# Patient Record
Sex: MALE | Race: WHITE | NOT HISPANIC OR LATINO | Employment: OTHER | URBAN - METROPOLITAN AREA
[De-identification: names, ages, dates, MRNs, and addresses within clinical notes are randomized per-mention and may not be internally consistent; named-entity substitution may affect disease eponyms.]

---

## 2017-01-05 ENCOUNTER — HOSPITAL ENCOUNTER (OUTPATIENT)
Dept: ONCOLOGY | Facility: CLINIC | Age: 67
Discharge: HOME | End: 2017-01-05
Attending: INTERNAL MEDICINE | Admitting: INTERNAL MEDICINE

## 2017-01-05 LAB
ALBUMIN SERPL-MCNC: 3.9 G/DL (ref 3.5–4.8)
ALBUMIN/GLOB SERPL: 1.3 {RATIO} (ref 1–1.7)
ALP SERPL-CCNC: 35 IU/L (ref 32–91)
ALT SERPL-CCNC: 10 IU/L (ref 17–63)
ANION GAP SERPL CALC-SCNC: 14.9 MMOL/L (ref 10–20)
AST SERPL-CCNC: 17 IU/L (ref 15–41)
BILIRUB SERPL-MCNC: 0.4 MG/DL (ref 0.3–1.2)
BUN SERPL-MCNC: 9 MG/DL (ref 8–20)
BUN/CREAT SERPL: 10 (ref 6.2–20.3)
CALCIUM SERPL-MCNC: 9.7 MG/DL (ref 8.9–10.3)
CEA SERPL-MCNC: NORMAL NG/ML (ref 0–5)
CHLORIDE SERPL-SCNC: 107 MMOL/L (ref 101–111)
CONV CO2: 25 MMOL/L (ref 22–32)
CONV TOTAL PROTEIN: 7 G/DL (ref 6.1–7.9)
CREAT UR-MCNC: 0.9 MG/DL (ref 0.7–1.2)
FERRITIN SERPL-MCNC: 35 NG/ML (ref 24–336)
FOLATE SERPL-MCNC: 10.2 NG/ML (ref 5.9–24.8)
GLOBULIN UR ELPH-MCNC: 3.1 G/DL (ref 2.5–3.8)
GLUCOSE SERPL-MCNC: 127 MG/DL (ref 65–99)
IRON SATN MFR SERPL: 16 % (ref 20–50)
IRON SERPL-MCNC: 56 UG/DL (ref 45–182)
POTASSIUM SERPL-SCNC: 4.9 MMOL/L (ref 3.6–5.1)
SODIUM SERPL-SCNC: 142 MMOL/L (ref 136–144)
TIBC SERPL-MCNC: 354 UG/DL (ref 228–428)
VIT B12 SERPL-MCNC: 200 PG/ML (ref 180–914)

## 2017-01-12 ENCOUNTER — HOSPITAL ENCOUNTER (OUTPATIENT)
Dept: ONCOLOGY | Facility: CLINIC | Age: 67
Discharge: HOME | End: 2017-01-12
Attending: INTERNAL MEDICINE | Admitting: INTERNAL MEDICINE

## 2017-01-12 LAB — GLUCOSE BLD-MCNC: 157 MG/DL (ref 70–105)

## 2017-01-17 ENCOUNTER — CONVERSION ENCOUNTER (OUTPATIENT)
Dept: SURGERY | Facility: CLINIC | Age: 67
End: 2017-01-17

## 2017-01-18 ENCOUNTER — OFFICE (AMBULATORY)
Dept: URBAN - METROPOLITAN AREA CLINIC 64 | Facility: CLINIC | Age: 67
End: 2017-01-18
Payer: COMMERCIAL

## 2017-01-18 VITALS — DIASTOLIC BLOOD PRESSURE: 65 MMHG | HEART RATE: 80 BPM | SYSTOLIC BLOOD PRESSURE: 112 MMHG | WEIGHT: 157 LBS

## 2017-01-18 DIAGNOSIS — C15.9 MALIGNANT NEOPLASM OF ESOPHAGUS, UNSPECIFIED: ICD-10-CM

## 2017-01-18 PROCEDURE — 99202 OFFICE O/P NEW SF 15 MIN: CPT | Performed by: INTERNAL MEDICINE

## 2017-01-18 RX ORDER — OMEPRAZOLE 20 MG/1
20 CAPSULE, DELAYED RELEASE ORAL
Qty: 30 | Refills: 11 | Status: COMPLETED
Start: 2017-01-18 | End: 2017-11-27

## 2017-01-18 RX ORDER — SUCRALFATE 1 G/10ML
3000 SUSPENSION ORAL
Qty: 900 | Refills: 12 | Status: ACTIVE
Start: 2017-01-18

## 2017-01-20 ENCOUNTER — HOSPITAL ENCOUNTER (OUTPATIENT)
Dept: ONCOLOGY | Facility: CLINIC | Age: 67
Discharge: HOME | End: 2017-01-20
Attending: INTERNAL MEDICINE | Admitting: INTERNAL MEDICINE

## 2017-01-25 ENCOUNTER — HOSPITAL ENCOUNTER (OUTPATIENT)
Dept: ONCOLOGY | Facility: CLINIC | Age: 67
Discharge: HOME | End: 2017-01-25
Attending: NURSE PRACTITIONER | Admitting: NURSE PRACTITIONER

## 2017-01-26 ENCOUNTER — HOSPITAL ENCOUNTER (OUTPATIENT)
Dept: PREOP | Facility: HOSPITAL | Age: 67
Setting detail: HOSPITAL OUTPATIENT SURGERY
Discharge: HOME | End: 2017-01-26
Attending: THORACIC SURGERY (CARDIOTHORACIC VASCULAR SURGERY) | Admitting: THORACIC SURGERY (CARDIOTHORACIC VASCULAR SURGERY)

## 2017-01-26 LAB — GLUCOSE BLD-MCNC: 108 MG/DL (ref 70–105)

## 2017-01-30 ENCOUNTER — HOSPITAL ENCOUNTER (OUTPATIENT)
Dept: ONCOLOGY | Facility: CLINIC | Age: 67
Discharge: HOME | End: 2017-01-30
Attending: INTERNAL MEDICINE | Admitting: INTERNAL MEDICINE

## 2017-01-30 ENCOUNTER — HOSPITAL ENCOUNTER (OUTPATIENT)
Dept: RADIATION ONCOLOGY | Facility: HOSPITAL | Age: 67
Setting detail: RECURRING SERIES
Discharge: HOME | End: 2017-03-15
Attending: RADIOLOGY | Admitting: RADIOLOGY

## 2017-01-30 LAB
ALBUMIN SERPL-MCNC: 3.9 G/DL (ref 3.5–4.8)
ALBUMIN/GLOB SERPL: 1.3 {RATIO} (ref 1–1.7)
ALP SERPL-CCNC: 35 IU/L (ref 32–91)
ALT SERPL-CCNC: 9 IU/L (ref 17–63)
ANION GAP SERPL CALC-SCNC: 13.8 MMOL/L (ref 10–20)
AST SERPL-CCNC: 20 IU/L (ref 15–41)
BILIRUB SERPL-MCNC: 0.4 MG/DL (ref 0.3–1.2)
BUN SERPL-MCNC: 13 MG/DL (ref 8–20)
BUN/CREAT SERPL: 14.4 (ref 6.2–20.3)
CALCIUM SERPL-MCNC: 9.2 MG/DL (ref 8.9–10.3)
CHLORIDE SERPL-SCNC: 103 MMOL/L (ref 101–111)
CONV CO2: 24 MMOL/L (ref 22–32)
CONV TOTAL PROTEIN: 7 G/DL (ref 6.1–7.9)
CREAT BLDA-MCNC: 0.7 MG/DL (ref 0.6–1.3)
CREAT UR-MCNC: 0.9 MG/DL (ref 0.7–1.2)
GLOBULIN UR ELPH-MCNC: 3.1 G/DL (ref 2.5–3.8)
GLUCOSE SERPL-MCNC: 280 MG/DL (ref 65–99)
POTASSIUM SERPL-SCNC: 4.8 MMOL/L (ref 3.6–5.1)
SODIUM SERPL-SCNC: 136 MMOL/L (ref 136–144)

## 2017-02-06 ENCOUNTER — HOSPITAL ENCOUNTER (OUTPATIENT)
Dept: ONCOLOGY | Facility: CLINIC | Age: 67
Discharge: HOME | End: 2017-02-06
Attending: INTERNAL MEDICINE | Admitting: INTERNAL MEDICINE

## 2017-02-06 LAB — CREAT BLDA-MCNC: 0.6 MG/DL (ref 0.6–1.3)

## 2017-02-13 ENCOUNTER — HOSPITAL ENCOUNTER (OUTPATIENT)
Dept: ONCOLOGY | Facility: CLINIC | Age: 67
Discharge: HOME | End: 2017-02-13
Attending: INTERNAL MEDICINE | Admitting: INTERNAL MEDICINE

## 2017-02-13 LAB — CREAT BLDA-MCNC: 0.6 MG/DL (ref 0.6–1.3)

## 2017-02-20 ENCOUNTER — HOSPITAL ENCOUNTER (OUTPATIENT)
Dept: ONCOLOGY | Facility: CLINIC | Age: 67
Discharge: HOME | End: 2017-02-20
Attending: INTERNAL MEDICINE | Admitting: INTERNAL MEDICINE

## 2017-02-20 LAB
ALBUMIN SERPL-MCNC: 3.5 G/DL (ref 3.5–4.8)
ALBUMIN/GLOB SERPL: 1.3 {RATIO} (ref 1–1.7)
ALP SERPL-CCNC: 34 IU/L (ref 32–91)
ALT SERPL-CCNC: 11 IU/L (ref 17–63)
ANION GAP SERPL CALC-SCNC: 12.7 MMOL/L (ref 10–20)
AST SERPL-CCNC: 22 IU/L (ref 15–41)
BILIRUB SERPL-MCNC: 0.4 MG/DL (ref 0.3–1.2)
BUN SERPL-MCNC: 9 MG/DL (ref 8–20)
BUN/CREAT SERPL: 12.9 (ref 6.2–20.3)
CALCIUM SERPL-MCNC: 9 MG/DL (ref 8.9–10.3)
CHLORIDE SERPL-SCNC: 104 MMOL/L (ref 101–111)
CONV CO2: 24 MMOL/L (ref 22–32)
CONV TOTAL PROTEIN: 6.3 G/DL (ref 6.1–7.9)
CREAT BLDA-MCNC: 0.5 MG/DL (ref 0.6–1.3)
CREAT UR-MCNC: 0.7 MG/DL (ref 0.7–1.2)
FERRITIN SERPL-MCNC: 361 NG/ML (ref 24–336)
FOLATE SERPL-MCNC: 15 NG/ML (ref 5.9–24.8)
GLOBULIN UR ELPH-MCNC: 2.8 G/DL (ref 2.5–3.8)
GLUCOSE SERPL-MCNC: 256 MG/DL (ref 65–99)
IRON SATN MFR SERPL: 33 % (ref 20–50)
IRON SERPL-MCNC: 88 UG/DL (ref 45–182)
MAGNESIUM UR-MCNC: 0.9 % (ref 0.5–1.5)
POTASSIUM SERPL-SCNC: 4.7 MMOL/L (ref 3.6–5.1)
RETICS/RBC NFR MANUAL: 0.03 10*6/UL
SODIUM SERPL-SCNC: 136 MMOL/L (ref 136–144)
TIBC SERPL-MCNC: 263 UG/DL (ref 228–428)

## 2017-02-22 LAB — HAPTOGLOB SERPL-MCNC: 214 MG/DL (ref 36–195)

## 2017-02-27 ENCOUNTER — HOSPITAL ENCOUNTER (OUTPATIENT)
Dept: ONCOLOGY | Facility: CLINIC | Age: 67
Discharge: HOME | End: 2017-02-27
Attending: INTERNAL MEDICINE | Admitting: INTERNAL MEDICINE

## 2017-03-06 ENCOUNTER — HOSPITAL ENCOUNTER (OUTPATIENT)
Dept: ONCOLOGY | Facility: CLINIC | Age: 67
Discharge: HOME | End: 2017-03-06
Attending: INTERNAL MEDICINE | Admitting: INTERNAL MEDICINE

## 2017-03-06 LAB — CREAT BLDA-MCNC: 0.6 MG/DL (ref 0.6–1.3)

## 2017-03-07 ENCOUNTER — CONVERSION ENCOUNTER (OUTPATIENT)
Dept: SURGERY | Facility: CLINIC | Age: 67
End: 2017-03-07

## 2017-03-13 ENCOUNTER — HOSPITAL ENCOUNTER (OUTPATIENT)
Dept: ONCOLOGY | Facility: CLINIC | Age: 67
Discharge: HOME | End: 2017-03-13
Attending: INTERNAL MEDICINE | Admitting: INTERNAL MEDICINE

## 2017-03-13 LAB — CREAT BLDA-MCNC: 0.8 MG/DL (ref 0.6–1.3)

## 2017-03-20 ENCOUNTER — HOSPITAL ENCOUNTER (OUTPATIENT)
Dept: ONCOLOGY | Facility: CLINIC | Age: 67
Discharge: HOME | End: 2017-03-20
Attending: INTERNAL MEDICINE | Admitting: INTERNAL MEDICINE

## 2017-03-23 LAB — CREAT BLDA-MCNC: 0.7 MG/DL (ref 0.6–1.3)

## 2017-03-27 ENCOUNTER — HOSPITAL ENCOUNTER (OUTPATIENT)
Dept: ONCOLOGY | Facility: CLINIC | Age: 67
Discharge: HOME | End: 2017-03-27
Attending: INTERNAL MEDICINE | Admitting: INTERNAL MEDICINE

## 2017-04-04 ENCOUNTER — CONVERSION ENCOUNTER (OUTPATIENT)
Dept: SURGERY | Facility: CLINIC | Age: 67
End: 2017-04-04

## 2017-04-07 ENCOUNTER — HOSPITAL ENCOUNTER (OUTPATIENT)
Dept: NUCLEAR MEDICINE | Facility: HOSPITAL | Age: 67
Discharge: HOME | End: 2017-04-07
Attending: THORACIC SURGERY (CARDIOTHORACIC VASCULAR SURGERY) | Admitting: THORACIC SURGERY (CARDIOTHORACIC VASCULAR SURGERY)

## 2017-04-11 ENCOUNTER — HOSPITAL ENCOUNTER (OUTPATIENT)
Dept: RADIATION ONCOLOGY | Facility: HOSPITAL | Age: 67
Discharge: HOME | End: 2017-04-11
Attending: RADIOLOGY | Admitting: RADIOLOGY

## 2017-04-17 ENCOUNTER — HOSPITAL ENCOUNTER (OUTPATIENT)
Dept: PREOP | Facility: HOSPITAL | Age: 67
Setting detail: HOSPITAL OUTPATIENT SURGERY
Discharge: HOME | End: 2017-04-17
Attending: THORACIC SURGERY (CARDIOTHORACIC VASCULAR SURGERY) | Admitting: THORACIC SURGERY (CARDIOTHORACIC VASCULAR SURGERY)

## 2017-04-17 LAB
GLUCOSE BLD-MCNC: 63 MG/DL (ref 70–105)
GLUCOSE BLD-MCNC: 76 MG/DL (ref 70–105)

## 2017-05-01 ENCOUNTER — HOSPITAL ENCOUNTER (OUTPATIENT)
Dept: ONCOLOGY | Facility: CLINIC | Age: 67
Discharge: HOME | End: 2017-05-01
Attending: INTERNAL MEDICINE | Admitting: INTERNAL MEDICINE

## 2017-05-19 ENCOUNTER — CONVERSION ENCOUNTER (OUTPATIENT)
Dept: SURGERY | Facility: CLINIC | Age: 67
End: 2017-05-19

## 2017-05-22 ENCOUNTER — HOSPITAL ENCOUNTER (OUTPATIENT)
Dept: ONCOLOGY | Facility: CLINIC | Age: 67
Discharge: HOME | End: 2017-05-22
Attending: INTERNAL MEDICINE | Admitting: INTERNAL MEDICINE

## 2017-05-26 ENCOUNTER — CONVERSION ENCOUNTER (OUTPATIENT)
Dept: SURGERY | Facility: CLINIC | Age: 67
End: 2017-05-26

## 2017-05-26 ENCOUNTER — HOSPITAL ENCOUNTER (OUTPATIENT)
Dept: GENERAL RADIOLOGY | Facility: HOSPITAL | Age: 67
Discharge: HOME | End: 2017-05-26
Attending: THORACIC SURGERY (CARDIOTHORACIC VASCULAR SURGERY) | Admitting: THORACIC SURGERY (CARDIOTHORACIC VASCULAR SURGERY)

## 2017-06-19 ENCOUNTER — HOSPITAL ENCOUNTER (OUTPATIENT)
Dept: ONCOLOGY | Facility: CLINIC | Age: 67
Discharge: HOME | End: 2017-06-19
Attending: INTERNAL MEDICINE | Admitting: INTERNAL MEDICINE

## 2017-06-19 LAB
FERRITIN SERPL-MCNC: 190 NG/ML (ref 24–336)
IRON SATN MFR SERPL: 15 % (ref 20–50)
IRON SERPL-MCNC: 49 UG/DL (ref 45–182)
TIBC SERPL-MCNC: 318 UG/DL (ref 228–428)

## 2017-06-27 ENCOUNTER — CONVERSION ENCOUNTER (OUTPATIENT)
Dept: SURGERY | Facility: CLINIC | Age: 67
End: 2017-06-27

## 2017-06-27 ENCOUNTER — HOSPITAL ENCOUNTER (OUTPATIENT)
Dept: PREADMISSION TESTING | Facility: HOSPITAL | Age: 67
Discharge: HOME | End: 2017-06-27
Attending: THORACIC SURGERY (CARDIOTHORACIC VASCULAR SURGERY) | Admitting: THORACIC SURGERY (CARDIOTHORACIC VASCULAR SURGERY)

## 2017-06-27 LAB
ALBUMIN SERPL-MCNC: 3.9 G/DL (ref 3.5–4.8)
ALBUMIN/GLOB SERPL: 1.3 {RATIO} (ref 1–1.7)
ALP SERPL-CCNC: 42 IU/L (ref 32–91)
ALT SERPL-CCNC: 17 IU/L (ref 17–63)
ANION GAP SERPL CALC-SCNC: 11.8 MMOL/L (ref 10–20)
AST SERPL-CCNC: 23 IU/L (ref 15–41)
BASOPHILS # BLD AUTO: 0 10*3/UL (ref 0–0.2)
BASOPHILS NFR BLD AUTO: 1 % (ref 0–2)
BILIRUB SERPL-MCNC: 0.5 MG/DL (ref 0.3–1.2)
BUN SERPL-MCNC: 14 MG/DL (ref 8–20)
BUN/CREAT SERPL: 20 (ref 6.2–20.3)
CALCIUM SERPL-MCNC: 9.7 MG/DL (ref 8.9–10.3)
CHLORIDE SERPL-SCNC: 103 MMOL/L (ref 101–111)
CONV CO2: 29 MMOL/L (ref 22–32)
CONV TOTAL PROTEIN: 6.9 G/DL (ref 6.1–7.9)
CREAT UR-MCNC: 0.7 MG/DL (ref 0.7–1.2)
DIFFERENTIAL METHOD BLD: (no result)
EOSINOPHIL # BLD AUTO: 0.1 10*3/UL (ref 0–0.3)
EOSINOPHIL # BLD AUTO: 2 % (ref 0–3)
ERYTHROCYTE [DISTWIDTH] IN BLOOD BY AUTOMATED COUNT: 15.7 % (ref 11.5–14.5)
GLOBULIN UR ELPH-MCNC: 3 G/DL (ref 2.5–3.8)
GLUCOSE SERPL-MCNC: 100 MG/DL (ref 65–99)
HCT VFR BLD AUTO: 35.2 % (ref 40–54)
HGB BLD-MCNC: 11.9 G/DL (ref 14–18)
LYMPHOCYTES # BLD AUTO: 1.4 10*3/UL (ref 0.8–4.8)
LYMPHOCYTES NFR BLD AUTO: 23 % (ref 18–42)
MCH RBC QN AUTO: 29.9 PG (ref 26–32)
MCHC RBC AUTO-ENTMCNC: 33.8 G/DL (ref 32–36)
MCV RBC AUTO: 88.6 FL (ref 80–94)
MONOCYTES # BLD AUTO: 0.6 10*3/UL (ref 0.1–1.3)
MONOCYTES NFR BLD AUTO: 11 % (ref 2–11)
NEUTROPHILS # BLD AUTO: 3.9 10*3/UL (ref 2.3–8.6)
NEUTROPHILS NFR BLD AUTO: 63 % (ref 50–75)
NRBC BLD AUTO-RTO: 0 /100{WBCS}
NRBC/RBC NFR BLD MANUAL: 0 10*3/UL
PLATELET # BLD AUTO: 179 10*3/UL (ref 150–450)
PMV BLD AUTO: 8.6 FL (ref 7.4–10.4)
POTASSIUM SERPL-SCNC: 4.8 MMOL/L (ref 3.6–5.1)
RBC # BLD AUTO: 3.97 10*6/UL (ref 4.6–6)
SODIUM SERPL-SCNC: 139 MMOL/L (ref 136–144)
WBC # BLD AUTO: 6.1 10*3/UL (ref 4.5–11.5)

## 2017-07-06 ENCOUNTER — HOSPITAL ENCOUNTER (OUTPATIENT)
Dept: PREOP | Facility: HOSPITAL | Age: 67
Setting detail: HOSPITAL OUTPATIENT SURGERY
Discharge: HOME | End: 2017-07-06
Attending: THORACIC SURGERY (CARDIOTHORACIC VASCULAR SURGERY) | Admitting: THORACIC SURGERY (CARDIOTHORACIC VASCULAR SURGERY)

## 2017-07-06 LAB — GLUCOSE BLD-MCNC: 133 MG/DL (ref 70–105)

## 2017-07-11 ENCOUNTER — HOSPITAL ENCOUNTER (OUTPATIENT)
Dept: RADIATION ONCOLOGY | Facility: HOSPITAL | Age: 67
Discharge: HOME | End: 2017-07-11
Attending: RADIOLOGY | Admitting: RADIOLOGY

## 2017-07-12 ENCOUNTER — HOSPITAL ENCOUNTER (OUTPATIENT)
Dept: PREOP | Facility: HOSPITAL | Age: 67
Setting detail: HOSPITAL OUTPATIENT SURGERY
Discharge: HOME | End: 2017-07-12
Attending: THORACIC SURGERY (CARDIOTHORACIC VASCULAR SURGERY) | Admitting: THORACIC SURGERY (CARDIOTHORACIC VASCULAR SURGERY)

## 2017-07-12 LAB — GLUCOSE BLD-MCNC: 102 MG/DL (ref 70–105)

## 2017-07-20 ENCOUNTER — CONVERSION ENCOUNTER (OUTPATIENT)
Dept: SURGERY | Facility: CLINIC | Age: 67
End: 2017-07-20

## 2017-07-26 ENCOUNTER — HOSPITAL ENCOUNTER (OUTPATIENT)
Dept: PREOP | Facility: HOSPITAL | Age: 67
Setting detail: HOSPITAL OUTPATIENT SURGERY
Discharge: HOME | End: 2017-07-26
Attending: THORACIC SURGERY (CARDIOTHORACIC VASCULAR SURGERY) | Admitting: THORACIC SURGERY (CARDIOTHORACIC VASCULAR SURGERY)

## 2017-07-26 LAB — GLUCOSE BLD-MCNC: 141 MG/DL (ref 70–105)

## 2017-07-31 ENCOUNTER — HOSPITAL ENCOUNTER (OUTPATIENT)
Dept: ONCOLOGY | Facility: CLINIC | Age: 67
Discharge: HOME | End: 2017-07-31
Attending: INTERNAL MEDICINE | Admitting: INTERNAL MEDICINE

## 2017-07-31 LAB — GLUCOSE BLD-MCNC: 126 MG/DL (ref 70–105)

## 2017-08-07 ENCOUNTER — HOSPITAL ENCOUNTER (OUTPATIENT)
Dept: ONCOLOGY | Facility: CLINIC | Age: 67
Discharge: HOME | End: 2017-08-07
Attending: INTERNAL MEDICINE | Admitting: INTERNAL MEDICINE

## 2017-08-11 ENCOUNTER — HOSPITAL ENCOUNTER (OUTPATIENT)
Dept: PREOP | Facility: HOSPITAL | Age: 67
Setting detail: HOSPITAL OUTPATIENT SURGERY
Discharge: HOME | End: 2017-08-11
Attending: THORACIC SURGERY (CARDIOTHORACIC VASCULAR SURGERY) | Admitting: THORACIC SURGERY (CARDIOTHORACIC VASCULAR SURGERY)

## 2017-08-11 LAB — GLUCOSE BLD-MCNC: 120 MG/DL (ref 70–105)

## 2017-08-28 ENCOUNTER — HOSPITAL ENCOUNTER (OUTPATIENT)
Dept: PREOP | Facility: HOSPITAL | Age: 67
Setting detail: HOSPITAL OUTPATIENT SURGERY
Discharge: HOME | End: 2017-08-28
Attending: THORACIC SURGERY (CARDIOTHORACIC VASCULAR SURGERY) | Admitting: THORACIC SURGERY (CARDIOTHORACIC VASCULAR SURGERY)

## 2017-08-28 LAB — GLUCOSE BLD-MCNC: 111 MG/DL (ref 70–105)

## 2017-09-19 ENCOUNTER — CONVERSION ENCOUNTER (OUTPATIENT)
Dept: SURGERY | Facility: CLINIC | Age: 67
End: 2017-09-19

## 2017-09-19 ENCOUNTER — CLINICAL SUPPORT (OUTPATIENT)
Dept: ONCOLOGY | Facility: HOSPITAL | Age: 67
End: 2017-09-19

## 2017-09-19 ENCOUNTER — HOSPITAL ENCOUNTER (OUTPATIENT)
Dept: ONCOLOGY | Facility: CLINIC | Age: 67
Setting detail: INFUSION SERIES
Discharge: HOME | End: 2017-09-19
Attending: INTERNAL MEDICINE | Admitting: INTERNAL MEDICINE

## 2017-09-19 ENCOUNTER — HOSPITAL ENCOUNTER (OUTPATIENT)
Dept: ONCOLOGY | Facility: HOSPITAL | Age: 67
Discharge: HOME | End: 2017-09-19
Attending: INTERNAL MEDICINE | Admitting: INTERNAL MEDICINE

## 2017-09-19 NOTE — PROGRESS NOTES
PATIENTS ONCOLOGY RECORD LOCATED IN [facility]      Subjective     Name:  DOROTHEA WALTON     Date:  2017  Address:  [address] [place] [address] [place] [facility]  Home: [phone]  :  1950 AGE:  67 y.o.        RECORDS OBTAINED:  Patients Oncology Record is located in [facility]

## 2017-09-27 ENCOUNTER — HOSPITAL ENCOUNTER (OUTPATIENT)
Dept: PREOP | Facility: HOSPITAL | Age: 67
Setting detail: HOSPITAL OUTPATIENT SURGERY
Discharge: HOME | End: 2017-09-27
Attending: THORACIC SURGERY (CARDIOTHORACIC VASCULAR SURGERY) | Admitting: THORACIC SURGERY (CARDIOTHORACIC VASCULAR SURGERY)

## 2017-09-29 ENCOUNTER — OFFICE (AMBULATORY)
Dept: URBAN - METROPOLITAN AREA CLINIC 64 | Facility: CLINIC | Age: 67
End: 2017-09-29
Payer: MEDICARE

## 2017-09-29 VITALS
DIASTOLIC BLOOD PRESSURE: 82 MMHG | HEART RATE: 65 BPM | SYSTOLIC BLOOD PRESSURE: 140 MMHG | DIASTOLIC BLOOD PRESSURE: 82 MMHG | HEART RATE: 65 BPM | HEART RATE: 65 BPM | WEIGHT: 156 LBS | WEIGHT: 156 LBS | SYSTOLIC BLOOD PRESSURE: 140 MMHG | HEIGHT: 66 IN | HEIGHT: 66 IN | WEIGHT: 156 LBS | SYSTOLIC BLOOD PRESSURE: 140 MMHG | DIASTOLIC BLOOD PRESSURE: 82 MMHG | HEIGHT: 66 IN

## 2017-09-29 DIAGNOSIS — K91.89 OTHER POSTPROCEDURAL COMPLICATIONS AND DISORDERS OF DIGESTIV: ICD-10-CM

## 2017-09-29 DIAGNOSIS — R13.10 DYSPHAGIA, UNSPECIFIED: ICD-10-CM

## 2017-09-29 DIAGNOSIS — K21.9 GASTRO-ESOPHAGEAL REFLUX DISEASE WITHOUT ESOPHAGITIS: ICD-10-CM

## 2017-09-29 PROCEDURE — 99215 OFFICE O/P EST HI 40 MIN: CPT | Performed by: INTERNAL MEDICINE

## 2017-09-29 PROCEDURE — 99244 OFF/OP CNSLTJ NEW/EST MOD 40: CPT | Performed by: INTERNAL MEDICINE

## 2017-09-29 PROCEDURE — 99204 OFFICE O/P NEW MOD 45 MIN: CPT | Performed by: INTERNAL MEDICINE

## 2017-10-10 ENCOUNTER — ON CAMPUS - OUTPATIENT (AMBULATORY)
Dept: URBAN - METROPOLITAN AREA HOSPITAL 85 | Facility: HOSPITAL | Age: 67
End: 2017-10-10
Payer: COMMERCIAL

## 2017-10-10 ENCOUNTER — HOSPITAL ENCOUNTER (OUTPATIENT)
Dept: PREOP | Facility: HOSPITAL | Age: 67
Setting detail: HOSPITAL OUTPATIENT SURGERY
Discharge: HOME | End: 2017-10-10
Attending: INTERNAL MEDICINE | Admitting: INTERNAL MEDICINE

## 2017-10-10 DIAGNOSIS — K22.2 ESOPHAGEAL OBSTRUCTION: ICD-10-CM

## 2017-10-10 LAB
GLUCOSE BLD-MCNC: 102 MG/DL (ref 70–105)
GLUCOSE BLD-MCNC: 94 MG/DL (ref 70–105)

## 2017-10-10 PROCEDURE — 43266 EGD ENDOSCOPIC STENT PLACE: CPT | Performed by: INTERNAL MEDICINE

## 2017-10-19 ENCOUNTER — CONVERSION ENCOUNTER (OUTPATIENT)
Dept: SURGERY | Facility: CLINIC | Age: 67
End: 2017-10-19

## 2017-10-19 ENCOUNTER — HOSPITAL ENCOUNTER (OUTPATIENT)
Dept: ONCOLOGY | Facility: CLINIC | Age: 67
Setting detail: INFUSION SERIES
Discharge: HOME | End: 2017-10-19
Attending: INTERNAL MEDICINE | Admitting: INTERNAL MEDICINE

## 2017-10-19 ENCOUNTER — HOSPITAL ENCOUNTER (OUTPATIENT)
Dept: ONCOLOGY | Facility: HOSPITAL | Age: 67
Discharge: HOME | End: 2017-10-19
Attending: INTERNAL MEDICINE | Admitting: INTERNAL MEDICINE

## 2017-10-25 ENCOUNTER — OFFICE (AMBULATORY)
Dept: URBAN - METROPOLITAN AREA CLINIC 64 | Facility: CLINIC | Age: 67
End: 2017-10-25
Payer: COMMERCIAL

## 2017-10-25 VITALS
SYSTOLIC BLOOD PRESSURE: 139 MMHG | WEIGHT: 150 LBS | DIASTOLIC BLOOD PRESSURE: 82 MMHG | HEIGHT: 66 IN | HEART RATE: 64 BPM

## 2017-10-25 DIAGNOSIS — Z85.01 PERSONAL HISTORY OF MALIGNANT NEOPLASM OF ESOPHAGUS: ICD-10-CM

## 2017-10-25 DIAGNOSIS — K91.89 OTHER POSTPROCEDURAL COMPLICATIONS AND DISORDERS OF DIGESTIV: ICD-10-CM

## 2017-10-25 PROCEDURE — 99213 OFFICE O/P EST LOW 20 MIN: CPT | Performed by: INTERNAL MEDICINE

## 2017-11-14 ENCOUNTER — HOSPITAL ENCOUNTER (OUTPATIENT)
Dept: ONCOLOGY | Facility: HOSPITAL | Age: 67
Discharge: HOME | End: 2017-11-14
Attending: INTERNAL MEDICINE | Admitting: INTERNAL MEDICINE

## 2017-11-14 ENCOUNTER — HOSPITAL ENCOUNTER (OUTPATIENT)
Dept: ONCOLOGY | Facility: CLINIC | Age: 67
Setting detail: INFUSION SERIES
Discharge: HOME | End: 2017-11-14
Attending: INTERNAL MEDICINE | Admitting: INTERNAL MEDICINE

## 2017-11-14 ENCOUNTER — HOSPITAL ENCOUNTER (OUTPATIENT)
Dept: ONCOLOGY | Facility: HOSPITAL | Age: 67
Discharge: HOME | End: 2017-11-14
Attending: RADIOLOGY | Admitting: RADIOLOGY

## 2017-11-14 ENCOUNTER — CLINICAL SUPPORT (OUTPATIENT)
Dept: ONCOLOGY | Facility: HOSPITAL | Age: 67
End: 2017-11-14

## 2017-11-14 NOTE — PROGRESS NOTES
PATIENTS ONCOLOGY RECORD LOCATED IN [facility]      Subjective     Name:  DOROTHEA WALTON     Date:  2017  Address:  [address] [place] [address] [place] [facility]  Home: [phone]  :  1950 AGE:  67 y.o.        RECORDS OBTAINED:  Patients Oncology Record is located in [facility]

## 2017-11-16 ENCOUNTER — CONVERSION ENCOUNTER (OUTPATIENT)
Dept: SURGERY | Facility: CLINIC | Age: 67
End: 2017-11-16

## 2017-11-27 ENCOUNTER — OFFICE (AMBULATORY)
Dept: URBAN - METROPOLITAN AREA CLINIC 64 | Facility: CLINIC | Age: 67
End: 2017-11-27
Payer: COMMERCIAL

## 2017-11-27 VITALS
HEIGHT: 66 IN | SYSTOLIC BLOOD PRESSURE: 111 MMHG | DIASTOLIC BLOOD PRESSURE: 66 MMHG | WEIGHT: 141 LBS | HEART RATE: 75 BPM

## 2017-11-27 DIAGNOSIS — K21.9 GASTRO-ESOPHAGEAL REFLUX DISEASE WITHOUT ESOPHAGITIS: ICD-10-CM

## 2017-11-27 DIAGNOSIS — Z85.01 PERSONAL HISTORY OF MALIGNANT NEOPLASM OF ESOPHAGUS: ICD-10-CM

## 2017-11-27 DIAGNOSIS — K91.89 OTHER POSTPROCEDURAL COMPLICATIONS AND DISORDERS OF DIGESTIV: ICD-10-CM

## 2017-11-27 DIAGNOSIS — R13.10 DYSPHAGIA, UNSPECIFIED: ICD-10-CM

## 2017-11-27 PROCEDURE — 99214 OFFICE O/P EST MOD 30 MIN: CPT | Performed by: INTERNAL MEDICINE

## 2017-11-27 RX ORDER — LANSOPRAZOLE 30 MG/1
30 TABLET, ORALLY DISINTEGRATING, DELAYED RELEASE ORAL
Qty: 30 | Refills: 11 | Status: ACTIVE
Start: 2017-11-27

## 2017-11-27 RX ORDER — OMEPRAZOLE 20 MG/1
20 CAPSULE, DELAYED RELEASE ORAL
Qty: 30 | Refills: 11 | Status: COMPLETED
Start: 2017-01-18 | End: 2017-11-27

## 2017-12-22 ENCOUNTER — HOSPITAL ENCOUNTER (OUTPATIENT)
Dept: ONCOLOGY | Facility: HOSPITAL | Age: 67
Discharge: HOME | End: 2017-12-22
Attending: INTERNAL MEDICINE | Admitting: INTERNAL MEDICINE

## 2017-12-22 ENCOUNTER — CLINICAL SUPPORT (OUTPATIENT)
Dept: ONCOLOGY | Facility: HOSPITAL | Age: 67
End: 2017-12-22

## 2017-12-22 ENCOUNTER — HOSPITAL ENCOUNTER (OUTPATIENT)
Dept: ONCOLOGY | Facility: CLINIC | Age: 67
Setting detail: INFUSION SERIES
Discharge: HOME | End: 2017-12-22
Attending: INTERNAL MEDICINE | Admitting: INTERNAL MEDICINE

## 2017-12-22 NOTE — PROGRESS NOTES
PATIENTS ONCOLOGY RECORD LOCATED IN [facility]      Subjective     Name:  DOROTHEA WALTON     Date:  2017  Address:  [address] [place] [address] [place] [facility]  Home: [phone]  :  1950 AGE:  67 y.o.        RECORDS OBTAINED:  Patients Oncology Record is located in [facility]

## 2018-01-02 ENCOUNTER — HOSPITAL ENCOUNTER (OUTPATIENT)
Dept: ONCOLOGY | Facility: HOSPITAL | Age: 68
Discharge: HOME | End: 2018-01-02
Attending: INTERNAL MEDICINE | Admitting: INTERNAL MEDICINE

## 2018-01-02 LAB — GLUCOSE BLD-MCNC: 134 MG/DL (ref 70–105)

## 2018-01-08 ENCOUNTER — HOSPITAL ENCOUNTER (OUTPATIENT)
Dept: ONCOLOGY | Facility: HOSPITAL | Age: 68
Discharge: HOME | End: 2018-01-08
Attending: INTERNAL MEDICINE | Admitting: INTERNAL MEDICINE

## 2018-01-11 ENCOUNTER — CLINICAL SUPPORT (OUTPATIENT)
Dept: ONCOLOGY | Facility: HOSPITAL | Age: 68
End: 2018-01-11

## 2018-01-11 ENCOUNTER — HOSPITAL ENCOUNTER (OUTPATIENT)
Dept: ONCOLOGY | Facility: CLINIC | Age: 68
Setting detail: INFUSION SERIES
Discharge: HOME | End: 2018-01-11
Attending: INTERNAL MEDICINE | Admitting: INTERNAL MEDICINE

## 2018-01-11 ENCOUNTER — CONVERSION ENCOUNTER (OUTPATIENT)
Dept: SURGERY | Facility: CLINIC | Age: 68
End: 2018-01-11

## 2018-01-11 NOTE — PROGRESS NOTES
PATIENTS ONCOLOGY RECORD LOCATED IN [facility]      Subjective     Name:  DOROTHEA WALTON     Date:  2018  Address:  [address] [place] [address] [place] [facility]  Home: [phone]  :  1950 AGE:  67 y.o.        RECORDS OBTAINED:  Patients Oncology Record is located in [facility]

## 2018-01-18 ENCOUNTER — ON CAMPUS - OUTPATIENT (AMBULATORY)
Dept: URBAN - METROPOLITAN AREA HOSPITAL 85 | Facility: HOSPITAL | Age: 68
End: 2018-01-18
Payer: COMMERCIAL

## 2018-01-18 ENCOUNTER — HOSPITAL ENCOUNTER (OUTPATIENT)
Dept: PREOP | Facility: HOSPITAL | Age: 68
Setting detail: HOSPITAL OUTPATIENT SURGERY
Discharge: HOME | End: 2018-01-18
Attending: INTERNAL MEDICINE | Admitting: INTERNAL MEDICINE

## 2018-01-18 DIAGNOSIS — K22.2 ESOPHAGEAL OBSTRUCTION: ICD-10-CM

## 2018-01-18 LAB — GLUCOSE BLD-MCNC: 133 MG/DL (ref 70–105)

## 2018-01-18 PROCEDURE — 43239 EGD BIOPSY SINGLE/MULTIPLE: CPT | Performed by: INTERNAL MEDICINE

## 2018-01-19 LAB
GLUCOSE BLD-MCNC: 74 MG/DL (ref 70–105)
GLUCOSE BLD-MCNC: 93 MG/DL (ref 70–105)

## 2018-01-30 ENCOUNTER — OFFICE (AMBULATORY)
Dept: URBAN - METROPOLITAN AREA CLINIC 64 | Facility: CLINIC | Age: 68
End: 2018-01-30
Payer: COMMERCIAL

## 2018-01-30 VITALS
HEIGHT: 66 IN | DIASTOLIC BLOOD PRESSURE: 60 MMHG | HEART RATE: 70 BPM | WEIGHT: 146 LBS | SYSTOLIC BLOOD PRESSURE: 109 MMHG

## 2018-01-30 DIAGNOSIS — K91.89 OTHER POSTPROCEDURAL COMPLICATIONS AND DISORDERS OF DIGESTIV: ICD-10-CM

## 2018-01-30 DIAGNOSIS — K21.9 GASTRO-ESOPHAGEAL REFLUX DISEASE WITHOUT ESOPHAGITIS: ICD-10-CM

## 2018-01-30 PROCEDURE — 99213 OFFICE O/P EST LOW 20 MIN: CPT | Performed by: INTERNAL MEDICINE

## 2018-02-01 ENCOUNTER — HOSPITAL ENCOUNTER (OUTPATIENT)
Dept: GENERAL RADIOLOGY | Facility: HOSPITAL | Age: 68
Discharge: HOME | End: 2018-02-01
Attending: INTERNAL MEDICINE | Admitting: INTERNAL MEDICINE

## 2018-02-22 ENCOUNTER — ON CAMPUS - OUTPATIENT (AMBULATORY)
Dept: URBAN - METROPOLITAN AREA HOSPITAL 6 | Facility: HOSPITAL | Age: 68
End: 2018-02-22
Payer: COMMERCIAL

## 2018-02-22 DIAGNOSIS — K21.9 GASTRO-ESOPHAGEAL REFLUX DISEASE WITHOUT ESOPHAGITIS: ICD-10-CM

## 2018-02-22 DIAGNOSIS — Z85.038 PERSONAL HISTORY OF OTHER MALIGNANT NEOPLASM OF LARGE INTEST: ICD-10-CM

## 2018-02-22 DIAGNOSIS — R10.13 EPIGASTRIC PAIN: ICD-10-CM

## 2018-02-22 DIAGNOSIS — K20.8 OTHER ESOPHAGITIS: ICD-10-CM

## 2018-02-22 DIAGNOSIS — K22.2 ESOPHAGEAL OBSTRUCTION: ICD-10-CM

## 2018-02-22 PROCEDURE — 43249 ESOPH EGD DILATION <30 MM: CPT | Performed by: INTERNAL MEDICINE

## 2018-02-27 ENCOUNTER — CONVERSION ENCOUNTER (OUTPATIENT)
Dept: SURGERY | Facility: CLINIC | Age: 68
End: 2018-02-27

## 2018-02-27 ENCOUNTER — HOSPITAL ENCOUNTER (OUTPATIENT)
Dept: ONCOLOGY | Facility: HOSPITAL | Age: 68
Discharge: HOME | End: 2018-02-27
Attending: INTERNAL MEDICINE | Admitting: INTERNAL MEDICINE

## 2018-02-27 LAB — CREAT BLDA-MCNC: 0.7 MG/DL (ref 0.6–1.3)

## 2018-03-07 ENCOUNTER — OFFICE (AMBULATORY)
Dept: URBAN - METROPOLITAN AREA CLINIC 64 | Facility: CLINIC | Age: 68
End: 2018-03-07
Payer: COMMERCIAL

## 2018-03-07 VITALS
WEIGHT: 146 LBS | HEART RATE: 60 BPM | HEIGHT: 66 IN | SYSTOLIC BLOOD PRESSURE: 140 MMHG | DIASTOLIC BLOOD PRESSURE: 81 MMHG

## 2018-03-07 DIAGNOSIS — K31.84 GASTROPARESIS: ICD-10-CM

## 2018-03-07 DIAGNOSIS — Z85.01 PERSONAL HISTORY OF MALIGNANT NEOPLASM OF ESOPHAGUS: ICD-10-CM

## 2018-03-07 DIAGNOSIS — K91.89 OTHER POSTPROCEDURAL COMPLICATIONS AND DISORDERS OF DIGESTIV: ICD-10-CM

## 2018-03-07 DIAGNOSIS — K21.0 GASTRO-ESOPHAGEAL REFLUX DISEASE WITH ESOPHAGITIS: ICD-10-CM

## 2018-03-07 PROCEDURE — 99213 OFFICE O/P EST LOW 20 MIN: CPT | Performed by: INTERNAL MEDICINE

## 2018-03-14 ENCOUNTER — HOSPITAL ENCOUNTER (OUTPATIENT)
Dept: RADIATION ONCOLOGY | Facility: HOSPITAL | Age: 68
Discharge: HOME | End: 2018-03-14
Attending: RADIOLOGY | Admitting: RADIOLOGY

## 2018-03-27 ENCOUNTER — CONVERSION ENCOUNTER (OUTPATIENT)
Dept: SURGERY | Facility: CLINIC | Age: 68
End: 2018-03-27

## 2018-03-27 ENCOUNTER — CLINICAL SUPPORT (OUTPATIENT)
Dept: ONCOLOGY | Facility: HOSPITAL | Age: 68
End: 2018-03-27

## 2018-03-27 ENCOUNTER — HOSPITAL ENCOUNTER (OUTPATIENT)
Dept: ONCOLOGY | Facility: CLINIC | Age: 68
Setting detail: INFUSION SERIES
Discharge: HOME | End: 2018-03-27
Attending: INTERNAL MEDICINE | Admitting: INTERNAL MEDICINE

## 2018-03-27 NOTE — PROGRESS NOTES
PATIENTS ONCOLOGY RECORD LOCATED IN [facility]      Subjective     Name:  DOROTHEA WALTON     Date:  2018  Address:  [address] [place] [address] [place] [facility]  Home: [phone]  :  1950 AGE:  67 y.o.        RECORDS OBTAINED:  Patients Oncology Record is located in [facility]

## 2018-04-13 ENCOUNTER — HOSPITAL ENCOUNTER (OUTPATIENT)
Dept: ONCOLOGY | Facility: CLINIC | Age: 68
Setting detail: INFUSION SERIES
Discharge: HOME | End: 2018-04-13
Attending: INTERNAL MEDICINE | Admitting: INTERNAL MEDICINE

## 2018-04-13 ENCOUNTER — CLINICAL SUPPORT (OUTPATIENT)
Dept: ONCOLOGY | Facility: HOSPITAL | Age: 68
End: 2018-04-13

## 2018-04-13 NOTE — PROGRESS NOTES
PATIENTS ONCOLOGY RECORD LOCATED IN [facility]      Subjective     Name:  DOROTHEA WALTON     Date:  2018  Address:  [address] [place] [address] [place] [facility]  Home: [phone]  :  1950 AGE:  67 y.o.        RECORDS OBTAINED:  Patients Oncology Record is located in [facility]

## 2018-05-08 ENCOUNTER — OFFICE (AMBULATORY)
Dept: URBAN - METROPOLITAN AREA CLINIC 64 | Facility: CLINIC | Age: 68
End: 2018-05-08
Payer: COMMERCIAL

## 2018-05-08 VITALS
HEIGHT: 66 IN | SYSTOLIC BLOOD PRESSURE: 147 MMHG | HEART RATE: 60 BPM | DIASTOLIC BLOOD PRESSURE: 85 MMHG | WEIGHT: 146 LBS

## 2018-05-08 DIAGNOSIS — K21.0 GASTRO-ESOPHAGEAL REFLUX DISEASE WITH ESOPHAGITIS: ICD-10-CM

## 2018-05-08 DIAGNOSIS — R15.2 FECAL URGENCY: ICD-10-CM

## 2018-05-08 DIAGNOSIS — Z12.11 ENCOUNTER FOR SCREENING FOR MALIGNANT NEOPLASM OF COLON: ICD-10-CM

## 2018-05-08 DIAGNOSIS — K91.89 OTHER POSTPROCEDURAL COMPLICATIONS AND DISORDERS OF DIGESTIV: ICD-10-CM

## 2018-05-08 DIAGNOSIS — K31.84 GASTROPARESIS: ICD-10-CM

## 2018-05-08 DIAGNOSIS — I25.10 ATHEROSCLEROTIC HEART DISEASE OF NATIVE CORONARY ARTERY WITH: ICD-10-CM

## 2018-05-08 PROCEDURE — 99214 OFFICE O/P EST MOD 30 MIN: CPT | Performed by: INTERNAL MEDICINE

## 2018-05-08 RX ORDER — SUCRALFATE 1 G/1
4 TABLET ORAL
Qty: 120 | Refills: 5 | Status: COMPLETED
Start: 2018-05-08 | End: 2018-07-13

## 2018-05-14 ENCOUNTER — HOSPITAL ENCOUNTER (OUTPATIENT)
Dept: ONCOLOGY | Facility: CLINIC | Age: 68
Setting detail: INFUSION SERIES
Discharge: HOME | End: 2018-05-14
Attending: INTERNAL MEDICINE | Admitting: INTERNAL MEDICINE

## 2018-05-14 ENCOUNTER — CLINICAL SUPPORT (OUTPATIENT)
Dept: ONCOLOGY | Facility: HOSPITAL | Age: 68
End: 2018-05-14

## 2018-05-14 NOTE — PROGRESS NOTES
PATIENTS ONCOLOGY RECORD LOCATED IN [facility]      Subjective     Name:  DOROTHEA WALTON     Date:  2018  Address:  [address] [place] [address] [place] [facility]  Home: [phone]  :  1950 AGE:  68 y.o.        RECORDS OBTAINED:  Patients Oncology Record is located in [facility]

## 2018-05-22 ENCOUNTER — HOSPITAL ENCOUNTER (OUTPATIENT)
Dept: PREOP | Facility: HOSPITAL | Age: 68
Setting detail: HOSPITAL OUTPATIENT SURGERY
Discharge: HOME | End: 2018-05-22
Attending: INTERNAL MEDICINE | Admitting: INTERNAL MEDICINE

## 2018-05-22 ENCOUNTER — ON CAMPUS - OUTPATIENT (AMBULATORY)
Dept: URBAN - METROPOLITAN AREA HOSPITAL 85 | Facility: HOSPITAL | Age: 68
End: 2018-05-22
Payer: COMMERCIAL

## 2018-05-22 DIAGNOSIS — K22.2 ESOPHAGEAL OBSTRUCTION: ICD-10-CM

## 2018-05-22 DIAGNOSIS — K55.20 ANGIODYSPLASIA OF COLON WITHOUT HEMORRHAGE: ICD-10-CM

## 2018-05-22 DIAGNOSIS — K63.5 POLYP OF COLON: ICD-10-CM

## 2018-05-22 DIAGNOSIS — Z12.11 ENCOUNTER FOR SCREENING FOR MALIGNANT NEOPLASM OF COLON: ICD-10-CM

## 2018-05-22 LAB
GLUCOSE BLD-MCNC: 120 MG/DL (ref 70–105)
GLUCOSE BLD-MCNC: 126 MG/DL (ref 70–105)

## 2018-05-22 PROCEDURE — 43249 ESOPH EGD DILATION <30 MM: CPT | Mod: 59 | Performed by: INTERNAL MEDICINE

## 2018-05-22 PROCEDURE — 45385 COLONOSCOPY W/LESION REMOVAL: CPT | Mod: PT | Performed by: INTERNAL MEDICINE

## 2018-05-22 PROCEDURE — 45388 COLONOSCOPY W/ABLATION: CPT | Mod: 59,PT | Performed by: INTERNAL MEDICINE

## 2018-06-12 ENCOUNTER — CONVERSION ENCOUNTER (OUTPATIENT)
Dept: SURGERY | Facility: CLINIC | Age: 68
End: 2018-06-12

## 2018-06-13 ENCOUNTER — HOSPITAL ENCOUNTER (OUTPATIENT)
Dept: RADIATION ONCOLOGY | Facility: HOSPITAL | Age: 68
Discharge: HOME | End: 2018-06-13
Attending: RADIOLOGY | Admitting: RADIOLOGY

## 2018-06-13 ENCOUNTER — HOSPITAL ENCOUNTER (OUTPATIENT)
Dept: ONCOLOGY | Facility: CLINIC | Age: 68
Setting detail: INFUSION SERIES
Discharge: HOME | End: 2018-06-13
Attending: INTERNAL MEDICINE | Admitting: INTERNAL MEDICINE

## 2018-06-13 ENCOUNTER — CLINICAL SUPPORT (OUTPATIENT)
Dept: ONCOLOGY | Facility: HOSPITAL | Age: 68
End: 2018-06-13

## 2018-06-13 NOTE — PROGRESS NOTES
PATIENTS ONCOLOGY RECORD LOCATED IN [facility]      Subjective     Name:  DOROTHEA WALTON     Date:  2018  Address:  [address] [place] [address] [place] [facility]  Home: [phone]  :  1950 AGE:  68 y.o.        RECORDS OBTAINED:  Patients Oncology Record is located in [facility]

## 2018-07-03 ENCOUNTER — CONVERSION ENCOUNTER (OUTPATIENT)
Dept: SURGERY | Facility: CLINIC | Age: 68
End: 2018-07-03

## 2018-07-09 ENCOUNTER — HOSPITAL ENCOUNTER (OUTPATIENT)
Dept: ONCOLOGY | Facility: HOSPITAL | Age: 68
Discharge: HOME | End: 2018-07-09
Attending: THORACIC SURGERY (CARDIOTHORACIC VASCULAR SURGERY) | Admitting: THORACIC SURGERY (CARDIOTHORACIC VASCULAR SURGERY)

## 2018-07-09 LAB — CREAT BLDA-MCNC: 0.7 MG/DL (ref 0.6–1.3)

## 2018-07-12 ENCOUNTER — CLINICAL SUPPORT (OUTPATIENT)
Dept: ONCOLOGY | Facility: HOSPITAL | Age: 68
End: 2018-07-12

## 2018-07-12 ENCOUNTER — HOSPITAL ENCOUNTER (OUTPATIENT)
Dept: ONCOLOGY | Facility: CLINIC | Age: 68
Setting detail: INFUSION SERIES
Discharge: HOME | End: 2018-07-12
Attending: INTERNAL MEDICINE | Admitting: INTERNAL MEDICINE

## 2018-07-12 ENCOUNTER — HOSPITAL ENCOUNTER (OUTPATIENT)
Dept: ONCOLOGY | Facility: HOSPITAL | Age: 68
Discharge: HOME | End: 2018-07-12
Attending: INTERNAL MEDICINE | Admitting: INTERNAL MEDICINE

## 2018-07-12 LAB
IRON SATN MFR SERPL: 19 % (ref 20–50)
IRON SERPL-MCNC: 56 UG/DL (ref 45–182)
TIBC SERPL-MCNC: 300 UG/DL (ref 228–428)

## 2018-07-12 NOTE — PROGRESS NOTES
PATIENTS ONCOLOGY RECORD LOCATED IN [facility]      Subjective     Name:  DOROTHEA WALTON     Date:  2018  Address:  [address] [place] [address] [place] [facility]  Home: [phone]  :  1950 AGE:  68 y.o.        RECORDS OBTAINED:  Patients Oncology Record is located in [facility]

## 2018-07-13 ENCOUNTER — ON CAMPUS - OUTPATIENT (AMBULATORY)
Dept: URBAN - METROPOLITAN AREA HOSPITAL 2 | Facility: HOSPITAL | Age: 68
End: 2018-07-13
Payer: COMMERCIAL

## 2018-07-13 VITALS
HEART RATE: 60 BPM | DIASTOLIC BLOOD PRESSURE: 65 MMHG | DIASTOLIC BLOOD PRESSURE: 51 MMHG | HEART RATE: 69 BPM | DIASTOLIC BLOOD PRESSURE: 48 MMHG | TEMPERATURE: 98 F | WEIGHT: 136 LBS | HEART RATE: 64 BPM | OXYGEN SATURATION: 97 % | DIASTOLIC BLOOD PRESSURE: 52 MMHG | SYSTOLIC BLOOD PRESSURE: 127 MMHG | SYSTOLIC BLOOD PRESSURE: 85 MMHG | HEART RATE: 62 BPM | OXYGEN SATURATION: 100 % | HEART RATE: 65 BPM | HEIGHT: 66 IN | OXYGEN SATURATION: 98 % | SYSTOLIC BLOOD PRESSURE: 100 MMHG | DIASTOLIC BLOOD PRESSURE: 86 MMHG | HEART RATE: 78 BPM | SYSTOLIC BLOOD PRESSURE: 121 MMHG | SYSTOLIC BLOOD PRESSURE: 123 MMHG | SYSTOLIC BLOOD PRESSURE: 115 MMHG | SYSTOLIC BLOOD PRESSURE: 86 MMHG | DIASTOLIC BLOOD PRESSURE: 62 MMHG | RESPIRATION RATE: 16 BRPM

## 2018-07-13 DIAGNOSIS — K22.2 ESOPHAGEAL OBSTRUCTION: ICD-10-CM

## 2018-07-13 DIAGNOSIS — Z48.815 ENCOUNTER FOR SURGICAL AFTERCARE FOLLOWING SURGERY ON THE DI: ICD-10-CM

## 2018-07-13 DIAGNOSIS — K91.89 OTHER POSTPROCEDURAL COMPLICATIONS AND DISORDERS OF DIGESTIV: ICD-10-CM

## 2018-07-13 PROCEDURE — 43249 ESOPH EGD DILATION <30 MM: CPT | Performed by: INTERNAL MEDICINE

## 2018-07-13 RX ORDER — SUCRALFATE 1 G/1
4 TABLET ORAL
Qty: 120 | Refills: 5 | Status: ACTIVE
Start: 2018-07-13

## 2018-07-13 RX ADMIN — PROPOFOL: 10 INJECTION, EMULSION INTRAVENOUS at 12:48

## 2018-07-17 ENCOUNTER — CONVERSION ENCOUNTER (OUTPATIENT)
Dept: SURGERY | Facility: CLINIC | Age: 68
End: 2018-07-17

## 2018-07-19 ENCOUNTER — OFFICE (AMBULATORY)
Dept: URBAN - METROPOLITAN AREA CLINIC 64 | Facility: CLINIC | Age: 68
End: 2018-07-19
Payer: COMMERCIAL

## 2018-07-19 VITALS
DIASTOLIC BLOOD PRESSURE: 68 MMHG | SYSTOLIC BLOOD PRESSURE: 119 MMHG | HEIGHT: 66 IN | HEART RATE: 60 BPM | WEIGHT: 136 LBS

## 2018-07-19 DIAGNOSIS — K91.89 OTHER POSTPROCEDURAL COMPLICATIONS AND DISORDERS OF DIGESTIV: ICD-10-CM

## 2018-07-19 PROCEDURE — 99213 OFFICE O/P EST LOW 20 MIN: CPT | Performed by: INTERNAL MEDICINE

## 2018-07-19 RX ORDER — OMEPRAZOLE 40 MG/1
CAPSULE, DELAYED RELEASE ORAL
Qty: 180 | Refills: 1 | Status: COMPLETED
End: 2023-01-09

## 2018-08-09 ENCOUNTER — HOSPITAL ENCOUNTER (OUTPATIENT)
Dept: ONCOLOGY | Facility: CLINIC | Age: 68
Setting detail: INFUSION SERIES
Discharge: HOME | End: 2018-08-09
Attending: INTERNAL MEDICINE | Admitting: INTERNAL MEDICINE

## 2018-08-09 ENCOUNTER — CLINICAL SUPPORT (OUTPATIENT)
Dept: ONCOLOGY | Facility: HOSPITAL | Age: 68
End: 2018-08-09

## 2018-08-09 NOTE — PROGRESS NOTES
PATIENTS ONCOLOGY RECORD LOCATED IN [facility]      Subjective     Name:  DOROTHEA WALTON     Date:  2018  Address:  [address] [place] [address] [place] [facility]  Home: [phone]  :  1950 AGE:  68 y.o.        RECORDS OBTAINED:  Patients Oncology Record is located in [facility]

## 2018-08-23 ENCOUNTER — ON CAMPUS - OUTPATIENT (AMBULATORY)
Dept: URBAN - METROPOLITAN AREA HOSPITAL 2 | Facility: HOSPITAL | Age: 68
End: 2018-08-23
Payer: COMMERCIAL

## 2018-08-23 VITALS
HEART RATE: 54 BPM | SYSTOLIC BLOOD PRESSURE: 135 MMHG | DIASTOLIC BLOOD PRESSURE: 82 MMHG | DIASTOLIC BLOOD PRESSURE: 67 MMHG | DIASTOLIC BLOOD PRESSURE: 69 MMHG | HEART RATE: 55 BPM | RESPIRATION RATE: 15 BRPM | DIASTOLIC BLOOD PRESSURE: 61 MMHG | HEART RATE: 41 BPM | SYSTOLIC BLOOD PRESSURE: 150 MMHG | HEART RATE: 47 BPM | WEIGHT: 146 LBS | RESPIRATION RATE: 16 BRPM | SYSTOLIC BLOOD PRESSURE: 148 MMHG | SYSTOLIC BLOOD PRESSURE: 143 MMHG | HEIGHT: 66 IN | DIASTOLIC BLOOD PRESSURE: 71 MMHG | SYSTOLIC BLOOD PRESSURE: 93 MMHG | HEART RATE: 58 BPM | HEART RATE: 72 BPM | SYSTOLIC BLOOD PRESSURE: 108 MMHG | TEMPERATURE: 97.3 F | HEART RATE: 38 BPM | OXYGEN SATURATION: 99 % | SYSTOLIC BLOOD PRESSURE: 109 MMHG | OXYGEN SATURATION: 100 % | HEART RATE: 67 BPM | DIASTOLIC BLOOD PRESSURE: 75 MMHG | HEART RATE: 70 BPM | SYSTOLIC BLOOD PRESSURE: 139 MMHG | DIASTOLIC BLOOD PRESSURE: 79 MMHG | SYSTOLIC BLOOD PRESSURE: 140 MMHG | SYSTOLIC BLOOD PRESSURE: 112 MMHG | DIASTOLIC BLOOD PRESSURE: 55 MMHG | SYSTOLIC BLOOD PRESSURE: 137 MMHG | HEART RATE: 68 BPM | SYSTOLIC BLOOD PRESSURE: 147 MMHG | OXYGEN SATURATION: 97 % | HEART RATE: 75 BPM | DIASTOLIC BLOOD PRESSURE: 73 MMHG | DIASTOLIC BLOOD PRESSURE: 74 MMHG | DIASTOLIC BLOOD PRESSURE: 64 MMHG | DIASTOLIC BLOOD PRESSURE: 72 MMHG | HEART RATE: 45 BPM | DIASTOLIC BLOOD PRESSURE: 80 MMHG | OXYGEN SATURATION: 98 % | DIASTOLIC BLOOD PRESSURE: 68 MMHG | HEART RATE: 60 BPM | SYSTOLIC BLOOD PRESSURE: 122 MMHG

## 2018-08-23 DIAGNOSIS — R13.10 DYSPHAGIA, UNSPECIFIED: ICD-10-CM

## 2018-08-23 DIAGNOSIS — Z48.815 ENCOUNTER FOR SURGICAL AFTERCARE FOLLOWING SURGERY ON THE DI: ICD-10-CM

## 2018-08-23 DIAGNOSIS — K91.89 OTHER POSTPROCEDURAL COMPLICATIONS AND DISORDERS OF DIGESTIV: ICD-10-CM

## 2018-08-23 PROCEDURE — 43249 ESOPH EGD DILATION <30 MM: CPT | Performed by: INTERNAL MEDICINE

## 2018-09-13 ENCOUNTER — HOSPITAL ENCOUNTER (OUTPATIENT)
Dept: ONCOLOGY | Facility: CLINIC | Age: 68
Setting detail: INFUSION SERIES
Discharge: HOME | End: 2018-09-13
Attending: INTERNAL MEDICINE | Admitting: INTERNAL MEDICINE

## 2018-09-13 ENCOUNTER — CLINICAL SUPPORT (OUTPATIENT)
Dept: ONCOLOGY | Facility: HOSPITAL | Age: 68
End: 2018-09-13

## 2018-09-13 NOTE — PROGRESS NOTES
PATIENTS ONCOLOGY RECORD LOCATED IN [facility]      Subjective     Name:  DOROTHEA WALTON     Date:  2018  Address:  [address] [place] [address] [place] [facility]  Home: [phone]  :  1950 AGE:  68 y.o.        RECORDS OBTAINED:  Patients Oncology Record is located in [facility]

## 2018-10-05 ENCOUNTER — OFFICE (AMBULATORY)
Dept: URBAN - METROPOLITAN AREA CLINIC 64 | Facility: CLINIC | Age: 68
End: 2018-10-05
Payer: COMMERCIAL

## 2018-10-05 DIAGNOSIS — R13.10 DYSPHAGIA, UNSPECIFIED: ICD-10-CM

## 2018-10-05 DIAGNOSIS — R19.7 DIARRHEA, UNSPECIFIED: ICD-10-CM

## 2018-10-05 PROCEDURE — 99213 OFFICE O/P EST LOW 20 MIN: CPT | Performed by: NURSE PRACTITIONER

## 2018-10-11 ENCOUNTER — HOSPITAL ENCOUNTER (OUTPATIENT)
Dept: ONCOLOGY | Facility: CLINIC | Age: 68
Setting detail: INFUSION SERIES
Discharge: HOME | End: 2018-10-11
Attending: INTERNAL MEDICINE | Admitting: INTERNAL MEDICINE

## 2018-10-11 ENCOUNTER — CLINICAL SUPPORT (OUTPATIENT)
Dept: ONCOLOGY | Facility: HOSPITAL | Age: 68
End: 2018-10-11

## 2018-10-11 NOTE — PROGRESS NOTES
PATIENTS ONCOLOGY RECORD LOCATED IN [facility]      Subjective     Name:  DOROTHEA WALTON     Date:  10/11/2018  Address:  [address] [place] [address] [place] [facility]  Home: [phone]  :  1950 AGE:  68 y.o.        RECORDS OBTAINED:  Patients Oncology Record is located in [facility]

## 2018-10-16 ENCOUNTER — CONVERSION ENCOUNTER (OUTPATIENT)
Dept: SURGERY | Facility: CLINIC | Age: 68
End: 2018-10-16

## 2018-10-16 ENCOUNTER — HOSPITAL ENCOUNTER (OUTPATIENT)
Dept: RADIATION ONCOLOGY | Facility: HOSPITAL | Age: 68
Discharge: HOME | End: 2018-10-16
Attending: RADIOLOGY | Admitting: RADIOLOGY

## 2018-10-22 ENCOUNTER — HOSPITAL ENCOUNTER (OUTPATIENT)
Dept: PREOP | Facility: HOSPITAL | Age: 68
Setting detail: HOSPITAL OUTPATIENT SURGERY
Discharge: HOME | End: 2018-10-22
Attending: THORACIC SURGERY (CARDIOTHORACIC VASCULAR SURGERY) | Admitting: THORACIC SURGERY (CARDIOTHORACIC VASCULAR SURGERY)

## 2018-10-22 LAB
GLUCOSE BLD-MCNC: 122 MG/DL (ref 70–105)
GLUCOSE BLD-MCNC: 92 MG/DL (ref 70–105)

## 2018-11-08 ENCOUNTER — CONVERSION ENCOUNTER (OUTPATIENT)
Dept: SURGERY | Facility: CLINIC | Age: 68
End: 2018-11-08

## 2018-11-08 ENCOUNTER — HOSPITAL ENCOUNTER (OUTPATIENT)
Dept: ONCOLOGY | Facility: CLINIC | Age: 68
Setting detail: INFUSION SERIES
Discharge: HOME | End: 2018-11-08
Attending: INTERNAL MEDICINE | Admitting: INTERNAL MEDICINE

## 2018-11-08 ENCOUNTER — CLINICAL SUPPORT (OUTPATIENT)
Dept: ONCOLOGY | Facility: HOSPITAL | Age: 68
End: 2018-11-08

## 2018-11-08 NOTE — PROGRESS NOTES
PATIENTS ONCOLOGY RECORD LOCATED IN [facility]      Subjective     Name:  DOROTHEA WALTON     Date:  2018  Address:  [address] [place] [address] [place] [facility]  Home: [phone]  :  1950 AGE:  68 y.o.        RECORDS OBTAINED:  Patients Oncology Record is located in [facility]

## 2018-12-06 ENCOUNTER — HOSPITAL ENCOUNTER (OUTPATIENT)
Dept: ONCOLOGY | Facility: HOSPITAL | Age: 68
Discharge: HOME | End: 2018-12-06
Attending: INTERNAL MEDICINE | Admitting: INTERNAL MEDICINE

## 2018-12-06 ENCOUNTER — CONVERSION ENCOUNTER (OUTPATIENT)
Dept: SURGERY | Facility: CLINIC | Age: 68
End: 2018-12-06

## 2018-12-06 ENCOUNTER — HOSPITAL ENCOUNTER (OUTPATIENT)
Dept: ONCOLOGY | Facility: CLINIC | Age: 68
Setting detail: INFUSION SERIES
Discharge: HOME | End: 2018-12-06
Attending: INTERNAL MEDICINE | Admitting: INTERNAL MEDICINE

## 2018-12-06 ENCOUNTER — CLINICAL SUPPORT (OUTPATIENT)
Dept: ONCOLOGY | Facility: HOSPITAL | Age: 68
End: 2018-12-06

## 2018-12-06 LAB
IRON SATN MFR SERPL: 25 % (ref 20–50)
IRON SERPL-MCNC: 80 UG/DL (ref 45–182)
TIBC SERPL-MCNC: 325 UG/DL (ref 228–428)

## 2018-12-06 NOTE — PROGRESS NOTES
PATIENTS ONCOLOGY RECORD LOCATED IN [facility]      Subjective     Name:  DOROTHEA WALTON     Date:  2018  Address:  [address] [place] [address] [place] [facility]  Home: [unfilled]  :  1950 AGE:  68 y.o.        RECORDS OBTAINED:  Patients Oncology Record is located in [facility]

## 2019-01-10 ENCOUNTER — HOSPITAL ENCOUNTER (OUTPATIENT)
Dept: ONCOLOGY | Facility: HOSPITAL | Age: 69
Discharge: HOME | End: 2019-01-10
Attending: INTERNAL MEDICINE | Admitting: INTERNAL MEDICINE

## 2019-01-10 LAB — CREAT BLDA-MCNC: 0.8 MG/DL (ref 0.6–1.3)

## 2019-01-17 ENCOUNTER — CLINICAL SUPPORT (OUTPATIENT)
Dept: ONCOLOGY | Facility: HOSPITAL | Age: 69
End: 2019-01-17

## 2019-01-17 ENCOUNTER — CONVERSION ENCOUNTER (OUTPATIENT)
Dept: SURGERY | Facility: CLINIC | Age: 69
End: 2019-01-17

## 2019-01-17 ENCOUNTER — HOSPITAL ENCOUNTER (OUTPATIENT)
Dept: ONCOLOGY | Facility: CLINIC | Age: 69
Setting detail: INFUSION SERIES
Discharge: HOME | End: 2019-01-17
Attending: INTERNAL MEDICINE | Admitting: INTERNAL MEDICINE

## 2019-01-17 NOTE — PROGRESS NOTES
PATIENTS ONCOLOGY RECORD LOCATED IN [facility]      Subjective     Name:  DOROTHEA WALTON     Date:  2019  Address:  [address] [place] [address] [place] [facility]  Home: [unfilled]  :  1950 AGE:  68 y.o.        RECORDS OBTAINED:  Patients Oncology Record is located in [facility]

## 2019-02-14 ENCOUNTER — CONVERSION ENCOUNTER (OUTPATIENT)
Dept: SURGERY | Facility: CLINIC | Age: 69
End: 2019-02-14

## 2019-02-14 ENCOUNTER — CLINICAL SUPPORT (OUTPATIENT)
Dept: ONCOLOGY | Facility: HOSPITAL | Age: 69
End: 2019-02-14

## 2019-02-14 ENCOUNTER — HOSPITAL ENCOUNTER (OUTPATIENT)
Dept: ONCOLOGY | Facility: CLINIC | Age: 69
Setting detail: INFUSION SERIES
Discharge: HOME | End: 2019-02-14
Attending: INTERNAL MEDICINE | Admitting: INTERNAL MEDICINE

## 2019-02-14 NOTE — PROGRESS NOTES
PATIENTS ONCOLOGY RECORD LOCATED IN [facility]      Subjective     Name:  DOROTHEA WALTON     Date:  2019  Address:  [address] [place] [address] [place] [facility]  Home: [unfilled]  :  1950 AGE:  68 y.o.        RECORDS OBTAINED:  Patients Oncology Record is located in [facility]

## 2019-03-07 ENCOUNTER — HOSPITAL ENCOUNTER (OUTPATIENT)
Dept: ONCOLOGY | Facility: CLINIC | Age: 69
Setting detail: INFUSION SERIES
Discharge: HOME | End: 2019-03-07
Attending: INTERNAL MEDICINE | Admitting: INTERNAL MEDICINE

## 2019-03-07 ENCOUNTER — CLINICAL SUPPORT (OUTPATIENT)
Dept: ONCOLOGY | Facility: HOSPITAL | Age: 69
End: 2019-03-07

## 2019-03-07 NOTE — PROGRESS NOTES
PATIENTS ONCOLOGY RECORD LOCATED IN [facility]      Subjective     Name:  DOROTHEA WALTON     Date:  2019  Address:  [address] [place] [address] [place] [facility]  Home: [unfilled]  :  1950 AGE:  68 y.o.        RECORDS OBTAINED:  Patients Oncology Record is located in [facility]

## 2019-03-13 ENCOUNTER — HOSPITAL ENCOUNTER (OUTPATIENT)
Dept: RADIATION ONCOLOGY | Facility: HOSPITAL | Age: 69
Setting detail: RECURRING SERIES
Discharge: HOME | End: 2019-04-28
Attending: RADIOLOGY | Admitting: RADIOLOGY

## 2019-04-04 ENCOUNTER — CLINICAL SUPPORT (OUTPATIENT)
Dept: ONCOLOGY | Facility: HOSPITAL | Age: 69
End: 2019-04-04

## 2019-04-04 ENCOUNTER — HOSPITAL ENCOUNTER (OUTPATIENT)
Dept: ONCOLOGY | Facility: CLINIC | Age: 69
Setting detail: INFUSION SERIES
Discharge: HOME | End: 2019-04-04
Attending: INTERNAL MEDICINE | Admitting: INTERNAL MEDICINE

## 2019-04-04 NOTE — PROGRESS NOTES
PATIENTS ONCOLOGY RECORD LOCATED IN [facility]      Subjective     Name:  DOROTHEA WALTON     Date:  2019  Address:  [address] [place] [address] [place] [facility]  Home: [unfilled]  :  1950 AGE:  68 y.o.        RECORDS OBTAINED:  Patients Oncology Record is located in [facility]

## 2019-05-01 ENCOUNTER — HOSPITAL ENCOUNTER (OUTPATIENT)
Dept: ONCOLOGY | Facility: HOSPITAL | Age: 69
Discharge: HOME | End: 2019-05-01
Attending: INTERNAL MEDICINE | Admitting: INTERNAL MEDICINE

## 2019-05-09 ENCOUNTER — HOSPITAL ENCOUNTER (OUTPATIENT)
Dept: ONCOLOGY | Facility: CLINIC | Age: 69
Setting detail: INFUSION SERIES
Discharge: HOME | End: 2019-05-09
Attending: INTERNAL MEDICINE | Admitting: INTERNAL MEDICINE

## 2019-05-09 ENCOUNTER — CLINICAL SUPPORT (OUTPATIENT)
Dept: ONCOLOGY | Facility: HOSPITAL | Age: 69
End: 2019-05-09

## 2019-05-09 ENCOUNTER — HOSPITAL ENCOUNTER (OUTPATIENT)
Dept: ONCOLOGY | Facility: HOSPITAL | Age: 69
Discharge: HOME | End: 2019-05-09
Attending: INTERNAL MEDICINE | Admitting: INTERNAL MEDICINE

## 2019-05-09 LAB
FERRITIN SERPL-MCNC: 61 NG/ML (ref 24–336)
FOLATE SERPL-MCNC: 14.3 NG/ML (ref 5.9–24.8)
IRON SATN MFR SERPL: 26 % (ref 20–50)
IRON SERPL-MCNC: 79 UG/DL (ref 45–182)
TIBC SERPL-MCNC: 304 UG/DL (ref 228–428)
VIT B12 SERPL-MCNC: 229 PG/ML (ref 180–914)

## 2019-05-09 NOTE — PROGRESS NOTES
PATIENTS ONCOLOGY RECORD LOCATED IN [facility]      Subjective     Name:  DOROTHEA WALTON     Date:  2019  Address:  [address] [place] [address] [place] [facility]  Home: [unfilled]  :  1950 AGE:  69 y.o.        RECORDS OBTAINED:  Patients Oncology Record is located in [facility]

## 2019-05-14 ENCOUNTER — HOSPITAL ENCOUNTER (OUTPATIENT)
Dept: RADIATION ONCOLOGY | Facility: HOSPITAL | Age: 69
Discharge: HOME | End: 2019-05-14
Attending: RADIOLOGY | Admitting: RADIOLOGY

## 2019-05-31 ENCOUNTER — CLINICAL SUPPORT (OUTPATIENT)
Dept: ONCOLOGY | Facility: HOSPITAL | Age: 69
End: 2019-05-31

## 2019-05-31 ENCOUNTER — HOSPITAL ENCOUNTER (OUTPATIENT)
Dept: ONCOLOGY | Facility: CLINIC | Age: 69
Setting detail: INFUSION SERIES
Discharge: HOME | End: 2019-05-31
Attending: INTERNAL MEDICINE | Admitting: INTERNAL MEDICINE

## 2019-05-31 ENCOUNTER — HOSPITAL ENCOUNTER (OUTPATIENT)
Dept: ONCOLOGY | Facility: HOSPITAL | Age: 69
Discharge: HOME | End: 2019-05-31
Attending: INTERNAL MEDICINE | Admitting: INTERNAL MEDICINE

## 2019-05-31 LAB
ALBUMIN SERPL-MCNC: 3.8 G/DL (ref 3.5–4.8)
ALBUMIN/GLOB SERPL: 1.7 {RATIO} (ref 1–1.7)
ALP SERPL-CCNC: 31 IU/L (ref 32–91)
ALT SERPL-CCNC: 11 IU/L (ref 17–63)
ANION GAP SERPL CALC-SCNC: 16.5 MMOL/L (ref 10–20)
AST SERPL-CCNC: 24 IU/L (ref 15–41)
BILIRUB SERPL-MCNC: 0.5 MG/DL (ref 0.3–1.2)
BUN SERPL-MCNC: 9 MG/DL (ref 8–20)
BUN/CREAT SERPL: 10 (ref 6.2–20.3)
CALCIUM SERPL-MCNC: 9.1 MG/DL (ref 8.9–10.3)
CHLORIDE SERPL-SCNC: 102 MMOL/L (ref 101–111)
CONV CO2: 21 MMOL/L (ref 22–32)
CONV TOTAL PROTEIN: 6.1 G/DL (ref 6.1–7.9)
CREAT UR-MCNC: 0.9 MG/DL (ref 0.7–1.2)
GLOBULIN UR ELPH-MCNC: 2.3 G/DL (ref 2.5–3.8)
GLUCOSE SERPL-MCNC: 145 MG/DL (ref 65–99)
POTASSIUM SERPL-SCNC: 4.5 MMOL/L (ref 3.6–5.1)
SODIUM SERPL-SCNC: 135 MMOL/L (ref 136–144)
T4 FREE SERPL-MCNC: 0.99 NG/DL (ref 0.58–1.64)
TSH SERPL-ACNC: 3.99 UIU/ML (ref 0.34–5.6)

## 2019-05-31 NOTE — PROGRESS NOTES
PATIENTS ONCOLOGY RECORD LOCATED IN [facility]      Subjective     Name:  DOROTHEA WALTON     Date:  2019  Address:  [address] [place] [address] [place] [facility]  Home: [unfilled]  :  1950 AGE:  69 y.o.        RECORDS OBTAINED:  Patients Oncology Record is located in [facility]

## 2019-06-04 VITALS
HEART RATE: 64 BPM | SYSTOLIC BLOOD PRESSURE: 98 MMHG | WEIGHT: 147.6 LBS | HEIGHT: 66 IN | HEART RATE: 76 BPM | DIASTOLIC BLOOD PRESSURE: 67 MMHG | HEART RATE: 70 BPM | HEIGHT: 66 IN | BODY MASS INDEX: 23.95 KG/M2 | SYSTOLIC BLOOD PRESSURE: 149 MMHG | BODY MASS INDEX: 24.05 KG/M2 | DIASTOLIC BLOOD PRESSURE: 68 MMHG | HEART RATE: 67 BPM | HEIGHT: 66 IN | DIASTOLIC BLOOD PRESSURE: 62 MMHG | WEIGHT: 142 LBS | HEIGHT: 66 IN | DIASTOLIC BLOOD PRESSURE: 57 MMHG | DIASTOLIC BLOOD PRESSURE: 74 MMHG | OXYGEN SATURATION: 99 % | SYSTOLIC BLOOD PRESSURE: 108 MMHG | SYSTOLIC BLOOD PRESSURE: 101 MMHG | WEIGHT: 150 LBS | BODY MASS INDEX: 22.34 KG/M2 | DIASTOLIC BLOOD PRESSURE: 74 MMHG | HEART RATE: 81 BPM | OXYGEN SATURATION: 100 % | HEIGHT: 66 IN | BODY MASS INDEX: 23.72 KG/M2 | SYSTOLIC BLOOD PRESSURE: 110 MMHG | DIASTOLIC BLOOD PRESSURE: 65 MMHG | OXYGEN SATURATION: 99 % | DIASTOLIC BLOOD PRESSURE: 56 MMHG | BODY MASS INDEX: 24.37 KG/M2 | WEIGHT: 149.6 LBS | SYSTOLIC BLOOD PRESSURE: 128 MMHG | HEIGHT: 66 IN | HEART RATE: 58 BPM | HEART RATE: 72 BPM | WEIGHT: 151 LBS | BODY MASS INDEX: 24.01 KG/M2 | HEIGHT: 66 IN | HEART RATE: 82 BPM | DIASTOLIC BLOOD PRESSURE: 72 MMHG | SYSTOLIC BLOOD PRESSURE: 124 MMHG | SYSTOLIC BLOOD PRESSURE: 128 MMHG | WEIGHT: 148 LBS | DIASTOLIC BLOOD PRESSURE: 57 MMHG | WEIGHT: 153 LBS | BODY MASS INDEX: 23.63 KG/M2 | DIASTOLIC BLOOD PRESSURE: 63 MMHG | WEIGHT: 147 LBS | OXYGEN SATURATION: 99 % | WEIGHT: 141 LBS | OXYGEN SATURATION: 97 % | WEIGHT: 153 LBS | DIASTOLIC BLOOD PRESSURE: 74 MMHG | SYSTOLIC BLOOD PRESSURE: 134 MMHG | BODY MASS INDEX: 24.59 KG/M2 | DIASTOLIC BLOOD PRESSURE: 70 MMHG | BODY MASS INDEX: 22.82 KG/M2 | HEIGHT: 66 IN | HEART RATE: 91 BPM | SYSTOLIC BLOOD PRESSURE: 122 MMHG | BODY MASS INDEX: 23.78 KG/M2 | HEART RATE: 60 BPM | SYSTOLIC BLOOD PRESSURE: 102 MMHG | SYSTOLIC BLOOD PRESSURE: 120 MMHG | DIASTOLIC BLOOD PRESSURE: 74 MMHG | DIASTOLIC BLOOD PRESSURE: 73 MMHG | OXYGEN SATURATION: 93 % | BODY MASS INDEX: 24.21 KG/M2 | DIASTOLIC BLOOD PRESSURE: 80 MMHG | OXYGEN SATURATION: 98 % | BODY MASS INDEX: 24.69 KG/M2 | OXYGEN SATURATION: 98 % | DIASTOLIC BLOOD PRESSURE: 66 MMHG | HEART RATE: 68 BPM | SYSTOLIC BLOOD PRESSURE: 126 MMHG | HEART RATE: 86 BPM | BODY MASS INDEX: 23.73 KG/M2 | BODY MASS INDEX: 24.04 KG/M2 | OXYGEN SATURATION: 100 % | SYSTOLIC BLOOD PRESSURE: 130 MMHG | HEART RATE: 71 BPM | SYSTOLIC BLOOD PRESSURE: 95 MMHG | HEIGHT: 66 IN | BODY MASS INDEX: 25.34 KG/M2 | SYSTOLIC BLOOD PRESSURE: 123 MMHG | HEIGHT: 66 IN | HEIGHT: 66 IN | BODY MASS INDEX: 22.98 KG/M2 | WEIGHT: 139 LBS | OXYGEN SATURATION: 100 % | OXYGEN SATURATION: 100 % | OXYGEN SATURATION: 100 % | WEIGHT: 147 LBS | OXYGEN SATURATION: 97 % | SYSTOLIC BLOOD PRESSURE: 150 MMHG | SYSTOLIC BLOOD PRESSURE: 138 MMHG | SYSTOLIC BLOOD PRESSURE: 107 MMHG | HEIGHT: 66 IN | BODY MASS INDEX: 22.66 KG/M2 | DIASTOLIC BLOOD PRESSURE: 62 MMHG | WEIGHT: 142 LBS | DIASTOLIC BLOOD PRESSURE: 68 MMHG | WEIGHT: 150 LBS | OXYGEN SATURATION: 98 % | WEIGHT: 157 LBS | OXYGEN SATURATION: 100 % | HEART RATE: 64 BPM | SYSTOLIC BLOOD PRESSURE: 133 MMHG | BODY MASS INDEX: 24.59 KG/M2 | HEIGHT: 66 IN | WEIGHT: 143 LBS | HEIGHT: 66 IN | DIASTOLIC BLOOD PRESSURE: 70 MMHG | WEIGHT: 149 LBS | OXYGEN SATURATION: 99 % | BODY MASS INDEX: 22.82 KG/M2 | HEART RATE: 65 BPM | OXYGEN SATURATION: 99 % | OXYGEN SATURATION: 100 % | WEIGHT: 149.4 LBS | HEART RATE: 70 BPM | HEART RATE: 60 BPM | WEIGHT: 153 LBS | WEIGHT: 149 LBS | OXYGEN SATURATION: 99 % | HEART RATE: 66 BPM | WEIGHT: 150 LBS | HEART RATE: 58 BPM | HEART RATE: 85 BPM | BODY MASS INDEX: 24.21 KG/M2 | BODY MASS INDEX: 24.11 KG/M2 | DIASTOLIC BLOOD PRESSURE: 69 MMHG | HEART RATE: 89 BPM | SYSTOLIC BLOOD PRESSURE: 111 MMHG | OXYGEN SATURATION: 99 % | OXYGEN SATURATION: 93 %

## 2019-06-06 ENCOUNTER — HOSPITAL ENCOUNTER (OUTPATIENT)
Dept: ONCOLOGY | Facility: CLINIC | Age: 69
Setting detail: INFUSION SERIES
Discharge: HOME | End: 2019-06-06
Attending: INTERNAL MEDICINE | Admitting: INTERNAL MEDICINE

## 2019-06-13 VITALS — BODY MASS INDEX: 23.77 KG/M2 | HEIGHT: 66 IN | WEIGHT: 147.9 LBS

## 2019-06-13 PROBLEM — C16.0 MALIGNANT NEOPLASM OF CARDIA: Status: ACTIVE | Noted: 2019-06-13

## 2019-06-13 RX ORDER — SODIUM CHLORIDE 0.9 % (FLUSH) 0.9 %
10 SYRINGE (ML) INJECTION AS NEEDED
Status: CANCELLED | OUTPATIENT
Start: 2019-06-17

## 2019-06-14 DIAGNOSIS — C16.0 MALIGNANT NEOPLASM OF CARDIA (HCC): ICD-10-CM

## 2019-06-14 RX ORDER — SODIUM CHLORIDE 9 MG/ML
250 INJECTION, SOLUTION INTRAVENOUS ONCE
Status: CANCELLED | OUTPATIENT
Start: 2019-06-14

## 2019-06-17 ENCOUNTER — CLINICAL SUPPORT NO REQUIREMENTS (OUTPATIENT)
Dept: CARDIOLOGY | Facility: CLINIC | Age: 69
End: 2019-06-17

## 2019-06-17 DIAGNOSIS — I44.2 AV BLOCK, COMPLETE (HCC): Primary | ICD-10-CM

## 2019-06-20 RX ORDER — CLONAZEPAM 0.25 MG/1
0.25 TABLET, ORALLY DISINTEGRATING ORAL 2 TIMES DAILY PRN
COMMUNITY
End: 2019-07-05 | Stop reason: SDUPTHER

## 2019-06-20 RX ORDER — OMEPRAZOLE 40 MG/1
40 CAPSULE, DELAYED RELEASE ORAL 2 TIMES DAILY
COMMUNITY
End: 2020-05-29 | Stop reason: SDUPTHER

## 2019-06-20 RX ORDER — FOLIC ACID 1 MG/1
1 TABLET ORAL DAILY
COMMUNITY
End: 2019-07-05 | Stop reason: SDUPTHER

## 2019-06-21 ENCOUNTER — HOSPITAL ENCOUNTER (OUTPATIENT)
Dept: ONCOLOGY | Facility: HOSPITAL | Age: 69
Setting detail: INFUSION SERIES
Discharge: HOME | End: 2019-06-21

## 2019-06-21 ENCOUNTER — TELEPHONE (OUTPATIENT)
Dept: ONCOLOGY | Facility: CLINIC | Age: 69
End: 2019-06-21

## 2019-06-21 VITALS
DIASTOLIC BLOOD PRESSURE: 67 MMHG | RESPIRATION RATE: 16 BRPM | BODY MASS INDEX: 23.78 KG/M2 | SYSTOLIC BLOOD PRESSURE: 111 MMHG | HEART RATE: 67 BPM | WEIGHT: 148 LBS | HEIGHT: 66 IN | TEMPERATURE: 97.4 F

## 2019-06-21 DIAGNOSIS — R30.0 DYSURIA: Primary | ICD-10-CM

## 2019-06-21 DIAGNOSIS — C16.0 MALIGNANT NEOPLASM OF CARDIA (HCC): Primary | ICD-10-CM

## 2019-06-21 LAB
ALBUMIN SERPL-MCNC: 3.5 G/DL (ref 3.5–4.8)
ALBUMIN/GLOB SERPL: 1.2 G/DL (ref 1–1.7)
ALP SERPL-CCNC: 30 U/L (ref 32–91)
ALT SERPL W P-5'-P-CCNC: 9 U/L (ref 17–63)
ANION GAP SERPL CALCULATED.3IONS-SCNC: 13 MMOL/L (ref 10–20)
AST SERPL-CCNC: 17 U/L (ref 15–41)
BASOPHILS # BLD AUTO: 0.04 10*3/MM3 (ref 0–0.2)
BASOPHILS NFR BLD AUTO: 0.6 % (ref 0–1.5)
BILIRUB SERPL-MCNC: 0.2 MG/DL (ref 0.3–1.2)
BILIRUB UR QL STRIP: ABNORMAL
BUN BLD-MCNC: 13 MG/DL (ref 8–20)
BUN/CREAT SERPL: 14.4 (ref 6.2–20.3)
CALCIUM SPEC-SCNC: 8.9 MG/DL (ref 8.9–10.3)
CHLORIDE SERPL-SCNC: 104 MMOL/L (ref 101–111)
CLARITY UR: ABNORMAL
CO2 SERPL-SCNC: 21 MMOL/L (ref 22–32)
COLOR UR: ABNORMAL
CREAT BLD-MCNC: 0.9 MG/DL (ref 0.7–1.2)
DEPRECATED RDW RBC AUTO: 40.6 FL (ref 37–54)
EOSINOPHIL # BLD AUTO: 0.28 10*3/MM3 (ref 0–0.4)
EOSINOPHIL NFR BLD AUTO: 4.4 % (ref 0.3–6.2)
ERYTHROCYTE [DISTWIDTH] IN BLOOD BY AUTOMATED COUNT: 12.5 % (ref 12.3–15.4)
GFR SERPL CREATININE-BSD FRML MDRD: 83 ML/MIN/1.73
GLOBULIN UR ELPH-MCNC: 3 GM/DL (ref 2.5–3.8)
GLUCOSE BLD-MCNC: 115 MG/DL (ref 65–99)
GLUCOSE UR STRIP-MCNC: NEGATIVE MG/DL
HCT VFR BLD AUTO: 33.5 % (ref 37.5–51)
HGB BLD-MCNC: 11.3 G/DL (ref 13–17.7)
HGB UR QL STRIP.AUTO: NEGATIVE
KETONES UR QL STRIP: ABNORMAL
LEUKOCYTE ESTERASE UR QL STRIP.AUTO: NEGATIVE
LYMPHOCYTES # BLD AUTO: 1.4 10*3/MM3 (ref 0.7–3.1)
LYMPHOCYTES NFR BLD AUTO: 22.1 % (ref 19.6–45.3)
MCH RBC QN AUTO: 31.3 PG (ref 26.6–33)
MCHC RBC AUTO-ENTMCNC: 33.7 G/DL (ref 31.5–35.7)
MCV RBC AUTO: 92.8 FL (ref 79–97)
MONOCYTES # BLD AUTO: 0.82 10*3/MM3 (ref 0.1–0.9)
MONOCYTES NFR BLD AUTO: 12.9 % (ref 5–12)
NEUTROPHILS # BLD AUTO: 3.8 10*3/MM3 (ref 1.7–7)
NEUTROPHILS NFR BLD AUTO: 60 % (ref 42.7–76)
NITRITE UR QL STRIP: NEGATIVE
PH UR STRIP.AUTO: 5.5 [PH] (ref 5–8)
PLATELET # BLD AUTO: 205 10*3/MM3 (ref 140–450)
PMV BLD AUTO: 10.3 FL (ref 6–12)
POTASSIUM BLD-SCNC: 4.3 MMOL/L (ref 3.6–5.1)
PROT SERPL-MCNC: 6.5 G/DL (ref 6.1–7.9)
PROT UR QL STRIP: ABNORMAL
RBC # BLD AUTO: 3.61 10*6/MM3 (ref 4.14–5.8)
SODIUM BLD-SCNC: 138 MMOL/L (ref 136–144)
SP GR UR STRIP: >=1.03 (ref 1–1.03)
UROBILINOGEN UR QL STRIP: ABNORMAL
WBC NRBC COR # BLD: 6.34 10*3/MM3 (ref 3.4–10.8)

## 2019-06-21 PROCEDURE — 25010000002 PEMBROLIZUMAB 100 MG/4ML SOLUTION 4 ML VIAL: Performed by: NURSE PRACTITIONER

## 2019-06-21 PROCEDURE — 81003 URINALYSIS AUTO W/O SCOPE: CPT | Performed by: NURSE PRACTITIONER

## 2019-06-21 PROCEDURE — 96413 CHEMO IV INFUSION 1 HR: CPT | Performed by: INTERNAL MEDICINE

## 2019-06-21 PROCEDURE — 85025 COMPLETE CBC W/AUTO DIFF WBC: CPT | Performed by: INTERNAL MEDICINE

## 2019-06-21 PROCEDURE — 80053 COMPREHEN METABOLIC PANEL: CPT | Performed by: INTERNAL MEDICINE

## 2019-06-21 RX ORDER — SODIUM CHLORIDE 9 MG/ML
250 INJECTION, SOLUTION INTRAVENOUS ONCE
Status: CANCELLED | OUTPATIENT
Start: 2019-06-21

## 2019-06-21 RX ORDER — SODIUM CHLORIDE 0.9 % (FLUSH) 0.9 %
10 SYRINGE (ML) INJECTION AS NEEDED
Status: DISCONTINUED | OUTPATIENT
Start: 2019-06-21 | End: 2019-06-22 | Stop reason: HOSPADM

## 2019-06-21 RX ORDER — SODIUM CHLORIDE 0.9 % (FLUSH) 0.9 %
10 SYRINGE (ML) INJECTION AS NEEDED
Status: CANCELLED | OUTPATIENT
Start: 2019-06-21

## 2019-06-21 RX ADMIN — SODIUM CHLORIDE 200 MG: 900 INJECTION, SOLUTION INTRAVENOUS at 09:50

## 2019-06-21 RX ADMIN — Medication 10 ML: at 10:40

## 2019-06-21 NOTE — TELEPHONE ENCOUNTER
----- Message from DAT Rojas sent at 6/21/2019 11:40 AM EDT -----  This patient complained of urinary symptoms today.  UA did not show infection.  Please let him know that he should call us if his symptoms to do not improve or if fever develops 100.4 or above. Thanks

## 2019-06-24 ENCOUNTER — CLINICAL SUPPORT NO REQUIREMENTS (OUTPATIENT)
Dept: CARDIOLOGY | Facility: CLINIC | Age: 69
End: 2019-06-24

## 2019-06-24 DIAGNOSIS — R00.1 BRADYCARDIA: Primary | ICD-10-CM

## 2019-06-27 DIAGNOSIS — C16.0 MALIGNANT NEOPLASM OF CARDIA (HCC): Primary | ICD-10-CM

## 2019-06-28 ENCOUNTER — LAB (OUTPATIENT)
Dept: LAB | Facility: HOSPITAL | Age: 69
End: 2019-06-28

## 2019-06-28 DIAGNOSIS — C16.0 MALIGNANT NEOPLASM OF CARDIA (HCC): ICD-10-CM

## 2019-06-28 LAB
BASOPHILS # BLD AUTO: 0.06 10*3/MM3 (ref 0–0.2)
BASOPHILS NFR BLD AUTO: 0.9 % (ref 0–1.5)
DEPRECATED RDW RBC AUTO: 40.9 FL (ref 37–54)
EOSINOPHIL # BLD AUTO: 0.38 10*3/MM3 (ref 0–0.4)
EOSINOPHIL NFR BLD AUTO: 5.8 % (ref 0.3–6.2)
ERYTHROCYTE [DISTWIDTH] IN BLOOD BY AUTOMATED COUNT: 12.5 % (ref 12.3–15.4)
HCT VFR BLD AUTO: 32.7 % (ref 37.5–51)
HGB BLD-MCNC: 10.7 G/DL (ref 13–17.7)
LYMPHOCYTES # BLD AUTO: 1.76 10*3/MM3 (ref 0.7–3.1)
LYMPHOCYTES NFR BLD AUTO: 26.7 % (ref 19.6–45.3)
MCH RBC QN AUTO: 30.6 PG (ref 26.6–33)
MCHC RBC AUTO-ENTMCNC: 32.7 G/DL (ref 31.5–35.7)
MCV RBC AUTO: 93.4 FL (ref 79–97)
MONOCYTES # BLD AUTO: 0.68 10*3/MM3 (ref 0.1–0.9)
MONOCYTES NFR BLD AUTO: 10.3 % (ref 5–12)
NEUTROPHILS # BLD AUTO: 3.71 10*3/MM3 (ref 1.7–7)
NEUTROPHILS NFR BLD AUTO: 56.3 % (ref 42.7–76)
PLATELET # BLD AUTO: 223 10*3/MM3 (ref 140–450)
PMV BLD AUTO: 9.9 FL (ref 6–12)
RBC # BLD AUTO: 3.5 10*6/MM3 (ref 4.14–5.8)
WBC NRBC COR # BLD: 6.59 10*3/MM3 (ref 3.4–10.8)

## 2019-06-28 PROCEDURE — 85025 COMPLETE CBC W/AUTO DIFF WBC: CPT | Performed by: INTERNAL MEDICINE

## 2019-06-28 PROCEDURE — 36415 COLL VENOUS BLD VENIPUNCTURE: CPT | Performed by: INTERNAL MEDICINE

## 2019-07-01 ENCOUNTER — CLINICAL SUPPORT NO REQUIREMENTS (OUTPATIENT)
Dept: CARDIOLOGY | Facility: CLINIC | Age: 69
End: 2019-07-01

## 2019-07-01 DIAGNOSIS — R00.1 BRADYCARDIA, SINUS: Primary | ICD-10-CM

## 2019-07-02 PROCEDURE — 93294 REM INTERROG EVL PM/LDLS PM: CPT | Performed by: INTERNAL MEDICINE

## 2019-07-02 PROCEDURE — 93296 REM INTERROG EVL PM/IDS: CPT | Performed by: INTERNAL MEDICINE

## 2019-07-05 ENCOUNTER — OFFICE VISIT (OUTPATIENT)
Dept: ONCOLOGY | Facility: CLINIC | Age: 69
End: 2019-07-05

## 2019-07-05 ENCOUNTER — APPOINTMENT (OUTPATIENT)
Dept: LAB | Facility: HOSPITAL | Age: 69
End: 2019-07-05

## 2019-07-05 VITALS
RESPIRATION RATE: 18 BRPM | DIASTOLIC BLOOD PRESSURE: 76 MMHG | TEMPERATURE: 97.6 F | SYSTOLIC BLOOD PRESSURE: 128 MMHG | WEIGHT: 149 LBS | HEART RATE: 60 BPM | BODY MASS INDEX: 23.95 KG/M2 | HEIGHT: 66 IN

## 2019-07-05 DIAGNOSIS — E53.8 B12 DEFICIENCY: ICD-10-CM

## 2019-07-05 DIAGNOSIS — C15.9 ESOPHAGEAL CANCER, STAGE IV (HCC): Primary | ICD-10-CM

## 2019-07-05 DIAGNOSIS — T45.1X5S ADVERSE EFFECT OF CHEMOTHERAPY, SEQUELA: ICD-10-CM

## 2019-07-05 DIAGNOSIS — C16.0 MALIGNANT NEOPLASM OF CARDIA (HCC): ICD-10-CM

## 2019-07-05 DIAGNOSIS — J02.9 SORETHROAT: ICD-10-CM

## 2019-07-05 PROBLEM — T45.1X5A CHEMOTHERAPY ADVERSE REACTION: Status: ACTIVE | Noted: 2019-07-05

## 2019-07-05 LAB
BASOPHILS # BLD AUTO: 0.03 10*3/MM3 (ref 0–0.2)
BASOPHILS NFR BLD AUTO: 0.5 % (ref 0–1.5)
DEPRECATED RDW RBC AUTO: 42.9 FL (ref 37–54)
EOSINOPHIL # BLD AUTO: 0.36 10*3/MM3 (ref 0–0.4)
EOSINOPHIL NFR BLD AUTO: 6.2 % (ref 0.3–6.2)
ERYTHROCYTE [DISTWIDTH] IN BLOOD BY AUTOMATED COUNT: 12.9 % (ref 12.3–15.4)
HCT VFR BLD AUTO: 32.6 % (ref 37.5–51)
HGB BLD-MCNC: 10.7 G/DL (ref 13–17.7)
LYMPHOCYTES # BLD AUTO: 1.56 10*3/MM3 (ref 0.7–3.1)
LYMPHOCYTES NFR BLD AUTO: 27 % (ref 19.6–45.3)
MCH RBC QN AUTO: 30.9 PG (ref 26.6–33)
MCHC RBC AUTO-ENTMCNC: 32.8 G/DL (ref 31.5–35.7)
MCV RBC AUTO: 94.2 FL (ref 79–97)
MONOCYTES # BLD AUTO: 0.59 10*3/MM3 (ref 0.1–0.9)
MONOCYTES NFR BLD AUTO: 10.2 % (ref 5–12)
NEUTROPHILS # BLD AUTO: 3.23 10*3/MM3 (ref 1.7–7)
NEUTROPHILS NFR BLD AUTO: 56.1 % (ref 42.7–76)
PLATELET # BLD AUTO: 203 10*3/MM3 (ref 140–450)
PMV BLD AUTO: 10.2 FL (ref 6–12)
RBC # BLD AUTO: 3.46 10*6/MM3 (ref 4.14–5.8)
WBC NRBC COR # BLD: 5.77 10*3/MM3 (ref 3.4–10.8)

## 2019-07-05 PROCEDURE — 85025 COMPLETE CBC W/AUTO DIFF WBC: CPT | Performed by: INTERNAL MEDICINE

## 2019-07-05 PROCEDURE — 36415 COLL VENOUS BLD VENIPUNCTURE: CPT | Performed by: INTERNAL MEDICINE

## 2019-07-05 PROCEDURE — 99215 OFFICE O/P EST HI 40 MIN: CPT | Performed by: INTERNAL MEDICINE

## 2019-07-05 RX ORDER — SODIUM CHLORIDE 9 MG/ML
250 INJECTION, SOLUTION INTRAVENOUS ONCE
Status: CANCELLED | OUTPATIENT
Start: 2019-07-12

## 2019-07-05 RX ORDER — PNV NO.95/FERROUS FUM/FOLIC AC 28MG-0.8MG
TABLET ORAL
COMMUNITY
Start: 2019-01-17 | End: 2019-12-04

## 2019-07-05 RX ORDER — PANTOPRAZOLE SODIUM 40 MG/1
TABLET, DELAYED RELEASE ORAL
COMMUNITY
Start: 2019-02-14 | End: 2019-08-09 | Stop reason: SINTOL

## 2019-07-05 RX ORDER — PRAVASTATIN SODIUM 10 MG
TABLET ORAL
COMMUNITY
Start: 2014-04-03 | End: 2020-07-17

## 2019-07-05 RX ORDER — SUCRALFATE 1 G/1
TABLET ORAL
COMMUNITY
Start: 2019-02-14 | End: 2019-08-09

## 2019-07-05 RX ORDER — CHOLECALCIFEROL (VITAMIN D3) 125 MCG
CAPSULE ORAL EVERY 24 HOURS
COMMUNITY
Start: 2019-01-17 | End: 2019-08-23

## 2019-07-05 RX ORDER — ONDANSETRON 4 MG/1
TABLET, ORALLY DISINTEGRATING ORAL
Refills: 1 | COMMUNITY
Start: 2019-05-28 | End: 2020-05-29 | Stop reason: SDUPTHER

## 2019-07-05 RX ORDER — CLONAZEPAM 0.5 MG/1
0.25 TABLET ORAL 2 TIMES DAILY PRN
Refills: 1 | COMMUNITY
Start: 2019-06-29

## 2019-07-05 RX ORDER — FOLIC ACID 1 MG/1
1 TABLET ORAL DAILY
COMMUNITY
Start: 2019-01-17

## 2019-07-05 RX ORDER — METOPROLOL SUCCINATE 25 MG/1
TABLET, EXTENDED RELEASE ORAL
COMMUNITY
Start: 2019-01-17 | End: 2019-08-23

## 2019-07-05 NOTE — PROGRESS NOTES
Hematology-Oncology Follow-up Note       Artie Mc  1950    Primary Care Physician: Chanel Carter APRN  Referring Physician: Chanel Carter APRN    Reason For Visit:  Chief Complaint   Patient presents with   • Follow-up     Metastatic esophageal cancer   Monitoring for adverse effects related to immunotherapy.    HPI:     Mr. Mc is a pleasant 69-year-old gentleman with a history of gastroesophageal reflux disease, diabetes, hypertension, heart block status post pacemaker placement and history of CABG.  He was having symptoms of dysphagia, weight loss since September 2016. He was reporting symptoms of food getting stuck in the lower chest.  He has transitioned himself to a liquid diet.  The patient reports losing about 30 pounds over this duration.  The patient underwent upper GI endoscopy on 12/20/16 with Dr. Esvin Barrera.  Endoscopy showed necrotic, circumferential and moderately obstructive mass at the distal esophagus between 42 cm and 36 cm.      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  There was also evidence of mild subacute inflammation in the duodenum.  Dr. Barrera ordered a CT scan of the chest and abdomen with contrast on 12/20/16.  The scan was done at [facility].  The scan showed a new ill-defined mass in the anterior wall of the distal esophagus at the GE junction measuring 2.3 x 2.2 x 1.6 cm, worrisome for cancer.  There were four subcentimeter liver lesions which had appeared stable in comparison to another CT scan from 2009 and they likely represent small cysts.  There was also mild lymphadenopathy in the gastrohepatic ligament.  There were at least six borderline enlarged lymph nodes measuring up to 1.3 x 1 cm in the region.  The patient is referred to us for further oncological evaluation.    1/5/2017 - The patient presents for initial consultation.  He reports persistent  dysphagia and also has symptoms of regurgitation.  He cannot tolerate solid foods and is dependent on liquid diet such as Ensure.  He also reports fatigue. Symptoms of dysphagia have been present for the past four months.  When he eats any solids, the food gets stuck in the lower chest.    • 1/5/17 - Vitamin B12 200 (L).  CEA 0.8.  Ferritin 35.  Iron saturation 16% (L), serum iron 56, TIBC 354.  Creatinine 0.9.  LFTs normal.  Folate 10.2.  • 1/12/17 - PET/CT scan:  Intense abnormal activity corresponding to the region of the GE junction and proximal stomach.  SUV is 11.06.  There is a suggestion of an abnormal mass or abnormal wall thickening in the region measuring 3.9 x 4.8 cm.  No additional abnormalities.  No evidence of metastases or other lymphadenopathy.  Scattered nonspecific subcentimeter lymph nodes involving the mediastinum and within the central upper abdomen.  • 1/13/17 - Creatinine 0.9.  LFTs normal.    • 1/17/17 - Patient seen by thoracic surgeon, Dr. Bradley.  PET scan was reviewed.  He has recommended neoadjuvant chemoradiation therapy followed by Baxter Gurwinder esophagogastrectomy.  Port-A-Cath is being arranged.    • 1/20/17 - WBC 6.3, hemoglobin 12.1, platelet count 198,000, MCV 86.4.  • 1/30/17 - Patient started weekly Carboplatin and Taxol (Carboplatin AUC 2 and Taxol 50 mg/M2).  Patient received Injectafer 750 mg.  WBC 5.8, hemoglobin 12.4, platelet count 244,000.   • 1/31/17 - Patient seen by Dr. Eduardo Oleary.  The plan was to give 5040 cGy in 28 fractions.   • 2/6/17 - WBC 4.4, hemoglobin 11.6, platelet count 201,000.  Patient received cycle 2 of weekly Carboplatin and Taxol (Carboplatin AUC 2 and Taxol 50 mg/M2).  • 2/13/17 - Patient received Carboplatin and Taxol weekly cycle 3.  • 2/13/17 - Patient started concurrent radiation therapy.  Total planned dose is 4140 cGy.    • 2/13/17 to 2/15/17 - Patient received 4140 cGy of neoadjuvant radiation.  Radiation was finished on 3/15/17.     • 2/20/17 - WBC 1.8, hemoglobin 10.6, platelet count 217,000.  Creatinine 0.5. Folate 15 (L).  Ferritin 361.  Haptoglobin 214.  Iron saturation 33%, TIBC 263, serum iron 88.    • 2/20/17 - Patient received Carboplatin and Taxol weekly cycle 4.   • 2/27/17 - WBC 3.7, hemoglobin 10.7, platelet count 177,000, MCV 85.3.    • 3/6/17 - Patient has received 16 out of the 23 planned radiation treatments.  Total planned dose is 4140 cGy.    • 3/6/17 - WBC 5.4, hemoglobin 11.2, platelet count 113,000.  • 3/6/17 - Patient received cycle 5 of weekly Carboplatin and Taxol.   • 3/13/17 - Patient received cycle 6 of weekly Carboplatin and Taxol.  WBC 3.9, hemoglobin 11.3, platelet count 93,000.     • 3/20/17 - WBC 1.4, hemoglobin 10.0, platelet count 118,000, MCV 86.1   • 3/27/17 - WBC 3.8, hemoglobin 9.5, platelet count 176,000, MCV 87.3.    • 4/7/17 - Stress test:  No evidence of reversible myocardial ischemia.  Normal left ventricular ejection fraction of 50%.    • 4/17/17 - Upper GI endoscopy by Dr. 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  The GE junction adenocarcinoma lesion seems to have a very good response to chemotherapy and radiation.  The lumen was open.  • 5/1/17 - WBC 7.0, hemoglobin 10.2, platelet count 175,000.    • 5/4/17 - Patient underwent Noel Gurwinder esophagectomy with pyloroplasty, feeding jejunostomy tube, abdominal and mediastinal lymph node dissection.    Surgical Pathology:  Moderately to poorly differentiated adenocarcinoma measuring 1.3 cm at the GE junction.  Resection margins are negative for malignancy.  Tumor margins are negative.  There is effective treatment response.  No evidence of lymphovascular invasion.  Staging is ypT3,pN1.  One out of fourteen lymph nodes involved.   • 5/22/17 - WBC 6.8, hemoglobin 10.3, platelet count 312,000.    • 6/19/17 - WBC 5.7, hemoglobin 10.5, platelet count 204,000, MCV 92.1.    • 7/6/17 - EGD:  Status post balloon  dilation of esophageal stricture.  • 7/12/17 - EGD with balloon dilation of esophageal stricture.  • 7/26/17 - EGD and balloon dilation of esophageal stricture.  • 7/31/17 - PET/CT scan:  There is mild metabolic activity seen at the thoracic esophagus just at and below the surgical margins.  Some mild wall thickening.  This could be from recent surgery.  A residual cancer seems unlikely.  There is a precarinal lymph node with mild metabolic activity with SUV of 3 measuring 1 x 1.3 cm.  Again this appears to be nonspecific in my opinion.    • 8/7/17 - WBC 5.9, hemoglobin 11.9, platelet count 171,000, MCV 88.7.    • 10/19/17 - WBC 7.5, hemoglobin 11.8, platelet count 216,000.    • 1/8/18 - PET scan:  No evidence of residual disease or recurrent disease.  There is an esophageal stent in the mid esophagus with a large debris air level.  No evidence of any metastases in the chest, abdomen or pelvis.  Mild nodule uptake in the vocal cords with fullness in the right vocal cord.  This could be physiologic.    • 7/9/18 - CT scan of chest with contrast:  Prominent mediastinal lymph nodes appear stable since February.  Changes of gastric pull-through procedure for esophageal cancer again noted.  Tree-in-bud infiltrates in the left lung base, consistent with infection versus inflammation.    • 1/10/19 - CT chest, abdomen and pelvis with contrast:  No evidence of mets in the chest; however, interval development of metastatic lymphadenopathy in the left side of the retroperitoneum.  For instance, there is a 17 mm rounded lymph node, 10 mm lymph node and also a 14 mm lymph node.  These are all new.  Stable 9 mm hypodense lesion within the right hepatic lobe, which could reflect hemangioma or complex cyst.    • 1/30/19 - CT-guided core biopsy of retroperitoneal lymph node:  Poorly differentiated adenocarcinoma consistent with metastases from patient’s known esophageal primary.  CK20 positive, CDX2 positive, cytokeratin 7 negative,  TTF-1 negative.    • 2/1/19 - Creatinine 0.9, BUN 18, calcium 9.3; these are normal.    • 2/28/19 - [facility] Next Gen sequencing testing on retroperitoneal lymph node specimen:  PD-L1 positive.  CPS = 3.  This implies responsiveness to pembrolizumab.  Mismatch repair status is proficient (no evidence of microsatellite instability).  Tumor mutational burden is low = 6 mutations/Mb.  CDH1 indeterminate.  KRAS mutation positive.  TP53 mutation positive.  CDK6 amplified.  [employer] PD-L1 testing by IHC:  PD-L1 expression 1% positive (CPS =1).  HER2/marvel by IHC is negative.  HER2/marvel by CISH not amplified.  Microsatellite stable tumor.    • 3/7/19 - WBC 7.3, hemoglobin 12, platelet count 128,000, MCV 92.     • 3/14/19 - Patient was seen by radiation oncologist, Dr. Chahal.  He has recommended observation versus systemic chemoimmunotherapy as needed.  No plans for radiation therapy.   • 5/1/19 - CT scan of chest with contrast:  No definitive findings of new metastatic disease in the chest.  Emphysema noted.    • 5/1/19 - CT abdomen and pelvis:  There is interval progression of metastatic retroperitoneal adenopathy.  Left periaortic adenopathy with lymph node measuring up to 2 cm, previously 1.7 cm.  Another lymph node now measures 2.3, previously 1.4 cm.  New enlarged lymph node on image 147 measures 1.4 cm, previously 0.4 cm.  A 9 mm hypodense right hepatic lobe lesion appears stable.    • 5/9/19 - Decision made to start patient on immunotherapy Keytruda.    • 5/9/19 - Vitamin B12 229.  Ferritin 61.  Folate 14.3.  Iron saturation 26%, TIBC 304, serum iron 79.  • 5/31/19 - Patient received Keytruda 200 mg cycle 1, day 1.    • 5/31/19 - Free T4 0.99.  Creatinine 0.9, BUN 9.  LFTs normal.  TSH 3.99.  Folate 14.3.  Iron saturation 26%.        Subjective:   Patient is here for a follow up. He complains of a little bit of SOA with exertion, chest congestion, sore throat. He denies and phlegm and states he has very little cough, He  denies any diarrhea.  He states his PCP checked his Hgb A1C, and kidney functions and all was good.  Good appetite and stable weight.    The following portions of the patient's history were reviewed and updated as appropriate: allergies, current medications, past family history, past medical history, past social history, past surgical history and problem list.     Past Medical History:   Diagnosis Date   • B12 deficiency    • Blockage of coronary artery of heart (CMS/HCC)    • DM (diabetes mellitus) (CMS/HCC)    • Dysphagia    • HTN (hypertension)        Past Surgical History:   Procedure Laterality Date   • CORONARY ARTERY BYPASS GRAFT  2015   • ENDOSCOPY  12/20/2016   • PACEMAKER IMPLANTATION  11/21/2016         Current Outpatient Medications:   •  clonazePAM (KlonoPIN) 0.5 MG tablet, TAKE 1 4 TO 1 FULL TABLET BY MOUTH TWICE DAILY AS NEEDED FOR ANXIETY, Disp: , Rfl: 1  •  Ferrous Sulfate (IRON) 325 (65 Fe) MG tablet, IRON 325 (65 Fe) MG TABS, Disp: , Rfl:   •  folic acid (FOLVITE) 1 MG tablet, Daily., Disp: , Rfl:   •  metFORMIN (GLUCOPHAGE) 1000 MG tablet, Take 1,000 mg by mouth 2 (Two) Times a Day., Disp: , Rfl: 4  •  metoprolol succinate XL (TOPROL-XL) 25 MG 24 hr tablet, METOPROLOL SUCCINATE ER 25 MG XR24H-TAB, Disp: , Rfl:   •  omeprazole (priLOSEC) 40 MG capsule, Take 40 mg by mouth 2 (Two) Times a Day., Disp: , Rfl:   •  ondansetron ODT (ZOFRAN-ODT) 4 MG disintegrating tablet, DISSOLVE 1 TABLET IN MOUTH 3 TIMES DAILY TO 4 TIMES DAILY FOR NAUSEA, Disp: , Rfl: 1  •  pravastatin (PRAVACHOL) 20 MG tablet, PRAVASTATIN SODIUM 20 MG TABS, Disp: , Rfl:   •  vitamin B-12 (CYANOCOBALAMIN) 500 MCG tablet, Daily., Disp: , Rfl:   •  pantoprazole (PROTONIX) 40 MG EC tablet, PROTONIX 40 MG TBEC, Disp: , Rfl:   •  sucralfate (CARAFATE) 1 g tablet, CARAFATE 1 GM TABS, Disp: , Rfl:     Allergies   Allergen Reactions   • Ace Inhibitors Unknown (See Comments)     Unknown reaction     • Heparin Other (See Comments)      "Fever/chills, weakness in legs   • Sulfa Antibiotics Other (See Comments)     Mouth sores       No family history on file.    Cancer-related family history is not on file.    Social History     Tobacco Use   • Smoking status: Former Smoker     Types: Cigarettes     Last attempt to quit: 2015     Years since quittin.0   Substance Use Topics   • Alcohol use: No     Frequency: Never   • Drug use: No         ROS:     Review of Systems   Constitutional: Positive for fatigue. Negative for chills and fever.   HENT: Positive for sore throat. Negative for ear pain, mouth sores and nosebleeds.    Eyes: Negative for photophobia and visual disturbance.   Respiratory: Positive for chest tightness and shortness of breath. Negative for wheezing and stridor.    Cardiovascular: Negative for chest pain and palpitations.   Gastrointestinal: Negative for abdominal pain, diarrhea, nausea and vomiting.   Endocrine: Negative for cold intolerance and heat intolerance.   Genitourinary: Negative for dysuria and hematuria.   Musculoskeletal: Negative for joint swelling and neck stiffness.   Skin: Negative for color change and rash.   Neurological: Negative for seizures and syncope.   Hematological: Negative for adenopathy.        No obvious bleeding   Psychiatric/Behavioral: Negative for agitation, confusion and hallucinations.       Objective:    Vitals:    19 0911   BP: 128/76   Pulse: 60   Resp: 18   Temp: 97.6 °F (36.4 °C)   Weight: 67.6 kg (149 lb)   Height: 167.6 cm (66\")   PainSc:   6   PainLoc: Eye  Comment: sinus allergies and medicine side effects       (1) Restricted in physically strenuous activity, ambulatory and able to do work of light nature    Physical Exam:     Physical Exam   Constitutional: He is oriented to person, place, and time. He appears well-developed and well-nourished. No distress.   HENT:   Head: Normocephalic and atraumatic.   Mouth/Throat: Oropharynx is clear and moist.   Slight erythema in " throat   Eyes: Conjunctivae and EOM are normal. Right eye exhibits no discharge. Left eye exhibits no discharge. No scleral icterus.   Neck: Normal range of motion. Neck supple. No thyromegaly present.   Cardiovascular: Normal rate, regular rhythm and normal heart sounds. Exam reveals no gallop and no friction rub.   Pulmonary/Chest: Effort normal and breath sounds normal. No stridor. No respiratory distress. He has no wheezes.   Abdominal: Soft. Bowel sounds are normal. He exhibits no mass. There is no tenderness. There is no rebound and no guarding.   Musculoskeletal: Normal range of motion. He exhibits no tenderness.   Lymphadenopathy:     He has no cervical adenopathy.   Neurological: He is alert and oriented to person, place, and time. He exhibits normal muscle tone.   Skin: Skin is warm. No rash noted. He is not diaphoretic. No erythema.   Right side chest  port   Psychiatric: He has a normal mood and affect. His behavior is normal.   Nursing note and vitals reviewed.        Lab Results - Last 18 Months   Lab Units 07/05/19  0924 06/28/19  0806 06/21/19  0825   WBC 10*3/mm3 5.77 6.59 6.34   HEMOGLOBIN g/dL 10.7* 10.7* 11.3*   HEMATOCRIT % 32.6* 32.7* 33.5*   PLATELETS 10*3/mm3 203 223 205   MCV fL 94.2 93.4 92.8     Lab Results - Last 18 Months   Lab Units 06/21/19  0825 05/31/19  0820   SODIUM mmol/L 138 135*   POTASSIUM mmol/L 4.3 4.5   CHLORIDE mmol/L 104 102   TOTAL CO2 mmol/L  --  21*   CO2 mmol/L 21.0*  --    BUN mg/dL 13 9   CREATININE mg/dL 0.90 0.9   CALCIUM mg/dL 8.9 9.1   BILIRUBIN mg/dL 0.2* 0.5   ALK PHOS U/L 30* 31*   ALT (SGPT) U/L 9* 11*   AST (SGOT) U/L 17 24   GLUCOSE mg/dL 115* 145*       Assessment/Plan     Assessment:    1. Metastatic esophageal carcinoma:  Patient has a history of GE junction poorly differentiated adenocarcinoma for which he had chemoradiation followed by Noel Gurwinder resection:  Pathology showed ypT3,pN1 disease.  Tumor was Stage IIIA, diagnosed in 2016.  He received  concurrent chemoradiation with weekly Carboplatin and Taxol and radiation finishing on 3/15/17.      CT scan on 1/10/19 showed new retroperitoneal lymph nodes.  These lymph nodes were biopsied on 1/30/19 and it shows metastatic esophageal cancer.  [facility] Next Gen testing was performed on the sample and it shows KRAS mutation, microsatellite stable tumor.  PD-L1 is positive.  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  HER2/marvel (ERBB2) was not amplified and was negative.  His CT scan on 5/1/19 shows evidence of progression.  Patient has started receiving Keytruda on 5/31/19.    2. History of chronic dysphagia:  Due to recurrent esophageal strictures.  Patient is status post EGD and balloon dilation.  Today, he does not report any new symptoms.  3. PD-L1 positive, HER2 negative, p53 and KRAS positive.    4. B12 deficiency:  Recommended to take B12 1000 mcg twice daily.    5. Sorethroat:- Has sinus congestion. Likely mild chronic sinusitis    PLAN:    1. Continue Keytruda. Discussed the risk and benefits of immunotherapy and adverse event profile/  2. Recommend Sinus nasal spray for his sore throat and Antihistamines at night  3. Continue B12 supplements twice a day.  4. Continue  folic acid supplement also.  5. Follow-up in one month and will monitor his TSH and cortisol levels during treatment.                Orders Placed This Encounter   Procedures   • CBC Auto Differential   • Comprehensive metabolic panel     Standing Status:   Future     Standing Expiration Date:   7/11/2020   • T3, Free     Standing Status:   Future     Standing Expiration Date:   7/11/2020   • Cortisol     Standing Status:   Future     Standing Expiration Date:   7/11/2020   • TSH     Standing Status:   Future     Standing Expiration Date:   7/11/2020   • T4, free     Standing Status:   Future     Standing Expiration Date:   7/11/2020   • CBC and Differential     Standing Status:   Future     Standing Expiration Date:   7/11/2020     Order Specific Question:    Manual Differential     Answer:   No                Thank you very much for providing the opportunity to participate in this patient’s care. Please do not hesitate to call if there are any other questions

## 2019-07-09 ENCOUNTER — CLINICAL SUPPORT NO REQUIREMENTS (OUTPATIENT)
Dept: CARDIOLOGY | Facility: CLINIC | Age: 69
End: 2019-07-09

## 2019-07-09 DIAGNOSIS — R00.1 BRADYCARDIA, UNSPECIFIED: Primary | ICD-10-CM

## 2019-07-12 ENCOUNTER — HOSPITAL ENCOUNTER (OUTPATIENT)
Dept: ONCOLOGY | Facility: HOSPITAL | Age: 69
Setting detail: INFUSION SERIES
Discharge: HOME | End: 2019-07-12

## 2019-07-12 VITALS
RESPIRATION RATE: 18 BRPM | TEMPERATURE: 97.4 F | SYSTOLIC BLOOD PRESSURE: 127 MMHG | WEIGHT: 146.5 LBS | DIASTOLIC BLOOD PRESSURE: 73 MMHG | BODY MASS INDEX: 23.65 KG/M2 | HEART RATE: 63 BPM

## 2019-07-12 DIAGNOSIS — C15.9 ESOPHAGEAL CANCER, STAGE IV (HCC): ICD-10-CM

## 2019-07-12 DIAGNOSIS — C16.0 MALIGNANT NEOPLASM OF CARDIA (HCC): Primary | ICD-10-CM

## 2019-07-12 LAB
ALBUMIN SERPL-MCNC: 3.7 G/DL (ref 3.5–4.8)
ALBUMIN/GLOB SERPL: 1.4 G/DL (ref 1–1.7)
ALP SERPL-CCNC: 32 U/L (ref 32–91)
ALT SERPL W P-5'-P-CCNC: 11 U/L (ref 17–63)
ANION GAP SERPL CALCULATED.3IONS-SCNC: 15.1 MMOL/L (ref 5–15)
AST SERPL-CCNC: 21 U/L (ref 15–41)
BASOPHILS # BLD AUTO: 0.04 10*3/MM3 (ref 0–0.2)
BASOPHILS NFR BLD AUTO: 0.6 % (ref 0–1.5)
BILIRUB SERPL-MCNC: 0.7 MG/DL (ref 0.3–1.2)
BUN BLD-MCNC: 15 MG/DL (ref 8–20)
BUN/CREAT SERPL: 18.8 (ref 6.2–20.3)
CALCIUM SPEC-SCNC: 9 MG/DL (ref 8.9–10.3)
CHLORIDE SERPL-SCNC: 105 MMOL/L (ref 101–111)
CO2 SERPL-SCNC: 21 MMOL/L (ref 22–32)
CORTIS SERPL-MCNC: 8.81 MCG/DL
CREAT BLD-MCNC: 0.8 MG/DL (ref 0.7–1.2)
DEPRECATED RDW RBC AUTO: 42.9 FL (ref 37–54)
EOSINOPHIL # BLD AUTO: 0.42 10*3/MM3 (ref 0–0.4)
EOSINOPHIL NFR BLD AUTO: 6.2 % (ref 0.3–6.2)
ERYTHROCYTE [DISTWIDTH] IN BLOOD BY AUTOMATED COUNT: 13 % (ref 12.3–15.4)
GFR SERPL CREATININE-BSD FRML MDRD: 96 ML/MIN/1.73
GLOBULIN UR ELPH-MCNC: 2.6 GM/DL (ref 2.5–3.8)
GLUCOSE BLD-MCNC: 144 MG/DL (ref 65–99)
HCT VFR BLD AUTO: 34.7 % (ref 37.5–51)
HGB BLD-MCNC: 11.2 G/DL (ref 13–17.7)
LYMPHOCYTES # BLD AUTO: 1.75 10*3/MM3 (ref 0.7–3.1)
LYMPHOCYTES NFR BLD AUTO: 26 % (ref 19.6–45.3)
MCH RBC QN AUTO: 30.2 PG (ref 26.6–33)
MCHC RBC AUTO-ENTMCNC: 32.3 G/DL (ref 31.5–35.7)
MCV RBC AUTO: 93.5 FL (ref 79–97)
MONOCYTES # BLD AUTO: 0.71 10*3/MM3 (ref 0.1–0.9)
MONOCYTES NFR BLD AUTO: 10.5 % (ref 5–12)
NEUTROPHILS # BLD AUTO: 3.82 10*3/MM3 (ref 1.7–7)
NEUTROPHILS NFR BLD AUTO: 56.7 % (ref 42.7–76)
PLATELET # BLD AUTO: 165 10*3/MM3 (ref 140–450)
PMV BLD AUTO: 10.5 FL (ref 6–12)
POTASSIUM BLD-SCNC: 5.1 MMOL/L (ref 3.6–5.1)
PROT SERPL-MCNC: 6.3 G/DL (ref 6.1–7.9)
RBC # BLD AUTO: 3.71 10*6/MM3 (ref 4.14–5.8)
SODIUM BLD-SCNC: 136 MMOL/L (ref 136–144)
T3FREE SERPL-MCNC: 3.07 PG/ML (ref 2.39–6.79)
T4 FREE SERPL-MCNC: 0.97 NG/DL (ref 0.58–1.64)
TSH SERPL DL<=0.05 MIU/L-ACNC: 2.55 MIU/ML (ref 0.34–5.6)
WBC NRBC COR # BLD: 6.74 10*3/MM3 (ref 3.4–10.8)

## 2019-07-12 PROCEDURE — 80053 COMPREHEN METABOLIC PANEL: CPT | Performed by: INTERNAL MEDICINE

## 2019-07-12 PROCEDURE — 85025 COMPLETE CBC W/AUTO DIFF WBC: CPT | Performed by: INTERNAL MEDICINE

## 2019-07-12 PROCEDURE — 84443 ASSAY THYROID STIM HORMONE: CPT | Performed by: INTERNAL MEDICINE

## 2019-07-12 PROCEDURE — 84481 FREE ASSAY (FT-3): CPT | Performed by: INTERNAL MEDICINE

## 2019-07-12 PROCEDURE — 25010000002 PEMBROLIZUMAB 100 MG/4ML SOLUTION 4 ML VIAL: Performed by: INTERNAL MEDICINE

## 2019-07-12 PROCEDURE — 84439 ASSAY OF FREE THYROXINE: CPT | Performed by: INTERNAL MEDICINE

## 2019-07-12 PROCEDURE — 82533 TOTAL CORTISOL: CPT | Performed by: INTERNAL MEDICINE

## 2019-07-12 PROCEDURE — 96413 CHEMO IV INFUSION 1 HR: CPT | Performed by: INTERNAL MEDICINE

## 2019-07-12 RX ORDER — SODIUM CHLORIDE 0.9 % (FLUSH) 0.9 %
10 SYRINGE (ML) INJECTION AS NEEDED
Status: DISCONTINUED | OUTPATIENT
Start: 2019-07-12 | End: 2019-07-13 | Stop reason: HOSPADM

## 2019-07-12 RX ORDER — SODIUM CHLORIDE 0.9 % (FLUSH) 0.9 %
10 SYRINGE (ML) INJECTION AS NEEDED
Status: CANCELLED | OUTPATIENT
Start: 2019-07-12

## 2019-07-12 RX ADMIN — SODIUM CHLORIDE 200 MG: 900 INJECTION, SOLUTION INTRAVENOUS at 11:25

## 2019-07-12 RX ADMIN — Medication 10 ML: at 12:06

## 2019-07-30 DIAGNOSIS — C16.0 MALIGNANT NEOPLASM OF CARDIA (HCC): ICD-10-CM

## 2019-07-30 RX ORDER — SODIUM CHLORIDE 9 MG/ML
250 INJECTION, SOLUTION INTRAVENOUS ONCE
Status: CANCELLED | OUTPATIENT
Start: 2019-08-02

## 2019-08-02 ENCOUNTER — HOSPITAL ENCOUNTER (OUTPATIENT)
Dept: ONCOLOGY | Facility: HOSPITAL | Age: 69
Setting detail: INFUSION SERIES
Discharge: HOME | End: 2019-08-02

## 2019-08-02 VITALS
TEMPERATURE: 97.5 F | SYSTOLIC BLOOD PRESSURE: 133 MMHG | BODY MASS INDEX: 23.63 KG/M2 | WEIGHT: 147 LBS | RESPIRATION RATE: 18 BRPM | HEIGHT: 66 IN | DIASTOLIC BLOOD PRESSURE: 77 MMHG | HEART RATE: 62 BPM

## 2019-08-02 DIAGNOSIS — C16.0 MALIGNANT NEOPLASM OF CARDIA (HCC): Primary | ICD-10-CM

## 2019-08-02 LAB
ALBUMIN SERPL-MCNC: 3.7 G/DL (ref 3.5–4.8)
ALBUMIN/GLOB SERPL: 1.3 G/DL (ref 1–1.7)
ALP SERPL-CCNC: 31 U/L (ref 32–91)
ALT SERPL W P-5'-P-CCNC: 11 U/L (ref 17–63)
ANION GAP SERPL CALCULATED.3IONS-SCNC: 15.8 MMOL/L (ref 5–15)
AST SERPL-CCNC: 22 U/L (ref 15–41)
BASOPHILS # BLD AUTO: 0.03 10*3/MM3 (ref 0–0.2)
BASOPHILS NFR BLD AUTO: 0.4 % (ref 0–1.5)
BILIRUB SERPL-MCNC: 0.7 MG/DL (ref 0.3–1.2)
BUN BLD-MCNC: 12 MG/DL (ref 8–20)
BUN/CREAT SERPL: 13.3 (ref 6.2–20.3)
CALCIUM SPEC-SCNC: 9.3 MG/DL (ref 8.9–10.3)
CHLORIDE SERPL-SCNC: 106 MMOL/L (ref 101–111)
CO2 SERPL-SCNC: 21 MMOL/L (ref 22–32)
CREAT BLD-MCNC: 0.9 MG/DL (ref 0.7–1.2)
DEPRECATED RDW RBC AUTO: 44 FL (ref 37–54)
EOSINOPHIL # BLD AUTO: 0.47 10*3/MM3 (ref 0–0.4)
EOSINOPHIL NFR BLD AUTO: 6.5 % (ref 0.3–6.2)
ERYTHROCYTE [DISTWIDTH] IN BLOOD BY AUTOMATED COUNT: 13.4 % (ref 12.3–15.4)
GFR SERPL CREATININE-BSD FRML MDRD: 83 ML/MIN/1.73
GLOBULIN UR ELPH-MCNC: 2.8 GM/DL (ref 2.5–3.8)
GLUCOSE BLD-MCNC: 128 MG/DL (ref 65–99)
HCT VFR BLD AUTO: 35.6 % (ref 37.5–51)
HGB BLD-MCNC: 11.8 G/DL (ref 13–17.7)
LYMPHOCYTES # BLD AUTO: 1.7 10*3/MM3 (ref 0.7–3.1)
LYMPHOCYTES NFR BLD AUTO: 23.6 % (ref 19.6–45.3)
MCH RBC QN AUTO: 30.9 PG (ref 26.6–33)
MCHC RBC AUTO-ENTMCNC: 33.1 G/DL (ref 31.5–35.7)
MCV RBC AUTO: 93.2 FL (ref 79–97)
MONOCYTES # BLD AUTO: 0.65 10*3/MM3 (ref 0.1–0.9)
MONOCYTES NFR BLD AUTO: 9 % (ref 5–12)
NEUTROPHILS # BLD AUTO: 4.36 10*3/MM3 (ref 1.7–7)
NEUTROPHILS NFR BLD AUTO: 60.5 % (ref 42.7–76)
PLATELET # BLD AUTO: 163 10*3/MM3 (ref 140–450)
PMV BLD AUTO: 10.5 FL (ref 6–12)
POTASSIUM BLD-SCNC: 4.8 MMOL/L (ref 3.6–5.1)
PROT SERPL-MCNC: 6.5 G/DL (ref 6.1–7.9)
RBC # BLD AUTO: 3.82 10*6/MM3 (ref 4.14–5.8)
SODIUM BLD-SCNC: 138 MMOL/L (ref 136–144)
WBC NRBC COR # BLD: 7.21 10*3/MM3 (ref 3.4–10.8)

## 2019-08-02 PROCEDURE — 80053 COMPREHEN METABOLIC PANEL: CPT | Performed by: NURSE PRACTITIONER

## 2019-08-02 PROCEDURE — 85025 COMPLETE CBC W/AUTO DIFF WBC: CPT | Performed by: NURSE PRACTITIONER

## 2019-08-02 PROCEDURE — 96413 CHEMO IV INFUSION 1 HR: CPT | Performed by: INTERNAL MEDICINE

## 2019-08-02 PROCEDURE — 25010000002 PEMBROLIZUMAB 100 MG/4ML SOLUTION 4 ML VIAL: Performed by: NURSE PRACTITIONER

## 2019-08-02 RX ORDER — SODIUM CHLORIDE 0.9 % (FLUSH) 0.9 %
10 SYRINGE (ML) INJECTION AS NEEDED
Status: DISCONTINUED | OUTPATIENT
Start: 2019-08-02 | End: 2019-08-03 | Stop reason: HOSPADM

## 2019-08-02 RX ORDER — SODIUM CHLORIDE 0.9 % (FLUSH) 0.9 %
10 SYRINGE (ML) INJECTION AS NEEDED
Status: CANCELLED | OUTPATIENT
Start: 2019-08-02

## 2019-08-02 RX ADMIN — Medication 10 ML: at 12:13

## 2019-08-02 RX ADMIN — SODIUM CHLORIDE 200 MG: 900 INJECTION, SOLUTION INTRAVENOUS at 11:21

## 2019-08-08 NOTE — PROGRESS NOTES
Hematology-Oncology Follow-up Note       Artie Mc  1950    Primary Care Physician: Chanel Carter APRN  Referring Physician: Chanel Carter APRN    Reason For Visit:  Chief Complaint   Patient presents with   • Follow-up     esophageal carcinoma   Monitoring for adverse effects related to immunotherapy.    HPI:   Mr. Mc is a pleasant 69-year-old gentleman with a history of gastroesophageal reflux disease, diabetes, hypertension, heart block status post pacemaker placement and history of CABG.  He was having symptoms of dysphagia, weight loss since September 2016. He was reporting symptoms of food getting stuck in the lower chest.  He has transitioned himself to a liquid diet.  The patient reports losing about 30 pounds over this duration.  The patient underwent upper GI endoscopy on 12/20/16 with Dr. Esvin Barrera.  Endoscopy showed necrotic, circumferential and moderately obstructive mass at the distal esophagus between 42 cm and 36 cm.      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  There was also evidence of mild subacute inflammation in the duodenum.  Dr. Barrera ordered a CT scan of the chest and abdomen with contrast on 12/20/16.  The scan was done at [facility].  The scan showed a new ill-defined mass in the anterior wall of the distal esophagus at the GE junction measuring 2.3 x 2.2 x 1.6 cm, worrisome for cancer.  There were four subcentimeter liver lesions which had appeared stable in comparison to another CT scan from 2009 and they likely represent small cysts.  There was also mild lymphadenopathy in the gastrohepatic ligament.  There were at least six borderline enlarged lymph nodes measuring up to 1.3 x 1 cm in the region.  The patient is referred to us for further oncological evaluation.    1/5/2017 - The patient presents for initial consultation.  He reports persistent dysphagia and  also has symptoms of regurgitation.  He cannot tolerate solid foods and is dependent on liquid diet such as Ensure.  He also reports fatigue. Symptoms of dysphagia have been present for the past four months.  When he eats any solids, the food gets stuck in the lower chest.    • 1/5/17 - Vitamin B12 200 (L).  CEA 0.8.  Ferritin 35.  Iron saturation 16% (L), serum iron 56, TIBC 354.  Creatinine 0.9.  LFTs normal.  Folate 10.2.  • 1/12/17 - PET/CT scan:  Intense abnormal activity corresponding to the region of the GE junction and proximal stomach.  SUV is 11.06.  There is a suggestion of an abnormal mass or abnormal wall thickening in the region measuring 3.9 x 4.8 cm.  No additional abnormalities.  No evidence of metastases or other lymphadenopathy.  Scattered nonspecific subcentimeter lymph nodes involving the mediastinum and within the central upper abdomen.  • 1/13/17 - Creatinine 0.9.  LFTs normal.    • 1/17/17 - Patient seen by thoracic surgeon, Dr. Bradley.  PET scan was reviewed.  He has recommended neoadjuvant chemoradiation therapy followed by Noel Gurwinder esophagogastrectomy.  Port-A-Cath is being arranged.    • 1/20/17 - WBC 6.3, hemoglobin 12.1, platelet count 198,000, MCV 86.4.  • 1/30/17 - Patient started weekly Carboplatin and Taxol (Carboplatin AUC 2 and Taxol 50 mg/M2).  Patient received Injectafer 750 mg.  WBC 5.8, hemoglobin 12.4, platelet count 244,000.   • 1/31/17 - Patient seen by Dr. Eduardo Oleary.  The plan was to give 5040 cGy in 28 fractions.   • 2/6/17 - WBC 4.4, hemoglobin 11.6, platelet count 201,000.  Patient received cycle 2 of weekly Carboplatin and Taxol (Carboplatin AUC 2 and Taxol 50 mg/M2).  • 2/13/17 - Patient received Carboplatin and Taxol weekly cycle 3.  • 2/13/17 - Patient started concurrent radiation therapy.  Total planned dose is 4140 cGy.    • 2/13/17 to 2/15/17 - Patient received 4140 cGy of neoadjuvant radiation.  Radiation was finished on 3/15/17.    • 2/20/17 - WBC 1.8,  hemoglobin 10.6, platelet count 217,000.  Creatinine 0.5. Folate 15 (L).  Ferritin 361.  Haptoglobin 214.  Iron saturation 33%, TIBC 263, serum iron 88.    • 2/20/17 - Patient received Carboplatin and Taxol weekly cycle 4.   • 2/27/17 - WBC 3.7, hemoglobin 10.7, platelet count 177,000, MCV 85.3.    • 3/6/17 - Patient has received 16 out of the 23 planned radiation treatments.  Total planned dose is 4140 cGy.    • 3/6/17 - WBC 5.4, hemoglobin 11.2, platelet count 113,000.  • 3/6/17 - Patient received cycle 5 of weekly Carboplatin and Taxol.   • 3/13/17 - Patient received cycle 6 of weekly Carboplatin and Taxol.  WBC 3.9, hemoglobin 11.3, platelet count 93,000.     • 3/20/17 - WBC 1.4, hemoglobin 10.0, platelet count 118,000, MCV 86.1   • 3/27/17 - WBC 3.8, hemoglobin 9.5, platelet count 176,000, MCV 87.3.    • 4/7/17 - Stress test:  No evidence of reversible myocardial ischemia.  Normal left ventricular ejection fraction of 50%.    • 4/17/17 - Upper GI endoscopy by Dr. 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  The GE junction adenocarcinoma lesion seems to have a very good response to chemotherapy and radiation.  The lumen was open.  • 5/1/17 - WBC 7.0, hemoglobin 10.2, platelet count 175,000.    • 5/4/17 - Patient underwent [place] Gurwinder esophagectomy with pyloroplasty, feeding jejunostomy tube, abdominal and mediastinal lymph node dissection.    Surgical Pathology:  Moderately to poorly differentiated adenocarcinoma measuring 1.3 cm at the GE junction.  Resection margins are negative for malignancy.  Tumor margins are negative.  There is effective treatment response.  No evidence of lymphovascular invasion.  Staging is ypT3,pN1.  One out of fourteen lymph nodes involved.   • 5/22/17 - WBC 6.8, hemoglobin 10.3, platelet count 312,000.    • 6/19/17 - WBC 5.7, hemoglobin 10.5, platelet count 204,000, MCV 92.1.    • 7/6/17 - EGD:  Status post balloon dilation of esophageal  stricture.  • 7/12/17 - EGD with balloon dilation of esophageal stricture.  • 7/26/17 - EGD and balloon dilation of esophageal stricture.  • 7/31/17 - PET/CT scan:  There is mild metabolic activity seen at the thoracic esophagus just at and below the surgical margins.  Some mild wall thickening.  This could be from recent surgery.  A residual cancer seems unlikely.  There is a precarinal lymph node with mild metabolic activity with SUV of 3 measuring 1 x 1.3 cm.  Again this appears to be nonspecific in my opinion.    • 8/7/17 - WBC 5.9, hemoglobin 11.9, platelet count 171,000, MCV 88.7.    • 10/19/17 - WBC 7.5, hemoglobin 11.8, platelet count 216,000.    • 1/8/18 - PET scan:  No evidence of residual disease or recurrent disease.  There is an esophageal stent in the mid esophagus with a large debris air level.  No evidence of any metastases in the chest, abdomen or pelvis.  Mild nodule uptake in the vocal cords with fullness in the right vocal cord.  This could be physiologic.    • 7/9/18 - CT scan of chest with contrast:  Prominent mediastinal lymph nodes appear stable since February.  Changes of gastric pull-through procedure for esophageal cancer again noted.  Tree-in-bud infiltrates in the left lung base, consistent with infection versus inflammation.    • 1/10/19 - CT chest, abdomen and pelvis with contrast:  No evidence of mets in the chest; however, interval development of metastatic lymphadenopathy in the left side of the retroperitoneum.  For instance, there is a 17 mm rounded lymph node, 10 mm lymph node and also a 14 mm lymph node.  These are all new.  Stable 9 mm hypodense lesion within the right hepatic lobe, which could reflect hemangioma or complex cyst.    • 1/30/19 - CT-guided core biopsy of retroperitoneal lymph node:  Poorly differentiated adenocarcinoma consistent with metastases from patient’s known esophageal primary.  CK20 positive, CDX2 positive, cytokeratin 7 negative, TTF-1 negative.     • 2/1/19 - Creatinine 0.9, BUN 18, calcium 9.3; these are normal.    • 2/28/19 - [employer] Next Gen sequencing testing on retroperitoneal lymph node specimen:  PD-L1 positive.  CPS = 3.  This implies responsiveness to pembrolizumab.  Mismatch repair status is proficient (no evidence of microsatellite instability).  Tumor mutational burden is low = 6 mutations/Mb.  CDH1 indeterminate.  KRAS mutation positive.  TP53 mutation positive.  CDK6 amplified.  [employer] PD-L1 testing by IHC:  PD-L1 expression 1% positive (CPS =1).  HER2/marvel by IHC is negative.  HER2/marvel by CISH not amplified.  Microsatellite stable tumor.    • 3/7/19 - WBC 7.3, hemoglobin 12, platelet count 128,000, MCV 92.     • 3/14/19 - Patient was seen by radiation oncologist, Dr. Chahal.  He has recommended observation versus systemic chemoimmunotherapy as needed.  No plans for radiation therapy.   • 5/1/19 - CT scan of chest with contrast:  No definitive findings of new metastatic disease in the chest.  Emphysema noted.    • 5/1/19 - CT abdomen and pelvis:  There is interval progression of metastatic retroperitoneal adenopathy.  Left periaortic adenopathy with lymph node measuring up to 2 cm, previously 1.7 cm.  Another lymph node now measures 2.3, previously 1.4 cm.  New enlarged lymph node on image 147 measures 1.4 cm, previously 0.4 cm.  A 9 mm hypodense right hepatic lobe lesion appears stable.    • 5/9/19 - Decision made to start patient on immunotherapy Keytruda.    • 5/9/19 - Vitamin B12 229.  Ferritin 61.  Folate 14.3.  Iron saturation 26%, TIBC 304, serum iron 79.  • 5/31/19 - Patient received Keytruda 200 mg cycle 1, day 1.    • 5/31/19 - Free T4 0.99.  Creatinine 0.9, BUN 9.  LFTs normal.  TSH 3.99.  Folate 14.3.  Iron saturation 26%.    • 6/21/2019 patient received Keytruda cycle 2  • 7/12/2019: Patient received Keytruda cycle 3  • 8/2/2019 patient received cycle 4 Keytruda WBC 7.2, hemoglobin 11.8 platelet count 163      Subjective:  8/9/19  Patient is here for a follow up. His appetite has decreased. He states that he gets diarrhea after he eats several times a week having 5 episodes daily, he is not taking any medication for this. The diarrhea has been going on about 1 year being a little worse with the Keytruda. He states that he has some trouble swallowing and eats soft foods. He does not sleep well and has to have his head and chest  Elevated. Patient has had 4 cycles of Keytruda.     The following portions of the patient's history were reviewed and updated as appropriate: allergies, current medications, past family history, past medical history, past social history, past surgical history and problem list.     Past Medical History:   Diagnosis Date   • B12 deficiency    • Blockage of coronary artery of heart (CMS/HCC)    • DM (diabetes mellitus) (CMS/HCC)    • Dysphagia    • HTN (hypertension)        Past Surgical History:   Procedure Laterality Date   • CORONARY ARTERY BYPASS GRAFT  2015   • ENDOSCOPY  12/20/2016   • PACEMAKER IMPLANTATION  11/21/2016         Current Outpatient Medications:   •  clonazePAM (KlonoPIN) 0.5 MG tablet, TAKE 1 4 TO 1 FULL TABLET BY MOUTH TWICE DAILY AS NEEDED FOR ANXIETY, Disp: , Rfl: 1  •  Ferrous Sulfate (IRON) 325 (65 Fe) MG tablet, IRON 325 (65 Fe) MG TABS, Disp: , Rfl:   •  folic acid (FOLVITE) 1 MG tablet, Daily., Disp: , Rfl:   •  metFORMIN (GLUCOPHAGE) 1000 MG tablet, Take 1,000 mg by mouth 2 (Two) Times a Day., Disp: , Rfl: 4  •  metoprolol succinate XL (TOPROL-XL) 25 MG 24 hr tablet, METOPROLOL SUCCINATE ER 25 MG XR24H-TAB, Disp: , Rfl:   •  omeprazole (priLOSEC) 40 MG capsule, Take 40 mg by mouth 2 (Two) Times a Day., Disp: , Rfl:   •  ondansetron ODT (ZOFRAN-ODT) 4 MG disintegrating tablet, DISSOLVE 1 TABLET IN MOUTH 3 TIMES DAILY TO 4 TIMES DAILY FOR NAUSEA, Disp: , Rfl: 1  •  pravastatin (PRAVACHOL) 20 MG tablet, PRAVASTATIN SODIUM 20 MG TABS, Disp: , Rfl:   •  vitamin B-12 (CYANOCOBALAMIN) 500  "MCG tablet, Daily., Disp: , Rfl:     Allergies   Allergen Reactions   • Ace Inhibitors Unknown (See Comments)     Unknown reaction     • Heparin Other (See Comments)     Fever/chills, weakness in legs   • Sulfa Antibiotics Other (See Comments)     Mouth sores       No family history on file.    Cancer-related family history is not on file.    Social History     Tobacco Use   • Smoking status: Former Smoker     Types: Cigarettes     Last attempt to quit: 2015     Years since quittin.1   Substance Use Topics   • Alcohol use: No     Frequency: Never   • Drug use: No         ROS:     Review of Systems   Constitutional: Positive for fatigue. Negative for chills and fever.   HENT: Positive for trouble swallowing (has to eat soft foods ). Negative for ear pain, mouth sores and nosebleeds.    Eyes: Negative for photophobia and visual disturbance.   Respiratory: Positive for chest tightness and shortness of breath. Negative for wheezing and stridor.    Cardiovascular: Negative for chest pain and palpitations.   Gastrointestinal: Positive for diarrhea (after eating). Negative for abdominal pain, nausea and vomiting.   Endocrine: Negative for cold intolerance and heat intolerance.   Genitourinary: Negative for dysuria and hematuria.   Musculoskeletal: Negative for joint swelling and neck stiffness.   Skin: Negative for color change and rash.   Neurological: Negative for seizures and syncope.   Hematological: Negative for adenopathy.        No obvious bleeding   Psychiatric/Behavioral: Positive for sleep disturbance (does not sleep well). Negative for agitation, confusion and hallucinations.       Objective:    Vitals:    19 0927   BP: 129/76   Pulse: 62   Resp: 18   Temp: 97.9 °F (36.6 °C)   Weight: 66.1 kg (145 lb 12.8 oz)   Height: 167.6 cm (66\")   PainSc: 0-No pain       (1) Restricted in physically strenuous activity, ambulatory and able to do work of light nature    Physical Exam:     Physical Exam "   Constitutional: He is oriented to person, place, and time. He appears well-developed and well-nourished. No distress.   HENT:   Head: Normocephalic and atraumatic.   Mouth/Throat: Oropharynx is clear and moist.   Absent teeth in upper jaw.   Eyes: Conjunctivae and EOM are normal. Right eye exhibits no discharge. Left eye exhibits no discharge. No scleral icterus.   Neck: Normal range of motion. Neck supple. No thyromegaly present.   Cardiovascular: Normal rate, regular rhythm and normal heart sounds. Exam reveals no gallop and no friction rub.   Pulmonary/Chest: Effort normal and breath sounds normal. No stridor. No respiratory distress. He has no wheezes.   Abdominal: Soft. Bowel sounds are normal. He exhibits no mass. There is no tenderness. There is no rebound and no guarding.   No tenderness   Musculoskeletal: Normal range of motion. He exhibits no tenderness.   Lymphadenopathy:     He has no cervical adenopathy.   Neurological: He is alert and oriented to person, place, and time. He exhibits normal muscle tone.   Skin: Skin is warm. No rash noted. He is not diaphoretic. No erythema.   Right side chest  port   Psychiatric: He has a normal mood and affect. His behavior is normal.   Nursing note and vitals reviewed.        Lab Results - Last 18 Months   Lab Units 08/09/19  0932 08/02/19  1022 07/12/19  1008   WBC 10*3/mm3 6.72 7.21 6.74   HEMOGLOBIN g/dL 12.1* 11.8* 11.2*   HEMATOCRIT % 35.9* 35.6* 34.7*   PLATELETS 10*3/mm3 174 163 165   MCV fL 93.0 93.2 93.5     Lab Results - Last 18 Months   Lab Units 08/02/19  1022 07/12/19  1008 06/21/19  0825   SODIUM mmol/L 138 136 138   POTASSIUM mmol/L 4.8 5.1 4.3   CHLORIDE mmol/L 106 105 104   CO2 mmol/L 21.0* 21.0* 21.0*   BUN mg/dL 12 15 13   CREATININE mg/dL 0.90 0.80 0.90   CALCIUM mg/dL 9.3 9.0 8.9   BILIRUBIN mg/dL 0.7 0.7 0.2*   ALK PHOS U/L 31* 32 30*   ALT (SGPT) U/L 11* 11* 9*   AST (SGOT) U/L 22 21 17   GLUCOSE mg/dL 128* 144* 115*       Assessment/Plan      Assessment:  1. Metastatic esophageal carcinoma:  Patient has a history of GE junction poorly differentiated adenocarcinoma for which he had chemoradiation followed by [place] Gurwinder resection:  Pathology showed ypT3,pN1 disease.  Tumor was Stage IIIA, diagnosed in 2016.  He received concurrent chemoradiation with weekly Carboplatin and Taxol and radiation finishing on 3/15/17.      CT scan on 1/10/19 showed new retroperitoneal lymph nodes.  These lymph nodes were biopsied on 1/30/19 and it shows metastatic esophageal cancer.  [employer] Next Gen testing was performed on the sample and it shows KRAS mutation, microsatellite stable tumor.  PD-L1 is positive.  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  HER2/marvel (ERBB2) was not amplified and was negative.  His CT scan on 5/1/19 shows evidence of progression.  Patient has started receiving Keytruda on 5/31/19.  He has received 4 cycles so far.    2. Diarrhea:-Patient reports diarrhea which is chronic.  But it has worsened recently.  Immunotherapy can cause diarrhea through colitis but exam does not show any signs of colitis.  Also diarrhea seems to be waxing and waning in nature.  Still emphasized with the patient that diarrhea could be related to immunotherapy and if symptoms worsen we will have to consider stopping treatment and starting steroids.  3. History of chronic dysphagia:  Due to recurrent esophageal strictures.  Patient is status post EGD and balloon dilation.  Today, he does not report any new symptoms.  4. PD-L1 positive, HER2 negative, p53 and KRAS positive.    5. B12 deficiency:  Recommended to take B12 1000 mcg twice daily.    6. Sorethroat:- Has sinus congestion. Likely mild chronic sinusitis    PLAN:  1. Continue Keytruda. Discussed the risk and benefits of immunotherapy and adverse event profile/  2. Will need to closely monitor diarrhea symptoms.  Advised to use Imodium as needed.  If diarrhea does not control, will have to stop immunotherapy and start steroids.   Check CMP and magnesium today.  3. We will use Imodium over-the-counter..  4. Continue B12 supplements twice a day.  5. Continue  folic acid supplement also.  6. CT chest abdomen and pelvis in 4 weeks.  7. Follow-up in 1 month after CAT scan                Orders Placed This Encounter   Procedures   • CBC Auto Differential                Thank you very much for providing the opportunity to participate in this patient’s care. Please do not hesitate to call if there are any other questions

## 2019-08-09 ENCOUNTER — APPOINTMENT (OUTPATIENT)
Dept: LAB | Facility: HOSPITAL | Age: 69
End: 2019-08-09

## 2019-08-09 ENCOUNTER — OFFICE VISIT (OUTPATIENT)
Dept: ONCOLOGY | Facility: CLINIC | Age: 69
End: 2019-08-09

## 2019-08-09 VITALS
SYSTOLIC BLOOD PRESSURE: 129 MMHG | WEIGHT: 145.8 LBS | HEART RATE: 62 BPM | RESPIRATION RATE: 18 BRPM | BODY MASS INDEX: 23.43 KG/M2 | TEMPERATURE: 97.9 F | DIASTOLIC BLOOD PRESSURE: 76 MMHG | HEIGHT: 66 IN

## 2019-08-09 DIAGNOSIS — E53.8 B12 DEFICIENCY: ICD-10-CM

## 2019-08-09 DIAGNOSIS — C16.0 MALIGNANT NEOPLASM OF CARDIA (HCC): ICD-10-CM

## 2019-08-09 DIAGNOSIS — C15.9 ESOPHAGEAL CANCER, STAGE IV (HCC): Primary | ICD-10-CM

## 2019-08-09 DIAGNOSIS — R19.7 DIARRHEA, UNSPECIFIED TYPE: ICD-10-CM

## 2019-08-09 LAB
ALBUMIN SERPL-MCNC: 3.8 G/DL (ref 3.5–4.8)
ALBUMIN/GLOB SERPL: 1.4 G/DL (ref 1–1.7)
ALP SERPL-CCNC: 30 U/L (ref 32–91)
ALT SERPL W P-5'-P-CCNC: 9 U/L (ref 17–63)
ANION GAP SERPL CALCULATED.3IONS-SCNC: 16.5 MMOL/L (ref 5–15)
AST SERPL-CCNC: 20 U/L (ref 15–41)
BASOPHILS # BLD AUTO: 0.03 10*3/MM3 (ref 0–0.2)
BASOPHILS NFR BLD AUTO: 0.4 % (ref 0–1.5)
BILIRUB SERPL-MCNC: 0.8 MG/DL (ref 0.3–1.2)
BUN BLD-MCNC: 10 MG/DL (ref 8–20)
BUN/CREAT SERPL: 11.1 (ref 6.2–20.3)
CALCIUM SPEC-SCNC: 9.2 MG/DL (ref 8.9–10.3)
CHLORIDE SERPL-SCNC: 102 MMOL/L (ref 101–111)
CO2 SERPL-SCNC: 23 MMOL/L (ref 22–32)
CREAT BLD-MCNC: 0.9 MG/DL (ref 0.7–1.2)
DEPRECATED RDW RBC AUTO: 43 FL (ref 37–54)
EOSINOPHIL # BLD AUTO: 0.45 10*3/MM3 (ref 0–0.4)
EOSINOPHIL NFR BLD AUTO: 6.7 % (ref 0.3–6.2)
ERYTHROCYTE [DISTWIDTH] IN BLOOD BY AUTOMATED COUNT: 13 % (ref 12.3–15.4)
GFR SERPL CREATININE-BSD FRML MDRD: 83 ML/MIN/1.73
GLOBULIN UR ELPH-MCNC: 2.8 GM/DL (ref 2.5–3.8)
GLUCOSE BLD-MCNC: 101 MG/DL (ref 65–99)
HCT VFR BLD AUTO: 35.9 % (ref 37.5–51)
HGB BLD-MCNC: 12.1 G/DL (ref 13–17.7)
LYMPHOCYTES # BLD AUTO: 1.61 10*3/MM3 (ref 0.7–3.1)
LYMPHOCYTES NFR BLD AUTO: 24 % (ref 19.6–45.3)
MAGNESIUM SERPL-MCNC: 1.6 MG/DL (ref 1.8–2.5)
MCH RBC QN AUTO: 31.3 PG (ref 26.6–33)
MCHC RBC AUTO-ENTMCNC: 33.7 G/DL (ref 31.5–35.7)
MCV RBC AUTO: 93 FL (ref 79–97)
MONOCYTES # BLD AUTO: 0.83 10*3/MM3 (ref 0.1–0.9)
MONOCYTES NFR BLD AUTO: 12.4 % (ref 5–12)
NEUTROPHILS # BLD AUTO: 3.8 10*3/MM3 (ref 1.7–7)
NEUTROPHILS NFR BLD AUTO: 56.5 % (ref 42.7–76)
PLATELET # BLD AUTO: 174 10*3/MM3 (ref 140–450)
PMV BLD AUTO: 10.5 FL (ref 6–12)
POTASSIUM BLD-SCNC: 5.5 MMOL/L (ref 3.6–5.1)
PROT SERPL-MCNC: 6.6 G/DL (ref 6.1–7.9)
RBC # BLD AUTO: 3.86 10*6/MM3 (ref 4.14–5.8)
SODIUM BLD-SCNC: 136 MMOL/L (ref 136–144)
WBC NRBC COR # BLD: 6.72 10*3/MM3 (ref 3.4–10.8)

## 2019-08-09 PROCEDURE — 99215 OFFICE O/P EST HI 40 MIN: CPT | Performed by: INTERNAL MEDICINE

## 2019-08-09 PROCEDURE — 83735 ASSAY OF MAGNESIUM: CPT | Performed by: NURSE PRACTITIONER

## 2019-08-09 PROCEDURE — 36415 COLL VENOUS BLD VENIPUNCTURE: CPT | Performed by: INTERNAL MEDICINE

## 2019-08-09 PROCEDURE — 80053 COMPREHEN METABOLIC PANEL: CPT | Performed by: NURSE PRACTITIONER

## 2019-08-09 PROCEDURE — 85025 COMPLETE CBC W/AUTO DIFF WBC: CPT | Performed by: INTERNAL MEDICINE

## 2019-08-13 ENCOUNTER — OUTSIDE FACILITY SERVICE (OUTPATIENT)
Dept: CARDIOLOGY | Facility: CLINIC | Age: 69
End: 2019-08-13

## 2019-08-13 PROCEDURE — 99214 OFFICE O/P EST MOD 30 MIN: CPT | Performed by: INTERNAL MEDICINE

## 2019-08-21 DIAGNOSIS — Z51.81 ENCOUNTER FOR THERAPEUTIC DRUG MONITORING: ICD-10-CM

## 2019-08-21 DIAGNOSIS — C16.0 MALIGNANT NEOPLASM OF CARDIA (HCC): ICD-10-CM

## 2019-08-21 RX ORDER — SODIUM CHLORIDE 9 MG/ML
250 INJECTION, SOLUTION INTRAVENOUS ONCE
Status: CANCELLED | OUTPATIENT
Start: 2019-08-23

## 2019-08-23 ENCOUNTER — DOCUMENTATION (OUTPATIENT)
Dept: ONCOLOGY | Facility: HOSPITAL | Age: 69
End: 2019-08-23

## 2019-08-23 ENCOUNTER — HOSPITAL ENCOUNTER (OUTPATIENT)
Dept: ONCOLOGY | Facility: HOSPITAL | Age: 69
Setting detail: INFUSION SERIES
Discharge: HOME | End: 2019-08-23

## 2019-08-23 VITALS
HEIGHT: 66 IN | DIASTOLIC BLOOD PRESSURE: 75 MMHG | WEIGHT: 145 LBS | RESPIRATION RATE: 18 BRPM | HEART RATE: 60 BPM | BODY MASS INDEX: 23.3 KG/M2 | SYSTOLIC BLOOD PRESSURE: 131 MMHG | OXYGEN SATURATION: 100 % | TEMPERATURE: 97.6 F

## 2019-08-23 DIAGNOSIS — C16.0 MALIGNANT NEOPLASM OF CARDIA (HCC): ICD-10-CM

## 2019-08-23 DIAGNOSIS — Z51.81 ENCOUNTER FOR THERAPEUTIC DRUG MONITORING: Primary | ICD-10-CM

## 2019-08-23 LAB
ALBUMIN SERPL-MCNC: 3.5 G/DL (ref 3.5–4.8)
ALBUMIN/GLOB SERPL: 1.3 G/DL (ref 1–1.7)
ALP SERPL-CCNC: 30 U/L (ref 32–91)
ALT SERPL W P-5'-P-CCNC: 8 U/L (ref 17–63)
ANION GAP SERPL CALCULATED.3IONS-SCNC: 16.7 MMOL/L (ref 5–15)
AST SERPL-CCNC: 21 U/L (ref 15–41)
BASOPHILS # BLD AUTO: 0.02 10*3/MM3 (ref 0–0.2)
BASOPHILS NFR BLD AUTO: 0.3 % (ref 0–1.5)
BILIRUB SERPL-MCNC: 0.4 MG/DL (ref 0.3–1.2)
BUN BLD-MCNC: 13 MG/DL (ref 8–20)
BUN/CREAT SERPL: 14.4 (ref 6.2–20.3)
CALCIUM SPEC-SCNC: 8.9 MG/DL (ref 8.9–10.3)
CHLORIDE SERPL-SCNC: 107 MMOL/L (ref 101–111)
CO2 SERPL-SCNC: 22 MMOL/L (ref 22–32)
CREAT BLD-MCNC: 0.9 MG/DL (ref 0.7–1.2)
DEPRECATED RDW RBC AUTO: 43.6 FL (ref 37–54)
EOSINOPHIL # BLD AUTO: 0.4 10*3/MM3 (ref 0–0.4)
EOSINOPHIL NFR BLD AUTO: 6.2 % (ref 0.3–6.2)
ERYTHROCYTE [DISTWIDTH] IN BLOOD BY AUTOMATED COUNT: 13.2 % (ref 12.3–15.4)
GFR SERPL CREATININE-BSD FRML MDRD: 83 ML/MIN/1.73
GLOBULIN UR ELPH-MCNC: 2.6 GM/DL (ref 2.5–3.8)
GLUCOSE BLD-MCNC: 191 MG/DL (ref 65–99)
HCT VFR BLD AUTO: 34.5 % (ref 37.5–51)
HGB BLD-MCNC: 11.5 G/DL (ref 13–17.7)
LYMPHOCYTES # BLD AUTO: 1.6 10*3/MM3 (ref 0.7–3.1)
LYMPHOCYTES NFR BLD AUTO: 24.8 % (ref 19.6–45.3)
MCH RBC QN AUTO: 31.2 PG (ref 26.6–33)
MCHC RBC AUTO-ENTMCNC: 33.3 G/DL (ref 31.5–35.7)
MCV RBC AUTO: 93.5 FL (ref 79–97)
MONOCYTES # BLD AUTO: 0.83 10*3/MM3 (ref 0.1–0.9)
MONOCYTES NFR BLD AUTO: 12.9 % (ref 5–12)
NEUTROPHILS # BLD AUTO: 3.59 10*3/MM3 (ref 1.7–7)
NEUTROPHILS NFR BLD AUTO: 55.8 % (ref 42.7–76)
PLATELET # BLD AUTO: 179 10*3/MM3 (ref 140–450)
PMV BLD AUTO: 10.4 FL (ref 6–12)
POTASSIUM BLD-SCNC: 4.7 MMOL/L (ref 3.6–5.1)
PROT SERPL-MCNC: 6.1 G/DL (ref 6.1–7.9)
RBC # BLD AUTO: 3.69 10*6/MM3 (ref 4.14–5.8)
SODIUM BLD-SCNC: 141 MMOL/L (ref 136–144)
T4 FREE SERPL-MCNC: 1.08 NG/DL (ref 0.58–1.64)
TSH SERPL DL<=0.05 MIU/L-ACNC: 2.18 MIU/ML (ref 0.34–5.6)
WBC NRBC COR # BLD: 6.44 10*3/MM3 (ref 3.4–10.8)

## 2019-08-23 PROCEDURE — 80053 COMPREHEN METABOLIC PANEL: CPT | Performed by: NURSE PRACTITIONER

## 2019-08-23 PROCEDURE — 84443 ASSAY THYROID STIM HORMONE: CPT | Performed by: NURSE PRACTITIONER

## 2019-08-23 PROCEDURE — 85025 COMPLETE CBC W/AUTO DIFF WBC: CPT | Performed by: NURSE PRACTITIONER

## 2019-08-23 PROCEDURE — 96413 CHEMO IV INFUSION 1 HR: CPT | Performed by: NURSE PRACTITIONER

## 2019-08-23 PROCEDURE — 25010000002 PEMBROLIZUMAB 100 MG/4ML SOLUTION 4 ML VIAL: Performed by: NURSE PRACTITIONER

## 2019-08-23 PROCEDURE — 84439 ASSAY OF FREE THYROXINE: CPT | Performed by: NURSE PRACTITIONER

## 2019-08-23 RX ORDER — SODIUM CHLORIDE 0.9 % (FLUSH) 0.9 %
10 SYRINGE (ML) INJECTION AS NEEDED
Status: DISCONTINUED | OUTPATIENT
Start: 2019-08-23 | End: 2019-08-24 | Stop reason: HOSPADM

## 2019-08-23 RX ORDER — SODIUM CHLORIDE 0.9 % (FLUSH) 0.9 %
10 SYRINGE (ML) INJECTION AS NEEDED
Status: CANCELLED | OUTPATIENT
Start: 2019-08-23

## 2019-08-23 RX ORDER — METFORMIN HYDROCHLORIDE 1000 MG/1
500 TABLET, FILM COATED, EXTENDED RELEASE ORAL 2 TIMES DAILY
COMMUNITY
Start: 2015-06-01 | End: 2019-08-23

## 2019-08-23 RX ADMIN — SODIUM CHLORIDE 200 MG: 900 INJECTION, SOLUTION INTRAVENOUS at 11:08

## 2019-08-23 RX ADMIN — Medication 10 ML: at 11:47

## 2019-08-23 NOTE — PROGRESS NOTES
"                 Nutrition Assessment  Artie Mc    Anthropometrics  Height: 5'6\"  Weight: 145#  BMI: 23.4  Weight Hx:   (10/12/15) 164.9#  (11/17/16) 164.9#  (1/17/17) 156.9#  (6/27/17) 149#  (1/11/18) 143#  (6/12/18) 141.9#  (12/6/18) 149.6#  (6/21/19) 147.9#  (7/12/19) 146.6#  (8/9/19) 145.7#  IBW: 142# (102.1%)  UBW: 180# (80.5%)    Nutrition Questionnaire    Food Allergies: Pt denied allergies at this time    Chewing/Swallowing Problems: Missing top teeth, has uppers but doesn't wear them, only has 7 bottom teeth. He relies on soft/tender foods.    Nausea/Vomiting/Diarrhea: Pt has nausea, but doesn't always take his prescribed medicine to manage this. Pt with some vomiting, he reported diarrhea 1x/week    Appetite: not good due to feeling nauseous after eating    24-Hour Diet Recall:   Breakfast - banana, coffee  Lunch - potato wedges, macaroni salad, propel water  Dinner - pt ate, but can't remember what he had  Snacks - vanilla pudding (he vomited this after, he said vomiting is rare), peanut butter crackers      Discussion: Met with pt because he's concerned he's not consuming enough calories or protein. Pt doesn't eat enough because he doesn't like feeling nauseous after eating and admits to not taking his anti-nausea meds because he said he gets tired of taking so many medications. Pt said often times he'll feel fine and will convince himself that he doesn't need the medicine, and then he'll regret it once he feels sick after eating. I encouraged him to start taking his medication so he can eat enough and feel more comfortable, pt said he will. We reviewed soft/high calorie/high protein foods he can incorporate into his diet and I gave him materials to take home. Pt liked all of the ideas we discussed and said he will make them. All questions and concerns were addressed and answered. I provided my contact information and encouraged him to call or schedule an appointment as needed.    Lucas " Sunday MS,RD,LD-[place],CD-[place]  Registered Dietitian

## 2019-08-26 ENCOUNTER — TELEPHONE (OUTPATIENT)
Dept: ONCOLOGY | Facility: HOSPITAL | Age: 69
End: 2019-08-26

## 2019-08-26 NOTE — TELEPHONE ENCOUNTER
Case Management/ Note    Patient Name: Artie Mc  YOB: 1950  MRN #: [id number]    OSW called patient regarding request for gas card. VM not set up and unable to leave patient a message.       Electronically signed by:   Elly Leon LCSW, OSW-C  08/26/19, 9:59 AM

## 2019-09-03 ENCOUNTER — HOSPITAL ENCOUNTER (OUTPATIENT)
Dept: PET IMAGING | Facility: HOSPITAL | Age: 69
Discharge: HOME | End: 2019-09-03
Admitting: NURSE PRACTITIONER

## 2019-09-03 DIAGNOSIS — C15.9 ESOPHAGEAL CANCER, STAGE IV (HCC): ICD-10-CM

## 2019-09-03 PROCEDURE — 74177 CT ABD & PELVIS W/CONTRAST: CPT

## 2019-09-03 PROCEDURE — 71260 CT THORAX DX C+: CPT

## 2019-09-03 PROCEDURE — 0 IOPAMIDOL PER 1 ML: Performed by: NURSE PRACTITIONER

## 2019-09-03 RX ADMIN — IOPAMIDOL 100 ML: 755 INJECTION, SOLUTION INTRAVENOUS at 10:45

## 2019-09-05 NOTE — PROGRESS NOTES
Hematology-Oncology Follow-up Note       Artie Mc  1950    Primary Care Physician: Chanel Carter APRN  Referring Physician: Chanel Carter APRN    Reason For Visit:  Chief Complaint   Patient presents with   • Follow-up     metastatic esophageal carcinoma   Monitoring for adverse effects related to immunotherapy.  Nausea    HPI:   Mr. Mc is a pleasant 69-year-old gentleman with a history of gastroesophageal reflux disease, diabetes, hypertension, heart block status post pacemaker placement and history of CABG.  He was having symptoms of dysphagia, weight loss since September 2016. He was reporting symptoms of food getting stuck in the lower chest.  He has transitioned himself to a liquid diet.  The patient reports losing about 30 pounds over this duration.  The patient underwent upper GI endoscopy on 12/20/16 with Dr. Esvin Barrera.  Endoscopy showed necrotic, circumferential and moderately obstructive mass at the distal esophagus between 42 cm and 36 cm.      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  There was also evidence of mild subacute inflammation in the duodenum.  Dr. Barrera ordered a CT scan of the chest and abdomen with contrast on 12/20/16.  The scan was done at [facility].  The scan showed a new ill-defined mass in the anterior wall of the distal esophagus at the GE junction measuring 2.3 x 2.2 x 1.6 cm, worrisome for cancer.  There were four subcentimeter liver lesions which had appeared stable in comparison to another CT scan from 2009 and they likely represent small cysts.  There was also mild lymphadenopathy in the gastrohepatic ligament.  There were at least six borderline enlarged lymph nodes measuring up to 1.3 x 1 cm in the region.  The patient is referred to us for further oncological evaluation.    1/5/2017 - The patient presents for initial consultation.  He reports  persistent dysphagia and also has symptoms of regurgitation.  He cannot tolerate solid foods and is dependent on liquid diet such as Ensure.  He also reports fatigue. Symptoms of dysphagia have been present for the past four months.  When he eats any solids, the food gets stuck in the lower chest.    • 1/5/17 - Vitamin B12 200 (L).  CEA 0.8.  Ferritin 35.  Iron saturation 16% (L), serum iron 56, TIBC 354.  Creatinine 0.9.  LFTs normal.  Folate 10.2.  • 1/12/17 - PET/CT scan:  Intense abnormal activity corresponding to the region of the GE junction and proximal stomach.  SUV is 11.06.  There is a suggestion of an abnormal mass or abnormal wall thickening in the region measuring 3.9 x 4.8 cm.  No additional abnormalities.  No evidence of metastases or other lymphadenopathy.  Scattered nonspecific subcentimeter lymph nodes involving the mediastinum and within the central upper abdomen.  • 1/13/17 - Creatinine 0.9.  LFTs normal.    • 1/17/17 - Patient seen by thoracic surgeon, Dr. Bradley.  PET scan was reviewed.  He has recommended neoadjuvant chemoradiation therapy followed by [place] Gurwinder esophagogastrectomy.  Port-A-Cath is being arranged.    • 1/20/17 - WBC 6.3, hemoglobin 12.1, platelet count 198,000, MCV 86.4.  • 1/30/17 - Patient started weekly Carboplatin and Taxol (Carboplatin AUC 2 and Taxol 50 mg/M2).  Patient received Injectafer 750 mg.  WBC 5.8, hemoglobin 12.4, platelet count 244,000.   • 1/31/17 - Patient seen by Dr. Eduardo Oleary.  The plan was to give 5040 cGy in 28 fractions.   • 2/6/17 - WBC 4.4, hemoglobin 11.6, platelet count 201,000.  Patient received cycle 2 of weekly Carboplatin and Taxol (Carboplatin AUC 2 and Taxol 50 mg/M2).  • 2/13/17 - Patient received Carboplatin and Taxol weekly cycle 3.  • 2/13/17 - Patient started concurrent radiation therapy.  Total planned dose is 4140 cGy.    • 2/13/17 to 2/15/17 - Patient received 4140 cGy of neoadjuvant radiation.  Radiation was finished on 3/15/17.     • 2/20/17 - WBC 1.8, hemoglobin 10.6, platelet count 217,000.  Creatinine 0.5. Folate 15 (L).  Ferritin 361.  Haptoglobin 214.  Iron saturation 33%, TIBC 263, serum iron 88.    • 2/20/17 - Patient received Carboplatin and Taxol weekly cycle 4.   • 2/27/17 - WBC 3.7, hemoglobin 10.7, platelet count 177,000, MCV 85.3.    • 3/6/17 - Patient has received 16 out of the 23 planned radiation treatments.  Total planned dose is 4140 cGy.    • 3/6/17 - WBC 5.4, hemoglobin 11.2, platelet count 113,000.  • 3/6/17 - Patient received cycle 5 of weekly Carboplatin and Taxol.   • 3/13/17 - Patient received cycle 6 of weekly Carboplatin and Taxol.  WBC 3.9, hemoglobin 11.3, platelet count 93,000.     • 3/20/17 - WBC 1.4, hemoglobin 10.0, platelet count 118,000, MCV 86.1   • 3/27/17 - WBC 3.8, hemoglobin 9.5, platelet count 176,000, MCV 87.3.    • 4/7/17 - Stress test:  No evidence of reversible myocardial ischemia.  Normal left ventricular ejection fraction of 50%.    • 4/17/17 - Upper GI endoscopy by Dr. 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  The GE junction adenocarcinoma lesion seems to have a very good response to chemotherapy and radiation.  The lumen was open.  • 5/1/17 - WBC 7.0, hemoglobin 10.2, platelet count 175,000.    • 5/4/17 - Patient underwent [place] Gurwinder esophagectomy with pyloroplasty, feeding jejunostomy tube, abdominal and mediastinal lymph node dissection.    Surgical Pathology:  Moderately to poorly differentiated adenocarcinoma measuring 1.3 cm at the GE junction.  Resection margins are negative for malignancy.  Tumor margins are negative.  There is effective treatment response.  No evidence of lymphovascular invasion.  Staging is ypT3,pN1.  One out of fourteen lymph nodes involved.   • 5/22/17 - WBC 6.8, hemoglobin 10.3, platelet count 312,000.    • 6/19/17 - WBC 5.7, hemoglobin 10.5, platelet count 204,000, MCV 92.1.    • 7/6/17 - EGD:  Status post balloon  dilation of esophageal stricture.  • 7/12/17 - EGD with balloon dilation of esophageal stricture.  • 7/26/17 - EGD and balloon dilation of esophageal stricture.  • 7/31/17 - PET/CT scan:  There is mild metabolic activity seen at the thoracic esophagus just at and below the surgical margins.  Some mild wall thickening.  This could be from recent surgery.  A residual cancer seems unlikely.  There is a precarinal lymph node with mild metabolic activity with SUV of 3 measuring 1 x 1.3 cm.  Again this appears to be nonspecific in my opinion.    • 8/7/17 - WBC 5.9, hemoglobin 11.9, platelet count 171,000, MCV 88.7.    • 10/19/17 - WBC 7.5, hemoglobin 11.8, platelet count 216,000.    • 1/8/18 - PET scan:  No evidence of residual disease or recurrent disease.  There is an esophageal stent in the mid esophagus with a large debris air level.  No evidence of any metastases in the chest, abdomen or pelvis.  Mild nodule uptake in the vocal cords with fullness in the right vocal cord.  This could be physiologic.    • 7/9/18 - CT scan of chest with contrast:  Prominent mediastinal lymph nodes appear stable since February.  Changes of gastric pull-through procedure for esophageal cancer again noted.  Tree-in-bud infiltrates in the left lung base, consistent with infection versus inflammation.    • 1/10/19 - CT chest, abdomen and pelvis with contrast:  No evidence of mets in the chest; however, interval development of metastatic lymphadenopathy in the left side of the retroperitoneum.  For instance, there is a 17 mm rounded lymph node, 10 mm lymph node and also a 14 mm lymph node.  These are all new.  Stable 9 mm hypodense lesion within the right hepatic lobe, which could reflect hemangioma or complex cyst.    • 1/30/19 - CT-guided core biopsy of retroperitoneal lymph node:  Poorly differentiated adenocarcinoma consistent with metastases from patient’s known esophageal primary.  CK20 positive, CDX2 positive, cytokeratin 7 negative,  TTF-1 negative.    • 2/1/19 - Creatinine 0.9, BUN 18, calcium 9.3; these are normal.    • 2/28/19 - [facility] Next Gen sequencing testing on retroperitoneal lymph node specimen:  PD-L1 positive.  CPS = 3.  This implies responsiveness to pembrolizumab.  Mismatch repair status is proficient (no evidence of microsatellite instability).  Tumor mutational burden is low = 6 mutations/Mb.  CDH1 indeterminate.  KRAS mutation positive.  TP53 mutation positive.  CDK6 amplified.  [employer] PD-L1 testing by IHC:  PD-L1 expression 1% positive (CPS =1).  HER2/marvel by IHC is negative.  HER2/marvel by CISH not amplified.  Microsatellite stable tumor.    • 3/7/19 - WBC 7.3, hemoglobin 12, platelet count 128,000, MCV 92.     • 3/14/19 - Patient was seen by radiation oncologist, Dr. Chahal.  He has recommended observation versus systemic chemoimmunotherapy as needed.  No plans for radiation therapy.   • 5/1/19 - CT scan of chest with contrast:  No definitive findings of new metastatic disease in the chest.  Emphysema noted.    • 5/1/19 - CT abdomen and pelvis:  There is interval progression of metastatic retroperitoneal adenopathy.  Left periaortic adenopathy with lymph node measuring up to 2 cm, previously 1.7 cm.  Another lymph node now measures 2.3, previously 1.4 cm.  New enlarged lymph node on image 147 measures 1.4 cm, previously 0.4 cm.  A 9 mm hypodense right hepatic lobe lesion appears stable.    • 5/9/19 - Decision made to start patient on immunotherapy Keytruda.    • 5/9/19 - Vitamin B12 229.  Ferritin 61.  Folate 14.3.  Iron saturation 26%, TIBC 304, serum iron 79.  • 5/31/19 - Patient received Keytruda 200 mg cycle 1, day 1.    • 5/31/19 - Free T4 0.99.  Creatinine 0.9, BUN 9.  LFTs normal.  TSH 3.99.  Folate 14.3.  Iron saturation 26%.    • 6/21/2019 patient received Keytruda cycle 2  • 7/12/2019: Patient received Keytruda cycle 3  • 8/2/2019 patient received cycle 4 Keytruda WBC 7.2, hemoglobin 11.8 platelet count 163  • 8/23/2019  creatinine 0.9, LFTs normal, WBC 6.4, hemoglobin 11.5, platelets 179, TSH 2.1   • 8/23/2019 patient received cycle 5 of Keytruda  • 9/3/2019 CT chest abdomen and pelvis with contrast: . Positive response to therapy in the abdomen since 05/01/2019. Pathologically enlarged retroperitoneal lymph nodes, predominantly in the left periaortic region, have diminished in size.a 1.3 x 2.1 cm left periaortic node (image 60), previously measured 3.5 x 2.3 cm. A 1.6 cm index node near the level of the left renal vein previously measured 2.0 cm No new or progressive adenopathy. 2. Stable findings in the chest. Noncalcified right upper lobe nodules are unchanged. There is no evidence of new or progressive metastatic disease within the chest. 3. 8 mm indeterminate low-density lesion in the right hepatic lobe, is stable. No new liver lesions.  • 9/6/2019 WBC 5.6, hemoglobin 11.2, platelets of 192, MCV 91.8        Subjective: 9/6/19  Patient is here for a follow up and to get CT results. He finished Cycle 5 of Keytruda about 2 weeks ago. He complains of nausea, diarrhea at times, trouble sleeping, trouble swallowing, fatigue, SOB and chest tightness. He continues to take po B12 and folic acid daily. He has a follow up with Dr. Bradley on Tuesday.  Reports fatigue.  Diarrhea is only about once a week and is not severe.  He has not had an EGD recently for a long time    The following portions of the patient's history were reviewed and updated as appropriate: allergies, current medications, past family history, past medical history, past social history, past surgical history and problem list.     Past Medical History:   Diagnosis Date   • B12 deficiency    • Blockage of coronary artery of heart (CMS/HCC)    • DM (diabetes mellitus) (CMS/HCC)    • Dysphagia    • HTN (hypertension)        Past Surgical History:   Procedure Laterality Date   • CORONARY ARTERY BYPASS GRAFT  2015   • ENDOSCOPY  12/20/2016   • PACEMAKER IMPLANTATION   2016         Current Outpatient Medications:   •  clonazePAM (KlonoPIN) 0.5 MG tablet, TAKE 1 4 TO 1 FULL TABLET BY MOUTH TWICE DAILY AS NEEDED FOR ANXIETY, Disp: , Rfl: 1  •  cyanocobalamin (VITAMIN B-12) 1000 MCG tablet, , Disp: , Rfl:   •  Ferrous Sulfate (IRON) 325 (65 Fe) MG tablet, IRON 325 (65 Fe) MG TABS, Disp: , Rfl:   •  folic acid (FOLVITE) 1 MG tablet, Daily., Disp: , Rfl:   •  metFORMIN (GLUCOPHAGE) 1000 MG tablet, Take 1,000 mg by mouth 2 (Two) Times a Day., Disp: , Rfl: 4  •  metoprolol tartrate (LOPRESSOR) 25 MG tablet, Take  by mouth., Disp: , Rfl:   •  omeprazole (priLOSEC) 40 MG capsule, Take 40 mg by mouth 2 (Two) Times a Day., Disp: , Rfl:   •  ondansetron ODT (ZOFRAN-ODT) 4 MG disintegrating tablet, DISSOLVE 1 TABLET IN MOUTH 3 TIMES DAILY TO 4 TIMES DAILY FOR NAUSEA, Disp: , Rfl: 1  •  pravastatin (PRAVACHOL) 20 MG tablet, PRAVASTATIN SODIUM 20 MG TABS, Disp: , Rfl:     Allergies   Allergen Reactions   • Ace Inhibitors Unknown (See Comments)     Unknown reaction     • Heparin Other (See Comments)     Fever/chills, weakness in legs   • Sulfa Antibiotics Other (See Comments)     Mouth sores       No family history on file.    Cancer-related family history is not on file.    Social History     Tobacco Use   • Smoking status: Former Smoker     Packs/day: 2.00     Years: 50.00     Pack years: 100.00     Types: Cigarettes     Last attempt to quit: 2015     Years since quittin.2   • Smokeless tobacco: Former User     Types: Chew     Quit date: 1989   Substance Use Topics   • Alcohol use: No     Frequency: Never   • Drug use: No         ROS:     Review of Systems   Constitutional: Positive for fatigue (after treatments). Negative for chills and fever.   HENT: Positive for trouble swallowing (has to eat soft foods ). Negative for ear pain, mouth sores and nosebleeds.    Eyes: Negative for photophobia and visual disturbance.   Respiratory: Positive for chest tightness and shortness of  "breath. Negative for wheezing and stridor.    Cardiovascular: Negative for chest pain and palpitations.   Gastrointestinal: Positive for diarrhea (5 x a day 1-2 times a week) and nausea. Negative for abdominal pain and vomiting.   Endocrine: Negative for cold intolerance and heat intolerance.   Genitourinary: Negative for dysuria and hematuria.   Musculoskeletal: Negative for joint swelling and neck stiffness.   Skin: Negative for color change and rash.   Neurological: Negative for seizures and syncope.   Hematological: Negative for adenopathy.        No obvious bleeding   Psychiatric/Behavioral: Positive for sleep disturbance (does not sleep well). Negative for agitation, confusion and hallucinations.       Objective:    Vitals:    09/06/19 1039   BP: 124/71   Pulse: 67   Resp: 20   Temp: 97.9 °F (36.6 °C)   Weight: 66.2 kg (146 lb)   Height: 167.6 cm (66\")   PainSc: 0-No pain       (1) Restricted in physically strenuous activity, ambulatory and able to do work of light nature    Physical Exam:     Physical Exam   Constitutional: He is oriented to person, place, and time. He appears well-developed and well-nourished. No distress.   HENT:   Head: Normocephalic and atraumatic.   Mouth/Throat: Oropharynx is clear and moist.   Absent teeth in upper jaw.   Eyes: Conjunctivae and EOM are normal. Right eye exhibits no discharge. Left eye exhibits no discharge. No scleral icterus.   Neck: Normal range of motion. Neck supple. No thyromegaly present.   Cardiovascular: Normal rate, regular rhythm and normal heart sounds. Exam reveals no gallop and no friction rub.   Pulmonary/Chest: Effort normal and breath sounds normal. No stridor. No respiratory distress. He has no wheezes.   Abdominal: Soft. Bowel sounds are normal. He exhibits no mass. There is no tenderness. There is no rebound and no guarding.   No tenderness   Musculoskeletal: Normal range of motion. He exhibits no tenderness.   Lymphadenopathy:     He has no cervical " adenopathy.   Neurological: He is alert and oriented to person, place, and time. He exhibits normal muscle tone.   Skin: Skin is warm. No rash noted. He is not diaphoretic. No erythema.   Right side chest  port   Psychiatric: He has a normal mood and affect. His behavior is normal.   Nursing note and vitals reviewed.        Lab Results - Last 18 Months   Lab Units 09/06/19  1050 08/23/19  1004 08/09/19  0932   WBC 10*3/mm3 5.61 6.44 6.72   HEMOGLOBIN g/dL 11.2* 11.5* 12.1*   HEMATOCRIT % 33.7* 34.5* 35.9*   PLATELETS 10*3/mm3 192 179 174   MCV fL 91.8 93.5 93.0     Lab Results - Last 18 Months   Lab Units 08/23/19  1004 08/09/19  1107 08/02/19  1022   SODIUM mmol/L 141 136 138   POTASSIUM mmol/L 4.7 5.5* 4.8   CHLORIDE mmol/L 107 102 106   CO2 mmol/L 22.0 23.0 21.0*   BUN mg/dL 13 10 12   CREATININE mg/dL 0.90 0.90 0.90   CALCIUM mg/dL 8.9 9.2 9.3   BILIRUBIN mg/dL 0.4 0.8 0.7   ALK PHOS U/L 30* 30* 31*   ALT (SGPT) U/L 8* 9* 11*   AST (SGOT) U/L 21 20 22   GLUCOSE mg/dL 191* 101* 128*       Assessment/Plan     Assessment:  1. Metastatic esophageal carcinoma:  Patient has a history of GE junction poorly differentiated adenocarcinoma for which he had chemoradiation followed by [place] Gurwinder resection:  Pathology showed ypT3,pN1 disease.  Tumor was Stage IIIA, diagnosed in 2016.  He received concurrent chemoradiation with weekly Carboplatin and Taxol and radiation finishing on 3/15/17.      CT scan on 1/10/19 showed new retroperitoneal lymph nodes.  These lymph nodes were biopsied on 1/30/19 and it shows metastatic esophageal cancer.  [facility] Next Gen testing was performed on the sample and it shows KRAS mutation, microsatellite stable tumor.  PD-L1 is positive.  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  HER2/marvel (ERBB2) was not amplified and was negative.  His CT scan on 5/1/19 shows evidence of progression.  Patient has started receiving Keytruda on 5/31/19.  He has received 4 cycles so far.    2. Diarrhea:-Patient reports diarrhea  which is chronic.  But it has worsened recently.  Immunotherapy can cause diarrhea through colitis but exam does not show any signs of colitis.  Also diarrhea seems to be waxing and waning in nature.  Still emphasized with the patient that diarrhea could be related to immunotherapy and if symptoms worsen we will have to consider stopping treatment and starting steroids.  3. History of chronic dysphagia:  Due to recurrent esophageal strictures.  Patient is status post EGD and balloon dilation.  Today, he does not report any new symptoms.  4. PD-L1 positive, HER2 negative, p53 and KRAS positive.    5. B12 deficiency:  Recommended to take B12 1000 mcg twice daily.    6. Anemia: Likely from malignancy.  Also on iron supplement.  7. Nausea: Symptoms unlikely to be from immunotherapy.  Could be from esophageal strictures.    PLAN:  1. Continue Keytruda.  CT scan shows response from immunotherapy.    2. We will continue to monitor closely for adverse events related to immunotherapy  3. Will need to closely monitor diarrhea symptoms.  They do not seem to be from immunotherapy.  Advised to use Imodium as needed.   4. We will use Imodium over-the-counter..  5. Nausea symptoms: May need repeat EGD and dilation of strictures.  6. Continue B12 supplements twice a day.  7. Continue  folic acid supplement also.  8. Follow-up in 1 month        Orders Placed This Encounter   Procedures   • CBC Auto Differential                Thank you very much for providing the opportunity to participate in this patient’s care. Please do not hesitate to call if there are any other questions

## 2019-09-06 ENCOUNTER — OFFICE VISIT (OUTPATIENT)
Dept: ONCOLOGY | Facility: CLINIC | Age: 69
End: 2019-09-06

## 2019-09-06 ENCOUNTER — APPOINTMENT (OUTPATIENT)
Dept: LAB | Facility: HOSPITAL | Age: 69
End: 2019-09-06

## 2019-09-06 VITALS
WEIGHT: 146 LBS | BODY MASS INDEX: 23.46 KG/M2 | SYSTOLIC BLOOD PRESSURE: 124 MMHG | TEMPERATURE: 97.9 F | RESPIRATION RATE: 20 BRPM | DIASTOLIC BLOOD PRESSURE: 71 MMHG | HEART RATE: 67 BPM | HEIGHT: 66 IN

## 2019-09-06 DIAGNOSIS — Z51.81 ENCOUNTER FOR THERAPEUTIC DRUG MONITORING: ICD-10-CM

## 2019-09-06 DIAGNOSIS — R11.0 NAUSEA: ICD-10-CM

## 2019-09-06 DIAGNOSIS — C15.9 ESOPHAGEAL CANCER, STAGE IV (HCC): ICD-10-CM

## 2019-09-06 DIAGNOSIS — E53.8 B12 DEFICIENCY: Primary | ICD-10-CM

## 2019-09-06 DIAGNOSIS — C16.0 MALIGNANT NEOPLASM OF CARDIA (HCC): ICD-10-CM

## 2019-09-06 PROBLEM — D63.0 ANEMIA IN NEOPLASTIC DISEASE: Status: ACTIVE | Noted: 2019-09-06

## 2019-09-06 LAB
BASOPHILS # BLD AUTO: 0.03 10*3/MM3 (ref 0–0.2)
BASOPHILS NFR BLD AUTO: 0.5 % (ref 0–1.5)
DEPRECATED RDW RBC AUTO: 43.9 FL (ref 37–54)
EOSINOPHIL # BLD AUTO: 0.37 10*3/MM3 (ref 0–0.4)
EOSINOPHIL NFR BLD AUTO: 6.6 % (ref 0.3–6.2)
ERYTHROCYTE [DISTWIDTH] IN BLOOD BY AUTOMATED COUNT: 13.6 % (ref 12.3–15.4)
HCT VFR BLD AUTO: 33.7 % (ref 37.5–51)
HGB BLD-MCNC: 11.2 G/DL (ref 13–17.7)
LYMPHOCYTES # BLD AUTO: 1.76 10*3/MM3 (ref 0.7–3.1)
LYMPHOCYTES NFR BLD AUTO: 31.4 % (ref 19.6–45.3)
MCH RBC QN AUTO: 30.5 PG (ref 26.6–33)
MCHC RBC AUTO-ENTMCNC: 33.2 G/DL (ref 31.5–35.7)
MCV RBC AUTO: 91.8 FL (ref 79–97)
MONOCYTES # BLD AUTO: 0.71 10*3/MM3 (ref 0.1–0.9)
MONOCYTES NFR BLD AUTO: 12.7 % (ref 5–12)
NEUTROPHILS # BLD AUTO: 2.74 10*3/MM3 (ref 1.7–7)
NEUTROPHILS NFR BLD AUTO: 48.8 % (ref 42.7–76)
PLATELET # BLD AUTO: 192 10*3/MM3 (ref 140–450)
PMV BLD AUTO: 9.8 FL (ref 6–12)
RBC # BLD AUTO: 3.67 10*6/MM3 (ref 4.14–5.8)
WBC NRBC COR # BLD: 5.61 10*3/MM3 (ref 3.4–10.8)

## 2019-09-06 PROCEDURE — 99215 OFFICE O/P EST HI 40 MIN: CPT | Performed by: INTERNAL MEDICINE

## 2019-09-06 PROCEDURE — 85025 COMPLETE CBC W/AUTO DIFF WBC: CPT | Performed by: INTERNAL MEDICINE

## 2019-09-06 PROCEDURE — 36415 COLL VENOUS BLD VENIPUNCTURE: CPT | Performed by: INTERNAL MEDICINE

## 2019-09-06 RX ORDER — SODIUM CHLORIDE 9 MG/ML
250 INJECTION, SOLUTION INTRAVENOUS ONCE
Status: CANCELLED | OUTPATIENT
Start: 2019-09-13

## 2019-09-06 RX ORDER — SODIUM CHLORIDE 9 MG/ML
250 INJECTION, SOLUTION INTRAVENOUS ONCE
Status: CANCELLED | OUTPATIENT
Start: 2019-10-04

## 2019-09-10 ENCOUNTER — OFFICE VISIT (OUTPATIENT)
Dept: SURGERY | Facility: CLINIC | Age: 69
End: 2019-09-10

## 2019-09-10 VITALS
BODY MASS INDEX: 23.4 KG/M2 | HEART RATE: 60 BPM | TEMPERATURE: 96.5 F | OXYGEN SATURATION: 100 % | WEIGHT: 145 LBS | SYSTOLIC BLOOD PRESSURE: 121 MMHG | DIASTOLIC BLOOD PRESSURE: 68 MMHG

## 2019-09-10 DIAGNOSIS — E11.9 TYPE 2 DIABETES MELLITUS WITHOUT COMPLICATION, WITHOUT LONG-TERM CURRENT USE OF INSULIN (HCC): ICD-10-CM

## 2019-09-10 DIAGNOSIS — C16.0 MALIGNANT NEOPLASM OF CARDIA (HCC): Primary | ICD-10-CM

## 2019-09-10 DIAGNOSIS — R13.19 ESOPHAGEAL DYSPHAGIA: ICD-10-CM

## 2019-09-10 PROBLEM — E78.5 HYPERLIPIDEMIA: Status: ACTIVE | Noted: 2019-09-10

## 2019-09-10 PROBLEM — F41.0 PANIC DISORDER: Status: ACTIVE | Noted: 2019-09-10

## 2019-09-10 PROBLEM — C15.5: Status: ACTIVE | Noted: 2017-01-17

## 2019-09-10 PROBLEM — R13.10 DYSPHAGIA: Status: ACTIVE | Noted: 2017-01-17

## 2019-09-10 PROBLEM — K21.9 GASTROESOPHAGEAL REFLUX DISEASE WITHOUT ESOPHAGITIS: Status: ACTIVE | Noted: 2019-09-10

## 2019-09-10 PROBLEM — L02.213 ABSCESS OF CHEST WALL: Status: ACTIVE | Noted: 2018-10-16

## 2019-09-10 PROBLEM — C48.0: Status: ACTIVE | Noted: 2019-01-17

## 2019-09-10 PROBLEM — I95.1 CHRONIC ORTHOSTATIC HYPOTENSION: Status: ACTIVE | Noted: 2019-09-10

## 2019-09-10 PROBLEM — K22.2 ESOPHAGEAL STRICTURE: Status: ACTIVE | Noted: 2017-07-20

## 2019-09-10 PROBLEM — R63.4 ABNORMAL WEIGHT LOSS: Status: ACTIVE | Noted: 2017-01-17

## 2019-09-10 PROBLEM — R55 SYNCOPE AND COLLAPSE: Status: ACTIVE | Noted: 2019-09-10

## 2019-09-10 PROBLEM — I25.10 CORONARY ARTERY DISEASE: Status: ACTIVE | Noted: 2017-01-17

## 2019-09-10 PROBLEM — I10 HYPERTENSION: Status: ACTIVE | Noted: 2019-09-10

## 2019-09-10 PROBLEM — R00.1 BRADYCARDIA: Status: ACTIVE | Noted: 2018-07-12

## 2019-09-10 PROBLEM — Z98.890 HISTORY OF OPEN HEART SURGERY: Status: ACTIVE | Noted: 2019-09-10

## 2019-09-10 PROCEDURE — 99214 OFFICE O/P EST MOD 30 MIN: CPT | Performed by: THORACIC SURGERY (CARDIOTHORACIC VASCULAR SURGERY)

## 2019-09-10 NOTE — PROGRESS NOTES
Thoracic surgery progress note  Chief complaint follow-up of esophageal cancer.  Patient is 2-1/2 years out from esophageal cancer resection following neoadjuvant therapy.  He has retroperitoneal adenopathy consistent with cancer recurrence.  He has begun on Keytruda therapy.  CT scan of the abdomen and chest performed.  He has marked improvement of his retroperitoneal adenopathy.  I personally examined the CT scan and reviewed the radiology report.  Case discussed with Dr. Jasso's medical oncologist.  Very pleased with response with Keytruda.  Patient is still having some degree of dysphasia.  He ate peanut butter and crackers yesterday with some food sticking.  Overall his weight is relatively stable today weighs 145 pounds.  Review of systems positive for poor appetite poor sleep irregular heartbeat sinus problems  Physical examination no cervical supraclavicular axillary adenopathy.  Abdomen soft.  Previous G-tube site is healed.    Impression #1 history of esophageal cancer status post [place] Gurwinder esophagectomy  2.  Retroperitoneal recurrence good response to therapy  3.  Gastric fistula now healed  4.  Esophageal stricture and dysphagia clinically relatively stable.  Plan esophageal dilation at some point in the future when patient's symptoms become more pressing.  5.  Follow-up of esophageal cancer in 3 months and will order CT scan in 6 months

## 2019-09-13 ENCOUNTER — HOSPITAL ENCOUNTER (OUTPATIENT)
Dept: ONCOLOGY | Facility: HOSPITAL | Age: 69
Setting detail: INFUSION SERIES
Discharge: HOME | End: 2019-09-13

## 2019-09-13 VITALS
DIASTOLIC BLOOD PRESSURE: 81 MMHG | TEMPERATURE: 97.4 F | HEART RATE: 60 BPM | SYSTOLIC BLOOD PRESSURE: 145 MMHG | BODY MASS INDEX: 23.46 KG/M2 | HEIGHT: 66 IN | RESPIRATION RATE: 18 BRPM | WEIGHT: 146 LBS

## 2019-09-13 DIAGNOSIS — Z51.81 ENCOUNTER FOR THERAPEUTIC DRUG MONITORING: ICD-10-CM

## 2019-09-13 DIAGNOSIS — C16.0 MALIGNANT NEOPLASM OF CARDIA (HCC): Primary | ICD-10-CM

## 2019-09-13 LAB
ALBUMIN SERPL-MCNC: 3.2 G/DL (ref 3.5–4.8)
ALBUMIN/GLOB SERPL: 1.5 G/DL (ref 1–1.7)
ALP SERPL-CCNC: 28 U/L (ref 32–91)
ALT SERPL W P-5'-P-CCNC: 8 U/L (ref 17–63)
ANION GAP SERPL CALCULATED.3IONS-SCNC: 12.9 MMOL/L (ref 5–15)
AST SERPL-CCNC: 18 U/L (ref 15–41)
BASOPHILS # BLD AUTO: 0.04 10*3/MM3 (ref 0–0.2)
BASOPHILS NFR BLD AUTO: 0.7 % (ref 0–1.5)
BILIRUB SERPL-MCNC: 0.5 MG/DL (ref 0.3–1.2)
BUN BLD-MCNC: 13 MG/DL (ref 8–20)
BUN/CREAT SERPL: 16.3 (ref 6.2–20.3)
CALCIUM SPEC-SCNC: 8.5 MG/DL (ref 8.9–10.3)
CHLORIDE SERPL-SCNC: 104 MMOL/L (ref 101–111)
CO2 SERPL-SCNC: 25 MMOL/L (ref 22–32)
CORTIS SERPL-MCNC: 7.75 MCG/DL
CREAT BLD-MCNC: 0.8 MG/DL (ref 0.7–1.2)
DEPRECATED RDW RBC AUTO: 44.8 FL (ref 37–54)
EOSINOPHIL # BLD AUTO: 0.33 10*3/MM3 (ref 0–0.4)
EOSINOPHIL NFR BLD AUTO: 6.1 % (ref 0.3–6.2)
ERYTHROCYTE [DISTWIDTH] IN BLOOD BY AUTOMATED COUNT: 13.5 % (ref 12.3–15.4)
GFR SERPL CREATININE-BSD FRML MDRD: 96 ML/MIN/1.73
GLOBULIN UR ELPH-MCNC: 2.1 GM/DL (ref 2.5–3.8)
GLUCOSE BLD-MCNC: 79 MG/DL (ref 65–99)
HCT VFR BLD AUTO: 32.5 % (ref 37.5–51)
HGB BLD-MCNC: 10.6 G/DL (ref 13–17.7)
LYMPHOCYTES # BLD AUTO: 1.5 10*3/MM3 (ref 0.7–3.1)
LYMPHOCYTES NFR BLD AUTO: 27.9 % (ref 19.6–45.3)
MCH RBC QN AUTO: 30.5 PG (ref 26.6–33)
MCHC RBC AUTO-ENTMCNC: 32.6 G/DL (ref 31.5–35.7)
MCV RBC AUTO: 93.7 FL (ref 79–97)
MONOCYTES # BLD AUTO: 0.61 10*3/MM3 (ref 0.1–0.9)
MONOCYTES NFR BLD AUTO: 11.3 % (ref 5–12)
NEUTROPHILS # BLD AUTO: 2.9 10*3/MM3 (ref 1.7–7)
NEUTROPHILS NFR BLD AUTO: 54 % (ref 42.7–76)
PLATELET # BLD AUTO: 160 10*3/MM3 (ref 140–450)
PMV BLD AUTO: 10.4 FL (ref 6–12)
POTASSIUM BLD-SCNC: 4.9 MMOL/L (ref 3.6–5.1)
PROT SERPL-MCNC: 5.3 G/DL (ref 6.1–7.9)
RBC # BLD AUTO: 3.47 10*6/MM3 (ref 4.14–5.8)
SODIUM BLD-SCNC: 137 MMOL/L (ref 136–144)
T3FREE SERPL-MCNC: 2.6 PG/ML (ref 2.39–6.79)
T4 FREE SERPL-MCNC: 0.83 NG/DL (ref 0.58–1.64)
TSH SERPL DL<=0.05 MIU/L-ACNC: 2.36 UIU/ML (ref 0.34–5.6)
WBC NRBC COR # BLD: 5.38 10*3/MM3 (ref 3.4–10.8)

## 2019-09-13 PROCEDURE — 82533 TOTAL CORTISOL: CPT | Performed by: INTERNAL MEDICINE

## 2019-09-13 PROCEDURE — 85025 COMPLETE CBC W/AUTO DIFF WBC: CPT | Performed by: INTERNAL MEDICINE

## 2019-09-13 PROCEDURE — 25010000002 PEMBROLIZUMAB 100 MG/4ML SOLUTION 4 ML VIAL: Performed by: INTERNAL MEDICINE

## 2019-09-13 PROCEDURE — 84439 ASSAY OF FREE THYROXINE: CPT | Performed by: INTERNAL MEDICINE

## 2019-09-13 PROCEDURE — 84443 ASSAY THYROID STIM HORMONE: CPT | Performed by: INTERNAL MEDICINE

## 2019-09-13 PROCEDURE — 80053 COMPREHEN METABOLIC PANEL: CPT | Performed by: INTERNAL MEDICINE

## 2019-09-13 PROCEDURE — 96413 CHEMO IV INFUSION 1 HR: CPT | Performed by: INTERNAL MEDICINE

## 2019-09-13 PROCEDURE — 84481 FREE ASSAY (FT-3): CPT | Performed by: INTERNAL MEDICINE

## 2019-09-13 RX ORDER — SODIUM CHLORIDE 0.9 % (FLUSH) 0.9 %
10 SYRINGE (ML) INJECTION AS NEEDED
Status: CANCELLED | OUTPATIENT
Start: 2019-09-13

## 2019-09-13 RX ORDER — SODIUM CHLORIDE 0.9 % (FLUSH) 0.9 %
10 SYRINGE (ML) INJECTION AS NEEDED
Status: DISCONTINUED | OUTPATIENT
Start: 2019-09-13 | End: 2019-09-14 | Stop reason: HOSPADM

## 2019-09-13 RX ADMIN — SODIUM CHLORIDE 200 MG: 900 INJECTION, SOLUTION INTRAVENOUS at 11:08

## 2019-09-13 RX ADMIN — SODIUM CHLORIDE, PRESERVATIVE FREE 10 ML: 5 INJECTION INTRAVENOUS at 11:51

## 2019-10-02 ENCOUNTER — CLINICAL SUPPORT NO REQUIREMENTS (OUTPATIENT)
Dept: CARDIOLOGY | Facility: CLINIC | Age: 69
End: 2019-10-02

## 2019-10-02 DIAGNOSIS — R00.1 BRADYCARDIA: Primary | ICD-10-CM

## 2019-10-02 DIAGNOSIS — Z95.0 PACEMAKER: ICD-10-CM

## 2019-10-02 PROCEDURE — 93296 REM INTERROG EVL PM/IDS: CPT | Performed by: INTERNAL MEDICINE

## 2019-10-02 PROCEDURE — 93294 REM INTERROG EVL PM/LDLS PM: CPT | Performed by: INTERNAL MEDICINE

## 2019-10-04 ENCOUNTER — HOSPITAL ENCOUNTER (OUTPATIENT)
Dept: ONCOLOGY | Facility: HOSPITAL | Age: 69
Setting detail: INFUSION SERIES
Discharge: HOME | End: 2019-10-04

## 2019-10-04 VITALS
SYSTOLIC BLOOD PRESSURE: 148 MMHG | BODY MASS INDEX: 23.63 KG/M2 | WEIGHT: 147 LBS | HEIGHT: 66 IN | TEMPERATURE: 97.4 F | RESPIRATION RATE: 18 BRPM | HEART RATE: 60 BPM | DIASTOLIC BLOOD PRESSURE: 85 MMHG

## 2019-10-04 DIAGNOSIS — Z51.81 ENCOUNTER FOR THERAPEUTIC DRUG MONITORING: Primary | ICD-10-CM

## 2019-10-04 DIAGNOSIS — C16.0 MALIGNANT NEOPLASM OF CARDIA (HCC): ICD-10-CM

## 2019-10-04 LAB
ALBUMIN SERPL-MCNC: 3.7 G/DL (ref 3.5–4.8)
ALBUMIN/GLOB SERPL: 1.5 G/DL (ref 1–1.7)
ALP SERPL-CCNC: 29 U/L (ref 32–91)
ALT SERPL W P-5'-P-CCNC: 11 U/L (ref 17–63)
ANION GAP SERPL CALCULATED.3IONS-SCNC: 13.7 MMOL/L (ref 5–15)
AST SERPL-CCNC: 21 U/L (ref 15–41)
BASOPHILS # BLD AUTO: 0.03 10*3/MM3 (ref 0–0.2)
BASOPHILS NFR BLD AUTO: 0.5 % (ref 0–1.5)
BILIRUB SERPL-MCNC: 0.2 MG/DL (ref 0.3–1.2)
BUN BLD-MCNC: 11 MG/DL (ref 8–20)
BUN/CREAT SERPL: 12.2 (ref 6.2–20.3)
CALCIUM SPEC-SCNC: 8.9 MG/DL (ref 8.9–10.3)
CHLORIDE SERPL-SCNC: 105 MMOL/L (ref 101–111)
CO2 SERPL-SCNC: 24 MMOL/L (ref 22–32)
CREAT BLD-MCNC: 0.9 MG/DL (ref 0.7–1.2)
DEPRECATED RDW RBC AUTO: 44.7 FL (ref 37–54)
EOSINOPHIL # BLD AUTO: 0.46 10*3/MM3 (ref 0–0.4)
EOSINOPHIL NFR BLD AUTO: 8 % (ref 0.3–6.2)
ERYTHROCYTE [DISTWIDTH] IN BLOOD BY AUTOMATED COUNT: 13.7 % (ref 12.3–15.4)
GFR SERPL CREATININE-BSD FRML MDRD: 83 ML/MIN/1.73
GLOBULIN UR ELPH-MCNC: 2.5 GM/DL (ref 2.5–3.8)
GLUCOSE BLD-MCNC: 168 MG/DL (ref 65–99)
HCT VFR BLD AUTO: 35.5 % (ref 37.5–51)
HGB BLD-MCNC: 11.8 G/DL (ref 13–17.7)
LYMPHOCYTES # BLD AUTO: 1.4 10*3/MM3 (ref 0.7–3.1)
LYMPHOCYTES NFR BLD AUTO: 24.3 % (ref 19.6–45.3)
MCH RBC QN AUTO: 30.6 PG (ref 26.6–33)
MCHC RBC AUTO-ENTMCNC: 33.2 G/DL (ref 31.5–35.7)
MCV RBC AUTO: 92.2 FL (ref 79–97)
MONOCYTES # BLD AUTO: 0.63 10*3/MM3 (ref 0.1–0.9)
MONOCYTES NFR BLD AUTO: 10.9 % (ref 5–12)
NEUTROPHILS # BLD AUTO: 3.24 10*3/MM3 (ref 1.7–7)
NEUTROPHILS NFR BLD AUTO: 56.3 % (ref 42.7–76)
PLATELET # BLD AUTO: 179 10*3/MM3 (ref 140–450)
PMV BLD AUTO: 10.3 FL (ref 6–12)
POTASSIUM BLD-SCNC: 4.7 MMOL/L (ref 3.6–5.1)
PROT SERPL-MCNC: 6.2 G/DL (ref 6.1–7.9)
RBC # BLD AUTO: 3.85 10*6/MM3 (ref 4.14–5.8)
SODIUM BLD-SCNC: 138 MMOL/L (ref 136–144)
T4 FREE SERPL-MCNC: 0.86 NG/DL (ref 0.58–1.64)
TSH SERPL DL<=0.05 MIU/L-ACNC: 4.85 UIU/ML (ref 0.34–5.6)
WBC NRBC COR # BLD: 5.76 10*3/MM3 (ref 3.4–10.8)

## 2019-10-04 PROCEDURE — 25010000002 PEMBROLIZUMAB 100 MG/4ML SOLUTION 4 ML VIAL: Performed by: INTERNAL MEDICINE

## 2019-10-04 PROCEDURE — 96413 CHEMO IV INFUSION 1 HR: CPT | Performed by: INTERNAL MEDICINE

## 2019-10-04 PROCEDURE — 85025 COMPLETE CBC W/AUTO DIFF WBC: CPT | Performed by: INTERNAL MEDICINE

## 2019-10-04 PROCEDURE — 80053 COMPREHEN METABOLIC PANEL: CPT | Performed by: INTERNAL MEDICINE

## 2019-10-04 PROCEDURE — 84439 ASSAY OF FREE THYROXINE: CPT | Performed by: INTERNAL MEDICINE

## 2019-10-04 PROCEDURE — 84443 ASSAY THYROID STIM HORMONE: CPT | Performed by: INTERNAL MEDICINE

## 2019-10-04 RX ORDER — SODIUM CHLORIDE 0.9 % (FLUSH) 0.9 %
10 SYRINGE (ML) INJECTION AS NEEDED
Status: DISCONTINUED | OUTPATIENT
Start: 2019-10-04 | End: 2019-10-05 | Stop reason: HOSPADM

## 2019-10-04 RX ORDER — SODIUM CHLORIDE 0.9 % (FLUSH) 0.9 %
10 SYRINGE (ML) INJECTION AS NEEDED
Status: CANCELLED | OUTPATIENT
Start: 2019-10-04

## 2019-10-04 RX ADMIN — SODIUM CHLORIDE 200 MG: 900 INJECTION, SOLUTION INTRAVENOUS at 11:20

## 2019-10-07 ENCOUNTER — APPOINTMENT (OUTPATIENT)
Dept: ONCOLOGY | Facility: CLINIC | Age: 69
End: 2019-10-07

## 2019-10-08 NOTE — PROGRESS NOTES
Hematology-Oncology Follow-up Note       Artie Mc  1950    Primary Care Physician: Chanel Carter APRN  Referring Physician: Chanel Carter APRN    Reason For Visit:  No chief complaint on file.  Monitoring for adverse effects related to immunotherapy.  Nausea    HPI:   Mr. Mc is a pleasant 69-year-old gentleman with a history of gastroesophageal reflux disease, diabetes, hypertension, heart block status post pacemaker placement and history of CABG.  He was having symptoms of dysphagia, weight loss since September 2016. He was reporting symptoms of food getting stuck in the lower chest.  He has transitioned himself to a liquid diet.  The patient reports losing about 30 pounds over this duration.  The patient underwent upper GI endoscopy on 12/20/16 with Dr. Esvin Barrera.  Endoscopy showed necrotic, circumferential and moderately obstructive mass at the distal esophagus between 42 cm and 36 cm.      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  There was also evidence of mild subacute inflammation in the duodenum.  Dr. Barrera ordered a CT scan of the chest and abdomen with contrast on 12/20/16.  The scan was done at [facility].  The scan showed a new ill-defined mass in the anterior wall of the distal esophagus at the GE junction measuring 2.3 x 2.2 x 1.6 cm, worrisome for cancer.  There were four subcentimeter liver lesions which had appeared stable in comparison to another CT scan from 2009 and they likely represent small cysts.  There was also mild lymphadenopathy in the gastrohepatic ligament.  There were at least six borderline enlarged lymph nodes measuring up to 1.3 x 1 cm in the region.  The patient is referred to us for further oncological evaluation.    1/5/2017 - The patient presents for initial consultation.  He reports persistent dysphagia and also has symptoms of regurgitation.  He  cannot tolerate solid foods and is dependent on liquid diet such as Ensure.  He also reports fatigue. Symptoms of dysphagia have been present for the past four months.  When he eats any solids, the food gets stuck in the lower chest.    • 1/5/17 - Vitamin B12 200 (L).  CEA 0.8.  Ferritin 35.  Iron saturation 16% (L), serum iron 56, TIBC 354.  Creatinine 0.9.  LFTs normal.  Folate 10.2.  • 1/12/17 - PET/CT scan:  Intense abnormal activity corresponding to the region of the GE junction and proximal stomach.  SUV is 11.06.  There is a suggestion of an abnormal mass or abnormal wall thickening in the region measuring 3.9 x 4.8 cm.  No additional abnormalities.  No evidence of metastases or other lymphadenopathy.  Scattered nonspecific subcentimeter lymph nodes involving the mediastinum and within the central upper abdomen.  • 1/13/17 - Creatinine 0.9.  LFTs normal.    • 1/17/17 - Patient seen by thoracic surgeon, Dr. Bradley.  PET scan was reviewed.  He has recommended neoadjuvant chemoradiation therapy followed by Noel Gurwinder esophagogastrectomy.  Port-A-Cath is being arranged.    • 1/20/17 - WBC 6.3, hemoglobin 12.1, platelet count 198,000, MCV 86.4.  • 1/30/17 - Patient started weekly Carboplatin and Taxol (Carboplatin AUC 2 and Taxol 50 mg/M2).  Patient received Injectafer 750 mg.  WBC 5.8, hemoglobin 12.4, platelet count 244,000.   • 1/31/17 - Patient seen by Dr. Eduardo Oleary.  The plan was to give 5040 cGy in 28 fractions.   • 2/6/17 - WBC 4.4, hemoglobin 11.6, platelet count 201,000.  Patient received cycle 2 of weekly Carboplatin and Taxol (Carboplatin AUC 2 and Taxol 50 mg/M2).  • 2/13/17 - Patient received Carboplatin and Taxol weekly cycle 3.  • 2/13/17 - Patient started concurrent radiation therapy.  Total planned dose is 4140 cGy.    • 2/13/17 to 2/15/17 - Patient received 4140 cGy of neoadjuvant radiation.  Radiation was finished on 3/15/17.    • 2/20/17 - WBC 1.8, hemoglobin 10.6, platelet count 217,000.   Creatinine 0.5. Folate 15 (L).  Ferritin 361.  Haptoglobin 214.  Iron saturation 33%, TIBC 263, serum iron 88.    • 2/20/17 - Patient received Carboplatin and Taxol weekly cycle 4.   • 2/27/17 - WBC 3.7, hemoglobin 10.7, platelet count 177,000, MCV 85.3.    • 3/6/17 - Patient has received 16 out of the 23 planned radiation treatments.  Total planned dose is 4140 cGy.    • 3/6/17 - WBC 5.4, hemoglobin 11.2, platelet count 113,000.  • 3/6/17 - Patient received cycle 5 of weekly Carboplatin and Taxol.   • 3/13/17 - Patient received cycle 6 of weekly Carboplatin and Taxol.  WBC 3.9, hemoglobin 11.3, platelet count 93,000.     • 3/20/17 - WBC 1.4, hemoglobin 10.0, platelet count 118,000, MCV 86.1   • 3/27/17 - WBC 3.8, hemoglobin 9.5, platelet count 176,000, MCV 87.3.    • 4/7/17 - Stress test:  No evidence of reversible myocardial ischemia.  Normal left ventricular ejection fraction of 50%.    • 4/17/17 - Upper GI endoscopy by Dr. 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  The GE junction adenocarcinoma lesion seems to have a very good response to chemotherapy and radiation.  The lumen was open.  • 5/1/17 - WBC 7.0, hemoglobin 10.2, platelet count 175,000.    • 5/4/17 - Patient underwent [place] Gurwinder esophagectomy with pyloroplasty, feeding jejunostomy tube, abdominal and mediastinal lymph node dissection.    Surgical Pathology:  Moderately to poorly differentiated adenocarcinoma measuring 1.3 cm at the GE junction.  Resection margins are negative for malignancy.  Tumor margins are negative.  There is effective treatment response.  No evidence of lymphovascular invasion.  Staging is ypT3,pN1.  One out of fourteen lymph nodes involved.   • 5/22/17 - WBC 6.8, hemoglobin 10.3, platelet count 312,000.    • 6/19/17 - WBC 5.7, hemoglobin 10.5, platelet count 204,000, MCV 92.1.    • 7/6/17 - EGD:  Status post balloon dilation of esophageal stricture.  • 7/12/17 - EGD with balloon  dilation of esophageal stricture.  • 7/26/17 - EGD and balloon dilation of esophageal stricture.  • 7/31/17 - PET/CT scan:  There is mild metabolic activity seen at the thoracic esophagus just at and below the surgical margins.  Some mild wall thickening.  This could be from recent surgery.  A residual cancer seems unlikely.  There is a precarinal lymph node with mild metabolic activity with SUV of 3 measuring 1 x 1.3 cm.  Again this appears to be nonspecific in my opinion.    • 8/7/17 - WBC 5.9, hemoglobin 11.9, platelet count 171,000, MCV 88.7.    • 10/19/17 - WBC 7.5, hemoglobin 11.8, platelet count 216,000.    • 1/8/18 - PET scan:  No evidence of residual disease or recurrent disease.  There is an esophageal stent in the mid esophagus with a large debris air level.  No evidence of any metastases in the chest, abdomen or pelvis.  Mild nodule uptake in the vocal cords with fullness in the right vocal cord.  This could be physiologic.    • 7/9/18 - CT scan of chest with contrast:  Prominent mediastinal lymph nodes appear stable since February.  Changes of gastric pull-through procedure for esophageal cancer again noted.  Tree-in-bud infiltrates in the left lung base, consistent with infection versus inflammation.    • 1/10/19 - CT chest, abdomen and pelvis with contrast:  No evidence of mets in the chest; however, interval development of metastatic lymphadenopathy in the left side of the retroperitoneum.  For instance, there is a 17 mm rounded lymph node, 10 mm lymph node and also a 14 mm lymph node.  These are all new.  Stable 9 mm hypodense lesion within the right hepatic lobe, which could reflect hemangioma or complex cyst.    • 1/30/19 - CT-guided core biopsy of retroperitoneal lymph node:  Poorly differentiated adenocarcinoma consistent with metastases from patient’s known esophageal primary.  CK20 positive, CDX2 positive, cytokeratin 7 negative, TTF-1 negative.    • 2/1/19 - Creatinine 0.9, BUN 18, calcium  9.3; these are normal.    • 2/28/19 - [employer] Next Gen sequencing testing on retroperitoneal lymph node specimen:  PD-L1 positive.  CPS = 3.  This implies responsiveness to pembrolizumab.  Mismatch repair status is proficient (no evidence of microsatellite instability).  Tumor mutational burden is low = 6 mutations/Mb.  CDH1 indeterminate.  KRAS mutation positive.  TP53 mutation positive.  CDK6 amplified.  [employer] PD-L1 testing by IHC:  PD-L1 expression 1% positive (CPS =1).  HER2/marvel by IHC is negative.  HER2/marvel by CISH not amplified.  Microsatellite stable tumor.    • 3/7/19 - WBC 7.3, hemoglobin 12, platelet count 128,000, MCV 92.     • 3/14/19 - Patient was seen by radiation oncologist, Dr. Chahal.  He has recommended observation versus systemic chemoimmunotherapy as needed.  No plans for radiation therapy.   • 5/1/19 - CT scan of chest with contrast:  No definitive findings of new metastatic disease in the chest.  Emphysema noted.    • 5/1/19 - CT abdomen and pelvis:  There is interval progression of metastatic retroperitoneal adenopathy.  Left periaortic adenopathy with lymph node measuring up to 2 cm, previously 1.7 cm.  Another lymph node now measures 2.3, previously 1.4 cm.  New enlarged lymph node on image 147 measures 1.4 cm, previously 0.4 cm.  A 9 mm hypodense right hepatic lobe lesion appears stable.    • 5/9/19 - Decision made to start patient on immunotherapy Keytruda.    • 5/9/19 - Vitamin B12 229.  Ferritin 61.  Folate 14.3.  Iron saturation 26%, TIBC 304, serum iron 79.  • 5/31/19 - Patient received Keytruda 200 mg cycle 1, day 1.    • 5/31/19 - Free T4 0.99.  Creatinine 0.9, BUN 9.  LFTs normal.  TSH 3.99.  Folate 14.3.  Iron saturation 26%.    • 6/21/2019 patient received Keytruda cycle 2  • 7/12/2019: Patient received Keytruda cycle 3  • 8/2/2019 patient received cycle 4 Keytruda WBC 7.2, hemoglobin 11.8 platelet count 163  • 8/23/2019 creatinine 0.9, LFTs normal, WBC 6.4, hemoglobin 11.5,  platelets 179, TSH 2.1   • 8/23/2019 patient received cycle 5 of Keytruda  • 9/3/2019 CT chest abdomen and pelvis with contrast: . Positive response to therapy in the abdomen since 05/01/2019. Pathologically enlarged retroperitoneal lymph nodes, predominantly in the left periaortic region, have diminished in size.a 1.3 x 2.1 cm left periaortic node (image 60), previously measured 3.5 x 2.3 cm. A 1.6 cm index node near the level of the left renal vein previously measured 2.0 cm No new or progressive adenopathy. 2. Stable findings in the chest. Noncalcified right upper lobe nodules are unchanged. There is no evidence of new or progressive metastatic disease within the chest. 3. 8 mm indeterminate low-density lesion in the right hepatic lobe, is stable. No new liver lesions.  • 9/6/2019 WBC 5.6, hemoglobin 11.2, platelets of 192, MCV 91.8  • 9/13/2019 patient received Keytruda cycle 6  • 10/4/2019 patient received cycle 7 of Keytruda  • 10/9/2019 WBC 5.8, hemoglobin 11.7, platelets 174        Subjective: 10/09/19  Patient is here for a follow up of his metastatic esophageal cancer.  He is on immunotherapy.. He continues Keytruda, B12 and folic acid. He is inquiring about getting a flu shot and a pneumonia shot. He gets sick for about 2 weeks after receiving a treatment.  His diarrhea symptoms have resolved.  . Also occasionally reports some dizziness after receiving the immunotherapy treatment.  He denies any diarrhea, skin rashes or any signs of pneumonitis.  He is tolerating the immunotherapy really well.    The following portions of the patient's history were reviewed and updated as appropriate: allergies, current medications, past family history, past medical history, past social history, past surgical history and problem list.     Past Medical History:   Diagnosis Date   • B12 deficiency    • Blockage of coronary artery of heart (CMS/HCC)    • Depression    • DM (diabetes mellitus) (CMS/HCC)    • Dysphagia    •  HTN (hypertension)    • Hyperlipidemia        Past Surgical History:   Procedure Laterality Date   • ABDOMINAL WALL ABSCESS INCISION AND DRAINAGE  10/22/2018    WITH DEBRIDEMENT  1.5CM X 1.5 CM X 1.5 CM    DR. PURI   • CATARACT EXTRACTION  2018   • COLONOSCOPY     • CORONARY ARTERY BYPASS GRAFT  2015   • ENDOSCOPY  12/20/2016   • ESOPHAGECTOMY  05/04/2017    BROOKS PETTY ESOPHAGECTOMY, FEEDING JEJNOSTOMOY, ABDOMINAL AND MEDIASTINAL LYMPH NODE DISECTION, PEDICLED MUSCLE FLAP COVERAGE DR. PURI   • ESOPHAGOSCOPY / EGD  07/12/2017    WITH BALLOON DILATION   • ESOPHAGOSCOPY / EGD  07/26/2017    WITH BALLOON DILATION   • ESOPHAGOSCOPY / EGD  08/11/2017    WITH BALLOON DILATION   • ESOPHAGOSCOPY / EGD  08/28/2017    WITH BALLOON DILATION   • ESOPHAGOSCOPY / EGD  09/27/2017    WITH BALLOON DILATION   • LYMPH NODE BIOPSY  01/30/2019   • PACEMAKER IMPLANTATION  11/21/2016         Current Outpatient Medications:   •  clonazePAM (KlonoPIN) 0.5 MG tablet, TAKE 1 4 TO 1 FULL TABLET BY MOUTH TWICE DAILY AS NEEDED FOR ANXIETY, Disp: , Rfl: 1  •  cyanocobalamin (VITAMIN B-12) 1000 MCG tablet, , Disp: , Rfl:   •  Ferrous Sulfate (IRON) 325 (65 Fe) MG tablet, IRON 325 (65 Fe) MG TABS, Disp: , Rfl:   •  folic acid (FOLVITE) 1 MG tablet, Daily., Disp: , Rfl:   •  metFORMIN (GLUCOPHAGE) 1000 MG tablet, Take 1,000 mg by mouth 2 (Two) Times a Day., Disp: , Rfl: 4  •  metoprolol tartrate (LOPRESSOR) 25 MG tablet, Take  by mouth., Disp: , Rfl:   •  omeprazole (priLOSEC) 40 MG capsule, Take 40 mg by mouth 2 (Two) Times a Day., Disp: , Rfl:   •  ondansetron ODT (ZOFRAN-ODT) 4 MG disintegrating tablet, DISSOLVE 1 TABLET IN MOUTH 3 TIMES DAILY TO 4 TIMES DAILY FOR NAUSEA, Disp: , Rfl: 1  •  pravastatin (PRAVACHOL) 10 MG tablet, every other day, Disp: , Rfl:     Allergies   Allergen Reactions   • Ace Inhibitors Unknown (See Comments)     Unknown reaction     • Heparin Other (See Comments)     Fever/chills, weakness in legs   • Sulfa  Antibiotics Other (See Comments)     Mouth sores       Family History   Problem Relation Age of Onset   • Mental illness Mother    • Heart disease Mother    • Hypertension Father    • Cancer Father    • Heart disease Sister    • Heart disease Brother        Cancer-related family history includes Cancer in his father.    Social History     Tobacco Use   • Smoking status: Former Smoker     Packs/day: 2.00     Years: 50.00     Pack years: 100.00     Types: Cigarettes     Last attempt to quit: 2015     Years since quittin.3   • Smokeless tobacco: Former User     Types: Chew     Quit date: 1989   Substance Use Topics   • Alcohol use: No     Frequency: Never   • Drug use: No         ROS:     Review of Systems   Constitutional: Positive for fatigue (after treatments). Negative for chills and fever.   HENT: Positive for trouble swallowing (has to eat soft foods ). Negative for ear pain, mouth sores and nosebleeds.    Eyes: Negative for photophobia and visual disturbance.   Respiratory: Positive for chest tightness and shortness of breath. Negative for wheezing and stridor.    Cardiovascular: Negative for chest pain and palpitations.   Gastrointestinal: Positive for diarrhea (5 x a day 1-2 times a week) and nausea. Negative for abdominal pain and vomiting.   Endocrine: Negative for cold intolerance and heat intolerance.   Genitourinary: Negative for dysuria and hematuria.   Musculoskeletal: Negative for joint swelling and neck stiffness.   Skin: Negative for color change and rash.   Neurological: Negative for seizures and syncope.   Hematological: Negative for adenopathy.        No obvious bleeding   Psychiatric/Behavioral: Positive for sleep disturbance (does not sleep well). Negative for agitation, confusion and hallucinations.       Objective:    There were no vitals filed for this visit.    (1) Restricted in physically strenuous activity, ambulatory and able to do work of light nature    Physical Exam:      Physical Exam   Constitutional: He is oriented to person, place, and time. He appears well-developed and well-nourished. No distress.   HENT:   Head: Normocephalic and atraumatic.   Mouth/Throat: Oropharynx is clear and moist.   Absent teeth in upper jaw.   Eyes: Conjunctivae and EOM are normal. Right eye exhibits no discharge. Left eye exhibits no discharge. No scleral icterus.   Neck: Normal range of motion. Neck supple. No thyromegaly present.   Cardiovascular: Normal rate, regular rhythm and normal heart sounds. Exam reveals no gallop and no friction rub.   Pulmonary/Chest: Effort normal and breath sounds normal. No stridor. No respiratory distress. He has no wheezes.   Abdominal: Soft. Bowel sounds are normal. He exhibits no mass. There is no tenderness. There is no rebound and no guarding.   No tenderness   Musculoskeletal: Normal range of motion. He exhibits no tenderness.   Lymphadenopathy:     He has no cervical adenopathy.   Neurological: He is alert and oriented to person, place, and time. He exhibits normal muscle tone.   Skin: Skin is warm. No rash noted. He is not diaphoretic. No erythema.   Right side chest  port   Psychiatric: He has a normal mood and affect. His behavior is normal.   Nursing note and vitals reviewed.        Lab Results - Last 18 Months   Lab Units 10/04/19  1003 09/13/19  1013 09/06/19  1050   WBC 10*3/mm3 5.76 5.38 5.61   HEMOGLOBIN g/dL 11.8* 10.6* 11.2*   HEMATOCRIT % 35.5* 32.5* 33.7*   PLATELETS 10*3/mm3 179 160 192   MCV fL 92.2 93.7 91.8     Lab Results - Last 18 Months   Lab Units 10/04/19  1003 09/13/19  1013 08/23/19  1004   SODIUM mmol/L 138 137 141   POTASSIUM mmol/L 4.7 4.9 4.7   CHLORIDE mmol/L 105 104 107   CO2 mmol/L 24.0 25.0 22.0   BUN mg/dL 11 13 13   CREATININE mg/dL 0.90 0.80 0.90   CALCIUM mg/dL 8.9 8.5* 8.9   BILIRUBIN mg/dL 0.2* 0.5 0.4   ALK PHOS U/L 29* 28* 30*   ALT (SGPT) U/L 11* 8* 8*   AST (SGOT) U/L 21 18 21   GLUCOSE mg/dL 168* 79 191*        Assessment/Plan     Assessment:  1. Metastatic esophageal carcinoma:  Patient has a history of GE junction poorly differentiated adenocarcinoma for which he had chemoradiation followed by Noel Gurwinder resection:  Pathology showed ypT3,pN1 disease.  Tumor was Stage IIIA, diagnosed in 2016.  He received concurrent chemoradiation with weekly Carboplatin and Taxol and radiation finishing on 3/15/17.      CT scan on 1/10/19 showed new retroperitoneal lymph nodes.  These lymph nodes were biopsied on 1/30/19 and it shows metastatic esophageal cancer.  [employer] Next Gen testing was performed on the sample and it shows KRAS mutation, microsatellite stable tumor.  PD-L1 is positive.  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  HER2/marvel (ERBB2) was not amplified and was negative.  His CT scan on 5/1/19 shows evidence of progression.  Patient has started receiving Keytruda on 5/31/19.  He has received 4 cycles so far.    2. Diarrhea:-Patient reports diarrhea which is chronic.  But it has worsened recently.  Immunotherapy can cause diarrhea through colitis but exam does not show any signs of colitis.  Also diarrhea seems to be waxing and waning in nature.  Still emphasized with the patient that diarrhea could be related to immunotherapy and if symptoms worsen we will have to consider stopping treatment and starting steroids.  3. History of chronic dysphagia:  Due to recurrent esophageal strictures.  Patient is status post EGD and balloon dilation.  Today, he does not report any new symptoms.  4. PD-L1 positive, HER2 negative, p53 and KRAS positive.    5. B12 deficiency:  Recommended to take B12 1000 mcg twice daily.    6. Anemia: Likely from malignancy.  Also on iron supplement.  7. Nausea: Symptoms unlikely to be from immunotherapy.  Could be from esophageal strictures.    PLAN:  1. Continue Keytruda.    2. Will order TSH, T3, serum cortisol level with the next cycle.  3. We will continue to monitor closely for adverse events related to  immunotherapy  4. Will need to closely monitor diarrhea symptoms.   Advised to use Imodium as needed.   5. Continue B12 supplements twice a day.  6. Continue  folic acid supplement also.  7. Follow-up in 1 month        No orders of the defined types were placed in this encounter.               Thank you very much for providing the opportunity to participate in this patient’s care. Please do not hesitate to call if there are any other questions

## 2019-10-09 ENCOUNTER — APPOINTMENT (OUTPATIENT)
Dept: LAB | Facility: HOSPITAL | Age: 69
End: 2019-10-09

## 2019-10-09 ENCOUNTER — OFFICE VISIT (OUTPATIENT)
Dept: ONCOLOGY | Facility: CLINIC | Age: 69
End: 2019-10-09

## 2019-10-09 ENCOUNTER — HOSPITAL ENCOUNTER (OUTPATIENT)
Dept: ONCOLOGY | Facility: HOSPITAL | Age: 69
Discharge: HOME | End: 2019-10-09
Admitting: INTERNAL MEDICINE

## 2019-10-09 VITALS
BODY MASS INDEX: 23.46 KG/M2 | HEART RATE: 61 BPM | HEIGHT: 66 IN | RESPIRATION RATE: 16 BRPM | SYSTOLIC BLOOD PRESSURE: 121 MMHG | DIASTOLIC BLOOD PRESSURE: 69 MMHG | WEIGHT: 146 LBS | TEMPERATURE: 97.9 F

## 2019-10-09 DIAGNOSIS — C15.9 ESOPHAGEAL CANCER, STAGE IV (HCC): Primary | ICD-10-CM

## 2019-10-09 DIAGNOSIS — Z23 FLU VACCINE NEED: Primary | ICD-10-CM

## 2019-10-09 DIAGNOSIS — R53.82 CHRONIC FATIGUE, UNSPECIFIED: ICD-10-CM

## 2019-10-09 DIAGNOSIS — Z51.11 ENCOUNTER FOR ANTINEOPLASTIC CHEMOTHERAPY: ICD-10-CM

## 2019-10-09 DIAGNOSIS — E53.8 B12 DEFICIENCY: ICD-10-CM

## 2019-10-09 DIAGNOSIS — C16.0 MALIGNANT NEOPLASM OF CARDIA (HCC): ICD-10-CM

## 2019-10-09 LAB
BASOPHILS # BLD AUTO: 0.03 10*3/MM3 (ref 0–0.2)
BASOPHILS NFR BLD AUTO: 0.5 % (ref 0–1.5)
DEPRECATED RDW RBC AUTO: 45.2 FL (ref 37–54)
EOSINOPHIL # BLD AUTO: 0.38 10*3/MM3 (ref 0–0.4)
EOSINOPHIL NFR BLD AUTO: 6.5 % (ref 0.3–6.2)
ERYTHROCYTE [DISTWIDTH] IN BLOOD BY AUTOMATED COUNT: 13.9 % (ref 12.3–15.4)
HCT VFR BLD AUTO: 34.9 % (ref 37.5–51)
HGB BLD-MCNC: 11.7 G/DL (ref 13–17.7)
LYMPHOCYTES # BLD AUTO: 1.65 10*3/MM3 (ref 0.7–3.1)
LYMPHOCYTES NFR BLD AUTO: 28.4 % (ref 19.6–45.3)
MCH RBC QN AUTO: 31 PG (ref 26.6–33)
MCHC RBC AUTO-ENTMCNC: 33.5 G/DL (ref 31.5–35.7)
MCV RBC AUTO: 92.3 FL (ref 79–97)
MONOCYTES # BLD AUTO: 0.61 10*3/MM3 (ref 0.1–0.9)
MONOCYTES NFR BLD AUTO: 10.5 % (ref 5–12)
NEUTROPHILS # BLD AUTO: 3.15 10*3/MM3 (ref 1.7–7)
NEUTROPHILS NFR BLD AUTO: 54.1 % (ref 42.7–76)
PLATELET # BLD AUTO: 174 10*3/MM3 (ref 140–450)
PMV BLD AUTO: 9.9 FL (ref 6–12)
RBC # BLD AUTO: 3.78 10*6/MM3 (ref 4.14–5.8)
WBC NRBC COR # BLD: 5.82 10*3/MM3 (ref 3.4–10.8)

## 2019-10-09 PROCEDURE — 36415 COLL VENOUS BLD VENIPUNCTURE: CPT | Performed by: INTERNAL MEDICINE

## 2019-10-09 PROCEDURE — G0008 ADMIN INFLUENZA VIRUS VAC: HCPCS | Performed by: INTERNAL MEDICINE

## 2019-10-09 PROCEDURE — 99215 OFFICE O/P EST HI 40 MIN: CPT | Performed by: INTERNAL MEDICINE

## 2019-10-09 PROCEDURE — 90674 CCIIV4 VAC NO PRSV 0.5 ML IM: CPT | Performed by: INTERNAL MEDICINE

## 2019-10-09 PROCEDURE — 85025 COMPLETE CBC W/AUTO DIFF WBC: CPT | Performed by: INTERNAL MEDICINE

## 2019-10-09 PROCEDURE — 25010000002 INFLUENZA VAC SUBUNIT QUAD 0.5 ML SUSPENSION PREFILLED SYRINGE: Performed by: INTERNAL MEDICINE

## 2019-10-09 RX ADMIN — INFLUENZA A VIRUS A/IDAHO/07/2018 (H1N1) ANTIGEN (MDCK CELL DERIVED, PROPIOLACTONE INACTIVATED, INFLUENZA A VIRUS A/INDIANA/08/2018 (H3N2) ANTIGEN (MDCK CELL DERIVED, PROPIOLACTONE INACTIVATED), INFLUENZA B VIRUS B/SINGAPORE/INFTT-16-0610/2016 ANTIGEN (MDCK CELL DERIVED, PROPIOLACTONE INACTIVATED), INFLUENZA B VIRUS B/IOWA/06/2017 ANTIGEN (MDCK CELL DERIVED, PROPIOLACTONE INACTIVATED) 0.5 ML: 15; 15; 15; 15 INJECTION, SUSPENSION INTRAMUSCULAR at 13:47

## 2019-10-23 DIAGNOSIS — C16.0 MALIGNANT NEOPLASM OF CARDIA (HCC): ICD-10-CM

## 2019-10-23 DIAGNOSIS — Z51.11 ENCOUNTER FOR ANTINEOPLASTIC CHEMOTHERAPY: ICD-10-CM

## 2019-10-23 RX ORDER — SODIUM CHLORIDE 9 MG/ML
250 INJECTION, SOLUTION INTRAVENOUS ONCE
Status: CANCELLED | OUTPATIENT
Start: 2019-10-25

## 2019-10-25 ENCOUNTER — HOSPITAL ENCOUNTER (OUTPATIENT)
Dept: ONCOLOGY | Facility: HOSPITAL | Age: 69
Setting detail: INFUSION SERIES
Discharge: HOME | End: 2019-10-25

## 2019-10-25 VITALS
DIASTOLIC BLOOD PRESSURE: 79 MMHG | RESPIRATION RATE: 18 BRPM | TEMPERATURE: 97.7 F | SYSTOLIC BLOOD PRESSURE: 146 MMHG | BODY MASS INDEX: 23.95 KG/M2 | HEIGHT: 66 IN | WEIGHT: 149 LBS | HEART RATE: 60 BPM

## 2019-10-25 DIAGNOSIS — C16.0 MALIGNANT NEOPLASM OF CARDIA (HCC): ICD-10-CM

## 2019-10-25 DIAGNOSIS — Z51.81 ENCOUNTER FOR THERAPEUTIC DRUG MONITORING: ICD-10-CM

## 2019-10-25 DIAGNOSIS — Z51.11 ENCOUNTER FOR ANTINEOPLASTIC CHEMOTHERAPY: ICD-10-CM

## 2019-10-25 DIAGNOSIS — R79.89 ELEVATED TSH: ICD-10-CM

## 2019-10-25 DIAGNOSIS — R53.82 CHRONIC FATIGUE, UNSPECIFIED: ICD-10-CM

## 2019-10-25 DIAGNOSIS — C15.9 ESOPHAGEAL CANCER, STAGE IV (HCC): Primary | ICD-10-CM

## 2019-10-25 LAB
ALBUMIN SERPL-MCNC: 4.1 G/DL (ref 3.5–5.2)
ALBUMIN/GLOB SERPL: 1.6 G/DL
ALP SERPL-CCNC: 37 U/L (ref 39–117)
ALT SERPL W P-5'-P-CCNC: 8 U/L (ref 1–41)
ANION GAP SERPL CALCULATED.3IONS-SCNC: 10 MMOL/L (ref 5–15)
AST SERPL-CCNC: 18 U/L (ref 1–40)
BASOPHILS # BLD AUTO: 0.03 10*3/MM3 (ref 0–0.2)
BASOPHILS NFR BLD AUTO: 0.5 % (ref 0–1.5)
BILIRUB SERPL-MCNC: 0.2 MG/DL (ref 0.2–1.2)
BUN BLD-MCNC: 16 MG/DL (ref 8–23)
BUN/CREAT SERPL: 21.3 (ref 7–25)
CALCIUM SPEC-SCNC: 9.2 MG/DL (ref 8.6–10.5)
CHLORIDE SERPL-SCNC: 102 MMOL/L (ref 98–107)
CO2 SERPL-SCNC: 26 MMOL/L (ref 22–29)
CORTIS SERPL-MCNC: 15.25 MCG/DL
CREAT BLD-MCNC: 0.75 MG/DL (ref 0.76–1.27)
DEPRECATED RDW RBC AUTO: 43.9 FL (ref 37–54)
EOSINOPHIL # BLD AUTO: 0.43 10*3/MM3 (ref 0–0.4)
EOSINOPHIL NFR BLD AUTO: 7.2 % (ref 0.3–6.2)
ERYTHROCYTE [DISTWIDTH] IN BLOOD BY AUTOMATED COUNT: 13.5 % (ref 12.3–15.4)
GFR SERPL CREATININE-BSD FRML MDRD: 103 ML/MIN/1.73
GLOBULIN UR ELPH-MCNC: 2.5 GM/DL
GLUCOSE BLD-MCNC: 137 MG/DL (ref 65–99)
HCT VFR BLD AUTO: 34.2 % (ref 37.5–51)
HGB BLD-MCNC: 11.5 G/DL (ref 13–17.7)
LYMPHOCYTES # BLD AUTO: 1.6 10*3/MM3 (ref 0.7–3.1)
LYMPHOCYTES NFR BLD AUTO: 26.8 % (ref 19.6–45.3)
MCH RBC QN AUTO: 31.2 PG (ref 26.6–33)
MCHC RBC AUTO-ENTMCNC: 33.6 G/DL (ref 31.5–35.7)
MCV RBC AUTO: 92.7 FL (ref 79–97)
MONOCYTES # BLD AUTO: 0.71 10*3/MM3 (ref 0.1–0.9)
MONOCYTES NFR BLD AUTO: 11.9 % (ref 5–12)
NEUTROPHILS # BLD AUTO: 3.2 10*3/MM3 (ref 1.7–7)
NEUTROPHILS NFR BLD AUTO: 53.6 % (ref 42.7–76)
PLATELET # BLD AUTO: 152 10*3/MM3 (ref 140–450)
PMV BLD AUTO: 10.4 FL (ref 6–12)
POTASSIUM BLD-SCNC: 4.7 MMOL/L (ref 3.5–5.2)
PROT SERPL-MCNC: 6.6 G/DL (ref 6–8.5)
RBC # BLD AUTO: 3.69 10*6/MM3 (ref 4.14–5.8)
SODIUM BLD-SCNC: 138 MMOL/L (ref 136–145)
T3FREE SERPL-MCNC: 3.17 PG/ML (ref 2–4.4)
TSH SERPL DL<=0.05 MIU/L-ACNC: 6.48 UIU/ML (ref 0.27–4.2)
WBC NRBC COR # BLD: 5.97 10*3/MM3 (ref 3.4–10.8)

## 2019-10-25 PROCEDURE — 84443 ASSAY THYROID STIM HORMONE: CPT | Performed by: NURSE PRACTITIONER

## 2019-10-25 PROCEDURE — 85025 COMPLETE CBC W/AUTO DIFF WBC: CPT | Performed by: NURSE PRACTITIONER

## 2019-10-25 PROCEDURE — 96413 CHEMO IV INFUSION 1 HR: CPT | Performed by: INTERNAL MEDICINE

## 2019-10-25 PROCEDURE — 25010000002 PEMBROLIZUMAB 100 MG/4ML SOLUTION 4 ML VIAL: Performed by: NURSE PRACTITIONER

## 2019-10-25 PROCEDURE — 84481 FREE ASSAY (FT-3): CPT | Performed by: NURSE PRACTITIONER

## 2019-10-25 PROCEDURE — 84439 ASSAY OF FREE THYROXINE: CPT | Performed by: NURSE PRACTITIONER

## 2019-10-25 PROCEDURE — 82533 TOTAL CORTISOL: CPT | Performed by: NURSE PRACTITIONER

## 2019-10-25 PROCEDURE — 80053 COMPREHEN METABOLIC PANEL: CPT | Performed by: NURSE PRACTITIONER

## 2019-10-25 RX ORDER — SODIUM CHLORIDE 0.9 % (FLUSH) 0.9 %
10 SYRINGE (ML) INJECTION AS NEEDED
Status: CANCELLED | OUTPATIENT
Start: 2019-10-25

## 2019-10-25 RX ORDER — SODIUM CHLORIDE 0.9 % (FLUSH) 0.9 %
10 SYRINGE (ML) INJECTION AS NEEDED
Status: DISCONTINUED | OUTPATIENT
Start: 2019-10-25 | End: 2019-10-26 | Stop reason: HOSPADM

## 2019-10-25 RX ADMIN — Medication 10 ML: at 10:43

## 2019-10-25 RX ADMIN — SODIUM CHLORIDE 200 MG: 900 INJECTION, SOLUTION INTRAVENOUS at 10:03

## 2019-10-28 DIAGNOSIS — R53.82 CHRONIC FATIGUE, UNSPECIFIED: ICD-10-CM

## 2019-10-28 DIAGNOSIS — R79.89 ELEVATED TSH: ICD-10-CM

## 2019-10-28 DIAGNOSIS — C15.9 ESOPHAGEAL CANCER, STAGE IV (HCC): ICD-10-CM

## 2019-10-28 DIAGNOSIS — Z51.81 ENCOUNTER FOR THERAPEUTIC DRUG MONITORING: Primary | ICD-10-CM

## 2019-10-28 LAB — T4 FREE SERPL-MCNC: 1.13 NG/DL (ref 0.93–1.7)

## 2019-10-30 ENCOUNTER — TELEPHONE (OUTPATIENT)
Dept: ONCOLOGY | Facility: CLINIC | Age: 69
End: 2019-10-30

## 2019-10-30 DIAGNOSIS — R79.89 ELEVATED TSH: Primary | ICD-10-CM

## 2019-10-30 RX ORDER — LEVOTHYROXINE SODIUM 0.05 MG/1
50 TABLET ORAL DAILY
Qty: 30 TABLET | Refills: 0 | Status: SHIPPED | OUTPATIENT
Start: 2019-10-30 | End: 2019-11-18 | Stop reason: SDUPTHER

## 2019-10-30 NOTE — TELEPHONE ENCOUNTER
----- Message from DAT Rojas sent at 10/30/2019  1:14 PM EDT -----  Let the patient know that a prescription for levothyroxine 50 mcg to be taken p.o. every morning has been sent to his pharmacy.  His lab work shows that he has some mild hypothyroidism.

## 2019-11-05 NOTE — PROGRESS NOTES
Hematology-Oncology Follow-up Note       Artie Mc  1950    Primary Care Physician: Chanel Carter APRN  Referring Physician: Chanel Carter APRN    Reason For Visit:  Chief Complaint   Patient presents with   • Follow-up     esophageal cancer   Monitoring for adverse effects related to immunotherapy.    Hypothyroidism  HPI:   Mr. Mc is a pleasant 69-year-old gentleman with a history of gastroesophageal reflux disease, diabetes, hypertension, heart block status post pacemaker placement and history of CABG.  He was having symptoms of dysphagia, weight loss since September 2016. He was reporting symptoms of food getting stuck in the lower chest.  He has transitioned himself to a liquid diet.  The patient reports losing about 30 pounds over this duration.  The patient underwent upper GI endoscopy on 12/20/16 with Dr. Esvin Barrera.  Endoscopy showed necrotic, circumferential and moderately obstructive mass at the distal esophagus between 42 cm and 36 cm.      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  There was also evidence of mild subacute inflammation in the duodenum.  Dr. Barrera ordered a CT scan of the chest and abdomen with contrast on 12/20/16.  The scan was done at [facility].  The scan showed a new ill-defined mass in the anterior wall of the distal esophagus at the GE junction measuring 2.3 x 2.2 x 1.6 cm, worrisome for cancer.  There were four subcentimeter liver lesions which had appeared stable in comparison to another CT scan from 2009 and they likely represent small cysts.  There was also mild lymphadenopathy in the gastrohepatic ligament.  There were at least six borderline enlarged lymph nodes measuring up to 1.3 x 1 cm in the region.  The patient is referred to us for further oncological evaluation.    1/5/2017 - The patient presents for initial consultation.  He reports persistent  dysphagia and also has symptoms of regurgitation.  He cannot tolerate solid foods and is dependent on liquid diet such as Ensure.  He also reports fatigue. Symptoms of dysphagia have been present for the past four months.  When he eats any solids, the food gets stuck in the lower chest.    • 1/5/17 - Vitamin B12 200 (L).  CEA 0.8.  Ferritin 35.  Iron saturation 16% (L), serum iron 56, TIBC 354.  Creatinine 0.9.  LFTs normal.  Folate 10.2.  • 1/12/17 - PET/CT scan:  Intense abnormal activity corresponding to the region of the GE junction and proximal stomach.  SUV is 11.06.  There is a suggestion of an abnormal mass or abnormal wall thickening in the region measuring 3.9 x 4.8 cm.  No additional abnormalities.  No evidence of metastases or other lymphadenopathy.  Scattered nonspecific subcentimeter lymph nodes involving the mediastinum and within the central upper abdomen.  • 1/13/17 - Creatinine 0.9.  LFTs normal.    • 1/17/17 - Patient seen by thoracic surgeon, Dr. Bradley.  PET scan was reviewed.  He has recommended neoadjuvant chemoradiation therapy followed by [place] Gurwinder esophagogastrectomy.  Port-A-Cath is being arranged.    • 1/20/17 - WBC 6.3, hemoglobin 12.1, platelet count 198,000, MCV 86.4.  • 1/30/17 - Patient started weekly Carboplatin and Taxol (Carboplatin AUC 2 and Taxol 50 mg/M2).  Patient received Injectafer 750 mg.  WBC 5.8, hemoglobin 12.4, platelet count 244,000.   • 1/31/17 - Patient seen by Dr. Eduardo Oleary.  The plan was to give 5040 cGy in 28 fractions.   • 2/6/17 - WBC 4.4, hemoglobin 11.6, platelet count 201,000.  Patient received cycle 2 of weekly Carboplatin and Taxol (Carboplatin AUC 2 and Taxol 50 mg/M2).  • 2/13/17 - Patient received Carboplatin and Taxol weekly cycle 3.  • 2/13/17 - Patient started concurrent radiation therapy.  Total planned dose is 4140 cGy.    • 2/13/17 to 2/15/17 - Patient received 4140 cGy of neoadjuvant radiation.  Radiation was finished on 3/15/17.     • 2/20/17 - WBC 1.8, hemoglobin 10.6, platelet count 217,000.  Creatinine 0.5. Folate 15 (L).  Ferritin 361.  Haptoglobin 214.  Iron saturation 33%, TIBC 263, serum iron 88.    • 2/20/17 - Patient received Carboplatin and Taxol weekly cycle 4.   • 2/27/17 - WBC 3.7, hemoglobin 10.7, platelet count 177,000, MCV 85.3.    • 3/6/17 - Patient has received 16 out of the 23 planned radiation treatments.  Total planned dose is 4140 cGy.    • 3/6/17 - WBC 5.4, hemoglobin 11.2, platelet count 113,000.  • 3/6/17 - Patient received cycle 5 of weekly Carboplatin and Taxol.   • 3/13/17 - Patient received cycle 6 of weekly Carboplatin and Taxol.  WBC 3.9, hemoglobin 11.3, platelet count 93,000.     • 3/20/17 - WBC 1.4, hemoglobin 10.0, platelet count 118,000, MCV 86.1   • 3/27/17 - WBC 3.8, hemoglobin 9.5, platelet count 176,000, MCV 87.3.    • 4/7/17 - Stress test:  No evidence of reversible myocardial ischemia.  Normal left ventricular ejection fraction of 50%.    • 4/17/17 - Upper GI endoscopy by Dr. 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  The GE junction adenocarcinoma lesion seems to have a very good response to chemotherapy and radiation.  The lumen was open.  • 5/1/17 - WBC 7.0, hemoglobin 10.2, platelet count 175,000.    • 5/4/17 - Patient underwent [place] Gurwinder esophagectomy with pyloroplasty, feeding jejunostomy tube, abdominal and mediastinal lymph node dissection.    Surgical Pathology:  Moderately to poorly differentiated adenocarcinoma measuring 1.3 cm at the GE junction.  Resection margins are negative for malignancy.  Tumor margins are negative.  There is effective treatment response.  No evidence of lymphovascular invasion.  Staging is ypT3,pN1.  One out of fourteen lymph nodes involved.   • 5/22/17 - WBC 6.8, hemoglobin 10.3, platelet count 312,000.    • 6/19/17 - WBC 5.7, hemoglobin 10.5, platelet count 204,000, MCV 92.1.    • 7/6/17 - EGD:  Status post balloon  dilation of esophageal stricture.  • 7/12/17 - EGD with balloon dilation of esophageal stricture.  • 7/26/17 - EGD and balloon dilation of esophageal stricture.  • 7/31/17 - PET/CT scan:  There is mild metabolic activity seen at the thoracic esophagus just at and below the surgical margins.  Some mild wall thickening.  This could be from recent surgery.  A residual cancer seems unlikely.  There is a precarinal lymph node with mild metabolic activity with SUV of 3 measuring 1 x 1.3 cm.  Again this appears to be nonspecific in my opinion.    • 8/7/17 - WBC 5.9, hemoglobin 11.9, platelet count 171,000, MCV 88.7.    • 10/19/17 - WBC 7.5, hemoglobin 11.8, platelet count 216,000.    • 1/8/18 - PET scan:  No evidence of residual disease or recurrent disease.  There is an esophageal stent in the mid esophagus with a large debris air level.  No evidence of any metastases in the chest, abdomen or pelvis.  Mild nodule uptake in the vocal cords with fullness in the right vocal cord.  This could be physiologic.    • 7/9/18 - CT scan of chest with contrast:  Prominent mediastinal lymph nodes appear stable since February.  Changes of gastric pull-through procedure for esophageal cancer again noted.  Tree-in-bud infiltrates in the left lung base, consistent with infection versus inflammation.    • 1/10/19 - CT chest, abdomen and pelvis with contrast:  No evidence of mets in the chest; however, interval development of metastatic lymphadenopathy in the left side of the retroperitoneum.  For instance, there is a 17 mm rounded lymph node, 10 mm lymph node and also a 14 mm lymph node.  These are all new.  Stable 9 mm hypodense lesion within the right hepatic lobe, which could reflect hemangioma or complex cyst.    • 1/30/19 - CT-guided core biopsy of retroperitoneal lymph node:  Poorly differentiated adenocarcinoma consistent with metastases from patient’s known esophageal primary.  CK20 positive, CDX2 positive, cytokeratin 7 negative,  TTF-1 negative.    • 2/1/19 - Creatinine 0.9, BUN 18, calcium 9.3; these are normal.    • 2/28/19 - [facility] Next Gen sequencing testing on retroperitoneal lymph node specimen:  PD-L1 positive.  CPS = 3.  This implies responsiveness to pembrolizumab.  Mismatch repair status is proficient (no evidence of microsatellite instability).  Tumor mutational burden is low = 6 mutations/Mb.  CDH1 indeterminate.  KRAS mutation positive.  TP53 mutation positive.  CDK6 amplified.  [employer] PD-L1 testing by IHC:  PD-L1 expression 1% positive (CPS =1).  HER2/marvel by IHC is negative.  HER2/marvel by CISH not amplified.  Microsatellite stable tumor.    • 3/7/19 - WBC 7.3, hemoglobin 12, platelet count 128,000, MCV 92.     • 3/14/19 - Patient was seen by radiation oncologist, Dr. Chahal.  He has recommended observation versus systemic chemoimmunotherapy as needed.  No plans for radiation therapy.   • 5/1/19 - CT scan of chest with contrast:  No definitive findings of new metastatic disease in the chest.  Emphysema noted.    • 5/1/19 - CT abdomen and pelvis:  There is interval progression of metastatic retroperitoneal adenopathy.  Left periaortic adenopathy with lymph node measuring up to 2 cm, previously 1.7 cm.  Another lymph node now measures 2.3, previously 1.4 cm.  New enlarged lymph node on image 147 measures 1.4 cm, previously 0.4 cm.  A 9 mm hypodense right hepatic lobe lesion appears stable.    • 5/9/19 - Decision made to start patient on immunotherapy Keytruda.    • 5/9/19 - Vitamin B12 229.  Ferritin 61.  Folate 14.3.  Iron saturation 26%, TIBC 304, serum iron 79.  • 5/31/19 - Patient received Keytruda 200 mg cycle 1, day 1.    • 5/31/19 - Free T4 0.99.  Creatinine 0.9, BUN 9.  LFTs normal.  TSH 3.99.  Folate 14.3.  Iron saturation 26%.    • 6/21/2019 patient received Keytruda cycle 2  • 7/12/2019: Patient received Keytruda cycle 3  • 8/2/2019 patient received cycle 4 Keytruda WBC 7.2, hemoglobin 11.8 platelet count 163  • 8/23/2019  creatinine 0.9, LFTs normal, WBC 6.4, hemoglobin 11.5, platelets 179, TSH 2.1   • 8/23/2019 patient received cycle 5 of Keytruda  • 9/3/2019 CT chest abdomen and pelvis with contrast: . Positive response to therapy in the abdomen since 05/01/2019. Pathologically enlarged retroperitoneal lymph nodes, predominantly in the left periaortic region, have diminished in size.a 1.3 x 2.1 cm left periaortic node (image 60), previously measured 3.5 x 2.3 cm. A 1.6 cm index node near the level of the left renal vein previously measured 2.0 cm No new or progressive adenopathy. 2. Stable findings in the chest. Noncalcified right upper lobe nodules are unchanged. There is no evidence of new or progressive metastatic disease within the chest. 3. 8 mm indeterminate low-density lesion in the right hepatic lobe, is stable. No new liver lesions.  • 9/6/2019 WBC 5.6, hemoglobin 11.2, platelets of 192, MCV 91.8  • 9/13/2019 patient received Keytruda cycle 6, albumin 3.2  • 10/4/2019 patient received cycle 7 of Keytruda, TSH 4.8, free T4 0.86  • 10/9/2019 WBC 5.8, hemoglobin 11.7, platelets 174  • 10/25/2019 patient received cycle 8 of Keytruda.  WBC 5.9, hemoglobin 11.5, platelets 152, creatinine 0.75, cortisol 15.2, TSH 6.48 high , free T4 1.13 normal  • 11/6/2019 WBC 6.6, hemoglobin 11.8, MCV 91.5, platelets 187        Subjective: 11/06/19  Patient is here for a follow up of his metastatic esophageal cancer.  He continues Keytruda. He states that his thyroid test was abnormal recently and started him on medication. He continues taking po B12 1000 mcg twice daily and folic acid 1 mg daily. He states he has had a lot of headaches and some memory problems. He went to the ER for the memory issue and he had a CT of the head at [facility]. He states his nausea and diarrhea is getting better. He is still complaining of fatigue, trouble swallowing, SOB, chest tightness and trouble sleeping. He states he is more anxious than usual.      The  following portions of the patient's history were reviewed and updated as appropriate: allergies, current medications, past family history, past medical history, past social history, past surgical history and problem list.     Past Medical History:   Diagnosis Date   • B12 deficiency    • Blockage of coronary artery of heart (CMS/HCC)    • Depression    • DM (diabetes mellitus) (CMS/HCC)    • Dysphagia    • HTN (hypertension)    • Hyperlipidemia        Past Surgical History:   Procedure Laterality Date   • ABDOMINAL WALL ABSCESS INCISION AND DRAINAGE  10/22/2018    WITH DEBRIDEMENT  1.5CM X 1.5 CM X 1.5 CM    DR. PURI   • CATARACT EXTRACTION  2018   • COLONOSCOPY     • CORONARY ARTERY BYPASS GRAFT  2015   • ENDOSCOPY  12/20/2016   • ESOPHAGECTOMY  05/04/2017    BROOKS PETTY ESOPHAGECTOMY, FEEDING JEJNOSTOMOY, ABDOMINAL AND MEDIASTINAL LYMPH NODE DISECTION, PEDICLED MUSCLE FLAP COVERAGE DR. PURI   • ESOPHAGOSCOPY / EGD  07/12/2017    WITH BALLOON DILATION   • ESOPHAGOSCOPY / EGD  07/26/2017    WITH BALLOON DILATION   • ESOPHAGOSCOPY / EGD  08/11/2017    WITH BALLOON DILATION   • ESOPHAGOSCOPY / EGD  08/28/2017    WITH BALLOON DILATION   • ESOPHAGOSCOPY / EGD  09/27/2017    WITH BALLOON DILATION   • LYMPH NODE BIOPSY  01/30/2019   • PACEMAKER IMPLANTATION  11/21/2016         Current Outpatient Medications:   •  clonazePAM (KlonoPIN) 0.5 MG tablet, TAKE 1 4 TO 1 FULL TABLET BY MOUTH TWICE DAILY AS NEEDED FOR ANXIETY, Disp: , Rfl: 1  •  cyanocobalamin (VITAMIN B-12) 1000 MCG tablet, , Disp: , Rfl:   •  Ferrous Sulfate (IRON) 325 (65 Fe) MG tablet, IRON 325 (65 Fe) MG TABS, Disp: , Rfl:   •  folic acid (FOLVITE) 1 MG tablet, Daily., Disp: , Rfl:   •  levothyroxine (SYNTHROID, LEVOTHROID) 50 MCG tablet, Take 1 tablet by mouth Daily., Disp: 30 tablet, Rfl: 0  •  metFORMIN (GLUCOPHAGE) 1000 MG tablet, Take 1,000 mg by mouth 2 (Two) Times a Day., Disp: , Rfl: 4  •  metoprolol tartrate (LOPRESSOR) 25 MG tablet, Take  by  mouth., Disp: , Rfl:   •  omeprazole (priLOSEC) 40 MG capsule, Take 40 mg by mouth 2 (Two) Times a Day., Disp: , Rfl:   •  ondansetron ODT (ZOFRAN-ODT) 4 MG disintegrating tablet, DISSOLVE 1 TABLET IN MOUTH 3 TIMES DAILY TO 4 TIMES DAILY FOR NAUSEA, Disp: , Rfl: 1  •  pravastatin (PRAVACHOL) 10 MG tablet, every other day, Disp: , Rfl:     Allergies   Allergen Reactions   • Ace Inhibitors Unknown (See Comments)     Unknown reaction     • Heparin Other (See Comments)     Fever/chills, weakness in legs   • Sulfa Antibiotics Other (See Comments)     Mouth sores       Family History   Problem Relation Age of Onset   • Mental illness Mother    • Heart disease Mother    • Hypertension Father    • Cancer Father    • Heart disease Sister    • Heart disease Brother        Cancer-related family history includes Cancer in his father.    Social History     Tobacco Use   • Smoking status: Former Smoker     Packs/day: 2.00     Years: 50.00     Pack years: 100.00     Types: Cigarettes     Last attempt to quit: 2015     Years since quittin.3   • Smokeless tobacco: Former User     Types: Chew     Quit date: 1989   Substance Use Topics   • Alcohol use: No     Frequency: Never   • Drug use: No         ROS:     Review of Systems   Constitutional: Positive for fatigue (after treatments). Negative for chills and fever.   HENT: Positive for sore throat and trouble swallowing (has to eat soft foods ). Negative for ear pain, mouth sores and nosebleeds.    Eyes: Negative for photophobia and visual disturbance.   Respiratory: Positive for chest tightness and shortness of breath. Negative for wheezing and stridor.    Cardiovascular: Negative for chest pain and palpitations.   Gastrointestinal: Positive for nausea (getting better). Negative for abdominal pain, diarrhea (5 x a day 1-2 times a week) and vomiting.   Endocrine: Negative for cold intolerance and heat intolerance.   Genitourinary: Negative for dysuria and hematuria.  "  Musculoskeletal: Negative for joint swelling and neck stiffness.   Skin: Negative for color change and rash.   Neurological: Positive for headaches. Negative for seizures and syncope.   Hematological: Negative for adenopathy.        No obvious bleeding   Psychiatric/Behavioral: Positive for sleep disturbance (does not sleep well). Negative for agitation, confusion and hallucinations.       Objective:    Vitals:    11/06/19 0838   BP: 128/81   Pulse: 61   Resp: 20   Temp: 97.8 °F (36.6 °C)   Weight: 67.9 kg (149 lb 9.6 oz)   Height: 167.6 cm (66\")   PainSc: 0-No pain       (1) Restricted in physically strenuous activity, ambulatory and able to do work of light nature    Physical Exam:     Physical Exam   Constitutional: He is oriented to person, place, and time. He appears well-developed and well-nourished. No distress.   HENT:   Head: Normocephalic and atraumatic.   Mouth/Throat: Oropharynx is clear and moist.   Absent teeth in upper jaw.   Eyes: Conjunctivae and EOM are normal. Right eye exhibits no discharge. Left eye exhibits no discharge. No scleral icterus.   Neck: Normal range of motion. Neck supple. No thyromegaly present.   Cardiovascular: Normal rate, regular rhythm and normal heart sounds. Exam reveals no gallop and no friction rub.   Pulmonary/Chest: Effort normal and breath sounds normal. No stridor. No respiratory distress. He has no wheezes.   Abdominal: Soft. Bowel sounds are normal. He exhibits no mass. There is no tenderness. There is no rebound and no guarding.   No tenderness   Musculoskeletal: Normal range of motion. He exhibits no tenderness.   Lymphadenopathy:     He has no cervical adenopathy.   Neurological: He is alert and oriented to person, place, and time. He exhibits normal muscle tone.   Skin: Skin is warm. No rash noted. He is not diaphoretic. No erythema.   Right side chest  port   Psychiatric: He has a normal mood and affect. His behavior is normal.   Nursing note and vitals " reviewed.        Lab Results - Last 18 Months   Lab Units 11/06/19  0852 10/25/19  0909 10/09/19  1243   WBC 10*3/mm3 6.68 5.97 5.82   HEMOGLOBIN g/dL 11.8* 11.5* 11.7*   HEMATOCRIT % 35.3* 34.2* 34.9*   PLATELETS 10*3/mm3 187 152 174   MCV fL 91.5 92.7 92.3     Lab Results - Last 18 Months   Lab Units 10/25/19  0909 10/04/19  1003 09/13/19  1013   SODIUM mmol/L 138 138 137   POTASSIUM mmol/L 4.7 4.7 4.9   CHLORIDE mmol/L 102 105 104   CO2 mmol/L 26.0 24.0 25.0   BUN mg/dL 16 11 13   CREATININE mg/dL 0.75* 0.90 0.80   CALCIUM mg/dL 9.2 8.9 8.5*   BILIRUBIN mg/dL 0.2 0.2* 0.5   ALK PHOS U/L 37* 29* 28*   ALT (SGPT) U/L 8 11* 8*   AST (SGOT) U/L 18 21 18   GLUCOSE mg/dL 137* 168* 79       Assessment/Plan     Assessment:  1. Metastatic esophageal carcinoma:  Patient has a history of GE junction poorly differentiated adenocarcinoma for which he had chemoradiation followed by [place] Gurwinder resection:  Pathology showed ypT3,pN1 disease.  Tumor was Stage IIIA, diagnosed in 2016.  He received concurrent chemoradiation with weekly Carboplatin and Taxol and radiation finishing on 3/15/17.      CT scan on 1/10/19 showed new retroperitoneal lymph nodes.  These lymph nodes were biopsied on 1/30/19 and it shows metastatic esophageal cancer.  [facility] Next Gen testing was performed on the sample and it shows KRAS mutation, microsatellite stable tumor.  PD-L1 is positive.  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  HER2/marvel (ERBB2) was not amplified and was negative.  His CT scan on 5/1/19 shows evidence of progression.  Patient has started receiving Keytruda on 5/31/19.  He has received 4 cycles so far.    2. 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  3. Occasional headaches/memory lapses: Advise close monitoring of the symptoms.  4. Diarrhea:- diarrhea seems to be waxing and waning in nature.  At this point he does not appear to be related to immunotherapy.  Symptoms are very mild..  5. History of chronic dysphagia:  Due to recurrent  esophageal strictures.  Patient is status post EGD and balloon dilation.  Today, he does not report any new symptoms.  6. PD-L1 positive, HER2 negative, p53 and KRAS positive.    7. B12 deficiency:  Recommended to take B12 1000 mcg twice daily.    8. Anemia: Likely from malignancy.  Also on iron supplement.  9. Nausea: Symptoms have improved    PLAN:  1. Continue Keytruda.  2. Started on levothyroxine 50 mcg.  Continue.  Monitor TSH  3.   Will order brain MRI with  pituitary protocol, if patient has symptoms of worsening memory problems or headaches.  4. Continue to monitor TSH, T3, serum cortisol level with the next cycle.  5. We will continue to monitor closely for adverse events related to immunotherapy  6. Will need to closely monitor diarrhea symptoms.   Advised to use Imodium as needed.   7. Continue B12 supplements twice a day.  8. Continue  folic acid supplement also.  9. Follow-up in 4 weeks        Orders Placed This Encounter   Procedures   • CBC Auto Differential                Thank you very much for providing the opportunity to participate in this patient’s care. Please do not hesitate to call if there are any other questions

## 2019-11-06 ENCOUNTER — OFFICE VISIT (OUTPATIENT)
Dept: ONCOLOGY | Facility: CLINIC | Age: 69
End: 2019-11-06

## 2019-11-06 ENCOUNTER — APPOINTMENT (OUTPATIENT)
Dept: LAB | Facility: HOSPITAL | Age: 69
End: 2019-11-06

## 2019-11-06 VITALS
HEIGHT: 66 IN | HEART RATE: 61 BPM | WEIGHT: 149.6 LBS | TEMPERATURE: 97.8 F | DIASTOLIC BLOOD PRESSURE: 81 MMHG | SYSTOLIC BLOOD PRESSURE: 128 MMHG | RESPIRATION RATE: 20 BRPM | BODY MASS INDEX: 24.04 KG/M2

## 2019-11-06 DIAGNOSIS — Z51.11 ENCOUNTER FOR ANTINEOPLASTIC CHEMOTHERAPY: ICD-10-CM

## 2019-11-06 DIAGNOSIS — C16.0 MALIGNANT NEOPLASM OF CARDIA (HCC): ICD-10-CM

## 2019-11-06 DIAGNOSIS — C15.9 ESOPHAGEAL CANCER, STAGE IV (HCC): Primary | ICD-10-CM

## 2019-11-06 DIAGNOSIS — E03.9 HYPOTHYROIDISM (ACQUIRED): ICD-10-CM

## 2019-11-06 LAB
BASOPHILS # BLD AUTO: 0.04 10*3/MM3 (ref 0–0.2)
BASOPHILS NFR BLD AUTO: 0.6 % (ref 0–1.5)
DEPRECATED RDW RBC AUTO: 42.7 FL (ref 37–54)
EOSINOPHIL # BLD AUTO: 0.48 10*3/MM3 (ref 0–0.4)
EOSINOPHIL NFR BLD AUTO: 7.2 % (ref 0.3–6.2)
ERYTHROCYTE [DISTWIDTH] IN BLOOD BY AUTOMATED COUNT: 13 % (ref 12.3–15.4)
HCT VFR BLD AUTO: 35.3 % (ref 37.5–51)
HGB BLD-MCNC: 11.8 G/DL (ref 13–17.7)
LYMPHOCYTES # BLD AUTO: 1.76 10*3/MM3 (ref 0.7–3.1)
LYMPHOCYTES NFR BLD AUTO: 26.3 % (ref 19.6–45.3)
MCH RBC QN AUTO: 30.6 PG (ref 26.6–33)
MCHC RBC AUTO-ENTMCNC: 33.4 G/DL (ref 31.5–35.7)
MCV RBC AUTO: 91.5 FL (ref 79–97)
MONOCYTES # BLD AUTO: 0.68 10*3/MM3 (ref 0.1–0.9)
MONOCYTES NFR BLD AUTO: 10.2 % (ref 5–12)
NEUTROPHILS # BLD AUTO: 3.72 10*3/MM3 (ref 1.7–7)
NEUTROPHILS NFR BLD AUTO: 55.7 % (ref 42.7–76)
PLATELET # BLD AUTO: 187 10*3/MM3 (ref 140–450)
PMV BLD AUTO: 9.9 FL (ref 6–12)
RBC # BLD AUTO: 3.86 10*6/MM3 (ref 4.14–5.8)
WBC NRBC COR # BLD: 6.68 10*3/MM3 (ref 3.4–10.8)

## 2019-11-06 PROCEDURE — 85025 COMPLETE CBC W/AUTO DIFF WBC: CPT | Performed by: INTERNAL MEDICINE

## 2019-11-06 PROCEDURE — 99215 OFFICE O/P EST HI 40 MIN: CPT | Performed by: INTERNAL MEDICINE

## 2019-11-06 PROCEDURE — 36415 COLL VENOUS BLD VENIPUNCTURE: CPT | Performed by: INTERNAL MEDICINE

## 2019-11-14 DIAGNOSIS — Z51.11 ENCOUNTER FOR ANTINEOPLASTIC CHEMOTHERAPY: ICD-10-CM

## 2019-11-14 DIAGNOSIS — C16.0 MALIGNANT NEOPLASM OF CARDIA (HCC): ICD-10-CM

## 2019-11-14 RX ORDER — SODIUM CHLORIDE 9 MG/ML
250 INJECTION, SOLUTION INTRAVENOUS ONCE
Status: CANCELLED | OUTPATIENT
Start: 2019-11-15

## 2019-11-15 ENCOUNTER — DOCUMENTATION (OUTPATIENT)
Dept: ONCOLOGY | Facility: HOSPITAL | Age: 69
End: 2019-11-15

## 2019-11-15 ENCOUNTER — HOSPITAL ENCOUNTER (OUTPATIENT)
Dept: ONCOLOGY | Facility: HOSPITAL | Age: 69
Setting detail: INFUSION SERIES
Discharge: HOME | End: 2019-11-15

## 2019-11-15 VITALS
DIASTOLIC BLOOD PRESSURE: 75 MMHG | TEMPERATURE: 97.6 F | WEIGHT: 152 LBS | HEIGHT: 66 IN | BODY MASS INDEX: 24.43 KG/M2 | SYSTOLIC BLOOD PRESSURE: 137 MMHG | HEART RATE: 73 BPM | RESPIRATION RATE: 18 BRPM

## 2019-11-15 DIAGNOSIS — C16.0 MALIGNANT NEOPLASM OF CARDIA (HCC): ICD-10-CM

## 2019-11-15 DIAGNOSIS — Z51.11 ENCOUNTER FOR ANTINEOPLASTIC CHEMOTHERAPY: Primary | ICD-10-CM

## 2019-11-15 LAB
ALBUMIN SERPL-MCNC: 4.1 G/DL (ref 3.5–5.2)
ALBUMIN/GLOB SERPL: 1.7 G/DL
ALP SERPL-CCNC: 33 U/L (ref 39–117)
ALT SERPL W P-5'-P-CCNC: 8 U/L (ref 1–41)
ANION GAP SERPL CALCULATED.3IONS-SCNC: 11 MMOL/L (ref 5–15)
AST SERPL-CCNC: 17 U/L (ref 1–40)
BASOPHILS # BLD AUTO: 0.03 10*3/MM3 (ref 0–0.2)
BASOPHILS NFR BLD AUTO: 0.5 % (ref 0–1.5)
BILIRUB SERPL-MCNC: 0.3 MG/DL (ref 0.2–1.2)
BUN BLD-MCNC: 11 MG/DL (ref 8–23)
BUN/CREAT SERPL: 12 (ref 7–25)
CALCIUM SPEC-SCNC: 9 MG/DL (ref 8.6–10.5)
CHLORIDE SERPL-SCNC: 105 MMOL/L (ref 98–107)
CO2 SERPL-SCNC: 25 MMOL/L (ref 22–29)
CORTIS SERPL-MCNC: 8.86 MCG/DL
CREAT BLD-MCNC: 0.92 MG/DL (ref 0.76–1.27)
DEPRECATED RDW RBC AUTO: 43.1 FL (ref 37–54)
EOSINOPHIL # BLD AUTO: 0.43 10*3/MM3 (ref 0–0.4)
EOSINOPHIL NFR BLD AUTO: 6.5 % (ref 0.3–6.2)
ERYTHROCYTE [DISTWIDTH] IN BLOOD BY AUTOMATED COUNT: 13.3 % (ref 12.3–15.4)
GFR SERPL CREATININE-BSD FRML MDRD: 81 ML/MIN/1.73
GLOBULIN UR ELPH-MCNC: 2.4 GM/DL
GLUCOSE BLD-MCNC: 111 MG/DL (ref 65–99)
HCT VFR BLD AUTO: 36 % (ref 37.5–51)
HGB BLD-MCNC: 11.9 G/DL (ref 13–17.7)
LYMPHOCYTES # BLD AUTO: 1.83 10*3/MM3 (ref 0.7–3.1)
LYMPHOCYTES NFR BLD AUTO: 27.7 % (ref 19.6–45.3)
MCH RBC QN AUTO: 30.1 PG (ref 26.6–33)
MCHC RBC AUTO-ENTMCNC: 33.1 G/DL (ref 31.5–35.7)
MCV RBC AUTO: 90.9 FL (ref 79–97)
MONOCYTES # BLD AUTO: 0.7 10*3/MM3 (ref 0.1–0.9)
MONOCYTES NFR BLD AUTO: 10.6 % (ref 5–12)
NEUTROPHILS # BLD AUTO: 3.61 10*3/MM3 (ref 1.7–7)
NEUTROPHILS NFR BLD AUTO: 54.7 % (ref 42.7–76)
PLATELET # BLD AUTO: 161 10*3/MM3 (ref 140–450)
PMV BLD AUTO: 10.5 FL (ref 6–12)
POTASSIUM BLD-SCNC: 4.5 MMOL/L (ref 3.5–5.2)
PROT SERPL-MCNC: 6.5 G/DL (ref 6–8.5)
RBC # BLD AUTO: 3.96 10*6/MM3 (ref 4.14–5.8)
SODIUM BLD-SCNC: 141 MMOL/L (ref 136–145)
T3FREE SERPL-MCNC: 2.89 PG/ML (ref 2–4.4)
T4 FREE SERPL-MCNC: 1.58 NG/DL (ref 0.93–1.7)
TSH SERPL DL<=0.05 MIU/L-ACNC: 2.68 UIU/ML (ref 0.27–4.2)
WBC NRBC COR # BLD: 6.6 10*3/MM3 (ref 3.4–10.8)

## 2019-11-15 PROCEDURE — 84443 ASSAY THYROID STIM HORMONE: CPT | Performed by: NURSE PRACTITIONER

## 2019-11-15 PROCEDURE — 85025 COMPLETE CBC W/AUTO DIFF WBC: CPT | Performed by: NURSE PRACTITIONER

## 2019-11-15 PROCEDURE — 25010000002 PEMBROLIZUMAB 100 MG/4ML SOLUTION 4 ML VIAL: Performed by: NURSE PRACTITIONER

## 2019-11-15 PROCEDURE — 84439 ASSAY OF FREE THYROXINE: CPT | Performed by: NURSE PRACTITIONER

## 2019-11-15 PROCEDURE — 80053 COMPREHEN METABOLIC PANEL: CPT | Performed by: NURSE PRACTITIONER

## 2019-11-15 PROCEDURE — 36591 DRAW BLOOD OFF VENOUS DEVICE: CPT

## 2019-11-15 PROCEDURE — 96413 CHEMO IV INFUSION 1 HR: CPT | Performed by: NURSE PRACTITIONER

## 2019-11-15 PROCEDURE — 82533 TOTAL CORTISOL: CPT | Performed by: NURSE PRACTITIONER

## 2019-11-15 PROCEDURE — 84481 FREE ASSAY (FT-3): CPT | Performed by: NURSE PRACTITIONER

## 2019-11-15 RX ORDER — SODIUM CHLORIDE 0.9 % (FLUSH) 0.9 %
10 SYRINGE (ML) INJECTION AS NEEDED
Status: CANCELLED | OUTPATIENT
Start: 2019-11-15

## 2019-11-15 RX ORDER — SODIUM CHLORIDE 0.9 % (FLUSH) 0.9 %
10 SYRINGE (ML) INJECTION AS NEEDED
Status: DISCONTINUED | OUTPATIENT
Start: 2019-11-15 | End: 2019-11-16 | Stop reason: HOSPADM

## 2019-11-15 RX ADMIN — SODIUM CHLORIDE 200 MG: 900 INJECTION, SOLUTION INTRAVENOUS at 12:01

## 2019-11-15 RX ADMIN — Medication 10 ML: at 12:37

## 2019-11-15 NOTE — PROGRESS NOTES
Case Management/ Note    Patient Name: Artie Mc  YOB: 1950  MRN #: [id number]    OSW met with patient and his wife at their request. She has a bracelet that she wanted to give with the intent of cancer center using it as a possible  for [facility]. We discussed her involvement in fundraisers for cancer as one has been a good friend that  recently. Supportive care provided. He said he was doing well on keytruda and is happy that he has little side effects. They talked about a new resource in [facility] cancer patient services and he said he has been given gas cards to help with travel. No other noted concerns.       Electronically signed by:   Elly Leon LCSW, OSW-C  11/15/19, 3:20 PM

## 2019-11-18 DIAGNOSIS — R79.89 ELEVATED TSH: ICD-10-CM

## 2019-11-18 RX ORDER — LEVOTHYROXINE SODIUM 50 UG/1
TABLET ORAL
Qty: 30 TABLET | Refills: 0 | Status: SHIPPED | OUTPATIENT
Start: 2019-11-18 | End: 2019-12-20 | Stop reason: SDUPTHER

## 2019-12-04 ENCOUNTER — APPOINTMENT (OUTPATIENT)
Dept: LAB | Facility: HOSPITAL | Age: 69
End: 2019-12-04

## 2019-12-04 ENCOUNTER — OFFICE VISIT (OUTPATIENT)
Dept: ONCOLOGY | Facility: CLINIC | Age: 69
End: 2019-12-04

## 2019-12-04 VITALS
DIASTOLIC BLOOD PRESSURE: 71 MMHG | SYSTOLIC BLOOD PRESSURE: 111 MMHG | HEART RATE: 66 BPM | RESPIRATION RATE: 18 BRPM | WEIGHT: 153.8 LBS | HEIGHT: 66 IN | TEMPERATURE: 97.6 F | BODY MASS INDEX: 24.72 KG/M2

## 2019-12-04 DIAGNOSIS — Z51.11 ENCOUNTER FOR ANTINEOPLASTIC CHEMOTHERAPY: ICD-10-CM

## 2019-12-04 DIAGNOSIS — D63.0 ANEMIA IN NEOPLASTIC DISEASE: ICD-10-CM

## 2019-12-04 DIAGNOSIS — C16.0 MALIGNANT NEOPLASM OF CARDIA (HCC): ICD-10-CM

## 2019-12-04 DIAGNOSIS — E53.8 B12 DEFICIENCY: Primary | ICD-10-CM

## 2019-12-04 LAB
BASOPHILS # BLD AUTO: 0.02 10*3/MM3 (ref 0–0.2)
BASOPHILS NFR BLD AUTO: 0.3 % (ref 0–1.5)
DEPRECATED RDW RBC AUTO: 44.4 FL (ref 37–54)
EOSINOPHIL # BLD AUTO: 0.47 10*3/MM3 (ref 0–0.4)
EOSINOPHIL NFR BLD AUTO: 6.8 % (ref 0.3–6.2)
ERYTHROCYTE [DISTWIDTH] IN BLOOD BY AUTOMATED COUNT: 13.4 % (ref 12.3–15.4)
HCT VFR BLD AUTO: 36.5 % (ref 37.5–51)
HGB BLD-MCNC: 12 G/DL (ref 13–17.7)
LYMPHOCYTES # BLD AUTO: 1.36 10*3/MM3 (ref 0.7–3.1)
LYMPHOCYTES NFR BLD AUTO: 19.7 % (ref 19.6–45.3)
MCH RBC QN AUTO: 30.5 PG (ref 26.6–33)
MCHC RBC AUTO-ENTMCNC: 32.9 G/DL (ref 31.5–35.7)
MCV RBC AUTO: 92.9 FL (ref 79–97)
MONOCYTES # BLD AUTO: 0.81 10*3/MM3 (ref 0.1–0.9)
MONOCYTES NFR BLD AUTO: 11.7 % (ref 5–12)
NEUTROPHILS # BLD AUTO: 4.24 10*3/MM3 (ref 1.7–7)
NEUTROPHILS NFR BLD AUTO: 61.5 % (ref 42.7–76)
PLATELET # BLD AUTO: 180 10*3/MM3 (ref 140–450)
PMV BLD AUTO: 10.1 FL (ref 6–12)
RBC # BLD AUTO: 3.93 10*6/MM3 (ref 4.14–5.8)
WBC NRBC COR # BLD: 6.9 10*3/MM3 (ref 3.4–10.8)

## 2019-12-04 PROCEDURE — 36415 COLL VENOUS BLD VENIPUNCTURE: CPT | Performed by: INTERNAL MEDICINE

## 2019-12-04 PROCEDURE — 85025 COMPLETE CBC W/AUTO DIFF WBC: CPT | Performed by: INTERNAL MEDICINE

## 2019-12-04 PROCEDURE — 99215 OFFICE O/P EST HI 40 MIN: CPT | Performed by: INTERNAL MEDICINE

## 2019-12-04 RX ORDER — SODIUM CHLORIDE 9 MG/ML
250 INJECTION, SOLUTION INTRAVENOUS ONCE
Status: CANCELLED | OUTPATIENT
Start: 2019-12-06

## 2019-12-04 NOTE — PROGRESS NOTES
Hematology-Oncology Follow-up Note       Artie Mc  1950    Primary Care Physician: Chanel Carter APRN  Referring Physician: Chanel Carter APRN    Reason For Visit:  Chief Complaint   Patient presents with   • Follow-up     esophageal cancer   Monitoring for adverse effects related to immunotherapy.    Hypothyroidism  HPI:   Mr. Mc is a pleasant 69-year-old gentleman with a history of gastroesophageal reflux disease, diabetes, hypertension, heart block status post pacemaker placement and history of CABG.  He was having symptoms of dysphagia, weight loss since September 2016. He was reporting symptoms of food getting stuck in the lower chest.  He has transitioned himself to a liquid diet.  The patient reports losing about 30 pounds over this duration.  The patient underwent upper GI endoscopy on 12/20/16 with Dr. Esvin Barrera.  Endoscopy showed necrotic, circumferential and moderately obstructive mass at the distal esophagus between 42 cm and 36 cm.      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  There was also evidence of mild subacute inflammation in the duodenum.  Dr. Barrera ordered a CT scan of the chest and abdomen with contrast on 12/20/16.  The scan was done at [facility].  The scan showed a new ill-defined mass in the anterior wall of the distal esophagus at the GE junction measuring 2.3 x 2.2 x 1.6 cm, worrisome for cancer.  There were four subcentimeter liver lesions which had appeared stable in comparison to another CT scan from 2009 and they likely represent small cysts.  There was also mild lymphadenopathy in the gastrohepatic ligament.  There were at least six borderline enlarged lymph nodes measuring up to 1.3 x 1 cm in the region.  The patient is referred to us for further oncological evaluation.    1/5/2017 - The patient presents for initial consultation.  He reports persistent  dysphagia and also has symptoms of regurgitation.  He cannot tolerate solid foods and is dependent on liquid diet such as Ensure.  He also reports fatigue. Symptoms of dysphagia have been present for the past four months.  When he eats any solids, the food gets stuck in the lower chest.    • 1/5/17 - Vitamin B12 200 (L).  CEA 0.8.  Ferritin 35.  Iron saturation 16% (L), serum iron 56, TIBC 354.  Creatinine 0.9.  LFTs normal.  Folate 10.2.  • 1/12/17 - PET/CT scan:  Intense abnormal activity corresponding to the region of the GE junction and proximal stomach.  SUV is 11.06.  There is a suggestion of an abnormal mass or abnormal wall thickening in the region measuring 3.9 x 4.8 cm.  No additional abnormalities.  No evidence of metastases or other lymphadenopathy.  Scattered nonspecific subcentimeter lymph nodes involving the mediastinum and within the central upper abdomen.  • 1/13/17 - Creatinine 0.9.  LFTs normal.    • 1/17/17 - Patient seen by thoracic surgeon, Dr. Bradley.  PET scan was reviewed.  He has recommended neoadjuvant chemoradiation therapy followed by [place] Gurwinder esophagogastrectomy.  Port-A-Cath is being arranged.    • 1/20/17 - WBC 6.3, hemoglobin 12.1, platelet count 198,000, MCV 86.4.  • 1/30/17 - Patient started weekly Carboplatin and Taxol (Carboplatin AUC 2 and Taxol 50 mg/M2).  Patient received Injectafer 750 mg.  WBC 5.8, hemoglobin 12.4, platelet count 244,000.   • 1/31/17 - Patient seen by Dr. Eduardo Oleary.  The plan was to give 5040 cGy in 28 fractions.   • 2/6/17 - WBC 4.4, hemoglobin 11.6, platelet count 201,000.  Patient received cycle 2 of weekly Carboplatin and Taxol (Carboplatin AUC 2 and Taxol 50 mg/M2).  • 2/13/17 - Patient received Carboplatin and Taxol weekly cycle 3.  • 2/13/17 - Patient started concurrent radiation therapy.  Total planned dose is 4140 cGy.    • 2/13/17 to 2/15/17 - Patient received 4140 cGy of neoadjuvant radiation.  Radiation was finished on 3/15/17.     • 2/20/17 - WBC 1.8, hemoglobin 10.6, platelet count 217,000.  Creatinine 0.5. Folate 15 (L).  Ferritin 361.  Haptoglobin 214.  Iron saturation 33%, TIBC 263, serum iron 88.    • 2/20/17 - Patient received Carboplatin and Taxol weekly cycle 4.   • 2/27/17 - WBC 3.7, hemoglobin 10.7, platelet count 177,000, MCV 85.3.    • 3/6/17 - Patient has received 16 out of the 23 planned radiation treatments.  Total planned dose is 4140 cGy.    • 3/6/17 - WBC 5.4, hemoglobin 11.2, platelet count 113,000.  • 3/6/17 - Patient received cycle 5 of weekly Carboplatin and Taxol.   • 3/13/17 - Patient received cycle 6 of weekly Carboplatin and Taxol.  WBC 3.9, hemoglobin 11.3, platelet count 93,000.     • 3/20/17 - WBC 1.4, hemoglobin 10.0, platelet count 118,000, MCV 86.1   • 3/27/17 - WBC 3.8, hemoglobin 9.5, platelet count 176,000, MCV 87.3.    • 4/7/17 - Stress test:  No evidence of reversible myocardial ischemia.  Normal left ventricular ejection fraction of 50%.    • 4/17/17 - Upper GI endoscopy by Dr. 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  The GE junction adenocarcinoma lesion seems to have a very good response to chemotherapy and radiation.  The lumen was open.  • 5/1/17 - WBC 7.0, hemoglobin 10.2, platelet count 175,000.    • 5/4/17 - Patient underwent [place] Gurwinder esophagectomy with pyloroplasty, feeding jejunostomy tube, abdominal and mediastinal lymph node dissection.    Surgical Pathology:  Moderately to poorly differentiated adenocarcinoma measuring 1.3 cm at the GE junction.  Resection margins are negative for malignancy.  Tumor margins are negative.  There is effective treatment response.  No evidence of lymphovascular invasion.  Staging is ypT3,pN1.  One out of fourteen lymph nodes involved.   • 5/22/17 - WBC 6.8, hemoglobin 10.3, platelet count 312,000.    • 6/19/17 - WBC 5.7, hemoglobin 10.5, platelet count 204,000, MCV 92.1.    • 7/6/17 - EGD:  Status post balloon  dilation of esophageal stricture.  • 7/12/17 - EGD with balloon dilation of esophageal stricture.  • 7/26/17 - EGD and balloon dilation of esophageal stricture.  • 7/31/17 - PET/CT scan:  There is mild metabolic activity seen at the thoracic esophagus just at and below the surgical margins.  Some mild wall thickening.  This could be from recent surgery.  A residual cancer seems unlikely.  There is a precarinal lymph node with mild metabolic activity with SUV of 3 measuring 1 x 1.3 cm.  Again this appears to be nonspecific in my opinion.    • 8/7/17 - WBC 5.9, hemoglobin 11.9, platelet count 171,000, MCV 88.7.    • 10/19/17 - WBC 7.5, hemoglobin 11.8, platelet count 216,000.    • 1/8/18 - PET scan:  No evidence of residual disease or recurrent disease.  There is an esophageal stent in the mid esophagus with a large debris air level.  No evidence of any metastases in the chest, abdomen or pelvis.  Mild nodule uptake in the vocal cords with fullness in the right vocal cord.  This could be physiologic.    • 7/9/18 - CT scan of chest with contrast:  Prominent mediastinal lymph nodes appear stable since February.  Changes of gastric pull-through procedure for esophageal cancer again noted.  Tree-in-bud infiltrates in the left lung base, consistent with infection versus inflammation.    • 1/10/19 - CT chest, abdomen and pelvis with contrast:  No evidence of mets in the chest; however, interval development of metastatic lymphadenopathy in the left side of the retroperitoneum.  For instance, there is a 17 mm rounded lymph node, 10 mm lymph node and also a 14 mm lymph node.  These are all new.  Stable 9 mm hypodense lesion within the right hepatic lobe, which could reflect hemangioma or complex cyst.    • 1/30/19 - CT-guided core biopsy of retroperitoneal lymph node:  Poorly differentiated adenocarcinoma consistent with metastases from patient’s known esophageal primary.  CK20 positive, CDX2 positive, cytokeratin 7 negative,  TTF-1 negative.    • 2/1/19 - Creatinine 0.9, BUN 18, calcium 9.3; these are normal.    • 2/28/19 - [facility] Next Gen sequencing testing on retroperitoneal lymph node specimen:  PD-L1 positive.  CPS = 3.  This implies responsiveness to pembrolizumab.  Mismatch repair status is proficient (no evidence of microsatellite instability).  Tumor mutational burden is low = 6 mutations/Mb.  CDH1 indeterminate.  KRAS mutation positive.  TP53 mutation positive.  CDK6 amplified.  [employer] PD-L1 testing by IHC:  PD-L1 expression 1% positive (CPS =1).  HER2/marvel by IHC is negative.  HER2/marvel by CISH not amplified.  Microsatellite stable tumor.    • 3/7/19 - WBC 7.3, hemoglobin 12, platelet count 128,000, MCV 92.     • 3/14/19 - Patient was seen by radiation oncologist, Dr. Chahal.  He has recommended observation versus systemic chemoimmunotherapy as needed.  No plans for radiation therapy.   • 5/1/19 - CT scan of chest with contrast:  No definitive findings of new metastatic disease in the chest.  Emphysema noted.    • 5/1/19 - CT abdomen and pelvis:  There is interval progression of metastatic retroperitoneal adenopathy.  Left periaortic adenopathy with lymph node measuring up to 2 cm, previously 1.7 cm.  Another lymph node now measures 2.3, previously 1.4 cm.  New enlarged lymph node on image 147 measures 1.4 cm, previously 0.4 cm.  A 9 mm hypodense right hepatic lobe lesion appears stable.    • 5/9/19 - Decision made to start patient on immunotherapy Keytruda.    • 5/9/19 - Vitamin B12 229.  Ferritin 61.  Folate 14.3.  Iron saturation 26%, TIBC 304, serum iron 79.  • 5/31/19 - Patient received Keytruda 200 mg cycle 1, day 1.    • 5/31/19 - Free T4 0.99.  Creatinine 0.9, BUN 9.  LFTs normal.  TSH 3.99.  Folate 14.3.  Iron saturation 26%.    • 6/21/2019 patient received Keytruda cycle 2  • 7/12/2019: Patient received Keytruda cycle 3  • 8/2/2019 patient received cycle 4 Keytruda WBC 7.2, hemoglobin 11.8 platelet count 163  • 8/23/2019  creatinine 0.9, LFTs normal, WBC 6.4, hemoglobin 11.5, platelets 179, TSH 2.1   • 8/23/2019 patient received cycle 5 of Keytruda  • 9/3/2019 CT chest abdomen and pelvis with contrast: . Positive response to therapy in the abdomen since 05/01/2019. Pathologically enlarged retroperitoneal lymph nodes, predominantly in the left periaortic region, have diminished in size.a 1.3 x 2.1 cm left periaortic node (image 60), previously measured 3.5 x 2.3 cm. A 1.6 cm index node near the level of the left renal vein previously measured 2.0 cm No new or progressive adenopathy. 2. Stable findings in the chest. Noncalcified right upper lobe nodules are unchanged. There is no evidence of new or progressive metastatic disease within the chest. 3. 8 mm indeterminate low-density lesion in the right hepatic lobe, is stable. No new liver lesions.  • 9/6/2019 WBC 5.6, hemoglobin 11.2, platelets of 192, MCV 91.8  • 9/13/2019 patient received Keytruda cycle 6, albumin 3.2  • 10/4/2019 patient received cycle 7 of Keytruda, TSH 4.8, free T4 0.86  • 10/9/2019 WBC 5.8, hemoglobin 11.7, platelets 174  • 10/25/2019 patient received cycle 8 of Keytruda.  WBC 5.9, hemoglobin 11.5, platelets 152, creatinine 0.75, cortisol 15.2, TSH 6.48 high , free T4 1.13 normal  • 11/6/2019 WBC 6.6, hemoglobin 11.8, MCV 91.5, platelets 187  • 11/15/2019 patient received cycle 9 of Keytruda, WBC 6.6, hemoglobin 11.9, platelets 161, cortisol level 8.86, TSH 2.68, free T3 2.8, free T4 1.5  • 12/4/2019 WBC 6.9, hemoglobin 12.0, platelets 180, MCV 92.9        Subjective: 12/04/19  Patient is here for a follow up. He continues Keytruda. He states that he has been fatigued and nauseous. States that he has been depressed and stressed out, he increased his Klonopin and he feels better now. He continues po Levothyroxine, B12 and folic acid. He had been having confusion, had a CT of the head, which showed sinusitis, the confusion is better now.      The following portions  of the patient's history were reviewed and updated as appropriate: allergies, current medications, past family history, past medical history, past social history, past surgical history and problem list.     Past Medical History:   Diagnosis Date   • B12 deficiency    • Blockage of coronary artery of heart (CMS/HCC)    • Depression    • DM (diabetes mellitus) (CMS/HCC)    • Dysphagia    • HTN (hypertension)    • Hyperlipidemia        Past Surgical History:   Procedure Laterality Date   • ABDOMINAL WALL ABSCESS INCISION AND DRAINAGE  10/22/2018    WITH DEBRIDEMENT  1.5CM X 1.5 CM X 1.5 CM    DR. PURI   • CATARACT EXTRACTION  2018   • COLONOSCOPY     • CORONARY ARTERY BYPASS GRAFT  2015   • ENDOSCOPY  12/20/2016   • ESOPHAGECTOMY  05/04/2017    BROOKS PETTY ESOPHAGECTOMY, FEEDING JEJNOSTOMOY, ABDOMINAL AND MEDIASTINAL LYMPH NODE DISECTION, PEDICLED MUSCLE FLAP COVERAGE DR. PURI   • ESOPHAGOSCOPY / EGD  07/12/2017    WITH BALLOON DILATION   • ESOPHAGOSCOPY / EGD  07/26/2017    WITH BALLOON DILATION   • ESOPHAGOSCOPY / EGD  08/11/2017    WITH BALLOON DILATION   • ESOPHAGOSCOPY / EGD  08/28/2017    WITH BALLOON DILATION   • ESOPHAGOSCOPY / EGD  09/27/2017    WITH BALLOON DILATION   • LYMPH NODE BIOPSY  01/30/2019   • PACEMAKER IMPLANTATION  11/21/2016         Current Outpatient Medications:   •  clonazePAM (KlonoPIN) 0.5 MG tablet, TAKE 1 4 TO 1 FULL TABLET BY MOUTH TWICE DAILY AS NEEDED FOR ANXIETY, Disp: , Rfl: 1  •  cyanocobalamin (VITAMIN B-12) 1000 MCG tablet, , Disp: , Rfl:   •  EUTHYROX 50 MCG tablet, TAKE 1 TABLET BY MOUTH ONCE DAILY, Disp: 30 tablet, Rfl: 0  •  folic acid (FOLVITE) 1 MG tablet, Daily., Disp: , Rfl:   •  metFORMIN (GLUCOPHAGE) 1000 MG tablet, Take 500 mg by mouth 2 (Two) Times a Day., Disp: , Rfl: 4  •  metoprolol tartrate (LOPRESSOR) 25 MG tablet, Take  by mouth 2 (Two) Times a Day., Disp: , Rfl:   •  omeprazole (priLOSEC) 40 MG capsule, Take 40 mg by mouth 2 (Two) Times a Day., Disp: ,  Rfl:   •  ondansetron ODT (ZOFRAN-ODT) 4 MG disintegrating tablet, DISSOLVE 1 TABLET IN MOUTH 3 TIMES DAILY TO 4 TIMES DAILY FOR NAUSEA, Disp: , Rfl: 1  •  pravastatin (PRAVACHOL) 10 MG tablet, every other day, Disp: , Rfl:     Allergies   Allergen Reactions   • Ace Inhibitors Unknown (See Comments)     Unknown reaction     • Heparin Other (See Comments)     Fever/chills, weakness in legs   • Sulfa Antibiotics Other (See Comments)     Mouth sores       Family History   Problem Relation Age of Onset   • Mental illness Mother    • Heart disease Mother    • Hypertension Father    • Cancer Father    • Heart disease Sister    • Heart disease Brother        Cancer-related family history includes Cancer in his father.    Social History     Tobacco Use   • Smoking status: Former Smoker     Packs/day: 2.00     Years: 50.00     Pack years: 100.00     Types: Cigarettes     Last attempt to quit: 2015     Years since quittin.4   • Smokeless tobacco: Former User     Types: Chew     Quit date: 1989   Substance Use Topics   • Alcohol use: No     Frequency: Never   • Drug use: No         ROS:     Review of Systems   Constitutional: Positive for fatigue (after treatments). Negative for chills and fever.   HENT: Positive for trouble swallowing (has to eat soft foods ). Negative for ear pain, mouth sores, nosebleeds and sore throat.    Eyes: Negative for photophobia and visual disturbance.   Respiratory: Negative for chest tightness, shortness of breath, wheezing and stridor.    Cardiovascular: Negative for chest pain and palpitations.   Gastrointestinal: Positive for nausea (getting better). Negative for abdominal pain, diarrhea (5 x a day 1-2 times a week) and vomiting.   Endocrine: Negative for cold intolerance and heat intolerance.   Genitourinary: Negative for dysuria and hematuria.   Musculoskeletal: Negative for joint swelling and neck stiffness.   Skin: Negative for color change and rash.   Neurological: Negative  "for seizures, syncope and headaches.   Hematological: Negative for adenopathy.        No obvious bleeding   Psychiatric/Behavioral: Positive for sleep disturbance (does not sleep well). Negative for agitation, confusion and hallucinations.       Objective:    Vitals:    12/04/19 0851   BP: 111/71   Pulse: 66   Resp: 18   Temp: 97.6 °F (36.4 °C)   Weight: 69.8 kg (153 lb 12.8 oz)   Height: 167.6 cm (66\")   PainSc: 0-No pain       (1) Restricted in physically strenuous activity, ambulatory and able to do work of light nature    Physical Exam:     Physical Exam   Constitutional: He is oriented to person, place, and time. He appears well-developed and well-nourished. No distress.   HENT:   Head: Normocephalic and atraumatic.   Mouth/Throat: Oropharynx is clear and moist.   Absent teeth in upper jaw.   Eyes: Conjunctivae and EOM are normal. Right eye exhibits no discharge. Left eye exhibits no discharge. No scleral icterus.   Neck: Normal range of motion. Neck supple. No thyromegaly present.   Cardiovascular: Normal rate, regular rhythm and normal heart sounds. Exam reveals no gallop and no friction rub.   Pulmonary/Chest: Effort normal and breath sounds normal. No stridor. No respiratory distress. He has no wheezes.   Abdominal: Soft. Bowel sounds are normal. He exhibits no mass. There is no tenderness. There is no rebound and no guarding.   No tenderness   Musculoskeletal: Normal range of motion. He exhibits no tenderness.   Lymphadenopathy:     He has no cervical adenopathy.   Neurological: He is alert and oriented to person, place, and time. He exhibits normal muscle tone.   Skin: Skin is warm. No rash noted. He is not diaphoretic. No erythema.   Right side chest  port   Psychiatric: He has a normal mood and affect. His behavior is normal.   Nursing note and vitals reviewed.        Lab Results - Last 18 Months   Lab Units 12/04/19  0855 11/15/19  1113 11/06/19  0852   WBC 10*3/mm3 6.90 6.60 6.68   HEMOGLOBIN g/dL " 12.0* 11.9* 11.8*   HEMATOCRIT % 36.5* 36.0* 35.3*   PLATELETS 10*3/mm3 180 161 187   MCV fL 92.9 90.9 91.5     Lab Results - Last 18 Months   Lab Units 11/15/19  1113 10/25/19  0909 10/04/19  1003   SODIUM mmol/L 141 138 138   POTASSIUM mmol/L 4.5 4.7 4.7   CHLORIDE mmol/L 105 102 105   CO2 mmol/L 25.0 26.0 24.0   BUN mg/dL 11 16 11   CREATININE mg/dL 0.92 0.75* 0.90   CALCIUM mg/dL 9.0 9.2 8.9   BILIRUBIN mg/dL 0.3 0.2 0.2*   ALK PHOS U/L 33* 37* 29*   ALT (SGPT) U/L 8 8 11*   AST (SGOT) U/L 17 18 21   GLUCOSE mg/dL 111* 137* 168*       Assessment/Plan     Assessment:  1. Metastatic esophageal carcinoma:  Patient has a history of GE junction poorly differentiated adenocarcinoma for which he had chemoradiation followed by [place] Gurwinder resection:  Pathology showed ypT3,pN1 disease.  Tumor was Stage IIIA, diagnosed in 2016.  He received concurrent chemoradiation with weekly Carboplatin and Taxol and radiation finishing on 3/15/17.      CT scan on 1/10/19 showed new retroperitoneal lymph nodes.  These lymph nodes were biopsied on 1/30/19 and it shows metastatic esophageal cancer.  [facility] Next Gen testing was performed on the sample and it shows KRAS mutation, microsatellite stable tumor.  PD-L1 is positive.  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  HER2/marvel (ERBB2) was not amplified and was negative.  His CT scan on 5/1/19 shows evidence of progression.  Patient has started receiving Keytruda on 5/31/19.  He has received 4 cycles so far.    2. 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  3. Occasional headaches/memory lapses: Advise close monitoring of the symptoms.  4. Diarrhea:- diarrhea seems to be waxing and waning in nature.  At this point he does not appear to be related to immunotherapy.  Symptoms are very mild..  5. History of chronic dysphagia:  Due to recurrent esophageal strictures.  Patient is status post EGD and balloon dilation.  Today, he does not report any new symptoms.  6. PD-L1 positive, HER2 negative, p53  and KRAS positive.    7. B12 deficiency:  Recommended to take B12 1000 mcg twice daily.    8. Anemia: Likely from malignancy.  Also on iron supplement.  9. Nausea: Symptoms have improved    PLAN:  1. Continue Keytruda.  2. Continue levothyroxine 50 mcg.  .  Monitor TSH  3. We will cancel brain MRIs as symptoms of headaches and memory problems have resolved..  4. Continue to monitor TSH, T3, serum cortisol level.  5. We will continue to monitor closely for adverse events related to immunotherapy  6. Will need to closely monitor diarrhea symptoms.   Advised to use Imodium as needed.   7. Continue B12 supplements twice a day.  8. Continue  folic acid supplement also.  9. Follow-up in 5 weeks        Orders Placed This Encounter   Procedures   • CBC Auto Differential                Thank you very much for providing the opportunity to participate in this patient’s care. Please do not hesitate to call if there are any other questions

## 2019-12-06 ENCOUNTER — HOSPITAL ENCOUNTER (OUTPATIENT)
Dept: ONCOLOGY | Facility: HOSPITAL | Age: 69
Setting detail: INFUSION SERIES
Discharge: HOME | End: 2019-12-06

## 2019-12-06 VITALS
HEART RATE: 62 BPM | SYSTOLIC BLOOD PRESSURE: 136 MMHG | WEIGHT: 151 LBS | HEIGHT: 66 IN | RESPIRATION RATE: 18 BRPM | DIASTOLIC BLOOD PRESSURE: 82 MMHG | BODY MASS INDEX: 24.27 KG/M2 | TEMPERATURE: 98.1 F

## 2019-12-06 DIAGNOSIS — Z51.11 ENCOUNTER FOR ANTINEOPLASTIC CHEMOTHERAPY: Primary | ICD-10-CM

## 2019-12-06 DIAGNOSIS — C16.0 MALIGNANT NEOPLASM OF CARDIA (HCC): ICD-10-CM

## 2019-12-06 LAB
ALBUMIN SERPL-MCNC: 3.9 G/DL (ref 3.5–5.2)
ALBUMIN/GLOB SERPL: 1.6 G/DL
ALP SERPL-CCNC: 30 U/L (ref 39–117)
ALT SERPL W P-5'-P-CCNC: 5 U/L (ref 1–41)
ANION GAP SERPL CALCULATED.3IONS-SCNC: 10 MMOL/L (ref 5–15)
AST SERPL-CCNC: 13 U/L (ref 1–40)
BASOPHILS # BLD AUTO: 0.03 10*3/MM3 (ref 0–0.2)
BASOPHILS NFR BLD AUTO: 0.5 % (ref 0–1.5)
BILIRUB SERPL-MCNC: 0.3 MG/DL (ref 0.2–1.2)
BUN BLD-MCNC: 14 MG/DL (ref 8–23)
BUN/CREAT SERPL: 17.3 (ref 7–25)
CALCIUM SPEC-SCNC: 8.9 MG/DL (ref 8.6–10.5)
CHLORIDE SERPL-SCNC: 102 MMOL/L (ref 98–107)
CO2 SERPL-SCNC: 27 MMOL/L (ref 22–29)
CREAT BLD-MCNC: 0.81 MG/DL (ref 0.76–1.27)
DEPRECATED RDW RBC AUTO: 43.9 FL (ref 37–54)
EOSINOPHIL # BLD AUTO: 0.48 10*3/MM3 (ref 0–0.4)
EOSINOPHIL NFR BLD AUTO: 7.8 % (ref 0.3–6.2)
ERYTHROCYTE [DISTWIDTH] IN BLOOD BY AUTOMATED COUNT: 13.4 % (ref 12.3–15.4)
GFR SERPL CREATININE-BSD FRML MDRD: 94 ML/MIN/1.73
GLOBULIN UR ELPH-MCNC: 2.5 GM/DL
GLUCOSE BLD-MCNC: 115 MG/DL (ref 65–99)
HCT VFR BLD AUTO: 35.1 % (ref 37.5–51)
HGB BLD-MCNC: 11.6 G/DL (ref 13–17.7)
LYMPHOCYTES # BLD AUTO: 1.73 10*3/MM3 (ref 0.7–3.1)
LYMPHOCYTES NFR BLD AUTO: 28.2 % (ref 19.6–45.3)
MCH RBC QN AUTO: 30.4 PG (ref 26.6–33)
MCHC RBC AUTO-ENTMCNC: 33 G/DL (ref 31.5–35.7)
MCV RBC AUTO: 92.1 FL (ref 79–97)
MONOCYTES # BLD AUTO: 0.78 10*3/MM3 (ref 0.1–0.9)
MONOCYTES NFR BLD AUTO: 12.7 % (ref 5–12)
NEUTROPHILS # BLD AUTO: 3.12 10*3/MM3 (ref 1.7–7)
NEUTROPHILS NFR BLD AUTO: 50.8 % (ref 42.7–76)
PLATELET # BLD AUTO: 162 10*3/MM3 (ref 140–450)
PMV BLD AUTO: 10.3 FL (ref 6–12)
POTASSIUM BLD-SCNC: 4.7 MMOL/L (ref 3.5–5.2)
PROT SERPL-MCNC: 6.4 G/DL (ref 6–8.5)
RBC # BLD AUTO: 3.81 10*6/MM3 (ref 4.14–5.8)
SODIUM BLD-SCNC: 139 MMOL/L (ref 136–145)
WBC NRBC COR # BLD: 6.14 10*3/MM3 (ref 3.4–10.8)

## 2019-12-06 PROCEDURE — 25010000002 PEMBROLIZUMAB 100 MG/4ML SOLUTION 4 ML VIAL: Performed by: NURSE PRACTITIONER

## 2019-12-06 PROCEDURE — 80053 COMPREHEN METABOLIC PANEL: CPT | Performed by: NURSE PRACTITIONER

## 2019-12-06 PROCEDURE — 85025 COMPLETE CBC W/AUTO DIFF WBC: CPT | Performed by: NURSE PRACTITIONER

## 2019-12-06 PROCEDURE — 96413 CHEMO IV INFUSION 1 HR: CPT | Performed by: NURSE PRACTITIONER

## 2019-12-06 RX ORDER — SODIUM CHLORIDE 0.9 % (FLUSH) 0.9 %
10 SYRINGE (ML) INJECTION AS NEEDED
Status: DISCONTINUED | OUTPATIENT
Start: 2019-12-06 | End: 2019-12-07 | Stop reason: HOSPADM

## 2019-12-06 RX ORDER — SODIUM CHLORIDE 0.9 % (FLUSH) 0.9 %
10 SYRINGE (ML) INJECTION AS NEEDED
Status: CANCELLED | OUTPATIENT
Start: 2019-12-06

## 2019-12-06 RX ORDER — PAROXETINE 10 MG/1
10 TABLET, FILM COATED ORAL DAILY
Refills: 0 | COMMUNITY
Start: 2019-12-05 | End: 2020-09-11

## 2019-12-06 RX ADMIN — Medication 10 ML: at 11:41

## 2019-12-06 RX ADMIN — SODIUM CHLORIDE 200 MG: 900 INJECTION, SOLUTION INTRAVENOUS at 10:53

## 2019-12-20 ENCOUNTER — TELEPHONE (OUTPATIENT)
Dept: ONCOLOGY | Facility: CLINIC | Age: 69
End: 2019-12-20

## 2019-12-20 DIAGNOSIS — E03.9 HYPOTHYROIDISM (ACQUIRED): Primary | ICD-10-CM

## 2019-12-20 DIAGNOSIS — R79.89 ELEVATED TSH: ICD-10-CM

## 2019-12-20 RX ORDER — LEVOTHYROXINE SODIUM 0.05 MG/1
50 TABLET ORAL DAILY
Qty: 30 TABLET | Refills: 0 | Status: SHIPPED | OUTPATIENT
Start: 2019-12-20 | End: 2020-05-08 | Stop reason: SDUPTHER

## 2019-12-20 NOTE — TELEPHONE ENCOUNTER
Patient requesting refill on the Euthyrox.  Patient with esophageal cancer and hypothyroidism.  Patient saw Dr. Jasso on 12-04-19. christiane Wiggins

## 2019-12-26 DIAGNOSIS — Z51.11 ENCOUNTER FOR ANTINEOPLASTIC CHEMOTHERAPY: ICD-10-CM

## 2019-12-26 DIAGNOSIS — C16.0 MALIGNANT NEOPLASM OF CARDIA (HCC): ICD-10-CM

## 2019-12-26 RX ORDER — SODIUM CHLORIDE 9 MG/ML
250 INJECTION, SOLUTION INTRAVENOUS ONCE
Status: CANCELLED | OUTPATIENT
Start: 2019-12-27

## 2019-12-27 ENCOUNTER — HOSPITAL ENCOUNTER (OUTPATIENT)
Dept: ONCOLOGY | Facility: HOSPITAL | Age: 69
Setting detail: INFUSION SERIES
Discharge: HOME | End: 2019-12-27

## 2019-12-27 VITALS
DIASTOLIC BLOOD PRESSURE: 57 MMHG | RESPIRATION RATE: 18 BRPM | WEIGHT: 152 LBS | SYSTOLIC BLOOD PRESSURE: 114 MMHG | HEIGHT: 66 IN | BODY MASS INDEX: 24.43 KG/M2 | HEART RATE: 63 BPM | TEMPERATURE: 98.1 F

## 2019-12-27 DIAGNOSIS — C16.0 MALIGNANT NEOPLASM OF CARDIA (HCC): Primary | ICD-10-CM

## 2019-12-27 DIAGNOSIS — Z51.11 ENCOUNTER FOR ANTINEOPLASTIC CHEMOTHERAPY: ICD-10-CM

## 2019-12-27 LAB
ALBUMIN SERPL-MCNC: 3.8 G/DL (ref 3.5–5.2)
ALBUMIN/GLOB SERPL: 1.6 G/DL
ALP SERPL-CCNC: 32 U/L (ref 39–117)
ALT SERPL W P-5'-P-CCNC: 8 U/L (ref 1–41)
ANION GAP SERPL CALCULATED.3IONS-SCNC: 11 MMOL/L (ref 5–15)
AST SERPL-CCNC: 15 U/L (ref 1–40)
BASOPHILS # BLD AUTO: 0.04 10*3/MM3 (ref 0–0.2)
BASOPHILS NFR BLD AUTO: 0.6 % (ref 0–1.5)
BILIRUB SERPL-MCNC: 0.2 MG/DL (ref 0.2–1.2)
BUN BLD-MCNC: 13 MG/DL (ref 8–23)
BUN/CREAT SERPL: 14.9 (ref 7–25)
CALCIUM SPEC-SCNC: 9 MG/DL (ref 8.6–10.5)
CHLORIDE SERPL-SCNC: 106 MMOL/L (ref 98–107)
CO2 SERPL-SCNC: 24 MMOL/L (ref 22–29)
CREAT BLD-MCNC: 0.87 MG/DL (ref 0.76–1.27)
DEPRECATED RDW RBC AUTO: 44 FL (ref 37–54)
EOSINOPHIL # BLD AUTO: 0.52 10*3/MM3 (ref 0–0.4)
EOSINOPHIL NFR BLD AUTO: 7.7 % (ref 0.3–6.2)
ERYTHROCYTE [DISTWIDTH] IN BLOOD BY AUTOMATED COUNT: 13.2 % (ref 12.3–15.4)
GFR SERPL CREATININE-BSD FRML MDRD: 87 ML/MIN/1.73
GLOBULIN UR ELPH-MCNC: 2.4 GM/DL
GLUCOSE BLD-MCNC: 153 MG/DL (ref 65–99)
HCT VFR BLD AUTO: 34.6 % (ref 37.5–51)
HGB BLD-MCNC: 11.4 G/DL (ref 13–17.7)
LYMPHOCYTES # BLD AUTO: 1.82 10*3/MM3 (ref 0.7–3.1)
LYMPHOCYTES NFR BLD AUTO: 27.1 % (ref 19.6–45.3)
MCH RBC QN AUTO: 30.6 PG (ref 26.6–33)
MCHC RBC AUTO-ENTMCNC: 32.9 G/DL (ref 31.5–35.7)
MCV RBC AUTO: 93 FL (ref 79–97)
MONOCYTES # BLD AUTO: 0.83 10*3/MM3 (ref 0.1–0.9)
MONOCYTES NFR BLD AUTO: 12.4 % (ref 5–12)
NEUTROPHILS # BLD AUTO: 3.51 10*3/MM3 (ref 1.7–7)
NEUTROPHILS NFR BLD AUTO: 52.2 % (ref 42.7–76)
PLATELET # BLD AUTO: 168 10*3/MM3 (ref 140–450)
PMV BLD AUTO: 10.3 FL (ref 6–12)
POTASSIUM BLD-SCNC: 5 MMOL/L (ref 3.5–5.2)
PROT SERPL-MCNC: 6.2 G/DL (ref 6–8.5)
RBC # BLD AUTO: 3.72 10*6/MM3 (ref 4.14–5.8)
SODIUM BLD-SCNC: 141 MMOL/L (ref 136–145)
T4 FREE SERPL-MCNC: 1.5 NG/DL (ref 0.93–1.7)
TSH SERPL DL<=0.05 MIU/L-ACNC: 2.43 UIU/ML (ref 0.27–4.2)
WBC NRBC COR # BLD: 6.72 10*3/MM3 (ref 3.4–10.8)

## 2019-12-27 PROCEDURE — 25010000002 PEMBROLIZUMAB 100 MG/4ML SOLUTION 4 ML VIAL: Performed by: NURSE PRACTITIONER

## 2019-12-27 PROCEDURE — 80053 COMPREHEN METABOLIC PANEL: CPT | Performed by: NURSE PRACTITIONER

## 2019-12-27 PROCEDURE — 96413 CHEMO IV INFUSION 1 HR: CPT | Performed by: NURSE PRACTITIONER

## 2019-12-27 PROCEDURE — 84443 ASSAY THYROID STIM HORMONE: CPT | Performed by: NURSE PRACTITIONER

## 2019-12-27 PROCEDURE — 84439 ASSAY OF FREE THYROXINE: CPT | Performed by: NURSE PRACTITIONER

## 2019-12-27 PROCEDURE — 85025 COMPLETE CBC W/AUTO DIFF WBC: CPT | Performed by: NURSE PRACTITIONER

## 2019-12-27 RX ORDER — SODIUM CHLORIDE 0.9 % (FLUSH) 0.9 %
10 SYRINGE (ML) INJECTION AS NEEDED
Status: DISCONTINUED | OUTPATIENT
Start: 2019-12-27 | End: 2019-12-28 | Stop reason: HOSPADM

## 2019-12-27 RX ORDER — SODIUM CHLORIDE 0.9 % (FLUSH) 0.9 %
10 SYRINGE (ML) INJECTION AS NEEDED
Status: CANCELLED | OUTPATIENT
Start: 2019-12-27

## 2019-12-27 RX ADMIN — Medication 10 ML: at 11:37

## 2019-12-27 RX ADMIN — SODIUM CHLORIDE 200 MG: 9 INJECTION, SOLUTION INTRAVENOUS at 10:47

## 2020-01-02 NOTE — PROGRESS NOTES
Hematology-Oncology Follow-up Note       Artie Mc  1950    Primary Care Physician: Chanel Carter APRN  Referring Physician: Chanel Carter APRN    Reason For Visit:  Chief Complaint   Patient presents with   • Follow-up     Metastatic esophageal carcinoma   Monitoring for adverse effects related to immunotherapy.    Hypothyroidism  HPI:   Mr. Mc is a pleasant 69-year-old gentleman with a history of gastroesophageal reflux disease, diabetes, hypertension, heart block status post pacemaker placement and history of CABG.  He was having symptoms of dysphagia, weight loss since September 2016. He was reporting symptoms of food getting stuck in the lower chest.  He has transitioned himself to a liquid diet.  The patient reports losing about 30 pounds over this duration.  The patient underwent upper GI endoscopy on 12/20/16 with Dr. Esvin Barrera.  Endoscopy showed necrotic, circumferential and moderately obstructive mass at the distal esophagus between 42 cm and 36 cm.      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  There was also evidence of mild subacute inflammation in the duodenum.  Dr. Barrera ordered a CT scan of the chest and abdomen with contrast on 12/20/16.  The scan was done at [facility] [address].  The scan showed a new ill-defined mass in the anterior wall of the distal esophagus at the GE junction measuring 2.3 x 2.2 x 1.6 cm, worrisome for cancer.  There were four subcentimeter liver lesions which had appeared stable in comparison to another CT scan from 2009 and they likely represent small cysts.  There was also mild lymphadenopathy in the gastrohepatic ligament.  There were at least six borderline enlarged lymph nodes measuring up to 1.3 x 1 cm in the region.  The patient is referred to us for further oncological evaluation.    1/5/2017 - The patient presents for initial consultation.  He  reports persistent dysphagia and also has symptoms of regurgitation.  He cannot tolerate solid foods and is dependent on liquid diet such as Ensure.  He also reports fatigue. Symptoms of dysphagia have been present for the past four months.  When he eats any solids, the food gets stuck in the lower chest.    • 1/5/17 - Vitamin B12 200 (L).  CEA 0.8.  Ferritin 35.  Iron saturation 16% (L), serum iron 56, TIBC 354.  Creatinine 0.9.  LFTs normal.  Folate 10.2.  • 1/12/17 - PET/CT scan:  Intense abnormal activity corresponding to the region of the GE junction and proximal stomach.  SUV is 11.06.  There is a suggestion of an abnormal mass or abnormal wall thickening in the region measuring 3.9 x 4.8 cm.  No additional abnormalities.  No evidence of metastases or other lymphadenopathy.  Scattered nonspecific subcentimeter lymph nodes involving the mediastinum and within the central upper abdomen.  • 1/13/17 - Creatinine 0.9.  LFTs normal.    • 1/17/17 - Patient seen by thoracic surgeon, Dr. Bradley.  PET scan was reviewed.  He has recommended neoadjuvant chemoradiation therapy followed by Noel Gurwinder esophagogastrectomy.  Port-A-Cath is being arranged.    • 1/20/17 - WBC 6.3, hemoglobin 12.1, platelet count 198,000, MCV 86.4.  • 1/30/17 - Patient started weekly Carboplatin and Taxol (Carboplatin AUC 2 and Taxol 50 mg/M2).  Patient received Injectafer 750 mg.  WBC 5.8, hemoglobin 12.4, platelet count 244,000.   • 1/31/17 - Patient seen by Dr. Eduardo Oleary.  The plan was to give 5040 cGy in 28 fractions.   • 2/6/17 - WBC 4.4, hemoglobin 11.6, platelet count 201,000.  Patient received cycle 2 of weekly Carboplatin and Taxol (Carboplatin AUC 2 and Taxol 50 mg/M2).  • 2/13/17 - Patient received Carboplatin and Taxol weekly cycle 3.  • 2/13/17 - Patient started concurrent radiation therapy.  Total planned dose is 4140 cGy.    • 2/13/17 to 2/15/17 - Patient received 4140 cGy of neoadjuvant radiation.  Radiation was finished on  3/15/17.    • 2/20/17 - WBC 1.8, hemoglobin 10.6, platelet count 217,000.  Creatinine 0.5. Folate 15 (L).  Ferritin 361.  Haptoglobin 214.  Iron saturation 33%, TIBC 263, serum iron 88.    • 2/20/17 - Patient received Carboplatin and Taxol weekly cycle 4.   • 2/27/17 - WBC 3.7, hemoglobin 10.7, platelet count 177,000, MCV 85.3.    • 3/6/17 - Patient has received 16 out of the 23 planned radiation treatments.  Total planned dose is 4140 cGy.    • 3/6/17 - WBC 5.4, hemoglobin 11.2, platelet count 113,000.  • 3/6/17 - Patient received cycle 5 of weekly Carboplatin and Taxol.   • 3/13/17 - Patient received cycle 6 of weekly Carboplatin and Taxol.  WBC 3.9, hemoglobin 11.3, platelet count 93,000.     • 3/20/17 - WBC 1.4, hemoglobin 10.0, platelet count 118,000, MCV 86.1   • 3/27/17 - WBC 3.8, hemoglobin 9.5, platelet count 176,000, MCV 87.3.    • 4/7/17 - Stress test:  No evidence of reversible myocardial ischemia.  Normal left ventricular ejection fraction of 50%.    • 4/17/17 - Upper GI endoscopy by Dr. 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  The GE junction adenocarcinoma lesion seems to have a very good response to chemotherapy and radiation.  The lumen was open.  • 5/1/17 - WBC 7.0, hemoglobin 10.2, platelet count 175,000.    • 5/4/17 - Patient underwent Noel Gurwinder esophagectomy with pyloroplasty, feeding jejunostomy tube, abdominal and mediastinal lymph node dissection.    Surgical Pathology:  Moderately to poorly differentiated adenocarcinoma measuring 1.3 cm at the GE junction.  Resection margins are negative for malignancy.  Tumor margins are negative.  There is effective treatment response.  No evidence of lymphovascular invasion.  Staging is ypT3,pN1.  One out of fourteen lymph nodes involved.   • 5/22/17 - WBC 6.8, hemoglobin 10.3, platelet count 312,000.    • 6/19/17 - WBC 5.7, hemoglobin 10.5, platelet count 204,000, MCV 92.1.    • 7/6/17 - EGD:  Status post  balloon dilation of esophageal stricture.  • 7/12/17 - EGD with balloon dilation of esophageal stricture.  • 7/26/17 - EGD and balloon dilation of esophageal stricture.  • 7/31/17 - PET/CT scan:  There is mild metabolic activity seen at the thoracic esophagus just at and below the surgical margins.  Some mild wall thickening.  This could be from recent surgery.  A residual cancer seems unlikely.  There is a precarinal lymph node with mild metabolic activity with SUV of 3 measuring 1 x 1.3 cm.  Again this appears to be nonspecific in my opinion.    • 8/7/17 - WBC 5.9, hemoglobin 11.9, platelet count 171,000, MCV 88.7.    • 10/19/17 - WBC 7.5, hemoglobin 11.8, platelet count 216,000.    • 1/8/18 - PET scan:  No evidence of residual disease or recurrent disease.  There is an esophageal stent in the mid esophagus with a large debris air level.  No evidence of any metastases in the chest, abdomen or pelvis.  Mild nodule uptake in the vocal cords with fullness in the right vocal cord.  This could be physiologic.    • 7/9/18 - CT scan of chest with contrast:  Prominent mediastinal lymph nodes appear stable since February.  Changes of gastric pull-through procedure for esophageal cancer again noted.  Tree-in-bud infiltrates in the left lung base, consistent with infection versus inflammation.    • 1/10/19 - CT chest, abdomen and pelvis with contrast:  No evidence of mets in the chest; however, interval development of metastatic lymphadenopathy in the left side of the retroperitoneum.  For instance, there is a 17 mm rounded lymph node, 10 mm lymph node and also a 14 mm lymph node.  These are all new.  Stable 9 mm hypodense lesion within the right hepatic lobe, which could reflect hemangioma or complex cyst.    • 1/30/19 - CT-guided core biopsy of retroperitoneal lymph node:  Poorly differentiated adenocarcinoma consistent with metastases from patient’s known esophageal primary.  CK20 positive, CDX2 positive, cytokeratin 7  negative, TTF-1 negative.    • 2/1/19 - Creatinine 0.9, BUN 18, calcium 9.3; these are normal.    • 2/28/19 - [employer] Next Gen sequencing testing on retroperitoneal lymph node specimen:  PD-L1 positive.  CPS = 3.  This implies responsiveness to pembrolizumab.  Mismatch repair status is proficient (no evidence of microsatellite instability).  Tumor mutational burden is low = 6 mutations/Mb.  CDH1 indeterminate.  KRAS mutation positive.  TP53 mutation positive.  CDK6 amplified.  [employer] PD-L1 testing by IHC:  PD-L1 expression 1% positive (CPS =1).  HER2/marvel by IHC is negative.  HER2/marvel by CISH not amplified.  Microsatellite stable tumor.    • 3/7/19 - WBC 7.3, hemoglobin 12, platelet count 128,000, MCV 92.     • 3/14/19 - Patient was seen by radiation oncologist, Dr. Chahal.  He has recommended observation versus systemic chemoimmunotherapy as needed.  No plans for radiation therapy.   • 5/1/19 - CT scan of chest with contrast:  No definitive findings of new metastatic disease in the chest.  Emphysema noted.    • 5/1/19 - CT abdomen and pelvis:  There is interval progression of metastatic retroperitoneal adenopathy.  Left periaortic adenopathy with lymph node measuring up to 2 cm, previously 1.7 cm.  Another lymph node now measures 2.3, previously 1.4 cm.  New enlarged lymph node on image 147 measures 1.4 cm, previously 0.4 cm.  A 9 mm hypodense right hepatic lobe lesion appears stable.    • 5/9/19 - Decision made to start patient on immunotherapy Keytruda.    • 5/9/19 - Vitamin B12 229.  Ferritin 61.  Folate 14.3.  Iron saturation 26%, TIBC 304, serum iron 79.  • 5/31/19 - Patient received Keytruda 200 mg cycle 1, day 1.    • 5/31/19 - Free T4 0.99.  Creatinine 0.9, BUN 9.  LFTs normal.  TSH 3.99.  Folate 14.3.  Iron saturation 26%.    • 6/21/2019 patient received Keytruda cycle 2  • 7/12/2019: Patient received Keytruda cycle 3  • 8/2/2019 patient received cycle 4 Keytruda WBC 7.2, hemoglobin 11.8 platelet count  163  • 8/23/2019 creatinine 0.9, LFTs normal, WBC 6.4, hemoglobin 11.5, platelets 179, TSH 2.1   • 8/23/2019 patient received cycle 5 of Keytruda  • 9/3/2019 CT chest abdomen and pelvis with contrast: . Positive response to therapy in the abdomen since 05/01/2019. Pathologically enlarged retroperitoneal lymph nodes, predominantly in the left periaortic region, have diminished in size.a 1.3 x 2.1 cm left periaortic node (image 60), previously measured 3.5 x 2.3 cm. A 1.6 cm index node near the level of the left renal vein previously measured 2.0 cm No new or progressive adenopathy. 2. Stable findings in the chest. Noncalcified right upper lobe nodules are unchanged. There is no evidence of new or progressive metastatic disease within the chest. 3. 8 mm indeterminate low-density lesion in the right hepatic lobe, is stable. No new liver lesions.  • 9/6/2019 WBC 5.6, hemoglobin 11.2, platelets of 192, MCV 91.8  • 9/13/2019 patient received Keytruda cycle 6, albumin 3.2  • 10/4/2019 patient received cycle 7 of Keytruda, TSH 4.8, free T4 0.86  • 10/9/2019 WBC 5.8, hemoglobin 11.7, platelets 174  • 10/25/2019 patient received cycle 8 of Keytruda.  WBC 5.9, hemoglobin 11.5, platelets 152, creatinine 0.75, cortisol 15.2, TSH 6.48 high , free T4 1.13 normal  • 11/6/2019 WBC 6.6, hemoglobin 11.8, MCV 91.5, platelets 187  • 11/15/2019 patient received cycle 9 of Keytruda, WBC 6.6, hemoglobin 11.9, platelets 161, cortisol level 8.86, TSH 2.68, free T3 2.8, free T4 1.5  • 12/4/2019 WBC 6.9, hemoglobin 12.0, platelets 180, MCV 92.9  • 12/06/2019-Keytruda Cycle 10  • 12/27/2019- Cycle 11 LFTs normal, WBC 6.7, hemoglobin 11.4, platelets 168, MCV 93, TSH 2.4,    Subjective: 1/08/20  69 year old male patient presents to the office for a follow up. He states that he has been having some fatigue especially after treatments. He still experiences some sporadic diarrhea. He has not been taking Imodium, as he forgot what the name of the  "medicine. I informed him today. He states he has some nausea, but takes Zofran. He is prescribed Folic acid and B-12 daily. Today he inquired if he could start getting refills on his Levothyroxine, as it is hard for him to go to the pharmacy every month. He is scheduled for Cycle 12 of Keytruda on 01/17/2020. He mentioned after his last treatment he coughed up some, \"thick, pale yellowish\" sputum. He states he had never seen anything like that before and it has not happened since. He denies any other issues. He is asking for a refill on his thyroid medication.         The following portions of the patient's history were reviewed and updated as appropriate: allergies, current medications, past family history, past medical history, past social history, past surgical history and problem list.     Past Medical History:   Diagnosis Date   • B12 deficiency    • Blockage of coronary artery of heart (CMS/HCC)    • Depression    • DM (diabetes mellitus) (CMS/HCC)    • Dysphagia    • HTN (hypertension)    • Hyperlipidemia        Past Surgical History:   Procedure Laterality Date   • ABDOMINAL WALL ABSCESS INCISION AND DRAINAGE  10/22/2018    WITH DEBRIDEMENT  1.5CM X 1.5 CM X 1.5 CM    DR. PURI   • CATARACT EXTRACTION  2018   • COLONOSCOPY     • CORONARY ARTERY BYPASS GRAFT  2015   • ENDOSCOPY  12/20/2016   • ESOPHAGECTOMY  05/04/2017    BROOKS PETTY ESOPHAGECTOMY, FEEDING JEJNOSTOMOY, ABDOMINAL AND MEDIASTINAL LYMPH NODE DISECTION, PEDICLED MUSCLE FLAP COVERAGE DR. PURI   • ESOPHAGOSCOPY / EGD  07/12/2017    WITH BALLOON DILATION   • ESOPHAGOSCOPY / EGD  07/26/2017    WITH BALLOON DILATION   • ESOPHAGOSCOPY / EGD  08/11/2017    WITH BALLOON DILATION   • ESOPHAGOSCOPY / EGD  08/28/2017    WITH BALLOON DILATION   • ESOPHAGOSCOPY / EGD  09/27/2017    WITH BALLOON DILATION   • LYMPH NODE BIOPSY  01/30/2019   • PACEMAKER IMPLANTATION  11/21/2016         Current Outpatient Medications:   •  clonazePAM (KlonoPIN) 0.5 MG " tablet, TAKE 1 4 TO 1 FULL TABLET BY MOUTH TWICE DAILY AS NEEDED FOR ANXIETY, Disp: , Rfl: 1  •  cyanocobalamin (VITAMIN B-12) 1000 MCG tablet, , Disp: , Rfl:   •  folic acid (FOLVITE) 1 MG tablet, Daily., Disp: , Rfl:   •  levothyroxine (EUTHYROX) 50 MCG tablet, Take 1 tablet by mouth Daily., Disp: 30 tablet, Rfl: 0  •  metFORMIN (GLUCOPHAGE) 1000 MG tablet, Take 500 mg by mouth 2 (Two) Times a Day., Disp: , Rfl: 4  •  metoprolol tartrate (LOPRESSOR) 25 MG tablet, Take  by mouth 2 (Two) Times a Day., Disp: , Rfl:   •  omeprazole (priLOSEC) 40 MG capsule, Take 40 mg by mouth 2 (Two) Times a Day., Disp: , Rfl:   •  ondansetron ODT (ZOFRAN-ODT) 4 MG disintegrating tablet, DISSOLVE 1 TABLET IN MOUTH 3 TIMES DAILY TO 4 TIMES DAILY FOR NAUSEA, Disp: , Rfl: 1  •  pravastatin (PRAVACHOL) 10 MG tablet, every other day, Disp: , Rfl:   •  diphenoxylate-atropine (LOMOTIL) 2.5-0.025 MG per tablet, Take 1 tablet by mouth 2 (Two) Times a Day As Needed for Diarrhea., Disp: 60 tablet, Rfl: 0  •  levothyroxine (SYNTHROID, LEVOTHROID) 50 MCG tablet, Take 1 tablet by mouth Daily for 30 days., Disp: 30 tablet, Rfl: 5  •  PARoxetine (PAXIL) 10 MG tablet, Take 10 mg by mouth Daily., Disp: , Rfl: 0    Allergies   Allergen Reactions   • Ace Inhibitors Unknown (See Comments)     Unknown reaction     • Heparin Other (See Comments)     Fever/chills, weakness in legs   • Sulfa Antibiotics Other (See Comments)     Mouth sores       Family History   Problem Relation Age of Onset   • Mental illness Mother    • Heart disease Mother    • Hypertension Father    • Cancer Father    • Heart disease Sister    • Heart disease Brother        Cancer-related family history includes Cancer in his father.    Social History     Tobacco Use   • Smoking status: Former Smoker     Packs/day: 2.00     Years: 50.00     Pack years: 100.00     Types: Cigarettes     Last attempt to quit: 2015     Years since quittin.5   • Smokeless tobacco: Former User      "Types: Chew     Quit date: 9/6/1989   Substance Use Topics   • Alcohol use: No     Frequency: Never   • Drug use: No         ROS:     Review of Systems   Constitutional: Positive for fatigue (after treatments). Negative for chills and fever.   HENT: Positive for trouble swallowing (has to eat soft foods ). Negative for ear pain, mouth sores, nosebleeds and sore throat.    Eyes: Negative for photophobia and visual disturbance.   Respiratory: Positive for shortness of breath (attributes it to keytruda). Negative for chest tightness, wheezing and stridor.    Cardiovascular: Negative for chest pain and palpitations.   Gastrointestinal: Positive for diarrhea (5 x a day 1-2 times a week) and nausea (getting better). Negative for abdominal pain and vomiting.   Endocrine: Negative for cold intolerance and heat intolerance.   Genitourinary: Negative for dysuria and hematuria.   Musculoskeletal: Negative for joint swelling and neck stiffness.   Skin: Negative for color change and rash.   Neurological: Negative for seizures, syncope and headaches.   Hematological: Negative for adenopathy.        No obvious bleeding   Psychiatric/Behavioral: Positive for sleep disturbance (does not sleep well). Negative for agitation, confusion and hallucinations.       Objective:    Vitals:    01/08/20 0925   BP: 132/72   Pulse: 67   Resp: 18   Temp: 97.9 °F (36.6 °C)   Weight: 70.6 kg (155 lb 9.6 oz)   Height: 167.6 cm (66\")   PainSc: 0-No pain       (1) Restricted in physically strenuous activity, ambulatory and able to do work of light nature    Physical Exam:     Physical Exam   Constitutional: He is oriented to person, place, and time. He appears well-developed and well-nourished. No distress.   HENT:   Head: Normocephalic and atraumatic.   Mouth/Throat: Oropharynx is clear and moist.   Absent teeth in upper jaw.--eyeglasses  Cleft lip and palette   Eyes: Conjunctivae and EOM are normal. Right eye exhibits no discharge. Left eye exhibits " no discharge. No scleral icterus.   Neck: Normal range of motion. Neck supple. No thyromegaly present.   Cardiovascular: Normal rate, regular rhythm and normal heart sounds. Exam reveals no gallop and no friction rub.   Pulmonary/Chest: Effort normal and breath sounds normal. No stridor. No respiratory distress. He has no wheezes.   Abdominal: Soft. Bowel sounds are normal. He exhibits no mass. There is no tenderness. There is no rebound and no guarding.   No tenderness   Musculoskeletal: Normal range of motion. He exhibits no tenderness.   Lymphadenopathy:     He has no cervical adenopathy.   Neurological: He is alert and oriented to person, place, and time. He exhibits normal muscle tone.   Skin: Skin is warm. No rash noted. He is not diaphoretic. No erythema.   Right side chest  port   Psychiatric: He has a normal mood and affect. His behavior is normal.   Nursing note and vitals reviewed.        Lab Results - Last 18 Months   Lab Units 01/08/20  0902 12/27/19  1007 12/06/19  1001   WBC 10*3/mm3 6.48 6.72 6.14   HEMOGLOBIN g/dL 12.1* 11.4* 11.6*   HEMATOCRIT % 36.4* 34.6* 35.1*   PLATELETS 10*3/mm3 170 168 162   MCV fL 92.2 93.0 92.1     Lab Results - Last 18 Months   Lab Units 12/27/19  1007 12/06/19  1001 11/15/19  1113   SODIUM mmol/L 141 139 141   POTASSIUM mmol/L 5.0 4.7 4.5   CHLORIDE mmol/L 106 102 105   CO2 mmol/L 24.0 27.0 25.0   BUN mg/dL 13 14 11   CREATININE mg/dL 0.87 0.81 0.92   CALCIUM mg/dL 9.0 8.9 9.0   BILIRUBIN mg/dL 0.2 0.3 0.3   ALK PHOS U/L 32* 30* 33*   ALT (SGPT) U/L 8 5 8   AST (SGOT) U/L 15 13 17   GLUCOSE mg/dL 153* 115* 111*       Assessment/Plan     Assessment:  1. Metastatic esophageal carcinoma:  Patient has a history of GE junction poorly differentiated adenocarcinoma for which he had chemoradiation followed by Noel Gurwinder resection:  Pathology showed ypT3,pN1 disease.  Tumor was Stage IIIA, diagnosed in 2016.  He received concurrent chemoradiation with weekly Carboplatin and Taxol  and radiation finishing on 3/15/17.      CT scan on 1/10/19 showed new retroperitoneal lymph nodes.  These lymph nodes were biopsied on 1/30/19 and it shows metastatic esophageal cancer.  [employer] Next Gen testing was performed on the sample and it shows KRAS mutation, microsatellite stable tumor.  PD-L1 is positive.  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  HER2/marvel (ERBB2) was not amplified and was negative.  His CT scan on 5/1/19 shows evidence of progression.  Patient has started receiving Keytruda on 5/31/19.  He has received 4 cycles so far.    2. 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  3. Occasional headaches/memory lapses: Advise close monitoring of the symptoms.  4. Diarrhea:- diarrhea seems to be waxing and waning in nature.  At this point he does not appear to be related to immunotherapy.  Symptoms are very mild..  5. History of chronic dysphagia:  Due to recurrent esophageal strictures.  Patient is status post EGD and balloon dilation.  Today, he does not report any new symptoms.  6. PD-L1 positive, HER2 negative, p53 and KRAS positive.    7. B12 deficiency:  Recommended to take B12 1000 mcg twice daily.    8. Anemia: Likely from malignancy.  Also on iron supplement.  9. Nausea: Symptoms have improved    PLAN:  1. Continue Keytruda.  2. Continue levothyroxine 50 mcg.  .  Monitor TSH  3. Continue to monitor TSH, T3, serum cortisol level.   4. We will continue to monitor closely for adverse events related to immunotherapy  5. We will order CT chest abdomen and pelvis with contrast for restaging evaluation.  6. Will need to closely monitor diarrhea symptoms.  Provided prescription for Lomotil to be taken 1 time daily.  If no response advised to take it twice a day.  7. Continue B12 supplements twice a day.  8. Continue  folic acid supplement also.  9. Follow-up in 1 month        Orders Placed This Encounter   Procedures   • CT chest w contrast     Standing Status:   Future     Standing Expiration Date:    1/8/2021     Scheduling Instructions:      To be done in 3 weeks   • CT abdomen pelvis w contrast     Standing Status:   Future     Standing Expiration Date:   1/8/2021     Scheduling Instructions:      To be done in 3 weeks     Order Specific Question:   Will Oral Contrast be needed for this procedure?     Answer:   No                Thank you very much for providing the opportunity to participate in this patient’s care. Please do not hesitate to call if there are any other questions

## 2020-01-03 ENCOUNTER — CLINICAL SUPPORT NO REQUIREMENTS (OUTPATIENT)
Dept: CARDIOLOGY | Facility: CLINIC | Age: 70
End: 2020-01-03

## 2020-01-03 DIAGNOSIS — R55 SYNCOPE AND COLLAPSE: ICD-10-CM

## 2020-01-03 DIAGNOSIS — Z95.818 STATUS POST PLACEMENT OF IMPLANTABLE LOOP RECORDER: Primary | ICD-10-CM

## 2020-01-03 PROCEDURE — 93294 REM INTERROG EVL PM/LDLS PM: CPT | Performed by: INTERNAL MEDICINE

## 2020-01-03 PROCEDURE — 93296 REM INTERROG EVL PM/IDS: CPT | Performed by: INTERNAL MEDICINE

## 2020-01-08 ENCOUNTER — OFFICE VISIT (OUTPATIENT)
Dept: LAB | Facility: HOSPITAL | Age: 70
End: 2020-01-08

## 2020-01-08 ENCOUNTER — OFFICE VISIT (OUTPATIENT)
Dept: ONCOLOGY | Facility: CLINIC | Age: 70
End: 2020-01-08

## 2020-01-08 VITALS
WEIGHT: 155.6 LBS | HEART RATE: 67 BPM | TEMPERATURE: 97.9 F | BODY MASS INDEX: 25.01 KG/M2 | SYSTOLIC BLOOD PRESSURE: 132 MMHG | HEIGHT: 66 IN | DIASTOLIC BLOOD PRESSURE: 72 MMHG | RESPIRATION RATE: 18 BRPM

## 2020-01-08 DIAGNOSIS — Z51.11 ENCOUNTER FOR ANTINEOPLASTIC CHEMOTHERAPY: ICD-10-CM

## 2020-01-08 DIAGNOSIS — C15.9 ESOPHAGEAL CANCER, STAGE IV (HCC): ICD-10-CM

## 2020-01-08 DIAGNOSIS — D63.0 ANEMIA IN NEOPLASTIC DISEASE: ICD-10-CM

## 2020-01-08 DIAGNOSIS — R19.7 DIARRHEA, UNSPECIFIED TYPE: Primary | ICD-10-CM

## 2020-01-08 DIAGNOSIS — C16.0 MALIGNANT NEOPLASM OF CARDIA (HCC): ICD-10-CM

## 2020-01-08 DIAGNOSIS — E53.8 B12 DEFICIENCY: ICD-10-CM

## 2020-01-08 LAB
BASOPHILS # BLD AUTO: 0.04 10*3/MM3 (ref 0–0.2)
BASOPHILS NFR BLD AUTO: 0.6 % (ref 0–1.5)
DEPRECATED RDW RBC AUTO: 44 FL (ref 37–54)
EOSINOPHIL # BLD AUTO: 0.44 10*3/MM3 (ref 0–0.4)
EOSINOPHIL NFR BLD AUTO: 6.8 % (ref 0.3–6.2)
ERYTHROCYTE [DISTWIDTH] IN BLOOD BY AUTOMATED COUNT: 13.5 % (ref 12.3–15.4)
HCT VFR BLD AUTO: 36.4 % (ref 37.5–51)
HGB BLD-MCNC: 12.1 G/DL (ref 13–17.7)
LYMPHOCYTES # BLD AUTO: 1.66 10*3/MM3 (ref 0.7–3.1)
LYMPHOCYTES NFR BLD AUTO: 25.6 % (ref 19.6–45.3)
MCH RBC QN AUTO: 30.6 PG (ref 26.6–33)
MCHC RBC AUTO-ENTMCNC: 33.2 G/DL (ref 31.5–35.7)
MCV RBC AUTO: 92.2 FL (ref 79–97)
MONOCYTES # BLD AUTO: 0.67 10*3/MM3 (ref 0.1–0.9)
MONOCYTES NFR BLD AUTO: 10.3 % (ref 5–12)
NEUTROPHILS # BLD AUTO: 3.67 10*3/MM3 (ref 1.7–7)
NEUTROPHILS NFR BLD AUTO: 56.7 % (ref 42.7–76)
PLATELET # BLD AUTO: 170 10*3/MM3 (ref 140–450)
PMV BLD AUTO: 10.2 FL (ref 6–12)
RBC # BLD AUTO: 3.95 10*6/MM3 (ref 4.14–5.8)
WBC NRBC COR # BLD: 6.48 10*3/MM3 (ref 3.4–10.8)

## 2020-01-08 PROCEDURE — 99215 OFFICE O/P EST HI 40 MIN: CPT | Performed by: INTERNAL MEDICINE

## 2020-01-08 PROCEDURE — 85025 COMPLETE CBC W/AUTO DIFF WBC: CPT

## 2020-01-08 PROCEDURE — 36415 COLL VENOUS BLD VENIPUNCTURE: CPT

## 2020-01-08 RX ORDER — DIPHENOXYLATE HYDROCHLORIDE AND ATROPINE SULFATE 2.5; .025 MG/1; MG/1
1 TABLET ORAL 2 TIMES DAILY PRN
Qty: 60 TABLET | Refills: 0 | Status: SHIPPED | OUTPATIENT
Start: 2020-01-08 | End: 2020-05-29 | Stop reason: SDUPTHER

## 2020-01-08 RX ORDER — LEVOTHYROXINE SODIUM 0.05 MG/1
50 TABLET ORAL DAILY
Qty: 30 TABLET | Refills: 5 | Status: SHIPPED | OUTPATIENT
Start: 2020-01-08 | End: 2020-02-07

## 2020-01-15 DIAGNOSIS — C16.0 MALIGNANT NEOPLASM OF CARDIA (HCC): ICD-10-CM

## 2020-01-15 DIAGNOSIS — Z51.11 ENCOUNTER FOR ANTINEOPLASTIC CHEMOTHERAPY: ICD-10-CM

## 2020-01-15 RX ORDER — SODIUM CHLORIDE 9 MG/ML
250 INJECTION, SOLUTION INTRAVENOUS ONCE
Status: CANCELLED | OUTPATIENT
Start: 2020-01-17

## 2020-01-15 RX ORDER — SODIUM CHLORIDE 9 MG/ML
250 INJECTION, SOLUTION INTRAVENOUS ONCE
Status: CANCELLED | OUTPATIENT
Start: 2020-02-10

## 2020-01-15 NOTE — PROGRESS NOTES
1/15/20  Spoke with pt.  Has appt with dr haas 2/18/20, but device check with ronni at [place] 3/17/20.  wb

## 2020-01-17 ENCOUNTER — HOSPITAL ENCOUNTER (OUTPATIENT)
Dept: ONCOLOGY | Facility: HOSPITAL | Age: 70
Setting detail: INFUSION SERIES
Discharge: HOME | End: 2020-01-17

## 2020-01-17 VITALS
DIASTOLIC BLOOD PRESSURE: 86 MMHG | HEIGHT: 66 IN | WEIGHT: 154 LBS | SYSTOLIC BLOOD PRESSURE: 138 MMHG | RESPIRATION RATE: 18 BRPM | BODY MASS INDEX: 24.75 KG/M2 | HEART RATE: 60 BPM | TEMPERATURE: 97.9 F

## 2020-01-17 DIAGNOSIS — Z51.11 ENCOUNTER FOR ANTINEOPLASTIC CHEMOTHERAPY: ICD-10-CM

## 2020-01-17 DIAGNOSIS — C16.0 MALIGNANT NEOPLASM OF CARDIA (HCC): Primary | ICD-10-CM

## 2020-01-17 LAB
ALBUMIN SERPL-MCNC: 4 G/DL (ref 3.5–5.2)
ALBUMIN/GLOB SERPL: 1.6 G/DL
ALP SERPL-CCNC: 30 U/L (ref 39–117)
ALT SERPL W P-5'-P-CCNC: 7 U/L (ref 1–41)
ANION GAP SERPL CALCULATED.3IONS-SCNC: 10 MMOL/L (ref 5–15)
AST SERPL-CCNC: 16 U/L (ref 1–40)
BASOPHILS # BLD AUTO: 0.03 10*3/MM3 (ref 0–0.2)
BASOPHILS NFR BLD AUTO: 0.5 % (ref 0–1.5)
BILIRUB SERPL-MCNC: 0.3 MG/DL (ref 0.2–1.2)
BUN BLD-MCNC: 12 MG/DL (ref 8–23)
BUN/CREAT SERPL: 16.9 (ref 7–25)
CALCIUM SPEC-SCNC: 9 MG/DL (ref 8.6–10.5)
CHLORIDE SERPL-SCNC: 103 MMOL/L (ref 98–107)
CO2 SERPL-SCNC: 25 MMOL/L (ref 22–29)
CORTIS SERPL-MCNC: 11.09 MCG/DL
CREAT BLD-MCNC: 0.71 MG/DL (ref 0.76–1.27)
DEPRECATED RDW RBC AUTO: 43.1 FL (ref 37–54)
EOSINOPHIL # BLD AUTO: 0.41 10*3/MM3 (ref 0–0.4)
EOSINOPHIL NFR BLD AUTO: 6.2 % (ref 0.3–6.2)
ERYTHROCYTE [DISTWIDTH] IN BLOOD BY AUTOMATED COUNT: 13.3 % (ref 12.3–15.4)
GFR SERPL CREATININE-BSD FRML MDRD: 110 ML/MIN/1.73
GLOBULIN UR ELPH-MCNC: 2.5 GM/DL
GLUCOSE BLD-MCNC: 135 MG/DL (ref 65–99)
HCT VFR BLD AUTO: 34.8 % (ref 37.5–51)
HGB BLD-MCNC: 11.7 G/DL (ref 13–17.7)
LYMPHOCYTES # BLD AUTO: 1.81 10*3/MM3 (ref 0.7–3.1)
LYMPHOCYTES NFR BLD AUTO: 27.2 % (ref 19.6–45.3)
MCH RBC QN AUTO: 30.8 PG (ref 26.6–33)
MCHC RBC AUTO-ENTMCNC: 33.6 G/DL (ref 31.5–35.7)
MCV RBC AUTO: 91.6 FL (ref 79–97)
MONOCYTES # BLD AUTO: 0.76 10*3/MM3 (ref 0.1–0.9)
MONOCYTES NFR BLD AUTO: 11.4 % (ref 5–12)
NEUTROPHILS # BLD AUTO: 3.65 10*3/MM3 (ref 1.7–7)
NEUTROPHILS NFR BLD AUTO: 54.7 % (ref 42.7–76)
PLATELET # BLD AUTO: 158 10*3/MM3 (ref 140–450)
PMV BLD AUTO: 10.3 FL (ref 6–12)
POTASSIUM BLD-SCNC: 4.7 MMOL/L (ref 3.5–5.2)
PROT SERPL-MCNC: 6.5 G/DL (ref 6–8.5)
RBC # BLD AUTO: 3.8 10*6/MM3 (ref 4.14–5.8)
SODIUM BLD-SCNC: 138 MMOL/L (ref 136–145)
T3FREE SERPL-MCNC: 2.75 PG/ML (ref 2–4.4)
WBC NRBC COR # BLD: 6.66 10*3/MM3 (ref 3.4–10.8)

## 2020-01-17 PROCEDURE — 96413 CHEMO IV INFUSION 1 HR: CPT | Performed by: INTERNAL MEDICINE

## 2020-01-17 PROCEDURE — 25010000002 PEMBROLIZUMAB 100 MG/4ML SOLUTION 4 ML VIAL: Performed by: NURSE PRACTITIONER

## 2020-01-17 PROCEDURE — 80053 COMPREHEN METABOLIC PANEL: CPT | Performed by: NURSE PRACTITIONER

## 2020-01-17 PROCEDURE — 85025 COMPLETE CBC W/AUTO DIFF WBC: CPT | Performed by: NURSE PRACTITIONER

## 2020-01-17 PROCEDURE — 84481 FREE ASSAY (FT-3): CPT | Performed by: NURSE PRACTITIONER

## 2020-01-17 PROCEDURE — 82533 TOTAL CORTISOL: CPT | Performed by: NURSE PRACTITIONER

## 2020-01-17 RX ORDER — SODIUM CHLORIDE 9 MG/ML
250 INJECTION, SOLUTION INTRAVENOUS ONCE
Status: DISCONTINUED | OUTPATIENT
Start: 2020-01-17 | End: 2020-01-18 | Stop reason: HOSPADM

## 2020-01-17 RX ADMIN — SODIUM CHLORIDE 200 MG: 9 INJECTION, SOLUTION INTRAVENOUS at 11:09

## 2020-01-28 ENCOUNTER — HOSPITAL ENCOUNTER (OUTPATIENT)
Dept: PET IMAGING | Facility: HOSPITAL | Age: 70
Discharge: HOME | End: 2020-01-28
Admitting: INTERNAL MEDICINE

## 2020-01-28 DIAGNOSIS — C15.9 ESOPHAGEAL CANCER, STAGE IV (HCC): ICD-10-CM

## 2020-01-28 PROCEDURE — 0 IOPAMIDOL PER 1 ML: Performed by: INTERNAL MEDICINE

## 2020-01-28 PROCEDURE — 74177 CT ABD & PELVIS W/CONTRAST: CPT

## 2020-01-28 PROCEDURE — 71260 CT THORAX DX C+: CPT

## 2020-01-28 RX ADMIN — IOPAMIDOL 100 ML: 755 INJECTION, SOLUTION INTRAVENOUS at 08:30

## 2020-01-31 DIAGNOSIS — C15.9 ESOPHAGEAL CANCER, STAGE IV (HCC): Primary | ICD-10-CM

## 2020-02-03 ENCOUNTER — OFFICE VISIT (OUTPATIENT)
Dept: LAB | Facility: HOSPITAL | Age: 70
End: 2020-02-03

## 2020-02-03 ENCOUNTER — OFFICE VISIT (OUTPATIENT)
Dept: ONCOLOGY | Facility: CLINIC | Age: 70
End: 2020-02-03

## 2020-02-03 VITALS
HEART RATE: 64 BPM | RESPIRATION RATE: 18 BRPM | BODY MASS INDEX: 24.75 KG/M2 | TEMPERATURE: 97.3 F | WEIGHT: 154 LBS | DIASTOLIC BLOOD PRESSURE: 77 MMHG | HEIGHT: 66 IN | SYSTOLIC BLOOD PRESSURE: 132 MMHG

## 2020-02-03 DIAGNOSIS — C15.9 ESOPHAGEAL CANCER, STAGE IV (HCC): ICD-10-CM

## 2020-02-03 DIAGNOSIS — C48.0: ICD-10-CM

## 2020-02-03 DIAGNOSIS — D63.0 ANEMIA IN NEOPLASTIC DISEASE: ICD-10-CM

## 2020-02-03 DIAGNOSIS — E53.8 B12 DEFICIENCY: Primary | ICD-10-CM

## 2020-02-03 DIAGNOSIS — C15.5 PRIMARY MALIGNANT NEOPLASM OF LOWER THIRD OF ESOPHAGUS (HCC): ICD-10-CM

## 2020-02-03 DIAGNOSIS — Z51.11 ENCOUNTER FOR ANTINEOPLASTIC CHEMOTHERAPY: ICD-10-CM

## 2020-02-03 DIAGNOSIS — R19.7 DIARRHEA, UNSPECIFIED TYPE: ICD-10-CM

## 2020-02-03 DIAGNOSIS — C16.0 MALIGNANT NEOPLASM OF CARDIA (HCC): ICD-10-CM

## 2020-02-03 DIAGNOSIS — R13.19 ESOPHAGEAL DYSPHAGIA: ICD-10-CM

## 2020-02-03 PROBLEM — R59.1 LYMPHADENOPATHY: Status: ACTIVE | Noted: 2020-02-03

## 2020-02-03 LAB
ALBUMIN SERPL-MCNC: 4.1 G/DL (ref 3.5–5.2)
ALBUMIN/GLOB SERPL: 1.6 G/DL
ALP SERPL-CCNC: 32 U/L (ref 39–117)
ALT SERPL W P-5'-P-CCNC: 10 U/L (ref 1–41)
ANION GAP SERPL CALCULATED.3IONS-SCNC: 10 MMOL/L (ref 5–15)
AST SERPL-CCNC: 17 U/L (ref 1–40)
BASOPHILS # BLD AUTO: 0.04 10*3/MM3 (ref 0–0.2)
BASOPHILS NFR BLD AUTO: 0.6 % (ref 0–1.5)
BILIRUB SERPL-MCNC: 0.3 MG/DL (ref 0.2–1.2)
BUN BLD-MCNC: 12 MG/DL (ref 8–23)
BUN/CREAT SERPL: 12.6 (ref 7–25)
CALCIUM SPEC-SCNC: 9.3 MG/DL (ref 8.6–10.5)
CHLORIDE SERPL-SCNC: 100 MMOL/L (ref 98–107)
CO2 SERPL-SCNC: 25 MMOL/L (ref 22–29)
CREAT BLD-MCNC: 0.95 MG/DL (ref 0.76–1.27)
DEPRECATED RDW RBC AUTO: 43.5 FL (ref 37–54)
EOSINOPHIL # BLD AUTO: 0.51 10*3/MM3 (ref 0–0.4)
EOSINOPHIL NFR BLD AUTO: 7.1 % (ref 0.3–6.2)
ERYTHROCYTE [DISTWIDTH] IN BLOOD BY AUTOMATED COUNT: 13.4 % (ref 12.3–15.4)
GFR SERPL CREATININE-BSD FRML MDRD: 78 ML/MIN/1.73
GLOBULIN UR ELPH-MCNC: 2.6 GM/DL
GLUCOSE BLD-MCNC: 171 MG/DL (ref 65–99)
HCT VFR BLD AUTO: 36.3 % (ref 37.5–51)
HGB BLD-MCNC: 12.2 G/DL (ref 13–17.7)
LYMPHOCYTES # BLD AUTO: 1.86 10*3/MM3 (ref 0.7–3.1)
LYMPHOCYTES NFR BLD AUTO: 25.9 % (ref 19.6–45.3)
MCH RBC QN AUTO: 30.7 PG (ref 26.6–33)
MCHC RBC AUTO-ENTMCNC: 33.6 G/DL (ref 31.5–35.7)
MCV RBC AUTO: 91.4 FL (ref 79–97)
MONOCYTES # BLD AUTO: 0.91 10*3/MM3 (ref 0.1–0.9)
MONOCYTES NFR BLD AUTO: 12.7 % (ref 5–12)
NEUTROPHILS # BLD AUTO: 3.85 10*3/MM3 (ref 1.7–7)
NEUTROPHILS NFR BLD AUTO: 53.7 % (ref 42.7–76)
PLATELET # BLD AUTO: 171 10*3/MM3 (ref 140–450)
PMV BLD AUTO: 10 FL (ref 6–12)
POTASSIUM BLD-SCNC: 5.1 MMOL/L (ref 3.5–5.2)
PROT SERPL-MCNC: 6.7 G/DL (ref 6–8.5)
RBC # BLD AUTO: 3.97 10*6/MM3 (ref 4.14–5.8)
SODIUM BLD-SCNC: 135 MMOL/L (ref 136–145)
T4 FREE SERPL-MCNC: 1.49 NG/DL (ref 0.93–1.7)
TSH SERPL DL<=0.05 MIU/L-ACNC: 2.32 UIU/ML (ref 0.27–4.2)
WBC NRBC COR # BLD: 7.17 10*3/MM3 (ref 3.4–10.8)

## 2020-02-03 PROCEDURE — 99215 OFFICE O/P EST HI 40 MIN: CPT | Performed by: INTERNAL MEDICINE

## 2020-02-03 PROCEDURE — 84439 ASSAY OF FREE THYROXINE: CPT | Performed by: NURSE PRACTITIONER

## 2020-02-03 PROCEDURE — 85025 COMPLETE CBC W/AUTO DIFF WBC: CPT

## 2020-02-03 PROCEDURE — 84443 ASSAY THYROID STIM HORMONE: CPT | Performed by: NURSE PRACTITIONER

## 2020-02-03 PROCEDURE — 36415 COLL VENOUS BLD VENIPUNCTURE: CPT

## 2020-02-03 PROCEDURE — 80053 COMPREHEN METABOLIC PANEL: CPT | Performed by: NURSE PRACTITIONER

## 2020-02-10 ENCOUNTER — DOCUMENTATION (OUTPATIENT)
Dept: ONCOLOGY | Facility: HOSPITAL | Age: 70
End: 2020-02-10

## 2020-02-10 ENCOUNTER — HOSPITAL ENCOUNTER (OUTPATIENT)
Dept: ONCOLOGY | Facility: HOSPITAL | Age: 70
Setting detail: INFUSION SERIES
Discharge: HOME | End: 2020-02-10

## 2020-02-10 VITALS
SYSTOLIC BLOOD PRESSURE: 130 MMHG | WEIGHT: 155.6 LBS | RESPIRATION RATE: 18 BRPM | DIASTOLIC BLOOD PRESSURE: 74 MMHG | HEIGHT: 66 IN | TEMPERATURE: 97.5 F | HEART RATE: 60 BPM | BODY MASS INDEX: 25.01 KG/M2

## 2020-02-10 DIAGNOSIS — C16.0 MALIGNANT NEOPLASM OF CARDIA (HCC): ICD-10-CM

## 2020-02-10 DIAGNOSIS — Z51.11 ENCOUNTER FOR ANTINEOPLASTIC CHEMOTHERAPY: Primary | ICD-10-CM

## 2020-02-10 LAB
ALBUMIN SERPL-MCNC: 4.2 G/DL (ref 3.5–5.2)
ALBUMIN/GLOB SERPL: 1.6 G/DL
ALP SERPL-CCNC: 31 U/L (ref 39–117)
ALT SERPL W P-5'-P-CCNC: 11 U/L (ref 1–41)
ANION GAP SERPL CALCULATED.3IONS-SCNC: 10 MMOL/L (ref 5–15)
AST SERPL-CCNC: 23 U/L (ref 1–40)
BASOPHILS # BLD AUTO: 0.04 10*3/MM3 (ref 0–0.2)
BASOPHILS NFR BLD AUTO: 0.5 % (ref 0–1.5)
BILIRUB SERPL-MCNC: 0.3 MG/DL (ref 0.2–1.2)
BUN BLD-MCNC: 10 MG/DL (ref 8–23)
BUN/CREAT SERPL: 12.2 (ref 7–25)
CALCIUM SPEC-SCNC: 9.2 MG/DL (ref 8.6–10.5)
CHLORIDE SERPL-SCNC: 101 MMOL/L (ref 98–107)
CO2 SERPL-SCNC: 27 MMOL/L (ref 22–29)
CREAT BLD-MCNC: 0.82 MG/DL (ref 0.76–1.27)
DEPRECATED RDW RBC AUTO: 42.4 FL (ref 37–54)
EOSINOPHIL # BLD AUTO: 0.53 10*3/MM3 (ref 0–0.4)
EOSINOPHIL NFR BLD AUTO: 7.3 % (ref 0.3–6.2)
ERYTHROCYTE [DISTWIDTH] IN BLOOD BY AUTOMATED COUNT: 13.2 % (ref 12.3–15.4)
GFR SERPL CREATININE-BSD FRML MDRD: 93 ML/MIN/1.73
GLOBULIN UR ELPH-MCNC: 2.6 GM/DL
GLUCOSE BLD-MCNC: 119 MG/DL (ref 65–99)
HCT VFR BLD AUTO: 35.4 % (ref 37.5–51)
HGB BLD-MCNC: 12.1 G/DL (ref 13–17.7)
LYMPHOCYTES # BLD AUTO: 2.03 10*3/MM3 (ref 0.7–3.1)
LYMPHOCYTES NFR BLD AUTO: 27.8 % (ref 19.6–45.3)
MCH RBC QN AUTO: 30.9 PG (ref 26.6–33)
MCHC RBC AUTO-ENTMCNC: 34.2 G/DL (ref 31.5–35.7)
MCV RBC AUTO: 90.5 FL (ref 79–97)
MONOCYTES # BLD AUTO: 0.81 10*3/MM3 (ref 0.1–0.9)
MONOCYTES NFR BLD AUTO: 11.1 % (ref 5–12)
NEUTROPHILS # BLD AUTO: 3.9 10*3/MM3 (ref 1.7–7)
NEUTROPHILS NFR BLD AUTO: 53.3 % (ref 42.7–76)
PLATELET # BLD AUTO: 166 10*3/MM3 (ref 140–450)
PMV BLD AUTO: 10.4 FL (ref 6–12)
POTASSIUM BLD-SCNC: 5.2 MMOL/L (ref 3.5–5.2)
PROT SERPL-MCNC: 6.8 G/DL (ref 6–8.5)
RBC # BLD AUTO: 3.91 10*6/MM3 (ref 4.14–5.8)
SODIUM BLD-SCNC: 138 MMOL/L (ref 136–145)
WBC NRBC COR # BLD: 7.31 10*3/MM3 (ref 3.4–10.8)

## 2020-02-10 PROCEDURE — 25010000002 PEMBROLIZUMAB 100 MG/4ML SOLUTION 4 ML VIAL: Performed by: NURSE PRACTITIONER

## 2020-02-10 PROCEDURE — 80053 COMPREHEN METABOLIC PANEL: CPT | Performed by: NURSE PRACTITIONER

## 2020-02-10 PROCEDURE — 96413 CHEMO IV INFUSION 1 HR: CPT

## 2020-02-10 PROCEDURE — 85025 COMPLETE CBC W/AUTO DIFF WBC: CPT | Performed by: INTERNAL MEDICINE

## 2020-02-10 RX ORDER — SODIUM CHLORIDE 0.9 % (FLUSH) 0.9 %
10 SYRINGE (ML) INJECTION AS NEEDED
Status: DISCONTINUED | OUTPATIENT
Start: 2020-02-10 | End: 2020-02-11 | Stop reason: HOSPADM

## 2020-02-10 RX ORDER — SODIUM CHLORIDE 0.9 % (FLUSH) 0.9 %
10 SYRINGE (ML) INJECTION AS NEEDED
Status: CANCELLED | OUTPATIENT
Start: 2020-02-10

## 2020-02-10 RX ADMIN — Medication 10 ML: at 13:33

## 2020-02-10 RX ADMIN — SODIUM CHLORIDE 200 MG: 9 INJECTION, SOLUTION INTRAVENOUS at 12:41

## 2020-02-10 NOTE — PROGRESS NOTES
"                 Nutrition Assessment  Artie Mc    Anthropometrics  Height: 5'6\"  Weight: 155.6#  BMI: 25.1  Weight Hx:   (10/12/15) 164.9#  (11/17/16) 164.9#  (1/17/17) 156.9#  (6/27/17) 149#  (1/11/18) 143#  (6/12/18) 141.9#  (12/6/18) 149.6#  (6/21/19) 147.9#  (7/12/19) 146.6#  (8/9/19) 145.7#  (8/23/19) 145#  IBW: 142# (109.5%)  UBW: 180# (86.4%)    Nutrition Questionnaire    Food Allergies: Pt denied allergies at this time    Chewing/Swallowing Problems: (8/23/19) Missing top teeth, has uppers but doesn't wear them, only has 7 bottom teeth. He relies on soft/tender foods. (2/10/20) Pt said food still feels like it gets stuck.    Nausea/Vomiting/Diarrhea: Pt has nausea and manages with Zofran. Pt has diarrhea at least 1x/week.    Appetite: \"pretty good\"    24-Hour Diet Recall:   Dinner - goolash made with beef, macaroni, and tomatoes  Snacks- chex mix with m&ms  Water     Discussion: Met with pt as a f/u during infusion. Pt doing well and in good spirits. Pt has gained more than ten pounds just over five months. Pt credits this to an improved appetite and his taste has been improving. Pt normally eats more than what's reported in his 24-hour recall, but he didn't yesterday because he wanted to avoid having diarrhea while at the doctors office today. Pt made chili last night and plans on having this for dinner later today. I praised the pt for his efforts and encouraged him to continue eating regular meals and snacking throughout the day. Pt said he typically eats two meals/day (breakfast and dinner) a long with snacks. I assured him this is fine as long as he's meeting his calorie needs, pt verbalized understanding. All questions and concerns were addressed and answered. I provided my contact information and encouraged him to call or schedule an appointment as needed.      Lucas Brand, MS,RD,LD-[place],CD-[place]  Registered Dietitian              "

## 2020-02-13 ENCOUNTER — TELEPHONE (OUTPATIENT)
Dept: ONCOLOGY | Facility: CLINIC | Age: 70
End: 2020-02-13

## 2020-02-13 NOTE — TELEPHONE ENCOUNTER
He is no longer febrile, no complaints or concerns today.   Is going to be scheduled to see DR. Jasso prior to next immunotherapy.

## 2020-02-13 NOTE — TELEPHONE ENCOUNTER
Pt states that after having Keytruda on Monday 2/10/2020, he had multiple symptoms later that night.   Pt states that roughly around midnight he felt like he had a fever and was sweating and chilling. States that he was having a hard time breathing and felt like he was gasping for air.   Pt purchased a thermometer on Tuesday and states that his temperature was 100.4 when he checked it.     He feels as though the immunotherapy is starting to affect him worse than it was in the beginning.   Pt scheduled for Keytruda on 3/6/2020 with MD visit on 3/11/2020.      Pt made aware to call our office anytime of day or night with symptoms such as these. Pt v/u.

## 2020-02-18 NOTE — PROGRESS NOTES
Hematology-Oncology Follow-up Note       Artie Mc  1950    Primary Care Physician: Chanel Carter, DAT  Referring Physician: No ref. provider found    Reason For Visit:  Chief Complaint   Patient presents with   • Follow-up     B12 deficiency   Monitoring for adverse effects related to immunotherapy.    Hypothyroidism  HPI:   Mr. Mc is a pleasant 69-year-old gentleman with a history of gastroesophageal reflux disease, diabetes, hypertension, heart block status post pacemaker placement and history of CABG.  He was having symptoms of dysphagia, weight loss since September 2016. He was reporting symptoms of food getting stuck in the lower chest.  He has transitioned himself to a liquid diet.  The patient reports losing about 30 pounds over this duration.  The patient underwent upper GI endoscopy on 12/20/16 with Dr. Esvin Barrera.  Endoscopy showed necrotic, circumferential and moderately obstructive mass at the distal esophagus between 42 cm and 36 cm.      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  There was also evidence of mild subacute inflammation in the duodenum.  Dr. Barrera ordered a CT scan of the chest and abdomen with contrast on 12/20/16.  The scan was done at [facility].  The scan showed a new ill-defined mass in the anterior wall of the distal esophagus at the GE junction measuring 2.3 x 2.2 x 1.6 cm, worrisome for cancer.  There were four subcentimeter liver lesions which had appeared stable in comparison to another CT scan from 2009 and they likely represent small cysts.  There was also mild lymphadenopathy in the gastrohepatic ligament.  There were at least six borderline enlarged lymph nodes measuring up to 1.3 x 1 cm in the region.  The patient is referred to us for further oncological evaluation.    1/5/2017 - The patient presents for initial consultation.  He reports persistent  dysphagia and also has symptoms of regurgitation.  He cannot tolerate solid foods and is dependent on liquid diet such as Ensure.  He also reports fatigue. Symptoms of dysphagia have been present for the past four months.  When he eats any solids, the food gets stuck in the lower chest.    • 1/5/17 - Vitamin B12 200 (L).  CEA 0.8.  Ferritin 35.  Iron saturation 16% (L), serum iron 56, TIBC 354.  Creatinine 0.9.  LFTs normal.  Folate 10.2.  • 1/12/17 - PET/CT scan:  Intense abnormal activity corresponding to the region of the GE junction and proximal stomach.  SUV is 11.06.  There is a suggestion of an abnormal mass or abnormal wall thickening in the region measuring 3.9 x 4.8 cm.  No additional abnormalities.  No evidence of metastases or other lymphadenopathy.  Scattered nonspecific subcentimeter lymph nodes involving the mediastinum and within the central upper abdomen.  • 1/13/17 - Creatinine 0.9.  LFTs normal.    • 1/17/17 - Patient seen by thoracic surgeon, Dr. Bradley.  PET scan was reviewed.  He has recommended neoadjuvant chemoradiation therapy followed by [place] Gurwinder esophagogastrectomy.  Port-A-Cath is being arranged.    • 1/20/17 - WBC 6.3, hemoglobin 12.1, platelet count 198,000, MCV 86.4.  • 1/30/17 - Patient started weekly Carboplatin and Taxol (Carboplatin AUC 2 and Taxol 50 mg/M2).  Patient received Injectafer 750 mg.  WBC 5.8, hemoglobin 12.4, platelet count 244,000.   • 1/31/17 - Patient seen by Dr. Eduardo Oleary.  The plan was to give 5040 cGy in 28 fractions.   • 2/6/17 - WBC 4.4, hemoglobin 11.6, platelet count 201,000.  Patient received cycle 2 of weekly Carboplatin and Taxol (Carboplatin AUC 2 and Taxol 50 mg/M2).  • 2/13/17 - Patient received Carboplatin and Taxol weekly cycle 3.  • 2/13/17 - Patient started concurrent radiation therapy.  Total planned dose is 4140 cGy.    • 2/13/17 to 2/15/17 - Patient received 4140 cGy of neoadjuvant radiation.  Radiation was finished on 3/15/17.     • 2/20/17 - WBC 1.8, hemoglobin 10.6, platelet count 217,000.  Creatinine 0.5. Folate 15 (L).  Ferritin 361.  Haptoglobin 214.  Iron saturation 33%, TIBC 263, serum iron 88.    • 2/20/17 - Patient received Carboplatin and Taxol weekly cycle 4.   • 2/27/17 - WBC 3.7, hemoglobin 10.7, platelet count 177,000, MCV 85.3.    • 3/6/17 - Patient has received 16 out of the 23 planned radiation treatments.  Total planned dose is 4140 cGy.    • 3/6/17 - WBC 5.4, hemoglobin 11.2, platelet count 113,000.  • 3/6/17 - Patient received cycle 5 of weekly Carboplatin and Taxol.   • 3/13/17 - Patient received cycle 6 of weekly Carboplatin and Taxol.  WBC 3.9, hemoglobin 11.3, platelet count 93,000.     • 3/20/17 - WBC 1.4, hemoglobin 10.0, platelet count 118,000, MCV 86.1   • 3/27/17 - WBC 3.8, hemoglobin 9.5, platelet count 176,000, MCV 87.3.    • 4/7/17 - Stress test:  No evidence of reversible myocardial ischemia.  Normal left ventricular ejection fraction of 50%.    • 4/17/17 - Upper GI endoscopy by Dr. 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  The GE junction adenocarcinoma lesion seems to have a very good response to chemotherapy and radiation.  The lumen was open.  • 5/1/17 - WBC 7.0, hemoglobin 10.2, platelet count 175,000.    • 5/4/17 - Patient underwent [facility] Gurwinder esophagectomy with pyloroplasty, feeding jejunostomy tube, abdominal and mediastinal lymph node dissection.    Surgical Pathology:  Moderately to poorly differentiated adenocarcinoma measuring 1.3 cm at the GE junction.  Resection margins are negative for malignancy.  Tumor margins are negative.  There is effective treatment response.  No evidence of lymphovascular invasion.  Staging is ypT3,pN1.  One out of fourteen lymph nodes involved.   • 5/22/17 - WBC 6.8, hemoglobin 10.3, platelet count 312,000.    • 6/19/17 - WBC 5.7, hemoglobin 10.5, platelet count 204,000, MCV 92.1.    • 7/6/17 - EGD:  Status post balloon  dilation of esophageal stricture.  • 7/12/17 - EGD with balloon dilation of esophageal stricture.  • 7/26/17 - EGD and balloon dilation of esophageal stricture.  • 7/31/17 - PET/CT scan:  There is mild metabolic activity seen at the thoracic esophagus just at and below the surgical margins.  Some mild wall thickening.  This could be from recent surgery.  A residual cancer seems unlikely.  There is a precarinal lymph node with mild metabolic activity with SUV of 3 measuring 1 x 1.3 cm.  Again this appears to be nonspecific in my opinion.    • 8/7/17 - WBC 5.9, hemoglobin 11.9, platelet count 171,000, MCV 88.7.    • 10/19/17 - WBC 7.5, hemoglobin 11.8, platelet count 216,000.    • 1/8/18 - PET scan:  No evidence of residual disease or recurrent disease.  There is an esophageal stent in the mid esophagus with a large debris air level.  No evidence of any metastases in the chest, abdomen or pelvis.  Mild nodule uptake in the vocal cords with fullness in the right vocal cord.  This could be physiologic.    • 7/9/18 - CT scan of chest with contrast:  Prominent mediastinal lymph nodes appear stable since February.  Changes of gastric pull-through procedure for esophageal cancer again noted.  Tree-in-bud infiltrates in the left lung base, consistent with infection versus inflammation.    • 1/10/19 - CT chest, abdomen and pelvis with contrast:  No evidence of mets in the chest; however, interval development of metastatic lymphadenopathy in the left side of the retroperitoneum.  For instance, there is a 17 mm rounded lymph node, 10 mm lymph node and also a 14 mm lymph node.  These are all new.  Stable 9 mm hypodense lesion within the right hepatic lobe, which could reflect hemangioma or complex cyst.    • 1/30/19 - CT-guided core biopsy of retroperitoneal lymph node:  Poorly differentiated adenocarcinoma consistent with metastases from patient’s known esophageal primary.  CK20 positive, CDX2 positive, cytokeratin 7 negative,  TTF-1 negative.    • 2/1/19 - Creatinine 0.9, BUN 18, calcium 9.3; these are normal.    • 2/28/19 - [employer] Next Gen sequencing testing on retroperitoneal lymph node specimen:  PD-L1 positive.  CPS = 3.  This implies responsiveness to pembrolizumab.  Mismatch repair status is proficient (no evidence of microsatellite instability).  Tumor mutational burden is low = 6 mutations/Mb.  CDH1 indeterminate.  KRAS mutation positive.  TP53 mutation positive.  CDK6 amplified.  [employer] PD-L1 testing by IHC:  PD-L1 expression 1% positive (CPS =1).  HER2/marvel by IHC is negative.  HER2/marvel by CISH not amplified.  Microsatellite stable tumor.    • 3/7/19 - WBC 7.3, hemoglobin 12, platelet count 128,000, MCV 92.     • 3/14/19 - Patient was seen by radiation oncologist, Dr. Chahal.  He has recommended observation versus systemic chemoimmunotherapy as needed.  No plans for radiation therapy.   • 5/1/19 - CT scan of chest with contrast:  No definitive findings of new metastatic disease in the chest.  Emphysema noted.    • 5/1/19 - CT abdomen and pelvis:  There is interval progression of metastatic retroperitoneal adenopathy.  Left periaortic adenopathy with lymph node measuring up to 2 cm, previously 1.7 cm.  Another lymph node now measures 2.3, previously 1.4 cm.  New enlarged lymph node on image 147 measures 1.4 cm, previously 0.4 cm.  A 9 mm hypodense right hepatic lobe lesion appears stable.    • 5/9/19 - Decision made to start patient on immunotherapy Keytruda.    • 5/9/19 - Vitamin B12 229.  Ferritin 61.  Folate 14.3.  Iron saturation 26%, TIBC 304, serum iron 79.  • 5/31/19 - Patient received Keytruda 200 mg cycle 1, day 1.    • 5/31/19 - Free T4 0.99.  Creatinine 0.9, BUN 9.  LFTs normal.  TSH 3.99.  Folate 14.3.  Iron saturation 26%.    • 6/21/2019 patient received Keytruda cycle 2  • 7/12/2019: Patient received Keytruda cycle 3  • 8/2/2019 patient received cycle 4 Keytruda WBC 7.2, hemoglobin 11.8 platelet count 163  • 8/23/2019  creatinine 0.9, LFTs normal, WBC 6.4, hemoglobin 11.5, platelets 179, TSH 2.1   • 8/23/2019 patient received cycle 5 of Keytruda  • 9/3/2019 CT chest abdomen and pelvis with contrast: . Positive response to therapy in the abdomen since 05/01/2019. Pathologically enlarged retroperitoneal lymph nodes, predominantly in the left periaortic region, have diminished in size.a 1.3 x 2.1 cm left periaortic node (image 60), previously measured 3.5 x 2.3 cm. A 1.6 cm index node near the level of the left renal vein previously measured 2.0 cm No new or progressive adenopathy. 2. Stable findings in the chest. Noncalcified right upper lobe nodules are unchanged. There is no evidence of new or progressive metastatic disease within the chest. 3. 8 mm indeterminate low-density lesion in the right hepatic lobe, is stable. No new liver lesions.  • 9/6/2019 WBC 5.6, hemoglobin 11.2, platelets of 192, MCV 91.8  • 9/13/2019 patient received Keytruda cycle 6, albumin 3.2  • 10/4/2019 patient received cycle 7 of Keytruda, TSH 4.8, free T4 0.86  • 10/9/2019 WBC 5.8, hemoglobin 11.7, platelets 174  • 10/25/2019 patient received cycle 8 of Keytruda.  WBC 5.9, hemoglobin 11.5, platelets 152, creatinine 0.75, cortisol 15.2, TSH 6.48 high , free T4 1.13 normal  • 11/6/2019 WBC 6.6, hemoglobin 11.8, MCV 91.5, platelets 187  • 11/15/2019 patient received cycle 9 of Keytruda, WBC 6.6, hemoglobin 11.9, platelets 161, cortisol level 8.86, TSH 2.68, free T3 2.8, free T4 1.5  • 12/4/2019 WBC 6.9, hemoglobin 12.0, platelets 180, MCV 92.9  • 12/06/2019-Keytruda Cycle 10  • 12/27/2019- Cycle 11 LFTs normal, WBC 6.7, hemoglobin 11.4, platelets 168, MCV 93, TSH 2.4,  • 1/28/2020: CT chest abdomen and pelvis with contrast:  • 1/17/2020 patient received Keytruda cycle 12: Single (aortic lymph node in the abdominal retroperitoneum has increased.  Rest of the retroperitoneal lymph nodes have decreased in size.  Mediastinal adenopathy appears unchanged.   "Noncalcified bilateral pulmonary nodules are stable.  • 2/3/2020 WBC 7.1, hemoglobin 12.2, platelets 171  • 02/10/2020 patient received cycle 13 of Keytruda: WBC 7.3, hemoglobin 12.1, platelets 166, creatinine 0.82       Subjective:02/20/2020  69 year old male patient presents to the office for a follow up. He states after his last cycle of Keytruda he had a high fever and SOA Dr. Jasso wanted to see him before another cycle to make sure everything looked alright. He says he is feeling pretty well, although he has a \"little bit of a cold\". He denies any more fever. His most recent cycle was Cycle 13 on 02/10/2020, due next on March 6. He states that the side effects are a little worse each time he takes it. He does complain of nausea and fatigue. He states he also has some trouble swallowing and pain on the right side up to a week after a cycle. He continues folic acid daily.     The following portions of the patient's history were reviewed and updated as appropriate: allergies, current medications, past family history, past medical history, past social history, past surgical history and problem list.     Past Medical History:   Diagnosis Date   • B12 deficiency    • Blockage of coronary artery of heart (CMS/HCC)    • Depression    • DM (diabetes mellitus) (CMS/HCC)    • Dysphagia    • HTN (hypertension)    • Hyperlipidemia        Past Surgical History:   Procedure Laterality Date   • ABDOMINAL WALL ABSCESS INCISION AND DRAINAGE  10/22/2018    WITH DEBRIDEMENT  1.5CM X 1.5 CM X 1.5 CM    DR. PURI   • CATARACT EXTRACTION  2018   • COLONOSCOPY     • CORONARY ARTERY BYPASS GRAFT  2015   • ENDOSCOPY  12/20/2016   • ESOPHAGECTOMY  05/04/2017    BROOKS PETTY ESOPHAGECTOMY, FEEDING JEJNOSTOMOY, ABDOMINAL AND MEDIASTINAL LYMPH NODE DISECTION, PEDICLED MUSCLE FLAP COVERAGE DR. PURI   • ESOPHAGOSCOPY / EGD  07/12/2017    WITH BALLOON DILATION   • ESOPHAGOSCOPY / EGD  07/26/2017    WITH BALLOON DILATION   • " ESOPHAGOSCOPY / EGD  08/11/2017    WITH BALLOON DILATION   • ESOPHAGOSCOPY / EGD  08/28/2017    WITH BALLOON DILATION   • ESOPHAGOSCOPY / EGD  09/27/2017    WITH BALLOON DILATION   • LYMPH NODE BIOPSY  01/30/2019   • PACEMAKER IMPLANTATION  11/21/2016         Current Outpatient Medications:   •  clonazePAM (KlonoPIN) 0.5 MG tablet, Pt taking 1.5 pills daily, Disp: , Rfl: 1  •  cyanocobalamin (VITAMIN B-12) 1000 MCG tablet, , Disp: , Rfl:   •  diphenoxylate-atropine (LOMOTIL) 2.5-0.025 MG per tablet, Take 1 tablet by mouth 2 (Two) Times a Day As Needed for Diarrhea., Disp: 60 tablet, Rfl: 0  •  folic acid (FOLVITE) 1 MG tablet, Daily., Disp: , Rfl:   •  levothyroxine (EUTHYROX) 50 MCG tablet, Take 1 tablet by mouth Daily., Disp: 30 tablet, Rfl: 0  •  metFORMIN (GLUCOPHAGE) 1000 MG tablet, Take 500 mg by mouth 2 (Two) Times a Day., Disp: , Rfl: 4  •  metoprolol tartrate (LOPRESSOR) 25 MG tablet, Take  by mouth 2 (Two) Times a Day., Disp: , Rfl:   •  omeprazole (priLOSEC) 40 MG capsule, Take 40 mg by mouth 2 (Two) Times a Day., Disp: , Rfl:   •  ondansetron ODT (ZOFRAN-ODT) 4 MG disintegrating tablet, DISSOLVE 1 TABLET IN MOUTH 3 TIMES DAILY TO 4 TIMES DAILY FOR NAUSEA, Disp: , Rfl: 1  •  PARoxetine (PAXIL) 10 MG tablet, Take 10 mg by mouth Daily., Disp: , Rfl: 0  •  pravastatin (PRAVACHOL) 10 MG tablet, every other day, Disp: , Rfl:   •  predniSONE (DELTASONE) 10 MG tablet, Take 1 tablet by mouth Daily., Disp: 21 tablet, Rfl: 0    Allergies   Allergen Reactions   • Ace Inhibitors Unknown (See Comments)     Unknown reaction     • Heparin Other (See Comments)     Fever/chills, weakness in legs   • Sulfa Antibiotics Other (See Comments)     Mouth sores       Family History   Problem Relation Age of Onset   • Mental illness Mother    • Heart disease Mother    • Hypertension Father    • Cancer Father    • Heart disease Sister    • Heart disease Brother        Cancer-related family history includes Cancer in his  "father.    Social History     Tobacco Use   • Smoking status: Former Smoker     Packs/day: 2.00     Years: 50.00     Pack years: 100.00     Types: Cigarettes     Last attempt to quit: 2015     Years since quittin.6   • Smokeless tobacco: Former User     Types: Chew     Quit date: 1989   Substance Use Topics   • Alcohol use: No     Frequency: Never   • Drug use: No         ROS:     Review of Systems   Constitutional: Positive for fatigue (after treatments). Negative for chills and fever.   HENT: Positive for trouble swallowing (has to eat soft foods ). Negative for ear pain, mouth sores, nosebleeds and sore throat.    Eyes: Negative for photophobia and visual disturbance.   Respiratory: Positive for shortness of breath (attributes it to keytruda). Negative for chest tightness, wheezing and stridor.    Cardiovascular: Negative for chest pain and palpitations.   Gastrointestinal: Positive for nausea. Negative for abdominal pain and vomiting.   Endocrine: Negative for cold intolerance and heat intolerance.   Genitourinary: Negative for dysuria and hematuria.   Musculoskeletal: Negative for joint swelling and neck stiffness.   Skin: Negative for color change and rash.   Neurological: Negative for seizures, syncope and headaches.   Hematological: Negative for adenopathy.        No obvious bleeding   Psychiatric/Behavioral: Negative for agitation, confusion, hallucinations and sleep disturbance.       Objective:    Vitals:    20 1407   BP: 153/75   Pulse: 76   Resp: 18   Temp: 97.8 °F (36.6 °C)   Weight: 71.2 kg (157 lb)   Height: 167.6 cm (66\")   PainSc: 0-No pain       (1) Restricted in physically strenuous activity, ambulatory and able to do work of light nature    Physical Exam:     Physical Exam   Constitutional: He is oriented to person, place, and time. He appears well-developed and well-nourished. No distress.   HENT:   Head: Normocephalic and atraumatic.   Mouth/Throat: Oropharynx is clear and " moist.   Absent teeth in upper jaw.--eyeglasses  Cleft lip and palette   Eyes: Conjunctivae and EOM are normal. Right eye exhibits no discharge. Left eye exhibits no discharge. No scleral icterus.   Neck: Normal range of motion. Neck supple. No thyromegaly present.   Cardiovascular: Normal rate, regular rhythm and normal heart sounds. Exam reveals no gallop and no friction rub.   Pulmonary/Chest: Effort normal and breath sounds normal. No stridor. No respiratory distress. He has no wheezes.   Abdominal: Soft. Bowel sounds are normal. He exhibits no mass. There is no tenderness. There is no rebound and no guarding.   No tenderness   Musculoskeletal: Normal range of motion. He exhibits no tenderness.   Lymphadenopathy:     He has no cervical adenopathy.   Neurological: He is alert and oriented to person, place, and time. He exhibits normal muscle tone.   Skin: Skin is warm. No rash noted. He is not diaphoretic. No erythema.   Right side chest  port   Psychiatric: He has a normal mood and affect. His behavior is normal.   Nursing note and vitals reviewed.        Lab Results - Last 18 Months   Lab Units 02/20/20  1212 02/10/20  1151 02/03/20  1030   WBC 10*3/mm3 5.69 7.31 7.17   HEMOGLOBIN g/dL 11.7* 12.1* 12.2*   HEMATOCRIT % 34.8* 35.4* 36.3*   PLATELETS 10*3/mm3 198 166 171   MCV fL 90.6 90.5 91.4     Lab Results - Last 18 Months   Lab Units 02/10/20  1151 02/03/20  1030 01/17/20  0952   SODIUM mmol/L 138 135* 138   POTASSIUM mmol/L 5.2 5.1 4.7   CHLORIDE mmol/L 101 100 103   CO2 mmol/L 27.0 25.0 25.0   BUN mg/dL 10 12 12   CREATININE mg/dL 0.82 0.95 0.71*   CALCIUM mg/dL 9.2 9.3 9.0   BILIRUBIN mg/dL 0.3 0.3 0.3   ALK PHOS U/L 31* 32* 30*   ALT (SGPT) U/L 11 10 7   AST (SGOT) U/L 23 17 16   GLUCOSE mg/dL 119* 171* 135*       Assessment/Plan     Assessment:  1. Metastatic esophageal carcinoma:  Patient has a history of GE junction poorly differentiated adenocarcinoma for which he had chemoradiation followed by [facility]  Gurwinder resection:  Pathology showed ypT3,pN1 disease.  Tumor was Stage IIIA, diagnosed in 2016.  He received concurrent chemoradiation with weekly Carboplatin and Taxol and radiation finishing on 3/15/17.    CT scan on 1/10/19 showed new retroperitoneal lymph nodes.  These lymph nodes were biopsied on 1/30/19 and it shows metastatic esophageal cancer.  [employer] Next Gen testing was performed on the sample and it shows KRAS mutation, microsatellite stable tumor.  PD-L1 is positive.  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  HER2/marvel (ERBB2) was not amplified and was negative.  His CT scan on 5/1/19 shows evidence of progression.  Patient has started receiving Keytruda on 5/31/19.  He has received 13 cycles so far.  Recent CT scan on 1/28/2020 shows improvement and continued response.     2. Dyspnea/fever: Patient had an episode of dyspnea and fever the night after his last cycle of immunotherapy.  Symptoms subsided within 1 day.  But he still continues to have some baseline shortness of air.  Symptoms do not appear to be related to immunotherapy.  Does not report any orthopnea or PND.  3. 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  4. Occasional headaches/memory lapses: Advise close monitoring of the symptoms.  5. Episodic diarrhea:- diarrhea seems to be waxing and waning in nature.  At this point he does not appear to be related to immunotherapy.  Symptoms are very mild..  6. History of chronic dysphagia:  Due to recurrent esophageal strictures.  Patient is status post EGD and balloon dilation.  Today, he does not report any new symptoms.  7. PD-L1 positive, HER2 negative, p53 and KRAS positive.    8. B12 deficiency:  Recommended to take B12 1000 mcg twice daily.    9. Anemia: Likely from malignancy.  Also on iron supplement.      PLAN:  1. Advised to start taking prednisone 10 mg daily for up to 3 weeks, for his dyspnea symptoms.  Symptoms are nonspecific.  2. Will order BNP  3. Continue Keytruda.  We are closely monitoring  for treatment related side effects.  4. Continue levothyroxine 50 mcg.   5. Advised to continue Lomotil and Imodium as needed for diarrhea.    6. Continue to monitor TSH, T3, serum cortisol level.   7. We will continue to monitor closely for adverse events related to immunotherapy  8. Will need to closely monitor diarrhea symptoms.  Provided prescription for Lomotil to be taken 1 time daily.  If no response advised to take it twice a day.  9. Continue B12 supplements .  10. Continue  folic acid supplement also.  11. Follow-up in 1 month        Orders Placed This Encounter   Procedures   • BNP             Thank you very much for providing the opportunity to participate in this patient’s care. Please do not hesitate to call if there are any other questions

## 2020-02-20 ENCOUNTER — APPOINTMENT (OUTPATIENT)
Dept: LAB | Facility: HOSPITAL | Age: 70
End: 2020-02-20

## 2020-02-20 ENCOUNTER — OFFICE VISIT (OUTPATIENT)
Dept: ONCOLOGY | Facility: CLINIC | Age: 70
End: 2020-02-20

## 2020-02-20 ENCOUNTER — HOSPITAL ENCOUNTER (OUTPATIENT)
Dept: ONCOLOGY | Facility: HOSPITAL | Age: 70
Discharge: HOME | End: 2020-02-20
Admitting: NURSE PRACTITIONER

## 2020-02-20 VITALS
WEIGHT: 157 LBS | HEIGHT: 66 IN | BODY MASS INDEX: 25.23 KG/M2 | RESPIRATION RATE: 18 BRPM | DIASTOLIC BLOOD PRESSURE: 75 MMHG | SYSTOLIC BLOOD PRESSURE: 153 MMHG | HEART RATE: 76 BPM | TEMPERATURE: 97.8 F

## 2020-02-20 DIAGNOSIS — R06.02 SOB (SHORTNESS OF BREATH): Primary | ICD-10-CM

## 2020-02-20 DIAGNOSIS — I10 ESSENTIAL (PRIMARY) HYPERTENSION: ICD-10-CM

## 2020-02-20 DIAGNOSIS — Z51.12 ENCOUNTER FOR ANTINEOPLASTIC IMMUNOTHERAPY: ICD-10-CM

## 2020-02-20 DIAGNOSIS — C15.9 ESOPHAGEAL CANCER, STAGE IV (HCC): ICD-10-CM

## 2020-02-20 DIAGNOSIS — R53.83 OTHER FATIGUE: ICD-10-CM

## 2020-02-20 LAB
BASOPHILS # BLD AUTO: 0.02 10*3/MM3 (ref 0–0.2)
BASOPHILS NFR BLD AUTO: 0.4 % (ref 0–1.5)
DEPRECATED RDW RBC AUTO: 41.9 FL (ref 37–54)
EOSINOPHIL # BLD AUTO: 0.36 10*3/MM3 (ref 0–0.4)
EOSINOPHIL NFR BLD AUTO: 6.3 % (ref 0.3–6.2)
ERYTHROCYTE [DISTWIDTH] IN BLOOD BY AUTOMATED COUNT: 12.9 % (ref 12.3–15.4)
HCT VFR BLD AUTO: 34.8 % (ref 37.5–51)
HGB BLD-MCNC: 11.7 G/DL (ref 13–17.7)
LYMPHOCYTES # BLD AUTO: 1.6 10*3/MM3 (ref 0.7–3.1)
LYMPHOCYTES NFR BLD AUTO: 28.1 % (ref 19.6–45.3)
MCH RBC QN AUTO: 30.5 PG (ref 26.6–33)
MCHC RBC AUTO-ENTMCNC: 33.6 G/DL (ref 31.5–35.7)
MCV RBC AUTO: 90.6 FL (ref 79–97)
MONOCYTES # BLD AUTO: 1.11 10*3/MM3 (ref 0.1–0.9)
MONOCYTES NFR BLD AUTO: 19.5 % (ref 5–12)
NEUTROPHILS # BLD AUTO: 2.6 10*3/MM3 (ref 1.7–7)
NEUTROPHILS NFR BLD AUTO: 45.7 % (ref 42.7–76)
NT-PROBNP SERPL-MCNC: 768.1 PG/ML (ref 5–900)
PLATELET # BLD AUTO: 198 10*3/MM3 (ref 140–450)
PMV BLD AUTO: 9.6 FL (ref 6–12)
RBC # BLD AUTO: 3.84 10*6/MM3 (ref 4.14–5.8)
WBC NRBC COR # BLD: 5.69 10*3/MM3 (ref 3.4–10.8)

## 2020-02-20 PROCEDURE — 99215 OFFICE O/P EST HI 40 MIN: CPT | Performed by: INTERNAL MEDICINE

## 2020-02-20 PROCEDURE — 36415 COLL VENOUS BLD VENIPUNCTURE: CPT | Performed by: INTERNAL MEDICINE

## 2020-02-20 PROCEDURE — 83880 ASSAY OF NATRIURETIC PEPTIDE: CPT | Performed by: NURSE PRACTITIONER

## 2020-02-20 PROCEDURE — 85025 COMPLETE CBC W/AUTO DIFF WBC: CPT | Performed by: NURSE PRACTITIONER

## 2020-02-20 PROCEDURE — 36415 COLL VENOUS BLD VENIPUNCTURE: CPT

## 2020-02-20 RX ORDER — PREDNISONE 10 MG/1
10 TABLET ORAL DAILY
Qty: 21 TABLET | Refills: 0 | Status: SHIPPED | OUTPATIENT
Start: 2020-02-20 | End: 2020-09-11

## 2020-02-20 NOTE — PROGRESS NOTES
Patient here to see M.D. and doesn't want blood drawn from port. Patient wants blood drawn from his arm today.

## 2020-03-05 DIAGNOSIS — C16.0 MALIGNANT NEOPLASM OF CARDIA (HCC): ICD-10-CM

## 2020-03-05 DIAGNOSIS — Z51.11 ENCOUNTER FOR ANTINEOPLASTIC CHEMOTHERAPY: ICD-10-CM

## 2020-03-05 RX ORDER — SODIUM CHLORIDE 9 MG/ML
250 INJECTION, SOLUTION INTRAVENOUS ONCE
Status: CANCELLED | OUTPATIENT
Start: 2020-03-06

## 2020-03-06 ENCOUNTER — HOSPITAL ENCOUNTER (OUTPATIENT)
Dept: ONCOLOGY | Facility: HOSPITAL | Age: 70
Setting detail: INFUSION SERIES
Discharge: HOME | End: 2020-03-06

## 2020-03-06 VITALS
TEMPERATURE: 97.6 F | HEART RATE: 62 BPM | HEIGHT: 66 IN | SYSTOLIC BLOOD PRESSURE: 132 MMHG | RESPIRATION RATE: 18 BRPM | DIASTOLIC BLOOD PRESSURE: 74 MMHG | WEIGHT: 156 LBS | BODY MASS INDEX: 25.07 KG/M2

## 2020-03-06 DIAGNOSIS — Z51.11 ENCOUNTER FOR ANTINEOPLASTIC CHEMOTHERAPY: ICD-10-CM

## 2020-03-06 DIAGNOSIS — C16.0 MALIGNANT NEOPLASM OF CARDIA (HCC): Primary | ICD-10-CM

## 2020-03-06 LAB
ALBUMIN SERPL-MCNC: 4 G/DL (ref 3.5–5.2)
ALBUMIN/GLOB SERPL: 1.7 G/DL
ALP SERPL-CCNC: 33 U/L (ref 39–117)
ALT SERPL W P-5'-P-CCNC: 7 U/L (ref 1–41)
ANION GAP SERPL CALCULATED.3IONS-SCNC: 11 MMOL/L (ref 5–15)
AST SERPL-CCNC: 14 U/L (ref 1–40)
BASOPHILS # BLD AUTO: 0.06 10*3/MM3 (ref 0–0.2)
BASOPHILS NFR BLD AUTO: 1 % (ref 0–1.5)
BILIRUB SERPL-MCNC: 0.3 MG/DL (ref 0.2–1.2)
BUN BLD-MCNC: 11 MG/DL (ref 8–23)
BUN/CREAT SERPL: 13.6 (ref 7–25)
CALCIUM SPEC-SCNC: 8.9 MG/DL (ref 8.6–10.5)
CHLORIDE SERPL-SCNC: 103 MMOL/L (ref 98–107)
CO2 SERPL-SCNC: 25 MMOL/L (ref 22–29)
CREAT BLD-MCNC: 0.81 MG/DL (ref 0.76–1.27)
DEPRECATED RDW RBC AUTO: 42.4 FL (ref 37–54)
EOSINOPHIL # BLD AUTO: 0.29 10*3/MM3 (ref 0–0.4)
EOSINOPHIL NFR BLD AUTO: 4.8 % (ref 0.3–6.2)
ERYTHROCYTE [DISTWIDTH] IN BLOOD BY AUTOMATED COUNT: 13.2 % (ref 12.3–15.4)
GFR SERPL CREATININE-BSD FRML MDRD: 94 ML/MIN/1.73
GLOBULIN UR ELPH-MCNC: 2.3 GM/DL
GLUCOSE BLD-MCNC: 173 MG/DL (ref 65–99)
HCT VFR BLD AUTO: 32.7 % (ref 37.5–51)
HGB BLD-MCNC: 10.8 G/DL (ref 13–17.7)
LYMPHOCYTES # BLD AUTO: 1.58 10*3/MM3 (ref 0.7–3.1)
LYMPHOCYTES NFR BLD AUTO: 26.3 % (ref 19.6–45.3)
MCH RBC QN AUTO: 30.5 PG (ref 26.6–33)
MCHC RBC AUTO-ENTMCNC: 33 G/DL (ref 31.5–35.7)
MCV RBC AUTO: 92.4 FL (ref 79–97)
MONOCYTES # BLD AUTO: 0.62 10*3/MM3 (ref 0.1–0.9)
MONOCYTES NFR BLD AUTO: 10.3 % (ref 5–12)
NEUTROPHILS # BLD AUTO: 3.45 10*3/MM3 (ref 1.7–7)
NEUTROPHILS NFR BLD AUTO: 57.6 % (ref 42.7–76)
PLATELET # BLD AUTO: 181 10*3/MM3 (ref 140–450)
PMV BLD AUTO: 10.1 FL (ref 6–12)
POTASSIUM BLD-SCNC: 4.8 MMOL/L (ref 3.5–5.2)
PROT SERPL-MCNC: 6.3 G/DL (ref 6–8.5)
RBC # BLD AUTO: 3.54 10*6/MM3 (ref 4.14–5.8)
SODIUM BLD-SCNC: 139 MMOL/L (ref 136–145)
WBC NRBC COR # BLD: 6 10*3/MM3 (ref 3.4–10.8)

## 2020-03-06 PROCEDURE — 25010000002 PEMBROLIZUMAB 100 MG/4ML SOLUTION 4 ML VIAL: Performed by: NURSE PRACTITIONER

## 2020-03-06 PROCEDURE — 80053 COMPREHEN METABOLIC PANEL: CPT | Performed by: NURSE PRACTITIONER

## 2020-03-06 PROCEDURE — 85025 COMPLETE CBC W/AUTO DIFF WBC: CPT | Performed by: NURSE PRACTITIONER

## 2020-03-06 PROCEDURE — 36591 DRAW BLOOD OFF VENOUS DEVICE: CPT

## 2020-03-06 PROCEDURE — 96413 CHEMO IV INFUSION 1 HR: CPT

## 2020-03-06 RX ORDER — ALBUTEROL SULFATE 90 UG/1
2 AEROSOL, METERED RESPIRATORY (INHALATION) EVERY 4 HOURS PRN
Qty: 8 G | Refills: 3 | Status: SHIPPED | OUTPATIENT
Start: 2020-03-06 | End: 2020-09-17

## 2020-03-06 RX ORDER — SODIUM CHLORIDE 0.9 % (FLUSH) 0.9 %
10 SYRINGE (ML) INJECTION AS NEEDED
Status: DISCONTINUED | OUTPATIENT
Start: 2020-03-06 | End: 2020-03-07 | Stop reason: HOSPADM

## 2020-03-06 RX ORDER — SODIUM CHLORIDE 9 MG/ML
250 INJECTION, SOLUTION INTRAVENOUS ONCE
Status: COMPLETED | OUTPATIENT
Start: 2020-03-06 | End: 2020-03-06

## 2020-03-06 RX ORDER — SODIUM CHLORIDE 0.9 % (FLUSH) 0.9 %
10 SYRINGE (ML) INJECTION AS NEEDED
Status: CANCELLED | OUTPATIENT
Start: 2020-03-06

## 2020-03-06 RX ADMIN — SODIUM CHLORIDE 250 ML: 900 INJECTION, SOLUTION INTRAVENOUS at 10:36

## 2020-03-06 RX ADMIN — SODIUM CHLORIDE 200 MG: 900 INJECTION, SOLUTION INTRAVENOUS at 10:49

## 2020-03-17 ENCOUNTER — CLINICAL SUPPORT NO REQUIREMENTS (OUTPATIENT)
Dept: CARDIOLOGY | Facility: CLINIC | Age: 70
End: 2020-03-17

## 2020-03-17 ENCOUNTER — OUTSIDE FACILITY SERVICE (OUTPATIENT)
Dept: CARDIOLOGY | Facility: CLINIC | Age: 70
End: 2020-03-17

## 2020-03-17 DIAGNOSIS — Z95.0 PRESENCE OF CARDIAC PACEMAKER: ICD-10-CM

## 2020-03-17 DIAGNOSIS — R00.1 BRADYCARDIA: Primary | ICD-10-CM

## 2020-03-17 PROCEDURE — 93010 ELECTROCARDIOGRAM REPORT: CPT | Performed by: INTERNAL MEDICINE

## 2020-03-17 PROCEDURE — 99214 OFFICE O/P EST MOD 30 MIN: CPT | Performed by: INTERNAL MEDICINE

## 2020-03-17 PROCEDURE — 93280 PM DEVICE PROGR EVAL DUAL: CPT | Performed by: INTERNAL MEDICINE

## 2020-03-18 ENCOUNTER — TELEPHONE (OUTPATIENT)
Dept: ONCOLOGY | Facility: CLINIC | Age: 70
End: 2020-03-18

## 2020-03-18 NOTE — TELEPHONE ENCOUNTER
Spoke with patient and he confirmed new appointment date and time two weeks from now per Dr Louisa CLARK-19

## 2020-03-19 ENCOUNTER — APPOINTMENT (OUTPATIENT)
Dept: LAB | Facility: HOSPITAL | Age: 70
End: 2020-03-19

## 2020-03-24 DIAGNOSIS — Z51.11 ENCOUNTER FOR ANTINEOPLASTIC CHEMOTHERAPY: ICD-10-CM

## 2020-03-24 DIAGNOSIS — C16.0 MALIGNANT NEOPLASM OF CARDIA (HCC): ICD-10-CM

## 2020-03-24 RX ORDER — SODIUM CHLORIDE 9 MG/ML
250 INJECTION, SOLUTION INTRAVENOUS ONCE
Status: CANCELLED | OUTPATIENT
Start: 2020-03-27

## 2020-03-27 ENCOUNTER — HOSPITAL ENCOUNTER (OUTPATIENT)
Dept: ONCOLOGY | Facility: HOSPITAL | Age: 70
Setting detail: INFUSION SERIES
Discharge: HOME | End: 2020-03-27

## 2020-03-27 VITALS
BODY MASS INDEX: 24.59 KG/M2 | HEIGHT: 66 IN | HEART RATE: 60 BPM | TEMPERATURE: 97.3 F | DIASTOLIC BLOOD PRESSURE: 75 MMHG | RESPIRATION RATE: 20 BRPM | SYSTOLIC BLOOD PRESSURE: 129 MMHG | WEIGHT: 153 LBS

## 2020-03-27 DIAGNOSIS — Z51.11 ENCOUNTER FOR ANTINEOPLASTIC CHEMOTHERAPY: ICD-10-CM

## 2020-03-27 DIAGNOSIS — C16.0 MALIGNANT NEOPLASM OF CARDIA (HCC): Primary | ICD-10-CM

## 2020-03-27 LAB
ALBUMIN SERPL-MCNC: 3.7 G/DL (ref 3.5–5.2)
ALBUMIN/GLOB SERPL: 1.6 G/DL
ALP SERPL-CCNC: 27 U/L (ref 39–117)
ALT SERPL W P-5'-P-CCNC: 6 U/L (ref 1–41)
ANION GAP SERPL CALCULATED.3IONS-SCNC: 12 MMOL/L (ref 5–15)
AST SERPL-CCNC: 12 U/L (ref 1–40)
BASOPHILS # BLD AUTO: 0.06 10*3/MM3 (ref 0–0.2)
BASOPHILS NFR BLD AUTO: 1 % (ref 0–1.5)
BILIRUB SERPL-MCNC: 0.3 MG/DL (ref 0.2–1.2)
BUN BLD-MCNC: 16 MG/DL (ref 8–23)
BUN/CREAT SERPL: 19 (ref 7–25)
CALCIUM SPEC-SCNC: 8.6 MG/DL (ref 8.6–10.5)
CHLORIDE SERPL-SCNC: 104 MMOL/L (ref 98–107)
CO2 SERPL-SCNC: 23 MMOL/L (ref 22–29)
CORTIS SERPL-MCNC: 12.25 MCG/DL
CREAT BLD-MCNC: 0.84 MG/DL (ref 0.76–1.27)
DEPRECATED RDW RBC AUTO: 43 FL (ref 37–54)
EOSINOPHIL # BLD AUTO: 0.42 10*3/MM3 (ref 0–0.4)
EOSINOPHIL NFR BLD AUTO: 6.8 % (ref 0.3–6.2)
ERYTHROCYTE [DISTWIDTH] IN BLOOD BY AUTOMATED COUNT: 13.2 % (ref 12.3–15.4)
GFR SERPL CREATININE-BSD FRML MDRD: 90 ML/MIN/1.73
GLOBULIN UR ELPH-MCNC: 2.3 GM/DL
GLUCOSE BLD-MCNC: 176 MG/DL (ref 65–99)
HCT VFR BLD AUTO: 34.6 % (ref 37.5–51)
HGB BLD-MCNC: 11.4 G/DL (ref 13–17.7)
LYMPHOCYTES # BLD AUTO: 1.65 10*3/MM3 (ref 0.7–3.1)
LYMPHOCYTES NFR BLD AUTO: 26.6 % (ref 19.6–45.3)
MCH RBC QN AUTO: 30.5 PG (ref 26.6–33)
MCHC RBC AUTO-ENTMCNC: 32.9 G/DL (ref 31.5–35.7)
MCV RBC AUTO: 92.5 FL (ref 79–97)
MONOCYTES # BLD AUTO: 0.68 10*3/MM3 (ref 0.1–0.9)
MONOCYTES NFR BLD AUTO: 11 % (ref 5–12)
NEUTROPHILS # BLD AUTO: 3.4 10*3/MM3 (ref 1.7–7)
NEUTROPHILS NFR BLD AUTO: 54.6 % (ref 42.7–76)
PLATELET # BLD AUTO: 171 10*3/MM3 (ref 140–450)
PMV BLD AUTO: 10.5 FL (ref 6–12)
POTASSIUM BLD-SCNC: 4.8 MMOL/L (ref 3.5–5.2)
PROT SERPL-MCNC: 6 G/DL (ref 6–8.5)
RBC # BLD AUTO: 3.74 10*6/MM3 (ref 4.14–5.8)
SODIUM BLD-SCNC: 139 MMOL/L (ref 136–145)
T3FREE SERPL-MCNC: 2.79 PG/ML (ref 2–4.4)
T4 FREE SERPL-MCNC: 1.5 NG/DL (ref 0.93–1.7)
TSH SERPL DL<=0.05 MIU/L-ACNC: 1.95 UIU/ML (ref 0.27–4.2)
WBC NRBC COR # BLD: 6.21 10*3/MM3 (ref 3.4–10.8)

## 2020-03-27 PROCEDURE — 36591 DRAW BLOOD OFF VENOUS DEVICE: CPT

## 2020-03-27 PROCEDURE — 25010000002 PEMBROLIZUMAB 100 MG/4ML SOLUTION 4 ML VIAL: Performed by: NURSE PRACTITIONER

## 2020-03-27 PROCEDURE — 85025 COMPLETE CBC W/AUTO DIFF WBC: CPT | Performed by: NURSE PRACTITIONER

## 2020-03-27 PROCEDURE — 84439 ASSAY OF FREE THYROXINE: CPT | Performed by: NURSE PRACTITIONER

## 2020-03-27 PROCEDURE — 80053 COMPREHEN METABOLIC PANEL: CPT | Performed by: NURSE PRACTITIONER

## 2020-03-27 PROCEDURE — 96413 CHEMO IV INFUSION 1 HR: CPT

## 2020-03-27 PROCEDURE — 82533 TOTAL CORTISOL: CPT | Performed by: NURSE PRACTITIONER

## 2020-03-27 PROCEDURE — 84481 FREE ASSAY (FT-3): CPT | Performed by: NURSE PRACTITIONER

## 2020-03-27 PROCEDURE — 84443 ASSAY THYROID STIM HORMONE: CPT | Performed by: NURSE PRACTITIONER

## 2020-03-27 RX ORDER — SODIUM CHLORIDE 0.9 % (FLUSH) 0.9 %
10 SYRINGE (ML) INJECTION AS NEEDED
Status: CANCELLED | OUTPATIENT
Start: 2020-03-27

## 2020-03-27 RX ORDER — SODIUM CHLORIDE 9 MG/ML
250 INJECTION, SOLUTION INTRAVENOUS ONCE
Status: COMPLETED | OUTPATIENT
Start: 2020-03-27 | End: 2020-03-27

## 2020-03-27 RX ORDER — SODIUM CHLORIDE 0.9 % (FLUSH) 0.9 %
10 SYRINGE (ML) INJECTION AS NEEDED
Status: DISCONTINUED | OUTPATIENT
Start: 2020-03-27 | End: 2020-03-28 | Stop reason: HOSPADM

## 2020-03-27 RX ADMIN — SODIUM CHLORIDE 250 ML: 900 INJECTION, SOLUTION INTRAVENOUS at 10:32

## 2020-03-27 RX ADMIN — Medication 10 ML: at 11:27

## 2020-03-27 RX ADMIN — SODIUM CHLORIDE 200 MG: 900 INJECTION, SOLUTION INTRAVENOUS at 10:30

## 2020-03-27 NOTE — PROGRESS NOTES
Pt here for keytruda states having diarrhea once a week, with a few night sweats and states having a fever last time receiving  keyturda but resolved with tylenol. Talk to NP Carmen Curiel, okay to treat pt today.

## 2020-03-31 ENCOUNTER — TELEPHONE (OUTPATIENT)
Dept: ONCOLOGY | Facility: CLINIC | Age: 70
End: 2020-03-31

## 2020-04-06 DIAGNOSIS — R53.83 OTHER FATIGUE: ICD-10-CM

## 2020-04-06 DIAGNOSIS — R06.02 SOB (SHORTNESS OF BREATH): ICD-10-CM

## 2020-04-06 RX ORDER — PREDNISONE 10 MG/1
TABLET ORAL
Qty: 21 TABLET | Refills: 0 | OUTPATIENT
Start: 2020-04-06

## 2020-04-06 NOTE — TELEPHONE ENCOUNTER
Patient states he does not need RX of prednisone that refill request was sent on, he still has a few of the three week supply that Dr. Jasso sent in, he has not been taking them as prescribed. Patient states that his SOA is better.

## 2020-04-15 DIAGNOSIS — C16.0 MALIGNANT NEOPLASM OF CARDIA (HCC): ICD-10-CM

## 2020-04-15 DIAGNOSIS — Z51.11 ENCOUNTER FOR ANTINEOPLASTIC CHEMOTHERAPY: ICD-10-CM

## 2020-04-15 RX ORDER — SODIUM CHLORIDE 9 MG/ML
250 INJECTION, SOLUTION INTRAVENOUS ONCE
Status: CANCELLED | OUTPATIENT
Start: 2020-04-17

## 2020-04-17 ENCOUNTER — HOSPITAL ENCOUNTER (OUTPATIENT)
Dept: ONCOLOGY | Facility: HOSPITAL | Age: 70
Setting detail: INFUSION SERIES
Discharge: HOME | End: 2020-04-17

## 2020-04-17 ENCOUNTER — OFFICE VISIT (OUTPATIENT)
Dept: ONCOLOGY | Facility: CLINIC | Age: 70
End: 2020-04-17

## 2020-04-17 VITALS
WEIGHT: 150.6 LBS | HEIGHT: 66 IN | BODY MASS INDEX: 24.2 KG/M2 | HEART RATE: 60 BPM | DIASTOLIC BLOOD PRESSURE: 75 MMHG | TEMPERATURE: 98.2 F | RESPIRATION RATE: 18 BRPM | SYSTOLIC BLOOD PRESSURE: 119 MMHG

## 2020-04-17 DIAGNOSIS — Z51.11 ENCOUNTER FOR ANTINEOPLASTIC CHEMOTHERAPY: ICD-10-CM

## 2020-04-17 DIAGNOSIS — E53.8 B12 DEFICIENCY: ICD-10-CM

## 2020-04-17 DIAGNOSIS — C15.9 ESOPHAGEAL CANCER, STAGE IV (HCC): Primary | ICD-10-CM

## 2020-04-17 DIAGNOSIS — E03.9 HYPOTHYROIDISM (ACQUIRED): ICD-10-CM

## 2020-04-17 DIAGNOSIS — C16.0 MALIGNANT NEOPLASM OF CARDIA (HCC): Primary | ICD-10-CM

## 2020-04-17 LAB
ALBUMIN SERPL-MCNC: 4.1 G/DL (ref 3.5–5.2)
ALBUMIN/GLOB SERPL: 1.7 G/DL
ALP SERPL-CCNC: 32 U/L (ref 39–117)
ALT SERPL W P-5'-P-CCNC: 7 U/L (ref 1–41)
ANION GAP SERPL CALCULATED.3IONS-SCNC: 11 MMOL/L (ref 5–15)
AST SERPL-CCNC: 14 U/L (ref 1–40)
BASOPHILS # BLD AUTO: 0.05 10*3/MM3 (ref 0–0.2)
BASOPHILS NFR BLD AUTO: 0.8 % (ref 0–1.5)
BILIRUB SERPL-MCNC: 0.2 MG/DL (ref 0.2–1.2)
BUN BLD-MCNC: 12 MG/DL (ref 8–23)
BUN/CREAT SERPL: 15.6 (ref 7–25)
CALCIUM SPEC-SCNC: 9 MG/DL (ref 8.6–10.5)
CHLORIDE SERPL-SCNC: 105 MMOL/L (ref 98–107)
CO2 SERPL-SCNC: 25 MMOL/L (ref 22–29)
CREAT BLD-MCNC: 0.77 MG/DL (ref 0.76–1.27)
DEPRECATED RDW RBC AUTO: 43.4 FL (ref 37–54)
EOSINOPHIL # BLD AUTO: 0.42 10*3/MM3 (ref 0–0.4)
EOSINOPHIL NFR BLD AUTO: 6.8 % (ref 0.3–6.2)
ERYTHROCYTE [DISTWIDTH] IN BLOOD BY AUTOMATED COUNT: 13.5 % (ref 12.3–15.4)
GFR SERPL CREATININE-BSD FRML MDRD: 100 ML/MIN/1.73
GLOBULIN UR ELPH-MCNC: 2.4 GM/DL
GLUCOSE BLD-MCNC: 145 MG/DL (ref 65–99)
HCT VFR BLD AUTO: 34.6 % (ref 37.5–51)
HGB BLD-MCNC: 11.6 G/DL (ref 13–17.7)
LYMPHOCYTES # BLD AUTO: 1.71 10*3/MM3 (ref 0.7–3.1)
LYMPHOCYTES NFR BLD AUTO: 27.9 % (ref 19.6–45.3)
MCH RBC QN AUTO: 30.7 PG (ref 26.6–33)
MCHC RBC AUTO-ENTMCNC: 33.5 G/DL (ref 31.5–35.7)
MCV RBC AUTO: 91.5 FL (ref 79–97)
MONOCYTES # BLD AUTO: 0.65 10*3/MM3 (ref 0.1–0.9)
MONOCYTES NFR BLD AUTO: 10.6 % (ref 5–12)
NEUTROPHILS # BLD AUTO: 3.31 10*3/MM3 (ref 1.7–7)
NEUTROPHILS NFR BLD AUTO: 53.9 % (ref 42.7–76)
PLATELET # BLD AUTO: 150 10*3/MM3 (ref 140–450)
PMV BLD AUTO: 10.3 FL (ref 6–12)
POTASSIUM BLD-SCNC: 5 MMOL/L (ref 3.5–5.2)
PROT SERPL-MCNC: 6.5 G/DL (ref 6–8.5)
RBC # BLD AUTO: 3.78 10*6/MM3 (ref 4.14–5.8)
SODIUM BLD-SCNC: 141 MMOL/L (ref 136–145)
WBC NRBC COR # BLD: 6.14 10*3/MM3 (ref 3.4–10.8)

## 2020-04-17 PROCEDURE — 80053 COMPREHEN METABOLIC PANEL: CPT | Performed by: NURSE PRACTITIONER

## 2020-04-17 PROCEDURE — 36591 DRAW BLOOD OFF VENOUS DEVICE: CPT

## 2020-04-17 PROCEDURE — 85025 COMPLETE CBC W/AUTO DIFF WBC: CPT | Performed by: NURSE PRACTITIONER

## 2020-04-17 PROCEDURE — 25010000002 PEMBROLIZUMAB 100 MG/4ML SOLUTION 4 ML VIAL: Performed by: NURSE PRACTITIONER

## 2020-04-17 PROCEDURE — 96413 CHEMO IV INFUSION 1 HR: CPT

## 2020-04-17 PROCEDURE — 99215 OFFICE O/P EST HI 40 MIN: CPT | Performed by: INTERNAL MEDICINE

## 2020-04-17 RX ORDER — SODIUM CHLORIDE 0.9 % (FLUSH) 0.9 %
10 SYRINGE (ML) INJECTION AS NEEDED
Status: DISCONTINUED | OUTPATIENT
Start: 2020-04-17 | End: 2020-04-18 | Stop reason: HOSPADM

## 2020-04-17 RX ORDER — SODIUM CHLORIDE 0.9 % (FLUSH) 0.9 %
10 SYRINGE (ML) INJECTION AS NEEDED
Status: CANCELLED | OUTPATIENT
Start: 2020-04-17

## 2020-04-17 RX ORDER — SODIUM CHLORIDE 9 MG/ML
250 INJECTION, SOLUTION INTRAVENOUS ONCE
Status: DISCONTINUED | OUTPATIENT
Start: 2020-04-17 | End: 2020-04-18 | Stop reason: HOSPADM

## 2020-04-17 RX ADMIN — Medication 10 ML: at 11:11

## 2020-04-17 RX ADMIN — SODIUM CHLORIDE 200 MG: 900 INJECTION, SOLUTION INTRAVENOUS at 10:30

## 2020-04-17 NOTE — PROGRESS NOTES
Pt here today for keytruda. He states that he is depressed. Pt currently is taking Clonazepam total of 1.5mg daily. Pt denies any thoughts of suicide. He states that he has dealt with depression throughout his life and doesn't want to try any additional medications for it. Pt verbalized understanding of calling office if any further concerns or thoughts of hurting himself.

## 2020-04-17 NOTE — PROGRESS NOTES
Hematology-Oncology Follow-up Note       Artie Mc  1950    Primary Care Physician: Chanel Carter APRN  Referring Physician: Chanel Carter APRN    Reason For Visit:  No chief complaint on file.  Metastatic esophageal cancer  Monitoring for adverse effects related to immunotherapy.  Hypothyroidism  HPI:   Mr. Mc is a pleasant 69-year-old gentleman with a history of gastroesophageal reflux disease, diabetes, hypertension, heart block status post pacemaker placement and history of CABG.  He was having symptoms of dysphagia, weight loss since September 2016. He was reporting symptoms of food getting stuck in the lower chest.  He has transitioned himself to a liquid diet.  The patient reports losing about 30 pounds over this duration.  The patient underwent upper GI endoscopy on 12/20/16 with Dr. Esvin Barrera.  Endoscopy showed necrotic, circumferential and moderately obstructive mass at the distal esophagus between 42 cm and 36 cm.      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  There was also evidence of mild subacute inflammation in the duodenum.  Dr. Barrera ordered a CT scan of the chest and abdomen with contrast on 12/20/16.  The scan was done at [facility].  The scan showed a new ill-defined mass in the anterior wall of the distal esophagus at the GE junction measuring 2.3 x 2.2 x 1.6 cm, worrisome for cancer.  There were four subcentimeter liver lesions which had appeared stable in comparison to another CT scan from 2009 and they likely represent small cysts.  There was also mild lymphadenopathy in the gastrohepatic ligament.  There were at least six borderline enlarged lymph nodes measuring up to 1.3 x 1 cm in the region.  The patient is referred to us for further oncological evaluation.    1/5/2017 - The patient presents for initial consultation.  He reports persistent dysphagia and also has  symptoms of regurgitation.  He cannot tolerate solid foods and is dependent on liquid diet such as Ensure.  He also reports fatigue. Symptoms of dysphagia have been present for the past four months.  When he eats any solids, the food gets stuck in the lower chest.    • 1/5/17 - Vitamin B12 200 (L).  CEA 0.8.  Ferritin 35.  Iron saturation 16% (L), serum iron 56, TIBC 354.  Creatinine 0.9.  LFTs normal.  Folate 10.2.  • 1/12/17 - PET/CT scan:  Intense abnormal activity corresponding to the region of the GE junction and proximal stomach.  SUV is 11.06.  There is a suggestion of an abnormal mass or abnormal wall thickening in the region measuring 3.9 x 4.8 cm.  No additional abnormalities.  No evidence of metastases or other lymphadenopathy.  Scattered nonspecific subcentimeter lymph nodes involving the mediastinum and within the central upper abdomen.  • 1/13/17 - Creatinine 0.9.  LFTs normal.    • 1/17/17 - Patient seen by thoracic surgeon, Dr. Bradley.  PET scan was reviewed.  He has recommended neoadjuvant chemoradiation therapy followed by Auberry Gurwinder esophagogastrectomy.  Port-A-Cath is being arranged.    • 1/20/17 - WBC 6.3, hemoglobin 12.1, platelet count 198,000, MCV 86.4.  • 1/30/17 - Patient started weekly Carboplatin and Taxol (Carboplatin AUC 2 and Taxol 50 mg/M2).  Patient received Injectafer 750 mg.  WBC 5.8, hemoglobin 12.4, platelet count 244,000.   • 1/31/17 - Patient seen by Dr. dEuardo Oleary.  The plan was to give 5040 cGy in 28 fractions.   • 2/6/17 - WBC 4.4, hemoglobin 11.6, platelet count 201,000.  Patient received cycle 2 of weekly Carboplatin and Taxol (Carboplatin AUC 2 and Taxol 50 mg/M2).  • 2/13/17 - Patient received Carboplatin and Taxol weekly cycle 3.  • 2/13/17 - Patient started concurrent radiation therapy.  Total planned dose is 4140 cGy.    • 2/13/17 to 2/15/17 - Patient received 4140 cGy of neoadjuvant radiation.  Radiation was finished on 3/15/17.    • 2/20/17 - WBC 1.8,  hemoglobin 10.6, platelet count 217,000.  Creatinine 0.5. Folate 15 (L).  Ferritin 361.  Haptoglobin 214.  Iron saturation 33%, TIBC 263, serum iron 88.    • 2/20/17 - Patient received Carboplatin and Taxol weekly cycle 4.   • 2/27/17 - WBC 3.7, hemoglobin 10.7, platelet count 177,000, MCV 85.3.    • 3/6/17 - Patient has received 16 out of the 23 planned radiation treatments.  Total planned dose is 4140 cGy.    • 3/6/17 - WBC 5.4, hemoglobin 11.2, platelet count 113,000.  • 3/6/17 - Patient received cycle 5 of weekly Carboplatin and Taxol.   • 3/13/17 - Patient received cycle 6 of weekly Carboplatin and Taxol.  WBC 3.9, hemoglobin 11.3, platelet count 93,000.     • 3/20/17 - WBC 1.4, hemoglobin 10.0, platelet count 118,000, MCV 86.1   • 3/27/17 - WBC 3.8, hemoglobin 9.5, platelet count 176,000, MCV 87.3.    • 4/7/17 - Stress test:  No evidence of reversible myocardial ischemia.  Normal left ventricular ejection fraction of 50%.    • 4/17/17 - Upper GI endoscopy by Dr. 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  The GE junction adenocarcinoma lesion seems to have a very good response to chemotherapy and radiation.  The lumen was open.  • 5/1/17 - WBC 7.0, hemoglobin 10.2, platelet count 175,000.    • 5/4/17 - Patient underwent [place] Guriwnder esophagectomy with pyloroplasty, feeding jejunostomy tube, abdominal and mediastinal lymph node dissection.    Surgical Pathology:  Moderately to poorly differentiated adenocarcinoma measuring 1.3 cm at the GE junction.  Resection margins are negative for malignancy.  Tumor margins are negative.  There is effective treatment response.  No evidence of lymphovascular invasion.  Staging is ypT3,pN1.  One out of fourteen lymph nodes involved.   • 5/22/17 - WBC 6.8, hemoglobin 10.3, platelet count 312,000.    • 6/19/17 - WBC 5.7, hemoglobin 10.5, platelet count 204,000, MCV 92.1.    • 7/6/17 - EGD:  Status post balloon dilation of esophageal  stricture.  • 7/12/17 - EGD with balloon dilation of esophageal stricture.  • 7/26/17 - EGD and balloon dilation of esophageal stricture.  • 7/31/17 - PET/CT scan:  There is mild metabolic activity seen at the thoracic esophagus just at and below the surgical margins.  Some mild wall thickening.  This could be from recent surgery.  A residual cancer seems unlikely.  There is a precarinal lymph node with mild metabolic activity with SUV of 3 measuring 1 x 1.3 cm.  Again this appears to be nonspecific in my opinion.    • 8/7/17 - WBC 5.9, hemoglobin 11.9, platelet count 171,000, MCV 88.7.    • 10/19/17 - WBC 7.5, hemoglobin 11.8, platelet count 216,000.    • 1/8/18 - PET scan:  No evidence of residual disease or recurrent disease.  There is an esophageal stent in the mid esophagus with a large debris air level.  No evidence of any metastases in the chest, abdomen or pelvis.  Mild nodule uptake in the vocal cords with fullness in the right vocal cord.  This could be physiologic.    • 7/9/18 - CT scan of chest with contrast:  Prominent mediastinal lymph nodes appear stable since February.  Changes of gastric pull-through procedure for esophageal cancer again noted.  Tree-in-bud infiltrates in the left lung base, consistent with infection versus inflammation.    • 1/10/19 - CT chest, abdomen and pelvis with contrast:  No evidence of mets in the chest; however, interval development of metastatic lymphadenopathy in the left side of the retroperitoneum.  For instance, there is a 17 mm rounded lymph node, 10 mm lymph node and also a 14 mm lymph node.  These are all new.  Stable 9 mm hypodense lesion within the right hepatic lobe, which could reflect hemangioma or complex cyst.    • 1/30/19 - CT-guided core biopsy of retroperitoneal lymph node:  Poorly differentiated adenocarcinoma consistent with metastases from patient’s known esophageal primary.  CK20 positive, CDX2 positive, cytokeratin 7 negative, TTF-1 negative.     • 2/1/19 - Creatinine 0.9, BUN 18, calcium 9.3; these are normal.    • 2/28/19 - [facility] Next Gen sequencing testing on retroperitoneal lymph node specimen:  PD-L1 positive.  CPS = 3.  This implies responsiveness to pembrolizumab.  Mismatch repair status is proficient (no evidence of microsatellite instability).  Tumor mutational burden is low = 6 mutations/Mb.  CDH1 indeterminate.  KRAS mutation positive.  TP53 mutation positive.  CDK6 amplified.  [employer] PD-L1 testing by IHC:  PD-L1 expression 1% positive (CPS =1).  HER2/marvel by IHC is negative.  HER2/marvel by CISH not amplified.  Microsatellite stable tumor.    • 3/7/19 - WBC 7.3, hemoglobin 12, platelet count 128,000, MCV 92.     • 3/14/19 - Patient was seen by radiation oncologist, Dr. Chahal.  He has recommended observation versus systemic chemoimmunotherapy as needed.  No plans for radiation therapy.   • 5/1/19 - CT scan of chest with contrast:  No definitive findings of new metastatic disease in the chest.  Emphysema noted.    • 5/1/19 - CT abdomen and pelvis:  There is interval progression of metastatic retroperitoneal adenopathy.  Left periaortic adenopathy with lymph node measuring up to 2 cm, previously 1.7 cm.  Another lymph node now measures 2.3, previously 1.4 cm.  New enlarged lymph node on image 147 measures 1.4 cm, previously 0.4 cm.  A 9 mm hypodense right hepatic lobe lesion appears stable.    • 5/9/19 - Decision made to start patient on immunotherapy Keytruda.    • 5/9/19 - Vitamin B12 229.  Ferritin 61.  Folate 14.3.  Iron saturation 26%, TIBC 304, serum iron 79.  • 5/31/19 - Patient received Keytruda 200 mg cycle 1, day 1.    • 5/31/19 - Free T4 0.99.  Creatinine 0.9, BUN 9.  LFTs normal.  TSH 3.99.  Folate 14.3.  Iron saturation 26%.    • 6/21/2019 patient received Keytruda cycle 2  • 7/12/2019: Patient received Keytruda cycle 3  • 8/2/2019 patient received cycle 4 Keytruda WBC 7.2, hemoglobin 11.8 platelet count 163  • 8/23/2019 creatinine 0.9,  LFTs normal, WBC 6.4, hemoglobin 11.5, platelets 179, TSH 2.1   • 8/23/2019 patient received cycle 5 of Keytruda  • 9/3/2019 CT chest abdomen and pelvis with contrast: . Positive response to therapy in the abdomen since 05/01/2019. Pathologically enlarged retroperitoneal lymph nodes, predominantly in the left periaortic region, have diminished in size.a 1.3 x 2.1 cm left periaortic node (image 60), previously measured 3.5 x 2.3 cm. A 1.6 cm index node near the level of the left renal vein previously measured 2.0 cm No new or progressive adenopathy. 2. Stable findings in the chest. Noncalcified right upper lobe nodules are unchanged. There is no evidence of new or progressive metastatic disease within the chest. 3. 8 mm indeterminate low-density lesion in the right hepatic lobe, is stable. No new liver lesions.  • 9/6/2019 WBC 5.6, hemoglobin 11.2, platelets of 192, MCV 91.8  • 9/13/2019 patient received Keytruda cycle 6, albumin 3.2  • 10/4/2019 patient received cycle 7 of Keytruda, TSH 4.8, free T4 0.86  • 10/9/2019 WBC 5.8, hemoglobin 11.7, platelets 174  • 10/25/2019 patient received cycle 8 of Keytruda.  WBC 5.9, hemoglobin 11.5, platelets 152, creatinine 0.75, cortisol 15.2, TSH 6.48 high , free T4 1.13 normal  • 11/6/2019 WBC 6.6, hemoglobin 11.8, MCV 91.5, platelets 187  • 11/15/2019 patient received cycle 9 of Keytruda, WBC 6.6, hemoglobin 11.9, platelets 161, cortisol level 8.86, TSH 2.68, free T3 2.8, free T4 1.5  • 12/4/2019 WBC 6.9, hemoglobin 12.0, platelets 180, MCV 92.9  • 12/06/2019-Keytruda Cycle 10  • 12/27/2019- Cycle 11 LFTs normal, WBC 6.7, hemoglobin 11.4, platelets 168, MCV 93, TSH 2.4,  • 1/28/2020: CT chest abdomen and pelvis with contrast:  • 1/17/2020 patient received Keytruda cycle 12: Single (aortic lymph node in the abdominal retroperitoneum has increased.  Rest of the retroperitoneal lymph nodes have decreased in size.  Mediastinal adenopathy appears unchanged.  Noncalcified bilateral  pulmonary nodules are stable.  • 2/3/2020 WBC 7.1, hemoglobin 12.2, platelets 171  • 02/10/2020 patient received cycle 13 of Keytruda: WBC 7.3, hemoglobin 12.1, platelets 166, creatinine 0.82  • 3/6/2020 patient is of Keytruda 200 mg, hemoglobin 10.8  • 3/27/2020 patient received Keytruda 200 mg, hemoglobin 11.4, TSH 1.95  • 4/17/2020 patient received Keytruda cycle 16 200 mg, WBC 6.1, hemoglobin 11.6, platelets 150       Subjective:  69 year old male patient presents to the office for his immunotherapy treatment. He does not report any more fevers.  Dr. Jasso wanted to see him before another cycle to make sure everything looked alright.  He continues to have episodic diarrhea.  He does complain of occasional mild nausea and fatigue.  He continues folic acid daily.     The following portions of the patient's history were reviewed and updated as appropriate: allergies, current medications, past family history, past medical history, past social history, past surgical history and problem list.     Past Medical History:   Diagnosis Date   • B12 deficiency    • Blockage of coronary artery of heart (CMS/HCC)    • Depression    • DM (diabetes mellitus) (CMS/HCC)    • Dysphagia    • HTN (hypertension)    • Hyperlipidemia        Past Surgical History:   Procedure Laterality Date   • ABDOMINAL WALL ABSCESS INCISION AND DRAINAGE  10/22/2018    WITH DEBRIDEMENT  1.5CM X 1.5 CM X 1.5 CM    DR. PURI   • CATARACT EXTRACTION  2018   • COLONOSCOPY     • CORONARY ARTERY BYPASS GRAFT  2015   • ENDOSCOPY  12/20/2016   • ESOPHAGECTOMY  05/04/2017    BROOKS PETTY ESOPHAGECTOMY, FEEDING JEJNOSTOMOY, ABDOMINAL AND MEDIASTINAL LYMPH NODE DISECTION, PEDICLED MUSCLE FLAP COVERAGE DR. PURI   • ESOPHAGOSCOPY / EGD  07/12/2017    WITH BALLOON DILATION   • ESOPHAGOSCOPY / EGD  07/26/2017    WITH BALLOON DILATION   • ESOPHAGOSCOPY / EGD  08/11/2017    WITH BALLOON DILATION   • ESOPHAGOSCOPY / EGD  08/28/2017    WITH BALLOON DILATION   •  ESOPHAGOSCOPY / EGD  09/27/2017    WITH BALLOON DILATION   • LYMPH NODE BIOPSY  01/30/2019   • PACEMAKER IMPLANTATION  11/21/2016         Current Outpatient Medications:   •  albuterol sulfate  (90 Base) MCG/ACT inhaler, Inhale 2 puffs Every 4 (Four) Hours As Needed for Wheezing., Disp: 8 g, Rfl: 3  •  clonazePAM (KlonoPIN) 0.5 MG tablet, Pt taking 1.5 pills daily, Disp: , Rfl: 1  •  cyanocobalamin (VITAMIN B-12) 1000 MCG tablet, , Disp: , Rfl:   •  diphenoxylate-atropine (LOMOTIL) 2.5-0.025 MG per tablet, Take 1 tablet by mouth 2 (Two) Times a Day As Needed for Diarrhea., Disp: 60 tablet, Rfl: 0  •  folic acid (FOLVITE) 1 MG tablet, Daily., Disp: , Rfl:   •  levothyroxine (EUTHYROX) 50 MCG tablet, Take 1 tablet by mouth Daily., Disp: 30 tablet, Rfl: 0  •  metFORMIN (GLUCOPHAGE) 1000 MG tablet, Take 500 mg by mouth 2 (Two) Times a Day., Disp: , Rfl: 4  •  metoprolol tartrate (LOPRESSOR) 25 MG tablet, Take  by mouth 2 (Two) Times a Day., Disp: , Rfl:   •  omeprazole (priLOSEC) 40 MG capsule, Take 40 mg by mouth 2 (Two) Times a Day., Disp: , Rfl:   •  ondansetron ODT (ZOFRAN-ODT) 4 MG disintegrating tablet, DISSOLVE 1 TABLET IN MOUTH 3 TIMES DAILY TO 4 TIMES DAILY FOR NAUSEA, Disp: , Rfl: 1  •  PARoxetine (PAXIL) 10 MG tablet, Take 10 mg by mouth Daily., Disp: , Rfl: 0  •  pravastatin (PRAVACHOL) 10 MG tablet, every other day, Disp: , Rfl:   •  predniSONE (DELTASONE) 10 MG tablet, Take 1 tablet by mouth Daily., Disp: 21 tablet, Rfl: 0  No current facility-administered medications for this visit.     Facility-Administered Medications Ordered in Other Visits:   •  Pembrolizumab (KEYTRUDA) 200 mg in sodium chloride 0.9 % 58 mL chemo IVPB, 200 mg, Intravenous, Once, Carmen Curiel, APRN, Last Rate: 125 mL/hr at 04/17/20 1030, 200 mg at 04/17/20 1030  •  sodium chloride 0.9 % flush 10 mL, 10 mL, Intravenous, PRN, Bassem Jasso MD  •  sodium chloride 0.9 % infusion 250 mL, 250 mL, Intravenous, Once, Carmen Curiel,  APRN    Allergies   Allergen Reactions   • Ace Inhibitors Unknown (See Comments)     Unknown reaction     • Heparin Other (See Comments)     Fever/chills, weakness in legs   • Sulfa Antibiotics Other (See Comments)     Mouth sores       Family History   Problem Relation Age of Onset   • Mental illness Mother    • Heart disease Mother    • Hypertension Father    • Cancer Father    • Heart disease Sister    • Heart disease Brother        Cancer-related family history includes Cancer in his father.    Social History     Tobacco Use   • Smoking status: Former Smoker     Packs/day: 2.00     Years: 50.00     Pack years: 100.00     Types: Cigarettes     Last attempt to quit: 2015     Years since quittin.8   • Smokeless tobacco: Former User     Types: Chew     Quit date: 1989   Substance Use Topics   • Alcohol use: No     Frequency: Never   • Drug use: No         ROS:     Review of Systems   Constitutional: Positive for fatigue (after treatments). Negative for activity change, appetite change, chills and fever.   HENT: Positive for trouble swallowing (has to eat soft foods ). Negative for ear pain, mouth sores, nosebleeds and sore throat.    Eyes: Negative for photophobia and visual disturbance.   Respiratory: Negative for chest tightness, shortness of breath (attributes it to keytruda), wheezing and stridor.    Cardiovascular: Negative for chest pain and palpitations.   Gastrointestinal: Positive for nausea. Negative for abdominal pain and vomiting.   Endocrine: Negative for cold intolerance and heat intolerance.   Genitourinary: Negative for dysuria and hematuria.   Musculoskeletal: Negative for joint swelling and neck stiffness.   Skin: Negative for color change and rash.   Neurological: Negative for seizures, syncope and headaches.   Hematological: Negative for adenopathy.        No obvious bleeding   Psychiatric/Behavioral: Negative for agitation, confusion, hallucinations and sleep disturbance.        Objective:    There were no vitals filed for this visit.    (1) Restricted in physically strenuous activity, ambulatory and able to do work of light nature    Physical Exam:     Physical Exam   Constitutional: He is oriented to person, place, and time. He appears well-developed and well-nourished. No distress.   HENT:   Head: Normocephalic and atraumatic.   Mouth/Throat: Oropharynx is clear and moist.   Absent teeth in upper jaw.--eyeglasses  Cleft lip and palette   Eyes: Conjunctivae and EOM are normal. Right eye exhibits no discharge. Left eye exhibits no discharge. No scleral icterus.   Neck: Normal range of motion. Neck supple. No thyromegaly present.   Cardiovascular: Normal rate, regular rhythm and normal heart sounds. Exam reveals no gallop and no friction rub.   Pulmonary/Chest: Effort normal and breath sounds normal. No stridor. No respiratory distress. He has no wheezes.   Abdominal: Soft. Bowel sounds are normal. He exhibits no mass. There is no tenderness. There is no rebound and no guarding.   No tenderness   Musculoskeletal: Normal range of motion. He exhibits no tenderness.   Lymphadenopathy:     He has no cervical adenopathy.   Neurological: He is alert and oriented to person, place, and time. He exhibits normal muscle tone.   Skin: Skin is warm. No rash noted. He is not diaphoretic. No erythema.   Right side chest  port   Psychiatric: He has a normal mood and affect. His behavior is normal.   Nursing note and vitals reviewed.        Lab Results - Last 18 Months   Lab Units 04/17/20  0936 03/27/20  0952 03/06/20  0935   WBC 10*3/mm3 6.14 6.21 6.00   HEMOGLOBIN g/dL 11.6* 11.4* 10.8*   HEMATOCRIT % 34.6* 34.6* 32.7*   PLATELETS 10*3/mm3 150 171 181   MCV fL 91.5 92.5 92.4     Lab Results - Last 18 Months   Lab Units 03/27/20  0952 03/06/20  0935 02/10/20  1151   SODIUM mmol/L 139 139 138   POTASSIUM mmol/L 4.8 4.8 5.2   CHLORIDE mmol/L 104 103 101   CO2 mmol/L 23.0 25.0 27.0   BUN mg/dL 16 11 10    CREATININE mg/dL 0.84 0.81 0.82   CALCIUM mg/dL 8.6 8.9 9.2   BILIRUBIN mg/dL 0.3 0.3 0.3   ALK PHOS U/L 27* 33* 31*   ALT (SGPT) U/L 6 7 11   AST (SGOT) U/L 12 14 23   GLUCOSE mg/dL 176* 173* 119*       Assessment/Plan     Assessment:  1. Metastatic esophageal carcinoma:  Patient has a history of GE junction poorly differentiated adenocarcinoma for which he had chemoradiation followed by Noel Gurwinder resection:  Pathology showed ypT3,pN1 disease.  Tumor was Stage IIIA, diagnosed in 2016.  He received concurrent chemoradiation with weekly Carboplatin and Taxol and radiation finishing on 3/15/17.    CT scan on 1/10/19 showed new retroperitoneal lymph nodes.  These lymph nodes were biopsied on 1/30/19 and it shows metastatic esophageal cancer.  [facility] Next Gen testing was performed on the sample and it shows KRAS mutation, microsatellite stable tumor.  PD-L1 is positive.  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  HER2/marvel (ERBB2) was not amplified and was negative.  His CT scan on 5/1/19 shows evidence of progression.  Patient has started receiving Keytruda on 5/31/19.  He has received 16 cycles so far.   CT scan on 1/28/2020 shows improvement and continued response.     2. 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  3. Occasional headaches/memory lapses: Advise close monitoring of the symptoms.  4. Episodic diarrhea:- diarrhea seems to be waxing and waning in nature.   Symptoms are very mild.  Patient continues to have the symptoms.  5. History of chronic dysphagia:  Due to recurrent esophageal strictures.  Patient is status post EGD and balloon dilation.  Today, he does not report any new symptoms.  6. PD-L1 positive, HER2 negative, p53 and KRAS positive.    7. B12 deficiency: Continue B12 1000 mcg twice daily.    8. Anemia: Likely from malignancy.  Also on iron supplement.    PLAN:    1. Continue Keytruda. Ongoing evaluation for immunotherapy related hypothyroidism.  2. Continue levothyroxine 50 mcg.   3. No longer on  prednisone.  Does not need that anymore.  4. Advised to continue Lomotil and Imodium as needed for diarrhea.    5. Continue to monitor TSH, T3, serum cortisol level.   6. We will continue to monitor closely for adverse events related to immunotherapy  7. Will need to closely monitor diarrhea symptoms.  Provided prescription for Lomotil to be taken 1 time daily.  If no response advised to take it twice a day.  8. Continue B12 supplements .  9. Continue  folic acid supplement also.  10. Follow-up in 1 month        No orders of the defined types were placed in this encounter.            Thank you very much for providing the opportunity to participate in this patient’s care. Please do not hesitate to call if there are any other questions          Electronically signed by Bassem Jasso MD, 04/19/20, 5:59 PM.

## 2020-05-07 ENCOUNTER — TELEPHONE (OUTPATIENT)
Dept: ONCOLOGY | Facility: HOSPITAL | Age: 70
End: 2020-05-07

## 2020-05-07 DIAGNOSIS — Z51.11 ENCOUNTER FOR ANTINEOPLASTIC CHEMOTHERAPY: ICD-10-CM

## 2020-05-07 DIAGNOSIS — C16.0 MALIGNANT NEOPLASM OF CARDIA (HCC): ICD-10-CM

## 2020-05-07 DIAGNOSIS — Z51.81 ENCOUNTER FOR THERAPEUTIC DRUG MONITORING: ICD-10-CM

## 2020-05-07 RX ORDER — SODIUM CHLORIDE 9 MG/ML
250 INJECTION, SOLUTION INTRAVENOUS ONCE
Status: CANCELLED | OUTPATIENT
Start: 2020-05-08

## 2020-05-07 NOTE — TELEPHONE ENCOUNTER
Case Management/ Note    Patient Name: Artie Mc  YOB: 1950  MRN #: [id number]    OSW called patient for prescreening. He is alert and oriented to person, place and time. He said he had fever of 101.0 on Sunday with nausea and diarrhea. He has been fever free with no GI symptoms since then. He said he has had a cough since starting Keytruda and attributes all of these symptoms to the Keytruda. He was encouraged to speak with nursing staff about this at his appointment.     He said he was accessing grants from [facility] and this has been helpful. [facility]. OSW will remain available.     Electronically signed by:   Elly Leon LCSW, OSW-C  05/07/20, 10:02 AM         Patient : Thiago Bennett Age: 55 year old Sex: male   MRN: [id number] Encounter Date: 10/24/2019      History     Chief Complaint   Patient presents with   • Hand Pain     Patient presents by EMS with R hand erythema and \"lumps\".  He was treated with IV antibiotics then PO Vibramycin for a skin infection, and reports that he is to see hand surgery in [place] tomorrow.  He called EMS tonight because he did not have a ride to his appointment tomorrow, and he thought he could be admitted for his hand preoperatively.  He has not taken anything for pain today.  Denies streaked erythema, drainage, motion changes, wrist or more proximal pain, new injury or other systemic symptoms.  He is rather insistent that the orthopedics PA wants him admitted preoperatively.          Allergies   Allergen Reactions   • Ibuprofen Other (See Comments)     Burning in stomach   • Tylenol Other (See Comments)     Burning in stomach   • Budesonide GI UPSET       Discharge Medication List as of 10/24/2019  8:10 PM      Prior to Admission Medications    Details   Cholecalciferol (VITAMIN D3 PO) Historical Med      OLANZapine (ZYPREXA) 5 MG tablet Take 1 tablet by mouth nightly.Add to AVS, Disp-30 tablet, R-0      traMADol (ULTRAM) 50 MG tablet Take 50 mg by mouth every 6 hours as needed for Pain.Historical Med      Biotin 5000 MCG Cap Take 5,000 mcg by mouth daily.Historical Med      diazePAM (VALIUM) 5 MG tablet Do not start before October 3, 2019. TAKE 1/2 OR 1 TABLET BY MOUTH EVERY DAY AS NEEDED FOR ACUTE ANXIETYEprescribe, Disp-30 tablet, R-0Must last 30 days      clonazePAM (KLONOPIN) 1 MG tablet Do not start before October 3, 2019. TAKE 1/2 TO 1 TABLET BY MOUTH FOUR TIMES DAILY AS NEEDED FOR ACUTE ANXIETYEprescribe, Disp-120 tablet, R-0DO NOT FILL EARLY. MUST LAST 30 DAYS      Cyanocobalamin (VITAMIN B-12 PO) Take 1 tablet by mouth daily. Historical Med      insulin glargine (LANTUS SOLOSTAR) 100 UNIT/ML pen-injector Inject 30 Units into  the skin 2 times daily. Adjusting dosing to valuesHistorical Med, Subcutaneous, 2 TIMES DAILY, Disp-15 mL, R-12This is for insulin pen. Dispense quantity is 15 mL per box (package).             Discharge Medication List as of 10/24/2019  8:10 PM          Past Medical History:   Diagnosis Date   • Colitis 2019   • Depressive disorder    • Diabetes mellitus (CMS/HCC)    • Hepatitis C     x 30 years        Past Surgical History:   Procedure Laterality Date   • BACK SURGERY     • COLONOSCOPY W/ POLYPECTOMY  2019    Repeat in 3 years - Dr. Renee   • ERCP  04/10/2019   • ESOPHAGOGASTRODUODENOSCOPY  04/10/2019   • ESOPHAGOGASTRODUODENOSCOPY  2019   • FINGER SURGERY Left 2019    Small finger partial amputation   • FOOT SURGERY Right 2019    Right foot second toe amputation    • GI ENDOSCOPIC ULTRASOUND  04/10/2019   • KNEE SCOPE,ABRASN ARTHROPLASTY     • SHOULDER SURGERY         Family History   Problem Relation Age of Onset   • Cancer Mother        Social History     Tobacco Use   • Smoking status: Current Some Day Smoker     Packs/day: 0.00     Types: Cigarettes     Last attempt to quit: 2003     Years since quittin.8   • Smokeless tobacco: Never Used   • Tobacco comment: very seldom   Substance Use Topics   • Alcohol use: No     Alcohol/week: 0.0 standard drinks     Frequency: Monthly or less     Drinks per session: 1 or 2     Binge frequency: Never   • Drug use: No       Review of Systems   Musculoskeletal: Positive for joint swelling. Negative for arthralgias and myalgias.   Skin: Positive for color change and wound. Negative for pallor and rash.   Hematological: Negative for adenopathy.   All other systems reviewed and are negative.      Physical Exam     ED Triage Vitals   ED Triage Vitals Group      Temp --       Pulse 10/24/19 1947 80      Resp 10/24/19 1947 16      BP 10/24/19 1947 137/88      SpO2 10/24/19 1929 99 %      EtCO2 mmHg --       Height 10/24/19 1947 6' 1\"  (1.854 m)      Weight 10/24/19 1947 195 lb (88.5 kg)      Weight Scale Used 10/24/19 1947 ED Actual       Physical Exam   Constitutional: He is oriented to person, place, and time. He appears well-developed and well-nourished. No distress.   HENT:   Head: Normocephalic and atraumatic.   Eyes: Pupils are equal, round, and reactive to light.   Neck: Normal range of motion.   Cardiovascular: Normal rate and intact distal pulses.   Pulmonary/Chest: Effort normal. No respiratory distress.   Abdominal: Soft. He exhibits no distension. Musculoskeletal: Normal range of motion.      Right hand: He exhibits tenderness and deformity. He exhibits normal range of motion, normal capillary refill and no swelling. Normal sensation noted. Normal strength noted.        Hands:      Neurological: He is alert and oriented to person, place, and time. No cranial nerve deficit.   Skin: Skin is warm. Lesion noted. No abrasion, no burn, no ecchymosis, no laceration, no petechiae and no rash noted. He is not diaphoretic. There is erythema. Nails show no clubbing.   Nursing note and vitals reviewed.      ED Course     Procedures    Lab Results     No results found for this visit on 10/24/19.  WBC (K/mcL)   Date Value   10/24/2019 14.9 (H)       EKG Results     none    Radiology Results     Imaging Results    None     XR HAND 3+ VIEW RIGHT,  10/24/2019.  Degenerative changes of the interphalangeal joints throughout the hand  again noted. Calcification at the base of the fifth proximal phalanx is  stable. No osseous lytic or destructive lesion. If concern for infectious  process, would consider MRI.     Diffuse atherosclerotic disease.  The diagnostic studies ordered were interpreted by Kush Shaw DO incident to care on 10/25/2019.    ED Medication Orders (From admission, onward)    Ordered Start     Status Ordering Provider    10/24/19 2008 10/24/19 2008  traMADol (ULTRAM) tablet 50 mg  ONCE PRN      Last MAR action:  Given YOLANDA  PITER BISHOP               [facility]  Number of Diagnoses or Management Options  Pain of right hand:   Diagnosis management comments: He refused further antibiotics, even a one time dose.       Amount and/or Complexity of Data Reviewed  Clinical lab tests: reviewed  Tests in the radiology section of CPT®: reviewed  Decide to obtain previous medical records or to obtain history from someone other than the patient: yes ( chart reviewed from 10/24/19)  Obtain history from someone other than the patient: yes (EMS)  Independent visualization of images, tracings, or specimens: yes        Clinical Impression     ED Diagnosis   1. Pain of right hand         Disposition         Follow Up:  Brendan Smith MD  [facility]  [place] [address]  [phone]    In 1 day  as already scheduled       Discharge Medication List as of 10/24/2019  8:10 PM      He refused antibiotics.    Disposition:  Discharge 10/24/2019  8:08 PM  Thiago Bennett discharge to home/self care.           Piter Shaw, DO  10/25/19 0329

## 2020-05-08 ENCOUNTER — HOSPITAL ENCOUNTER (OUTPATIENT)
Dept: ONCOLOGY | Facility: HOSPITAL | Age: 70
Setting detail: INFUSION SERIES
Discharge: HOME | End: 2020-05-08

## 2020-05-08 VITALS
RESPIRATION RATE: 16 BRPM | HEART RATE: 60 BPM | SYSTOLIC BLOOD PRESSURE: 126 MMHG | BODY MASS INDEX: 24.33 KG/M2 | TEMPERATURE: 98.4 F | DIASTOLIC BLOOD PRESSURE: 74 MMHG | HEIGHT: 66 IN | WEIGHT: 151.4 LBS

## 2020-05-08 DIAGNOSIS — Z51.11 ENCOUNTER FOR ANTINEOPLASTIC CHEMOTHERAPY: ICD-10-CM

## 2020-05-08 DIAGNOSIS — E03.9 HYPOTHYROIDISM (ACQUIRED): ICD-10-CM

## 2020-05-08 DIAGNOSIS — Z51.81 ENCOUNTER FOR THERAPEUTIC DRUG MONITORING: ICD-10-CM

## 2020-05-08 DIAGNOSIS — C16.0 MALIGNANT NEOPLASM OF CARDIA (HCC): ICD-10-CM

## 2020-05-08 DIAGNOSIS — R79.89 ELEVATED TSH: ICD-10-CM

## 2020-05-08 DIAGNOSIS — C16.0 MALIGNANT NEOPLASM OF CARDIA (HCC): Primary | ICD-10-CM

## 2020-05-08 LAB
ALBUMIN SERPL-MCNC: 4 G/DL (ref 3.5–5.2)
ALBUMIN/GLOB SERPL: 1.5 G/DL
ALP SERPL-CCNC: 32 U/L (ref 39–117)
ALT SERPL W P-5'-P-CCNC: 7 U/L (ref 1–41)
ANION GAP SERPL CALCULATED.3IONS-SCNC: 12 MMOL/L (ref 5–15)
AST SERPL-CCNC: 14 U/L (ref 1–40)
BASOPHILS # BLD AUTO: 0.04 10*3/MM3 (ref 0–0.2)
BASOPHILS NFR BLD AUTO: 0.6 % (ref 0–1.5)
BILIRUB SERPL-MCNC: 0.3 MG/DL (ref 0.2–1.2)
BUN BLD-MCNC: 9 MG/DL (ref 8–23)
BUN/CREAT SERPL: 9.7 (ref 7–25)
CALCIUM SPEC-SCNC: 8.9 MG/DL (ref 8.6–10.5)
CHLORIDE SERPL-SCNC: 103 MMOL/L (ref 98–107)
CO2 SERPL-SCNC: 24 MMOL/L (ref 22–29)
CREAT BLD-MCNC: 0.93 MG/DL (ref 0.76–1.27)
DEPRECATED RDW RBC AUTO: 43 FL (ref 37–54)
EOSINOPHIL # BLD AUTO: 0.61 10*3/MM3 (ref 0–0.4)
EOSINOPHIL NFR BLD AUTO: 9.6 % (ref 0.3–6.2)
ERYTHROCYTE [DISTWIDTH] IN BLOOD BY AUTOMATED COUNT: 13.4 % (ref 12.3–15.4)
GFR SERPL CREATININE-BSD FRML MDRD: 80 ML/MIN/1.73
GLOBULIN UR ELPH-MCNC: 2.7 GM/DL
GLUCOSE BLD-MCNC: 145 MG/DL (ref 65–99)
HCT VFR BLD AUTO: 34.3 % (ref 37.5–51)
HGB BLD-MCNC: 11.6 G/DL (ref 13–17.7)
LYMPHOCYTES # BLD AUTO: 1.81 10*3/MM3 (ref 0.7–3.1)
LYMPHOCYTES NFR BLD AUTO: 28.4 % (ref 19.6–45.3)
MCH RBC QN AUTO: 30.8 PG (ref 26.6–33)
MCHC RBC AUTO-ENTMCNC: 33.8 G/DL (ref 31.5–35.7)
MCV RBC AUTO: 91 FL (ref 79–97)
MONOCYTES # BLD AUTO: 0.85 10*3/MM3 (ref 0.1–0.9)
MONOCYTES NFR BLD AUTO: 13.3 % (ref 5–12)
NEUTROPHILS # BLD AUTO: 3.06 10*3/MM3 (ref 1.7–7)
NEUTROPHILS NFR BLD AUTO: 48.1 % (ref 42.7–76)
PLATELET # BLD AUTO: 137 10*3/MM3 (ref 140–450)
PMV BLD AUTO: 11.1 FL (ref 6–12)
POTASSIUM BLD-SCNC: 4.6 MMOL/L (ref 3.5–5.2)
PROT SERPL-MCNC: 6.7 G/DL (ref 6–8.5)
RBC # BLD AUTO: 3.77 10*6/MM3 (ref 4.14–5.8)
SODIUM BLD-SCNC: 139 MMOL/L (ref 136–145)
T4 FREE SERPL-MCNC: 1.54 NG/DL (ref 0.93–1.7)
TSH SERPL DL<=0.05 MIU/L-ACNC: 2.05 UIU/ML (ref 0.27–4.2)
WBC NRBC COR # BLD: 6.37 10*3/MM3 (ref 3.4–10.8)

## 2020-05-08 PROCEDURE — 85025 COMPLETE CBC W/AUTO DIFF WBC: CPT | Performed by: NURSE PRACTITIONER

## 2020-05-08 PROCEDURE — 80053 COMPREHEN METABOLIC PANEL: CPT | Performed by: NURSE PRACTITIONER

## 2020-05-08 PROCEDURE — 25010000002 PEMBROLIZUMAB 100 MG/4ML SOLUTION 4 ML VIAL: Performed by: NURSE PRACTITIONER

## 2020-05-08 PROCEDURE — 96413 CHEMO IV INFUSION 1 HR: CPT

## 2020-05-08 PROCEDURE — 36591 DRAW BLOOD OFF VENOUS DEVICE: CPT

## 2020-05-08 PROCEDURE — 84439 ASSAY OF FREE THYROXINE: CPT | Performed by: NURSE PRACTITIONER

## 2020-05-08 PROCEDURE — 84443 ASSAY THYROID STIM HORMONE: CPT | Performed by: NURSE PRACTITIONER

## 2020-05-08 RX ORDER — SODIUM CHLORIDE 0.9 % (FLUSH) 0.9 %
10 SYRINGE (ML) INJECTION AS NEEDED
Status: CANCELLED | OUTPATIENT
Start: 2020-05-08

## 2020-05-08 RX ORDER — LEVOTHYROXINE SODIUM 0.05 MG/1
50 TABLET ORAL DAILY
Qty: 30 TABLET | Refills: 2 | Status: SHIPPED | OUTPATIENT
Start: 2020-05-08 | End: 2020-05-29 | Stop reason: SDUPTHER

## 2020-05-08 RX ORDER — SODIUM CHLORIDE 0.9 % (FLUSH) 0.9 %
10 SYRINGE (ML) INJECTION AS NEEDED
Status: DISCONTINUED | OUTPATIENT
Start: 2020-05-08 | End: 2020-05-09 | Stop reason: HOSPADM

## 2020-05-08 RX ORDER — SODIUM CHLORIDE 9 MG/ML
250 INJECTION, SOLUTION INTRAVENOUS ONCE
Status: CANCELLED | OUTPATIENT
Start: 2020-05-29

## 2020-05-08 RX ORDER — SODIUM CHLORIDE 9 MG/ML
250 INJECTION, SOLUTION INTRAVENOUS ONCE
Status: DISCONTINUED | OUTPATIENT
Start: 2020-05-08 | End: 2020-05-09 | Stop reason: HOSPADM

## 2020-05-08 RX ADMIN — Medication 10 ML: at 10:59

## 2020-05-08 RX ADMIN — SODIUM CHLORIDE 200 MG: 900 INJECTION, SOLUTION INTRAVENOUS at 10:18

## 2020-05-29 ENCOUNTER — HOSPITAL ENCOUNTER (OUTPATIENT)
Dept: ONCOLOGY | Facility: HOSPITAL | Age: 70
Setting detail: INFUSION SERIES
Discharge: HOME | End: 2020-05-29

## 2020-05-29 ENCOUNTER — OFFICE VISIT (OUTPATIENT)
Dept: ONCOLOGY | Facility: CLINIC | Age: 70
End: 2020-05-29

## 2020-05-29 VITALS
HEART RATE: 61 BPM | BODY MASS INDEX: 23.78 KG/M2 | TEMPERATURE: 98 F | HEIGHT: 66 IN | WEIGHT: 148 LBS | SYSTOLIC BLOOD PRESSURE: 116 MMHG | RESPIRATION RATE: 16 BRPM | DIASTOLIC BLOOD PRESSURE: 63 MMHG

## 2020-05-29 VITALS
SYSTOLIC BLOOD PRESSURE: 116 MMHG | BODY MASS INDEX: 23.82 KG/M2 | HEIGHT: 66 IN | OXYGEN SATURATION: 95 % | DIASTOLIC BLOOD PRESSURE: 63 MMHG | TEMPERATURE: 98 F | WEIGHT: 148.2 LBS | RESPIRATION RATE: 20 BRPM | HEART RATE: 61 BPM

## 2020-05-29 DIAGNOSIS — E03.9 HYPOTHYROIDISM (ACQUIRED): ICD-10-CM

## 2020-05-29 DIAGNOSIS — C15.9 ESOPHAGEAL CANCER, STAGE IV (HCC): ICD-10-CM

## 2020-05-29 DIAGNOSIS — C16.0 MALIGNANT NEOPLASM OF CARDIA (HCC): ICD-10-CM

## 2020-05-29 DIAGNOSIS — Z51.81 ENCOUNTER FOR THERAPEUTIC DRUG MONITORING: Primary | ICD-10-CM

## 2020-05-29 DIAGNOSIS — Z51.11 ENCOUNTER FOR ANTINEOPLASTIC CHEMOTHERAPY: ICD-10-CM

## 2020-05-29 DIAGNOSIS — C15.9 ESOPHAGEAL CANCER, STAGE IV (HCC): Primary | ICD-10-CM

## 2020-05-29 DIAGNOSIS — R13.19 ESOPHAGEAL DYSPHAGIA: ICD-10-CM

## 2020-05-29 DIAGNOSIS — R11.0 NAUSEA: Primary | ICD-10-CM

## 2020-05-29 DIAGNOSIS — R19.7 DIARRHEA, UNSPECIFIED TYPE: ICD-10-CM

## 2020-05-29 DIAGNOSIS — R79.89 ELEVATED TSH: ICD-10-CM

## 2020-05-29 LAB
ALBUMIN SERPL-MCNC: 4 G/DL (ref 3.5–5.2)
ALBUMIN/GLOB SERPL: 1.5 G/DL
ALP SERPL-CCNC: 32 U/L (ref 39–117)
ALT SERPL W P-5'-P-CCNC: 6 U/L (ref 1–41)
ANION GAP SERPL CALCULATED.3IONS-SCNC: 10 MMOL/L (ref 5–15)
AST SERPL-CCNC: 14 U/L (ref 1–40)
BASOPHILS # BLD AUTO: 0.07 10*3/MM3 (ref 0–0.2)
BASOPHILS NFR BLD AUTO: 1.1 % (ref 0–1.5)
BILIRUB SERPL-MCNC: 0.2 MG/DL (ref 0.2–1.2)
BUN BLD-MCNC: 16 MG/DL (ref 8–23)
BUN/CREAT SERPL: 19.3 (ref 7–25)
CALCIUM SPEC-SCNC: 9 MG/DL (ref 8.6–10.5)
CHLORIDE SERPL-SCNC: 104 MMOL/L (ref 98–107)
CO2 SERPL-SCNC: 25 MMOL/L (ref 22–29)
CORTIS SERPL-MCNC: 14.73 MCG/DL
CREAT BLD-MCNC: 0.83 MG/DL (ref 0.76–1.27)
DEPRECATED RDW RBC AUTO: 44 FL (ref 37–54)
EOSINOPHIL # BLD AUTO: 0.65 10*3/MM3 (ref 0–0.4)
EOSINOPHIL NFR BLD AUTO: 10 % (ref 0.3–6.2)
ERYTHROCYTE [DISTWIDTH] IN BLOOD BY AUTOMATED COUNT: 13.3 % (ref 12.3–15.4)
GFR SERPL CREATININE-BSD FRML MDRD: 91 ML/MIN/1.73
GLOBULIN UR ELPH-MCNC: 2.6 GM/DL
GLUCOSE BLD-MCNC: 140 MG/DL (ref 65–99)
HCT VFR BLD AUTO: 35.4 % (ref 37.5–51)
HGB BLD-MCNC: 11.7 G/DL (ref 13–17.7)
LYMPHOCYTES # BLD AUTO: 1.69 10*3/MM3 (ref 0.7–3.1)
LYMPHOCYTES NFR BLD AUTO: 25.9 % (ref 19.6–45.3)
MCH RBC QN AUTO: 30.6 PG (ref 26.6–33)
MCHC RBC AUTO-ENTMCNC: 33.1 G/DL (ref 31.5–35.7)
MCV RBC AUTO: 92.7 FL (ref 79–97)
MONOCYTES # BLD AUTO: 0.63 10*3/MM3 (ref 0.1–0.9)
MONOCYTES NFR BLD AUTO: 9.7 % (ref 5–12)
NEUTROPHILS # BLD AUTO: 3.48 10*3/MM3 (ref 1.7–7)
NEUTROPHILS NFR BLD AUTO: 53.3 % (ref 42.7–76)
PLATELET # BLD AUTO: 129 10*3/MM3 (ref 140–450)
PMV BLD AUTO: 11 FL (ref 6–12)
POTASSIUM BLD-SCNC: 4.8 MMOL/L (ref 3.5–5.2)
PROT SERPL-MCNC: 6.6 G/DL (ref 6–8.5)
RBC # BLD AUTO: 3.82 10*6/MM3 (ref 4.14–5.8)
SODIUM BLD-SCNC: 139 MMOL/L (ref 136–145)
T3FREE SERPL-MCNC: 2.6 PG/ML (ref 2–4.4)
TSH SERPL DL<=0.05 MIU/L-ACNC: 1.69 UIU/ML (ref 0.27–4.2)
WBC NRBC COR # BLD: 6.52 10*3/MM3 (ref 3.4–10.8)

## 2020-05-29 PROCEDURE — 96413 CHEMO IV INFUSION 1 HR: CPT

## 2020-05-29 PROCEDURE — 99215 OFFICE O/P EST HI 40 MIN: CPT | Performed by: INTERNAL MEDICINE

## 2020-05-29 PROCEDURE — 82533 TOTAL CORTISOL: CPT | Performed by: NURSE PRACTITIONER

## 2020-05-29 PROCEDURE — 80053 COMPREHEN METABOLIC PANEL: CPT | Performed by: NURSE PRACTITIONER

## 2020-05-29 PROCEDURE — 84481 FREE ASSAY (FT-3): CPT | Performed by: NURSE PRACTITIONER

## 2020-05-29 PROCEDURE — 85025 COMPLETE CBC W/AUTO DIFF WBC: CPT | Performed by: NURSE PRACTITIONER

## 2020-05-29 PROCEDURE — 36591 DRAW BLOOD OFF VENOUS DEVICE: CPT

## 2020-05-29 PROCEDURE — 25010000002 PEMBROLIZUMAB 100 MG/4ML SOLUTION 4 ML VIAL: Performed by: NURSE PRACTITIONER

## 2020-05-29 PROCEDURE — 84443 ASSAY THYROID STIM HORMONE: CPT | Performed by: NURSE PRACTITIONER

## 2020-05-29 RX ORDER — SODIUM CHLORIDE 9 MG/ML
250 INJECTION, SOLUTION INTRAVENOUS ONCE
Status: COMPLETED | OUTPATIENT
Start: 2020-05-29 | End: 2020-05-29

## 2020-05-29 RX ORDER — ONDANSETRON 4 MG/1
4 TABLET, FILM COATED ORAL EVERY 8 HOURS PRN
Qty: 90 TABLET | Refills: 0 | Status: SHIPPED | OUTPATIENT
Start: 2020-05-29 | End: 2020-06-25

## 2020-05-29 RX ORDER — SODIUM CHLORIDE 0.9 % (FLUSH) 0.9 %
10 SYRINGE (ML) INJECTION AS NEEDED
Status: DISCONTINUED | OUTPATIENT
Start: 2020-05-29 | End: 2020-05-30 | Stop reason: HOSPADM

## 2020-05-29 RX ORDER — DIPHENOXYLATE HYDROCHLORIDE AND ATROPINE SULFATE 2.5; .025 MG/1; MG/1
1 TABLET ORAL DAILY PRN
Qty: 60 TABLET | Refills: 0 | Status: SHIPPED | OUTPATIENT
Start: 2020-05-29 | End: 2021-07-28

## 2020-05-29 RX ORDER — SODIUM CHLORIDE 0.9 % (FLUSH) 0.9 %
10 SYRINGE (ML) INJECTION AS NEEDED
Status: CANCELLED | OUTPATIENT
Start: 2020-05-29

## 2020-05-29 RX ORDER — LEVOTHYROXINE SODIUM 0.05 MG/1
50 TABLET ORAL DAILY
Qty: 30 TABLET | Refills: 2 | Status: SHIPPED | OUTPATIENT
Start: 2020-05-29 | End: 2020-12-14 | Stop reason: SDUPTHER

## 2020-05-29 RX ORDER — OMEPRAZOLE 40 MG/1
40 CAPSULE, DELAYED RELEASE ORAL 2 TIMES DAILY
Qty: 60 CAPSULE | Refills: 1 | Status: SHIPPED | OUTPATIENT
Start: 2020-05-29 | End: 2020-07-23

## 2020-05-29 RX ADMIN — SODIUM CHLORIDE 200 MG: 900 INJECTION, SOLUTION INTRAVENOUS at 11:03

## 2020-05-29 RX ADMIN — SODIUM CHLORIDE 250 ML: 900 INJECTION, SOLUTION INTRAVENOUS at 11:03

## 2020-05-29 NOTE — PROGRESS NOTES
Hematology-Oncology Follow-up Note       Artie Mc  1950    Primary Care Physician: Chanel Carter APRN  Referring Physician: Chanel Carter APRN    Reason For Visit:  Chief Complaint   Patient presents with   • Follow-up     Esophageal cancer, stage IV   Metastatic esophageal cancer  Monitoring for adverse effects related to immunotherapy.  Hypothyroidism  HPI:   Mr. Mc is a pleasant 69-year-old gentleman with a history of gastroesophageal reflux disease, diabetes, hypertension, heart block status post pacemaker placement and history of CABG.  He was having symptoms of dysphagia, weight loss since September 2016. He was reporting symptoms of food getting stuck in the lower chest.  He has transitioned himself to a liquid diet.  The patient reports losing about 30 pounds over this duration.  The patient underwent upper GI endoscopy on 12/20/16 with Dr. Esvin Barrera.  Endoscopy showed necrotic, circumferential and moderately obstructive mass at the distal esophagus between 42 cm and 36 cm.      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  There was also evidence of mild subacute inflammation in the duodenum.  Dr. Barrera ordered a CT scan of the chest and abdomen with contrast on 12/20/16.  The scan was done at [facility].  The scan showed a new ill-defined mass in the anterior wall of the distal esophagus at the GE junction measuring 2.3 x 2.2 x 1.6 cm, worrisome for cancer.  There were four subcentimeter liver lesions which had appeared stable in comparison to another CT scan from 2009 and they likely represent small cysts.  There was also mild lymphadenopathy in the gastrohepatic ligament.  There were at least six borderline enlarged lymph nodes measuring up to 1.3 x 1 cm in the region.  The patient is referred to us for further oncological evaluation.    1/5/2017 - The patient presents for initial  consultation.  He reports persistent dysphagia and also has symptoms of regurgitation.  He cannot tolerate solid foods and is dependent on liquid diet such as Ensure.  He also reports fatigue. Symptoms of dysphagia have been present for the past four months.  When he eats any solids, the food gets stuck in the lower chest.    • 1/5/17 - Vitamin B12 200 (L).  CEA 0.8.  Ferritin 35.  Iron saturation 16% (L), serum iron 56, TIBC 354.  Creatinine 0.9.  LFTs normal.  Folate 10.2.  • 1/12/17 - PET/CT scan:  Intense abnormal activity corresponding to the region of the GE junction and proximal stomach.  SUV is 11.06.  There is a suggestion of an abnormal mass or abnormal wall thickening in the region measuring 3.9 x 4.8 cm.  No additional abnormalities.  No evidence of metastases or other lymphadenopathy.  Scattered nonspecific subcentimeter lymph nodes involving the mediastinum and within the central upper abdomen.  • 1/13/17 - Creatinine 0.9.  LFTs normal.    • 1/17/17 - Patient seen by thoracic surgeon, Dr. Bradley.  PET scan was reviewed.  He has recommended neoadjuvant chemoradiation therapy followed by [place] Gurwinder esophagogastrectomy.  Port-A-Cath is being arranged.    • 1/20/17 - WBC 6.3, hemoglobin 12.1, platelet count 198,000, MCV 86.4.  • 1/30/17 - Patient started weekly Carboplatin and Taxol (Carboplatin AUC 2 and Taxol 50 mg/M2).  Patient received Injectafer 750 mg.  WBC 5.8, hemoglobin 12.4, platelet count 244,000.   • 1/31/17 - Patient seen by Dr. Eduardo Oleary.  The plan was to give 5040 cGy in 28 fractions.   • 2/6/17 - WBC 4.4, hemoglobin 11.6, platelet count 201,000.  Patient received cycle 2 of weekly Carboplatin and Taxol (Carboplatin AUC 2 and Taxol 50 mg/M2).  • 2/13/17 - Patient received Carboplatin and Taxol weekly cycle 3.  • 2/13/17 - Patient started concurrent radiation therapy.  Total planned dose is 4140 cGy.    • 2/13/17 to 2/15/17 - Patient received 4140 cGy of neoadjuvant radiation.   Radiation was finished on 3/15/17.    • 2/20/17 - WBC 1.8, hemoglobin 10.6, platelet count 217,000.  Creatinine 0.5. Folate 15 (L).  Ferritin 361.  Haptoglobin 214.  Iron saturation 33%, TIBC 263, serum iron 88.    • 2/20/17 - Patient received Carboplatin and Taxol weekly cycle 4.   • 2/27/17 - WBC 3.7, hemoglobin 10.7, platelet count 177,000, MCV 85.3.    • 3/6/17 - Patient has received 16 out of the 23 planned radiation treatments.  Total planned dose is 4140 cGy.    • 3/6/17 - WBC 5.4, hemoglobin 11.2, platelet count 113,000.  • 3/6/17 - Patient received cycle 5 of weekly Carboplatin and Taxol.   • 3/13/17 - Patient received cycle 6 of weekly Carboplatin and Taxol.  WBC 3.9, hemoglobin 11.3, platelet count 93,000.     • 3/20/17 - WBC 1.4, hemoglobin 10.0, platelet count 118,000, MCV 86.1   • 3/27/17 - WBC 3.8, hemoglobin 9.5, platelet count 176,000, MCV 87.3.    • 4/7/17 - Stress test:  No evidence of reversible myocardial ischemia.  Normal left ventricular ejection fraction of 50%.    • 4/17/17 - Upper GI endoscopy by Dr. 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  The GE junction adenocarcinoma lesion seems to have a very good response to chemotherapy and radiation.  The lumen was open.  • 5/1/17 - WBC 7.0, hemoglobin 10.2, platelet count 175,000.    • 5/4/17 - Patient underwent [place] Gurwinder esophagectomy with pyloroplasty, feeding jejunostomy tube, abdominal and mediastinal lymph node dissection.    Surgical Pathology:  Moderately to poorly differentiated adenocarcinoma measuring 1.3 cm at the GE junction.  Resection margins are negative for malignancy.  Tumor margins are negative.  There is effective treatment response.  No evidence of lymphovascular invasion.  Staging is ypT3,pN1.  One out of fourteen lymph nodes involved.   • 5/22/17 - WBC 6.8, hemoglobin 10.3, platelet count 312,000.    • 6/19/17 - WBC 5.7, hemoglobin 10.5, platelet count 204,000, MCV 92.1.     • 7/6/17 - EGD:  Status post balloon dilation of esophageal stricture.  • 7/12/17 - EGD with balloon dilation of esophageal stricture.  • 7/26/17 - EGD and balloon dilation of esophageal stricture.  • 7/31/17 - PET/CT scan:  There is mild metabolic activity seen at the thoracic esophagus just at and below the surgical margins.  Some mild wall thickening.  This could be from recent surgery.  A residual cancer seems unlikely.  There is a precarinal lymph node with mild metabolic activity with SUV of 3 measuring 1 x 1.3 cm.  Again this appears to be nonspecific in my opinion.    • 8/7/17 - WBC 5.9, hemoglobin 11.9, platelet count 171,000, MCV 88.7.    • 10/19/17 - WBC 7.5, hemoglobin 11.8, platelet count 216,000.    • 1/8/18 - PET scan:  No evidence of residual disease or recurrent disease.  There is an esophageal stent in the mid esophagus with a large debris air level.  No evidence of any metastases in the chest, abdomen or pelvis.  Mild nodule uptake in the vocal cords with fullness in the right vocal cord.  This could be physiologic.    • 7/9/18 - CT scan of chest with contrast:  Prominent mediastinal lymph nodes appear stable since February.  Changes of gastric pull-through procedure for esophageal cancer again noted.  Tree-in-bud infiltrates in the left lung base, consistent with infection versus inflammation.    • 1/10/19 - CT chest, abdomen and pelvis with contrast:  No evidence of mets in the chest; however, interval development of metastatic lymphadenopathy in the left side of the retroperitoneum.  For instance, there is a 17 mm rounded lymph node, 10 mm lymph node and also a 14 mm lymph node.  These are all new.  Stable 9 mm hypodense lesion within the right hepatic lobe, which could reflect hemangioma or complex cyst.    • 1/30/19 - CT-guided core biopsy of retroperitoneal lymph node:  Poorly differentiated adenocarcinoma consistent with metastases from patient’s known esophageal primary.  CK20 positive,  CDX2 positive, cytokeratin 7 negative, TTF-1 negative.    • 2/1/19 - Creatinine 0.9, BUN 18, calcium 9.3; these are normal.    • 2/28/19 - [facility] Next Gen sequencing testing on retroperitoneal lymph node specimen:  PD-L1 positive.  CPS = 3.  This implies responsiveness to pembrolizumab.  Mismatch repair status is proficient (no evidence of microsatellite instability).  Tumor mutational burden is low = 6 mutations/Mb.  CDH1 indeterminate.  KRAS mutation positive.  TP53 mutation positive.  CDK6 amplified.  [employer] PD-L1 testing by IHC:  PD-L1 expression 1% positive (CPS =1).  HER2/marvel by IHC is negative.  HER2/marvel by CISH not amplified.  Microsatellite stable tumor.    • 3/7/19 - WBC 7.3, hemoglobin 12, platelet count 128,000, MCV 92.     • 3/14/19 - Patient was seen by radiation oncologist, Dr. Chahal.  He has recommended observation versus systemic chemoimmunotherapy as needed.  No plans for radiation therapy.   • 5/1/19 - CT scan of chest with contrast:  No definitive findings of new metastatic disease in the chest.  Emphysema noted.    • 5/1/19 - CT abdomen and pelvis:  There is interval progression of metastatic retroperitoneal adenopathy.  Left periaortic adenopathy with lymph node measuring up to 2 cm, previously 1.7 cm.  Another lymph node now measures 2.3, previously 1.4 cm.  New enlarged lymph node on image 147 measures 1.4 cm, previously 0.4 cm.  A 9 mm hypodense right hepatic lobe lesion appears stable.    • 5/9/19 - Decision made to start patient on immunotherapy Keytruda.    • 5/9/19 - Vitamin B12 229.  Ferritin 61.  Folate 14.3.  Iron saturation 26%, TIBC 304, serum iron 79.  • 5/31/19 - Patient received Keytruda 200 mg cycle 1, day 1.    • 5/31/19 - Free T4 0.99.  Creatinine 0.9, BUN 9.  LFTs normal.  TSH 3.99.  Folate 14.3.  Iron saturation 26%.    • 6/21/2019 patient received Keytruda cycle 2  • 7/12/2019: Patient received Keytruda cycle 3  • 8/2/2019 patient received cycle 4 Keytruda WBC 7.2,  hemoglobin 11.8 platelet count 163  • 8/23/2019 creatinine 0.9, LFTs normal, WBC 6.4, hemoglobin 11.5, platelets 179, TSH 2.1   • 8/23/2019 patient received cycle 5 of Keytruda  • 9/3/2019 CT chest abdomen and pelvis with contrast: . Positive response to therapy in the abdomen since 05/01/2019. Pathologically enlarged retroperitoneal lymph nodes, predominantly in the left periaortic region, have diminished in size.a 1.3 x 2.1 cm left periaortic node (image 60), previously measured 3.5 x 2.3 cm. A 1.6 cm index node near the level of the left renal vein previously measured 2.0 cm No new or progressive adenopathy. 2. Stable findings in the chest. Noncalcified right upper lobe nodules are unchanged. There is no evidence of new or progressive metastatic disease within the chest. 3. 8 mm indeterminate low-density lesion in the right hepatic lobe, is stable. No new liver lesions.  • 9/6/2019 WBC 5.6, hemoglobin 11.2, platelets of 192, MCV 91.8  • 9/13/2019 patient received Keytruda cycle 6, albumin 3.2  • 10/4/2019 patient received cycle 7 of Keytruda, TSH 4.8, free T4 0.86  • 10/9/2019 WBC 5.8, hemoglobin 11.7, platelets 174  • 10/25/2019 patient received cycle 8 of Keytruda.  WBC 5.9, hemoglobin 11.5, platelets 152, creatinine 0.75, cortisol 15.2, TSH 6.48 high , free T4 1.13 normal  • 11/6/2019 WBC 6.6, hemoglobin 11.8, MCV 91.5, platelets 187  • 11/15/2019 patient received cycle 9 of Keytruda, WBC 6.6, hemoglobin 11.9, platelets 161, cortisol level 8.86, TSH 2.68, free T3 2.8, free T4 1.5  • 12/4/2019 WBC 6.9, hemoglobin 12.0, platelets 180, MCV 92.9  • 12/06/2019-Keytruda Cycle 10  • 12/27/2019- Cycle 11 LFTs normal, WBC 6.7, hemoglobin 11.4, platelets 168, MCV 93, TSH 2.4,  • 1/28/2020: CT chest abdomen and pelvis with contrast:  Single (aortic lymph node in the abdominal retroperitoneum has increased.  Rest of the retroperitoneal lymph nodes have decreased in size.  Mediastinal adenopathy appears unchanged.   Noncalcified bilateral pulmonary nodules are stable  • 1/17/2020 patient received Keytruda cycle 12:.  • 2/3/2020 WBC 7.1, hemoglobin 12.2, platelets 171  • 02/10/2020 patient received cycle 13 of Keytruda: WBC 7.3, hemoglobin 12.1, platelets 166, creatinine 0.82  • 3/6/2020 patient is of Keytruda 200 mg, hemoglobin 10.8  • 3/27/2020 patient received Keytruda 200 mg, hemoglobin 11.4, TSH 1.95  • 4/17/2020 patient received Keytruda cycle 16 200 mg, WBC 6.1, hemoglobin 11.6, platelets 150  • 5/8/2020: Patient received Keytruda, WBC 6.3, hemoglobin 9.6, platelets 137, TSH 2.05, free T4 1.54, creatinine 0.93, LFTs normal,  • 5/29/2020: Patient received cycle 18 Keytruda       Subjective:    69 year old male patient presents to the office for his immunotherapy treatment.  He continues to have intermittent severe diarrhea about once a week and that he has to go to the bathroom within 1 to 2 minutes when that happens.  He also has episodic nausea and sneezing.  Taking Synthroid.  Requesting for refill for omeprazole.  Also requesting for Zofran for his nausea symptoms.  He does not seem to have lost weight.  He does not report any fevers.  .  He continues folic acid daily.   .  No new complaints today.      The following portions of the patient's history were reviewed and updated as appropriate: allergies, current medications, past family history, past medical history, past social history, past surgical history and problem list.     Past Medical History:   Diagnosis Date   • B12 deficiency    • Blockage of coronary artery of heart (CMS/[facility])    • Depression    • DM (diabetes mellitus) (CMS/[facility])    • Dysphagia    • HTN (hypertension)    • Hyperlipidemia        Past Surgical History:   Procedure Laterality Date   • ABDOMINAL WALL ABSCESS INCISION AND DRAINAGE  10/22/2018    WITH DEBRIDEMENT  1.5CM X 1.5 CM X 1.5 CM    DR. PURI   • CATARACT EXTRACTION  2018   • COLONOSCOPY     • CORONARY ARTERY BYPASS GRAFT  2015   •  ENDOSCOPY  12/20/2016   • ESOPHAGECTOMY  05/04/2017    BROOKS PETTY ESOPHAGECTOMY, FEEDING JEJNOSTOMOY, ABDOMINAL AND MEDIASTINAL LYMPH NODE DISECTION, PEDICLED MUSCLE FLAP COVERAGE DR. PURI   • ESOPHAGOSCOPY / EGD  07/12/2017    WITH BALLOON DILATION   • ESOPHAGOSCOPY / EGD  07/26/2017    WITH BALLOON DILATION   • ESOPHAGOSCOPY / EGD  08/11/2017    WITH BALLOON DILATION   • ESOPHAGOSCOPY / EGD  08/28/2017    WITH BALLOON DILATION   • ESOPHAGOSCOPY / EGD  09/27/2017    WITH BALLOON DILATION   • LYMPH NODE BIOPSY  01/30/2019   • PACEMAKER IMPLANTATION  11/21/2016         Current Outpatient Medications:   •  albuterol sulfate  (90 Base) MCG/ACT inhaler, Inhale 2 puffs Every 4 (Four) Hours As Needed for Wheezing., Disp: 8 g, Rfl: 3  •  clonazePAM (KlonoPIN) 0.5 MG tablet, Pt taking 1.5 pills daily, Disp: , Rfl: 1  •  cyanocobalamin (VITAMIN B-12) 1000 MCG tablet, , Disp: , Rfl:   •  diphenoxylate-atropine (LOMOTIL) 2.5-0.025 MG per tablet, Take 1 tablet by mouth 2 (Two) Times a Day As Needed for Diarrhea., Disp: 60 tablet, Rfl: 0  •  folic acid (FOLVITE) 1 MG tablet, Daily., Disp: , Rfl:   •  levothyroxine (Euthyrox) 50 MCG tablet, Take 1 tablet by mouth Daily., Disp: 30 tablet, Rfl: 2  •  metFORMIN (GLUCOPHAGE) 1000 MG tablet, Take 500 mg by mouth 2 (Two) Times a Day., Disp: , Rfl: 4  •  metoprolol tartrate (LOPRESSOR) 25 MG tablet, Take  by mouth 2 (Two) Times a Day., Disp: , Rfl:   •  omeprazole (priLOSEC) 40 MG capsule, Take 40 mg by mouth 2 (Two) Times a Day., Disp: , Rfl:   •  ondansetron (ZOFRAN) 4 MG tablet, Take 1 tablet by mouth Every 8 (Eight) Hours As Needed for Nausea or Vomiting., Disp: 90 tablet, Rfl: 0  •  PARoxetine (PAXIL) 10 MG tablet, Take 10 mg by mouth Daily., Disp: , Rfl: 0  •  pravastatin (PRAVACHOL) 10 MG tablet, every other day, Disp: , Rfl:   •  predniSONE (DELTASONE) 10 MG tablet, Take 1 tablet by mouth Daily., Disp: 21 tablet, Rfl: 0    Allergies   Allergen Reactions   • Ace  Inhibitors Unknown (See Comments)     Unknown reaction     • Heparin Other (See Comments)     Fever/chills, weakness in legs   • Sulfa Antibiotics Other (See Comments)     Mouth sores       Family History   Problem Relation Age of Onset   • Mental illness Mother    • Heart disease Mother    • Hypertension Father    • Cancer Father    • Heart disease Sister    • Heart disease Brother        Cancer-related family history includes Cancer in his father.    Social History     Tobacco Use   • Smoking status: Former Smoker     Packs/day: 2.00     Years: 50.00     Pack years: 100.00     Types: Cigarettes     Last attempt to quit: 2015     Years since quittin.9   • Smokeless tobacco: Former User     Types: Chew     Quit date: 1989   Substance Use Topics   • Alcohol use: No     Frequency: Never   • Drug use: No         ROS:     Review of Systems   Constitutional: Positive for fatigue (after treatments). Negative for activity change, appetite change, chills and fever.   HENT: Negative for ear pain, mouth sores, nosebleeds, sore throat and trouble swallowing (has to eat soft foods ).    Eyes: Negative for photophobia and visual disturbance.   Respiratory: Negative for chest tightness, shortness of breath (attributes it to keytruda), wheezing and stridor.    Cardiovascular: Negative for chest pain and palpitations.   Gastrointestinal: Positive for diarrhea and nausea. Negative for abdominal pain and vomiting.   Endocrine: Negative for cold intolerance and heat intolerance.   Genitourinary: Negative for dysuria and hematuria.   Musculoskeletal: Negative for joint swelling and neck stiffness.   Skin: Negative for color change and rash.   Neurological: Negative for seizures, syncope, weakness and headaches.   Hematological: Negative for adenopathy.        No obvious bleeding   Psychiatric/Behavioral: Negative for agitation, confusion, hallucinations and sleep disturbance.       Objective:    Vitals:    20 1025  "  BP: 116/63   Pulse: 61   Resp: 16   Temp: 98 °F (36.7 °C)   Weight: 67.1 kg (148 lb)   Height: 167.6 cm (66\")   PainSc: 0-No pain       (1) Restricted in physically strenuous activity, ambulatory and able to do work of light nature    Physical Exam:     Physical Exam   Constitutional: He is oriented to person, place, and time. He appears well-developed and well-nourished. No distress.   HENT:   Head: Normocephalic and atraumatic.   Mouth/Throat: Oropharynx is clear and moist.   Absent teeth in upper jaw.--eyeglasses  Cleft lip and palette   Eyes: Conjunctivae and EOM are normal. Right eye exhibits no discharge. Left eye exhibits no discharge. No scleral icterus.   Neck: Normal range of motion. Neck supple. No thyromegaly present.   Cardiovascular: Normal rate, regular rhythm and normal heart sounds. Exam reveals no gallop and no friction rub.   Pulmonary/Chest: Effort normal and breath sounds normal. No stridor. No respiratory distress. He has no wheezes.   Abdominal: Soft. Bowel sounds are normal. He exhibits no mass. There is no tenderness. There is no rebound and no guarding.   No tenderness   Musculoskeletal: Normal range of motion. He exhibits no tenderness.   Lymphadenopathy:     He has no cervical adenopathy.   Neurological: He is alert and oriented to person, place, and time. He exhibits normal muscle tone.   Skin: Skin is warm. No rash noted. He is not diaphoretic. No erythema.   Right side chest  port   Psychiatric: He has a normal mood and affect. His behavior is normal.   Nursing note and vitals reviewed.        Lab Results - Last 18 Months   Lab Units 05/29/20  1011 05/08/20  0916 04/17/20  0936   WBC 10*3/mm3 6.52 6.37 6.14   HEMOGLOBIN g/dL 11.7* 11.6* 11.6*   HEMATOCRIT % 35.4* 34.3* 34.6*   PLATELETS 10*3/mm3 129* 137* 150   MCV fL 92.7 91.0 91.5     Lab Results - Last 18 Months   Lab Units 05/08/20  0916 04/17/20  0936 03/27/20  0952   SODIUM mmol/L 139 141 139   POTASSIUM mmol/L 4.6 5.0 4.8 "   CHLORIDE mmol/L 103 105 104   CO2 mmol/L 24.0 25.0 23.0   BUN mg/dL 9 12 16   CREATININE mg/dL 0.93 0.77 0.84   CALCIUM mg/dL 8.9 9.0 8.6   BILIRUBIN mg/dL 0.3 0.2 0.3   ALK PHOS U/L 32* 32* 27*   ALT (SGPT) U/L 7 7 6   AST (SGOT) U/L 14 14 12   GLUCOSE mg/dL 145* 145* 176*       Assessment/Plan     Assessment:  1. Metastatic esophageal carcinoma:  Patient has a history of GE junction poorly differentiated adenocarcinoma for which he had chemoradiation followed by [place] Gurwinder resection:  Pathology showed ypT3,pN1 disease.  Tumor was Stage IIIA, diagnosed in 2016.  He received concurrent chemoradiation with weekly Carboplatin and Taxol and radiation finishing on 3/15/17.    CT scan on 1/10/19 showed new retroperitoneal lymph nodes.  These lymph nodes were biopsied on 1/30/19 and it shows metastatic esophageal cancer.  [facility] Next Gen testing was performed on the sample and it shows KRAS mutation, microsatellite stable tumor.  PD-L1 is positive.  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  HER2/marvel (ERBB2) was not amplified and was negative.  His CT scan on 5/1/19 shows evidence of progression.  Patient has started receiving Keytruda on 5/31/19.  He has received 18 cycles so far.   CT scan on 1/28/2020 shows improvement and continued response.     2. 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  3. Occasional headaches/memory lapses: Advise close monitoring of the symptoms.  4. Episodic diarrhea:- diarrhea seems to be waxing and waning in nature.   Symptoms are intermittent and sometimes explosive..  Patient continues to have the symptoms.  Related to immunotherapy, but not severe.  5. Nausea  6. History of chronic dysphagia:  Due to recurrent esophageal strictures.  Patient is status post EGD and balloon dilation.  Today, he does not report any new symptoms.  7. PD-L1 positive, HER2 negative, p53 and KRAS positive.    8. B12 deficiency: Continue B12 1000 mcg twice daily.    9. Anemia: Likely from malignancy.  Also on iron  supplement.    PLAN:    1. Continue Keytruda. Ongoing evaluation for immunotherapy related hypothyroidism.  2. Continue levothyroxine 50 mcg.  3. Start low-dose Lomotil 1 tablet daily.  He may use Imodium as needed.  4. No longer on prednisone.  Does not need that anymore.   5. Continue to monitor TSH, T3, serum cortisol level.   6. We will continue to monitor closely for adverse events related to immunotherapy  7. Will need to closely monitor diarrhea symptoms.   8. Continue B12 supplements .  9. Continue  folic acid supplement also.  10. I plan to order reimaging at the end of July.  11. Follow-up in 1 month        No orders of the defined types were placed in this encounter.            Thank you very much for providing the opportunity to participate in this patient’s care. Please do not hesitate to call if there are any other questions      I have reviewed all relevant outside reports, notes, labs results, imaging, vitals, medications and plan with the patient today.    Portions of the note have been Scribed by medical assistant/Nurse.  I have reviewed and made changes accordingly.      Electronically signed by Bassem Jasso MD, 05/29/20, 10:41 AM.

## 2020-06-17 ENCOUNTER — CLINICAL SUPPORT NO REQUIREMENTS (OUTPATIENT)
Dept: CARDIOLOGY | Facility: CLINIC | Age: 70
End: 2020-06-17

## 2020-06-17 DIAGNOSIS — R00.1 BRADYCARDIA: ICD-10-CM

## 2020-06-17 DIAGNOSIS — Z95.0 PACEMAKER: Primary | ICD-10-CM

## 2020-06-17 PROCEDURE — 93294 REM INTERROG EVL PM/LDLS PM: CPT | Performed by: INTERNAL MEDICINE

## 2020-06-17 PROCEDURE — 93296 REM INTERROG EVL PM/IDS: CPT | Performed by: INTERNAL MEDICINE

## 2020-06-21 DIAGNOSIS — Z51.11 ENCOUNTER FOR ANTINEOPLASTIC CHEMOTHERAPY: ICD-10-CM

## 2020-06-21 DIAGNOSIS — C16.0 MALIGNANT NEOPLASM OF CARDIA (HCC): ICD-10-CM

## 2020-06-21 DIAGNOSIS — Z51.81 ENCOUNTER FOR THERAPEUTIC DRUG MONITORING: ICD-10-CM

## 2020-06-21 RX ORDER — SODIUM CHLORIDE 9 MG/ML
250 INJECTION, SOLUTION INTRAVENOUS ONCE
Status: CANCELLED | OUTPATIENT
Start: 2020-06-26

## 2020-06-24 ENCOUNTER — TELEPHONE (OUTPATIENT)
Dept: CARDIOLOGY | Facility: CLINIC | Age: 70
End: 2020-06-24

## 2020-06-24 NOTE — TELEPHONE ENCOUNTER
Called patient today to let him know his pacemaker remote check was good- he reports feeling more fatigued recently, and his HR is always 60 when checked. He was wanting to know if there is anything he can do to get HR up so he isnt so tired all of the time? He takes Metoprolol 25mg  BID- blood pressure is in the 115/60 range on average.

## 2020-06-25 DIAGNOSIS — R11.0 NAUSEA: ICD-10-CM

## 2020-06-25 RX ORDER — ONDANSETRON 4 MG/1
TABLET, FILM COATED ORAL
Qty: 90 TABLET | Refills: 0 | Status: SHIPPED | OUTPATIENT
Start: 2020-06-25 | End: 2020-06-29 | Stop reason: SDUPTHER

## 2020-06-26 ENCOUNTER — OFFICE VISIT (OUTPATIENT)
Dept: ONCOLOGY | Facility: CLINIC | Age: 70
End: 2020-06-26

## 2020-06-26 ENCOUNTER — APPOINTMENT (OUTPATIENT)
Dept: LAB | Facility: HOSPITAL | Age: 70
End: 2020-06-26

## 2020-06-26 ENCOUNTER — HOSPITAL ENCOUNTER (OUTPATIENT)
Dept: ONCOLOGY | Facility: HOSPITAL | Age: 70
Setting detail: INFUSION SERIES
Discharge: HOME | End: 2020-06-26

## 2020-06-26 VITALS
RESPIRATION RATE: 18 BRPM | BODY MASS INDEX: 24.04 KG/M2 | TEMPERATURE: 98.4 F | WEIGHT: 149.6 LBS | SYSTOLIC BLOOD PRESSURE: 125 MMHG | DIASTOLIC BLOOD PRESSURE: 73 MMHG | HEART RATE: 71 BPM | HEIGHT: 66 IN

## 2020-06-26 DIAGNOSIS — C16.0 MALIGNANT NEOPLASM OF CARDIA (HCC): ICD-10-CM

## 2020-06-26 DIAGNOSIS — C16.0 MALIGNANT NEOPLASM OF CARDIA (HCC): Primary | ICD-10-CM

## 2020-06-26 DIAGNOSIS — C15.9 ESOPHAGEAL CANCER, STAGE IV (HCC): Primary | ICD-10-CM

## 2020-06-26 DIAGNOSIS — Z51.81 ENCOUNTER FOR THERAPEUTIC DRUG MONITORING: ICD-10-CM

## 2020-06-26 DIAGNOSIS — Z51.11 ENCOUNTER FOR ANTINEOPLASTIC CHEMOTHERAPY: ICD-10-CM

## 2020-06-26 LAB
ALBUMIN SERPL-MCNC: 4 G/DL (ref 3.5–5.2)
ALBUMIN/GLOB SERPL: 1.3 G/DL
ALP SERPL-CCNC: 28 U/L (ref 39–117)
ALT SERPL W P-5'-P-CCNC: 6 U/L (ref 1–41)
ANION GAP SERPL CALCULATED.3IONS-SCNC: 10 MMOL/L (ref 5–15)
AST SERPL-CCNC: 13 U/L (ref 1–40)
BASOPHILS # BLD AUTO: 0.04 10*3/MM3 (ref 0–0.2)
BASOPHILS NFR BLD AUTO: 0.5 % (ref 0–1.5)
BILIRUB SERPL-MCNC: 0.4 MG/DL (ref 0.2–1.2)
BUN BLD-MCNC: 12 MG/DL (ref 8–23)
BUN BLD-MCNC: ABNORMAL MG/DL
BUN/CREAT SERPL: ABNORMAL
CALCIUM SPEC-SCNC: 9.2 MG/DL (ref 8.6–10.5)
CHLORIDE SERPL-SCNC: 102 MMOL/L (ref 98–107)
CO2 SERPL-SCNC: 24 MMOL/L (ref 22–29)
CREAT BLD-MCNC: 0.76 MG/DL (ref 0.76–1.27)
DEPRECATED RDW RBC AUTO: 42.4 FL (ref 37–54)
EOSINOPHIL # BLD AUTO: 0.54 10*3/MM3 (ref 0–0.4)
EOSINOPHIL NFR BLD AUTO: 7.1 % (ref 0.3–6.2)
ERYTHROCYTE [DISTWIDTH] IN BLOOD BY AUTOMATED COUNT: 13.2 % (ref 12.3–15.4)
GFR SERPL CREATININE-BSD FRML MDRD: 101 ML/MIN/1.73
GLOBULIN UR ELPH-MCNC: 3 GM/DL
GLUCOSE BLD-MCNC: 146 MG/DL (ref 65–99)
HCT VFR BLD AUTO: 34.9 % (ref 37.5–51)
HGB BLD-MCNC: 12 G/DL (ref 13–17.7)
LYMPHOCYTES # BLD AUTO: 1.66 10*3/MM3 (ref 0.7–3.1)
LYMPHOCYTES NFR BLD AUTO: 22 % (ref 19.6–45.3)
MCH RBC QN AUTO: 31.2 PG (ref 26.6–33)
MCHC RBC AUTO-ENTMCNC: 34.4 G/DL (ref 31.5–35.7)
MCV RBC AUTO: 90.6 FL (ref 79–97)
MONOCYTES # BLD AUTO: 0.96 10*3/MM3 (ref 0.1–0.9)
MONOCYTES NFR BLD AUTO: 12.7 % (ref 5–12)
NEUTROPHILS # BLD AUTO: 4.36 10*3/MM3 (ref 1.7–7)
NEUTROPHILS NFR BLD AUTO: 57.7 % (ref 42.7–76)
PLATELET # BLD AUTO: 165 10*3/MM3 (ref 140–450)
PMV BLD AUTO: 10.7 FL (ref 6–12)
POTASSIUM BLD-SCNC: 5 MMOL/L (ref 3.5–5.2)
PROT SERPL-MCNC: 7 G/DL (ref 6–8.5)
RBC # BLD AUTO: 3.85 10*6/MM3 (ref 4.14–5.8)
SODIUM BLD-SCNC: 136 MMOL/L (ref 136–145)
T4 FREE SERPL-MCNC: 1.51 NG/DL (ref 0.93–1.7)
TSH SERPL DL<=0.05 MIU/L-ACNC: 2.06 UIU/ML (ref 0.27–4.2)
WBC NRBC COR # BLD: 7.56 10*3/MM3 (ref 3.4–10.8)

## 2020-06-26 PROCEDURE — 96413 CHEMO IV INFUSION 1 HR: CPT

## 2020-06-26 PROCEDURE — 25010000002 PEMBROLIZUMAB 100 MG/4ML SOLUTION 4 ML VIAL: Performed by: INTERNAL MEDICINE

## 2020-06-26 PROCEDURE — 84439 ASSAY OF FREE THYROXINE: CPT | Performed by: INTERNAL MEDICINE

## 2020-06-26 PROCEDURE — 80053 COMPREHEN METABOLIC PANEL: CPT | Performed by: INTERNAL MEDICINE

## 2020-06-26 PROCEDURE — 85025 COMPLETE CBC W/AUTO DIFF WBC: CPT | Performed by: INTERNAL MEDICINE

## 2020-06-26 PROCEDURE — 84443 ASSAY THYROID STIM HORMONE: CPT | Performed by: INTERNAL MEDICINE

## 2020-06-26 PROCEDURE — 36591 DRAW BLOOD OFF VENOUS DEVICE: CPT

## 2020-06-26 PROCEDURE — 99214 OFFICE O/P EST MOD 30 MIN: CPT | Performed by: INTERNAL MEDICINE

## 2020-06-26 RX ORDER — INSULIN GLARGINE 100 [IU]/ML
INJECTION, SOLUTION SUBCUTANEOUS
COMMUNITY
Start: 2016-11-04 | End: 2020-09-17

## 2020-06-26 RX ORDER — SODIUM CHLORIDE 0.9 % (FLUSH) 0.9 %
10 SYRINGE (ML) INJECTION AS NEEDED
Status: DISCONTINUED | OUTPATIENT
Start: 2020-06-26 | End: 2020-06-27 | Stop reason: HOSPADM

## 2020-06-26 RX ORDER — SODIUM CHLORIDE 9 MG/ML
250 INJECTION, SOLUTION INTRAVENOUS ONCE
Status: DISCONTINUED | OUTPATIENT
Start: 2020-06-26 | End: 2020-06-27 | Stop reason: HOSPADM

## 2020-06-26 RX ORDER — SODIUM CHLORIDE 0.9 % (FLUSH) 0.9 %
10 SYRINGE (ML) INJECTION AS NEEDED
Status: CANCELLED | OUTPATIENT
Start: 2020-06-26

## 2020-06-26 RX ADMIN — Medication 10 ML: at 12:30

## 2020-06-26 RX ADMIN — SODIUM CHLORIDE 200 MG: 900 INJECTION, SOLUTION INTRAVENOUS at 11:51

## 2020-06-26 NOTE — PROGRESS NOTES
Hematology-Oncology Follow-up Note       Artie Mc  1950    Primary Care Physician: Chanel Carter APRN  Referring Physician: Chanel Carter APRN    Reason For Visit:  Chief Complaint   Patient presents with   • Follow-up     Esophageal cancer, stage IV    Metastatic esophageal cancer  Monitoring for adverse effects related to immunotherapy.  Hypothyroidism  HPI:   Mr. Mc is a pleasant 70-year-old gentleman with a history of gastroesophageal reflux disease, diabetes, hypertension, heart block status post pacemaker placement and history of CABG.  He was having symptoms of dysphagia, weight loss since September 2016. He was reporting symptoms of food getting stuck in the lower chest.  He has transitioned himself to a liquid diet.  The patient reports losing about 30 pounds over this duration.  The patient underwent upper GI endoscopy on 12/20/16 with Dr. Esvin Barrera.  Endoscopy showed necrotic, circumferential and moderately obstructive mass at the distal esophagus between 42 cm and 36 cm.      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  There was also evidence of mild subacute inflammation in the duodenum.  Dr. Barrera ordered a CT scan of the chest and abdomen with contrast on 12/20/16.  The scan was done at [facility].  The scan showed a new ill-defined mass in the anterior wall of the distal esophagus at the GE junction measuring 2.3 x 2.2 x 1.6 cm, worrisome for cancer.  There were four subcentimeter liver lesions which had appeared stable in comparison to another CT scan from 2009 and they likely represent small cysts.  There was also mild lymphadenopathy in the gastrohepatic ligament.  There were at least six borderline enlarged lymph nodes measuring up to 1.3 x 1 cm in the region.  The patient is referred to us for further oncological evaluation.    1/5/2017 - The patient presents for initial  consultation.  He reports persistent dysphagia and also has symptoms of regurgitation.  He cannot tolerate solid foods and is dependent on liquid diet such as Ensure.  He also reports fatigue. Symptoms of dysphagia have been present for the past four months.  When he eats any solids, the food gets stuck in the lower chest.    • 1/5/17 - Vitamin B12 200 (L).  CEA 0.8.  Ferritin 35.  Iron saturation 16% (L), serum iron 56, TIBC 354.  Creatinine 0.9.  LFTs normal.  Folate 10.2.  • 1/12/17 - PET/CT scan:  Intense abnormal activity corresponding to the region of the GE junction and proximal stomach.  SUV is 11.06.  There is a suggestion of an abnormal mass or abnormal wall thickening in the region measuring 3.9 x 4.8 cm.  No additional abnormalities.  No evidence of metastases or other lymphadenopathy.  Scattered nonspecific subcentimeter lymph nodes involving the mediastinum and within the central upper abdomen.  • 1/13/17 - Creatinine 0.9.  LFTs normal.    • 1/17/17 - Patient seen by thoracic surgeon, Dr. Bradley.  PET scan was reviewed.  He has recommended neoadjuvant chemoradiation therapy followed by Casar Gurwinder esophagogastrectomy.  Port-A-Cath is being arranged.    • 1/20/17 - WBC 6.3, hemoglobin 12.1, platelet count 198,000, MCV 86.4.  • 1/30/17 - Patient started weekly Carboplatin and Taxol (Carboplatin AUC 2 and Taxol 50 mg/M2).  Patient received Injectafer 750 mg.  WBC 5.8, hemoglobin 12.4, platelet count 244,000.   • 1/31/17 - Patient seen by Dr. Eduardo Oleary.  The plan was to give 5040 cGy in 28 fractions.   • 2/6/17 - WBC 4.4, hemoglobin 11.6, platelet count 201,000.  Patient received cycle 2 of weekly Carboplatin and Taxol (Carboplatin AUC 2 and Taxol 50 mg/M2).  • 2/13/17 - Patient received Carboplatin and Taxol weekly cycle 3.  • 2/13/17 - Patient started concurrent radiation therapy.  Total planned dose is 4140 cGy.    • 2/13/17 to 2/15/17 - Patient received 4140 cGy of neoadjuvant radiation.   Radiation was finished on 3/15/17.    • 2/20/17 - WBC 1.8, hemoglobin 10.6, platelet count 217,000.  Creatinine 0.5. Folate 15 (L).  Ferritin 361.  Haptoglobin 214.  Iron saturation 33%, TIBC 263, serum iron 88.    • 2/20/17 - Patient received Carboplatin and Taxol weekly cycle 4.   • 2/27/17 - WBC 3.7, hemoglobin 10.7, platelet count 177,000, MCV 85.3.    • 3/6/17 - Patient has received 16 out of the 23 planned radiation treatments.  Total planned dose is 4140 cGy.    • 3/6/17 - WBC 5.4, hemoglobin 11.2, platelet count 113,000.  • 3/6/17 - Patient received cycle 5 of weekly Carboplatin and Taxol.   • 3/13/17 - Patient received cycle 6 of weekly Carboplatin and Taxol.  WBC 3.9, hemoglobin 11.3, platelet count 93,000.     • 3/20/17 - WBC 1.4, hemoglobin 10.0, platelet count 118,000, MCV 86.1   • 3/27/17 - WBC 3.8, hemoglobin 9.5, platelet count 176,000, MCV 87.3.    • 4/7/17 - Stress test:  No evidence of reversible myocardial ischemia.  Normal left ventricular ejection fraction of 50%.    • 4/17/17 - Upper GI endoscopy by Dr. 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  The GE junction adenocarcinoma lesion seems to have a very good response to chemotherapy and radiation.  The lumen was open.  • 5/1/17 - WBC 7.0, hemoglobin 10.2, platelet count 175,000.    • 5/4/17 - Patient underwent Noel Gurwinder esophagectomy with pyloroplasty, feeding jejunostomy tube, abdominal and mediastinal lymph node dissection.    Surgical Pathology:  Moderately to poorly differentiated adenocarcinoma measuring 1.3 cm at the GE junction.  Resection margins are negative for malignancy.  Tumor margins are negative.  There is effective treatment response.  No evidence of lymphovascular invasion.  Staging is ypT3,pN1.  One out of fourteen lymph nodes involved.   • 5/22/17 - WBC 6.8, hemoglobin 10.3, platelet count 312,000.    • 6/19/17 - WBC 5.7, hemoglobin 10.5, platelet count 204,000, MCV 92.1.     • 7/6/17 - EGD:  Status post balloon dilation of esophageal stricture.  • 7/12/17 - EGD with balloon dilation of esophageal stricture.  • 7/26/17 - EGD and balloon dilation of esophageal stricture.  • 7/31/17 - PET/CT scan:  There is mild metabolic activity seen at the thoracic esophagus just at and below the surgical margins.  Some mild wall thickening.  This could be from recent surgery.  A residual cancer seems unlikely.  There is a precarinal lymph node with mild metabolic activity with SUV of 3 measuring 1 x 1.3 cm.  Again this appears to be nonspecific in my opinion.    • 8/7/17 - WBC 5.9, hemoglobin 11.9, platelet count 171,000, MCV 88.7.    • 10/19/17 - WBC 7.5, hemoglobin 11.8, platelet count 216,000.    • 1/8/18 - PET scan:  No evidence of residual disease or recurrent disease.  There is an esophageal stent in the mid esophagus with a large debris air level.  No evidence of any metastases in the chest, abdomen or pelvis.  Mild nodule uptake in the vocal cords with fullness in the right vocal cord.  This could be physiologic.    • 7/9/18 - CT scan of chest with contrast:  Prominent mediastinal lymph nodes appear stable since February.  Changes of gastric pull-through procedure for esophageal cancer again noted.  Tree-in-bud infiltrates in the left lung base, consistent with infection versus inflammation.    • 1/10/19 - CT chest, abdomen and pelvis with contrast:  No evidence of mets in the chest; however, interval development of metastatic lymphadenopathy in the left side of the retroperitoneum.  For instance, there is a 17 mm rounded lymph node, 10 mm lymph node and also a 14 mm lymph node.  These are all new.  Stable 9 mm hypodense lesion within the right hepatic lobe, which could reflect hemangioma or complex cyst.    • 1/30/19 - CT-guided core biopsy of retroperitoneal lymph node:  Poorly differentiated adenocarcinoma consistent with metastases from patient’s known esophageal primary.  CK20 positive,  CDX2 positive, cytokeratin 7 negative, TTF-1 negative.    • 2/1/19 - Creatinine 0.9, BUN 18, calcium 9.3; these are normal.    • 2/28/19 - [employer] Next Gen sequencing testing on retroperitoneal lymph node specimen:  PD-L1 positive.  CPS = 3.  This implies responsiveness to pembrolizumab.  Mismatch repair status is proficient (no evidence of microsatellite instability).  Tumor mutational burden is low = 6 mutations/Mb.  CDH1 indeterminate.  KRAS mutation positive.  TP53 mutation positive.  CDK6 amplified.  [employer] PD-L1 testing by IHC:  PD-L1 expression 1% positive (CPS =1).  HER2/marvel by IHC is negative.  HER2/marvel by CISH not amplified.  Microsatellite stable tumor.    • 3/7/19 - WBC 7.3, hemoglobin 12, platelet count 128,000, MCV 92.     • 3/14/19 - Patient was seen by radiation oncologist, Dr. Chahal.  He has recommended observation versus systemic chemoimmunotherapy as needed.  No plans for radiation therapy.   • 5/1/19 - CT scan of chest with contrast:  No definitive findings of new metastatic disease in the chest.  Emphysema noted.    • 5/1/19 - CT abdomen and pelvis:  There is interval progression of metastatic retroperitoneal adenopathy.  Left periaortic adenopathy with lymph node measuring up to 2 cm, previously 1.7 cm.  Another lymph node now measures 2.3, previously 1.4 cm.  New enlarged lymph node on image 147 measures 1.4 cm, previously 0.4 cm.  A 9 mm hypodense right hepatic lobe lesion appears stable.    • 5/9/19 - Decision made to start patient on immunotherapy Keytruda.    • 5/9/19 - Vitamin B12 229.  Ferritin 61.  Folate 14.3.  Iron saturation 26%, TIBC 304, serum iron 79.  • 5/31/19 - Patient received Keytruda 200 mg cycle 1, day 1.    • 5/31/19 - Free T4 0.99.  Creatinine 0.9, BUN 9.  LFTs normal.  TSH 3.99.  Folate 14.3.  Iron saturation 26%.    • 6/21/2019 patient received Keytruda cycle 2  • 7/12/2019: Patient received Keytruda cycle 3  • 8/2/2019 patient received cycle 4 Keytruda WBC 7.2,  hemoglobin 11.8 platelet count 163  • 8/23/2019 creatinine 0.9, LFTs normal, WBC 6.4, hemoglobin 11.5, platelets 179, TSH 2.1   • 8/23/2019 patient received cycle 5 of Keytruda  • 9/3/2019 CT chest abdomen and pelvis with contrast: . Positive response to therapy in the abdomen since 05/01/2019. Pathologically enlarged retroperitoneal lymph nodes, predominantly in the left periaortic region, have diminished in size.a 1.3 x 2.1 cm left periaortic node (image 60), previously measured 3.5 x 2.3 cm. A 1.6 cm index node near the level of the left renal vein previously measured 2.0 cm No new or progressive adenopathy. 2. Stable findings in the chest. Noncalcified right upper lobe nodules are unchanged. There is no evidence of new or progressive metastatic disease within the chest. 3. 8 mm indeterminate low-density lesion in the right hepatic lobe, is stable. No new liver lesions.  • 9/6/2019 WBC 5.6, hemoglobin 11.2, platelets of 192, MCV 91.8  • 9/13/2019 patient received Keytruda cycle 6, albumin 3.2  • 10/4/2019 patient received cycle 7 of Keytruda, TSH 4.8, free T4 0.86  • 10/9/2019 WBC 5.8, hemoglobin 11.7, platelets 174  • 10/25/2019 patient received cycle 8 of Keytruda.  WBC 5.9, hemoglobin 11.5, platelets 152, creatinine 0.75, cortisol 15.2, TSH 6.48 high , free T4 1.13 normal  • 11/6/2019 WBC 6.6, hemoglobin 11.8, MCV 91.5, platelets 187  • 11/15/2019 patient received cycle 9 of Keytruda, WBC 6.6, hemoglobin 11.9, platelets 161, cortisol level 8.86, TSH 2.68, free T3 2.8, free T4 1.5  • 12/4/2019 WBC 6.9, hemoglobin 12.0, platelets 180, MCV 92.9  • 12/06/2019-Keytruda Cycle 10  • 12/27/2019- Cycle 11 LFTs normal, WBC 6.7, hemoglobin 11.4, platelets 168, MCV 93, TSH 2.4,  • 1/28/2020: CT chest abdomen and pelvis with contrast:  Single (aortic lymph node in the abdominal retroperitoneum has increased.  Rest of the retroperitoneal lymph nodes have decreased in size.  Mediastinal adenopathy appears unchanged.   Noncalcified bilateral pulmonary nodules are stable  • 1/17/2020 patient received Keytruda cycle 12:.  • 2/3/2020 WBC 7.1, hemoglobin 12.2, platelets 171  • 02/10/2020 patient received cycle 13 of Keytruda: WBC 7.3, hemoglobin 12.1, platelets 166, creatinine 0.82  • 3/6/2020 patient is of Keytruda 200 mg, hemoglobin 10.8  • 3/27/2020 patient received Keytruda 200 mg, hemoglobin 11.4, TSH 1.95  • 4/17/2020 patient received Keytruda cycle 16 200 mg, WBC 6.1, hemoglobin 11.6, platelets 150  • 5/8/2020: Patient received cycle 17 Keytruda, WBC 6.3, hemoglobin 9.6, platelets 137, TSH 2.05, free T4 1.54, creatinine 0.93, LFTs normal,  • 5/29/2020: Patient received cycle 18 Keytruda, WBC 6.5, hemoglobin 11.7, platelets 129, creatinine 0.83, TSH 1.69  • 6/26/2020: Patient receiving Keytruda.           Subjective:  70 year old male patient presents to the office for a follow up regarding esophageal cancer. He continues to have intermittent severe diarrhea about once a week and that he has to go to the bathroom within 1 to 2 minutes when that happens. He states he tries to eat at night, due to nausea. He states he doesn't like to eat and move around a lot afterward. He states that his nausea has improved. He continues to take B-12, Folic acid, and Synthroid. He denies any side effects from taking Keytruda.     The following portions of the patient's history were reviewed and updated as appropriate: allergies, current medications, past family history, past medical history, past social history, past surgical history and problem list.     Past Medical History:   Diagnosis Date   • B12 deficiency    • Blockage of coronary artery of heart (CMS/HCC)    • Depression    • DM (diabetes mellitus) (CMS/HCC)    • Dysphagia    • HTN (hypertension)    • Hyperlipidemia        Past Surgical History:   Procedure Laterality Date   • ABDOMINAL WALL ABSCESS INCISION AND DRAINAGE  10/22/2018    WITH DEBRIDEMENT  1.5CM X 1.5 CM X 1.5 CM      [facility]   • CATARACT EXTRACTION  2018   • COLONOSCOPY     • CORONARY ARTERY BYPASS GRAFT  2015   • ENDOSCOPY  12/20/2016   • ESOPHAGECTOMY  05/04/2017    BROOKS PETTY ESOPHAGECTOMY, FEEDING JEJNOSTOMOY, ABDOMINAL AND MEDIASTINAL LYMPH NODE DISECTION, PEDICLED MUSCLE FLAP COVERAGE DR. PURI   • ESOPHAGOSCOPY / EGD  07/12/2017    WITH BALLOON DILATION   • ESOPHAGOSCOPY / EGD  07/26/2017    WITH BALLOON DILATION   • ESOPHAGOSCOPY / EGD  08/11/2017    WITH BALLOON DILATION   • ESOPHAGOSCOPY / EGD  08/28/2017    WITH BALLOON DILATION   • ESOPHAGOSCOPY / EGD  09/27/2017    WITH BALLOON DILATION   • LYMPH NODE BIOPSY  01/30/2019   • PACEMAKER IMPLANTATION  11/21/2016         Current Outpatient Medications:   •  albuterol sulfate  (90 Base) MCG/ACT inhaler, Inhale 2 puffs Every 4 (Four) Hours As Needed for Wheezing., Disp: 8 g, Rfl: 3  •  clonazePAM (KlonoPIN) 0.5 MG tablet, Pt taking 1.5 pills daily, Disp: , Rfl: 1  •  cyanocobalamin (VITAMIN B-12) 1000 MCG tablet, , Disp: , Rfl:   •  diphenoxylate-atropine (LOMOTIL) 2.5-0.025 MG per tablet, Take 1 tablet by mouth Daily As Needed for Diarrhea., Disp: 60 tablet, Rfl: 0  •  folic acid (FOLVITE) 1 MG tablet, Daily., Disp: , Rfl:   •  insulin glargine (Lantus) 100 UNIT/ML injection, Inject  under the skin into the appropriate area as directed., Disp: , Rfl:   •  levothyroxine (Euthyrox) 50 MCG tablet, Take 1 tablet by mouth Daily., Disp: 30 tablet, Rfl: 2  •  metFORMIN (GLUCOPHAGE) 1000 MG tablet, Take 500 mg by mouth 2 (Two) Times a Day., Disp: , Rfl: 4  •  metoprolol tartrate (LOPRESSOR) 25 MG tablet, Take  by mouth 2 (Two) Times a Day., Disp: , Rfl:   •  omeprazole (priLOSEC) 40 MG capsule, Take 1 capsule by mouth 2 (Two) Times a Day., Disp: 60 capsule, Rfl: 1  •  ondansetron (ZOFRAN) 4 MG tablet, TAKE 1 TABLET BY MOUTH EVERY 8 HOURS AS NEEDED FOR NAUSEA FOR VOMITING, Disp: 90 tablet, Rfl: 0  •  PARoxetine (PAXIL) 10 MG tablet, Take 10 mg by mouth Daily., Disp: ,  Rfl: 0  •  pravastatin (PRAVACHOL) 10 MG tablet, every other day, Disp: , Rfl:   •  predniSONE (DELTASONE) 10 MG tablet, Take 1 tablet by mouth Daily., Disp: 21 tablet, Rfl: 0    Allergies   Allergen Reactions   • Ace Inhibitors Unknown (See Comments)     Unknown reaction     • Heparin Other (See Comments)     Fever/chills, weakness in legs   • Sulfa Antibiotics Other (See Comments)     Mouth sores       Family History   Problem Relation Age of Onset   • Mental illness Mother    • Heart disease Mother    • Hypertension Father    • Cancer Father    • Heart disease Sister    • Heart disease Brother        Cancer-related family history includes Cancer in his father.    Social History     Tobacco Use   • Smoking status: Former Smoker     Packs/day: 2.00     Years: 50.00     Pack years: 100.00     Types: Cigarettes     Last attempt to quit: 2015     Years since quittin.0   • Smokeless tobacco: Former User     Types: Chew     Quit date: 1989   Substance Use Topics   • Alcohol use: No     Frequency: Never   • Drug use: No     ROS:     Review of Systems   Constitutional: Negative for activity change, chills and fever.   HENT: Negative for drooling, ear discharge, mouth sores, nosebleeds and sore throat.    Eyes: Negative for photophobia, pain, redness and visual disturbance.   Respiratory: Negative for cough, choking, shortness of breath and wheezing.    Cardiovascular: Negative for chest pain and palpitations.   Gastrointestinal: Negative for abdominal pain, diarrhea, nausea and vomiting.        Reports diarrhea sporadically once a week.   Genitourinary: Negative for dysuria.   Musculoskeletal: Negative for neck stiffness.   Skin: Negative for rash.   Neurological: Negative for seizures, syncope and speech difficulty.   Psychiatric/Behavioral: Negative for agitation, confusion and hallucinations.       Objective:    Vitals:    20 0939   BP: 125/73   Pulse: 71   Resp: 18   Temp: 98.4 °F (36.9 °C)  "  Weight: 67.9 kg (149 lb 9.6 oz)   Height: 167.6 cm (66\")   PainSc: 0-No pain       (1) Restricted in physically strenuous activity, ambulatory and able to do work of light nature    Physical Exam:     Physical Exam   Constitutional: He is oriented to person, place, and time. He appears well-developed and well-nourished. No distress.   HENT:   Head: Normocephalic and atraumatic.   Mouth/Throat: Oropharynx is clear and moist.   Absent teeth in upper jaw.--eyeglasses  Cleft lip and palette   Eyes: Conjunctivae and EOM are normal. Right eye exhibits no discharge. Left eye exhibits no discharge. No scleral icterus.   Neck: Normal range of motion. Neck supple. No thyromegaly present.   Cardiovascular: Normal rate, regular rhythm and normal heart sounds. Exam reveals no gallop and no friction rub.   Pulmonary/Chest: Effort normal and breath sounds normal. No stridor. No respiratory distress. He has no wheezes.   Abdominal: Soft. Bowel sounds are normal. He exhibits no mass. There is no tenderness. There is no rebound and no guarding.   No tenderness   Musculoskeletal: Normal range of motion. He exhibits no tenderness.   Lymphadenopathy:     He has no cervical adenopathy.   Neurological: He is alert and oriented to person, place, and time. He exhibits normal muscle tone.   Skin: Skin is warm. No rash noted. He is not diaphoretic. No erythema.   Right side chest  port   Psychiatric: He has a normal mood and affect. His behavior is normal.   Nursing note and vitals reviewed.        Lab Results - Last 18 Months   Lab Units 05/29/20  1011 05/08/20  0916 04/17/20  0936   WBC 10*3/mm3 6.52 6.37 6.14   HEMOGLOBIN g/dL 11.7* 11.6* 11.6*   HEMATOCRIT % 35.4* 34.3* 34.6*   PLATELETS 10*3/mm3 129* 137* 150   MCV fL 92.7 91.0 91.5     Lab Results - Last 18 Months   Lab Units 05/29/20  1011 05/08/20  0916 04/17/20  0936   SODIUM mmol/L 139 139 141   POTASSIUM mmol/L 4.8 4.6 5.0   CHLORIDE mmol/L 104 103 105   CO2 mmol/L 25.0 24.0 25.0 "   BUN mg/dL 16 9 12   CREATININE mg/dL 0.83 0.93 0.77   CALCIUM mg/dL 9.0 8.9 9.0   BILIRUBIN mg/dL 0.2 0.3 0.2   ALK PHOS U/L 32* 32* 32*   ALT (SGPT) U/L 6 7 7   AST (SGOT) U/L 14 14 14   GLUCOSE mg/dL 140* 145* 145*       Assessment/Plan     Assessment:  1. Metastatic esophageal carcinoma:  Patient has a history of GE junction poorly differentiated adenocarcinoma for which he had chemoradiation followed by Noel Gurwinder resection:  Pathology showed ypT3,pN1 disease.  Tumor was Stage IIIA, diagnosed in 2016.  He received concurrent chemoradiation with weekly Carboplatin and Taxol and radiation finishing on 3/15/17.    CT scan on 1/10/19 showed new retroperitoneal lymph nodes.  These lymph nodes were biopsied on 1/30/19 and it shows metastatic esophageal cancer.  [employer] Next Gen testing was performed on the sample and it shows KRAS mutation, microsatellite stable tumor.  PD-L1 is positive.  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  HER2/marvel (ERBB2) was not amplified and was negative.  His CT scan on 5/1/19 shows evidence of progression.  Patient has started receiving Keytruda on 5/31/19.  He has received 19 cycles so far.   CT scan on 1/28/2020 shows improvement and continued response.     2. 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  3. Occasional headaches/memory lapses: Advise close monitoring of the symptoms.  4. Episodic diarrhea:- diarrhea seems to be waxing and waning in nature.   Symptoms are intermittent and sometimes explosive..  Patient continues to have the symptoms.  Related to immunotherapy, but not severe.  5. Nausea: Symptoms are intermittent  6. History of chronic dysphagia:  Due to recurrent esophageal strictures.  Patient is status post EGD and balloon dilation.  Today, he does not report any new symptoms.  7. PD-L1 positive, HER2 negative, p53 and KRAS positive.    8. B12 deficiency: Continue B12 1000 mcg twice daily.    9. Anemia: Likely from malignancy.  Also on iron supplement.    PLAN:    1. Continue  Keytruda. Ongoing evaluation for immunotherapy related hypothyroidism.  2. Continue levothyroxine 50 mcg.  3. Restaging CT scan of the chest abdomen pelvis and 1 month  4. Continue low-dose Lomotil 1 tablet daily.  He may use Imodium as needed.  5. No longer on prednisone.  Does not need that anymore.   6. Continue to monitor TSH, T3, serum cortisol level.   7. We will continue to monitor closely for adverse events related to immunotherapy  8. Will need to closely monitor diarrhea symptoms.   9. Continue B12 supplements .  10. Continue  folic acid supplement also.  11. Follow-up in 1 month        No orders of the defined types were placed in this encounter.            Thank you very much for providing the opportunity to participate in this patient’s care. Please do not hesitate to call if there are any other questions      I have reviewed all relevant outside reports, notes, labs results, imaging, vitals, medications and plan with the patient today.    Portions of the note have been Scribed by medical assistant/Nurse.  I have reviewed and made changes accordingly.      Electronically signed by Bassem Jasso MD, 06/26/20, 10:22 AM.

## 2020-06-29 DIAGNOSIS — R11.0 NAUSEA: ICD-10-CM

## 2020-06-29 RX ORDER — ONDANSETRON 4 MG/1
4 TABLET, FILM COATED ORAL EVERY 8 HOURS PRN
Qty: 90 TABLET | Refills: 2 | Status: SHIPPED | OUTPATIENT
Start: 2020-06-29 | End: 2022-02-02 | Stop reason: SDUPTHER

## 2020-07-13 DIAGNOSIS — C16.0 MALIGNANT NEOPLASM OF CARDIA (HCC): ICD-10-CM

## 2020-07-13 DIAGNOSIS — Z51.81 ENCOUNTER FOR THERAPEUTIC DRUG MONITORING: ICD-10-CM

## 2020-07-13 DIAGNOSIS — Z51.11 ENCOUNTER FOR ANTINEOPLASTIC CHEMOTHERAPY: ICD-10-CM

## 2020-07-13 RX ORDER — SODIUM CHLORIDE 9 MG/ML
250 INJECTION, SOLUTION INTRAVENOUS ONCE
Status: CANCELLED | OUTPATIENT
Start: 2020-07-17

## 2020-07-17 ENCOUNTER — HOSPITAL ENCOUNTER (OUTPATIENT)
Dept: ONCOLOGY | Facility: HOSPITAL | Age: 70
Setting detail: INFUSION SERIES
Discharge: HOME | End: 2020-07-17

## 2020-07-17 VITALS
SYSTOLIC BLOOD PRESSURE: 157 MMHG | HEIGHT: 66 IN | RESPIRATION RATE: 18 BRPM | HEART RATE: 60 BPM | DIASTOLIC BLOOD PRESSURE: 78 MMHG | TEMPERATURE: 98.2 F | BODY MASS INDEX: 24.11 KG/M2 | WEIGHT: 150 LBS

## 2020-07-17 DIAGNOSIS — Z51.81 ENCOUNTER FOR THERAPEUTIC DRUG MONITORING: ICD-10-CM

## 2020-07-17 DIAGNOSIS — C16.0 MALIGNANT NEOPLASM OF CARDIA (HCC): Primary | ICD-10-CM

## 2020-07-17 DIAGNOSIS — Z51.11 ENCOUNTER FOR ANTINEOPLASTIC CHEMOTHERAPY: ICD-10-CM

## 2020-07-17 LAB
ALBUMIN SERPL-MCNC: 4.2 G/DL (ref 3.5–5.2)
ALBUMIN/GLOB SERPL: 2.1 G/DL
ALP SERPL-CCNC: 33 U/L (ref 39–117)
ALT SERPL W P-5'-P-CCNC: 6 U/L (ref 1–41)
ANION GAP SERPL CALCULATED.3IONS-SCNC: 13 MMOL/L (ref 5–15)
AST SERPL-CCNC: 12 U/L (ref 1–40)
BASOPHILS # BLD AUTO: 0.04 10*3/MM3 (ref 0–0.2)
BASOPHILS NFR BLD AUTO: 0.7 % (ref 0–1.5)
BILIRUB SERPL-MCNC: 0.2 MG/DL (ref 0–1.2)
BUN SERPL-MCNC: 12 MG/DL (ref 8–23)
BUN SERPL-MCNC: ABNORMAL MG/DL
BUN/CREAT SERPL: ABNORMAL
CALCIUM SPEC-SCNC: 9.1 MG/DL (ref 8.6–10.5)
CHLORIDE SERPL-SCNC: 104 MMOL/L (ref 98–107)
CO2 SERPL-SCNC: 24 MMOL/L (ref 22–29)
CREAT SERPL-MCNC: 0.78 MG/DL (ref 0.76–1.27)
DEPRECATED RDW RBC AUTO: 43.5 FL (ref 37–54)
EOSINOPHIL # BLD AUTO: 0.37 10*3/MM3 (ref 0–0.4)
EOSINOPHIL NFR BLD AUTO: 6.4 % (ref 0.3–6.2)
ERYTHROCYTE [DISTWIDTH] IN BLOOD BY AUTOMATED COUNT: 13.5 % (ref 12.3–15.4)
GFR SERPL CREATININE-BSD FRML MDRD: 98 ML/MIN/1.73
GLOBULIN UR ELPH-MCNC: 2 GM/DL
GLUCOSE SERPL-MCNC: 124 MG/DL (ref 65–99)
HCT VFR BLD AUTO: 33.9 % (ref 37.5–51)
HGB BLD-MCNC: 11.4 G/DL (ref 13–17.7)
LYMPHOCYTES # BLD AUTO: 1.58 10*3/MM3 (ref 0.7–3.1)
LYMPHOCYTES NFR BLD AUTO: 27.5 % (ref 19.6–45.3)
MCH RBC QN AUTO: 31.1 PG (ref 26.6–33)
MCHC RBC AUTO-ENTMCNC: 33.6 G/DL (ref 31.5–35.7)
MCV RBC AUTO: 92.4 FL (ref 79–97)
MONOCYTES # BLD AUTO: 0.66 10*3/MM3 (ref 0.1–0.9)
MONOCYTES NFR BLD AUTO: 11.5 % (ref 5–12)
NEUTROPHILS NFR BLD AUTO: 3.1 10*3/MM3 (ref 1.7–7)
NEUTROPHILS NFR BLD AUTO: 53.9 % (ref 42.7–76)
PLATELET # BLD AUTO: 177 10*3/MM3 (ref 140–450)
PMV BLD AUTO: 10.3 FL (ref 6–12)
POTASSIUM SERPL-SCNC: 4.8 MMOL/L (ref 3.5–5.2)
PROT SERPL-MCNC: 6.2 G/DL (ref 6–8.5)
RBC # BLD AUTO: 3.67 10*6/MM3 (ref 4.14–5.8)
SODIUM SERPL-SCNC: 141 MMOL/L (ref 136–145)
TSH SERPL DL<=0.05 MIU/L-ACNC: 2.06 UIU/ML (ref 0.27–4.2)
WBC # BLD AUTO: 5.75 10*3/MM3 (ref 3.4–10.8)

## 2020-07-17 PROCEDURE — 85025 COMPLETE CBC W/AUTO DIFF WBC: CPT | Performed by: INTERNAL MEDICINE

## 2020-07-17 PROCEDURE — 80053 COMPREHEN METABOLIC PANEL: CPT | Performed by: INTERNAL MEDICINE

## 2020-07-17 PROCEDURE — 25010000002 PEMBROLIZUMAB 100 MG/4ML SOLUTION 4 ML VIAL: Performed by: INTERNAL MEDICINE

## 2020-07-17 PROCEDURE — 84443 ASSAY THYROID STIM HORMONE: CPT | Performed by: INTERNAL MEDICINE

## 2020-07-17 PROCEDURE — 96413 CHEMO IV INFUSION 1 HR: CPT

## 2020-07-17 PROCEDURE — 36591 DRAW BLOOD OFF VENOUS DEVICE: CPT

## 2020-07-17 RX ORDER — SODIUM CHLORIDE 9 MG/ML
250 INJECTION, SOLUTION INTRAVENOUS ONCE
Status: DISCONTINUED | OUTPATIENT
Start: 2020-07-17 | End: 2020-07-18 | Stop reason: HOSPADM

## 2020-07-17 RX ORDER — PRAVASTATIN SODIUM 10 MG
10 TABLET ORAL EVERY OTHER DAY
COMMUNITY
Start: 2020-06-25

## 2020-07-17 RX ORDER — SODIUM CHLORIDE 0.9 % (FLUSH) 0.9 %
10 SYRINGE (ML) INJECTION AS NEEDED
Status: CANCELLED | OUTPATIENT
Start: 2020-07-17

## 2020-07-17 RX ORDER — SODIUM CHLORIDE 0.9 % (FLUSH) 0.9 %
10 SYRINGE (ML) INJECTION AS NEEDED
Status: DISCONTINUED | OUTPATIENT
Start: 2020-07-17 | End: 2020-07-18 | Stop reason: HOSPADM

## 2020-07-17 RX ADMIN — Medication 10 ML: at 10:39

## 2020-07-17 RX ADMIN — SODIUM CHLORIDE 200 MG: 900 INJECTION, SOLUTION INTRAVENOUS at 10:01

## 2020-07-23 DIAGNOSIS — C15.9 ESOPHAGEAL CANCER, STAGE IV (HCC): ICD-10-CM

## 2020-07-23 DIAGNOSIS — R13.19 ESOPHAGEAL DYSPHAGIA: ICD-10-CM

## 2020-07-23 RX ORDER — OMEPRAZOLE 40 MG/1
CAPSULE, DELAYED RELEASE ORAL
Qty: 60 CAPSULE | Refills: 0 | Status: SHIPPED | OUTPATIENT
Start: 2020-07-23 | End: 2020-08-21

## 2020-07-27 ENCOUNTER — HOSPITAL ENCOUNTER (OUTPATIENT)
Dept: CT IMAGING | Facility: HOSPITAL | Age: 70
Discharge: HOME | End: 2020-07-27
Admitting: NURSE PRACTITIONER

## 2020-07-27 DIAGNOSIS — C15.9 ESOPHAGEAL CANCER, STAGE IV (HCC): ICD-10-CM

## 2020-07-27 DIAGNOSIS — C16.0 MALIGNANT NEOPLASM OF CARDIA (HCC): ICD-10-CM

## 2020-07-27 PROCEDURE — 74177 CT ABD & PELVIS W/CONTRAST: CPT

## 2020-07-27 PROCEDURE — 0 IOPAMIDOL PER 1 ML: Performed by: NURSE PRACTITIONER

## 2020-07-27 PROCEDURE — 71260 CT THORAX DX C+: CPT

## 2020-07-27 RX ADMIN — IOPAMIDOL 100 ML: 755 INJECTION, SOLUTION INTRAVENOUS at 12:00

## 2020-08-04 DIAGNOSIS — C16.0 MALIGNANT NEOPLASM OF CARDIA (HCC): ICD-10-CM

## 2020-08-04 DIAGNOSIS — Z51.81 ENCOUNTER FOR THERAPEUTIC DRUG MONITORING: ICD-10-CM

## 2020-08-04 DIAGNOSIS — Z51.11 ENCOUNTER FOR ANTINEOPLASTIC CHEMOTHERAPY: ICD-10-CM

## 2020-08-04 RX ORDER — SODIUM CHLORIDE 9 MG/ML
250 INJECTION, SOLUTION INTRAVENOUS ONCE
Status: CANCELLED | OUTPATIENT
Start: 2020-08-07

## 2020-08-06 NOTE — PROGRESS NOTES
Hematology-Oncology Follow-up Note       Artie Mc  1950    Primary Care Physician: Chanel Carter APRN  Referring Physician: Chanel Carter APRN    Reason For Visit:  Chief Complaint   Patient presents with   • Follow-up     Esophageal cancer, stage IV      Metastatic esophageal cancer  Monitoring for adverse effects related to immunotherapy.  Hypothyroidism    HPI:   Mr. Mc is a pleasant 70-year-old gentleman with a history of gastroesophageal reflux disease, diabetes, hypertension, heart block status post pacemaker placement and history of CABG.  He was having symptoms of dysphagia, weight loss since September 2016. He was reporting symptoms of food getting stuck in the lower chest.  He has transitioned himself to a liquid diet.  The patient reports losing about 30 pounds over this duration.  The patient underwent upper GI endoscopy on 12/20/16 with Dr. Esvin Barrera.  Endoscopy showed necrotic, circumferential and moderately obstructive mass at the distal esophagus between 42 cm and 36 cm.      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  There was also evidence of mild subacute inflammation in the duodenum.  Dr. Barrera ordered a CT scan of the chest and abdomen with contrast on 12/20/16.  The scan was done at [facility].  The scan showed a new ill-defined mass in the anterior wall of the distal esophagus at the GE junction measuring 2.3 x 2.2 x 1.6 cm, worrisome for cancer.  There were four subcentimeter liver lesions which had appeared stable in comparison to another CT scan from 2009 and they likely represent small cysts.  There was also mild lymphadenopathy in the gastrohepatic ligament.  There were at least six borderline enlarged lymph nodes measuring up to 1.3 x 1 cm in the region.  The patient is referred to us for further oncological evaluation.    1/5/2017 - The patient presents for  initial consultation.  He reports persistent dysphagia and also has symptoms of regurgitation.  He cannot tolerate solid foods and is dependent on liquid diet such as Ensure.  He also reports fatigue. Symptoms of dysphagia have been present for the past four months.  When he eats any solids, the food gets stuck in the lower chest.    • 1/5/17 - Vitamin B12 200 (L).  CEA 0.8.  Ferritin 35.  Iron saturation 16% (L), serum iron 56, TIBC 354.  Creatinine 0.9.  LFTs normal.  Folate 10.2.  • 1/12/17 - PET/CT scan:  Intense abnormal activity corresponding to the region of the GE junction and proximal stomach.  SUV is 11.06.  There is a suggestion of an abnormal mass or abnormal wall thickening in the region measuring 3.9 x 4.8 cm.  No additional abnormalities.  No evidence of metastases or other lymphadenopathy.  Scattered nonspecific subcentimeter lymph nodes involving the mediastinum and within the central upper abdomen.  • 1/13/17 - Creatinine 0.9.  LFTs normal.    • 1/17/17 - Patient seen by thoracic surgeon, Dr. Bradley.  PET scan was reviewed.  He has recommended neoadjuvant chemoradiation therapy followed by [place] Gurwinder esophagogastrectomy.  Port-A-Cath is being arranged.    • 1/20/17 - WBC 6.3, hemoglobin 12.1, platelet count 198,000, MCV 86.4.  • 1/30/17 - Patient started weekly Carboplatin and Taxol (Carboplatin AUC 2 and Taxol 50 mg/M2).  Patient received Injectafer 750 mg.  WBC 5.8, hemoglobin 12.4, platelet count 244,000.   • 1/31/17 - Patient seen by Dr. Eduardo Oleary.  The plan was to give 5040 cGy in 28 fractions.   • 2/6/17 - WBC 4.4, hemoglobin 11.6, platelet count 201,000.  Patient received cycle 2 of weekly Carboplatin and Taxol (Carboplatin AUC 2 and Taxol 50 mg/M2).  • 2/13/17 - Patient received Carboplatin and Taxol weekly cycle 3.  • 2/13/17 - Patient started concurrent radiation therapy.  Total planned dose is 4140 cGy.    • 2/13/17 to 2/15/17 - Patient received 4140 cGy of neoadjuvant radiation.   Radiation was finished on 3/15/17.    • 2/20/17 - WBC 1.8, hemoglobin 10.6, platelet count 217,000.  Creatinine 0.5. Folate 15 (L).  Ferritin 361.  Haptoglobin 214.  Iron saturation 33%, TIBC 263, serum iron 88.    • 2/20/17 - Patient received Carboplatin and Taxol weekly cycle 4.   • 2/27/17 - WBC 3.7, hemoglobin 10.7, platelet count 177,000, MCV 85.3.    • 3/6/17 - Patient has received 16 out of the 23 planned radiation treatments.  Total planned dose is 4140 cGy.    • 3/6/17 - WBC 5.4, hemoglobin 11.2, platelet count 113,000.  • 3/6/17 - Patient received cycle 5 of weekly Carboplatin and Taxol.   • 3/13/17 - Patient received cycle 6 of weekly Carboplatin and Taxol.  WBC 3.9, hemoglobin 11.3, platelet count 93,000.     • 3/20/17 - WBC 1.4, hemoglobin 10.0, platelet count 118,000, MCV 86.1   • 3/27/17 - WBC 3.8, hemoglobin 9.5, platelet count 176,000, MCV 87.3.    • 4/7/17 - Stress test:  No evidence of reversible myocardial ischemia.  Normal left ventricular ejection fraction of 50%.    • 4/17/17 - Upper GI endoscopy by Dr. 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  The GE junction adenocarcinoma lesion seems to have a very good response to chemotherapy and radiation.  The lumen was open.  • 5/1/17 - WBC 7.0, hemoglobin 10.2, platelet count 175,000.    • 5/4/17 - Patient underwent [facility] Gurwinder esophagectomy with pyloroplasty, feeding jejunostomy tube, abdominal and mediastinal lymph node dissection.    Surgical Pathology:  Moderately to poorly differentiated adenocarcinoma measuring 1.3 cm at the GE junction.  Resection margins are negative for malignancy.  Tumor margins are negative.  There is effective treatment response.  No evidence of lymphovascular invasion.  Staging is ypT3,pN1.  One out of fourteen lymph nodes involved.   • 5/22/17 - WBC 6.8, hemoglobin 10.3, platelet count 312,000.    • 6/19/17 - WBC 5.7, hemoglobin 10.5, platelet count 204,000, MCV 92.1.     • 7/6/17 - EGD:  Status post balloon dilation of esophageal stricture.  • 7/12/17 - EGD with balloon dilation of esophageal stricture.  • 7/26/17 - EGD and balloon dilation of esophageal stricture.  • 7/31/17 - PET/CT scan:  There is mild metabolic activity seen at the thoracic esophagus just at and below the surgical margins.  Some mild wall thickening.  This could be from recent surgery.  A residual cancer seems unlikely.  There is a precarinal lymph node with mild metabolic activity with SUV of 3 measuring 1 x 1.3 cm.  Again this appears to be nonspecific in my opinion.    • 8/7/17 - WBC 5.9, hemoglobin 11.9, platelet count 171,000, MCV 88.7.    • 10/19/17 - WBC 7.5, hemoglobin 11.8, platelet count 216,000.    • 1/8/18 - PET scan:  No evidence of residual disease or recurrent disease.  There is an esophageal stent in the mid esophagus with a large debris air level.  No evidence of any metastases in the chest, abdomen or pelvis.  Mild nodule uptake in the vocal cords with fullness in the right vocal cord.  This could be physiologic.    • 7/9/18 - CT scan of chest with contrast:  Prominent mediastinal lymph nodes appear stable since February.  Changes of gastric pull-through procedure for esophageal cancer again noted.  Tree-in-bud infiltrates in the left lung base, consistent with infection versus inflammation.    • 1/10/19 - CT chest, abdomen and pelvis with contrast:  No evidence of mets in the chest; however, interval development of metastatic lymphadenopathy in the left side of the retroperitoneum.  For instance, there is a 17 mm rounded lymph node, 10 mm lymph node and also a 14 mm lymph node.  These are all new.  Stable 9 mm hypodense lesion within the right hepatic lobe, which could reflect hemangioma or complex cyst.    • 1/30/19 - CT-guided core biopsy of retroperitoneal lymph node:  Poorly differentiated adenocarcinoma consistent with metastases from patient’s known esophageal primary.  CK20 positive,  CDX2 positive, cytokeratin 7 negative, TTF-1 negative.    • 2/1/19 - Creatinine 0.9, BUN 18, calcium 9.3; these are normal.    • 2/28/19 - [facility] Next Gen sequencing testing on retroperitoneal lymph node specimen:  PD-L1 positive.  CPS = 3.  This implies responsiveness to pembrolizumab.  Mismatch repair status is proficient (no evidence of microsatellite instability).  Tumor mutational burden is low = 6 mutations/Mb.  CDH1 indeterminate.  KRAS mutation positive.  TP53 mutation positive.  CDK6 amplified.  [employer] PD-L1 testing by IHC:  PD-L1 expression 1% positive (CPS =1).  HER2/marevl by IHC is negative.  HER2/marvel by CISH not amplified.  Microsatellite stable tumor.    • 3/7/19 - WBC 7.3, hemoglobin 12, platelet count 128,000, MCV 92.     • 3/14/19 - Patient was seen by radiation oncologist, Dr. Chahal.  He has recommended observation versus systemic chemoimmunotherapy as needed.  No plans for radiation therapy.   • 5/1/19 - CT scan of chest with contrast:  No definitive findings of new metastatic disease in the chest.  Emphysema noted.    • 5/1/19 - CT abdomen and pelvis:  There is interval progression of metastatic retroperitoneal adenopathy.  Left periaortic adenopathy with lymph node measuring up to 2 cm, previously 1.7 cm.  Another lymph node now measures 2.3, previously 1.4 cm.  New enlarged lymph node on image 147 measures 1.4 cm, previously 0.4 cm.  A 9 mm hypodense right hepatic lobe lesion appears stable.    • 5/9/19 - Decision made to start patient on immunotherapy Keytruda.    • 5/9/19 - Vitamin B12 229.  Ferritin 61.  Folate 14.3.  Iron saturation 26%, TIBC 304, serum iron 79.  • 5/31/19 - Patient received Keytruda 200 mg cycle 1, day 1.    • 5/31/19 - Free T4 0.99.  Creatinine 0.9, BUN 9.  LFTs normal.  TSH 3.99.  Folate 14.3.  Iron saturation 26%.    • 6/21/2019 patient received Keytruda cycle 2  • 7/12/2019: Patient received Keytruda cycle 3  • 8/2/2019 patient received cycle 4 Keytruda WBC 7.2,  hemoglobin 11.8 platelet count 163  • 8/23/2019 creatinine 0.9, LFTs normal, WBC 6.4, hemoglobin 11.5, platelets 179, TSH 2.1   • 8/23/2019 patient received cycle 5 of Keytruda  • 9/3/2019 CT chest abdomen and pelvis with contrast: . Positive response to therapy in the abdomen since 05/01/2019. Pathologically enlarged retroperitoneal lymph nodes, predominantly in the left periaortic region, have diminished in size.a 1.3 x 2.1 cm left periaortic node (image 60), previously measured 3.5 x 2.3 cm. A 1.6 cm index node near the level of the left renal vein previously measured 2.0 cm No new or progressive adenopathy. 2. Stable findings in the chest. Noncalcified right upper lobe nodules are unchanged. There is no evidence of new or progressive metastatic disease within the chest. 3. 8 mm indeterminate low-density lesion in the right hepatic lobe, is stable. No new liver lesions.  • 9/6/2019 WBC 5.6, hemoglobin 11.2, platelets of 192, MCV 91.8  • 9/13/2019 patient received Keytruda cycle 6, albumin 3.2  • 10/4/2019 patient received cycle 7 of Keytruda, TSH 4.8, free T4 0.86  • 10/9/2019 WBC 5.8, hemoglobin 11.7, platelets 174  • 10/25/2019 patient received cycle 8 of Keytruda.  WBC 5.9, hemoglobin 11.5, platelets 152, creatinine 0.75, cortisol 15.2, TSH 6.48 high , free T4 1.13 normal  • 11/6/2019 WBC 6.6, hemoglobin 11.8, MCV 91.5, platelets 187  • 11/15/2019 patient received cycle 9 of Keytruda, WBC 6.6, hemoglobin 11.9, platelets 161, cortisol level 8.86, TSH 2.68, free T3 2.8, free T4 1.5  • 12/4/2019 WBC 6.9, hemoglobin 12.0, platelets 180, MCV 92.9  • 12/06/2019-Keytruda Cycle 10  • 12/27/2019- Cycle 11 LFTs normal, WBC 6.7, hemoglobin 11.4, platelets 168, MCV 93, TSH 2.4,  • 1/28/2020: CT chest abdomen and pelvis with contrast:  Single (aortic lymph node in the abdominal retroperitoneum has increased.  Rest of the retroperitoneal lymph nodes have decreased in size.  Mediastinal adenopathy appears unchanged.   Noncalcified bilateral pulmonary nodules are stable  • 1/17/2020 patient received Keytruda cycle 12:.  • 2/3/2020 WBC 7.1, hemoglobin 12.2, platelets 171  • 02/10/2020 patient received cycle 13 of Keytruda: WBC 7.3, hemoglobin 12.1, platelets 166, creatinine 0.82  • 3/6/2020 patient is of Keytruda 200 mg, hemoglobin 10.8  • 3/27/2020 patient received Keytruda 200 mg, hemoglobin 11.4, TSH 1.95  • 4/17/2020 patient received Keytruda cycle 16 200 mg, WBC 6.1, hemoglobin 11.6, platelets 150  • 5/8/2020: Patient received cycle 17 Keytruda, WBC 6.3, hemoglobin 9.6, platelets 137, TSH 2.05, free T4 1.54, creatinine 0.93, LFTs normal,  • 5/29/2020: Patient received cycle 18 Keytruda, WBC 6.5, hemoglobin 11.7, platelets 129, creatinine 0.83, TSH 1.69  • 6/26/2020: Patient receiving Keytruda.   • 7/17/2020: TSH 2.06, WBC 5.7, hemoglobin 11.4, platelets 177, MCV 92.4, Keytruda 200 mg cycle 20  • 7/27/2020: CT chest abdomen pelvis with contrast: Stable findings in the chest abdomen pelvis since 1/28/2020.  Noncalcified bilateral pulmonary nodules are stable.  Stable retroperitoneal left perinephric adenopathy in the abdomen.  •     Subjective:  70 year old male patient presents to the office for a follow up regarding esophageal cancer. He also presents for CT scan results. He had restaging scans on 07/27/2020. He takes lomotil to combat symptoms of diarrhea. He continues to take B-12, Folic acid, and Synthroid. He denies any side effects from taking Keytruda. His most recent cycle was 07/17/20.    The following portions of the patient's history were reviewed and updated as appropriate: allergies, current medications, past family history, past medical history, past social history, past surgical history and problem list.     Past Medical History:   Diagnosis Date   • B12 deficiency    • Blockage of coronary artery of heart (CMS/HCC)    • Depression    • DM (diabetes mellitus) (CMS/HCC)    • Dysphagia    • HTN (hypertension)    •  Hyperlipidemia        Past Surgical History:   Procedure Laterality Date   • ABDOMINAL WALL ABSCESS INCISION AND DRAINAGE  10/22/2018    WITH DEBRIDEMENT  1.5CM X 1.5 CM X 1.5 CM    DR. PURI   • CATARACT EXTRACTION  2018   • COLONOSCOPY     • CORONARY ARTERY BYPASS GRAFT  2015   • ENDOSCOPY  12/20/2016   • ESOPHAGECTOMY  05/04/2017    BROOKS PETTY ESOPHAGECTOMY, FEEDING JEJNOSTOMOY, ABDOMINAL AND MEDIASTINAL LYMPH NODE DISECTION, PEDICLED MUSCLE FLAP COVERAGE DR. PURI   • ESOPHAGOSCOPY / EGD  07/12/2017    WITH BALLOON DILATION   • ESOPHAGOSCOPY / EGD  07/26/2017    WITH BALLOON DILATION   • ESOPHAGOSCOPY / EGD  08/11/2017    WITH BALLOON DILATION   • ESOPHAGOSCOPY / EGD  08/28/2017    WITH BALLOON DILATION   • ESOPHAGOSCOPY / EGD  09/27/2017    WITH BALLOON DILATION   • LYMPH NODE BIOPSY  01/30/2019   • PACEMAKER IMPLANTATION  11/21/2016       Current Outpatient Medications:   •  albuterol sulfate  (90 Base) MCG/ACT inhaler, Inhale 2 puffs Every 4 (Four) Hours As Needed for Wheezing., Disp: 8 g, Rfl: 3  •  clonazePAM (KlonoPIN) 0.5 MG tablet, Pt taking 1.5 pills daily, Disp: , Rfl: 1  •  cyanocobalamin (VITAMIN B-12) 1000 MCG tablet, , Disp: , Rfl:   •  diphenoxylate-atropine (LOMOTIL) 2.5-0.025 MG per tablet, Take 1 tablet by mouth Daily As Needed for Diarrhea., Disp: 60 tablet, Rfl: 0  •  folic acid (FOLVITE) 1 MG tablet, Daily., Disp: , Rfl:   •  insulin glargine (Lantus) 100 UNIT/ML injection, Inject  under the skin into the appropriate area as directed., Disp: , Rfl:   •  levothyroxine (Euthyrox) 50 MCG tablet, Take 1 tablet by mouth Daily., Disp: 30 tablet, Rfl: 2  •  metFORMIN (GLUCOPHAGE) 1000 MG tablet, Take 500 mg by mouth 2 (Two) Times a Day., Disp: , Rfl: 4  •  metoprolol tartrate (LOPRESSOR) 25 MG tablet, Take  by mouth 2 (Two) Times a Day., Disp: , Rfl:   •  omeprazole (priLOSEC) 40 MG capsule, Take 1 capsule by mouth twice daily, Disp: 60 capsule, Rfl: 0  •  ondansetron (ZOFRAN) 4 MG  tablet, Take 1 tablet by mouth Every 8 (Eight) Hours As Needed for Nausea or Vomiting., Disp: 90 tablet, Rfl: 2  •  PARoxetine (PAXIL) 10 MG tablet, Take 10 mg by mouth Daily., Disp: , Rfl: 0  •  pravastatin (PRAVACHOL) 20 MG tablet, Take 20 mg by mouth every night at bedtime., Disp: , Rfl:   •  predniSONE (DELTASONE) 10 MG tablet, Take 1 tablet by mouth Daily., Disp: 21 tablet, Rfl: 0  •  metoclopramide (REGLAN) 5 MG tablet, Take 1 tablet by mouth Every Evening., Disp: 15 tablet, Rfl: 0  No current facility-administered medications for this visit.     Facility-Administered Medications Ordered in Other Visits:   •  Pembrolizumab (KEYTRUDA) 200 mg in sodium chloride 0.9 % 58 mL chemo IVPB, 200 mg, Intravenous, Once, Bassem Jasso MD  •  sodium chloride 0.9 % infusion 250 mL, 250 mL, Intravenous, Once, Bassem Jasso MD    Allergies   Allergen Reactions   • Ace Inhibitors Unknown (See Comments)     Unknown reaction     • Heparin Other (See Comments)     Fever/chills, weakness in legs   • Sulfa Antibiotics Other (See Comments)     Mouth sores       Family History   Problem Relation Age of Onset   • Mental illness Mother    • Heart disease Mother    • Hypertension Father    • Cancer Father    • Heart disease Sister    • Heart disease Brother      Cancer-related family history includes Cancer in his father.    Social History     Tobacco Use   • Smoking status: Former Smoker     Packs/day: 2.00     Years: 50.00     Pack years: 100.00     Types: Cigarettes     Last attempt to quit: 2015     Years since quittin.1   • Smokeless tobacco: Former User     Types: Chew     Quit date: 1989   Substance Use Topics   • Alcohol use: No     Frequency: Never   • Drug use: No     ROS:   Review of Systems   Constitutional: Negative for activity change, chills and fever.   HENT: Negative for drooling, ear discharge, mouth sores, nosebleeds and sore throat.    Eyes: Negative for photophobia, pain, redness and visual disturbance.  "  Respiratory: Negative for cough, choking, shortness of breath and wheezing.    Cardiovascular: Negative for chest pain and palpitations.   Gastrointestinal: Negative for abdominal pain, diarrhea, nausea and vomiting.        Reports diarrhea sporadically once a week.   Genitourinary: Negative for dysuria.   Musculoskeletal: Negative for neck stiffness.   Skin: Negative for rash.   Neurological: Negative for seizures, syncope and speech difficulty.   Psychiatric/Behavioral: Negative for agitation, confusion and hallucinations.     Objective:    Vitals:    08/07/20 0906   BP: 147/76   Pulse: 64   Resp: 16   Temp: 97.7 °F (36.5 °C)   Weight: 67.1 kg (148 lb)   Height: 167.6 cm (66\")   PainSc: 0-No pain       (1) Restricted in physically strenuous activity, ambulatory and able to do work of light nature    Physical Exam:     Physical Exam   Constitutional: He is oriented to person, place, and time. He appears well-developed and well-nourished. No distress.   HENT:   Head: Normocephalic and atraumatic.   Mouth/Throat: Oropharynx is clear and moist.   Absent teeth in upper jaw.--eyeglasses  Cleft lip and palette   Eyes: Conjunctivae and EOM are normal. Right eye exhibits no discharge. Left eye exhibits no discharge. No scleral icterus.   Neck: Normal range of motion. Neck supple. No thyromegaly present.   Cardiovascular: Normal rate, regular rhythm and normal heart sounds. Exam reveals no gallop and no friction rub.   Pulmonary/Chest: Effort normal and breath sounds normal. No stridor. No respiratory distress. He has no wheezes.   Abdominal: Soft. Bowel sounds are normal. He exhibits no mass. There is no tenderness. There is no rebound and no guarding.   No tenderness   Musculoskeletal: Normal range of motion. He exhibits no tenderness.   Lymphadenopathy:     He has no cervical adenopathy.   Neurological: He is alert and oriented to person, place, and time. He exhibits normal muscle tone.   Skin: Skin is warm. No rash " noted. He is not diaphoretic. No erythema.   Right side chest  port   Psychiatric: He has a normal mood and affect. His behavior is normal.   Nursing note and vitals reviewed.    Lab Results - Last 18 Months   Lab Units 08/07/20  0952 07/17/20  0926 06/26/20  1052   WBC 10*3/mm3 6.19 5.75 7.56   HEMOGLOBIN g/dL 11.5* 11.4* 12.0*   HEMATOCRIT % 33.9* 33.9* 34.9*   PLATELETS 10*3/mm3 187 177 165   MCV fL 91.4 92.4 90.6     Lab Results - Last 18 Months   Lab Units 07/17/20  0925 06/26/20  1052 05/29/20  1011   SODIUM mmol/L 141 136 139   POTASSIUM mmol/L 4.8 5.0 4.8   CHLORIDE mmol/L 104 102 104   CO2 mmol/L 24.0 24.0 25.0   BUN  12 12 16   CREATININE mg/dL 0.78 0.76 0.83   CALCIUM mg/dL 9.1 9.2 9.0   BILIRUBIN mg/dL 0.2 0.4 0.2   ALK PHOS U/L 33* 28* 32*   ALT (SGPT) U/L 6 6 6   AST (SGOT) U/L 12 13 14   GLUCOSE mg/dL 124* 146* 140*     Assessment/Plan     Assessment:  1. Metastatic esophageal carcinoma:  Patient has a history of GE junction poorly differentiated adenocarcinoma for which he had chemoradiation followed by [facility]el Gurwinder resection:  Pathology showed ypT3,pN1 disease.  Tumor was Stage IIIA, diagnosed in 2016.  He received concurrent chemoradiation with weekly Carboplatin and Taxol and radiation finishing on 3/15/17.    CT scan on 1/10/19 showed new retroperitoneal lymph nodes.  These lymph nodes were biopsied on 1/30/19 and it shows metastatic esophageal cancer.  [facility] Next Gen testing was performed on the sample and it shows KRAS mutation, microsatellite stable tumor.  PD-L1 is positive.  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  HER2/marvel (ERBB2) was not amplified and was negative.  His CT scan on 5/1/19 shows evidence of progression.  Patient has started receiving Keytruda on 5/31/19.  He has received 19 cycles so far.   CT scan on 1/28/2020 shows improvement and continued response.  CT scan chest abdomen pelvis with contrast on 7/28/2020 does not show any evidence of disease progression.    2. Reflux/heartburn: He  does have gastroesophageal reflux.  Also has a history of strictures.  3. Noncalcified pulmonary nodules: Remained stable on 7/27/2020 scan.  4. 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  5. History of occasional headaches/memory lapses: Advise close monitoring of the symptoms.  6. Episodic diarrhea:- diarrhea seems to be waxing and waning in nature.   Symptoms are intermittent and sometimes explosive..  Patient continues to have the symptoms.  Related to immunotherapy, but not severe.  7. Nausea: Symptoms are intermittent  8. History of chronic dysphagia:  Due to recurrent esophageal strictures.  Patient is status post EGD and balloon dilation.  Today, he does not report any new symptoms.  9. PD-L1 positive, HER2 negative, p53 and KRAS positive.    10. B12 deficiency: Continue B12 1000 mcg twice daily.    11. Anemia: Likely from malignancy.  Also on iron supplement.    PLAN:  1. Continue Keytruda. Ongoing evaluation for immunotherapy related hypothyroidism.  2. Continue levothyroxine 50 mcg for immunotherapy related hypothyroidism..  3. Regarding reflux, we will provide a prescription for Reglan.  Advised patient to keep the head of bed elevated.  Also advised to finish his dinner at least 3 hours before bedtime.  Advised to call Dr. Bradley's office, but patient would like to wait about having any endoscopy due to current ongoing coronavirus pandemic.    4. Use low-dose Lomotil 1 tablet daily as needed.    5. Continue to monitor TSH,  serum cortisol level.   6. We will continue to monitor closely for adverse events related to immunotherapy  7. Will need to closely monitor diarrhea symptoms.   8. Continue B12 supplements.  9. Continue folic acid supplement also.  10. Follow-up in 1 month        No orders of the defined types were placed in this encounter.    Thank you very much for providing the opportunity to participate in this patient’s care. Please do not hesitate to call if there are any other  questions      I have reviewed all relevant outside reports, notes, labs results, imaging, vitals, medications and plan with the patient today.    Portions of the note have been Scribed by medical assistant/Nurse.  I have reviewed and made changes accordingly.        Electronically signed by Bassem Jasso MD, 08/07/20, 10:17 AM.

## 2020-08-07 ENCOUNTER — OFFICE VISIT (OUTPATIENT)
Dept: ONCOLOGY | Facility: CLINIC | Age: 70
End: 2020-08-07

## 2020-08-07 ENCOUNTER — HOSPITAL ENCOUNTER (OUTPATIENT)
Dept: ONCOLOGY | Facility: HOSPITAL | Age: 70
Setting detail: INFUSION SERIES
Discharge: HOME | End: 2020-08-07

## 2020-08-07 ENCOUNTER — APPOINTMENT (OUTPATIENT)
Dept: LAB | Facility: HOSPITAL | Age: 70
End: 2020-08-07

## 2020-08-07 VITALS
HEIGHT: 66 IN | BODY MASS INDEX: 23.78 KG/M2 | DIASTOLIC BLOOD PRESSURE: 76 MMHG | WEIGHT: 148 LBS | TEMPERATURE: 97.7 F | HEART RATE: 64 BPM | SYSTOLIC BLOOD PRESSURE: 147 MMHG | RESPIRATION RATE: 16 BRPM

## 2020-08-07 DIAGNOSIS — Z51.81 ENCOUNTER FOR THERAPEUTIC DRUG MONITORING: ICD-10-CM

## 2020-08-07 DIAGNOSIS — C16.0 MALIGNANT NEOPLASM OF CARDIA (HCC): Primary | ICD-10-CM

## 2020-08-07 DIAGNOSIS — C15.9 ESOPHAGEAL CANCER, STAGE IV (HCC): Primary | ICD-10-CM

## 2020-08-07 DIAGNOSIS — Z51.11 ENCOUNTER FOR ANTINEOPLASTIC CHEMOTHERAPY: ICD-10-CM

## 2020-08-07 LAB
ALBUMIN SERPL-MCNC: 4.1 G/DL (ref 3.5–5.2)
ALBUMIN/GLOB SERPL: 1.8 G/DL
ALP SERPL-CCNC: 31 U/L (ref 39–117)
ALT SERPL W P-5'-P-CCNC: 5 U/L (ref 1–41)
ANION GAP SERPL CALCULATED.3IONS-SCNC: 11 MMOL/L (ref 5–15)
AST SERPL-CCNC: 14 U/L (ref 1–40)
BASOPHILS # BLD AUTO: 0.04 10*3/MM3 (ref 0–0.2)
BASOPHILS NFR BLD AUTO: 0.6 % (ref 0–1.5)
BILIRUB SERPL-MCNC: 0.3 MG/DL (ref 0–1.2)
BUN SERPL-MCNC: 9 MG/DL (ref 8–23)
BUN SERPL-MCNC: ABNORMAL MG/DL
BUN/CREAT SERPL: ABNORMAL
CALCIUM SPEC-SCNC: 9.1 MG/DL (ref 8.6–10.5)
CHLORIDE SERPL-SCNC: 103 MMOL/L (ref 98–107)
CO2 SERPL-SCNC: 25 MMOL/L (ref 22–29)
CORTIS SERPL-MCNC: 8.14 MCG/DL
CREAT SERPL-MCNC: 0.76 MG/DL (ref 0.76–1.27)
DEPRECATED RDW RBC AUTO: 42.9 FL (ref 37–54)
EOSINOPHIL # BLD AUTO: 0.52 10*3/MM3 (ref 0–0.4)
EOSINOPHIL NFR BLD AUTO: 8.4 % (ref 0.3–6.2)
ERYTHROCYTE [DISTWIDTH] IN BLOOD BY AUTOMATED COUNT: 13.3 % (ref 12.3–15.4)
GFR SERPL CREATININE-BSD FRML MDRD: 101 ML/MIN/1.73
GLOBULIN UR ELPH-MCNC: 2.3 GM/DL
GLUCOSE SERPL-MCNC: 141 MG/DL (ref 65–99)
HCT VFR BLD AUTO: 33.9 % (ref 37.5–51)
HGB BLD-MCNC: 11.5 G/DL (ref 13–17.7)
LYMPHOCYTES # BLD AUTO: 1.52 10*3/MM3 (ref 0.7–3.1)
LYMPHOCYTES NFR BLD AUTO: 24.6 % (ref 19.6–45.3)
MCH RBC QN AUTO: 31 PG (ref 26.6–33)
MCHC RBC AUTO-ENTMCNC: 33.9 G/DL (ref 31.5–35.7)
MCV RBC AUTO: 91.4 FL (ref 79–97)
MONOCYTES # BLD AUTO: 0.75 10*3/MM3 (ref 0.1–0.9)
MONOCYTES NFR BLD AUTO: 12.1 % (ref 5–12)
NEUTROPHILS NFR BLD AUTO: 3.36 10*3/MM3 (ref 1.7–7)
NEUTROPHILS NFR BLD AUTO: 54.3 % (ref 42.7–76)
PLATELET # BLD AUTO: 187 10*3/MM3 (ref 140–450)
PMV BLD AUTO: 10 FL (ref 6–12)
POTASSIUM SERPL-SCNC: 4.8 MMOL/L (ref 3.5–5.2)
PROT SERPL-MCNC: 6.4 G/DL (ref 6–8.5)
RBC # BLD AUTO: 3.71 10*6/MM3 (ref 4.14–5.8)
SODIUM SERPL-SCNC: 139 MMOL/L (ref 136–145)
T3FREE SERPL-MCNC: 2.78 PG/ML (ref 2–4.4)
T4 FREE SERPL-MCNC: 1.58 NG/DL (ref 0.93–1.7)
TSH SERPL DL<=0.05 MIU/L-ACNC: 2.14 UIU/ML (ref 0.27–4.2)
WBC # BLD AUTO: 6.19 10*3/MM3 (ref 3.4–10.8)

## 2020-08-07 PROCEDURE — 80053 COMPREHEN METABOLIC PANEL: CPT | Performed by: INTERNAL MEDICINE

## 2020-08-07 PROCEDURE — 25010000002 PEMBROLIZUMAB 100 MG/4ML SOLUTION 4 ML VIAL: Performed by: INTERNAL MEDICINE

## 2020-08-07 PROCEDURE — 84443 ASSAY THYROID STIM HORMONE: CPT | Performed by: INTERNAL MEDICINE

## 2020-08-07 PROCEDURE — 82533 TOTAL CORTISOL: CPT | Performed by: INTERNAL MEDICINE

## 2020-08-07 PROCEDURE — 84439 ASSAY OF FREE THYROXINE: CPT | Performed by: INTERNAL MEDICINE

## 2020-08-07 PROCEDURE — 85025 COMPLETE CBC W/AUTO DIFF WBC: CPT | Performed by: INTERNAL MEDICINE

## 2020-08-07 PROCEDURE — 36591 DRAW BLOOD OFF VENOUS DEVICE: CPT

## 2020-08-07 PROCEDURE — 96413 CHEMO IV INFUSION 1 HR: CPT

## 2020-08-07 PROCEDURE — 84481 FREE ASSAY (FT-3): CPT | Performed by: INTERNAL MEDICINE

## 2020-08-07 PROCEDURE — 99214 OFFICE O/P EST MOD 30 MIN: CPT | Performed by: INTERNAL MEDICINE

## 2020-08-07 RX ORDER — SODIUM CHLORIDE 0.9 % (FLUSH) 0.9 %
10 SYRINGE (ML) INJECTION AS NEEDED
Status: CANCELLED | OUTPATIENT
Start: 2020-08-07

## 2020-08-07 RX ORDER — SODIUM CHLORIDE 9 MG/ML
250 INJECTION, SOLUTION INTRAVENOUS ONCE
Status: DISCONTINUED | OUTPATIENT
Start: 2020-08-07 | End: 2020-08-08 | Stop reason: HOSPADM

## 2020-08-07 RX ORDER — SODIUM CHLORIDE 0.9 % (FLUSH) 0.9 %
10 SYRINGE (ML) INJECTION AS NEEDED
Status: DISCONTINUED | OUTPATIENT
Start: 2020-08-07 | End: 2020-08-08 | Stop reason: HOSPADM

## 2020-08-07 RX ORDER — METOCLOPRAMIDE 5 MG/1
5 TABLET ORAL EVERY EVENING
Qty: 15 TABLET | Refills: 0 | Status: SHIPPED | OUTPATIENT
Start: 2020-08-07 | End: 2020-09-17

## 2020-08-07 RX ADMIN — SODIUM CHLORIDE 200 MG: 900 INJECTION, SOLUTION INTRAVENOUS at 10:52

## 2020-08-07 RX ADMIN — Medication 20 ML: at 11:34

## 2020-08-21 DIAGNOSIS — Z51.81 ENCOUNTER FOR THERAPEUTIC DRUG MONITORING: ICD-10-CM

## 2020-08-21 DIAGNOSIS — R13.19 ESOPHAGEAL DYSPHAGIA: ICD-10-CM

## 2020-08-21 DIAGNOSIS — C16.0 MALIGNANT NEOPLASM OF CARDIA (HCC): ICD-10-CM

## 2020-08-21 DIAGNOSIS — C15.9 ESOPHAGEAL CANCER, STAGE IV (HCC): ICD-10-CM

## 2020-08-21 DIAGNOSIS — Z51.11 ENCOUNTER FOR ANTINEOPLASTIC CHEMOTHERAPY: ICD-10-CM

## 2020-08-21 RX ORDER — SODIUM CHLORIDE 9 MG/ML
250 INJECTION, SOLUTION INTRAVENOUS ONCE
Status: CANCELLED | OUTPATIENT
Start: 2020-08-28

## 2020-08-21 RX ORDER — OMEPRAZOLE 40 MG/1
CAPSULE, DELAYED RELEASE ORAL
Qty: 60 CAPSULE | Refills: 0 | Status: SHIPPED | OUTPATIENT
Start: 2020-08-21 | End: 2020-09-18

## 2020-08-28 ENCOUNTER — HOSPITAL ENCOUNTER (OUTPATIENT)
Dept: ONCOLOGY | Facility: HOSPITAL | Age: 70
Setting detail: INFUSION SERIES
Discharge: HOME | End: 2020-08-28

## 2020-08-28 VITALS
TEMPERATURE: 97.8 F | HEIGHT: 66 IN | DIASTOLIC BLOOD PRESSURE: 65 MMHG | WEIGHT: 147 LBS | RESPIRATION RATE: 20 BRPM | SYSTOLIC BLOOD PRESSURE: 141 MMHG | BODY MASS INDEX: 23.63 KG/M2 | HEART RATE: 60 BPM

## 2020-08-28 DIAGNOSIS — Z51.81 ENCOUNTER FOR THERAPEUTIC DRUG MONITORING: ICD-10-CM

## 2020-08-28 DIAGNOSIS — C16.0 MALIGNANT NEOPLASM OF CARDIA (HCC): Primary | ICD-10-CM

## 2020-08-28 DIAGNOSIS — Z51.11 ENCOUNTER FOR ANTINEOPLASTIC CHEMOTHERAPY: ICD-10-CM

## 2020-08-28 LAB
ALBUMIN SERPL-MCNC: 4 G/DL (ref 3.5–5.2)
ALBUMIN/GLOB SERPL: 1.8 G/DL
ALP SERPL-CCNC: 32 U/L (ref 39–117)
ALT SERPL W P-5'-P-CCNC: 6 U/L (ref 1–41)
ANION GAP SERPL CALCULATED.3IONS-SCNC: 9 MMOL/L (ref 5–15)
AST SERPL-CCNC: 19 U/L (ref 1–40)
BASOPHILS # BLD AUTO: 0.03 10*3/MM3 (ref 0–0.2)
BASOPHILS NFR BLD AUTO: 0.5 % (ref 0–1.5)
BILIRUB SERPL-MCNC: 0.4 MG/DL (ref 0–1.2)
BUN SERPL-MCNC: 13 MG/DL (ref 8–23)
BUN SERPL-MCNC: ABNORMAL MG/DL
BUN/CREAT SERPL: ABNORMAL
CALCIUM SPEC-SCNC: 8.9 MG/DL (ref 8.6–10.5)
CHLORIDE SERPL-SCNC: 103 MMOL/L (ref 98–107)
CO2 SERPL-SCNC: 26 MMOL/L (ref 22–29)
CREAT SERPL-MCNC: 0.8 MG/DL (ref 0.76–1.27)
DEPRECATED RDW RBC AUTO: 42.9 FL (ref 37–54)
EOSINOPHIL # BLD AUTO: 0.38 10*3/MM3 (ref 0–0.4)
EOSINOPHIL NFR BLD AUTO: 6.3 % (ref 0.3–6.2)
ERYTHROCYTE [DISTWIDTH] IN BLOOD BY AUTOMATED COUNT: 13.1 % (ref 12.3–15.4)
GFR SERPL CREATININE-BSD FRML MDRD: 95 ML/MIN/1.73
GLOBULIN UR ELPH-MCNC: 2.2 GM/DL
GLUCOSE SERPL-MCNC: 130 MG/DL (ref 65–99)
HCT VFR BLD AUTO: 33.9 % (ref 37.5–51)
HGB BLD-MCNC: 11.3 G/DL (ref 13–17.7)
LYMPHOCYTES # BLD AUTO: 1.51 10*3/MM3 (ref 0.7–3.1)
LYMPHOCYTES NFR BLD AUTO: 25.1 % (ref 19.6–45.3)
MCH RBC QN AUTO: 30.6 PG (ref 26.6–33)
MCHC RBC AUTO-ENTMCNC: 33.3 G/DL (ref 31.5–35.7)
MCV RBC AUTO: 91.9 FL (ref 79–97)
MONOCYTES # BLD AUTO: 0.82 10*3/MM3 (ref 0.1–0.9)
MONOCYTES NFR BLD AUTO: 13.6 % (ref 5–12)
NEUTROPHILS NFR BLD AUTO: 3.28 10*3/MM3 (ref 1.7–7)
NEUTROPHILS NFR BLD AUTO: 54.5 % (ref 42.7–76)
PLATELET # BLD AUTO: 161 10*3/MM3 (ref 140–450)
PMV BLD AUTO: 10.2 FL (ref 6–12)
POTASSIUM SERPL-SCNC: 4.6 MMOL/L (ref 3.5–5.2)
PROT SERPL-MCNC: 6.2 G/DL (ref 6–8.5)
RBC # BLD AUTO: 3.69 10*6/MM3 (ref 4.14–5.8)
SODIUM SERPL-SCNC: 138 MMOL/L (ref 136–145)
WBC # BLD AUTO: 6.02 10*3/MM3 (ref 3.4–10.8)

## 2020-08-28 PROCEDURE — 96413 CHEMO IV INFUSION 1 HR: CPT

## 2020-08-28 PROCEDURE — 25010000002 PEMBROLIZUMAB 100 MG/4ML SOLUTION 4 ML VIAL: Performed by: INTERNAL MEDICINE

## 2020-08-28 PROCEDURE — 80053 COMPREHEN METABOLIC PANEL: CPT | Performed by: INTERNAL MEDICINE

## 2020-08-28 PROCEDURE — 36591 DRAW BLOOD OFF VENOUS DEVICE: CPT

## 2020-08-28 PROCEDURE — 85025 COMPLETE CBC W/AUTO DIFF WBC: CPT | Performed by: INTERNAL MEDICINE

## 2020-08-28 RX ORDER — SODIUM CHLORIDE 0.9 % (FLUSH) 0.9 %
10 SYRINGE (ML) INJECTION AS NEEDED
Status: CANCELLED | OUTPATIENT
Start: 2020-08-28

## 2020-08-28 RX ORDER — SODIUM CHLORIDE 9 MG/ML
250 INJECTION, SOLUTION INTRAVENOUS ONCE
Status: DISCONTINUED | OUTPATIENT
Start: 2020-08-28 | End: 2020-08-29 | Stop reason: HOSPADM

## 2020-08-28 RX ORDER — SODIUM CHLORIDE 0.9 % (FLUSH) 0.9 %
10 SYRINGE (ML) INJECTION AS NEEDED
Status: DISCONTINUED | OUTPATIENT
Start: 2020-08-28 | End: 2020-08-29 | Stop reason: HOSPADM

## 2020-08-28 RX ADMIN — SODIUM CHLORIDE 200 MG: 900 INJECTION, SOLUTION INTRAVENOUS at 10:55

## 2020-08-28 RX ADMIN — Medication 10 ML: at 11:38

## 2020-09-11 ENCOUNTER — APPOINTMENT (OUTPATIENT)
Dept: LAB | Facility: HOSPITAL | Age: 70
End: 2020-09-11

## 2020-09-11 ENCOUNTER — OFFICE VISIT (OUTPATIENT)
Dept: ONCOLOGY | Facility: CLINIC | Age: 70
End: 2020-09-11

## 2020-09-11 VITALS
TEMPERATURE: 97.7 F | BODY MASS INDEX: 23.69 KG/M2 | HEART RATE: 66 BPM | RESPIRATION RATE: 16 BRPM | DIASTOLIC BLOOD PRESSURE: 81 MMHG | HEIGHT: 66 IN | WEIGHT: 147.4 LBS | SYSTOLIC BLOOD PRESSURE: 136 MMHG

## 2020-09-11 DIAGNOSIS — C15.9 ESOPHAGEAL CANCER, STAGE IV (HCC): ICD-10-CM

## 2020-09-11 DIAGNOSIS — C16.0 MALIGNANT NEOPLASM OF CARDIA (HCC): Primary | ICD-10-CM

## 2020-09-11 PROCEDURE — 99215 OFFICE O/P EST HI 40 MIN: CPT | Performed by: INTERNAL MEDICINE

## 2020-09-11 NOTE — PROGRESS NOTES
Hematology-Oncology Follow-up Note       Artie Mc  1950    Primary Care Physician: Chanel Carter APRN  Referring Physician: Chanel Carter APRN    Reason For Visit:  Chief Complaint   Patient presents with   • Follow-up     Esophageal cancer, stage IV     Metastatic esophageal cancer  Monitoring for adverse effects related to immunotherapy.  Hypothyroidism    HPI:   Mr. Mc is a pleasant 70-year-old gentleman with a history of gastroesophageal reflux disease, diabetes, hypertension, heart block status post pacemaker placement and history of CABG.  He was having symptoms of dysphagia, weight loss since September 2016. He was reporting symptoms of food getting stuck in the lower chest.  He has transitioned himself to a liquid diet.  The patient reports losing about 30 pounds over this duration.  The patient underwent upper GI endoscopy on 12/20/16 with Dr. Esvin Barrera.  Endoscopy showed necrotic, circumferential and moderately obstructive mass at the distal esophagus between 42 cm and 36 cm.      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  There was also evidence of mild subacute inflammation in the duodenum.  Dr. Barrera ordered a CT scan of the chest and abdomen with contrast on 12/20/16.  The scan was done at [facility].  The scan showed a new ill-defined mass in the anterior wall of the distal esophagus at the GE junction measuring 2.3 x 2.2 x 1.6 cm, worrisome for cancer.  There were four subcentimeter liver lesions which had appeared stable in comparison to another CT scan from 2009 and they likely represent small cysts.  There was also mild lymphadenopathy in the gastrohepatic ligament.  There were at least six borderline enlarged lymph nodes measuring up to 1.3 x 1 cm in the region.  The patient is referred to us for further oncological evaluation.    1/5/2017 - The patient presents for  initial consultation.  He reports persistent dysphagia and also has symptoms of regurgitation.  He cannot tolerate solid foods and is dependent on liquid diet such as Ensure.  He also reports fatigue. Symptoms of dysphagia have been present for the past four months.  When he eats any solids, the food gets stuck in the lower chest.    • 1/5/17 - Vitamin B12 200 (L).  CEA 0.8.  Ferritin 35.  Iron saturation 16% (L), serum iron 56, TIBC 354.  Creatinine 0.9.  LFTs normal.  Folate 10.2.  • 1/12/17 - PET/CT scan:  Intense abnormal activity corresponding to the region of the GE junction and proximal stomach.  SUV is 11.06.  There is a suggestion of an abnormal mass or abnormal wall thickening in the region measuring 3.9 x 4.8 cm.  No additional abnormalities.  No evidence of metastases or other lymphadenopathy.  Scattered nonspecific subcentimeter lymph nodes involving the mediastinum and within the central upper abdomen.  • 1/13/17 - Creatinine 0.9.  LFTs normal.    • 1/17/17 - Patient seen by thoracic surgeon, Dr. Bradley.  PET scan was reviewed.  He has recommended neoadjuvant chemoradiation therapy followed by [place] [facility]winder esophagogastrectomy.  Port-A-Cath is being arranged.    • 1/20/17 - WBC 6.3, hemoglobin 12.1, platelet count 198,000, MCV 86.4.  • 1/30/17 - Patient started weekly Carboplatin and Taxol (Carboplatin AUC 2 and Taxol 50 mg/M2).  Patient received Injectafer 750 mg.  WBC 5.8, hemoglobin 12.4, platelet count 244,000.   • 1/31/17 - Patient seen by Dr. Eduardo Oleary.  The plan was to give 5040 cGy in 28 fractions.   • 2/6/17 - WBC 4.4, hemoglobin 11.6, platelet count 201,000.  Patient received cycle 2 of weekly Carboplatin and Taxol (Carboplatin AUC 2 and Taxol 50 mg/M2).  • 2/13/17 - Patient received Carboplatin and Taxol weekly cycle 3.  • 2/13/17 - Patient started concurrent radiation therapy.  Total planned dose is 4140 cGy.    • 2/13/17 to 2/15/17 - Patient received 4140 cGy of neoadjuvant radiation.   Radiation was finished on 3/15/17.    • 2/20/17 - WBC 1.8, hemoglobin 10.6, platelet count 217,000.  Creatinine 0.5. Folate 15 (L).  Ferritin 361.  Haptoglobin 214.  Iron saturation 33%, TIBC 263, serum iron 88.    • 2/20/17 - Patient received Carboplatin and Taxol weekly cycle 4.   • 2/27/17 - WBC 3.7, hemoglobin 10.7, platelet count 177,000, MCV 85.3.    • 3/6/17 - Patient has received 16 out of the 23 planned radiation treatments.  Total planned dose is 4140 cGy.    • 3/6/17 - WBC 5.4, hemoglobin 11.2, platelet count 113,000.  • 3/6/17 - Patient received cycle 5 of weekly Carboplatin and Taxol.   • 3/13/17 - Patient received cycle 6 of weekly Carboplatin and Taxol.  WBC 3.9, hemoglobin 11.3, platelet count 93,000.     • 3/20/17 - WBC 1.4, hemoglobin 10.0, platelet count 118,000, MCV 86.1   • 3/27/17 - WBC 3.8, hemoglobin 9.5, platelet count 176,000, MCV 87.3.    • 4/7/17 - Stress test:  No evidence of reversible myocardial ischemia.  Normal left ventricular ejection fraction of 50%.    • 4/17/17 - Upper GI endoscopy by Dr. 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  The GE junction adenocarcinoma lesion seems to have a very good response to chemotherapy and radiation.  The lumen was open.  • 5/1/17 - WBC 7.0, hemoglobin 10.2, platelet count 175,000.    • 5/4/17 - Patient underwent [place] Gurwinder esophagectomy with pyloroplasty, feeding jejunostomy tube, abdominal and mediastinal lymph node dissection.    Surgical Pathology:  Moderately to poorly differentiated adenocarcinoma measuring 1.3 cm at the GE junction.  Resection margins are negative for malignancy.  Tumor margins are negative.  There is effective treatment response.  No evidence of lymphovascular invasion.  Staging is ypT3,pN1.  One out of fourteen lymph nodes involved.   • 5/22/17 - WBC 6.8, hemoglobin 10.3, platelet count 312,000.    • 6/19/17 - WBC 5.7, hemoglobin 10.5, platelet count 204,000, MCV 92.1.     • 7/6/17 - EGD:  Status post balloon dilation of esophageal stricture.  • 7/12/17 - EGD with balloon dilation of esophageal stricture.  • 7/26/17 - EGD and balloon dilation of esophageal stricture.  • 7/31/17 - PET/CT scan:  There is mild metabolic activity seen at the thoracic esophagus just at and below the surgical margins.  Some mild wall thickening.  This could be from recent surgery.  A residual cancer seems unlikely.  There is a precarinal lymph node with mild metabolic activity with SUV of 3 measuring 1 x 1.3 cm.  Again this appears to be nonspecific in my opinion.    • 8/7/17 - WBC 5.9, hemoglobin 11.9, platelet count 171,000, MCV 88.7.    • 10/19/17 - WBC 7.5, hemoglobin 11.8, platelet count 216,000.    • 1/8/18 - PET scan:  No evidence of residual disease or recurrent disease.  There is an esophageal stent in the mid esophagus with a large debris air level.  No evidence of any metastases in the chest, abdomen or pelvis.  Mild nodule uptake in the vocal cords with fullness in the right vocal cord.  This could be physiologic.    • 7/9/18 - CT scan of chest with contrast:  Prominent mediastinal lymph nodes appear stable since February.  Changes of gastric pull-through procedure for esophageal cancer again noted.  Tree-in-bud infiltrates in the left lung base, consistent with infection versus inflammation.    • 1/10/19 - CT chest, abdomen and pelvis with contrast:  No evidence of mets in the chest; however, interval development of metastatic lymphadenopathy in the left side of the retroperitoneum.  For instance, there is a 17 mm rounded lymph node, 10 mm lymph node and also a 14 mm lymph node.  These are all new.  Stable 9 mm hypodense lesion within the right hepatic lobe, which could reflect hemangioma or complex cyst.    • 1/30/19 - CT-guided core biopsy of retroperitoneal lymph node:  Poorly differentiated adenocarcinoma consistent with metastases from patient’s known esophageal primary.  CK20 positive,  CDX2 positive, cytokeratin 7 negative, TTF-1 negative.    • 2/1/19 - Creatinine 0.9, BUN 18, calcium 9.3; these are normal.    • 2/28/19 - [facility] Next Gen sequencing testing on retroperitoneal lymph node specimen:  PD-L1 positive.  CPS = 3.  This implies responsiveness to pembrolizumab.  Mismatch repair status is proficient (no evidence of microsatellite instability).  Tumor mutational burden is low = 6 mutations/Mb.  CDH1 indeterminate.  KRAS mutation positive.  TP53 mutation positive.  CDK6 amplified.  [employer] PD-L1 testing by IHC:  PD-L1 expression 1% positive (CPS =1).  HER2/marvel by IHC is negative.  HER2/marvel by CISH not amplified.  Microsatellite stable tumor.    • 3/7/19 - WBC 7.3, hemoglobin 12, platelet count 128,000, MCV 92.     • 3/14/19 - Patient was seen by radiation oncologist, Dr. Chahal.  He has recommended observation versus systemic chemoimmunotherapy as needed.  No plans for radiation therapy.   • 5/1/19 - CT scan of chest with contrast:  No definitive findings of new metastatic disease in the chest.  Emphysema noted.    • 5/1/19 - CT abdomen and pelvis:  There is interval progression of metastatic retroperitoneal adenopathy.  Left periaortic adenopathy with lymph node measuring up to 2 cm, previously 1.7 cm.  Another lymph node now measures 2.3, previously 1.4 cm.  New enlarged lymph node on image 147 measures 1.4 cm, previously 0.4 cm.  A 9 mm hypodense right hepatic lobe lesion appears stable.    • 5/9/19 - Decision made to start patient on immunotherapy Keytruda.    • 5/9/19 - Vitamin B12 229.  Ferritin 61.  Folate 14.3.  Iron saturation 26%, TIBC 304, serum iron 79.  • 5/31/19 - Patient received Keytruda 200 mg cycle 1, day 1.    • 5/31/19 - Free T4 0.99.  Creatinine 0.9, BUN 9.  LFTs normal.  TSH 3.99.  Folate 14.3.  Iron saturation 26%.    • 6/21/2019 patient received Keytruda cycle 2  • 7/12/2019: Patient received Keytruda cycle 3  • 8/2/2019 patient received cycle 4 Keytruda WBC 7.2,  hemoglobin 11.8 platelet count 163  • 8/23/2019 creatinine 0.9, LFTs normal, WBC 6.4, hemoglobin 11.5, platelets 179, TSH 2.1   • 8/23/2019 patient received cycle 5 of Keytruda  • 9/3/2019 CT chest abdomen and pelvis with contrast: . Positive response to therapy in the abdomen since 05/01/2019. Pathologically enlarged retroperitoneal lymph nodes, predominantly in the left periaortic region, have diminished in size.a 1.3 x 2.1 cm left periaortic node (image 60), previously measured 3.5 x 2.3 cm. A 1.6 cm index node near the level of the left renal vein previously measured 2.0 cm No new or progressive adenopathy. 2. Stable findings in the chest. Noncalcified right upper lobe nodules are unchanged. There is no evidence of new or progressive metastatic disease within the chest. 3. 8 mm indeterminate low-density lesion in the right hepatic lobe, is stable. No new liver lesions.  • 9/6/2019 WBC 5.6, hemoglobin 11.2, platelets of 192, MCV 91.8  • 9/13/2019 patient received Keytruda cycle 6, albumin 3.2  • 10/4/2019 patient received cycle 7 of Keytruda, TSH 4.8, free T4 0.86  • 10/9/2019 WBC 5.8, hemoglobin 11.7, platelets 174  • 10/25/2019 patient received cycle 8 of Keytruda.  WBC 5.9, hemoglobin 11.5, platelets 152, creatinine 0.75, cortisol 15.2, TSH 6.48 high , free T4 1.13 normal  • 11/6/2019 WBC 6.6, hemoglobin 11.8, MCV 91.5, platelets 187  • 11/15/2019 patient received cycle 9 of Keytruda, WBC 6.6, hemoglobin 11.9, platelets 161, cortisol level 8.86, TSH 2.68, free T3 2.8, free T4 1.5  • 12/4/2019 WBC 6.9, hemoglobin 12.0, platelets 180, MCV 92.9  • 12/06/2019-Keytruda Cycle 10  • 12/27/2019- Cycle 11 LFTs normal, WBC 6.7, hemoglobin 11.4, platelets 168, MCV 93, TSH 2.4,  • 1/28/2020: CT chest abdomen and pelvis with contrast:  Single (aortic lymph node in the abdominal retroperitoneum has increased.  Rest of the retroperitoneal lymph nodes have decreased in size.  Mediastinal adenopathy appears unchanged.   Noncalcified bilateral pulmonary nodules are stable  • 1/17/2020 patient received Keytruda cycle 12:.  • 2/3/2020 WBC 7.1, hemoglobin 12.2, platelets 171  • 02/10/2020 patient received cycle 13 of Keytruda: WBC 7.3, hemoglobin 12.1, platelets 166, creatinine 0.82  • 3/6/2020 patient is of Keytruda 200 mg, hemoglobin 10.8  • 3/27/2020 patient received Keytruda 200 mg, hemoglobin 11.4, TSH 1.95  • 4/17/2020 patient received Keytruda cycle 16 200 mg, WBC 6.1, hemoglobin 11.6, platelets 150  • 5/8/2020: Patient received cycle 17 Keytruda, WBC 6.3, hemoglobin 9.6, platelets 137, TSH 2.05, free T4 1.54, creatinine 0.93, LFTs normal,  • 5/29/2020: Patient received cycle 18 Keytruda, WBC 6.5, hemoglobin 11.7, platelets 129, creatinine 0.83, TSH 1.69  • 6/26/2020: Patient receiving Keytruda.   • 7/17/2020: TSH 2.06, WBC 5.7, hemoglobin 11.4, platelets 177, MCV 92.4, Keytruda 200 mg cycle 20  • 7/27/2020: CT chest abdomen pelvis with contrast: Stable findings in the chest abdomen pelvis since 1/28/2020.  Noncalcified bilateral pulmonary nodules are stable.  Stable retroperitoneal left perinephric adenopathy in the abdomen.  • 8/7/2020: Patient received cycle 21 of Keytruda  • 8/28/2020 patient is a cycle 22 of Keytruda.  WBC 6.02, hemoglobin 11.3, platelets 161  • 9/11/2020 WBC 6.9, hemoglobin 12.4, platelets 192  •     Subjective:  70 year old male patient presents to the office for a follow up regarding esophageal cancer. He takes lomotil to combat symptoms of diarrhea. He continues to take B-12, Folic acid, and Synthroid. He denies any side effects from taking Keytruda. His most recent cycle was 08/28/20.  He does report some diarrhea the week after receiving immunotherapy.  He does report fatigue.  Inquires if he can have a break for his treatments.  Diarrhea is not severe.  He is able to tolerate it well.    The following portions of the patient's history were reviewed and updated as appropriate: allergies, current  medications, past family history, past medical history, past social history, past surgical history and problem list.     Past Medical History:   Diagnosis Date   • B12 deficiency    • Blockage of coronary artery of heart (CMS/HCC)    • Depression    • DM (diabetes mellitus) (CMS/HCC)    • Dysphagia    • HTN (hypertension)    • Hyperlipidemia        Past Surgical History:   Procedure Laterality Date   • ABDOMINAL WALL ABSCESS INCISION AND DRAINAGE  10/22/2018    WITH DEBRIDEMENT  1.5CM X 1.5 CM X 1.5 CM    DR. PURI   • CATARACT EXTRACTION  2018   • COLONOSCOPY     • CORONARY ARTERY BYPASS GRAFT  2015   • ENDOSCOPY  12/20/2016   • ESOPHAGECTOMY  05/04/2017    BROOKS PETTY ESOPHAGECTOMY, FEEDING JEJNOSTOMOY, ABDOMINAL AND MEDIASTINAL LYMPH NODE DISECTION, PEDICLED MUSCLE FLAP COVERAGE DR. PURI   • ESOPHAGOSCOPY / EGD  07/12/2017    WITH BALLOON DILATION   • ESOPHAGOSCOPY / EGD  07/26/2017    WITH BALLOON DILATION   • ESOPHAGOSCOPY / EGD  08/11/2017    WITH BALLOON DILATION   • ESOPHAGOSCOPY / EGD  08/28/2017    WITH BALLOON DILATION   • ESOPHAGOSCOPY / EGD  09/27/2017    WITH BALLOON DILATION   • LYMPH NODE BIOPSY  01/30/2019   • PACEMAKER IMPLANTATION  11/21/2016       Current Outpatient Medications:   •  albuterol sulfate  (90 Base) MCG/ACT inhaler, Inhale 2 puffs Every 4 (Four) Hours As Needed for Wheezing., Disp: 8 g, Rfl: 3  •  clonazePAM (KlonoPIN) 0.5 MG tablet, Pt taking 1.5 pills daily, Disp: , Rfl: 1  •  cyanocobalamin (VITAMIN B-12) 1000 MCG tablet, , Disp: , Rfl:   •  diphenoxylate-atropine (LOMOTIL) 2.5-0.025 MG per tablet, Take 1 tablet by mouth Daily As Needed for Diarrhea., Disp: 60 tablet, Rfl: 0  •  folic acid (FOLVITE) 1 MG tablet, Daily., Disp: , Rfl:   •  insulin glargine (Lantus) 100 UNIT/ML injection, Inject  under the skin into the appropriate area as directed., Disp: , Rfl:   •  levothyroxine (Euthyrox) 50 MCG tablet, Take 1 tablet by mouth Daily., Disp: 30 tablet, Rfl: 2  •   metFORMIN (GLUCOPHAGE) 1000 MG tablet, Take 500 mg by mouth 2 (Two) Times a Day., Disp: , Rfl: 4  •  metoclopramide (REGLAN) 5 MG tablet, Take 1 tablet by mouth Every Evening., Disp: 15 tablet, Rfl: 0  •  metoprolol tartrate (LOPRESSOR) 25 MG tablet, Take  by mouth 2 (Two) Times a Day., Disp: , Rfl:   •  omeprazole (priLOSEC) 40 MG capsule, Take 1 capsule by mouth twice daily, Disp: 60 capsule, Rfl: 0  •  ondansetron (ZOFRAN) 4 MG tablet, Take 1 tablet by mouth Every 8 (Eight) Hours As Needed for Nausea or Vomiting., Disp: 90 tablet, Rfl: 2  •  pravastatin (PRAVACHOL) 20 MG tablet, Take 20 mg by mouth every night at bedtime., Disp: , Rfl:   •  predniSONE (DELTASONE) 10 MG tablet, Take 1 tablet by mouth Daily., Disp: 21 tablet, Rfl: 0    Allergies   Allergen Reactions   • Ace Inhibitors Unknown (See Comments)     Unknown reaction     • Heparin Other (See Comments)     Fever/chills, weakness in legs   • Sulfa Antibiotics Other (See Comments)     Mouth sores       Family History   Problem Relation Age of Onset   • Mental illness Mother    • Heart disease Mother    • Hypertension Father    • Cancer Father    • Heart disease Sister    • Heart disease Brother      Cancer-related family history includes Cancer in his father.    Social History     Tobacco Use   • Smoking status: Former Smoker     Packs/day: 2.00     Years: 50.00     Pack years: 100.00     Types: Cigarettes     Last attempt to quit: 2015     Years since quittin.2   • Smokeless tobacco: Former User     Types: Chew     Quit date: 1989   Substance Use Topics   • Alcohol use: No     Frequency: Never   • Drug use: No     ROS:   Review of Systems   Constitutional: Negative for activity change, chills and fever.   HENT: Negative for drooling, ear discharge, mouth sores, nosebleeds and sore throat.    Eyes: Negative for photophobia, pain, redness and visual disturbance.   Respiratory: Negative for cough, choking, shortness of breath and wheezing.   "  Cardiovascular: Negative for chest pain and palpitations.   Gastrointestinal: Negative for abdominal pain, diarrhea, nausea and vomiting.        Reports diarrhea sporadically once a week.   Genitourinary: Negative for dysuria.   Musculoskeletal: Negative for neck stiffness.   Skin: Negative for rash.   Neurological: Negative for seizures, syncope and speech difficulty.   Psychiatric/Behavioral: Negative for agitation, confusion and hallucinations.     Objective:    Vitals:    09/11/20 1016   BP: 136/81   Pulse: 66   Resp: 16   Temp: 97.7 °F (36.5 °C)   Weight: 66.9 kg (147 lb 6.4 oz)   Height: 167.6 cm (66\")       (1) Restricted in physically strenuous activity, ambulatory and able to do work of light nature    Physical Exam:     Physical Exam   Constitutional: He is oriented to person, place, and time. He appears well-developed and well-nourished. No distress.   HENT:   Head: Normocephalic and atraumatic.   Mouth/Throat: Oropharynx is clear and moist.   Absent teeth in upper jaw.--eyeglasses  Cleft lip and palette   Eyes: Conjunctivae and EOM are normal. Right eye exhibits no discharge. Left eye exhibits no discharge. No scleral icterus.   Neck: Normal range of motion. Neck supple. No thyromegaly present.   Cardiovascular: Normal rate, regular rhythm and normal heart sounds. Exam reveals no gallop and no friction rub.   Pulmonary/Chest: Effort normal and breath sounds normal. No stridor. No respiratory distress. He has no wheezes.   Abdominal: Soft. Bowel sounds are normal. He exhibits no mass. There is no tenderness. There is no rebound and no guarding.   No tenderness   Musculoskeletal: Normal range of motion. He exhibits no tenderness.   Lymphadenopathy:     He has no cervical adenopathy.   Neurological: He is alert and oriented to person, place, and time. He exhibits normal muscle tone.   Skin: Skin is warm. No rash noted. He is not diaphoretic. No erythema.   Right side chest  port   Psychiatric: He has a " normal mood and affect. His behavior is normal.   Nursing note and vitals reviewed.    I have reexamined the patient and the results are consistent with the previously documented exam. Bassem Jasso MD       Lab Results - Last 18 Months   Lab Units 09/11/20  1003 08/28/20  1004 08/07/20  0952   WBC 10*3/mm3 6.96 6.02 6.19   HEMOGLOBIN g/dL 12.4* 11.3* 11.5*   HEMATOCRIT % 36.4* 33.9* 33.9*   PLATELETS 10*3/mm3 192 161 187   MCV fL 91.9 91.9 91.4     Lab Results - Last 18 Months   Lab Units 08/28/20  1004 08/07/20  0951 07/17/20  0925   SODIUM mmol/L 138 139 141   POTASSIUM mmol/L 4.6 4.8 4.8   CHLORIDE mmol/L 103 103 104   CO2 mmol/L 26.0 25.0 24.0   BUN  13 9 12   CREATININE mg/dL 0.80 0.76 0.78   CALCIUM mg/dL 8.9 9.1 9.1   BILIRUBIN mg/dL 0.4 0.3 0.2   ALK PHOS U/L 32* 31* 33*   ALT (SGPT) U/L 6 5 6   AST (SGOT) U/L 19 14 12   GLUCOSE mg/dL 130* 141* 124*     Assessment/Plan     Assessment:  1. Metastatic esophageal carcinoma:  Patient has a history of GE junction poorly differentiated adenocarcinoma for which he had chemoradiation followed by [place] Gurwinder resection:  Pathology showed ypT3,pN1 disease.  Tumor was Stage IIIA, diagnosed in 2016.  He received concurrent chemoradiation with weekly Carboplatin and Taxol and radiation finishing on 3/15/17.    CT scan on 1/10/19 showed new retroperitoneal lymph nodes.  These lymph nodes were biopsied on 1/30/19 and it shows metastatic esophageal cancer.  [facility] Next Gen testing was performed on the sample and it shows KRAS mutation, microsatellite stable tumor.  PD-L1 is positive.  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  HER2/marvel (ERBB2) was not amplified and was negative.  His CT scan on 5/1/19 shows evidence of progression.  Patient has started receiving Keytruda on 5/31/19.  He has received 19 cycles so far.   CT scan on 1/28/2020 shows improvement and continued response.  CT scan chest abdomen pelvis with contrast on 7/28/2020 does not show any evidence of disease  progression.  2. Reflux/heartburn: Symptoms improved.  Also has a history of strictures.  3. Noncalcified pulmonary nodules: Remained stable on 7/27/2020 scan.  4. 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  5. History of occasional headaches/memory lapses: Advise close monitoring of the symptoms.  6. Mild diarrhea:- diarrhea seems to be waxing and waning in nature.  Related to immunotherapy, but not severe.  7. Nausea: Symptoms are intermittent  8. History of chronic dysphagia:  Due to recurrent esophageal strictures.  Patient is status post EGD and balloon dilation.  Today, he does not report any new symptoms.  9. PD-L1 positive, HER2 negative, p53 and KRAS positive.    10. B12 deficiency: Continue B12 1000 mcg twice daily.    11. Anemia: Likely from malignancy.  Also on iron supplement.    PLAN:  1. Continue Keytruda. Ongoing evaluation for immunotherapy related hypothyroidism.  2. Discussed with patient about considering treatment break at the next visit, since patient wants to have treatment holiday.  Imaging will need to be done at that point.  3. Continue levothyroxine 50 mcg for immunotherapy related hypothyroidism..  4. Regarding reflux, continue Reglan.    5. Use low-dose Lomotil 1 tablet daily as needed.    6. Continue to monitor TSH,  serum cortisol level.   7. We will continue to monitor closely for adverse events related to immunotherapy  8. Will need to closely monitor diarrhea symptoms.   9. Continue B12 supplements.  10. Continue folic acid supplement also.  11. Follow-up in 1 month    I have reviewed and confirmed the accuracy of the patient's history: Chief complaint, HPI, ROS and Subjective as entered by the MA/SUMANTHN/RN. Bassem Jasso MD 09/11/20     No orders of the defined types were placed in this encounter.    Thank you very much for providing the opportunity to participate in this patient’s care. Please do not hesitate to call if there are any other questions      I have reviewed all  relevant outside reports, notes, labs results, imaging, vitals, medications and plan with the patient today.    Portions of the note have been Scribed by medical assistant/Nurse.  I have reviewed and made changes accordingly.      Electronically signed by Bassem Jasso MD, 09/11/20, 10:22 AM.

## 2020-09-15 ENCOUNTER — OUTSIDE FACILITY SERVICE (OUTPATIENT)
Dept: CARDIOLOGY | Facility: CLINIC | Age: 70
End: 2020-09-15

## 2020-09-15 ENCOUNTER — CLINICAL SUPPORT NO REQUIREMENTS (OUTPATIENT)
Dept: CARDIOLOGY | Facility: CLINIC | Age: 70
End: 2020-09-15

## 2020-09-15 DIAGNOSIS — R00.1 BRADYCARDIA: Primary | ICD-10-CM

## 2020-09-15 DIAGNOSIS — Z95.0 PRESENCE OF CARDIAC PACEMAKER: ICD-10-CM

## 2020-09-15 PROCEDURE — 99214 OFFICE O/P EST MOD 30 MIN: CPT | Performed by: INTERNAL MEDICINE

## 2020-09-15 PROCEDURE — 93280 PM DEVICE PROGR EVAL DUAL: CPT | Performed by: INTERNAL MEDICINE

## 2020-09-18 ENCOUNTER — HOSPITAL ENCOUNTER (OUTPATIENT)
Dept: ONCOLOGY | Facility: HOSPITAL | Age: 70
Setting detail: INFUSION SERIES
Discharge: HOME | End: 2020-09-18

## 2020-09-18 VITALS
SYSTOLIC BLOOD PRESSURE: 129 MMHG | WEIGHT: 147 LBS | TEMPERATURE: 96.9 F | DIASTOLIC BLOOD PRESSURE: 75 MMHG | HEIGHT: 66 IN | HEART RATE: 62 BPM | BODY MASS INDEX: 23.63 KG/M2 | RESPIRATION RATE: 18 BRPM

## 2020-09-18 DIAGNOSIS — R13.19 ESOPHAGEAL DYSPHAGIA: ICD-10-CM

## 2020-09-18 DIAGNOSIS — C16.0 MALIGNANT NEOPLASM OF CARDIA (HCC): ICD-10-CM

## 2020-09-18 DIAGNOSIS — Z51.81 ENCOUNTER FOR THERAPEUTIC DRUG MONITORING: Primary | ICD-10-CM

## 2020-09-18 DIAGNOSIS — C16.0 MALIGNANT NEOPLASM OF CARDIA (HCC): Primary | ICD-10-CM

## 2020-09-18 DIAGNOSIS — Z51.81 ENCOUNTER FOR THERAPEUTIC DRUG MONITORING: ICD-10-CM

## 2020-09-18 DIAGNOSIS — Z51.11 ENCOUNTER FOR ANTINEOPLASTIC CHEMOTHERAPY: ICD-10-CM

## 2020-09-18 DIAGNOSIS — C15.9 ESOPHAGEAL CANCER, STAGE IV (HCC): ICD-10-CM

## 2020-09-18 LAB
ALBUMIN SERPL-MCNC: 4 G/DL (ref 3.5–5.2)
ALBUMIN/GLOB SERPL: 1.7 G/DL
ALP SERPL-CCNC: 29 U/L (ref 39–117)
ALT SERPL W P-5'-P-CCNC: 6 U/L (ref 1–41)
ANION GAP SERPL CALCULATED.3IONS-SCNC: 8 MMOL/L (ref 5–15)
AST SERPL-CCNC: 12 U/L (ref 1–40)
BASOPHILS # BLD AUTO: 0.03 10*3/MM3 (ref 0–0.2)
BASOPHILS NFR BLD AUTO: 0.5 % (ref 0–1.5)
BILIRUB SERPL-MCNC: 0.3 MG/DL (ref 0–1.2)
BUN SERPL-MCNC: 13 MG/DL (ref 8–23)
BUN SERPL-MCNC: ABNORMAL MG/DL
BUN/CREAT SERPL: ABNORMAL
CALCIUM SPEC-SCNC: 8.9 MG/DL (ref 8.6–10.5)
CHLORIDE SERPL-SCNC: 102 MMOL/L (ref 98–107)
CO2 SERPL-SCNC: 27 MMOL/L (ref 22–29)
CREAT SERPL-MCNC: 0.89 MG/DL (ref 0.76–1.27)
DEPRECATED RDW RBC AUTO: 44 FL (ref 37–54)
EOSINOPHIL # BLD AUTO: 0.41 10*3/MM3 (ref 0–0.4)
EOSINOPHIL NFR BLD AUTO: 7.4 % (ref 0.3–6.2)
ERYTHROCYTE [DISTWIDTH] IN BLOOD BY AUTOMATED COUNT: 13.5 % (ref 12.3–15.4)
GFR SERPL CREATININE-BSD FRML MDRD: 84 ML/MIN/1.73
GLOBULIN UR ELPH-MCNC: 2.3 GM/DL
GLUCOSE SERPL-MCNC: 144 MG/DL (ref 65–99)
HCT VFR BLD AUTO: 34.4 % (ref 37.5–51)
HGB BLD-MCNC: 11.6 G/DL (ref 13–17.7)
LYMPHOCYTES # BLD AUTO: 1.24 10*3/MM3 (ref 0.7–3.1)
LYMPHOCYTES NFR BLD AUTO: 22.3 % (ref 19.6–45.3)
MCH RBC QN AUTO: 31.3 PG (ref 26.6–33)
MCHC RBC AUTO-ENTMCNC: 33.7 G/DL (ref 31.5–35.7)
MCV RBC AUTO: 92.7 FL (ref 79–97)
MONOCYTES # BLD AUTO: 0.85 10*3/MM3 (ref 0.1–0.9)
MONOCYTES NFR BLD AUTO: 15.3 % (ref 5–12)
NEUTROPHILS NFR BLD AUTO: 3.04 10*3/MM3 (ref 1.7–7)
NEUTROPHILS NFR BLD AUTO: 54.5 % (ref 42.7–76)
PLATELET # BLD AUTO: 159 10*3/MM3 (ref 140–450)
PMV BLD AUTO: 10.4 FL (ref 6–12)
POTASSIUM SERPL-SCNC: 5 MMOL/L (ref 3.5–5.2)
PROT SERPL-MCNC: 6.3 G/DL (ref 6–8.5)
RBC # BLD AUTO: 3.71 10*6/MM3 (ref 4.14–5.8)
SODIUM SERPL-SCNC: 137 MMOL/L (ref 136–145)
T4 FREE SERPL-MCNC: 1.56 NG/DL (ref 0.93–1.7)
TSH SERPL DL<=0.05 MIU/L-ACNC: 1.19 UIU/ML (ref 0.27–4.2)
WBC # BLD AUTO: 5.57 10*3/MM3 (ref 3.4–10.8)

## 2020-09-18 PROCEDURE — 84439 ASSAY OF FREE THYROXINE: CPT | Performed by: INTERNAL MEDICINE

## 2020-09-18 PROCEDURE — 96413 CHEMO IV INFUSION 1 HR: CPT

## 2020-09-18 PROCEDURE — 84443 ASSAY THYROID STIM HORMONE: CPT | Performed by: INTERNAL MEDICINE

## 2020-09-18 PROCEDURE — 85025 COMPLETE CBC W/AUTO DIFF WBC: CPT | Performed by: INTERNAL MEDICINE

## 2020-09-18 PROCEDURE — 36591 DRAW BLOOD OFF VENOUS DEVICE: CPT

## 2020-09-18 PROCEDURE — 25010000002 PEMBROLIZUMAB 100 MG/4ML SOLUTION 4 ML VIAL: Performed by: INTERNAL MEDICINE

## 2020-09-18 PROCEDURE — 80053 COMPREHEN METABOLIC PANEL: CPT | Performed by: INTERNAL MEDICINE

## 2020-09-18 RX ORDER — OMEPRAZOLE 40 MG/1
CAPSULE, DELAYED RELEASE ORAL
Qty: 60 CAPSULE | Refills: 0 | Status: SHIPPED | OUTPATIENT
Start: 2020-09-18 | End: 2020-10-29

## 2020-09-18 RX ORDER — SODIUM CHLORIDE 9 MG/ML
250 INJECTION, SOLUTION INTRAVENOUS ONCE
Status: COMPLETED | OUTPATIENT
Start: 2020-09-18 | End: 2020-09-18

## 2020-09-18 RX ORDER — SODIUM CHLORIDE 0.9 % (FLUSH) 0.9 %
10 SYRINGE (ML) INJECTION AS NEEDED
Status: CANCELLED | OUTPATIENT
Start: 2020-09-18

## 2020-09-18 RX ORDER — SODIUM CHLORIDE 9 MG/ML
250 INJECTION, SOLUTION INTRAVENOUS ONCE
Status: CANCELLED | OUTPATIENT
Start: 2020-09-18

## 2020-09-18 RX ORDER — SODIUM CHLORIDE 0.9 % (FLUSH) 0.9 %
10 SYRINGE (ML) INJECTION AS NEEDED
Status: DISCONTINUED | OUTPATIENT
Start: 2020-09-18 | End: 2020-09-19 | Stop reason: HOSPADM

## 2020-09-18 RX ADMIN — SODIUM CHLORIDE 250 ML: 900 INJECTION, SOLUTION INTRAVENOUS at 10:14

## 2020-09-18 RX ADMIN — Medication 10 ML: at 10:53

## 2020-09-18 RX ADMIN — SODIUM CHLORIDE 200 MG: 900 INJECTION, SOLUTION INTRAVENOUS at 10:17

## 2020-09-18 NOTE — PROGRESS NOTES
Pt reports to having nausea and diarrhea for a couple days following chemotherapy but does resolved a couple days prior to the next cycle. Takes Zofran at home for the nausea and imodium for the diarrhea..

## 2020-10-07 DIAGNOSIS — Z51.81 ENCOUNTER FOR THERAPEUTIC DRUG MONITORING: Primary | ICD-10-CM

## 2020-10-07 DIAGNOSIS — C16.0 MALIGNANT NEOPLASM OF CARDIA (HCC): ICD-10-CM

## 2020-10-07 DIAGNOSIS — Z51.11 ENCOUNTER FOR ANTINEOPLASTIC CHEMOTHERAPY: ICD-10-CM

## 2020-10-07 RX ORDER — SODIUM CHLORIDE 9 MG/ML
250 INJECTION, SOLUTION INTRAVENOUS ONCE
Status: CANCELLED | OUTPATIENT
Start: 2020-10-09

## 2020-10-09 ENCOUNTER — HOSPITAL ENCOUNTER (OUTPATIENT)
Dept: ONCOLOGY | Facility: HOSPITAL | Age: 70
Setting detail: INFUSION SERIES
Discharge: HOME | End: 2020-10-09

## 2020-10-09 VITALS
DIASTOLIC BLOOD PRESSURE: 66 MMHG | HEART RATE: 63 BPM | TEMPERATURE: 97.5 F | WEIGHT: 148.5 LBS | HEIGHT: 66 IN | SYSTOLIC BLOOD PRESSURE: 133 MMHG | RESPIRATION RATE: 16 BRPM | OXYGEN SATURATION: 98 % | BODY MASS INDEX: 23.87 KG/M2

## 2020-10-09 DIAGNOSIS — Z51.11 ENCOUNTER FOR ANTINEOPLASTIC CHEMOTHERAPY: ICD-10-CM

## 2020-10-09 DIAGNOSIS — Z51.81 ENCOUNTER FOR THERAPEUTIC DRUG MONITORING: ICD-10-CM

## 2020-10-09 DIAGNOSIS — C16.0 MALIGNANT NEOPLASM OF CARDIA (HCC): Primary | ICD-10-CM

## 2020-10-09 LAB
ALBUMIN SERPL-MCNC: 3.9 G/DL (ref 3.5–5.2)
ALBUMIN/GLOB SERPL: 1.7 G/DL
ALP SERPL-CCNC: 30 U/L (ref 39–117)
ALT SERPL W P-5'-P-CCNC: 5 U/L (ref 1–41)
ANION GAP SERPL CALCULATED.3IONS-SCNC: 10 MMOL/L (ref 5–15)
AST SERPL-CCNC: 11 U/L (ref 1–40)
BASOPHILS # BLD AUTO: 0.03 10*3/MM3 (ref 0–0.2)
BASOPHILS NFR BLD AUTO: 0.5 % (ref 0–1.5)
BILIRUB SERPL-MCNC: 0.3 MG/DL (ref 0–1.2)
BUN SERPL-MCNC: <2 MG/DL (ref 8–23)
BUN SERPL-MCNC: ABNORMAL MG/DL
BUN/CREAT SERPL: ABNORMAL
CALCIUM SPEC-SCNC: 8.7 MG/DL (ref 8.6–10.5)
CHLORIDE SERPL-SCNC: 104 MMOL/L (ref 98–107)
CO2 SERPL-SCNC: 26 MMOL/L (ref 22–29)
CORTIS SERPL-MCNC: 10.23 MCG/DL
CREAT SERPL-MCNC: 0.84 MG/DL (ref 0.76–1.27)
DEPRECATED RDW RBC AUTO: 43.4 FL (ref 37–54)
EOSINOPHIL # BLD AUTO: 0.41 10*3/MM3 (ref 0–0.4)
EOSINOPHIL NFR BLD AUTO: 6.9 % (ref 0.3–6.2)
ERYTHROCYTE [DISTWIDTH] IN BLOOD BY AUTOMATED COUNT: 13.5 % (ref 12.3–15.4)
GFR SERPL CREATININE-BSD FRML MDRD: 90 ML/MIN/1.73
GLOBULIN UR ELPH-MCNC: 2.3 GM/DL
GLUCOSE SERPL-MCNC: 150 MG/DL (ref 65–99)
HCT VFR BLD AUTO: 32.6 % (ref 37.5–51)
HGB BLD-MCNC: 11.1 G/DL (ref 13–17.7)
LYMPHOCYTES # BLD AUTO: 1.59 10*3/MM3 (ref 0.7–3.1)
LYMPHOCYTES NFR BLD AUTO: 26.9 % (ref 19.6–45.3)
MCH RBC QN AUTO: 31.5 PG (ref 26.6–33)
MCHC RBC AUTO-ENTMCNC: 34 G/DL (ref 31.5–35.7)
MCV RBC AUTO: 92.6 FL (ref 79–97)
MONOCYTES # BLD AUTO: 0.61 10*3/MM3 (ref 0.1–0.9)
MONOCYTES NFR BLD AUTO: 10.3 % (ref 5–12)
NEUTROPHILS NFR BLD AUTO: 3.26 10*3/MM3 (ref 1.7–7)
NEUTROPHILS NFR BLD AUTO: 55.4 % (ref 42.7–76)
PLATELET # BLD AUTO: 178 10*3/MM3 (ref 140–450)
PMV BLD AUTO: 10.4 FL (ref 6–12)
POTASSIUM SERPL-SCNC: 4.7 MMOL/L (ref 3.5–5.2)
PROT SERPL-MCNC: 6.2 G/DL (ref 6–8.5)
RBC # BLD AUTO: 3.52 10*6/MM3 (ref 4.14–5.8)
SODIUM SERPL-SCNC: 140 MMOL/L (ref 136–145)
T3FREE SERPL-MCNC: 2.4 PG/ML (ref 2–4.4)
TSH SERPL DL<=0.05 MIU/L-ACNC: 2.56 UIU/ML (ref 0.27–4.2)
WBC # BLD AUTO: 5.9 10*3/MM3 (ref 3.4–10.8)

## 2020-10-09 PROCEDURE — 25010000002 PEMBROLIZUMAB 100 MG/4ML SOLUTION 4 ML VIAL: Performed by: INTERNAL MEDICINE

## 2020-10-09 PROCEDURE — 36591 DRAW BLOOD OFF VENOUS DEVICE: CPT

## 2020-10-09 PROCEDURE — 80053 COMPREHEN METABOLIC PANEL: CPT | Performed by: INTERNAL MEDICINE

## 2020-10-09 PROCEDURE — 96413 CHEMO IV INFUSION 1 HR: CPT

## 2020-10-09 PROCEDURE — 84481 FREE ASSAY (FT-3): CPT | Performed by: INTERNAL MEDICINE

## 2020-10-09 PROCEDURE — 82533 TOTAL CORTISOL: CPT | Performed by: INTERNAL MEDICINE

## 2020-10-09 PROCEDURE — 85025 COMPLETE CBC W/AUTO DIFF WBC: CPT | Performed by: INTERNAL MEDICINE

## 2020-10-09 PROCEDURE — 84443 ASSAY THYROID STIM HORMONE: CPT | Performed by: INTERNAL MEDICINE

## 2020-10-09 RX ORDER — SODIUM CHLORIDE 9 MG/ML
250 INJECTION, SOLUTION INTRAVENOUS ONCE
Status: DISCONTINUED | OUTPATIENT
Start: 2020-10-09 | End: 2020-10-10 | Stop reason: HOSPADM

## 2020-10-09 RX ORDER — SODIUM CHLORIDE 0.9 % (FLUSH) 0.9 %
10 SYRINGE (ML) INJECTION AS NEEDED
Status: DISCONTINUED | OUTPATIENT
Start: 2020-10-09 | End: 2020-10-10 | Stop reason: HOSPADM

## 2020-10-09 RX ORDER — SODIUM CHLORIDE 0.9 % (FLUSH) 0.9 %
10 SYRINGE (ML) INJECTION AS NEEDED
Status: CANCELLED | OUTPATIENT
Start: 2020-10-09

## 2020-10-09 RX ADMIN — SODIUM CHLORIDE 200 MG: 900 INJECTION, SOLUTION INTRAVENOUS at 10:18

## 2020-10-09 RX ADMIN — Medication 10 ML: at 10:52

## 2020-10-23 ENCOUNTER — OFFICE VISIT (OUTPATIENT)
Dept: ONCOLOGY | Facility: CLINIC | Age: 70
End: 2020-10-23

## 2020-10-23 ENCOUNTER — APPOINTMENT (OUTPATIENT)
Dept: LAB | Facility: HOSPITAL | Age: 70
End: 2020-10-23

## 2020-10-23 VITALS — WEIGHT: 150 LBS | TEMPERATURE: 97.3 F | RESPIRATION RATE: 16 BRPM | BODY MASS INDEX: 24.11 KG/M2 | HEIGHT: 66 IN

## 2020-10-23 DIAGNOSIS — C16.0 MALIGNANT NEOPLASM OF CARDIA (HCC): Primary | ICD-10-CM

## 2020-10-23 DIAGNOSIS — C15.9 ESOPHAGEAL CANCER, STAGE IV (HCC): ICD-10-CM

## 2020-10-23 LAB
BASOPHILS # BLD AUTO: 0.06 10*3/MM3 (ref 0–0.2)
BASOPHILS NFR BLD AUTO: 0.8 % (ref 0–1.5)
DEPRECATED RDW RBC AUTO: 43.4 FL (ref 37–54)
EOSINOPHIL # BLD AUTO: 0.61 10*3/MM3 (ref 0–0.4)
EOSINOPHIL NFR BLD AUTO: 8.3 % (ref 0.3–6.2)
ERYTHROCYTE [DISTWIDTH] IN BLOOD BY AUTOMATED COUNT: 13.1 % (ref 12.3–15.4)
HCT VFR BLD AUTO: 34.8 % (ref 37.5–51)
HGB BLD-MCNC: 11.6 G/DL (ref 13–17.7)
LYMPHOCYTES # BLD AUTO: 1.72 10*3/MM3 (ref 0.7–3.1)
LYMPHOCYTES NFR BLD AUTO: 23.4 % (ref 19.6–45.3)
MCH RBC QN AUTO: 31.1 PG (ref 26.6–33)
MCHC RBC AUTO-ENTMCNC: 33.3 G/DL (ref 31.5–35.7)
MCV RBC AUTO: 93.3 FL (ref 79–97)
MONOCYTES # BLD AUTO: 0.88 10*3/MM3 (ref 0.1–0.9)
MONOCYTES NFR BLD AUTO: 12 % (ref 5–12)
NEUTROPHILS NFR BLD AUTO: 4.07 10*3/MM3 (ref 1.7–7)
NEUTROPHILS NFR BLD AUTO: 55.5 % (ref 42.7–76)
PLATELET # BLD AUTO: 175 10*3/MM3 (ref 140–450)
PMV BLD AUTO: 9.7 FL (ref 6–12)
RBC # BLD AUTO: 3.73 10*6/MM3 (ref 4.14–5.8)
WBC # BLD AUTO: 7.34 10*3/MM3 (ref 3.4–10.8)

## 2020-10-23 PROCEDURE — 85025 COMPLETE CBC W/AUTO DIFF WBC: CPT | Performed by: INTERNAL MEDICINE

## 2020-10-23 PROCEDURE — 99215 OFFICE O/P EST HI 40 MIN: CPT | Performed by: INTERNAL MEDICINE

## 2020-10-23 PROCEDURE — 36415 COLL VENOUS BLD VENIPUNCTURE: CPT | Performed by: INTERNAL MEDICINE

## 2020-10-23 NOTE — PROGRESS NOTES
Hematology-Oncology Follow-up Note       Artie Mc  1950    Primary Care Physician: Chanel Carter APRN  Referring Physician: Chanel Carter APRN    Reason For Visit:  Chief Complaint   Patient presents with   • Follow-up     Esophageal cancer, stage IV     Metastatic esophageal cancer  Monitoring for adverse effects related to immunotherapy.  Hypothyroidism    HPI:   Mr. Mc is a pleasant 70-year-old gentleman with a history of gastroesophageal reflux disease, diabetes, hypertension, heart block status post pacemaker placement and history of CABG.  He was having symptoms of dysphagia, weight loss since September 2016. He was reporting symptoms of food getting stuck in the lower chest.  He has transitioned himself to a liquid diet.  The patient reports losing about 30 pounds over this duration.  The patient underwent upper GI endoscopy on 12/20/16 with Dr. Esvin Barrera.  Endoscopy showed necrotic, circumferential and moderately obstructive mass at the distal esophagus between 42 cm and 36 cm.      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  There was also evidence of mild subacute inflammation in the duodenum.  Dr. Barrera ordered a CT scan of the chest and abdomen with contrast on 12/20/16.  The scan was done at [facility].  The scan showed a new ill-defined mass in the anterior wall of the distal esophagus at the GE junction measuring 2.3 x 2.2 x 1.6 cm, worrisome for cancer.  There were four subcentimeter liver lesions which had appeared stable in comparison to another CT scan from 2009 and they likely represent small cysts.  There was also mild lymphadenopathy in the gastrohepatic ligament.  There were at least six borderline enlarged lymph nodes measuring up to 1.3 x 1 cm in the region.  The patient is referred to us for further oncological evaluation.    1/5/2017 - The patient presents for  initial consultation.  He reports persistent dysphagia and also has symptoms of regurgitation.  He cannot tolerate solid foods and is dependent on liquid diet such as Ensure.  He also reports fatigue. Symptoms of dysphagia have been present for the past four months.  When he eats any solids, the food gets stuck in the lower chest.    • 1/5/17 - Vitamin B12 200 (L).  CEA 0.8.  Ferritin 35.  Iron saturation 16% (L), serum iron 56, TIBC 354.  Creatinine 0.9.  LFTs normal.  Folate 10.2.  • 1/12/17 - PET/CT scan:  Intense abnormal activity corresponding to the region of the GE junction and proximal stomach.  SUV is 11.06.  There is a suggestion of an abnormal mass or abnormal wall thickening in the region measuring 3.9 x 4.8 cm.  No additional abnormalities.  No evidence of metastases or other lymphadenopathy.  Scattered nonspecific subcentimeter lymph nodes involving the mediastinum and within the central upper abdomen.  • 1/13/17 - Creatinine 0.9.  LFTs normal.    • 1/17/17 - Patient seen by thoracic surgeon, Dr. Bradley.  PET scan was reviewed.  He has recommended neoadjuvant chemoradiation therapy followed by [place]illon Gurwinder esophagogastrectomy.  Port-A-Cath is being arranged.    • 1/20/17 - WBC 6.3, hemoglobin 12.1, platelet count 198,000, MCV 86.4.  • 1/30/17 - Patient started weekly Carboplatin and Taxol (Carboplatin AUC 2 and Taxol 50 mg/M2).  Patient received Injectafer 750 mg.  WBC 5.8, hemoglobin 12.4, platelet count 244,000.   • 1/31/17 - Patient seen by Dr. Eduardo Oleary.  The plan was to give 5040 cGy in 28 fractions.   • 2/6/17 - WBC 4.4, hemoglobin 11.6, platelet count 201,000.  Patient received cycle 2 of weekly Carboplatin and Taxol (Carboplatin AUC 2 and Taxol 50 mg/M2).  • 2/13/17 - Patient received Carboplatin and Taxol weekly cycle 3.  • 2/13/17 - Patient started concurrent radiation therapy.  Total planned dose is 4140 cGy.    • 2/13/17 to 2/15/17 - Patient received 4140 cGy of neoadjuvant radiation.   Radiation was finished on 3/15/17.    • 2/20/17 - WBC 1.8, hemoglobin 10.6, platelet count 217,000.  Creatinine 0.5. Folate 15 (L).  Ferritin 361.  Haptoglobin 214.  Iron saturation 33%, TIBC 263, serum iron 88.    • 2/20/17 - Patient received Carboplatin and Taxol weekly cycle 4.   • 2/27/17 - WBC 3.7, hemoglobin 10.7, platelet count 177,000, MCV 85.3.    • 3/6/17 - Patient has received 16 out of the 23 planned radiation treatments.  Total planned dose is 4140 cGy.    • 3/6/17 - WBC 5.4, hemoglobin 11.2, platelet count 113,000.  • 3/6/17 - Patient received cycle 5 of weekly Carboplatin and Taxol.   • 3/13/17 - Patient received cycle 6 of weekly Carboplatin and Taxol.  WBC 3.9, hemoglobin 11.3, platelet count 93,000.     • 3/20/17 - WBC 1.4, hemoglobin 10.0, platelet count 118,000, MCV 86.1   • 3/27/17 - WBC 3.8, hemoglobin 9.5, platelet count 176,000, MCV 87.3.    • 4/7/17 - Stress test:  No evidence of reversible myocardial ischemia.  Normal left ventricular ejection fraction of 50%.    • 4/17/17 - Upper GI endoscopy by Dr. 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  The GE junction adenocarcinoma lesion seems to have a very good response to chemotherapy and radiation.  The lumen was open.  • 5/1/17 - WBC 7.0, hemoglobin 10.2, platelet count 175,000.    • 5/4/17 - Patient underwent Noel Gurwinder esophagectomy with pyloroplasty, feeding jejunostomy tube, abdominal and mediastinal lymph node dissection.    Surgical Pathology:  Moderately to poorly differentiated adenocarcinoma measuring 1.3 cm at the GE junction.  Resection margins are negative for malignancy.  Tumor margins are negative.  There is effective treatment response.  No evidence of lymphovascular invasion.  Staging is ypT3,pN1.  One out of fourteen lymph nodes involved.   • 5/22/17 - WBC 6.8, hemoglobin 10.3, platelet count 312,000.    • 6/19/17 - WBC 5.7, hemoglobin 10.5, platelet count 204,000, MCV 92.1.     • 7/6/17 - EGD:  Status post balloon dilation of esophageal stricture.  • 7/12/17 - EGD with balloon dilation of esophageal stricture.  • 7/26/17 - EGD and balloon dilation of esophageal stricture.  • 7/31/17 - PET/CT scan:  There is mild metabolic activity seen at the thoracic esophagus just at and below the surgical margins.  Some mild wall thickening.  This could be from recent surgery.  A residual cancer seems unlikely.  There is a precarinal lymph node with mild metabolic activity with SUV of 3 measuring 1 x 1.3 cm.  Again this appears to be nonspecific in my opinion.    • 8/7/17 - WBC 5.9, hemoglobin 11.9, platelet count 171,000, MCV 88.7.    • 10/19/17 - WBC 7.5, hemoglobin 11.8, platelet count 216,000.    • 1/8/18 - PET scan:  No evidence of residual disease or recurrent disease.  There is an esophageal stent in the mid esophagus with a large debris air level.  No evidence of any metastases in the chest, abdomen or pelvis.  Mild nodule uptake in the vocal cords with fullness in the right vocal cord.  This could be physiologic.    • 7/9/18 - CT scan of chest with contrast:  Prominent mediastinal lymph nodes appear stable since February.  Changes of gastric pull-through procedure for esophageal cancer again noted.  Tree-in-bud infiltrates in the left lung base, consistent with infection versus inflammation.    • 1/10/19 - CT chest, abdomen and pelvis with contrast:  No evidence of mets in the chest; however, interval development of metastatic lymphadenopathy in the left side of the retroperitoneum.  For instance, there is a 17 mm rounded lymph node, 10 mm lymph node and also a 14 mm lymph node.  These are all new.  Stable 9 mm hypodense lesion within the right hepatic lobe, which could reflect hemangioma or complex cyst.    • 1/30/19 - CT-guided core biopsy of retroperitoneal lymph node:  Poorly differentiated adenocarcinoma consistent with metastases from patient’s known esophageal primary.  CK20 positive,  CDX2 positive, cytokeratin 7 negative, TTF-1 negative.    • 2/1/19 - Creatinine 0.9, BUN 18, calcium 9.3; these are normal.    • 2/28/19 - [facility] Next Gen sequencing testing on retroperitoneal lymph node specimen:  PD-L1 positive.  CPS = 3.  This implies responsiveness to pembrolizumab.  Mismatch repair status is proficient (no evidence of microsatellite instability).  Tumor mutational burden is low = 6 mutations/Mb.  CDH1 indeterminate.  KRAS mutation positive.  TP53 mutation positive.  CDK6 amplified.  [employer] PD-L1 testing by IHC:  PD-L1 expression 1% positive (CPS =1).  HER2/marvel by IHC is negative.  HER2/marvel by CISH not amplified.  Microsatellite stable tumor.    • 3/7/19 - WBC 7.3, hemoglobin 12, platelet count 128,000, MCV 92.     • 3/14/19 - Patient was seen by radiation oncologist, Dr. Chahal.  He has recommended observation versus systemic chemoimmunotherapy as needed.  No plans for radiation therapy.   • 5/1/19 - CT scan of chest with contrast:  No definitive findings of new metastatic disease in the chest.  Emphysema noted.    • 5/1/19 - CT abdomen and pelvis:  There is interval progression of metastatic retroperitoneal adenopathy.  Left periaortic adenopathy with lymph node measuring up to 2 cm, previously 1.7 cm.  Another lymph node now measures 2.3, previously 1.4 cm.  New enlarged lymph node on image 147 measures 1.4 cm, previously 0.4 cm.  A 9 mm hypodense right hepatic lobe lesion appears stable.    • 5/9/19 - Decision made to start patient on immunotherapy Keytruda.    • 5/9/19 - Vitamin B12 229.  Ferritin 61.  Folate 14.3.  Iron saturation 26%, TIBC 304, serum iron 79.  • 5/31/19 - Patient received Keytruda 200 mg cycle 1, day 1.    • 5/31/19 - Free T4 0.99.  Creatinine 0.9, BUN 9.  LFTs normal.  TSH 3.99.  Folate 14.3.  Iron saturation 26%.    • 6/21/2019 patient received Keytruda cycle 2  • 7/12/2019: Patient received Keytruda cycle 3  • 8/2/2019 patient received cycle 4 Keytruda WBC 7.2,  hemoglobin 11.8 platelet count 163  • 8/23/2019 creatinine 0.9, LFTs normal, WBC 6.4, hemoglobin 11.5, platelets 179, TSH 2.1   • 8/23/2019 patient received cycle 5 of Keytruda  • 9/3/2019 CT chest abdomen and pelvis with contrast: . Positive response to therapy in the abdomen since 05/01/2019. Pathologically enlarged retroperitoneal lymph nodes, predominantly in the left periaortic region, have diminished in size.a 1.3 x 2.1 cm left periaortic node (image 60), previously measured 3.5 x 2.3 cm. A 1.6 cm index node near the level of the left renal vein previously measured 2.0 cm No new or progressive adenopathy. 2. Stable findings in the chest. Noncalcified right upper lobe nodules are unchanged. There is no evidence of new or progressive metastatic disease within the chest. 3. 8 mm indeterminate low-density lesion in the right hepatic lobe, is stable. No new liver lesions.  • 9/6/2019 WBC 5.6, hemoglobin 11.2, platelets of 192, MCV 91.8  • 9/13/2019 patient received Keytruda cycle 6, albumin 3.2  • 10/4/2019 patient received cycle 7 of Keytruda, TSH 4.8, free T4 0.86  • 10/9/2019 WBC 5.8, hemoglobin 11.7, platelets 174  • 10/25/2019 patient received cycle 8 of Keytruda.  WBC 5.9, hemoglobin 11.5, platelets 152, creatinine 0.75, cortisol 15.2, TSH 6.48 high , free T4 1.13 normal  • 11/6/2019 WBC 6.6, hemoglobin 11.8, MCV 91.5, platelets 187  • 11/15/2019 patient received cycle 9 of Keytruda, WBC 6.6, hemoglobin 11.9, platelets 161, cortisol level 8.86, TSH 2.68, free T3 2.8, free T4 1.5  • 12/4/2019 WBC 6.9, hemoglobin 12.0, platelets 180, MCV 92.9  • 12/06/2019-Keytruda Cycle 10  • 12/27/2019- Cycle 11 LFTs normal, WBC 6.7, hemoglobin 11.4, platelets 168, MCV 93, TSH 2.4,  • 1/28/2020: CT chest abdomen and pelvis with contrast:  Single (aortic lymph node in the abdominal retroperitoneum has increased.  Rest of the retroperitoneal lymph nodes have decreased in size.  Mediastinal adenopathy appears unchanged.   Noncalcified bilateral pulmonary nodules are stable  • 1/17/2020 patient received Keytruda cycle 12:.  • 2/3/2020 WBC 7.1, hemoglobin 12.2, platelets 171  • 02/10/2020 patient received cycle 13 of Keytruda: WBC 7.3, hemoglobin 12.1, platelets 166, creatinine 0.82  • 3/6/2020 patient is of Keytruda 200 mg, hemoglobin 10.8  • 3/27/2020 patient received Keytruda 200 mg, hemoglobin 11.4, TSH 1.95  • 4/17/2020 patient received Keytruda cycle 16 200 mg, WBC 6.1, hemoglobin 11.6, platelets 150  • 5/8/2020: Patient received cycle 17 Keytruda, WBC 6.3, hemoglobin 9.6, platelets 137, TSH 2.05, free T4 1.54, creatinine 0.93, LFTs normal,  • 5/29/2020: Patient received cycle 18 Keytruda, WBC 6.5, hemoglobin 11.7, platelets 129, creatinine 0.83, TSH 1.69  • 6/26/2020: Patient receiving Keytruda.   • 7/17/2020: TSH 2.06, WBC 5.7, hemoglobin 11.4, platelets 177, MCV 92.4, Keytruda 200 mg cycle 20  • 7/27/2020: CT chest abdomen pelvis with contrast: Stable findings in the chest abdomen pelvis since 1/28/2020.  Noncalcified bilateral pulmonary nodules are stable.  Stable retroperitoneal left perinephric adenopathy in the abdomen.  • 8/7/2020: Patient received cycle 21 of Keytruda  • 8/28/2020 patient is a cycle 22 of Keytruda.  WBC 6.02, hemoglobin 11.3, platelets 161  • 9/11/2020 WBC 6.9, hemoglobin 12.4, platelets 192  • 9/18/2020: Cycle 23 of Keytruda, TSH 1.19  • 10/9/2020: Cycle 24 of Keytruda, WBC 5.9, hemoglobin 11.9, platelets 178, creatinine 0.84, TSH 2.56, free T3 2.4, CMP normal  • 10/23/2020: WBC 7.34, hemoglobin 11.6, platelets 175    Subjective:  70 year old male patient presents to the office for a follow up regarding esophageal cancer.  He reports diarrhea about 3 to 4 days after each treatment.  He takes lomotil to combat symptoms of diarrhea. He continues to take B-12, Folic acid, and Synthroid. He denies any side effects from taking Keytruda.He does report fatigue. Inquires if he can have a break for his  treatments. Diarrhea is not severe.  He is able to tolerate it well.    The following portions of the patient's history were reviewed and updated as appropriate: allergies, current medications, past family history, past medical history, past social history, past surgical history and problem list.     Past Medical History:   Diagnosis Date   • B12 deficiency    • Blockage of coronary artery of heart (CMS/HCC)    • Depression    • DM (diabetes mellitus) (CMS/HCC)    • Dysphagia    • HTN (hypertension)    • Hyperlipidemia        Past Surgical History:   Procedure Laterality Date   • ABDOMINAL WALL ABSCESS INCISION AND DRAINAGE  10/22/2018    WITH DEBRIDEMENT  1.5CM X 1.5 CM X 1.5 CM    DR. PURI   • CATARACT EXTRACTION  2018   • COLONOSCOPY     • CORONARY ARTERY BYPASS GRAFT  2015   • ENDOSCOPY  12/20/2016   • ESOPHAGECTOMY  05/04/2017    BROOKS PETTY ESOPHAGECTOMY, FEEDING JEJNOSTOMOY, ABDOMINAL AND MEDIASTINAL LYMPH NODE DISECTION, PEDICLED MUSCLE FLAP COVERAGE DR. PURI   • ESOPHAGOSCOPY / EGD  07/12/2017    WITH BALLOON DILATION   • ESOPHAGOSCOPY / EGD  07/26/2017    WITH BALLOON DILATION   • ESOPHAGOSCOPY / EGD  08/11/2017    WITH BALLOON DILATION   • ESOPHAGOSCOPY / EGD  08/28/2017    WITH BALLOON DILATION   • ESOPHAGOSCOPY / EGD  09/27/2017    WITH BALLOON DILATION   • LYMPH NODE BIOPSY  01/30/2019   • PACEMAKER IMPLANTATION  11/21/2016       Current Outpatient Medications:   •  Cetirizine HCl (ZyrTEC Allergy) 10 MG capsule, Take  by mouth., Disp: , Rfl:   •  clonazePAM (KlonoPIN) 0.5 MG tablet, Pt taking 1.5 pills daily, Disp: , Rfl: 1  •  cyanocobalamin (VITAMIN B-12) 1000 MCG tablet, , Disp: , Rfl:   •  diphenhydrAMINE-APAP, sleep, (TYLENOL PM EXTRA STRENGTH PO), Take  by mouth., Disp: , Rfl:   •  diphenoxylate-atropine (LOMOTIL) 2.5-0.025 MG per tablet, Take 1 tablet by mouth Daily As Needed for Diarrhea., Disp: 60 tablet, Rfl: 0  •  folic acid (FOLVITE) 1 MG tablet, Daily., Disp: , Rfl:   •   levothyroxine (Euthyrox) 50 MCG tablet, Take 1 tablet by mouth Daily., Disp: 30 tablet, Rfl: 2  •  metFORMIN (GLUCOPHAGE) 500 MG tablet, Take 500 mg by mouth 2 (Two) Times a Day., Disp: , Rfl: 4  •  metoprolol tartrate (LOPRESSOR) 25 MG tablet, Take 50 mg by mouth 2 (Two) Times a Day., Disp: , Rfl:   •  omeprazole (priLOSEC) 40 MG capsule, Take 1 capsule by mouth twice daily, Disp: 60 capsule, Rfl: 0  •  ondansetron (ZOFRAN) 4 MG tablet, Take 1 tablet by mouth Every 8 (Eight) Hours As Needed for Nausea or Vomiting., Disp: 90 tablet, Rfl: 2  •  Pembrolizumab (Keytruda) 100 MG/4ML solution, Infuse  into a venous catheter., Disp: , Rfl:   •  pravastatin (PRAVACHOL) 10 MG tablet, Take 20 mg by mouth every night at bedtime., Disp: , Rfl:     Allergies   Allergen Reactions   • Ace Inhibitors Unknown (See Comments)     Unknown reaction     • Heparin Other (See Comments)     Fever/chills, weakness in legs   • Sulfa Antibiotics Other (See Comments)     Mouth sores       Family History   Problem Relation Age of Onset   • Mental illness Mother    • Heart disease Mother    • Hypertension Father    • Cancer Father    • Heart disease Sister    • Heart disease Brother      Cancer-related family history includes Cancer in his father.    Social History     Tobacco Use   • Smoking status: Former Smoker     Packs/day: 2.00     Years: 50.00     Pack years: 100.00     Types: Cigarettes     Quit date: 2015     Years since quittin.3   • Smokeless tobacco: Former User     Types: Chew     Quit date: 1989   Substance Use Topics   • Alcohol use: No     Frequency: Never   • Drug use: No     ROS:   Review of Systems   Constitutional: Negative for activity change, chills and fever.   HENT: Negative for drooling, ear discharge, mouth sores, nosebleeds and sore throat.    Eyes: Negative for photophobia, pain, redness and visual disturbance.   Respiratory: Negative for cough, choking, shortness of breath and wheezing.   "  Cardiovascular: Negative for chest pain and palpitations.   Gastrointestinal: Negative for abdominal pain, diarrhea, nausea and vomiting.        Reports diarrhea sporadically once a week.   Genitourinary: Negative for dysuria.   Musculoskeletal: Negative for neck stiffness.   Skin: Negative for rash.   Neurological: Negative for syncope, speech difficulty, light-headedness and headaches.   Psychiatric/Behavioral: Negative for agitation, confusion and hallucinations.     Objective:    Vitals:    10/23/20 0953   Resp: 16   Temp: 97.3 °F (36.3 °C)   Weight: 68 kg (150 lb)   Height: 167.6 cm (66\")       (1) Restricted in physically strenuous activity, ambulatory and able to do work of light nature    Physical Exam:     Physical Exam   Constitutional: He is oriented to person, place, and time. He appears well-developed. No distress.   HENT:   Head: Normocephalic and atraumatic.   Eyes: Conjunctivae are normal. Right eye exhibits no discharge. Left eye exhibits no discharge. No scleral icterus.   Neck: Normal range of motion. Neck supple. No thyromegaly present.   Cardiovascular: Normal rate, regular rhythm and normal heart sounds. Exam reveals no gallop and no friction rub.   Pulmonary/Chest: Effort normal and breath sounds normal. No stridor. No respiratory distress. He has no wheezes.   Abdominal: Soft. Normal appearance and bowel sounds are normal. He exhibits no mass. There is no abdominal tenderness. There is no rebound and no guarding.   No tenderness   Musculoskeletal: Normal range of motion. No deformity or signs of injury.   Lymphadenopathy:     He has no cervical adenopathy.   Neurological: He is alert and oriented to person, place, and time. He exhibits normal muscle tone.   Skin: Skin is warm. No rash noted. He is not diaphoretic. No erythema. No jaundice.   Right side chest  port   Psychiatric: His behavior is normal. Mood normal.   Nursing note and vitals reviewed.    I have reexamined the patient and the " results are consistent with the previously documented exam. Bassem Jasso MD       Lab Results - Last 18 Months   Lab Units 10/23/20  0930 10/09/20  0947 09/18/20  0942   WBC 10*3/mm3 7.34 5.90 5.57   HEMOGLOBIN g/dL 11.6* 11.1* 11.6*   HEMATOCRIT % 34.8* 32.6* 34.4*   PLATELETS 10*3/mm3 175 178 159   MCV fL 93.3 92.6 92.7     Lab Results - Last 18 Months   Lab Units 10/09/20  0947 09/18/20  0942 08/28/20  1004   SODIUM mmol/L 140 137 138   POTASSIUM mmol/L 4.7 5.0 4.6   CHLORIDE mmol/L 104 102 103   CO2 mmol/L 26.0 27.0 26.0   BUN  <2* 13 13   CREATININE mg/dL 0.84 0.89 0.80   CALCIUM mg/dL 8.7 8.9 8.9   BILIRUBIN mg/dL 0.3 0.3 0.4   ALK PHOS U/L 30* 29* 32*   ALT (SGPT) U/L 5 6 6   AST (SGOT) U/L 11 12 19   GLUCOSE mg/dL 150* 144* 130*     Assessment/Plan     Assessment:  1. Metastatic esophageal carcinoma:  Patient has a history of GE junction poorly differentiated adenocarcinoma for which he had chemoradiation followed by Maud Gurwinder resection:  Pathology showed ypT3,pN1 disease.  Tumor was Stage IIIA, diagnosed in 2016.  He received concurrent chemoradiation with weekly Carboplatin and Taxol and radiation finishing on 3/15/17.    CT scan on 1/10/19 showed new retroperitoneal lymph nodes.  These lymph nodes were biopsied on 1/30/19 and it shows metastatic esophageal cancer.  [facility] Next Gen testing was performed on the sample and it shows KRAS mutation, microsatellite stable tumor.  PD-L1 is positive.  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  HER2/marvel (ERBB2) was not amplified and was negative.  His CT scan on 5/1/19 shows evidence of progression.  Patient has started receiving Keytruda on 5/31/19.  He has received 19 cycles so far.   CT scan on 1/28/2020 shows improvement and continued response.  CT scan chest abdomen pelvis with contrast on 7/28/2020 does not show any evidence of disease progression.  2. Reflux/heartburn: Symptoms improved.  Also has a history of strictures.  3. Noncalcified pulmonary nodules: Remained  stable on 7/27/2020 scan.  4. 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  5. History of occasional headaches/memory lapses: Advise close monitoring of the symptoms.  6. Mild diarrhea:- diarrhea seems to be waxing and waning in nature.  Related to immunotherapy, but not severe.  7. Nausea: Symptoms are intermittent  8. History of chronic dysphagia:  Due to recurrent esophageal strictures.  Patient is status post EGD and balloon dilation.  Today, he does not report any new symptoms.  9. PD-L1 positive, HER2 negative, p53 and KRAS positive.    10. B12 deficiency: Continue B12 1000 mcg twice daily.    11. Anemia: Likely from malignancy.  Also on iron supplement.    PLAN:  1. Continue Keytruda. Ongoing evaluation for immunotherapy related hypothyroidism.  2. Discussed with patient about considering treatment break at the next visit, since patient wants to have treatment holiday.  Imaging will need to be done at that point.  3. Will order CT scan of the chest abdomen pelvis with contrast in about 8 weeks.  If scan shows stable disease we will consider treatment holiday.  4. Continue levothyroxine 50 mcg for immunotherapy related hypothyroidism..  5. Regarding reflux, continue Reglan.    6. Use low-dose Lomotil 1 tablet daily as needed.    7. Continue to monitor TSH,  serum cortisol level.   8. We will continue to monitor closely for adverse events related to immunotherapy  9. Will need to closely monitor diarrhea symptoms.   10. Continue B12 supplements.  11. Continue folic acid supplement also.  12. Follow-up in 2 months    I have reviewed and confirmed the accuracy of the patient's history: Chief complaint, HPI, ROS and Subjective as entered by the MA/SUMANTHN/RN. Bassem Jasso MD 10/23/20     Orders Placed This Encounter   Procedures   • CBC Auto Differential   • CBC & Differential     Thank you very much for providing the opportunity to participate in this patient’s care. Please do not hesitate to call if there are  any other questions    Electronically signed by Bassem Jasso MD, 10/23/20, 10:25 AM EDT.

## 2020-10-27 DIAGNOSIS — R13.19 ESOPHAGEAL DYSPHAGIA: ICD-10-CM

## 2020-10-27 DIAGNOSIS — C15.9 ESOPHAGEAL CANCER, STAGE IV (HCC): ICD-10-CM

## 2020-10-29 RX ORDER — OMEPRAZOLE 40 MG/1
CAPSULE, DELAYED RELEASE ORAL
Qty: 60 CAPSULE | Refills: 5 | Status: SHIPPED | OUTPATIENT
Start: 2020-10-29 | End: 2021-05-03

## 2020-10-30 ENCOUNTER — TELEPHONE (OUTPATIENT)
Dept: ONCOLOGY | Facility: CLINIC | Age: 70
End: 2020-10-30

## 2020-10-30 ENCOUNTER — HOSPITAL ENCOUNTER (OUTPATIENT)
Dept: ONCOLOGY | Facility: HOSPITAL | Age: 70
Setting detail: INFUSION SERIES
Discharge: HOME | End: 2020-10-30

## 2020-10-30 VITALS
HEIGHT: 66 IN | TEMPERATURE: 97.1 F | RESPIRATION RATE: 18 BRPM | BODY MASS INDEX: 24.03 KG/M2 | DIASTOLIC BLOOD PRESSURE: 74 MMHG | WEIGHT: 149.5 LBS | HEART RATE: 60 BPM | SYSTOLIC BLOOD PRESSURE: 150 MMHG

## 2020-10-30 DIAGNOSIS — Z51.81 ENCOUNTER FOR THERAPEUTIC DRUG MONITORING: ICD-10-CM

## 2020-10-30 DIAGNOSIS — C16.0 MALIGNANT NEOPLASM OF CARDIA (HCC): ICD-10-CM

## 2020-10-30 DIAGNOSIS — K21.9 GASTROESOPHAGEAL REFLUX DISEASE, UNSPECIFIED WHETHER ESOPHAGITIS PRESENT: Primary | ICD-10-CM

## 2020-10-30 DIAGNOSIS — Z51.11 ENCOUNTER FOR ANTINEOPLASTIC CHEMOTHERAPY: ICD-10-CM

## 2020-10-30 DIAGNOSIS — Z51.81 ENCOUNTER FOR THERAPEUTIC DRUG MONITORING: Primary | ICD-10-CM

## 2020-10-30 DIAGNOSIS — C16.0 MALIGNANT NEOPLASM OF CARDIA (HCC): Primary | ICD-10-CM

## 2020-10-30 LAB
ALBUMIN SERPL-MCNC: 3.9 G/DL (ref 3.5–5.2)
ALBUMIN/GLOB SERPL: 1.8 G/DL
ALP SERPL-CCNC: 32 U/L (ref 39–117)
ALT SERPL W P-5'-P-CCNC: 7 U/L (ref 1–41)
ANION GAP SERPL CALCULATED.3IONS-SCNC: 10 MMOL/L (ref 5–15)
AST SERPL-CCNC: 13 U/L (ref 1–40)
BASOPHILS # BLD AUTO: 0.03 10*3/MM3 (ref 0–0.2)
BASOPHILS NFR BLD AUTO: 0.5 % (ref 0–1.5)
BILIRUB SERPL-MCNC: 0.3 MG/DL (ref 0–1.2)
BUN SERPL-MCNC: 14 MG/DL (ref 8–23)
BUN/CREAT SERPL: 16.1 (ref 7–25)
CALCIUM SPEC-SCNC: 8.7 MG/DL (ref 8.6–10.5)
CHLORIDE SERPL-SCNC: 102 MMOL/L (ref 98–107)
CO2 SERPL-SCNC: 25 MMOL/L (ref 22–29)
CREAT SERPL-MCNC: 0.87 MG/DL (ref 0.76–1.27)
DEPRECATED RDW RBC AUTO: 42.2 FL (ref 37–54)
EOSINOPHIL # BLD AUTO: 0.4 10*3/MM3 (ref 0–0.4)
EOSINOPHIL NFR BLD AUTO: 6.6 % (ref 0.3–6.2)
ERYTHROCYTE [DISTWIDTH] IN BLOOD BY AUTOMATED COUNT: 13 % (ref 12.3–15.4)
GFR SERPL CREATININE-BSD FRML MDRD: 87 ML/MIN/1.73
GLOBULIN UR ELPH-MCNC: 2.2 GM/DL
GLUCOSE SERPL-MCNC: 185 MG/DL (ref 65–99)
HCT VFR BLD AUTO: 34.7 % (ref 37.5–51)
HGB BLD-MCNC: 11.3 G/DL (ref 13–17.7)
LYMPHOCYTES # BLD AUTO: 1.57 10*3/MM3 (ref 0.7–3.1)
LYMPHOCYTES NFR BLD AUTO: 25.8 % (ref 19.6–45.3)
MCH RBC QN AUTO: 30.4 PG (ref 26.6–33)
MCHC RBC AUTO-ENTMCNC: 32.6 G/DL (ref 31.5–35.7)
MCV RBC AUTO: 93.3 FL (ref 79–97)
MONOCYTES # BLD AUTO: 0.75 10*3/MM3 (ref 0.1–0.9)
MONOCYTES NFR BLD AUTO: 12.3 % (ref 5–12)
NEUTROPHILS NFR BLD AUTO: 3.33 10*3/MM3 (ref 1.7–7)
NEUTROPHILS NFR BLD AUTO: 54.8 % (ref 42.7–76)
PLATELET # BLD AUTO: 173 10*3/MM3 (ref 140–450)
PMV BLD AUTO: 10.3 FL (ref 6–12)
POTASSIUM SERPL-SCNC: 4.7 MMOL/L (ref 3.5–5.2)
PROT SERPL-MCNC: 6.1 G/DL (ref 6–8.5)
RBC # BLD AUTO: 3.72 10*6/MM3 (ref 4.14–5.8)
SODIUM SERPL-SCNC: 137 MMOL/L (ref 136–145)
T4 FREE SERPL-MCNC: 1.5 NG/DL (ref 0.93–1.7)
TSH SERPL DL<=0.05 MIU/L-ACNC: 1.86 UIU/ML (ref 0.27–4.2)
WBC # BLD AUTO: 6.08 10*3/MM3 (ref 3.4–10.8)

## 2020-10-30 PROCEDURE — 25010000002 PEMBROLIZUMAB 100 MG/4ML SOLUTION 4 ML VIAL: Performed by: INTERNAL MEDICINE

## 2020-10-30 PROCEDURE — 96413 CHEMO IV INFUSION 1 HR: CPT

## 2020-10-30 PROCEDURE — 36591 DRAW BLOOD OFF VENOUS DEVICE: CPT

## 2020-10-30 PROCEDURE — 84439 ASSAY OF FREE THYROXINE: CPT | Performed by: INTERNAL MEDICINE

## 2020-10-30 PROCEDURE — 85025 COMPLETE CBC W/AUTO DIFF WBC: CPT | Performed by: INTERNAL MEDICINE

## 2020-10-30 PROCEDURE — 84443 ASSAY THYROID STIM HORMONE: CPT | Performed by: INTERNAL MEDICINE

## 2020-10-30 PROCEDURE — 80053 COMPREHEN METABOLIC PANEL: CPT | Performed by: INTERNAL MEDICINE

## 2020-10-30 RX ORDER — FAMOTIDINE 20 MG/1
20 TABLET, FILM COATED ORAL 2 TIMES DAILY PRN
Qty: 60 TABLET | Refills: 1 | Status: SHIPPED | OUTPATIENT
Start: 2020-10-30 | End: 2021-07-28

## 2020-10-30 RX ORDER — SODIUM CHLORIDE 9 MG/ML
250 INJECTION, SOLUTION INTRAVENOUS ONCE
Status: CANCELLED | OUTPATIENT
Start: 2020-10-30

## 2020-10-30 RX ORDER — SODIUM CHLORIDE 0.9 % (FLUSH) 0.9 %
10 SYRINGE (ML) INJECTION AS NEEDED
Status: DISCONTINUED | OUTPATIENT
Start: 2020-10-30 | End: 2020-10-31 | Stop reason: HOSPADM

## 2020-10-30 RX ORDER — SODIUM CHLORIDE 9 MG/ML
250 INJECTION, SOLUTION INTRAVENOUS ONCE
Status: COMPLETED | OUTPATIENT
Start: 2020-10-30 | End: 2020-10-30

## 2020-10-30 RX ORDER — SODIUM CHLORIDE 0.9 % (FLUSH) 0.9 %
10 SYRINGE (ML) INJECTION AS NEEDED
Status: CANCELLED | OUTPATIENT
Start: 2020-10-30

## 2020-10-30 RX ADMIN — SODIUM CHLORIDE 250 ML: 900 INJECTION, SOLUTION INTRAVENOUS at 10:50

## 2020-10-30 RX ADMIN — Medication 10 ML: at 11:23

## 2020-10-30 RX ADMIN — SODIUM CHLORIDE 200 MG: 900 INJECTION, SOLUTION INTRAVENOUS at 10:46

## 2020-10-30 NOTE — PATIENT INSTRUCTIONS
Start taking pepcid. May take tums or mylanta over the counter as needed for reflux.    Referral to [facility], they should call with appointment.

## 2020-10-30 NOTE — PROGRESS NOTES
Patient is here for Keytruda complaining over severe acid reflux. Spoke with Kristyn Ngo NP and new orders for pepcid, tums or mylanta OTC, and urgent referral to [facility]. Orders in, Leyla is working on the referral. Information in patients AVS as well, Patient verbalized understanding. He states he is having increased anxiety but does not want to try anything new at this time.

## 2020-11-18 DIAGNOSIS — Z51.81 ENCOUNTER FOR THERAPEUTIC DRUG MONITORING: Primary | ICD-10-CM

## 2020-11-18 DIAGNOSIS — C16.0 MALIGNANT NEOPLASM OF CARDIA (HCC): ICD-10-CM

## 2020-11-18 DIAGNOSIS — Z51.11 ENCOUNTER FOR ANTINEOPLASTIC CHEMOTHERAPY: ICD-10-CM

## 2020-11-18 RX ORDER — SODIUM CHLORIDE 9 MG/ML
250 INJECTION, SOLUTION INTRAVENOUS ONCE
Status: CANCELLED | OUTPATIENT
Start: 2020-11-20

## 2020-11-20 ENCOUNTER — HOSPITAL ENCOUNTER (OUTPATIENT)
Dept: ONCOLOGY | Facility: HOSPITAL | Age: 70
Setting detail: INFUSION SERIES
Discharge: HOME | End: 2020-11-20

## 2020-11-20 VITALS
DIASTOLIC BLOOD PRESSURE: 80 MMHG | TEMPERATURE: 96.9 F | HEIGHT: 66 IN | WEIGHT: 146.8 LBS | RESPIRATION RATE: 18 BRPM | BODY MASS INDEX: 23.59 KG/M2 | HEART RATE: 56 BPM | SYSTOLIC BLOOD PRESSURE: 130 MMHG

## 2020-11-20 DIAGNOSIS — Z51.81 ENCOUNTER FOR THERAPEUTIC DRUG MONITORING: ICD-10-CM

## 2020-11-20 DIAGNOSIS — Z51.11 ENCOUNTER FOR ANTINEOPLASTIC CHEMOTHERAPY: ICD-10-CM

## 2020-11-20 DIAGNOSIS — C16.0 MALIGNANT NEOPLASM OF CARDIA (HCC): Primary | ICD-10-CM

## 2020-11-20 LAB
ALBUMIN SERPL-MCNC: 4 G/DL (ref 3.5–5.2)
ALBUMIN/GLOB SERPL: 2 G/DL
ALP SERPL-CCNC: 31 U/L (ref 39–117)
ALT SERPL W P-5'-P-CCNC: 6 U/L (ref 1–41)
ANION GAP SERPL CALCULATED.3IONS-SCNC: 9 MMOL/L (ref 5–15)
AST SERPL-CCNC: 11 U/L (ref 1–40)
BASOPHILS # BLD AUTO: 0.03 10*3/MM3 (ref 0–0.2)
BASOPHILS NFR BLD AUTO: 0.5 % (ref 0–1.5)
BILIRUB SERPL-MCNC: 0.3 MG/DL (ref 0–1.2)
BUN SERPL-MCNC: 15 MG/DL (ref 8–23)
BUN/CREAT SERPL: 16.7 (ref 7–25)
CALCIUM SPEC-SCNC: 8.8 MG/DL (ref 8.6–10.5)
CHLORIDE SERPL-SCNC: 105 MMOL/L (ref 98–107)
CO2 SERPL-SCNC: 26 MMOL/L (ref 22–29)
CREAT SERPL-MCNC: 0.9 MG/DL (ref 0.76–1.27)
DEPRECATED RDW RBC AUTO: 42.2 FL (ref 37–54)
EOSINOPHIL # BLD AUTO: 0.5 10*3/MM3 (ref 0–0.4)
EOSINOPHIL NFR BLD AUTO: 7.8 % (ref 0.3–6.2)
ERYTHROCYTE [DISTWIDTH] IN BLOOD BY AUTOMATED COUNT: 13 % (ref 12.3–15.4)
GFR SERPL CREATININE-BSD FRML MDRD: 83 ML/MIN/1.73
GLOBULIN UR ELPH-MCNC: 2 GM/DL
GLUCOSE SERPL-MCNC: 161 MG/DL (ref 65–99)
HCT VFR BLD AUTO: 34 % (ref 37.5–51)
HGB BLD-MCNC: 11.3 G/DL (ref 13–17.7)
LYMPHOCYTES # BLD AUTO: 1.78 10*3/MM3 (ref 0.7–3.1)
LYMPHOCYTES NFR BLD AUTO: 27.6 % (ref 19.6–45.3)
MCH RBC QN AUTO: 31 PG (ref 26.6–33)
MCHC RBC AUTO-ENTMCNC: 33.2 G/DL (ref 31.5–35.7)
MCV RBC AUTO: 93.2 FL (ref 79–97)
MONOCYTES # BLD AUTO: 0.68 10*3/MM3 (ref 0.1–0.9)
MONOCYTES NFR BLD AUTO: 10.5 % (ref 5–12)
NEUTROPHILS NFR BLD AUTO: 3.46 10*3/MM3 (ref 1.7–7)
NEUTROPHILS NFR BLD AUTO: 53.6 % (ref 42.7–76)
PLATELET # BLD AUTO: 157 10*3/MM3 (ref 140–450)
PMV BLD AUTO: 10.8 FL (ref 6–12)
POTASSIUM SERPL-SCNC: 4.7 MMOL/L (ref 3.5–5.2)
PROT SERPL-MCNC: 6 G/DL (ref 6–8.5)
RBC # BLD AUTO: 3.65 10*6/MM3 (ref 4.14–5.8)
SODIUM SERPL-SCNC: 140 MMOL/L (ref 136–145)
WBC # BLD AUTO: 6.45 10*3/MM3 (ref 3.4–10.8)

## 2020-11-20 PROCEDURE — 85025 COMPLETE CBC W/AUTO DIFF WBC: CPT | Performed by: INTERNAL MEDICINE

## 2020-11-20 PROCEDURE — 96413 CHEMO IV INFUSION 1 HR: CPT

## 2020-11-20 PROCEDURE — 80053 COMPREHEN METABOLIC PANEL: CPT | Performed by: INTERNAL MEDICINE

## 2020-11-20 PROCEDURE — 36591 DRAW BLOOD OFF VENOUS DEVICE: CPT

## 2020-11-20 PROCEDURE — 25010000002 PEMBROLIZUMAB 100 MG/4ML SOLUTION 4 ML VIAL: Performed by: INTERNAL MEDICINE

## 2020-11-20 RX ORDER — SODIUM CHLORIDE 9 MG/ML
250 INJECTION, SOLUTION INTRAVENOUS ONCE
Status: COMPLETED | OUTPATIENT
Start: 2020-11-20 | End: 2020-11-20

## 2020-11-20 RX ORDER — SODIUM CHLORIDE 0.9 % (FLUSH) 0.9 %
10 SYRINGE (ML) INJECTION AS NEEDED
Status: CANCELLED | OUTPATIENT
Start: 2020-11-20

## 2020-11-20 RX ORDER — SODIUM CHLORIDE 0.9 % (FLUSH) 0.9 %
10 SYRINGE (ML) INJECTION AS NEEDED
Status: DISCONTINUED | OUTPATIENT
Start: 2020-11-20 | End: 2020-11-21 | Stop reason: HOSPADM

## 2020-11-20 RX ADMIN — SODIUM CHLORIDE 200 MG: 900 INJECTION, SOLUTION INTRAVENOUS at 10:24

## 2020-11-20 RX ADMIN — Medication 10 ML: at 10:54

## 2020-11-20 RX ADMIN — SODIUM CHLORIDE 250 ML: 9 INJECTION, SOLUTION INTRAVENOUS at 10:24

## 2020-12-09 DIAGNOSIS — C16.0 MALIGNANT NEOPLASM OF CARDIA (HCC): ICD-10-CM

## 2020-12-09 DIAGNOSIS — Z51.11 ENCOUNTER FOR ANTINEOPLASTIC CHEMOTHERAPY: ICD-10-CM

## 2020-12-09 DIAGNOSIS — Z51.81 ENCOUNTER FOR THERAPEUTIC DRUG MONITORING: Primary | ICD-10-CM

## 2020-12-09 RX ORDER — SODIUM CHLORIDE 9 MG/ML
250 INJECTION, SOLUTION INTRAVENOUS ONCE
Status: CANCELLED | OUTPATIENT
Start: 2020-12-11

## 2020-12-10 ENCOUNTER — HOSPITAL ENCOUNTER (OUTPATIENT)
Dept: PET IMAGING | Facility: HOSPITAL | Age: 70
Discharge: HOME | End: 2020-12-10
Admitting: INTERNAL MEDICINE

## 2020-12-10 DIAGNOSIS — C15.9 ESOPHAGEAL CANCER, STAGE IV (HCC): ICD-10-CM

## 2020-12-10 DIAGNOSIS — C16.0 MALIGNANT NEOPLASM OF CARDIA (HCC): ICD-10-CM

## 2020-12-10 PROCEDURE — 74177 CT ABD & PELVIS W/CONTRAST: CPT

## 2020-12-10 PROCEDURE — 0 IOPAMIDOL PER 1 ML: Performed by: INTERNAL MEDICINE

## 2020-12-10 PROCEDURE — 71260 CT THORAX DX C+: CPT

## 2020-12-10 RX ADMIN — IOPAMIDOL 100 ML: 755 INJECTION, SOLUTION INTRAVENOUS at 08:50

## 2020-12-11 ENCOUNTER — HOSPITAL ENCOUNTER (OUTPATIENT)
Dept: ONCOLOGY | Facility: HOSPITAL | Age: 70
Setting detail: INFUSION SERIES
Discharge: HOME | End: 2020-12-11

## 2020-12-11 VITALS
DIASTOLIC BLOOD PRESSURE: 83 MMHG | HEIGHT: 66 IN | WEIGHT: 148 LBS | BODY MASS INDEX: 23.78 KG/M2 | RESPIRATION RATE: 18 BRPM | TEMPERATURE: 97.1 F | HEART RATE: 60 BPM | SYSTOLIC BLOOD PRESSURE: 157 MMHG

## 2020-12-11 DIAGNOSIS — Z51.11 ENCOUNTER FOR ANTINEOPLASTIC CHEMOTHERAPY: ICD-10-CM

## 2020-12-11 DIAGNOSIS — Z51.81 ENCOUNTER FOR THERAPEUTIC DRUG MONITORING: ICD-10-CM

## 2020-12-11 DIAGNOSIS — C16.0 MALIGNANT NEOPLASM OF CARDIA (HCC): Primary | ICD-10-CM

## 2020-12-11 LAB
ALBUMIN SERPL-MCNC: 4 G/DL (ref 3.5–5.2)
ALBUMIN/GLOB SERPL: 1.9 G/DL
ALP SERPL-CCNC: 31 U/L (ref 39–117)
ALT SERPL W P-5'-P-CCNC: 10 U/L (ref 1–41)
ANION GAP SERPL CALCULATED.3IONS-SCNC: 9 MMOL/L (ref 5–15)
AST SERPL-CCNC: 15 U/L (ref 1–40)
BASOPHILS # BLD AUTO: 0.03 10*3/MM3 (ref 0–0.2)
BASOPHILS NFR BLD AUTO: 0.5 % (ref 0–1.5)
BILIRUB SERPL-MCNC: 0.2 MG/DL (ref 0–1.2)
BUN SERPL-MCNC: 12 MG/DL (ref 8–23)
BUN/CREAT SERPL: 15 (ref 7–25)
CALCIUM SPEC-SCNC: 9.1 MG/DL (ref 8.6–10.5)
CHLORIDE SERPL-SCNC: 102 MMOL/L (ref 98–107)
CO2 SERPL-SCNC: 26 MMOL/L (ref 22–29)
CORTIS SERPL-MCNC: 8.37 MCG/DL
CREAT SERPL-MCNC: 0.8 MG/DL (ref 0.76–1.27)
DEPRECATED RDW RBC AUTO: 42.6 FL (ref 37–54)
EOSINOPHIL # BLD AUTO: 0.38 10*3/MM3 (ref 0–0.4)
EOSINOPHIL NFR BLD AUTO: 6.8 % (ref 0.3–6.2)
ERYTHROCYTE [DISTWIDTH] IN BLOOD BY AUTOMATED COUNT: 13 % (ref 12.3–15.4)
GFR SERPL CREATININE-BSD FRML MDRD: 95 ML/MIN/1.73
GLOBULIN UR ELPH-MCNC: 2.1 GM/DL
GLUCOSE SERPL-MCNC: 174 MG/DL (ref 65–99)
HCT VFR BLD AUTO: 34.6 % (ref 37.5–51)
HGB BLD-MCNC: 11.4 G/DL (ref 13–17.7)
LYMPHOCYTES # BLD AUTO: 1.56 10*3/MM3 (ref 0.7–3.1)
LYMPHOCYTES NFR BLD AUTO: 28.1 % (ref 19.6–45.3)
MCH RBC QN AUTO: 30.6 PG (ref 26.6–33)
MCHC RBC AUTO-ENTMCNC: 32.9 G/DL (ref 31.5–35.7)
MCV RBC AUTO: 93 FL (ref 79–97)
MONOCYTES # BLD AUTO: 0.63 10*3/MM3 (ref 0.1–0.9)
MONOCYTES NFR BLD AUTO: 11.3 % (ref 5–12)
NEUTROPHILS NFR BLD AUTO: 2.96 10*3/MM3 (ref 1.7–7)
NEUTROPHILS NFR BLD AUTO: 53.3 % (ref 42.7–76)
PLATELET # BLD AUTO: 176 10*3/MM3 (ref 140–450)
PMV BLD AUTO: 10.6 FL (ref 6–12)
POTASSIUM SERPL-SCNC: 4.8 MMOL/L (ref 3.5–5.2)
PROT SERPL-MCNC: 6.1 G/DL (ref 6–8.5)
RBC # BLD AUTO: 3.72 10*6/MM3 (ref 4.14–5.8)
SODIUM SERPL-SCNC: 137 MMOL/L (ref 136–145)
T3FREE SERPL-MCNC: 2.73 PG/ML (ref 2–4.4)
T4 FREE SERPL-MCNC: 1.54 NG/DL (ref 0.93–1.7)
TSH SERPL DL<=0.05 MIU/L-ACNC: 2.21 UIU/ML (ref 0.27–4.2)
WBC # BLD AUTO: 5.56 10*3/MM3 (ref 3.4–10.8)

## 2020-12-11 PROCEDURE — 84443 ASSAY THYROID STIM HORMONE: CPT | Performed by: INTERNAL MEDICINE

## 2020-12-11 PROCEDURE — 36591 DRAW BLOOD OFF VENOUS DEVICE: CPT

## 2020-12-11 PROCEDURE — 25010000002 PEMBROLIZUMAB 100 MG/4ML SOLUTION 4 ML VIAL: Performed by: INTERNAL MEDICINE

## 2020-12-11 PROCEDURE — 96413 CHEMO IV INFUSION 1 HR: CPT

## 2020-12-11 PROCEDURE — 84439 ASSAY OF FREE THYROXINE: CPT | Performed by: INTERNAL MEDICINE

## 2020-12-11 PROCEDURE — 85025 COMPLETE CBC W/AUTO DIFF WBC: CPT | Performed by: INTERNAL MEDICINE

## 2020-12-11 PROCEDURE — 84481 FREE ASSAY (FT-3): CPT | Performed by: INTERNAL MEDICINE

## 2020-12-11 PROCEDURE — 80053 COMPREHEN METABOLIC PANEL: CPT | Performed by: INTERNAL MEDICINE

## 2020-12-11 PROCEDURE — 82533 TOTAL CORTISOL: CPT | Performed by: INTERNAL MEDICINE

## 2020-12-11 RX ORDER — SODIUM CHLORIDE 0.9 % (FLUSH) 0.9 %
10 SYRINGE (ML) INJECTION AS NEEDED
Status: DISCONTINUED | OUTPATIENT
Start: 2020-12-11 | End: 2020-12-12 | Stop reason: HOSPADM

## 2020-12-11 RX ORDER — SODIUM CHLORIDE 0.9 % (FLUSH) 0.9 %
10 SYRINGE (ML) INJECTION AS NEEDED
Status: CANCELLED | OUTPATIENT
Start: 2020-12-11

## 2020-12-11 RX ORDER — SODIUM CHLORIDE 9 MG/ML
250 INJECTION, SOLUTION INTRAVENOUS ONCE
Status: COMPLETED | OUTPATIENT
Start: 2020-12-11 | End: 2020-12-11

## 2020-12-11 RX ADMIN — SODIUM CHLORIDE 250 ML: 9 INJECTION, SOLUTION INTRAVENOUS at 10:40

## 2020-12-11 RX ADMIN — Medication 10 ML: at 11:25

## 2020-12-11 RX ADMIN — SODIUM CHLORIDE 200 MG: 9 INJECTION, SOLUTION INTRAVENOUS at 10:40

## 2020-12-14 DIAGNOSIS — R79.89 ELEVATED TSH: ICD-10-CM

## 2020-12-14 DIAGNOSIS — E03.9 HYPOTHYROIDISM (ACQUIRED): ICD-10-CM

## 2020-12-14 RX ORDER — LEVOTHYROXINE SODIUM 0.05 MG/1
50 TABLET ORAL DAILY
Qty: 30 TABLET | Refills: 2 | Status: SHIPPED | OUTPATIENT
Start: 2020-12-14 | End: 2021-03-19

## 2020-12-17 ENCOUNTER — TELEPHONE (OUTPATIENT)
Dept: ONCOLOGY | Facility: CLINIC | Age: 70
End: 2020-12-17

## 2020-12-18 ENCOUNTER — APPOINTMENT (OUTPATIENT)
Dept: LAB | Facility: HOSPITAL | Age: 70
End: 2020-12-18

## 2020-12-21 NOTE — PROGRESS NOTES
Hematology-Oncology Follow-up Note       Artie Mc  1950    Primary Care Physician: Chanel Carter APRN  Referring Physician: Chanel Carter APRN    Reason For Visit:  Chief Complaint   Patient presents with   • Follow-up     malignant neoplasm of cardia, esophageal cancer stage IV     Metastatic esophageal cancer  Monitoring for adverse effects related to immunotherapy.  Hypothyroidism    HPI:   Mr. Mc is a pleasant 70-year-old gentleman with a history of gastroesophageal reflux disease, diabetes, hypertension, heart block status post pacemaker placement and history of CABG.  He was having symptoms of dysphagia, weight loss since September 2016. He was reporting symptoms of food getting stuck in the lower chest.  He has transitioned himself to a liquid diet.  The patient reports losing about 30 pounds over this duration.  The patient underwent upper GI endoscopy on 12/20/16 with Dr. Esvin Barrera.  Endoscopy showed necrotic, circumferential and moderately obstructive mass at the distal esophagus between 42 cm and 36 cm.      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  There was also evidence of mild subacute inflammation in the duodenum.  Dr. Barrera ordered a CT scan of the chest and abdomen with contrast on 12/20/16.  The scan was done at [facility].  The scan showed a new ill-defined mass in the anterior wall of the distal esophagus at the GE junction measuring 2.3 x 2.2 x 1.6 cm, worrisome for cancer.  There were four subcentimeter liver lesions which had appeared stable in comparison to another CT scan from 2009 and they likely represent small cysts.  There was also mild lymphadenopathy in the gastrohepatic ligament.  There were at least six borderline enlarged lymph nodes measuring up to 1.3 x 1 cm in the region.  The patient is referred to us for further oncological evaluation.    1/5/2017 -  The patient presents for initial consultation.  He reports persistent dysphagia and also has symptoms of regurgitation.  He cannot tolerate solid foods and is dependent on liquid diet such as Ensure.  He also reports fatigue. Symptoms of dysphagia have been present for the past four months.  When he eats any solids, the food gets stuck in the lower chest.    • 1/5/17 - Vitamin B12 200 (L).  CEA 0.8.  Ferritin 35.  Iron saturation 16% (L), serum iron 56, TIBC 354.  Creatinine 0.9.  LFTs normal.  Folate 10.2.  • 1/12/17 - PET/CT scan:  Intense abnormal activity corresponding to the region of the GE junction and proximal stomach.  SUV is 11.06.  There is a suggestion of an abnormal mass or abnormal wall thickening in the region measuring 3.9 x 4.8 cm.  No additional abnormalities.  No evidence of metastases or other lymphadenopathy.  Scattered nonspecific subcentimeter lymph nodes involving the mediastinum and within the central upper abdomen.  • 1/13/17 - Creatinine 0.9.  LFTs normal.    • 1/17/17 - Patient seen by thoracic surgeon, Dr. Bradley.  PET scan was reviewed.  He has recommended neoadjuvant chemoradiation therapy followed by Noel Gurwinder esophagogastrectomy.  Port-A-Cath is being arranged.    • 1/20/17 - WBC 6.3, hemoglobin 12.1, platelet count 198,000, MCV 86.4.  • 1/30/17 - Patient started weekly Carboplatin and Taxol (Carboplatin AUC 2 and Taxol 50 mg/M2).  Patient received Injectafer 750 mg.  WBC 5.8, hemoglobin 12.4, platelet count 244,000.   • 1/31/17 - Patient seen by Dr. Eduardo Oleary.  The plan was to give 5040 cGy in 28 fractions.   • 2/6/17 - WBC 4.4, hemoglobin 11.6, platelet count 201,000.  Patient received cycle 2 of weekly Carboplatin and Taxol (Carboplatin AUC 2 and Taxol 50 mg/M2).  • 2/13/17 - Patient received Carboplatin and Taxol weekly cycle 3.  • 2/13/17 - Patient started concurrent radiation therapy.  Total planned dose is 4140 cGy.    • 2/13/17 to 2/15/17 - Patient received 4140 cGy of  neoadjuvant radiation.  Radiation was finished on 3/15/17.    • 2/20/17 - WBC 1.8, hemoglobin 10.6, platelet count 217,000.  Creatinine 0.5. Folate 15 (L).  Ferritin 361.  Haptoglobin 214.  Iron saturation 33%, TIBC 263, serum iron 88.    • 2/20/17 - Patient received Carboplatin and Taxol weekly cycle 4.   • 2/27/17 - WBC 3.7, hemoglobin 10.7, platelet count 177,000, MCV 85.3.    • 3/6/17 - Patient has received 16 out of the 23 planned radiation treatments.  Total planned dose is 4140 cGy.    • 3/6/17 - WBC 5.4, hemoglobin 11.2, platelet count 113,000.  • 3/6/17 - Patient received cycle 5 of weekly Carboplatin and Taxol.   • 3/13/17 - Patient received cycle 6 of weekly Carboplatin and Taxol.  WBC 3.9, hemoglobin 11.3, platelet count 93,000.     • 3/20/17 - WBC 1.4, hemoglobin 10.0, platelet count 118,000, MCV 86.1   • 3/27/17 - WBC 3.8, hemoglobin 9.5, platelet count 176,000, MCV 87.3.    • 4/7/17 - Stress test:  No evidence of reversible myocardial ischemia.  Normal left ventricular ejection fraction of 50%.    • 4/17/17 - Upper GI endoscopy by Dr. 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  The GE junction adenocarcinoma lesion seems to have a very good response to chemotherapy and radiation.  The lumen was open.  • 5/1/17 - WBC 7.0, hemoglobin 10.2, platelet count 175,000.    • 5/4/17 - Patient underwent [place] Gurwinder esophagectomy with pyloroplasty, feeding jejunostomy tube, abdominal and mediastinal lymph node dissection.    Surgical Pathology:  Moderately to poorly differentiated adenocarcinoma measuring 1.3 cm at the GE junction.  Resection margins are negative for malignancy.  Tumor margins are negative.  There is effective treatment response.  No evidence of lymphovascular invasion.  Staging is ypT3,pN1.  One out of fourteen lymph nodes involved.   • 5/22/17 - WBC 6.8, hemoglobin 10.3, platelet count 312,000.    • 6/19/17 - WBC 5.7, hemoglobin 10.5, platelet count  204,000, MCV 92.1.    • 7/6/17 - EGD:  Status post balloon dilation of esophageal stricture.  • 7/12/17 - EGD with balloon dilation of esophageal stricture.  • 7/26/17 - EGD and balloon dilation of esophageal stricture.  • 7/31/17 - PET/CT scan:  There is mild metabolic activity seen at the thoracic esophagus just at and below the surgical margins.  Some mild wall thickening.  This could be from recent surgery.  A residual cancer seems unlikely.  There is a precarinal lymph node with mild metabolic activity with SUV of 3 measuring 1 x 1.3 cm.  Again this appears to be nonspecific in my opinion.    • 8/7/17 - WBC 5.9, hemoglobin 11.9, platelet count 171,000, MCV 88.7.    • 10/19/17 - WBC 7.5, hemoglobin 11.8, platelet count 216,000.    • 1/8/18 - PET scan:  No evidence of residual disease or recurrent disease.  There is an esophageal stent in the mid esophagus with a large debris air level.  No evidence of any metastases in the chest, abdomen or pelvis.  Mild nodule uptake in the vocal cords with fullness in the right vocal cord.  This could be physiologic.    • 7/9/18 - CT scan of chest with contrast:  Prominent mediastinal lymph nodes appear stable since February.  Changes of gastric pull-through procedure for esophageal cancer again noted.  Tree-in-bud infiltrates in the left lung base, consistent with infection versus inflammation.    • 1/10/19 - CT chest, abdomen and pelvis with contrast:  No evidence of mets in the chest; however, interval development of metastatic lymphadenopathy in the left side of the retroperitoneum.  For instance, there is a 17 mm rounded lymph node, 10 mm lymph node and also a 14 mm lymph node.  These are all new.  Stable 9 mm hypodense lesion within the right hepatic lobe, which could reflect hemangioma or complex cyst.    • 1/30/19 - CT-guided core biopsy of retroperitoneal lymph node:  Poorly differentiated adenocarcinoma consistent with metastases from patient’s known esophageal  primary.  CK20 positive, CDX2 positive, cytokeratin 7 negative, TTF-1 negative.    • 2/1/19 - Creatinine 0.9, BUN 18, calcium 9.3; these are normal.    • 2/28/19 - [facility] Next Gen sequencing testing on retroperitoneal lymph node specimen:  PD-L1 positive.  CPS = 3.  This implies responsiveness to pembrolizumab.  Mismatch repair status is proficient (no evidence of microsatellite instability).  Tumor mutational burden is low = 6 mutations/Mb.  CDH1 indeterminate.  KRAS mutation positive.  TP53 mutation positive.  CDK6 amplified.  [employer] PD-L1 testing by IHC:  PD-L1 expression 1% positive (CPS =1).  HER2/marvel by IHC is negative.  HER2/marvel by CISH not amplified.  Microsatellite stable tumor.    • 3/7/19 - WBC 7.3, hemoglobin 12, platelet count 128,000, MCV 92.     • 3/14/19 - Patient was seen by radiation oncologist, Dr. Chahal.  He has recommended observation versus systemic chemoimmunotherapy as needed.  No plans for radiation therapy.   • 5/1/19 - CT scan of chest with contrast:  No definitive findings of new metastatic disease in the chest.  Emphysema noted.    • 5/1/19 - CT abdomen and pelvis:  There is interval progression of metastatic retroperitoneal adenopathy.  Left periaortic adenopathy with lymph node measuring up to 2 cm, previously 1.7 cm.  Another lymph node now measures 2.3, previously 1.4 cm.  New enlarged lymph node on image 147 measures 1.4 cm, previously 0.4 cm.  A 9 mm hypodense right hepatic lobe lesion appears stable.    • 5/9/19 - Decision made to start patient on immunotherapy Keytruda.    • 5/9/19 - Vitamin B12 229.  Ferritin 61.  Folate 14.3.  Iron saturation 26%, TIBC 304, serum iron 79.  • 5/31/19 - Patient received Keytruda 200 mg cycle 1, day 1.    • 5/31/19 - Free T4 0.99.  Creatinine 0.9, BUN 9.  LFTs normal.  TSH 3.99.  Folate 14.3.  Iron saturation 26%.    • 6/21/2019 patient received Keytruda cycle 2  • 7/12/2019: Patient received Keytruda cycle 3  • 8/2/2019 patient received cycle 4  Keytruda WBC 7.2, hemoglobin 11.8 platelet count 163  • 8/23/2019 creatinine 0.9, LFTs normal, WBC 6.4, hemoglobin 11.5, platelets 179, TSH 2.1   • 8/23/2019 patient received cycle 5 of Keytruda  • 9/3/2019 CT chest abdomen and pelvis with contrast: . Positive response to therapy in the abdomen since 05/01/2019. Pathologically enlarged retroperitoneal lymph nodes, predominantly in the left periaortic region, have diminished in size.a 1.3 x 2.1 cm left periaortic node (image 60), previously measured 3.5 x 2.3 cm. A 1.6 cm index node near the level of the left renal vein previously measured 2.0 cm No new or progressive adenopathy. 2. Stable findings in the chest. Noncalcified right upper lobe nodules are unchanged. There is no evidence of new or progressive metastatic disease within the chest. 3. 8 mm indeterminate low-density lesion in the right hepatic lobe, is stable. No new liver lesions.  • 9/6/2019 WBC 5.6, hemoglobin 11.2, platelets of 192, MCV 91.8  • 9/13/2019 patient received Keytruda cycle 6, albumin 3.2  • 10/4/2019 patient received cycle 7 of Keytruda, TSH 4.8, free T4 0.86  • 10/9/2019 WBC 5.8, hemoglobin 11.7, platelets 174  • 10/25/2019 patient received cycle 8 of Keytruda.  WBC 5.9, hemoglobin 11.5, platelets 152, creatinine 0.75, cortisol 15.2, TSH 6.48 high , free T4 1.13 normal  • 11/6/2019 WBC 6.6, hemoglobin 11.8, MCV 91.5, platelets 187  • 11/15/2019 patient received cycle 9 of Keytruda, WBC 6.6, hemoglobin 11.9, platelets 161, cortisol level 8.86, TSH 2.68, free T3 2.8, free T4 1.5  • 12/4/2019 WBC 6.9, hemoglobin 12.0, platelets 180, MCV 92.9  • 12/06/2019-Keytruda Cycle 10  • 12/27/2019- Cycle 11 LFTs normal, WBC 6.7, hemoglobin 11.4, platelets 168, MCV 93, TSH 2.4,  • 1/28/2020: CT chest abdomen and pelvis with contrast:  Single (aortic lymph node in the abdominal retroperitoneum has increased.  Rest of the retroperitoneal lymph nodes have decreased in size.  Mediastinal adenopathy appears  unchanged.  Noncalcified bilateral pulmonary nodules are stable  • 1/17/2020 patient received Keytruda cycle 12:.  • 2/3/2020 WBC 7.1, hemoglobin 12.2, platelets 171  • 02/10/2020 patient received cycle 13 of Keytruda: WBC 7.3, hemoglobin 12.1, platelets 166, creatinine 0.82  • 3/6/2020 patient is of Keytruda 200 mg, hemoglobin 10.8  • 3/27/2020 patient received Keytruda 200 mg, hemoglobin 11.4, TSH 1.95  • 4/17/2020 patient received Keytruda cycle 16 200 mg, WBC 6.1, hemoglobin 11.6, platelets 150  • 5/8/2020: Patient received cycle 17 Keytruda, WBC 6.3, hemoglobin 9.6, platelets 137, TSH 2.05, free T4 1.54, creatinine 0.93, LFTs normal,  • 5/29/2020: Patient received cycle 18 Keytruda, WBC 6.5, hemoglobin 11.7, platelets 129, creatinine 0.83, TSH 1.69  • 6/26/2020: Patient receiving Keytruda.   • 7/17/2020: TSH 2.06, WBC 5.7, hemoglobin 11.4, platelets 177, MCV 92.4, Keytruda 200 mg cycle 20  • 7/27/2020: CT chest abdomen pelvis with contrast: Stable findings in the chest abdomen pelvis since 1/28/2020.  Noncalcified bilateral pulmonary nodules are stable.  Stable retroperitoneal left perinephric adenopathy in the abdomen.  • 8/7/2020: Patient received cycle 21 of Keytruda  • 8/28/2020 patient is a cycle 22 of Keytruda.  WBC 6.02, hemoglobin 11.3, platelets 161  • 9/11/2020 WBC 6.9, hemoglobin 12.4, platelets 192  • 9/18/2020: Cycle 23 of Keytruda, TSH 1.19  • 10/9/2020: Cycle 24 of Keytruda, WBC 5.9, hemoglobin 11.9, platelets 178, creatinine 0.84, TSH 2.56, free T3 2.4, CMP normal  • 10/23/2020: WBC 7.34, hemoglobin 11.6, platelets 175  • 10/30/2020:.  Keytruda 200 mg  • 11/20/2020: Keytruda 200 mg  • 12/11/2020 Keytruda 200 mg.  WBC 5.5, hemoglobin 11.4, platelets 176, creatinine 0.8, LFTs normal, TSH 2.2, cortisol 8.3  • 12/10/2020: CT chest abdomen and pelvis with contrast: Previously seen left para-aortic lymph nodes within the abdomen are decreased in size.  No pathological lymphadenopathy.  No evidence of  residual malignancy..  Small noncalcified pulmonary nodules are stable.  No new nodules.  Stable mediastinal lymph nodes.    Subjective:  70 year old male patient presents to the office for a follow up regarding esophageal cancer.  He reports diarrhea about 3 to 4 days after each treatment. . He continues to take B-12, Folic acid, and Synthroid. .He does report fatigue.  And patient would like to take a break from his treatments.     The following portions of the patient's history were reviewed and updated as appropriate: allergies, current medications, past family history, past medical history, past social history, past surgical history and problem list.     Past Medical History:   Diagnosis Date   • B12 deficiency    • Blockage of coronary artery of heart (CMS/HCC)    • Depression    • DM (diabetes mellitus) (CMS/HCC)    • Dysphagia    • HTN (hypertension)    • Hyperlipidemia        Past Surgical History:   Procedure Laterality Date   • ABDOMINAL WALL ABSCESS INCISION AND DRAINAGE  10/22/2018    WITH DEBRIDEMENT  1.5CM X 1.5 CM X 1.5 CM    DR. PURI   • CATARACT EXTRACTION  2018   • COLONOSCOPY     • CORONARY ARTERY BYPASS GRAFT  2015   • ENDOSCOPY  12/20/2016   • ESOPHAGECTOMY  05/04/2017    BROOKS PETTY ESOPHAGECTOMY, FEEDING JEJNOSTOMOY, ABDOMINAL AND MEDIASTINAL LYMPH NODE DISECTION, PEDICLED MUSCLE FLAP COVERAGE DR. PURI   • ESOPHAGOSCOPY / EGD  07/12/2017    WITH BALLOON DILATION   • ESOPHAGOSCOPY / EGD  07/26/2017    WITH BALLOON DILATION   • ESOPHAGOSCOPY / EGD  08/11/2017    WITH BALLOON DILATION   • ESOPHAGOSCOPY / EGD  08/28/2017    WITH BALLOON DILATION   • ESOPHAGOSCOPY / EGD  09/27/2017    WITH BALLOON DILATION   • LYMPH NODE BIOPSY  01/30/2019   • PACEMAKER IMPLANTATION  11/21/2016       Current Outpatient Medications:   •  Cetirizine HCl (ZyrTEC Allergy) 10 MG capsule, Take  by mouth., Disp: , Rfl:   •  clonazePAM (KlonoPIN) 0.5 MG tablet, Pt taking 1.5 pills daily, Disp: , Rfl: 1  •   cyanocobalamin (VITAMIN B-12) 1000 MCG tablet, , Disp: , Rfl:   •  diphenhydrAMINE-APAP, sleep, (TYLENOL PM EXTRA STRENGTH PO), Take  by mouth., Disp: , Rfl:   •  diphenoxylate-atropine (LOMOTIL) 2.5-0.025 MG per tablet, Take 1 tablet by mouth Daily As Needed for Diarrhea., Disp: 60 tablet, Rfl: 0  •  famotidine (PEPCID) 20 MG tablet, Take 1 tablet by mouth 2 (Two) Times a Day As Needed for Heartburn., Disp: 60 tablet, Rfl: 1  •  folic acid (FOLVITE) 1 MG tablet, Daily., Disp: , Rfl:   •  levothyroxine (Euthyrox) 50 MCG tablet, Take 1 tablet by mouth Daily., Disp: 30 tablet, Rfl: 2  •  metFORMIN (GLUCOPHAGE) 500 MG tablet, Take 500 mg by mouth 2 (Two) Times a Day., Disp: , Rfl: 4  •  metoprolol tartrate (LOPRESSOR) 25 MG tablet, Take 50 mg by mouth 2 (Two) Times a Day., Disp: , Rfl:   •  omeprazole (priLOSEC) 40 MG capsule, Take 1 capsule by mouth twice daily, Disp: 60 capsule, Rfl: 5  •  ondansetron (ZOFRAN) 4 MG tablet, Take 1 tablet by mouth Every 8 (Eight) Hours As Needed for Nausea or Vomiting., Disp: 90 tablet, Rfl: 2  •  Pembrolizumab (Keytruda) 100 MG/4ML solution, Infuse  into a venous catheter., Disp: , Rfl:   •  pravastatin (PRAVACHOL) 10 MG tablet, Take 20 mg by mouth every night at bedtime., Disp: , Rfl:     Allergies   Allergen Reactions   • Ace Inhibitors Unknown (See Comments)     Unknown reaction     • Heparin Other (See Comments)     Fever/chills, weakness in legs   • Sulfa Antibiotics Other (See Comments)     Mouth sores       Family History   Problem Relation Age of Onset   • Mental illness Mother    • Heart disease Mother    • Hypertension Father    • Cancer Father    • Heart disease Sister    • Heart disease Brother      Cancer-related family history includes Cancer in his father.    Social History     Tobacco Use   • Smoking status: Former Smoker     Packs/day: 2.00     Years: 50.00     Pack years: 100.00     Types: Cigarettes     Quit date: 2015     Years since quittin.5   •  "Smokeless tobacco: Former User     Types: Chew     Quit date: 9/6/1989   Substance Use Topics   • Alcohol use: No     Frequency: Never   • Drug use: No     ROS:   Review of Systems   Constitutional: Negative for activity change, chills and fever.   HENT: Negative for drooling, ear discharge, mouth sores, nosebleeds and sore throat.    Eyes: Negative for photophobia, pain, redness and visual disturbance.   Respiratory: Negative for cough, choking, shortness of breath and wheezing.    Cardiovascular: Negative for chest pain and palpitations.   Gastrointestinal: Negative for abdominal pain, diarrhea, nausea and vomiting.        Reports diarrhea sporadically once a week.   Genitourinary: Negative for dysuria.   Musculoskeletal: Negative for neck stiffness.   Skin: Negative for rash.   Neurological: Negative for syncope, speech difficulty, light-headedness and headaches.   Psychiatric/Behavioral: Negative for agitation, behavioral problems, confusion and hallucinations.     Objective:    Vitals:    12/22/20 0946   BP: 146/76   Resp: 18   Temp: 96.6 °F (35.9 °C)   TempSrc: Temporal   Weight: 67.1 kg (148 lb)   Height: 167.6 cm (66\")   PainSc:   1   PainLoc: Comment: right chest pain       (1) Restricted in physically strenuous activity, ambulatory and able to do work of light nature    Physical Exam:     Physical Exam   Constitutional: He is oriented to person, place, and time. He appears well-developed. No distress.   HENT:   Head: Normocephalic and atraumatic.   Eyes: Conjunctivae are normal. Right eye exhibits no discharge. Left eye exhibits no discharge. No scleral icterus.   Neck: Normal range of motion. Neck supple. No thyromegaly present.   Cardiovascular: Normal rate, regular rhythm and normal heart sounds. Exam reveals no gallop and no friction rub.   Pulmonary/Chest: Effort normal and breath sounds normal. No stridor. No respiratory distress. He has no wheezes.   Abdominal: Soft. Normal appearance and bowel " sounds are normal. He exhibits no mass. There is no abdominal tenderness. There is no rebound and no guarding.   No tenderness   Musculoskeletal: Normal range of motion. No deformity or signs of injury.   Lymphadenopathy:     He has no cervical adenopathy.   Neurological: He is alert and oriented to person, place, and time. He exhibits normal muscle tone.   Skin: Skin is warm. No rash noted. He is not diaphoretic. No erythema. No jaundice.   Right side chest  port   Psychiatric: His behavior is normal. Mood, judgment and thought content normal.   Nursing note and vitals reviewed.    I have reexamined the patient and the results are consistent with the previously documented exam. Bassem Jasso MD       Lab Results - Last 18 Months   Lab Units 12/11/20  1003 11/20/20  0958 10/30/20  0956   WBC 10*3/mm3 5.56 6.45 6.08   HEMOGLOBIN g/dL 11.4* 11.3* 11.3*   HEMATOCRIT % 34.6* 34.0* 34.7*   PLATELETS 10*3/mm3 176 157 173   MCV fL 93.0 93.2 93.3     Lab Results - Last 18 Months   Lab Units 12/11/20  1003 11/20/20  0958 10/30/20  0956   SODIUM mmol/L 137 140 137   POTASSIUM mmol/L 4.8 4.7 4.7   CHLORIDE mmol/L 102 105 102   CO2 mmol/L 26.0 26.0 25.0   BUN mg/dL 12 15 14   CREATININE mg/dL 0.80 0.90 0.87   CALCIUM mg/dL 9.1 8.8 8.7   BILIRUBIN mg/dL 0.2 0.3 0.3   ALK PHOS U/L 31* 31* 32*   ALT (SGPT) U/L 10 6 7   AST (SGOT) U/L 15 11 13   GLUCOSE mg/dL 174* 161* 185*     Assessment/Plan     Assessment:  1. Metastatic esophageal carcinoma:  Patient has a history of GE junction poorly differentiated adenocarcinoma for which he had chemoradiation followed by Noel Gurwinder resection:  Pathology showed ypT3,pN1 disease.  Tumor was Stage IIIA, diagnosed in 2016.  He received concurrent chemoradiation with weekly Carboplatin and Taxol and radiation finishing on 3/15/17.    CT scan on 1/10/19 showed new retroperitoneal lymph nodes.  These lymph nodes were biopsied on 1/30/19 and it shows metastatic esophageal cancer.  [facility] Next Gen  testing was performed on the sample and it shows KRAS mutation, microsatellite stable tumor.  PD-L1 is positive.  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  HER2/marvel (ERBB2) was not amplified and was negative.  His CT scan on 5/1/19 shows evidence of progression.  Patient has started receiving Keytruda on 5/31/19.  He has received 19 cycles so far.   CT scan on 1/28/2020 shows improvement and continued response.  CT scan chest abdomen pelvis with contrast on 12/10/2020 does not show any evidence of disease progression.  On 12/22/2020 decision made to hold Keytruda per patient's request to get a treatment break.  2. Reflux/heartburn: Symptoms improved.  Also has a history of strictures.  3. Noncalcified pulmonary nodules: Remained stable on 7/27/2020 scan.  4. 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  Continues on Synthroid  5. History of occasional headaches/memory lapses: Advise close monitoring of the symptoms.  6. Mild diarrhea:- diarrhea seems to be waxing and waning in nature.  Related to immunotherapy, but not severe.  7. Nausea: Symptoms are intermittent  8. History of chronic dysphagia:  Due to recurrent esophageal strictures.  Patient is status post EGD and balloon dilation.  Today, he does not report any new symptoms.  9. PD-L1 positive, HER2 negative, p53 and KRAS positive.    10. B12 deficiency: Continue B12 1000 mcg twice daily.    11. Anemia: Likely from malignancy.  Also on iron supplement.    PLAN:  1. Continues to respond very well to Keytruda.  However patient would like to take a break from his immunotherapy treatments.'  Risks of holding immunotherapy discussed.  2. We will therefore hold.  We will continue close monitoring of CBC, CMP, TSH, cortisol levels q. monthly.  3. Continue levothyroxine 50 mcg for immunotherapy related hypothyroidism..  4. Regarding reflux, continue Reglan.    5. Continue to monitor TSH,  serum cortisol level.   6. We will continue to monitor closely for adverse events related to  immunotherapy  7. Continue B12 supplements.  8. Continue folic acid supplement also.  9. Follow-up in 3 months with repeat CT scan.    I have reviewed and confirmed the accuracy of the patient's history: Chief complaint, HPI, ROS and Subjective as entered by the MA/LPN/RN. Bassem Jasso MD 12/22/20     No orders of the defined types were placed in this encounter.    Thank you very much for providing the opportunity to participate in this patient’s care. Please do not hesitate to call if there are any other questions    Electronically signed by Bassem Jasso MD, 12/22/20, 10:06 AM EST.

## 2020-12-22 ENCOUNTER — OFFICE VISIT (OUTPATIENT)
Dept: ONCOLOGY | Facility: CLINIC | Age: 70
End: 2020-12-22

## 2020-12-22 ENCOUNTER — TELEPHONE (OUTPATIENT)
Dept: ONCOLOGY | Facility: CLINIC | Age: 70
End: 2020-12-22

## 2020-12-22 VITALS
HEIGHT: 66 IN | WEIGHT: 148 LBS | BODY MASS INDEX: 23.78 KG/M2 | RESPIRATION RATE: 18 BRPM | TEMPERATURE: 96.6 F | DIASTOLIC BLOOD PRESSURE: 76 MMHG | SYSTOLIC BLOOD PRESSURE: 146 MMHG

## 2020-12-22 DIAGNOSIS — C15.9 ESOPHAGEAL CANCER, STAGE IV (HCC): Primary | ICD-10-CM

## 2020-12-22 DIAGNOSIS — R53.83 OTHER FATIGUE: ICD-10-CM

## 2020-12-22 PROCEDURE — 99215 OFFICE O/P EST HI 40 MIN: CPT | Performed by: INTERNAL MEDICINE

## 2020-12-22 NOTE — TELEPHONE ENCOUNTER
Patient said Dr. Jasso took him off Keytruda for 3 months.  He has to go back to the [place] office every month for labs.  Can he get his port flushed there when he gets labs?    He gets it flushed with saline only-he is allergic to heparin.    [phone]

## 2020-12-22 NOTE — TELEPHONE ENCOUNTER
I returned call to pt to advise him that they don't access ports in [place] office, he will have to continue to come here. Pt verbalized understanding. Message sent to Merly to get appointments scheduled.

## 2021-01-04 ENCOUNTER — APPOINTMENT (OUTPATIENT)
Dept: ONCOLOGY | Facility: HOSPITAL | Age: 71
End: 2021-01-04

## 2021-01-11 ENCOUNTER — HOSPITAL ENCOUNTER (OUTPATIENT)
Dept: ONCOLOGY | Facility: HOSPITAL | Age: 71
Setting detail: INFUSION SERIES
Discharge: HOME | End: 2021-01-11

## 2021-01-11 DIAGNOSIS — C15.9 ESOPHAGEAL CANCER, STAGE IV (HCC): Primary | ICD-10-CM

## 2021-01-11 DIAGNOSIS — R53.83 OTHER FATIGUE: ICD-10-CM

## 2021-01-11 DIAGNOSIS — C16.0 MALIGNANT NEOPLASM OF CARDIA (HCC): ICD-10-CM

## 2021-01-11 LAB
ALBUMIN SERPL-MCNC: 4 G/DL (ref 3.5–5.2)
ALBUMIN/GLOB SERPL: 1.6 G/DL
ALP SERPL-CCNC: 34 U/L (ref 39–117)
ALT SERPL W P-5'-P-CCNC: 5 U/L (ref 1–41)
ANION GAP SERPL CALCULATED.3IONS-SCNC: 10 MMOL/L (ref 5–15)
AST SERPL-CCNC: 14 U/L (ref 1–40)
BASOPHILS # BLD AUTO: 0.06 10*3/MM3 (ref 0–0.2)
BASOPHILS NFR BLD AUTO: 1 % (ref 0–1.5)
BILIRUB SERPL-MCNC: 0.3 MG/DL (ref 0–1.2)
BUN SERPL-MCNC: 14 MG/DL (ref 8–23)
BUN/CREAT SERPL: 18.2 (ref 7–25)
CALCIUM SPEC-SCNC: 9 MG/DL (ref 8.6–10.5)
CHLORIDE SERPL-SCNC: 101 MMOL/L (ref 98–107)
CO2 SERPL-SCNC: 25 MMOL/L (ref 22–29)
CORTIS SERPL-MCNC: 11.08 MCG/DL
CREAT SERPL-MCNC: 0.77 MG/DL (ref 0.76–1.27)
DEPRECATED RDW RBC AUTO: 43.4 FL (ref 37–54)
EOSINOPHIL # BLD AUTO: 0.44 10*3/MM3 (ref 0–0.4)
EOSINOPHIL NFR BLD AUTO: 7.1 % (ref 0.3–6.2)
ERYTHROCYTE [DISTWIDTH] IN BLOOD BY AUTOMATED COUNT: 13 % (ref 12.3–15.4)
GFR SERPL CREATININE-BSD FRML MDRD: 100 ML/MIN/1.73
GLOBULIN UR ELPH-MCNC: 2.5 GM/DL
GLUCOSE SERPL-MCNC: 122 MG/DL (ref 65–99)
HCT VFR BLD AUTO: 36.1 % (ref 37.5–51)
HGB BLD-MCNC: 11.9 G/DL (ref 13–17.7)
LYMPHOCYTES # BLD AUTO: 1.52 10*3/MM3 (ref 0.7–3.1)
LYMPHOCYTES NFR BLD AUTO: 24.5 % (ref 19.6–45.3)
MCH RBC QN AUTO: 30.7 PG (ref 26.6–33)
MCHC RBC AUTO-ENTMCNC: 33 G/DL (ref 31.5–35.7)
MCV RBC AUTO: 93 FL (ref 79–97)
MONOCYTES # BLD AUTO: 0.73 10*3/MM3 (ref 0.1–0.9)
MONOCYTES NFR BLD AUTO: 11.8 % (ref 5–12)
NEUTROPHILS NFR BLD AUTO: 3.46 10*3/MM3 (ref 1.7–7)
NEUTROPHILS NFR BLD AUTO: 55.6 % (ref 42.7–76)
PLATELET # BLD AUTO: 186 10*3/MM3 (ref 140–450)
PMV BLD AUTO: 10.3 FL (ref 6–12)
POTASSIUM SERPL-SCNC: 5.1 MMOL/L (ref 3.5–5.2)
PROT SERPL-MCNC: 6.5 G/DL (ref 6–8.5)
RBC # BLD AUTO: 3.88 10*6/MM3 (ref 4.14–5.8)
SODIUM SERPL-SCNC: 136 MMOL/L (ref 136–145)
TSH SERPL DL<=0.05 MIU/L-ACNC: 2.78 UIU/ML (ref 0.27–4.2)
WBC # BLD AUTO: 6.21 10*3/MM3 (ref 3.4–10.8)

## 2021-01-11 PROCEDURE — 82533 TOTAL CORTISOL: CPT | Performed by: INTERNAL MEDICINE

## 2021-01-11 PROCEDURE — 80053 COMPREHEN METABOLIC PANEL: CPT | Performed by: INTERNAL MEDICINE

## 2021-01-11 PROCEDURE — 84443 ASSAY THYROID STIM HORMONE: CPT | Performed by: INTERNAL MEDICINE

## 2021-01-11 PROCEDURE — 36591 DRAW BLOOD OFF VENOUS DEVICE: CPT

## 2021-01-11 PROCEDURE — 85025 COMPLETE CBC W/AUTO DIFF WBC: CPT | Performed by: INTERNAL MEDICINE

## 2021-01-11 RX ORDER — SODIUM CHLORIDE 0.9 % (FLUSH) 0.9 %
10 SYRINGE (ML) INJECTION AS NEEDED
Status: CANCELLED | OUTPATIENT
Start: 2021-01-11

## 2021-01-11 RX ORDER — SODIUM CHLORIDE 0.9 % (FLUSH) 0.9 %
10 SYRINGE (ML) INJECTION AS NEEDED
Status: DISCONTINUED | OUTPATIENT
Start: 2021-01-11 | End: 2021-01-12 | Stop reason: HOSPADM

## 2021-01-11 RX ADMIN — Medication 10 ML: at 10:37

## 2021-01-11 NOTE — PROGRESS NOTES
Patient in for port flush and blood draw.  10 cc of blood wasted prior to specimen collection.  Specimens sent to lab for processing.

## 2021-02-11 ENCOUNTER — HOSPITAL ENCOUNTER (OUTPATIENT)
Dept: ONCOLOGY | Facility: HOSPITAL | Age: 71
Setting detail: INFUSION SERIES
Discharge: HOME | End: 2021-02-11

## 2021-02-11 VITALS — HEIGHT: 66 IN | WEIGHT: 151 LBS | BODY MASS INDEX: 24.27 KG/M2

## 2021-02-11 DIAGNOSIS — C16.0 MALIGNANT NEOPLASM OF CARDIA (HCC): Primary | ICD-10-CM

## 2021-02-11 DIAGNOSIS — Z45.2 ENCOUNTER FOR CARE RELATED TO VASCULAR ACCESS PORT: Primary | ICD-10-CM

## 2021-02-11 DIAGNOSIS — C15.9 ESOPHAGEAL CANCER, STAGE IV (HCC): ICD-10-CM

## 2021-02-11 DIAGNOSIS — R53.83 OTHER FATIGUE: ICD-10-CM

## 2021-02-11 LAB
ALBUMIN SERPL-MCNC: 4 G/DL (ref 3.5–5.2)
ALBUMIN/GLOB SERPL: 1.6 G/DL
ALP SERPL-CCNC: 28 U/L (ref 39–117)
ALT SERPL W P-5'-P-CCNC: 6 U/L (ref 1–41)
ANION GAP SERPL CALCULATED.3IONS-SCNC: 9 MMOL/L (ref 5–15)
AST SERPL-CCNC: 14 U/L (ref 1–40)
BASOPHILS # BLD AUTO: 0.04 10*3/MM3 (ref 0–0.2)
BASOPHILS NFR BLD AUTO: 0.7 % (ref 0–1.5)
BILIRUB SERPL-MCNC: 0.4 MG/DL (ref 0–1.2)
BUN SERPL-MCNC: 15 MG/DL (ref 8–23)
BUN/CREAT SERPL: 20.8 (ref 7–25)
CALCIUM SPEC-SCNC: 8.8 MG/DL (ref 8.6–10.5)
CHLORIDE SERPL-SCNC: 104 MMOL/L (ref 98–107)
CO2 SERPL-SCNC: 26 MMOL/L (ref 22–29)
CORTIS SERPL-MCNC: 9.31 MCG/DL
CREAT SERPL-MCNC: 0.72 MG/DL (ref 0.76–1.27)
DEPRECATED RDW RBC AUTO: 43.2 FL (ref 37–54)
EOSINOPHIL # BLD AUTO: 0.38 10*3/MM3 (ref 0–0.4)
EOSINOPHIL NFR BLD AUTO: 6.5 % (ref 0.3–6.2)
ERYTHROCYTE [DISTWIDTH] IN BLOOD BY AUTOMATED COUNT: 13.2 % (ref 12.3–15.4)
GFR SERPL CREATININE-BSD FRML MDRD: 108 ML/MIN/1.73
GLOBULIN UR ELPH-MCNC: 2.5 GM/DL
GLUCOSE SERPL-MCNC: 117 MG/DL (ref 65–99)
HCT VFR BLD AUTO: 34.6 % (ref 37.5–51)
HGB BLD-MCNC: 11.3 G/DL (ref 13–17.7)
LYMPHOCYTES # BLD AUTO: 1.61 10*3/MM3 (ref 0.7–3.1)
LYMPHOCYTES NFR BLD AUTO: 27.3 % (ref 19.6–45.3)
MCH RBC QN AUTO: 30.3 PG (ref 26.6–33)
MCHC RBC AUTO-ENTMCNC: 32.7 G/DL (ref 31.5–35.7)
MCV RBC AUTO: 92.8 FL (ref 79–97)
MONOCYTES # BLD AUTO: 0.64 10*3/MM3 (ref 0.1–0.9)
MONOCYTES NFR BLD AUTO: 10.9 % (ref 5–12)
NEUTROPHILS NFR BLD AUTO: 3.22 10*3/MM3 (ref 1.7–7)
NEUTROPHILS NFR BLD AUTO: 54.6 % (ref 42.7–76)
PLATELET # BLD AUTO: 168 10*3/MM3 (ref 140–450)
PMV BLD AUTO: 10 FL (ref 6–12)
POTASSIUM SERPL-SCNC: 4.5 MMOL/L (ref 3.5–5.2)
PROT SERPL-MCNC: 6.5 G/DL (ref 6–8.5)
RBC # BLD AUTO: 3.73 10*6/MM3 (ref 4.14–5.8)
SODIUM SERPL-SCNC: 139 MMOL/L (ref 136–145)
TSH SERPL DL<=0.05 MIU/L-ACNC: 1.42 UIU/ML (ref 0.27–4.2)
WBC # BLD AUTO: 5.89 10*3/MM3 (ref 3.4–10.8)

## 2021-02-11 PROCEDURE — 82533 TOTAL CORTISOL: CPT | Performed by: INTERNAL MEDICINE

## 2021-02-11 PROCEDURE — 84443 ASSAY THYROID STIM HORMONE: CPT | Performed by: INTERNAL MEDICINE

## 2021-02-11 PROCEDURE — 85025 COMPLETE CBC W/AUTO DIFF WBC: CPT | Performed by: INTERNAL MEDICINE

## 2021-02-11 PROCEDURE — G0463 HOSPITAL OUTPT CLINIC VISIT: HCPCS

## 2021-02-11 PROCEDURE — 80053 COMPREHEN METABOLIC PANEL: CPT | Performed by: INTERNAL MEDICINE

## 2021-02-11 RX ORDER — SODIUM CHLORIDE 0.9 % (FLUSH) 0.9 %
10 SYRINGE (ML) INJECTION AS NEEDED
Status: DISCONTINUED | OUTPATIENT
Start: 2021-02-11 | End: 2021-02-12 | Stop reason: HOSPADM

## 2021-02-11 RX ORDER — SODIUM CHLORIDE 0.9 % (FLUSH) 0.9 %
10 SYRINGE (ML) INJECTION AS NEEDED
Status: CANCELLED | OUTPATIENT
Start: 2021-02-11

## 2021-02-11 NOTE — PROGRESS NOTES
Noted port flipped when I attempted to flush port  I had Willow JOYCE double check patients port and she also states port is flipped. Patient denies port feeling different or aware of any injury to port. Willow JOYCE had Shefali JOYCE  Charge Nurse check port who also states port is flipped and she will notify .

## 2021-02-12 ENCOUNTER — TELEPHONE (OUTPATIENT)
Dept: ONCOLOGY | Facility: CLINIC | Age: 71
End: 2021-02-12

## 2021-02-12 ENCOUNTER — TELEPHONE (OUTPATIENT)
Dept: CARDIOLOGY | Facility: CLINIC | Age: 71
End: 2021-02-12

## 2021-02-12 ENCOUNTER — HOSPITAL ENCOUNTER (OUTPATIENT)
Dept: ONCOLOGY | Facility: HOSPITAL | Age: 71
Setting detail: INFUSION SERIES
Discharge: HOME | End: 2021-02-12

## 2021-02-12 VITALS — HEART RATE: 70 BPM | DIASTOLIC BLOOD PRESSURE: 68 MMHG | SYSTOLIC BLOOD PRESSURE: 120 MMHG

## 2021-02-12 DIAGNOSIS — C16.0 MALIGNANT NEOPLASM OF CARDIA (HCC): Primary | ICD-10-CM

## 2021-02-12 PROCEDURE — 96523 IRRIG DRUG DELIVERY DEVICE: CPT

## 2021-02-12 RX ORDER — SODIUM CHLORIDE 0.9 % (FLUSH) 0.9 %
10 SYRINGE (ML) INJECTION AS NEEDED
Status: CANCELLED | OUTPATIENT
Start: 2021-02-12

## 2021-02-12 RX ORDER — SODIUM CHLORIDE 0.9 % (FLUSH) 0.9 %
10 SYRINGE (ML) INJECTION AS NEEDED
Status: DISCONTINUED | OUTPATIENT
Start: 2021-02-12 | End: 2021-02-13 | Stop reason: HOSPADM

## 2021-02-12 RX ADMIN — Medication 20 ML: at 13:19

## 2021-02-12 NOTE — TELEPHONE ENCOUNTER
Caller: PT    Relationship to patient: PT    Best call back number: [phone]    PT WENT FOR PORT FLUSH 2/11.    PT STATES TREATMENT CENTER ATTEMPTED TO ACCES PACEMAKER NOT PORT.  DOES NOT BELIEVE HE NEEDS SURGERY TO CORRECT PORT.  PT DOES NEED TO BE SCHED FOR PORT FLUSH.    PLEASE RETURN CALL

## 2021-02-12 NOTE — TELEPHONE ENCOUNTER
Caller: DOROTHEA WALTON    Relationship to patient: SELF    Best call back number: [phone]    PT IS CALLING TO SCHEDULE HIS NEXT PORT FLUSH

## 2021-02-12 NOTE — PROGRESS NOTES
Pt here for port flush of right chest.  Port accessed and flushed w/ 20 cc normal saline.  Pt does not tolerate heparin.  Positive blood return noted.  Needle removed after flushed w/ normal saline.  Left chest where pacemaker is located assessed w/ no redness, swelling or bruising noted.  No pain per pt.  No issues noted.

## 2021-02-12 NOTE — TELEPHONE ENCOUNTER
CALL FROM [facility]    PATIENT WAS IN TO HAVE PORT FLUSHED. NURSE WAS NOT ABLE TO GET NEEDLE INTO PORT. THEY THOUGHT PORT HAD FLIPPED AND WOULD NEED SURGERY TO FIX. PATIENT THEN CALLED BACK STATING THEY WERE ON WRONG SIDE LOOKING FOR PORT. THEY WERE ACTUALLY HITTING PACEMAKER WITH NEEDLE.  JUST WANTED TO LET DR. MARTINEZ KNOW HIS PACEMAKER WAS HIT HARD WITH NEEDLE. PATIENT COMING BACK IN TODAY TO HAVE PORT FLUSHED.  A SAFE REPORT HAS BEEN DONE.

## 2021-02-15 NOTE — TELEPHONE ENCOUNTER
Spoke to patient, he lives in [place] and will not be in [place] until 3/12/21, Has an apt 3/16/21 at [facility] for device check and OV. Do to some predictions and upcoming weather this week, I will have patient send a remote check and look at this leads. Will discuss with Dr Del Castillo the results.

## 2021-02-15 NOTE — TELEPHONE ENCOUNTER
Spoke to patient, reviewed PM check, leads appear to be fine, numbers stable. Will talk to Dr Del Castillo about data received and see if it is ok to monitor and see in office in March as scheduled.

## 2021-02-15 NOTE — TELEPHONE ENCOUNTER
Contacted patient to schedule port flush. Scheduled for 03/12/2021 at 1000. Patient confirmed appt and had no other questions.

## 2021-02-17 NOTE — TELEPHONE ENCOUNTER
Spoke to patient, advised him we will continue to monitor and we will check him in March. Watch injection site and let us know if there are any issues.

## 2021-03-12 ENCOUNTER — HOSPITAL ENCOUNTER (OUTPATIENT)
Dept: ONCOLOGY | Facility: HOSPITAL | Age: 71
Setting detail: INFUSION SERIES
Discharge: HOME | End: 2021-03-12

## 2021-03-12 DIAGNOSIS — C15.9 ESOPHAGEAL CANCER, STAGE IV (HCC): Primary | ICD-10-CM

## 2021-03-12 DIAGNOSIS — C16.0 MALIGNANT NEOPLASM OF CARDIA (HCC): ICD-10-CM

## 2021-03-12 DIAGNOSIS — R53.83 OTHER FATIGUE: ICD-10-CM

## 2021-03-12 LAB
ALBUMIN SERPL-MCNC: 4 G/DL (ref 3.5–5.2)
ALBUMIN/GLOB SERPL: 1.7 G/DL
ALP SERPL-CCNC: 26 U/L (ref 39–117)
ALT SERPL W P-5'-P-CCNC: <5 U/L (ref 1–41)
ANION GAP SERPL CALCULATED.3IONS-SCNC: 9 MMOL/L (ref 5–15)
AST SERPL-CCNC: 12 U/L (ref 1–40)
BASOPHILS # BLD AUTO: 0.03 10*3/MM3 (ref 0–0.2)
BASOPHILS NFR BLD AUTO: 0.4 % (ref 0–1.5)
BILIRUB SERPL-MCNC: 0.3 MG/DL (ref 0–1.2)
BUN SERPL-MCNC: 13 MG/DL (ref 8–23)
BUN/CREAT SERPL: 14.8 (ref 7–25)
CALCIUM SPEC-SCNC: 8.6 MG/DL (ref 8.6–10.5)
CHLORIDE SERPL-SCNC: 103 MMOL/L (ref 98–107)
CO2 SERPL-SCNC: 27 MMOL/L (ref 22–29)
CORTIS SERPL-MCNC: 11.95 MCG/DL
CREAT SERPL-MCNC: 0.88 MG/DL (ref 0.76–1.27)
DEPRECATED RDW RBC AUTO: 45.2 FL (ref 37–54)
EOSINOPHIL # BLD AUTO: 0.42 10*3/MM3 (ref 0–0.4)
EOSINOPHIL NFR BLD AUTO: 6.2 % (ref 0.3–6.2)
ERYTHROCYTE [DISTWIDTH] IN BLOOD BY AUTOMATED COUNT: 13.8 % (ref 12.3–15.4)
GFR SERPL CREATININE-BSD FRML MDRD: 85 ML/MIN/1.73
GLOBULIN UR ELPH-MCNC: 2.4 GM/DL
GLUCOSE SERPL-MCNC: 119 MG/DL (ref 65–99)
HCT VFR BLD AUTO: 34.5 % (ref 37.5–51)
HGB BLD-MCNC: 11.3 G/DL (ref 13–17.7)
LYMPHOCYTES # BLD AUTO: 1.58 10*3/MM3 (ref 0.7–3.1)
LYMPHOCYTES NFR BLD AUTO: 23.5 % (ref 19.6–45.3)
MCH RBC QN AUTO: 30.5 PG (ref 26.6–33)
MCHC RBC AUTO-ENTMCNC: 32.8 G/DL (ref 31.5–35.7)
MCV RBC AUTO: 93.2 FL (ref 79–97)
MONOCYTES # BLD AUTO: 0.67 10*3/MM3 (ref 0.1–0.9)
MONOCYTES NFR BLD AUTO: 10 % (ref 5–12)
NEUTROPHILS NFR BLD AUTO: 4.03 10*3/MM3 (ref 1.7–7)
NEUTROPHILS NFR BLD AUTO: 59.9 % (ref 42.7–76)
PLATELET # BLD AUTO: 171 10*3/MM3 (ref 140–450)
PMV BLD AUTO: 10.5 FL (ref 6–12)
POTASSIUM SERPL-SCNC: 4.9 MMOL/L (ref 3.5–5.2)
PROT SERPL-MCNC: 6.4 G/DL (ref 6–8.5)
RBC # BLD AUTO: 3.7 10*6/MM3 (ref 4.14–5.8)
SODIUM SERPL-SCNC: 139 MMOL/L (ref 136–145)
TSH SERPL DL<=0.05 MIU/L-ACNC: 2.92 UIU/ML (ref 0.27–4.2)
WBC # BLD AUTO: 6.73 10*3/MM3 (ref 3.4–10.8)

## 2021-03-12 PROCEDURE — 84443 ASSAY THYROID STIM HORMONE: CPT | Performed by: INTERNAL MEDICINE

## 2021-03-12 PROCEDURE — 85025 COMPLETE CBC W/AUTO DIFF WBC: CPT | Performed by: INTERNAL MEDICINE

## 2021-03-12 PROCEDURE — 80053 COMPREHEN METABOLIC PANEL: CPT | Performed by: INTERNAL MEDICINE

## 2021-03-12 PROCEDURE — 36591 DRAW BLOOD OFF VENOUS DEVICE: CPT

## 2021-03-12 PROCEDURE — 82533 TOTAL CORTISOL: CPT | Performed by: INTERNAL MEDICINE

## 2021-03-12 RX ORDER — SODIUM CHLORIDE 0.9 % (FLUSH) 0.9 %
10 SYRINGE (ML) INJECTION AS NEEDED
Status: DISCONTINUED | OUTPATIENT
Start: 2021-03-12 | End: 2021-03-13 | Stop reason: HOSPADM

## 2021-03-12 RX ORDER — SODIUM CHLORIDE 0.9 % (FLUSH) 0.9 %
10 SYRINGE (ML) INJECTION AS NEEDED
Status: CANCELLED | OUTPATIENT
Start: 2021-03-12

## 2021-03-12 RX ADMIN — Medication 10 ML: at 10:00

## 2021-03-12 NOTE — PROGRESS NOTES
PT here for port flush port accessed and 10 cc of blood wasted, then labs drawn from port. Port flushed with Normal saline 10 cc and de-accessed

## 2021-03-12 NOTE — ADDENDUM NOTE
Encounter addended by: Nathalia Lim RN on: 3/12/2021 11:20 AM   Actions taken: Charge Capture section accepted

## 2021-03-15 ENCOUNTER — HOSPITAL ENCOUNTER (OUTPATIENT)
Dept: PET IMAGING | Facility: HOSPITAL | Age: 71
Discharge: HOME | End: 2021-03-15
Admitting: INTERNAL MEDICINE

## 2021-03-15 DIAGNOSIS — R53.83 OTHER FATIGUE: ICD-10-CM

## 2021-03-15 DIAGNOSIS — C15.9 ESOPHAGEAL CANCER, STAGE IV (HCC): ICD-10-CM

## 2021-03-15 PROCEDURE — 0 IOPAMIDOL PER 1 ML: Performed by: INTERNAL MEDICINE

## 2021-03-15 PROCEDURE — 71260 CT THORAX DX C+: CPT

## 2021-03-15 RX ADMIN — IOPAMIDOL 100 ML: 755 INJECTION, SOLUTION INTRAVENOUS at 10:08

## 2021-03-16 ENCOUNTER — OUTSIDE FACILITY SERVICE (OUTPATIENT)
Dept: CARDIOLOGY | Facility: CLINIC | Age: 71
End: 2021-03-16

## 2021-03-16 ENCOUNTER — CLINICAL SUPPORT NO REQUIREMENTS (OUTPATIENT)
Dept: CARDIOLOGY | Facility: CLINIC | Age: 71
End: 2021-03-16

## 2021-03-16 DIAGNOSIS — Z95.0 PRESENCE OF CARDIAC PACEMAKER: ICD-10-CM

## 2021-03-16 DIAGNOSIS — R00.1 BRADYCARDIA: Primary | ICD-10-CM

## 2021-03-16 PROCEDURE — 93280 PM DEVICE PROGR EVAL DUAL: CPT | Performed by: INTERNAL MEDICINE

## 2021-03-16 PROCEDURE — 99214 OFFICE O/P EST MOD 30 MIN: CPT | Performed by: INTERNAL MEDICINE

## 2021-03-17 DIAGNOSIS — R79.89 ELEVATED TSH: ICD-10-CM

## 2021-03-17 DIAGNOSIS — E03.9 HYPOTHYROIDISM (ACQUIRED): ICD-10-CM

## 2021-03-17 NOTE — PROGRESS NOTES
Hematology-Oncology Follow-up Note       Artie Mc  1950    Primary Care Physician: Chanel Carter APRN  Referring Physician: Chanel Carter APRN    Reason For Visit:  Chief Complaint   Patient presents with   • Follow-up     Esophageal cancer, stage IV     Metastatic esophageal cancer  Monitoring for adverse effects related to immunotherapy.  Hypothyroidism    HPI:   Mr. Mc is a pleasant 70-year-old gentleman with a history of gastroesophageal reflux disease, diabetes, hypertension, heart block status post pacemaker placement and history of CABG.  He was having symptoms of dysphagia, weight loss since September 2016. He was reporting symptoms of food getting stuck in the lower chest.  He has transitioned himself to a liquid diet.  The patient reports losing about 30 pounds over this duration.  The patient underwent upper GI endoscopy on 12/20/16 with Dr. Esvin Barrera.  Endoscopy showed necrotic, circumferential and moderately obstructive mass at the distal esophagus between 42 cm and 36 cm.      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  There was also evidence of mild subacute inflammation in the duodenum.  Dr. Barrera ordered a CT scan of the chest and abdomen with contrast on 12/20/16.  The scan was done at [facility].  The scan showed a new ill-defined mass in the anterior wall of the distal esophagus at the GE junction measuring 2.3 x 2.2 x 1.6 cm, worrisome for cancer.  There were four subcentimeter liver lesions which had appeared stable in comparison to another CT scan from 2009 and they likely represent small cysts.  There was also mild lymphadenopathy in the gastrohepatic ligament.  There were at least six borderline enlarged lymph nodes measuring up to 1.3 x 1 cm in the region.  The patient is referred to us for further oncological evaluation.    1/5/2017 - The patient presents for  initial consultation.  He reports persistent dysphagia and also has symptoms of regurgitation.  He cannot tolerate solid foods and is dependent on liquid diet such as Ensure.  He also reports fatigue. Symptoms of dysphagia have been present for the past four months.  When he eats any solids, the food gets stuck in the lower chest.    • 1/5/17 - Vitamin B12 200 (L).  CEA 0.8.  Ferritin 35.  Iron saturation 16% (L), serum iron 56, TIBC 354.  Creatinine 0.9.  LFTs normal.  Folate 10.2.  • 1/12/17 - PET/CT scan:  Intense abnormal activity corresponding to the region of the GE junction and proximal stomach.  SUV is 11.06.  There is a suggestion of an abnormal mass or abnormal wall thickening in the region measuring 3.9 x 4.8 cm.  No additional abnormalities.  No evidence of metastases or other lymphadenopathy.  Scattered nonspecific subcentimeter lymph nodes involving the mediastinum and within the central upper abdomen.  • 1/13/17 - Creatinine 0.9.  LFTs normal.    • 1/17/17 - Patient seen by thoracic surgeon, Dr. Bradley.  PET scan was reviewed.  He has recommended neoadjuvant chemoradiation therapy followed by Sebree Gurwinder esophagogastrectomy.  Port-A-Cath is being arranged.    • 1/20/17 - WBC 6.3, hemoglobin 12.1, platelet count 198,000, MCV 86.4.  • 1/30/17 - Patient started weekly Carboplatin and Taxol (Carboplatin AUC 2 and Taxol 50 mg/M2).  Patient received Injectafer 750 mg.  WBC 5.8, hemoglobin 12.4, platelet count 244,000.   • 1/31/17 - Patient seen by Dr. Eduardo Oleary.  The plan was to give 5040 cGy in 28 fractions.   • 2/6/17 - WBC 4.4, hemoglobin 11.6, platelet count 201,000.  Patient received cycle 2 of weekly Carboplatin and Taxol (Carboplatin AUC 2 and Taxol 50 mg/M2).  • 2/13/17 - Patient received Carboplatin and Taxol weekly cycle 3.  • 2/13/17 - Patient started concurrent radiation therapy.  Total planned dose is 4140 cGy.    • 2/13/17 to 2/15/17 - Patient received 4140 cGy of neoadjuvant radiation.   Radiation was finished on 3/15/17.    • 2/20/17 - WBC 1.8, hemoglobin 10.6, platelet count 217,000.  Creatinine 0.5. Folate 15 (L).  Ferritin 361.  Haptoglobin 214.  Iron saturation 33%, TIBC 263, serum iron 88.    • 2/20/17 - Patient received Carboplatin and Taxol weekly cycle 4.   • 2/27/17 - WBC 3.7, hemoglobin 10.7, platelet count 177,000, MCV 85.3.    • 3/6/17 - Patient has received 16 out of the 23 planned radiation treatments.  Total planned dose is 4140 cGy.    • 3/6/17 - WBC 5.4, hemoglobin 11.2, platelet count 113,000.  • 3/6/17 - Patient received cycle 5 of weekly Carboplatin and Taxol.   • 3/13/17 - Patient received cycle 6 of weekly Carboplatin and Taxol.  WBC 3.9, hemoglobin 11.3, platelet count 93,000.     • 3/20/17 - WBC 1.4, hemoglobin 10.0, platelet count 118,000, MCV 86.1   • 3/27/17 - WBC 3.8, hemoglobin 9.5, platelet count 176,000, MCV 87.3.    • 4/7/17 - Stress test:  No evidence of reversible myocardial ischemia.  Normal left ventricular ejection fraction of 50%.    • 4/17/17 - Upper GI endoscopy by Dr. 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  The GE junction adenocarcinoma lesion seems to have a very good response to chemotherapy and radiation.  The lumen was open.  • 5/1/17 - WBC 7.0, hemoglobin 10.2, platelet count 175,000.    • 5/4/17 - Patient underwent Noel Gurwinder esophagectomy with pyloroplasty, feeding jejunostomy tube, abdominal and mediastinal lymph node dissection.    Surgical Pathology:  Moderately to poorly differentiated adenocarcinoma measuring 1.3 cm at the GE junction.  Resection margins are negative for malignancy.  Tumor margins are negative.  There is effective treatment response.  No evidence of lymphovascular invasion.  Staging is ypT3,pN1.  One out of fourteen lymph nodes involved.   • 5/22/17 - WBC 6.8, hemoglobin 10.3, platelet count 312,000.    • 6/19/17 - WBC 5.7, hemoglobin 10.5, platelet count 204,000, MCV 92.1.     • 7/6/17 - EGD:  Status post balloon dilation of esophageal stricture.  • 7/12/17 - EGD with balloon dilation of esophageal stricture.  • 7/26/17 - EGD and balloon dilation of esophageal stricture.  • 7/31/17 - PET/CT scan:  There is mild metabolic activity seen at the thoracic esophagus just at and below the surgical margins.  Some mild wall thickening.  This could be from recent surgery.  A residual cancer seems unlikely.  There is a precarinal lymph node with mild metabolic activity with SUV of 3 measuring 1 x 1.3 cm.  Again this appears to be nonspecific in my opinion.    • 8/7/17 - WBC 5.9, hemoglobin 11.9, platelet count 171,000, MCV 88.7.    • 10/19/17 - WBC 7.5, hemoglobin 11.8, platelet count 216,000.    • 1/8/18 - PET scan:  No evidence of residual disease or recurrent disease.  There is an esophageal stent in the mid esophagus with a large debris air level.  No evidence of any metastases in the chest, abdomen or pelvis.  Mild nodule uptake in the vocal cords with fullness in the right vocal cord.  This could be physiologic.    • 7/9/18 - CT scan of chest with contrast:  Prominent mediastinal lymph nodes appear stable since February.  Changes of gastric pull-through procedure for esophageal cancer again noted.  Tree-in-bud infiltrates in the left lung base, consistent with infection versus inflammation.    • 1/10/19 - CT chest, abdomen and pelvis with contrast:  No evidence of mets in the chest; however, interval development of metastatic lymphadenopathy in the left side of the retroperitoneum.  For instance, there is a 17 mm rounded lymph node, 10 mm lymph node and also a 14 mm lymph node.  These are all new.  Stable 9 mm hypodense lesion within the right hepatic lobe, which could reflect hemangioma or complex cyst.    • 1/30/19 - CT-guided core biopsy of retroperitoneal lymph node:  Poorly differentiated adenocarcinoma consistent with metastases from patient’s known esophageal primary.  CK20 positive,  CDX2 positive, cytokeratin 7 negative, TTF-1 negative.    • 2/1/19 - Creatinine 0.9, BUN 18, calcium 9.3; these are normal.    • 2/28/19 - [facility] Next Gen sequencing testing on retroperitoneal lymph node specimen:  PD-L1 positive.  CPS = 3.  This implies responsiveness to pembrolizumab.  Mismatch repair status is proficient (no evidence of microsatellite instability).  Tumor mutational burden is low = 6 mutations/Mb.  CDH1 indeterminate.  KRAS mutation positive.  TP53 mutation positive.  CDK6 amplified.  [employer] PD-L1 testing by IHC:  PD-L1 expression 1% positive (CPS =1).  HER2/marvel by IHC is negative.  HER2/marvel by CISH not amplified.  Microsatellite stable tumor.    • 3/7/19 - WBC 7.3, hemoglobin 12, platelet count 128,000, MCV 92.     • 3/14/19 - Patient was seen by radiation oncologist, Dr. Chahal.  He has recommended observation versus systemic chemoimmunotherapy as needed.  No plans for radiation therapy.   • 5/1/19 - CT scan of chest with contrast:  No definitive findings of new metastatic disease in the chest.  Emphysema noted.    • 5/1/19 - CT abdomen and pelvis:  There is interval progression of metastatic retroperitoneal adenopathy.  Left periaortic adenopathy with lymph node measuring up to 2 cm, previously 1.7 cm.  Another lymph node now measures 2.3, previously 1.4 cm.  New enlarged lymph node on image 147 measures 1.4 cm, previously 0.4 cm.  A 9 mm hypodense right hepatic lobe lesion appears stable.    • 5/9/19 - Decision made to start patient on immunotherapy Keytruda.    • 5/9/19 - Vitamin B12 229.  Ferritin 61.  Folate 14.3.  Iron saturation 26%, TIBC 304, serum iron 79.  • 5/31/19 - Patient received Keytruda 200 mg cycle 1, day 1.    • 5/31/19 - Free T4 0.99.  Creatinine 0.9, BUN 9.  LFTs normal.  TSH 3.99.  Folate 14.3.  Iron saturation 26%.    • 6/21/2019 patient received Keytruda cycle 2  • 7/12/2019: Patient received Keytruda cycle 3  • 8/2/2019 patient received cycle 4 Keytruda WBC 7.2,  hemoglobin 11.8 platelet count 163  • 8/23/2019 creatinine 0.9, LFTs normal, WBC 6.4, hemoglobin 11.5, platelets 179, TSH 2.1   • 8/23/2019 patient received cycle 5 of Keytruda  • 9/3/2019 CT chest abdomen and pelvis with contrast: . Positive response to therapy in the abdomen since 05/01/2019. Pathologically enlarged retroperitoneal lymph nodes, predominantly in the left periaortic region, have diminished in size.a 1.3 x 2.1 cm left periaortic node (image 60), previously measured 3.5 x 2.3 cm. A 1.6 cm index node near the level of the left renal vein previously measured 2.0 cm No new or progressive adenopathy. 2. Stable findings in the chest. Noncalcified right upper lobe nodules are unchanged. There is no evidence of new or progressive metastatic disease within the chest. 3. 8 mm indeterminate low-density lesion in the right hepatic lobe, is stable. No new liver lesions.  • 9/6/2019 WBC 5.6, hemoglobin 11.2, platelets of 192, MCV 91.8  • 9/13/2019 patient received Keytruda cycle 6, albumin 3.2  • 10/4/2019 patient received cycle 7 of Keytruda, TSH 4.8, free T4 0.86  • 10/9/2019 WBC 5.8, hemoglobin 11.7, platelets 174  • 10/25/2019 patient received cycle 8 of Keytruda.  WBC 5.9, hemoglobin 11.5, platelets 152, creatinine 0.75, cortisol 15.2, TSH 6.48 high , free T4 1.13 normal  • 11/6/2019 WBC 6.6, hemoglobin 11.8, MCV 91.5, platelets 187  • 11/15/2019 patient received cycle 9 of Keytruda, WBC 6.6, hemoglobin 11.9, platelets 161, cortisol level 8.86, TSH 2.68, free T3 2.8, free T4 1.5  • 12/4/2019 WBC 6.9, hemoglobin 12.0, platelets 180, MCV 92.9  • 12/06/2019-Keytruda Cycle 10  • 12/27/2019- Cycle 11 LFTs normal, WBC 6.7, hemoglobin 11.4, platelets 168, MCV 93, TSH 2.4,  • 1/28/2020: CT chest abdomen and pelvis with contrast:  Single (aortic lymph node in the abdominal retroperitoneum has increased.  Rest of the retroperitoneal lymph nodes have decreased in size.  Mediastinal adenopathy appears unchanged.   Noncalcified bilateral pulmonary nodules are stable  • 1/17/2020 patient received Keytruda cycle 12:.  • 2/3/2020 WBC 7.1, hemoglobin 12.2, platelets 171  • 02/10/2020 patient received cycle 13 of Keytruda: WBC 7.3, hemoglobin 12.1, platelets 166, creatinine 0.82  • 3/6/2020 patient is of Keytruda 200 mg, hemoglobin 10.8  • 3/27/2020 patient received Keytruda 200 mg, hemoglobin 11.4, TSH 1.95  • 4/17/2020 patient received Keytruda cycle 16 200 mg, WBC 6.1, hemoglobin 11.6, platelets 150  • 5/8/2020: Patient received cycle 17 Keytruda, WBC 6.3, hemoglobin 9.6, platelets 137, TSH 2.05, free T4 1.54, creatinine 0.93, LFTs normal,  • 5/29/2020: Patient received cycle 18 Keytruda, WBC 6.5, hemoglobin 11.7, platelets 129, creatinine 0.83, TSH 1.69  • 6/26/2020: Patient receiving Keytruda.   • 7/17/2020: TSH 2.06, WBC 5.7, hemoglobin 11.4, platelets 177, MCV 92.4, Keytruda 200 mg cycle 20  • 7/27/2020: CT chest abdomen pelvis with contrast: Stable findings in the chest abdomen pelvis since 1/28/2020.  Noncalcified bilateral pulmonary nodules are stable.  Stable retroperitoneal left perinephric adenopathy in the abdomen.  • 8/7/2020: Patient received cycle 21 of Keytruda  • 8/28/2020 patient is a cycle 22 of Keytruda.  WBC 6.02, hemoglobin 11.3, platelets 161  • 9/11/2020 WBC 6.9, hemoglobin 12.4, platelets 192  • 9/18/2020: Cycle 23 of Keytruda, TSH 1.19  • 10/9/2020: Cycle 24 of Keytruda, WBC 5.9, hemoglobin 11.9, platelets 178, creatinine 0.84, TSH 2.56, free T3 2.4, CMP normal  • 10/23/2020: WBC 7.34, hemoglobin 11.6, platelets 175  • 10/30/2020:.  Keytruda 200 mg  • 11/20/2020: Keytruda 200 mg  • 12/11/2020 Keytruda 200 mg.  WBC 5.5, hemoglobin 11.4, platelets 176, creatinine 0.8, LFTs normal, TSH 2.2, cortisol 8.3  • 12/10/2020: CT chest abdomen and pelvis with contrast: Previously seen left para-aortic lymph nodes within the abdomen are decreased in size.  No pathological lymphadenopathy.  No evidence of residual  "malignancy..  Small noncalcified pulmonary nodules are stable.  No new nodules.  Stable mediastinal lymph nodes.  • 3/12/2021: WBC 6.7, hemoglobin 11.3, platelets 17  • 3/12/2021: CMP normal, TSH 2.9, cortisol 11.95, 1  • 3/15/2021: CT chest with contrast: Stable findings in the chest with postsurgical changes of esophageal resection and gastric pull-through.  Stable right lower lobe lung nodule measures 11 mm.  Stable size and appearance of the mediastinal and upper abdominal lymph nodes.    Subjective:  70 year old male patient presents to the office for a follow up regarding esophageal cancer.  Recently had CAT scan.  He is reporting swelling in his right lower extremity.  He also has some swelling in the right upper extremity.  He states he has swelling in his right leg and tingling and, \"Tightness\" in his right hand. Onset was three months ago. He was given two rounds of antibiotics by his PCP. .     The following portions of the patient's history were reviewed and updated as appropriate: allergies, current medications, past family history, past medical history, past social history, past surgical history and problem list.     Past Medical History:   Diagnosis Date   • B12 deficiency    • Blockage of coronary artery of heart (CMS/HCC)    • Depression    • DM (diabetes mellitus) (CMS/HCC)    • Dysphagia    • HTN (hypertension)    • Hyperlipidemia        Past Surgical History:   Procedure Laterality Date   • ABDOMINAL WALL ABSCESS INCISION AND DRAINAGE  10/22/2018    WITH DEBRIDEMENT  1.5CM X 1.5 CM X 1.5 CM    DR. PURI   • CATARACT EXTRACTION  2018   • COLONOSCOPY     • CORONARY ARTERY BYPASS GRAFT  2015   • ENDOSCOPY  12/20/2016   • ESOPHAGECTOMY  05/04/2017    BROOKS PETTY ESOPHAGECTOMY, FEEDING JEJNOSTOMOY, ABDOMINAL AND MEDIASTINAL LYMPH NODE DISECTION, PEDICLED MUSCLE FLAP COVERAGE DR. PURI   • ESOPHAGOSCOPY / EGD  07/12/2017    WITH BALLOON DILATION   • ESOPHAGOSCOPY / EGD  07/26/2017    WITH BALLOON " DILATION   • ESOPHAGOSCOPY / EGD  08/11/2017    WITH BALLOON DILATION   • ESOPHAGOSCOPY / EGD  08/28/2017    WITH BALLOON DILATION   • ESOPHAGOSCOPY / EGD  09/27/2017    WITH BALLOON DILATION   • LYMPH NODE BIOPSY  01/30/2019   • PACEMAKER IMPLANTATION  11/21/2016       Current Outpatient Medications:   •  Cetirizine HCl (ZyrTEC Allergy) 10 MG capsule, Take  by mouth., Disp: , Rfl:   •  clonazePAM (KlonoPIN) 0.5 MG tablet, Pt taking 1.5 pills daily, Disp: , Rfl: 1  •  cyanocobalamin (VITAMIN B-12) 1000 MCG tablet, , Disp: , Rfl:   •  diphenhydrAMINE-APAP, sleep, (TYLENOL PM EXTRA STRENGTH PO), Take  by mouth., Disp: , Rfl:   •  diphenoxylate-atropine (LOMOTIL) 2.5-0.025 MG per tablet, Take 1 tablet by mouth Daily As Needed for Diarrhea., Disp: 60 tablet, Rfl: 0  •  famotidine (PEPCID) 20 MG tablet, Take 1 tablet by mouth 2 (Two) Times a Day As Needed for Heartburn., Disp: 60 tablet, Rfl: 1  •  folic acid (FOLVITE) 1 MG tablet, Daily., Disp: , Rfl:   •  levothyroxine (SYNTHROID, LEVOTHROID) 50 MCG tablet, Take 1 tablet by mouth once daily, Disp: 90 tablet, Rfl: 0  •  metFORMIN (GLUCOPHAGE) 500 MG tablet, Take 500 mg by mouth 2 (Two) Times a Day., Disp: , Rfl: 4  •  metoprolol tartrate (LOPRESSOR) 25 MG tablet, Take 50 mg by mouth 2 (Two) Times a Day., Disp: , Rfl:   •  omeprazole (priLOSEC) 40 MG capsule, Take 1 capsule by mouth twice daily, Disp: 60 capsule, Rfl: 5  •  ondansetron (ZOFRAN) 4 MG tablet, Take 1 tablet by mouth Every 8 (Eight) Hours As Needed for Nausea or Vomiting., Disp: 90 tablet, Rfl: 2  •  Pembrolizumab (Keytruda) 100 MG/4ML solution, Infuse  into a venous catheter., Disp: , Rfl:   •  pravastatin (PRAVACHOL) 10 MG tablet, Take 20 mg by mouth every night at bedtime., Disp: , Rfl:     Allergies   Allergen Reactions   • Ace Inhibitors Unknown (See Comments)     Unknown reaction     • Heparin Other (See Comments)     Fever/chills, weakness in legs   • Sulfa Antibiotics Other (See Comments)     Mouth  "ran       Family History   Problem Relation Age of Onset   • Mental illness Mother    • Heart disease Mother    • Hypertension Father    • Cancer Father    • Heart disease Sister    • Heart disease Brother      Cancer-related family history includes Cancer in his father.    Social History     Tobacco Use   • Smoking status: Former Smoker     Packs/day: 2.00     Years: 50.00     Pack years: 100.00     Types: Cigarettes     Quit date: 2015     Years since quittin.7   • Smokeless tobacco: Former User     Types: Chew     Quit date: 1989   Substance Use Topics   • Alcohol use: No   • Drug use: No     ROS:     Objective:    Vitals:    21 1009   BP: 102/76   Pulse: 80   Resp: 18   Temp: 97.6 °F (36.4 °C)   Weight: 68 kg (150 lb)   Height: 167.6 cm (66\")   PainSc: 0-No pain       (1) Restricted in physically strenuous activity, ambulatory and able to do work of light nature    Physical Exam:     Physical Exam   Constitutional: He is oriented to person, place, and time. He appears well-developed. No distress.   HENT:   Head: Normocephalic and atraumatic.   Eyes: Conjunctivae are normal. Right eye exhibits no discharge. Left eye exhibits no discharge. No scleral icterus.   Neck: No thyromegaly present.   Cardiovascular: Normal rate, regular rhythm and normal heart sounds. Exam reveals no gallop and no friction rub.   Pulmonary/Chest: Effort normal and breath sounds normal. No stridor. No respiratory distress. He has no wheezes.   Abdominal: Soft. Normal appearance and bowel sounds are normal. He exhibits no mass. There is no abdominal tenderness. There is no rebound and no guarding.   No tenderness   Musculoskeletal: Normal range of motion. No deformity or signs of injury.      Right lower leg: Edema present.      Comments: Swelling upper extremity   Lymphadenopathy:     He has no cervical adenopathy.   Neurological: He is alert and oriented to person, place, and time. He exhibits normal muscle tone. "   Skin: Skin is warm. No rash noted. He is not diaphoretic. No erythema. No jaundice.   Right side chest  port   Psychiatric: His behavior is normal. Mood, judgment and thought content normal.   Nursing note and vitals reviewed.    I have reexamined the patient and the results are consistent with the previously documented exam. Bassem Jasso MD       Lab Results - Last 18 Months   Lab Units 03/12/21  0945 02/11/21  1159 01/11/21  1025   WBC 10*3/mm3 6.73 5.89 6.21   HEMOGLOBIN g/dL 11.3* 11.3* 11.9*   HEMATOCRIT % 34.5* 34.6* 36.1*   PLATELETS 10*3/mm3 171 168 186   MCV fL 93.2 92.8 93.0     Lab Results - Last 18 Months   Lab Units 03/12/21  0945 02/11/21  1159 01/11/21  1025   SODIUM mmol/L 139 139 136   POTASSIUM mmol/L 4.9 4.5 5.1   CHLORIDE mmol/L 103 104 101   CO2 mmol/L 27.0 26.0 25.0   BUN mg/dL 13 15 14   CREATININE mg/dL 0.88 0.72* 0.77   CALCIUM mg/dL 8.6 8.8 9.0   BILIRUBIN mg/dL 0.3 0.4 0.3   ALK PHOS U/L 26* 28* 34*   ALT (SGPT) U/L <5 6 5   AST (SGOT) U/L 12 14 14   GLUCOSE mg/dL 119* 117* 122*     Assessment/Plan     Assessment:  1. Metastatic esophageal carcinoma:  Patient has a history of GE junction poorly differentiated adenocarcinoma for which he had chemoradiation followed by Noel Gurwinder resection:  Pathology showed ypT3,pN1 disease.  Tumor was Stage IIIA, diagnosed in 2016.  He received concurrent chemoradiation with weekly Carboplatin and Taxol and radiation finishing on 3/15/17.    CT scan on 1/10/19 showed new retroperitoneal lymph nodes.  These lymph nodes were biopsied on 1/30/19 and it shows metastatic esophageal cancer.  [facility] Next Gen testing was performed on the sample and it shows KRAS mutation, microsatellite stable tumor.  PD-L1 is positive.  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  HER2/marvel (ERBB2) was not amplified and was negative.  His CT scan on 5/1/19 shows evidence of progression.  Patient has started receiving Keytruda on 5/31/19.  He has received 19 cycles so far.   CT scan on 1/28/2020  shows improvement and continued response.  CT scan chest abdomen pelvis with contrast on 12/10/2020 does not show any evidence of disease progression.  On 12/22/2020 decision made to hold Keytruda per patient's request to get a treatment break.. Repeat CT scan chest 3/15/2021 does not show any evidence of disease progression.    2. Right lower extremity swelling/right forearm swelling: .  3. Reflux/heartburn/dysphagia: Symptoms improved.  Also has a history of strictures.Status post EGD and balloon dilation  4. Noncalcified pulmonary nodules: Remained stable on 7/27/2020 scan.  5. 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  Continues on Synthroid  6. Mild diarrhea:- diarrhea seems to be waxing and waning in nature.  Related to immunotherapy, but not severe.  7. Nausea: Symptoms are intermittent    8. PD-L1 positive, HER2 negative, p53 and KRAS positive.    9. B12 deficiency: Continue B12 1000 mcg twice daily.    10. Anemia: Likely from malignancy.  Also on iron supplement.    PLAN:  1. Continue to hold Keytruda.  CT scan shows evidence of stable disease  2. We will order Doppler of bilateral lower extremities in the right upper extremity  3. Order CT abdomen and pelvis with contrast, since this was not done for staging work-up.  .  4. Continue levothyroxine 50 mcg for immunotherapy related hypothyroidism..  5. Continue to monitor TSH,  serum cortisol level.   6. We will continue to monitor closely for adverse events related to immunotherapy  7. Continue B12 supplements.  8. Continue folic acid supplement also.  9. Follow-up in 1 month    I have reviewed and confirmed the accuracy of the patient's history: Chief complaint, HPI, ROS and Subjective as entered by the MA/SUMANTHN/RN. Bassem Jasso MD 03/23/21     No orders of the defined types were placed in this encounter.      I have spent greater than 40 minutes on patient encounter and more than 50% of the time was spent in counseling and coordination of care.  Significant  amount of time during encounter was spent on data review including review of labs, imaging, pathology and also formulating treatment/management/surveillance plan.    Thank you very much for providing the opportunity to participate in this patient’s care. Please do not hesitate to call if there are any other questions    Electronically signed by Bassem Jasso MD, 03/23/21, 10:47 AM EDT.

## 2021-03-19 ENCOUNTER — TELEPHONE (OUTPATIENT)
Dept: ONCOLOGY | Facility: CLINIC | Age: 71
End: 2021-03-19

## 2021-03-19 RX ORDER — LEVOTHYROXINE SODIUM 0.05 MG/1
TABLET ORAL
Qty: 90 TABLET | Refills: 0 | Status: SHIPPED | OUTPATIENT
Start: 2021-03-19 | End: 2021-06-10

## 2021-03-19 NOTE — TELEPHONE ENCOUNTER
Caller: DOROTHEA WALTON    Relationship to patient: SELF    Best call back number: [phone]    Patient is needing: TO CHECK ON LEVOTHYROXINE MEDICATION. HE SAID THE PHARMACY SENT SOMETHING TO OUR OFFICE. HUB SEES ORDER FROM 3-. CAN CHECK ON THIS AND UPDATE THE PATIENT?

## 2021-03-19 NOTE — TELEPHONE ENCOUNTER
Called pt to let him know that I sent a refill request to Dr. Jasso for his Levothyroxine and if it is not sent to his pharmacy by this afternoon to call us back. Pt verbalized understanding.

## 2021-03-23 ENCOUNTER — TELEPHONE (OUTPATIENT)
Dept: ONCOLOGY | Facility: HOSPITAL | Age: 71
End: 2021-03-23

## 2021-03-23 ENCOUNTER — HOSPITAL ENCOUNTER (OUTPATIENT)
Dept: CARDIOLOGY | Facility: HOSPITAL | Age: 71
Discharge: HOME | End: 2021-03-23

## 2021-03-23 ENCOUNTER — OFFICE VISIT (OUTPATIENT)
Dept: ONCOLOGY | Facility: CLINIC | Age: 71
End: 2021-03-23

## 2021-03-23 ENCOUNTER — TRANSCRIBE ORDERS (OUTPATIENT)
Dept: ADMINISTRATIVE | Facility: HOSPITAL | Age: 71
End: 2021-03-23

## 2021-03-23 VITALS
TEMPERATURE: 97.6 F | BODY MASS INDEX: 24.11 KG/M2 | DIASTOLIC BLOOD PRESSURE: 76 MMHG | SYSTOLIC BLOOD PRESSURE: 102 MMHG | RESPIRATION RATE: 18 BRPM | WEIGHT: 150 LBS | HEIGHT: 66 IN | HEART RATE: 80 BPM

## 2021-03-23 DIAGNOSIS — C15.9 ESOPHAGEAL CANCER, STAGE IV (HCC): Primary | ICD-10-CM

## 2021-03-23 DIAGNOSIS — M79.89 SWELLING OF BOTH LOWER EXTREMITIES: Primary | ICD-10-CM

## 2021-03-23 DIAGNOSIS — M79.89 SWELLING OF BOTH LOWER EXTREMITIES: ICD-10-CM

## 2021-03-23 DIAGNOSIS — M79.89 SWELLING OF UPPER EXTREMITY: ICD-10-CM

## 2021-03-23 LAB
BH CV LOWER VASCULAR LEFT COMMON FEMORAL AUGMENT: NORMAL
BH CV LOWER VASCULAR LEFT COMMON FEMORAL COMPETENT: NORMAL
BH CV LOWER VASCULAR LEFT COMMON FEMORAL COMPRESS: NORMAL
BH CV LOWER VASCULAR LEFT COMMON FEMORAL PHASIC: NORMAL
BH CV LOWER VASCULAR LEFT COMMON FEMORAL SPONT: NORMAL
BH CV LOWER VASCULAR LEFT DISTAL FEMORAL COMPRESS: NORMAL
BH CV LOWER VASCULAR LEFT GASTRONEMIUS COMPRESS: NORMAL
BH CV LOWER VASCULAR LEFT GREATER SAPH AK COMPRESS: NORMAL
BH CV LOWER VASCULAR LEFT GREATER SAPH BK COMPRESS: NORMAL
BH CV LOWER VASCULAR LEFT LESSER SAPH COMPRESS: NORMAL
BH CV LOWER VASCULAR LEFT MID FEMORAL AUGMENT: NORMAL
BH CV LOWER VASCULAR LEFT MID FEMORAL COMPETENT: NORMAL
BH CV LOWER VASCULAR LEFT MID FEMORAL COMPRESS: NORMAL
BH CV LOWER VASCULAR LEFT MID FEMORAL PHASIC: NORMAL
BH CV LOWER VASCULAR LEFT MID FEMORAL SPONT: NORMAL
BH CV LOWER VASCULAR LEFT PERONEAL COMPRESS: NORMAL
BH CV LOWER VASCULAR LEFT POPLITEAL AUGMENT: NORMAL
BH CV LOWER VASCULAR LEFT POPLITEAL COMPETENT: NORMAL
BH CV LOWER VASCULAR LEFT POPLITEAL COMPRESS: NORMAL
BH CV LOWER VASCULAR LEFT POPLITEAL PHASIC: NORMAL
BH CV LOWER VASCULAR LEFT POPLITEAL SPONT: NORMAL
BH CV LOWER VASCULAR LEFT POSTERIOR TIBIAL COMPRESS: NORMAL
BH CV LOWER VASCULAR LEFT PROXIMAL FEMORAL COMPRESS: NORMAL
BH CV LOWER VASCULAR LEFT SAPHENOFEMORAL JUNCTION COMPRESS: NORMAL
BH CV LOWER VASCULAR RIGHT COMMON FEMORAL AUGMENT: NORMAL
BH CV LOWER VASCULAR RIGHT COMMON FEMORAL COMPETENT: NORMAL
BH CV LOWER VASCULAR RIGHT COMMON FEMORAL COMPRESS: NORMAL
BH CV LOWER VASCULAR RIGHT COMMON FEMORAL PHASIC: NORMAL
BH CV LOWER VASCULAR RIGHT COMMON FEMORAL SPONT: NORMAL
BH CV LOWER VASCULAR RIGHT DISTAL FEMORAL COMPRESS: NORMAL
BH CV LOWER VASCULAR RIGHT GASTRONEMIUS COMPRESS: NORMAL
BH CV LOWER VASCULAR RIGHT GREATER SAPH AK COMPRESS: NORMAL
BH CV LOWER VASCULAR RIGHT GREATER SAPH BK COMPRESS: NORMAL
BH CV LOWER VASCULAR RIGHT LESSER SAPH COMPRESS: NORMAL
BH CV LOWER VASCULAR RIGHT MID FEMORAL AUGMENT: NORMAL
BH CV LOWER VASCULAR RIGHT MID FEMORAL COMPETENT: NORMAL
BH CV LOWER VASCULAR RIGHT MID FEMORAL COMPRESS: NORMAL
BH CV LOWER VASCULAR RIGHT MID FEMORAL PHASIC: NORMAL
BH CV LOWER VASCULAR RIGHT MID FEMORAL SPONT: NORMAL
BH CV LOWER VASCULAR RIGHT PERONEAL COMPRESS: NORMAL
BH CV LOWER VASCULAR RIGHT POPLITEAL AUGMENT: NORMAL
BH CV LOWER VASCULAR RIGHT POPLITEAL COMPETENT: NORMAL
BH CV LOWER VASCULAR RIGHT POPLITEAL COMPRESS: NORMAL
BH CV LOWER VASCULAR RIGHT POPLITEAL PHASIC: NORMAL
BH CV LOWER VASCULAR RIGHT POPLITEAL SPONT: NORMAL
BH CV LOWER VASCULAR RIGHT POSTERIOR TIBIAL COMPRESS: NORMAL
BH CV LOWER VASCULAR RIGHT PROXIMAL FEMORAL COMPRESS: NORMAL
BH CV LOWER VASCULAR RIGHT SAPHENOFEMORAL JUNCTION COMPRESS: NORMAL
BH CV UPPER VENOUS LEFT INTERNAL JUGULAR AUGMENT: NORMAL
BH CV UPPER VENOUS LEFT INTERNAL JUGULAR COMPETENT: NORMAL
BH CV UPPER VENOUS LEFT INTERNAL JUGULAR COMPRESS: NORMAL
BH CV UPPER VENOUS LEFT INTERNAL JUGULAR PHASIC: NORMAL
BH CV UPPER VENOUS LEFT INTERNAL JUGULAR SPONT: NORMAL
BH CV UPPER VENOUS LEFT SUBCLAVIAN AUGMENT: NORMAL
BH CV UPPER VENOUS LEFT SUBCLAVIAN COMPETENT: NORMAL
BH CV UPPER VENOUS LEFT SUBCLAVIAN COMPRESS: NORMAL
BH CV UPPER VENOUS LEFT SUBCLAVIAN PHASIC: NORMAL
BH CV UPPER VENOUS LEFT SUBCLAVIAN SPONT: NORMAL
BH CV UPPER VENOUS RIGHT AXILLARY AUGMENT: NORMAL
BH CV UPPER VENOUS RIGHT AXILLARY COMPETENT: NORMAL
BH CV UPPER VENOUS RIGHT AXILLARY COMPRESS: NORMAL
BH CV UPPER VENOUS RIGHT AXILLARY PHASIC: NORMAL
BH CV UPPER VENOUS RIGHT AXILLARY SPONT: NORMAL
BH CV UPPER VENOUS RIGHT BASILIC FOREARM COMPRESS: NORMAL
BH CV UPPER VENOUS RIGHT BASILIC UPPER COMPRESS: NORMAL
BH CV UPPER VENOUS RIGHT BRACHIAL COMPRESS: NORMAL
BH CV UPPER VENOUS RIGHT CEPHALIC FOREARM COMPRESS: NORMAL
BH CV UPPER VENOUS RIGHT CEPHALIC UPPER COMPRESS: NORMAL
BH CV UPPER VENOUS RIGHT INTERNAL JUGULAR AUGMENT: NORMAL
BH CV UPPER VENOUS RIGHT INTERNAL JUGULAR COMPETENT: NORMAL
BH CV UPPER VENOUS RIGHT INTERNAL JUGULAR COMPRESS: NORMAL
BH CV UPPER VENOUS RIGHT INTERNAL JUGULAR PHASIC: NORMAL
BH CV UPPER VENOUS RIGHT INTERNAL JUGULAR SPONT: NORMAL
BH CV UPPER VENOUS RIGHT RADIAL COMPRESS: NORMAL
BH CV UPPER VENOUS RIGHT SUBCLAVIAN AUGMENT: NORMAL
BH CV UPPER VENOUS RIGHT SUBCLAVIAN COMPETENT: NORMAL
BH CV UPPER VENOUS RIGHT SUBCLAVIAN COMPRESS: NORMAL
BH CV UPPER VENOUS RIGHT SUBCLAVIAN PHASIC: NORMAL
BH CV UPPER VENOUS RIGHT SUBCLAVIAN SPONT: NORMAL
BH CV UPPER VENOUS RIGHT ULNAR COMPRESS: NORMAL

## 2021-03-23 PROCEDURE — 93971 EXTREMITY STUDY: CPT

## 2021-03-23 PROCEDURE — 99215 OFFICE O/P EST HI 40 MIN: CPT | Performed by: INTERNAL MEDICINE

## 2021-03-23 PROCEDURE — 93970 EXTREMITY STUDY: CPT

## 2021-03-23 NOTE — TELEPHONE ENCOUNTER
Rec'd call from [facility]. Pt was sent for doppler exams today.  They all were negative.

## 2021-03-24 ENCOUNTER — TELEPHONE (OUTPATIENT)
Dept: ONCOLOGY | Facility: CLINIC | Age: 71
End: 2021-03-24

## 2021-03-24 ENCOUNTER — HOSPITAL ENCOUNTER (OUTPATIENT)
Dept: PET IMAGING | Facility: HOSPITAL | Age: 71
Discharge: HOME | End: 2021-03-24
Admitting: INTERNAL MEDICINE

## 2021-03-24 DIAGNOSIS — C15.9 ESOPHAGEAL CANCER, STAGE IV (HCC): ICD-10-CM

## 2021-03-24 PROCEDURE — 0 IOPAMIDOL PER 1 ML: Performed by: INTERNAL MEDICINE

## 2021-03-24 PROCEDURE — 74177 CT ABD & PELVIS W/CONTRAST: CPT

## 2021-03-24 RX ADMIN — IOPAMIDOL 100 ML: 755 INJECTION, SOLUTION INTRAVENOUS at 12:16

## 2021-03-24 NOTE — TELEPHONE ENCOUNTER
Received call from Lynette Crocker MA stating Dr. Jasso is wanting patient to have CT scan today.  Spoke with Vibha Mcneill, Diagnostic Imaging.  CT scheduled for today at 12:00PM.  Patient notified and v/u.

## 2021-03-26 PROCEDURE — 93296 REM INTERROG EVL PM/IDS: CPT | Performed by: INTERNAL MEDICINE

## 2021-03-26 PROCEDURE — 93294 REM INTERROG EVL PM/LDLS PM: CPT | Performed by: INTERNAL MEDICINE

## 2021-04-12 ENCOUNTER — HOSPITAL ENCOUNTER (OUTPATIENT)
Dept: ONCOLOGY | Facility: HOSPITAL | Age: 71
Setting detail: INFUSION SERIES
Discharge: HOME | End: 2021-04-12

## 2021-04-12 VITALS — HEIGHT: 66 IN | WEIGHT: 151.4 LBS | BODY MASS INDEX: 24.33 KG/M2

## 2021-04-12 DIAGNOSIS — C15.9 ESOPHAGEAL CANCER, STAGE IV (HCC): Primary | ICD-10-CM

## 2021-04-12 DIAGNOSIS — C16.0 MALIGNANT NEOPLASM OF CARDIA (HCC): ICD-10-CM

## 2021-04-12 DIAGNOSIS — R53.83 OTHER FATIGUE: ICD-10-CM

## 2021-04-12 LAB
ALBUMIN SERPL-MCNC: 4.2 G/DL (ref 3.5–5.2)
ALBUMIN/GLOB SERPL: 1.6 G/DL
ALP SERPL-CCNC: 43 U/L (ref 39–117)
ALT SERPL W P-5'-P-CCNC: 7 U/L (ref 1–41)
ANION GAP SERPL CALCULATED.3IONS-SCNC: 13 MMOL/L (ref 5–15)
AST SERPL-CCNC: 15 U/L (ref 1–40)
BASOPHILS # BLD AUTO: 0.06 10*3/MM3 (ref 0–0.2)
BASOPHILS NFR BLD AUTO: 0.9 % (ref 0–1.5)
BILIRUB SERPL-MCNC: 0.2 MG/DL (ref 0–1.2)
BUN SERPL-MCNC: 13 MG/DL (ref 8–23)
BUN/CREAT SERPL: 14.1 (ref 7–25)
CALCIUM SPEC-SCNC: 9.4 MG/DL (ref 8.6–10.5)
CHLORIDE SERPL-SCNC: 102 MMOL/L (ref 98–107)
CO2 SERPL-SCNC: 23 MMOL/L (ref 22–29)
CORTIS SERPL-MCNC: 12.4 MCG/DL
CREAT SERPL-MCNC: 0.92 MG/DL (ref 0.76–1.27)
DEPRECATED RDW RBC AUTO: 45.7 FL (ref 37–54)
EOSINOPHIL # BLD AUTO: 0.48 10*3/MM3 (ref 0–0.4)
EOSINOPHIL NFR BLD AUTO: 7.2 % (ref 0.3–6.2)
ERYTHROCYTE [DISTWIDTH] IN BLOOD BY AUTOMATED COUNT: 13.7 % (ref 12.3–15.4)
GFR SERPL CREATININE-BSD FRML MDRD: 81 ML/MIN/1.73
GLOBULIN UR ELPH-MCNC: 2.6 GM/DL
GLUCOSE SERPL-MCNC: 152 MG/DL (ref 65–99)
HCT VFR BLD AUTO: 36.4 % (ref 37.5–51)
HGB BLD-MCNC: 11.7 G/DL (ref 13–17.7)
LYMPHOCYTES # BLD AUTO: 1.66 10*3/MM3 (ref 0.7–3.1)
LYMPHOCYTES NFR BLD AUTO: 24.9 % (ref 19.6–45.3)
MCH RBC QN AUTO: 30.7 PG (ref 26.6–33)
MCHC RBC AUTO-ENTMCNC: 32.1 G/DL (ref 31.5–35.7)
MCV RBC AUTO: 95.5 FL (ref 79–97)
MONOCYTES # BLD AUTO: 0.82 10*3/MM3 (ref 0.1–0.9)
MONOCYTES NFR BLD AUTO: 12.3 % (ref 5–12)
NEUTROPHILS NFR BLD AUTO: 3.64 10*3/MM3 (ref 1.7–7)
NEUTROPHILS NFR BLD AUTO: 54.7 % (ref 42.7–76)
PLATELET # BLD AUTO: 173 10*3/MM3 (ref 140–450)
PMV BLD AUTO: 11 FL (ref 6–12)
POTASSIUM SERPL-SCNC: 5.4 MMOL/L (ref 3.5–5.2)
PROT SERPL-MCNC: 6.8 G/DL (ref 6–8.5)
RBC # BLD AUTO: 3.81 10*6/MM3 (ref 4.14–5.8)
SODIUM SERPL-SCNC: 138 MMOL/L (ref 136–145)
TSH SERPL DL<=0.05 MIU/L-ACNC: 4.1 UIU/ML (ref 0.27–4.2)
WBC # BLD AUTO: 6.66 10*3/MM3 (ref 3.4–10.8)

## 2021-04-12 PROCEDURE — 80053 COMPREHEN METABOLIC PANEL: CPT | Performed by: INTERNAL MEDICINE

## 2021-04-12 PROCEDURE — 36591 DRAW BLOOD OFF VENOUS DEVICE: CPT

## 2021-04-12 PROCEDURE — 85025 COMPLETE CBC W/AUTO DIFF WBC: CPT | Performed by: INTERNAL MEDICINE

## 2021-04-12 PROCEDURE — 82533 TOTAL CORTISOL: CPT | Performed by: INTERNAL MEDICINE

## 2021-04-12 PROCEDURE — 84443 ASSAY THYROID STIM HORMONE: CPT | Performed by: INTERNAL MEDICINE

## 2021-04-12 RX ORDER — SODIUM CHLORIDE 0.9 % (FLUSH) 0.9 %
10 SYRINGE (ML) INJECTION AS NEEDED
Status: DISCONTINUED | OUTPATIENT
Start: 2021-04-12 | End: 2021-04-13 | Stop reason: HOSPADM

## 2021-04-12 RX ORDER — SODIUM CHLORIDE 0.9 % (FLUSH) 0.9 %
10 SYRINGE (ML) INJECTION AS NEEDED
Status: CANCELLED | OUTPATIENT
Start: 2021-04-12

## 2021-04-12 RX ADMIN — Medication 30 ML: at 09:50

## 2021-04-12 NOTE — PROGRESS NOTES
Patient in for port flush and blood draw.  10 cc blood wasted prior to specimen collection.  Specimens collected and sent to lab for processing.

## 2021-04-14 ENCOUNTER — TELEPHONE (OUTPATIENT)
Dept: CARDIOLOGY | Facility: CLINIC | Age: 71
End: 2021-04-14

## 2021-04-16 ENCOUNTER — TELEPHONE (OUTPATIENT)
Dept: ONCOLOGY | Facility: CLINIC | Age: 71
End: 2021-04-16

## 2021-04-16 DIAGNOSIS — C16.0 MALIGNANT NEOPLASM OF CARDIA (HCC): Primary | ICD-10-CM

## 2021-04-16 RX ORDER — SODIUM POLYSTYRENE SULFONATE 15 G/60ML
15 SUSPENSION ORAL; RECTAL ONCE
Qty: 30 ML | Refills: 0 | Status: SHIPPED | OUTPATIENT
Start: 2021-04-16 | End: 2021-04-16

## 2021-04-16 NOTE — TELEPHONE ENCOUNTER
S/w patient and advised him that his potassium is high. We called in Kayexalate 30 ml x 2 days per Dr. Jasso. We will recheck his BMP in one week, patient has an appointment scheduled for Thursday, April 22, 2021/ Patient v/u

## 2021-04-22 ENCOUNTER — LAB (OUTPATIENT)
Dept: LAB | Facility: HOSPITAL | Age: 71
End: 2021-04-22

## 2021-04-22 DIAGNOSIS — C16.0 MALIGNANT NEOPLASM OF CARDIA (HCC): ICD-10-CM

## 2021-04-22 LAB
ANION GAP SERPL CALCULATED.3IONS-SCNC: 11 MMOL/L (ref 5–15)
BUN SERPL-MCNC: 15 MG/DL (ref 8–23)
BUN/CREAT SERPL: 16.7 (ref 7–25)
CALCIUM SPEC-SCNC: 9.6 MG/DL (ref 8.6–10.5)
CHLORIDE SERPL-SCNC: 104 MMOL/L (ref 98–107)
CO2 SERPL-SCNC: 23 MMOL/L (ref 22–29)
CREAT SERPL-MCNC: 0.9 MG/DL (ref 0.76–1.27)
GFR SERPL CREATININE-BSD FRML MDRD: 83 ML/MIN/1.73
GLUCOSE SERPL-MCNC: 132 MG/DL (ref 65–99)
POTASSIUM SERPL-SCNC: 4.2 MMOL/L (ref 3.5–5.2)
SODIUM SERPL-SCNC: 138 MMOL/L (ref 136–145)

## 2021-04-22 PROCEDURE — 36415 COLL VENOUS BLD VENIPUNCTURE: CPT

## 2021-04-22 PROCEDURE — 80048 BASIC METABOLIC PNL TOTAL CA: CPT

## 2021-05-01 DIAGNOSIS — R13.19 ESOPHAGEAL DYSPHAGIA: ICD-10-CM

## 2021-05-01 DIAGNOSIS — C15.9 ESOPHAGEAL CANCER, STAGE IV (HCC): ICD-10-CM

## 2021-05-03 RX ORDER — OMEPRAZOLE 40 MG/1
CAPSULE, DELAYED RELEASE ORAL
Qty: 180 CAPSULE | Refills: 1 | Status: SHIPPED | OUTPATIENT
Start: 2021-05-03 | End: 2021-12-16 | Stop reason: SDUPTHER

## 2021-05-04 ENCOUNTER — OFFICE VISIT (OUTPATIENT)
Dept: ONCOLOGY | Facility: CLINIC | Age: 71
End: 2021-05-04

## 2021-05-04 VITALS
BODY MASS INDEX: 24.11 KG/M2 | SYSTOLIC BLOOD PRESSURE: 106 MMHG | HEART RATE: 68 BPM | HEIGHT: 66 IN | WEIGHT: 150 LBS | RESPIRATION RATE: 18 BRPM | TEMPERATURE: 98 F | DIASTOLIC BLOOD PRESSURE: 70 MMHG

## 2021-05-04 DIAGNOSIS — R68.89 OTHER GENERAL SYMPTOMS AND SIGNS: ICD-10-CM

## 2021-05-04 DIAGNOSIS — C15.9 ESOPHAGEAL CANCER, STAGE IV (HCC): Primary | ICD-10-CM

## 2021-05-04 PROCEDURE — 99214 OFFICE O/P EST MOD 30 MIN: CPT | Performed by: INTERNAL MEDICINE

## 2021-05-04 RX ORDER — FUROSEMIDE 20 MG/1
20 TABLET ORAL 2 TIMES DAILY
COMMUNITY
End: 2021-07-28

## 2021-05-05 ENCOUNTER — TELEPHONE (OUTPATIENT)
Dept: CARDIOLOGY | Facility: CLINIC | Age: 71
End: 2021-05-05

## 2021-05-05 DIAGNOSIS — R00.2 PALPITATIONS: ICD-10-CM

## 2021-05-05 DIAGNOSIS — R00.2 PALPITATIONS: Primary | ICD-10-CM

## 2021-05-05 DIAGNOSIS — R00.1 BRADYCARDIA, UNSPECIFIED: Primary | ICD-10-CM

## 2021-05-05 NOTE — TELEPHONE ENCOUNTER
[facility] called and stated that if you want a 3 day holter it has to be an event monitor order for 3 days or they can do a 2 day holter instead.  Please advise.

## 2021-05-05 NOTE — TELEPHONE ENCOUNTER
Faxed holter and labs to [phone] and spoke with Ana Lilia and she stated they will call patient and schedule holter for patient. Spoke w pt and advsd and he verbally understood.

## 2021-05-05 NOTE — TELEPHONE ENCOUNTER
Pt stated only has 1 cup of coffee in the morning and no other caffeine products.  The last 2 nights has work up wringing wet with sweat and heart pounding out of his chest. This only happens at night.  I advsd if continues go to the ER, and he wanted to know if should go on and have holter and labs done. Please advise.

## 2021-05-05 NOTE — TELEPHONE ENCOUNTER
Continue beta-blocker decrease all the caffeine products  If continue to have symptoms then would consider Holter monitor for 3 days and check the TSH BMP levels

## 2021-05-11 ENCOUNTER — HOSPITAL ENCOUNTER (OUTPATIENT)
Dept: ONCOLOGY | Facility: HOSPITAL | Age: 71
Setting detail: INFUSION SERIES
Discharge: HOME | End: 2021-05-11

## 2021-05-11 VITALS — BODY MASS INDEX: 24.11 KG/M2 | HEIGHT: 66 IN | WEIGHT: 150 LBS

## 2021-05-11 DIAGNOSIS — C16.0 MALIGNANT NEOPLASM OF CARDIA (HCC): Primary | ICD-10-CM

## 2021-05-11 PROCEDURE — G0463 HOSPITAL OUTPT CLINIC VISIT: HCPCS

## 2021-05-11 RX ORDER — SODIUM CHLORIDE 0.9 % (FLUSH) 0.9 %
10 SYRINGE (ML) INJECTION AS NEEDED
Status: CANCELLED | OUTPATIENT
Start: 2021-05-11

## 2021-05-11 RX ORDER — SODIUM CHLORIDE 0.9 % (FLUSH) 0.9 %
10 SYRINGE (ML) INJECTION AS NEEDED
Status: DISCONTINUED | OUTPATIENT
Start: 2021-05-11 | End: 2021-05-12 | Stop reason: HOSPADM

## 2021-05-11 RX ADMIN — Medication 10 ML: at 11:25

## 2021-05-11 NOTE — ADDENDUM NOTE
Encounter addended by: Esme Pelayo RN on: 5/11/2021 11:25 AM   Actions taken: Allergies reviewed, MAR administration accepted

## 2021-06-07 DIAGNOSIS — R79.89 ELEVATED TSH: ICD-10-CM

## 2021-06-07 DIAGNOSIS — E03.9 HYPOTHYROIDISM (ACQUIRED): ICD-10-CM

## 2021-06-09 NOTE — TELEPHONE ENCOUNTER
Caller: DOROTHEA    Relationship: PATIENT    Best call back number: [phone]     What is the best time to reach you: ANYTIME    What was the call regarding: DOROTHEA CALLED FOR AN UPDATE ON HIS REFILL, REQUESTING A CALL ONCE HIS RX HAS BEEN SENT IN    Do you require a callback: YES

## 2021-06-10 RX ORDER — LEVOTHYROXINE SODIUM 50 UG/1
TABLET ORAL
Qty: 90 TABLET | Refills: 0 | Status: SHIPPED | OUTPATIENT
Start: 2021-06-10 | End: 2021-09-23

## 2021-06-10 NOTE — TELEPHONE ENCOUNTER
Caller: DOROTHEA     Relationship:     Best call back number: [phone]    Medication needed:   Requested Prescriptions     Pending Prescriptions Disp Refills   • Euthyrox 50 MCG tablet [Pharmacy Med Name: Euthyrox 50 MCG Oral Tablet] 90 tablet 0     Sig: Take 1 tablet by mouth once daily       When do you need the refill by: SOON IF POSSIBLE    What additional details did the patient provide when requesting the medication: JUST CALLING IN [facility]     Does the patient have less than a 3 day supply:  [] Yes  [x] No    What is the patient's preferred pharmacy: [address] [phone] [place] [phone]

## 2021-06-22 ENCOUNTER — HOSPITAL ENCOUNTER (OUTPATIENT)
Dept: ONCOLOGY | Facility: HOSPITAL | Age: 71
Setting detail: INFUSION SERIES
Discharge: HOME | End: 2021-06-22

## 2021-06-22 DIAGNOSIS — R53.83 OTHER FATIGUE: ICD-10-CM

## 2021-06-22 DIAGNOSIS — C15.9 ESOPHAGEAL CANCER, STAGE IV (HCC): ICD-10-CM

## 2021-06-22 DIAGNOSIS — C16.0 MALIGNANT NEOPLASM OF CARDIA (HCC): Primary | ICD-10-CM

## 2021-06-22 DIAGNOSIS — R68.89 OTHER GENERAL SYMPTOMS AND SIGNS: ICD-10-CM

## 2021-06-22 LAB
ALBUMIN SERPL-MCNC: 4.6 G/DL (ref 3.5–5.2)
ALBUMIN/GLOB SERPL: 1.8 G/DL
ALP SERPL-CCNC: 41 U/L (ref 39–117)
ALT SERPL W P-5'-P-CCNC: 7 U/L (ref 1–41)
ANION GAP SERPL CALCULATED.3IONS-SCNC: 10 MMOL/L (ref 5–15)
AST SERPL-CCNC: 15 U/L (ref 1–40)
BASOPHILS # BLD AUTO: 0.08 10*3/MM3 (ref 0–0.2)
BASOPHILS NFR BLD AUTO: 1.4 % (ref 0–1.5)
BILIRUB SERPL-MCNC: 0.3 MG/DL (ref 0–1.2)
BUN SERPL-MCNC: 17 MG/DL (ref 8–23)
BUN/CREAT SERPL: 20.7 (ref 7–25)
CALCIUM SPEC-SCNC: 8.6 MG/DL (ref 8.6–10.5)
CHLORIDE SERPL-SCNC: 105 MMOL/L (ref 98–107)
CO2 SERPL-SCNC: 23 MMOL/L (ref 22–29)
CORTIS SERPL-MCNC: 10.08 MCG/DL
CREAT SERPL-MCNC: 0.82 MG/DL (ref 0.76–1.27)
DEPRECATED RDW RBC AUTO: 44.1 FL (ref 37–54)
EOSINOPHIL # BLD AUTO: 0.3 10*3/MM3 (ref 0–0.4)
EOSINOPHIL NFR BLD AUTO: 5.2 % (ref 0.3–6.2)
ERYTHROCYTE [DISTWIDTH] IN BLOOD BY AUTOMATED COUNT: 13.4 % (ref 12.3–15.4)
GFR SERPL CREATININE-BSD FRML MDRD: 92 ML/MIN/1.73
GLOBULIN UR ELPH-MCNC: 2.5 GM/DL
GLUCOSE SERPL-MCNC: 146 MG/DL (ref 65–99)
HCT VFR BLD AUTO: 34.2 % (ref 37.5–51)
HGB BLD-MCNC: 11.2 G/DL (ref 13–17.7)
LYMPHOCYTES # BLD AUTO: 1.59 10*3/MM3 (ref 0.7–3.1)
LYMPHOCYTES NFR BLD AUTO: 27.4 % (ref 19.6–45.3)
MCH RBC QN AUTO: 30.4 PG (ref 26.6–33)
MCHC RBC AUTO-ENTMCNC: 32.7 G/DL (ref 31.5–35.7)
MCV RBC AUTO: 92.9 FL (ref 79–97)
MONOCYTES # BLD AUTO: 0.62 10*3/MM3 (ref 0.1–0.9)
MONOCYTES NFR BLD AUTO: 10.7 % (ref 5–12)
NEUTROPHILS NFR BLD AUTO: 3.22 10*3/MM3 (ref 1.7–7)
NEUTROPHILS NFR BLD AUTO: 55.3 % (ref 42.7–76)
PLATELET # BLD AUTO: 109 10*3/MM3 (ref 140–450)
PMV BLD AUTO: 10.7 FL (ref 6–12)
POTASSIUM SERPL-SCNC: 5.1 MMOL/L (ref 3.5–5.2)
PROT SERPL-MCNC: 7.1 G/DL (ref 6–8.5)
RBC # BLD AUTO: 3.68 10*6/MM3 (ref 4.14–5.8)
SODIUM SERPL-SCNC: 138 MMOL/L (ref 136–145)
TSH SERPL DL<=0.05 MIU/L-ACNC: 2.59 UIU/ML (ref 0.27–4.2)
WBC # BLD AUTO: 5.81 10*3/MM3 (ref 3.4–10.8)

## 2021-06-22 PROCEDURE — 82533 TOTAL CORTISOL: CPT | Performed by: INTERNAL MEDICINE

## 2021-06-22 PROCEDURE — 85025 COMPLETE CBC W/AUTO DIFF WBC: CPT | Performed by: INTERNAL MEDICINE

## 2021-06-22 PROCEDURE — 80053 COMPREHEN METABOLIC PANEL: CPT | Performed by: INTERNAL MEDICINE

## 2021-06-22 PROCEDURE — 84443 ASSAY THYROID STIM HORMONE: CPT | Performed by: INTERNAL MEDICINE

## 2021-06-22 PROCEDURE — 36591 DRAW BLOOD OFF VENOUS DEVICE: CPT

## 2021-06-22 RX ORDER — SODIUM CHLORIDE 0.9 % (FLUSH) 0.9 %
10 SYRINGE (ML) INJECTION AS NEEDED
Status: CANCELLED | OUTPATIENT
Start: 2021-06-22

## 2021-06-22 RX ORDER — SODIUM CHLORIDE 0.9 % (FLUSH) 0.9 %
10 SYRINGE (ML) INJECTION AS NEEDED
Status: DISCONTINUED | OUTPATIENT
Start: 2021-06-22 | End: 2021-06-23 | Stop reason: HOSPADM

## 2021-06-22 RX ADMIN — Medication 30 ML: at 11:40

## 2021-06-25 PROCEDURE — 93294 REM INTERROG EVL PM/LDLS PM: CPT | Performed by: INTERNAL MEDICINE

## 2021-06-25 PROCEDURE — 93296 REM INTERROG EVL PM/IDS: CPT | Performed by: INTERNAL MEDICINE

## 2021-07-12 ENCOUNTER — HOSPITAL ENCOUNTER (OUTPATIENT)
Dept: PET IMAGING | Facility: HOSPITAL | Age: 71
Discharge: HOME | End: 2021-07-12
Admitting: INTERNAL MEDICINE

## 2021-07-12 DIAGNOSIS — C15.9 ESOPHAGEAL CANCER, STAGE IV (HCC): ICD-10-CM

## 2021-07-12 LAB — CREAT BLDA-MCNC: 0.9 MG/DL (ref 0.6–1.3)

## 2021-07-12 PROCEDURE — 71260 CT THORAX DX C+: CPT

## 2021-07-12 PROCEDURE — 82565 ASSAY OF CREATININE: CPT

## 2021-07-12 PROCEDURE — 0 IOPAMIDOL PER 1 ML: Performed by: INTERNAL MEDICINE

## 2021-07-12 PROCEDURE — 74177 CT ABD & PELVIS W/CONTRAST: CPT

## 2021-07-12 RX ADMIN — IOPAMIDOL 100 ML: 755 INJECTION, SOLUTION INTRAVENOUS at 11:23

## 2021-07-23 NOTE — PROGRESS NOTES
Hematology-Oncology Follow-up Note       Artie Mc  1950    Primary Care Physician: Chanel Carter APRN  Referring Physician: Chanel Carter APRN    Reason For Visit:  Chief Complaint   Patient presents with   • Appointment     Metastatic esophageal carcinoma   • Follow-up     Metastatic esophageal cancer  Monitoring for adverse effects related to immunotherapy.  Hypothyroidism    HPI:   Mr. Mc is a pleasant 70-year-old gentleman with a history of gastroesophageal reflux disease, diabetes, hypertension, heart block status post pacemaker placement and history of CABG.  He was having symptoms of dysphagia, weight loss since September 2016. He was reporting symptoms of food getting stuck in the lower chest.  He has transitioned himself to a liquid diet.  The patient reports losing about 30 pounds over this duration.  The patient underwent upper GI endoscopy on 12/20/16 with Dr. Esvin Barrera.  Endoscopy showed necrotic, circumferential and moderately obstructive mass at the distal esophagus between 42 cm and 36 cm.      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  There was also evidence of mild subacute inflammation in the duodenum.  Dr. Barrera ordered a CT scan of the chest and abdomen with contrast on 12/20/16.  The scan was done at [facility].  The scan showed a new ill-defined mass in the anterior wall of the distal esophagus at the GE junction measuring 2.3 x 2.2 x 1.6 cm, worrisome for cancer.  There were four subcentimeter liver lesions which had appeared stable in comparison to another CT scan from 2009 and they likely represent small cysts.  There was also mild lymphadenopathy in the gastrohepatic ligament.  There were at least six borderline enlarged lymph nodes measuring up to 1.3 x 1 cm in the region.  The patient is referred to us for further oncological evaluation.    1/5/2017 - The  patient presents for initial consultation.  He reports persistent dysphagia and also has symptoms of regurgitation.  He cannot tolerate solid foods and is dependent on liquid diet such as Ensure.  He also reports fatigue. Symptoms of dysphagia have been present for the past four months.  When he eats any solids, the food gets stuck in the lower chest.    • 1/5/17 - Vitamin B12 200 (L).  CEA 0.8.  Ferritin 35.  Iron saturation 16% (L), serum iron 56, TIBC 354.  Creatinine 0.9.  LFTs normal.  Folate 10.2.  • 1/12/17 - PET/CT scan:  Intense abnormal activity corresponding to the region of the GE junction and proximal stomach.  SUV is 11.06.  There is a suggestion of an abnormal mass or abnormal wall thickening in the region measuring 3.9 x 4.8 cm.  No additional abnormalities.  No evidence of metastases or other lymphadenopathy.  Scattered nonspecific subcentimeter lymph nodes involving the mediastinum and within the central upper abdomen.  • 1/13/17 - Creatinine 0.9.  LFTs normal.    • 1/17/17 - Patient seen by thoracic surgeon, Dr. Bradley.  PET scan was reviewed.  He has recommended neoadjuvant chemoradiation therapy followed by Noel Gurwinder esophagogastrectomy.  Port-A-Cath is being arranged.    • 1/20/17 - WBC 6.3, hemoglobin 12.1, platelet count 198,000, MCV 86.4.  • 1/30/17 - Patient started weekly Carboplatin and Taxol (Carboplatin AUC 2 and Taxol 50 mg/M2).  Patient received Injectafer 750 mg.  WBC 5.8, hemoglobin 12.4, platelet count 244,000.   • 1/31/17 - Patient seen by Dr. Eduardo Oleary.  The plan was to give 5040 cGy in 28 fractions.   • 2/6/17 - WBC 4.4, hemoglobin 11.6, platelet count 201,000.  Patient received cycle 2 of weekly Carboplatin and Taxol (Carboplatin AUC 2 and Taxol 50 mg/M2).  • 2/13/17 - Patient received Carboplatin and Taxol weekly cycle 3.  • 2/13/17 - Patient started concurrent radiation therapy.  Total planned dose is 4140 cGy.    • 2/13/17 to 2/15/17 - Patient received 4140 cGy of  neoadjuvant radiation.  Radiation was finished on 3/15/17.    • 2/20/17 - WBC 1.8, hemoglobin 10.6, platelet count 217,000.  Creatinine 0.5. Folate 15 (L).  Ferritin 361.  Haptoglobin 214.  Iron saturation 33%, TIBC 263, serum iron 88.    • 2/20/17 - Patient received Carboplatin and Taxol weekly cycle 4.   • 2/27/17 - WBC 3.7, hemoglobin 10.7, platelet count 177,000, MCV 85.3.    • 3/6/17 - Patient has received 16 out of the 23 planned radiation treatments.  Total planned dose is 4140 cGy.    • 3/6/17 - WBC 5.4, hemoglobin 11.2, platelet count 113,000.  • 3/6/17 - Patient received cycle 5 of weekly Carboplatin and Taxol.   • 3/13/17 - Patient received cycle 6 of weekly Carboplatin and Taxol.  WBC 3.9, hemoglobin 11.3, platelet count 93,000.     • 3/20/17 - WBC 1.4, hemoglobin 10.0, platelet count 118,000, MCV 86.1   • 3/27/17 - WBC 3.8, hemoglobin 9.5, platelet count 176,000, MCV 87.3.    • 4/7/17 - Stress test:  No evidence of reversible myocardial ischemia.  Normal left ventricular ejection fraction of 50%.    • 4/17/17 - Upper GI endoscopy by Dr. 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  The GE junction adenocarcinoma lesion seems to have a very good response to chemotherapy and radiation.  The lumen was open.  • 5/1/17 - WBC 7.0, hemoglobin 10.2, platelet count 175,000.    • 5/4/17 - Patient underwent Charlotte Gurwinder esophagectomy with pyloroplasty, feeding jejunostomy tube, abdominal and mediastinal lymph node dissection.    Surgical Pathology:  Moderately to poorly differentiated adenocarcinoma measuring 1.3 cm at the GE junction.  Resection margins are negative for malignancy.  Tumor margins are negative.  There is effective treatment response.  No evidence of lymphovascular invasion.  Staging is ypT3,pN1.  One out of fourteen lymph nodes involved.   • 5/22/17 - WBC 6.8, hemoglobin 10.3, platelet count 312,000.    • 6/19/17 - WBC 5.7, hemoglobin 10.5, platelet count  204,000, MCV 92.1.    • 7/6/17 - EGD:  Status post balloon dilation of esophageal stricture.  • 7/12/17 - EGD with balloon dilation of esophageal stricture.  • 7/26/17 - EGD and balloon dilation of esophageal stricture.  • 7/31/17 - PET/CT scan:  There is mild metabolic activity seen at the thoracic esophagus just at and below the surgical margins.  Some mild wall thickening.  This could be from recent surgery.  A residual cancer seems unlikely.  There is a precarinal lymph node with mild metabolic activity with SUV of 3 measuring 1 x 1.3 cm.  Again this appears to be nonspecific in my opinion.    • 8/7/17 - WBC 5.9, hemoglobin 11.9, platelet count 171,000, MCV 88.7.    • 10/19/17 - WBC 7.5, hemoglobin 11.8, platelet count 216,000.    • 1/8/18 - PET scan:  No evidence of residual disease or recurrent disease.  There is an esophageal stent in the mid esophagus with a large debris air level.  No evidence of any metastases in the chest, abdomen or pelvis.  Mild nodule uptake in the vocal cords with fullness in the right vocal cord.  This could be physiologic.    • 7/9/18 - CT scan of chest with contrast:  Prominent mediastinal lymph nodes appear stable since February.  Changes of gastric pull-through procedure for esophageal cancer again noted.  Tree-in-bud infiltrates in the left lung base, consistent with infection versus inflammation.    • 1/10/19 - CT chest, abdomen and pelvis with contrast:  No evidence of mets in the chest; however, interval development of metastatic lymphadenopathy in the left side of the retroperitoneum.  For instance, there is a 17 mm rounded lymph node, 10 mm lymph node and also a 14 mm lymph node.  These are all new.  Stable 9 mm hypodense lesion within the right hepatic lobe, which could reflect hemangioma or complex cyst.    • 1/30/19 - CT-guided core biopsy of retroperitoneal lymph node:  Poorly differentiated adenocarcinoma consistent with metastases from patient’s known esophageal  primary.  CK20 positive, CDX2 positive, cytokeratin 7 negative, TTF-1 negative.    • 2/1/19 - Creatinine 0.9, BUN 18, calcium 9.3; these are normal.    • 2/28/19 - [employer] Gen sequencing testing on retroperitoneal lymph node specimen:  PD-L1 positive.  CPS = 3.  This implies responsiveness to pembrolizumab.  Mismatch repair status is proficient (no evidence of microsatellite instability).  Tumor mutational burden is low = 6 mutations/Mb.  CDH1 indeterminate.  KRAS mutation positive.  TP53 mutation positive.  CDK6 amplified.  [employer] PD-L1 testing by IHC:  PD-L1 expression 1% positive (CPS =1).  HER2/marvel by IHC is negative.  HER2/marvel by CISH not amplified.  Microsatellite stable tumor.    • 3/7/19 - WBC 7.3, hemoglobin 12, platelet count 128,000, MCV 92.     • 3/14/19 - Patient was seen by radiation oncologist, Dr. Chahal.  He has recommended observation versus systemic chemoimmunotherapy as needed.  No plans for radiation therapy.   • 5/1/19 - CT scan of chest with contrast:  No definitive findings of new metastatic disease in the chest.  Emphysema noted.    • 5/1/19 - CT abdomen and pelvis:  There is interval progression of metastatic retroperitoneal adenopathy.  Left periaortic adenopathy with lymph node measuring up to 2 cm, previously 1.7 cm.  Another lymph node now measures 2.3, previously 1.4 cm.  New enlarged lymph node on image 147 measures 1.4 cm, previously 0.4 cm.  A 9 mm hypodense right hepatic lobe lesion appears stable.    • 5/9/19 - Decision made to start patient on immunotherapy Keytruda.    • 5/9/19 - Vitamin B12 229.  Ferritin 61.  Folate 14.3.  Iron saturation 26%, TIBC 304, serum iron 79.  • 5/31/19 - Patient received Keytruda 200 mg cycle 1, day 1.    • 5/31/19 - Free T4 0.99.  Creatinine 0.9, BUN 9.  LFTs normal.  TSH 3.99.  Folate 14.3.  Iron saturation 26%.    • 6/21/2019 patient received Keytruda cycle 2  • 7/12/2019: Patient received Keytruda cycle 3  • 8/2/2019 patient received cycle 4  Keytruda WBC 7.2, hemoglobin 11.8 platelet count 163  • 8/23/2019 creatinine 0.9, LFTs normal, WBC 6.4, hemoglobin 11.5, platelets 179, TSH 2.1   • 8/23/2019 patient received cycle 5 of Keytruda  • 9/3/2019 CT chest abdomen and pelvis with contrast: . Positive response to therapy in the abdomen since 05/01/2019. Pathologically enlarged retroperitoneal lymph nodes, predominantly in the left periaortic region, have diminished in size.a 1.3 x 2.1 cm left periaortic node (image 60), previously measured 3.5 x 2.3 cm. A 1.6 cm index node near the level of the left renal vein previously measured 2.0 cm No new or progressive adenopathy. 2. Stable findings in the chest. Noncalcified right upper lobe nodules are unchanged. There is no evidence of new or progressive metastatic disease within the chest. 3. 8 mm indeterminate low-density lesion in the right hepatic lobe, is stable. No new liver lesions.  • 9/6/2019 WBC 5.6, hemoglobin 11.2, platelets of 192, MCV 91.8  • 9/13/2019 patient received Keytruda cycle 6, albumin 3.2  • 10/4/2019 patient received cycle 7 of Keytruda, TSH 4.8, free T4 0.86  • 10/9/2019 WBC 5.8, hemoglobin 11.7, platelets 174  • 10/25/2019 patient received cycle 8 of Keytruda.  WBC 5.9, hemoglobin 11.5, platelets 152, creatinine 0.75, cortisol 15.2, TSH 6.48 high , free T4 1.13 normal  • 11/6/2019 WBC 6.6, hemoglobin 11.8, MCV 91.5, platelets 187  • 11/15/2019 patient received cycle 9 of Keytruda, WBC 6.6, hemoglobin 11.9, platelets 161, cortisol level 8.86, TSH 2.68, free T3 2.8, free T4 1.5  • 12/4/2019 WBC 6.9, hemoglobin 12.0, platelets 180, MCV 92.9  • 12/06/2019-Keytruda Cycle 10  • 12/27/2019- Cycle 11 LFTs normal, WBC 6.7, hemoglobin 11.4, platelets 168, MCV 93, TSH 2.4,  • 1/28/2020: CT chest abdomen and pelvis with contrast:  Single (aortic lymph node in the abdominal retroperitoneum has increased.  Rest of the retroperitoneal lymph nodes have decreased in size.  Mediastinal adenopathy appears  unchanged.  Noncalcified bilateral pulmonary nodules are stable  • 1/17/2020 patient received Keytruda cycle 12:.  • 2/3/2020 WBC 7.1, hemoglobin 12.2, platelets 171  • 02/10/2020 patient received cycle 13 of Keytruda: WBC 7.3, hemoglobin 12.1, platelets 166, creatinine 0.82  • 3/6/2020 patient is of Keytruda 200 mg, hemoglobin 10.8  • 3/27/2020 patient received Keytruda 200 mg, hemoglobin 11.4, TSH 1.95  • 4/17/2020 patient received Keytruda cycle 16 200 mg, WBC 6.1, hemoglobin 11.6, platelets 150  • 5/8/2020: Patient received cycle 17 Keytruda, WBC 6.3, hemoglobin 9.6, platelets 137, TSH 2.05, free T4 1.54, creatinine 0.93, LFTs normal,  • 5/29/2020: Patient received cycle 18 Keytruda, WBC 6.5, hemoglobin 11.7, platelets 129, creatinine 0.83, TSH 1.69  • 6/26/2020: Patient receiving Keytruda.   • 7/17/2020: TSH 2.06, WBC 5.7, hemoglobin 11.4, platelets 177, MCV 92.4, Keytruda 200 mg cycle 20  • 7/27/2020: CT chest abdomen pelvis with contrast: Stable findings in the chest abdomen pelvis since 1/28/2020.  Noncalcified bilateral pulmonary nodules are stable.  Stable retroperitoneal left perinephric adenopathy in the abdomen.  • 8/7/2020: Patient received cycle 21 of Keytruda  • 8/28/2020 patient is a cycle 22 of Keytruda.  WBC 6.02, hemoglobin 11.3, platelets 161  • 9/11/2020 WBC 6.9, hemoglobin 12.4, platelets 192  • 9/18/2020: Cycle 23 of Keytruda, TSH 1.19  • 10/9/2020: Cycle 24 of Keytruda, WBC 5.9, hemoglobin 11.9, platelets 178, creatinine 0.84, TSH 2.56, free T3 2.4, CMP normal  • 10/23/2020: WBC 7.34, hemoglobin 11.6, platelets 175  • 10/30/2020:.  Keytruda 200 mg  • 11/20/2020: Keytruda 200 mg  • 12/11/2020 Keytruda 200 mg.  WBC 5.5, hemoglobin 11.4, platelets 176, creatinine 0.8, LFTs normal, TSH 2.2, cortisol 8.3  • 12/10/2020: CT chest abdomen and pelvis with contrast: Previously seen left para-aortic lymph nodes within the abdomen are decreased in size.  No pathological lymphadenopathy.  No evidence of  residual malignancy..  Small noncalcified pulmonary nodules are stable.  No new nodules.  Stable mediastinal lymph nodes.  • 3/12/2021: WBC 6.7, hemoglobin 11.3, platelets 17  • 3/12/2021: CMP normal, TSH 2.9, cortisol 11.95, 1  • 3/15/2021: CT chest with contrast: Stable findings in the chest with postsurgical changes of esophageal resection and gastric pull-through.  Stable right lower lobe lung nodule measures 11 mm.  Stable size and appearance of the mediastinal and upper abdominal lymph nodes.  • 3/23/2021: Doppler of upper extremity: Normal.  • 3/24/2021: CT abdomen and pelvis with contrast: Postoperative changes.  Left periaortic nodes are stable.  • 4/12/2021: TSH 4.1, cortisol 12.4  • 6/22/2021: CMP normal,Cortisol 10.08,, WBC 5.8, hemoglobin 11.2, platelets 109, TSH 2.59  • 7/12/2021: CT scan of the chest abdomen pelvis with contrast: Dominant 10 x 10 left periaortic lymph node is stable since 3/22/2021.  Dominant lymph nodes in the right lower paratracheal and AP window distribution are stable since 3/15/2021.  No evidence of progressive adenopathy in the chest abdomen or pelvis.      Subjective:    70 year old male patient presents to the office for a follow up regarding esophageal cancer.  He follows up with cardiology.  Denies any chest pains.  Denies any dysphagia.  Occasional diahrrea     The following portions of the patient's history were reviewed and updated as appropriate: allergies, current medications, past family history, past medical history, past social history, past surgical history and problem list.     Past Medical History:   Diagnosis Date   • B12 deficiency    • Blockage of coronary artery of heart (CMS/HCC)    • Depression    • DM (diabetes mellitus) (CMS/HCC)    • Dysphagia    • HTN (hypertension)    • Hyperlipidemia        Past Surgical History:   Procedure Laterality Date   • ABDOMINAL WALL ABSCESS INCISION AND DRAINAGE  10/22/2018    WITH DEBRIDEMENT  1.5CM X 1.5 CM X 1.5 CM      JAXSON   • CATARACT EXTRACTION  2018   • COLONOSCOPY     • CORONARY ARTERY BYPASS GRAFT  2015   • ENDOSCOPY  12/20/2016   • ESOPHAGECTOMY  05/04/2017    BROOKS PETTY ESOPHAGECTOMY, FEEDING JEJNOSTOMOY, ABDOMINAL AND MEDIASTINAL LYMPH NODE DISECTION, PEDICLED MUSCLE FLAP COVERAGE DR. PURI   • ESOPHAGOSCOPY / EGD  07/12/2017    WITH BALLOON DILATION   • ESOPHAGOSCOPY / EGD  07/26/2017    WITH BALLOON DILATION   • ESOPHAGOSCOPY / EGD  08/11/2017    WITH BALLOON DILATION   • ESOPHAGOSCOPY / EGD  08/28/2017    WITH BALLOON DILATION   • ESOPHAGOSCOPY / EGD  09/27/2017    WITH BALLOON DILATION   • LYMPH NODE BIOPSY  01/30/2019   • PACEMAKER IMPLANTATION  11/21/2016       Current Outpatient Medications:   •  Cetirizine HCl (ZyrTEC Allergy) 10 MG capsule, Take  by mouth., Disp: , Rfl:   •  clonazePAM (KlonoPIN) 0.5 MG tablet, Pt taking 1.5 pills daily, Disp: , Rfl: 1  •  cyanocobalamin (VITAMIN B-12) 1000 MCG tablet, , Disp: , Rfl:   •  diphenhydrAMINE-APAP, sleep, (TYLENOL PM EXTRA STRENGTH PO), Take  by mouth., Disp: , Rfl:   •  Euthyrox 50 MCG tablet, Take 1 tablet by mouth once daily, Disp: 90 tablet, Rfl: 0  •  folic acid (FOLVITE) 1 MG tablet, Daily., Disp: , Rfl:   •  metFORMIN (GLUCOPHAGE) 500 MG tablet, Take 500 mg by mouth 2 (Two) Times a Day., Disp: , Rfl: 4  •  metoprolol tartrate (LOPRESSOR) 25 MG tablet, Take 50 mg by mouth 2 (Two) Times a Day., Disp: , Rfl:   •  omeprazole (priLOSEC) 40 MG capsule, Take 1 capsule by mouth twice daily, Disp: 180 capsule, Rfl: 1  •  ondansetron (ZOFRAN) 4 MG tablet, Take 1 tablet by mouth Every 8 (Eight) Hours As Needed for Nausea or Vomiting., Disp: 90 tablet, Rfl: 2  •  pravastatin (PRAVACHOL) 10 MG tablet, Take 20 mg by mouth every night at bedtime., Disp: , Rfl:     Allergies   Allergen Reactions   • Ace Inhibitors Unknown (See Comments)     Unknown reaction     • Heparin Other (See Comments)     Fever/chills, weakness in legs   • Sulfa Antibiotics Other (See Comments)  "    Mouth sores       Family History   Problem Relation Age of Onset   • Mental illness Mother    • Heart disease Mother    • Hypertension Father    • Cancer Father    • Heart disease Sister    • Heart disease Brother      Cancer-related family history includes Cancer in his father.    Social History     Tobacco Use   • Smoking status: Former Smoker     Packs/day: 2.00     Years: 50.00     Pack years: 100.00     Types: Cigarettes     Quit date: 2015     Years since quittin.1   • Smokeless tobacco: Former User     Types: Chew     Quit date: 1989   Substance Use Topics   • Alcohol use: No   • Drug use: No     ROS:     Objective:    Vitals:    21 0955   BP: 145/80   Pulse: 71   Resp: 18   Temp: 97.1 °F (36.2 °C)   Weight: 70.3 kg (155 lb)   Height: 167.6 cm (65.98\")   PainSc: 0-No pain       (1) Restricted in physically strenuous activity, ambulatory and able to do work of light nature    Physical Exam:     Physical Exam   Constitutional: He is oriented to person, place, and time. He appears well-developed. No distress.   HENT:   Head: Normocephalic and atraumatic.   Eyes: Conjunctivae are normal. Right eye exhibits no discharge. Left eye exhibits no discharge. No scleral icterus.   Neck: No thyromegaly present.   Cardiovascular: Normal rate, regular rhythm and normal heart sounds. Exam reveals no gallop and no friction rub.   Pulmonary/Chest: Effort normal and breath sounds normal. No stridor. No respiratory distress. He has no wheezes.   Abdominal: Soft. Normal appearance and bowel sounds are normal. He exhibits no mass. There is no abdominal tenderness. There is no rebound and no guarding.   No tenderness   Musculoskeletal: Normal range of motion. No deformity or signs of injury.      Right lower leg: Edema present.      Comments: Swelling upper extremity   Lymphadenopathy:     He has no cervical adenopathy.   Neurological: He is alert and oriented to person, place, and time. He exhibits normal " muscle tone.   Skin: Skin is warm. No rash noted. He is not diaphoretic. No erythema. No jaundice.   Right side chest  port   Psychiatric: His behavior is normal. Mood, judgment and thought content normal.   Nursing note and vitals reviewed.    I have reexamined the patient and the results are consistent with the previously documented exam. Bassem Jasso MD       Lab Results - Last 18 Months   Lab Units 07/28/21  0950 06/22/21  1145 04/12/21  0950   WBC 10*3/mm3 7.20 5.81 6.66   HEMOGLOBIN g/dL 10.9* 11.2* 11.7*   HEMATOCRIT % 32.8* 34.2* 36.4*   PLATELETS 10*3/mm3 185 109* 173   MCV fL 91.6 92.9 95.5     Lab Results - Last 18 Months   Lab Units 07/12/21  1006 06/22/21  1144 04/22/21  1004 04/12/21  0950 04/12/21  0950 03/12/21  0945 03/12/21  0945   SODIUM mmol/L  --  138 138  --  138   < > 139   POTASSIUM mmol/L  --  5.1 4.2  --  5.4*   < > 4.9   CHLORIDE mmol/L  --  105 104  --  102   < > 103   CO2 mmol/L  --  23.0 23.0  --  23.0   < > 27.0   BUN mg/dL  --  17 15  --  13   < > 13   CREATININE mg/dL 0.90 0.82 0.90   < > 0.92   < > 0.88   CALCIUM mg/dL  --  8.6 9.6  --  9.4   < > 8.6   BILIRUBIN mg/dL  --  0.3  --   --  0.2  --  0.3   ALK PHOS U/L  --  41  --   --  43  --  26*   ALT (SGPT) U/L  --  7  --   --  7  --  <5   AST (SGOT) U/L  --  15  --   --  15  --  12   GLUCOSE mg/dL  --  146* 132*  --  152*   < > 119*    < > = values in this interval not displayed.     Assessment/Plan     Assessment:  1. Metastatic esophageal carcinoma:  Patient has a history of GE junction poorly differentiated adenocarcinoma for which he had chemoradiation followed by Noel Gurwinder resection:  Pathology showed ypT3,pN1 disease.  Tumor was Stage IIIA, diagnosed in 2016.  He received concurrent chemoradiation with weekly Carboplatin and Taxol and radiation finishing on 3/15/17.    CT scan on 1/10/19 showed new retroperitoneal lymph nodes.  These lymph nodes were biopsied on 1/30/19 and it shows metastatic esophageal cancer.  [facility] [employer]  Gen testing was performed on the sample and it shows KRAS mutation, microsatellite stable tumor.  PD-L1 is positive.  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  HER2/marvel (ERBB2) was not amplified and was negative.  His CT scan on 5/1/19 shows evidence of progression.  Patient has started receiving Keytruda on 5/31/19.  He has received 19 cycles so far.   CT scan on 1/28/2020 shows improvement and continued response.  CT scan chest abdomen pelvis with contrast on 12/10/2020 does not show any evidence of disease progression.  On 12/22/2020 decision made to hold Keytruda per patient's request to get a treatment break.. Repeat CT scan chest 7/12/2021 does not show any evidence of disease progression.     2. Hx of Right lower extremity swelling/right forearm swelling:   Dopplers negative  3. Reflux/heartburn/dysphagia: Symptoms improved.  Also has a history of strictures.Status post EGD and balloon dilation  4. Noncalcified pulmonary nodules: Remained stable on 7/27/2020 scan.  5. 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  Continues on Synthroid  6. Mild diarrhea:- diarrhea seems to be waxing and waning in nature.  Related to immunotherapy, but not severe.  7. PD-L1 positive, HER2 negative, p53 and KRAS positive.    8. B12 deficiency: Continue B12 1000 mcg twice daily.      PLAN:  1. Continue to hold Keytruda.  CT scan does not show any evidence of disease progression.  2. Check TSH and will adjust the dose of levothyroxine.    3. Continue to monitor TSH,  serum cortisol level.   4. Continue B12 supplements.  5. Continue folic acid supplement also.  6. Follow-up in 3 and half months.  Hopefully we can decrease the frequency of CAT scans to probably every 6 months.    I have reviewed and confirmed the accuracy of the patient's history: Chief complaint, HPI, ROS and Subjective as entered by the MA/LPN/RN. Bassem Jasso MD 07/28/21     Orders Placed This Encounter   Procedures   • CBC & Differential     Standing Status:   Future      Number of Occurrences:   1         Thank you very much for providing the opportunity to participate in this patient’s care. Please do not hesitate to call if there are any other questions        Electronically signed by Bassem Jasso MD, 07/28/21, 10:25 AM EDT.

## 2021-07-28 ENCOUNTER — APPOINTMENT (OUTPATIENT)
Dept: LAB | Facility: HOSPITAL | Age: 71
End: 2021-07-28

## 2021-07-28 ENCOUNTER — OFFICE VISIT (OUTPATIENT)
Dept: ONCOLOGY | Facility: CLINIC | Age: 71
End: 2021-07-28

## 2021-07-28 ENCOUNTER — HOSPITAL ENCOUNTER (OUTPATIENT)
Dept: ONCOLOGY | Facility: HOSPITAL | Age: 71
Setting detail: INFUSION SERIES
Discharge: HOME | End: 2021-07-28

## 2021-07-28 VITALS
RESPIRATION RATE: 18 BRPM | BODY MASS INDEX: 24.91 KG/M2 | HEART RATE: 71 BPM | TEMPERATURE: 97.1 F | WEIGHT: 155 LBS | SYSTOLIC BLOOD PRESSURE: 145 MMHG | HEIGHT: 66 IN | DIASTOLIC BLOOD PRESSURE: 80 MMHG

## 2021-07-28 DIAGNOSIS — C15.9 ESOPHAGEAL CANCER, STAGE IV (HCC): ICD-10-CM

## 2021-07-28 DIAGNOSIS — C16.0 MALIGNANT NEOPLASM OF CARDIA (HCC): Primary | ICD-10-CM

## 2021-07-28 DIAGNOSIS — R53.83 OTHER FATIGUE: ICD-10-CM

## 2021-07-28 LAB
BASOPHILS # BLD AUTO: 0.07 10*3/MM3 (ref 0–0.2)
BASOPHILS NFR BLD AUTO: 1 % (ref 0–1.5)
CORTIS SERPL-MCNC: 11.23 MCG/DL
DEPRECATED RDW RBC AUTO: 43.2 FL (ref 37–54)
EOSINOPHIL # BLD AUTO: 0.29 10*3/MM3 (ref 0–0.4)
EOSINOPHIL NFR BLD AUTO: 4 % (ref 0.3–6.2)
ERYTHROCYTE [DISTWIDTH] IN BLOOD BY AUTOMATED COUNT: 13.3 % (ref 12.3–15.4)
HCT VFR BLD AUTO: 32.8 % (ref 37.5–51)
HGB BLD-MCNC: 10.9 G/DL (ref 13–17.7)
LYMPHOCYTES # BLD AUTO: 1.54 10*3/MM3 (ref 0.7–3.1)
LYMPHOCYTES NFR BLD AUTO: 21.4 % (ref 19.6–45.3)
MCH RBC QN AUTO: 30.4 PG (ref 26.6–33)
MCHC RBC AUTO-ENTMCNC: 33.2 G/DL (ref 31.5–35.7)
MCV RBC AUTO: 91.6 FL (ref 79–97)
MONOCYTES # BLD AUTO: 0.94 10*3/MM3 (ref 0.1–0.9)
MONOCYTES NFR BLD AUTO: 13.1 % (ref 5–12)
NEUTROPHILS NFR BLD AUTO: 4.36 10*3/MM3 (ref 1.7–7)
NEUTROPHILS NFR BLD AUTO: 60.5 % (ref 42.7–76)
PLATELET # BLD AUTO: 185 10*3/MM3 (ref 140–450)
PMV BLD AUTO: 10.5 FL (ref 6–12)
RBC # BLD AUTO: 3.58 10*6/MM3 (ref 4.14–5.8)
WBC # BLD AUTO: 7.2 10*3/MM3 (ref 3.4–10.8)

## 2021-07-28 PROCEDURE — 85025 COMPLETE CBC W/AUTO DIFF WBC: CPT | Performed by: INTERNAL MEDICINE

## 2021-07-28 PROCEDURE — 36591 DRAW BLOOD OFF VENOUS DEVICE: CPT

## 2021-07-28 PROCEDURE — 82533 TOTAL CORTISOL: CPT | Performed by: INTERNAL MEDICINE

## 2021-07-28 PROCEDURE — 99214 OFFICE O/P EST MOD 30 MIN: CPT | Performed by: INTERNAL MEDICINE

## 2021-07-28 RX ORDER — HEPARIN SODIUM (PORCINE) LOCK FLUSH IV SOLN 100 UNIT/ML 100 UNIT/ML
500 SOLUTION INTRAVENOUS AS NEEDED
Status: CANCELLED | OUTPATIENT
Start: 2021-07-28

## 2021-07-28 RX ORDER — SODIUM CHLORIDE 0.9 % (FLUSH) 0.9 %
20 SYRINGE (ML) INJECTION AS NEEDED
Status: DISCONTINUED | OUTPATIENT
Start: 2021-07-28 | End: 2021-07-29 | Stop reason: HOSPADM

## 2021-07-28 RX ORDER — SODIUM CHLORIDE 0.9 % (FLUSH) 0.9 %
20 SYRINGE (ML) INJECTION AS NEEDED
Status: CANCELLED | OUTPATIENT
Start: 2021-07-28

## 2021-07-28 RX ADMIN — Medication 30 ML: at 09:50

## 2021-08-18 ENCOUNTER — TELEPHONE (OUTPATIENT)
Dept: ONCOLOGY | Facility: HOSPITAL | Age: 71
End: 2021-08-18

## 2021-08-18 ENCOUNTER — TELEPHONE (OUTPATIENT)
Dept: ONCOLOGY | Facility: CLINIC | Age: 71
End: 2021-08-18

## 2021-08-18 DIAGNOSIS — C16.0 MALIGNANT NEOPLASM OF CARDIA (HCC): Primary | ICD-10-CM

## 2021-08-18 NOTE — TELEPHONE ENCOUNTER
Pt called asking if he can get COVID booster vaccine. I advised pt to have CBC drawn at 9/8 port flush visit to make sure CBC results are within normal range. Pt verbalized understanding and order was put in for cbc.

## 2021-08-18 NOTE — TELEPHONE ENCOUNTER
Caller: Artie Mc    Relationship: Self    Best call back number: [phone]    What is the best time to reach you: ASAP    Who are you requesting to speak with (clinical staff, provider,  specific staff member): NURSE    Do you know the name of the person who called:    What was the call regarding: COVID BOOSTER    Do you require a callback: YES

## 2021-08-30 NOTE — PROGRESS NOTES
"Pt here for Keytruda infusion with c/o continued SOA. When questioned about the prednisone RX from Dr. Jasso he admits to not using it at all. States, \" I know people who have went crazy on that stuff. I educated him on SE of this strength. YAZMIN Curiel NP consulted and she will call in inhaler for SOA , Pt encouraged to take steroids and use inhaler he VU.  I  "
Statement Selected

## 2021-09-08 ENCOUNTER — HOSPITAL ENCOUNTER (OUTPATIENT)
Dept: ONCOLOGY | Facility: HOSPITAL | Age: 71
Setting detail: INFUSION SERIES
Discharge: HOME | End: 2021-09-08

## 2021-09-08 VITALS — WEIGHT: 150.4 LBS | HEIGHT: 66 IN | BODY MASS INDEX: 24.17 KG/M2

## 2021-09-08 DIAGNOSIS — C16.0 MALIGNANT NEOPLASM OF CARDIA (HCC): ICD-10-CM

## 2021-09-08 DIAGNOSIS — C15.9 ESOPHAGEAL CANCER, STAGE IV (HCC): Primary | ICD-10-CM

## 2021-09-08 LAB
BASOPHILS # BLD AUTO: 0.06 10*3/MM3 (ref 0–0.2)
BASOPHILS NFR BLD AUTO: 1 % (ref 0–1.5)
DEPRECATED RDW RBC AUTO: 45.7 FL (ref 37–54)
EOSINOPHIL # BLD AUTO: 0.26 10*3/MM3 (ref 0–0.4)
EOSINOPHIL NFR BLD AUTO: 4.3 % (ref 0.3–6.2)
ERYTHROCYTE [DISTWIDTH] IN BLOOD BY AUTOMATED COUNT: 14.3 % (ref 12.3–15.4)
HCT VFR BLD AUTO: 33.6 % (ref 37.5–51)
HGB BLD-MCNC: 10.9 G/DL (ref 13–17.7)
LYMPHOCYTES # BLD AUTO: 1.37 10*3/MM3 (ref 0.7–3.1)
LYMPHOCYTES NFR BLD AUTO: 22.9 % (ref 19.6–45.3)
MCH RBC QN AUTO: 29.5 PG (ref 26.6–33)
MCHC RBC AUTO-ENTMCNC: 32.4 G/DL (ref 31.5–35.7)
MCV RBC AUTO: 91.1 FL (ref 79–97)
MONOCYTES # BLD AUTO: 0.71 10*3/MM3 (ref 0.1–0.9)
MONOCYTES NFR BLD AUTO: 11.9 % (ref 5–12)
NEUTROPHILS NFR BLD AUTO: 3.59 10*3/MM3 (ref 1.7–7)
NEUTROPHILS NFR BLD AUTO: 59.9 % (ref 42.7–76)
PLATELET # BLD AUTO: 211 10*3/MM3 (ref 140–450)
PMV BLD AUTO: 10.6 FL (ref 6–12)
RBC # BLD AUTO: 3.69 10*6/MM3 (ref 4.14–5.8)
WBC # BLD AUTO: 5.99 10*3/MM3 (ref 3.4–10.8)

## 2021-09-08 PROCEDURE — 36591 DRAW BLOOD OFF VENOUS DEVICE: CPT

## 2021-09-08 PROCEDURE — 85025 COMPLETE CBC W/AUTO DIFF WBC: CPT | Performed by: INTERNAL MEDICINE

## 2021-09-08 RX ORDER — SODIUM CHLORIDE 0.9 % (FLUSH) 0.9 %
20 SYRINGE (ML) INJECTION AS NEEDED
Status: DISCONTINUED | OUTPATIENT
Start: 2021-09-08 | End: 2021-09-09 | Stop reason: HOSPADM

## 2021-09-08 RX ORDER — SODIUM CHLORIDE 0.9 % (FLUSH) 0.9 %
20 SYRINGE (ML) INJECTION AS NEEDED
Status: CANCELLED | OUTPATIENT
Start: 2021-09-08

## 2021-09-08 RX ORDER — HEPARIN SODIUM (PORCINE) LOCK FLUSH IV SOLN 100 UNIT/ML 100 UNIT/ML
500 SOLUTION INTRAVENOUS AS NEEDED
Status: CANCELLED | OUTPATIENT
Start: 2021-09-08

## 2021-09-08 RX ADMIN — Medication 30 ML: at 09:05

## 2021-09-21 ENCOUNTER — OUTSIDE FACILITY SERVICE (OUTPATIENT)
Dept: CARDIOLOGY | Facility: CLINIC | Age: 71
End: 2021-09-21

## 2021-09-21 ENCOUNTER — CLINICAL SUPPORT NO REQUIREMENTS (OUTPATIENT)
Dept: CARDIOLOGY | Facility: CLINIC | Age: 71
End: 2021-09-21

## 2021-09-21 DIAGNOSIS — Z95.0 PRESENCE OF CARDIAC PACEMAKER: ICD-10-CM

## 2021-09-21 DIAGNOSIS — R00.1 BRADYCARDIA: Primary | ICD-10-CM

## 2021-09-21 PROCEDURE — 93280 PM DEVICE PROGR EVAL DUAL: CPT | Performed by: INTERNAL MEDICINE

## 2021-09-21 PROCEDURE — 99214 OFFICE O/P EST MOD 30 MIN: CPT | Performed by: INTERNAL MEDICINE

## 2021-09-22 DIAGNOSIS — R79.89 ELEVATED TSH: ICD-10-CM

## 2021-09-22 DIAGNOSIS — E03.9 HYPOTHYROIDISM (ACQUIRED): ICD-10-CM

## 2021-09-22 NOTE — TELEPHONE ENCOUNTER
Rx Refill Note  Requested Prescriptions     Pending Prescriptions Disp Refills   • Euthyrox 50 MCG tablet [Pharmacy Med Name: Euthyrox 50 MCG Oral Tablet] 90 tablet 0     Sig: Take 1 tablet by mouth once daily      Last office visit with prescribing clinician: 7/28/2021      Next office visit with prescribing clinician: Visit date not found            Amanuel Joseph RN  09/22/21, 10:16 EDT

## 2021-09-23 RX ORDER — LEVOTHYROXINE SODIUM 50 UG/1
TABLET ORAL
Qty: 90 TABLET | Refills: 0 | Status: SHIPPED | OUTPATIENT
Start: 2021-09-23 | End: 2021-12-17

## 2021-09-24 PROCEDURE — 93296 REM INTERROG EVL PM/IDS: CPT | Performed by: INTERNAL MEDICINE

## 2021-09-24 PROCEDURE — 93294 REM INTERROG EVL PM/LDLS PM: CPT | Performed by: INTERNAL MEDICINE

## 2021-10-20 ENCOUNTER — HOSPITAL ENCOUNTER (OUTPATIENT)
Dept: ONCOLOGY | Facility: HOSPITAL | Age: 71
Setting detail: INFUSION SERIES
Discharge: HOME | End: 2021-10-20

## 2021-10-20 VITALS — HEIGHT: 66 IN | BODY MASS INDEX: 24.46 KG/M2 | WEIGHT: 152.2 LBS

## 2021-10-20 DIAGNOSIS — C15.9 ESOPHAGEAL CANCER, STAGE IV (HCC): Primary | ICD-10-CM

## 2021-10-20 PROCEDURE — 96523 IRRIG DRUG DELIVERY DEVICE: CPT

## 2021-10-20 RX ORDER — SODIUM CHLORIDE 0.9 % (FLUSH) 0.9 %
20 SYRINGE (ML) INJECTION AS NEEDED
Status: DISCONTINUED | OUTPATIENT
Start: 2021-10-20 | End: 2021-10-21 | Stop reason: HOSPADM

## 2021-10-20 RX ORDER — SODIUM CHLORIDE 0.9 % (FLUSH) 0.9 %
20 SYRINGE (ML) INJECTION AS NEEDED
Status: CANCELLED | OUTPATIENT
Start: 2021-10-20

## 2021-10-20 RX ORDER — HEPARIN SODIUM (PORCINE) LOCK FLUSH IV SOLN 100 UNIT/ML 100 UNIT/ML
500 SOLUTION INTRAVENOUS AS NEEDED
Status: CANCELLED | OUTPATIENT
Start: 2021-10-20

## 2021-10-20 RX ADMIN — Medication 30 ML: at 09:07

## 2021-11-05 NOTE — PROGRESS NOTES
Hematology-Oncology Follow-up Note       Artie Mc  1950    Primary Care Physician: Chanel Carter APRN  Referring Physician: Chanel Carter APRN    Reason For Visit:  Chief Complaint   Patient presents with   • Follow-up     Esophageal cancer, stage IV     Metastatic esophageal cancer  Monitoring for adverse effects related to immunotherapy.  Hypothyroidism    HPI:   Mr. Mc is a pleasant 70-year-old gentleman with a history of gastroesophageal reflux disease, diabetes, hypertension, heart block status post pacemaker placement and history of CABG.  He was having symptoms of dysphagia, weight loss since September 2016. He was reporting symptoms of food getting stuck in the lower chest.  He has transitioned himself to a liquid diet.  The patient reports losing about 30 pounds over this duration.  The patient underwent upper GI endoscopy on 12/20/16 with Dr. Esvin Barrera.  Endoscopy showed necrotic, circumferential and moderately obstructive mass at the distal esophagus between 42 cm and 36 cm.      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  There was also evidence of mild subacute inflammation in the duodenum.  Dr. Barrera ordered a CT scan of the chest and abdomen with contrast on 12/20/16.  The scan was done at [facility].  The scan showed a new ill-defined mass in the anterior wall of the distal esophagus at the GE junction measuring 2.3 x 2.2 x 1.6 cm, worrisome for cancer.  There were four subcentimeter liver lesions which had appeared stable in comparison to another CT scan from 2009 and they likely represent small cysts.  There was also mild lymphadenopathy in the gastrohepatic ligament.  There were at least six borderline enlarged lymph nodes measuring up to 1.3 x 1 cm in the region.  The patient is referred to us for further oncological evaluation.    1/5/2017 - The patient presents for  initial consultation.  He reports persistent dysphagia and also has symptoms of regurgitation.  He cannot tolerate solid foods and is dependent on liquid diet such as Ensure.  He also reports fatigue. Symptoms of dysphagia have been present for the past four months.  When he eats any solids, the food gets stuck in the lower chest.    • 1/5/17 - Vitamin B12 200 (L).  CEA 0.8.  Ferritin 35.  Iron saturation 16% (L), serum iron 56, TIBC 354.  Creatinine 0.9.  LFTs normal.  Folate 10.2.  • 1/12/17 - PET/CT scan:  Intense abnormal activity corresponding to the region of the GE junction and proximal stomach.  SUV is 11.06.  There is a suggestion of an abnormal mass or abnormal wall thickening in the region measuring 3.9 x 4.8 cm.  No additional abnormalities.  No evidence of metastases or other lymphadenopathy.  Scattered nonspecific subcentimeter lymph nodes involving the mediastinum and within the central upper abdomen.  • 1/13/17 - Creatinine 0.9.  LFTs normal.    • 1/17/17 - Patient seen by thoracic surgeon, Dr. Bradley.  PET scan was reviewed.  He has recommended neoadjuvant chemoradiation therapy followed by Chester Gurwinder esophagogastrectomy.  Port-A-Cath is being arranged.    • 1/20/17 - WBC 6.3, hemoglobin 12.1, platelet count 198,000, MCV 86.4.  • 1/30/17 - Patient started weekly Carboplatin and Taxol (Carboplatin AUC 2 and Taxol 50 mg/M2).  Patient received Injectafer 750 mg.  WBC 5.8, hemoglobin 12.4, platelet count 244,000.   • 1/31/17 - Patient seen by Dr. Eduardo Oleary.  The plan was to give 5040 cGy in 28 fractions.   • 2/6/17 - WBC 4.4, hemoglobin 11.6, platelet count 201,000.  Patient received cycle 2 of weekly Carboplatin and Taxol (Carboplatin AUC 2 and Taxol 50 mg/M2).  • 2/13/17 - Patient received Carboplatin and Taxol weekly cycle 3.  • 2/13/17 - Patient started concurrent radiation therapy.  Total planned dose is 4140 cGy.    • 2/13/17 to 2/15/17 - Patient received 4140 cGy of neoadjuvant radiation.   Radiation was finished on 3/15/17.    • 2/20/17 - WBC 1.8, hemoglobin 10.6, platelet count 217,000.  Creatinine 0.5. Folate 15 (L).  Ferritin 361.  Haptoglobin 214.  Iron saturation 33%, TIBC 263, serum iron 88.    • 2/20/17 - Patient received Carboplatin and Taxol weekly cycle 4.   • 2/27/17 - WBC 3.7, hemoglobin 10.7, platelet count 177,000, MCV 85.3.    • 3/6/17 - Patient has received 16 out of the 23 planned radiation treatments.  Total planned dose is 4140 cGy.    • 3/6/17 - WBC 5.4, hemoglobin 11.2, platelet count 113,000.  • 3/6/17 - Patient received cycle 5 of weekly Carboplatin and Taxol.   • 3/13/17 - Patient received cycle 6 of weekly Carboplatin and Taxol.  WBC 3.9, hemoglobin 11.3, platelet count 93,000.     • 3/20/17 - WBC 1.4, hemoglobin 10.0, platelet count 118,000, MCV 86.1   • 3/27/17 - WBC 3.8, hemoglobin 9.5, platelet count 176,000, MCV 87.3.    • 4/7/17 - Stress test:  No evidence of reversible myocardial ischemia.  Normal left ventricular ejection fraction of 50%.    • 4/17/17 - Upper GI endoscopy by Dr. 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  The GE junction adenocarcinoma lesion seems to have a very good response to chemotherapy and radiation.  The lumen was open.  • 5/1/17 - WBC 7.0, hemoglobin 10.2, platelet count 175,000.    • 5/4/17 - Patient underwent Noel Gurwinder esophagectomy with pyloroplasty, feeding jejunostomy tube, abdominal and mediastinal lymph node dissection.    Surgical Pathology:  Moderately to poorly differentiated adenocarcinoma measuring 1.3 cm at the GE junction.  Resection margins are negative for malignancy.  Tumor margins are negative.  There is effective treatment response.  No evidence of lymphovascular invasion.  Staging is ypT3,pN1.  One out of fourteen lymph nodes involved.   • 5/22/17 - WBC 6.8, hemoglobin 10.3, platelet count 312,000.    • 6/19/17 - WBC 5.7, hemoglobin 10.5, platelet count 204,000, MCV 92.1.     • 7/6/17 - EGD:  Status post balloon dilation of esophageal stricture.  • 7/12/17 - EGD with balloon dilation of esophageal stricture.  • 7/26/17 - EGD and balloon dilation of esophageal stricture.  • 7/31/17 - PET/CT scan:  There is mild metabolic activity seen at the thoracic esophagus just at and below the surgical margins.  Some mild wall thickening.  This could be from recent surgery.  A residual cancer seems unlikely.  There is a precarinal lymph node with mild metabolic activity with SUV of 3 measuring 1 x 1.3 cm.  Again this appears to be nonspecific in my opinion.    • 8/7/17 - WBC 5.9, hemoglobin 11.9, platelet count 171,000, MCV 88.7.    • 10/19/17 - WBC 7.5, hemoglobin 11.8, platelet count 216,000.    • 1/8/18 - PET scan:  No evidence of residual disease or recurrent disease.  There is an esophageal stent in the mid esophagus with a large debris air level.  No evidence of any metastases in the chest, abdomen or pelvis.  Mild nodule uptake in the vocal cords with fullness in the right vocal cord.  This could be physiologic.    • 7/9/18 - CT scan of chest with contrast:  Prominent mediastinal lymph nodes appear stable since February.  Changes of gastric pull-through procedure for esophageal cancer again noted.  Tree-in-bud infiltrates in the left lung base, consistent with infection versus inflammation.    • 1/10/19 - CT chest, abdomen and pelvis with contrast:  No evidence of mets in the chest; however, interval development of metastatic lymphadenopathy in the left side of the retroperitoneum.  For instance, there is a 17 mm rounded lymph node, 10 mm lymph node and also a 14 mm lymph node.  These are all new.  Stable 9 mm hypodense lesion within the right hepatic lobe, which could reflect hemangioma or complex cyst.    • 1/30/19 - CT-guided core biopsy of retroperitoneal lymph node:  Poorly differentiated adenocarcinoma consistent with metastases from patient’s known esophageal primary.  CK20 positive,  CDX2 positive, cytokeratin 7 negative, TTF-1 negative.    • 2/1/19 - Creatinine 0.9, BUN 18, calcium 9.3; these are normal.    • 2/28/19 - [facility] Next Gen sequencing testing on retroperitoneal lymph node specimen:  PD-L1 positive.  CPS = 3.  This implies responsiveness to pembrolizumab.  Mismatch repair status is proficient (no evidence of microsatellite instability).  Tumor mutational burden is low = 6 mutations/Mb.  CDH1 indeterminate.  KRAS mutation positive.  TP53 mutation positive.  CDK6 amplified.  [facility] PD-L1 testing by IHC:  PD-L1 expression 1% positive (CPS =1).  HER2/marvel by IHC is negative.  HER2/marvel by CISH not amplified.  Microsatellite stable tumor.    • 3/7/19 - WBC 7.3, hemoglobin 12, platelet count 128,000, MCV 92.     • 3/14/19 - Patient was seen by radiation oncologist, Dr. Chahal.  He has recommended observation versus systemic chemoimmunotherapy as needed.  No plans for radiation therapy.   • 5/1/19 - CT scan of chest with contrast:  No definitive findings of new metastatic disease in the chest.  Emphysema noted.    • 5/1/19 - CT abdomen and pelvis:  There is interval progression of metastatic retroperitoneal adenopathy.  Left periaortic adenopathy with lymph node measuring up to 2 cm, previously 1.7 cm.  Another lymph node now measures 2.3, previously 1.4 cm.  New enlarged lymph node on image 147 measures 1.4 cm, previously 0.4 cm.  A 9 mm hypodense right hepatic lobe lesion appears stable.    • 5/9/19 - Decision made to start patient on immunotherapy Keytruda.    • 5/9/19 - Vitamin B12 229.  Ferritin 61.  Folate 14.3.  Iron saturation 26%, TIBC 304, serum iron 79.  • 5/31/19 - Patient received Keytruda 200 mg cycle 1, day 1.    • 5/31/19 - Free T4 0.99.  Creatinine 0.9, BUN 9.  LFTs normal.  TSH 3.99.  Folate 14.3.  Iron saturation 26%.    • 6/21/2019 patient received Keytruda cycle 2  • 7/12/2019: Patient received Keytruda cycle 3  • 8/2/2019 patient received cycle 4 Keytruda WBC 7.2,  hemoglobin 11.8 platelet count 163  • 8/23/2019 creatinine 0.9, LFTs normal, WBC 6.4, hemoglobin 11.5, platelets 179, TSH 2.1   • 8/23/2019 patient received cycle 5 of Keytruda  • 9/3/2019 CT chest abdomen and pelvis with contrast: . Positive response to therapy in the abdomen since 05/01/2019. Pathologically enlarged retroperitoneal lymph nodes, predominantly in the left periaortic region, have diminished in size.a 1.3 x 2.1 cm left periaortic node (image 60), previously measured 3.5 x 2.3 cm. A 1.6 cm index node near the level of the left renal vein previously measured 2.0 cm No new or progressive adenopathy. 2. Stable findings in the chest. Noncalcified right upper lobe nodules are unchanged. There is no evidence of new or progressive metastatic disease within the chest. 3. 8 mm indeterminate low-density lesion in the right hepatic lobe, is stable. No new liver lesions.  • 9/6/2019 WBC 5.6, hemoglobin 11.2, platelets of 192, MCV 91.8  • 9/13/2019 patient received Keytruda cycle 6, albumin 3.2  • 10/4/2019 patient received cycle 7 of Keytruda, TSH 4.8, free T4 0.86  • 10/9/2019 WBC 5.8, hemoglobin 11.7, platelets 174  • 10/25/2019 patient received cycle 8 of Keytruda.  WBC 5.9, hemoglobin 11.5, platelets 152, creatinine 0.75, cortisol 15.2, TSH 6.48 high , free T4 1.13 normal  • 11/6/2019 WBC 6.6, hemoglobin 11.8, MCV 91.5, platelets 187  • 11/15/2019 patient received cycle 9 of Keytruda, WBC 6.6, hemoglobin 11.9, platelets 161, cortisol level 8.86, TSH 2.68, free T3 2.8, free T4 1.5  • 12/4/2019 WBC 6.9, hemoglobin 12.0, platelets 180, MCV 92.9  • 12/06/2019-Keytruda Cycle 10  • 12/27/2019- Cycle 11 LFTs normal, WBC 6.7, hemoglobin 11.4, platelets 168, MCV 93, TSH 2.4,  • 1/28/2020: CT chest abdomen and pelvis with contrast:  Single (aortic lymph node in the abdominal retroperitoneum has increased.  Rest of the retroperitoneal lymph nodes have decreased in size.  Mediastinal adenopathy appears unchanged.   Noncalcified bilateral pulmonary nodules are stable  • 1/17/2020 patient received Keytruda cycle 12:.  • 2/3/2020 WBC 7.1, hemoglobin 12.2, platelets 171  • 02/10/2020 patient received cycle 13 of Keytruda: WBC 7.3, hemoglobin 12.1, platelets 166, creatinine 0.82  • 3/6/2020 patient is of Keytruda 200 mg, hemoglobin 10.8  • 3/27/2020 patient received Keytruda 200 mg, hemoglobin 11.4, TSH 1.95  • 4/17/2020 patient received Keytruda cycle 16 200 mg, WBC 6.1, hemoglobin 11.6, platelets 150  • 5/8/2020: Patient received cycle 17 Keytruda, WBC 6.3, hemoglobin 9.6, platelets 137, TSH 2.05, free T4 1.54, creatinine 0.93, LFTs normal,  • 5/29/2020: Patient received cycle 18 Keytruda, WBC 6.5, hemoglobin 11.7, platelets 129, creatinine 0.83, TSH 1.69  • 6/26/2020: Patient receiving Keytruda.   • 7/17/2020: TSH 2.06, WBC 5.7, hemoglobin 11.4, platelets 177, MCV 92.4, Keytruda 200 mg cycle 20  • 7/27/2020: CT chest abdomen pelvis with contrast: Stable findings in the chest abdomen pelvis since 1/28/2020.  Noncalcified bilateral pulmonary nodules are stable.  Stable retroperitoneal left perinephric adenopathy in the abdomen.  • 8/7/2020: Patient received cycle 21 of Keytruda  • 8/28/2020 patient is a cycle 22 of Keytruda.  WBC 6.02, hemoglobin 11.3, platelets 161  • 9/11/2020 WBC 6.9, hemoglobin 12.4, platelets 192  • 9/18/2020: Cycle 23 of Keytruda, TSH 1.19  • 10/9/2020: Cycle 24 of Keytruda, WBC 5.9, hemoglobin 11.9, platelets 178, creatinine 0.84, TSH 2.56, free T3 2.4, CMP normal  • 10/23/2020: WBC 7.34, hemoglobin 11.6, platelets 175  • 10/30/2020:.  Keytruda 200 mg  • 11/20/2020: Keytruda 200 mg  • 12/11/2020 Keytruda 200 mg.  WBC 5.5, hemoglobin 11.4, platelets 176, creatinine 0.8, LFTs normal, TSH 2.2, cortisol 8.3  • 12/10/2020: CT chest abdomen and pelvis with contrast: Previously seen left para-aortic lymph nodes within the abdomen are decreased in size.  No pathological lymphadenopathy.  No evidence of residual  malignancy..  Small noncalcified pulmonary nodules are stable.  No new nodules.  Stable mediastinal lymph nodes.  • 3/12/2021: WBC 6.7, hemoglobin 11.3, platelets 17  • 3/12/2021: CMP normal, TSH 2.9, cortisol 11.95, 1  • 3/15/2021: CT chest with contrast: Stable findings in the chest with postsurgical changes of esophageal resection and gastric pull-through.  Stable right lower lobe lung nodule measures 11 mm.  Stable size and appearance of the mediastinal and upper abdominal lymph nodes.  • 3/23/2021: Doppler of upper extremity: Normal.  • 3/24/2021: CT abdomen and pelvis with contrast: Postoperative changes.  Left periaortic nodes are stable.  • 4/12/2021: TSH 4.1, cortisol 12.4  • 6/22/2021: CMP normal,Cortisol 10.08,, WBC 5.8, hemoglobin 11.2, platelets 109, TSH 2.59  • 7/12/2021: CT scan of the chest abdomen pelvis with contrast: Dominant 10 x 10 left periaortic lymph node is stable since 3/22/2021.  Dominant lymph nodes in the right lower paratracheal and AP window distribution are stable since 3/15/2021.  No evidence of progressive adenopathy in the chest abdomen or pelvis.      Subjective:    Patient in for follow-up.  Patient has had shortness of breath.  He thinks that this is getting little bit worse.  No chest pain other than right-sided chronic pain since his surgery.  No weight loss no other new concerning symptoms.  Still has symptoms of heartburn.    The following portions of the patient's history were reviewed and updated as appropriate: allergies, current medications, past family history, past medical history, past social history, past surgical history and problem list.     Past Medical History:   Diagnosis Date   • B12 deficiency    • Blockage of coronary artery of heart (HCC)    • Depression    • DM (diabetes mellitus) (HCC)    • Dysphagia    • HTN (hypertension)    • Hyperlipidemia        Past Surgical History:   Procedure Laterality Date   • ABDOMINAL WALL ABSCESS INCISION AND DRAINAGE   10/22/2018    WITH DEBRIDEMENT  1.5CM X 1.5 CM X 1.5 CM    DR. PURI   • CATARACT EXTRACTION  2018   • COLONOSCOPY     • CORONARY ARTERY BYPASS GRAFT  2015   • ENDOSCOPY  12/20/2016   • ESOPHAGECTOMY  05/04/2017    BROOKS PETTY ESOPHAGECTOMY, FEEDING JEJNOSTOMOY, ABDOMINAL AND MEDIASTINAL LYMPH NODE DISECTION, PEDICLED MUSCLE FLAP COVERAGE DR. PURI   • ESOPHAGOSCOPY / EGD  07/12/2017    WITH BALLOON DILATION   • ESOPHAGOSCOPY / EGD  07/26/2017    WITH BALLOON DILATION   • ESOPHAGOSCOPY / EGD  08/11/2017    WITH BALLOON DILATION   • ESOPHAGOSCOPY / EGD  08/28/2017    WITH BALLOON DILATION   • ESOPHAGOSCOPY / EGD  09/27/2017    WITH BALLOON DILATION   • LYMPH NODE BIOPSY  01/30/2019   • PACEMAKER IMPLANTATION  11/21/2016       Current Outpatient Medications:   •  Cetirizine HCl (ZyrTEC Allergy) 10 MG capsule, Take  by mouth., Disp: , Rfl:   •  clonazePAM (KlonoPIN) 0.5 MG tablet, Pt taking 1.5 pills daily, Disp: , Rfl: 1  •  cyanocobalamin (VITAMIN B-12) 1000 MCG tablet, , Disp: , Rfl:   •  diphenhydrAMINE-APAP, sleep, (TYLENOL PM EXTRA STRENGTH PO), Take  by mouth., Disp: , Rfl:   •  Euthyrox 50 MCG tablet, Take 1 tablet by mouth once daily, Disp: 90 tablet, Rfl: 0  •  folic acid (FOLVITE) 1 MG tablet, Daily., Disp: , Rfl:   •  metFORMIN (GLUCOPHAGE) 500 MG tablet, Take 500 mg by mouth 2 (Two) Times a Day., Disp: , Rfl: 4  •  metoprolol tartrate (LOPRESSOR) 25 MG tablet, Take 25 mg by mouth 2 (Two) Times a Day., Disp: , Rfl:   •  omeprazole (priLOSEC) 40 MG capsule, Take 1 capsule by mouth twice daily, Disp: 180 capsule, Rfl: 1  •  ondansetron (ZOFRAN) 4 MG tablet, Take 1 tablet by mouth Every 8 (Eight) Hours As Needed for Nausea or Vomiting., Disp: 90 tablet, Rfl: 2  •  pravastatin (PRAVACHOL) 10 MG tablet, Take 20 mg by mouth every night at bedtime., Disp: , Rfl:     Allergies   Allergen Reactions   • Ace Inhibitors Unknown (See Comments)     Unknown reaction     • Heparin Other (See Comments)      "Fever/chills, weakness in legs   • Sulfa Antibiotics Other (See Comments)     Mouth sores       Family History   Problem Relation Age of Onset   • Mental illness Mother    • Heart disease Mother    • Hypertension Father    • Cancer Father    • Heart disease Sister    • Heart disease Brother      Cancer-related family history includes Cancer in his father.    Social History     Tobacco Use   • Smoking status: Former Smoker     Packs/day: 2.00     Years: 50.00     Pack years: 100.00     Types: Cigarettes     Quit date: 2015     Years since quittin.3   • Smokeless tobacco: Former User     Types: Chew     Quit date: 1989   Substance Use Topics   • Alcohol use: No   • Drug use: No     ROS:     Objective:    Vitals:    21 0902   BP: 117/70   Pulse: 74   Resp: 18   Temp: 97.1 °F (36.2 °C)   SpO2: 98%   Weight: 68.9 kg (152 lb)   Height: 167.6 cm (66\")   PainSc: 0-No pain       (1) Restricted in physically strenuous activity, ambulatory and able to do work of light nature    Physical Exam:     Physical Exam   Constitutional: He is oriented to person, place, and time. He appears well-developed. No distress.   HENT:   Head: Normocephalic and atraumatic.   Eyes: Conjunctivae are normal. Right eye exhibits no discharge. Left eye exhibits no discharge. No scleral icterus.   Neck: No thyromegaly present.   Cardiovascular: Normal rate, regular rhythm and normal heart sounds. Exam reveals no gallop and no friction rub.   Pulmonary/Chest: Effort normal and breath sounds normal. No stridor. No respiratory distress. He has no wheezes.   Abdominal: Soft. Normal appearance and bowel sounds are normal. He exhibits no mass. There is no abdominal tenderness. There is no rebound and no guarding.   No tenderness   Musculoskeletal: Normal range of motion. No deformity or signs of injury.      Right lower leg: Edema present.      Comments: Swelling upper extremity   Lymphadenopathy:     He has no cervical adenopathy. "   Neurological: He is alert and oriented to person, place, and time. He exhibits normal muscle tone.   Skin: Skin is warm. No rash noted. He is not diaphoretic. No erythema. No jaundice.   Right side chest  port   Psychiatric: His behavior is normal. Mood normal.   Nursing note and vitals reviewed.    I have reexamined the patient and the results are consistent with the previously documented exam. Jean Pierre Elizondo MD       Lab Results - Last 18 Months   Lab Units 09/08/21  0905 07/28/21  0950 06/22/21  1145   WBC 10*3/mm3 5.99 7.20 5.81   HEMOGLOBIN g/dL 10.9* 10.9* 11.2*   HEMATOCRIT % 33.6* 32.8* 34.2*   PLATELETS 10*3/mm3 211 185 109*   MCV fL 91.1 91.6 92.9     Lab Results - Last 18 Months   Lab Units 07/12/21  1006 06/22/21  1144 04/22/21  1004 04/12/21  0950 04/12/21  0950 03/12/21  0945 03/12/21  0945   SODIUM mmol/L  --  138 138  --  138   < > 139   POTASSIUM mmol/L  --  5.1 4.2  --  5.4*   < > 4.9   CHLORIDE mmol/L  --  105 104  --  102   < > 103   CO2 mmol/L  --  23.0 23.0  --  23.0   < > 27.0   BUN mg/dL  --  17 15  --  13   < > 13   CREATININE mg/dL 0.90 0.82 0.90   < > 0.92   < > 0.88   CALCIUM mg/dL  --  8.6 9.6  --  9.4   < > 8.6   BILIRUBIN mg/dL  --  0.3  --   --  0.2  --  0.3   ALK PHOS U/L  --  41  --   --  43  --  26*   ALT (SGPT) U/L  --  7  --   --  7  --  <5   AST (SGOT) U/L  --  15  --   --  15  --  12   GLUCOSE mg/dL  --  146* 132*  --  152*   < > 119*    < > = values in this interval not displayed.     Assessment/Plan     Assessment:  1. Metastatic esophageal carcinoma:  Patient has a history of GE junction poorly differentiated adenocarcinoma for which he had chemoradiation followed by Noel Gurwinder resection:  Pathology showed ypT3,pN1 disease.  Tumor was Stage IIIA, diagnosed in 2016.  He received concurrent chemoradiation with weekly Carboplatin and Taxol and radiation finishing on 3/15/17.    CT scan on 1/10/19 showed new retroperitoneal lymph nodes.  These lymph nodes were biopsied on  1/30/19 and it shows metastatic esophageal cancer.  [facility] Next Gen testing was performed on the sample and it shows KRAS mutation, microsatellite stable tumor.  PD-L1 is positive.  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  HER2/marvel (ERBB2) was not amplified and was negative.  His CT scan on 5/1/19 shows evidence of progression.  Patient has started receiving Keytruda on 5/31/19.  He has received 19 cycles so far.   CT scan on 1/28/2020 shows improvement and continued response.  CT scan chest abdomen pelvis with contrast on 12/10/2020 does not show any evidence of disease progression.  On 12/22/2020 decision made to hold Keytruda per patient's request to get a treatment break.. Repeat CT scan chest 7/12/2021 does not show any evidence of disease progression.  With stable disease for the last year off treatment we can consider repeating CT images in 6 months from prior.  This will be in January 2021.  I will repeat his scans and follow-up after.  Today I am not concerned about any symptoms or signs which could suggest disease worsening.    2. Reflux/heartburn/dysphagia: Symptoms improved.  Also has a history of strictures.Status post EGD and balloon dilation.  Omeprazole does not help him significantly we will decrease the dose to once a day.  He takes over-the-counter famotidine which helps continue same  3. 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  Continues on Synthroid  4. Mild diarrhea:- diarrhea seems to be waxing and waning in nature.  Related to immunotherapy, but not severe.  5. PD-L1 positive, HER2 negative, p53 and KRAS positive.    6. B12 deficiency: Continue B12 1000 mcg twice daily.  7. Shortness of breath: Patient has had atelectasis which could be contributing to shortness of breath.  Recommended incentive spirometry which patient has at home.  If does not improve would consider discussing with cardiologist if any further work-up is needed from that standpoint.    PLAN:  1. Continue to hold Keytruda.  Repeat CT  imaging in 2 months in follow-up.  2. Check TSH and will adjust the dose of levothyroxine.    3. Continue to monitor TSH,  4. Continue B12 supplements.  5. Continue folic acid supplement also.  6. Incentive spirometry  7. Follow-up in 2 months repeat CT imaging.    I have reviewed and confirmed the accuracy of the patient's history: Chief complaint, HPI, ROS and Subjective as entered by the MA/LPN/RN.     Jean Pierre Elizondo MD 11/09/21     Orders Placed This Encounter   Procedures   • CT chest w contrast     Standing Status:   Future     Standing Expiration Date:   11/9/2022   • CT abdomen pelvis w contrast     Standing Status:   Future     Standing Expiration Date:   11/9/2022     Order Specific Question:   Will Oral Contrast be needed for this procedure?     Answer:   No     Order Specific Question:   Release to patient     Answer:   Immediate   • Comprehensive Metabolic Panel     Order Specific Question:   Release to patient     Answer:   Immediate   • Comprehensive Metabolic Panel     Standing Status:   Future     Standing Expiration Date:   11/9/2022     Order Specific Question:   Release to patient     Answer:   Immediate   • CBC & Differential   • CBC & Differential     Standing Status:   Future     Standing Expiration Date:   11/9/2022

## 2021-11-09 ENCOUNTER — APPOINTMENT (OUTPATIENT)
Dept: LAB | Facility: HOSPITAL | Age: 71
End: 2021-11-09

## 2021-11-09 ENCOUNTER — OFFICE VISIT (OUTPATIENT)
Dept: ONCOLOGY | Facility: CLINIC | Age: 71
End: 2021-11-09

## 2021-11-09 VITALS
WEIGHT: 152 LBS | SYSTOLIC BLOOD PRESSURE: 117 MMHG | RESPIRATION RATE: 18 BRPM | BODY MASS INDEX: 24.43 KG/M2 | HEIGHT: 66 IN | TEMPERATURE: 97.1 F | HEART RATE: 74 BPM | OXYGEN SATURATION: 98 % | DIASTOLIC BLOOD PRESSURE: 70 MMHG

## 2021-11-09 DIAGNOSIS — R53.83 OTHER FATIGUE: ICD-10-CM

## 2021-11-09 DIAGNOSIS — C16.0 MALIGNANT NEOPLASM OF CARDIA (HCC): ICD-10-CM

## 2021-11-09 DIAGNOSIS — C15.9 ESOPHAGEAL CANCER, STAGE IV (HCC): Primary | ICD-10-CM

## 2021-11-09 DIAGNOSIS — E03.9 HYPOTHYROIDISM (ACQUIRED): ICD-10-CM

## 2021-11-09 DIAGNOSIS — R79.89 ELEVATED TSH: ICD-10-CM

## 2021-11-09 LAB
ALBUMIN SERPL-MCNC: 4.4 G/DL (ref 3.5–5.2)
ALBUMIN/GLOB SERPL: 1.8 G/DL
ALP SERPL-CCNC: 33 U/L (ref 39–117)
ALT SERPL W P-5'-P-CCNC: 9 U/L (ref 1–41)
ANION GAP SERPL CALCULATED.3IONS-SCNC: 16 MMOL/L (ref 5–15)
AST SERPL-CCNC: 13 U/L (ref 1–40)
BASOPHILS # BLD AUTO: 0.04 10*3/MM3 (ref 0–0.2)
BASOPHILS NFR BLD AUTO: 0.6 % (ref 0–1.5)
BILIRUB SERPL-MCNC: 0.2 MG/DL (ref 0–1.2)
BUN SERPL-MCNC: 25 MG/DL (ref 8–23)
BUN/CREAT SERPL: 23.8 (ref 7–25)
CALCIUM SPEC-SCNC: 9.3 MG/DL (ref 8.6–10.5)
CHLORIDE SERPL-SCNC: 104 MMOL/L (ref 98–107)
CO2 SERPL-SCNC: 20 MMOL/L (ref 22–29)
CREAT SERPL-MCNC: 1.05 MG/DL (ref 0.76–1.27)
DEPRECATED RDW RBC AUTO: 50.1 FL (ref 37–54)
EOSINOPHIL # BLD AUTO: 0.31 10*3/MM3 (ref 0–0.4)
EOSINOPHIL NFR BLD AUTO: 4.8 % (ref 0.3–6.2)
ERYTHROCYTE [DISTWIDTH] IN BLOOD BY AUTOMATED COUNT: 15.4 % (ref 12.3–15.4)
GFR SERPL CREATININE-BSD FRML MDRD: 69 ML/MIN/1.73
GLOBULIN UR ELPH-MCNC: 2.5 GM/DL
GLUCOSE SERPL-MCNC: 108 MG/DL (ref 65–99)
HCT VFR BLD AUTO: 35.3 % (ref 37.5–51)
HGB BLD-MCNC: 11.3 G/DL (ref 13–17.7)
LYMPHOCYTES # BLD AUTO: 1.74 10*3/MM3 (ref 0.7–3.1)
LYMPHOCYTES NFR BLD AUTO: 27.1 % (ref 19.6–45.3)
MCH RBC QN AUTO: 28.8 PG (ref 26.6–33)
MCHC RBC AUTO-ENTMCNC: 32 G/DL (ref 31.5–35.7)
MCV RBC AUTO: 90.1 FL (ref 79–97)
MONOCYTES # BLD AUTO: 0.81 10*3/MM3 (ref 0.1–0.9)
MONOCYTES NFR BLD AUTO: 12.6 % (ref 5–12)
NEUTROPHILS NFR BLD AUTO: 3.52 10*3/MM3 (ref 1.7–7)
NEUTROPHILS NFR BLD AUTO: 54.9 % (ref 42.7–76)
PLATELET # BLD AUTO: 204 10*3/MM3 (ref 140–450)
PMV BLD AUTO: 9.7 FL (ref 6–12)
POTASSIUM SERPL-SCNC: 5.2 MMOL/L (ref 3.5–5.2)
PROT SERPL-MCNC: 6.9 G/DL (ref 6–8.5)
RBC # BLD AUTO: 3.92 10*6/MM3 (ref 4.14–5.8)
SODIUM SERPL-SCNC: 140 MMOL/L (ref 136–145)
T3FREE SERPL-MCNC: 2.51 PG/ML (ref 2–4.4)
T4 FREE SERPL-MCNC: 1.43 NG/DL (ref 0.93–1.7)
TSH SERPL DL<=0.05 MIU/L-ACNC: 1.88 UIU/ML (ref 0.27–4.2)
WBC # BLD AUTO: 6.42 10*3/MM3 (ref 3.4–10.8)

## 2021-11-09 PROCEDURE — 36415 COLL VENOUS BLD VENIPUNCTURE: CPT | Performed by: INTERNAL MEDICINE

## 2021-11-09 PROCEDURE — 84439 ASSAY OF FREE THYROXINE: CPT | Performed by: INTERNAL MEDICINE

## 2021-11-09 PROCEDURE — 99214 OFFICE O/P EST MOD 30 MIN: CPT | Performed by: INTERNAL MEDICINE

## 2021-11-09 PROCEDURE — 80053 COMPREHEN METABOLIC PANEL: CPT | Performed by: INTERNAL MEDICINE

## 2021-11-09 PROCEDURE — 84481 FREE ASSAY (FT-3): CPT | Performed by: INTERNAL MEDICINE

## 2021-11-09 PROCEDURE — 85025 COMPLETE CBC W/AUTO DIFF WBC: CPT | Performed by: INTERNAL MEDICINE

## 2021-11-09 PROCEDURE — 84443 ASSAY THYROID STIM HORMONE: CPT | Performed by: INTERNAL MEDICINE

## 2021-12-01 ENCOUNTER — HOSPITAL ENCOUNTER (OUTPATIENT)
Dept: ONCOLOGY | Facility: HOSPITAL | Age: 71
Setting detail: INFUSION SERIES
Discharge: HOME | End: 2021-12-01

## 2021-12-01 VITALS — WEIGHT: 148.8 LBS | BODY MASS INDEX: 23.91 KG/M2 | HEIGHT: 66 IN

## 2021-12-01 DIAGNOSIS — C15.9 ESOPHAGEAL CANCER, STAGE IV (HCC): Primary | ICD-10-CM

## 2021-12-01 PROCEDURE — 96523 IRRIG DRUG DELIVERY DEVICE: CPT

## 2021-12-01 RX ORDER — HEPARIN SODIUM (PORCINE) LOCK FLUSH IV SOLN 100 UNIT/ML 100 UNIT/ML
500 SOLUTION INTRAVENOUS AS NEEDED
Status: CANCELLED | OUTPATIENT
Start: 2021-12-01

## 2021-12-01 RX ORDER — SODIUM CHLORIDE 0.9 % (FLUSH) 0.9 %
20 SYRINGE (ML) INJECTION AS NEEDED
Status: DISCONTINUED | OUTPATIENT
Start: 2021-12-01 | End: 2021-12-02 | Stop reason: HOSPADM

## 2021-12-01 RX ORDER — SODIUM CHLORIDE 0.9 % (FLUSH) 0.9 %
20 SYRINGE (ML) INJECTION AS NEEDED
Status: CANCELLED | OUTPATIENT
Start: 2021-12-01

## 2021-12-01 RX ADMIN — Medication 30 ML: at 08:56

## 2021-12-16 DIAGNOSIS — C15.9 ESOPHAGEAL CANCER, STAGE IV (HCC): ICD-10-CM

## 2021-12-16 DIAGNOSIS — E03.9 HYPOTHYROIDISM (ACQUIRED): ICD-10-CM

## 2021-12-16 DIAGNOSIS — R79.89 ELEVATED TSH: ICD-10-CM

## 2021-12-16 DIAGNOSIS — R13.19 ESOPHAGEAL DYSPHAGIA: ICD-10-CM

## 2021-12-17 RX ORDER — LEVOTHYROXINE SODIUM 50 UG/1
TABLET ORAL
Qty: 90 TABLET | Refills: 0 | Status: SHIPPED | OUTPATIENT
Start: 2021-12-17 | End: 2022-02-02

## 2021-12-17 RX ORDER — OMEPRAZOLE 40 MG/1
40 CAPSULE, DELAYED RELEASE ORAL 2 TIMES DAILY
Qty: 180 CAPSULE | Refills: 1 | Status: SHIPPED | OUTPATIENT
Start: 2021-12-17 | End: 2022-01-12 | Stop reason: DRUGHIGH

## 2021-12-24 PROCEDURE — 93294 REM INTERROG EVL PM/LDLS PM: CPT | Performed by: INTERNAL MEDICINE

## 2021-12-24 PROCEDURE — 93296 REM INTERROG EVL PM/IDS: CPT | Performed by: INTERNAL MEDICINE

## 2022-01-03 ENCOUNTER — HOSPITAL ENCOUNTER (OUTPATIENT)
Dept: PET IMAGING | Facility: HOSPITAL | Age: 72
Discharge: HOME | End: 2022-01-03
Admitting: INTERNAL MEDICINE

## 2022-01-03 DIAGNOSIS — C16.0 MALIGNANT NEOPLASM OF CARDIA: ICD-10-CM

## 2022-01-03 DIAGNOSIS — C15.9 ESOPHAGEAL CANCER, STAGE IV: ICD-10-CM

## 2022-01-03 LAB — CREAT BLDA-MCNC: 0.8 MG/DL (ref 0.6–1.3)

## 2022-01-03 PROCEDURE — 74177 CT ABD & PELVIS W/CONTRAST: CPT

## 2022-01-03 PROCEDURE — 71260 CT THORAX DX C+: CPT

## 2022-01-03 PROCEDURE — 0 IOPAMIDOL PER 1 ML: Performed by: INTERNAL MEDICINE

## 2022-01-03 PROCEDURE — 82565 ASSAY OF CREATININE: CPT

## 2022-01-03 RX ADMIN — IOPAMIDOL 100 ML: 755 INJECTION, SOLUTION INTRAVENOUS at 10:46

## 2022-01-04 ENCOUNTER — TELEPHONE (OUTPATIENT)
Dept: ONCOLOGY | Facility: CLINIC | Age: 72
End: 2022-01-04

## 2022-01-04 DIAGNOSIS — C15.9 ESOPHAGEAL CANCER, STAGE IV: Primary | ICD-10-CM

## 2022-01-04 DIAGNOSIS — C16.0 MALIGNANT NEOPLASM OF CARDIA: ICD-10-CM

## 2022-01-04 NOTE — TELEPHONE ENCOUNTER
----- Message from Jean Pierre Elizondo MD sent at 1/3/2022  4:26 PM EST -----  We need to order a PET/CT.

## 2022-01-04 NOTE — TELEPHONE ENCOUNTER
Called to let the PT know we would be ordering a PET/CT for him. He v/u and had no other questions. Orders placed

## 2022-01-06 ENCOUNTER — TELEPHONE (OUTPATIENT)
Dept: ONCOLOGY | Facility: CLINIC | Age: 72
End: 2022-01-06

## 2022-01-06 NOTE — TELEPHONE ENCOUNTER
Called scheduling to get appointment for PET.  Appointment given for 1/11/22 at 1200, be here at 1130.  Patient to have water only that morning.  Patient notified and v/u.

## 2022-01-06 NOTE — TELEPHONE ENCOUNTER
Provider: DR SAAVEDRA    Caller: DOROTHEA    Relationship to Patient: SELF    Reason for Call: DOROTHEA HAS A REFERRAL TO GET A PET SCAN.  HE WOULD LIKE [facility] DONE AT [facility]. AND HE IS WANTING TO KNOW IF DR SAAVEDRA WANTS IT DONE BEFORE HE SEES HIM IN 1-12.    PLEASE ADVISE

## 2022-01-11 ENCOUNTER — HOSPITAL ENCOUNTER (OUTPATIENT)
Dept: PET IMAGING | Facility: HOSPITAL | Age: 72
Discharge: HOME | End: 2022-01-11
Admitting: INTERNAL MEDICINE

## 2022-01-11 DIAGNOSIS — C16.0 MALIGNANT NEOPLASM OF CARDIA: ICD-10-CM

## 2022-01-11 DIAGNOSIS — C15.9 ESOPHAGEAL CANCER, STAGE IV: ICD-10-CM

## 2022-01-11 LAB — GLUCOSE BLDC GLUCOMTR-MCNC: 104 MG/DL (ref 70–105)

## 2022-01-11 PROCEDURE — 82962 GLUCOSE BLOOD TEST: CPT

## 2022-01-11 PROCEDURE — 78815 PET IMAGE W/CT SKULL-THIGH: CPT

## 2022-01-11 PROCEDURE — 0 FLUDEOXYGLUCOSE F18 SOLUTION: Performed by: INTERNAL MEDICINE

## 2022-01-11 PROCEDURE — A9552 F18 FDG: HCPCS | Performed by: INTERNAL MEDICINE

## 2022-01-11 RX ADMIN — FLUDEOXYGLUCOSE F18 1 DOSE: 300 INJECTION INTRAVENOUS at 11:39

## 2022-01-11 NOTE — PROGRESS NOTES
Hematology-Oncology Follow-up Note       Artie Mc  1950    Primary Care Physician: Chanel Carter APRN  Referring Physician: Chanel Carter APRN    Reason For Visit:  Chief Complaint   Patient presents with   • Follow-up     Esophageal cancer, stage IV      Metastatic esophageal cancer  Monitoring for adverse effects related to immunotherapy.  Hypothyroidism    HPI:   Mr. Mc is a pleasant 70-year-old gentleman with a history of gastroesophageal reflux disease, diabetes, hypertension, heart block status post pacemaker placement and history of CABG.  He was having symptoms of dysphagia, weight loss since September 2016. He was reporting symptoms of food getting stuck in the lower chest.  He has transitioned himself to a liquid diet.  The patient reports losing about 30 pounds over this duration.  The patient underwent upper GI endoscopy on 12/20/16 with Dr. Esvin Barrera.  Endoscopy showed necrotic, circumferential and moderately obstructive mass at the distal esophagus between 42 cm and 36 cm.      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  There was also evidence of mild subacute inflammation in the duodenum.  Dr. Barrera ordered a CT scan of the chest and abdomen with contrast on 12/20/16.  The scan was done at [facility].  The scan showed a new ill-defined mass in the anterior wall of the distal esophagus at the GE junction measuring 2.3 x 2.2 x 1.6 cm, worrisome for cancer.  There were four subcentimeter liver lesions which had appeared stable in comparison to another CT scan from 2009 and they likely represent small cysts.  There was also mild lymphadenopathy in the gastrohepatic ligament.  There were at least six borderline enlarged lymph nodes measuring up to 1.3 x 1 cm in the region.  The patient is referred to us for further oncological evaluation.    1/5/2017 - The patient presents for  initial consultation.  He reports persistent dysphagia and also has symptoms of regurgitation.  He cannot tolerate solid foods and is dependent on liquid diet such as Ensure.  He also reports fatigue. Symptoms of dysphagia have been present for the past four months.  When he eats any solids, the food gets stuck in the lower chest.    • 1/5/17 - Vitamin B12 200 (L).  CEA 0.8.  Ferritin 35.  Iron saturation 16% (L), serum iron 56, TIBC 354.  Creatinine 0.9.  LFTs normal.  Folate 10.2.  • 1/12/17 - PET/CT scan:  Intense abnormal activity corresponding to the region of the GE junction and proximal stomach.  SUV is 11.06.  There is a suggestion of an abnormal mass or abnormal wall thickening in the region measuring 3.9 x 4.8 cm.  No additional abnormalities.  No evidence of metastases or other lymphadenopathy.  Scattered nonspecific subcentimeter lymph nodes involving the mediastinum and within the central upper abdomen.  • 1/13/17 - Creatinine 0.9.  LFTs normal.    • 1/17/17 - Patient seen by thoracic surgeon, Dr. Bradley.  PET scan was reviewed.  He has recommended neoadjuvant chemoradiation therapy followed by [place] Gurwinder esophagogastrectomy.  Port-A-Cath is being arranged.    • 1/20/17 - WBC 6.3, hemoglobin 12.1, platelet count 198,000, MCV 86.4.  • 1/30/17 - Patient started weekly Carboplatin and Taxol (Carboplatin AUC 2 and Taxol 50 mg/M2).  Patient received Injectafer 750 mg.  WBC 5.8, hemoglobin 12.4, platelet count 244,000.   • 1/31/17 - Patient seen by Dr. Eduardo Oleary.  The plan was to give 5040 cGy in 28 fractions.   • 2/6/17 - WBC 4.4, hemoglobin 11.6, platelet count 201,000.  Patient received cycle 2 of weekly Carboplatin and Taxol (Carboplatin AUC 2 and Taxol 50 mg/M2).  • 2/13/17 - Patient received Carboplatin and Taxol weekly cycle 3.  • 2/13/17 - Patient started concurrent radiation therapy.  Total planned dose is 4140 cGy.    • 2/13/17 to 2/15/17 - Patient received 4140 cGy of neoadjuvant radiation.   Radiation was finished on 3/15/17.    • 2/20/17 - WBC 1.8, hemoglobin 10.6, platelet count 217,000.  Creatinine 0.5. Folate 15 (L).  Ferritin 361.  Haptoglobin 214.  Iron saturation 33%, TIBC 263, serum iron 88.    • 2/20/17 - Patient received Carboplatin and Taxol weekly cycle 4.   • 2/27/17 - WBC 3.7, hemoglobin 10.7, platelet count 177,000, MCV 85.3.    • 3/6/17 - Patient has received 16 out of the 23 planned radiation treatments.  Total planned dose is 4140 cGy.    • 3/6/17 - WBC 5.4, hemoglobin 11.2, platelet count 113,000.  • 3/6/17 - Patient received cycle 5 of weekly Carboplatin and Taxol.   • 3/13/17 - Patient received cycle 6 of weekly Carboplatin and Taxol.  WBC 3.9, hemoglobin 11.3, platelet count 93,000.     • 3/20/17 - WBC 1.4, hemoglobin 10.0, platelet count 118,000, MCV 86.1   • 3/27/17 - WBC 3.8, hemoglobin 9.5, platelet count 176,000, MCV 87.3.    • 4/7/17 - Stress test:  No evidence of reversible myocardial ischemia.  Normal left ventricular ejection fraction of 50%.    • 4/17/17 - Upper GI endoscopy by Dr. 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  The GE junction adenocarcinoma lesion seems to have a very good response to chemotherapy and radiation.  The lumen was open.  • 5/1/17 - WBC 7.0, hemoglobin 10.2, platelet count 175,000.    • 5/4/17 - Patient underwent [place] Gurwinder esophagectomy with pyloroplasty, feeding jejunostomy tube, abdominal and mediastinal lymph node dissection.    Surgical Pathology:  Moderately to poorly differentiated adenocarcinoma measuring 1.3 cm at the GE junction.  Resection margins are negative for malignancy.  Tumor margins are negative.  There is effective treatment response.  No evidence of lymphovascular invasion.  Staging is ypT3,pN1.  One out of fourteen lymph nodes involved.   • 5/22/17 - WBC 6.8, hemoglobin 10.3, platelet count 312,000.    • 6/19/17 - WBC 5.7, hemoglobin 10.5, platelet count 204,000, MCV 92.1.     • 7/6/17 - EGD:  Status post balloon dilation of esophageal stricture.  • 7/12/17 - EGD with balloon dilation of esophageal stricture.  • 7/26/17 - EGD and balloon dilation of esophageal stricture.  • 7/31/17 - PET/CT scan:  There is mild metabolic activity seen at the thoracic esophagus just at and below the surgical margins.  Some mild wall thickening.  This could be from recent surgery.  A residual cancer seems unlikely.  There is a precarinal lymph node with mild metabolic activity with SUV of 3 measuring 1 x 1.3 cm.  Again this appears to be nonspecific in my opinion.    • 8/7/17 - WBC 5.9, hemoglobin 11.9, platelet count 171,000, MCV 88.7.    • 10/19/17 - WBC 7.5, hemoglobin 11.8, platelet count 216,000.    • 1/8/18 - PET scan:  No evidence of residual disease or recurrent disease.  There is an esophageal stent in the mid esophagus with a large debris air level.  No evidence of any metastases in the chest, abdomen or pelvis.  Mild nodule uptake in the vocal cords with fullness in the right vocal cord.  This could be physiologic.    • 7/9/18 - CT scan of chest with contrast:  Prominent mediastinal lymph nodes appear stable since February.  Changes of gastric pull-through procedure for esophageal cancer again noted.  Tree-in-bud infiltrates in the left lung base, consistent with infection versus inflammation.    • 1/10/19 - CT chest, abdomen and pelvis with contrast:  No evidence of mets in the chest; however, interval development of metastatic lymphadenopathy in the left side of the retroperitoneum.  For instance, there is a 17 mm rounded lymph node, 10 mm lymph node and also a 14 mm lymph node.  These are all new.  Stable 9 mm hypodense lesion within the right hepatic lobe, which could reflect hemangioma or complex cyst.    • 1/30/19 - CT-guided core biopsy of retroperitoneal lymph node:  Poorly differentiated adenocarcinoma consistent with metastases from patient’s known esophageal primary.  CK20 positive,  CDX2 positive, cytokeratin 7 negative, TTF-1 negative.    • 2/1/19 - Creatinine 0.9, BUN 18, calcium 9.3; these are normal.    • 2/28/19 - [facility] Next Gen sequencing testing on retroperitoneal lymph node specimen:  PD-L1 positive.  CPS = 3.  This implies responsiveness to pembrolizumab.  Mismatch repair status is proficient (no evidence of microsatellite instability).  Tumor mutational burden is low = 6 mutations/Mb.  CDH1 indeterminate.  KRAS mutation positive.  TP53 mutation positive.  CDK6 amplified.  [employer] PD-L1 testing by IHC:  PD-L1 expression 1% positive (CPS =1).  HER2/marvel by IHC is negative.  HER2/marvel by CISH not amplified.  Microsatellite stable tumor.    • 3/7/19 - WBC 7.3, hemoglobin 12, platelet count 128,000, MCV 92.     • 3/14/19 - Patient was seen by radiation oncologist, Dr. Chahal.  He has recommended observation versus systemic chemoimmunotherapy as needed.  No plans for radiation therapy.   • 5/1/19 - CT scan of chest with contrast:  No definitive findings of new metastatic disease in the chest.  Emphysema noted.    • 5/1/19 - CT abdomen and pelvis:  There is interval progression of metastatic retroperitoneal adenopathy.  Left periaortic adenopathy with lymph node measuring up to 2 cm, previously 1.7 cm.  Another lymph node now measures 2.3, previously 1.4 cm.  New enlarged lymph node on image 147 measures 1.4 cm, previously 0.4 cm.  A 9 mm hypodense right hepatic lobe lesion appears stable.    • 5/9/19 - Decision made to start patient on immunotherapy Keytruda.    • 5/9/19 - Vitamin B12 229.  Ferritin 61.  Folate 14.3.  Iron saturation 26%, TIBC 304, serum iron 79.  • 5/31/19 - Patient received Keytruda 200 mg cycle 1, day 1.    • 5/31/19 - Free T4 0.99.  Creatinine 0.9, BUN 9.  LFTs normal.  TSH 3.99.  Folate 14.3.  Iron saturation 26%.    • 6/21/2019 patient received Keytruda cycle 2  • 7/12/2019: Patient received Keytruda cycle 3  • 8/2/2019 patient received cycle 4 Keytruda WBC 7.2,  hemoglobin 11.8 platelet count 163  • 8/23/2019 creatinine 0.9, LFTs normal, WBC 6.4, hemoglobin 11.5, platelets 179, TSH 2.1   • 8/23/2019 patient received cycle 5 of Keytruda  • 9/3/2019 CT chest abdomen and pelvis with contrast: . Positive response to therapy in the abdomen since 05/01/2019. Pathologically enlarged retroperitoneal lymph nodes, predominantly in the left periaortic region, have diminished in size.a 1.3 x 2.1 cm left periaortic node (image 60), previously measured 3.5 x 2.3 cm. A 1.6 cm index node near the level of the left renal vein previously measured 2.0 cm No new or progressive adenopathy. 2. Stable findings in the chest. Noncalcified right upper lobe nodules are unchanged. There is no evidence of new or progressive metastatic disease within the chest. 3. 8 mm indeterminate low-density lesion in the right hepatic lobe, is stable. No new liver lesions.  • 9/6/2019 WBC 5.6, hemoglobin 11.2, platelets of 192, MCV 91.8  • 9/13/2019 patient received Keytruda cycle 6, albumin 3.2  • 10/4/2019 patient received cycle 7 of Keytruda, TSH 4.8, free T4 0.86  • 10/9/2019 WBC 5.8, hemoglobin 11.7, platelets 174  • 10/25/2019 patient received cycle 8 of Keytruda.  WBC 5.9, hemoglobin 11.5, platelets 152, creatinine 0.75, cortisol 15.2, TSH 6.48 high , free T4 1.13 normal  • 11/6/2019 WBC 6.6, hemoglobin 11.8, MCV 91.5, platelets 187  • 11/15/2019 patient received cycle 9 of Keytruda, WBC 6.6, hemoglobin 11.9, platelets 161, cortisol level 8.86, TSH 2.68, free T3 2.8, free T4 1.5  • 12/4/2019 WBC 6.9, hemoglobin 12.0, platelets 180, MCV 92.9  • 12/06/2019-Keytruda Cycle 10  • 12/27/2019- Cycle 11 LFTs normal, WBC 6.7, hemoglobin 11.4, platelets 168, MCV 93, TSH 2.4,  • 1/28/2020: CT chest abdomen and pelvis with contrast:  Single (aortic lymph node in the abdominal retroperitoneum has increased.  Rest of the retroperitoneal lymph nodes have decreased in size.  Mediastinal adenopathy appears unchanged.   Noncalcified bilateral pulmonary nodules are stable  • 1/17/2020 patient received Keytruda cycle 12:.  • 2/3/2020 WBC 7.1, hemoglobin 12.2, platelets 171  • 02/10/2020 patient received cycle 13 of Keytruda: WBC 7.3, hemoglobin 12.1, platelets 166, creatinine 0.82  • 3/6/2020 patient is of Keytruda 200 mg, hemoglobin 10.8  • 3/27/2020 patient received Keytruda 200 mg, hemoglobin 11.4, TSH 1.95  • 4/17/2020 patient received Keytruda cycle 16 200 mg, WBC 6.1, hemoglobin 11.6, platelets 150  • 5/8/2020: Patient received cycle 17 Keytruda, WBC 6.3, hemoglobin 9.6, platelets 137, TSH 2.05, free T4 1.54, creatinine 0.93, LFTs normal,  • 5/29/2020: Patient received cycle 18 Keytruda, WBC 6.5, hemoglobin 11.7, platelets 129, creatinine 0.83, TSH 1.69  • 6/26/2020: Patient receiving Keytruda.   • 7/17/2020: TSH 2.06, WBC 5.7, hemoglobin 11.4, platelets 177, MCV 92.4, Keytruda 200 mg cycle 20  • 7/27/2020: CT chest abdomen pelvis with contrast: Stable findings in the chest abdomen pelvis since 1/28/2020.  Noncalcified bilateral pulmonary nodules are stable.  Stable retroperitoneal left perinephric adenopathy in the abdomen.  • 8/7/2020: Patient received cycle 21 of Keytruda  • 8/28/2020 patient is a cycle 22 of Keytruda.  WBC 6.02, hemoglobin 11.3, platelets 161  • 9/11/2020 WBC 6.9, hemoglobin 12.4, platelets 192  • 9/18/2020: Cycle 23 of Keytruda, TSH 1.19  • 10/9/2020: Cycle 24 of Keytruda, WBC 5.9, hemoglobin 11.9, platelets 178, creatinine 0.84, TSH 2.56, free T3 2.4, CMP normal  • 10/23/2020: WBC 7.34, hemoglobin 11.6, platelets 175  • 10/30/2020:.  Keytruda 200 mg  • 11/20/2020: Keytruda 200 mg  • 12/11/2020 Keytruda 200 mg.  WBC 5.5, hemoglobin 11.4, platelets 176, creatinine 0.8, LFTs normal, TSH 2.2, cortisol 8.3  • 12/10/2020: CT chest abdomen and pelvis with contrast: Previously seen left para-aortic lymph nodes within the abdomen are decreased in size.  No pathological lymphadenopathy.  No evidence of residual  malignancy..  Small noncalcified pulmonary nodules are stable.  No new nodules.  Stable mediastinal lymph nodes.  • 3/12/2021: WBC 6.7, hemoglobin 11.3, platelets 17  • 3/12/2021: CMP normal, TSH 2.9, cortisol 11.95, 1  • 3/15/2021: CT chest with contrast: Stable findings in the chest with postsurgical changes of esophageal resection and gastric pull-through.  Stable right lower lobe lung nodule measures 11 mm.  Stable size and appearance of the mediastinal and upper abdominal lymph nodes.  • 3/23/2021: Doppler of upper extremity: Normal.  • 3/24/2021: CT abdomen and pelvis with contrast: Postoperative changes.  Left periaortic nodes are stable.  • 4/12/2021: TSH 4.1, cortisol 12.4  • 6/22/2021: CMP normal,Cortisol 10.08,, WBC 5.8, hemoglobin 11.2, platelets 109, TSH 2.59  • 7/12/2021: CT scan of the chest abdomen pelvis with contrast: Dominant 10 x 10 left periaortic lymph node is stable since 3/22/2021.  Dominant lymph nodes in the right lower paratracheal and AP window distribution are stable since 3/15/2021.  No evidence of progressive adenopathy in the chest abdomen or pelvis.    • 01/11/2021 -findings consistent with new cedric metastatic disease in the retroperitoneum.  2 new pathologically enlarged hypermetabolic retroperitoneal lymph nodes.  No convincing evidence of recurrent disease within the chest neck pelvis or skeleton.    Subjective:    Patient seen for follow-up.  No new symptoms.    The following portions of the patient's history were reviewed and updated as appropriate: allergies, current medications, past family history, past medical history, past social history, past surgical history and problem list.     Past Medical History:   Diagnosis Date   • B12 deficiency    • Blockage of coronary artery of heart (HCC)    • Depression    • DM (diabetes mellitus) (HCC)    • Dysphagia    • HTN (hypertension)    • Hyperlipidemia        Past Surgical History:   Procedure Laterality Date   • ABDOMINAL WALL  ABSCESS INCISION AND DRAINAGE  10/22/2018    WITH DEBRIDEMENT  1.5CM X 1.5 CM X 1.5 CM    DR. PURI   • CATARACT EXTRACTION  2018   • COLONOSCOPY     • CORONARY ARTERY BYPASS GRAFT  2015   • ENDOSCOPY  12/20/2016   • ESOPHAGECTOMY  05/04/2017    BROOKS PETTY ESOPHAGECTOMY, FEEDING JEJNOSTOMOY, ABDOMINAL AND MEDIASTINAL LYMPH NODE DISECTION, PEDICLED MUSCLE FLAP COVERAGE DR. PURI   • ESOPHAGOSCOPY / EGD  07/12/2017    WITH BALLOON DILATION   • ESOPHAGOSCOPY / EGD  07/26/2017    WITH BALLOON DILATION   • ESOPHAGOSCOPY / EGD  08/11/2017    WITH BALLOON DILATION   • ESOPHAGOSCOPY / EGD  08/28/2017    WITH BALLOON DILATION   • ESOPHAGOSCOPY / EGD  09/27/2017    WITH BALLOON DILATION   • LYMPH NODE BIOPSY  01/30/2019   • PACEMAKER IMPLANTATION  11/21/2016       Current Outpatient Medications:   •  metoprolol tartrate (LOPRESSOR) 25 MG tablet, Take 25 mg by mouth 2 (Two) Times a Day., Disp: , Rfl:   •  Cetirizine HCl (ZyrTEC Allergy) 10 MG capsule, Take  by mouth., Disp: , Rfl:   •  clonazePAM (KlonoPIN) 0.5 MG tablet, Pt taking 1.5 pills daily, Disp: , Rfl: 1  •  cyanocobalamin (VITAMIN B-12) 1000 MCG tablet, , Disp: , Rfl:   •  diphenhydrAMINE-APAP, sleep, (TYLENOL PM EXTRA STRENGTH PO), Take  by mouth., Disp: , Rfl:   •  Euthyrox 50 MCG tablet, Take 1 tablet by mouth once daily, Disp: 90 tablet, Rfl: 0  •  folic acid (FOLVITE) 1 MG tablet, Daily., Disp: , Rfl:   •  metFORMIN (GLUCOPHAGE) 1000 MG tablet, 2 (Two) Times a Day With Meals., Disp: , Rfl:   •  omeprazole (priLOSEC) 20 MG capsule, Take 20 mg by mouth 2 (Two) Times a Day., Disp: , Rfl:   •  ondansetron (ZOFRAN) 4 MG tablet, Take 1 tablet by mouth Every 8 (Eight) Hours As Needed for Nausea or Vomiting., Disp: 90 tablet, Rfl: 2  •  ondansetron ODT (ZOFRAN-ODT) 4 MG disintegrating tablet, , Disp: , Rfl:   •  pravastatin (PRAVACHOL) 10 MG tablet, Take 20 mg by mouth every night at bedtime., Disp: , Rfl:   No current facility-administered medications for this  visit.    Allergies   Allergen Reactions   • Ace Inhibitors Unknown (See Comments)     Unknown reaction     • Heparin Other (See Comments)     Fever/chills, weakness in legs   • Sulfa Antibiotics Other (See Comments)     Mouth sores       Family History   Problem Relation Age of Onset   • Mental illness Mother    • Heart disease Mother    • Hypertension Father    • Cancer Father    • Heart disease Sister    • Heart disease Brother      Cancer-related family history includes Cancer in his father.    Social History     Tobacco Use   • Smoking status: Former Smoker     Packs/day: 2.00     Years: 50.00     Pack years: 100.00     Types: Cigarettes     Quit date: 2015     Years since quittin.5   • Smokeless tobacco: Former User     Types: Chew     Quit date: 1989   Substance Use Topics   • Alcohol use: No   • Drug use: No     ROS:     Objective:    Vitals:    22 0905   BP: 140/81   Pulse: 80   Temp: 97.2 °F (36.2 °C)   Weight: 69 kg (152 lb 3.2 oz)   PainSc: 0-No pain       (1) Restricted in physically strenuous activity, ambulatory and able to do work of light nature    Physical Exam:     Physical Exam   Constitutional: He is oriented to person, place, and time. He appears well-developed. No distress.   HENT:   Head: Normocephalic and atraumatic.   Eyes: Conjunctivae are normal. Right eye exhibits no discharge. Left eye exhibits no discharge. No scleral icterus.   Neck: No thyromegaly present.   Cardiovascular: Normal rate, regular rhythm and normal heart sounds. Exam reveals no gallop and no friction rub.   Pulmonary/Chest: Effort normal and breath sounds normal. No stridor. No respiratory distress. He has no wheezes.   Abdominal: Soft. Normal appearance and bowel sounds are normal. He exhibits no mass. There is no abdominal tenderness. There is no rebound and no guarding.   No tenderness   Musculoskeletal: Normal range of motion. No deformity or signs of injury.      Right lower leg: No edema.    Lymphadenopathy:     He has no cervical adenopathy.   Neurological: He is alert and oriented to person, place, and time. He exhibits normal muscle tone.   Skin: Skin is warm and dry. No rash noted. He is not diaphoretic. No erythema. No jaundice.   Right side chest  port   Psychiatric: His behavior is normal. Mood normal.   Vitals reviewed.    I have reexamined the patient and the results are consistent with the previously documented exam. Jean Pierre Elizondo MD       Lab Results - Last 18 Months   Lab Units 01/12/22  0845 11/09/21  0957 09/08/21  0905   WBC 10*3/mm3 7.09 6.42 5.99   HEMOGLOBIN g/dL 10.6* 11.3* 10.9*   HEMATOCRIT % 33.4* 35.3* 33.6*   PLATELETS 10*3/mm3 245 204 211   MCV fL 90.8 90.1 91.1     Lab Results - Last 18 Months   Lab Units 01/03/22  0953 11/09/21  0957 07/12/21  1006 06/22/21  1144 04/22/21  1004 04/22/21  1004 04/12/21  0950 04/12/21  0950   SODIUM mmol/L  --  140  --  138  --  138   < > 138   POTASSIUM mmol/L  --  5.2  --  5.1  --  4.2   < > 5.4*   CHLORIDE mmol/L  --  104  --  105  --  104   < > 102   CO2 mmol/L  --  20.0*  --  23.0  --  23.0   < > 23.0   BUN mg/dL  --  25*  --  17  --  15   < > 13   CREATININE mg/dL 0.80 1.05 0.90 0.82   < > 0.90   < > 0.92   CALCIUM mg/dL  --  9.3  --  8.6  --  9.6   < > 9.4   BILIRUBIN mg/dL  --  0.2  --  0.3  --   --   --  0.2   ALK PHOS U/L  --  33*  --  41  --   --   --  43   ALT (SGPT) U/L  --  9  --  7  --   --   --  7   AST (SGOT) U/L  --  13  --  15  --   --   --  15   GLUCOSE mg/dL  --  108*  --  146*  --  132*   < > 152*    < > = values in this interval not displayed.     Assessment/Plan     Assessment:  1. Metastatic esophageal carcinoma:  Patient has a history of GE junction poorly differentiated adenocarcinoma for which he had chemoradiation followed by [facility] Gurwinder resection:  Pathology showed ypT3,pN1 disease.  Tumor was Stage IIIA, diagnosed in 2016.  He received concurrent chemoradiation with weekly Carboplatin and Taxol and radiation  finishing on 3/15/17.    CT scan on 1/10/19 showed new retroperitoneal lymph nodes.  These lymph nodes were biopsied on 1/30/19 and it shows metastatic esophageal cancer.  [facility] Next Gen testing was performed on the sample and it shows KRAS mutation, microsatellite stable tumor.  PD-L1 is positive.  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  HER2/marvel (ERBB2) was not amplified and was negative.  His CT scan on 5/1/19 shows evidence of progression.  Patient has started receiving Keytruda on 5/31/19.  He has received 19 cycles so far.   CT scan on 1/28/2020 shows improvement and continued response.  CT scan chest abdomen pelvis with contrast on 12/10/2020 does not show any evidence of disease progression.  On 12/22/2020 decision made to hold Keytruda per patient's request to get a treatment break.. Repeat CT scan chest 7/12/2021 does not show any evidence of disease progression.  Now CT imaging in January 2021 with a CT showing increased abdominal left para-aortic adenopathy.  Subsequent PET/CT with new cedric metastatic disease in the retroperitoneum of the abdomen.  New pathologically enlarged hypermetabolic retroperitoneal lymph nodes.  This would be concerning for disease recurrence/progression.  I would recommend getting a CT guided biopsy if possible.  We will refer to interventional radiology.  We will send the specimen out for next addition sequencing with disease progression.  Plan on starting immunotherapy again.    2. Reflux/heartburn/dysphagia: Symptoms improved.  Also has a history of strictures.Status post EGD and balloon dilation.  Omeprazole does not help him significantly we will decrease the dose to once a day.  He takes over-the-counter famotidine which helps continue same  3. 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  Continues on Synthroid we will recheck TSH once starting immunotherapy  4. Mild diarrhea:- diarrhea seems to be waxing and waning in nature.  Related to immunotherapy, but not severe.  Now  resolved  5. PD-L1 positive, HER2 negative, p53 and KRAS positive.  Repeat [facility] testing with repeat biopsy.  6. B12 deficiency: Continue B12 1000 mcg twice daily.      PLAN:  1. CT-guided biopsy.  [facility] next ration sequencing  2. Check TSH on follow-up  3. Continue to monitor TSH,  4. Continue B12 supplements.  5. Continue folic acid supplement also.  6. Incentive spirometry  7. Follow-up after pathology results are available plan on starting Keytruda    I have reviewed and confirmed the accuracy of the patient's history: Chief complaint, HPI, ROS and Subjective as entered by the MA/LPN/RN.     Jean Pierre Elizondo MD 01/12/22     No orders of the defined types were placed in this encounter.

## 2022-01-12 ENCOUNTER — OFFICE VISIT (OUTPATIENT)
Dept: ONCOLOGY | Facility: CLINIC | Age: 72
End: 2022-01-12

## 2022-01-12 ENCOUNTER — HOSPITAL ENCOUNTER (OUTPATIENT)
Dept: ONCOLOGY | Facility: HOSPITAL | Age: 72
Setting detail: INFUSION SERIES
Discharge: HOME | End: 2022-01-12

## 2022-01-12 ENCOUNTER — LAB (OUTPATIENT)
Dept: LAB | Facility: HOSPITAL | Age: 72
End: 2022-01-12

## 2022-01-12 VITALS
SYSTOLIC BLOOD PRESSURE: 140 MMHG | WEIGHT: 152.2 LBS | DIASTOLIC BLOOD PRESSURE: 81 MMHG | TEMPERATURE: 97.2 F | HEART RATE: 80 BPM | BODY MASS INDEX: 24.57 KG/M2

## 2022-01-12 DIAGNOSIS — C16.0 MALIGNANT NEOPLASM OF CARDIA: ICD-10-CM

## 2022-01-12 DIAGNOSIS — C15.9 ESOPHAGEAL CANCER, STAGE IV: Primary | ICD-10-CM

## 2022-01-12 DIAGNOSIS — C15.9 ESOPHAGEAL CANCER, STAGE IV: ICD-10-CM

## 2022-01-12 DIAGNOSIS — R53.83 OTHER FATIGUE: ICD-10-CM

## 2022-01-12 DIAGNOSIS — E03.9 HYPOTHYROIDISM (ACQUIRED): ICD-10-CM

## 2022-01-12 DIAGNOSIS — R79.89 ELEVATED TSH: ICD-10-CM

## 2022-01-12 LAB
ALBUMIN SERPL-MCNC: 4 G/DL (ref 3.5–5.2)
ALBUMIN/GLOB SERPL: 1.4 G/DL
ALP SERPL-CCNC: 41 U/L (ref 39–117)
ALT SERPL W P-5'-P-CCNC: 11 U/L (ref 1–41)
ANION GAP SERPL CALCULATED.3IONS-SCNC: 11 MMOL/L (ref 5–15)
AST SERPL-CCNC: 14 U/L (ref 1–40)
BASOPHILS # BLD AUTO: 0.05 10*3/MM3 (ref 0–0.2)
BASOPHILS NFR BLD AUTO: 0.7 % (ref 0–1.5)
BILIRUB SERPL-MCNC: 0.2 MG/DL (ref 0–1.2)
BUN SERPL-MCNC: 14 MG/DL (ref 8–23)
BUN/CREAT SERPL: 20.3 (ref 7–25)
CALCIUM SPEC-SCNC: 8.9 MG/DL (ref 8.6–10.5)
CHLORIDE SERPL-SCNC: 106 MMOL/L (ref 98–107)
CO2 SERPL-SCNC: 22 MMOL/L (ref 22–29)
CREAT SERPL-MCNC: 0.69 MG/DL (ref 0.76–1.27)
DEPRECATED RDW RBC AUTO: 48 FL (ref 37–54)
EOSINOPHIL # BLD AUTO: 0.26 10*3/MM3 (ref 0–0.4)
EOSINOPHIL NFR BLD AUTO: 3.7 % (ref 0.3–6.2)
ERYTHROCYTE [DISTWIDTH] IN BLOOD BY AUTOMATED COUNT: 15 % (ref 12.3–15.4)
GFR SERPL CREATININE-BSD FRML MDRD: 113 ML/MIN/1.73
GLOBULIN UR ELPH-MCNC: 2.8 GM/DL
GLUCOSE SERPL-MCNC: 169 MG/DL (ref 65–99)
HCT VFR BLD AUTO: 33.4 % (ref 37.5–51)
HGB BLD-MCNC: 10.6 G/DL (ref 13–17.7)
LYMPHOCYTES # BLD AUTO: 1.28 10*3/MM3 (ref 0.7–3.1)
LYMPHOCYTES NFR BLD AUTO: 18.1 % (ref 19.6–45.3)
MCH RBC QN AUTO: 28.8 PG (ref 26.6–33)
MCHC RBC AUTO-ENTMCNC: 31.7 G/DL (ref 31.5–35.7)
MCV RBC AUTO: 90.8 FL (ref 79–97)
MONOCYTES # BLD AUTO: 0.95 10*3/MM3 (ref 0.1–0.9)
MONOCYTES NFR BLD AUTO: 13.4 % (ref 5–12)
NEUTROPHILS NFR BLD AUTO: 4.55 10*3/MM3 (ref 1.7–7)
NEUTROPHILS NFR BLD AUTO: 64.1 % (ref 42.7–76)
PLATELET # BLD AUTO: 245 10*3/MM3 (ref 140–450)
PMV BLD AUTO: 10.3 FL (ref 6–12)
POTASSIUM SERPL-SCNC: 4.5 MMOL/L (ref 3.5–5.2)
PROT SERPL-MCNC: 6.8 G/DL (ref 6–8.5)
RBC # BLD AUTO: 3.68 10*6/MM3 (ref 4.14–5.8)
SODIUM SERPL-SCNC: 139 MMOL/L (ref 136–145)
TSH SERPL DL<=0.05 MIU/L-ACNC: 3.22 UIU/ML (ref 0.27–4.2)
WBC NRBC COR # BLD: 7.09 10*3/MM3 (ref 3.4–10.8)

## 2022-01-12 PROCEDURE — 84443 ASSAY THYROID STIM HORMONE: CPT | Performed by: INTERNAL MEDICINE

## 2022-01-12 PROCEDURE — 99214 OFFICE O/P EST MOD 30 MIN: CPT | Performed by: INTERNAL MEDICINE

## 2022-01-12 PROCEDURE — 36591 DRAW BLOOD OFF VENOUS DEVICE: CPT

## 2022-01-12 PROCEDURE — 85025 COMPLETE CBC W/AUTO DIFF WBC: CPT

## 2022-01-12 PROCEDURE — 80053 COMPREHEN METABOLIC PANEL: CPT | Performed by: INTERNAL MEDICINE

## 2022-01-12 RX ORDER — ONDANSETRON 4 MG/1
TABLET, ORALLY DISINTEGRATING ORAL
COMMUNITY
Start: 2021-10-20 | End: 2022-01-13

## 2022-01-12 RX ORDER — OMEPRAZOLE 20 MG/1
20 CAPSULE, DELAYED RELEASE ORAL 2 TIMES DAILY
COMMUNITY
Start: 2022-01-05 | End: 2023-01-03 | Stop reason: HOSPADM

## 2022-01-12 RX ORDER — SODIUM CHLORIDE 0.9 % (FLUSH) 0.9 %
20 SYRINGE (ML) INJECTION AS NEEDED
Status: CANCELLED | OUTPATIENT
Start: 2022-01-12

## 2022-01-12 RX ORDER — SODIUM CHLORIDE 0.9 % (FLUSH) 0.9 %
20 SYRINGE (ML) INJECTION AS NEEDED
Status: DISCONTINUED | OUTPATIENT
Start: 2022-01-12 | End: 2022-01-13 | Stop reason: HOSPADM

## 2022-01-12 RX ORDER — HEPARIN SODIUM (PORCINE) LOCK FLUSH IV SOLN 100 UNIT/ML 100 UNIT/ML
500 SOLUTION INTRAVENOUS AS NEEDED
Status: CANCELLED | OUTPATIENT
Start: 2022-01-12

## 2022-01-12 RX ADMIN — Medication 20 ML: at 08:45

## 2022-01-12 RX ADMIN — Medication 20 ML: at 10:10

## 2022-01-20 ENCOUNTER — HOSPITAL ENCOUNTER (OUTPATIENT)
Dept: CT IMAGING | Facility: HOSPITAL | Age: 72
Discharge: HOME | End: 2022-01-20
Admitting: INTERNAL MEDICINE

## 2022-01-20 VITALS
TEMPERATURE: 98.1 F | WEIGHT: 152 LBS | HEIGHT: 66 IN | BODY MASS INDEX: 24.43 KG/M2 | HEART RATE: 64 BPM | SYSTOLIC BLOOD PRESSURE: 124 MMHG | OXYGEN SATURATION: 100 % | DIASTOLIC BLOOD PRESSURE: 63 MMHG | RESPIRATION RATE: 16 BRPM

## 2022-01-20 DIAGNOSIS — C15.9 ESOPHAGEAL CANCER, STAGE IV: ICD-10-CM

## 2022-01-20 LAB
APTT PPP: 29.7 SECONDS (ref 24–31)
BASOPHILS # BLD AUTO: 0 10*3/MM3 (ref 0–0.2)
BASOPHILS NFR BLD AUTO: 0.5 % (ref 0–1.5)
DEPRECATED RDW RBC AUTO: 49 FL (ref 37–54)
EOSINOPHIL # BLD AUTO: 0.2 10*3/MM3 (ref 0–0.4)
EOSINOPHIL NFR BLD AUTO: 3.8 % (ref 0.3–6.2)
ERYTHROCYTE [DISTWIDTH] IN BLOOD BY AUTOMATED COUNT: 15.8 % (ref 12.3–15.4)
HCT VFR BLD AUTO: 31.9 % (ref 37.5–51)
HGB BLD-MCNC: 10.7 G/DL (ref 13–17.7)
INR PPP: 0.98 (ref 0.93–1.1)
LYMPHOCYTES # BLD AUTO: 1.4 10*3/MM3 (ref 0.7–3.1)
LYMPHOCYTES NFR BLD AUTO: 25.3 % (ref 19.6–45.3)
MCH RBC QN AUTO: 29.8 PG (ref 26.6–33)
MCHC RBC AUTO-ENTMCNC: 33.6 G/DL (ref 31.5–35.7)
MCV RBC AUTO: 88.5 FL (ref 79–97)
MONOCYTES # BLD AUTO: 0.6 10*3/MM3 (ref 0.1–0.9)
MONOCYTES NFR BLD AUTO: 10 % (ref 5–12)
NEUTROPHILS NFR BLD AUTO: 3.3 10*3/MM3 (ref 1.7–7)
NEUTROPHILS NFR BLD AUTO: 60.4 % (ref 42.7–76)
NRBC BLD AUTO-RTO: 0.1 /100 WBC (ref 0–0.2)
PLATELET # BLD AUTO: 217 10*3/MM3 (ref 140–450)
PMV BLD AUTO: 8 FL (ref 6–12)
PROTHROMBIN TIME: 10.9 SECONDS (ref 9.6–11.7)
RBC # BLD AUTO: 3.61 10*6/MM3 (ref 4.14–5.8)
WBC NRBC COR # BLD: 5.5 10*3/MM3 (ref 3.4–10.8)

## 2022-01-20 PROCEDURE — 85025 COMPLETE CBC W/AUTO DIFF WBC: CPT | Performed by: RADIOLOGY

## 2022-01-20 PROCEDURE — 88333 PATH CONSLTJ SURG CYTO XM 1: CPT | Performed by: INTERNAL MEDICINE

## 2022-01-20 PROCEDURE — 88341 IMHCHEM/IMCYTCHM EA ADD ANTB: CPT | Performed by: INTERNAL MEDICINE

## 2022-01-20 PROCEDURE — 88342 IMHCHEM/IMCYTCHM 1ST ANTB: CPT | Performed by: INTERNAL MEDICINE

## 2022-01-20 PROCEDURE — 25010000002 FENTANYL CITRATE (PF) 50 MCG/ML SOLUTION: Performed by: RADIOLOGY

## 2022-01-20 PROCEDURE — 25010000002 MIDAZOLAM PER 1 MG: Performed by: RADIOLOGY

## 2022-01-20 PROCEDURE — 85610 PROTHROMBIN TIME: CPT | Performed by: RADIOLOGY

## 2022-01-20 PROCEDURE — 99152 MOD SED SAME PHYS/QHP 5/>YRS: CPT

## 2022-01-20 PROCEDURE — 0 LIDOCAINE 1 % SOLUTION: Performed by: RADIOLOGY

## 2022-01-20 PROCEDURE — 88334 PATH CONSLTJ SURG CYTO XM EA: CPT | Performed by: INTERNAL MEDICINE

## 2022-01-20 PROCEDURE — 88305 TISSUE EXAM BY PATHOLOGIST: CPT | Performed by: INTERNAL MEDICINE

## 2022-01-20 PROCEDURE — 77012 CT SCAN FOR NEEDLE BIOPSY: CPT

## 2022-01-20 PROCEDURE — 85730 THROMBOPLASTIN TIME PARTIAL: CPT | Performed by: RADIOLOGY

## 2022-01-20 RX ORDER — MIDAZOLAM HYDROCHLORIDE 1 MG/ML
INJECTION INTRAMUSCULAR; INTRAVENOUS
Status: COMPLETED | OUTPATIENT
Start: 2022-01-20 | End: 2022-01-20

## 2022-01-20 RX ORDER — SODIUM CHLORIDE 0.9 % (FLUSH) 0.9 %
10 SYRINGE (ML) INJECTION EVERY 12 HOURS SCHEDULED
Status: DISCONTINUED | OUTPATIENT
Start: 2022-01-20 | End: 2022-01-21 | Stop reason: HOSPADM

## 2022-01-20 RX ORDER — FENTANYL CITRATE 50 UG/ML
INJECTION, SOLUTION INTRAMUSCULAR; INTRAVENOUS
Status: COMPLETED | OUTPATIENT
Start: 2022-01-20 | End: 2022-01-20

## 2022-01-20 RX ORDER — LIDOCAINE HYDROCHLORIDE 10 MG/ML
INJECTION, SOLUTION INFILTRATION; PERINEURAL
Status: COMPLETED | OUTPATIENT
Start: 2022-01-20 | End: 2022-01-20

## 2022-01-20 RX ORDER — SODIUM CHLORIDE 9 MG/ML
75 INJECTION, SOLUTION INTRAVENOUS CONTINUOUS
Status: DISCONTINUED | OUTPATIENT
Start: 2022-01-20 | End: 2022-01-21 | Stop reason: HOSPADM

## 2022-01-20 RX ORDER — SODIUM CHLORIDE 0.9 % (FLUSH) 0.9 %
10 SYRINGE (ML) INJECTION AS NEEDED
Status: DISCONTINUED | OUTPATIENT
Start: 2022-01-20 | End: 2022-01-21 | Stop reason: HOSPADM

## 2022-01-20 RX ADMIN — SODIUM CHLORIDE 75 ML/HR: 0.9 INJECTION, SOLUTION INTRAVENOUS at 08:28

## 2022-01-20 RX ADMIN — Medication 10 ML: at 08:27

## 2022-01-20 RX ADMIN — MIDAZOLAM 0.75 MG: 1 INJECTION INTRAMUSCULAR; INTRAVENOUS at 09:55

## 2022-01-20 RX ADMIN — MIDAZOLAM 0.25 MG: 1 INJECTION INTRAMUSCULAR; INTRAVENOUS at 09:59

## 2022-01-20 RX ADMIN — FENTANYL CITRATE 75 MCG: 50 INJECTION, SOLUTION INTRAMUSCULAR; INTRAVENOUS at 09:55

## 2022-01-20 RX ADMIN — LIDOCAINE HYDROCHLORIDE 9 ML: 10 INJECTION, SOLUTION INFILTRATION; PERINEURAL at 10:09

## 2022-01-20 RX ADMIN — FENTANYL CITRATE 25 MCG: 50 INJECTION, SOLUTION INTRAMUSCULAR; INTRAVENOUS at 09:58

## 2022-01-20 NOTE — DISCHARGE INSTRUCTIONS
A responsible adult should stay with you and you should rest quietly for the rest of the day. Do not drink alcohol, drive or cook for 24 hours following your procedure.  Progress your diet as tolerated.  Resume your usual medications.  When you remove your dressing in 48 hours, a small amount of blood is to be expected. Do not be alarmed.  If you feel it is bleeding excessively apply pressure and proceed to the Emergency room.  Do not shower, bath, or get your dressing wet at all for 48 hours.  You may shower after the dressing is removed. No lifting more that 10 pounds for 48 hours.  If severe pain, increased shortness of air or racing heartbeat occur, seek immediate medical attention.  Follow up with Dr. Elizondo for results.

## 2022-01-20 NOTE — NURSING NOTE
Site dressed with triple antibiotic, sterile gauze and tegaderm.  Patient assisted back to stretcher and taken to recovery.

## 2022-02-02 ENCOUNTER — OFFICE VISIT (OUTPATIENT)
Dept: ONCOLOGY | Facility: CLINIC | Age: 72
End: 2022-02-02

## 2022-02-02 ENCOUNTER — APPOINTMENT (OUTPATIENT)
Dept: LAB | Facility: HOSPITAL | Age: 72
End: 2022-02-02

## 2022-02-02 VITALS
SYSTOLIC BLOOD PRESSURE: 124 MMHG | BODY MASS INDEX: 24.33 KG/M2 | HEIGHT: 66 IN | RESPIRATION RATE: 18 BRPM | OXYGEN SATURATION: 99 % | WEIGHT: 151.4 LBS | TEMPERATURE: 97.3 F | HEART RATE: 73 BPM | DIASTOLIC BLOOD PRESSURE: 76 MMHG

## 2022-02-02 DIAGNOSIS — R11.0 NAUSEA: ICD-10-CM

## 2022-02-02 DIAGNOSIS — C15.9 ESOPHAGEAL CANCER, STAGE IV: Primary | ICD-10-CM

## 2022-02-02 DIAGNOSIS — R79.89 ELEVATED TSH: ICD-10-CM

## 2022-02-02 DIAGNOSIS — E03.9 HYPOTHYROIDISM (ACQUIRED): ICD-10-CM

## 2022-02-02 LAB
BASOPHILS # BLD AUTO: 0.07 10*3/MM3 (ref 0–0.2)
BASOPHILS NFR BLD AUTO: 1.1 % (ref 0–1.5)
DEPRECATED RDW RBC AUTO: 45.9 FL (ref 37–54)
EOSINOPHIL # BLD AUTO: 0.28 10*3/MM3 (ref 0–0.4)
EOSINOPHIL NFR BLD AUTO: 4.2 % (ref 0.3–6.2)
ERYTHROCYTE [DISTWIDTH] IN BLOOD BY AUTOMATED COUNT: 14.5 % (ref 12.3–15.4)
HCT VFR BLD AUTO: 32 % (ref 37.5–51)
HGB BLD-MCNC: 10.3 G/DL (ref 13–17.7)
HOLD SPECIMEN: NORMAL
HOLD SPECIMEN: NORMAL
LYMPHOCYTES # BLD AUTO: 1.73 10*3/MM3 (ref 0.7–3.1)
LYMPHOCYTES NFR BLD AUTO: 26.2 % (ref 19.6–45.3)
MCH RBC QN AUTO: 29.1 PG (ref 26.6–33)
MCHC RBC AUTO-ENTMCNC: 32.2 G/DL (ref 31.5–35.7)
MCV RBC AUTO: 90.4 FL (ref 79–97)
MONOCYTES # BLD AUTO: 1.05 10*3/MM3 (ref 0.1–0.9)
MONOCYTES NFR BLD AUTO: 15.9 % (ref 5–12)
NEUTROPHILS NFR BLD AUTO: 3.47 10*3/MM3 (ref 1.7–7)
NEUTROPHILS NFR BLD AUTO: 52.6 % (ref 42.7–76)
PLATELET # BLD AUTO: 238 10*3/MM3 (ref 140–450)
PMV BLD AUTO: 9.2 FL (ref 6–12)
RBC # BLD AUTO: 3.54 10*6/MM3 (ref 4.14–5.8)
WBC NRBC COR # BLD: 6.6 10*3/MM3 (ref 3.4–10.8)

## 2022-02-02 PROCEDURE — 36415 COLL VENOUS BLD VENIPUNCTURE: CPT | Performed by: INTERNAL MEDICINE

## 2022-02-02 PROCEDURE — 85025 COMPLETE CBC W/AUTO DIFF WBC: CPT | Performed by: INTERNAL MEDICINE

## 2022-02-02 PROCEDURE — 99215 OFFICE O/P EST HI 40 MIN: CPT | Performed by: INTERNAL MEDICINE

## 2022-02-02 RX ORDER — LEVOTHYROXINE SODIUM 50 UG/1
TABLET ORAL
Qty: 90 TABLET | Refills: 0 | Status: SHIPPED | OUTPATIENT
Start: 2022-02-02 | End: 2022-06-20

## 2022-02-02 RX ORDER — ONDANSETRON 4 MG/1
4 TABLET, FILM COATED ORAL EVERY 8 HOURS PRN
Qty: 90 TABLET | Refills: 2 | Status: SHIPPED | OUTPATIENT
Start: 2022-02-02 | End: 2022-10-10

## 2022-02-09 ENCOUNTER — HOSPITAL ENCOUNTER (OUTPATIENT)
Dept: ONCOLOGY | Facility: HOSPITAL | Age: 72
Setting detail: INFUSION SERIES
Discharge: HOME | End: 2022-02-09

## 2022-02-09 VITALS
TEMPERATURE: 97.6 F | SYSTOLIC BLOOD PRESSURE: 150 MMHG | HEIGHT: 66 IN | RESPIRATION RATE: 18 BRPM | DIASTOLIC BLOOD PRESSURE: 76 MMHG | HEART RATE: 75 BPM | BODY MASS INDEX: 24.43 KG/M2 | WEIGHT: 152 LBS

## 2022-02-09 DIAGNOSIS — Z51.11 ENCOUNTER FOR ANTINEOPLASTIC CHEMOTHERAPY: ICD-10-CM

## 2022-02-09 DIAGNOSIS — C16.0 MALIGNANT NEOPLASM OF CARDIA: Primary | ICD-10-CM

## 2022-02-09 DIAGNOSIS — Z51.81 ENCOUNTER FOR THERAPEUTIC DRUG MONITORING: ICD-10-CM

## 2022-02-09 DIAGNOSIS — C15.9 ESOPHAGEAL CANCER, STAGE IV: ICD-10-CM

## 2022-02-09 DIAGNOSIS — Z51.81 ENCOUNTER FOR THERAPEUTIC DRUG MONITORING: Primary | ICD-10-CM

## 2022-02-09 DIAGNOSIS — C16.0 MALIGNANT NEOPLASM OF CARDIA: ICD-10-CM

## 2022-02-09 LAB
ALBUMIN SERPL-MCNC: 3.8 G/DL (ref 3.5–5.2)
ALBUMIN/GLOB SERPL: 1.4 G/DL
ALP SERPL-CCNC: 40 U/L (ref 39–117)
ALT SERPL W P-5'-P-CCNC: 7 U/L (ref 1–41)
ANION GAP SERPL CALCULATED.3IONS-SCNC: 10 MMOL/L (ref 5–15)
AST SERPL-CCNC: 16 U/L (ref 1–40)
BASOPHILS # BLD AUTO: 0.05 10*3/MM3 (ref 0–0.2)
BASOPHILS NFR BLD AUTO: 0.8 % (ref 0–1.5)
BILIRUB SERPL-MCNC: 0.3 MG/DL (ref 0–1.2)
BUN SERPL-MCNC: 11 MG/DL (ref 8–23)
BUN/CREAT SERPL: 13.4 (ref 7–25)
CALCIUM SPEC-SCNC: 9 MG/DL (ref 8.6–10.5)
CHLORIDE SERPL-SCNC: 104 MMOL/L (ref 98–107)
CO2 SERPL-SCNC: 22 MMOL/L (ref 22–29)
CREAT SERPL-MCNC: 0.82 MG/DL (ref 0.76–1.27)
DEPRECATED RDW RBC AUTO: 45.8 FL (ref 37–54)
EOSINOPHIL # BLD AUTO: 0.3 10*3/MM3 (ref 0–0.4)
EOSINOPHIL NFR BLD AUTO: 5.1 % (ref 0.3–6.2)
ERYTHROCYTE [DISTWIDTH] IN BLOOD BY AUTOMATED COUNT: 14.4 % (ref 12.3–15.4)
GFR SERPL CREATININE-BSD FRML MDRD: 92 ML/MIN/1.73
GLOBULIN UR ELPH-MCNC: 2.7 GM/DL
GLUCOSE BLDC GLUCOMTR-MCNC: 90 MG/DL (ref 70–105)
GLUCOSE SERPL-MCNC: 89 MG/DL (ref 65–99)
HCT VFR BLD AUTO: 31 % (ref 37.5–51)
HGB BLD-MCNC: 10 G/DL (ref 13–17.7)
LYMPHOCYTES # BLD AUTO: 1.49 10*3/MM3 (ref 0.7–3.1)
LYMPHOCYTES NFR BLD AUTO: 25.2 % (ref 19.6–45.3)
MCH RBC QN AUTO: 28.8 PG (ref 26.6–33)
MCHC RBC AUTO-ENTMCNC: 32.3 G/DL (ref 31.5–35.7)
MCV RBC AUTO: 89.3 FL (ref 79–97)
MONOCYTES # BLD AUTO: 0.85 10*3/MM3 (ref 0.1–0.9)
MONOCYTES NFR BLD AUTO: 14.4 % (ref 5–12)
NEUTROPHILS NFR BLD AUTO: 3.23 10*3/MM3 (ref 1.7–7)
NEUTROPHILS NFR BLD AUTO: 54.5 % (ref 42.7–76)
PLATELET # BLD AUTO: 163 10*3/MM3 (ref 140–450)
PMV BLD AUTO: 10.3 FL (ref 6–12)
POTASSIUM SERPL-SCNC: 4.7 MMOL/L (ref 3.5–5.2)
PROT SERPL-MCNC: 6.5 G/DL (ref 6–8.5)
RBC # BLD AUTO: 3.47 10*6/MM3 (ref 4.14–5.8)
SODIUM SERPL-SCNC: 136 MMOL/L (ref 136–145)
TSH SERPL DL<=0.05 MIU/L-ACNC: 3.04 UIU/ML (ref 0.27–4.2)
WBC NRBC COR # BLD: 5.92 10*3/MM3 (ref 3.4–10.8)

## 2022-02-09 PROCEDURE — 80053 COMPREHEN METABOLIC PANEL: CPT | Performed by: INTERNAL MEDICINE

## 2022-02-09 PROCEDURE — 85025 COMPLETE CBC W/AUTO DIFF WBC: CPT | Performed by: INTERNAL MEDICINE

## 2022-02-09 PROCEDURE — 25010000002 PEMBROLIZUMAB 100 MG/4ML SOLUTION 4 ML VIAL: Performed by: INTERNAL MEDICINE

## 2022-02-09 PROCEDURE — 96413 CHEMO IV INFUSION 1 HR: CPT

## 2022-02-09 PROCEDURE — 96360 HYDRATION IV INFUSION INIT: CPT

## 2022-02-09 PROCEDURE — 82962 GLUCOSE BLOOD TEST: CPT

## 2022-02-09 PROCEDURE — 84443 ASSAY THYROID STIM HORMONE: CPT | Performed by: INTERNAL MEDICINE

## 2022-02-09 RX ORDER — SODIUM CHLORIDE 0.9 % (FLUSH) 0.9 %
20 SYRINGE (ML) INJECTION AS NEEDED
Status: DISCONTINUED | OUTPATIENT
Start: 2022-02-09 | End: 2022-02-10 | Stop reason: HOSPADM

## 2022-02-09 RX ORDER — SODIUM CHLORIDE 0.9 % (FLUSH) 0.9 %
20 SYRINGE (ML) INJECTION AS NEEDED
Status: CANCELLED | OUTPATIENT
Start: 2022-02-09

## 2022-02-09 RX ORDER — SODIUM CHLORIDE 9 MG/ML
250 INJECTION, SOLUTION INTRAVENOUS ONCE
Status: CANCELLED | OUTPATIENT
Start: 2022-02-09

## 2022-02-09 RX ORDER — HEPARIN SODIUM (PORCINE) LOCK FLUSH IV SOLN 100 UNIT/ML 100 UNIT/ML
500 SOLUTION INTRAVENOUS AS NEEDED
Status: CANCELLED | OUTPATIENT
Start: 2022-02-09

## 2022-02-09 RX ORDER — SODIUM CHLORIDE 9 MG/ML
250 INJECTION, SOLUTION INTRAVENOUS ONCE
Status: COMPLETED | OUTPATIENT
Start: 2022-02-09 | End: 2022-02-09

## 2022-02-09 RX ADMIN — SODIUM CHLORIDE 200 MG: 900 INJECTION, SOLUTION INTRAVENOUS at 12:01

## 2022-02-09 RX ADMIN — SODIUM CHLORIDE, PRESERVATIVE FREE 10 ML: 5 INJECTION INTRAVENOUS at 12:32

## 2022-02-09 RX ADMIN — SODIUM CHLORIDE 250 ML: 9 INJECTION, SOLUTION INTRAVENOUS at 12:01

## 2022-02-11 NOTE — PROGRESS NOTES
Hematology-Oncology Follow-up Note       Artie Mc  1950    Primary Care Physician: Chanel Carter APRN  Referring Physician: Chanel Carter APRN    Reason For Visit:  Chief Complaint   Patient presents with   • Follow-up     Esophageal cancer, stage IV     Metastatic esophageal cancer  Monitoring for adverse effects related to immunotherapy.  Hypothyroidism    HPI:   Mr. Mc is a pleasant 70-year-old gentleman with a history of gastroesophageal reflux disease, diabetes, hypertension, heart block status post pacemaker placement and history of CABG.  He was having symptoms of dysphagia, weight loss since September 2016. He was reporting symptoms of food getting stuck in the lower chest.  He has transitioned himself to a liquid diet.  The patient reports losing about 30 pounds over this duration.  The patient underwent upper GI endoscopy on 12/20/16 with Dr. Esvin Barrera.  Endoscopy showed necrotic, circumferential and moderately obstructive mass at the distal esophagus between 42 cm and 36 cm.      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  There was also evidence of mild subacute inflammation in the duodenum.  Dr. Barrera ordered a CT scan of the chest and abdomen with contrast on 12/20/16.  The scan was done at [facility].  The scan showed a new ill-defined mass in the anterior wall of the distal esophagus at the GE junction measuring 2.3 x 2.2 x 1.6 cm, worrisome for cancer.  There were four subcentimeter liver lesions which had appeared stable in comparison to another CT scan from 2009 and they likely represent small cysts.  There was also mild lymphadenopathy in the gastrohepatic ligament.  There were at least six borderline enlarged lymph nodes measuring up to 1.3 x 1 cm in the region.  The patient is referred to us for further oncological evaluation.    1/5/2017 - The patient presents for  initial consultation.  He reports persistent dysphagia and also has symptoms of regurgitation.  He cannot tolerate solid foods and is dependent on liquid diet such as Ensure.  He also reports fatigue. Symptoms of dysphagia have been present for the past four months.  When he eats any solids, the food gets stuck in the lower chest.    • 1/5/17 - Vitamin B12 200 (L).  CEA 0.8.  Ferritin 35.  Iron saturation 16% (L), serum iron 56, TIBC 354.  Creatinine 0.9.  LFTs normal.  Folate 10.2.  • 1/12/17 - PET/CT scan:  Intense abnormal activity corresponding to the region of the GE junction and proximal stomach.  SUV is 11.06.  There is a suggestion of an abnormal mass or abnormal wall thickening in the region measuring 3.9 x 4.8 cm.  No additional abnormalities.  No evidence of metastases or other lymphadenopathy.  Scattered nonspecific subcentimeter lymph nodes involving the mediastinum and within the central upper abdomen.  • 1/13/17 - Creatinine 0.9.  LFTs normal.    • 1/17/17 - Patient seen by thoracic surgeon, Dr. Bradley.  PET scan was reviewed.  He has recommended neoadjuvant chemoradiation therapy followed by [place] Gurwinder esophagogastrectomy.  Port-A-Cath is being arranged.    • 1/20/17 - WBC 6.3, hemoglobin 12.1, platelet count 198,000, MCV 86.4.  • 1/30/17 - Patient started weekly Carboplatin and Taxol (Carboplatin AUC 2 and Taxol 50 mg/M2).  Patient received Injectafer 750 mg.  WBC 5.8, hemoglobin 12.4, platelet count 244,000.   • 1/31/17 - Patient seen by Dr. Eduardo Oleary.  The plan was to give 5040 cGy in 28 fractions.   • 2/6/17 - WBC 4.4, hemoglobin 11.6, platelet count 201,000.  Patient received cycle 2 of weekly Carboplatin and Taxol (Carboplatin AUC 2 and Taxol 50 mg/M2).  • 2/13/17 - Patient received Carboplatin and Taxol weekly cycle 3.  • 2/13/17 - Patient started concurrent radiation therapy.  Total planned dose is 4140 cGy.    • 2/13/17 to 2/15/17 - Patient received 4140 cGy of neoadjuvant radiation.   Radiation was finished on 3/15/17.    • 2/20/17 - WBC 1.8, hemoglobin 10.6, platelet count 217,000.  Creatinine 0.5. Folate 15 (L).  Ferritin 361.  Haptoglobin 214.  Iron saturation 33%, TIBC 263, serum iron 88.    • 2/20/17 - Patient received Carboplatin and Taxol weekly cycle 4.   • 2/27/17 - WBC 3.7, hemoglobin 10.7, platelet count 177,000, MCV 85.3.    • 3/6/17 - Patient has received 16 out of the 23 planned radiation treatments.  Total planned dose is 4140 cGy.    • 3/6/17 - WBC 5.4, hemoglobin 11.2, platelet count 113,000.  • 3/6/17 - Patient received cycle 5 of weekly Carboplatin and Taxol.   • 3/13/17 - Patient received cycle 6 of weekly Carboplatin and Taxol.  WBC 3.9, hemoglobin 11.3, platelet count 93,000.     • 3/20/17 - WBC 1.4, hemoglobin 10.0, platelet count 118,000, MCV 86.1   • 3/27/17 - WBC 3.8, hemoglobin 9.5, platelet count 176,000, MCV 87.3.    • 4/7/17 - Stress test:  No evidence of reversible myocardial ischemia.  Normal left ventricular ejection fraction of 50%.    • 4/17/17 - Upper GI endoscopy by Dr. 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  The GE junction adenocarcinoma lesion seems to have a very good response to chemotherapy and radiation.  The lumen was open.  • 5/1/17 - WBC 7.0, hemoglobin 10.2, platelet count 175,000.    • 5/4/17 - Patient underwent [place] Gurwinder esophagectomy with pyloroplasty, feeding jejunostomy tube, abdominal and mediastinal lymph node dissection.    Surgical Pathology:  Moderately to poorly differentiated adenocarcinoma measuring 1.3 cm at the GE junction.  Resection margins are negative for malignancy.  Tumor margins are negative.  There is effective treatment response.  No evidence of lymphovascular invasion.  Staging is ypT3,pN1.  One out of fourteen lymph nodes involved.   • 5/22/17 - WBC 6.8, hemoglobin 10.3, platelet count 312,000.    • 6/19/17 - WBC 5.7, hemoglobin 10.5, platelet count 204,000, MCV 92.1.     • 7/6/17 - EGD:  Status post balloon dilation of esophageal stricture.  • 7/12/17 - EGD with balloon dilation of esophageal stricture.  • 7/26/17 - EGD and balloon dilation of esophageal stricture.  • 7/31/17 - PET/CT scan:  There is mild metabolic activity seen at the thoracic esophagus just at and below the surgical margins.  Some mild wall thickening.  This could be from recent surgery.  A residual cancer seems unlikely.  There is a precarinal lymph node with mild metabolic activity with SUV of 3 measuring 1 x 1.3 cm.  Again this appears to be nonspecific in my opinion.    • 8/7/17 - WBC 5.9, hemoglobin 11.9, platelet count 171,000, MCV 88.7.    • 10/19/17 - WBC 7.5, hemoglobin 11.8, platelet count 216,000.    • 1/8/18 - PET scan:  No evidence of residual disease or recurrent disease.  There is an esophageal stent in the mid esophagus with a large debris air level.  No evidence of any metastases in the chest, abdomen or pelvis.  Mild nodule uptake in the vocal cords with fullness in the right vocal cord.  This could be physiologic.    • 7/9/18 - CT scan of chest with contrast:  Prominent mediastinal lymph nodes appear stable since February.  Changes of gastric pull-through procedure for esophageal cancer again noted.  Tree-in-bud infiltrates in the left lung base, consistent with infection versus inflammation.    • 1/10/19 - CT chest, abdomen and pelvis with contrast:  No evidence of mets in the chest; however, interval development of metastatic lymphadenopathy in the left side of the retroperitoneum.  For instance, there is a 17 mm rounded lymph node, 10 mm lymph node and also a 14 mm lymph node.  These are all new.  Stable 9 mm hypodense lesion within the right hepatic lobe, which could reflect hemangioma or complex cyst.    • 1/30/19 - CT-guided core biopsy of retroperitoneal lymph node:  Poorly differentiated adenocarcinoma consistent with metastases from patient’s known esophageal primary.  CK20 positive,  CDX2 positive, cytokeratin 7 negative, TTF-1 negative.    • 2/1/19 - Creatinine 0.9, BUN 18, calcium 9.3; these are normal.    • 2/28/19 - [facility] Next Gen sequencing testing on retroperitoneal lymph node specimen:  PD-L1 positive.  CPS = 3.  This implies responsiveness to pembrolizumab.  Mismatch repair status is proficient (no evidence of microsatellite instability).  Tumor mutational burden is low = 6 mutations/Mb.  CDH1 indeterminate.  KRAS mutation positive.  TP53 mutation positive.  CDK6 amplified.  [employer] PD-L1 testing by IHC:  PD-L1 expression 1% positive (CPS =1).  HER2/marvel by IHC is negative.  HER2/marvel by CISH not amplified.  Microsatellite stable tumor.    • 3/7/19 - WBC 7.3, hemoglobin 12, platelet count 128,000, MCV 92.     • 3/14/19 - Patient was seen by radiation oncologist, Dr. Chahal.  He has recommended observation versus systemic chemoimmunotherapy as needed.  No plans for radiation therapy.   • 5/1/19 - CT scan of chest with contrast:  No definitive findings of new metastatic disease in the chest.  Emphysema noted.    • 5/1/19 - CT abdomen and pelvis:  There is interval progression of metastatic retroperitoneal adenopathy.  Left periaortic adenopathy with lymph node measuring up to 2 cm, previously 1.7 cm.  Another lymph node now measures 2.3, previously 1.4 cm.  New enlarged lymph node on image 147 measures 1.4 cm, previously 0.4 cm.  A 9 mm hypodense right hepatic lobe lesion appears stable.    • 5/9/19 - Decision made to start patient on immunotherapy Keytruda.    • 5/9/19 - Vitamin B12 229.  Ferritin 61.  Folate 14.3.  Iron saturation 26%, TIBC 304, serum iron 79.  • 5/31/19 - Patient received Keytruda 200 mg cycle 1, day 1.    • 5/31/19 - Free T4 0.99.  Creatinine 0.9, BUN 9.  LFTs normal.  TSH 3.99.  Folate 14.3.  Iron saturation 26%.    • 6/21/2019 patient received Keytruda cycle 2  • 7/12/2019: Patient received Keytruda cycle 3  • 8/2/2019 patient received cycle 4 Keytruda WBC 7.2,  hemoglobin 11.8 platelet count 163  • 8/23/2019 creatinine 0.9, LFTs normal, WBC 6.4, hemoglobin 11.5, platelets 179, TSH 2.1   • 8/23/2019 patient received cycle 5 of Keytruda  • 9/3/2019 CT chest abdomen and pelvis with contrast: . Positive response to therapy in the abdomen since 05/01/2019. Pathologically enlarged retroperitoneal lymph nodes, predominantly in the left periaortic region, have diminished in size.a 1.3 x 2.1 cm left periaortic node (image 60), previously measured 3.5 x 2.3 cm. A 1.6 cm index node near the level of the left renal vein previously measured 2.0 cm No new or progressive adenopathy. 2. Stable findings in the chest. Noncalcified right upper lobe nodules are unchanged. There is no evidence of new or progressive metastatic disease within the chest. 3. 8 mm indeterminate low-density lesion in the right hepatic lobe, is stable. No new liver lesions.  • 9/6/2019 WBC 5.6, hemoglobin 11.2, platelets of 192, MCV 91.8  • 9/13/2019 patient received Keytruda cycle 6, albumin 3.2  • 10/4/2019 patient received cycle 7 of Keytruda, TSH 4.8, free T4 0.86  • 10/9/2019 WBC 5.8, hemoglobin 11.7, platelets 174  • 10/25/2019 patient received cycle 8 of Keytruda.  WBC 5.9, hemoglobin 11.5, platelets 152, creatinine 0.75, cortisol 15.2, TSH 6.48 high , free T4 1.13 normal  • 11/6/2019 WBC 6.6, hemoglobin 11.8, MCV 91.5, platelets 187  • 11/15/2019 patient received cycle 9 of Keytruda, WBC 6.6, hemoglobin 11.9, platelets 161, cortisol level 8.86, TSH 2.68, free T3 2.8, free T4 1.5  • 12/4/2019 WBC 6.9, hemoglobin 12.0, platelets 180, MCV 92.9  • 12/06/2019-Keytruda Cycle 10  • 12/27/2019- Cycle 11 LFTs normal, WBC 6.7, hemoglobin 11.4, platelets 168, MCV 93, TSH 2.4,  • 1/28/2020: CT chest abdomen and pelvis with contrast:  Single (aortic lymph node in the abdominal retroperitoneum has increased.  Rest of the retroperitoneal lymph nodes have decreased in size.  Mediastinal adenopathy appears unchanged.   Noncalcified bilateral pulmonary nodules are stable  • 1/17/2020 patient received Keytruda cycle 12:.  • 2/3/2020 WBC 7.1, hemoglobin 12.2, platelets 171  • 02/10/2020 patient received cycle 13 of Keytruda: WBC 7.3, hemoglobin 12.1, platelets 166, creatinine 0.82  • 3/6/2020 patient is of Keytruda 200 mg, hemoglobin 10.8  • 3/27/2020 patient received Keytruda 200 mg, hemoglobin 11.4, TSH 1.95  • 4/17/2020 patient received Keytruda cycle 16 200 mg, WBC 6.1, hemoglobin 11.6, platelets 150  • 5/8/2020: Patient received cycle 17 Keytruda, WBC 6.3, hemoglobin 9.6, platelets 137, TSH 2.05, free T4 1.54, creatinine 0.93, LFTs normal,  • 5/29/2020: Patient received cycle 18 Keytruda, WBC 6.5, hemoglobin 11.7, platelets 129, creatinine 0.83, TSH 1.69  • 6/26/2020: Patient receiving Keytruda.   • 7/17/2020: TSH 2.06, WBC 5.7, hemoglobin 11.4, platelets 177, MCV 92.4, Keytruda 200 mg cycle 20  • 7/27/2020: CT chest abdomen pelvis with contrast: Stable findings in the chest abdomen pelvis since 1/28/2020.  Noncalcified bilateral pulmonary nodules are stable.  Stable retroperitoneal left perinephric adenopathy in the abdomen.  • 8/7/2020: Patient received cycle 21 of Keytruda  • 8/28/2020 patient is a cycle 22 of Keytruda.  WBC 6.02, hemoglobin 11.3, platelets 161  • 9/11/2020 WBC 6.9, hemoglobin 12.4, platelets 192  • 9/18/2020: Cycle 23 of Keytruda, TSH 1.19  • 10/9/2020: Cycle 24 of Keytruda, WBC 5.9, hemoglobin 11.9, platelets 178, creatinine 0.84, TSH 2.56, free T3 2.4, CMP normal  • 10/23/2020: WBC 7.34, hemoglobin 11.6, platelets 175  • 10/30/2020:.  Keytruda 200 mg  • 11/20/2020: Keytruda 200 mg  • 12/11/2020 Keytruda 200 mg.  WBC 5.5, hemoglobin 11.4, platelets 176, creatinine 0.8, LFTs normal, TSH 2.2, cortisol 8.3  • 12/10/2020: CT chest abdomen and pelvis with contrast: Previously seen left para-aortic lymph nodes within the abdomen are decreased in size.  No pathological lymphadenopathy.  No evidence of residual  malignancy..  Small noncalcified pulmonary nodules are stable.  No new nodules.  Stable mediastinal lymph nodes.  • 3/12/2021: WBC 6.7, hemoglobin 11.3, platelets 17  • 3/12/2021: CMP normal, TSH 2.9, cortisol 11.95, 1  • 3/15/2021: CT chest with contrast: Stable findings in the chest with postsurgical changes of esophageal resection and gastric pull-through.  Stable right lower lobe lung nodule measures 11 mm.  Stable size and appearance of the mediastinal and upper abdominal lymph nodes.  • 3/23/2021: Doppler of upper extremity: Normal.  • 3/24/2021: CT abdomen and pelvis with contrast: Postoperative changes.  Left periaortic nodes are stable.  • 4/12/2021: TSH 4.1, cortisol 12.4  • 6/22/2021: CMP normal,Cortisol 10.08,, WBC 5.8, hemoglobin 11.2, platelets 109, TSH 2.59  • 7/12/2021: CT scan of the chest abdomen pelvis with contrast: Dominant 10 x 10 left periaortic lymph node is stable since 3/22/2021.  Dominant lymph nodes in the right lower paratracheal and AP window distribution are stable since 3/15/2021.  No evidence of progressive adenopathy in the chest abdomen or pelvis.    • 01/11/2021 -PET/CT findings consistent with new cedric metastatic disease in the retroperitoneum.  2 new pathologically enlarged hypermetabolic retroperitoneal lymph nodes.  No convincing evidence of recurrent disease within the chest neck pelvis or skeleton.  • 1/20/2022 -CT-guided needle biopsy consistent with poorly differentiated carcinoma.  CDX2 positive consistent with metastases from known esophageal primary  • 2/9/2022 - Pembrolizumab complicated with fatigue, joint pains, nausea, diarrhea. Symptoms decrease in intensity over a week.    Subjective:    Patient still has some achiness. Diarrhea has resolved.    The following portions of the patient's history were reviewed and updated as appropriate: allergies, current medications, past family history, past medical history, past social history, past surgical history and problem  list.     Past Medical History:   Diagnosis Date   • B12 deficiency    • Blockage of coronary artery of heart (HCC)    • Depression    • DM (diabetes mellitus) (HCC)    • Dysphagia    • HTN (hypertension)    • Hyperlipidemia        Past Surgical History:   Procedure Laterality Date   • ABDOMINAL WALL ABSCESS INCISION AND DRAINAGE  10/22/2018    WITH DEBRIDEMENT  1.5CM X 1.5 CM X 1.5 CM    DR. PURI   • CATARACT EXTRACTION  2018   • COLONOSCOPY     • CORONARY ARTERY BYPASS GRAFT  2015   • ENDOSCOPY  12/20/2016   • ESOPHAGECTOMY  05/04/2017    BROOKS PETTY ESOPHAGECTOMY, FEEDING JEJNOSTOMOY, ABDOMINAL AND MEDIASTINAL LYMPH NODE DISECTION, PEDICLED MUSCLE FLAP COVERAGE DR. PURI   • ESOPHAGOSCOPY / EGD  07/12/2017    WITH BALLOON DILATION   • ESOPHAGOSCOPY / EGD  07/26/2017    WITH BALLOON DILATION   • ESOPHAGOSCOPY / EGD  08/11/2017    WITH BALLOON DILATION   • ESOPHAGOSCOPY / EGD  08/28/2017    WITH BALLOON DILATION   • ESOPHAGOSCOPY / EGD  09/27/2017    WITH BALLOON DILATION   • LYMPH NODE BIOPSY  01/30/2019   • PACEMAKER IMPLANTATION  11/21/2016       Current Outpatient Medications:   •  Cetirizine HCl (ZyrTEC Allergy) 10 MG capsule, Take  by mouth., Disp: , Rfl:   •  clonazePAM (KlonoPIN) 0.5 MG tablet, Pt taking 1.5 pills daily, Disp: , Rfl: 1  •  cyanocobalamin (VITAMIN B-12) 1000 MCG tablet, , Disp: , Rfl:   •  diphenhydrAMINE-APAP, sleep, (TYLENOL PM EXTRA STRENGTH PO), Take  by mouth., Disp: , Rfl:   •  Euthyrox 50 MCG tablet, Take 1 tablet by mouth once daily, Disp: 90 tablet, Rfl: 0  •  folic acid (FOLVITE) 1 MG tablet, Daily., Disp: , Rfl:   •  metFORMIN (GLUCOPHAGE) 1000 MG tablet, 2 (Two) Times a Day With Meals., Disp: , Rfl:   •  metoprolol tartrate (LOPRESSOR) 25 MG tablet, Take 12.5 mg by mouth 2 (Two) Times a Day., Disp: , Rfl:   •  omeprazole (priLOSEC) 20 MG capsule, Take 20 mg by mouth 2 (Two) Times a Day., Disp: , Rfl:   •  ondansetron (ZOFRAN) 4 MG tablet, Take 1 tablet by mouth Every 8  "(Eight) Hours As Needed for Nausea or Vomiting., Disp: 90 tablet, Rfl: 2  •  pravastatin (PRAVACHOL) 10 MG tablet, Take 10 mg by mouth every night at bedtime., Disp: , Rfl:     Allergies   Allergen Reactions   • Ace Inhibitors Unknown (See Comments)     Unknown reaction     • Heparin Other (See Comments)     Fever/chills, weakness in legs   • Sulfa Antibiotics Other (See Comments)     Mouth sores       Family History   Problem Relation Age of Onset   • Mental illness Mother    • Heart disease Mother    • Hypertension Father    • Cancer Father    • Heart disease Sister    • Heart disease Brother      Cancer-related family history includes Cancer in his father.    Social History     Tobacco Use   • Smoking status: Former Smoker     Packs/day: 2.00     Years: 50.00     Pack years: 100.00     Types: Cigarettes     Quit date: 2015     Years since quittin.6   • Smokeless tobacco: Former User     Types: Chew     Quit date: 1989   Substance Use Topics   • Alcohol use: No   • Drug use: No     ROS:     Objective:    Vitals:    22 0953   BP: 112/68   Pulse: 78   Resp: 18   Temp: 97.2 °F (36.2 °C)   SpO2: 97%   Weight: 67.4 kg (148 lb 9.6 oz)   Height: 167.6 cm (66\")       (1) Restricted in physically strenuous activity, ambulatory and able to do work of light nature    Physical Exam:     Physical Exam   Constitutional: He is oriented to person, place, and time. He appears well-developed. No distress.   HENT:   Head: Normocephalic and atraumatic.   Nose: Nose normal.   Eyes: Pupils are equal, round, and reactive to light. Conjunctivae are normal.   Neck: No thyromegaly present.   Cardiovascular: Normal rate, regular rhythm, normal heart sounds and normal pulses. Exam reveals no gallop and no friction rub.   Pulmonary/Chest: Effort normal and breath sounds normal. No stridor. No respiratory distress. He has no wheezes.   Abdominal: Soft. Normal appearance and bowel sounds are normal. He exhibits no mass. There " is no abdominal tenderness. There is no rebound and no guarding.   No tenderness   Musculoskeletal: Normal range of motion. No deformity or signs of injury.      Right lower leg: No edema.   Lymphadenopathy:     He has no cervical adenopathy.   Neurological: He is alert and oriented to person, place, and time. He exhibits normal muscle tone.   Skin: Skin is warm and dry. No rash noted. He is not diaphoretic. No erythema. No jaundice.   Right side chest  port   Psychiatric: His behavior is normal. Mood normal.   Vitals reviewed.    I have reexamined the patient and the results are consistent with the previously documented exam. Jean Pierre Elizondo MD       Lab Results - Last 18 Months   Lab Units 02/16/22  0949 02/09/22  1124 02/02/22  0850   WBC 10*3/mm3 5.59 5.92 6.60   HEMOGLOBIN g/dL 10.0* 10.0* 10.3*   HEMATOCRIT % 31.2* 31.0* 32.0*   PLATELETS 10*3/mm3 223 163 238   MCV fL 89.9 89.3 90.4     Lab Results - Last 18 Months   Lab Units 02/09/22  1124 01/12/22  0845 01/03/22  0953 11/09/21  0957 07/12/21  1006   SODIUM mmol/L 136 139  --  140  --    POTASSIUM mmol/L 4.7 4.5  --  5.2  --    CHLORIDE mmol/L 104 106  --  104  --    CO2 mmol/L 22.0 22.0  --  20.0*  --    BUN mg/dL 11 14  --  25*  --    CREATININE mg/dL 0.82 0.69* 0.80 1.05   < >   CALCIUM mg/dL 9.0 8.9  --  9.3  --    BILIRUBIN mg/dL 0.3 0.2  --  0.2  --    ALK PHOS U/L 40 41  --  33*  --    ALT (SGPT) U/L 7 11  --  9  --    AST (SGOT) U/L 16 14  --  13  --    GLUCOSE mg/dL 89 169*  --  108*  --     < > = values in this interval not displayed.     Assessment/Plan     Assessment:    Metastatic esophageal carcinoma:    Patient has a history of GE junction poorly differentiated adenocarcinoma for which he had chemoradiation followed by [facility] Gurwinder resection:  Pathology showed ypT3,pN1 disease.  Tumor was Stage IIIA, diagnosed in 2016.  He received concurrent chemoradiation with weekly Carboplatin and Taxol and radiation finishing on 3/15/17.  CT scan on 1/10/19  showed new retroperitoneal lymph nodes.  These lymph nodes were biopsied on 1/30/19 and it shows metastatic esophageal cancer.  [facility] Next Gen testing was performed on the sample and it shows KRAS mutation, microsatellite stable tumor.  PD-L1 is positive.  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  HER2/marvel (ERBB2) was not amplified and was negative.  His CT scan on 5/1/19 shows evidence of progression.  Patient has started receiving Keytruda on 5/31/19.  He has received 19 cycles so far.   CT scan on 1/28/2020 shows improvement and continued response.  CT scan chest abdomen pelvis with contrast on 12/10/2020 does not show any evidence of disease progression.  On 12/22/2020 decision made to hold Keytruda per patient's request to get a treatment break.. Repeat CT scan chest 7/12/2021 does not show any evidence of disease progression.  Now CT imaging in January 2021 with a CT showing increased abdominal left para-aortic adenopathy.  Subsequent PET/CT with new cedric metastatic disease in the retroperitoneum of the abdomen.  New pathologically enlarged hypermetabolic retroperitoneal lymph nodes.  This would be concerning for disease recurrence/progression.  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  I discussed with him the treatment options going forward.  He has had a good response with immunotherapy pembrolizumab in the past. This was now restarted. Continue the same. He has had significant symptoms with flu like illness after treatment. Recommend taking Tylenol after day of infusion. Patient agreeable. He will take upto 2 high strength tylenol 500 mg 2-3 times a day for 2-3 days as needed. Follow up in 3 weeks after next infusion with his significant symptoms.    Reflux/heartburn/dysphagia:   Symptoms improved.  Also has a history of strictures.Status post EGD and balloon dilation.  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  Continues on Synthroid we will  recheck TSH once starting immunotherapy. Refill today    Mild diarrhea:-   diarrhea seems to be waxing and waning in nature.  Related to immunotherapy, but not severe.  Now resolved.  Will monitor when he starts back on immunotherapy.    PD-L1 positive, HER2 negative, p53 and KRAS positive.  Repeat [facility] testing with repeat biopsy.    B12 deficiency: Continue B12 1000 mcg twice daily.        I have reviewed and confirmed the accuracy of the patient's history: Chief complaint, HPI, ROS and Subjective as entered by the MA/LPN/RN.     Jean Pierre Elizondo MD 02/16/22     Orders Placed This Encounter   Procedures   • CBC & Differential     Standing Status:   Standing     Number of Occurrences:   30     Standing Expiration Date:   2/11/2023

## 2022-02-16 ENCOUNTER — LAB (OUTPATIENT)
Dept: LAB | Facility: HOSPITAL | Age: 72
End: 2022-02-16

## 2022-02-16 ENCOUNTER — APPOINTMENT (OUTPATIENT)
Dept: ONCOLOGY | Facility: HOSPITAL | Age: 72
End: 2022-02-16

## 2022-02-16 ENCOUNTER — OFFICE VISIT (OUTPATIENT)
Dept: ONCOLOGY | Facility: CLINIC | Age: 72
End: 2022-02-16

## 2022-02-16 VITALS
DIASTOLIC BLOOD PRESSURE: 68 MMHG | WEIGHT: 148.6 LBS | HEIGHT: 66 IN | SYSTOLIC BLOOD PRESSURE: 112 MMHG | BODY MASS INDEX: 23.88 KG/M2 | HEART RATE: 78 BPM | RESPIRATION RATE: 18 BRPM | OXYGEN SATURATION: 97 % | TEMPERATURE: 97.2 F

## 2022-02-16 DIAGNOSIS — C15.9 ESOPHAGEAL CANCER, STAGE IV: ICD-10-CM

## 2022-02-16 DIAGNOSIS — C16.0 MALIGNANT NEOPLASM OF CARDIA: ICD-10-CM

## 2022-02-16 DIAGNOSIS — C15.9 ESOPHAGEAL CANCER, STAGE IV: Primary | ICD-10-CM

## 2022-02-16 DIAGNOSIS — C16.0 MALIGNANT NEOPLASM OF CARDIA: Primary | ICD-10-CM

## 2022-02-16 LAB
BASOPHILS # BLD AUTO: 0.04 10*3/MM3 (ref 0–0.2)
BASOPHILS NFR BLD AUTO: 0.7 % (ref 0–1.5)
DEPRECATED RDW RBC AUTO: 45.4 FL (ref 37–54)
EOSINOPHIL # BLD AUTO: 0.21 10*3/MM3 (ref 0–0.4)
EOSINOPHIL NFR BLD AUTO: 3.8 % (ref 0.3–6.2)
ERYTHROCYTE [DISTWIDTH] IN BLOOD BY AUTOMATED COUNT: 14.3 % (ref 12.3–15.4)
HCT VFR BLD AUTO: 31.2 % (ref 37.5–51)
HGB BLD-MCNC: 10 G/DL (ref 13–17.7)
HOLD SPECIMEN: NORMAL
HOLD SPECIMEN: NORMAL
LYMPHOCYTES # BLD AUTO: 1.43 10*3/MM3 (ref 0.7–3.1)
LYMPHOCYTES NFR BLD AUTO: 25.6 % (ref 19.6–45.3)
MCH RBC QN AUTO: 28.8 PG (ref 26.6–33)
MCHC RBC AUTO-ENTMCNC: 32.1 G/DL (ref 31.5–35.7)
MCV RBC AUTO: 89.9 FL (ref 79–97)
MONOCYTES # BLD AUTO: 1.04 10*3/MM3 (ref 0.1–0.9)
MONOCYTES NFR BLD AUTO: 18.6 % (ref 5–12)
NEUTROPHILS NFR BLD AUTO: 2.87 10*3/MM3 (ref 1.7–7)
NEUTROPHILS NFR BLD AUTO: 51.3 % (ref 42.7–76)
PLATELET # BLD AUTO: 223 10*3/MM3 (ref 140–450)
PMV BLD AUTO: 9.4 FL (ref 6–12)
RBC # BLD AUTO: 3.47 10*6/MM3 (ref 4.14–5.8)
WBC NRBC COR # BLD: 5.59 10*3/MM3 (ref 3.4–10.8)

## 2022-02-16 PROCEDURE — 85025 COMPLETE CBC W/AUTO DIFF WBC: CPT

## 2022-02-16 PROCEDURE — 99214 OFFICE O/P EST MOD 30 MIN: CPT | Performed by: INTERNAL MEDICINE

## 2022-02-16 PROCEDURE — 36415 COLL VENOUS BLD VENIPUNCTURE: CPT

## 2022-03-02 ENCOUNTER — HOSPITAL ENCOUNTER (OUTPATIENT)
Dept: ONCOLOGY | Facility: HOSPITAL | Age: 72
Setting detail: INFUSION SERIES
Discharge: HOME | End: 2022-03-02

## 2022-03-02 VITALS
HEART RATE: 66 BPM | DIASTOLIC BLOOD PRESSURE: 63 MMHG | TEMPERATURE: 97.5 F | SYSTOLIC BLOOD PRESSURE: 120 MMHG | HEIGHT: 66 IN | OXYGEN SATURATION: 97 % | WEIGHT: 152 LBS | RESPIRATION RATE: 18 BRPM | BODY MASS INDEX: 24.43 KG/M2

## 2022-03-02 DIAGNOSIS — C16.0 MALIGNANT NEOPLASM OF CARDIA: ICD-10-CM

## 2022-03-02 DIAGNOSIS — Z51.11 ENCOUNTER FOR ANTINEOPLASTIC CHEMOTHERAPY: ICD-10-CM

## 2022-03-02 DIAGNOSIS — Z51.81 ENCOUNTER FOR THERAPEUTIC DRUG MONITORING: Primary | ICD-10-CM

## 2022-03-02 DIAGNOSIS — C16.0 MALIGNANT NEOPLASM OF CARDIA: Primary | ICD-10-CM

## 2022-03-02 DIAGNOSIS — Z51.81 ENCOUNTER FOR THERAPEUTIC DRUG MONITORING: ICD-10-CM

## 2022-03-02 DIAGNOSIS — C15.9 ESOPHAGEAL CANCER, STAGE IV: ICD-10-CM

## 2022-03-02 LAB
ALBUMIN SERPL-MCNC: 3.7 G/DL (ref 3.5–5.2)
ALBUMIN/GLOB SERPL: 1.3 G/DL
ALP SERPL-CCNC: 37 U/L (ref 39–117)
ALT SERPL W P-5'-P-CCNC: 5 U/L (ref 1–41)
ANION GAP SERPL CALCULATED.3IONS-SCNC: 10 MMOL/L (ref 5–15)
AST SERPL-CCNC: 14 U/L (ref 1–40)
BASOPHILS # BLD AUTO: 0.04 10*3/MM3 (ref 0–0.2)
BASOPHILS NFR BLD AUTO: 0.7 % (ref 0–1.5)
BILIRUB SERPL-MCNC: 0.2 MG/DL (ref 0–1.2)
BUN SERPL-MCNC: 14 MG/DL (ref 8–23)
BUN/CREAT SERPL: 17.1 (ref 7–25)
CALCIUM SPEC-SCNC: 8.9 MG/DL (ref 8.6–10.5)
CHLORIDE SERPL-SCNC: 103 MMOL/L (ref 98–107)
CO2 SERPL-SCNC: 24 MMOL/L (ref 22–29)
CREAT SERPL-MCNC: 0.82 MG/DL (ref 0.76–1.27)
DEPRECATED RDW RBC AUTO: 42.8 FL (ref 37–54)
EGFRCR SERPLBLD CKD-EPI 2021: 93.3 ML/MIN/1.73
EOSINOPHIL # BLD AUTO: 0.24 10*3/MM3 (ref 0–0.4)
EOSINOPHIL NFR BLD AUTO: 4.3 % (ref 0.3–6.2)
ERYTHROCYTE [DISTWIDTH] IN BLOOD BY AUTOMATED COUNT: 14 % (ref 12.3–15.4)
GLOBULIN UR ELPH-MCNC: 2.8 GM/DL
GLUCOSE BLDC GLUCOMTR-MCNC: 107 MG/DL (ref 70–105)
GLUCOSE SERPL-MCNC: 104 MG/DL (ref 65–99)
HCT VFR BLD AUTO: 28.6 % (ref 37.5–51)
HGB BLD-MCNC: 9.3 G/DL (ref 13–17.7)
LYMPHOCYTES # BLD AUTO: 1.61 10*3/MM3 (ref 0.7–3.1)
LYMPHOCYTES NFR BLD AUTO: 28.5 % (ref 19.6–45.3)
MCH RBC QN AUTO: 28.1 PG (ref 26.6–33)
MCHC RBC AUTO-ENTMCNC: 32.5 G/DL (ref 31.5–35.7)
MCV RBC AUTO: 86.4 FL (ref 79–97)
MONOCYTES # BLD AUTO: 0.69 10*3/MM3 (ref 0.1–0.9)
MONOCYTES NFR BLD AUTO: 12.2 % (ref 5–12)
NEUTROPHILS NFR BLD AUTO: 3.06 10*3/MM3 (ref 1.7–7)
NEUTROPHILS NFR BLD AUTO: 54.3 % (ref 42.7–76)
PLATELET # BLD AUTO: 320 10*3/MM3 (ref 140–450)
PMV BLD AUTO: 10 FL (ref 6–12)
POTASSIUM SERPL-SCNC: 4.5 MMOL/L (ref 3.5–5.2)
PROT SERPL-MCNC: 6.5 G/DL (ref 6–8.5)
RBC # BLD AUTO: 3.31 10*6/MM3 (ref 4.14–5.8)
SODIUM SERPL-SCNC: 137 MMOL/L (ref 136–145)
T4 FREE SERPL-MCNC: 1.29 NG/DL (ref 0.93–1.7)
TSH SERPL DL<=0.05 MIU/L-ACNC: 2.58 UIU/ML (ref 0.27–4.2)
WBC NRBC COR # BLD: 5.64 10*3/MM3 (ref 3.4–10.8)

## 2022-03-02 PROCEDURE — 84439 ASSAY OF FREE THYROXINE: CPT | Performed by: INTERNAL MEDICINE

## 2022-03-02 PROCEDURE — 36591 DRAW BLOOD OFF VENOUS DEVICE: CPT

## 2022-03-02 PROCEDURE — 85025 COMPLETE CBC W/AUTO DIFF WBC: CPT | Performed by: INTERNAL MEDICINE

## 2022-03-02 PROCEDURE — 82962 GLUCOSE BLOOD TEST: CPT

## 2022-03-02 PROCEDURE — 80053 COMPREHEN METABOLIC PANEL: CPT | Performed by: INTERNAL MEDICINE

## 2022-03-02 PROCEDURE — 84443 ASSAY THYROID STIM HORMONE: CPT | Performed by: INTERNAL MEDICINE

## 2022-03-02 PROCEDURE — 96413 CHEMO IV INFUSION 1 HR: CPT

## 2022-03-02 PROCEDURE — 25010000002 PEMBROLIZUMAB 100 MG/4ML SOLUTION 4 ML VIAL: Performed by: INTERNAL MEDICINE

## 2022-03-02 RX ORDER — SODIUM CHLORIDE 9 MG/ML
250 INJECTION, SOLUTION INTRAVENOUS ONCE
Status: CANCELLED | OUTPATIENT
Start: 2022-03-02

## 2022-03-02 RX ORDER — HEPARIN SODIUM (PORCINE) LOCK FLUSH IV SOLN 100 UNIT/ML 100 UNIT/ML
500 SOLUTION INTRAVENOUS AS NEEDED
Status: CANCELLED | OUTPATIENT
Start: 2022-03-02

## 2022-03-02 RX ORDER — SODIUM CHLORIDE 0.9 % (FLUSH) 0.9 %
20 SYRINGE (ML) INJECTION AS NEEDED
Status: CANCELLED | OUTPATIENT
Start: 2022-03-02

## 2022-03-02 RX ORDER — SODIUM CHLORIDE 9 MG/ML
250 INJECTION, SOLUTION INTRAVENOUS ONCE
Status: COMPLETED | OUTPATIENT
Start: 2022-03-02 | End: 2022-03-02

## 2022-03-02 RX ORDER — SODIUM CHLORIDE 0.9 % (FLUSH) 0.9 %
20 SYRINGE (ML) INJECTION AS NEEDED
Status: DISCONTINUED | OUTPATIENT
Start: 2022-03-02 | End: 2022-03-03 | Stop reason: HOSPADM

## 2022-03-02 RX ADMIN — SODIUM CHLORIDE 200 MG: 9 INJECTION, SOLUTION INTRAVENOUS at 11:53

## 2022-03-02 RX ADMIN — SODIUM CHLORIDE 250 ML: 9 INJECTION, SOLUTION INTRAVENOUS at 11:52

## 2022-03-02 RX ADMIN — Medication 20 ML: at 12:26

## 2022-03-03 LAB
LAB AP CARIS, ADDENDUM: NORMAL
LAB AP CASE REPORT: NORMAL
LAB AP DIAGNOSIS COMMENT: NORMAL
Lab: NORMAL
PATH REPORT.FINAL DX SPEC: NORMAL
PATH REPORT.GROSS SPEC: NORMAL

## 2022-03-08 NOTE — PROGRESS NOTES
Hematology-Oncology Follow-up Note       Artie Mc  1950    Primary Care Physician: Chanel Carter APRN  Referring Physician: Chanel Carter APRN    Reason For Visit:  Chief Complaint   Patient presents with   • Follow-up     Esophageal cancer, stage IV     Metastatic esophageal cancer  Monitoring for adverse effects related to immunotherapy.  Hypothyroidism    HPI:   Mr. Mc is a pleasant 70-year-old gentleman with a history of gastroesophageal reflux disease, diabetes, hypertension, heart block status post pacemaker placement and history of CABG.  He was having symptoms of dysphagia, weight loss since September 2016. He was reporting symptoms of food getting stuck in the lower chest.  He has transitioned himself to a liquid diet.  The patient reports losing about 30 pounds over this duration.  The patient underwent upper GI endoscopy on 12/20/16 with Dr. Esvin Barrera.  Endoscopy showed necrotic, circumferential and moderately obstructive mass at the distal esophagus between 42 cm and 36 cm.      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  There was also evidence of mild subacute inflammation in the duodenum.  Dr. Barrera ordered a CT scan of the chest and abdomen with contrast on 12/20/16.  The scan was done at [facility].  The scan showed a new ill-defined mass in the anterior wall of the distal esophagus at the GE junction measuring 2.3 x 2.2 x 1.6 cm, worrisome for cancer.  There were four subcentimeter liver lesions which had appeared stable in comparison to another CT scan from 2009 and they likely represent small cysts.  There was also mild lymphadenopathy in the gastrohepatic ligament.  There were at least six borderline enlarged lymph nodes measuring up to 1.3 x 1 cm in the region.  The patient is referred to us for further oncological evaluation.    1/5/2017 - The patient presents for  initial consultation.  He reports persistent dysphagia and also has symptoms of regurgitation.  He cannot tolerate solid foods and is dependent on liquid diet such as Ensure.  He also reports fatigue. Symptoms of dysphagia have been present for the past four months.  When he eats any solids, the food gets stuck in the lower chest.    • 1/5/17 - Vitamin B12 200 (L).  CEA 0.8.  Ferritin 35.  Iron saturation 16% (L), serum iron 56, TIBC 354.  Creatinine 0.9.  LFTs normal.  Folate 10.2.  • 1/12/17 - PET/CT scan:  Intense abnormal activity corresponding to the region of the GE junction and proximal stomach.  SUV is 11.06.  There is a suggestion of an abnormal mass or abnormal wall thickening in the region measuring 3.9 x 4.8 cm.  No additional abnormalities.  No evidence of metastases or other lymphadenopathy.  Scattered nonspecific subcentimeter lymph nodes involving the mediastinum and within the central upper abdomen.  • 1/13/17 - Creatinine 0.9.  LFTs normal.    • 1/17/17 - Patient seen by thoracic surgeon, Dr. Bradley.  PET scan was reviewed.  He has recommended neoadjuvant chemoradiation therapy followed by [place] Gurwinder esophagogastrectomy.  Port-A-Cath is being arranged.    • 1/20/17 - WBC 6.3, hemoglobin 12.1, platelet count 198,000, MCV 86.4.  • 1/30/17 - Patient started weekly Carboplatin and Taxol (Carboplatin AUC 2 and Taxol 50 mg/M2).  Patient received Injectafer 750 mg.  WBC 5.8, hemoglobin 12.4, platelet count 244,000.   • 1/31/17 - Patient seen by Dr. Eduardo Oleary.  The plan was to give 5040 cGy in 28 fractions.   • 2/6/17 - WBC 4.4, hemoglobin 11.6, platelet count 201,000.  Patient received cycle 2 of weekly Carboplatin and Taxol (Carboplatin AUC 2 and Taxol 50 mg/M2).  • 2/13/17 - Patient received Carboplatin and Taxol weekly cycle 3.  • 2/13/17 - Patient started concurrent radiation therapy.  Total planned dose is 4140 cGy.    • 2/13/17 to 2/15/17 - Patient received 4140 cGy of neoadjuvant radiation.   Radiation was finished on 3/15/17.    • 2/20/17 - WBC 1.8, hemoglobin 10.6, platelet count 217,000.  Creatinine 0.5. Folate 15 (L).  Ferritin 361.  Haptoglobin 214.  Iron saturation 33%, TIBC 263, serum iron 88.    • 2/20/17 - Patient received Carboplatin and Taxol weekly cycle 4.   • 2/27/17 - WBC 3.7, hemoglobin 10.7, platelet count 177,000, MCV 85.3.    • 3/6/17 - Patient has received 16 out of the 23 planned radiation treatments.  Total planned dose is 4140 cGy.    • 3/6/17 - WBC 5.4, hemoglobin 11.2, platelet count 113,000.  • 3/6/17 - Patient received cycle 5 of weekly Carboplatin and Taxol.   • 3/13/17 - Patient received cycle 6 of weekly Carboplatin and Taxol.  WBC 3.9, hemoglobin 11.3, platelet count 93,000.     • 3/20/17 - WBC 1.4, hemoglobin 10.0, platelet count 118,000, MCV 86.1   • 3/27/17 - WBC 3.8, hemoglobin 9.5, platelet count 176,000, MCV 87.3.    • 4/7/17 - Stress test:  No evidence of reversible myocardial ischemia.  Normal left ventricular ejection fraction of 50%.    • 4/17/17 - Upper GI endoscopy by Dr. 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  The GE junction adenocarcinoma lesion seems to have a very good response to chemotherapy and radiation.  The lumen was open.  • 5/1/17 - WBC 7.0, hemoglobin 10.2, platelet count 175,000.    • 5/4/17 - Patient underwent [place] Gurwinder esophagectomy with pyloroplasty, feeding jejunostomy tube, abdominal and mediastinal lymph node dissection.    Surgical Pathology:  Moderately to poorly differentiated adenocarcinoma measuring 1.3 cm at the GE junction.  Resection margins are negative for malignancy.  Tumor margins are negative.  There is effective treatment response.  No evidence of lymphovascular invasion.  Staging is ypT3,pN1.  One out of fourteen lymph nodes involved.   • 5/22/17 - WBC 6.8, hemoglobin 10.3, platelet count 312,000.    • 6/19/17 - WBC 5.7, hemoglobin 10.5, platelet count 204,000, MCV 92.1.     • 7/6/17 - EGD:  Status post balloon dilation of esophageal stricture.  • 7/12/17 - EGD with balloon dilation of esophageal stricture.  • 7/26/17 - EGD and balloon dilation of esophageal stricture.  • 7/31/17 - PET/CT scan:  There is mild metabolic activity seen at the thoracic esophagus just at and below the surgical margins.  Some mild wall thickening.  This could be from recent surgery.  A residual cancer seems unlikely.  There is a precarinal lymph node with mild metabolic activity with SUV of 3 measuring 1 x 1.3 cm.  Again this appears to be nonspecific in my opinion.    • 8/7/17 - WBC 5.9, hemoglobin 11.9, platelet count 171,000, MCV 88.7.    • 10/19/17 - WBC 7.5, hemoglobin 11.8, platelet count 216,000.    • 1/8/18 - PET scan:  No evidence of residual disease or recurrent disease.  There is an esophageal stent in the mid esophagus with a large debris air level.  No evidence of any metastases in the chest, abdomen or pelvis.  Mild nodule uptake in the vocal cords with fullness in the right vocal cord.  This could be physiologic.    • 7/9/18 - CT scan of chest with contrast:  Prominent mediastinal lymph nodes appear stable since February.  Changes of gastric pull-through procedure for esophageal cancer again noted.  Tree-in-bud infiltrates in the left lung base, consistent with infection versus inflammation.    • 1/10/19 - CT chest, abdomen and pelvis with contrast:  No evidence of mets in the chest; however, interval development of metastatic lymphadenopathy in the left side of the retroperitoneum.  For instance, there is a 17 mm rounded lymph node, 10 mm lymph node and also a 14 mm lymph node.  These are all new.  Stable 9 mm hypodense lesion within the right hepatic lobe, which could reflect hemangioma or complex cyst.    • 1/30/19 - CT-guided core biopsy of retroperitoneal lymph node:  Poorly differentiated adenocarcinoma consistent with metastases from patient’s known esophageal primary.  CK20 positive,  CDX2 positive, cytokeratin 7 negative, TTF-1 negative.    • 2/1/19 - Creatinine 0.9, BUN 18, calcium 9.3; these are normal.    • 2/28/19 - [facility] Next Gen sequencing testing on retroperitoneal lymph node specimen:  PD-L1 positive.  CPS = 3.  This implies responsiveness to pembrolizumab.  Mismatch repair status is proficient (no evidence of microsatellite instability).  Tumor mutational burden is low = 6 mutations/Mb.  CDH1 indeterminate.  KRAS mutation positive.  TP53 mutation positive.  CDK6 amplified.  [employer] PD-L1 testing by IHC:  PD-L1 expression 1% positive (CPS =1).  HER2/marvel by IHC is negative.  HER2/marvel by CISH not amplified.  Microsatellite stable tumor.    • 3/7/19 - WBC 7.3, hemoglobin 12, platelet count 128,000, MCV 92.     • 3/14/19 - Patient was seen by radiation oncologist, Dr. Chahal.  He has recommended observation versus systemic chemoimmunotherapy as needed.  No plans for radiation therapy.   • 5/1/19 - CT scan of chest with contrast:  No definitive findings of new metastatic disease in the chest.  Emphysema noted.    • 5/1/19 - CT abdomen and pelvis:  There is interval progression of metastatic retroperitoneal adenopathy.  Left periaortic adenopathy with lymph node measuring up to 2 cm, previously 1.7 cm.  Another lymph node now measures 2.3, previously 1.4 cm.  New enlarged lymph node on image 147 measures 1.4 cm, previously 0.4 cm.  A 9 mm hypodense right hepatic lobe lesion appears stable.    • 5/9/19 - Decision made to start patient on immunotherapy Keytruda.    • 5/9/19 - Vitamin B12 229.  Ferritin 61.  Folate 14.3.  Iron saturation 26%, TIBC 304, serum iron 79.  • 5/31/19 - Patient received Keytruda 200 mg cycle 1, day 1.    • 5/31/19 - Free T4 0.99.  Creatinine 0.9, BUN 9.  LFTs normal.  TSH 3.99.  Folate 14.3.  Iron saturation 26%.    • 6/21/2019 patient received Keytruda cycle 2  • 7/12/2019: Patient received Keytruda cycle 3  • 8/2/2019 patient received cycle 4 Keytruda WBC 7.2,  hemoglobin 11.8 platelet count 163  • 8/23/2019 creatinine 0.9, LFTs normal, WBC 6.4, hemoglobin 11.5, platelets 179, TSH 2.1   • 8/23/2019 patient received cycle 5 of Keytruda  • 9/3/2019 CT chest abdomen and pelvis with contrast: . Positive response to therapy in the abdomen since 05/01/2019. Pathologically enlarged retroperitoneal lymph nodes, predominantly in the left periaortic region, have diminished in size.a 1.3 x 2.1 cm left periaortic node (image 60), previously measured 3.5 x 2.3 cm. A 1.6 cm index node near the level of the left renal vein previously measured 2.0 cm No new or progressive adenopathy. 2. Stable findings in the chest. Noncalcified right upper lobe nodules are unchanged. There is no evidence of new or progressive metastatic disease within the chest. 3. 8 mm indeterminate low-density lesion in the right hepatic lobe, is stable. No new liver lesions.  • 9/6/2019 WBC 5.6, hemoglobin 11.2, platelets of 192, MCV 91.8  • 9/13/2019 patient received Keytruda cycle 6, albumin 3.2  • 10/4/2019 patient received cycle 7 of Keytruda, TSH 4.8, free T4 0.86  • 10/9/2019 WBC 5.8, hemoglobin 11.7, platelets 174  • 10/25/2019 patient received cycle 8 of Keytruda.  WBC 5.9, hemoglobin 11.5, platelets 152, creatinine 0.75, cortisol 15.2, TSH 6.48 high , free T4 1.13 normal  • 11/6/2019 WBC 6.6, hemoglobin 11.8, MCV 91.5, platelets 187  • 11/15/2019 patient received cycle 9 of Keytruda, WBC 6.6, hemoglobin 11.9, platelets 161, cortisol level 8.86, TSH 2.68, free T3 2.8, free T4 1.5  • 12/4/2019 WBC 6.9, hemoglobin 12.0, platelets 180, MCV 92.9  • 12/06/2019-Keytruda Cycle 10  • 12/27/2019- Cycle 11 LFTs normal, WBC 6.7, hemoglobin 11.4, platelets 168, MCV 93, TSH 2.4,  • 1/28/2020: CT chest abdomen and pelvis with contrast:  Single (aortic lymph node in the abdominal retroperitoneum has increased.  Rest of the retroperitoneal lymph nodes have decreased in size.  Mediastinal adenopathy appears unchanged.   Noncalcified bilateral pulmonary nodules are stable  • 1/17/2020 patient received Keytruda cycle 12:.  • 2/3/2020 WBC 7.1, hemoglobin 12.2, platelets 171  • 02/10/2020 patient received cycle 13 of Keytruda: WBC 7.3, hemoglobin 12.1, platelets 166, creatinine 0.82  • 3/6/2020 patient is of Keytruda 200 mg, hemoglobin 10.8  • 3/27/2020 patient received Keytruda 200 mg, hemoglobin 11.4, TSH 1.95  • 4/17/2020 patient received Keytruda cycle 16 200 mg, WBC 6.1, hemoglobin 11.6, platelets 150  • 5/8/2020: Patient received cycle 17 Keytruda, WBC 6.3, hemoglobin 9.6, platelets 137, TSH 2.05, free T4 1.54, creatinine 0.93, LFTs normal,  • 5/29/2020: Patient received cycle 18 Keytruda, WBC 6.5, hemoglobin 11.7, platelets 129, creatinine 0.83, TSH 1.69  • 6/26/2020: Patient receiving Keytruda.   • 7/17/2020: TSH 2.06, WBC 5.7, hemoglobin 11.4, platelets 177, MCV 92.4, Keytruda 200 mg cycle 20  • 7/27/2020: CT chest abdomen pelvis with contrast: Stable findings in the chest abdomen pelvis since 1/28/2020.  Noncalcified bilateral pulmonary nodules are stable.  Stable retroperitoneal left perinephric adenopathy in the abdomen.  • 8/7/2020: Patient received cycle 21 of Keytruda  • 8/28/2020 patient is a cycle 22 of Keytruda.  WBC 6.02, hemoglobin 11.3, platelets 161  • 9/11/2020 WBC 6.9, hemoglobin 12.4, platelets 192  • 9/18/2020: Cycle 23 of Keytruda, TSH 1.19  • 10/9/2020: Cycle 24 of Keytruda, WBC 5.9, hemoglobin 11.9, platelets 178, creatinine 0.84, TSH 2.56, free T3 2.4, CMP normal  • 10/23/2020: WBC 7.34, hemoglobin 11.6, platelets 175  • 10/30/2020:.  Keytruda 200 mg  • 11/20/2020: Keytruda 200 mg  • 12/11/2020 Keytruda 200 mg.  WBC 5.5, hemoglobin 11.4, platelets 176, creatinine 0.8, LFTs normal, TSH 2.2, cortisol 8.3  • 12/10/2020: CT chest abdomen and pelvis with contrast: Previously seen left para-aortic lymph nodes within the abdomen are decreased in size.  No pathological lymphadenopathy.  No evidence of residual  malignancy..  Small noncalcified pulmonary nodules are stable.  No new nodules.  Stable mediastinal lymph nodes.  • 3/12/2021: WBC 6.7, hemoglobin 11.3, platelets 17  • 3/12/2021: CMP normal, TSH 2.9, cortisol 11.95, 1  • 3/15/2021: CT chest with contrast: Stable findings in the chest with postsurgical changes of esophageal resection and gastric pull-through.  Stable right lower lobe lung nodule measures 11 mm.  Stable size and appearance of the mediastinal and upper abdominal lymph nodes.  • 3/23/2021: Doppler of upper extremity: Normal.  • 3/24/2021: CT abdomen and pelvis with contrast: Postoperative changes.  Left periaortic nodes are stable.  • 4/12/2021: TSH 4.1, cortisol 12.4  • 6/22/2021: CMP normal,Cortisol 10.08,, WBC 5.8, hemoglobin 11.2, platelets 109, TSH 2.59  • 7/12/2021: CT scan of the chest abdomen pelvis with contrast: Dominant 10 x 10 left periaortic lymph node is stable since 3/22/2021.  Dominant lymph nodes in the right lower paratracheal and AP window distribution are stable since 3/15/2021.  No evidence of progressive adenopathy in the chest abdomen or pelvis.    • 01/11/2021 -PET/CT findings consistent with new cedric metastatic disease in the retroperitoneum.  2 new pathologically enlarged hypermetabolic retroperitoneal lymph nodes.  No convincing evidence of recurrent disease within the chest neck pelvis or skeleton.  • 1/20/2022 -CT-guided needle biopsy consistent with poorly differentiated carcinoma.  CDX2 positive consistent with metastases from known esophageal primary  • 2/9/2022 - Pembrolizumab complicated with fatigue, joint pains, nausea, diarrhea. Symptoms decrease in intensity over a week.    Subjective:    Second cycle was better tolerated. No significant arthralgias with this go around. Has had fatigue.    The following portions of the patient's history were reviewed and updated as appropriate: allergies, current medications, past family history, past medical history, past social  history, past surgical history and problem list.     Past Medical History:   Diagnosis Date   • B12 deficiency    • Blockage of coronary artery of heart (HCC)    • Depression    • DM (diabetes mellitus) (HCC)    • Dysphagia    • HTN (hypertension)    • Hyperlipidemia        Past Surgical History:   Procedure Laterality Date   • ABDOMINAL WALL ABSCESS INCISION AND DRAINAGE  10/22/2018    WITH DEBRIDEMENT  1.5CM X 1.5 CM X 1.5 CM    DR. PURI   • CATARACT EXTRACTION  2018   • COLONOSCOPY     • CORONARY ARTERY BYPASS GRAFT  2015   • ENDOSCOPY  12/20/2016   • ESOPHAGECTOMY  05/04/2017    BROOKS PETTY ESOPHAGECTOMY, FEEDING JEJNOSTOMOY, ABDOMINAL AND MEDIASTINAL LYMPH NODE DISECTION, PEDICLED MUSCLE FLAP COVERAGE DR. PURI   • ESOPHAGOSCOPY / EGD  07/12/2017    WITH BALLOON DILATION   • ESOPHAGOSCOPY / EGD  07/26/2017    WITH BALLOON DILATION   • ESOPHAGOSCOPY / EGD  08/11/2017    WITH BALLOON DILATION   • ESOPHAGOSCOPY / EGD  08/28/2017    WITH BALLOON DILATION   • ESOPHAGOSCOPY / EGD  09/27/2017    WITH BALLOON DILATION   • LYMPH NODE BIOPSY  01/30/2019   • PACEMAKER IMPLANTATION  11/21/2016       Current Outpatient Medications:   •  Cetirizine HCl (ZyrTEC Allergy) 10 MG capsule, Take  by mouth., Disp: , Rfl:   •  clonazePAM (KlonoPIN) 0.5 MG tablet, Pt taking 1.5 pills daily, Disp: , Rfl: 1  •  cyanocobalamin (VITAMIN B-12) 1000 MCG tablet, , Disp: , Rfl:   •  diphenhydrAMINE-APAP, sleep, (TYLENOL PM EXTRA STRENGTH PO), Take  by mouth., Disp: , Rfl:   •  Euthyrox 50 MCG tablet, Take 1 tablet by mouth once daily, Disp: 90 tablet, Rfl: 0  •  folic acid (FOLVITE) 1 MG tablet, Daily., Disp: , Rfl:   •  metFORMIN (GLUCOPHAGE) 1000 MG tablet, 2 (Two) Times a Day With Meals., Disp: , Rfl:   •  metoprolol tartrate (LOPRESSOR) 25 MG tablet, Take 12.5 mg by mouth 2 (Two) Times a Day., Disp: , Rfl:   •  omeprazole (priLOSEC) 20 MG capsule, Take 20 mg by mouth 2 (Two) Times a Day., Disp: , Rfl:   •  ondansetron (ZOFRAN) 4 MG  "tablet, Take 1 tablet by mouth Every 8 (Eight) Hours As Needed for Nausea or Vomiting., Disp: 90 tablet, Rfl: 2  •  pravastatin (PRAVACHOL) 10 MG tablet, Take 10 mg by mouth every night at bedtime., Disp: , Rfl:     Allergies   Allergen Reactions   • Ace Inhibitors Unknown (See Comments)     Unknown reaction     • Heparin Other (See Comments)     Fever/chills, weakness in legs   • Sulfa Antibiotics Other (See Comments)     Mouth sores       Family History   Problem Relation Age of Onset   • Mental illness Mother    • Heart disease Mother    • Hypertension Father    • Cancer Father    • Heart disease Sister    • Heart disease Brother      Cancer-related family history includes Cancer in his father.    Social History     Tobacco Use   • Smoking status: Former Smoker     Packs/day: 2.00     Years: 50.00     Pack years: 100.00     Types: Cigarettes     Quit date: 2015     Years since quittin.7   • Smokeless tobacco: Former User     Types: Chew     Quit date: 1989   Substance Use Topics   • Alcohol use: No   • Drug use: No     ROS:     Objective:    Vitals:    03/10/22 1056   BP: 123/70   Pulse: 70   Resp: 18   Temp: 97.1 °F (36.2 °C)   SpO2: 99%   Weight: 68.9 kg (152 lb)   Height: 167.6 cm (66\")   PainSc: 0-No pain       (1) Restricted in physically strenuous activity, ambulatory and able to do work of light nature    Physical Exam:     Physical Exam   Constitutional: He is oriented to person, place, and time. He appears well-developed. No distress.   HENT:   Head: Normocephalic and atraumatic.   Nose: Nose normal.   Eyes: Pupils are equal, round, and reactive to light.   Neck: No thyromegaly present.   Cardiovascular: Normal rate, regular rhythm, normal heart sounds and normal pulses. Exam reveals no gallop and no friction rub.   Pulmonary/Chest: Effort normal and breath sounds normal. No stridor. No respiratory distress. He has no wheezes.   Abdominal: Soft. Normal appearance and bowel sounds are normal. " He exhibits no mass. There is no abdominal tenderness. There is no rebound and no guarding.   No tenderness   Musculoskeletal: Normal range of motion. No deformity or signs of injury.      Right lower leg: No edema.   Lymphadenopathy:     He has no cervical adenopathy.   Neurological: He is alert and oriented to person, place, and time. He exhibits normal muscle tone.   Skin: Skin is warm and dry. No rash noted. He is not diaphoretic. No erythema. No jaundice.   Right side chest  port   Psychiatric: His behavior is normal. Mood normal.   Vitals reviewed.    I have reexamined the patient and the results are consistent with the previously documented exam. Jean Pierre Elizondo MD       Lab Results - Last 18 Months   Lab Units 03/10/22  1049 03/02/22  1111 02/16/22  0949   WBC 10*3/mm3 7.00 5.64 5.59   HEMOGLOBIN g/dL 10.0* 9.3* 10.0*   HEMATOCRIT % 31.6* 28.6* 31.2*   PLATELETS 10*3/mm3 209 320 223   MCV fL 89.0 86.4 89.9     Lab Results - Last 18 Months   Lab Units 03/02/22  1111 02/09/22  1124 01/12/22  0845   SODIUM mmol/L 137 136 139   POTASSIUM mmol/L 4.5 4.7 4.5   CHLORIDE mmol/L 103 104 106   CO2 mmol/L 24.0 22.0 22.0   BUN mg/dL 14 11 14   CREATININE mg/dL 0.82 0.82 0.69*   CALCIUM mg/dL 8.9 9.0 8.9   BILIRUBIN mg/dL 0.2 0.3 0.2   ALK PHOS U/L 37* 40 41   ALT (SGPT) U/L 5 7 11   AST (SGOT) U/L 14 16 14   GLUCOSE mg/dL 104* 89 169*     Assessment/Plan     Assessment:    Metastatic esophageal carcinoma:    Patient has a history of GE junction poorly differentiated adenocarcinoma for which he had chemoradiation followed by [place] [facility]winder resection:  Pathology showed ypT3,pN1 disease.  Tumor was Stage IIIA, diagnosed in 2016.  He received concurrent chemoradiation with weekly Carboplatin and Taxol and radiation finishing on 3/15/17.  CT scan on 1/10/19 showed new retroperitoneal lymph nodes.  These lymph nodes were biopsied on 1/30/19 and it shows metastatic esophageal cancer.  [facility] Next Gen testing was performed on the  sample and it shows KRAS mutation, microsatellite stable tumor.  PD-L1 is positive.  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  HER2/marvel (ERBB2) was not amplified and was negative.  His CT scan on 5/1/19 shows evidence of progression.  Patient has started receiving Keytruda on 5/31/19.  He has received 19 cycles so far.   CT scan on 1/28/2020 shows improvement and continued response.  CT scan chest abdomen pelvis with contrast on 12/10/2020 does not show any evidence of disease progression.  On 12/22/2020 decision made to hold Keytruda per patient's request to get a treatment break.. Repeat CT scan chest 7/12/2021 does not show any evidence of disease progression.  Now CT imaging in January 2021 with a CT showing increased abdominal left para-aortic adenopathy.  Subsequent PET/CT with new cedric metastatic disease in the retroperitoneum of the abdomen.  New pathologically enlarged hypermetabolic retroperitoneal lymph nodes.  This would be concerning for disease recurrence/progression.  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  I discussed with him the treatment options going forward.  He has had a good response with immunotherapy pembrolizumab in the past. This was now restarted. Continue the same. He has had significant symptoms with flu like illness after treatment.He is taking tylenol for 2-3 days after infusion which helps. No other new symptoms with second infusion. Repeat CT imaging prior to follow up.    Reflux/heartburn/dysphagia:   Symptoms improved.  Also has a history of strictures. Status post EGD and balloon dilation.  Omeprazole once daily, famotidine as needed, symptoms improved.    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  Continues on Synthroid monitor TSH and fT4 with treatment.    Mild diarrhea:-   diarrhea seems to be waxing and waning in nature.  Related to immunotherapy, but not severe.  Now resolved.  Will monitor    PD-L1 positive, HER2 negative, p53 and KRAS positive.  Repeat  [facility] testing with repeat biopsy with no target mutations.    B12 deficiency: Continue B12 1000 mcg twice daily.        I have reviewed and confirmed the accuracy of the patient's history: Chief complaint, HPI, ROS and Subjective as entered by the MA/LPN/RN.     Jaen Pierre Elizondo MD 03/10/22     Orders Placed This Encounter   Procedures   • CT chest w contrast     Standing Status:   Future     Standing Expiration Date:   3/10/2023     Scheduling Instructions:      Please schedule within a month. Needs to be prior to next follow up with Dr. Elizondo   • CT abdomen pelvis w contrast     Standing Status:   Future     Standing Expiration Date:   3/10/2023     Scheduling Instructions:      Please schedule within a month. Needs to be prior to next follow up with Dr. Elizondo     Order Specific Question:   Will Oral Contrast be needed for this procedure?     Answer:   No     Order Specific Question:   Release to patient     Answer:   Immediate

## 2022-03-10 ENCOUNTER — OFFICE VISIT (OUTPATIENT)
Dept: ONCOLOGY | Facility: CLINIC | Age: 72
End: 2022-03-10

## 2022-03-10 ENCOUNTER — LAB (OUTPATIENT)
Dept: LAB | Facility: HOSPITAL | Age: 72
End: 2022-03-10

## 2022-03-10 VITALS
SYSTOLIC BLOOD PRESSURE: 123 MMHG | HEART RATE: 70 BPM | RESPIRATION RATE: 18 BRPM | WEIGHT: 152 LBS | HEIGHT: 66 IN | DIASTOLIC BLOOD PRESSURE: 70 MMHG | BODY MASS INDEX: 24.43 KG/M2 | OXYGEN SATURATION: 99 % | TEMPERATURE: 97.1 F

## 2022-03-10 DIAGNOSIS — C15.9 ESOPHAGEAL CANCER, STAGE IV: Primary | ICD-10-CM

## 2022-03-10 DIAGNOSIS — C16.0 MALIGNANT NEOPLASM OF CARDIA: ICD-10-CM

## 2022-03-10 LAB
BASOPHILS # BLD AUTO: 0.05 10*3/MM3 (ref 0–0.2)
BASOPHILS NFR BLD AUTO: 0.7 % (ref 0–1.5)
DEPRECATED RDW RBC AUTO: 44.8 FL (ref 37–54)
EOSINOPHIL # BLD AUTO: 0.39 10*3/MM3 (ref 0–0.4)
EOSINOPHIL NFR BLD AUTO: 5.6 % (ref 0.3–6.2)
ERYTHROCYTE [DISTWIDTH] IN BLOOD BY AUTOMATED COUNT: 14.2 % (ref 12.3–15.4)
HCT VFR BLD AUTO: 31.6 % (ref 37.5–51)
HGB BLD-MCNC: 10 G/DL (ref 13–17.7)
HOLD SPECIMEN: NORMAL
HOLD SPECIMEN: NORMAL
LYMPHOCYTES # BLD AUTO: 1.84 10*3/MM3 (ref 0.7–3.1)
LYMPHOCYTES NFR BLD AUTO: 26.3 % (ref 19.6–45.3)
MCH RBC QN AUTO: 28.2 PG (ref 26.6–33)
MCHC RBC AUTO-ENTMCNC: 31.6 G/DL (ref 31.5–35.7)
MCV RBC AUTO: 89 FL (ref 79–97)
MONOCYTES # BLD AUTO: 0.81 10*3/MM3 (ref 0.1–0.9)
MONOCYTES NFR BLD AUTO: 11.6 % (ref 5–12)
NEUTROPHILS NFR BLD AUTO: 3.91 10*3/MM3 (ref 1.7–7)
NEUTROPHILS NFR BLD AUTO: 55.8 % (ref 42.7–76)
PLATELET # BLD AUTO: 209 10*3/MM3 (ref 140–450)
PMV BLD AUTO: 9.6 FL (ref 6–12)
RBC # BLD AUTO: 3.55 10*6/MM3 (ref 4.14–5.8)
WBC NRBC COR # BLD: 7 10*3/MM3 (ref 3.4–10.8)

## 2022-03-10 PROCEDURE — 36415 COLL VENOUS BLD VENIPUNCTURE: CPT

## 2022-03-10 PROCEDURE — 99214 OFFICE O/P EST MOD 30 MIN: CPT | Performed by: INTERNAL MEDICINE

## 2022-03-10 PROCEDURE — 85025 COMPLETE CBC W/AUTO DIFF WBC: CPT

## 2022-03-15 ENCOUNTER — OUTSIDE FACILITY SERVICE (OUTPATIENT)
Dept: CARDIOLOGY | Facility: CLINIC | Age: 72
End: 2022-03-15

## 2022-03-15 ENCOUNTER — CLINICAL SUPPORT NO REQUIREMENTS (OUTPATIENT)
Dept: CARDIOLOGY | Facility: CLINIC | Age: 72
End: 2022-03-15

## 2022-03-15 DIAGNOSIS — Z95.0 PRESENCE OF CARDIAC PACEMAKER: ICD-10-CM

## 2022-03-15 DIAGNOSIS — R00.1 BRADYCARDIA: Primary | ICD-10-CM

## 2022-03-15 PROCEDURE — 99214 OFFICE O/P EST MOD 30 MIN: CPT | Performed by: INTERNAL MEDICINE

## 2022-03-15 PROCEDURE — 93280 PM DEVICE PROGR EVAL DUAL: CPT | Performed by: INTERNAL MEDICINE

## 2022-03-23 ENCOUNTER — HOSPITAL ENCOUNTER (OUTPATIENT)
Dept: ONCOLOGY | Facility: HOSPITAL | Age: 72
Setting detail: INFUSION SERIES
Discharge: HOME | End: 2022-03-23

## 2022-03-23 VITALS
WEIGHT: 153 LBS | RESPIRATION RATE: 18 BRPM | SYSTOLIC BLOOD PRESSURE: 131 MMHG | HEART RATE: 74 BPM | HEIGHT: 66 IN | BODY MASS INDEX: 24.59 KG/M2 | TEMPERATURE: 96.9 F | DIASTOLIC BLOOD PRESSURE: 74 MMHG

## 2022-03-23 DIAGNOSIS — C16.0 MALIGNANT NEOPLASM OF CARDIA: ICD-10-CM

## 2022-03-23 DIAGNOSIS — Z51.11 ENCOUNTER FOR ANTINEOPLASTIC CHEMOTHERAPY: ICD-10-CM

## 2022-03-23 DIAGNOSIS — Z51.81 ENCOUNTER FOR THERAPEUTIC DRUG MONITORING: Primary | ICD-10-CM

## 2022-03-23 LAB
ALBUMIN SERPL-MCNC: 4 G/DL (ref 3.5–5.2)
ALBUMIN/GLOB SERPL: 1.6 G/DL
ALP SERPL-CCNC: 42 U/L (ref 39–117)
ALT SERPL W P-5'-P-CCNC: 8 U/L (ref 1–41)
ANION GAP SERPL CALCULATED.3IONS-SCNC: 12 MMOL/L (ref 5–15)
AST SERPL-CCNC: 18 U/L (ref 1–40)
BASOPHILS # BLD AUTO: 0.06 10*3/MM3 (ref 0–0.2)
BASOPHILS NFR BLD AUTO: 1 % (ref 0–1.5)
BILIRUB SERPL-MCNC: 0.2 MG/DL (ref 0–1.2)
BUN SERPL-MCNC: 14 MG/DL (ref 8–23)
BUN/CREAT SERPL: 16.7 (ref 7–25)
CALCIUM SPEC-SCNC: 8.5 MG/DL (ref 8.6–10.5)
CHLORIDE SERPL-SCNC: 106 MMOL/L (ref 98–107)
CO2 SERPL-SCNC: 22 MMOL/L (ref 22–29)
CORTIS SERPL-MCNC: 13.61 MCG/DL
CREAT SERPL-MCNC: 0.84 MG/DL (ref 0.76–1.27)
DEPRECATED RDW RBC AUTO: 45.7 FL (ref 37–54)
EGFRCR SERPLBLD CKD-EPI 2021: 92.7 ML/MIN/1.73
EOSINOPHIL # BLD AUTO: 0.38 10*3/MM3 (ref 0–0.4)
EOSINOPHIL NFR BLD AUTO: 6.3 % (ref 0.3–6.2)
ERYTHROCYTE [DISTWIDTH] IN BLOOD BY AUTOMATED COUNT: 14.7 % (ref 12.3–15.4)
GLOBULIN UR ELPH-MCNC: 2.5 GM/DL
GLUCOSE BLDC GLUCOMTR-MCNC: 108 MG/DL (ref 70–105)
GLUCOSE SERPL-MCNC: 109 MG/DL (ref 65–99)
HCT VFR BLD AUTO: 30 % (ref 37.5–51)
HGB BLD-MCNC: 9.4 G/DL (ref 13–17.7)
LYMPHOCYTES # BLD AUTO: 1.6 10*3/MM3 (ref 0.7–3.1)
LYMPHOCYTES NFR BLD AUTO: 26.6 % (ref 19.6–45.3)
MCH RBC QN AUTO: 27.6 PG (ref 26.6–33)
MCHC RBC AUTO-ENTMCNC: 31.3 G/DL (ref 31.5–35.7)
MCV RBC AUTO: 88.2 FL (ref 79–97)
MONOCYTES # BLD AUTO: 0.82 10*3/MM3 (ref 0.1–0.9)
MONOCYTES NFR BLD AUTO: 13.6 % (ref 5–12)
NEUTROPHILS NFR BLD AUTO: 3.16 10*3/MM3 (ref 1.7–7)
NEUTROPHILS NFR BLD AUTO: 52.5 % (ref 42.7–76)
PLATELET # BLD AUTO: 197 10*3/MM3 (ref 140–450)
PMV BLD AUTO: 10.3 FL (ref 6–12)
POTASSIUM SERPL-SCNC: 5 MMOL/L (ref 3.5–5.2)
PROT SERPL-MCNC: 6.5 G/DL (ref 6–8.5)
RBC # BLD AUTO: 3.4 10*6/MM3 (ref 4.14–5.8)
SODIUM SERPL-SCNC: 140 MMOL/L (ref 136–145)
T3FREE SERPL-MCNC: 2.48 PG/ML (ref 2–4.4)
WBC NRBC COR # BLD: 6.02 10*3/MM3 (ref 3.4–10.8)

## 2022-03-23 PROCEDURE — 80053 COMPREHEN METABOLIC PANEL: CPT | Performed by: INTERNAL MEDICINE

## 2022-03-23 PROCEDURE — 36591 DRAW BLOOD OFF VENOUS DEVICE: CPT

## 2022-03-23 PROCEDURE — 84481 FREE ASSAY (FT-3): CPT | Performed by: INTERNAL MEDICINE

## 2022-03-23 PROCEDURE — 82962 GLUCOSE BLOOD TEST: CPT

## 2022-03-23 PROCEDURE — 96413 CHEMO IV INFUSION 1 HR: CPT

## 2022-03-23 PROCEDURE — 82533 TOTAL CORTISOL: CPT | Performed by: INTERNAL MEDICINE

## 2022-03-23 PROCEDURE — 85025 COMPLETE CBC W/AUTO DIFF WBC: CPT | Performed by: INTERNAL MEDICINE

## 2022-03-23 PROCEDURE — 25010000002 PEMBROLIZUMAB 100 MG/4ML SOLUTION 4 ML VIAL: Performed by: INTERNAL MEDICINE

## 2022-03-23 RX ORDER — SODIUM CHLORIDE 9 MG/ML
250 INJECTION, SOLUTION INTRAVENOUS ONCE
Status: COMPLETED | OUTPATIENT
Start: 2022-03-23 | End: 2022-03-23

## 2022-03-23 RX ORDER — SODIUM CHLORIDE 9 MG/ML
250 INJECTION, SOLUTION INTRAVENOUS ONCE
Status: CANCELLED | OUTPATIENT
Start: 2022-03-23

## 2022-03-23 RX ADMIN — SODIUM CHLORIDE 250 ML: 9 INJECTION, SOLUTION INTRAVENOUS at 11:55

## 2022-03-23 RX ADMIN — SODIUM CHLORIDE 200 MG: 9 INJECTION, SOLUTION INTRAVENOUS at 11:56

## 2022-03-23 NOTE — PROGRESS NOTES
Patient getting Keytruda, patient states that about a day or two after infusion he gets nauseated, sob, sneezing about 10 times, vomit phlem, and diarrhea after he eats for about 2 hours he does take zofran but not like he is suppose to. He said it all started when he started back on Keytruda but this time it's worse. I told him to take his zofran before he eats and take some allergy medication, Kavya advised to do same thing. Advised patient and he v/u, patient has AVS for next infusion

## 2022-03-25 PROCEDURE — 93296 REM INTERROG EVL PM/IDS: CPT | Performed by: INTERNAL MEDICINE

## 2022-03-25 PROCEDURE — 93294 REM INTERROG EVL PM/LDLS PM: CPT | Performed by: INTERNAL MEDICINE

## 2022-03-30 ENCOUNTER — HOSPITAL ENCOUNTER (OUTPATIENT)
Dept: PET IMAGING | Facility: HOSPITAL | Age: 72
Discharge: HOME | End: 2022-03-30
Admitting: INTERNAL MEDICINE

## 2022-03-30 DIAGNOSIS — C15.9 ESOPHAGEAL CANCER, STAGE IV: ICD-10-CM

## 2022-03-30 PROCEDURE — 0 IOPAMIDOL PER 1 ML: Performed by: INTERNAL MEDICINE

## 2022-03-30 PROCEDURE — 74177 CT ABD & PELVIS W/CONTRAST: CPT

## 2022-03-30 PROCEDURE — 71260 CT THORAX DX C+: CPT

## 2022-03-30 RX ADMIN — IOPAMIDOL 100 ML: 755 INJECTION, SOLUTION INTRAVENOUS at 11:09

## 2022-04-05 NOTE — PROGRESS NOTES
Hematology-Oncology Follow-up Note       Artie Mc  1950    Primary Care Physician: Chanel Carter APRN  Referring Physician: Chanel Carter APRN    Reason For Visit:  Chief Complaint   Patient presents with   • Follow-up     Esophageal cancer, stage IV     Metastatic esophageal cancer  Monitoring for adverse effects related to immunotherapy.  Hypothyroidism    HPI:   Mr. Mc is a pleasant 71 y.o. gentleman with a history of gastroesophageal reflux disease, diabetes, hypertension, heart block status post pacemaker placement and history of CABG.  He was having symptoms of dysphagia, weight loss since September 2016. He was reporting symptoms of food getting stuck in the lower chest.  He has transitioned himself to a liquid diet.  The patient reports losing about 30 pounds over this duration.  The patient underwent upper GI endoscopy on 12/20/16 with Dr. Esvin Barrera.  Endoscopy showed necrotic, circumferential and moderately obstructive mass at the distal esophagus between 42 cm and 36 cm.      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  There was also evidence of mild subacute inflammation in the duodenum.  Dr. Barrera ordered a CT scan of the chest and abdomen with contrast on 12/20/16.  The scan was done at [facility].  The scan showed a new ill-defined mass in the anterior wall of the distal esophagus at the GE junction measuring 2.3 x 2.2 x 1.6 cm, worrisome for cancer.  There were four subcentimeter liver lesions which had appeared stable in comparison to another CT scan from 2009 and they likely represent small cysts.  There was also mild lymphadenopathy in the gastrohepatic ligament.  There were at least six borderline enlarged lymph nodes measuring up to 1.3 x 1 cm in the region.  The patient is referred to us for further oncological evaluation.    1/5/2017 - The patient presents for initial  consultation.  He reports persistent dysphagia and also has symptoms of regurgitation.  He cannot tolerate solid foods and is dependent on liquid diet such as Ensure.  He also reports fatigue. Symptoms of dysphagia have been present for the past four months.  When he eats any solids, the food gets stuck in the lower chest.    • 1/5/17 - Vitamin B12 200 (L).  CEA 0.8.  Ferritin 35.  Iron saturation 16% (L), serum iron 56, TIBC 354.  Creatinine 0.9.  LFTs normal.  Folate 10.2.  • 1/12/17 - PET/CT scan:  Intense abnormal activity corresponding to the region of the GE junction and proximal stomach.  SUV is 11.06.  There is a suggestion of an abnormal mass or abnormal wall thickening in the region measuring 3.9 x 4.8 cm.  No additional abnormalities.  No evidence of metastases or other lymphadenopathy.  Scattered nonspecific subcentimeter lymph nodes involving the mediastinum and within the central upper abdomen.  • 1/13/17 - Creatinine 0.9.  LFTs normal.    • 1/17/17 - Patient seen by thoracic surgeon, Dr. Bradley.  PET scan was reviewed.  He has recommended neoadjuvant chemoradiation therapy followed by [place] Gurwinder esophagogastrectomy.  Port-A-Cath is being arranged.    • 1/20/17 - WBC 6.3, hemoglobin 12.1, platelet count 198,000, MCV 86.4.  • 1/30/17 - Patient started weekly Carboplatin and Taxol (Carboplatin AUC 2 and Taxol 50 mg/M2).  Patient received Injectafer 750 mg.  WBC 5.8, hemoglobin 12.4, platelet count 244,000.   • 1/31/17 - Patient seen by Dr. Eduardo Oleary.  The plan was to give 5040 cGy in 28 fractions.   • 2/6/17 - WBC 4.4, hemoglobin 11.6, platelet count 201,000.  Patient received cycle 2 of weekly Carboplatin and Taxol (Carboplatin AUC 2 and Taxol 50 mg/M2).  • 2/13/17 - Patient received Carboplatin and Taxol weekly cycle 3.  • 2/13/17 - Patient started concurrent radiation therapy.  Total planned dose is 4140 cGy.    • 2/13/17 to 2/15/17 - Patient received 4140 cGy of neoadjuvant radiation.   Radiation was finished on 3/15/17.    • 2/20/17 - WBC 1.8, hemoglobin 10.6, platelet count 217,000.  Creatinine 0.5. Folate 15 (L).  Ferritin 361.  Haptoglobin 214.  Iron saturation 33%, TIBC 263, serum iron 88.    • 2/20/17 - Patient received Carboplatin and Taxol weekly cycle 4.   • 2/27/17 - WBC 3.7, hemoglobin 10.7, platelet count 177,000, MCV 85.3.    • 3/6/17 - Patient has received 16 out of the 23 planned radiation treatments.  Total planned dose is 4140 cGy.    • 3/6/17 - WBC 5.4, hemoglobin 11.2, platelet count 113,000.  • 3/6/17 - Patient received cycle 5 of weekly Carboplatin and Taxol.   • 3/13/17 - Patient received cycle 6 of weekly Carboplatin and Taxol.  WBC 3.9, hemoglobin 11.3, platelet count 93,000.     • 3/20/17 - WBC 1.4, hemoglobin 10.0, platelet count 118,000, MCV 86.1   • 3/27/17 - WBC 3.8, hemoglobin 9.5, platelet count 176,000, MCV 87.3.    • 4/7/17 - Stress test:  No evidence of reversible myocardial ischemia.  Normal left ventricular ejection fraction of 50%.    • 4/17/17 - Upper GI endoscopy by Dr. 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  The GE junction adenocarcinoma lesion seems to have a very good response to chemotherapy and radiation.  The lumen was open.  • 5/1/17 - WBC 7.0, hemoglobin 10.2, platelet count 175,000.    • 5/4/17 - Patient underwent [facility] Gurwinder esophagectomy with pyloroplasty, feeding jejunostomy tube, abdominal and mediastinal lymph node dissection.    Surgical Pathology:  Moderately to poorly differentiated adenocarcinoma measuring 1.3 cm at the GE junction.  Resection margins are negative for malignancy.  Tumor margins are negative.  There is effective treatment response.  No evidence of lymphovascular invasion.  Staging is ypT3,pN1.  One out of fourteen lymph nodes involved.   • 5/22/17 - WBC 6.8, hemoglobin 10.3, platelet count 312,000.    • 6/19/17 - WBC 5.7, hemoglobin 10.5, platelet count 204,000, MCV 92.1.     • 7/6/17 - EGD:  Status post balloon dilation of esophageal stricture.  • 7/12/17 - EGD with balloon dilation of esophageal stricture.  • 7/26/17 - EGD and balloon dilation of esophageal stricture.  • 7/31/17 - PET/CT scan:  There is mild metabolic activity seen at the thoracic esophagus just at and below the surgical margins.  Some mild wall thickening.  This could be from recent surgery.  A residual cancer seems unlikely.  There is a precarinal lymph node with mild metabolic activity with SUV of 3 measuring 1 x 1.3 cm.  Again this appears to be nonspecific in my opinion.    • 8/7/17 - WBC 5.9, hemoglobin 11.9, platelet count 171,000, MCV 88.7.    • 10/19/17 - WBC 7.5, hemoglobin 11.8, platelet count 216,000.    • 1/8/18 - PET scan:  No evidence of residual disease or recurrent disease.  There is an esophageal stent in the mid esophagus with a large debris air level.  No evidence of any metastases in the chest, abdomen or pelvis.  Mild nodule uptake in the vocal cords with fullness in the right vocal cord.  This could be physiologic.    • 7/9/18 - CT scan of chest with contrast:  Prominent mediastinal lymph nodes appear stable since February.  Changes of gastric pull-through procedure for esophageal cancer again noted.  Tree-in-bud infiltrates in the left lung base, consistent with infection versus inflammation.    • 1/10/19 - CT chest, abdomen and pelvis with contrast:  No evidence of mets in the chest; however, interval development of metastatic lymphadenopathy in the left side of the retroperitoneum.  For instance, there is a 17 mm rounded lymph node, 10 mm lymph node and also a 14 mm lymph node.  These are all new.  Stable 9 mm hypodense lesion within the right hepatic lobe, which could reflect hemangioma or complex cyst.    • 1/30/19 - CT-guided core biopsy of retroperitoneal lymph node:  Poorly differentiated adenocarcinoma consistent with metastases from patient’s known esophageal primary.  CK20 positive,  CDX2 positive, cytokeratin 7 negative, TTF-1 negative.    • 2/1/19 - Creatinine 0.9, BUN 18, calcium 9.3; these are normal.    • 2/28/19 - [facility] Next Gen sequencing testing on retroperitoneal lymph node specimen:  PD-L1 positive.  CPS = 3.  This implies responsiveness to pembrolizumab.  Mismatch repair status is proficient (no evidence of microsatellite instability).  Tumor mutational burden is low = 6 mutations/Mb.  CDH1 indeterminate.  KRAS mutation positive.  TP53 mutation positive.  CDK6 amplified.  [employer] PD-L1 testing by IHC:  PD-L1 expression 1% positive (CPS =1).  HER2/marvel by IHC is negative.  HER2/marvel by CISH not amplified.  Microsatellite stable tumor.    • 3/7/19 - WBC 7.3, hemoglobin 12, platelet count 128,000, MCV 92.     • 3/14/19 - Patient was seen by radiation oncologist, Dr. Chahal.  He has recommended observation versus systemic chemoimmunotherapy as needed.  No plans for radiation therapy.   • 5/1/19 - CT scan of chest with contrast:  No definitive findings of new metastatic disease in the chest.  Emphysema noted.    • 5/1/19 - CT abdomen and pelvis:  There is interval progression of metastatic retroperitoneal adenopathy.  Left periaortic adenopathy with lymph node measuring up to 2 cm, previously 1.7 cm.  Another lymph node now measures 2.3, previously 1.4 cm.  New enlarged lymph node on image 147 measures 1.4 cm, previously 0.4 cm.  A 9 mm hypodense right hepatic lobe lesion appears stable.    • 5/9/19 - Decision made to start patient on immunotherapy Keytruda.    • 5/9/19 - Vitamin B12 229.  Ferritin 61.  Folate 14.3.  Iron saturation 26%, TIBC 304, serum iron 79.  • 5/31/19 - Patient received Keytruda 200 mg cycle 1, day 1.    • 5/31/19 - Free T4 0.99.  Creatinine 0.9, BUN 9.  LFTs normal.  TSH 3.99.  Folate 14.3.  Iron saturation 26%.    • 6/21/2019 patient received Keytruda cycle 2  • 7/12/2019: Patient received Keytruda cycle 3  • 8/2/2019 patient received cycle 4 Keytruda WBC 7.2,  hemoglobin 11.8 platelet count 163  • 8/23/2019 creatinine 0.9, LFTs normal, WBC 6.4, hemoglobin 11.5, platelets 179, TSH 2.1   • 8/23/2019 patient received cycle 5 of Keytruda  • 9/3/2019 CT chest abdomen and pelvis with contrast: . Positive response to therapy in the abdomen since 05/01/2019. Pathologically enlarged retroperitoneal lymph nodes, predominantly in the left periaortic region, have diminished in size.a 1.3 x 2.1 cm left periaortic node (image 60), previously measured 3.5 x 2.3 cm. A 1.6 cm index node near the level of the left renal vein previously measured 2.0 cm No new or progressive adenopathy. 2. Stable findings in the chest. Noncalcified right upper lobe nodules are unchanged. There is no evidence of new or progressive metastatic disease within the chest. 3. 8 mm indeterminate low-density lesion in the right hepatic lobe, is stable. No new liver lesions.  • 9/6/2019 WBC 5.6, hemoglobin 11.2, platelets of 192, MCV 91.8  • 9/13/2019 patient received Keytruda cycle 6, albumin 3.2  • 10/4/2019 patient received cycle 7 of Keytruda, TSH 4.8, free T4 0.86  • 10/9/2019 WBC 5.8, hemoglobin 11.7, platelets 174  • 10/25/2019 patient received cycle 8 of Keytruda.  WBC 5.9, hemoglobin 11.5, platelets 152, creatinine 0.75, cortisol 15.2, TSH 6.48 high , free T4 1.13 normal  • 11/6/2019 WBC 6.6, hemoglobin 11.8, MCV 91.5, platelets 187  • 11/15/2019 patient received cycle 9 of Keytruda, WBC 6.6, hemoglobin 11.9, platelets 161, cortisol level 8.86, TSH 2.68, free T3 2.8, free T4 1.5  • 12/4/2019 WBC 6.9, hemoglobin 12.0, platelets 180, MCV 92.9  • 12/06/2019-Keytruda Cycle 10  • 12/27/2019- Cycle 11 LFTs normal, WBC 6.7, hemoglobin 11.4, platelets 168, MCV 93, TSH 2.4,  • 1/28/2020: CT chest abdomen and pelvis with contrast:  Single (aortic lymph node in the abdominal retroperitoneum has increased.  Rest of the retroperitoneal lymph nodes have decreased in size.  Mediastinal adenopathy appears unchanged.   Noncalcified bilateral pulmonary nodules are stable  • 1/17/2020 patient received Keytruda cycle 12:.  • 2/3/2020 WBC 7.1, hemoglobin 12.2, platelets 171  • 02/10/2020 patient received cycle 13 of Keytruda: WBC 7.3, hemoglobin 12.1, platelets 166, creatinine 0.82  • 3/6/2020 patient is of Keytruda 200 mg, hemoglobin 10.8  • 3/27/2020 patient received Keytruda 200 mg, hemoglobin 11.4, TSH 1.95  • 4/17/2020 patient received Keytruda cycle 16 200 mg, WBC 6.1, hemoglobin 11.6, platelets 150  • 5/8/2020: Patient received cycle 17 Keytruda, WBC 6.3, hemoglobin 9.6, platelets 137, TSH 2.05, free T4 1.54, creatinine 0.93, LFTs normal,  • 5/29/2020: Patient received cycle 18 Keytruda, WBC 6.5, hemoglobin 11.7, platelets 129, creatinine 0.83, TSH 1.69  • 6/26/2020: Patient receiving Keytruda.   • 7/17/2020: TSH 2.06, WBC 5.7, hemoglobin 11.4, platelets 177, MCV 92.4, Keytruda 200 mg cycle 20  • 7/27/2020: CT chest abdomen pelvis with contrast: Stable findings in the chest abdomen pelvis since 1/28/2020.  Noncalcified bilateral pulmonary nodules are stable.  Stable retroperitoneal left perinephric adenopathy in the abdomen.  • 8/7/2020: Patient received cycle 21 of Keytruda  • 8/28/2020 patient is a cycle 22 of Keytruda.  WBC 6.02, hemoglobin 11.3, platelets 161  • 9/11/2020 WBC 6.9, hemoglobin 12.4, platelets 192  • 9/18/2020: Cycle 23 of Keytruda, TSH 1.19  • 10/9/2020: Cycle 24 of Keytruda, WBC 5.9, hemoglobin 11.9, platelets 178, creatinine 0.84, TSH 2.56, free T3 2.4, CMP normal  • 10/23/2020: WBC 7.34, hemoglobin 11.6, platelets 175  • 10/30/2020:.  Keytruda 200 mg  • 11/20/2020: Keytruda 200 mg  • 12/11/2020 Keytruda 200 mg.  WBC 5.5, hemoglobin 11.4, platelets 176, creatinine 0.8, LFTs normal, TSH 2.2, cortisol 8.3  • 12/10/2020: CT chest abdomen and pelvis with contrast: Previously seen left para-aortic lymph nodes within the abdomen are decreased in size.  No pathological lymphadenopathy.  No evidence of residual  malignancy..  Small noncalcified pulmonary nodules are stable.  No new nodules.  Stable mediastinal lymph nodes.  • 3/12/2021: WBC 6.7, hemoglobin 11.3, platelets 17  • 3/12/2021: CMP normal, TSH 2.9, cortisol 11.95, 1  • 3/15/2021: CT chest with contrast: Stable findings in the chest with postsurgical changes of esophageal resection and gastric pull-through.  Stable right lower lobe lung nodule measures 11 mm.  Stable size and appearance of the mediastinal and upper abdominal lymph nodes.  • 3/23/2021: Doppler of upper extremity: Normal.  • 3/24/2021: CT abdomen and pelvis with contrast: Postoperative changes.  Left periaortic nodes are stable.  • 4/12/2021: TSH 4.1, cortisol 12.4  • 6/22/2021: CMP normal,Cortisol 10.08,, WBC 5.8, hemoglobin 11.2, platelets 109, TSH 2.59  • 7/12/2021: CT scan of the chest abdomen pelvis with contrast: Dominant 10 x 10 left periaortic lymph node is stable since 3/22/2021.  Dominant lymph nodes in the right lower paratracheal and AP window distribution are stable since 3/15/2021.  No evidence of progressive adenopathy in the chest abdomen or pelvis.    • 01/11/2021 -PET/CT findings consistent with new cedric metastatic disease in the retroperitoneum.  2 new pathologically enlarged hypermetabolic retroperitoneal lymph nodes.  No convincing evidence of recurrent disease within the chest neck pelvis or skeleton.  • 1/20/2022 -CT-guided needle biopsy consistent with poorly differentiated carcinoma.  CDX2 positive consistent with metastases from known esophageal primary  • 2/9/2022 - Pembrolizumab complicated with fatigue, joint pains, nausea, diarrhea. Symptoms decrease in intensity over a week.  • 3/30/2022 -CT chest and pelvis with stable metastatic lymph nodes in the left periaortic retroperitoneum.  No evidence of disease progression or new lesions.  Stable lymphadenopathy in the distal paratracheal mediastinal area.    Subjective:    Patient continues have inflammatory arthralgias.   Does cause pain and he takes Tylenol occasionally.  Pain is moderate mild not severe.    The following portions of the patient's history were reviewed and updated as appropriate: allergies, current medications, past family history, past medical history, past social history, past surgical history and problem list.     Past Medical History:   Diagnosis Date   • B12 deficiency    • Blockage of coronary artery of heart (HCC)    • Depression    • DM (diabetes mellitus) (HCC)    • Dysphagia    • HTN (hypertension)    • Hyperlipidemia        Past Surgical History:   Procedure Laterality Date   • ABDOMINAL WALL ABSCESS INCISION AND DRAINAGE  10/22/2018    WITH DEBRIDEMENT  1.5CM X 1.5 CM X 1.5 CM    DR. PURI   • CATARACT EXTRACTION  2018   • COLONOSCOPY     • CORONARY ARTERY BYPASS GRAFT  2015   • ENDOSCOPY  12/20/2016   • ESOPHAGECTOMY  05/04/2017    BROOKS PETTY ESOPHAGECTOMY, FEEDING JEJNOSTOMOY, ABDOMINAL AND MEDIASTINAL LYMPH NODE DISECTION, PEDICLED MUSCLE FLAP COVERAGE DR. PURI   • ESOPHAGOSCOPY / EGD  07/12/2017    WITH BALLOON DILATION   • ESOPHAGOSCOPY / EGD  07/26/2017    WITH BALLOON DILATION   • ESOPHAGOSCOPY / EGD  08/11/2017    WITH BALLOON DILATION   • ESOPHAGOSCOPY / EGD  08/28/2017    WITH BALLOON DILATION   • ESOPHAGOSCOPY / EGD  09/27/2017    WITH BALLOON DILATION   • LYMPH NODE BIOPSY  01/30/2019   • PACEMAKER IMPLANTATION  11/21/2016       Current Outpatient Medications:   •  Cetirizine HCl (ZyrTEC Allergy) 10 MG capsule, Take  by mouth., Disp: , Rfl:   •  clonazePAM (KlonoPIN) 0.5 MG tablet, Pt taking 1.5 pills daily, Disp: , Rfl: 1  •  cyanocobalamin (VITAMIN B-12) 1000 MCG tablet, , Disp: , Rfl:   •  diphenhydrAMINE-APAP, sleep, (TYLENOL PM EXTRA STRENGTH PO), Take  by mouth., Disp: , Rfl:   •  Euthyrox 50 MCG tablet, Take 1 tablet by mouth once daily, Disp: 90 tablet, Rfl: 0  •  folic acid (FOLVITE) 1 MG tablet, Daily., Disp: , Rfl:   •  metFORMIN (GLUCOPHAGE) 1000 MG tablet, 2 (Two) Times a  "Day With Meals., Disp: , Rfl:   •  metoprolol tartrate (LOPRESSOR) 25 MG tablet, Take 12.5 mg by mouth 2 (Two) Times a Day., Disp: , Rfl:   •  omeprazole (priLOSEC) 20 MG capsule, Take 20 mg by mouth 2 (Two) Times a Day., Disp: , Rfl:   •  ondansetron (ZOFRAN) 4 MG tablet, Take 1 tablet by mouth Every 8 (Eight) Hours As Needed for Nausea or Vomiting., Disp: 90 tablet, Rfl: 2  •  pravastatin (PRAVACHOL) 10 MG tablet, Take 10 mg by mouth every night at bedtime., Disp: , Rfl:     Allergies   Allergen Reactions   • Ace Inhibitors Unknown (See Comments)     Unknown reaction     • Heparin Other (See Comments)     Fever/chills, weakness in legs   • Sulfa Antibiotics Other (See Comments)     Mouth sores       Family History   Problem Relation Age of Onset   • Mental illness Mother    • Heart disease Mother    • Hypertension Father    • Cancer Father    • Heart disease Sister    • Heart disease Brother      Cancer-related family history includes Cancer in his father.    Social History     Tobacco Use   • Smoking status: Former Smoker     Packs/day: 2.00     Years: 50.00     Pack years: 100.00     Types: Cigarettes     Quit date: 2015     Years since quittin.7   • Smokeless tobacco: Former User     Types: Chew     Quit date: 1989   Substance Use Topics   • Alcohol use: No   • Drug use: No     ROS:     Objective:    Vitals:    22 1106   BP: 134/68   Pulse: 81   Resp: 18   Temp: 97.3 °F (36.3 °C)   SpO2: 96%   Weight: 69.4 kg (153 lb)   Height: 167.6 cm (66\")   PainSc: 0-No pain       (1) Restricted in physically strenuous activity, ambulatory and able to do work of light nature    Physical Exam:     Physical Exam   Constitutional: He is oriented to person, place, and time. He appears well-developed. No distress.   HENT:   Head: Normocephalic and atraumatic.   Nose: Nose normal.   Mouth/Throat: Mucous membranes are dry.   Eyes: Pupils are equal, round, and reactive to light.   Neck: No thyromegaly present. "   Cardiovascular: Normal rate, regular rhythm, normal heart sounds and normal pulses. Exam reveals no gallop and no friction rub.   Pulmonary/Chest: Effort normal and breath sounds normal. No stridor. No respiratory distress. He has no wheezes.   Abdominal: Soft. Normal appearance and bowel sounds are normal.   No tenderness   Musculoskeletal: Normal range of motion. No deformity or signs of injury.      Right lower leg: No edema.   Lymphadenopathy:     He has no cervical adenopathy.   Neurological: He is alert and oriented to person, place, and time. He exhibits normal muscle tone.   Skin: Skin is warm and dry. No rash noted. He is not diaphoretic. No erythema. No jaundice.   Right side chest  port   Vitals reviewed.    I have reexamined the patient and the results are consistent with the previously documented exam. Jean Pierre Elizondo MD       Lab Results - Last 18 Months   Lab Units 04/06/22  1052 03/23/22  1107 03/10/22  1049   WBC 10*3/mm3 9.54 6.02 7.00   HEMOGLOBIN g/dL 9.7* 9.4* 10.0*   HEMATOCRIT % 31.4* 30.0* 31.6*   PLATELETS 10*3/mm3 291 197 209   MCV fL 88.2 88.2 89.0     Lab Results - Last 18 Months   Lab Units 03/23/22  1107 03/02/22  1111 02/09/22  1124   SODIUM mmol/L 140 137 136   POTASSIUM mmol/L 5.0 4.5 4.7   CHLORIDE mmol/L 106 103 104   CO2 mmol/L 22.0 24.0 22.0   BUN mg/dL 14 14 11   CREATININE mg/dL 0.84 0.82 0.82   CALCIUM mg/dL 8.5* 8.9 9.0   BILIRUBIN mg/dL 0.2 0.2 0.3   ALK PHOS U/L 42 37* 40   ALT (SGPT) U/L 8 5 7   AST (SGOT) U/L 18 14 16   GLUCOSE mg/dL 109* 104* 89     Assessment/Plan     Assessment:    Metastatic esophageal carcinoma:    Patient has a history of GE junction poorly differentiated adenocarcinoma for which he had chemoradiation followed by Noel Gurwinder resection:  Pathology showed ypT3,pN1 disease.  Tumor was Stage IIIA, diagnosed in 2016.  He received concurrent chemoradiation with weekly Carboplatin and Taxol and radiation finishing on 3/15/17.  CT scan on 1/10/19 showed new  retroperitoneal lymph nodes.  These lymph nodes were biopsied on 1/30/19 and it shows metastatic esophageal cancer.  [facility] Next Gen testing was performed on the sample and it shows KRAS mutation, microsatellite stable tumor.  PD-L1 is positive.  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  HER2/marvel (ERBB2) was not amplified and was negative.  His CT scan on 5/1/19 shows evidence of progression.  Patient has started receiving Keytruda on 5/31/19.  He has received 19 cycles so far.   CT scan on 1/28/2020 shows improvement and continued response.  CT scan chest abdomen pelvis with contrast on 12/10/2020 does not show any evidence of disease progression.  On 12/22/2020 decision made to hold Keytruda per patient's request to get a treatment break.. Repeat CT scan chest 7/12/2021 does not show any evidence of disease progression.  Now CT imaging in January 2021 with a CT showing increased abdominal left para-aortic adenopathy.  Subsequent PET/CT with new cedric metastatic disease in the retroperitoneum of the abdomen.  New pathologically enlarged hypermetabolic retroperitoneal lymph nodes.  This would be concerning for disease recurrence/progression.  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  I discussed with him the treatment options going forward.  He has had a good response with immunotherapy pembrolizumab in the past. This was now restarted. Continue the same. He has had significant symptoms with flu like illness after treatment.he takes Tylenol which helps.  He also has inflammatory arthralgias.  Continues to take Tylenol.  We will monitor this closely for now no treatment interruption or steroids.    Reflux/heartburn/dysphagia:   Symptoms improved.  Also has a history of strictures. Status post EGD and balloon dilation.  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  Continues on Synthroid monitor TSH and fT4 with treatment.    Mild  diarrhea:-   diarrhea seems to be waxing and waning in nature.  Related to immunotherapy, but not severe.  Now resolved.  Will monitor    PD-L1 positive, HER2 negative, p53 and KRAS positive.  Repeat [facility] testing with repeat biopsy with no target mutations.    B12 deficiency: Continue B12 1000 mcg twice daily.        I have reviewed and confirmed the accuracy of the patient's history: Chief complaint, HPI, ROS and Subjective as entered by the MA/LPN/RN.     Jean Pierre Elizondo MD 04/06/22     No orders of the defined types were placed in this encounter.

## 2022-04-06 ENCOUNTER — OFFICE VISIT (OUTPATIENT)
Dept: ONCOLOGY | Facility: CLINIC | Age: 72
End: 2022-04-06

## 2022-04-06 ENCOUNTER — LAB (OUTPATIENT)
Dept: LAB | Facility: HOSPITAL | Age: 72
End: 2022-04-06

## 2022-04-06 VITALS
WEIGHT: 153 LBS | BODY MASS INDEX: 24.59 KG/M2 | DIASTOLIC BLOOD PRESSURE: 68 MMHG | TEMPERATURE: 97.3 F | OXYGEN SATURATION: 96 % | SYSTOLIC BLOOD PRESSURE: 134 MMHG | RESPIRATION RATE: 18 BRPM | HEIGHT: 66 IN | HEART RATE: 81 BPM

## 2022-04-06 DIAGNOSIS — C15.9 ESOPHAGEAL CANCER, STAGE IV: Primary | ICD-10-CM

## 2022-04-06 DIAGNOSIS — C16.0 MALIGNANT NEOPLASM OF CARDIA: ICD-10-CM

## 2022-04-06 LAB
BASOPHILS # BLD AUTO: 0.06 10*3/MM3 (ref 0–0.2)
BASOPHILS NFR BLD AUTO: 0.6 % (ref 0–1.5)
DEPRECATED RDW RBC AUTO: 45.3 FL (ref 37–54)
EOSINOPHIL # BLD AUTO: 0.27 10*3/MM3 (ref 0–0.4)
EOSINOPHIL NFR BLD AUTO: 2.8 % (ref 0.3–6.2)
ERYTHROCYTE [DISTWIDTH] IN BLOOD BY AUTOMATED COUNT: 14.5 % (ref 12.3–15.4)
HCT VFR BLD AUTO: 31.4 % (ref 37.5–51)
HGB BLD-MCNC: 9.7 G/DL (ref 13–17.7)
HOLD SPECIMEN: NORMAL
HOLD SPECIMEN: NORMAL
LYMPHOCYTES # BLD AUTO: 1.57 10*3/MM3 (ref 0.7–3.1)
LYMPHOCYTES NFR BLD AUTO: 16.5 % (ref 19.6–45.3)
MCH RBC QN AUTO: 27.2 PG (ref 26.6–33)
MCHC RBC AUTO-ENTMCNC: 30.9 G/DL (ref 31.5–35.7)
MCV RBC AUTO: 88.2 FL (ref 79–97)
MONOCYTES # BLD AUTO: 1.1 10*3/MM3 (ref 0.1–0.9)
MONOCYTES NFR BLD AUTO: 11.5 % (ref 5–12)
NEUTROPHILS NFR BLD AUTO: 6.54 10*3/MM3 (ref 1.7–7)
NEUTROPHILS NFR BLD AUTO: 68.6 % (ref 42.7–76)
PLATELET # BLD AUTO: 291 10*3/MM3 (ref 140–450)
PMV BLD AUTO: 9.8 FL (ref 6–12)
RBC # BLD AUTO: 3.56 10*6/MM3 (ref 4.14–5.8)
WBC NRBC COR # BLD: 9.54 10*3/MM3 (ref 3.4–10.8)

## 2022-04-06 PROCEDURE — 36415 COLL VENOUS BLD VENIPUNCTURE: CPT

## 2022-04-06 PROCEDURE — 85025 COMPLETE CBC W/AUTO DIFF WBC: CPT

## 2022-04-06 PROCEDURE — 99214 OFFICE O/P EST MOD 30 MIN: CPT | Performed by: INTERNAL MEDICINE

## 2022-04-08 ENCOUNTER — TELEPHONE (OUTPATIENT)
Dept: CARDIOLOGY | Facility: CLINIC | Age: 72
End: 2022-04-08

## 2022-04-08 NOTE — TELEPHONE ENCOUNTER
Called and notified, patient verbalized understanding.    ---------------------------    Mazin Simmons MD [place], MA    LV function looks same as before   LV systolic function is 45 to 50% unchanged from before   Moderate mitral regurgitation   Likely shortness of breath is because of the mitral regurgitation   Continue the same therapy   Follow-up as scheduled             Previous Messages       ----- Message -----   From:  JOSHUA Mayfield   Sent: 4/5/2022   1:43 PM EDT   To: Mazin Simmons MD   Subject: Echo                                             Patient calling for echo results

## 2022-04-13 ENCOUNTER — HOSPITAL ENCOUNTER (OUTPATIENT)
Dept: ONCOLOGY | Facility: HOSPITAL | Age: 72
Setting detail: INFUSION SERIES
Discharge: HOME | End: 2022-04-13

## 2022-04-13 VITALS
BODY MASS INDEX: 25.23 KG/M2 | WEIGHT: 157 LBS | TEMPERATURE: 97.1 F | HEART RATE: 74 BPM | SYSTOLIC BLOOD PRESSURE: 155 MMHG | RESPIRATION RATE: 20 BRPM | OXYGEN SATURATION: 99 % | HEIGHT: 66 IN | DIASTOLIC BLOOD PRESSURE: 87 MMHG

## 2022-04-13 DIAGNOSIS — C16.0 MALIGNANT NEOPLASM OF CARDIA: ICD-10-CM

## 2022-04-13 DIAGNOSIS — Z51.11 ENCOUNTER FOR ANTINEOPLASTIC CHEMOTHERAPY: ICD-10-CM

## 2022-04-13 DIAGNOSIS — C16.0 MALIGNANT NEOPLASM OF CARDIA: Primary | ICD-10-CM

## 2022-04-13 DIAGNOSIS — Z51.81 ENCOUNTER FOR THERAPEUTIC DRUG MONITORING: ICD-10-CM

## 2022-04-13 DIAGNOSIS — C15.9 ESOPHAGEAL CANCER, STAGE IV: ICD-10-CM

## 2022-04-13 DIAGNOSIS — Z51.81 ENCOUNTER FOR THERAPEUTIC DRUG MONITORING: Primary | ICD-10-CM

## 2022-04-13 LAB
ALBUMIN SERPL-MCNC: 4 G/DL (ref 3.5–5.2)
ALBUMIN/GLOB SERPL: 1.5 G/DL
ALP SERPL-CCNC: 41 U/L (ref 39–117)
ALT SERPL W P-5'-P-CCNC: 8 U/L (ref 1–41)
ANION GAP SERPL CALCULATED.3IONS-SCNC: 11 MMOL/L (ref 5–15)
AST SERPL-CCNC: 18 U/L (ref 1–40)
BASOPHILS # BLD AUTO: 0.04 10*3/MM3 (ref 0–0.2)
BASOPHILS NFR BLD AUTO: 0.7 % (ref 0–1.5)
BILIRUB SERPL-MCNC: 0.2 MG/DL (ref 0–1.2)
BUN SERPL-MCNC: 12 MG/DL (ref 8–23)
BUN/CREAT SERPL: 15.6 (ref 7–25)
CALCIUM SPEC-SCNC: 8.8 MG/DL (ref 8.6–10.5)
CHLORIDE SERPL-SCNC: 103 MMOL/L (ref 98–107)
CO2 SERPL-SCNC: 23 MMOL/L (ref 22–29)
CREAT BLDA-MCNC: 0.8 MG/DL (ref 0.6–1.3)
CREAT SERPL-MCNC: 0.77 MG/DL (ref 0.76–1.27)
DEPRECATED RDW RBC AUTO: 45.6 FL (ref 37–54)
EGFRCR SERPLBLD CKD-EPI 2021: 94 ML/MIN/1.73
EGFRCR SERPLBLD CKD-EPI 2021: 95.1 ML/MIN/1.73
EOSINOPHIL # BLD AUTO: 0.24 10*3/MM3 (ref 0–0.4)
EOSINOPHIL NFR BLD AUTO: 4.2 % (ref 0.3–6.2)
ERYTHROCYTE [DISTWIDTH] IN BLOOD BY AUTOMATED COUNT: 14.9 % (ref 12.3–15.4)
GLOBULIN UR ELPH-MCNC: 2.7 GM/DL
GLUCOSE BLDC GLUCOMTR-MCNC: 124 MG/DL (ref 70–105)
GLUCOSE SERPL-MCNC: 120 MG/DL (ref 65–99)
HCT VFR BLD AUTO: 29.6 % (ref 37.5–51)
HGB BLD-MCNC: 9.4 G/DL (ref 13–17.7)
LYMPHOCYTES # BLD AUTO: 1.65 10*3/MM3 (ref 0.7–3.1)
LYMPHOCYTES NFR BLD AUTO: 29.1 % (ref 19.6–45.3)
MCH RBC QN AUTO: 27.7 PG (ref 26.6–33)
MCHC RBC AUTO-ENTMCNC: 31.8 G/DL (ref 31.5–35.7)
MCV RBC AUTO: 87.3 FL (ref 79–97)
MONOCYTES # BLD AUTO: 0.6 10*3/MM3 (ref 0.1–0.9)
MONOCYTES NFR BLD AUTO: 10.6 % (ref 5–12)
NEUTROPHILS NFR BLD AUTO: 3.14 10*3/MM3 (ref 1.7–7)
NEUTROPHILS NFR BLD AUTO: 55.4 % (ref 42.7–76)
PLATELET # BLD AUTO: 220 10*3/MM3 (ref 140–450)
PMV BLD AUTO: 10.3 FL (ref 6–12)
POTASSIUM SERPL-SCNC: 4.6 MMOL/L (ref 3.5–5.2)
PROT SERPL-MCNC: 6.7 G/DL (ref 6–8.5)
RBC # BLD AUTO: 3.39 10*6/MM3 (ref 4.14–5.8)
SODIUM SERPL-SCNC: 137 MMOL/L (ref 136–145)
T4 FREE SERPL-MCNC: 1.41 NG/DL (ref 0.93–1.7)
TSH SERPL DL<=0.05 MIU/L-ACNC: 4.21 UIU/ML (ref 0.27–4.2)
WBC NRBC COR # BLD: 5.67 10*3/MM3 (ref 3.4–10.8)

## 2022-04-13 PROCEDURE — 84439 ASSAY OF FREE THYROXINE: CPT | Performed by: INTERNAL MEDICINE

## 2022-04-13 PROCEDURE — 84443 ASSAY THYROID STIM HORMONE: CPT | Performed by: INTERNAL MEDICINE

## 2022-04-13 PROCEDURE — 85025 COMPLETE CBC W/AUTO DIFF WBC: CPT | Performed by: INTERNAL MEDICINE

## 2022-04-13 PROCEDURE — 82565 ASSAY OF CREATININE: CPT

## 2022-04-13 PROCEDURE — 25010000002 PEMBROLIZUMAB 100 MG/4ML SOLUTION 4 ML VIAL: Performed by: INTERNAL MEDICINE

## 2022-04-13 PROCEDURE — 36591 DRAW BLOOD OFF VENOUS DEVICE: CPT

## 2022-04-13 PROCEDURE — 96413 CHEMO IV INFUSION 1 HR: CPT

## 2022-04-13 PROCEDURE — 82962 GLUCOSE BLOOD TEST: CPT

## 2022-04-13 PROCEDURE — 80053 COMPREHEN METABOLIC PANEL: CPT | Performed by: INTERNAL MEDICINE

## 2022-04-13 RX ORDER — SODIUM CHLORIDE 0.9 % (FLUSH) 0.9 %
20 SYRINGE (ML) INJECTION AS NEEDED
Status: CANCELLED | OUTPATIENT
Start: 2022-04-13

## 2022-04-13 RX ORDER — HEPARIN SODIUM (PORCINE) LOCK FLUSH IV SOLN 100 UNIT/ML 100 UNIT/ML
500 SOLUTION INTRAVENOUS AS NEEDED
Status: CANCELLED | OUTPATIENT
Start: 2022-04-13

## 2022-04-13 RX ORDER — SODIUM CHLORIDE 9 MG/ML
250 INJECTION, SOLUTION INTRAVENOUS ONCE
Status: CANCELLED | OUTPATIENT
Start: 2022-04-13

## 2022-04-13 RX ORDER — SODIUM CHLORIDE 0.9 % (FLUSH) 0.9 %
20 SYRINGE (ML) INJECTION AS NEEDED
Status: DISCONTINUED | OUTPATIENT
Start: 2022-04-13 | End: 2022-04-14 | Stop reason: HOSPADM

## 2022-04-13 RX ORDER — SODIUM CHLORIDE 9 MG/ML
250 INJECTION, SOLUTION INTRAVENOUS ONCE
Status: COMPLETED | OUTPATIENT
Start: 2022-04-13 | End: 2022-04-13

## 2022-04-13 RX ORDER — HEPARIN SODIUM (PORCINE) LOCK FLUSH IV SOLN 100 UNIT/ML 100 UNIT/ML
500 SOLUTION INTRAVENOUS AS NEEDED
Status: DISCONTINUED | OUTPATIENT
Start: 2022-04-13 | End: 2022-04-14 | Stop reason: HOSPADM

## 2022-04-13 RX ADMIN — Medication 20 ML: at 13:01

## 2022-04-13 RX ADMIN — SODIUM CHLORIDE 250 ML: 9 INJECTION, SOLUTION INTRAVENOUS at 12:25

## 2022-04-13 RX ADMIN — SODIUM CHLORIDE 200 MG: 9 INJECTION, SOLUTION INTRAVENOUS at 12:25

## 2022-04-13 NOTE — PROGRESS NOTES
Pt here today for C31 D1 keytruda. Port accessed for blood collection using sterile technique. 10 ml blood wasted prior to blood for labs being collected. Pt tolerated treatment well. AVS given at time of discharge.

## 2022-04-22 NOTE — PROGRESS NOTES
Hematology-Oncology Follow-up Note       Artie Mc  1950    Primary Care Physician: Chanel Carter APRN  Referring Physician: Chanel Carter APRN    Reason For Visit:  Chief Complaint   Patient presents with   • Follow-up     Esophageal cancer, stage IV     Metastatic esophageal cancer  Monitoring for adverse effects related to immunotherapy.  Hypothyroidism    HPI:   Mr. Mc is a pleasant 71 y.o. gentleman with a history of gastroesophageal reflux disease, diabetes, hypertension, heart block status post pacemaker placement and history of CABG.  He was having symptoms of dysphagia, weight loss since September 2016. He was reporting symptoms of food getting stuck in the lower chest.  He has transitioned himself to a liquid diet.  The patient reports losing about 30 pounds over this duration.  The patient underwent upper GI endoscopy on 12/20/16 with Dr. Esvin Barrera.  Endoscopy showed necrotic, circumferential and moderately obstructive mass at the distal esophagus between 42 cm and 36 cm.      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  There was also evidence of mild subacute inflammation in the duodenum.  Dr. Barrera ordered a CT scan of the chest and abdomen with contrast on 12/20/16.  The scan was done at [facility].  The scan showed a new ill-defined mass in the anterior wall of the distal esophagus at the GE junction measuring 2.3 x 2.2 x 1.6 cm, worrisome for cancer.  There were four subcentimeter liver lesions which had appeared stable in comparison to another CT scan from 2009 and they likely represent small cysts.  There was also mild lymphadenopathy in the gastrohepatic ligament.  There were at least six borderline enlarged lymph nodes measuring up to 1.3 x 1 cm in the region.  The patient is referred to us for further oncological evaluation.    1/5/2017 - The patient presents for initial  consultation.  He reports persistent dysphagia and also has symptoms of regurgitation.  He cannot tolerate solid foods and is dependent on liquid diet such as Ensure.  He also reports fatigue. Symptoms of dysphagia have been present for the past four months.  When he eats any solids, the food gets stuck in the lower chest.    • 1/5/17 - Vitamin B12 200 (L).  CEA 0.8.  Ferritin 35.  Iron saturation 16% (L), serum iron 56, TIBC 354.  Creatinine 0.9.  LFTs normal.  Folate 10.2.  • 1/12/17 - PET/CT scan:  Intense abnormal activity corresponding to the region of the GE junction and proximal stomach.  SUV is 11.06.  There is a suggestion of an abnormal mass or abnormal wall thickening in the region measuring 3.9 x 4.8 cm.  No additional abnormalities.  No evidence of metastases or other lymphadenopathy.  Scattered nonspecific subcentimeter lymph nodes involving the mediastinum and within the central upper abdomen.  • 1/13/17 - Creatinine 0.9.  LFTs normal.    • 1/17/17 - Patient seen by thoracic surgeon, Dr. Bradley.  PET scan was reviewed.  He has recommended neoadjuvant chemoradiation therapy followed by [place] Gurwinder esophagogastrectomy.  Port-A-Cath is being arranged.    • 1/20/17 - WBC 6.3, hemoglobin 12.1, platelet count 198,000, MCV 86.4.  • 1/30/17 - Patient started weekly Carboplatin and Taxol (Carboplatin AUC 2 and Taxol 50 mg/M2).  Patient received Injectafer 750 mg.  WBC 5.8, hemoglobin 12.4, platelet count 244,000.   • 1/31/17 - Patient seen by Dr. Eduardo Oleary.  The plan was to give 5040 cGy in 28 fractions.   • 2/6/17 - WBC 4.4, hemoglobin 11.6, platelet count 201,000.  Patient received cycle 2 of weekly Carboplatin and Taxol (Carboplatin AUC 2 and Taxol 50 mg/M2).  • 2/13/17 - Patient received Carboplatin and Taxol weekly cycle 3.  • 2/13/17 - Patient started concurrent radiation therapy.  Total planned dose is 4140 cGy.    • 2/13/17 to 2/15/17 - Patient received 4140 cGy of neoadjuvant radiation.   Radiation was finished on 3/15/17.    • 2/20/17 - WBC 1.8, hemoglobin 10.6, platelet count 217,000.  Creatinine 0.5. Folate 15 (L).  Ferritin 361.  Haptoglobin 214.  Iron saturation 33%, TIBC 263, serum iron 88.    • 2/20/17 - Patient received Carboplatin and Taxol weekly cycle 4.   • 2/27/17 - WBC 3.7, hemoglobin 10.7, platelet count 177,000, MCV 85.3.    • 3/6/17 - Patient has received 16 out of the 23 planned radiation treatments.  Total planned dose is 4140 cGy.    • 3/6/17 - WBC 5.4, hemoglobin 11.2, platelet count 113,000.  • 3/6/17 - Patient received cycle 5 of weekly Carboplatin and Taxol.   • 3/13/17 - Patient received cycle 6 of weekly Carboplatin and Taxol.  WBC 3.9, hemoglobin 11.3, platelet count 93,000.     • 3/20/17 - WBC 1.4, hemoglobin 10.0, platelet count 118,000, MCV 86.1   • 3/27/17 - WBC 3.8, hemoglobin 9.5, platelet count 176,000, MCV 87.3.    • 4/7/17 - Stress test:  No evidence of reversible myocardial ischemia.  Normal left ventricular ejection fraction of 50%.    • 4/17/17 - Upper GI endoscopy by Dr. 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  The GE junction adenocarcinoma lesion seems to have a very good response to chemotherapy and radiation.  The lumen was open.  • 5/1/17 - WBC 7.0, hemoglobin 10.2, platelet count 175,000.    • 5/4/17 - Patient underwent Noel Gurwinder esophagectomy with pyloroplasty, feeding jejunostomy tube, abdominal and mediastinal lymph node dissection.    Surgical Pathology:  Moderately to poorly differentiated adenocarcinoma measuring 1.3 cm at the GE junction.  Resection margins are negative for malignancy.  Tumor margins are negative.  There is effective treatment response.  No evidence of lymphovascular invasion.  Staging is ypT3,pN1.  One out of fourteen lymph nodes involved.   • 5/22/17 - WBC 6.8, hemoglobin 10.3, platelet count 312,000.    • 6/19/17 - WBC 5.7, hemoglobin 10.5, platelet count 204,000, MCV 92.1.     • 7/6/17 - EGD:  Status post balloon dilation of esophageal stricture.  • 7/12/17 - EGD with balloon dilation of esophageal stricture.  • 7/26/17 - EGD and balloon dilation of esophageal stricture.  • 7/31/17 - PET/CT scan:  There is mild metabolic activity seen at the thoracic esophagus just at and below the surgical margins.  Some mild wall thickening.  This could be from recent surgery.  A residual cancer seems unlikely.  There is a precarinal lymph node with mild metabolic activity with SUV of 3 measuring 1 x 1.3 cm.  Again this appears to be nonspecific in my opinion.    • 8/7/17 - WBC 5.9, hemoglobin 11.9, platelet count 171,000, MCV 88.7.    • 10/19/17 - WBC 7.5, hemoglobin 11.8, platelet count 216,000.    • 1/8/18 - PET scan:  No evidence of residual disease or recurrent disease.  There is an esophageal stent in the mid esophagus with a large debris air level.  No evidence of any metastases in the chest, abdomen or pelvis.  Mild nodule uptake in the vocal cords with fullness in the right vocal cord.  This could be physiologic.    • 7/9/18 - CT scan of chest with contrast:  Prominent mediastinal lymph nodes appear stable since February.  Changes of gastric pull-through procedure for esophageal cancer again noted.  Tree-in-bud infiltrates in the left lung base, consistent with infection versus inflammation.    • 1/10/19 - CT chest, abdomen and pelvis with contrast:  No evidence of mets in the chest; however, interval development of metastatic lymphadenopathy in the left side of the retroperitoneum.  For instance, there is a 17 mm rounded lymph node, 10 mm lymph node and also a 14 mm lymph node.  These are all new.  Stable 9 mm hypodense lesion within the right hepatic lobe, which could reflect hemangioma or complex cyst.    • 1/30/19 - CT-guided core biopsy of retroperitoneal lymph node:  Poorly differentiated adenocarcinoma consistent with metastases from patient’s known esophageal primary.  CK20 positive,  CDX2 positive, cytokeratin 7 negative, TTF-1 negative.    • 2/1/19 - Creatinine 0.9, BUN 18, calcium 9.3; these are normal.    • 2/28/19 - [employer] Next Gen sequencing testing on retroperitoneal lymph node specimen:  PD-L1 positive.  CPS = 3.  This implies responsiveness to pembrolizumab.  Mismatch repair status is proficient (no evidence of microsatellite instability).  Tumor mutational burden is low = 6 mutations/Mb.  CDH1 indeterminate.  KRAS mutation positive.  TP53 mutation positive.  CDK6 amplified.  [employer] PD-L1 testing by IHC:  PD-L1 expression 1% positive (CPS =1).  HER2/marvel by IHC is negative.  HER2/marvel by CISH not amplified.  Microsatellite stable tumor.    • 3/7/19 - WBC 7.3, hemoglobin 12, platelet count 128,000, MCV 92.     • 3/14/19 - Patient was seen by radiation oncologist, Dr. Chahal.  He has recommended observation versus systemic chemoimmunotherapy as needed.  No plans for radiation therapy.   • 5/1/19 - CT scan of chest with contrast:  No definitive findings of new metastatic disease in the chest.  Emphysema noted.    • 5/1/19 - CT abdomen and pelvis:  There is interval progression of metastatic retroperitoneal adenopathy.  Left periaortic adenopathy with lymph node measuring up to 2 cm, previously 1.7 cm.  Another lymph node now measures 2.3, previously 1.4 cm.  New enlarged lymph node on image 147 measures 1.4 cm, previously 0.4 cm.  A 9 mm hypodense right hepatic lobe lesion appears stable.    • 5/9/19 - Decision made to start patient on immunotherapy Keytruda.    • 5/9/19 - Vitamin B12 229.  Ferritin 61.  Folate 14.3.  Iron saturation 26%, TIBC 304, serum iron 79.  • 5/31/19 - Patient received Keytruda 200 mg cycle 1, day 1.    • 5/31/19 - Free T4 0.99.  Creatinine 0.9, BUN 9.  LFTs normal.  TSH 3.99.  Folate 14.3.  Iron saturation 26%.    • 6/21/2019 patient received Keytruda cycle 2  • 7/12/2019: Patient received Keytruda cycle 3  • 8/2/2019 patient received cycle 4 Keytruda WBC 7.2,  hemoglobin 11.8 platelet count 163  • 8/23/2019 creatinine 0.9, LFTs normal, WBC 6.4, hemoglobin 11.5, platelets 179, TSH 2.1   • 8/23/2019 patient received cycle 5 of Keytruda  • 9/3/2019 CT chest abdomen and pelvis with contrast: . Positive response to therapy in the abdomen since 05/01/2019. Pathologically enlarged retroperitoneal lymph nodes, predominantly in the left periaortic region, have diminished in size.a 1.3 x 2.1 cm left periaortic node (image 60), previously measured 3.5 x 2.3 cm. A 1.6 cm index node near the level of the left renal vein previously measured 2.0 cm No new or progressive adenopathy. 2. Stable findings in the chest. Noncalcified right upper lobe nodules are unchanged. There is no evidence of new or progressive metastatic disease within the chest. 3. 8 mm indeterminate low-density lesion in the right hepatic lobe, is stable. No new liver lesions.  • 9/6/2019 WBC 5.6, hemoglobin 11.2, platelets of 192, MCV 91.8  • 9/13/2019 patient received Keytruda cycle 6, albumin 3.2  • 10/4/2019 patient received cycle 7 of Keytruda, TSH 4.8, free T4 0.86  • 10/9/2019 WBC 5.8, hemoglobin 11.7, platelets 174  • 10/25/2019 patient received cycle 8 of Keytruda.  WBC 5.9, hemoglobin 11.5, platelets 152, creatinine 0.75, cortisol 15.2, TSH 6.48 high , free T4 1.13 normal  • 11/6/2019 WBC 6.6, hemoglobin 11.8, MCV 91.5, platelets 187  • 11/15/2019 patient received cycle 9 of Keytruda, WBC 6.6, hemoglobin 11.9, platelets 161, cortisol level 8.86, TSH 2.68, free T3 2.8, free T4 1.5  • 12/4/2019 WBC 6.9, hemoglobin 12.0, platelets 180, MCV 92.9  • 12/06/2019-Keytruda Cycle 10  • 12/27/2019- Cycle 11 LFTs normal, WBC 6.7, hemoglobin 11.4, platelets 168, MCV 93, TSH 2.4,  • 1/28/2020: CT chest abdomen and pelvis with contrast:  Single (aortic lymph node in the abdominal retroperitoneum has increased.  Rest of the retroperitoneal lymph nodes have decreased in size.  Mediastinal adenopathy appears unchanged.   Noncalcified bilateral pulmonary nodules are stable  • 1/17/2020 patient received Keytruda cycle 12:.  • 2/3/2020 WBC 7.1, hemoglobin 12.2, platelets 171  • 02/10/2020 patient received cycle 13 of Keytruda: WBC 7.3, hemoglobin 12.1, platelets 166, creatinine 0.82  • 3/6/2020 patient is of Keytruda 200 mg, hemoglobin 10.8  • 3/27/2020 patient received Keytruda 200 mg, hemoglobin 11.4, TSH 1.95  • 4/17/2020 patient received Keytruda cycle 16 200 mg, WBC 6.1, hemoglobin 11.6, platelets 150  • 5/8/2020: Patient received cycle 17 Keytruda, WBC 6.3, hemoglobin 9.6, platelets 137, TSH 2.05, free T4 1.54, creatinine 0.93, LFTs normal,  • 5/29/2020: Patient received cycle 18 Keytruda, WBC 6.5, hemoglobin 11.7, platelets 129, creatinine 0.83, TSH 1.69  • 6/26/2020: Patient receiving Keytruda.   • 7/17/2020: TSH 2.06, WBC 5.7, hemoglobin 11.4, platelets 177, MCV 92.4, Keytruda 200 mg cycle 20  • 7/27/2020: CT chest abdomen pelvis with contrast: Stable findings in the chest abdomen pelvis since 1/28/2020.  Noncalcified bilateral pulmonary nodules are stable.  Stable retroperitoneal left perinephric adenopathy in the abdomen.  • 8/7/2020: Patient received cycle 21 of Keytruda  • 8/28/2020 patient is a cycle 22 of Keytruda.  WBC 6.02, hemoglobin 11.3, platelets 161  • 9/11/2020 WBC 6.9, hemoglobin 12.4, platelets 192  • 9/18/2020: Cycle 23 of Keytruda, TSH 1.19  • 10/9/2020: Cycle 24 of Keytruda, WBC 5.9, hemoglobin 11.9, platelets 178, creatinine 0.84, TSH 2.56, free T3 2.4, CMP normal  • 10/23/2020: WBC 7.34, hemoglobin 11.6, platelets 175  • 10/30/2020:.  Keytruda 200 mg  • 11/20/2020: Keytruda 200 mg  • 12/11/2020 Keytruda 200 mg.  WBC 5.5, hemoglobin 11.4, platelets 176, creatinine 0.8, LFTs normal, TSH 2.2, cortisol 8.3  • 12/10/2020: CT chest abdomen and pelvis with contrast: Previously seen left para-aortic lymph nodes within the abdomen are decreased in size.  No pathological lymphadenopathy.  No evidence of residual  malignancy..  Small noncalcified pulmonary nodules are stable.  No new nodules.  Stable mediastinal lymph nodes.  • 3/12/2021: WBC 6.7, hemoglobin 11.3, platelets 17  • 3/12/2021: CMP normal, TSH 2.9, cortisol 11.95, 1  • 3/15/2021: CT chest with contrast: Stable findings in the chest with postsurgical changes of esophageal resection and gastric pull-through.  Stable right lower lobe lung nodule measures 11 mm.  Stable size and appearance of the mediastinal and upper abdominal lymph nodes.  • 3/23/2021: Doppler of upper extremity: Normal.  • 3/24/2021: CT abdomen and pelvis with contrast: Postoperative changes.  Left periaortic nodes are stable.  • 4/12/2021: TSH 4.1, cortisol 12.4  • 6/22/2021: CMP normal,Cortisol 10.08,, WBC 5.8, hemoglobin 11.2, platelets 109, TSH 2.59  • 7/12/2021: CT scan of the chest abdomen pelvis with contrast: Dominant 10 x 10 left periaortic lymph node is stable since 3/22/2021.  Dominant lymph nodes in the right lower paratracheal and AP window distribution are stable since 3/15/2021.  No evidence of progressive adenopathy in the chest abdomen or pelvis.    • 01/11/2021 -PET/CT findings consistent with new cedric metastatic disease in the retroperitoneum.  2 new pathologically enlarged hypermetabolic retroperitoneal lymph nodes.  No convincing evidence of recurrent disease within the chest neck pelvis or skeleton.  • 1/20/2022 -CT-guided needle biopsy consistent with poorly differentiated carcinoma.  CDX2 positive consistent with metastases from known esophageal primary  • 2/9/2022 - Pembrolizumab complicated with fatigue, joint pains, nausea, diarrhea. Symptoms decrease in intensity over a week.  • 3/30/2022 -CT chest and pelvis with stable metastatic lymph nodes in the left periaortic retroperitoneum.  No evidence of disease progression or new lesions.  Stable lymphadenopathy in the distal paratracheal mediastinal area  • 4/13/2022 - Keytruda 200    Subjective:    Patient continues to  have fatigue, appetite is improved.    The following portions of the patient's history were reviewed and updated as appropriate: allergies, current medications, past family history, past medical history, past social history, past surgical history and problem list.     Past Medical History:   Diagnosis Date   • B12 deficiency    • Blockage of coronary artery of heart (HCC)    • Depression    • DM (diabetes mellitus) (HCC)    • Dysphagia    • HTN (hypertension)    • Hyperlipidemia        Past Surgical History:   Procedure Laterality Date   • ABDOMINAL WALL ABSCESS INCISION AND DRAINAGE  10/22/2018    WITH DEBRIDEMENT  1.5CM X 1.5 CM X 1.5 CM    DR. PURI   • CATARACT EXTRACTION  2018   • COLONOSCOPY     • CORONARY ARTERY BYPASS GRAFT  2015   • ENDOSCOPY  12/20/2016   • ESOPHAGECTOMY  05/04/2017    BROOKS PETTY ESOPHAGECTOMY, FEEDING JEJNOSTOMOY, ABDOMINAL AND MEDIASTINAL LYMPH NODE DISECTION, PEDICLED MUSCLE FLAP COVERAGE DR. PURI   • ESOPHAGOSCOPY / EGD  07/12/2017    WITH BALLOON DILATION   • ESOPHAGOSCOPY / EGD  07/26/2017    WITH BALLOON DILATION   • ESOPHAGOSCOPY / EGD  08/11/2017    WITH BALLOON DILATION   • ESOPHAGOSCOPY / EGD  08/28/2017    WITH BALLOON DILATION   • ESOPHAGOSCOPY / EGD  09/27/2017    WITH BALLOON DILATION   • LYMPH NODE BIOPSY  01/30/2019   • PACEMAKER IMPLANTATION  11/21/2016       Current Outpatient Medications:   •  Cetirizine HCl (ZyrTEC Allergy) 10 MG capsule, Take  by mouth., Disp: , Rfl:   •  clonazePAM (KlonoPIN) 0.5 MG tablet, Pt taking 1.5 pills daily, Disp: , Rfl: 1  •  cyanocobalamin (VITAMIN B-12) 1000 MCG tablet, , Disp: , Rfl:   •  diphenhydrAMINE-APAP, sleep, (TYLENOL PM EXTRA STRENGTH PO), Take  by mouth., Disp: , Rfl:   •  Euthyrox 50 MCG tablet, Take 1 tablet by mouth once daily, Disp: 90 tablet, Rfl: 0  •  folic acid (FOLVITE) 1 MG tablet, Daily., Disp: , Rfl:   •  metFORMIN (GLUCOPHAGE) 1000 MG tablet, 2 (Two) Times a Day With Meals., Disp: , Rfl:   •  metoprolol  "tartrate (LOPRESSOR) 25 MG tablet, Take 12.5 mg by mouth 2 (Two) Times a Day., Disp: , Rfl:   •  omeprazole (priLOSEC) 20 MG capsule, Take 20 mg by mouth 2 (Two) Times a Day., Disp: , Rfl:   •  ondansetron (ZOFRAN) 4 MG tablet, Take 1 tablet by mouth Every 8 (Eight) Hours As Needed for Nausea or Vomiting., Disp: 90 tablet, Rfl: 2  •  pravastatin (PRAVACHOL) 10 MG tablet, Take 10 mg by mouth every night at bedtime., Disp: , Rfl:     Allergies   Allergen Reactions   • Ace Inhibitors Unknown (See Comments)     Unknown reaction     • Heparin Other (See Comments)     Fever/chills, weakness in legs   • Sulfa Antibiotics Other (See Comments)     Mouth sores       Family History   Problem Relation Age of Onset   • Mental illness Mother    • Heart disease Mother    • Hypertension Father    • Cancer Father    • Heart disease Sister    • Heart disease Brother      Cancer-related family history includes Cancer in his father.    Social History     Tobacco Use   • Smoking status: Former Smoker     Packs/day: 2.00     Years: 50.00     Pack years: 100.00     Types: Cigarettes     Quit date: 2015     Years since quittin.8   • Smokeless tobacco: Former User     Types: Chew     Quit date: 1989   Substance Use Topics   • Alcohol use: No   • Drug use: No     ROS:     Objective:    Vitals:    22 0946   BP: 149/79   Pulse: 74   Resp: 18   Temp: 97.1 °F (36.2 °C)   SpO2: 98%   Weight: 70.9 kg (156 lb 6.4 oz)   Height: 167.6 cm (66\")   PainSc: 0-No pain       (1) Restricted in physically strenuous activity, ambulatory and able to do work of light nature    Physical Exam:     Physical Exam   Constitutional: He is oriented to person, place, and time. He appears well-developed. No distress.   HENT:   Head: Normocephalic and atraumatic.   Nose: Nose normal.   Mouth/Throat: Mucous membranes are dry.   Eyes: Pupils are equal, round, and reactive to light.   Neck: No thyromegaly present.   Cardiovascular: Normal rate, regular " rhythm, normal heart sounds and normal pulses. Exam reveals no gallop and no friction rub.   Pulmonary/Chest: Effort normal and breath sounds normal. No stridor. No respiratory distress. He has no wheezes.   Abdominal: Soft. Normal appearance and bowel sounds are normal.   No tenderness   Musculoskeletal: Normal range of motion. No deformity or signs of injury.      Right lower leg: No edema.   Lymphadenopathy:     He has no cervical adenopathy.   Neurological: He is alert and oriented to person, place, and time. He exhibits normal muscle tone.   Skin: Skin is warm and dry. No rash noted. He is not diaphoretic. No erythema. No jaundice.   Right side chest  port   Vitals reviewed.    I have reexamined the patient and the results are consistent with the previously documented exam. Jean Pierre Elizondo MD       Lab Results - Last 18 Months   Lab Units 04/27/22  0940 04/13/22  1132 04/06/22  1052   WBC 10*3/mm3 6.14 5.67 9.54   HEMOGLOBIN g/dL 9.6* 9.4* 9.7*   HEMATOCRIT % 31.0* 29.6* 31.4*   PLATELETS 10*3/mm3 195 220 291   MCV fL 86.8 87.3 88.2     Lab Results - Last 18 Months   Lab Units 04/13/22  1204 04/13/22  1132 03/23/22  1107 03/02/22  1111   SODIUM mmol/L  --  137 140 137   POTASSIUM mmol/L  --  4.6 5.0 4.5   CHLORIDE mmol/L  --  103 106 103   CO2 mmol/L  --  23.0 22.0 24.0   BUN mg/dL  --  12 14 14   CREATININE mg/dL 0.80 0.77 0.84 0.82   CALCIUM mg/dL  --  8.8 8.5* 8.9   BILIRUBIN mg/dL  --  0.2 0.2 0.2   ALK PHOS U/L  --  41 42 37*   ALT (SGPT) U/L  --  8 8 5   AST (SGOT) U/L  --  18 18 14   GLUCOSE mg/dL  --  120* 109* 104*     Assessment/Plan     Assessment:    Metastatic esophageal carcinoma:    Patient has a history of GE junction poorly differentiated adenocarcinoma for which he had chemoradiation followed by [place] Gurwinder resection:  Pathology showed ypT3,pN1 disease.  Tumor was Stage IIIA, diagnosed in 2016.  He received concurrent chemoradiation with weekly Carboplatin and Taxol and radiation finishing on  3/15/17.  CT scan on 1/10/19 showed new retroperitoneal lymph nodes.  These lymph nodes were biopsied on 1/30/19 and it shows metastatic esophageal cancer.  [employer] Next Gen testing was performed on the sample and it shows KRAS mutation, microsatellite stable tumor.  PD-L1 is positive.  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  HER2/marvel (ERBB2) was not amplified and was negative.  His CT scan on 5/1/19 shows evidence of progression.  Patient has started receiving Keytruda on 5/31/19.  He has received 19 cycles so far.   CT scan on 1/28/2020 shows improvement and continued response.  CT scan chest abdomen pelvis with contrast on 12/10/2020 does not show any evidence of disease progression.  On 12/22/2020 decision made to hold Keytruda per patient's request to get a treatment break.. Repeat CT scan chest 7/12/2021 does not show any evidence of disease progression.  Now CT imaging in January 2021 with a CT showing increased abdominal left para-aortic adenopathy.  Subsequent PET/CT with new cedric metastatic disease in the retroperitoneum of the abdomen.  New pathologically enlarged hypermetabolic retroperitoneal lymph nodes.  This would be concerning for disease recurrence/progression.  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  I discussed with him the treatment options going forward.  He has had a good response with immunotherapy pembrolizumab in the past. This was now restarted. Continue the same. He has had significant symptoms with flu like illness after treatment.he takes Tylenol which helps.  He also has inflammatory arthralgias. This has overall improved. Continue treatment without modification. Repeat imaging in end of June 2022 and potentially every 6 week treatment then.    Reflux/heartburn/dysphagia:   Symptoms improved.  Also has a history of strictures. Status post EGD and balloon dilation.  Omeprazole once daily, famotidine as needed, no worsening noted in symptoms    Hypothyroidism:    induced by  immunotherapy.  Continues on Synthroid monitor TSH and fT4 with treatment.    Mild diarrhea:   diarrhea seems to be waxing and waning in nature.  Related to immunotherapy, but not severe.  Now resolved.  Will monitor    PD-L1 positive, HER2 negative, p53 and KRAS positive.  Repeat [facility] testing with repeat biopsy with no target mutations.    B12 deficiency: Continue B12 1000 mcg twice daily.    Anemia - Hb 9.6, normocytic, check iron studies vitamin B12 today.    F/u in a month. Monitor for arthralgias, diarrhea,     I have reviewed and confirmed the accuracy of the patient's history: Chief complaint, HPI, ROS and Subjective as entered by the MA/LPN/RN.     Jean Pierre Elizondo MD 04/27/22     No orders of the defined types were placed in this encounter.

## 2022-04-27 ENCOUNTER — LAB (OUTPATIENT)
Dept: LAB | Facility: HOSPITAL | Age: 72
End: 2022-04-27

## 2022-04-27 ENCOUNTER — TELEPHONE (OUTPATIENT)
Dept: ONCOLOGY | Facility: CLINIC | Age: 72
End: 2022-04-27

## 2022-04-27 ENCOUNTER — OFFICE VISIT (OUTPATIENT)
Dept: ONCOLOGY | Facility: CLINIC | Age: 72
End: 2022-04-27

## 2022-04-27 VITALS
RESPIRATION RATE: 18 BRPM | WEIGHT: 156.4 LBS | DIASTOLIC BLOOD PRESSURE: 79 MMHG | OXYGEN SATURATION: 98 % | HEART RATE: 74 BPM | SYSTOLIC BLOOD PRESSURE: 149 MMHG | BODY MASS INDEX: 25.13 KG/M2 | HEIGHT: 66 IN | TEMPERATURE: 97.1 F

## 2022-04-27 DIAGNOSIS — C15.9 ESOPHAGEAL CANCER, STAGE IV: Primary | ICD-10-CM

## 2022-04-27 DIAGNOSIS — E61.1 IRON DEFICIENCY: ICD-10-CM

## 2022-04-27 DIAGNOSIS — C16.0 MALIGNANT NEOPLASM OF CARDIA: ICD-10-CM

## 2022-04-27 DIAGNOSIS — D64.9 ANEMIA, UNSPECIFIED TYPE: Primary | ICD-10-CM

## 2022-04-27 LAB
BASOPHILS # BLD AUTO: 0.05 10*3/MM3 (ref 0–0.2)
BASOPHILS NFR BLD AUTO: 0.8 % (ref 0–1.5)
DEPRECATED RDW RBC AUTO: 45.5 FL (ref 37–54)
EOSINOPHIL # BLD AUTO: 0.27 10*3/MM3 (ref 0–0.4)
EOSINOPHIL NFR BLD AUTO: 4.4 % (ref 0.3–6.2)
ERYTHROCYTE [DISTWIDTH] IN BLOOD BY AUTOMATED COUNT: 14.9 % (ref 12.3–15.4)
FERRITIN SERPL-MCNC: 12.11 NG/ML (ref 30–400)
FOLATE SERPL-MCNC: >20 NG/ML (ref 4.78–24.2)
HCT VFR BLD AUTO: 31 % (ref 37.5–51)
HGB BLD-MCNC: 9.6 G/DL (ref 13–17.7)
HOLD SPECIMEN: NORMAL
HOLD SPECIMEN: NORMAL
IRON 24H UR-MRATE: 27 MCG/DL (ref 59–158)
IRON SATN MFR SERPL: 5 % (ref 20–50)
LYMPHOCYTES # BLD AUTO: 1.54 10*3/MM3 (ref 0.7–3.1)
LYMPHOCYTES NFR BLD AUTO: 25.1 % (ref 19.6–45.3)
MCH RBC QN AUTO: 26.9 PG (ref 26.6–33)
MCHC RBC AUTO-ENTMCNC: 31 G/DL (ref 31.5–35.7)
MCV RBC AUTO: 86.8 FL (ref 79–97)
MONOCYTES # BLD AUTO: 0.74 10*3/MM3 (ref 0.1–0.9)
MONOCYTES NFR BLD AUTO: 12.1 % (ref 5–12)
NEUTROPHILS NFR BLD AUTO: 3.54 10*3/MM3 (ref 1.7–7)
NEUTROPHILS NFR BLD AUTO: 57.6 % (ref 42.7–76)
PLATELET # BLD AUTO: 195 10*3/MM3 (ref 140–450)
PMV BLD AUTO: 9.6 FL (ref 6–12)
RBC # BLD AUTO: 3.57 10*6/MM3 (ref 4.14–5.8)
TIBC SERPL-MCNC: 532 MCG/DL (ref 298–536)
TRANSFERRIN SERPL-MCNC: 357 MG/DL (ref 200–360)
VIT B12 BLD-MCNC: 763 PG/ML (ref 211–946)
WBC NRBC COR # BLD: 6.14 10*3/MM3 (ref 3.4–10.8)

## 2022-04-27 PROCEDURE — 82607 VITAMIN B-12: CPT | Performed by: INTERNAL MEDICINE

## 2022-04-27 PROCEDURE — 85025 COMPLETE CBC W/AUTO DIFF WBC: CPT

## 2022-04-27 PROCEDURE — 36415 COLL VENOUS BLD VENIPUNCTURE: CPT

## 2022-04-27 PROCEDURE — 99214 OFFICE O/P EST MOD 30 MIN: CPT | Performed by: INTERNAL MEDICINE

## 2022-04-27 PROCEDURE — 82728 ASSAY OF FERRITIN: CPT | Performed by: INTERNAL MEDICINE

## 2022-04-27 PROCEDURE — 83540 ASSAY OF IRON: CPT | Performed by: INTERNAL MEDICINE

## 2022-04-27 PROCEDURE — 82746 ASSAY OF FOLIC ACID SERUM: CPT | Performed by: INTERNAL MEDICINE

## 2022-04-27 PROCEDURE — 84466 ASSAY OF TRANSFERRIN: CPT | Performed by: INTERNAL MEDICINE

## 2022-04-27 RX ORDER — FERROUS SULFATE 325(65) MG
325 TABLET ORAL 2 TIMES DAILY WITH MEALS
Qty: 60 TABLET | Refills: 3 | Status: SHIPPED | OUTPATIENT
Start: 2022-04-27 | End: 2023-01-01

## 2022-04-27 NOTE — TELEPHONE ENCOUNTER
Attempted to call the pt to let them know Kavya DEB had sent in oral iron for him to take due to his levels on his labs today being low. No answer. Unable to leave v/m

## 2022-04-29 DIAGNOSIS — C16.0 MALIGNANT NEOPLASM OF CARDIA: ICD-10-CM

## 2022-04-29 DIAGNOSIS — Z51.11 ENCOUNTER FOR ANTINEOPLASTIC CHEMOTHERAPY: ICD-10-CM

## 2022-04-29 DIAGNOSIS — Z51.81 ENCOUNTER FOR THERAPEUTIC DRUG MONITORING: Primary | ICD-10-CM

## 2022-04-29 RX ORDER — SODIUM CHLORIDE 9 MG/ML
250 INJECTION, SOLUTION INTRAVENOUS ONCE
Status: CANCELLED | OUTPATIENT
Start: 2022-05-04

## 2022-05-04 ENCOUNTER — HOSPITAL ENCOUNTER (OUTPATIENT)
Dept: ONCOLOGY | Facility: HOSPITAL | Age: 72
Setting detail: INFUSION SERIES
Discharge: HOME | End: 2022-05-04

## 2022-05-04 VITALS
WEIGHT: 154 LBS | HEIGHT: 66 IN | DIASTOLIC BLOOD PRESSURE: 68 MMHG | HEART RATE: 76 BPM | RESPIRATION RATE: 18 BRPM | TEMPERATURE: 96.9 F | BODY MASS INDEX: 24.75 KG/M2 | OXYGEN SATURATION: 100 % | SYSTOLIC BLOOD PRESSURE: 157 MMHG

## 2022-05-04 DIAGNOSIS — C16.0 MALIGNANT NEOPLASM OF CARDIA: Primary | ICD-10-CM

## 2022-05-04 DIAGNOSIS — Z51.11 ENCOUNTER FOR ANTINEOPLASTIC CHEMOTHERAPY: ICD-10-CM

## 2022-05-04 DIAGNOSIS — Z51.81 ENCOUNTER FOR THERAPEUTIC DRUG MONITORING: ICD-10-CM

## 2022-05-04 LAB
ALBUMIN SERPL-MCNC: 4.1 G/DL (ref 3.5–5.2)
ALBUMIN/GLOB SERPL: 1.5 G/DL
ALP SERPL-CCNC: 37 U/L (ref 39–117)
ALT SERPL W P-5'-P-CCNC: 9 U/L (ref 1–41)
ANION GAP SERPL CALCULATED.3IONS-SCNC: 14 MMOL/L (ref 5–15)
AST SERPL-CCNC: 20 U/L (ref 1–40)
BASOPHILS # BLD AUTO: 0.05 10*3/MM3 (ref 0–0.2)
BASOPHILS NFR BLD AUTO: 0.9 % (ref 0–1.5)
BILIRUB SERPL-MCNC: 0.2 MG/DL (ref 0–1.2)
BUN SERPL-MCNC: 12 MG/DL (ref 8–23)
BUN/CREAT SERPL: 14.1 (ref 7–25)
CALCIUM SPEC-SCNC: 8.7 MG/DL (ref 8.6–10.5)
CHLORIDE SERPL-SCNC: 103 MMOL/L (ref 98–107)
CO2 SERPL-SCNC: 21 MMOL/L (ref 22–29)
CREAT SERPL-MCNC: 0.85 MG/DL (ref 0.76–1.27)
DEPRECATED RDW RBC AUTO: 47.6 FL (ref 37–54)
EGFRCR SERPLBLD CKD-EPI 2021: 92.3 ML/MIN/1.73
EOSINOPHIL # BLD AUTO: 0.23 10*3/MM3 (ref 0–0.4)
EOSINOPHIL NFR BLD AUTO: 4.1 % (ref 0.3–6.2)
ERYTHROCYTE [DISTWIDTH] IN BLOOD BY AUTOMATED COUNT: 15.6 % (ref 12.3–15.4)
GLOBULIN UR ELPH-MCNC: 2.7 GM/DL
GLUCOSE BLDC GLUCOMTR-MCNC: 108 MG/DL (ref 70–105)
GLUCOSE SERPL-MCNC: 109 MG/DL (ref 65–99)
HCT VFR BLD AUTO: 30.5 % (ref 37.5–51)
HGB BLD-MCNC: 9.6 G/DL (ref 13–17.7)
LYMPHOCYTES # BLD AUTO: 1.65 10*3/MM3 (ref 0.7–3.1)
LYMPHOCYTES NFR BLD AUTO: 29.7 % (ref 19.6–45.3)
MCH RBC QN AUTO: 27.3 PG (ref 26.6–33)
MCHC RBC AUTO-ENTMCNC: 31.5 G/DL (ref 31.5–35.7)
MCV RBC AUTO: 86.6 FL (ref 79–97)
MONOCYTES # BLD AUTO: 0.71 10*3/MM3 (ref 0.1–0.9)
MONOCYTES NFR BLD AUTO: 12.8 % (ref 5–12)
NEUTROPHILS NFR BLD AUTO: 2.92 10*3/MM3 (ref 1.7–7)
NEUTROPHILS NFR BLD AUTO: 52.5 % (ref 42.7–76)
PLATELET # BLD AUTO: 202 10*3/MM3 (ref 140–450)
PMV BLD AUTO: 10.3 FL (ref 6–12)
POTASSIUM SERPL-SCNC: 4.8 MMOL/L (ref 3.5–5.2)
PROT SERPL-MCNC: 6.8 G/DL (ref 6–8.5)
RBC # BLD AUTO: 3.52 10*6/MM3 (ref 4.14–5.8)
SODIUM SERPL-SCNC: 138 MMOL/L (ref 136–145)
TSH SERPL DL<=0.05 MIU/L-ACNC: 2.81 UIU/ML (ref 0.27–4.2)
WBC NRBC COR # BLD: 5.56 10*3/MM3 (ref 3.4–10.8)

## 2022-05-04 PROCEDURE — 85025 COMPLETE CBC W/AUTO DIFF WBC: CPT | Performed by: INTERNAL MEDICINE

## 2022-05-04 PROCEDURE — 84443 ASSAY THYROID STIM HORMONE: CPT | Performed by: INTERNAL MEDICINE

## 2022-05-04 PROCEDURE — 80053 COMPREHEN METABOLIC PANEL: CPT | Performed by: INTERNAL MEDICINE

## 2022-05-04 PROCEDURE — 82962 GLUCOSE BLOOD TEST: CPT

## 2022-05-04 PROCEDURE — 96413 CHEMO IV INFUSION 1 HR: CPT

## 2022-05-04 PROCEDURE — 25010000002 PEMBROLIZUMAB 100 MG/4ML SOLUTION 4 ML VIAL: Performed by: INTERNAL MEDICINE

## 2022-05-04 RX ORDER — SODIUM CHLORIDE 9 MG/ML
250 INJECTION, SOLUTION INTRAVENOUS ONCE
Status: COMPLETED | OUTPATIENT
Start: 2022-05-04 | End: 2022-05-04

## 2022-05-04 RX ORDER — SODIUM CHLORIDE 0.9 % (FLUSH) 0.9 %
20 SYRINGE (ML) INJECTION AS NEEDED
Status: CANCELLED | OUTPATIENT
Start: 2022-05-04

## 2022-05-04 RX ORDER — SODIUM CHLORIDE 0.9 % (FLUSH) 0.9 %
20 SYRINGE (ML) INJECTION AS NEEDED
Status: DISCONTINUED | OUTPATIENT
Start: 2022-05-04 | End: 2022-05-05 | Stop reason: HOSPADM

## 2022-05-04 RX ADMIN — SODIUM CHLORIDE 200 MG: 9 INJECTION, SOLUTION INTRAVENOUS at 12:09

## 2022-05-04 RX ADMIN — SODIUM CHLORIDE 250 ML: 9 INJECTION, SOLUTION INTRAVENOUS at 12:08

## 2022-05-04 RX ADMIN — Medication 10 ML: at 12:46

## 2022-05-13 NOTE — PROGRESS NOTES
Hematology-Oncology Follow-up Note       Artie Mc  1950    Primary Care Physician: Chanel Carter APRN  Referring Physician: Chanel Carter APRN    Reason For Visit:  Chief Complaint   Patient presents with   • Follow-up     Esophageal cancer, stage IV      Metastatic esophageal cancer  Monitoring for adverse effects related to immunotherapy.  Hypothyroidism    HPI:   Mr. Mc is a pleasant 72 y.o. gentleman with a history of gastroesophageal reflux disease, diabetes, hypertension, heart block status post pacemaker placement and history of CABG.  He was having symptoms of dysphagia, weight loss since September 2016. He was reporting symptoms of food getting stuck in the lower chest.  He has transitioned himself to a liquid diet.  The patient reports losing about 30 pounds over this duration.  The patient underwent upper GI endoscopy on 12/20/16 with Dr. Esvin Barrera.  Endoscopy showed necrotic, circumferential and moderately obstructive mass at the distal esophagus between 42 cm and 36 cm.      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  There was also evidence of mild subacute inflammation in the duodenum.  Dr. Barrera ordered a CT scan of the chest and abdomen with contrast on 12/20/16.  The scan was done at [facility].  The scan showed a new ill-defined mass in the anterior wall of the distal esophagus at the GE junction measuring 2.3 x 2.2 x 1.6 cm, worrisome for cancer.  There were four subcentimeter liver lesions which had appeared stable in comparison to another CT scan from 2009 and they likely represent small cysts.  There was also mild lymphadenopathy in the gastrohepatic ligament.  There were at least six borderline enlarged lymph nodes measuring up to 1.3 x 1 cm in the region.  The patient is referred to us for further oncological evaluation.    1/5/2017 - The patient presents for initial  consultation.  He reports persistent dysphagia and also has symptoms of regurgitation.  He cannot tolerate solid foods and is dependent on liquid diet such as Ensure.  He also reports fatigue. Symptoms of dysphagia have been present for the past four months.  When he eats any solids, the food gets stuck in the lower chest.    • 1/5/17 - Vitamin B12 200 (L).  CEA 0.8.  Ferritin 35.  Iron saturation 16% (L), serum iron 56, TIBC 354.  Creatinine 0.9.  LFTs normal.  Folate 10.2.  • 1/12/17 - PET/CT scan:  Intense abnormal activity corresponding to the region of the GE junction and proximal stomach.  SUV is 11.06.  There is a suggestion of an abnormal mass or abnormal wall thickening in the region measuring 3.9 x 4.8 cm.  No additional abnormalities.  No evidence of metastases or other lymphadenopathy.  Scattered nonspecific subcentimeter lymph nodes involving the mediastinum and within the central upper abdomen.  • 1/13/17 - Creatinine 0.9.  LFTs normal.    • 1/17/17 - Patient seen by thoracic surgeon, Dr. Bradley.  PET scan was reviewed.  He has recommended neoadjuvant chemoradiation therapy followed by [place] Gurwinder esophagogastrectomy.  Port-A-Cath is being arranged.    • 1/20/17 - WBC 6.3, hemoglobin 12.1, platelet count 198,000, MCV 86.4.  • 1/30/17 - Patient started weekly Carboplatin and Taxol (Carboplatin AUC 2 and Taxol 50 mg/M2).  Patient received Injectafer 750 mg.  WBC 5.8, hemoglobin 12.4, platelet count 244,000.   • 1/31/17 - Patient seen by Dr. Eduardo Oleary.  The plan was to give 5040 cGy in 28 fractions.   • 2/6/17 - WBC 4.4, hemoglobin 11.6, platelet count 201,000.  Patient received cycle 2 of weekly Carboplatin and Taxol (Carboplatin AUC 2 and Taxol 50 mg/M2).  • 2/13/17 - Patient received Carboplatin and Taxol weekly cycle 3.  • 2/13/17 - Patient started concurrent radiation therapy.  Total planned dose is 4140 cGy.    • 2/13/17 to 2/15/17 - Patient received 4140 cGy of neoadjuvant radiation.   Radiation was finished on 3/15/17.    • 2/20/17 - WBC 1.8, hemoglobin 10.6, platelet count 217,000.  Creatinine 0.5. Folate 15 (L).  Ferritin 361.  Haptoglobin 214.  Iron saturation 33%, TIBC 263, serum iron 88.    • 2/20/17 - Patient received Carboplatin and Taxol weekly cycle 4.   • 2/27/17 - WBC 3.7, hemoglobin 10.7, platelet count 177,000, MCV 85.3.    • 3/6/17 - Patient has received 16 out of the 23 planned radiation treatments.  Total planned dose is 4140 cGy.    • 3/6/17 - WBC 5.4, hemoglobin 11.2, platelet count 113,000.  • 3/6/17 - Patient received cycle 5 of weekly Carboplatin and Taxol.   • 3/13/17 - Patient received cycle 6 of weekly Carboplatin and Taxol.  WBC 3.9, hemoglobin 11.3, platelet count 93,000.     • 3/20/17 - WBC 1.4, hemoglobin 10.0, platelet count 118,000, MCV 86.1   • 3/27/17 - WBC 3.8, hemoglobin 9.5, platelet count 176,000, MCV 87.3.    • 4/7/17 - Stress test:  No evidence of reversible myocardial ischemia.  Normal left ventricular ejection fraction of 50%.    • 4/17/17 - Upper GI endoscopy by Dr. 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  The GE junction adenocarcinoma lesion seems to have a very good response to chemotherapy and radiation.  The lumen was open.  • 5/1/17 - WBC 7.0, hemoglobin 10.2, platelet count 175,000.    • 5/4/17 - Patient underwent [facility] Gurwinder esophagectomy with pyloroplasty, feeding jejunostomy tube, abdominal and mediastinal lymph node dissection.    Surgical Pathology:  Moderately to poorly differentiated adenocarcinoma measuring 1.3 cm at the GE junction.  Resection margins are negative for malignancy.  Tumor margins are negative.  There is effective treatment response.  No evidence of lymphovascular invasion.  Staging is ypT3,pN1.  One out of fourteen lymph nodes involved.   • 5/22/17 - WBC 6.8, hemoglobin 10.3, platelet count 312,000.    • 6/19/17 - WBC 5.7, hemoglobin 10.5, platelet count 204,000, MCV 92.1.     • 7/6/17 - EGD:  Status post balloon dilation of esophageal stricture.  • 7/12/17 - EGD with balloon dilation of esophageal stricture.  • 7/26/17 - EGD and balloon dilation of esophageal stricture.  • 7/31/17 - PET/CT scan:  There is mild metabolic activity seen at the thoracic esophagus just at and below the surgical margins.  Some mild wall thickening.  This could be from recent surgery.  A residual cancer seems unlikely.  There is a precarinal lymph node with mild metabolic activity with SUV of 3 measuring 1 x 1.3 cm.  Again this appears to be nonspecific in my opinion.    • 8/7/17 - WBC 5.9, hemoglobin 11.9, platelet count 171,000, MCV 88.7.    • 10/19/17 - WBC 7.5, hemoglobin 11.8, platelet count 216,000.    • 1/8/18 - PET scan:  No evidence of residual disease or recurrent disease.  There is an esophageal stent in the mid esophagus with a large debris air level.  No evidence of any metastases in the chest, abdomen or pelvis.  Mild nodule uptake in the vocal cords with fullness in the right vocal cord.  This could be physiologic.    • 7/9/18 - CT scan of chest with contrast:  Prominent mediastinal lymph nodes appear stable since February.  Changes of gastric pull-through procedure for esophageal cancer again noted.  Tree-in-bud infiltrates in the left lung base, consistent with infection versus inflammation.    • 1/10/19 - CT chest, abdomen and pelvis with contrast:  No evidence of mets in the chest; however, interval development of metastatic lymphadenopathy in the left side of the retroperitoneum.  For instance, there is a 17 mm rounded lymph node, 10 mm lymph node and also a 14 mm lymph node.  These are all new.  Stable 9 mm hypodense lesion within the right hepatic lobe, which could reflect hemangioma or complex cyst.    • 1/30/19 - CT-guided core biopsy of retroperitoneal lymph node:  Poorly differentiated adenocarcinoma consistent with metastases from patient’s known esophageal primary.  CK20 positive,  CDX2 positive, cytokeratin 7 negative, TTF-1 negative.    • 2/1/19 - Creatinine 0.9, BUN 18, calcium 9.3; these are normal.    • 2/28/19 - [facility] Next Gen sequencing testing on retroperitoneal lymph node specimen:  PD-L1 positive.  CPS = 3.  This implies responsiveness to pembrolizumab.  Mismatch repair status is proficient (no evidence of microsatellite instability).  Tumor mutational burden is low = 6 mutations/Mb.  CDH1 indeterminate.  KRAS mutation positive.  TP53 mutation positive.  CDK6 amplified.  [employer] PD-L1 testing by IHC:  PD-L1 expression 1% positive (CPS =1).  HER2/amrvel by IHC is negative.  HER2/marvel by CISH not amplified.  Microsatellite stable tumor.    • 3/7/19 - WBC 7.3, hemoglobin 12, platelet count 128,000, MCV 92.     • 3/14/19 - Patient was seen by radiation oncologist, Dr. Chahal.  He has recommended observation versus systemic chemoimmunotherapy as needed.  No plans for radiation therapy.   • 5/1/19 - CT scan of chest with contrast:  No definitive findings of new metastatic disease in the chest.  Emphysema noted.    • 5/1/19 - CT abdomen and pelvis:  There is interval progression of metastatic retroperitoneal adenopathy.  Left periaortic adenopathy with lymph node measuring up to 2 cm, previously 1.7 cm.  Another lymph node now measures 2.3, previously 1.4 cm.  New enlarged lymph node on image 147 measures 1.4 cm, previously 0.4 cm.  A 9 mm hypodense right hepatic lobe lesion appears stable.    • 5/9/19 - Decision made to start patient on immunotherapy Keytruda.    • 5/9/19 - Vitamin B12 229.  Ferritin 61.  Folate 14.3.  Iron saturation 26%, TIBC 304, serum iron 79.  • 5/31/19 - Patient received Keytruda 200 mg cycle 1, day 1.    • 5/31/19 - Free T4 0.99.  Creatinine 0.9, BUN 9.  LFTs normal.  TSH 3.99.  Folate 14.3.  Iron saturation 26%.    • 6/21/2019 patient received Keytruda cycle 2  • 7/12/2019: Patient received Keytruda cycle 3  • 8/2/2019 patient received cycle 4 Keytruda WBC 7.2,  hemoglobin 11.8 platelet count 163  • 8/23/2019 creatinine 0.9, LFTs normal, WBC 6.4, hemoglobin 11.5, platelets 179, TSH 2.1   • 8/23/2019 patient received cycle 5 of Keytruda  • 9/3/2019 CT chest abdomen and pelvis with contrast: . Positive response to therapy in the abdomen since 05/01/2019. Pathologically enlarged retroperitoneal lymph nodes, predominantly in the left periaortic region, have diminished in size.a 1.3 x 2.1 cm left periaortic node (image 60), previously measured 3.5 x 2.3 cm. A 1.6 cm index node near the level of the left renal vein previously measured 2.0 cm No new or progressive adenopathy. 2. Stable findings in the chest. Noncalcified right upper lobe nodules are unchanged. There is no evidence of new or progressive metastatic disease within the chest. 3. 8 mm indeterminate low-density lesion in the right hepatic lobe, is stable. No new liver lesions.  • 9/6/2019 WBC 5.6, hemoglobin 11.2, platelets of 192, MCV 91.8  • 9/13/2019 patient received Keytruda cycle 6, albumin 3.2  • 10/4/2019 patient received cycle 7 of Keytruda, TSH 4.8, free T4 0.86  • 10/9/2019 WBC 5.8, hemoglobin 11.7, platelets 174  • 10/25/2019 patient received cycle 8 of Keytruda.  WBC 5.9, hemoglobin 11.5, platelets 152, creatinine 0.75, cortisol 15.2, TSH 6.48 high , free T4 1.13 normal  • 11/6/2019 WBC 6.6, hemoglobin 11.8, MCV 91.5, platelets 187  • 11/15/2019 patient received cycle 9 of Keytruda, WBC 6.6, hemoglobin 11.9, platelets 161, cortisol level 8.86, TSH 2.68, free T3 2.8, free T4 1.5  • 12/4/2019 WBC 6.9, hemoglobin 12.0, platelets 180, MCV 92.9  • 12/06/2019-Keytruda Cycle 10  • 12/27/2019- Cycle 11 LFTs normal, WBC 6.7, hemoglobin 11.4, platelets 168, MCV 93, TSH 2.4,  • 1/28/2020: CT chest abdomen and pelvis with contrast:  Single (aortic lymph node in the abdominal retroperitoneum has increased.  Rest of the retroperitoneal lymph nodes have decreased in size.  Mediastinal adenopathy appears unchanged.   Noncalcified bilateral pulmonary nodules are stable  • 1/17/2020 patient received Keytruda cycle 12:.  • 2/3/2020 WBC 7.1, hemoglobin 12.2, platelets 171  • 02/10/2020 patient received cycle 13 of Keytruda: WBC 7.3, hemoglobin 12.1, platelets 166, creatinine 0.82  • 3/6/2020 patient is of Keytruda 200 mg, hemoglobin 10.8  • 3/27/2020 patient received Keytruda 200 mg, hemoglobin 11.4, TSH 1.95  • 4/17/2020 patient received Keytruda cycle 16 200 mg, WBC 6.1, hemoglobin 11.6, platelets 150  • 5/8/2020: Patient received cycle 17 Keytruda, WBC 6.3, hemoglobin 9.6, platelets 137, TSH 2.05, free T4 1.54, creatinine 0.93, LFTs normal,  • 5/29/2020: Patient received cycle 18 Keytruda, WBC 6.5, hemoglobin 11.7, platelets 129, creatinine 0.83, TSH 1.69  • 6/26/2020: Patient receiving Keytruda.   • 7/17/2020: TSH 2.06, WBC 5.7, hemoglobin 11.4, platelets 177, MCV 92.4, Keytruda 200 mg cycle 20  • 7/27/2020: CT chest abdomen pelvis with contrast: Stable findings in the chest abdomen pelvis since 1/28/2020.  Noncalcified bilateral pulmonary nodules are stable.  Stable retroperitoneal left perinephric adenopathy in the abdomen.  • 8/7/2020: Patient received cycle 21 of Keytruda  • 8/28/2020 patient is a cycle 22 of Keytruda.  WBC 6.02, hemoglobin 11.3, platelets 161  • 9/11/2020 WBC 6.9, hemoglobin 12.4, platelets 192  • 9/18/2020: Cycle 23 of Keytruda, TSH 1.19  • 10/9/2020: Cycle 24 of Keytruda, WBC 5.9, hemoglobin 11.9, platelets 178, creatinine 0.84, TSH 2.56, free T3 2.4, CMP normal  • 10/23/2020: WBC 7.34, hemoglobin 11.6, platelets 175  • 10/30/2020:.  Keytruda 200 mg  • 11/20/2020: Keytruda 200 mg  • 12/11/2020 Keytruda 200 mg.  WBC 5.5, hemoglobin 11.4, platelets 176, creatinine 0.8, LFTs normal, TSH 2.2, cortisol 8.3  • 12/10/2020: CT chest abdomen and pelvis with contrast: Previously seen left para-aortic lymph nodes within the abdomen are decreased in size.  No pathological lymphadenopathy.  No evidence of residual  malignancy..  Small noncalcified pulmonary nodules are stable.  No new nodules.  Stable mediastinal lymph nodes.  • 3/12/2021: WBC 6.7, hemoglobin 11.3, platelets 17  • 3/12/2021: CMP normal, TSH 2.9, cortisol 11.95, 1  • 3/15/2021: CT chest with contrast: Stable findings in the chest with postsurgical changes of esophageal resection and gastric pull-through.  Stable right lower lobe lung nodule measures 11 mm.  Stable size and appearance of the mediastinal and upper abdominal lymph nodes.  • 3/23/2021: Doppler of upper extremity: Normal.  • 3/24/2021: CT abdomen and pelvis with contrast: Postoperative changes.  Left periaortic nodes are stable.  • 4/12/2021: TSH 4.1, cortisol 12.4  • 6/22/2021: CMP normal,Cortisol 10.08,, WBC 5.8, hemoglobin 11.2, platelets 109, TSH 2.59  • 7/12/2021: CT scan of the chest abdomen pelvis with contrast: Dominant 10 x 10 left periaortic lymph node is stable since 3/22/2021.  Dominant lymph nodes in the right lower paratracheal and AP window distribution are stable since 3/15/2021.  No evidence of progressive adenopathy in the chest abdomen or pelvis.    • 01/11/2021 -PET/CT findings consistent with new cedric metastatic disease in the retroperitoneum.  2 new pathologically enlarged hypermetabolic retroperitoneal lymph nodes.  No convincing evidence of recurrent disease within the chest neck pelvis or skeleton.  • 1/20/2022 -CT-guided needle biopsy consistent with poorly differentiated carcinoma.  CDX2 positive consistent with metastases from known esophageal primary  • 2/9/2022 - Pembrolizumab complicated with fatigue, joint pains, nausea, diarrhea. Symptoms decrease in intensity over a week.  • 3/30/2022 -CT chest and pelvis with stable metastatic lymph nodes in the left periaortic retroperitoneum.  No evidence of disease progression or new lesions.  Stable lymphadenopathy in the distal paratracheal mediastinal area  • 4/13/2022 - Keytruda 200  • 5/4/2022 - Keytruda  200    Subjective:    Complaining of increased shortness of breath. Has been complaining of digestion.    The following portions of the patient's history were reviewed and updated as appropriate: allergies, current medications, past family history, past medical history, past social history, past surgical history and problem list.     Past Medical History:   Diagnosis Date   • B12 deficiency    • Blockage of coronary artery of heart (HCC)    • Depression    • DM (diabetes mellitus) (HCC)    • Dysphagia    • HTN (hypertension)    • Hyperlipidemia        Past Surgical History:   Procedure Laterality Date   • ABDOMINAL WALL ABSCESS INCISION AND DRAINAGE  10/22/2018    WITH DEBRIDEMENT  1.5CM X 1.5 CM X 1.5 CM    DR. PURI   • CATARACT EXTRACTION  2018   • COLONOSCOPY     • CORONARY ARTERY BYPASS GRAFT  2015   • ENDOSCOPY  12/20/2016   • ESOPHAGECTOMY  05/04/2017    BROOKS PETTY ESOPHAGECTOMY, FEEDING JEJNOSTOMOY, ABDOMINAL AND MEDIASTINAL LYMPH NODE DISECTION, PEDICLED MUSCLE FLAP COVERAGE DR. PURI   • ESOPHAGOSCOPY / EGD  07/12/2017    WITH BALLOON DILATION   • ESOPHAGOSCOPY / EGD  07/26/2017    WITH BALLOON DILATION   • ESOPHAGOSCOPY / EGD  08/11/2017    WITH BALLOON DILATION   • ESOPHAGOSCOPY / EGD  08/28/2017    WITH BALLOON DILATION   • ESOPHAGOSCOPY / EGD  09/27/2017    WITH BALLOON DILATION   • LYMPH NODE BIOPSY  01/30/2019   • PACEMAKER IMPLANTATION  11/21/2016       Current Outpatient Medications:   •  Cetirizine HCl (ZyrTEC Allergy) 10 MG capsule, Take  by mouth., Disp: , Rfl:   •  clonazePAM (KlonoPIN) 0.5 MG tablet, Pt taking 1.5 pills daily, Disp: , Rfl: 1  •  cyanocobalamin (VITAMIN B-12) 1000 MCG tablet, , Disp: , Rfl:   •  diphenhydrAMINE-APAP, sleep, (TYLENOL PM EXTRA STRENGTH PO), Take  by mouth., Disp: , Rfl:   •  Euthyrox 50 MCG tablet, Take 1 tablet by mouth once daily, Disp: 90 tablet, Rfl: 0  •  ferrous sulfate 325 (65 FE) MG tablet, Take 1 tablet by mouth 2 (Two) Times a Day With Meals.,  Disp: 60 tablet, Rfl: 3  •  folic acid (FOLVITE) 1 MG tablet, Daily., Disp: , Rfl:   •  metFORMIN (GLUCOPHAGE) 1000 MG tablet, 2 (Two) Times a Day With Meals., Disp: , Rfl:   •  metoprolol tartrate (LOPRESSOR) 25 MG tablet, Take 12.5 mg by mouth 2 (Two) Times a Day., Disp: , Rfl:   •  omeprazole (priLOSEC) 20 MG capsule, Take 20 mg by mouth 2 (Two) Times a Day., Disp: , Rfl:   •  ondansetron (ZOFRAN) 4 MG tablet, Take 1 tablet by mouth Every 8 (Eight) Hours As Needed for Nausea or Vomiting., Disp: 90 tablet, Rfl: 2  •  pravastatin (PRAVACHOL) 10 MG tablet, Take 10 mg by mouth every night at bedtime., Disp: , Rfl:   No current facility-administered medications for this visit.    Facility-Administered Medications Ordered in Other Visits:   •  Pembrolizumab (KEYTRUDA) 200 mg in sodium chloride 0.9 % 108 mL chemo IVPB, 200 mg, Intravenous, Once, Jean Pierre Elizondo MD  •  sodium chloride 0.9 % infusion 250 mL, 250 mL, Intravenous, Once, Jean Pierre Elizondo MD    Allergies   Allergen Reactions   • Ace Inhibitors Unknown (See Comments)     Unknown reaction     • Heparin Other (See Comments)     Fever/chills, weakness in legs   • Sulfa Antibiotics Other (See Comments)     Mouth sores       Family History   Problem Relation Age of Onset   • Mental illness Mother    • Heart disease Mother    • Hypertension Father    • Cancer Father    • Heart disease Sister    • Heart disease Brother      Cancer-related family history includes Cancer in his father.    Social History     Tobacco Use   • Smoking status: Former Smoker     Packs/day: 2.00     Years: 50.00     Pack years: 100.00     Types: Cigarettes     Quit date: 2015     Years since quittin.9   • Smokeless tobacco: Former User     Types: Chew     Quit date: 1989   Substance Use Topics   • Alcohol use: No   • Drug use: No     ROS:     Objective:    Vitals:    22 1024   BP: 154/76   Pulse: 82   Resp: 18   Temp: 97.5 °F (36.4 °C)   SpO2: 98%   Weight: 69.4 kg (153 lb)  "  Height: 167.6 cm (66\")   PainSc: 0-No pain       (1) Restricted in physically strenuous activity, ambulatory and able to do work of light nature    Physical Exam:     Physical Exam   Constitutional: He is oriented to person, place, and time. He appears well-developed. No distress.   HENT:   Head: Normocephalic and atraumatic.   Nose: Nose normal.   Mouth/Throat: Mucous membranes are dry.   Eyes: Pupils are equal, round, and reactive to light.   Neck: No thyromegaly present.   Cardiovascular: Normal rate, regular rhythm, normal heart sounds and normal pulses. Exam reveals no gallop and no friction rub.   Pulmonary/Chest: Effort normal and breath sounds normal. No stridor. No respiratory distress. He has no wheezes.   Abdominal: Soft. Normal appearance and bowel sounds are normal.   No tenderness   Musculoskeletal: Normal range of motion. No deformity or signs of injury.      Right lower leg: No edema.   Lymphadenopathy:     He has no cervical adenopathy.   Neurological: He is alert and oriented to person, place, and time. He exhibits normal muscle tone.   Skin: Skin is warm and dry. No rash noted. He is not diaphoretic. No erythema. No jaundice.   Right side chest  port   Vitals reviewed.    I have reexamined the patient and the results are consistent with the previously documented exam. Jean Pierre Elizondo MD       Lab Results - Last 18 Months   Lab Units 05/25/22  1023 05/04/22  1122 04/27/22  0940   WBC 10*3/mm3 5.23 5.56 6.14   HEMOGLOBIN g/dL 9.9* 9.6* 9.6*   HEMATOCRIT % 31.7* 30.5* 31.0*   PLATELETS 10*3/mm3 195 202 195   MCV fL 87.8 86.6 86.8     Lab Results - Last 18 Months   Lab Units 05/04/22  1122 04/13/22  1204 04/13/22  1132 03/23/22  1107   SODIUM mmol/L 138  --  137 140   POTASSIUM mmol/L 4.8  --  4.6 5.0   CHLORIDE mmol/L 103  --  103 106   CO2 mmol/L 21.0*  --  23.0 22.0   BUN mg/dL 12  --  12 14   CREATININE mg/dL 0.85 0.80 0.77 0.84   CALCIUM mg/dL 8.7  --  8.8 8.5*   BILIRUBIN mg/dL 0.2  --  0.2 0.2 "   ALK PHOS U/L 37*  --  41 42   ALT (SGPT) U/L 9  --  8 8   AST (SGOT) U/L 20  --  18 18   GLUCOSE mg/dL 109*  --  120* 109*     Assessment & Plan     Assessment:    Metastatic esophageal carcinoma:    Patient has a history of GE junction poorly differentiated adenocarcinoma for which he had chemoradiation followed by [facility] Gurwinder resection:  Pathology showed ypT3,pN1 disease.  Tumor was Stage IIIA, diagnosed in 2016.  He received concurrent chemoradiation with weekly Carboplatin and Taxol and radiation finishing on 3/15/17.  CT scan on 1/10/19 showed new retroperitoneal lymph nodes.  These lymph nodes were biopsied on 1/30/19 and it shows metastatic esophageal cancer.  [facility] Next Gen testing was performed on the sample and it shows KRAS mutation, microsatellite stable tumor.  PD-L1 is positive.  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  HER2/marvel (ERBB2) was not amplified and was negative.  His CT scan on 5/1/19 shows evidence of progression.  Patient has started receiving Keytruda on 5/31/19.  He has received 19 cycles so far.   CT scan on 1/28/2020 shows improvement and continued response.  CT scan chest abdomen pelvis with contrast on 12/10/2020 does not show any evidence of disease progression.  On 12/22/2020 decision made to hold Keytruda per patient's request to get a treatment break.. Repeat CT scan chest 7/12/2021 does not show any evidence of disease progression.  Now CT imaging in January 2021 with a CT showing increased abdominal left para-aortic adenopathy.  Subsequent PET/CT with new cedric metastatic disease in the retroperitoneum of the abdomen.  New pathologically enlarged hypermetabolic retroperitoneal lymph nodes.  This would be concerning for disease recurrence/progression.  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  I discussed with him the treatment options going forward.  He has had a good response with immunotherapy pembrolizumab in the past. This was now restarted. Continue the same. He  has had significant symptoms with flu like illness after treatment.he takes Tylenol which helps.  He also has inflammatory arthralgias.   CT chest non contrast with shortness of breath.    Shortness of breath  No pneumonia like symptoms  Order CT chest non contrast    Reflux/heartburn/dysphagia:   Symptoms improved.  Also has a history of strictures. Status post EGD and balloon dilation.  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  Continues on Synthroid monitor TSH and fT4 with treatment.    Mild diarrhea:   diarrhea seems to be waxing and waning in nature.  Related to immunotherapy, but not severe.  Now resolved.  Will monitor    PD-L1 positive, HER2 negative, p53 and KRAS positive.  Repeat Caris testing with repeat biopsy with no target mutations.    B12 deficiency: Continue B12 1000 mcg twice daily.    Anemia - Hb 9.6, normocytic, was started on oral iron which is causing significant indigestion.     F/u in a month. Monitor for arthralgias, diarrhea,     I have reviewed and confirmed the accuracy of the patient's history: Chief complaint, HPI, ROS and Subjective as entered by the MA/LPN/RN.     Jean Pierre Elizondo MD 05/25/22     No orders of the defined types were placed in this encounter.    Time spent on encounter including record review, history taking, exam, discussion, counseling and documentation at: 40 minutes

## 2022-05-25 ENCOUNTER — OFFICE VISIT (OUTPATIENT)
Dept: ONCOLOGY | Facility: CLINIC | Age: 72
End: 2022-05-25

## 2022-05-25 ENCOUNTER — HOSPITAL ENCOUNTER (OUTPATIENT)
Dept: ONCOLOGY | Facility: HOSPITAL | Age: 72
Setting detail: INFUSION SERIES
Discharge: HOME | End: 2022-05-25

## 2022-05-25 VITALS
HEIGHT: 66 IN | SYSTOLIC BLOOD PRESSURE: 154 MMHG | WEIGHT: 153 LBS | DIASTOLIC BLOOD PRESSURE: 76 MMHG | TEMPERATURE: 97.5 F | RESPIRATION RATE: 18 BRPM | HEART RATE: 82 BPM | OXYGEN SATURATION: 98 % | BODY MASS INDEX: 24.59 KG/M2

## 2022-05-25 VITALS
RESPIRATION RATE: 18 BRPM | TEMPERATURE: 97.5 F | HEIGHT: 66 IN | HEART RATE: 82 BPM | DIASTOLIC BLOOD PRESSURE: 76 MMHG | OXYGEN SATURATION: 98 % | WEIGHT: 153 LBS | SYSTOLIC BLOOD PRESSURE: 154 MMHG | BODY MASS INDEX: 24.59 KG/M2

## 2022-05-25 DIAGNOSIS — Z51.11 ENCOUNTER FOR ANTINEOPLASTIC CHEMOTHERAPY: ICD-10-CM

## 2022-05-25 DIAGNOSIS — C15.9 ESOPHAGEAL CANCER, STAGE IV: Primary | ICD-10-CM

## 2022-05-25 DIAGNOSIS — C16.0 MALIGNANT NEOPLASM OF CARDIA: Primary | ICD-10-CM

## 2022-05-25 DIAGNOSIS — Z51.81 ENCOUNTER FOR THERAPEUTIC DRUG MONITORING: Primary | ICD-10-CM

## 2022-05-25 DIAGNOSIS — C16.0 MALIGNANT NEOPLASM OF CARDIA: ICD-10-CM

## 2022-05-25 DIAGNOSIS — Z51.81 ENCOUNTER FOR THERAPEUTIC DRUG MONITORING: ICD-10-CM

## 2022-05-25 PROBLEM — D50.9 IDA (IRON DEFICIENCY ANEMIA): Status: ACTIVE | Noted: 2022-05-25

## 2022-05-25 PROBLEM — K90.9 MALABSORPTION OF IRON: Status: ACTIVE | Noted: 2022-05-25

## 2022-05-25 LAB
ALBUMIN SERPL-MCNC: 4 G/DL (ref 3.5–5.2)
ALBUMIN/GLOB SERPL: 1.6 G/DL
ALP SERPL-CCNC: 36 U/L (ref 39–117)
ALT SERPL W P-5'-P-CCNC: 9 U/L (ref 1–41)
ANION GAP SERPL CALCULATED.3IONS-SCNC: 11 MMOL/L (ref 5–15)
AST SERPL-CCNC: 21 U/L (ref 1–40)
BASOPHILS # BLD AUTO: 0.04 10*3/MM3 (ref 0–0.2)
BASOPHILS NFR BLD AUTO: 0.8 % (ref 0–1.5)
BILIRUB SERPL-MCNC: 0.3 MG/DL (ref 0–1.2)
BUN SERPL-MCNC: 19 MG/DL (ref 8–23)
BUN/CREAT SERPL: 24.7 (ref 7–25)
CALCIUM SPEC-SCNC: 8.5 MG/DL (ref 8.6–10.5)
CHLORIDE SERPL-SCNC: 104 MMOL/L (ref 98–107)
CO2 SERPL-SCNC: 21 MMOL/L (ref 22–29)
CORTIS SERPL-MCNC: 8.66 MCG/DL
CREAT SERPL-MCNC: 0.77 MG/DL (ref 0.76–1.27)
DEPRECATED RDW RBC AUTO: 56.6 FL (ref 37–54)
EGFRCR SERPLBLD CKD-EPI 2021: 94.5 ML/MIN/1.73
EOSINOPHIL # BLD AUTO: 0.24 10*3/MM3 (ref 0–0.4)
EOSINOPHIL NFR BLD AUTO: 4.6 % (ref 0.3–6.2)
ERYTHROCYTE [DISTWIDTH] IN BLOOD BY AUTOMATED COUNT: 18.1 % (ref 12.3–15.4)
GLOBULIN UR ELPH-MCNC: 2.5 GM/DL
GLUCOSE BLDC GLUCOMTR-MCNC: 166 MG/DL (ref 70–105)
GLUCOSE SERPL-MCNC: 162 MG/DL (ref 65–99)
HCT VFR BLD AUTO: 31.7 % (ref 37.5–51)
HGB BLD-MCNC: 9.9 G/DL (ref 13–17.7)
LYMPHOCYTES # BLD AUTO: 1.47 10*3/MM3 (ref 0.7–3.1)
LYMPHOCYTES NFR BLD AUTO: 28.1 % (ref 19.6–45.3)
MCH RBC QN AUTO: 27.4 PG (ref 26.6–33)
MCHC RBC AUTO-ENTMCNC: 31.2 G/DL (ref 31.5–35.7)
MCV RBC AUTO: 87.8 FL (ref 79–97)
MONOCYTES # BLD AUTO: 0.68 10*3/MM3 (ref 0.1–0.9)
MONOCYTES NFR BLD AUTO: 13 % (ref 5–12)
NEUTROPHILS NFR BLD AUTO: 2.8 10*3/MM3 (ref 1.7–7)
NEUTROPHILS NFR BLD AUTO: 53.5 % (ref 42.7–76)
PLATELET # BLD AUTO: 195 10*3/MM3 (ref 140–450)
PMV BLD AUTO: 10.5 FL (ref 6–12)
POTASSIUM SERPL-SCNC: 4.5 MMOL/L (ref 3.5–5.2)
PROT SERPL-MCNC: 6.5 G/DL (ref 6–8.5)
RBC # BLD AUTO: 3.61 10*6/MM3 (ref 4.14–5.8)
SODIUM SERPL-SCNC: 136 MMOL/L (ref 136–145)
T3FREE SERPL-MCNC: 2.35 PG/ML (ref 2–4.4)
T4 FREE SERPL-MCNC: 1.34 NG/DL (ref 0.93–1.7)
TSH SERPL DL<=0.05 MIU/L-ACNC: 3.38 UIU/ML (ref 0.27–4.2)
WBC NRBC COR # BLD: 5.23 10*3/MM3 (ref 3.4–10.8)

## 2022-05-25 PROCEDURE — 82533 TOTAL CORTISOL: CPT | Performed by: INTERNAL MEDICINE

## 2022-05-25 PROCEDURE — 85025 COMPLETE CBC W/AUTO DIFF WBC: CPT | Performed by: INTERNAL MEDICINE

## 2022-05-25 PROCEDURE — 36591 DRAW BLOOD OFF VENOUS DEVICE: CPT

## 2022-05-25 PROCEDURE — 82962 GLUCOSE BLOOD TEST: CPT

## 2022-05-25 PROCEDURE — 84443 ASSAY THYROID STIM HORMONE: CPT | Performed by: INTERNAL MEDICINE

## 2022-05-25 PROCEDURE — 84481 FREE ASSAY (FT-3): CPT | Performed by: INTERNAL MEDICINE

## 2022-05-25 PROCEDURE — 84439 ASSAY OF FREE THYROXINE: CPT | Performed by: INTERNAL MEDICINE

## 2022-05-25 PROCEDURE — 80053 COMPREHEN METABOLIC PANEL: CPT | Performed by: INTERNAL MEDICINE

## 2022-05-25 PROCEDURE — 99215 OFFICE O/P EST HI 40 MIN: CPT | Performed by: INTERNAL MEDICINE

## 2022-05-25 RX ORDER — SODIUM CHLORIDE 9 MG/ML
250 INJECTION, SOLUTION INTRAVENOUS ONCE
Status: CANCELLED | OUTPATIENT
Start: 2022-06-02

## 2022-05-25 RX ORDER — SODIUM CHLORIDE 9 MG/ML
250 INJECTION, SOLUTION INTRAVENOUS ONCE
Status: DISCONTINUED | OUTPATIENT
Start: 2022-05-25 | End: 2022-05-26 | Stop reason: HOSPADM

## 2022-05-25 RX ORDER — SODIUM CHLORIDE 9 MG/ML
250 INJECTION, SOLUTION INTRAVENOUS ONCE
Status: CANCELLED | OUTPATIENT
Start: 2022-05-25

## 2022-05-25 NOTE — PROGRESS NOTES
Pt to the clinic for C33 of Keytruda and MD visit. He reports to having nausea and SOA at times.  The SOA is during changes in the weather and on exertion. Port accessed and flushed with good blood return noted. 10cc of blood wasted prior to specimen collection. Blood specimen obtained and sent to lab for processing per protocol. Orders given to hold treatment by 1 week.  Port flushed with saline and heparin prior to needle removal.

## 2022-05-27 ENCOUNTER — HOSPITAL ENCOUNTER (OUTPATIENT)
Dept: PET IMAGING | Facility: HOSPITAL | Age: 72
Discharge: HOME | End: 2022-05-27
Admitting: INTERNAL MEDICINE

## 2022-05-27 DIAGNOSIS — C16.0 MALIGNANT NEOPLASM OF CARDIA: ICD-10-CM

## 2022-05-27 DIAGNOSIS — C15.9 ESOPHAGEAL CANCER, STAGE IV: ICD-10-CM

## 2022-05-27 PROCEDURE — 71250 CT THORAX DX C-: CPT

## 2022-06-01 ENCOUNTER — TELEPHONE (OUTPATIENT)
Dept: ONCOLOGY | Facility: CLINIC | Age: 72
End: 2022-06-01

## 2022-06-01 NOTE — TELEPHONE ENCOUNTER
Called pt to let him know that his scans looked good. Pt was also unaware that he had been scheduled for his IV iron. I let the pt know what days and times he was scheduled for his IV iron. He v/u and had no other questions.

## 2022-06-01 NOTE — TELEPHONE ENCOUNTER
----- Message from Jean Pierre Elizondo MD sent at 5/31/2022  5:11 PM EDT -----  Please let him know that scans look good. He should get treatment as scheduled.

## 2022-06-02 ENCOUNTER — HOSPITAL ENCOUNTER (OUTPATIENT)
Dept: ONCOLOGY | Facility: HOSPITAL | Age: 72
Setting detail: INFUSION SERIES
Discharge: HOME | End: 2022-06-02

## 2022-06-02 VITALS
SYSTOLIC BLOOD PRESSURE: 114 MMHG | BODY MASS INDEX: 24.53 KG/M2 | TEMPERATURE: 97.1 F | WEIGHT: 152 LBS | HEART RATE: 66 BPM | DIASTOLIC BLOOD PRESSURE: 68 MMHG

## 2022-06-02 DIAGNOSIS — Z51.11 ENCOUNTER FOR ANTINEOPLASTIC CHEMOTHERAPY: ICD-10-CM

## 2022-06-02 DIAGNOSIS — C16.0 MALIGNANT NEOPLASM OF CARDIA: ICD-10-CM

## 2022-06-02 DIAGNOSIS — K90.9 MALABSORPTION OF IRON: Primary | ICD-10-CM

## 2022-06-02 DIAGNOSIS — D50.9 IRON DEFICIENCY ANEMIA, UNSPECIFIED IRON DEFICIENCY ANEMIA TYPE: ICD-10-CM

## 2022-06-02 DIAGNOSIS — Z51.81 ENCOUNTER FOR THERAPEUTIC DRUG MONITORING: ICD-10-CM

## 2022-06-02 LAB
ALBUMIN SERPL-MCNC: 3.8 G/DL (ref 3.5–5.2)
ALBUMIN/GLOB SERPL: 1.5 G/DL
ALP SERPL-CCNC: 42 U/L (ref 39–117)
ALT SERPL W P-5'-P-CCNC: 9 U/L (ref 1–41)
ANION GAP SERPL CALCULATED.3IONS-SCNC: 12 MMOL/L (ref 5–15)
AST SERPL-CCNC: 16 U/L (ref 1–40)
BASOPHILS # BLD AUTO: 0.07 10*3/MM3 (ref 0–0.2)
BASOPHILS NFR BLD AUTO: 1.2 % (ref 0–1.5)
BILIRUB SERPL-MCNC: 0.2 MG/DL (ref 0–1.2)
BUN SERPL-MCNC: 16 MG/DL (ref 8–23)
BUN/CREAT SERPL: 20.8 (ref 7–25)
CALCIUM SPEC-SCNC: 8.8 MG/DL (ref 8.6–10.5)
CHLORIDE SERPL-SCNC: 105 MMOL/L (ref 98–107)
CO2 SERPL-SCNC: 20 MMOL/L (ref 22–29)
CORTIS SERPL-MCNC: 14.21 MCG/DL
CREAT SERPL-MCNC: 0.77 MG/DL (ref 0.76–1.27)
DEPRECATED RDW RBC AUTO: 55.3 FL (ref 37–54)
EGFRCR SERPLBLD CKD-EPI 2021: 94.5 ML/MIN/1.73
EOSINOPHIL # BLD AUTO: 0.3 10*3/MM3 (ref 0–0.4)
EOSINOPHIL NFR BLD AUTO: 5 % (ref 0.3–6.2)
ERYTHROCYTE [DISTWIDTH] IN BLOOD BY AUTOMATED COUNT: 17.7 % (ref 12.3–15.4)
GLOBULIN UR ELPH-MCNC: 2.6 GM/DL
GLUCOSE BLDC GLUCOMTR-MCNC: 161 MG/DL (ref 70–105)
GLUCOSE SERPL-MCNC: 158 MG/DL (ref 65–99)
HCT VFR BLD AUTO: 33.4 % (ref 37.5–51)
HGB BLD-MCNC: 10.5 G/DL (ref 13–17.7)
LYMPHOCYTES # BLD AUTO: 1.63 10*3/MM3 (ref 0.7–3.1)
LYMPHOCYTES NFR BLD AUTO: 27.3 % (ref 19.6–45.3)
MCH RBC QN AUTO: 27.7 PG (ref 26.6–33)
MCHC RBC AUTO-ENTMCNC: 31.4 G/DL (ref 31.5–35.7)
MCV RBC AUTO: 88.1 FL (ref 79–97)
MONOCYTES # BLD AUTO: 0.76 10*3/MM3 (ref 0.1–0.9)
MONOCYTES NFR BLD AUTO: 12.7 % (ref 5–12)
NEUTROPHILS NFR BLD AUTO: 3.21 10*3/MM3 (ref 1.7–7)
NEUTROPHILS NFR BLD AUTO: 53.8 % (ref 42.7–76)
PLATELET # BLD AUTO: 190 10*3/MM3 (ref 140–450)
PMV BLD AUTO: 10.4 FL (ref 6–12)
POTASSIUM SERPL-SCNC: 4.5 MMOL/L (ref 3.5–5.2)
PROT SERPL-MCNC: 6.4 G/DL (ref 6–8.5)
RBC # BLD AUTO: 3.79 10*6/MM3 (ref 4.14–5.8)
SODIUM SERPL-SCNC: 137 MMOL/L (ref 136–145)
T3FREE SERPL-MCNC: 2.65 PG/ML (ref 2–4.4)
T4 FREE SERPL-MCNC: 1.33 NG/DL (ref 0.93–1.7)
TSH SERPL DL<=0.05 MIU/L-ACNC: 2.96 UIU/ML (ref 0.27–4.2)
WBC NRBC COR # BLD: 5.97 10*3/MM3 (ref 3.4–10.8)

## 2022-06-02 PROCEDURE — 80053 COMPREHEN METABOLIC PANEL: CPT | Performed by: INTERNAL MEDICINE

## 2022-06-02 PROCEDURE — 96367 TX/PROPH/DG ADDL SEQ IV INF: CPT

## 2022-06-02 PROCEDURE — 84439 ASSAY OF FREE THYROXINE: CPT | Performed by: INTERNAL MEDICINE

## 2022-06-02 PROCEDURE — 84481 FREE ASSAY (FT-3): CPT | Performed by: INTERNAL MEDICINE

## 2022-06-02 PROCEDURE — 96413 CHEMO IV INFUSION 1 HR: CPT

## 2022-06-02 PROCEDURE — 85025 COMPLETE CBC W/AUTO DIFF WBC: CPT | Performed by: INTERNAL MEDICINE

## 2022-06-02 PROCEDURE — 25010000002 PEMBROLIZUMAB 100 MG/4ML SOLUTION 4 ML VIAL: Performed by: INTERNAL MEDICINE

## 2022-06-02 PROCEDURE — 82533 TOTAL CORTISOL: CPT | Performed by: INTERNAL MEDICINE

## 2022-06-02 PROCEDURE — 84443 ASSAY THYROID STIM HORMONE: CPT | Performed by: INTERNAL MEDICINE

## 2022-06-02 PROCEDURE — 82962 GLUCOSE BLOOD TEST: CPT

## 2022-06-02 PROCEDURE — 25010000002 IRON SUCROSE PER 1 MG: Performed by: INTERNAL MEDICINE

## 2022-06-02 RX ORDER — SODIUM CHLORIDE 9 MG/ML
250 INJECTION, SOLUTION INTRAVENOUS ONCE
Status: DISCONTINUED | OUTPATIENT
Start: 2022-06-02 | End: 2022-06-03 | Stop reason: HOSPADM

## 2022-06-02 RX ORDER — SODIUM CHLORIDE 0.9 % (FLUSH) 0.9 %
20 SYRINGE (ML) INJECTION AS NEEDED
Status: CANCELLED | OUTPATIENT
Start: 2022-06-02

## 2022-06-02 RX ORDER — SODIUM CHLORIDE 0.9 % (FLUSH) 0.9 %
20 SYRINGE (ML) INJECTION AS NEEDED
Status: DISCONTINUED | OUTPATIENT
Start: 2022-06-02 | End: 2022-06-03 | Stop reason: HOSPADM

## 2022-06-02 RX ORDER — SODIUM CHLORIDE 9 MG/ML
250 INJECTION, SOLUTION INTRAVENOUS ONCE
Status: COMPLETED | OUTPATIENT
Start: 2022-06-02 | End: 2022-06-02

## 2022-06-02 RX ADMIN — SODIUM CHLORIDE 250 ML: 9 INJECTION, SOLUTION INTRAVENOUS at 09:19

## 2022-06-02 RX ADMIN — Medication 10 ML: at 10:53

## 2022-06-02 RX ADMIN — IRON SUCROSE 200 MG: 20 INJECTION, SOLUTION INTRAVENOUS at 09:19

## 2022-06-02 RX ADMIN — SODIUM CHLORIDE 200 MG: 9 INJECTION, SOLUTION INTRAVENOUS at 10:20

## 2022-06-02 NOTE — PROGRESS NOTES
Pt here today for C1 D1 venofer and C33 D1 keytruda. Pt's port accessed for blood collection. 10 ml blood wasted prior to blood for labs being collected. Pt tolerated treatment well. AVS given at time of discharge.

## 2022-06-09 ENCOUNTER — HOSPITAL ENCOUNTER (OUTPATIENT)
Dept: ONCOLOGY | Facility: HOSPITAL | Age: 72
Setting detail: INFUSION SERIES
Discharge: HOME | End: 2022-06-09

## 2022-06-09 VITALS
HEIGHT: 66 IN | BODY MASS INDEX: 24.54 KG/M2 | WEIGHT: 152.7 LBS | TEMPERATURE: 96.9 F | OXYGEN SATURATION: 99 % | DIASTOLIC BLOOD PRESSURE: 74 MMHG | RESPIRATION RATE: 18 BRPM | SYSTOLIC BLOOD PRESSURE: 143 MMHG | HEART RATE: 53 BPM

## 2022-06-09 DIAGNOSIS — K90.9 MALABSORPTION OF IRON: Primary | ICD-10-CM

## 2022-06-09 DIAGNOSIS — D50.9 IRON DEFICIENCY ANEMIA, UNSPECIFIED IRON DEFICIENCY ANEMIA TYPE: ICD-10-CM

## 2022-06-09 PROCEDURE — 96365 THER/PROPH/DIAG IV INF INIT: CPT

## 2022-06-09 PROCEDURE — 25010000002 IRON SUCROSE PER 1 MG: Performed by: INTERNAL MEDICINE

## 2022-06-09 RX ORDER — SODIUM CHLORIDE 9 MG/ML
250 INJECTION, SOLUTION INTRAVENOUS ONCE
Status: COMPLETED | OUTPATIENT
Start: 2022-06-09 | End: 2022-06-09

## 2022-06-09 RX ADMIN — SODIUM CHLORIDE 250 ML: 9 INJECTION, SOLUTION INTRAVENOUS at 10:49

## 2022-06-09 RX ADMIN — IRON SUCROSE 200 MG: 20 INJECTION, SOLUTION INTRAVENOUS at 10:49

## 2022-06-09 NOTE — PROGRESS NOTES
Patient here for Venofer, patient has no complaints at this time, patient has appt for next infusion.

## 2022-06-16 ENCOUNTER — HOSPITAL ENCOUNTER (OUTPATIENT)
Dept: ONCOLOGY | Facility: HOSPITAL | Age: 72
Setting detail: INFUSION SERIES
Discharge: HOME | End: 2022-06-16

## 2022-06-16 VITALS
HEART RATE: 60 BPM | TEMPERATURE: 97.3 F | WEIGHT: 153 LBS | SYSTOLIC BLOOD PRESSURE: 135 MMHG | OXYGEN SATURATION: 99 % | HEIGHT: 66 IN | BODY MASS INDEX: 24.59 KG/M2 | DIASTOLIC BLOOD PRESSURE: 70 MMHG | RESPIRATION RATE: 18 BRPM

## 2022-06-16 DIAGNOSIS — E61.1 IRON DEFICIENCY: ICD-10-CM

## 2022-06-16 DIAGNOSIS — D50.9 IRON DEFICIENCY ANEMIA, UNSPECIFIED IRON DEFICIENCY ANEMIA TYPE: ICD-10-CM

## 2022-06-16 DIAGNOSIS — D64.9 ANEMIA, UNSPECIFIED TYPE: ICD-10-CM

## 2022-06-16 DIAGNOSIS — K90.9 MALABSORPTION OF IRON: ICD-10-CM

## 2022-06-16 DIAGNOSIS — Z51.11 ENCOUNTER FOR ANTINEOPLASTIC CHEMOTHERAPY: ICD-10-CM

## 2022-06-16 DIAGNOSIS — Z51.81 ENCOUNTER FOR THERAPEUTIC DRUG MONITORING: Primary | ICD-10-CM

## 2022-06-16 DIAGNOSIS — C16.0 MALIGNANT NEOPLASM OF CARDIA: ICD-10-CM

## 2022-06-16 LAB
ALBUMIN SERPL-MCNC: 3.9 G/DL (ref 3.5–5.2)
ALBUMIN/GLOB SERPL: 1.4 G/DL
ALP SERPL-CCNC: 36 U/L (ref 39–117)
ALT SERPL W P-5'-P-CCNC: 6 U/L (ref 1–41)
ANION GAP SERPL CALCULATED.3IONS-SCNC: 12 MMOL/L (ref 5–15)
AST SERPL-CCNC: 18 U/L (ref 1–40)
BASOPHILS # BLD AUTO: 0.04 10*3/MM3 (ref 0–0.2)
BASOPHILS NFR BLD AUTO: 0.7 % (ref 0–1.5)
BILIRUB SERPL-MCNC: 0.2 MG/DL (ref 0–1.2)
BUN SERPL-MCNC: 9 MG/DL (ref 8–23)
BUN/CREAT SERPL: 10.5 (ref 7–25)
CALCIUM SPEC-SCNC: 8.5 MG/DL (ref 8.6–10.5)
CHLORIDE SERPL-SCNC: 104 MMOL/L (ref 98–107)
CO2 SERPL-SCNC: 20 MMOL/L (ref 22–29)
CREAT SERPL-MCNC: 0.86 MG/DL (ref 0.76–1.27)
DEPRECATED RDW RBC AUTO: 56.1 FL (ref 37–54)
EGFRCR SERPLBLD CKD-EPI 2021: 91.4 ML/MIN/1.73
EOSINOPHIL # BLD AUTO: 0.21 10*3/MM3 (ref 0–0.4)
EOSINOPHIL NFR BLD AUTO: 3.5 % (ref 0.3–6.2)
ERYTHROCYTE [DISTWIDTH] IN BLOOD BY AUTOMATED COUNT: 17.8 % (ref 12.3–15.4)
FERRITIN SERPL-MCNC: 135.8 NG/ML (ref 30–400)
GLOBULIN UR ELPH-MCNC: 2.7 GM/DL
GLUCOSE SERPL-MCNC: 70 MG/DL (ref 65–99)
HCT VFR BLD AUTO: 34.4 % (ref 37.5–51)
HGB BLD-MCNC: 11 G/DL (ref 13–17.7)
IRON 24H UR-MRATE: 65 MCG/DL (ref 59–158)
IRON SATN MFR SERPL: 16 % (ref 20–50)
LYMPHOCYTES # BLD AUTO: 1.55 10*3/MM3 (ref 0.7–3.1)
LYMPHOCYTES NFR BLD AUTO: 26.2 % (ref 19.6–45.3)
MCH RBC QN AUTO: 28.5 PG (ref 26.6–33)
MCHC RBC AUTO-ENTMCNC: 32 G/DL (ref 31.5–35.7)
MCV RBC AUTO: 89.1 FL (ref 79–97)
MONOCYTES # BLD AUTO: 0.76 10*3/MM3 (ref 0.1–0.9)
MONOCYTES NFR BLD AUTO: 12.8 % (ref 5–12)
NEUTROPHILS NFR BLD AUTO: 3.36 10*3/MM3 (ref 1.7–7)
NEUTROPHILS NFR BLD AUTO: 56.8 % (ref 42.7–76)
PLATELET # BLD AUTO: 189 10*3/MM3 (ref 140–450)
PMV BLD AUTO: 9.9 FL (ref 6–12)
POTASSIUM SERPL-SCNC: 4.9 MMOL/L (ref 3.5–5.2)
PROT SERPL-MCNC: 6.6 G/DL (ref 6–8.5)
RBC # BLD AUTO: 3.86 10*6/MM3 (ref 4.14–5.8)
SODIUM SERPL-SCNC: 136 MMOL/L (ref 136–145)
TIBC SERPL-MCNC: 414 MCG/DL (ref 298–536)
TRANSFERRIN SERPL-MCNC: 278 MG/DL (ref 200–360)
WBC NRBC COR # BLD: 5.92 10*3/MM3 (ref 3.4–10.8)

## 2022-06-16 PROCEDURE — 85025 COMPLETE CBC W/AUTO DIFF WBC: CPT | Performed by: INTERNAL MEDICINE

## 2022-06-16 PROCEDURE — 82728 ASSAY OF FERRITIN: CPT | Performed by: NURSE PRACTITIONER

## 2022-06-16 PROCEDURE — 84466 ASSAY OF TRANSFERRIN: CPT | Performed by: NURSE PRACTITIONER

## 2022-06-16 PROCEDURE — 83540 ASSAY OF IRON: CPT | Performed by: NURSE PRACTITIONER

## 2022-06-16 PROCEDURE — 80053 COMPREHEN METABOLIC PANEL: CPT | Performed by: INTERNAL MEDICINE

## 2022-06-16 PROCEDURE — 96365 THER/PROPH/DIAG IV INF INIT: CPT

## 2022-06-16 PROCEDURE — 25010000002 IRON SUCROSE PER 1 MG: Performed by: INTERNAL MEDICINE

## 2022-06-16 RX ORDER — SODIUM CHLORIDE 9 MG/ML
250 INJECTION, SOLUTION INTRAVENOUS ONCE
Status: COMPLETED | OUTPATIENT
Start: 2022-06-16 | End: 2022-06-16

## 2022-06-16 RX ORDER — SODIUM CHLORIDE 0.9 % (FLUSH) 0.9 %
20 SYRINGE (ML) INJECTION AS NEEDED
Status: DISCONTINUED | OUTPATIENT
Start: 2022-06-16 | End: 2022-06-17 | Stop reason: HOSPADM

## 2022-06-16 RX ORDER — SODIUM CHLORIDE 0.9 % (FLUSH) 0.9 %
20 SYRINGE (ML) INJECTION AS NEEDED
Status: CANCELLED | OUTPATIENT
Start: 2022-06-16

## 2022-06-16 RX ORDER — SODIUM CHLORIDE 9 MG/ML
250 INJECTION, SOLUTION INTRAVENOUS ONCE
Status: CANCELLED | OUTPATIENT
Start: 2022-06-24

## 2022-06-16 RX ADMIN — SODIUM CHLORIDE 500 ML: 9 INJECTION, SOLUTION INTRAVENOUS at 10:59

## 2022-06-16 RX ADMIN — IRON SUCROSE 200 MG: 20 INJECTION, SOLUTION INTRAVENOUS at 10:59

## 2022-06-16 NOTE — PROGRESS NOTES
Pt here for iron with improved levels of energy; States it really helps but only lasts for 3 days.He tolerated it well and was discharged with next appts.

## 2022-06-17 NOTE — PROGRESS NOTES
Hematology-Oncology Follow-up Note       Artie Mc  1950    Primary Care Physician: Chanel Carter APRN  Referring Physician: Chanel Carter APRN    Reason For Visit:  Chief Complaint   Patient presents with   • Follow-up     Esophageal cancer, stage IV     Metastatic esophageal cancer  Monitoring for adverse effects related to immunotherapy.  Hypothyroidism    HPI:   Mr. Mc is a pleasant 72 y.o. gentleman with a history of gastroesophageal reflux disease, diabetes, hypertension, heart block status post pacemaker placement and history of CABG.  He was having symptoms of dysphagia, weight loss since September 2016. He was reporting symptoms of food getting stuck in the lower chest.  He has transitioned himself to a liquid diet.  The patient reports losing about 30 pounds over this duration.  The patient underwent upper GI endoscopy on 12/20/16 with Dr. Esvin Barrera.  Endoscopy showed necrotic, circumferential and moderately obstructive mass at the distal esophagus between 42 cm and 36 cm.      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  There was also evidence of mild subacute inflammation in the duodenum.  Dr. Barrera ordered a CT scan of the chest and abdomen with contrast on 12/20/16.  The scan was done at [facility].  The scan showed a new ill-defined mass in the anterior wall of the distal esophagus at the GE junction measuring 2.3 x 2.2 x 1.6 cm, worrisome for cancer.  There were four subcentimeter liver lesions which had appeared stable in comparison to another CT scan from 2009 and they likely represent small cysts.  There was also mild lymphadenopathy in the gastrohepatic ligament.  There were at least six borderline enlarged lymph nodes measuring up to 1.3 x 1 cm in the region.  The patient is referred to us for further oncological evaluation.    1/5/2017 - The patient presents for initial  consultation.  He reports persistent dysphagia and also has symptoms of regurgitation.  He cannot tolerate solid foods and is dependent on liquid diet such as Ensure.  He also reports fatigue. Symptoms of dysphagia have been present for the past four months.  When he eats any solids, the food gets stuck in the lower chest.    • 1/5/17 - Vitamin B12 200 (L).  CEA 0.8.  Ferritin 35.  Iron saturation 16% (L), serum iron 56, TIBC 354.  Creatinine 0.9.  LFTs normal.  Folate 10.2.  • 1/12/17 - PET/CT scan:  Intense abnormal activity corresponding to the region of the GE junction and proximal stomach.  SUV is 11.06.  There is a suggestion of an abnormal mass or abnormal wall thickening in the region measuring 3.9 x 4.8 cm.  No additional abnormalities.  No evidence of metastases or other lymphadenopathy.  Scattered nonspecific subcentimeter lymph nodes involving the mediastinum and within the central upper abdomen.  • 1/13/17 - Creatinine 0.9.  LFTs normal.    • 1/17/17 - Patient seen by thoracic surgeon, Dr. Bradley.  PET scan was reviewed.  He has recommended neoadjuvant chemoradiation therapy followed by [place] Gurwinder esophagogastrectomy.  Port-A-Cath is being arranged.    • 1/20/17 - WBC 6.3, hemoglobin 12.1, platelet count 198,000, MCV 86.4.  • 1/30/17 - Patient started weekly Carboplatin and Taxol (Carboplatin AUC 2 and Taxol 50 mg/M2).  Patient received Injectafer 750 mg.  WBC 5.8, hemoglobin 12.4, platelet count 244,000.   • 1/31/17 - Patient seen by Dr. Eduardo Oleary.  The plan was to give 5040 cGy in 28 fractions.   • 2/6/17 - WBC 4.4, hemoglobin 11.6, platelet count 201,000.  Patient received cycle 2 of weekly Carboplatin and Taxol (Carboplatin AUC 2 and Taxol 50 mg/M2).  • 2/13/17 - Patient received Carboplatin and Taxol weekly cycle 3.  • 2/13/17 - Patient started concurrent radiation therapy.  Total planned dose is 4140 cGy.    • 2/13/17 to 2/15/17 - Patient received 4140 cGy of neoadjuvant radiation.   Radiation was finished on 3/15/17.    • 2/20/17 - WBC 1.8, hemoglobin 10.6, platelet count 217,000.  Creatinine 0.5. Folate 15 (L).  Ferritin 361.  Haptoglobin 214.  Iron saturation 33%, TIBC 263, serum iron 88.    • 2/20/17 - Patient received Carboplatin and Taxol weekly cycle 4.   • 2/27/17 - WBC 3.7, hemoglobin 10.7, platelet count 177,000, MCV 85.3.    • 3/6/17 - Patient has received 16 out of the 23 planned radiation treatments.  Total planned dose is 4140 cGy.    • 3/6/17 - WBC 5.4, hemoglobin 11.2, platelet count 113,000.  • 3/6/17 - Patient received cycle 5 of weekly Carboplatin and Taxol.   • 3/13/17 - Patient received cycle 6 of weekly Carboplatin and Taxol.  WBC 3.9, hemoglobin 11.3, platelet count 93,000.     • 3/20/17 - WBC 1.4, hemoglobin 10.0, platelet count 118,000, MCV 86.1   • 3/27/17 - WBC 3.8, hemoglobin 9.5, platelet count 176,000, MCV 87.3.    • 4/7/17 - Stress test:  No evidence of reversible myocardial ischemia.  Normal left ventricular ejection fraction of 50%.    • 4/17/17 - Upper GI endoscopy by Dr. 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  The GE junction adenocarcinoma lesion seems to have a very good response to chemotherapy and radiation.  The lumen was open.  • 5/1/17 - WBC 7.0, hemoglobin 10.2, platelet count 175,000.    • 5/4/17 - Patient underwent [place] Gurwinder esophagectomy with pyloroplasty, feeding jejunostomy tube, abdominal and mediastinal lymph node dissection.    Surgical Pathology:  Moderately to poorly differentiated adenocarcinoma measuring 1.3 cm at the GE junction.  Resection margins are negative for malignancy.  Tumor margins are negative.  There is effective treatment response.  No evidence of lymphovascular invasion.  Staging is ypT3,pN1.  One out of fourteen lymph nodes involved.   • 5/22/17 - WBC 6.8, hemoglobin 10.3, platelet count 312,000.    • 6/19/17 - WBC 5.7, hemoglobin 10.5, platelet count 204,000, MCV 92.1.     • 7/6/17 - EGD:  Status post balloon dilation of esophageal stricture.  • 7/12/17 - EGD with balloon dilation of esophageal stricture.  • 7/26/17 - EGD and balloon dilation of esophageal stricture.  • 7/31/17 - PET/CT scan:  There is mild metabolic activity seen at the thoracic esophagus just at and below the surgical margins.  Some mild wall thickening.  This could be from recent surgery.  A residual cancer seems unlikely.  There is a precarinal lymph node with mild metabolic activity with SUV of 3 measuring 1 x 1.3 cm.  Again this appears to be nonspecific in my opinion.    • 8/7/17 - WBC 5.9, hemoglobin 11.9, platelet count 171,000, MCV 88.7.    • 10/19/17 - WBC 7.5, hemoglobin 11.8, platelet count 216,000.    • 1/8/18 - PET scan:  No evidence of residual disease or recurrent disease.  There is an esophageal stent in the mid esophagus with a large debris air level.  No evidence of any metastases in the chest, abdomen or pelvis.  Mild nodule uptake in the vocal cords with fullness in the right vocal cord.  This could be physiologic.    • 7/9/18 - CT scan of chest with contrast:  Prominent mediastinal lymph nodes appear stable since February.  Changes of gastric pull-through procedure for esophageal cancer again noted.  Tree-in-bud infiltrates in the left lung base, consistent with infection versus inflammation.    • 1/10/19 - CT chest, abdomen and pelvis with contrast:  No evidence of mets in the chest; however, interval development of metastatic lymphadenopathy in the left side of the retroperitoneum.  For instance, there is a 17 mm rounded lymph node, 10 mm lymph node and also a 14 mm lymph node.  These are all new.  Stable 9 mm hypodense lesion within the right hepatic lobe, which could reflect hemangioma or complex cyst.    • 1/30/19 - CT-guided core biopsy of retroperitoneal lymph node:  Poorly differentiated adenocarcinoma consistent with metastases from patient’s known esophageal primary.  CK20 positive,  CDX2 positive, cytokeratin 7 negative, TTF-1 negative.    • 2/1/19 - Creatinine 0.9, BUN 18, calcium 9.3; these are normal.    • 2/28/19 - [employer] Next Gen sequencing testing on retroperitoneal lymph node specimen:  PD-L1 positive.  CPS = 3.  This implies responsiveness to pembrolizumab.  Mismatch repair status is proficient (no evidence of microsatellite instability).  Tumor mutational burden is low = 6 mutations/Mb.  CDH1 indeterminate.  KRAS mutation positive.  TP53 mutation positive.  CDK6 amplified.  [employer] PD-L1 testing by IHC:  PD-L1 expression 1% positive (CPS =1).  HER2/marvel by IHC is negative.  HER2/marvel by CISH not amplified.  Microsatellite stable tumor.    • 3/7/19 - WBC 7.3, hemoglobin 12, platelet count 128,000, MCV 92.     • 3/14/19 - Patient was seen by radiation oncologist, Dr. Chahal.  He has recommended observation versus systemic chemoimmunotherapy as needed.  No plans for radiation therapy.   • 5/1/19 - CT scan of chest with contrast:  No definitive findings of new metastatic disease in the chest.  Emphysema noted.    • 5/1/19 - CT abdomen and pelvis:  There is interval progression of metastatic retroperitoneal adenopathy.  Left periaortic adenopathy with lymph node measuring up to 2 cm, previously 1.7 cm.  Another lymph node now measures 2.3, previously 1.4 cm.  New enlarged lymph node on image 147 measures 1.4 cm, previously 0.4 cm.  A 9 mm hypodense right hepatic lobe lesion appears stable.    • 5/9/19 - Decision made to start patient on immunotherapy Keytruda.    • 5/9/19 - Vitamin B12 229.  Ferritin 61.  Folate 14.3.  Iron saturation 26%, TIBC 304, serum iron 79.  • 5/31/19 - Patient received Keytruda 200 mg cycle 1, day 1.    • 5/31/19 - Free T4 0.99.  Creatinine 0.9, BUN 9.  LFTs normal.  TSH 3.99.  Folate 14.3.  Iron saturation 26%.    • 6/21/2019 patient received Keytruda cycle 2  • 7/12/2019: Patient received Keytruda cycle 3  • 8/2/2019 patient received cycle 4 Keytruda WBC 7.2,  hemoglobin 11.8 platelet count 163  • 8/23/2019 creatinine 0.9, LFTs normal, WBC 6.4, hemoglobin 11.5, platelets 179, TSH 2.1   • 8/23/2019 patient received cycle 5 of Keytruda  • 9/3/2019 CT chest abdomen and pelvis with contrast: . Positive response to therapy in the abdomen since 05/01/2019. Pathologically enlarged retroperitoneal lymph nodes, predominantly in the left periaortic region, have diminished in size.a 1.3 x 2.1 cm left periaortic node (image 60), previously measured 3.5 x 2.3 cm. A 1.6 cm index node near the level of the left renal vein previously measured 2.0 cm No new or progressive adenopathy. 2. Stable findings in the chest. Noncalcified right upper lobe nodules are unchanged. There is no evidence of new or progressive metastatic disease within the chest. 3. 8 mm indeterminate low-density lesion in the right hepatic lobe, is stable. No new liver lesions.  • 9/6/2019 WBC 5.6, hemoglobin 11.2, platelets of 192, MCV 91.8  • 9/13/2019 patient received Keytruda cycle 6, albumin 3.2  • 10/4/2019 patient received cycle 7 of Keytruda, TSH 4.8, free T4 0.86  • 10/9/2019 WBC 5.8, hemoglobin 11.7, platelets 174  • 10/25/2019 patient received cycle 8 of Keytruda.  WBC 5.9, hemoglobin 11.5, platelets 152, creatinine 0.75, cortisol 15.2, TSH 6.48 high , free T4 1.13 normal  • 11/6/2019 WBC 6.6, hemoglobin 11.8, MCV 91.5, platelets 187  • 11/15/2019 patient received cycle 9 of Keytruda, WBC 6.6, hemoglobin 11.9, platelets 161, cortisol level 8.86, TSH 2.68, free T3 2.8, free T4 1.5  • 12/4/2019 WBC 6.9, hemoglobin 12.0, platelets 180, MCV 92.9  • 12/06/2019-Keytruda Cycle 10  • 12/27/2019- Cycle 11 LFTs normal, WBC 6.7, hemoglobin 11.4, platelets 168, MCV 93, TSH 2.4,  • 1/28/2020: CT chest abdomen and pelvis with contrast:  Single (aortic lymph node in the abdominal retroperitoneum has increased.  Rest of the retroperitoneal lymph nodes have decreased in size.  Mediastinal adenopathy appears unchanged.   Noncalcified bilateral pulmonary nodules are stable  • 1/17/2020 patient received Keytruda cycle 12:.  • 2/3/2020 WBC 7.1, hemoglobin 12.2, platelets 171  • 02/10/2020 patient received cycle 13 of Keytruda: WBC 7.3, hemoglobin 12.1, platelets 166, creatinine 0.82  • 3/6/2020 patient is of Keytruda 200 mg, hemoglobin 10.8  • 3/27/2020 patient received Keytruda 200 mg, hemoglobin 11.4, TSH 1.95  • 4/17/2020 patient received Keytruda cycle 16 200 mg, WBC 6.1, hemoglobin 11.6, platelets 150  • 5/8/2020: Patient received cycle 17 Keytruda, WBC 6.3, hemoglobin 9.6, platelets 137, TSH 2.05, free T4 1.54, creatinine 0.93, LFTs normal,  • 5/29/2020: Patient received cycle 18 Keytruda, WBC 6.5, hemoglobin 11.7, platelets 129, creatinine 0.83, TSH 1.69  • 6/26/2020: Patient receiving Keytruda.   • 7/17/2020: TSH 2.06, WBC 5.7, hemoglobin 11.4, platelets 177, MCV 92.4, Keytruda 200 mg cycle 20  • 7/27/2020: CT chest abdomen pelvis with contrast: Stable findings in the chest abdomen pelvis since 1/28/2020.  Noncalcified bilateral pulmonary nodules are stable.  Stable retroperitoneal left perinephric adenopathy in the abdomen.  • 8/7/2020: Patient received cycle 21 of Keytruda  • 8/28/2020 patient is a cycle 22 of Keytruda.  WBC 6.02, hemoglobin 11.3, platelets 161  • 9/11/2020 WBC 6.9, hemoglobin 12.4, platelets 192  • 9/18/2020: Cycle 23 of Keytruda, TSH 1.19  • 10/9/2020: Cycle 24 of Keytruda, WBC 5.9, hemoglobin 11.9, platelets 178, creatinine 0.84, TSH 2.56, free T3 2.4, CMP normal  • 10/23/2020: WBC 7.34, hemoglobin 11.6, platelets 175  • 10/30/2020:.  Keytruda 200 mg  • 11/20/2020: Keytruda 200 mg  • 12/11/2020 Keytruda 200 mg.  WBC 5.5, hemoglobin 11.4, platelets 176, creatinine 0.8, LFTs normal, TSH 2.2, cortisol 8.3  • 12/10/2020: CT chest abdomen and pelvis with contrast: Previously seen left para-aortic lymph nodes within the abdomen are decreased in size.  No pathological lymphadenopathy.  No evidence of residual  malignancy..  Small noncalcified pulmonary nodules are stable.  No new nodules.  Stable mediastinal lymph nodes.  • 3/12/2021: WBC 6.7, hemoglobin 11.3, platelets 17  • 3/12/2021: CMP normal, TSH 2.9, cortisol 11.95, 1  • 3/15/2021: CT chest with contrast: Stable findings in the chest with postsurgical changes of esophageal resection and gastric pull-through.  Stable right lower lobe lung nodule measures 11 mm.  Stable size and appearance of the mediastinal and upper abdominal lymph nodes.  • 3/23/2021: Doppler of upper extremity: Normal.  • 3/24/2021: CT abdomen and pelvis with contrast: Postoperative changes.  Left periaortic nodes are stable.  • 4/12/2021: TSH 4.1, cortisol 12.4  • 6/22/2021: CMP normal,Cortisol 10.08,, WBC 5.8, hemoglobin 11.2, platelets 109, TSH 2.59  • 7/12/2021: CT scan of the chest abdomen pelvis with contrast: Dominant 10 x 10 left periaortic lymph node is stable since 3/22/2021.  Dominant lymph nodes in the right lower paratracheal and AP window distribution are stable since 3/15/2021.  No evidence of progressive adenopathy in the chest abdomen or pelvis.    • 01/11/2021 -PET/CT findings consistent with new cedric metastatic disease in the retroperitoneum.  2 new pathologically enlarged hypermetabolic retroperitoneal lymph nodes.  No convincing evidence of recurrent disease within the chest neck pelvis or skeleton.  • 1/20/2022 -CT-guided needle biopsy consistent with poorly differentiated carcinoma.  CDX2 positive consistent with metastases from known esophageal primary  • 2/9/2022 - Pembrolizumab complicated with fatigue, joint pains, nausea, diarrhea. Symptoms decrease in intensity over a week.  • 3/30/2022 -CT chest and pelvis with stable metastatic lymph nodes in the left periaortic retroperitoneum.  No evidence of disease progression or new lesions.  Stable lymphadenopathy in the distal paratracheal mediastinal area  • 4/13/2022 - Keytruda 200  • 5/4/2022 - Keytruda 200  • Continued  keytruda    Subjective:    Has mild diarrhea , fatigue no other new symptoms.    The following portions of the patient's history were reviewed and updated as appropriate: allergies, current medications, past family history, past medical history, past social history, past surgical history and problem list.     Past Medical History:   Diagnosis Date   • B12 deficiency    • Blockage of coronary artery of heart (HCC)    • Depression    • DM (diabetes mellitus) (HCC)    • Dysphagia    • HTN (hypertension)    • Hyperlipidemia        Past Surgical History:   Procedure Laterality Date   • ABDOMINAL WALL ABSCESS INCISION AND DRAINAGE  10/22/2018    WITH DEBRIDEMENT  1.5CM X 1.5 CM X 1.5 CM    DR. PURI   • CATARACT EXTRACTION  2018   • COLONOSCOPY     • CORONARY ARTERY BYPASS GRAFT  2015   • ENDOSCOPY  12/20/2016   • ESOPHAGECTOMY  05/04/2017    BROOKS PETTY ESOPHAGECTOMY, FEEDING JEJNOSTOMOY, ABDOMINAL AND MEDIASTINAL LYMPH NODE DISECTION, PEDICLED MUSCLE FLAP COVERAGE DR. PURI   • ESOPHAGOSCOPY / EGD  07/12/2017    WITH BALLOON DILATION   • ESOPHAGOSCOPY / EGD  07/26/2017    WITH BALLOON DILATION   • ESOPHAGOSCOPY / EGD  08/11/2017    WITH BALLOON DILATION   • ESOPHAGOSCOPY / EGD  08/28/2017    WITH BALLOON DILATION   • ESOPHAGOSCOPY / EGD  09/27/2017    WITH BALLOON DILATION   • LYMPH NODE BIOPSY  01/30/2019   • PACEMAKER IMPLANTATION  11/21/2016       Current Outpatient Medications:   •  Cetirizine HCl (ZyrTEC Allergy) 10 MG capsule, Take  by mouth., Disp: , Rfl:   •  clonazePAM (KlonoPIN) 0.5 MG tablet, Pt taking 1.5 pills daily, Disp: , Rfl: 1  •  cyanocobalamin (VITAMIN B-12) 1000 MCG tablet, , Disp: , Rfl:   •  diphenhydrAMINE-APAP, sleep, (TYLENOL PM EXTRA STRENGTH PO), Take  by mouth., Disp: , Rfl:   •  Euthyrox 50 MCG tablet, Take 1 tablet by mouth once daily, Disp: 90 tablet, Rfl: 0  •  ferrous sulfate 325 (65 FE) MG tablet, Take 1 tablet by mouth 2 (Two) Times a Day With Meals., Disp: 60 tablet, Rfl: 3  •   folic acid (FOLVITE) 1 MG tablet, Daily., Disp: , Rfl:   •  metFORMIN (GLUCOPHAGE) 1000 MG tablet, 2 (Two) Times a Day With Meals., Disp: , Rfl:   •  metoprolol tartrate (LOPRESSOR) 25 MG tablet, Take 12.5 mg by mouth 2 (Two) Times a Day., Disp: , Rfl:   •  omeprazole (priLOSEC) 20 MG capsule, Take 20 mg by mouth 2 (Two) Times a Day., Disp: , Rfl:   •  ondansetron (ZOFRAN) 4 MG tablet, Take 1 tablet by mouth Every 8 (Eight) Hours As Needed for Nausea or Vomiting., Disp: 90 tablet, Rfl: 2  •  pravastatin (PRAVACHOL) 10 MG tablet, Take 10 mg by mouth every night at bedtime., Disp: , Rfl:   No current facility-administered medications for this visit.    Facility-Administered Medications Ordered in Other Visits:   •  iron sucrose (VENOFER) 200 mg in sodium chloride 0.9 % 100 mL IVPB, 200 mg, Intravenous, Once, Jean Pierre Elizondo MD, Last Rate: 220 mL/hr at 22 1037, 200 mg at 22 1037  •  sodium chloride 0.9 % flush 20 mL, 20 mL, Intravenous, PRN, Jean Pierre Elizondo MD    Allergies   Allergen Reactions   • Ace Inhibitors Unknown (See Comments)     Unknown reaction     • Heparin Other (See Comments)     Fever/chills, weakness in legs   • Sulfa Antibiotics Other (See Comments)     Mouth sores       Family History   Problem Relation Age of Onset   • Mental illness Mother    • Heart disease Mother    • Hypertension Father    • Cancer Father    • Heart disease Sister    • Heart disease Brother      Cancer-related family history includes Cancer in his father.    Social History     Tobacco Use   • Smoking status: Former Smoker     Packs/day: 2.00     Years: 50.00     Pack years: 100.00     Types: Cigarettes     Quit date: 2015     Years since quittin.0   • Smokeless tobacco: Former User     Types: Chew     Quit date: 1989   Substance Use Topics   • Alcohol use: No   • Drug use: No     ROS:     Objective:    Vitals:    22 1000   BP: 138/65   Pulse: 63   Resp: 18   Temp: 97.1 °F (36.2 °C)   SpO2: 100%  "  Weight: 68.9 kg (152 lb)   Height: 167.6 cm (66\")   PainSc: 0-No pain       (1) Restricted in physically strenuous activity, ambulatory and able to do work of light nature    Physical Exam:     Physical Exam   Constitutional: He is oriented to person, place, and time. He appears well-developed. No distress.   HENT:   Head: Normocephalic and atraumatic.   Nose: Nose normal.   Eyes: Pupils are equal, round, and reactive to light.   Neck: No thyromegaly present.   Cardiovascular: Normal rate, regular rhythm, normal heart sounds and normal pulses. Exam reveals no gallop and no friction rub.   Pulmonary/Chest: Effort normal and breath sounds normal. No stridor. No respiratory distress. He has no wheezes.   Abdominal: Soft. Normal appearance and bowel sounds are normal.   No tenderness   Musculoskeletal: Normal range of motion. No deformity or signs of injury.      Right lower leg: No edema.   Neurological: He is alert and oriented to person, place, and time. He exhibits normal muscle tone.   Skin: Skin is warm and dry. No rash noted. He is not diaphoretic. No erythema. No jaundice.   Right side chest  port   Vitals reviewed.    I have reexamined the patient and the results are consistent with the previously documented exam. Jean Pierre Elizondo MD       Lab Results - Last 18 Months   Lab Units 06/24/22  1014 06/16/22  1031 06/02/22  0858   WBC 10*3/mm3 5.12 5.92 5.97   HEMOGLOBIN g/dL 10.8* 11.0* 10.5*   HEMATOCRIT % 33.8* 34.4* 33.4*   PLATELETS 10*3/mm3 174 189 190   MCV fL 88.7 89.1 88.1     Lab Results - Last 18 Months   Lab Units 06/16/22  1031 06/02/22  0858 05/25/22  1023   SODIUM mmol/L 136 137 136   POTASSIUM mmol/L 4.9 4.5 4.5   CHLORIDE mmol/L 104 105 104   CO2 mmol/L 20.0* 20.0* 21.0*   BUN mg/dL 9 16 19   CREATININE mg/dL 0.86 0.77 0.77   CALCIUM mg/dL 8.5* 8.8 8.5*   BILIRUBIN mg/dL 0.2 0.2 0.3   ALK PHOS U/L 36* 42 36*   ALT (SGPT) U/L 6 9 9   AST (SGOT) U/L 18 16 21   GLUCOSE mg/dL 70 158* 162*     Assessment & " Plan     Assessment:    Metastatic esophageal carcinoma:    Patient has a history of GE junction poorly differentiated adenocarcinoma for which he had chemoradiation followed by [place] Gurwinder resection:  Pathology showed ypT3,pN1 disease.  Tumor was Stage IIIA, diagnosed in 2016.  He received concurrent chemoradiation with weekly Carboplatin and Taxol and radiation finishing on 3/15/17.  CT scan on 1/10/19 showed new retroperitoneal lymph nodes.  These lymph nodes were biopsied on 1/30/19 and it shows metastatic esophageal cancer.  [employer] Next Gen testing was performed on the sample and it shows KRAS mutation, microsatellite stable tumor.  PD-L1 is positive.  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  HER2/marvel (ERBB2) was not amplified and was negative.  His CT scan on 5/1/19 shows evidence of progression.  Patient has started receiving Keytruda on 5/31/19.  He has received 19 cycles so far.   CT scan on 1/28/2020 shows improvement and continued response.  CT scan chest abdomen pelvis with contrast on 12/10/2020 does not show any evidence of disease progression.  On 12/22/2020 decision made to hold Keytruda per patient's request to get a treatment break.. Repeat CT scan chest 7/12/2021 does not show any evidence of disease progression.  Now CT imaging in January 2021 with a CT showing increased abdominal left para-aortic adenopathy.  Subsequent PET/CT with new cedric metastatic disease in the retroperitoneum of the abdomen.  New pathologically enlarged hypermetabolic retroperitoneal lymph nodes.  This would be concerning for disease recurrence/progression.  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  I discussed with him the treatment options going forward.  He has had a good response with immunotherapy pembrolizumab in the past. This was now restarted. Continue the same. He has had significant symptoms with flu like illness after treatment.he takes Tylenol which helps.  He also has inflammatory arthralgias, mild  diarrhea, repeat CT abdomen prior to follow up in a month,    Shortness of breath  Improved, has occasional shortness of breath,    Reflux/heartburn/dysphagia:   Symptoms improved.  Also has a history of strictures. Status post EGD and balloon dilation.  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  Continues on Synthroid monitor TSH and fT4 with treatment.    Mild diarrhea:   diarrhea seems to be waxing and waning in nature.  Related to immunotherapy, but not severe.  Now resolved.  Will monitor    PD-L1 positive, HER2 negative, p53 and KRAS positive.  Repeat Caris testing with repeat biopsy with no target mutations.    B12 deficiency: Continue B12 1000 mcg twice daily.    Anemia - Hb 9.6, normocytic,iron sat low, continue iron infusion.     F/u in a month with repeat imaging    I have reviewed and confirmed the accuracy of the patient's history: Chief complaint, HPI, ROS and Subjective as entered by the MA/SUMANTHN/RN.     Jean Pierre Elizondo MD 06/24/22     No orders of the defined types were placed in this encounter.    Time spent on encounter including record review, history taking, exam, discussion, counseling and documentation at: 40 minutes

## 2022-06-20 DIAGNOSIS — R79.89 ELEVATED TSH: ICD-10-CM

## 2022-06-20 DIAGNOSIS — E03.9 HYPOTHYROIDISM (ACQUIRED): ICD-10-CM

## 2022-06-20 RX ORDER — LEVOTHYROXINE SODIUM 50 UG/1
TABLET ORAL
Qty: 90 TABLET | Refills: 0 | Status: SHIPPED | OUTPATIENT
Start: 2022-06-20 | End: 2022-09-13

## 2022-06-24 ENCOUNTER — HOSPITAL ENCOUNTER (OUTPATIENT)
Dept: ONCOLOGY | Facility: HOSPITAL | Age: 72
Setting detail: INFUSION SERIES
Discharge: HOME | End: 2022-06-24

## 2022-06-24 ENCOUNTER — OFFICE VISIT (OUTPATIENT)
Dept: ONCOLOGY | Facility: CLINIC | Age: 72
End: 2022-06-24

## 2022-06-24 VITALS
TEMPERATURE: 97.1 F | WEIGHT: 152 LBS | RESPIRATION RATE: 18 BRPM | OXYGEN SATURATION: 100 % | BODY MASS INDEX: 24.43 KG/M2 | HEART RATE: 63 BPM | DIASTOLIC BLOOD PRESSURE: 65 MMHG | HEIGHT: 66 IN | SYSTOLIC BLOOD PRESSURE: 138 MMHG

## 2022-06-24 VITALS
SYSTOLIC BLOOD PRESSURE: 134 MMHG | RESPIRATION RATE: 18 BRPM | TEMPERATURE: 97.1 F | WEIGHT: 152 LBS | DIASTOLIC BLOOD PRESSURE: 81 MMHG | BODY MASS INDEX: 24.43 KG/M2 | HEART RATE: 60 BPM | OXYGEN SATURATION: 100 % | HEIGHT: 66 IN

## 2022-06-24 DIAGNOSIS — D64.9 ANEMIA, UNSPECIFIED TYPE: ICD-10-CM

## 2022-06-24 DIAGNOSIS — C15.9 ESOPHAGEAL CANCER, STAGE IV: Primary | ICD-10-CM

## 2022-06-24 DIAGNOSIS — D50.9 IRON DEFICIENCY ANEMIA, UNSPECIFIED IRON DEFICIENCY ANEMIA TYPE: ICD-10-CM

## 2022-06-24 DIAGNOSIS — C15.5 PRIMARY MALIGNANT NEOPLASM OF LOWER THIRD OF ESOPHAGUS: ICD-10-CM

## 2022-06-24 DIAGNOSIS — C16.0 MALIGNANT NEOPLASM OF CARDIA: ICD-10-CM

## 2022-06-24 DIAGNOSIS — Z51.11 ENCOUNTER FOR ANTINEOPLASTIC CHEMOTHERAPY: ICD-10-CM

## 2022-06-24 DIAGNOSIS — K90.9 MALABSORPTION OF IRON: Primary | ICD-10-CM

## 2022-06-24 DIAGNOSIS — E61.1 IRON DEFICIENCY: ICD-10-CM

## 2022-06-24 DIAGNOSIS — Z51.81 ENCOUNTER FOR THERAPEUTIC DRUG MONITORING: ICD-10-CM

## 2022-06-24 LAB
ALBUMIN SERPL-MCNC: 4.2 G/DL (ref 3.5–5.2)
ALBUMIN/GLOB SERPL: 2.1 G/DL
ALP SERPL-CCNC: 33 U/L (ref 39–117)
ALT SERPL W P-5'-P-CCNC: 7 U/L (ref 1–41)
ANION GAP SERPL CALCULATED.3IONS-SCNC: 13 MMOL/L (ref 5–15)
AST SERPL-CCNC: 13 U/L (ref 1–40)
BASOPHILS # BLD AUTO: 0.03 10*3/MM3 (ref 0–0.2)
BASOPHILS NFR BLD AUTO: 0.6 % (ref 0–1.5)
BILIRUB SERPL-MCNC: 0.2 MG/DL (ref 0–1.2)
BUN SERPL-MCNC: 16 MG/DL (ref 8–23)
BUN/CREAT SERPL: 18 (ref 7–25)
CALCIUM SPEC-SCNC: 8.7 MG/DL (ref 8.6–10.5)
CHLORIDE SERPL-SCNC: 105 MMOL/L (ref 98–107)
CO2 SERPL-SCNC: 21 MMOL/L (ref 22–29)
CREAT SERPL-MCNC: 0.89 MG/DL (ref 0.76–1.27)
DEPRECATED RDW RBC AUTO: 56.1 FL (ref 37–54)
EGFRCR SERPLBLD CKD-EPI 2021: 90.5 ML/MIN/1.73
EOSINOPHIL # BLD AUTO: 0.21 10*3/MM3 (ref 0–0.4)
EOSINOPHIL NFR BLD AUTO: 4.1 % (ref 0.3–6.2)
ERYTHROCYTE [DISTWIDTH] IN BLOOD BY AUTOMATED COUNT: 17.8 % (ref 12.3–15.4)
GLOBULIN UR ELPH-MCNC: 2 GM/DL
GLUCOSE BLDC GLUCOMTR-MCNC: 101 MG/DL (ref 70–105)
GLUCOSE SERPL-MCNC: 100 MG/DL (ref 65–99)
HCT VFR BLD AUTO: 33.8 % (ref 37.5–51)
HGB BLD-MCNC: 10.8 G/DL (ref 13–17.7)
LYMPHOCYTES # BLD AUTO: 1.55 10*3/MM3 (ref 0.7–3.1)
LYMPHOCYTES NFR BLD AUTO: 30.3 % (ref 19.6–45.3)
MCH RBC QN AUTO: 28.3 PG (ref 26.6–33)
MCHC RBC AUTO-ENTMCNC: 32 G/DL (ref 31.5–35.7)
MCV RBC AUTO: 88.7 FL (ref 79–97)
MONOCYTES # BLD AUTO: 0.59 10*3/MM3 (ref 0.1–0.9)
MONOCYTES NFR BLD AUTO: 11.5 % (ref 5–12)
NEUTROPHILS NFR BLD AUTO: 2.74 10*3/MM3 (ref 1.7–7)
NEUTROPHILS NFR BLD AUTO: 53.5 % (ref 42.7–76)
PLATELET # BLD AUTO: 174 10*3/MM3 (ref 140–450)
PMV BLD AUTO: 10.3 FL (ref 6–12)
POTASSIUM SERPL-SCNC: 4.8 MMOL/L (ref 3.5–5.2)
PROT SERPL-MCNC: 6.2 G/DL (ref 6–8.5)
RBC # BLD AUTO: 3.81 10*6/MM3 (ref 4.14–5.8)
SODIUM SERPL-SCNC: 139 MMOL/L (ref 136–145)
WBC NRBC COR # BLD: 5.12 10*3/MM3 (ref 3.4–10.8)

## 2022-06-24 PROCEDURE — 25010000002 PEMBROLIZUMAB 100 MG/4ML SOLUTION 4 ML VIAL: Performed by: INTERNAL MEDICINE

## 2022-06-24 PROCEDURE — 80053 COMPREHEN METABOLIC PANEL: CPT | Performed by: INTERNAL MEDICINE

## 2022-06-24 PROCEDURE — 82962 GLUCOSE BLOOD TEST: CPT

## 2022-06-24 PROCEDURE — 96367 TX/PROPH/DG ADDL SEQ IV INF: CPT

## 2022-06-24 PROCEDURE — 85025 COMPLETE CBC W/AUTO DIFF WBC: CPT | Performed by: NURSE PRACTITIONER

## 2022-06-24 PROCEDURE — 25010000002 IRON SUCROSE PER 1 MG: Performed by: INTERNAL MEDICINE

## 2022-06-24 PROCEDURE — 96413 CHEMO IV INFUSION 1 HR: CPT

## 2022-06-24 PROCEDURE — 99214 OFFICE O/P EST MOD 30 MIN: CPT | Performed by: INTERNAL MEDICINE

## 2022-06-24 RX ORDER — SODIUM CHLORIDE 9 MG/ML
250 INJECTION, SOLUTION INTRAVENOUS ONCE
Status: COMPLETED | OUTPATIENT
Start: 2022-06-24 | End: 2022-06-24

## 2022-06-24 RX ORDER — SODIUM CHLORIDE 9 MG/ML
250 INJECTION, SOLUTION INTRAVENOUS ONCE
Status: DISCONTINUED | OUTPATIENT
Start: 2022-06-24 | End: 2022-06-25 | Stop reason: HOSPADM

## 2022-06-24 RX ORDER — SODIUM CHLORIDE 0.9 % (FLUSH) 0.9 %
20 SYRINGE (ML) INJECTION AS NEEDED
Status: CANCELLED | OUTPATIENT
Start: 2022-06-24

## 2022-06-24 RX ORDER — SODIUM CHLORIDE 0.9 % (FLUSH) 0.9 %
20 SYRINGE (ML) INJECTION AS NEEDED
Status: DISCONTINUED | OUTPATIENT
Start: 2022-06-24 | End: 2022-06-25 | Stop reason: HOSPADM

## 2022-06-24 RX ADMIN — IRON SUCROSE 200 MG: 20 INJECTION, SOLUTION INTRAVENOUS at 10:37

## 2022-06-24 RX ADMIN — SODIUM CHLORIDE 250 ML: 9 INJECTION, SOLUTION INTRAVENOUS at 10:37

## 2022-06-24 RX ADMIN — SODIUM CHLORIDE 200 MG: 9 INJECTION, SOLUTION INTRAVENOUS at 11:46

## 2022-06-24 RX ADMIN — Medication 20 ML: at 12:21

## 2022-06-30 ENCOUNTER — TELEPHONE (OUTPATIENT)
Dept: ONCOLOGY | Facility: CLINIC | Age: 72
End: 2022-06-30

## 2022-06-30 NOTE — TELEPHONE ENCOUNTER
Caller: Artie Mc    Relationship: Self    Best call back number: [phone]      What was the call regarding: PATIENT CALLED STATED DR SAAVEDRA WANTED HIM TO HAVE A CT SCAN DONE BUT NO ONE HAS CALLED HIM TO SCHEDULE     Do you require a callback: YES

## 2022-06-30 NOTE — TELEPHONE ENCOUNTER
Aura spoke to central scheduling and they said they would be reaching out to him today to schedule.

## 2022-07-11 ENCOUNTER — HOSPITAL ENCOUNTER (OUTPATIENT)
Dept: PET IMAGING | Facility: HOSPITAL | Age: 72
Discharge: HOME | End: 2022-07-11
Admitting: INTERNAL MEDICINE

## 2022-07-11 DIAGNOSIS — C15.9 ESOPHAGEAL CANCER, STAGE IV: ICD-10-CM

## 2022-07-11 PROCEDURE — 74177 CT ABD & PELVIS W/CONTRAST: CPT

## 2022-07-11 PROCEDURE — 0 IOPAMIDOL PER 1 ML: Performed by: INTERNAL MEDICINE

## 2022-07-11 RX ADMIN — IOPAMIDOL 100 ML: 755 INJECTION, SOLUTION INTRAVENOUS at 10:11

## 2022-07-11 NOTE — PROGRESS NOTES
Hematology-Oncology Follow-up Note       Artie Mc  1950    Primary Care Physician: Chanel Carter APRN  Referring Physician: Chanel Carter APRN    Reason For Visit:  Chief Complaint   Patient presents with   • Follow-up     Esophageal cancer, stage IV (HCC)     Metastatic esophageal cancer  Monitoring for adverse effects related to immunotherapy.  Hypothyroidism    HPI:   Mr. Mc is a pleasant 72 y.o. gentleman with a history of gastroesophageal reflux disease, diabetes, hypertension, heart block status post pacemaker placement and history of CABG.  He was having symptoms of dysphagia, weight loss since September 2016. He was reporting symptoms of food getting stuck in the lower chest.  He has transitioned himself to a liquid diet.  The patient reports losing about 30 pounds over this duration.  The patient underwent upper GI endoscopy on 12/20/16 with Dr. Esvin Barrera.  Endoscopy showed necrotic, circumferential and moderately obstructive mass at the distal esophagus between 42 cm and 36 cm.      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  There was also evidence of mild subacute inflammation in the duodenum.  Dr. Barrera ordered a CT scan of the chest and abdomen with contrast on 12/20/16.  The scan was done at [facility].  The scan showed a new ill-defined mass in the anterior wall of the distal esophagus at the GE junction measuring 2.3 x 2.2 x 1.6 cm, worrisome for cancer.  There were four subcentimeter liver lesions which had appeared stable in comparison to another CT scan from 2009 and they likely represent small cysts.  There was also mild lymphadenopathy in the gastrohepatic ligament.  There were at least six borderline enlarged lymph nodes measuring up to 1.3 x 1 cm in the region.  The patient is referred to us for further oncological evaluation.    1/5/2017 - The patient presents for  initial consultation.  He reports persistent dysphagia and also has symptoms of regurgitation.  He cannot tolerate solid foods and is dependent on liquid diet such as Ensure.  He also reports fatigue. Symptoms of dysphagia have been present for the past four months.  When he eats any solids, the food gets stuck in the lower chest.    • 1/5/17 - Vitamin B12 200 (L).  CEA 0.8.  Ferritin 35.  Iron saturation 16% (L), serum iron 56, TIBC 354.  Creatinine 0.9.  LFTs normal.  Folate 10.2.  • 1/12/17 - PET/CT scan:  Intense abnormal activity corresponding to the region of the GE junction and proximal stomach.  SUV is 11.06.  There is a suggestion of an abnormal mass or abnormal wall thickening in the region measuring 3.9 x 4.8 cm.  No additional abnormalities.  No evidence of metastases or other lymphadenopathy.  Scattered nonspecific subcentimeter lymph nodes involving the mediastinum and within the central upper abdomen.  • 1/13/17 - Creatinine 0.9.  LFTs normal.    • 1/17/17 - Patient seen by thoracic surgeon, Dr. Bradley.  PET scan was reviewed.  He has recommended neoadjuvant chemoradiation therapy followed by [place] Gurwinder esophagogastrectomy.  Port-A-Cath is being arranged.    • 1/20/17 - WBC 6.3, hemoglobin 12.1, platelet count 198,000, MCV 86.4.  • 1/30/17 - Patient started weekly Carboplatin and Taxol (Carboplatin AUC 2 and Taxol 50 mg/M2).  Patient received Injectafer 750 mg.  WBC 5.8, hemoglobin 12.4, platelet count 244,000.   • 1/31/17 - Patient seen by Dr. Eduardo Oleary.  The plan was to give 5040 cGy in 28 fractions.   • 2/6/17 - WBC 4.4, hemoglobin 11.6, platelet count 201,000.  Patient received cycle 2 of weekly Carboplatin and Taxol (Carboplatin AUC 2 and Taxol 50 mg/M2).  • 2/13/17 - Patient received Carboplatin and Taxol weekly cycle 3.  • 2/13/17 - Patient started concurrent radiation therapy.  Total planned dose is 4140 cGy.    • 2/13/17 to 2/15/17 - Patient received 4140 cGy of neoadjuvant radiation.   Radiation was finished on 3/15/17.    • 2/20/17 - WBC 1.8, hemoglobin 10.6, platelet count 217,000.  Creatinine 0.5. Folate 15 (L).  Ferritin 361.  Haptoglobin 214.  Iron saturation 33%, TIBC 263, serum iron 88.    • 2/20/17 - Patient received Carboplatin and Taxol weekly cycle 4.   • 2/27/17 - WBC 3.7, hemoglobin 10.7, platelet count 177,000, MCV 85.3.    • 3/6/17 - Patient has received 16 out of the 23 planned radiation treatments.  Total planned dose is 4140 cGy.    • 3/6/17 - WBC 5.4, hemoglobin 11.2, platelet count 113,000.  • 3/6/17 - Patient received cycle 5 of weekly Carboplatin and Taxol.   • 3/13/17 - Patient received cycle 6 of weekly Carboplatin and Taxol.  WBC 3.9, hemoglobin 11.3, platelet count 93,000.     • 3/20/17 - WBC 1.4, hemoglobin 10.0, platelet count 118,000, MCV 86.1   • 3/27/17 - WBC 3.8, hemoglobin 9.5, platelet count 176,000, MCV 87.3.    • 4/7/17 - Stress test:  No evidence of reversible myocardial ischemia.  Normal left ventricular ejection fraction of 50%.    • 4/17/17 - Upper GI endoscopy by Dr. 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  The GE junction adenocarcinoma lesion seems to have a very good response to chemotherapy and radiation.  The lumen was open.  • 5/1/17 - WBC 7.0, hemoglobin 10.2, platelet count 175,000.    • 5/4/17 - Patient underwent Noel Gurwinder esophagectomy with pyloroplasty, feeding jejunostomy tube, abdominal and mediastinal lymph node dissection.    Surgical Pathology:  Moderately to poorly differentiated adenocarcinoma measuring 1.3 cm at the GE junction.  Resection margins are negative for malignancy.  Tumor margins are negative.  There is effective treatment response.  No evidence of lymphovascular invasion.  Staging is ypT3,pN1.  One out of fourteen lymph nodes involved.   • 5/22/17 - WBC 6.8, hemoglobin 10.3, platelet count 312,000.    • 6/19/17 - WBC 5.7, hemoglobin 10.5, platelet count 204,000, MCV 92.1.     • 7/6/17 - EGD:  Status post balloon dilation of esophageal stricture.  • 7/12/17 - EGD with balloon dilation of esophageal stricture.  • 7/26/17 - EGD and balloon dilation of esophageal stricture.  • 7/31/17 - PET/CT scan:  There is mild metabolic activity seen at the thoracic esophagus just at and below the surgical margins.  Some mild wall thickening.  This could be from recent surgery.  A residual cancer seems unlikely.  There is a precarinal lymph node with mild metabolic activity with SUV of 3 measuring 1 x 1.3 cm.  Again this appears to be nonspecific in my opinion.    • 8/7/17 - WBC 5.9, hemoglobin 11.9, platelet count 171,000, MCV 88.7.    • 10/19/17 - WBC 7.5, hemoglobin 11.8, platelet count 216,000.    • 1/8/18 - PET scan:  No evidence of residual disease or recurrent disease.  There is an esophageal stent in the mid esophagus with a large debris air level.  No evidence of any metastases in the chest, abdomen or pelvis.  Mild nodule uptake in the vocal cords with fullness in the right vocal cord.  This could be physiologic.    • 7/9/18 - CT scan of chest with contrast:  Prominent mediastinal lymph nodes appear stable since February.  Changes of gastric pull-through procedure for esophageal cancer again noted.  Tree-in-bud infiltrates in the left lung base, consistent with infection versus inflammation.    • 1/10/19 - CT chest, abdomen and pelvis with contrast:  No evidence of mets in the chest; however, interval development of metastatic lymphadenopathy in the left side of the retroperitoneum.  For instance, there is a 17 mm rounded lymph node, 10 mm lymph node and also a 14 mm lymph node.  These are all new.  Stable 9 mm hypodense lesion within the right hepatic lobe, which could reflect hemangioma or complex cyst.    • 1/30/19 - CT-guided core biopsy of retroperitoneal lymph node:  Poorly differentiated adenocarcinoma consistent with metastases from patient’s known esophageal primary.  CK20 positive,  CDX2 positive, cytokeratin 7 negative, TTF-1 negative.    • 2/1/19 - Creatinine 0.9, BUN 18, calcium 9.3; these are normal.    • 2/28/19 - [facility] Next Gen sequencing testing on retroperitoneal lymph node specimen:  PD-L1 positive.  CPS = 3.  This implies responsiveness to pembrolizumab.  Mismatch repair status is proficient (no evidence of microsatellite instability).  Tumor mutational burden is low = 6 mutations/Mb.  CDH1 indeterminate.  KRAS mutation positive.  TP53 mutation positive.  CDK6 amplified.  [employer] PD-L1 testing by IHC:  PD-L1 expression 1% positive (CPS =1).  HER2/marvel by IHC is negative.  HER2/marvel by CISH not amplified.  Microsatellite stable tumor.    • 3/7/19 - WBC 7.3, hemoglobin 12, platelet count 128,000, MCV 92.     • 3/14/19 - Patient was seen by radiation oncologist, Dr. Chahal.  He has recommended observation versus systemic chemoimmunotherapy as needed.  No plans for radiation therapy.   • 5/1/19 - CT scan of chest with contrast:  No definitive findings of new metastatic disease in the chest.  Emphysema noted.    • 5/1/19 - CT abdomen and pelvis:  There is interval progression of metastatic retroperitoneal adenopathy.  Left periaortic adenopathy with lymph node measuring up to 2 cm, previously 1.7 cm.  Another lymph node now measures 2.3, previously 1.4 cm.  New enlarged lymph node on image 147 measures 1.4 cm, previously 0.4 cm.  A 9 mm hypodense right hepatic lobe lesion appears stable.    • 5/9/19 - Decision made to start patient on immunotherapy Keytruda.    • 5/9/19 - Vitamin B12 229.  Ferritin 61.  Folate 14.3.  Iron saturation 26%, TIBC 304, serum iron 79.  • 5/31/19 - Patient received Keytruda 200 mg cycle 1, day 1.    • 5/31/19 - Free T4 0.99.  Creatinine 0.9, BUN 9.  LFTs normal.  TSH 3.99.  Folate 14.3.  Iron saturation 26%.    • 6/21/2019 patient received Keytruda cycle 2  • 7/12/2019: Patient received Keytruda cycle 3  • 8/2/2019 patient received cycle 4 Keytruda WBC 7.2,  hemoglobin 11.8 platelet count 163  • 8/23/2019 creatinine 0.9, LFTs normal, WBC 6.4, hemoglobin 11.5, platelets 179, TSH 2.1   • 8/23/2019 patient received cycle 5 of Keytruda  • 9/3/2019 CT chest abdomen and pelvis with contrast: . Positive response to therapy in the abdomen since 05/01/2019. Pathologically enlarged retroperitoneal lymph nodes, predominantly in the left periaortic region, have diminished in size.a 1.3 x 2.1 cm left periaortic node (image 60), previously measured 3.5 x 2.3 cm. A 1.6 cm index node near the level of the left renal vein previously measured 2.0 cm No new or progressive adenopathy. 2. Stable findings in the chest. Noncalcified right upper lobe nodules are unchanged. There is no evidence of new or progressive metastatic disease within the chest. 3. 8 mm indeterminate low-density lesion in the right hepatic lobe, is stable. No new liver lesions.  • 9/6/2019 WBC 5.6, hemoglobin 11.2, platelets of 192, MCV 91.8  • 9/13/2019 patient received Keytruda cycle 6, albumin 3.2  • 10/4/2019 patient received cycle 7 of Keytruda, TSH 4.8, free T4 0.86  • 10/9/2019 WBC 5.8, hemoglobin 11.7, platelets 174  • 10/25/2019 patient received cycle 8 of Keytruda.  WBC 5.9, hemoglobin 11.5, platelets 152, creatinine 0.75, cortisol 15.2, TSH 6.48 high , free T4 1.13 normal  • 11/6/2019 WBC 6.6, hemoglobin 11.8, MCV 91.5, platelets 187  • 11/15/2019 patient received cycle 9 of Keytruda, WBC 6.6, hemoglobin 11.9, platelets 161, cortisol level 8.86, TSH 2.68, free T3 2.8, free T4 1.5  • 12/4/2019 WBC 6.9, hemoglobin 12.0, platelets 180, MCV 92.9  • 12/06/2019-Keytruda Cycle 10  • 12/27/2019- Cycle 11 LFTs normal, WBC 6.7, hemoglobin 11.4, platelets 168, MCV 93, TSH 2.4,  • 1/28/2020: CT chest abdomen and pelvis with contrast:  Single (aortic lymph node in the abdominal retroperitoneum has increased.  Rest of the retroperitoneal lymph nodes have decreased in size.  Mediastinal adenopathy appears unchanged.   Noncalcified bilateral pulmonary nodules are stable  • 1/17/2020 patient received Keytruda cycle 12:.  • 2/3/2020 WBC 7.1, hemoglobin 12.2, platelets 171  • 02/10/2020 patient received cycle 13 of Keytruda: WBC 7.3, hemoglobin 12.1, platelets 166, creatinine 0.82  • 3/6/2020 patient is of Keytruda 200 mg, hemoglobin 10.8  • 3/27/2020 patient received Keytruda 200 mg, hemoglobin 11.4, TSH 1.95  • 4/17/2020 patient received Keytruda cycle 16 200 mg, WBC 6.1, hemoglobin 11.6, platelets 150  • 5/8/2020: Patient received cycle 17 Keytruda, WBC 6.3, hemoglobin 9.6, platelets 137, TSH 2.05, free T4 1.54, creatinine 0.93, LFTs normal,  • 5/29/2020: Patient received cycle 18 Keytruda, WBC 6.5, hemoglobin 11.7, platelets 129, creatinine 0.83, TSH 1.69  • 6/26/2020: Patient receiving Keytruda.   • 7/17/2020: TSH 2.06, WBC 5.7, hemoglobin 11.4, platelets 177, MCV 92.4, Keytruda 200 mg cycle 20  • 7/27/2020: CT chest abdomen pelvis with contrast: Stable findings in the chest abdomen pelvis since 1/28/2020.  Noncalcified bilateral pulmonary nodules are stable.  Stable retroperitoneal left perinephric adenopathy in the abdomen.  • 8/7/2020: Patient received cycle 21 of Keytruda  • 8/28/2020 patient is a cycle 22 of Keytruda.  WBC 6.02, hemoglobin 11.3, platelets 161  • 9/11/2020 WBC 6.9, hemoglobin 12.4, platelets 192  • 9/18/2020: Cycle 23 of Keytruda, TSH 1.19  • 10/9/2020: Cycle 24 of Keytruda, WBC 5.9, hemoglobin 11.9, platelets 178, creatinine 0.84, TSH 2.56, free T3 2.4, CMP normal  • 10/23/2020: WBC 7.34, hemoglobin 11.6, platelets 175  • 10/30/2020:.  Keytruda 200 mg  • 11/20/2020: Keytruda 200 mg  • 12/11/2020 Keytruda 200 mg.  WBC 5.5, hemoglobin 11.4, platelets 176, creatinine 0.8, LFTs normal, TSH 2.2, cortisol 8.3  • 12/10/2020: CT chest abdomen and pelvis with contrast: Previously seen left para-aortic lymph nodes within the abdomen are decreased in size.  No pathological lymphadenopathy.  No evidence of residual  malignancy..  Small noncalcified pulmonary nodules are stable.  No new nodules.  Stable mediastinal lymph nodes.  • 3/12/2021: WBC 6.7, hemoglobin 11.3, platelets 17  • 3/12/2021: CMP normal, TSH 2.9, cortisol 11.95, 1  • 3/15/2021: CT chest with contrast: Stable findings in the chest with postsurgical changes of esophageal resection and gastric pull-through.  Stable right lower lobe lung nodule measures 11 mm.  Stable size and appearance of the mediastinal and upper abdominal lymph nodes.  • 3/23/2021: Doppler of upper extremity: Normal.  • 3/24/2021: CT abdomen and pelvis with contrast: Postoperative changes.  Left periaortic nodes are stable.  • 4/12/2021: TSH 4.1, cortisol 12.4  • 6/22/2021: CMP normal,Cortisol 10.08,, WBC 5.8, hemoglobin 11.2, platelets 109, TSH 2.59  • 7/12/2021: CT scan of the chest abdomen pelvis with contrast: Dominant 10 x 10 left periaortic lymph node is stable since 3/22/2021.  Dominant lymph nodes in the right lower paratracheal and AP window distribution are stable since 3/15/2021.  No evidence of progressive adenopathy in the chest abdomen or pelvis.    • 01/11/2021 -PET/CT findings consistent with new cedric metastatic disease in the retroperitoneum.  2 new pathologically enlarged hypermetabolic retroperitoneal lymph nodes.  No convincing evidence of recurrent disease within the chest neck pelvis or skeleton.  • 1/20/2022 -CT-guided needle biopsy consistent with poorly differentiated carcinoma.  CDX2 positive consistent with metastases from known esophageal primary  • 2/9/2022 - Pembrolizumab complicated with fatigue, joint pains, nausea, diarrhea. Symptoms decrease in intensity over a week.  • 3/30/2022 -CT chest and pelvis with stable metastatic lymph nodes in the left periaortic retroperitoneum.  No evidence of disease progression or new lesions.  Stable lymphadenopathy in the distal paratracheal mediastinal area  • 4/13/2022 - Keytruda 200  • 5/4/2022 - Keytruda 200  • Continued  keytruda  • 7/11/2022 - CT abdomen pelvis with decrease in size of left perioaortic lymph node. No other areas of disease.    Subjective:    Mild diarrhea fatigue, no rash.    The following portions of the patient's history were reviewed and updated as appropriate: allergies, current medications, past family history, past medical history, past social history, past surgical history and problem list.     Past Medical History:   Diagnosis Date   • B12 deficiency    • Blockage of coronary artery of heart (HCC)    • Depression    • DM (diabetes mellitus) (HCC)    • Dysphagia    • HTN (hypertension)    • Hyperlipidemia        Past Surgical History:   Procedure Laterality Date   • ABDOMINAL WALL ABSCESS INCISION AND DRAINAGE  10/22/2018    WITH DEBRIDEMENT  1.5CM X 1.5 CM X 1.5 CM    DR. PURI   • CATARACT EXTRACTION  2018   • COLONOSCOPY     • CORONARY ARTERY BYPASS GRAFT  2015   • ENDOSCOPY  12/20/2016   • ESOPHAGECTOMY  05/04/2017    BROOKS PETTY ESOPHAGECTOMY, FEEDING JEJNOSTOMOY, ABDOMINAL AND MEDIASTINAL LYMPH NODE DISECTION, PEDICLED MUSCLE FLAP COVERAGE DR. PURI   • ESOPHAGOSCOPY / EGD  07/12/2017    WITH BALLOON DILATION   • ESOPHAGOSCOPY / EGD  07/26/2017    WITH BALLOON DILATION   • ESOPHAGOSCOPY / EGD  08/11/2017    WITH BALLOON DILATION   • ESOPHAGOSCOPY / EGD  08/28/2017    WITH BALLOON DILATION   • ESOPHAGOSCOPY / EGD  09/27/2017    WITH BALLOON DILATION   • LYMPH NODE BIOPSY  01/30/2019   • PACEMAKER IMPLANTATION  11/21/2016       Current Outpatient Medications:   •  Cetirizine HCl (ZyrTEC Allergy) 10 MG capsule, Take  by mouth., Disp: , Rfl:   •  clonazePAM (KlonoPIN) 0.5 MG tablet, Pt taking 1.5 pills daily, Disp: , Rfl: 1  •  cyanocobalamin (VITAMIN B-12) 1000 MCG tablet, , Disp: , Rfl:   •  diphenhydrAMINE-APAP, sleep, (TYLENOL PM EXTRA STRENGTH PO), Take  by mouth., Disp: , Rfl:   •  Euthyrox 50 MCG tablet, Take 1 tablet by mouth once daily, Disp: 90 tablet, Rfl: 0  •  ferrous sulfate 325 (65 FE)  "MG tablet, Take 1 tablet by mouth 2 (Two) Times a Day With Meals., Disp: 60 tablet, Rfl: 3  •  folic acid (FOLVITE) 1 MG tablet, Daily., Disp: , Rfl:   •  metFORMIN (GLUCOPHAGE) 1000 MG tablet, 2 (Two) Times a Day With Meals., Disp: , Rfl:   •  metoprolol tartrate (LOPRESSOR) 25 MG tablet, Take 12.5 mg by mouth 2 (Two) Times a Day., Disp: , Rfl:   •  omeprazole (priLOSEC) 20 MG capsule, Take 20 mg by mouth 2 (Two) Times a Day., Disp: , Rfl:   •  ondansetron (ZOFRAN) 4 MG tablet, Take 1 tablet by mouth Every 8 (Eight) Hours As Needed for Nausea or Vomiting., Disp: 90 tablet, Rfl: 2  •  pravastatin (PRAVACHOL) 10 MG tablet, Take 10 mg by mouth every night at bedtime., Disp: , Rfl:     Allergies   Allergen Reactions   • Ace Inhibitors Unknown (See Comments)     Unknown reaction     • Heparin Other (See Comments)     Fever/chills, weakness in legs   • Sulfa Antibiotics Other (See Comments)     Mouth sores       Family History   Problem Relation Age of Onset   • Mental illness Mother    • Heart disease Mother    • Hypertension Father    • Cancer Father    • Heart disease Sister    • Heart disease Brother      Cancer-related family history includes Cancer in his father.    Social History     Tobacco Use   • Smoking status: Former Smoker     Packs/day: 2.00     Years: 50.00     Pack years: 100.00     Types: Cigarettes     Quit date: 2015     Years since quittin.0   • Smokeless tobacco: Former User     Types: Chew     Quit date: 1989   Substance Use Topics   • Alcohol use: No   • Drug use: No     ROS:     Objective:    Vitals:    22 1157   BP: 149/82   Pulse: 68   Temp: 97.7 °F (36.5 °C)   SpO2: 99%   Weight: 69 kg (152 lb 3.2 oz)   Height: 167.6 cm (66\")   PainSc: 0-No pain       (1) Restricted in physically strenuous activity, ambulatory and able to do work of light nature    Physical Exam:     Physical Exam   Constitutional: He is oriented to person, place, and time. He appears well-developed. No " distress.   HENT:   Head: Normocephalic and atraumatic.   Right Ear: Tympanic membrane normal.   Nose: Nose normal.   Eyes: Pupils are equal, round, and reactive to light.   Neck: No thyromegaly present.   Cardiovascular: Normal rate, regular rhythm, normal heart sounds and normal pulses. Exam reveals no gallop and no friction rub.   Pulmonary/Chest: Effort normal and breath sounds normal. No stridor. No respiratory distress. He has no wheezes.   Abdominal: Soft. Normal appearance and bowel sounds are normal.   No tenderness   Musculoskeletal: Normal range of motion. No deformity or signs of injury.      Right lower leg: No edema.   Neurological: He is alert and oriented to person, place, and time. He exhibits normal muscle tone.   Skin: Skin is warm and dry. No rash noted. He is not diaphoretic. No erythema. No jaundice.   Right side chest  port   Vitals reviewed.    I have reexamined the patient and the results are consistent with the previously documented exam. Jean Pierre Elizondo MD       Lab Results - Last 18 Months   Lab Units 07/13/22  1157 06/24/22  1014 06/16/22  1031   WBC 10*3/mm3 5.98 5.12 5.92   HEMOGLOBIN g/dL 12.0* 10.8* 11.0*   HEMATOCRIT % 36.9* 33.8* 34.4*   PLATELETS 10*3/mm3 197 174 189   MCV fL 90.0 88.7 89.1     Lab Results - Last 18 Months   Lab Units 06/24/22  1014 06/16/22  1031 06/02/22  0858   SODIUM mmol/L 139 136 137   POTASSIUM mmol/L 4.8 4.9 4.5   CHLORIDE mmol/L 105 104 105   CO2 mmol/L 21.0* 20.0* 20.0*   BUN mg/dL 16 9 16   CREATININE mg/dL 0.89 0.86 0.77   CALCIUM mg/dL 8.7 8.5* 8.8   BILIRUBIN mg/dL 0.2 0.2 0.2   ALK PHOS U/L 33* 36* 42   ALT (SGPT) U/L 7 6 9   AST (SGOT) U/L 13 18 16   GLUCOSE mg/dL 100* 70 158*     Assessment & Plan     Assessment:    Metastatic esophageal carcinoma:    Patient has a history of GE junction poorly differentiated adenocarcinoma for which he had chemoradiation followed by [place] Gurwinder resection:  Pathology showed ypT3,pN1 disease.  Tumor was Stage IIIA,  diagnosed in 2016.  He received concurrent chemoradiation with weekly Carboplatin and Taxol and radiation finishing on 3/15/17.  CT scan on 1/10/19 showed new retroperitoneal lymph nodes.  These lymph nodes were biopsied on 1/30/19 and it shows metastatic esophageal cancer.  [facility] Next Gen testing was performed on the sample and it shows KRAS mutation, microsatellite stable tumor.  PD-L1 is positive.  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  HER2/marvel (ERBB2) was not amplified and was negative.  His CT scan on 5/1/19 shows evidence of progression.  Patient has started receiving Keytruda on 5/31/19.  He has received 19 cycles so far.   CT scan on 1/28/2020 shows improvement and continued response.  CT scan chest abdomen pelvis with contrast on 12/10/2020 does not show any evidence of disease progression.  On 12/22/2020 decision made to hold Keytruda per patient's request to get a treatment break.. Repeat CT scan chest 7/12/2021 does not show any evidence of disease progression.  Now CT imaging in January 2021 with a CT showing increased abdominal left para-aortic adenopathy.  Subsequent PET/CT with new cedric metastatic disease in the retroperitoneum of the abdomen.  New pathologically enlarged hypermetabolic retroperitoneal lymph nodes.  This would be concerning for disease recurrence/progression.  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  I discussed with him the treatment options going forward.  He has had a good response with immunotherapy pembrolizumab in the past. This was now restarted. Continue the same. He has had significant symptoms with flu like illness after treatment.he takes Tylenol which helps. Now recent imaging with treatment response. Repeat CT imaging in 3 months, change Keytruda to every 6 weeks.    Shortness of breath  Improved, has occasional shortness of breath, likely copd    Reflux/heartburn/dysphagia:   Symptoms improved.  Also has a history of strictures. Status post EGD and  balloon dilation.  Omeprazole once daily, famotidine as needed, symptoms stable.    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  Continues on Synthroid monitor TSH and fT4 with treatment.    Mild diarrhea:  diarrhea seems to be waxing and waning in nature.  Related to immunotherapy, but not severe.  Now resolved.  Will monitor    PD-L1 positive, HER2 negative, p53 and KRAS positive.  Repeat [facility] testing with repeat biopsy with no target mutations.    B12 deficiency: Continue B12 1000 mcg twice daily.    Anemia - Hb 9.6, initially low iron sat, was given iv iron infusion, now hb to 12.    F/u in 6 weeks    I have reviewed and confirmed the accuracy of the patient's history: Chief complaint, HPI, ROS and Subjective as entered by the MA/LPN/RN.     Jean Pierre Elizondo MD 07/13/22     No orders of the defined types were placed in this encounter.

## 2022-07-13 ENCOUNTER — LAB (OUTPATIENT)
Dept: LAB | Facility: HOSPITAL | Age: 72
End: 2022-07-13

## 2022-07-13 ENCOUNTER — OFFICE VISIT (OUTPATIENT)
Dept: ONCOLOGY | Facility: CLINIC | Age: 72
End: 2022-07-13

## 2022-07-13 VITALS
SYSTOLIC BLOOD PRESSURE: 149 MMHG | DIASTOLIC BLOOD PRESSURE: 82 MMHG | TEMPERATURE: 97.7 F | HEIGHT: 66 IN | BODY MASS INDEX: 24.46 KG/M2 | HEART RATE: 68 BPM | WEIGHT: 152.2 LBS | OXYGEN SATURATION: 99 %

## 2022-07-13 DIAGNOSIS — C15.9 ESOPHAGEAL CANCER, STAGE IV: ICD-10-CM

## 2022-07-13 DIAGNOSIS — C16.0 MALIGNANT NEOPLASM OF CARDIA: Primary | ICD-10-CM

## 2022-07-13 DIAGNOSIS — C15.5 PRIMARY MALIGNANT NEOPLASM OF LOWER THIRD OF ESOPHAGUS: Primary | ICD-10-CM

## 2022-07-13 LAB
BASOPHILS # BLD AUTO: 0.04 10*3/MM3 (ref 0–0.2)
BASOPHILS NFR BLD AUTO: 0.7 % (ref 0–1.5)
DEPRECATED RDW RBC AUTO: 56.4 FL (ref 37–54)
EOSINOPHIL # BLD AUTO: 0.3 10*3/MM3 (ref 0–0.4)
EOSINOPHIL NFR BLD AUTO: 5 % (ref 0.3–6.2)
ERYTHROCYTE [DISTWIDTH] IN BLOOD BY AUTOMATED COUNT: 17.6 % (ref 12.3–15.4)
HCT VFR BLD AUTO: 36.9 % (ref 37.5–51)
HGB BLD-MCNC: 12 G/DL (ref 13–17.7)
HOLD SPECIMEN: NORMAL
HOLD SPECIMEN: NORMAL
LYMPHOCYTES # BLD AUTO: 1.9 10*3/MM3 (ref 0.7–3.1)
LYMPHOCYTES NFR BLD AUTO: 31.8 % (ref 19.6–45.3)
MCH RBC QN AUTO: 29.3 PG (ref 26.6–33)
MCHC RBC AUTO-ENTMCNC: 32.5 G/DL (ref 31.5–35.7)
MCV RBC AUTO: 90 FL (ref 79–97)
MONOCYTES # BLD AUTO: 0.73 10*3/MM3 (ref 0.1–0.9)
MONOCYTES NFR BLD AUTO: 12.2 % (ref 5–12)
NEUTROPHILS NFR BLD AUTO: 3.01 10*3/MM3 (ref 1.7–7)
NEUTROPHILS NFR BLD AUTO: 50.3 % (ref 42.7–76)
PLATELET # BLD AUTO: 197 10*3/MM3 (ref 140–450)
PMV BLD AUTO: 9.6 FL (ref 6–12)
RBC # BLD AUTO: 4.1 10*6/MM3 (ref 4.14–5.8)
WBC NRBC COR # BLD: 5.98 10*3/MM3 (ref 3.4–10.8)

## 2022-07-13 PROCEDURE — 99214 OFFICE O/P EST MOD 30 MIN: CPT | Performed by: INTERNAL MEDICINE

## 2022-07-13 PROCEDURE — 85025 COMPLETE CBC W/AUTO DIFF WBC: CPT

## 2022-07-13 PROCEDURE — 36415 COLL VENOUS BLD VENIPUNCTURE: CPT

## 2022-07-14 DIAGNOSIS — Z51.11 ENCOUNTER FOR ANTINEOPLASTIC CHEMOTHERAPY: ICD-10-CM

## 2022-07-14 DIAGNOSIS — C16.0 MALIGNANT NEOPLASM OF CARDIA: ICD-10-CM

## 2022-07-14 DIAGNOSIS — Z51.81 ENCOUNTER FOR THERAPEUTIC DRUG MONITORING: Primary | ICD-10-CM

## 2022-07-14 RX ORDER — SODIUM CHLORIDE 9 MG/ML
250 INJECTION, SOLUTION INTRAVENOUS ONCE
Status: CANCELLED | OUTPATIENT
Start: 2022-07-15

## 2022-07-15 ENCOUNTER — HOSPITAL ENCOUNTER (OUTPATIENT)
Dept: ONCOLOGY | Facility: HOSPITAL | Age: 72
Setting detail: INFUSION SERIES
Discharge: HOME | End: 2022-07-15

## 2022-07-15 VITALS
WEIGHT: 153 LBS | HEART RATE: 67 BPM | SYSTOLIC BLOOD PRESSURE: 122 MMHG | BODY MASS INDEX: 24.59 KG/M2 | RESPIRATION RATE: 18 BRPM | TEMPERATURE: 96.9 F | OXYGEN SATURATION: 95 % | HEIGHT: 66 IN | DIASTOLIC BLOOD PRESSURE: 67 MMHG

## 2022-07-15 DIAGNOSIS — Z51.81 ENCOUNTER FOR THERAPEUTIC DRUG MONITORING: ICD-10-CM

## 2022-07-15 DIAGNOSIS — Z51.11 ENCOUNTER FOR ANTINEOPLASTIC CHEMOTHERAPY: ICD-10-CM

## 2022-07-15 DIAGNOSIS — C16.0 MALIGNANT NEOPLASM OF CARDIA: Primary | ICD-10-CM

## 2022-07-15 LAB
ALBUMIN SERPL-MCNC: 4.2 G/DL (ref 3.5–5.2)
ALBUMIN/GLOB SERPL: 1.8 G/DL
ALP SERPL-CCNC: 39 U/L (ref 39–117)
ALT SERPL W P-5'-P-CCNC: 8 U/L (ref 1–41)
ANION GAP SERPL CALCULATED.3IONS-SCNC: 13 MMOL/L (ref 5–15)
AST SERPL-CCNC: 18 U/L (ref 1–40)
BASOPHILS # BLD AUTO: 0.03 10*3/MM3 (ref 0–0.2)
BASOPHILS NFR BLD AUTO: 0.6 % (ref 0–1.5)
BILIRUB SERPL-MCNC: 0.3 MG/DL (ref 0–1.2)
BUN SERPL-MCNC: 18 MG/DL (ref 8–23)
BUN/CREAT SERPL: 18.6 (ref 7–25)
CALCIUM SPEC-SCNC: 9.1 MG/DL (ref 8.6–10.5)
CHLORIDE SERPL-SCNC: 105 MMOL/L (ref 98–107)
CO2 SERPL-SCNC: 20 MMOL/L (ref 22–29)
CREAT SERPL-MCNC: 0.97 MG/DL (ref 0.76–1.27)
DEPRECATED RDW RBC AUTO: 54 FL (ref 37–54)
EGFRCR SERPLBLD CKD-EPI 2021: 82.4 ML/MIN/1.73
EOSINOPHIL # BLD AUTO: 0.22 10*3/MM3 (ref 0–0.4)
EOSINOPHIL NFR BLD AUTO: 4.7 % (ref 0.3–6.2)
ERYTHROCYTE [DISTWIDTH] IN BLOOD BY AUTOMATED COUNT: 17 % (ref 12.3–15.4)
GLOBULIN UR ELPH-MCNC: 2.3 GM/DL
GLUCOSE BLDC GLUCOMTR-MCNC: 100 MG/DL (ref 70–105)
GLUCOSE SERPL-MCNC: 99 MG/DL (ref 65–99)
HCT VFR BLD AUTO: 35.1 % (ref 37.5–51)
HGB BLD-MCNC: 11.4 G/DL (ref 13–17.7)
LYMPHOCYTES # BLD AUTO: 1.44 10*3/MM3 (ref 0.7–3.1)
LYMPHOCYTES NFR BLD AUTO: 30.7 % (ref 19.6–45.3)
MCH RBC QN AUTO: 28.9 PG (ref 26.6–33)
MCHC RBC AUTO-ENTMCNC: 32.5 G/DL (ref 31.5–35.7)
MCV RBC AUTO: 88.9 FL (ref 79–97)
MONOCYTES # BLD AUTO: 0.55 10*3/MM3 (ref 0.1–0.9)
MONOCYTES NFR BLD AUTO: 11.7 % (ref 5–12)
NEUTROPHILS NFR BLD AUTO: 2.45 10*3/MM3 (ref 1.7–7)
NEUTROPHILS NFR BLD AUTO: 52.3 % (ref 42.7–76)
PLATELET # BLD AUTO: 184 10*3/MM3 (ref 140–450)
PMV BLD AUTO: 10.4 FL (ref 6–12)
POTASSIUM SERPL-SCNC: 4.8 MMOL/L (ref 3.5–5.2)
PROT SERPL-MCNC: 6.5 G/DL (ref 6–8.5)
RBC # BLD AUTO: 3.95 10*6/MM3 (ref 4.14–5.8)
SODIUM SERPL-SCNC: 138 MMOL/L (ref 136–145)
WBC NRBC COR # BLD: 4.69 10*3/MM3 (ref 3.4–10.8)

## 2022-07-15 PROCEDURE — 25010000002 PEMBROLIZUMAB 100 MG/4ML SOLUTION 4 ML VIAL: Performed by: INTERNAL MEDICINE

## 2022-07-15 PROCEDURE — 96360 HYDRATION IV INFUSION INIT: CPT

## 2022-07-15 PROCEDURE — 80053 COMPREHEN METABOLIC PANEL: CPT | Performed by: INTERNAL MEDICINE

## 2022-07-15 PROCEDURE — 85025 COMPLETE CBC W/AUTO DIFF WBC: CPT | Performed by: INTERNAL MEDICINE

## 2022-07-15 PROCEDURE — 82962 GLUCOSE BLOOD TEST: CPT

## 2022-07-15 PROCEDURE — 96413 CHEMO IV INFUSION 1 HR: CPT

## 2022-07-15 RX ORDER — SODIUM CHLORIDE 9 MG/ML
250 INJECTION, SOLUTION INTRAVENOUS ONCE
Status: COMPLETED | OUTPATIENT
Start: 2022-07-15 | End: 2022-07-15

## 2022-07-15 RX ADMIN — SODIUM CHLORIDE 400 MG: 9 INJECTION, SOLUTION INTRAVENOUS at 10:41

## 2022-07-15 RX ADMIN — SODIUM CHLORIDE 250 ML: 9 INJECTION, SOLUTION INTRAVENOUS at 10:41

## 2022-08-16 NOTE — PROGRESS NOTES
Hematology-Oncology Follow-up Note       Artie Mc  1950    Primary Care Physician: Chanel Carter APRN  Referring Physician: Chanel Carter APRN    Reason For Visit:  Chief Complaint   Patient presents with   • Follow-up     Primary malignant neoplasm of lower third of esophagus      Metastatic esophageal cancer  Monitoring for adverse effects related to immunotherapy.  Hypothyroidism    HPI:   Mr. Mc is a pleasant 72 y.o. gentleman with a history of gastroesophageal reflux disease, diabetes, hypertension, heart block status post pacemaker placement and history of CABG.  He was having symptoms of dysphagia, weight loss since September 2016. He was reporting symptoms of food getting stuck in the lower chest.  He has transitioned himself to a liquid diet.  The patient reports losing about 30 pounds over this duration.  The patient underwent upper GI endoscopy on 12/20/16 with Dr. Esvin Barrera.  Endoscopy showed necrotic, circumferential and moderately obstructive mass at the distal esophagus between 42 cm and 36 cm.      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  There was also evidence of mild subacute inflammation in the duodenum.  Dr. Barrera ordered a CT scan of the chest and abdomen with contrast on 12/20/16.  The scan was done at [facility].  The scan showed a new ill-defined mass in the anterior wall of the distal esophagus at the GE junction measuring 2.3 x 2.2 x 1.6 cm, worrisome for cancer.  There were four subcentimeter liver lesions which had appeared stable in comparison to another CT scan from 2009 and they likely represent small cysts.  There was also mild lymphadenopathy in the gastrohepatic ligament.  There were at least six borderline enlarged lymph nodes measuring up to 1.3 x 1 cm in the region.  The patient is referred to us for further oncological evaluation.    1/5/2017 - The  patient presents for initial consultation.  He reports persistent dysphagia and also has symptoms of regurgitation.  He cannot tolerate solid foods and is dependent on liquid diet such as Ensure.  He also reports fatigue. Symptoms of dysphagia have been present for the past four months.  When he eats any solids, the food gets stuck in the lower chest.    • 1/5/17 - Vitamin B12 200 (L).  CEA 0.8.  Ferritin 35.  Iron saturation 16% (L), serum iron 56, TIBC 354.  Creatinine 0.9.  LFTs normal.  Folate 10.2.  • 1/12/17 - PET/CT scan:  Intense abnormal activity corresponding to the region of the GE junction and proximal stomach.  SUV is 11.06.  There is a suggestion of an abnormal mass or abnormal wall thickening in the region measuring 3.9 x 4.8 cm.  No additional abnormalities.  No evidence of metastases or other lymphadenopathy.  Scattered nonspecific subcentimeter lymph nodes involving the mediastinum and within the central upper abdomen.  • 1/13/17 - Creatinine 0.9.  LFTs normal.    • 1/17/17 - Patient seen by thoracic surgeon, Dr. Bradley.  PET scan was reviewed.  He has recommended neoadjuvant chemoradiation therapy followed by [facility] Gurwinder esophagogastrectomy.  Port-A-Cath is being arranged.    • 1/20/17 - WBC 6.3, hemoglobin 12.1, platelet count 198,000, MCV 86.4.  • 1/30/17 - Patient started weekly Carboplatin and Taxol (Carboplatin AUC 2 and Taxol 50 mg/M2).  Patient received Injectafer 750 mg.  WBC 5.8, hemoglobin 12.4, platelet count 244,000.   • 1/31/17 - Patient seen by Dr. Eduardo Oleary.  The plan was to give 5040 cGy in 28 fractions.   • 2/6/17 - WBC 4.4, hemoglobin 11.6, platelet count 201,000.  Patient received cycle 2 of weekly Carboplatin and Taxol (Carboplatin AUC 2 and Taxol 50 mg/M2).  • 2/13/17 - Patient received Carboplatin and Taxol weekly cycle 3.  • 2/13/17 - Patient started concurrent radiation therapy.  Total planned dose is 4140 cGy.    • 2/13/17 to 2/15/17 - Patient received 4140 cGy of  neoadjuvant radiation.  Radiation was finished on 3/15/17.    • 2/20/17 - WBC 1.8, hemoglobin 10.6, platelet count 217,000.  Creatinine 0.5. Folate 15 (L).  Ferritin 361.  Haptoglobin 214.  Iron saturation 33%, TIBC 263, serum iron 88.    • 2/20/17 - Patient received Carboplatin and Taxol weekly cycle 4.   • 2/27/17 - WBC 3.7, hemoglobin 10.7, platelet count 177,000, MCV 85.3.    • 3/6/17 - Patient has received 16 out of the 23 planned radiation treatments.  Total planned dose is 4140 cGy.    • 3/6/17 - WBC 5.4, hemoglobin 11.2, platelet count 113,000.  • 3/6/17 - Patient received cycle 5 of weekly Carboplatin and Taxol.   • 3/13/17 - Patient received cycle 6 of weekly Carboplatin and Taxol.  WBC 3.9, hemoglobin 11.3, platelet count 93,000.     • 3/20/17 - WBC 1.4, hemoglobin 10.0, platelet count 118,000, MCV 86.1   • 3/27/17 - WBC 3.8, hemoglobin 9.5, platelet count 176,000, MCV 87.3.    • 4/7/17 - Stress test:  No evidence of reversible myocardial ischemia.  Normal left ventricular ejection fraction of 50%.    • 4/17/17 - Upper GI endoscopy by Dr. 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  The GE junction adenocarcinoma lesion seems to have a very good response to chemotherapy and radiation.  The lumen was open.  • 5/1/17 - WBC 7.0, hemoglobin 10.2, platelet count 175,000.    • 5/4/17 - Patient underwent [place] Gurwinder esophagectomy with pyloroplasty, feeding jejunostomy tube, abdominal and mediastinal lymph node dissection.    Surgical Pathology:  Moderately to poorly differentiated adenocarcinoma measuring 1.3 cm at the GE junction.  Resection margins are negative for malignancy.  Tumor margins are negative.  There is effective treatment response.  No evidence of lymphovascular invasion.  Staging is ypT3,pN1.  One out of fourteen lymph nodes involved.   • 5/22/17 - WBC 6.8, hemoglobin 10.3, platelet count 312,000.    • 6/19/17 - WBC 5.7, hemoglobin 10.5, platelet count  204,000, MCV 92.1.    • 7/6/17 - EGD:  Status post balloon dilation of esophageal stricture.  • 7/12/17 - EGD with balloon dilation of esophageal stricture.  • 7/26/17 - EGD and balloon dilation of esophageal stricture.  • 7/31/17 - PET/CT scan:  There is mild metabolic activity seen at the thoracic esophagus just at and below the surgical margins.  Some mild wall thickening.  This could be from recent surgery.  A residual cancer seems unlikely.  There is a precarinal lymph node with mild metabolic activity with SUV of 3 measuring 1 x 1.3 cm.  Again this appears to be nonspecific in my opinion.    • 8/7/17 - WBC 5.9, hemoglobin 11.9, platelet count 171,000, MCV 88.7.    • 10/19/17 - WBC 7.5, hemoglobin 11.8, platelet count 216,000.    • 1/8/18 - PET scan:  No evidence of residual disease or recurrent disease.  There is an esophageal stent in the mid esophagus with a large debris air level.  No evidence of any metastases in the chest, abdomen or pelvis.  Mild nodule uptake in the vocal cords with fullness in the right vocal cord.  This could be physiologic.    • 7/9/18 - CT scan of chest with contrast:  Prominent mediastinal lymph nodes appear stable since February.  Changes of gastric pull-through procedure for esophageal cancer again noted.  Tree-in-bud infiltrates in the left lung base, consistent with infection versus inflammation.    • 1/10/19 - CT chest, abdomen and pelvis with contrast:  No evidence of mets in the chest; however, interval development of metastatic lymphadenopathy in the left side of the retroperitoneum.  For instance, there is a 17 mm rounded lymph node, 10 mm lymph node and also a 14 mm lymph node.  These are all new.  Stable 9 mm hypodense lesion within the right hepatic lobe, which could reflect hemangioma or complex cyst.    • 1/30/19 - CT-guided core biopsy of retroperitoneal lymph node:  Poorly differentiated adenocarcinoma consistent with metastases from patient’s known esophageal  primary.  CK20 positive, CDX2 positive, cytokeratin 7 negative, TTF-1 negative.    • 2/1/19 - Creatinine 0.9, BUN 18, calcium 9.3; these are normal.    • 2/28/19 - [facility] Next Gen sequencing testing on retroperitoneal lymph node specimen:  PD-L1 positive.  CPS = 3.  This implies responsiveness to pembrolizumab.  Mismatch repair status is proficient (no evidence of microsatellite instability).  Tumor mutational burden is low = 6 mutations/Mb.  CDH1 indeterminate.  KRAS mutation positive.  TP53 mutation positive.  CDK6 amplified.  [employer] PD-L1 testing by IHC:  PD-L1 expression 1% positive (CPS =1).  HER2/marvel by IHC is negative.  HER2/marvel by CISH not amplified.  Microsatellite stable tumor.    • 3/7/19 - WBC 7.3, hemoglobin 12, platelet count 128,000, MCV 92.     • 3/14/19 - Patient was seen by radiation oncologist, Dr. Chahal.  He has recommended observation versus systemic chemoimmunotherapy as needed.  No plans for radiation therapy.   • 5/1/19 - CT scan of chest with contrast:  No definitive findings of new metastatic disease in the chest.  Emphysema noted.    • 5/1/19 - CT abdomen and pelvis:  There is interval progression of metastatic retroperitoneal adenopathy.  Left periaortic adenopathy with lymph node measuring up to 2 cm, previously 1.7 cm.  Another lymph node now measures 2.3, previously 1.4 cm.  New enlarged lymph node on image 147 measures 1.4 cm, previously 0.4 cm.  A 9 mm hypodense right hepatic lobe lesion appears stable.    • 5/9/19 - Decision made to start patient on immunotherapy Keytruda.    • 5/9/19 - Vitamin B12 229.  Ferritin 61.  Folate 14.3.  Iron saturation 26%, TIBC 304, serum iron 79.  • 5/31/19 - Patient received Keytruda 200 mg cycle 1, day 1.    • 5/31/19 - Free T4 0.99.  Creatinine 0.9, BUN 9.  LFTs normal.  TSH 3.99.  Folate 14.3.  Iron saturation 26%.    • 6/21/2019 patient received Keytruda cycle 2  • 7/12/2019: Patient received Keytruda cycle 3  • 8/2/2019 patient received cycle 4  Keytruda WBC 7.2, hemoglobin 11.8 platelet count 163  • 8/23/2019 creatinine 0.9, LFTs normal, WBC 6.4, hemoglobin 11.5, platelets 179, TSH 2.1   • 8/23/2019 patient received cycle 5 of Keytruda  • 9/3/2019 CT chest abdomen and pelvis with contrast: . Positive response to therapy in the abdomen since 05/01/2019. Pathologically enlarged retroperitoneal lymph nodes, predominantly in the left periaortic region, have diminished in size.a 1.3 x 2.1 cm left periaortic node (image 60), previously measured 3.5 x 2.3 cm. A 1.6 cm index node near the level of the left renal vein previously measured 2.0 cm No new or progressive adenopathy. 2. Stable findings in the chest. Noncalcified right upper lobe nodules are unchanged. There is no evidence of new or progressive metastatic disease within the chest. 3. 8 mm indeterminate low-density lesion in the right hepatic lobe, is stable. No new liver lesions.  • 9/6/2019 WBC 5.6, hemoglobin 11.2, platelets of 192, MCV 91.8  • 9/13/2019 patient received Keytruda cycle 6, albumin 3.2  • 10/4/2019 patient received cycle 7 of Keytruda, TSH 4.8, free T4 0.86  • 10/9/2019 WBC 5.8, hemoglobin 11.7, platelets 174  • 10/25/2019 patient received cycle 8 of Keytruda.  WBC 5.9, hemoglobin 11.5, platelets 152, creatinine 0.75, cortisol 15.2, TSH 6.48 high , free T4 1.13 normal  • 11/6/2019 WBC 6.6, hemoglobin 11.8, MCV 91.5, platelets 187  • 11/15/2019 patient received cycle 9 of Keytruda, WBC 6.6, hemoglobin 11.9, platelets 161, cortisol level 8.86, TSH 2.68, free T3 2.8, free T4 1.5  • 12/4/2019 WBC 6.9, hemoglobin 12.0, platelets 180, MCV 92.9  • 12/06/2019-Keytruda Cycle 10  • 12/27/2019- Cycle 11 LFTs normal, WBC 6.7, hemoglobin 11.4, platelets 168, MCV 93, TSH 2.4,  • 1/28/2020: CT chest abdomen and pelvis with contrast:  Single (aortic lymph node in the abdominal retroperitoneum has increased.  Rest of the retroperitoneal lymph nodes have decreased in size.  Mediastinal adenopathy appears  unchanged.  Noncalcified bilateral pulmonary nodules are stable  • 1/17/2020 patient received Keytruda cycle 12:.  • 2/3/2020 WBC 7.1, hemoglobin 12.2, platelets 171  • 02/10/2020 patient received cycle 13 of Keytruda: WBC 7.3, hemoglobin 12.1, platelets 166, creatinine 0.82  • 3/6/2020 patient is of Keytruda 200 mg, hemoglobin 10.8  • 3/27/2020 patient received Keytruda 200 mg, hemoglobin 11.4, TSH 1.95  • 4/17/2020 patient received Keytruda cycle 16 200 mg, WBC 6.1, hemoglobin 11.6, platelets 150  • 5/8/2020: Patient received cycle 17 Keytruda, WBC 6.3, hemoglobin 9.6, platelets 137, TSH 2.05, free T4 1.54, creatinine 0.93, LFTs normal,  • 5/29/2020: Patient received cycle 18 Keytruda, WBC 6.5, hemoglobin 11.7, platelets 129, creatinine 0.83, TSH 1.69  • 6/26/2020: Patient receiving Keytruda.   • 7/17/2020: TSH 2.06, WBC 5.7, hemoglobin 11.4, platelets 177, MCV 92.4, Keytruda 200 mg cycle 20  • 7/27/2020: CT chest abdomen pelvis with contrast: Stable findings in the chest abdomen pelvis since 1/28/2020.  Noncalcified bilateral pulmonary nodules are stable.  Stable retroperitoneal left perinephric adenopathy in the abdomen.  • 8/7/2020: Patient received cycle 21 of Keytruda  • 8/28/2020 patient is a cycle 22 of Keytruda.  WBC 6.02, hemoglobin 11.3, platelets 161  • 9/11/2020 WBC 6.9, hemoglobin 12.4, platelets 192  • 9/18/2020: Cycle 23 of Keytruda, TSH 1.19  • 10/9/2020: Cycle 24 of Keytruda, WBC 5.9, hemoglobin 11.9, platelets 178, creatinine 0.84, TSH 2.56, free T3 2.4, CMP normal  • 10/23/2020: WBC 7.34, hemoglobin 11.6, platelets 175  • 10/30/2020:.  Keytruda 200 mg  • 11/20/2020: Keytruda 200 mg  • 12/11/2020 Keytruda 200 mg.  WBC 5.5, hemoglobin 11.4, platelets 176, creatinine 0.8, LFTs normal, TSH 2.2, cortisol 8.3  • 12/10/2020: CT chest abdomen and pelvis with contrast: Previously seen left para-aortic lymph nodes within the abdomen are decreased in size.  No pathological lymphadenopathy.  No evidence of  residual malignancy..  Small noncalcified pulmonary nodules are stable.  No new nodules.  Stable mediastinal lymph nodes.  • 3/12/2021: WBC 6.7, hemoglobin 11.3, platelets 17  • 3/12/2021: CMP normal, TSH 2.9, cortisol 11.95, 1  • 3/15/2021: CT chest with contrast: Stable findings in the chest with postsurgical changes of esophageal resection and gastric pull-through.  Stable right lower lobe lung nodule measures 11 mm.  Stable size and appearance of the mediastinal and upper abdominal lymph nodes.  • 3/23/2021: Doppler of upper extremity: Normal.  • 3/24/2021: CT abdomen and pelvis with contrast: Postoperative changes.  Left periaortic nodes are stable.  • 4/12/2021: TSH 4.1, cortisol 12.4  • 6/22/2021: CMP normal,Cortisol 10.08,, WBC 5.8, hemoglobin 11.2, platelets 109, TSH 2.59  • 7/12/2021: CT scan of the chest abdomen pelvis with contrast: Dominant 10 x 10 left periaortic lymph node is stable since 3/22/2021.  Dominant lymph nodes in the right lower paratracheal and AP window distribution are stable since 3/15/2021.  No evidence of progressive adenopathy in the chest abdomen or pelvis.    • 01/11/2021 -PET/CT findings consistent with new cedric metastatic disease in the retroperitoneum.  2 new pathologically enlarged hypermetabolic retroperitoneal lymph nodes.  No convincing evidence of recurrent disease within the chest neck pelvis or skeleton.  • 1/20/2022 -CT-guided needle biopsy consistent with poorly differentiated carcinoma.  CDX2 positive consistent with metastases from known esophageal primary  • 2/9/2022 - Pembrolizumab complicated with fatigue, joint pains, nausea, diarrhea. Symptoms decrease in intensity over a week.  • 3/30/2022 -CT chest and pelvis with stable metastatic lymph nodes in the left periaortic retroperitoneum.  No evidence of disease progression or new lesions.  Stable lymphadenopathy in the distal paratracheal mediastinal area  • 4/13/2022 - Keytruda 200  • 5/4/2022 - Keytruda  200  • Continued keytruda  • 7/11/2022 - CT abdomen pelvis with decrease in size of left perioaortic lymph node. No other areas of disease.  • Continues to be on Keytruda    Subjective:    He is complaining of increasing fatigue, has some worsening in shortness of breath.    The following portions of the patient's history were reviewed and updated as appropriate: allergies, current medications, past family history, past medical history, past social history, past surgical history and problem list.     Past Medical History:   Diagnosis Date   • B12 deficiency    • Blockage of coronary artery of heart (HCC)    • Depression    • DM (diabetes mellitus) (HCC)    • Dysphagia    • HTN (hypertension)    • Hyperlipidemia        Past Surgical History:   Procedure Laterality Date   • ABDOMINAL WALL ABSCESS INCISION AND DRAINAGE  10/22/2018    WITH DEBRIDEMENT  1.5CM X 1.5 CM X 1.5 CM    DR. PURI   • CATARACT EXTRACTION  2018   • COLONOSCOPY     • CORONARY ARTERY BYPASS GRAFT  2015   • ENDOSCOPY  12/20/2016   • ESOPHAGECTOMY  05/04/2017    BROOKS PETTY ESOPHAGECTOMY, FEEDING JEJNOSTOMOY, ABDOMINAL AND MEDIASTINAL LYMPH NODE DISECTION, PEDICLED MUSCLE FLAP COVERAGE DR. PURI   • ESOPHAGOSCOPY / EGD  07/12/2017    WITH BALLOON DILATION   • ESOPHAGOSCOPY / EGD  07/26/2017    WITH BALLOON DILATION   • ESOPHAGOSCOPY / EGD  08/11/2017    WITH BALLOON DILATION   • ESOPHAGOSCOPY / EGD  08/28/2017    WITH BALLOON DILATION   • ESOPHAGOSCOPY / EGD  09/27/2017    WITH BALLOON DILATION   • LYMPH NODE BIOPSY  01/30/2019   • PACEMAKER IMPLANTATION  11/21/2016       Current Outpatient Medications:   •  Cetirizine HCl (ZyrTEC Allergy) 10 MG capsule, Take  by mouth., Disp: , Rfl:   •  clonazePAM (KlonoPIN) 0.5 MG tablet, Pt taking 1.5 pills daily, Disp: , Rfl: 1  •  cyanocobalamin (VITAMIN B-12) 1000 MCG tablet, , Disp: , Rfl:   •  diphenhydrAMINE-APAP, sleep, (TYLENOL PM EXTRA STRENGTH PO), Take  by mouth., Disp: , Rfl:   •  Euthyrox 50 MCG  tablet, Take 1 tablet by mouth once daily, Disp: 90 tablet, Rfl: 0  •  ferrous sulfate 325 (65 FE) MG tablet, Take 1 tablet by mouth 2 (Two) Times a Day With Meals., Disp: 60 tablet, Rfl: 3  •  folic acid (FOLVITE) 1 MG tablet, Daily., Disp: , Rfl:   •  metFORMIN (GLUCOPHAGE) 1000 MG tablet, 2 (Two) Times a Day With Meals., Disp: , Rfl:   •  metoprolol tartrate (LOPRESSOR) 25 MG tablet, Take 12.5 mg by mouth 2 (Two) Times a Day., Disp: , Rfl:   •  omeprazole (priLOSEC) 20 MG capsule, Take 20 mg by mouth 2 (Two) Times a Day., Disp: , Rfl:   •  ondansetron (ZOFRAN) 4 MG tablet, Take 1 tablet by mouth Every 8 (Eight) Hours As Needed for Nausea or Vomiting., Disp: 90 tablet, Rfl: 2  •  pravastatin (PRAVACHOL) 10 MG tablet, Take 10 mg by mouth every night at bedtime., Disp: , Rfl:   No current facility-administered medications for this visit.    Facility-Administered Medications Ordered in Other Visits:   •  sodium chloride 0.9 % flush 20 mL, 20 mL, Intravenous, PRN, Jean Pierre Elizondo MD, 20 mL at 22 1245    Allergies   Allergen Reactions   • Ace Inhibitors Unknown (See Comments)     Unknown reaction     • Heparin Other (See Comments)     Fever/chills, weakness in legs   • Sulfa Antibiotics Other (See Comments)     Mouth sores       Family History   Problem Relation Age of Onset   • Mental illness Mother    • Heart disease Mother    • Hypertension Father    • Cancer Father    • Heart disease Sister    • Heart disease Brother      Cancer-related family history includes Cancer in his father.    Social History     Tobacco Use   • Smoking status: Former Smoker     Packs/day: 2.00     Years: 50.00     Pack years: 100.00     Types: Cigarettes     Quit date: 2015     Years since quittin.1   • Smokeless tobacco: Former User     Types: Chew     Quit date: 1989   Substance Use Topics   • Alcohol use: No   • Drug use: No     ROS:     Objective:    Vitals:    22 1052   BP: 141/73   Pulse: 65   Resp: 18   Temp:  "97.1 °F (36.2 °C)   SpO2: 96%   Weight: 68.5 kg (151 lb)   Height: 167.6 cm (66\")   PainSc: 0-No pain       (1) Restricted in physically strenuous activity, ambulatory and able to do work of light nature    Physical Exam:     Physical Exam   Constitutional: He is oriented to person, place, and time. He appears well-developed. No distress.   HENT:   Head: Normocephalic and atraumatic.   Right Ear: Tympanic membrane normal.   Nose: Nose normal.   Eyes: Pupils are equal, round, and reactive to light.   Neck: No thyromegaly present.   Cardiovascular: Normal rate, regular rhythm, normal heart sounds and normal pulses. Exam reveals no gallop and no friction rub.   Pulmonary/Chest: Effort normal. He has rhonchi.   Abdominal: Soft. Normal appearance and bowel sounds are normal.   No tenderness   Musculoskeletal: Normal range of motion. No deformity or signs of injury.      Right lower leg: No edema.   Neurological: He is alert and oriented to person, place, and time. He exhibits normal muscle tone.   Skin: Skin is warm and dry. No rash noted. He is not diaphoretic. No erythema. No jaundice.   Right side chest  port   Vitals reviewed.    I have reexamined the patient and the results are consistent with the previously documented exam. Jean Pierre Elizondo MD       Lab Results - Last 18 Months   Lab Units 08/29/22  1051 07/15/22  0923 07/13/22  1157   WBC 10*3/mm3 5.95 4.69 5.98   HEMOGLOBIN g/dL 12.2* 11.4* 12.0*   HEMATOCRIT % 37.1* 35.1* 36.9*   PLATELETS 10*3/mm3 179 184 197   MCV fL 90.5 88.9 90.0     Lab Results - Last 18 Months   Lab Units 07/15/22  0923 06/24/22  1014 06/16/22  1031   SODIUM mmol/L 138 139 136   POTASSIUM mmol/L 4.8 4.8 4.9   CHLORIDE mmol/L 105 105 104   CO2 mmol/L 20.0* 21.0* 20.0*   BUN mg/dL 18 16 9   CREATININE mg/dL 0.97 0.89 0.86   CALCIUM mg/dL 9.1 8.7 8.5*   BILIRUBIN mg/dL 0.3 0.2 0.2   ALK PHOS U/L 39 33* 36*   ALT (SGPT) U/L 8 7 6   AST (SGOT) U/L 18 13 18   GLUCOSE mg/dL 99 100* 70     Assessment " & Plan     Assessment:    Metastatic esophageal carcinoma:    Patient has a history of GE junction poorly differentiated adenocarcinoma for which he had chemoradiation followed by [place] Gurwinder resection:  Pathology showed ypT3,pN1 disease.  Tumor was Stage IIIA, diagnosed in 2016.  He received concurrent chemoradiation with weekly Carboplatin and Taxol and radiation finishing on 3/15/17.  CT scan on 1/10/19 showed new retroperitoneal lymph nodes.  These lymph nodes were biopsied on 1/30/19 and it shows metastatic esophageal cancer.  [facility] Next Gen testing was performed on the sample and it shows KRAS mutation, microsatellite stable tumor.  PD-L1 is positive.  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  HER2/marvel (ERBB2) was not amplified and was negative.  His CT scan on 5/1/19 shows evidence of progression.  Patient has started receiving Keytruda on 5/31/19.  He has received 19 cycles so far.   CT scan on 1/28/2020 shows improvement and continued response.  CT scan chest abdomen pelvis with contrast on 12/10/2020 does not show any evidence of disease progression.  On 12/22/2020 decision made to hold Keytruda per patient's request to get a treatment break.. Repeat CT scan chest 7/12/2021 does not show any evidence of disease progression.  Now CT imaging in January 2021 with a CT showing increased abdominal left para-aortic adenopathy.  Subsequent PET/CT with new cedric metastatic disease in the retroperitoneum of the abdomen.  New pathologically enlarged hypermetabolic retroperitoneal lymph nodes.  This would be concerning for disease recurrence/progression.  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  I discussed with him the treatment options going forward.  He has had a good response with immunotherapy pembrolizumab in the past. This was  restarted. subsequent imaging with treatment response. Changed to every 6 week infusion, still complains of ongoing shortness of breath, cough. Possibility of immunotherapy  induced pneumonitis, however symptoms are not significant, patient advised to call/go to ER if shortness of breath is worsening. I will reevaluate in 3 weeks.    Shortness of breath  Improved, has occasional shortness of breath, likely copd  I have recommended using albuterol.     Reflux/heartburn/dysphagia:   Symptoms improved.  Also has a history of strictures. Status post EGD and balloon dilation.  Omeprazole once daily, famotidine as needed, symptoms stable.    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  Continues on Synthroid monitor TSH and fT4 with treatment.    Mild diarrhea:  diarrhea seems to be waxing and waning in nature.  Related to immunotherapy, but not severe. Monitor for now.    PD-L1 positive, HER2 negative, p53 and KRAS positive.  Repeat Caris testing with repeat biopsy with no target mutations.    B12 deficiency: Continue B12 1000 mcg twice daily.    Anemia - Hb 9.6, initially low iron sat, was given iv iron infusion, now hb to 12.2    F/u in 3 weeks    I have reviewed and confirmed the accuracy of the patient's history: Chief complaint, HPI, ROS and Subjective as entered by the MA/LPN/RN.     Jean Pierre Elizondo MD 08/29/22     No orders of the defined types were placed in this encounter.

## 2022-08-25 DIAGNOSIS — Z51.11 ENCOUNTER FOR ANTINEOPLASTIC CHEMOTHERAPY: ICD-10-CM

## 2022-08-25 DIAGNOSIS — C16.0 MALIGNANT NEOPLASM OF CARDIA: ICD-10-CM

## 2022-08-25 DIAGNOSIS — Z51.81 ENCOUNTER FOR THERAPEUTIC DRUG MONITORING: Primary | ICD-10-CM

## 2022-08-25 RX ORDER — SODIUM CHLORIDE 9 MG/ML
250 INJECTION, SOLUTION INTRAVENOUS ONCE
Status: CANCELLED | OUTPATIENT
Start: 2022-08-29

## 2022-08-29 ENCOUNTER — OFFICE VISIT (OUTPATIENT)
Dept: ONCOLOGY | Facility: CLINIC | Age: 72
End: 2022-08-29

## 2022-08-29 ENCOUNTER — HOSPITAL ENCOUNTER (OUTPATIENT)
Dept: ONCOLOGY | Facility: HOSPITAL | Age: 72
Setting detail: INFUSION SERIES
Discharge: HOME | End: 2022-08-29

## 2022-08-29 VITALS
TEMPERATURE: 97.1 F | WEIGHT: 151 LBS | SYSTOLIC BLOOD PRESSURE: 141 MMHG | HEIGHT: 66 IN | RESPIRATION RATE: 18 BRPM | BODY MASS INDEX: 24.27 KG/M2 | OXYGEN SATURATION: 96 % | HEART RATE: 65 BPM | DIASTOLIC BLOOD PRESSURE: 73 MMHG

## 2022-08-29 VITALS
TEMPERATURE: 97.1 F | WEIGHT: 151 LBS | BODY MASS INDEX: 24.27 KG/M2 | HEART RATE: 65 BPM | SYSTOLIC BLOOD PRESSURE: 141 MMHG | RESPIRATION RATE: 18 BRPM | OXYGEN SATURATION: 96 % | DIASTOLIC BLOOD PRESSURE: 73 MMHG | HEIGHT: 66 IN

## 2022-08-29 DIAGNOSIS — C48.0 PRIMARY MALIGNANT NEOPLASM OF RETROPERITONEUM: ICD-10-CM

## 2022-08-29 DIAGNOSIS — C15.5 PRIMARY MALIGNANT NEOPLASM OF LOWER THIRD OF ESOPHAGUS: ICD-10-CM

## 2022-08-29 DIAGNOSIS — Z51.11 ENCOUNTER FOR ANTINEOPLASTIC CHEMOTHERAPY: ICD-10-CM

## 2022-08-29 DIAGNOSIS — Z51.81 ENCOUNTER FOR THERAPEUTIC DRUG MONITORING: ICD-10-CM

## 2022-08-29 DIAGNOSIS — C16.0 MALIGNANT NEOPLASM OF CARDIA: Primary | ICD-10-CM

## 2022-08-29 DIAGNOSIS — R93.89 ABNORMAL FINDINGS ON DIAGNOSTIC IMAGING OF OTHER SPECIFIED BODY STRUCTURES: ICD-10-CM

## 2022-08-29 LAB
ALBUMIN SERPL-MCNC: 4.1 G/DL (ref 3.5–5.2)
ALBUMIN/GLOB SERPL: 1.7 G/DL
ALP SERPL-CCNC: 35 U/L (ref 39–117)
ALT SERPL W P-5'-P-CCNC: 7 U/L (ref 1–41)
ANION GAP SERPL CALCULATED.3IONS-SCNC: 10 MMOL/L (ref 5–15)
AST SERPL-CCNC: 18 U/L (ref 1–40)
BASOPHILS # BLD AUTO: 0.05 10*3/MM3 (ref 0–0.2)
BASOPHILS NFR BLD AUTO: 0.8 % (ref 0–1.5)
BILIRUB SERPL-MCNC: 0.4 MG/DL (ref 0–1.2)
BUN SERPL-MCNC: 13 MG/DL (ref 8–23)
BUN/CREAT SERPL: 13.7 (ref 7–25)
CALCIUM SPEC-SCNC: 8.7 MG/DL (ref 8.6–10.5)
CHLORIDE SERPL-SCNC: 105 MMOL/L (ref 98–107)
CO2 SERPL-SCNC: 23 MMOL/L (ref 22–29)
CREAT SERPL-MCNC: 0.95 MG/DL (ref 0.76–1.27)
DEPRECATED RDW RBC AUTO: 47.4 FL (ref 37–54)
EGFRCR SERPLBLD CKD-EPI 2021: 84.5 ML/MIN/1.73
EOSINOPHIL # BLD AUTO: 0.31 10*3/MM3 (ref 0–0.4)
EOSINOPHIL NFR BLD AUTO: 5.2 % (ref 0.3–6.2)
ERYTHROCYTE [DISTWIDTH] IN BLOOD BY AUTOMATED COUNT: 14.9 % (ref 12.3–15.4)
GLOBULIN UR ELPH-MCNC: 2.4 GM/DL
GLUCOSE BLDC GLUCOMTR-MCNC: 103 MG/DL (ref 70–105)
GLUCOSE SERPL-MCNC: 115 MG/DL (ref 65–99)
HCT VFR BLD AUTO: 37.1 % (ref 37.5–51)
HGB BLD-MCNC: 12.2 G/DL (ref 13–17.7)
LYMPHOCYTES # BLD AUTO: 1.58 10*3/MM3 (ref 0.7–3.1)
LYMPHOCYTES NFR BLD AUTO: 26.6 % (ref 19.6–45.3)
MCH RBC QN AUTO: 29.8 PG (ref 26.6–33)
MCHC RBC AUTO-ENTMCNC: 32.9 G/DL (ref 31.5–35.7)
MCV RBC AUTO: 90.5 FL (ref 79–97)
MONOCYTES # BLD AUTO: 0.71 10*3/MM3 (ref 0.1–0.9)
MONOCYTES NFR BLD AUTO: 11.9 % (ref 5–12)
NEUTROPHILS NFR BLD AUTO: 3.3 10*3/MM3 (ref 1.7–7)
NEUTROPHILS NFR BLD AUTO: 55.5 % (ref 42.7–76)
PLATELET # BLD AUTO: 179 10*3/MM3 (ref 140–450)
PMV BLD AUTO: 9.9 FL (ref 6–12)
POTASSIUM SERPL-SCNC: 5.1 MMOL/L (ref 3.5–5.2)
PROT SERPL-MCNC: 6.5 G/DL (ref 6–8.5)
RBC # BLD AUTO: 4.1 10*6/MM3 (ref 4.14–5.8)
SODIUM SERPL-SCNC: 138 MMOL/L (ref 136–145)
T4 SERPL-MCNC: 8.78 MCG/DL (ref 4.5–11.7)
TSH SERPL DL<=0.05 MIU/L-ACNC: 3.08 UIU/ML (ref 0.27–4.2)
WBC NRBC COR # BLD: 5.95 10*3/MM3 (ref 3.4–10.8)

## 2022-08-29 PROCEDURE — 96413 CHEMO IV INFUSION 1 HR: CPT

## 2022-08-29 PROCEDURE — 96360 HYDRATION IV INFUSION INIT: CPT

## 2022-08-29 PROCEDURE — 82962 GLUCOSE BLOOD TEST: CPT

## 2022-08-29 PROCEDURE — 84443 ASSAY THYROID STIM HORMONE: CPT | Performed by: INTERNAL MEDICINE

## 2022-08-29 PROCEDURE — 99214 OFFICE O/P EST MOD 30 MIN: CPT | Performed by: INTERNAL MEDICINE

## 2022-08-29 PROCEDURE — 85025 COMPLETE CBC W/AUTO DIFF WBC: CPT | Performed by: INTERNAL MEDICINE

## 2022-08-29 PROCEDURE — 25010000002 PEMBROLIZUMAB 100 MG/4ML SOLUTION 4 ML VIAL: Performed by: INTERNAL MEDICINE

## 2022-08-29 PROCEDURE — 80053 COMPREHEN METABOLIC PANEL: CPT | Performed by: INTERNAL MEDICINE

## 2022-08-29 PROCEDURE — 84436 ASSAY OF TOTAL THYROXINE: CPT | Performed by: INTERNAL MEDICINE

## 2022-08-29 RX ORDER — SODIUM CHLORIDE 9 MG/ML
250 INJECTION, SOLUTION INTRAVENOUS ONCE
Status: COMPLETED | OUTPATIENT
Start: 2022-08-29 | End: 2022-08-29

## 2022-08-29 RX ORDER — SODIUM CHLORIDE 0.9 % (FLUSH) 0.9 %
20 SYRINGE (ML) INJECTION AS NEEDED
Status: DISCONTINUED | OUTPATIENT
Start: 2022-08-29 | End: 2022-08-30 | Stop reason: HOSPADM

## 2022-08-29 RX ORDER — SODIUM CHLORIDE 0.9 % (FLUSH) 0.9 %
20 SYRINGE (ML) INJECTION AS NEEDED
Status: CANCELLED | OUTPATIENT
Start: 2022-08-29

## 2022-08-29 RX ADMIN — SODIUM CHLORIDE 250 ML: 9 INJECTION, SOLUTION INTRAVENOUS at 12:13

## 2022-08-29 RX ADMIN — Medication 20 ML: at 12:45

## 2022-08-29 RX ADMIN — SODIUM CHLORIDE 400 MG: 9 INJECTION, SOLUTION INTRAVENOUS at 12:13

## 2022-09-12 DIAGNOSIS — E03.9 HYPOTHYROIDISM (ACQUIRED): ICD-10-CM

## 2022-09-12 DIAGNOSIS — R79.89 ELEVATED TSH: ICD-10-CM

## 2022-09-13 RX ORDER — LEVOTHYROXINE SODIUM 50 UG/1
TABLET ORAL
Qty: 90 TABLET | Refills: 0 | Status: ON HOLD | OUTPATIENT
Start: 2022-09-13 | End: 2023-01-03 | Stop reason: SDUPTHER

## 2022-09-20 NOTE — PROGRESS NOTES
Hematology-Oncology Follow-up Note       Artie Mc  1950    Primary Care Physician: Chanel Carter APRN  Referring Physician: Chanel Carter APRN    Reason For Visit:  Chief Complaint   Patient presents with   • Follow-up     Primary malignant neoplasm of lower third of esophagus     Metastatic esophageal cancer  Monitoring for adverse effects related to immunotherapy.  Hypothyroidism    HPI:   Mr. Mc is a pleasant 72 y.o. gentleman with a history of gastroesophageal reflux disease, diabetes, hypertension, heart block status post pacemaker placement and history of CABG.  He was having symptoms of dysphagia, weight loss since September 2016. He was reporting symptoms of food getting stuck in the lower chest.  He has transitioned himself to a liquid diet.  The patient reports losing about 30 pounds over this duration.  The patient underwent upper GI endoscopy on 12/20/16 with Dr. Esvin Barrera.  Endoscopy showed necrotic, circumferential and moderately obstructive mass at the distal esophagus between 42 cm and 36 cm.      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  There was also evidence of mild subacute inflammation in the duodenum.  Dr. Barrera ordered a CT scan of the chest and abdomen with contrast on 12/20/16.  [facility]  The scan showed a new ill-defined mass in the anterior wall of the distal esophagus at the GE junction measuring 2.3 x 2.2 x 1.6 cm, worrisome for cancer.  There were four subcentimeter liver lesions which had appeared stable in comparison to another CT scan from 2009 and they likely represent small cysts.  There was also mild lymphadenopathy in the gastrohepatic ligament.  There were at least six borderline enlarged lymph nodes measuring up to 1.3 x 1 cm in the region.  The patient is referred to us for further oncological evaluation.    1/5/2017 - The  patient presents for initial consultation.  He reports persistent dysphagia and also has symptoms of regurgitation.  He cannot tolerate solid foods and is dependent on liquid diet such as Ensure.  He also reports fatigue. Symptoms of dysphagia have been present for the past four months.  When he eats any solids, the food gets stuck in the lower chest.    • 1/5/17 - Vitamin B12 200 (L).  CEA 0.8.  Ferritin 35.  Iron saturation 16% (L), serum iron 56, TIBC 354.  Creatinine 0.9.  LFTs normal.  Folate 10.2.  • 1/12/17 - PET/CT scan:  Intense abnormal activity corresponding to the region of the GE junction and proximal stomach.  SUV is 11.06.  There is a suggestion of an abnormal mass or abnormal wall thickening in the region measuring 3.9 x 4.8 cm.  No additional abnormalities.  No evidence of metastases or other lymphadenopathy.  Scattered nonspecific subcentimeter lymph nodes involving the mediastinum and within the central upper abdomen.  • 1/13/17 - Creatinine 0.9.  LFTs normal.    • 1/17/17 - Patient seen by thoracic surgeon, Dr. Bradley.  PET scan was reviewed.  He has recommended neoadjuvant chemoradiation therapy followed by Noel Gurwinder esophagogastrectomy.  Port-A-Cath is being arranged.    • 1/20/17 - WBC 6.3, hemoglobin 12.1, platelet count 198,000, MCV 86.4.  • 1/30/17 - Patient started weekly Carboplatin and Taxol (Carboplatin AUC 2 and Taxol 50 mg/M2).  Patient received Injectafer 750 mg.  WBC 5.8, hemoglobin 12.4, platelet count 244,000.   • 1/31/17 - Patient seen by Dr. Eduardo Oleary.  The plan was to give 5040 cGy in 28 fractions.   • 2/6/17 - WBC 4.4, hemoglobin 11.6, platelet count 201,000.  Patient received cycle 2 of weekly Carboplatin and Taxol (Carboplatin AUC 2 and Taxol 50 mg/M2).  • 2/13/17 - Patient received Carboplatin and Taxol weekly cycle 3.  • 2/13/17 - Patient started concurrent radiation therapy.  Total planned dose is 4140 cGy.    • 2/13/17 to 2/15/17 - Patient received 4140 cGy of  neoadjuvant radiation.  Radiation was finished on 3/15/17.    • 2/20/17 - WBC 1.8, hemoglobin 10.6, platelet count 217,000.  Creatinine 0.5. Folate 15 (L).  Ferritin 361.  Haptoglobin 214.  Iron saturation 33%, TIBC 263, serum iron 88.    • 2/20/17 - Patient received Carboplatin and Taxol weekly cycle 4.   • 2/27/17 - WBC 3.7, hemoglobin 10.7, platelet count 177,000, MCV 85.3.    • 3/6/17 - Patient has received 16 out of the 23 planned radiation treatments.  Total planned dose is 4140 cGy.    • 3/6/17 - WBC 5.4, hemoglobin 11.2, platelet count 113,000.  • 3/6/17 - Patient received cycle 5 of weekly Carboplatin and Taxol.   • 3/13/17 - Patient received cycle 6 of weekly Carboplatin and Taxol.  WBC 3.9, hemoglobin 11.3, platelet count 93,000.     • 3/20/17 - WBC 1.4, hemoglobin 10.0, platelet count 118,000, MCV 86.1   • 3/27/17 - WBC 3.8, hemoglobin 9.5, platelet count 176,000, MCV 87.3.    • 4/7/17 - Stress test:  No evidence of reversible myocardial ischemia.  Normal left ventricular ejection fraction of 50%.    • 4/17/17 - Upper GI endoscopy by Dr. 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  The GE junction adenocarcinoma lesion seems to have a very good response to chemotherapy and radiation.  The lumen was open.  • 5/1/17 - WBC 7.0, hemoglobin 10.2, platelet count 175,000.    • 5/4/17 - Patient underwent [place] Gurwinder esophagectomy with pyloroplasty, feeding jejunostomy tube, abdominal and mediastinal lymph node dissection.    Surgical Pathology:  Moderately to poorly differentiated adenocarcinoma measuring 1.3 cm at the GE junction.  Resection margins are negative for malignancy.  Tumor margins are negative.  There is effective treatment response.  No evidence of lymphovascular invasion.  Staging is ypT3,pN1.  One out of fourteen lymph nodes involved.   • 5/22/17 - WBC 6.8, hemoglobin 10.3, platelet count 312,000.    • 6/19/17 - WBC 5.7, hemoglobin 10.5, platelet count  204,000, MCV 92.1.    • 7/6/17 - EGD:  Status post balloon dilation of esophageal stricture.  • 7/12/17 - EGD with balloon dilation of esophageal stricture.  • 7/26/17 - EGD and balloon dilation of esophageal stricture.  • 7/31/17 - PET/CT scan:  There is mild metabolic activity seen at the thoracic esophagus just at and below the surgical margins.  Some mild wall thickening.  This could be from recent surgery.  A residual cancer seems unlikely.  There is a precarinal lymph node with mild metabolic activity with SUV of 3 measuring 1 x 1.3 cm.  Again this appears to be nonspecific in my opinion.    • 8/7/17 - WBC 5.9, hemoglobin 11.9, platelet count 171,000, MCV 88.7.    • 10/19/17 - WBC 7.5, hemoglobin 11.8, platelet count 216,000.    • 1/8/18 - PET scan:  No evidence of residual disease or recurrent disease.  There is an esophageal stent in the mid esophagus with a large debris air level.  No evidence of any metastases in the chest, abdomen or pelvis.  Mild nodule uptake in the vocal cords with fullness in the right vocal cord.  This could be physiologic.    • 7/9/18 - CT scan of chest with contrast:  Prominent mediastinal lymph nodes appear stable since February.  Changes of gastric pull-through procedure for esophageal cancer again noted.  Tree-in-bud infiltrates in the left lung base, consistent with infection versus inflammation.    • 1/10/19 - CT chest, abdomen and pelvis with contrast:  No evidence of mets in the chest; however, interval development of metastatic lymphadenopathy in the left side of the retroperitoneum.  For instance, there is a 17 mm rounded lymph node, 10 mm lymph node and also a 14 mm lymph node.  These are all new.  Stable 9 mm hypodense lesion within the right hepatic lobe, which could reflect hemangioma or complex cyst.    • 1/30/19 - CT-guided core biopsy of retroperitoneal lymph node:  Poorly differentiated adenocarcinoma consistent with metastases from patient’s known esophageal  primary.  CK20 positive, CDX2 positive, cytokeratin 7 negative, TTF-1 negative.    • 2/1/19 - Creatinine 0.9, BUN 18, calcium 9.3; these are normal.    • 2/28/19 - [facility] Next Gen sequencing testing on retroperitoneal lymph node specimen:  PD-L1 positive.  CPS = 3.  This implies responsiveness to pembrolizumab.  Mismatch repair status is proficient (no evidence of microsatellite instability).  Tumor mutational burden is low = 6 mutations/Mb.  CDH1 indeterminate.  KRAS mutation positive.  TP53 mutation positive.  CDK6 amplified.  [employer] PD-L1 testing by IHC:  PD-L1 expression 1% positive (CPS =1).  HER2/marvel by IHC is negative.  HER2/marvel by CISH not amplified.  Microsatellite stable tumor.    • 3/7/19 - WBC 7.3, hemoglobin 12, platelet count 128,000, MCV 92.     • 3/14/19 - Patient was seen by radiation oncologist, Dr. Chahal.  He has recommended observation versus systemic chemoimmunotherapy as needed.  No plans for radiation therapy.   • 5/1/19 - CT scan of chest with contrast:  No definitive findings of new metastatic disease in the chest.  Emphysema noted.    • 5/1/19 - CT abdomen and pelvis:  There is interval progression of metastatic retroperitoneal adenopathy.  Left periaortic adenopathy with lymph node measuring up to 2 cm, previously 1.7 cm.  Another lymph node now measures 2.3, previously 1.4 cm.  New enlarged lymph node on image 147 measures 1.4 cm, previously 0.4 cm.  A 9 mm hypodense right hepatic lobe lesion appears stable.    • 5/9/19 - Decision made to start patient on immunotherapy Keytruda.    • 5/9/19 - Vitamin B12 229.  Ferritin 61.  Folate 14.3.  Iron saturation 26%, TIBC 304, serum iron 79.  • 5/31/19 - Patient received Keytruda 200 mg cycle 1, day 1.    • 5/31/19 - Free T4 0.99.  Creatinine 0.9, BUN 9.  LFTs normal.  TSH 3.99.  Folate 14.3.  Iron saturation 26%.    • 6/21/2019 patient received Keytruda cycle 2  • 7/12/2019: Patient received Keytruda cycle 3  • 8/2/2019 patient received cycle 4  Keytruda WBC 7.2, hemoglobin 11.8 platelet count 163  • 8/23/2019 creatinine 0.9, LFTs normal, WBC 6.4, hemoglobin 11.5, platelets 179, TSH 2.1   • 8/23/2019 patient received cycle 5 of Keytruda  • 9/3/2019 CT chest abdomen and pelvis with contrast: . Positive response to therapy in the abdomen since 05/01/2019. Pathologically enlarged retroperitoneal lymph nodes, predominantly in the left periaortic region, have diminished in size.a 1.3 x 2.1 cm left periaortic node (image 60), previously measured 3.5 x 2.3 cm. A 1.6 cm index node near the level of the left renal vein previously measured 2.0 cm No new or progressive adenopathy. 2. Stable findings in the chest. Noncalcified right upper lobe nodules are unchanged. There is no evidence of new or progressive metastatic disease within the chest. 3. 8 mm indeterminate low-density lesion in the right hepatic lobe, is stable. No new liver lesions.  • 9/6/2019 WBC 5.6, hemoglobin 11.2, platelets of 192, MCV 91.8  • 9/13/2019 patient received Keytruda cycle 6, albumin 3.2  • 10/4/2019 patient received cycle 7 of Keytruda, TSH 4.8, free T4 0.86  • 10/9/2019 WBC 5.8, hemoglobin 11.7, platelets 174  • 10/25/2019 patient received cycle 8 of Keytruda.  WBC 5.9, hemoglobin 11.5, platelets 152, creatinine 0.75, cortisol 15.2, TSH 6.48 high , free T4 1.13 normal  • 11/6/2019 WBC 6.6, hemoglobin 11.8, MCV 91.5, platelets 187  • 11/15/2019 patient received cycle 9 of Keytruda, WBC 6.6, hemoglobin 11.9, platelets 161, cortisol level 8.86, TSH 2.68, free T3 2.8, free T4 1.5  • 12/4/2019 WBC 6.9, hemoglobin 12.0, platelets 180, MCV 92.9  • 12/06/2019-Keytruda Cycle 10  • 12/27/2019- Cycle 11 LFTs normal, WBC 6.7, hemoglobin 11.4, platelets 168, MCV 93, TSH 2.4,  • 1/28/2020: CT chest abdomen and pelvis with contrast:  Single (aortic lymph node in the abdominal retroperitoneum has increased.  Rest of the retroperitoneal lymph nodes have decreased in size.  Mediastinal adenopathy appears  unchanged.  Noncalcified bilateral pulmonary nodules are stable  • 1/17/2020 patient received Keytruda cycle 12:.  • 2/3/2020 WBC 7.1, hemoglobin 12.2, platelets 171  • 02/10/2020 patient received cycle 13 of Keytruda: WBC 7.3, hemoglobin 12.1, platelets 166, creatinine 0.82  • 3/6/2020 patient is of Keytruda 200 mg, hemoglobin 10.8  • 3/27/2020 patient received Keytruda 200 mg, hemoglobin 11.4, TSH 1.95  • 4/17/2020 patient received Keytruda cycle 16 200 mg, WBC 6.1, hemoglobin 11.6, platelets 150  • 5/8/2020: Patient received cycle 17 Keytruda, WBC 6.3, hemoglobin 9.6, platelets 137, TSH 2.05, free T4 1.54, creatinine 0.93, LFTs normal,  • 5/29/2020: Patient received cycle 18 Keytruda, WBC 6.5, hemoglobin 11.7, platelets 129, creatinine 0.83, TSH 1.69  • 6/26/2020: Patient receiving Keytruda.   • 7/17/2020: TSH 2.06, WBC 5.7, hemoglobin 11.4, platelets 177, MCV 92.4, Keytruda 200 mg cycle 20  • 7/27/2020: CT chest abdomen pelvis with contrast: Stable findings in the chest abdomen pelvis since 1/28/2020.  Noncalcified bilateral pulmonary nodules are stable.  Stable retroperitoneal left perinephric adenopathy in the abdomen.  • 8/7/2020: Patient received cycle 21 of Keytruda  • 8/28/2020 patient is a cycle 22 of Keytruda.  WBC 6.02, hemoglobin 11.3, platelets 161  • 9/11/2020 WBC 6.9, hemoglobin 12.4, platelets 192  • 9/18/2020: Cycle 23 of Keytruda, TSH 1.19  • 10/9/2020: Cycle 24 of Keytruda, WBC 5.9, hemoglobin 11.9, platelets 178, creatinine 0.84, TSH 2.56, free T3 2.4, CMP normal  • 10/23/2020: WBC 7.34, hemoglobin 11.6, platelets 175  • 10/30/2020:.  Keytruda 200 mg  • 11/20/2020: Keytruda 200 mg  • 12/11/2020 Keytruda 200 mg.  WBC 5.5, hemoglobin 11.4, platelets 176, creatinine 0.8, LFTs normal, TSH 2.2, cortisol 8.3  • 12/10/2020: CT chest abdomen and pelvis with contrast: Previously seen left para-aortic lymph nodes within the abdomen are decreased in size.  No pathological lymphadenopathy.  No evidence of  residual malignancy..  Small noncalcified pulmonary nodules are stable.  No new nodules.  Stable mediastinal lymph nodes.  • 3/12/2021: WBC 6.7, hemoglobin 11.3, platelets 17  • 3/12/2021: CMP normal, TSH 2.9, cortisol 11.95, 1  • 3/15/2021: CT chest with contrast: Stable findings in the chest with postsurgical changes of esophageal resection and gastric pull-through.  Stable right lower lobe lung nodule measures 11 mm.  Stable size and appearance of the mediastinal and upper abdominal lymph nodes.  • 3/23/2021: Doppler of upper extremity: Normal.  • 3/24/2021: CT abdomen and pelvis with contrast: Postoperative changes.  Left periaortic nodes are stable.  • 4/12/2021: TSH 4.1, cortisol 12.4  • 6/22/2021: CMP normal,Cortisol 10.08,, WBC 5.8, hemoglobin 11.2, platelets 109, TSH 2.59  • 7/12/2021: CT scan of the chest abdomen pelvis with contrast: Dominant 10 x 10 left periaortic lymph node is stable since 3/22/2021.  Dominant lymph nodes in the right lower paratracheal and AP window distribution are stable since 3/15/2021.  No evidence of progressive adenopathy in the chest abdomen or pelvis.    • 01/11/2021 -PET/CT findings consistent with new cedric metastatic disease in the retroperitoneum.  2 new pathologically enlarged hypermetabolic retroperitoneal lymph nodes.  No convincing evidence of recurrent disease within the chest neck pelvis or skeleton.  • 1/20/2022 -CT-guided needle biopsy consistent with poorly differentiated carcinoma.  CDX2 positive consistent with metastases from known esophageal primary  • 2/9/2022 - Pembrolizumab complicated with fatigue, joint pains, nausea, diarrhea. Symptoms decrease in intensity over a week.  • 3/30/2022 -CT chest and pelvis with stable metastatic lymph nodes in the left periaortic retroperitoneum.  No evidence of disease progression or new lesions.  Stable lymphadenopathy in the distal paratracheal mediastinal area  • 4/13/2022 - Keytruda 200  • 5/4/2022 - Keytruda  200  • Continued keytruda  • 7/11/2022 - CT abdomen pelvis with decrease in size of left perioaortic lymph node. No other areas of disease.  • Continues to be on Keytruda    Subjective:    Improvement in shortness of breath, no other new symptoms      Past Medical History:   Diagnosis Date   • B12 deficiency    • Blockage of coronary artery of heart (HCC)    • Depression    • DM (diabetes mellitus) (HCC)    • Dysphagia    • HTN (hypertension)    • Hyperlipidemia        Past Surgical History:   Procedure Laterality Date   • ABDOMINAL WALL ABSCESS INCISION AND DRAINAGE  10/22/2018    WITH DEBRIDEMENT  1.5CM X 1.5 CM X 1.5 CM    DR. PURI   • CATARACT EXTRACTION  2018   • COLONOSCOPY     • CORONARY ARTERY BYPASS GRAFT  2015   • ENDOSCOPY  12/20/2016   • ESOPHAGECTOMY  05/04/2017    BROOKS PETTY ESOPHAGECTOMY, FEEDING JEJNOSTOMOY, ABDOMINAL AND MEDIASTINAL LYMPH NODE DISECTION, PEDICLED MUSCLE FLAP COVERAGE DR. PURI   • ESOPHAGOSCOPY / EGD  07/12/2017    WITH BALLOON DILATION   • ESOPHAGOSCOPY / EGD  07/26/2017    WITH BALLOON DILATION   • ESOPHAGOSCOPY / EGD  08/11/2017    WITH BALLOON DILATION   • ESOPHAGOSCOPY / EGD  08/28/2017    WITH BALLOON DILATION   • ESOPHAGOSCOPY / EGD  09/27/2017    WITH BALLOON DILATION   • LYMPH NODE BIOPSY  01/30/2019   • PACEMAKER IMPLANTATION  11/21/2016       Current Outpatient Medications:   •  Cetirizine HCl (ZyrTEC Allergy) 10 MG capsule, Take  by mouth., Disp: , Rfl:   •  clonazePAM (KlonoPIN) 0.5 MG tablet, Pt taking 1.5 pills daily, Disp: , Rfl: 1  •  cyanocobalamin (VITAMIN B-12) 1000 MCG tablet, , Disp: , Rfl:   •  diphenhydrAMINE-APAP, sleep, (TYLENOL PM EXTRA STRENGTH PO), Take  by mouth., Disp: , Rfl:   •  Euthyrox 50 MCG tablet, Take 1 tablet by mouth once daily, Disp: 90 tablet, Rfl: 0  •  ferrous sulfate 325 (65 FE) MG tablet, Take 1 tablet by mouth 2 (Two) Times a Day With Meals., Disp: 60 tablet, Rfl: 3  •  folic acid (FOLVITE) 1 MG tablet, Daily., Disp: , Rfl:   •   "metFORMIN (GLUCOPHAGE) 1000 MG tablet, 2 (Two) Times a Day With Meals., Disp: , Rfl:   •  metoprolol tartrate (LOPRESSOR) 25 MG tablet, Take 12.5 mg by mouth 2 (Two) Times a Day., Disp: , Rfl:   •  omeprazole (priLOSEC) 20 MG capsule, Take 20 mg by mouth 2 (Two) Times a Day., Disp: , Rfl:   •  ondansetron (ZOFRAN) 4 MG tablet, Take 1 tablet by mouth Every 8 (Eight) Hours As Needed for Nausea or Vomiting., Disp: 90 tablet, Rfl: 2  •  pravastatin (PRAVACHOL) 10 MG tablet, Take 10 mg by mouth every night at bedtime., Disp: , Rfl:     Allergies   Allergen Reactions   • Ace Inhibitors Unknown (See Comments)     Unknown reaction     • Heparin Other (See Comments)     Fever/chills, weakness in legs   • Sulfa Antibiotics Other (See Comments)     Mouth sores       Family History   Problem Relation Age of Onset   • Mental illness Mother    • Heart disease Mother    • Hypertension Father    • Cancer Father    • Heart disease Sister    • Heart disease Brother      Cancer-related family history includes Cancer in his father.    Social History     Tobacco Use   • Smoking status: Former Smoker     Packs/day: 2.00     Years: 50.00     Pack years: 100.00     Types: Cigarettes     Quit date: 2015     Years since quittin.2   • Smokeless tobacco: Former User     Types: Chew     Quit date: 1989   Substance Use Topics   • Alcohol use: No   • Drug use: No     ROS:     Objective:    Vitals:    22 1103   BP: 145/72   Pulse: 69   Resp: 16   Temp: 96.9 °F (36.1 °C)   SpO2: 100%   Weight: 68.5 kg (151 lb)   Height: 167.6 cm (66\")   PainSc: 0-No pain       (1) Restricted in physically strenuous activity, ambulatory and able to do work of light nature    Physical Exam:     Physical Exam   Constitutional: He is oriented to person, place, and time. He appears well-developed. No distress.   HENT:   Head: Normocephalic and atraumatic.   Right Ear: Tympanic membrane normal.   Nose: Nose normal.   Mouth/Throat: Mucous membranes are " moist.   Eyes: Pupils are equal, round, and reactive to light.   Neck: No thyromegaly present.   Cardiovascular: Normal rate, regular rhythm, normal heart sounds and normal pulses. Exam reveals no gallop and no friction rub.   Pulmonary/Chest: Effort normal. He has rhonchi.   Abdominal: Soft. Normal appearance and bowel sounds are normal.   No tenderness   Musculoskeletal: Normal range of motion. No deformity or signs of injury.      Right lower leg: No edema.   Neurological: He is alert and oriented to person, place, and time. He exhibits normal muscle tone.   Skin: Skin is warm and dry. No rash noted. He is not diaphoretic. No erythema. No jaundice.   Right side chest  port   Vitals reviewed.      Lab Results - Last 18 Months   Lab Units 09/21/22  1100 08/29/22  1051 07/15/22  0923   WBC 10*3/mm3 6.04 5.95 4.69   HEMOGLOBIN g/dL 12.3* 12.2* 11.4*   HEMATOCRIT % 37.1* 37.1* 35.1*   PLATELETS 10*3/mm3 186 179 184   MCV fL 92.8 90.5 88.9     Lab Results - Last 18 Months   Lab Units 08/29/22  1051 07/15/22  0923 06/24/22  1014   SODIUM mmol/L 138 138 139   POTASSIUM mmol/L 5.1 4.8 4.8   CHLORIDE mmol/L 105 105 105   CO2 mmol/L 23.0 20.0* 21.0*   BUN mg/dL 13 18 16   CREATININE mg/dL 0.95 0.97 0.89   CALCIUM mg/dL 8.7 9.1 8.7   BILIRUBIN mg/dL 0.4 0.3 0.2   ALK PHOS U/L 35* 39 33*   ALT (SGPT) U/L 7 8 7   AST (SGOT) U/L 18 18 13   GLUCOSE mg/dL 115* 99 100*     Assessment & Plan     Assessment:    Metastatic esophageal carcinoma:    Patient has a history of GE junction poorly differentiated adenocarcinoma for which he had chemoradiation followed by Noel Gurwinder resection:  Pathology showed ypT3,pN1 disease.  Tumor was Stage IIIA, diagnosed in 2016.  He received concurrent chemoradiation with weekly Carboplatin and Taxol and radiation finishing on 3/15/17.  CT scan on 1/10/19 showed new retroperitoneal lymph nodes.  These lymph nodes were biopsied on 1/30/19 and it shows metastatic esophageal cancer.  [facility] Next Gen  testing was performed on the sample and it shows KRAS mutation, microsatellite stable tumor.  PD-L1 is positive.  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  HER2/marvel (ERBB2) was not amplified and was negative.  His CT scan on 5/1/19 shows evidence of progression.  Patient has started receiving Keytruda on 5/31/19.  He has received 19 cycles so far.   CT scan on 1/28/2020 shows improvement and continued response.  CT scan chest abdomen pelvis with contrast on 12/10/2020 does not show any evidence of disease progression.  On 12/22/2020 decision made to hold Keytruda per patient's request to get a treatment break.. Repeat CT scan chest 7/12/2021 does not show any evidence of disease progression.  Now CT imaging in January 2021 with a CT showing increased abdominal left para-aortic adenopathy.  Subsequent PET/CT with new cedric metastatic disease in the retroperitoneum of the abdomen.  New pathologically enlarged hypermetabolic retroperitoneal lymph nodes.  This would be concerning for disease recurrence/progression.  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  I discussed with him the treatment options going forward.  He has had a good response with immunotherapy pembrolizumab in the past. This was  restarted. subsequent imaging with treatment response. Changed to every 6 week infusion, continue the same. Repeat imaging in a month and follow up.     Shortness of breath  Improved, has occasional shortness of breath, likely copd  I have recommended using albuterol. continue    Reflux/heartburn/dysphagia:   Symptoms improved.  Also has a history of strictures. Status post EGD and balloon dilation.  Omeprazole once daily, famotidine as needed, symptoms stable, continue.    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  Continues on Synthroid monitor TSH and fT4 with treatment. Monitor     Mild diarrhea:  diarrhea seems to be waxing and waning in nature.  Related to immunotherapy, but not severe. improved    PD-L1  positive, HER2 negative, p53 and KRAS positive.  Repeat [facility] testing with repeat biopsy with no target mutations.    B12 deficiency: Continue B12 1000 mcg twice daily.    Anemia - Hb improved now    F/u after imaging       Jean Pierre Elizondo MD 09/21/22     Orders Placed This Encounter   Procedures   • CT chest w contrast     Standing Status:   Future     Standing Expiration Date:   9/21/2023     Scheduling Instructions:      Schedule mid-october   • CT abdomen pelvis w contrast     Standing Status:   Future     Standing Expiration Date:   9/21/2023     Scheduling Instructions:      Schedule in mid-october     Order Specific Question:   Will Oral Contrast be needed for this procedure?     Answer:   No

## 2022-09-21 ENCOUNTER — OFFICE VISIT (OUTPATIENT)
Dept: ONCOLOGY | Facility: CLINIC | Age: 72
End: 2022-09-21

## 2022-09-21 ENCOUNTER — LAB (OUTPATIENT)
Dept: LAB | Facility: HOSPITAL | Age: 72
End: 2022-09-21

## 2022-09-21 VITALS
SYSTOLIC BLOOD PRESSURE: 145 MMHG | HEART RATE: 69 BPM | DIASTOLIC BLOOD PRESSURE: 72 MMHG | WEIGHT: 151 LBS | HEIGHT: 66 IN | BODY MASS INDEX: 24.27 KG/M2 | TEMPERATURE: 96.9 F | OXYGEN SATURATION: 100 % | RESPIRATION RATE: 16 BRPM

## 2022-09-21 DIAGNOSIS — C15.9 ESOPHAGEAL CANCER, STAGE IV: Primary | ICD-10-CM

## 2022-09-21 DIAGNOSIS — C15.9 ESOPHAGEAL CANCER, STAGE IV: ICD-10-CM

## 2022-09-21 DIAGNOSIS — C16.0 MALIGNANT NEOPLASM OF CARDIA: Primary | ICD-10-CM

## 2022-09-21 LAB
BASOPHILS # BLD AUTO: 0.03 10*3/MM3 (ref 0–0.2)
BASOPHILS NFR BLD AUTO: 0.5 % (ref 0–1.5)
DEPRECATED RDW RBC AUTO: 44.9 FL (ref 37–54)
EOSINOPHIL # BLD AUTO: 0.35 10*3/MM3 (ref 0–0.4)
EOSINOPHIL NFR BLD AUTO: 5.8 % (ref 0.3–6.2)
ERYTHROCYTE [DISTWIDTH] IN BLOOD BY AUTOMATED COUNT: 13.7 % (ref 12.3–15.4)
HCT VFR BLD AUTO: 37.1 % (ref 37.5–51)
HGB BLD-MCNC: 12.3 G/DL (ref 13–17.7)
HOLD SPECIMEN: NORMAL
HOLD SPECIMEN: NORMAL
LYMPHOCYTES # BLD AUTO: 1.71 10*3/MM3 (ref 0.7–3.1)
LYMPHOCYTES NFR BLD AUTO: 28.3 % (ref 19.6–45.3)
MCH RBC QN AUTO: 30.8 PG (ref 26.6–33)
MCHC RBC AUTO-ENTMCNC: 33.2 G/DL (ref 31.5–35.7)
MCV RBC AUTO: 92.8 FL (ref 79–97)
MONOCYTES # BLD AUTO: 0.72 10*3/MM3 (ref 0.1–0.9)
MONOCYTES NFR BLD AUTO: 11.9 % (ref 5–12)
NEUTROPHILS NFR BLD AUTO: 3.23 10*3/MM3 (ref 1.7–7)
NEUTROPHILS NFR BLD AUTO: 53.5 % (ref 42.7–76)
PLATELET # BLD AUTO: 186 10*3/MM3 (ref 140–450)
PMV BLD AUTO: 9.8 FL (ref 6–12)
RBC # BLD AUTO: 4 10*6/MM3 (ref 4.14–5.8)
WBC NRBC COR # BLD: 6.04 10*3/MM3 (ref 3.4–10.8)

## 2022-09-21 PROCEDURE — 85025 COMPLETE CBC W/AUTO DIFF WBC: CPT

## 2022-09-21 PROCEDURE — 99214 OFFICE O/P EST MOD 30 MIN: CPT | Performed by: INTERNAL MEDICINE

## 2022-09-21 PROCEDURE — 36415 COLL VENOUS BLD VENIPUNCTURE: CPT

## 2022-09-23 PROCEDURE — 93296 REM INTERROG EVL PM/IDS: CPT | Performed by: INTERNAL MEDICINE

## 2022-09-23 PROCEDURE — 93294 REM INTERROG EVL PM/LDLS PM: CPT | Performed by: INTERNAL MEDICINE

## 2022-09-27 ENCOUNTER — CLINICAL SUPPORT NO REQUIREMENTS (OUTPATIENT)
Dept: CARDIOLOGY | Facility: CLINIC | Age: 72
End: 2022-09-27

## 2022-09-27 ENCOUNTER — OUTSIDE FACILITY SERVICE (OUTPATIENT)
Dept: CARDIOLOGY | Facility: CLINIC | Age: 72
End: 2022-09-27

## 2022-09-27 DIAGNOSIS — R00.1 BRADYCARDIA: Primary | ICD-10-CM

## 2022-09-27 DIAGNOSIS — Z95.0 PRESENCE OF CARDIAC PACEMAKER: ICD-10-CM

## 2022-09-27 PROCEDURE — 93280 PM DEVICE PROGR EVAL DUAL: CPT | Performed by: INTERNAL MEDICINE

## 2022-09-27 PROCEDURE — 93010 ELECTROCARDIOGRAM REPORT: CPT | Performed by: INTERNAL MEDICINE

## 2022-09-27 PROCEDURE — 99214 OFFICE O/P EST MOD 30 MIN: CPT | Performed by: INTERNAL MEDICINE

## 2022-10-10 ENCOUNTER — HOSPITAL ENCOUNTER (OUTPATIENT)
Dept: ONCOLOGY | Facility: HOSPITAL | Age: 72
Setting detail: INFUSION SERIES
Discharge: HOME | End: 2022-10-10

## 2022-10-10 VITALS
DIASTOLIC BLOOD PRESSURE: 65 MMHG | OXYGEN SATURATION: 98 % | HEIGHT: 66 IN | HEART RATE: 75 BPM | TEMPERATURE: 96.8 F | WEIGHT: 152 LBS | SYSTOLIC BLOOD PRESSURE: 144 MMHG | RESPIRATION RATE: 18 BRPM | BODY MASS INDEX: 24.43 KG/M2

## 2022-10-10 DIAGNOSIS — Z51.81 ENCOUNTER FOR THERAPEUTIC DRUG MONITORING: ICD-10-CM

## 2022-10-10 DIAGNOSIS — R11.0 NAUSEA: ICD-10-CM

## 2022-10-10 DIAGNOSIS — Z51.11 ENCOUNTER FOR ANTINEOPLASTIC CHEMOTHERAPY: ICD-10-CM

## 2022-10-10 DIAGNOSIS — C16.0 MALIGNANT NEOPLASM OF CARDIA: ICD-10-CM

## 2022-10-10 DIAGNOSIS — C16.0 MALIGNANT NEOPLASM OF CARDIA: Primary | ICD-10-CM

## 2022-10-10 DIAGNOSIS — Z51.81 ENCOUNTER FOR THERAPEUTIC DRUG MONITORING: Primary | ICD-10-CM

## 2022-10-10 LAB
ALBUMIN SERPL-MCNC: 4.1 G/DL (ref 3.5–5.2)
ALBUMIN/GLOB SERPL: 1.7 G/DL
ALP SERPL-CCNC: 35 U/L (ref 39–117)
ALT SERPL W P-5'-P-CCNC: 12 U/L (ref 1–41)
ANION GAP SERPL CALCULATED.3IONS-SCNC: 12 MMOL/L (ref 5–15)
AST SERPL-CCNC: 20 U/L (ref 1–40)
BASOPHILS # BLD AUTO: 0.05 10*3/MM3 (ref 0–0.2)
BASOPHILS NFR BLD AUTO: 0.8 % (ref 0–1.5)
BILIRUB SERPL-MCNC: 0.3 MG/DL (ref 0–1.2)
BUN SERPL-MCNC: 14 MG/DL (ref 8–23)
BUN/CREAT SERPL: 16.1 (ref 7–25)
CALCIUM SPEC-SCNC: 9.1 MG/DL (ref 8.6–10.5)
CHLORIDE SERPL-SCNC: 105 MMOL/L (ref 98–107)
CO2 SERPL-SCNC: 21 MMOL/L (ref 22–29)
CREAT SERPL-MCNC: 0.87 MG/DL (ref 0.76–1.27)
DEPRECATED RDW RBC AUTO: 45.1 FL (ref 37–54)
EGFRCR SERPLBLD CKD-EPI 2021: 91.1 ML/MIN/1.73
EOSINOPHIL # BLD AUTO: 0.28 10*3/MM3 (ref 0–0.4)
EOSINOPHIL NFR BLD AUTO: 4.5 % (ref 0.3–6.2)
ERYTHROCYTE [DISTWIDTH] IN BLOOD BY AUTOMATED COUNT: 13.6 % (ref 12.3–15.4)
GLOBULIN UR ELPH-MCNC: 2.4 GM/DL
GLUCOSE BLDC GLUCOMTR-MCNC: 169 MG/DL (ref 70–105)
GLUCOSE SERPL-MCNC: 181 MG/DL (ref 65–99)
HCT VFR BLD AUTO: 36.5 % (ref 37.5–51)
HGB BLD-MCNC: 11.9 G/DL (ref 13–17.7)
LYMPHOCYTES # BLD AUTO: 1.52 10*3/MM3 (ref 0.7–3.1)
LYMPHOCYTES NFR BLD AUTO: 24.3 % (ref 19.6–45.3)
MCH RBC QN AUTO: 30.6 PG (ref 26.6–33)
MCHC RBC AUTO-ENTMCNC: 32.6 G/DL (ref 31.5–35.7)
MCV RBC AUTO: 93.8 FL (ref 79–97)
MONOCYTES # BLD AUTO: 0.65 10*3/MM3 (ref 0.1–0.9)
MONOCYTES NFR BLD AUTO: 10.4 % (ref 5–12)
NEUTROPHILS NFR BLD AUTO: 3.76 10*3/MM3 (ref 1.7–7)
NEUTROPHILS NFR BLD AUTO: 60 % (ref 42.7–76)
PLATELET # BLD AUTO: 183 10*3/MM3 (ref 140–450)
PMV BLD AUTO: 10.2 FL (ref 6–12)
POTASSIUM SERPL-SCNC: 5 MMOL/L (ref 3.5–5.2)
PROT SERPL-MCNC: 6.5 G/DL (ref 6–8.5)
RBC # BLD AUTO: 3.89 10*6/MM3 (ref 4.14–5.8)
SODIUM SERPL-SCNC: 138 MMOL/L (ref 136–145)
T4 SERPL-MCNC: 7.11 MCG/DL (ref 4.5–11.7)
TSH SERPL DL<=0.05 MIU/L-ACNC: 3.91 UIU/ML (ref 0.27–4.2)
WBC NRBC COR # BLD: 6.26 10*3/MM3 (ref 3.4–10.8)

## 2022-10-10 PROCEDURE — 84436 ASSAY OF TOTAL THYROXINE: CPT | Performed by: INTERNAL MEDICINE

## 2022-10-10 PROCEDURE — 82962 GLUCOSE BLOOD TEST: CPT

## 2022-10-10 PROCEDURE — 80053 COMPREHEN METABOLIC PANEL: CPT | Performed by: INTERNAL MEDICINE

## 2022-10-10 PROCEDURE — 25010000002 PEMBROLIZUMAB 100 MG/4ML SOLUTION 4 ML VIAL: Performed by: INTERNAL MEDICINE

## 2022-10-10 PROCEDURE — 96413 CHEMO IV INFUSION 1 HR: CPT

## 2022-10-10 PROCEDURE — 85025 COMPLETE CBC W/AUTO DIFF WBC: CPT | Performed by: INTERNAL MEDICINE

## 2022-10-10 PROCEDURE — 84443 ASSAY THYROID STIM HORMONE: CPT | Performed by: INTERNAL MEDICINE

## 2022-10-10 RX ORDER — SODIUM CHLORIDE 9 MG/ML
250 INJECTION, SOLUTION INTRAVENOUS ONCE
Status: CANCELLED | OUTPATIENT
Start: 2022-10-10

## 2022-10-10 RX ORDER — ONDANSETRON 4 MG/1
TABLET, FILM COATED ORAL
Qty: 90 TABLET | Refills: 0 | Status: SHIPPED | OUTPATIENT
Start: 2022-10-10 | End: 2022-12-30

## 2022-10-10 RX ORDER — SODIUM CHLORIDE 9 MG/ML
250 INJECTION, SOLUTION INTRAVENOUS ONCE
Status: DISCONTINUED | OUTPATIENT
Start: 2022-10-10 | End: 2022-10-11 | Stop reason: HOSPADM

## 2022-10-10 RX ORDER — SODIUM CHLORIDE 0.9 % (FLUSH) 0.9 %
20 SYRINGE (ML) INJECTION AS NEEDED
Status: CANCELLED | OUTPATIENT
Start: 2022-10-10

## 2022-10-10 RX ORDER — SODIUM CHLORIDE 0.9 % (FLUSH) 0.9 %
20 SYRINGE (ML) INJECTION AS NEEDED
Status: DISCONTINUED | OUTPATIENT
Start: 2022-10-10 | End: 2022-10-11 | Stop reason: HOSPADM

## 2022-10-10 RX ADMIN — SODIUM CHLORIDE 400 MG: 9 INJECTION, SOLUTION INTRAVENOUS at 11:09

## 2022-10-10 RX ADMIN — Medication 20 ML: at 11:43

## 2022-10-10 NOTE — PROGRESS NOTES
Awaiting approval for radiofrequency ablation and possible foam sclerotherapy    Left Great Saphenous Vein distal calf to prox thigh  Left Short saphenous vein prox calf  Left calf Tributary  Right Great Saphenous Vein prox calf to mid thigh  Right Short saphenous vein distal to prox calf  Right calf Tributary and Short saphenous vein trib    Office to check on progress and decision to proceed in 1 month and will consider possible venous intervention with RFA (sensitivity to adhesive with Steri-Strips)  She is awaiting repeat visit with neurosurgery regarding brain aneurysms at [facility] Pt to the clinic for Keytruda. Pt reports to having occasional nausea. He does have medication at home to improve the symptoms.  Port accessed and flushed with good blood return noted. 10cc of blood wasted prior to specimen collection. Blood specimen obtained and sent to lab for processing per protocol.  Port flushed with saline and heparin prior to needle removal.  AVS given prior to discharge.

## 2022-10-18 ENCOUNTER — HOSPITAL ENCOUNTER (OUTPATIENT)
Dept: PET IMAGING | Facility: HOSPITAL | Age: 72
Discharge: HOME | End: 2022-10-18
Admitting: INTERNAL MEDICINE

## 2022-10-18 DIAGNOSIS — C15.9 ESOPHAGEAL CANCER, STAGE IV: ICD-10-CM

## 2022-10-18 PROCEDURE — 71260 CT THORAX DX C+: CPT

## 2022-10-18 PROCEDURE — 25010000002 IOPAMIDOL 61 % SOLUTION: Performed by: INTERNAL MEDICINE

## 2022-10-18 PROCEDURE — 74177 CT ABD & PELVIS W/CONTRAST: CPT

## 2022-10-18 RX ADMIN — IOPAMIDOL 100 ML: 612 INJECTION, SOLUTION INTRAVENOUS at 10:06

## 2022-10-20 ENCOUNTER — TELEPHONE (OUTPATIENT)
Dept: ONCOLOGY | Facility: CLINIC | Age: 72
End: 2022-10-20

## 2022-10-25 NOTE — PROGRESS NOTES
Hematology-Oncology Follow-up Note       Artie Mc  1950    Primary Care Physician: Chanel Carter APRN  Referring Physician: Chanel Carter APRN    Reason For Visit:  Chief Complaint   Patient presents with   • Follow-up     Esophageal cancer, stage IV     Metastatic esophageal cancer  Monitoring for adverse effects related to immunotherapy.  Hypothyroidism    HPI:   Mr. Mc is a pleasant 72 y.o. gentleman with a history of gastroesophageal reflux disease, diabetes, hypertension, heart block status post pacemaker placement and history of CABG.  He was having symptoms of dysphagia, weight loss since September 2016. He was reporting symptoms of food getting stuck in the lower chest.  He has transitioned himself to a liquid diet.  The patient reports losing about 30 pounds over this duration.  The patient underwent upper GI endoscopy on 12/20/16 with Dr. Esvin Barrera.  Endoscopy showed necrotic, circumferential and moderately obstructive mass at the distal esophagus between 42 cm and 36 cm.      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  There was also evidence of mild subacute inflammation in the duodenum.  Dr. Barrera ordered a CT scan of the chest and abdomen with contrast on 12/20/16.  The scan was done at [facility].  The scan showed a new ill-defined mass in the anterior wall of the distal esophagus at the GE junction measuring 2.3 x 2.2 x 1.6 cm, worrisome for cancer.  There were four subcentimeter liver lesions which had appeared stable in comparison to another CT scan from 2009 and they likely represent small cysts.  There was also mild lymphadenopathy in the gastrohepatic ligament.  There were at least six borderline enlarged lymph nodes measuring up to 1.3 x 1 cm in the region.  The patient is referred to us for further oncological evaluation.    1/5/2017 - The patient presents for initial  consultation.  He reports persistent dysphagia and also has symptoms of regurgitation.  He cannot tolerate solid foods and is dependent on liquid diet such as Ensure.  He also reports fatigue. Symptoms of dysphagia have been present for the past four months.  When he eats any solids, the food gets stuck in the lower chest.    • 1/5/17 - Vitamin B12 200 (L).  CEA 0.8.  Ferritin 35.  Iron saturation 16% (L), serum iron 56, TIBC 354.  Creatinine 0.9.  LFTs normal.  Folate 10.2.  • 1/12/17 - PET/CT scan:  Intense abnormal activity corresponding to the region of the GE junction and proximal stomach.  SUV is 11.06.  There is a suggestion of an abnormal mass or abnormal wall thickening in the region measuring 3.9 x 4.8 cm.  No additional abnormalities.  No evidence of metastases or other lymphadenopathy.  Scattered nonspecific subcentimeter lymph nodes involving the mediastinum and within the central upper abdomen.  • 1/13/17 - Creatinine 0.9.  LFTs normal.    • 1/17/17 - Patient seen by thoracic surgeon, Dr. Bradley.  PET scan was reviewed.  He has recommended neoadjuvant chemoradiation therapy followed by [place] Gurwinder esophagogastrectomy.  Port-A-Cath is being arranged.    • 1/20/17 - WBC 6.3, hemoglobin 12.1, platelet count 198,000, MCV 86.4.  • 1/30/17 - Patient started weekly Carboplatin and Taxol (Carboplatin AUC 2 and Taxol 50 mg/M2).  Patient received Injectafer 750 mg.  WBC 5.8, hemoglobin 12.4, platelet count 244,000.   • 1/31/17 - Patient seen by Dr. Eduardo Oleary.  The plan was to give 5040 cGy in 28 fractions.   • 2/6/17 - WBC 4.4, hemoglobin 11.6, platelet count 201,000.  Patient received cycle 2 of weekly Carboplatin and Taxol (Carboplatin AUC 2 and Taxol 50 mg/M2).  • 2/13/17 - Patient received Carboplatin and Taxol weekly cycle 3.  • 2/13/17 - Patient started concurrent radiation therapy.  Total planned dose is 4140 cGy.    • 2/13/17 to 2/15/17 - Patient received 4140 cGy of neoadjuvant radiation.   Radiation was finished on 3/15/17.    • 2/20/17 - WBC 1.8, hemoglobin 10.6, platelet count 217,000.  Creatinine 0.5. Folate 15 (L).  Ferritin 361.  Haptoglobin 214.  Iron saturation 33%, TIBC 263, serum iron 88.    • 2/20/17 - Patient received Carboplatin and Taxol weekly cycle 4.   • 2/27/17 - WBC 3.7, hemoglobin 10.7, platelet count 177,000, MCV 85.3.    • 3/6/17 - Patient has received 16 out of the 23 planned radiation treatments.  Total planned dose is 4140 cGy.    • 3/6/17 - WBC 5.4, hemoglobin 11.2, platelet count 113,000.  • 3/6/17 - Patient received cycle 5 of weekly Carboplatin and Taxol.   • 3/13/17 - Patient received cycle 6 of weekly Carboplatin and Taxol.  WBC 3.9, hemoglobin 11.3, platelet count 93,000.     • 3/20/17 - WBC 1.4, hemoglobin 10.0, platelet count 118,000, MCV 86.1   • 3/27/17 - WBC 3.8, hemoglobin 9.5, platelet count 176,000, MCV 87.3.    • 4/7/17 - Stress test:  No evidence of reversible myocardial ischemia.  Normal left ventricular ejection fraction of 50%.    • 4/17/17 - Upper GI endoscopy by Dr. 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  The GE junction adenocarcinoma lesion seems to have a very good response to chemotherapy and radiation.  The lumen was open.  • 5/1/17 - WBC 7.0, hemoglobin 10.2, platelet count 175,000.    • 5/4/17 - Patient underwent [place] Gurwinder esophagectomy with pyloroplasty, feeding jejunostomy tube, abdominal and mediastinal lymph node dissection.    Surgical Pathology:  Moderately to poorly differentiated adenocarcinoma measuring 1.3 cm at the GE junction.  Resection margins are negative for malignancy.  Tumor margins are negative.  There is effective treatment response.  No evidence of lymphovascular invasion.  Staging is ypT3,pN1.  One out of fourteen lymph nodes involved.   • 5/22/17 - WBC 6.8, hemoglobin 10.3, platelet count 312,000.    • 6/19/17 - WBC 5.7, hemoglobin 10.5, platelet count 204,000, MCV 92.1.     • 7/6/17 - EGD:  Status post balloon dilation of esophageal stricture.  • 7/12/17 - EGD with balloon dilation of esophageal stricture.  • 7/26/17 - EGD and balloon dilation of esophageal stricture.  • 7/31/17 - PET/CT scan:  There is mild metabolic activity seen at the thoracic esophagus just at and below the surgical margins.  Some mild wall thickening.  This could be from recent surgery.  A residual cancer seems unlikely.  There is a precarinal lymph node with mild metabolic activity with SUV of 3 measuring 1 x 1.3 cm.  Again this appears to be nonspecific in my opinion.    • 8/7/17 - WBC 5.9, hemoglobin 11.9, platelet count 171,000, MCV 88.7.    • 10/19/17 - WBC 7.5, hemoglobin 11.8, platelet count 216,000.    • 1/8/18 - PET scan:  No evidence of residual disease or recurrent disease.  There is an esophageal stent in the mid esophagus with a large debris air level.  No evidence of any metastases in the chest, abdomen or pelvis.  Mild nodule uptake in the vocal cords with fullness in the right vocal cord.  This could be physiologic.    • 7/9/18 - CT scan of chest with contrast:  Prominent mediastinal lymph nodes appear stable since February.  Changes of gastric pull-through procedure for esophageal cancer again noted.  Tree-in-bud infiltrates in the left lung base, consistent with infection versus inflammation.    • 1/10/19 - CT chest, abdomen and pelvis with contrast:  No evidence of mets in the chest; however, interval development of metastatic lymphadenopathy in the left side of the retroperitoneum.  For instance, there is a 17 mm rounded lymph node, 10 mm lymph node and also a 14 mm lymph node.  These are all new.  Stable 9 mm hypodense lesion within the right hepatic lobe, which could reflect hemangioma or complex cyst.    • 1/30/19 - CT-guided core biopsy of retroperitoneal lymph node:  Poorly differentiated adenocarcinoma consistent with metastases from patient’s known esophageal primary.  CK20 positive,  CDX2 positive, cytokeratin 7 negative, TTF-1 negative.    • 2/1/19 - Creatinine 0.9, BUN 18, calcium 9.3; these are normal.    • 2/28/19 - [facility] Next Gen sequencing testing on retroperitoneal lymph node specimen:  PD-L1 positive.  CPS = 3.  This implies responsiveness to pembrolizumab.  Mismatch repair status is proficient (no evidence of microsatellite instability).  Tumor mutational burden is low = 6 mutations/Mb.  CDH1 indeterminate.  KRAS mutation positive.  TP53 mutation positive.  CDK6 amplified.  [employer] PD-L1 testing by IHC:  PD-L1 expression 1% positive (CPS =1).  HER2/marvel by IHC is negative.  HER2/marvel by CISH not amplified.  Microsatellite stable tumor.    • 3/7/19 - WBC 7.3, hemoglobin 12, platelet count 128,000, MCV 92.     • 3/14/19 - Patient was seen by radiation oncologist, Dr. Chahal.  He has recommended observation versus systemic chemoimmunotherapy as needed.  No plans for radiation therapy.   • 5/1/19 - CT scan of chest with contrast:  No definitive findings of new metastatic disease in the chest.  Emphysema noted.    • 5/1/19 - CT abdomen and pelvis:  There is interval progression of metastatic retroperitoneal adenopathy.  Left periaortic adenopathy with lymph node measuring up to 2 cm, previously 1.7 cm.  Another lymph node now measures 2.3, previously 1.4 cm.  New enlarged lymph node on image 147 measures 1.4 cm, previously 0.4 cm.  A 9 mm hypodense right hepatic lobe lesion appears stable.    • 5/9/19 - Decision made to start patient on immunotherapy Keytruda.    • 5/9/19 - Vitamin B12 229.  Ferritin 61.  Folate 14.3.  Iron saturation 26%, TIBC 304, serum iron 79.  • 5/31/19 - Patient received Keytruda 200 mg cycle 1, day 1.    • 5/31/19 - Free T4 0.99.  Creatinine 0.9, BUN 9.  LFTs normal.  TSH 3.99.  Folate 14.3.  Iron saturation 26%.    • 6/21/2019 patient received Keytruda cycle 2  • 7/12/2019: Patient received Keytruda cycle 3  • 8/2/2019 patient received cycle 4 Keytruda WBC 7.2,  hemoglobin 11.8 platelet count 163  • 8/23/2019 creatinine 0.9, LFTs normal, WBC 6.4, hemoglobin 11.5, platelets 179, TSH 2.1   • 8/23/2019 patient received cycle 5 of Keytruda  • 9/3/2019 CT chest abdomen and pelvis with contrast: . Positive response to therapy in the abdomen since 05/01/2019. Pathologically enlarged retroperitoneal lymph nodes, predominantly in the left periaortic region, have diminished in size.a 1.3 x 2.1 cm left periaortic node (image 60), previously measured 3.5 x 2.3 cm. A 1.6 cm index node near the level of the left renal vein previously measured 2.0 cm No new or progressive adenopathy. 2. Stable findings in the chest. Noncalcified right upper lobe nodules are unchanged. There is no evidence of new or progressive metastatic disease within the chest. 3. 8 mm indeterminate low-density lesion in the right hepatic lobe, is stable. No new liver lesions.  • 9/6/2019 WBC 5.6, hemoglobin 11.2, platelets of 192, MCV 91.8  • 9/13/2019 patient received Keytruda cycle 6, albumin 3.2  • 10/4/2019 patient received cycle 7 of Keytruda, TSH 4.8, free T4 0.86  • 10/9/2019 WBC 5.8, hemoglobin 11.7, platelets 174  • 10/25/2019 patient received cycle 8 of Keytruda.  WBC 5.9, hemoglobin 11.5, platelets 152, creatinine 0.75, cortisol 15.2, TSH 6.48 high , free T4 1.13 normal  • 11/6/2019 WBC 6.6, hemoglobin 11.8, MCV 91.5, platelets 187  • 11/15/2019 patient received cycle 9 of Keytruda, WBC 6.6, hemoglobin 11.9, platelets 161, cortisol level 8.86, TSH 2.68, free T3 2.8, free T4 1.5  • 12/4/2019 WBC 6.9, hemoglobin 12.0, platelets 180, MCV 92.9  • 12/06/2019-Keytruda Cycle 10  • 12/27/2019- Cycle 11 LFTs normal, WBC 6.7, hemoglobin 11.4, platelets 168, MCV 93, TSH 2.4,  • 1/28/2020: CT chest abdomen and pelvis with contrast:  Single (aortic lymph node in the abdominal retroperitoneum has increased.  Rest of the retroperitoneal lymph nodes have decreased in size.  Mediastinal adenopathy appears unchanged.   Noncalcified bilateral pulmonary nodules are stable  • 1/17/2020 patient received Keytruda cycle 12:.  • 2/3/2020 WBC 7.1, hemoglobin 12.2, platelets 171  • 02/10/2020 patient received cycle 13 of Keytruda: WBC 7.3, hemoglobin 12.1, platelets 166, creatinine 0.82  • 3/6/2020 patient is of Keytruda 200 mg, hemoglobin 10.8  • 3/27/2020 patient received Keytruda 200 mg, hemoglobin 11.4, TSH 1.95  • 4/17/2020 patient received Keytruda cycle 16 200 mg, WBC 6.1, hemoglobin 11.6, platelets 150  • 5/8/2020: Patient received cycle 17 Keytruda, WBC 6.3, hemoglobin 9.6, platelets 137, TSH 2.05, free T4 1.54, creatinine 0.93, LFTs normal,  • 5/29/2020: Patient received cycle 18 Keytruda, WBC 6.5, hemoglobin 11.7, platelets 129, creatinine 0.83, TSH 1.69  • 6/26/2020: Patient receiving Keytruda.   • 7/17/2020: TSH 2.06, WBC 5.7, hemoglobin 11.4, platelets 177, MCV 92.4, Keytruda 200 mg cycle 20  • 7/27/2020: CT chest abdomen pelvis with contrast: Stable findings in the chest abdomen pelvis since 1/28/2020.  Noncalcified bilateral pulmonary nodules are stable.  Stable retroperitoneal left perinephric adenopathy in the abdomen.  • 8/7/2020: Patient received cycle 21 of Keytruda  • 8/28/2020 patient is a cycle 22 of Keytruda.  WBC 6.02, hemoglobin 11.3, platelets 161  • 9/11/2020 WBC 6.9, hemoglobin 12.4, platelets 192  • 9/18/2020: Cycle 23 of Keytruda, TSH 1.19  • 10/9/2020: Cycle 24 of Keytruda, WBC 5.9, hemoglobin 11.9, platelets 178, creatinine 0.84, TSH 2.56, free T3 2.4, CMP normal  • 10/23/2020: WBC 7.34, hemoglobin 11.6, platelets 175  • 10/30/2020:.  Keytruda 200 mg  • 11/20/2020: Keytruda 200 mg  • 12/11/2020 Keytruda 200 mg.  WBC 5.5, hemoglobin 11.4, platelets 176, creatinine 0.8, LFTs normal, TSH 2.2, cortisol 8.3  • 12/10/2020: CT chest abdomen and pelvis with contrast: Previously seen left para-aortic lymph nodes within the abdomen are decreased in size.  No pathological lymphadenopathy.  No evidence of residual  malignancy..  Small noncalcified pulmonary nodules are stable.  No new nodules.  Stable mediastinal lymph nodes.  • 3/12/2021: WBC 6.7, hemoglobin 11.3, platelets 17  • 3/12/2021: CMP normal, TSH 2.9, cortisol 11.95, 1  • 3/15/2021: CT chest with contrast: Stable findings in the chest with postsurgical changes of esophageal resection and gastric pull-through.  Stable right lower lobe lung nodule measures 11 mm.  Stable size and appearance of the mediastinal and upper abdominal lymph nodes.  • 3/23/2021: Doppler of upper extremity: Normal.  • 3/24/2021: CT abdomen and pelvis with contrast: Postoperative changes.  Left periaortic nodes are stable.  • 4/12/2021: TSH 4.1, cortisol 12.4  • 6/22/2021: CMP normal,Cortisol 10.08,, WBC 5.8, hemoglobin 11.2, platelets 109, TSH 2.59  • 7/12/2021: CT scan of the chest abdomen pelvis with contrast: Dominant 10 x 10 left periaortic lymph node is stable since 3/22/2021.  Dominant lymph nodes in the right lower paratracheal and AP window distribution are stable since 3/15/2021.  No evidence of progressive adenopathy in the chest abdomen or pelvis.    • 01/11/2022 -PET/CT findings consistent with new cedric metastatic disease in the retroperitoneum.  2 new pathologically enlarged hypermetabolic retroperitoneal lymph nodes.  No convincing evidence of recurrent disease within the chest neck pelvis or skeleton.  • 1/20/2022 -CT-guided needle biopsy consistent with poorly differentiated carcinoma.  CDX2 positive consistent with metastases from known esophageal primary  • 2/9/2022 - Pembrolizumab complicated with fatigue, joint pains, nausea, diarrhea. Symptoms decrease in intensity over a week.  • 3/30/2022 -CT chest and pelvis with stable metastatic lymph nodes in the left periaortic retroperitoneum.  No evidence of disease progression or new lesions.  Stable lymphadenopathy in the distal paratracheal mediastinal area  • 4/13/2022 - Keytruda 200  • 5/4/2022 - Keytruda 200  • Continued  keytruda  • 7/11/2022 - CT abdomen pelvis with decrease in size of left perioaortic lymph node. No other areas of disease.  • Continues to be on Keytruda  • 10/18/2022 - stable CT imaging    Subjective:    Has been having some nausea, diarrhea. Usually after he eats.    Past Medical History:   Diagnosis Date   • B12 deficiency    • Blockage of coronary artery of heart (HCC)    • Depression    • DM (diabetes mellitus) (HCC)    • Dysphagia    • HTN (hypertension)    • Hyperlipidemia        Past Surgical History:   Procedure Laterality Date   • ABDOMINAL WALL ABSCESS INCISION AND DRAINAGE  10/22/2018    WITH DEBRIDEMENT  1.5CM X 1.5 CM X 1.5 CM    DR. PURI   • CATARACT EXTRACTION  2018   • COLONOSCOPY     • CORONARY ARTERY BYPASS GRAFT  2015   • ENDOSCOPY  12/20/2016   • ESOPHAGECTOMY  05/04/2017    BROOKS PETTY ESOPHAGECTOMY, FEEDING JEJNOSTOMOY, ABDOMINAL AND MEDIASTINAL LYMPH NODE DISECTION, PEDICLED MUSCLE FLAP COVERAGE DR. PURI   • ESOPHAGOSCOPY / EGD  07/12/2017    WITH BALLOON DILATION   • ESOPHAGOSCOPY / EGD  07/26/2017    WITH BALLOON DILATION   • ESOPHAGOSCOPY / EGD  08/11/2017    WITH BALLOON DILATION   • ESOPHAGOSCOPY / EGD  08/28/2017    WITH BALLOON DILATION   • ESOPHAGOSCOPY / EGD  09/27/2017    WITH BALLOON DILATION   • LYMPH NODE BIOPSY  01/30/2019   • PACEMAKER IMPLANTATION  11/21/2016       Current Outpatient Medications:   •  Cetirizine HCl (ZyrTEC Allergy) 10 MG capsule, Take  by mouth., Disp: , Rfl:   •  clonazePAM (KlonoPIN) 0.5 MG tablet, Pt taking 1.5 pills daily, Disp: , Rfl: 1  •  cyanocobalamin (VITAMIN B-12) 1000 MCG tablet, , Disp: , Rfl:   •  diphenhydrAMINE-APAP, sleep, (TYLENOL PM EXTRA STRENGTH PO), Take  by mouth., Disp: , Rfl:   •  Euthyrox 50 MCG tablet, Take 1 tablet by mouth once daily, Disp: 90 tablet, Rfl: 0  •  ferrous sulfate 325 (65 FE) MG tablet, Take 1 tablet by mouth 2 (Two) Times a Day With Meals., Disp: 60 tablet, Rfl: 3  •  folic acid (FOLVITE) 1 MG tablet,  "Daily., Disp: , Rfl:   •  metFORMIN ER (GLUCOPHAGE-XR) 500 MG 24 hr tablet, , Disp: , Rfl:   •  metoprolol tartrate (LOPRESSOR) 25 MG tablet, Take 12.5 mg by mouth 2 (Two) Times a Day., Disp: , Rfl:   •  omeprazole (priLOSEC) 20 MG capsule, Take 20 mg by mouth 2 (Two) Times a Day., Disp: , Rfl:   •  ondansetron (ZOFRAN) 4 MG tablet, TAKE 1 TABLET BY MOUTH EVERY 8 HOURS AS NEEDED FOR NAUSEA FOR VOMITING, Disp: 90 tablet, Rfl: 0  •  pravastatin (PRAVACHOL) 10 MG tablet, Take 10 mg by mouth every night at bedtime., Disp: , Rfl:     Allergies   Allergen Reactions   • Ace Inhibitors Unknown (See Comments)     Unknown reaction     • Heparin Other (See Comments)     Fever/chills, weakness in legs   • Sulfa Antibiotics Other (See Comments)     Mouth sores       Family History   Problem Relation Age of Onset   • Mental illness Mother    • Heart disease Mother    • Hypertension Father    • Cancer Father    • Heart disease Sister    • Heart disease Brother      Cancer-related family history includes Cancer in his father.    Social History     Tobacco Use   • Smoking status: Former     Packs/day: 2.00     Years: 50.00     Pack years: 100.00     Types: Cigarettes     Quit date: 2015     Years since quittin.3   • Smokeless tobacco: Former     Types: Chew     Quit date: 1989   Substance Use Topics   • Alcohol use: No   • Drug use: No     ROS:     Objective:    Vitals:    10/26/22 1025   BP: 122/70   Pulse: 72   Resp: 18   Temp: 96.4 °F (35.8 °C)   SpO2: 98%   Weight: 68.9 kg (152 lb)   Height: 167.6 cm (66\")   PainSc: 0-No pain       (1) Restricted in physically strenuous activity, ambulatory and able to do work of light nature    Physical Exam:     Physical Exam   Constitutional: He is oriented to person, place, and time. He appears well-developed. No distress.   HENT:   Head: Normocephalic and atraumatic.   Right Ear: Tympanic membrane normal.   Nose: Nose normal.   Mouth/Throat: Mucous membranes are moist.   Eyes: " Pupils are equal, round, and reactive to light.   Neck: No thyromegaly present.   Cardiovascular: Normal rate, regular rhythm, normal heart sounds and normal pulses. Exam reveals no gallop and no friction rub.   Pulmonary/Chest: Effort normal. He has rhonchi.   Abdominal: Soft. Normal appearance and bowel sounds are normal.   No tenderness   Musculoskeletal: Normal range of motion. No deformity or signs of injury.      Right lower leg: No edema.   Neurological: He is alert and oriented to person, place, and time. He exhibits normal muscle tone.   Skin: Skin is warm and dry. No rash noted. He is not diaphoretic. No erythema. No jaundice.   Right side chest  port       Lab Results - Last 18 Months   Lab Units 10/26/22  1017 10/10/22  1019 09/21/22  1100   WBC 10*3/mm3 6.32 6.26 6.04   HEMOGLOBIN g/dL 12.0* 11.9* 12.3*   HEMATOCRIT % 37.2* 36.5* 37.1*   PLATELETS 10*3/mm3 209 183 186   MCV fL 94.7 93.8 92.8     Lab Results - Last 18 Months   Lab Units 10/10/22  1019 08/29/22  1051 07/15/22  0923   SODIUM mmol/L 138 138 138   POTASSIUM mmol/L 5.0 5.1 4.8   CHLORIDE mmol/L 105 105 105   CO2 mmol/L 21.0* 23.0 20.0*   BUN mg/dL 14 13 18   CREATININE mg/dL 0.87 0.95 0.97   CALCIUM mg/dL 9.1 8.7 9.1   BILIRUBIN mg/dL 0.3 0.4 0.3   ALK PHOS U/L 35* 35* 39   ALT (SGPT) U/L 12 7 8   AST (SGOT) U/L 20 18 18   GLUCOSE mg/dL 181* 115* 99     Assessment & Plan     Assessment:    Metastatic esophageal carcinoma:    Patient has a history of GE junction poorly differentiated adenocarcinoma for which he had chemoradiation followed by [place] Gurwinder resection:  Pathology showed ypT3,pN1 disease.  Tumor was Stage IIIA, diagnosed in 2016.  He received concurrent chemoradiation with weekly Carboplatin and Taxol and radiation finishing on 3/15/17.  CT scan on 1/10/19 showed new retroperitoneal lymph nodes.  These lymph nodes were biopsied on 1/30/19 and it shows metastatic esophageal cancer.  [facility] Next Gen testing was performed on the sample  and it shows KRAS mutation, microsatellite stable tumor.  PD-L1 is positive.  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  HER2/marvel (ERBB2) was not amplified and was negative.  His CT scan on 5/1/19 shows evidence of progression.  Patient has started receiving Keytruda on 5/31/19.  He has received 19 cycles so far.   CT scan on 1/28/2020 shows improvement and continued response.  CT scan chest abdomen pelvis with contrast on 12/10/2020 does not show any evidence of disease progression.  On 12/22/2020 decision made to hold Keytruda per patient's request to get a treatment break.. Repeat CT scan chest 7/12/2021 does not show any evidence of disease progression.  Now CT imaging in January 2021 with a CT showing increased abdominal left para-aortic adenopathy.  Subsequent PET/CT with new cedric metastatic disease in the retroperitoneum of the abdomen.  New pathologically enlarged hypermetabolic retroperitoneal lymph nodes.  This would be concerning for disease recurrence/progression.  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  I discussed with him the treatment options going forward.  He has had a good response with immunotherapy pembrolizumab in the past. This was  restarted. subsequent imaging with treatment response. Now recent imaging with ongoing response. Continue treatment as per plan indefinitely for now with recurrence    Shortness of breath  Improved, has occasional shortness of breath, likely copd  I have recommended using albuterol. Continue, no increase.    Reflux/heartburn/dysphagia:   Symptoms improved.  Also has a history of strictures. Status post EGD and balloon dilation.  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  Continues on Synthroid monitor TSH and fT4 with treatment. Monitor     Mild diarrhea:  diarrhea seems to be waxing and waning in nature.  Related to immunotherapy, but not severe. improved    PD-L1 positive,  HER2 negative, p53 and KRAS positive.  Repeat [facility] testing with repeat biopsy with no target mutations.    B12 deficiency: Continue B12 1000 mcg twice daily.    Anemia - Hb improved and stable.    F/u in 6 weeks.       Jean Pierre Elizondo MD 10/26/22     No orders of the defined types were placed in this encounter.

## 2022-10-26 ENCOUNTER — OFFICE VISIT (OUTPATIENT)
Dept: ONCOLOGY | Facility: CLINIC | Age: 72
End: 2022-10-26

## 2022-10-26 ENCOUNTER — LAB (OUTPATIENT)
Dept: LAB | Facility: HOSPITAL | Age: 72
End: 2022-10-26

## 2022-10-26 VITALS
WEIGHT: 152 LBS | OXYGEN SATURATION: 98 % | RESPIRATION RATE: 18 BRPM | HEIGHT: 66 IN | BODY MASS INDEX: 24.43 KG/M2 | HEART RATE: 72 BPM | DIASTOLIC BLOOD PRESSURE: 70 MMHG | SYSTOLIC BLOOD PRESSURE: 122 MMHG | TEMPERATURE: 96.4 F

## 2022-10-26 DIAGNOSIS — C15.5 PRIMARY MALIGNANT NEOPLASM OF LOWER THIRD OF ESOPHAGUS: Primary | ICD-10-CM

## 2022-10-26 DIAGNOSIS — C15.9 ESOPHAGEAL CANCER, STAGE IV: ICD-10-CM

## 2022-10-26 DIAGNOSIS — D50.9 IRON DEFICIENCY ANEMIA, UNSPECIFIED IRON DEFICIENCY ANEMIA TYPE: ICD-10-CM

## 2022-10-26 DIAGNOSIS — C16.0 MALIGNANT NEOPLASM OF CARDIA: Primary | ICD-10-CM

## 2022-10-26 LAB
BASOPHILS # BLD AUTO: 0.04 10*3/MM3 (ref 0–0.2)
BASOPHILS NFR BLD AUTO: 0.6 % (ref 0–1.5)
DEPRECATED RDW RBC AUTO: 44.5 FL (ref 37–54)
EOSINOPHIL # BLD AUTO: 0.32 10*3/MM3 (ref 0–0.4)
EOSINOPHIL NFR BLD AUTO: 5.1 % (ref 0.3–6.2)
ERYTHROCYTE [DISTWIDTH] IN BLOOD BY AUTOMATED COUNT: 13.3 % (ref 12.3–15.4)
HCT VFR BLD AUTO: 37.2 % (ref 37.5–51)
HGB BLD-MCNC: 12 G/DL (ref 13–17.7)
HOLD SPECIMEN: NORMAL
HOLD SPECIMEN: NORMAL
LYMPHOCYTES # BLD AUTO: 1.74 10*3/MM3 (ref 0.7–3.1)
LYMPHOCYTES NFR BLD AUTO: 27.5 % (ref 19.6–45.3)
MCH RBC QN AUTO: 30.5 PG (ref 26.6–33)
MCHC RBC AUTO-ENTMCNC: 32.3 G/DL (ref 31.5–35.7)
MCV RBC AUTO: 94.7 FL (ref 79–97)
MONOCYTES # BLD AUTO: 0.69 10*3/MM3 (ref 0.1–0.9)
MONOCYTES NFR BLD AUTO: 10.9 % (ref 5–12)
NEUTROPHILS NFR BLD AUTO: 3.53 10*3/MM3 (ref 1.7–7)
NEUTROPHILS NFR BLD AUTO: 55.9 % (ref 42.7–76)
PLATELET # BLD AUTO: 209 10*3/MM3 (ref 140–450)
PMV BLD AUTO: 9.7 FL (ref 6–12)
RBC # BLD AUTO: 3.93 10*6/MM3 (ref 4.14–5.8)
WBC NRBC COR # BLD: 6.32 10*3/MM3 (ref 3.4–10.8)

## 2022-10-26 PROCEDURE — 85025 COMPLETE CBC W/AUTO DIFF WBC: CPT

## 2022-10-26 PROCEDURE — 99214 OFFICE O/P EST MOD 30 MIN: CPT | Performed by: INTERNAL MEDICINE

## 2022-10-26 PROCEDURE — 36415 COLL VENOUS BLD VENIPUNCTURE: CPT

## 2022-10-26 RX ORDER — METFORMIN HYDROCHLORIDE 500 MG/1
1000 TABLET, EXTENDED RELEASE ORAL 2 TIMES DAILY
COMMUNITY
Start: 2022-10-19 | End: 2023-03-15 | Stop reason: DRUGHIGH

## 2022-11-21 ENCOUNTER — HOSPITAL ENCOUNTER (OUTPATIENT)
Dept: ONCOLOGY | Facility: HOSPITAL | Age: 72
Setting detail: INFUSION SERIES
Discharge: HOME | End: 2022-11-21

## 2022-11-21 VITALS
TEMPERATURE: 98 F | HEIGHT: 66 IN | BODY MASS INDEX: 24.83 KG/M2 | SYSTOLIC BLOOD PRESSURE: 133 MMHG | HEART RATE: 67 BPM | RESPIRATION RATE: 18 BRPM | OXYGEN SATURATION: 100 % | WEIGHT: 154.5 LBS | DIASTOLIC BLOOD PRESSURE: 73 MMHG

## 2022-11-21 DIAGNOSIS — C16.0 MALIGNANT NEOPLASM OF CARDIA: ICD-10-CM

## 2022-11-21 DIAGNOSIS — Z51.81 ENCOUNTER FOR THERAPEUTIC DRUG MONITORING: ICD-10-CM

## 2022-11-21 DIAGNOSIS — Z51.11 ENCOUNTER FOR ANTINEOPLASTIC CHEMOTHERAPY: ICD-10-CM

## 2022-11-21 DIAGNOSIS — C16.0 MALIGNANT NEOPLASM OF CARDIA: Primary | ICD-10-CM

## 2022-11-21 DIAGNOSIS — Z51.81 ENCOUNTER FOR THERAPEUTIC DRUG MONITORING: Primary | ICD-10-CM

## 2022-11-21 LAB
ALBUMIN SERPL-MCNC: 4.1 G/DL (ref 3.5–5.2)
ALBUMIN/GLOB SERPL: 1.6 G/DL
ALP SERPL-CCNC: 37 U/L (ref 39–117)
ALT SERPL W P-5'-P-CCNC: 5 U/L (ref 1–41)
ANION GAP SERPL CALCULATED.3IONS-SCNC: 9 MMOL/L (ref 5–15)
AST SERPL-CCNC: 12 U/L (ref 1–40)
BASOPHILS # BLD AUTO: 0.07 10*3/MM3 (ref 0–0.2)
BASOPHILS NFR BLD AUTO: 1.2 % (ref 0–1.5)
BILIRUB SERPL-MCNC: 0.4 MG/DL (ref 0–1.2)
BUN SERPL-MCNC: 15 MG/DL (ref 8–23)
BUN/CREAT SERPL: 16.9 (ref 7–25)
CALCIUM SPEC-SCNC: 9.2 MG/DL (ref 8.6–10.5)
CHLORIDE SERPL-SCNC: 105 MMOL/L (ref 98–107)
CO2 SERPL-SCNC: 23 MMOL/L (ref 22–29)
CREAT SERPL-MCNC: 0.89 MG/DL (ref 0.76–1.27)
DEPRECATED RDW RBC AUTO: 41.7 FL (ref 37–54)
EGFRCR SERPLBLD CKD-EPI 2021: 90.5 ML/MIN/1.73
EOSINOPHIL # BLD AUTO: 0.29 10*3/MM3 (ref 0–0.4)
EOSINOPHIL NFR BLD AUTO: 4.9 % (ref 0.3–6.2)
ERYTHROCYTE [DISTWIDTH] IN BLOOD BY AUTOMATED COUNT: 12.5 % (ref 12.3–15.4)
GLOBULIN UR ELPH-MCNC: 2.6 GM/DL
GLUCOSE BLDC GLUCOMTR-MCNC: 114 MG/DL (ref 70–105)
GLUCOSE SERPL-MCNC: 134 MG/DL (ref 65–99)
HCT VFR BLD AUTO: 35.4 % (ref 37.5–51)
HGB BLD-MCNC: 11.9 G/DL (ref 13–17.7)
LYMPHOCYTES # BLD AUTO: 1.52 10*3/MM3 (ref 0.7–3.1)
LYMPHOCYTES NFR BLD AUTO: 25.5 % (ref 19.6–45.3)
MCH RBC QN AUTO: 31.8 PG (ref 26.6–33)
MCHC RBC AUTO-ENTMCNC: 33.6 G/DL (ref 31.5–35.7)
MCV RBC AUTO: 94.7 FL (ref 79–97)
MONOCYTES # BLD AUTO: 0.77 10*3/MM3 (ref 0.1–0.9)
MONOCYTES NFR BLD AUTO: 12.9 % (ref 5–12)
NEUTROPHILS NFR BLD AUTO: 3.32 10*3/MM3 (ref 1.7–7)
NEUTROPHILS NFR BLD AUTO: 55.5 % (ref 42.7–76)
PLATELET # BLD AUTO: 183 10*3/MM3 (ref 140–450)
PMV BLD AUTO: 10 FL (ref 6–12)
POTASSIUM SERPL-SCNC: 4.7 MMOL/L (ref 3.5–5.2)
PROT SERPL-MCNC: 6.7 G/DL (ref 6–8.5)
RBC # BLD AUTO: 3.74 10*6/MM3 (ref 4.14–5.8)
SODIUM SERPL-SCNC: 137 MMOL/L (ref 136–145)
T4 SERPL-MCNC: 7.44 MCG/DL (ref 4.5–11.7)
TSH SERPL DL<=0.05 MIU/L-ACNC: 3.28 UIU/ML (ref 0.27–4.2)
WBC NRBC COR # BLD: 5.97 10*3/MM3 (ref 3.4–10.8)

## 2022-11-21 PROCEDURE — 82962 GLUCOSE BLOOD TEST: CPT

## 2022-11-21 PROCEDURE — 25010000002 PEMBROLIZUMAB 100 MG/4ML SOLUTION 4 ML VIAL: Performed by: INTERNAL MEDICINE

## 2022-11-21 PROCEDURE — 84436 ASSAY OF TOTAL THYROXINE: CPT | Performed by: INTERNAL MEDICINE

## 2022-11-21 PROCEDURE — 80053 COMPREHEN METABOLIC PANEL: CPT | Performed by: INTERNAL MEDICINE

## 2022-11-21 PROCEDURE — 96413 CHEMO IV INFUSION 1 HR: CPT

## 2022-11-21 PROCEDURE — 85025 COMPLETE CBC W/AUTO DIFF WBC: CPT | Performed by: INTERNAL MEDICINE

## 2022-11-21 PROCEDURE — 84443 ASSAY THYROID STIM HORMONE: CPT | Performed by: INTERNAL MEDICINE

## 2022-11-21 RX ORDER — SODIUM CHLORIDE 9 MG/ML
250 INJECTION, SOLUTION INTRAVENOUS ONCE
Status: CANCELLED | OUTPATIENT
Start: 2022-11-21

## 2022-11-21 RX ORDER — SODIUM CHLORIDE 9 MG/ML
250 INJECTION, SOLUTION INTRAVENOUS ONCE
Status: COMPLETED | OUTPATIENT
Start: 2022-11-21 | End: 2022-11-21

## 2022-11-21 RX ADMIN — SODIUM CHLORIDE 400 MG: 9 INJECTION, SOLUTION INTRAVENOUS at 10:51

## 2022-11-21 RX ADMIN — SODIUM CHLORIDE 250 ML: 9 INJECTION, SOLUTION INTRAVENOUS at 10:50

## 2022-11-21 NOTE — PROGRESS NOTES
PT here for infusion of keytruda with only nausea as a complaint says it comes and goes otherwise he is doing well. Infusion started and AVS given to pt. With appts for next infusion.

## 2022-12-06 ENCOUNTER — TELEPHONE (OUTPATIENT)
Dept: ONCOLOGY | Facility: CLINIC | Age: 72
End: 2022-12-06

## 2022-12-06 NOTE — TELEPHONE ENCOUNTER
Caller: Artie Mc    Relationship to patient: Self    Best call back number: [phone]    Chief complaint: TESTED SAT 12/03 POSITIVE FOR COVID    Type of visit: LAB AND FOLLOW UP     Requested date: ANYTIME PREFER APPT TIME AROUND 9:30AM     If rescheduling, when is the original appointment: 12/07    Additional notes:

## 2022-12-07 ENCOUNTER — APPOINTMENT (OUTPATIENT)
Dept: LAB | Facility: HOSPITAL | Age: 72
End: 2022-12-07

## 2022-12-14 ENCOUNTER — TELEPHONE (OUTPATIENT)
Dept: ONCOLOGY | Facility: CLINIC | Age: 72
End: 2022-12-14

## 2022-12-14 NOTE — TELEPHONE ENCOUNTER
RECEIVED A PHONE CALL FROM MR. WALTON REGARDING HIS APPT ON Friday 12/16/22. PATIENT STATED HE IS CURRENTLY ADMITTED TO THE HOSPITAL WITH COVID RIGHT NOW THEREFORE HE WOULD LIKE TO CANCEL THAT APPT FOR Friday. I CONFIRMED AND STATED THAT I WILL CANCEL THE APPT FOR HIM. I ADVISED HIM TO CONTACT OUR OFFICE ONCE HE IS DISCHARGED FROM THE HOSPITAL SO WE CAN GET HIS APPT RESCHEDULED; PATIENT VOICED UNDERSTANDING. I ADVISED HIM TO CONTACT OUR OFFICE IF HE NEEDS ANYTHING, HE CONFIRMED.

## 2022-12-16 ENCOUNTER — APPOINTMENT (OUTPATIENT)
Dept: LAB | Facility: HOSPITAL | Age: 72
End: 2022-12-16

## 2022-12-28 DIAGNOSIS — Z51.81 ENCOUNTER FOR THERAPEUTIC DRUG MONITORING: Primary | ICD-10-CM

## 2022-12-28 DIAGNOSIS — C16.0 MALIGNANT NEOPLASM OF CARDIA: ICD-10-CM

## 2022-12-28 DIAGNOSIS — Z51.11 ENCOUNTER FOR ANTINEOPLASTIC CHEMOTHERAPY: ICD-10-CM

## 2022-12-28 RX ORDER — SODIUM CHLORIDE 9 MG/ML
250 INJECTION, SOLUTION INTRAVENOUS ONCE
Status: CANCELLED | OUTPATIENT
Start: 2023-01-10

## 2022-12-29 PROCEDURE — 93296 REM INTERROG EVL PM/IDS: CPT | Performed by: INTERNAL MEDICINE

## 2022-12-29 PROCEDURE — 93294 REM INTERROG EVL PM/LDLS PM: CPT | Performed by: INTERNAL MEDICINE

## 2022-12-30 ENCOUNTER — TELEPHONE (OUTPATIENT)
Dept: ONCOLOGY | Facility: CLINIC | Age: 72
End: 2022-12-30

## 2022-12-30 DIAGNOSIS — R11.0 NAUSEA: ICD-10-CM

## 2022-12-30 RX ORDER — ONDANSETRON 4 MG/1
TABLET, FILM COATED ORAL
Qty: 90 TABLET | Refills: 0 | Status: SHIPPED | OUTPATIENT
Start: 2022-12-30 | End: 2023-01-01

## 2022-12-30 NOTE — TELEPHONE ENCOUNTER
Caller: Artie Mc    Relationship: Self    Best call back number: [phone]      What was the call regarding: PT CALLED WANTED TO SPEAK TO DR HERNANDEZ NURSE REGARDING HIS INFUSIONS     Do you require a callback: YES

## 2022-12-30 NOTE — TELEPHONE ENCOUNTER
Pt was just d/c from [facility] after being IP for over 20 days with COVID. Pt was wanting to push his infusion from 1/3 back to 1/10 and see the NP prior to treatment to ensure he was okay to have treatment. Pt stated he was told her had blood in his stool while IP. I educated the pt that we do not have the same system at  and I would have to request his records. Pt was educated to go to the ER if he noticed anymore black and tarry stools or blood in his stool. He v/u and had no other questions.     Order request was faxed. Pt after was rescheduled.

## 2023-01-01 ENCOUNTER — HOSPITAL ENCOUNTER (INPATIENT)
Facility: HOSPITAL | Age: 73
LOS: 2 days | Discharge: HOME HEALTH | DRG: 381 | End: 2023-01-03
Attending: EMERGENCY MEDICINE | Admitting: INTERNAL MEDICINE
Payer: MEDICARE

## 2023-01-01 ENCOUNTER — INPATIENT HOSPITAL (AMBULATORY)
Dept: URBAN - METROPOLITAN AREA HOSPITAL 84 | Facility: HOSPITAL | Age: 73
End: 2023-01-01
Payer: COMMERCIAL

## 2023-01-01 DIAGNOSIS — K92.0 HEMATEMESIS: ICD-10-CM

## 2023-01-01 DIAGNOSIS — K92.0 HEMATEMESIS WITH NAUSEA: ICD-10-CM

## 2023-01-01 DIAGNOSIS — D64.9 ANEMIA, UNSPECIFIED: ICD-10-CM

## 2023-01-01 DIAGNOSIS — K92.2 GASTROINTESTINAL HEMORRHAGE, UNSPECIFIED GASTROINTESTINAL HEMORRHAGE TYPE: Primary | ICD-10-CM

## 2023-01-01 DIAGNOSIS — C77.9 SECONDARY AND UNSPECIFIED MALIGNANT NEOPLASM OF LYMPH NODE,: ICD-10-CM

## 2023-01-01 DIAGNOSIS — R13.10 DYSPHAGIA, UNSPECIFIED: ICD-10-CM

## 2023-01-01 DIAGNOSIS — R74.01 ELEVATION OF LEVELS OF LIVER TRANSAMINASE LEVELS: ICD-10-CM

## 2023-01-01 DIAGNOSIS — Z85.01 PERSONAL HISTORY OF MALIGNANT NEOPLASM OF ESOPHAGUS: ICD-10-CM

## 2023-01-01 LAB
ABO GROUP BLD: NORMAL
ALBUMIN SERPL-MCNC: 3.1 G/DL (ref 3.5–5.2)
ALBUMIN/GLOB SERPL: 1.3 G/DL
ALP SERPL-CCNC: 51 U/L (ref 39–117)
ALT SERPL W P-5'-P-CCNC: 21 U/L (ref 1–41)
ANION GAP SERPL CALCULATED.3IONS-SCNC: 9 MMOL/L (ref 5–15)
APTT PPP: 28.8 SECONDS (ref 61–76.5)
AST SERPL-CCNC: 47 U/L (ref 1–40)
BASOPHILS # BLD AUTO: 0 10*3/MM3 (ref 0–0.2)
BASOPHILS NFR BLD AUTO: 0.5 % (ref 0–1.5)
BILIRUB SERPL-MCNC: 0.3 MG/DL (ref 0–1.2)
BLD GP AB SCN SERPL QL: NEGATIVE
BUN SERPL-MCNC: 22 MG/DL (ref 8–23)
BUN/CREAT SERPL: 23.9 (ref 7–25)
CALCIUM SPEC-SCNC: 8.7 MG/DL (ref 8.6–10.5)
CHLORIDE SERPL-SCNC: 105 MMOL/L (ref 98–107)
CHOLEST SERPL-MCNC: 110 MG/DL (ref 0–200)
CO2 SERPL-SCNC: 25 MMOL/L (ref 22–29)
CREAT SERPL-MCNC: 0.92 MG/DL (ref 0.76–1.27)
DEPRECATED RDW RBC AUTO: 48.1 FL (ref 37–54)
EGFRCR SERPLBLD CKD-EPI 2021: 87.8 ML/MIN/1.73
EOSINOPHIL # BLD AUTO: 0 10*3/MM3 (ref 0–0.4)
EOSINOPHIL NFR BLD AUTO: 0.8 % (ref 0.3–6.2)
ERYTHROCYTE [DISTWIDTH] IN BLOOD BY AUTOMATED COUNT: 14.6 % (ref 12.3–15.4)
GLOBULIN UR ELPH-MCNC: 2.4 GM/DL
GLUCOSE SERPL-MCNC: 103 MG/DL (ref 65–99)
HCT VFR BLD AUTO: 29.4 % (ref 37.5–51)
HCT VFR BLD AUTO: 29.8 % (ref 37.5–51)
HDLC SERPL-MCNC: 46 MG/DL (ref 40–60)
HGB BLD-MCNC: 9.5 G/DL (ref 13–17.7)
HGB BLD-MCNC: 9.5 G/DL (ref 13–17.7)
HOLD SPECIMEN: NORMAL
INR PPP: 1.08 (ref 0.93–1.1)
LDLC SERPL CALC-MCNC: 46 MG/DL (ref 0–100)
LDLC/HDLC SERPL: 0.98 {RATIO}
LIPASE SERPL-CCNC: 26 U/L (ref 13–60)
LYMPHOCYTES # BLD AUTO: 0.9 10*3/MM3 (ref 0.7–3.1)
LYMPHOCYTES NFR BLD AUTO: 19.8 % (ref 19.6–45.3)
MCH RBC QN AUTO: 30.3 PG (ref 26.6–33)
MCHC RBC AUTO-ENTMCNC: 31.9 G/DL (ref 31.5–35.7)
MCV RBC AUTO: 94.9 FL (ref 79–97)
MONOCYTES # BLD AUTO: 0.3 10*3/MM3 (ref 0.1–0.9)
MONOCYTES NFR BLD AUTO: 7 % (ref 5–12)
NEUTROPHILS NFR BLD AUTO: 3.3 10*3/MM3 (ref 1.7–7)
NEUTROPHILS NFR BLD AUTO: 71.9 % (ref 42.7–76)
NRBC BLD AUTO-RTO: 0.1 /100 WBC (ref 0–0.2)
PLATELET # BLD AUTO: 140 10*3/MM3 (ref 140–450)
PMV BLD AUTO: 7.9 FL (ref 6–12)
POTASSIUM SERPL-SCNC: 5.4 MMOL/L (ref 3.5–5.2)
PROT SERPL-MCNC: 5.5 G/DL (ref 6–8.5)
PROTHROMBIN TIME: 11.1 SECONDS (ref 9.6–11.7)
RBC # BLD AUTO: 3.14 10*6/MM3 (ref 4.14–5.8)
RH BLD: NEGATIVE
SODIUM SERPL-SCNC: 139 MMOL/L (ref 136–145)
T&S EXPIRATION DATE: NORMAL
TRIGL SERPL-MCNC: 95 MG/DL (ref 0–150)
VLDLC SERPL-MCNC: 18 MG/DL (ref 5–40)
WBC NRBC COR # BLD: 4.5 10*3/MM3 (ref 3.4–10.8)

## 2023-01-01 PROCEDURE — 83690 ASSAY OF LIPASE: CPT | Performed by: EMERGENCY MEDICINE

## 2023-01-01 PROCEDURE — 86850 RBC ANTIBODY SCREEN: CPT | Performed by: EMERGENCY MEDICINE

## 2023-01-01 PROCEDURE — 86901 BLOOD TYPING SEROLOGIC RH(D): CPT | Performed by: EMERGENCY MEDICINE

## 2023-01-01 PROCEDURE — 25010000002 ONDANSETRON PER 1 MG: Performed by: NURSE PRACTITIONER

## 2023-01-01 PROCEDURE — 85018 HEMOGLOBIN: CPT | Performed by: NURSE PRACTITIONER

## 2023-01-01 PROCEDURE — 86900 BLOOD TYPING SEROLOGIC ABO: CPT

## 2023-01-01 PROCEDURE — 85025 COMPLETE CBC W/AUTO DIFF WBC: CPT | Performed by: EMERGENCY MEDICINE

## 2023-01-01 PROCEDURE — 80061 LIPID PANEL: CPT | Performed by: NURSE PRACTITIONER

## 2023-01-01 PROCEDURE — 85610 PROTHROMBIN TIME: CPT | Performed by: EMERGENCY MEDICINE

## 2023-01-01 PROCEDURE — 99285 EMERGENCY DEPT VISIT HI MDM: CPT

## 2023-01-01 PROCEDURE — 25010000002 METOCLOPRAMIDE PER 10 MG: Performed by: NURSE PRACTITIONER

## 2023-01-01 PROCEDURE — 36415 COLL VENOUS BLD VENIPUNCTURE: CPT | Performed by: EMERGENCY MEDICINE

## 2023-01-01 PROCEDURE — 99222 1ST HOSP IP/OBS MODERATE 55: CPT | Performed by: NURSE PRACTITIONER

## 2023-01-01 PROCEDURE — 86900 BLOOD TYPING SEROLOGIC ABO: CPT | Performed by: EMERGENCY MEDICINE

## 2023-01-01 PROCEDURE — 80053 COMPREHEN METABOLIC PANEL: CPT | Performed by: EMERGENCY MEDICINE

## 2023-01-01 PROCEDURE — 85014 HEMATOCRIT: CPT | Performed by: NURSE PRACTITIONER

## 2023-01-01 PROCEDURE — 86901 BLOOD TYPING SEROLOGIC RH(D): CPT

## 2023-01-01 PROCEDURE — 85730 THROMBOPLASTIN TIME PARTIAL: CPT | Performed by: EMERGENCY MEDICINE

## 2023-01-01 RX ORDER — CLONAZEPAM 0.5 MG/1
0.25 TABLET ORAL 2 TIMES DAILY PRN
Status: DISCONTINUED | OUTPATIENT
Start: 2023-01-01 | End: 2023-01-03 | Stop reason: HOSPADM

## 2023-01-01 RX ORDER — SODIUM CHLORIDE 0.9 % (FLUSH) 0.9 %
10 SYRINGE (ML) INJECTION AS NEEDED
Status: DISCONTINUED | OUTPATIENT
Start: 2023-01-01 | End: 2023-01-03 | Stop reason: HOSPADM

## 2023-01-01 RX ORDER — ONDANSETRON 2 MG/ML
4 INJECTION INTRAMUSCULAR; INTRAVENOUS EVERY 6 HOURS PRN
Status: DISCONTINUED | OUTPATIENT
Start: 2023-01-01 | End: 2023-01-02 | Stop reason: SDUPTHER

## 2023-01-01 RX ORDER — PANTOPRAZOLE SODIUM 40 MG/10ML
40 INJECTION, POWDER, LYOPHILIZED, FOR SOLUTION INTRAVENOUS
Status: DISCONTINUED | OUTPATIENT
Start: 2023-01-02 | End: 2023-01-03 | Stop reason: HOSPADM

## 2023-01-01 RX ORDER — ONDANSETRON 4 MG/1
4 TABLET, FILM COATED ORAL EVERY 8 HOURS PRN
COMMUNITY

## 2023-01-01 RX ORDER — LEVOTHYROXINE SODIUM 0.05 MG/1
50 TABLET ORAL DAILY
Status: DISCONTINUED | OUTPATIENT
Start: 2023-01-02 | End: 2023-01-03 | Stop reason: HOSPADM

## 2023-01-01 RX ORDER — CLONAZEPAM 0.5 MG/1
0.12 TABLET ORAL DAILY PRN
Status: DISCONTINUED | OUTPATIENT
Start: 2023-01-01 | End: 2023-01-03 | Stop reason: HOSPADM

## 2023-01-01 RX ORDER — PANTOPRAZOLE SODIUM 40 MG/10ML
80 INJECTION, POWDER, LYOPHILIZED, FOR SOLUTION INTRAVENOUS ONCE
Status: COMPLETED | OUTPATIENT
Start: 2023-01-01 | End: 2023-01-01

## 2023-01-01 RX ORDER — METFORMIN HYDROCHLORIDE 500 MG/1
1000 TABLET, EXTENDED RELEASE ORAL 2 TIMES DAILY
Status: DISCONTINUED | OUTPATIENT
Start: 2023-01-01 | End: 2023-01-03

## 2023-01-01 RX ORDER — METOCLOPRAMIDE HYDROCHLORIDE 5 MG/ML
10 INJECTION INTRAMUSCULAR; INTRAVENOUS ONCE
Status: COMPLETED | OUTPATIENT
Start: 2023-01-01 | End: 2023-01-01

## 2023-01-01 RX ORDER — SODIUM CHLORIDE 9 MG/ML
40 INJECTION, SOLUTION INTRAVENOUS AS NEEDED
Status: DISCONTINUED | OUTPATIENT
Start: 2023-01-01 | End: 2023-01-03 | Stop reason: HOSPADM

## 2023-01-01 RX ORDER — SODIUM CHLORIDE 0.9 % (FLUSH) 0.9 %
10 SYRINGE (ML) INJECTION EVERY 12 HOURS SCHEDULED
Status: DISCONTINUED | OUTPATIENT
Start: 2023-01-01 | End: 2023-01-03 | Stop reason: HOSPADM

## 2023-01-01 RX ORDER — NITROGLYCERIN 0.4 MG/1
0.4 TABLET SUBLINGUAL
Status: DISCONTINUED | OUTPATIENT
Start: 2023-01-01 | End: 2023-01-03 | Stop reason: HOSPADM

## 2023-01-01 RX ORDER — CLONAZEPAM 0.5 MG/1
0.12 TABLET ORAL DAILY PRN
COMMUNITY

## 2023-01-01 RX ORDER — ATORVASTATIN CALCIUM 10 MG/1
10 TABLET, FILM COATED ORAL NIGHTLY
Status: DISCONTINUED | OUTPATIENT
Start: 2023-01-01 | End: 2023-01-03 | Stop reason: HOSPADM

## 2023-01-01 RX ADMIN — PANTOPRAZOLE SODIUM 80 MG: 40 INJECTION, POWDER, FOR SOLUTION INTRAVENOUS at 15:25

## 2023-01-01 RX ADMIN — ATORVASTATIN CALCIUM 10 MG: 10 TABLET, FILM COATED ORAL at 20:14

## 2023-01-01 RX ADMIN — Medication 10 ML: at 20:14

## 2023-01-01 RX ADMIN — METFORMIN HYDROCHLORIDE 1000 MG: 500 TABLET, EXTENDED RELEASE ORAL at 20:13

## 2023-01-01 RX ADMIN — METOCLOPRAMIDE 10 MG: 5 INJECTION, SOLUTION INTRAMUSCULAR; INTRAVENOUS at 20:13

## 2023-01-01 RX ADMIN — ONDANSETRON 4 MG: 2 INJECTION INTRAMUSCULAR; INTRAVENOUS at 21:23

## 2023-01-01 RX ADMIN — Medication 12.5 MG: at 20:13

## 2023-01-01 NOTE — H&P
[facility]      Patient Name: Artie Mc  : 1950  MRN: [id number]  Primary Care Physician:  Chanel Carter, DAT  Date of admission: 2023      Subjective      Chief Complaint: Hematemesis    History of Present Illness: Artie Mc is a 72 y.o. male who presented to [facility] on 2023 complaining of vomiting x 1 today bright red blood. Pt reports recently discharged from [facility] d/t covid dx, and now on 2L NC since discharge. Patient reports h/o esophageal cancer and frequent esophagus stretching.    In ED, afebrile 97.8, /85, and on 2 L NC.  Glucose 103, potassium 5.4, creatinine 0.92, lipase 26, INR 1.08, PTT 28.8, WBC 4.50, hemoglobin 9.5, platelets 140.  Last hemoglobin in our system was 11.9 on 2022.  Currently awaiting records from [facility].      ROS     Personal History     Past Medical History:   Diagnosis Date   • B12 deficiency    • Blockage of coronary artery of heart (HCC)    • Depression    • DM (diabetes mellitus) (HCC)    • Dysphagia    • HTN (hypertension)    • Hyperlipidemia        Past Surgical History:   Procedure Laterality Date   • ABDOMINAL WALL ABSCESS INCISION AND DRAINAGE  10/22/2018    WITH DEBRIDEMENT  1.5CM X 1.5 CM X 1.5 CM    DR. PURI   • CATARACT EXTRACTION     • COLONOSCOPY     • CORONARY ARTERY BYPASS GRAFT     • ENDOSCOPY  2016   • ESOPHAGECTOMY  2017    BROOKS PETTY ESOPHAGECTOMY, FEEDING JEJNOSTOMOY, ABDOMINAL AND MEDIASTINAL LYMPH NODE DISECTION, PEDICLED MUSCLE FLAP COVERAGE DR. PURI   • ESOPHAGOSCOPY / EGD  2017    WITH BALLOON DILATION   • ESOPHAGOSCOPY / EGD  2017    WITH BALLOON DILATION   • ESOPHAGOSCOPY / EGD  2017    WITH BALLOON DILATION   • ESOPHAGOSCOPY / EGD  2017    WITH BALLOON DILATION   • ESOPHAGOSCOPY / EGD  2017    WITH BALLOON DILATION   • LYMPH NODE BIOPSY  2019   • PACEMAKER IMPLANTATION   11/21/2016       Family History: family history includes Cancer in his father; Heart disease in his brother, mother, and sister; Hypertension in his father; Mental illness in his mother. Otherwise pertinent FHx was reviewed and not pertinent to current issue.    Social History:  reports that he quit smoking about 7 years ago. His smoking use included cigarettes. He has a 100.00 pack-year smoking history. He quit smokeless tobacco use about 33 years ago.  His smokeless tobacco use included chew. He reports that he does not drink alcohol and does not use drugs.    Home Medications:  Prior to Admission Medications     Prescriptions Last Dose Informant Patient Reported? Taking?    clonazePAM (KlonoPIN) 0.5 MG tablet 1/1/2023  Yes Yes    Take 0.25 mg by mouth 2 (Two) Times a Day As Needed.    clonazePAM (KlonoPIN) 0.5 MG tablet 12/31/2022  Yes Yes    Take 0.125 mg by mouth Daily As Needed.    cyanocobalamin (VITAMIN B-12) 1000 MCG tablet 12/31/2022  Yes Yes    Take 1,000 mcg by mouth Daily.    Euthyrox 50 MCG tablet 1/1/2023  No Yes    Take 1 tablet by mouth once daily    folic acid (FOLVITE) 1 MG tablet 12/31/2022  Yes Yes    Take 1 mg by mouth Daily.    metFORMIN ER (GLUCOPHAGE-XR) 500 MG 24 hr tablet 12/31/2022  Yes Yes    Take 1,000 mg by mouth 2 (Two) Times a Day.    metoprolol tartrate (LOPRESSOR) 25 MG tablet 1/1/2023  Yes Yes    Take 12.5 mg by mouth 2 (Two) Times a Day.    omeprazole (priLOSEC) 20 MG capsule 12/31/2022  Yes Yes    Take 20 mg by mouth 2 (Two) Times a Day.    pravastatin (PRAVACHOL) 10 MG tablet 12/31/2022 Self Yes Yes    Take 10 mg by mouth Every Other Day.    ondansetron (ZOFRAN) 4 MG tablet   Yes No    Take 4 mg by mouth Every 8 (Eight) Hours As Needed for Nausea or Vomiting.            Allergies:  Allergies   Allergen Reactions   • Ace Inhibitors Unknown (See Comments)     Unknown reaction     • Heparin Other (See Comments)     Fever/chills, weakness in legs   • Sulfa Antibiotics Other (See  Comments)     Mouth sores       Objective      Vitals:   Temp:  [97.8 °F (36.6 °C)] 97.8 °F (36.6 °C)  Heart Rate:  [61-82] 76  Resp:  [18] 18  BP: (117-138)/(61-85) 134/61  Flow (L/min):  [2] 2    Physical Exam     Result Review    Result Review:  I have personally reviewed the results from the time of this admission to 1/1/2023 16:46 EST and agree with these findings:  [x]  Laboratory  [x]  Microbiology  [x]  Radiology  [x]  EKG/Telemetry   []  Cardiology/Vascular   []  Pathology  []  Old records  []  Other:      Assessment & Plan        Active Hospital Problems:  There are no active hospital problems to display for this patient.    Plan:     Hematemesis  Anemia  - Hemoglobin 9.5, check repeat H&H every 6 hours x 3  - GI consulted  - N.p.o.  - Protonix    History of esophageal cancer, stage IV  - Follows Dr. Elizondo  - Immunotherapy, last infusion 10/10/2022      Recent COVID-19 infection  - Currently on 2L NC  - Will need to follow-up with pulmonary once discharged     Hypertension  HLD  - /61  - Continue Lopressor and statin  - Lipid panel ordered    Diabetes  - Glucose 103  - Continue metformin  - Hemoglobin A1c ordered    Hypothyroidism  - Last TSH 3.28 on 11/21/2022  - Continue Euthyrox    DVT prophylaxis  -Mechanical SCDs    CODE STATUS:       Admission Status:  I believe this patient meets inpatient status.    I discussed the patient's findings and my recommendations with patient.      Signature: Electronically signed by DAT Nava, 01/01/23, 4:46 PM EST.

## 2023-01-01 NOTE — PROGRESS NOTES
Synopsis  Nursing report ED to floor  Artie Mc  72 y.o.  male    HPI:   Chief Complaint   Patient presents with    Vomiting Blood       Admitting doctor:   Manolo Spann MD    Admitting diagnosis:   The primary encounter diagnosis was Gastrointestinal hemorrhage, unspecified gastrointestinal hemorrhage type. A diagnosis of Hematemesis with nausea was also pertinent to this visit.    Code status:   Current Code Status       Date Active Code Status Order ID Comments User Context       1/1/2023 4549 CPR (Attempt to Resuscitate) [id number]  Jerrica Jones APRN ED        Question Answer    Code Status (Patient has no pulse and is not breathing) CPR (Attempt to Resuscitate)    Medical Interventions (Patient has pulse or is breathing) Full Support    Level Of Support Discussed With Patient                    Allergies:   Ace inhibitors, Heparin, and Sulfa antibiotics    Isolation:  No active isolations     Fall Risk:  Fall Risk Assessment was completed, and patient is at moderate risk for falls.   Predictive Model Details   No score data available for Risk of Fall        Weight:       01/01/23  1416   Weight: 63.9 kg (140 lb 14 oz)       Intake and Output  No intake or output data in the 24 hours ending 01/01/23 1725    Diet:   Dietary Orders (From admission, onward)       Start     Ordered    01/01/23 1630  NPO Diet NPO Type: Strict NPO  Diet Effective Now        Question:  NPO Type  Answer:  Strict NPO    01/01/23 1629                     Most recent vitals:   Vitals:    01/01/23 1547 01/01/23 1616 01/01/23 1716 01/01/23 1717   BP:  134/61 126/70    Pulse: 82 76 74 73   Resp:       Temp:       SpO2: 95% 96%  98%   Weight:       Height:           Active LDAs/IV Access:   Lines, Drains & Airways       Active LDAs       Name Placement date Placement time Site Days    Single Lumen Implantable Port Right Chest --  --  Chest  --                    Skin Condition:   Skin Assessments (last day)       Date/Time Skin  WDL Skin Integrity    01/01/23 1505 X;all bruised (ecchymotic);abrasion     Skin Integrity: scattered bruiding to BUE at 01/01/23 1505             Labs (abnormal labs have a star):   Labs Reviewed   COMPREHENSIVE METABOLIC PANEL - Abnormal; Notable for the following components:       Result Value    Glucose 103 (*)     Potassium 5.4 (*)     Total Protein 5.5 (*)     Albumin 3.1 (*)     AST (SGOT) 47 (*)     All other components within normal limits    Narrative:     GFR Normal >60  Chronic Kidney Disease <60  Kidney Failure <15    The GFR formula is only valid for adults with stable renal function between ages 18 and 70.   APTT - Abnormal; Notable for the following components:    PTT 28.8 (*)     All other components within normal limits   CBC WITH AUTO DIFFERENTIAL - Abnormal; Notable for the following components:    RBC 3.14 (*)     Hemoglobin 9.5 (*)     Hematocrit 29.8 (*)     All other components within normal limits   PROTIME-INR - Normal   LIPASE - Normal   LIPID PANEL   HEMOGLOBIN AND HEMATOCRIT, BLOOD   HEMOGLOBIN AND HEMATOCRIT, BLOOD   TYPE AND SCREEN   BB ARMBAND CHECK   CBC AND DIFFERENTIAL    Narrative:     The following orders were created for panel order CBC & Differential.  Procedure                               Abnormality         Status                     ---------                               -----------         ------                     CBC Auto Differential[[id number]]        Abnormal            Final result                 Please view results for these tests on the individual orders.   EXTRA TUBES    Narrative:     The following orders were created for panel order Extra Tubes.  Procedure                               Abnormality         Status                     ---------                               -----------         ------                     Gold Top - SST[[id number]]                                   Final result                 Please view results for these tests on the individual  orders.   GOLD TOP - SST       LOC: Person, Place, Time, and Situation    Telemetry:  Progressive Care    Cardiac Monitoring Ordered: yes    EKG:   No orders to display       Medications Given in the ED:   Medications   sodium chloride 0.9 % flush 10 mL (has no administration in time range)   sodium chloride 0.9 % flush 10 mL (has no administration in time range)   sodium chloride 0.9 % flush 10 mL (has no administration in time range)   sodium chloride 0.9 % infusion 40 mL (has no administration in time range)   nitroglycerin (NITROSTAT) SL tablet 0.4 mg (has no administration in time range)   ondansetron (ZOFRAN) injection 4 mg (has no administration in time range)   pantoprazole (PROTONIX) injection 40 mg (has no administration in time range)   pantoprazole (PROTONIX) injection 80 mg (80 mg Intravenous Given 23 1525)       Imaging results:  No radiology results for the last day    Social issues:   Social History     Socioeconomic History    Marital status:    Tobacco Use    Smoking status: Former     Packs/day: 2.00     Years: 50.00     Pack years: 100.00     Types: Cigarettes     Quit date: 2015     Years since quittin.5    Smokeless tobacco: Former     Types: Chew     Quit date: 1989   Substance and Sexual Activity    Alcohol use: No    Drug use: No    Sexual activity: Defer       NIH Stroke Scale:  Interval: (not recorded)  1a. Level of Consciousness: (not recorded)  1b. LOC Questions: (not recorded)  1c. LOC Commands: (not recorded)  2. Best Gaze: (not recorded)  3. Visual: (not recorded)  4. Facial Palsy: (not recorded)  5a. Motor Arm, Left: (not recorded)  5b. Motor Arm, Right: (not recorded)  6a. Motor Leg, Left: (not recorded)  6b. Motor Leg, Right: (not recorded)  7. Limb Ataxia: (not recorded)  8. Sensory: (not recorded)  9. Best Language: (not recorded)  10. Dysarthria: (not recorded)  11. Extinction and Inattention (formerly Neglect): (not recorded)    Total (NIH Stroke  Scale): (not recorded)     Additional notable assessment information:h/o esophageal cancer and esophagus stretching.     Nursing report ED to floor:  Varun Wilson RN   01/01/23 17:25 EST        Other

## 2023-01-01 NOTE — CONSULTS
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 Bradley in 5/2017, dysphagia due to recurrent esophageal anastomotic stricture, CAD, DM, HTN who woke up nauseated this morning & vomited blood. He has never had this happen before so he came to ER, 1/1/2023.   States he has a not been feeling bad previously to waking up this morning.  Patient states he has a constant right breast pain that he relates as a gas bubble in his chest can be 7 out of 10 as far as discomfort.  Patient denies taking any blood thinners or NSAIDs.  States he occasionally takes Tylenol for pain.  No history of tobacco or alcohol intake.  Patient states he has had a dime sized lymph node positive for adenocarcinoma and he is currently taking Keytruda every 6 weeks for this.  Patient has a history of recurrent esophageal anastomotic stricturing with multiple dilations in 2018.  Patient states he stopped having them done as they seem to help very little.  Patient has adapted his diet and has been doing okay with eating.  Patient has a history of COVID diagnosis at [facility] recently.  Pending records.  Hemoglobin is noted to be 9.5 upon admission.  Hemoglobin was 11.9 on 11/21/2022.  I do show patient takes 20 mg of omeprazole twice a day at home.    Endo History:  8/23/2018 EGD (Dr. Villafana) - previous esophageal surgery status post dilation at the anastomosis to 17 mm  7/2018 EGD (Dr. Villafana) - previous esophageal surgery status post dilation to 15 mm  5/2018 EGD/colonoscopy (Dr. Villafana) - esophagogastric  anastomotic stricture status post dilation to 16.5 mm, status post Noel-Petty esophagectomy, colon polyps (TA and HP), cecal AVM status post APC        Past Medical History:  Past Medical History:   Diagnosis Date   • B12 deficiency    • Blockage of coronary artery of heart (HCC)    • Depression    • DM (diabetes mellitus) (HCC)    • Dysphagia    • HTN (hypertension)    • Hyperlipidemia        Past Surgical History:  Past Surgical History:   Procedure Laterality Date   • ABDOMINAL WALL ABSCESS INCISION AND DRAINAGE  10/22/2018    WITH DEBRIDEMENT  1.5CM X 1.5 CM X 1.5 CM    DR. PURI   • CATARACT EXTRACTION     • COLONOSCOPY     • CORONARY ARTERY BYPASS GRAFT     • ENDOSCOPY  2016   • ESOPHAGECTOMY  2017    NOEL PETTY ESOPHAGECTOMY, FEEDING JEJNOSTOMOY, ABDOMINAL AND MEDIASTINAL LYMPH NODE DISECTION, PEDICLED MUSCLE FLAP COVERAGE DR. PURI   • ESOPHAGOSCOPY / EGD  2017    WITH BALLOON DILATION   • ESOPHAGOSCOPY / EGD  2017    WITH BALLOON DILATION   • ESOPHAGOSCOPY / EGD  2017    WITH BALLOON DILATION   • ESOPHAGOSCOPY / EGD  2017    WITH BALLOON DILATION   • ESOPHAGOSCOPY / EGD  2017    WITH BALLOON DILATION   • LYMPH NODE BIOPSY  2019   • PACEMAKER IMPLANTATION  2016       Social History:  Social History     Tobacco Use   • Smoking status: Former     Packs/day: 2.00     Years: 50.00     Pack years: 100.00     Types: Cigarettes     Quit date: 2015     Years since quittin.5   • Smokeless tobacco: Former     Types: Chew     Quit date: 1989   Substance Use Topics   • Alcohol use: No   • Drug use: No       Family History:  Family History   Problem Relation Age of Onset   • Mental illness Mother    • Heart disease Mother    • Hypertension Father    • Cancer Father    • Heart disease Sister    • Heart disease Brother        Medications:  (Not in a hospital admission)      Scheduled Meds:[START ON 2023] pantoprazole, 40 mg, Intravenous, Q  AM  sodium chloride, 10 mL, Intravenous, Q12H      Continuous Infusions:   PRN Meds:.•  nitroglycerin  •  ondansetron  •  Insert Peripheral IV **AND** sodium chloride  •  sodium chloride  •  sodium chloride    ALLERGIES:  Ace inhibitors, Heparin, and Sulfa antibiotics    ROS: Nausea, hematemesis  The following systems were reviewed and negative;   Constitution:  No fevers, chills, no unintentional weight loss  Skin: no rash, no jaundice  Eyes:  No blurry vision, no eye pain  HENT:  No change in hearing or smell  Resp:  No dyspnea or cough  CV:  No chest pain or palpitations  :  No dysuria, hematuria  Musculoskeletal:  No leg cramps or arthralgias  Neuro:  No tremor, no numbness  Psych:  No depression or confsuion    Objective  Patient seen in the emergency room room 17.  Resting on the stretcher.  NAD.    Vital Signs:   Vitals:    01/01/23 1547 01/01/23 1616 01/01/23 1716 01/01/23 1717   BP:  134/61 126/70    Pulse: 82 76 74 73   Resp:       Temp:       SpO2: 95% 96%  98%   Weight:       Height:           Physical Exam:   General Appearance:    Awake and alert, in no acute distress   Head:    Normocephalic, without obvious abnormality, atraumatic   Eyes:            Conjunctivae normal, anicteric sclera, pupils equal   Ears:    Ears appear intact with no abnormalities noted   Throat:   No oral lesions, no thrush, oral mucosa moist   Neck:   Supple, no JVD   Lungs:    Respirations regular, even and unlabored       Chest Wall:    No abnormalities observed   Abdomen:     soft, non-tender, no rebound or guarding, non-distended, no hepatosplenomegaly   Rectal:     Deferred   Extremities:   Moves all extremities, no edema, no cyanosis   Pulses:   Pulses palpable and equal bilaterally   Skin:   No rash, no jaundice, normal palpaion       Neurologic:   Cranial nerves 2 - 12 grossly intact, no asterixis     Results Review:   I reviewed the patient's labs and imaging.  Lab Results (last 24 hours)     Procedure Component  Value Units Date/Time    Lipid Panel [[id number]] Collected: 01/01/23 1505    Specimen: Blood from Arm, Left Updated: 01/01/23 1719    Extra Tubes [[id number]] Collected: 01/01/23 1505    Specimen: Blood from Arm, Left Updated: 01/01/23 1617    Narrative:      The following orders were created for panel order Extra Tubes.  Procedure                               Abnormality         Status                     ---------                               -----------         ------                     Gold Top - SST[[id number]]                                   Final result                 Please view results for these tests on the individual orders.    Gold Top - SST [[id number]] Collected: 01/01/23 1505    Specimen: Blood from Arm, Left Updated: 01/01/23 1617     Extra Tube Hold for add-ons.     Comment: Auto resulted.       Comprehensive Metabolic Panel [[id number]]  (Abnormal) Collected: 01/01/23 1505    Specimen: Blood from Arm, Left Updated: 01/01/23 1543     Glucose 103 mg/dL      BUN 22 mg/dL      Creatinine 0.92 mg/dL      Sodium 139 mmol/L      Potassium 5.4 mmol/L      Chloride 105 mmol/L      CO2 25.0 mmol/L      Calcium 8.7 mg/dL      Total Protein 5.5 g/dL      Albumin 3.1 g/dL      ALT (SGPT) 21 U/L      AST (SGOT) 47 U/L      Alkaline Phosphatase 51 U/L      Total Bilirubin 0.3 mg/dL      Globulin 2.4 gm/dL      A/G Ratio 1.3 g/dL      BUN/Creatinine Ratio 23.9     Anion Gap 9.0 mmol/L      eGFR 87.8 mL/min/1.73      Comment: National Kidney Foundation and American Society of Nephrology (ASN) Task Force recommended calculation based on the Chronic Kidney Disease Epidemiology Collaboration (CKD-EPI) equation refit without adjustment for race.       Narrative:      GFR Normal >60  Chronic Kidney Disease <60  Kidney Failure <15    The GFR formula is only valid for adults with stable renal function between ages 18 and 70.    Lipase [[id number]]  (Normal) Collected: 01/01/23 1505    Specimen: Blood from Arm, Left  Updated: 01/01/23 1543     Lipase 26 U/L     Protime-INR [[id number]]  (Normal) Collected: 01/01/23 1505    Specimen: Blood from Arm, Left Updated: 01/01/23 1527     Protime 11.1 Seconds      INR 1.08    aPTT [[id number]]  (Abnormal) Collected: 01/01/23 1505    Specimen: Blood from Arm, Left Updated: 01/01/23 1527     PTT 28.8 seconds     CBC & Differential [[id number]]  (Abnormal) Collected: 01/01/23 1505    Specimen: Blood from Arm, Left Updated: 01/01/23 1515    Narrative:      The following orders were created for panel order CBC & Differential.  Procedure                               Abnormality         Status                     ---------                               -----------         ------                     CBC Auto Differential[[id number]]        Abnormal            Final result                 Please view results for these tests on the individual orders.    CBC Auto Differential [[id number]]  (Abnormal) Collected: 01/01/23 1505    Specimen: Blood from Arm, Left Updated: 01/01/23 1515     WBC 4.50 10*3/mm3      RBC 3.14 10*6/mm3      Hemoglobin 9.5 g/dL      Hematocrit 29.8 %      MCV 94.9 fL      MCH 30.3 pg      MCHC 31.9 g/dL      RDW 14.6 %      RDW-SD 48.1 fl      MPV 7.9 fL      Platelets 140 10*3/mm3      Neutrophil % 71.9 %      Lymphocyte % 19.8 %      Monocyte % 7.0 %      Eosinophil % 0.8 %      Basophil % 0.5 %      Neutrophils, Absolute 3.30 10*3/mm3      Lymphocytes, Absolute 0.90 10*3/mm3      Monocytes, Absolute 0.30 10*3/mm3      Eosinophils, Absolute 0.00 10*3/mm3      Basophils, Absolute 0.00 10*3/mm3      nRBC 0.1 /100 WBC           Imaging Results (Last 24 Hours)     ** No results found for the last 24 hours. 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  Consider dilation due to history of dysphagia.  PPI IV  IV fluids for hydration  Okay for clear liquids, no reds  Antiemetics as needed  Continue to monitor H&H and transfuse for hemoglobin less than 7.  Labs in AM.    I discussed the patients findings and my recommendations with the patient.  Fabiana Stevens, APRN  01/01/23  17:20 EST    Time:

## 2023-01-01 NOTE — ED PROVIDER NOTES
Subjective   History of Present Illness  Chief complaint vomiting of blood    History of present illness this is a 72-year-old gentleman who has complicated past medical history of esophageal cancer with some surgery several years ago to remove and is currently followed by oncology and on immunotherapy with last dose 6 weeks ago.  Patient also complicated about admission to [facility] just with a discharge 2 days ago after having COVID.  He spent a month in the hospital he states.        Review of Systems   Constitutional: Negative for chills and fever.   Eyes: Negative for photophobia and visual disturbance.   Respiratory: Negative for chest tightness and shortness of breath.    Cardiovascular: Negative for chest pain and palpitations.   Gastrointestinal: Negative for abdominal pain, blood in stool and vomiting.   Endocrine: Negative for cold intolerance and heat intolerance.   Musculoskeletal: Negative for back pain.   Skin: Negative for rash and wound.   Neurological: Negative for dizziness and light-headedness.   Psychiatric/Behavioral: Negative for agitation and confusion.       Past Medical History:   Diagnosis Date   • B12 deficiency    • Blockage of coronary artery of heart (HCC)    • Depression    • DM (diabetes mellitus) (HCC)    • Dysphagia    • HTN (hypertension)    • Hyperlipidemia    History of esophageal cancer.  Just got out of hospital 3 days ago after having COVID.  Esophageal strictures with dilatation multiple times    Allergies   Allergen Reactions   • Ace Inhibitors Unknown (See Comments)     Unknown reaction     • Heparin Other (See Comments)     Fever/chills, weakness in legs   • Sulfa Antibiotics Other (See Comments)     Mouth sores       Past Surgical History:   Procedure Laterality Date   • ABDOMINAL WALL ABSCESS INCISION AND DRAINAGE  10/22/2018    WITH DEBRIDEMENT  1.5CM X 1.5 CM X 1.5 CM    DR. PURI   • CATARACT EXTRACTION  2018   • COLONOSCOPY     • CORONARY ARTERY BYPASS GRAFT      • ENDOSCOPY  2016   • ESOPHAGECTOMY  2017    BROOKS PETTY ESOPHAGECTOMY, FEEDING JEJNOSTOMOY, ABDOMINAL AND MEDIASTINAL LYMPH NODE DISECTION, PEDICLED MUSCLE FLAP COVERAGE DR. PURI   • ESOPHAGOSCOPY / EGD  2017    WITH BALLOON DILATION   • ESOPHAGOSCOPY / EGD  2017    WITH BALLOON DILATION   • ESOPHAGOSCOPY / EGD  2017    WITH BALLOON DILATION   • ESOPHAGOSCOPY / EGD  2017    WITH BALLOON DILATION   • ESOPHAGOSCOPY / EGD  2017    WITH BALLOON DILATION   • LYMPH NODE BIOPSY  2019   • PACEMAKER IMPLANTATION  2016       Family History   Problem Relation Age of Onset   • Mental illness Mother    • Heart disease Mother    • Hypertension Father    • Cancer Father    • Heart disease Sister    • Heart disease Brother        Social History     Socioeconomic History   • Marital status:    Tobacco Use   • Smoking status: Former     Packs/day: 2.00     Years: 50.00     Pack years: 100.00     Types: Cigarettes     Quit date: 2015     Years since quittin.5   • Smokeless tobacco: Former     Types: Chew     Quit date: 1989   Substance and Sexual Activity   • Alcohol use: No   • Drug use: No   • Sexual activity: Defer     Prior to Admission medications    Medication Sig Start Date End Date Taking? Authorizing Provider   Cetirizine HCl (ZyrTEC Allergy) 10 MG capsule Take  by mouth. 9/15/20   Lamar Banda MD   clonazePAM (KlonoPIN) 0.5 MG tablet Pt taking 1.5 pills daily 19   Lamar Banda MD   cyanocobalamin (VITAMIN B-12) 1000 MCG tablet  19   Lamar Banda MD   diphenhydrAMINE-APAP, sleep, (TYLENOL PM EXTRA STRENGTH PO) Take  by mouth. 9/15/20   Lamar Banda MD   Euthyrox 50 MCG tablet Take 1 tablet by mouth once daily 22   Jean Pierre Elizondo MD   ferrous sulfate 325 (65 FE) MG tablet Take 1 tablet by mouth 2 (Two) Times a Day With Meals. 22   Kavya Ashton APRN   folic acid (FOLVITE) 1 MG tablet  Daily. 1/17/19   Lamar Banda MD   metFORMIN ER (GLUCOPHAGE-XR) 500 MG 24 hr tablet  10/19/22   Lamar Banda MD   metoprolol tartrate (LOPRESSOR) 25 MG tablet Take 12.5 mg by mouth 2 (Two) Times a Day. 11/3/16   Lamar Banda MD   omeprazole (priLOSEC) 20 MG capsule Take 20 mg by mouth 2 (Two) Times a Day. 1/5/22   Lamar Banda MD   ondansetron (ZOFRAN) 4 MG tablet TAKE 1 TABLET BY MOUTH EVERY 8 HOURS AS NEEDED FOR NAUSEA FOR VOMITING 12/30/22   Jean Pierre Elizondo MD   pravastatin (PRAVACHOL) 10 MG tablet Take 10 mg by mouth every night at bedtime. 6/25/20   Lamar Banda MD     Currently on immunotherapy last dose 6 weeks ago followed by Dr. Elizondo      Objective   Physical Exam  Constitutional is a 72-year-old male awake alert no acute distress resting comfortably temperature is 97 blood pressure 115/60 heart rate 81 respiratory rate 18.  HEENT extraocular muscles are intact pupils equal round reactive sclera clear.  Mouth clear neck supple no adenopathy no meningeal signs.  Lungs clear no retractions.  Heart regular without murmur.  Abdomen soft nontender good bowel sounds no peritoneal findings or pulsatile masses.  Extremities pulses equal throughout upper and lower extremities no edema no cords or Homans' sign or evidence of DVT.  Skin is warm and dry without rashes or cellulitic changes.  Neurologic awake alert and follows commands monitorings normal without focal weakness  Procedures           ED Course      Results for orders placed or performed during the hospital encounter of 01/01/23   Comprehensive Metabolic Panel    Specimen: Arm, Left; Blood   Result Value Ref Range    Glucose 103 (H) 65 - 99 mg/dL    BUN 22 8 - 23 mg/dL    Creatinine 0.92 0.76 - 1.27 mg/dL    Sodium 139 136 - 145 mmol/L    Potassium 5.4 (H) 3.5 - 5.2 mmol/L    Chloride 105 98 - 107 mmol/L    CO2 25.0 22.0 - 29.0 mmol/L    Calcium 8.7 8.6 - 10.5 mg/dL    Total Protein 5.5 (L) 6.0 - 8.5 g/dL     Albumin 3.1 (L) 3.5 - 5.2 g/dL    ALT (SGPT) 21 1 - 41 U/L    AST (SGOT) 47 (H) 1 - 40 U/L    Alkaline Phosphatase 51 39 - 117 U/L    Total Bilirubin 0.3 0.0 - 1.2 mg/dL    Globulin 2.4 gm/dL    A/G Ratio 1.3 g/dL    BUN/Creatinine Ratio 23.9 7.0 - 25.0    Anion Gap 9.0 5.0 - 15.0 mmol/L    eGFR 87.8 >60.0 mL/min/1.73   Protime-INR    Specimen: Arm, Left; Blood   Result Value Ref Range    Protime 11.1 9.6 - 11.7 Seconds    INR 1.08 0.93 - 1.10   aPTT    Specimen: Arm, Left; Blood   Result Value Ref Range    PTT 28.8 (L) 61.0 - 76.5 seconds   Lipase    Specimen: Arm, Left; Blood   Result Value Ref Range    Lipase 26 13 - 60 U/L   CBC Auto Differential    Specimen: Arm, Left; Blood   Result Value Ref Range    WBC 4.50 3.40 - 10.80 10*3/mm3    RBC 3.14 (L) 4.14 - 5.80 10*6/mm3    Hemoglobin 9.5 (L) 13.0 - 17.7 g/dL    Hematocrit 29.8 (L) 37.5 - 51.0 %    MCV 94.9 79.0 - 97.0 fL    MCH 30.3 26.6 - 33.0 pg    MCHC 31.9 31.5 - 35.7 g/dL    RDW 14.6 12.3 - 15.4 %    RDW-SD 48.1 37.0 - 54.0 fl    MPV 7.9 6.0 - 12.0 fL    Platelets 140 140 - 450 10*3/mm3    Neutrophil % 71.9 42.7 - 76.0 %    Lymphocyte % 19.8 19.6 - 45.3 %    Monocyte % 7.0 5.0 - 12.0 %    Eosinophil % 0.8 0.3 - 6.2 %    Basophil % 0.5 0.0 - 1.5 %    Neutrophils, Absolute 3.30 1.70 - 7.00 10*3/mm3    Lymphocytes, Absolute 0.90 0.70 - 3.10 10*3/mm3    Monocytes, Absolute 0.30 0.10 - 0.90 10*3/mm3    Eosinophils, Absolute 0.00 0.00 - 0.40 10*3/mm3    Basophils, Absolute 0.00 0.00 - 0.20 10*3/mm3    nRBC 0.1 0.0 - 0.2 /100 WBC   Type & Screen    Specimen: Arm, Left; Blood   Result Value Ref Range    ABO Type O     RH type Negative      No radiology results for the last day  Medications   sodium chloride 0.9 % flush 10 mL (has no administration in time range)   pantoprazole (PROTONIX) injection 80 mg (80 mg Intravenous Given 1/1/23 1525)                                            Medical Decision Making  Medical decision making patient IV established placed on a  cardiac monitor reviewed by me showed a sinus rhythm was given Protonix 80 mg IV.  Labs obtained CBC unremarkable other than hemoglobin 9.5 which is down from 11.9 a month ago.  And chemistries are unremarkable.  The patient had no further bleeding here.  He said no black or bloody stool I get these records sent down from [facility] where he just got out of the hospital 2 days ago he had COVID 19 at that time.  His hemoglobin in the hospital was 9.5 so it is unchanged from today's.  The patient states he is having trouble swallowing pain he has had esophageal strictures and dilatations multiple times.  History of esophageal cancer.  Certainly could be related to the stricture foreign bodies peptic ulcer disease stress ulcer recent hospitalization gastritis esophagitis.  Not complete list of all possibilities.  But he is remained hemodynamically stable all labs obtained reviewed by me independently.  He will be admitted to hospital for further care I have talked to the hospitalist nurse practitioner.  I have also talked to Dr. Morgan from GI.    Gastrointestinal hemorrhage, unspecified gastrointestinal hemorrhage type: acute illness or injury  Amount and/or Complexity of Data Reviewed  External Data Reviewed: labs.  Labs: ordered. Decision-making details documented in ED Course.      Risk  Decision regarding hospitalization.          Final diagnoses:   Gastrointestinal hemorrhage, unspecified gastrointestinal hemorrhage type       ED Disposition  ED Disposition     ED Disposition   Decision to Admit    Condition   --    Comment   --             No follow-up provider specified.       Medication List      No changes were made to your prescriptions during this visit.          Srinivasa Son MD  01/01/23 0490

## 2023-01-01 NOTE — ED NOTES
Pt reports recently discharged from [facility] d/t covid dx, now on 2L NC since discharge, pt reports h/o esophageal cancer and frequent esophagus stretching. C/o vomiting x 1 today bright red blood.

## 2023-01-01 NOTE — Clinical Note
Level of Care: Telemetry [5]   Admitting Physician: MICHAEL FORD [[id number]]   Attending Physician: MICHAEL FORD [[id number]]

## 2023-01-02 ENCOUNTER — INPATIENT HOSPITAL (AMBULATORY)
Dept: URBAN - METROPOLITAN AREA HOSPITAL 84 | Facility: HOSPITAL | Age: 73
End: 2023-01-02
Payer: COMMERCIAL

## 2023-01-02 ENCOUNTER — ANESTHESIA (OUTPATIENT)
Dept: GASTROENTEROLOGY | Facility: HOSPITAL | Age: 73
DRG: 381 | End: 2023-01-02
Payer: MEDICARE

## 2023-01-02 ENCOUNTER — ANESTHESIA EVENT (OUTPATIENT)
Dept: GASTROENTEROLOGY | Facility: HOSPITAL | Age: 73
DRG: 381 | End: 2023-01-02
Payer: MEDICARE

## 2023-01-02 DIAGNOSIS — K20.80 OTHER ESOPHAGITIS WITHOUT BLEEDING: ICD-10-CM

## 2023-01-02 DIAGNOSIS — Z90.49 ACQUIRED ABSENCE OF OTHER SPECIFIED PARTS OF DIGESTIVE TRACT: ICD-10-CM

## 2023-01-02 DIAGNOSIS — K91.89 OTHER POSTPROCEDURAL COMPLICATIONS AND DISORDERS OF DIGESTIV: ICD-10-CM

## 2023-01-02 DIAGNOSIS — K92.0 HEMATEMESIS: ICD-10-CM

## 2023-01-02 DIAGNOSIS — R13.10 DYSPHAGIA, UNSPECIFIED: ICD-10-CM

## 2023-01-02 DIAGNOSIS — D50.0 IRON DEFICIENCY ANEMIA SECONDARY TO BLOOD LOSS (CHRONIC): ICD-10-CM

## 2023-01-02 DIAGNOSIS — K22.2 ESOPHAGEAL OBSTRUCTION: ICD-10-CM

## 2023-01-02 PROBLEM — E78.2 MIXED HYPERLIPIDEMIA: Status: ACTIVE | Noted: 2019-09-10

## 2023-01-02 PROBLEM — K22.10 EROSIVE ESOPHAGITIS: Status: ACTIVE | Noted: 2023-01-02

## 2023-01-02 LAB
ANION GAP SERPL CALCULATED.3IONS-SCNC: 8 MMOL/L (ref 5–15)
BASOPHILS # BLD AUTO: 0 10*3/MM3 (ref 0–0.2)
BASOPHILS NFR BLD AUTO: 0.5 % (ref 0–1.5)
BUN SERPL-MCNC: 22 MG/DL (ref 8–23)
BUN/CREAT SERPL: 26.2 (ref 7–25)
CALCIUM SPEC-SCNC: 8.4 MG/DL (ref 8.6–10.5)
CHLORIDE SERPL-SCNC: 103 MMOL/L (ref 98–107)
CO2 SERPL-SCNC: 27 MMOL/L (ref 22–29)
CREAT SERPL-MCNC: 0.84 MG/DL (ref 0.76–1.27)
DEPRECATED RDW RBC AUTO: 48.6 FL (ref 37–54)
EGFRCR SERPLBLD CKD-EPI 2021: 92.1 ML/MIN/1.73
EOSINOPHIL # BLD AUTO: 0 10*3/MM3 (ref 0–0.4)
EOSINOPHIL NFR BLD AUTO: 1.3 % (ref 0.3–6.2)
ERYTHROCYTE [DISTWIDTH] IN BLOOD BY AUTOMATED COUNT: 14.4 % (ref 12.3–15.4)
GLUCOSE BLDC GLUCOMTR-MCNC: 106 MG/DL (ref 70–105)
GLUCOSE BLDC GLUCOMTR-MCNC: 107 MG/DL (ref 70–105)
GLUCOSE BLDC GLUCOMTR-MCNC: 194 MG/DL (ref 70–105)
GLUCOSE SERPL-MCNC: 112 MG/DL (ref 65–99)
HBA1C MFR BLD: 7.7 % (ref 3.5–5.6)
HCT VFR BLD AUTO: 26.3 % (ref 37.5–51)
HCT VFR BLD AUTO: 26.7 % (ref 37.5–51)
HGB BLD-MCNC: 8.8 G/DL (ref 13–17.7)
HGB BLD-MCNC: 8.9 G/DL (ref 13–17.7)
INR PPP: 1.1 (ref 0.93–1.1)
LYMPHOCYTES # BLD AUTO: 1.4 10*3/MM3 (ref 0.7–3.1)
LYMPHOCYTES NFR BLD AUTO: 38.4 % (ref 19.6–45.3)
MCH RBC QN AUTO: 30.8 PG (ref 26.6–33)
MCHC RBC AUTO-ENTMCNC: 33.4 G/DL (ref 31.5–35.7)
MCV RBC AUTO: 92.2 FL (ref 79–97)
MONOCYTES # BLD AUTO: 0.4 10*3/MM3 (ref 0.1–0.9)
MONOCYTES NFR BLD AUTO: 10.9 % (ref 5–12)
NEUTROPHILS NFR BLD AUTO: 1.8 10*3/MM3 (ref 1.7–7)
NEUTROPHILS NFR BLD AUTO: 48.9 % (ref 42.7–76)
NRBC BLD AUTO-RTO: 0.1 /100 WBC (ref 0–0.2)
PLATELET # BLD AUTO: 126 10*3/MM3 (ref 140–450)
PMV BLD AUTO: 8.4 FL (ref 6–12)
POTASSIUM SERPL-SCNC: 5.2 MMOL/L (ref 3.5–5.2)
PROTHROMBIN TIME: 11.3 SECONDS (ref 9.6–11.7)
RBC # BLD AUTO: 2.85 10*6/MM3 (ref 4.14–5.8)
SODIUM SERPL-SCNC: 138 MMOL/L (ref 136–145)
WBC NRBC COR # BLD: 3.7 10*3/MM3 (ref 3.4–10.8)

## 2023-01-02 PROCEDURE — 85025 COMPLETE CBC W/AUTO DIFF WBC: CPT | Performed by: NURSE PRACTITIONER

## 2023-01-02 PROCEDURE — 80048 BASIC METABOLIC PNL TOTAL CA: CPT | Performed by: INTERNAL MEDICINE

## 2023-01-02 PROCEDURE — 85014 HEMATOCRIT: CPT | Performed by: NURSE PRACTITIONER

## 2023-01-02 PROCEDURE — 82962 GLUCOSE BLOOD TEST: CPT

## 2023-01-02 PROCEDURE — 85610 PROTHROMBIN TIME: CPT | Performed by: NURSE PRACTITIONER

## 2023-01-02 PROCEDURE — 0DJ08ZZ INSPECTION OF UPPER INTESTINAL TRACT, VIA NATURAL OR ARTIFICIAL OPENING ENDOSCOPIC: ICD-10-PCS | Performed by: INTERNAL MEDICINE

## 2023-01-02 PROCEDURE — 43235 EGD DIAGNOSTIC BRUSH WASH: CPT | Performed by: INTERNAL MEDICINE

## 2023-01-02 PROCEDURE — 85018 HEMOGLOBIN: CPT | Performed by: NURSE PRACTITIONER

## 2023-01-02 PROCEDURE — 25010000002 PROPOFOL 200 MG/20ML EMULSION: Performed by: ANESTHESIOLOGY

## 2023-01-02 PROCEDURE — 83036 HEMOGLOBIN GLYCOSYLATED A1C: CPT | Performed by: NURSE PRACTITIONER

## 2023-01-02 PROCEDURE — 36415 COLL VENOUS BLD VENIPUNCTURE: CPT | Performed by: INTERNAL MEDICINE

## 2023-01-02 PROCEDURE — 80048 BASIC METABOLIC PNL TOTAL CA: CPT | Performed by: NURSE PRACTITIONER

## 2023-01-02 PROCEDURE — 85025 COMPLETE CBC W/AUTO DIFF WBC: CPT | Performed by: INTERNAL MEDICINE

## 2023-01-02 RX ORDER — NALOXONE HCL 0.4 MG/ML
0.4 VIAL (ML) INJECTION AS NEEDED
Status: CANCELLED | OUTPATIENT
Start: 2023-01-02

## 2023-01-02 RX ORDER — FLUMAZENIL 0.1 MG/ML
0.5 INJECTION INTRAVENOUS AS NEEDED
Status: CANCELLED | OUTPATIENT
Start: 2023-01-02

## 2023-01-02 RX ORDER — MEPERIDINE HYDROCHLORIDE 25 MG/ML
12.5 INJECTION INTRAMUSCULAR; INTRAVENOUS; SUBCUTANEOUS
Status: CANCELLED | OUTPATIENT
Start: 2023-01-02 | End: 2023-01-03

## 2023-01-02 RX ORDER — KETOROLAC TROMETHAMINE 15 MG/ML
15 INJECTION, SOLUTION INTRAMUSCULAR; INTRAVENOUS EVERY 6 HOURS PRN
Status: CANCELLED | OUTPATIENT
Start: 2023-01-02 | End: 2023-01-05

## 2023-01-02 RX ORDER — PROPOFOL 10 MG/ML
INJECTION, EMULSION INTRAVENOUS AS NEEDED
Status: DISCONTINUED | OUTPATIENT
Start: 2023-01-02 | End: 2023-01-02 | Stop reason: SURG

## 2023-01-02 RX ORDER — ONDANSETRON 2 MG/ML
4 INJECTION INTRAMUSCULAR; INTRAVENOUS ONCE AS NEEDED
Status: CANCELLED | OUTPATIENT
Start: 2023-01-02

## 2023-01-02 RX ORDER — ONDANSETRON 2 MG/ML
4 INJECTION INTRAMUSCULAR; INTRAVENOUS EVERY 6 HOURS PRN
Status: DISCONTINUED | OUTPATIENT
Start: 2023-01-02 | End: 2023-01-03 | Stop reason: HOSPADM

## 2023-01-02 RX ORDER — LORAZEPAM 2 MG/ML
0.5 INJECTION INTRAMUSCULAR
Status: CANCELLED | OUTPATIENT
Start: 2023-01-02 | End: 2023-01-12

## 2023-01-02 RX ORDER — ACETAMINOPHEN 650 MG/1
650 SUPPOSITORY RECTAL ONCE AS NEEDED
Status: CANCELLED | OUTPATIENT
Start: 2023-01-02

## 2023-01-02 RX ORDER — PROMETHAZINE HYDROCHLORIDE 25 MG/1
25 TABLET ORAL ONCE AS NEEDED
Status: CANCELLED | OUTPATIENT
Start: 2023-01-02

## 2023-01-02 RX ORDER — MORPHINE SULFATE 2 MG/ML
2 INJECTION, SOLUTION INTRAMUSCULAR; INTRAVENOUS
Status: CANCELLED | OUTPATIENT
Start: 2023-01-02 | End: 2023-01-02

## 2023-01-02 RX ORDER — ONDANSETRON 4 MG/1
4 TABLET, FILM COATED ORAL EVERY 6 HOURS PRN
Status: DISCONTINUED | OUTPATIENT
Start: 2023-01-02 | End: 2023-01-03 | Stop reason: HOSPADM

## 2023-01-02 RX ORDER — ACETAMINOPHEN 325 MG/1
650 TABLET ORAL ONCE AS NEEDED
Status: CANCELLED | OUTPATIENT
Start: 2023-01-02

## 2023-01-02 RX ORDER — PROMETHAZINE HYDROCHLORIDE 25 MG/1
25 SUPPOSITORY RECTAL ONCE AS NEEDED
Status: CANCELLED | OUTPATIENT
Start: 2023-01-02

## 2023-01-02 RX ADMIN — PROPOFOL 40 MG: 10 INJECTION, EMULSION INTRAVENOUS at 16:13

## 2023-01-02 RX ADMIN — PROPOFOL 80 MG: 10 INJECTION, EMULSION INTRAVENOUS at 16:10

## 2023-01-02 RX ADMIN — CLONAZEPAM 0.25 MG: 0.5 TABLET ORAL at 19:50

## 2023-01-02 RX ADMIN — Medication 12.5 MG: at 08:33

## 2023-01-02 RX ADMIN — Medication 12.5 MG: at 19:34

## 2023-01-02 RX ADMIN — CLONAZEPAM 0.25 MG: 0.5 TABLET ORAL at 08:48

## 2023-01-02 RX ADMIN — METFORMIN HYDROCHLORIDE 1000 MG: 500 TABLET, EXTENDED RELEASE ORAL at 19:34

## 2023-01-02 RX ADMIN — Medication 10 ML: at 19:36

## 2023-01-02 RX ADMIN — Medication 10 ML: at 08:43

## 2023-01-02 RX ADMIN — PROPOFOL 40 MG: 10 INJECTION, EMULSION INTRAVENOUS at 16:16

## 2023-01-02 RX ADMIN — PANTOPRAZOLE SODIUM 40 MG: 40 INJECTION, POWDER, FOR SOLUTION INTRAVENOUS at 06:11

## 2023-01-02 RX ADMIN — ATORVASTATIN CALCIUM 10 MG: 10 TABLET, FILM COATED ORAL at 19:34

## 2023-01-02 RX ADMIN — LEVOTHYROXINE SODIUM 50 MCG: 0.05 TABLET ORAL at 08:33

## 2023-01-02 NOTE — DISCHARGE PLACEMENT REQUEST
"Artie Mc (72 y.o. Male)     Date of Birth   1950    Social Security Number       Address   [address] [place] [address] [place] [facility]    Home Phone   [phone]    MRN   [id number]       Jain   Faith    Marital Status                               Admission Date   1/1/23    Admission Type   Emergency    Admitting Provider   Manolo Spann MD    Attending Provider   Manolo Spann MD    Department, Room/Bed   [facility], 2115/1       Discharge Date       Discharge Disposition       Discharge Destination                               Attending Provider: Manolo Spann MD    Allergies: Ace Inhibitors, Heparin, Sulfa Antibiotics    Isolation: None   Infection: None   Code Status: CPR    Ht: 167.6 cm (65.98\")   Wt: 68 kg (149 lb 14.6 oz)    Admission Cmt: None   Principal Problem: Gastrointestinal hemorrhage, unspecified gastrointestinal hemorrhage type [K92.2]                 Active Insurance as of 1/1/2023     Primary Coverage     Payor Plan Insurance Group Employer/Plan Group    MEDICARE MEDICARE A & B      Payor Plan Address Payor Plan Phone Number Payor Plan Fax Number Effective Dates    [address] [phone]  5/1/2014 - None Entered    [facility] [id number]       Subscriber Name Subscriber Birth Date Member ID       ARTIE MC 1950 [id number]           Secondary Coverage     Payor Plan Insurance Group Employer/Plan Group    [facility] INDEMNITY [facility] INDEMNITY PLAN F     Payor Plan Address Payor Plan Phone Number Payor Plan Fax Number Effective Dates    [address] [phone]  6/1/2019 - None Entered    [facility] [id number]       Subscriber Name Subscriber Birth Date Member ID       ARTIE MC 1950 [id number]                 Emergency Contacts      (Rel.) Home Phone Work Phone Mobile Phone    VITOR MC (Son) [phone] -- [phone]    GOMEZ MC (Son) -- -- [phone]    " GOMEZ WALTON (Brother) -- -- [phone]               History & Physical      Jerrica Jones, APRN at 23 1646     Attestation signed by Manolo Spann MD at 23 1705    Electronically signed by Manolo Spann MD, 23, 5:05 PM EST.     I have reviewed this documentation and agree.                      [facility] Medicine Services      Patient Name: Artie Walton  : 1950  MRN: [id number]  Primary Care Physician:  Chanel Carter, APRN  Date of admission: 2023      Subjective       Chief Complaint: Hematemesis    History of Present Illness: Artie Walton is a 72 y.o. male who presented to [facility] on 2023 complaining of vomiting x 1 today bright red blood. Pt reports recently discharged from [facility] d/t covid dx, and now on 2L NC since discharge. Patient reports h/o esophageal cancer and frequent esophagus stretching.    In ED, afebrile 97.8, /85, and on 2 L NC.  Glucose 103, potassium 5.4, creatinine 0.92, lipase 26, INR 1.08, PTT 28.8, WBC 4.50, hemoglobin 9.5, platelets 140.  Last hemoglobin in our system was 11.9 on 2022.  Currently awaiting records from [facility].      ROS     Personal History     Past Medical History:   Diagnosis Date   • B12 deficiency    • Blockage of coronary artery of heart (HCC)    • Depression    • DM (diabetes mellitus) (HCC)    • Dysphagia    • HTN (hypertension)    • Hyperlipidemia        Past Surgical History:   Procedure Laterality Date   • ABDOMINAL WALL ABSCESS INCISION AND DRAINAGE  10/22/2018    WITH DEBRIDEMENT  1.5CM X 1.5 CM X 1.5 CM    DR. PURI   • CATARACT EXTRACTION     • COLONOSCOPY     • CORONARY ARTERY BYPASS GRAFT     • ENDOSCOPY  2016   • ESOPHAGECTOMY  2017    BROOKS PETTY ESOPHAGECTOMY, FEEDING JEJNOSTOMOY, ABDOMINAL AND MEDIASTINAL LYMPH NODE DISECTION, PEDICLED MUSCLE FLAP COVERAGE DR. PURI   • ESOPHAGOSCOPY / EGD  2017    WITH  BALLOON DILATION   • ESOPHAGOSCOPY / EGD  07/26/2017    WITH BALLOON DILATION   • ESOPHAGOSCOPY / EGD  08/11/2017    WITH BALLOON DILATION   • ESOPHAGOSCOPY / EGD  08/28/2017    WITH BALLOON DILATION   • ESOPHAGOSCOPY / EGD  09/27/2017    WITH BALLOON DILATION   • LYMPH NODE BIOPSY  01/30/2019   • PACEMAKER IMPLANTATION  11/21/2016       Family History: family history includes Cancer in his father; Heart disease in his brother, mother, and sister; Hypertension in his father; Mental illness in his mother. Otherwise pertinent FHx was reviewed and not pertinent to current issue.    Social History:  reports that he quit smoking about 7 years ago. His smoking use included cigarettes. He has a 100.00 pack-year smoking history. He quit smokeless tobacco use about 33 years ago.  His smokeless tobacco use included chew. He reports that he does not drink alcohol and does not use drugs.    Home Medications:  Prior to Admission Medications     Prescriptions Last Dose Informant Patient Reported? Taking?    clonazePAM (KlonoPIN) 0.5 MG tablet 1/1/2023  Yes Yes    Take 0.25 mg by mouth 2 (Two) Times a Day As Needed.    clonazePAM (KlonoPIN) 0.5 MG tablet 12/31/2022  Yes Yes    Take 0.125 mg by mouth Daily As Needed.    cyanocobalamin (VITAMIN B-12) 1000 MCG tablet 12/31/2022  Yes Yes    Take 1,000 mcg by mouth Daily.    Euthyrox 50 MCG tablet 1/1/2023  No Yes    Take 1 tablet by mouth once daily    folic acid (FOLVITE) 1 MG tablet 12/31/2022  Yes Yes    Take 1 mg by mouth Daily.    metFORMIN ER (GLUCOPHAGE-XR) 500 MG 24 hr tablet 12/31/2022  Yes Yes    Take 1,000 mg by mouth 2 (Two) Times a Day.    metoprolol tartrate (LOPRESSOR) 25 MG tablet 1/1/2023  Yes Yes    Take 12.5 mg by mouth 2 (Two) Times a Day.    omeprazole (priLOSEC) 20 MG capsule 12/31/2022  Yes Yes    Take 20 mg by mouth 2 (Two) Times a Day.    pravastatin (PRAVACHOL) 10 MG tablet 12/31/2022 Self Yes Yes    Take 10 mg by mouth Every Other Day.    ondansetron  (ZOFRAN) 4 MG tablet   Yes No    Take 4 mg by mouth Every 8 (Eight) Hours As Needed for Nausea or Vomiting.            Allergies:  Allergies   Allergen Reactions   • Ace Inhibitors Unknown (See Comments)     Unknown reaction     • Heparin Other (See Comments)     Fever/chills, weakness in legs   • Sulfa Antibiotics Other (See Comments)     Mouth sores       Objective       Vitals:   Temp:  [97.8 °F (36.6 °C)] 97.8 °F (36.6 °C)  Heart Rate:  [61-82] 76  Resp:  [18] 18  BP: (117-138)/(61-85) 134/61  Flow (L/min):  [2] 2    Physical Exam     Result Review    Result Review:  I have personally reviewed the results from the time of this admission to 1/1/2023 16:46 EST and agree with these findings:  [x]  Laboratory  [x]  Microbiology  [x]  Radiology  [x]  EKG/Telemetry   []  Cardiology/Vascular   []  Pathology  []  Old records  []  Other:      Assessment & Plan        Active Hospital Problems:  There are no active hospital problems to display for this patient.    Plan:     Hematemesis  Anemia  - Hemoglobin 9.5, check repeat H&H every 6 hours x 3  - GI consulted  - N.p.o.  - Protonix    History of esophageal cancer, stage IV  - Follows Dr. Elizondo  - Immunotherapy, last infusion 10/10/2022      Recent COVID-19 infection  - Currently on 2L NC  - Will need to follow-up with pulmonary once discharged     Hypertension  HLD  - /61  - Continue Lopressor and statin  - Lipid panel ordered    Diabetes  - Glucose 103  - Continue metformin  - Hemoglobin A1c ordered    Hypothyroidism  - Last TSH 3.28 on 11/21/2022  - Continue Euthyrox    DVT prophylaxis  -Mechanical SCDs    CODE STATUS:       Admission Status:  I believe this patient meets inpatient status.    I discussed the patient's findings and my recommendations with patient.      Signature: Electronically signed by DAT Nava, 01/01/23, 4:46 PM EST.      Electronically signed by Manolo Spann MD at 01/01/23 4007

## 2023-01-02 NOTE — PROGRESS NOTES
[facility] Medicine Services Daily Progress Note    Patient Name: Artie Mc  : 1950  MRN: [id number]  Primary Care Physician:  Chanel Carter, APRN  Date of admission: 2023      Subjective      Chief Complaint: Hematemesis      Patient Reports:        2023.  Patient was seen and examined.  Patient reported no overnight events.  Patient is status post EGD.    Review of Systems   Constitutional: Positive for malaise/fatigue.   HENT: Negative.    Eyes: Negative.    Cardiovascular: Negative.    Respiratory: Negative.    Endocrine: Negative.    Hematologic/Lymphatic: Negative.    Skin: Negative.    Musculoskeletal: Negative.    Gastrointestinal: Negative.    Genitourinary: Negative.    Neurological: Negative.    Psychiatric/Behavioral: Negative.    Allergic/Immunologic: Negative.             Objective      Vitals:   Temp:  [97.7 °F (36.5 °C)-98.5 °F (36.9 °C)] 97.7 °F (36.5 °C)  Heart Rate:  [61-87] 69  Resp:  [15-20] 15  BP: ()/(31-83) 71/33  Flow (L/min):  [2-6] 6    Physical Exam  Vitals reviewed.   Constitutional:       General: He is not in acute distress.  HENT:      Head: Normocephalic.      Nose: Nose normal.      Mouth/Throat:      Mouth: Mucous membranes are dry.      Pharynx: Oropharynx is clear.   Eyes:      Extraocular Movements: Extraocular movements intact.      Conjunctiva/sclera: Conjunctivae normal.      Pupils: Pupils are equal, round, and reactive to light.   Cardiovascular:      Pulses: Normal pulses.      Heart sounds: No murmur heard.    No friction rub. No gallop.      Comments: S1 and S2 present.  No tachycardia.  Pulmonary:      Effort: No respiratory distress.      Breath sounds: No stridor. No wheezing or rales.   Chest:      Chest wall: No tenderness.   Abdominal:      General: Bowel sounds are normal. There is no distension.      Palpations: Abdomen is soft.      Tenderness: There is no abdominal tenderness. There is no right CVA  tenderness or guarding.   Musculoskeletal:         General: No swelling, tenderness, deformity or signs of injury.      Cervical back: Normal range of motion. No rigidity.      Right lower leg: No edema.      Left lower leg: No edema.   Skin:     General: Skin is warm and dry.      Capillary Refill: Capillary refill takes less than 2 seconds.      Coloration: Skin is not jaundiced.      Findings: No bruising, erythema, lesion or rash.   Neurological:      Comments: No facial asymmetry noted.  Gait and station not tested.   Psychiatric:         Behavior: Behavior normal.               Result Review    Result Review:  I have personally reviewed the results from the time of this admission to 1/2/2023 18:14 EST and agree with these findings:  []  Laboratory  []  Microbiology  []  Radiology  []  EKG/Telemetry   []  Cardiology/Vascular   []  Pathology  []  Old records  []  Other:  Most notable findings include:          Assessment & Plan    From previous notes and with minor updates.    Brief Patient Summary:      Patient is a 72 y.o. male who presented to [facility] on 1/1/2023 complaining of vomiting x 1 today bright red blood. [facility], and now on 2L NC since discharge. Patient reports h/o esophageal cancer and frequent esophagus stretching.   Patient was seen in the emergency room and in ED, afebrile 97.8, /85, and on 2 L NC.  Glucose 103, potassium 5.4, creatinine 0.92, lipase 26, INR 1.08, PTT 28.8, WBC 4.50, hemoglobin 9.5, platelets 140.  Last hemoglobin in our system was 11.9 on 11/21/2022.  Currently awaiting records from [facility].  GI consult was completed.  EGD was completed.  Patient was found to have erosive esophagitis and GE junction stricture.          atorvastatin, 10 mg, Oral, Nightly  levothyroxine, 50 mcg, Oral, Daily  metFORMIN ER, 1,000 mg, Oral, BID  metoprolol tartrate, 12.5 mg, Oral, BID  pantoprazole, 40 mg,  Intravenous, Q AM  sodium chloride, 10 mL, Intravenous, Q12H             Active Hospital Problems:  Active Hospital Problems    Diagnosis    • **Gastrointestinal hemorrhage, unspecified gastrointestinal hemorrhage type    • Erosive esophagitis    • Hematemesis with nausea    • CHRISTIAN (iron deficiency anemia)    • Hypothyroidism (acquired)    • Chronic orthostatic hypotension    • Esophageal dysphagia    • Gastroesophageal reflux disease without esophagitis    • History of open heart surgery    • Mixed hyperlipidemia    • Primary hypertension    • Panic disorder    • Type 2 diabetes mellitus (HCC)    • Esophageal cancer, stage IV (HCC)    • Malignant neoplasm of cardia (HCC)    • Primary malignant neoplasm of retroperitoneum (HCC)    • Esophageal stricture    • Chronic coronary artery disease    • Dysphagia    • Primary malignant neoplasm of lower third of esophagus (HCC)    • Presence of cardiac pacemaker    • Seizure (HCC)          Plan:      -Continue appropriate patient's home medications for other chronic medical conditions.  -Continue the present level of care.  -Patient and family agreed with the plan of care.  -Follow GI recommendations.  -Patient is status post EGD.      DVT prophylaxis:  Mechanical DVT prophylaxis orders are present.    CODE STATUS:    Level Of Support Discussed With: Patient  Code Status (Patient has no pulse and is not breathing): CPR (Attempt to Resuscitate)  Medical Interventions (Patient has pulse or is breathing): Full Support      Disposition: This wonderful patient can discharge in the next 24 to 48 hours.     This patient has been examined wearing appropriate Personal Protective Equipment and discussed with hospital infection control department, [facility] department, infectious disease specialist and pulmonologist. 01/02/23      Electronically signed by Manolo Spann MD, FACP, 01/02/23, 18:14 MACK.       [facility] Hospitalist Team

## 2023-01-02 NOTE — ANESTHESIA POSTPROCEDURE EVALUATION
Patient: Artie Mc    Procedure Summary     Date: 01/02/23 Room / Location: [facility] ENDOSCOPY 1 / [facility] ENDOSCOPY    Anesthesia Start: 1608 Anesthesia Stop: 1623    Procedure: ESOPHAGOGASTRODUODENOSCOPY Diagnosis:       Gastrointestinal hemorrhage, unspecified gastrointestinal hemorrhage type      Hematemesis with nausea      (Gastrointestinal hemorrhage, unspecified gastrointestinal hemorrhage type [K92.2])      (Hematemesis with nausea [K92.0])    Surgeons: Esvin Morgan MD Provider: Cristobal Schmidt MD    Anesthesia Type: MAC ASA Status: 4 - Emergent          Anesthesia Type: MAC    Vitals  Vitals Value Taken Time   BP     Temp     Pulse 68 01/02/23 1624   Resp     SpO2 100 % 01/02/23 1624   Vitals shown include unvalidated device data.        Post Anesthesia Care and Evaluation    Patient location during evaluation: bedside  Patient participation: complete - patient participated  Level of consciousness: awake and alert  Pain score: 1  Pain management: adequate    Airway patency: patent  Anesthetic complications: No anesthetic complications  PONV Status: none  Cardiovascular status: acceptable  Respiratory status: acceptable  Hydration status: acceptable  Post Neuraxial Block status: Motor and sensory function returned to baseline

## 2023-01-02 NOTE — CONSULTS
Consult visit. Pt having procedure later and asked for prayer. He was upbeat and believes that God has not let him down and will not let him down now. Pt will accept whatever lies ahead and was calm knowing that God is in charge. He appreciated 's prayer and asked for a visit tomorrow for a longer discussion.  will follow up then. No other needs at this time.

## 2023-01-02 NOTE — CASE MANAGEMENT/SOCIAL WORK
Discharge Planning Assessment  [facility]     Patient Name: Artie Mc  MRN: [id number]  Today's Date: 1/2/2023    Admit Date: 1/1/2023    Plan: DC Plan: [facility] Has home O2 through [employer] [facility].   Discharge Needs Assessment     Row Name 01/02/23 1250       Living Environment    People in Home alone    Current Living Arrangements home    Primary Care Provided by self    Provides Primary Care For no one    Family Caregiver if Needed child(jaqueline), adult    Family Caregiver Names Sons- Romulo and Carlyle    Quality of Family Relationships unable to assess    Able to Return to Prior Arrangements yes       Resource/Environmental Concerns    Resource/Environmental Concerns none    Transportation Concerns none       Transition Planning    Patient/Family Anticipates Transition to home with help/services    Patient/Family Anticipated Services at Transition home health care    Transportation Anticipated family or friend will provide       Discharge Needs Assessment    Readmission Within the Last 30 Days no previous admission in last 30 days  Previous hospitalization at [facility]    Equipment Currently Used at Home oxygen    Concerns to be Addressed care coordination/care conferences    Anticipated Changes Related to Illness none    Equipment Needed After Discharge none    Discharge Facility/Level of Care Needs home with home health    Provided Post Acute Provider List? N/A               Discharge Plan     Row Name 01/02/23 1251       Plan    Plan DC Plan: [facility] Has home O2 through [employer].    Patient/Family in Agreement with Plan yes    Plan Comments CM met with patient at bedside to discuss dc planning. PCP and pharmacy confirmed, reported no trouble affording medications, and declined needs at this time for any DME. Reported that he recently discharged from [facility] and was set up with home O2 through [employer]. 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 Reported that a Home Health company in [place] was scheduled to come out on Thursday and he was going to have a nurse and PT. Per chart review, [facility] CM added in basket and discussed with liaisonMarine that patient just discharged from [facility] and he is scheduled to be seen Thursday. Liaison reported they accept.              Continued Care and Services - Admitted Since 1/1/2023     Home Medical Care Coordination complete.    Service Provider Request Status Selected Services Address Phone Fax Patient Preferred    [email or web address] [place]  Selected Home Health Services [address] [place] [address] [place] [address] [phone]                Demographic Summary     Row Name 01/02/23 1250       General Information    Admission Type inpatient    Required Notices Provided Important Message from Medicare    Referral Source admission list    Reason for Consult discharge planning    Preferred Language English       Contact Information    Permission Granted to Share Info With                Functional Status     Row Name 01/02/23 1250       Functional Status    Usual Activity Tolerance moderate    Current Activity Tolerance moderate       Functional Status, IADL    Medications independent    Meal Preparation independent    Housekeeping assistive equipment    Laundry independent    Shopping assistive equipment and person              Met with patient in room wearing PPE: mask.      Maintained distance greater than six feet and spent less than 15 minutes in the room.      Altagracia Carranza RN     Office Phone: [phone]  Office Cell: [phone]

## 2023-01-02 NOTE — PLAN OF CARE
Goal Outcome Evaluation:  Plan of Care Reviewed With: patient        Progress: improving  Outcome Evaluation: Pt w/ no hematemesis this shift. Vitals and H/H stable. EGD today. Esophagitis and extremely tight esophageal stricture found. GI has s/o. Pt w/ no complaints and will likely discharge to home tomorrow.

## 2023-01-02 NOTE — ANESTHESIA PREPROCEDURE EVALUATION
Anesthesia Evaluation     Patient summary reviewed and Nursing notes reviewed   no history of anesthetic complications:  NPO Solid Status: > 8 hours  NPO Liquid Status: > 8 hours           Airway   Dental      Pulmonary    (+) a smoker Former,   Cardiovascular     ECG reviewed  Patient on routine beta blocker    (+) pacemaker pacemaker, hypertension, CAD, CABG, dysrhythmias Bradycardia, hyperlipidemia,       Neuro/Psych  (+) seizures, dizziness/light headedness, syncope, psychiatric history Depression and Anxiety,    GI/Hepatic/Renal/Endo    (+)  GERD, GI bleeding , diabetes mellitus, thyroid problem hypothyroidism    Musculoskeletal     Abdominal    Substance History      OB/GYN          Other   blood dyscrasia anemia thrombocytopenia,   history of cancer    ROS/Med Hx Other: Low protein/albumin, low vit B12, N/V, hematemesis, chest wall abscess, orthostatic hypotension, dysphagia, esophageal stricture, low vit B12, iron malabsorption, panic d/o    Neoplasm of cardia, esophagus, and retroperitoneal    Stress  No evidence for reversible myocardial ischemia noted.  Reverse redistribution noted in the inferior and apical wall without  any ischemia  Normal left ventricular ejection fraction of  50 %.     PSH  PACEMAKER IMPLANTATION CORONARY ARTERY BYPASS GRAFT  ENDOSCOPY COLONOSCOPY  ESOPHAGECTOMY ESOPHAGOSCOPY / EGD  ESOPHAGOSCOPY / EGD ESOPHAGOSCOPY / EGD  ESOPHAGOSCOPY / EGD ESOPHAGOSCOPY / EGD  ABDOMINAL WALL ABSCESS INCISION AND DRAINAGE CATARACT EXTRACTION  LYMPH NODE BIOPSY                   Anesthesia Plan    ASA 4 - emergent     MAC     (Patient identified; pre-operative vital signs, all relevant labs/studies, complete medical/surgical/anesthetic history, full medication list, full allergy list, and NPO status obtained/reviewed; physical assessment performed; anesthetic options, side effects, potential complications, risks, and benefits discussed; questions answered; written anesthesia consent obtained;  patient cleared for procedure; anesthesia machine and equipment checked and functioning)    Anesthetic plan, risks, benefits, and alternatives have been provided, discussed and informed consent has been obtained with: patient.        CODE STATUS:    Level Of Support Discussed With: Patient  Code Status (Patient has no pulse and is not breathing): CPR (Attempt to Resuscitate)  Medical Interventions (Patient has pulse or is breathing): Full Support

## 2023-01-02 NOTE — OP NOTE
ESOPHAGOGASTRODUODENOSCOPY Procedure Report    Patient Name:  Artie Mc  YOB: 1950    Date of Surgery:  1/2/2023     Pre-Op Diagnosis:  Gastrointestinal hemorrhage, unspecified gastrointestinal hemorrhage type [K92.2]  Hematemesis with nausea [K92.0]   Dysphagia    Postop diagnosis:  1.  GE junction anastomotic stricture  2.  Status post [place] Gurwinder esophagectomy anatomy  3.  Normal gastric mucosa entire stomach  4.  Normal duodenal mucosa visualized 40 cm into the small intestine  5.  Erosive esophagitis    Procedure/CPT® Codes:      Procedure(s):  ESOPHAGOGASTRODUODENOSCOPY    Staff:  Surgeon(s):  Esvin Morgan MD      Anesthesia: Monitored Anesthesia Care    Description of Procedure:  A description of the procedure as well as risks, benefits and alternative methods were explained to the patient who voiced understanding and signed the corresponding consent form. A physical exam was performed and vital signs were monitored throughout the procedure.    An upper GI endoscope was placed into the mouth and proceeded through the esophagus, stomach and second portion of the duodenum without difficulty. The scope was then retroflexed and the fundus was visualized. The procedure was not difficult and there were no immediate complications.  There was no blood loss.    Impression:  1.  Esophagogastro anastomosis stricture.  Was difficult to traverse with the scope but could be done with some gentle pressure causing tearing and dilation from the scope alone.  There was linear ulcerations proximal to this indicating erosive esophagitis.  2.  Normal gastric mucosa entire stomach  3.  [place] Gurwinder esophagectomy anatomy  4.  Normal small bowel mucosa 40 cm into the small bowel    Recommendations:  P.o. twice daily PPI  Repeat EGD in 4 weeks with further dilation of anastomosis site.  Patient has no high risk lesions.  Likely had esophageal tear versus esophagitis from vomiting which caused  hematemesis.  Patient can be discharged from a GI standpoint      Esvin Morgan MD     Date: 1/2/2023    Time: 16:21 EST

## 2023-01-03 ENCOUNTER — READMISSION MANAGEMENT (OUTPATIENT)
Dept: CALL CENTER | Facility: HOSPITAL | Age: 73
End: 2023-01-03
Payer: MEDICARE

## 2023-01-03 ENCOUNTER — TELEPHONE (OUTPATIENT)
Dept: ONCOLOGY | Facility: CLINIC | Age: 73
End: 2023-01-03
Payer: MEDICARE

## 2023-01-03 ENCOUNTER — HOSPITAL ENCOUNTER (OUTPATIENT)
Dept: ONCOLOGY | Facility: HOSPITAL | Age: 73
Setting detail: INFUSION SERIES
End: 2023-01-03
Payer: MEDICARE

## 2023-01-03 VITALS
HEIGHT: 66 IN | DIASTOLIC BLOOD PRESSURE: 45 MMHG | HEART RATE: 76 BPM | SYSTOLIC BLOOD PRESSURE: 105 MMHG | BODY MASS INDEX: 21.54 KG/M2 | TEMPERATURE: 97.4 F | OXYGEN SATURATION: 97 % | WEIGHT: 134.04 LBS | RESPIRATION RATE: 11 BRPM

## 2023-01-03 DIAGNOSIS — R79.89 ELEVATED TSH: ICD-10-CM

## 2023-01-03 DIAGNOSIS — E03.9 HYPOTHYROIDISM (ACQUIRED): ICD-10-CM

## 2023-01-03 LAB
ANION GAP SERPL CALCULATED.3IONS-SCNC: 9 MMOL/L (ref 5–15)
BASOPHILS # BLD AUTO: 0 10*3/MM3 (ref 0–0.2)
BASOPHILS NFR BLD AUTO: 0.6 % (ref 0–1.5)
BUN SERPL-MCNC: 21 MG/DL (ref 8–23)
BUN/CREAT SERPL: 23.9 (ref 7–25)
CALCIUM SPEC-SCNC: 8.4 MG/DL (ref 8.6–10.5)
CHLORIDE SERPL-SCNC: 106 MMOL/L (ref 98–107)
CO2 SERPL-SCNC: 26 MMOL/L (ref 22–29)
CREAT SERPL-MCNC: 0.88 MG/DL (ref 0.76–1.27)
DEPRECATED RDW RBC AUTO: 52.1 FL (ref 37–54)
EGFRCR SERPLBLD CKD-EPI 2021: 90.8 ML/MIN/1.73
EOSINOPHIL # BLD AUTO: 0 10*3/MM3 (ref 0–0.4)
EOSINOPHIL NFR BLD AUTO: 1.1 % (ref 0.3–6.2)
ERYTHROCYTE [DISTWIDTH] IN BLOOD BY AUTOMATED COUNT: 15.2 % (ref 12.3–15.4)
GLUCOSE BLDC GLUCOMTR-MCNC: 78 MG/DL (ref 70–105)
GLUCOSE SERPL-MCNC: 76 MG/DL (ref 65–99)
HCT VFR BLD AUTO: 26.8 % (ref 37.5–51)
HGB BLD-MCNC: 8.8 G/DL (ref 13–17.7)
LYMPHOCYTES # BLD AUTO: 1.3 10*3/MM3 (ref 0.7–3.1)
LYMPHOCYTES NFR BLD AUTO: 29.2 % (ref 19.6–45.3)
MCH RBC QN AUTO: 30.7 PG (ref 26.6–33)
MCHC RBC AUTO-ENTMCNC: 33 G/DL (ref 31.5–35.7)
MCV RBC AUTO: 93.1 FL (ref 79–97)
MONOCYTES # BLD AUTO: 0.5 10*3/MM3 (ref 0.1–0.9)
MONOCYTES NFR BLD AUTO: 12.1 % (ref 5–12)
NEUTROPHILS NFR BLD AUTO: 2.6 10*3/MM3 (ref 1.7–7)
NEUTROPHILS NFR BLD AUTO: 57 % (ref 42.7–76)
NRBC BLD AUTO-RTO: 0.2 /100 WBC (ref 0–0.2)
PLATELET # BLD AUTO: 134 10*3/MM3 (ref 140–450)
PMV BLD AUTO: 8.4 FL (ref 6–12)
POTASSIUM SERPL-SCNC: 5.2 MMOL/L (ref 3.5–5.2)
RBC # BLD AUTO: 2.88 10*6/MM3 (ref 4.14–5.8)
SODIUM SERPL-SCNC: 141 MMOL/L (ref 136–145)
WBC NRBC COR # BLD: 4.5 10*3/MM3 (ref 3.4–10.8)

## 2023-01-03 PROCEDURE — 82962 GLUCOSE BLOOD TEST: CPT

## 2023-01-03 RX ORDER — OMEPRAZOLE 40 MG/1
40 CAPSULE, DELAYED RELEASE ORAL 2 TIMES DAILY
Qty: 60 CAPSULE | Refills: 1 | Status: SHIPPED | OUTPATIENT
Start: 2023-01-03 | End: 2023-02-09 | Stop reason: HOSPADM

## 2023-01-03 RX ORDER — ACETAMINOPHEN 325 MG/1
650 TABLET ORAL EVERY 6 HOURS PRN
Status: DISCONTINUED | OUTPATIENT
Start: 2023-01-03 | End: 2023-01-03 | Stop reason: HOSPADM

## 2023-01-03 RX ORDER — LEVOTHYROXINE SODIUM 0.05 MG/1
50 TABLET ORAL DAILY
Qty: 90 TABLET | Refills: 0 | OUTPATIENT
Start: 2023-01-03

## 2023-01-03 RX ORDER — LEVOTHYROXINE SODIUM 0.05 MG/1
50 TABLET ORAL DAILY
Qty: 90 TABLET | Refills: 0 | Status: SHIPPED | OUTPATIENT
Start: 2023-01-03 | End: 2023-02-28 | Stop reason: SDUPTHER

## 2023-01-03 RX ORDER — SACCHAROMYCES BOULARDII 250 MG
250 CAPSULE ORAL 2 TIMES DAILY
Qty: 60 CAPSULE | Refills: 1 | Status: SHIPPED | OUTPATIENT
Start: 2023-01-03 | End: 2023-03-04

## 2023-01-03 RX ADMIN — CLONAZEPAM 0.25 MG: 0.5 TABLET ORAL at 09:10

## 2023-01-03 RX ADMIN — Medication 12.5 MG: at 09:11

## 2023-01-03 RX ADMIN — Medication 10 ML: at 09:13

## 2023-01-03 RX ADMIN — LEVOTHYROXINE SODIUM 50 MCG: 0.05 TABLET ORAL at 09:11

## 2023-01-03 RX ADMIN — PANTOPRAZOLE SODIUM 40 MG: 40 INJECTION, POWDER, FOR SOLUTION INTRAVENOUS at 06:10

## 2023-01-03 NOTE — TELEPHONE ENCOUNTER
Caller: DOROTHEA    Relationship to patient: SELF    Best call back number: [phone]    Patient is needing: TO CHECK ON LEVYTHYROXINE 50 MCG BEING REFILLED. PT IS CURRENTLY IN HOSPITAL AND HE IS GETTING READY TO BE DISCHARGED.    HE WOULD LIKE THE MEDICINE SENT TO:    [facility] [address] [place] [address] [address] [place] [address] [phone] [facility] [phone]

## 2023-01-03 NOTE — CASE MANAGEMENT/SOCIAL WORK
Continued Stay Note   [facility]     Patient Name: Artie Mc  MRN: [id number]  Today's Date: 1/3/2023    Admit Date: 1/1/2023    Plan: DC Plan: Anticipate routine home with [facility] (accepted). Has home O2 through [employer].   Discharge Plan     Row Name 01/03/23 1533       Plan    Plan Comments DC orders noted at this time. CM updated [facility] liaison, Marine LISA. She confirmed that they did start patient with  services on 12/31/22.              Phone communication or documentation only - no physical contact with patient or family.    Altagracia Carranza RN     Office Phone: [phone]  Office Cell: [phone]

## 2023-01-03 NOTE — PLAN OF CARE
Goal Outcome Evaluation:              Outcome Evaluation: VSS on 2L 02. Pt doing well with no complaints. Up to bathroom with standby assist. pt to d/c home today. no hematemesis this shift.

## 2023-01-03 NOTE — DISCHARGE INSTRUCTIONS
Patient was advised to follow-up with his a primary care physician who will review his current medications.    Patient was advised to follow-up with his gastroenterologist who will reassess his GI function.    Patient was advised to return to the emergency department if he experiences any recurrence of his symptoms.

## 2023-01-03 NOTE — DISCHARGE SUMMARY
[facility]  DISCHARGE SUMMARY    Patient Name: Artie Mc  : 1950  MRN: [id number]    Date of Admission: 2023  Discharge Diagnosis: Upper GI bleed./Erosive esophagitis.  Date of Discharge: 2023.  Primary Care Physician: Chanel Carter, DAT      Presenting Problem:   Hematemesis with nausea [K92.0]  Gastrointestinal hemorrhage, unspecified gastrointestinal hemorrhage type [K92.2]    Active and Resolved Hospital Problems:  Active Hospital Problems    Diagnosis POA   • **Gastrointestinal hemorrhage, unspecified gastrointestinal hemorrhage type [K92.2] Yes     Priority: High   • Erosive esophagitis [K22.10] Yes     Priority: High   • Hematemesis with nausea [K92.0] Yes     Priority: High   • CHRISTIAN (iron deficiency anemia) [D50.9] Yes   • Hypothyroidism (acquired) [E03.9] Yes   • Chronic orthostatic hypotension [I95.1] Yes   • Esophageal dysphagia [R13.19] Yes   • Gastroesophageal reflux disease without esophagitis [K21.9] Yes   • History of open heart surgery [Z98.890] Not Applicable   • Mixed hyperlipidemia [E78.2] Yes   • Primary hypertension [I10] Yes   • Panic disorder [F41.0] Yes   • Type 2 diabetes mellitus (HCC) [E11.9] Yes   • Esophageal cancer, stage IV (HCC) [C15.9] Yes   • 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         Hospital Course   From previous notes and with minor updates.    Hospital Course:      Patient is a 72 y.o. male who presented to [facility] on 2023 complaining of vomiting x 1 today bright red blood. Pt  reports recently discharged from [facility] d/t covid dx, and now on 2L NC since discharge. Patient reports h/o esophageal cancer and frequent esophagus stretching.   Patient was seen in the emergency room and in ED, afebrile 97.8, /85, and on 2 L NC.  Glucose 103, potassium 5.4, creatinine 0.92, lipase 26, INR 1.08, PTT 28.8, WBC 4.50, hemoglobin 9.5, platelets 140.  Last hemoglobin in our system was 11.9 on 11/21/2022.  Currently awaiting records from [facility].  GI consult was completed.  EGD was completed.  Patient was found to have erosive esophagitis and GE junction stricture.  Hyperlipidemia was treated with Lipitor.  Diabetes mellitus was treated with metformin.  Hypothyroidism was treated with Synthroid.  Hypertension was treated with metoprolol.  Appropriate patient's home medications were resumed in the hospital for other chronic medical conditions.  Patient reported complete resolution of his symptoms after over 24 hours in the hospital and requested to be discharged home.  Patient was advised to take his medications as prescribed.  The discharge medications are as per medication reconciliation list.  Patient was advised to follow-up with his primary care physician within 3 to 5 days of discharge.  Patient was advised to follow-up with his gastroenterologist within 14 days of discharge.  Patient was advised to return to the emergency department if he experiences any recurrence of his symptoms.  Patient and family agreed with the plan and patient was discharged in a stable condition.           DISCHARGE Follow Up Recommendations for labs and diagnostics:    Patient was advised to follow-up with his a primary care physician who will review his current medications.     Patient was advised to follow-up with his gastroenterologist who will reassess his GI function.         Reasons For Change In Medications and Indications for New Medications:      Day of Discharge     Vital Signs:  Temp:   [97.4 °F (36.3 °C)-98.1 °F (36.7 °C)] 97.4 °F (36.3 °C)  Heart Rate:  [60-81] 76  Resp:  [11-20] 11  BP: ()/(31-65) 105/45  Flow (L/min):  [2-6] 2    Physical Exam:  Physical Exam  Vitals reviewed.   Constitutional:       General: He is not in acute distress.  HENT:      Head: Normocephalic.      Nose: Nose normal.      Mouth/Throat:      Mouth: Mucous membranes are dry.      Pharynx: Oropharynx is clear.   Eyes:      Extraocular Movements: Extraocular movements intact.      Conjunctiva/sclera: Conjunctivae normal.      Pupils: Pupils are equal, round, and reactive to light.   Cardiovascular:      Pulses: Normal pulses.      Heart sounds: No murmur heard.    No friction rub. No gallop.      Comments: S1 and S2 present.  No tachycardia.  Pulmonary:      Effort: Pulmonary effort is normal.      Breath sounds: No stridor. No wheezing, rhonchi or rales.   Chest:      Chest wall: No tenderness.   Abdominal:      General: Bowel sounds are normal. There is no distension.      Palpations: Abdomen is soft. There is no mass.      Tenderness: There is no abdominal tenderness. There is no right CVA tenderness, left CVA tenderness, guarding or rebound.      Hernia: No hernia is present.   Musculoskeletal:         General: No swelling, tenderness, deformity or signs of injury.      Cervical back: Normal range of motion. No rigidity.      Right lower leg: No edema.      Left lower leg: No edema.   Skin:     General: Skin is warm and dry.      Capillary Refill: Capillary refill takes less than 2 seconds.      Coloration: Skin is not jaundiced.      Findings: No bruising, erythema, lesion or rash.   Neurological:      Comments: No facial asymmetry.  Gait and station not tested.   Psychiatric:      Comments: No agitation.              Pertinent  and/or Most Recent Results     LAB RESULTS:      Lab 01/02/23  2341 01/02/23  0616 01/02/23  0005 01/01/23  1846 01/01/23  1505   WBC 4.50  --  3.70  --  4.50   HEMOGLOBIN 8.8* 8.9*  8.8* 9.5* 9.5*   HEMATOCRIT 26.8* 26.7* 26.3* 29.4* 29.8*   PLATELETS 134*  --  126*  --  140   NEUTROS ABS 2.60  --  1.80  --  3.30   LYMPHS ABS 1.30  --  1.40  --  0.90   MONOS ABS 0.50  --  0.40  --  0.30   EOS ABS 0.00  --  0.00  --  0.00   MCV 93.1  --  92.2  --  94.9   PROTIME  --   --  11.3  --  11.1   APTT  --   --   --   --  28.8*         Lab 01/02/23  2341 01/02/23  0005 01/01/23  1505   SODIUM 141 138 139   POTASSIUM 5.2 5.2 5.4*   CHLORIDE 106 103 105   CO2 26.0 27.0 25.0   ANION GAP 9.0 8.0 9.0   BUN 21 22 22   CREATININE 0.88 0.84 0.92   EGFR 90.8 92.1 87.8   GLUCOSE 76 112* 103*   CALCIUM 8.4* 8.4* 8.7   HEMOGLOBIN A1C  --  7.7*  --          Lab 01/01/23  1505   TOTAL PROTEIN 5.5*   ALBUMIN 3.1*   GLOBULIN 2.4   ALT (SGPT) 21   AST (SGOT) 47*   BILIRUBIN 0.3   ALK PHOS 51   LIPASE 26         Lab 01/02/23  0005 01/01/23  1505   PROTIME 11.3 11.1   INR 1.10 1.08         Lab 01/01/23  1505   CHOLESTEROL 110   LDL CHOL 46   HDL CHOL 46   TRIGLYCERIDES 95         Lab 01/01/23  1505   ABO TYPING O   RH TYPING Negative   ANTIBODY SCREEN Negative         Brief Urine Lab Results     None        Microbiology Results (last 10 days)     ** No results found for the last 240 hours. **               Results for orders placed during the hospital encounter of 03/23/21    Duplex Venous Lower Extremity - Bilateral CAR    Interpretation Summary  · Normal bilateral lower extremity venous duplex scan.      Results for orders placed during the hospital encounter of 03/23/21    Duplex Venous Lower Extremity - Bilateral CAR    Interpretation Summary  · Normal bilateral lower extremity venous duplex scan.          Labs Pending at Discharge:      Procedures Performed  Procedure(s):  ESOPHAGOGASTRODUODENOSCOPY  01/02 1603 UPPER GI ENDOSCOPY      Consults:   Consults     Date and Time Order Name Status Description    1/1/2023  3:50 PM Gastroenterology (on-call MD unless specified) Completed     1/1/2023  3:50 PM Hospitalist  (on-call MD unless specified)              Discharge Details        Discharge Medications      Changes to Medications      Instructions Start Date   levothyroxine 50 MCG tablet  Commonly known as: Euthyrox  What changed: how much to take   50 mcg, Oral, Daily      omeprazole 40 MG capsule  Commonly known as: priLOSEC  What changed:   · medication strength  · how much to take   40 mg, Oral, 2 Times Daily         Continue These Medications      Instructions Start Date   clonazePAM 0.5 MG tablet  Commonly known as: KlonoPIN   0.125 mg, Oral, Daily PRN      clonazePAM 0.5 MG tablet  Commonly known as: KlonoPIN   0.25 mg, Oral, 2 Times Daily PRN      cyanocobalamin 1000 MCG tablet  Commonly known as: VITAMIN B-12   1,000 mcg, Oral, Daily      folic acid 1 MG tablet  Commonly known as: FOLVITE   1 mg, Oral, Daily      metFORMIN  MG 24 hr tablet  Commonly known as: GLUCOPHAGE-XR   1,000 mg, Oral, 2 Times Daily      metoprolol tartrate 25 MG tablet  Commonly known as: LOPRESSOR   12.5 mg, Oral, 2 Times Daily      ondansetron 4 MG tablet  Commonly known as: ZOFRAN   4 mg, Oral, Every 8 Hours PRN      pravastatin 10 MG tablet  Commonly known as: PRAVACHOL   10 mg, Oral, Every Other Day             Allergies   Allergen Reactions   • Ace Inhibitors Unknown (See Comments)     Unknown reaction     • Heparin Other (See Comments)     Fever/chills, weakness in legs   • Sulfa Antibiotics Other (See Comments)     Mouth sores         Discharge Disposition: Stable.  Home or Self Care    Diet:  Hospital:  Diet Order   Procedures   • Diet: Diabetic Diets; Consistent Carbohydrate; Texture: Regular Texture (IDDSI 7); Fluid Consistency: Thin (IDDSI 0)         Discharge Activity: As tolerated.        CODE STATUS:  Code Status and Medical Interventions:   Ordered at: 01/01/23 8487     Level Of Support Discussed With:    Patient     Code Status (Patient has no pulse and is not breathing):    CPR (Attempt to Resuscitate)     Medical  Interventions (Patient has pulse or is breathing):    Full Support         Future Appointments   Date Time Provider Department Center   1/10/2023 10:00 AM Kavya Ashton APRN [facility] ONC NA [facility]   1/10/2023 10:45 AM INF ROOM 23 - CHAIR A  [facility]  [facility] CC NA [facility]   3/14/2023  2:15 PM [facility] CL[facility], [facility] ELLY MCCLAIN [facility] CAR [facility]   3/14/2023  2:30 PM Mazin Simmons MD [facility] CAR [facility]           Time spent on Discharge including face to face service: 55 minutes    This patient has been examined wearing appropriate Personal Protective Equipment and discussed with hospital infection control department, [facility] department, infectious disease specialist and pulmonologist. 01/03/23      Signature: Electronically signed by Manolo Spann MD, FACP, 01/03/23, 1:51 PM EST.

## 2023-01-03 NOTE — TELEPHONE ENCOUNTER
Medication was sent to [facility] pharmacy.     Contacted patient to let him know and he confirmed. He had no other questions.

## 2023-01-04 NOTE — OUTREACH NOTE
Prep Survey    Flowsheet Row Responses   [facility] facility patient discharged from? [facility]   Is LACE score < 7 ? No   Eligibility Readm Mgmt   Discharge diagnosis Hematemesis   Does the patient have one of the following disease processes/diagnoses(primary or secondary)? Other   Does the patient have Home health ordered? Yes   What is the Home health agency?  [facility]     Is there a DME ordered? No   Prep survey completed? Yes          CORINNE SIDDIQUI - Registered Nurse

## 2023-01-04 NOTE — CASE MANAGEMENT/SOCIAL WORK
Case Management Discharge Note      Final Note: Cindy     Selected Continued Care - Discharged on 1/3/2023 Admission date: 1/1/2023 - Discharge disposition: Home or Self Care        Home Medical Care Coordination complete.    Service Provider Selected Services Address Phone Fax Patient Preferred    [facility] [address] [place] [id number] [phone]                   Transportation Services  Private: Car    Final Discharge Disposition Code: 06 - home with home health care

## 2023-01-06 ENCOUNTER — READMISSION MANAGEMENT (OUTPATIENT)
Dept: CALL CENTER | Facility: HOSPITAL | Age: 73
End: 2023-01-06
Payer: MEDICARE

## 2023-01-06 NOTE — OUTREACH NOTE
Medical Week 1 Survey    Flowsheet Row Responses   [facility] patient discharged from? [facility]   Does the patient have one of the following disease processes/diagnoses(primary or secondary)? Other   Week 1 attempt successful? Yes   Call start time 1619   Call end time 1637   Discharge diagnosis Hematemesis, EGD   Person spoke with today (if not patient) and relationship Patient   Meds reviewed with patient/caregiver? Yes   Does the patient have all medications ordered at discharge? No   What is keeping the patient from filling the prescriptions? Script on hold per patient  [patient had not gotten the probiotic ordered. ]   Nursing Interventions Nurse provided patient education   Is the patient taking all medications as directed (includes completed medication regime)? No   What is preventing the patient from taking all medications as directed? Other  [See above. Patient will try to get the florastor asap and start taking as ordered. ]   Does the patient have a primary care provider?  Yes   Does the patient have an appointment with their PCP within 7 days of discharge? No   Comments regarding PCP Follow up with [facility]   Nursing Interventions Educated patient on importance of making appointment, Advised patient to make appointment   Has the patient kept scheduled appointments due by today? N/A   Comments Follow up with Esvin Morgan MD,  Office will call you to set up a follow-up EGD   What is the Home health agency?  [facility]    Psychosocial issues? No   Did the patient receive a copy of their discharge instructions? Yes   Nursing interventions Reviewed instructions with patient   What is the patient's perception of their health status since discharge? Improving   Is the patient/caregiver able to teach back signs and symptoms related to disease process for when to call PCP? Yes   Is the patient/caregiver able to teach back signs and symptoms related to disease process for when to call 911? Yes    Is the patient/caregiver able to teach back the hierarchy of who to call/visit for symptoms/problems? PCP, Specialist, Home health nurse, Urgent Care, ED, 911 Yes   If the patient is a current smoker, are they able to teach back resources for cessation? Not a smoker   Week 1 call completed? Yes          BRITNEY BOGGS - Registered Nurse

## 2023-01-09 ENCOUNTER — DOCUMENTATION (OUTPATIENT)
Dept: ONCOLOGY | Facility: CLINIC | Age: 73
End: 2023-01-09
Payer: MEDICARE

## 2023-01-09 VITALS — HEART RATE: 62 BPM | SYSTOLIC BLOOD PRESSURE: 128 MMHG | DIASTOLIC BLOOD PRESSURE: 59 MMHG | HEIGHT: 66 IN

## 2023-01-10 ENCOUNTER — OFFICE VISIT (OUTPATIENT)
Dept: ONCOLOGY | Facility: CLINIC | Age: 73
End: 2023-01-10
Payer: MEDICARE

## 2023-01-10 ENCOUNTER — HOSPITAL ENCOUNTER (OUTPATIENT)
Dept: ONCOLOGY | Facility: HOSPITAL | Age: 73
Setting detail: INFUSION SERIES
Discharge: HOME | End: 2023-01-10
Payer: MEDICARE

## 2023-01-10 VITALS
DIASTOLIC BLOOD PRESSURE: 62 MMHG | OXYGEN SATURATION: 98 % | WEIGHT: 140 LBS | HEIGHT: 66 IN | RESPIRATION RATE: 18 BRPM | BODY MASS INDEX: 22.5 KG/M2 | SYSTOLIC BLOOD PRESSURE: 110 MMHG | TEMPERATURE: 97.6 F | HEART RATE: 79 BPM

## 2023-01-10 VITALS
BODY MASS INDEX: 22.5 KG/M2 | DIASTOLIC BLOOD PRESSURE: 62 MMHG | OXYGEN SATURATION: 98 % | HEART RATE: 79 BPM | SYSTOLIC BLOOD PRESSURE: 110 MMHG | RESPIRATION RATE: 18 BRPM | HEIGHT: 66 IN | TEMPERATURE: 97.6 F | WEIGHT: 140 LBS

## 2023-01-10 DIAGNOSIS — Z51.11 ENCOUNTER FOR ANTINEOPLASTIC CHEMOTHERAPY: ICD-10-CM

## 2023-01-10 DIAGNOSIS — Z51.81 ENCOUNTER FOR THERAPEUTIC DRUG MONITORING: ICD-10-CM

## 2023-01-10 DIAGNOSIS — E03.9 HYPOTHYROIDISM (ACQUIRED): Primary | ICD-10-CM

## 2023-01-10 DIAGNOSIS — C15.9 ESOPHAGEAL CANCER, STAGE IV: ICD-10-CM

## 2023-01-10 DIAGNOSIS — C15.5 PRIMARY MALIGNANT NEOPLASM OF LOWER THIRD OF ESOPHAGUS: ICD-10-CM

## 2023-01-10 DIAGNOSIS — D64.9 ANEMIA, UNSPECIFIED TYPE: ICD-10-CM

## 2023-01-10 DIAGNOSIS — C16.0 MALIGNANT NEOPLASM OF CARDIA: Primary | ICD-10-CM

## 2023-01-10 LAB
ALBUMIN SERPL-MCNC: 3.2 G/DL (ref 3.5–5.2)
ALBUMIN/GLOB SERPL: 1.5 G/DL
ALP SERPL-CCNC: 38 U/L (ref 39–117)
ALT SERPL W P-5'-P-CCNC: 8 U/L (ref 1–41)
ANION GAP SERPL CALCULATED.3IONS-SCNC: 12 MMOL/L (ref 5–15)
AST SERPL-CCNC: 16 U/L (ref 1–40)
BASOPHILS # BLD AUTO: 0.04 10*3/MM3 (ref 0–0.2)
BASOPHILS NFR BLD AUTO: 1 % (ref 0–1.5)
BILIRUB SERPL-MCNC: 0.2 MG/DL (ref 0–1.2)
BUN SERPL-MCNC: 17 MG/DL (ref 8–23)
BUN/CREAT SERPL: 22.7 (ref 7–25)
CALCIUM SPEC-SCNC: 8.5 MG/DL (ref 8.6–10.5)
CHLORIDE SERPL-SCNC: 103 MMOL/L (ref 98–107)
CO2 SERPL-SCNC: 24 MMOL/L (ref 22–29)
CREAT SERPL-MCNC: 0.75 MG/DL (ref 0.76–1.27)
DEPRECATED RDW RBC AUTO: 58.6 FL (ref 37–54)
EGFRCR SERPLBLD CKD-EPI 2021: 95.3 ML/MIN/1.73
EOSINOPHIL # BLD AUTO: 0.06 10*3/MM3 (ref 0–0.4)
EOSINOPHIL NFR BLD AUTO: 1.5 % (ref 0.3–6.2)
ERYTHROCYTE [DISTWIDTH] IN BLOOD BY AUTOMATED COUNT: 16.7 % (ref 12.3–15.4)
GLOBULIN UR ELPH-MCNC: 2.2 GM/DL
GLUCOSE BLDC GLUCOMTR-MCNC: 90 MG/DL (ref 70–105)
GLUCOSE SERPL-MCNC: 91 MG/DL (ref 65–99)
HCT VFR BLD AUTO: 26.8 % (ref 37.5–51)
HGB BLD-MCNC: 8.5 G/DL (ref 13–17.7)
LYMPHOCYTES # BLD AUTO: 1.16 10*3/MM3 (ref 0.7–3.1)
LYMPHOCYTES NFR BLD AUTO: 29.5 % (ref 19.6–45.3)
MCH RBC QN AUTO: 31.7 PG (ref 26.6–33)
MCHC RBC AUTO-ENTMCNC: 31.7 G/DL (ref 31.5–35.7)
MCV RBC AUTO: 100 FL (ref 79–97)
MONOCYTES # BLD AUTO: 0.54 10*3/MM3 (ref 0.1–0.9)
MONOCYTES NFR BLD AUTO: 13.7 % (ref 5–12)
NEUTROPHILS NFR BLD AUTO: 2.13 10*3/MM3 (ref 1.7–7)
NEUTROPHILS NFR BLD AUTO: 54.3 % (ref 42.7–76)
PLATELET # BLD AUTO: 299 10*3/MM3 (ref 140–450)
PMV BLD AUTO: 9.7 FL (ref 6–12)
POTASSIUM SERPL-SCNC: 4.4 MMOL/L (ref 3.5–5.2)
PROT SERPL-MCNC: 5.4 G/DL (ref 6–8.5)
RBC # BLD AUTO: 2.68 10*6/MM3 (ref 4.14–5.8)
SODIUM SERPL-SCNC: 139 MMOL/L (ref 136–145)
T4 SERPL-MCNC: 7.24 MCG/DL (ref 4.5–11.7)
TSH SERPL DL<=0.05 MIU/L-ACNC: 1.77 UIU/ML (ref 0.27–4.2)
WBC NRBC COR # BLD: 3.93 10*3/MM3 (ref 3.4–10.8)

## 2023-01-10 PROCEDURE — 85025 COMPLETE CBC W/AUTO DIFF WBC: CPT | Performed by: INTERNAL MEDICINE

## 2023-01-10 PROCEDURE — 25010000002 PEMBROLIZUMAB 100 MG/4ML SOLUTION 4 ML VIAL: Performed by: INTERNAL MEDICINE

## 2023-01-10 PROCEDURE — 99215 OFFICE O/P EST HI 40 MIN: CPT | Performed by: NURSE PRACTITIONER

## 2023-01-10 PROCEDURE — 82962 GLUCOSE BLOOD TEST: CPT

## 2023-01-10 PROCEDURE — 96413 CHEMO IV INFUSION 1 HR: CPT

## 2023-01-10 PROCEDURE — 84443 ASSAY THYROID STIM HORMONE: CPT | Performed by: INTERNAL MEDICINE

## 2023-01-10 PROCEDURE — 84436 ASSAY OF TOTAL THYROXINE: CPT | Performed by: INTERNAL MEDICINE

## 2023-01-10 PROCEDURE — 80053 COMPREHEN METABOLIC PANEL: CPT | Performed by: INTERNAL MEDICINE

## 2023-01-10 RX ORDER — SODIUM CHLORIDE 0.9 % (FLUSH) 0.9 %
20 SYRINGE (ML) INJECTION AS NEEDED
Status: DISCONTINUED | OUTPATIENT
Start: 2023-01-10 | End: 2023-01-11 | Stop reason: HOSPADM

## 2023-01-10 RX ORDER — SODIUM CHLORIDE 9 MG/ML
250 INJECTION, SOLUTION INTRAVENOUS ONCE
Status: COMPLETED | OUTPATIENT
Start: 2023-01-10 | End: 2023-01-10

## 2023-01-10 RX ORDER — SODIUM CHLORIDE 0.9 % (FLUSH) 0.9 %
20 SYRINGE (ML) INJECTION AS NEEDED
Status: CANCELLED | OUTPATIENT
Start: 2023-01-10

## 2023-01-10 RX ADMIN — SODIUM CHLORIDE 250 ML: 9 INJECTION, SOLUTION INTRAVENOUS at 11:39

## 2023-01-10 RX ADMIN — SODIUM CHLORIDE 400 MG: 9 INJECTION, SOLUTION INTRAVENOUS at 11:39

## 2023-01-10 RX ADMIN — Medication 20 ML: at 12:16

## 2023-01-10 NOTE — PROGRESS NOTES
Hematology-Oncology Follow-up Note       Artie Mc  1950    Primary Care Physician: Chanel Carter APRN  Referring Physician: Chanel Carter APRN    Reason For Visit:  Chief Complaint   Patient presents with   • Follow-up     Primary malignant neoplasm of lower third of esophagus (HCC)     Metastatic esophageal cancer  Monitoring for adverse effects related to immunotherapy.  Hypothyroidism    HPI:   Mr. Mc is a pleasant 72 y.o. gentleman with a history of gastroesophageal reflux disease, diabetes, hypertension, heart block status post pacemaker placement and history of CABG.  He was having symptoms of dysphagia, weight loss since September 2016. He was reporting symptoms of food getting stuck in the lower chest.  He has transitioned himself to a liquid diet.  The patient reports losing about 30 pounds over this duration.  The patient underwent upper GI endoscopy on 12/20/16 with Dr. Esvin Barrera.  Endoscopy showed necrotic, circumferential and moderately obstructive mass at the distal esophagus between 42 cm and 36 cm.      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  There was also evidence of mild subacute inflammation in the duodenum.  Dr. Barrera ordered a CT scan of the chest and abdomen with contrast on 12/20/16.  The scan was done at [facility].  The scan showed a new ill-defined mass in the anterior wall of the distal esophagus at the GE junction measuring 2.3 x 2.2 x 1.6 cm, worrisome for cancer.  There were four subcentimeter liver lesions which had appeared stable in comparison to another CT scan from 2009 and they likely represent small cysts.  There was also mild lymphadenopathy in the gastrohepatic ligament.  There were at least six borderline enlarged lymph nodes measuring up to 1.3 x 1 cm in the region.  The patient is referred to us for further oncological evaluation.    1/5/2017 -  The patient presents for initial consultation.  He reports persistent dysphagia and also has symptoms of regurgitation.  He cannot tolerate solid foods and is dependent on liquid diet such as Ensure.  He also reports fatigue. Symptoms of dysphagia have been present for the past four months.  When he eats any solids, the food gets stuck in the lower chest.    • 1/5/17 - Vitamin B12 200 (L).  CEA 0.8.  Ferritin 35.  Iron saturation 16% (L), serum iron 56, TIBC 354.  Creatinine 0.9.  LFTs normal.  Folate 10.2.  • 1/12/17 - PET/CT scan:  Intense abnormal activity corresponding to the region of the GE junction and proximal stomach.  SUV is 11.06.  There is a suggestion of an abnormal mass or abnormal wall thickening in the region measuring 3.9 x 4.8 cm.  No additional abnormalities.  No evidence of metastases or other lymphadenopathy.  Scattered nonspecific subcentimeter lymph nodes involving the mediastinum and within the central upper abdomen.  • 1/13/17 - Creatinine 0.9.  LFTs normal.    • 1/17/17 - Patient seen by thoracic surgeon, Dr. Bradley.  PET scan was reviewed.  He has recommended neoadjuvant chemoradiation therapy followed by Noel Gurwinder esophagogastrectomy.  Port-A-Cath is being arranged.    • 1/20/17 - WBC 6.3, hemoglobin 12.1, platelet count 198,000, MCV 86.4.  • 1/30/17 - Patient started weekly Carboplatin and Taxol (Carboplatin AUC 2 and Taxol 50 mg/M2).  Patient received Injectafer 750 mg.  WBC 5.8, hemoglobin 12.4, platelet count 244,000.   • 1/31/17 - Patient seen by Dr. Eduardo Oleary.  The plan was to give 5040 cGy in 28 fractions.   • 2/6/17 - WBC 4.4, hemoglobin 11.6, platelet count 201,000.  Patient received cycle 2 of weekly Carboplatin and Taxol (Carboplatin AUC 2 and Taxol 50 mg/M2).  • 2/13/17 - Patient received Carboplatin and Taxol weekly cycle 3.  • 2/13/17 - Patient started concurrent radiation therapy.  Total planned dose is 4140 cGy.    • 2/13/17 to 2/15/17 - Patient received 4140 cGy of  neoadjuvant radiation.  Radiation was finished on 3/15/17.    • 2/20/17 - WBC 1.8, hemoglobin 10.6, platelet count 217,000.  Creatinine 0.5. Folate 15 (L).  Ferritin 361.  Haptoglobin 214.  Iron saturation 33%, TIBC 263, serum iron 88.    • 2/20/17 - Patient received Carboplatin and Taxol weekly cycle 4.   • 2/27/17 - WBC 3.7, hemoglobin 10.7, platelet count 177,000, MCV 85.3.    • 3/6/17 - Patient has received 16 out of the 23 planned radiation treatments.  Total planned dose is 4140 cGy.    • 3/6/17 - WBC 5.4, hemoglobin 11.2, platelet count 113,000.  • 3/6/17 - Patient received cycle 5 of weekly Carboplatin and Taxol.   • 3/13/17 - Patient received cycle 6 of weekly Carboplatin and Taxol.  WBC 3.9, hemoglobin 11.3, platelet count 93,000.     • 3/20/17 - WBC 1.4, hemoglobin 10.0, platelet count 118,000, MCV 86.1   • 3/27/17 - WBC 3.8, hemoglobin 9.5, platelet count 176,000, MCV 87.3.    • 4/7/17 - Stress test:  No evidence of reversible myocardial ischemia.  Normal left ventricular ejection fraction of 50%.    • 4/17/17 - Upper GI endoscopy by Dr. 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  The GE junction adenocarcinoma lesion seems to have a very good response to chemotherapy and radiation.  The lumen was open.  • 5/1/17 - WBC 7.0, hemoglobin 10.2, platelet count 175,000.    • 5/4/17 - Patient underwent Oneida Gurwinder esophagectomy with pyloroplasty, feeding jejunostomy tube, abdominal and mediastinal lymph node dissection.    Surgical Pathology:  Moderately to poorly differentiated adenocarcinoma measuring 1.3 cm at the GE junction.  Resection margins are negative for malignancy.  Tumor margins are negative.  There is effective treatment response.  No evidence of lymphovascular invasion.  Staging is ypT3,pN1.  One out of fourteen lymph nodes involved.   • 5/22/17 - WBC 6.8, hemoglobin 10.3, platelet count 312,000.    • 6/19/17 - WBC 5.7, hemoglobin 10.5, platelet count  204,000, MCV 92.1.    • 7/6/17 - EGD:  Status post balloon dilation of esophageal stricture.  • 7/12/17 - EGD with balloon dilation of esophageal stricture.  • 7/26/17 - EGD and balloon dilation of esophageal stricture.  • 7/31/17 - PET/CT scan:  There is mild metabolic activity seen at the thoracic esophagus just at and below the surgical margins.  Some mild wall thickening.  This could be from recent surgery.  A residual cancer seems unlikely.  There is a precarinal lymph node with mild metabolic activity with SUV of 3 measuring 1 x 1.3 cm.  Again this appears to be nonspecific in my opinion.    • 8/7/17 - WBC 5.9, hemoglobin 11.9, platelet count 171,000, MCV 88.7.    • 10/19/17 - WBC 7.5, hemoglobin 11.8, platelet count 216,000.    • 1/8/18 - PET scan:  No evidence of residual disease or recurrent disease.  There is an esophageal stent in the mid esophagus with a large debris air level.  No evidence of any metastases in the chest, abdomen or pelvis.  Mild nodule uptake in the vocal cords with fullness in the right vocal cord.  This could be physiologic.    • 7/9/18 - CT scan of chest with contrast:  Prominent mediastinal lymph nodes appear stable since February.  Changes of gastric pull-through procedure for esophageal cancer again noted.  Tree-in-bud infiltrates in the left lung base, consistent with infection versus inflammation.    • 1/10/19 - CT chest, abdomen and pelvis with contrast:  No evidence of mets in the chest; however, interval development of metastatic lymphadenopathy in the left side of the retroperitoneum.  For instance, there is a 17 mm rounded lymph node, 10 mm lymph node and also a 14 mm lymph node.  These are all new.  Stable 9 mm hypodense lesion within the right hepatic lobe, which could reflect hemangioma or complex cyst.    • 1/30/19 - CT-guided core biopsy of retroperitoneal lymph node:  Poorly differentiated adenocarcinoma consistent with metastases from patient’s known esophageal  primary.  CK20 positive, CDX2 positive, cytokeratin 7 negative, TTF-1 negative.    • 2/1/19 - Creatinine 0.9, BUN 18, calcium 9.3; these are normal.    • 2/28/19 - [facility] Next Gen sequencing testing on retroperitoneal lymph node specimen:  PD-L1 positive.  CPS = 3.  This implies responsiveness to pembrolizumab.  Mismatch repair status is proficient (no evidence of microsatellite instability).  Tumor mutational burden is low = 6 mutations/Mb.  CDH1 indeterminate.  KRAS mutation positive.  TP53 mutation positive.  CDK6 amplified.  [employer] PD-L1 testing by IHC:  PD-L1 expression 1% positive (CPS =1).  HER2/marvel by IHC is negative.  HER2/marvel by CISH not amplified.  Microsatellite stable tumor.    • 3/7/19 - WBC 7.3, hemoglobin 12, platelet count 128,000, MCV 92.     • 3/14/19 - Patient was seen by radiation oncologist, Dr. Chahal.  He has recommended observation versus systemic chemoimmunotherapy as needed.  No plans for radiation therapy.   • 5/1/19 - CT scan of chest with contrast:  No definitive findings of new metastatic disease in the chest.  Emphysema noted.    • 5/1/19 - CT abdomen and pelvis:  There is interval progression of metastatic retroperitoneal adenopathy.  Left periaortic adenopathy with lymph node measuring up to 2 cm, previously 1.7 cm.  Another lymph node now measures 2.3, previously 1.4 cm.  New enlarged lymph node on image 147 measures 1.4 cm, previously 0.4 cm.  A 9 mm hypodense right hepatic lobe lesion appears stable.    • 5/9/19 - Decision made to start patient on immunotherapy Keytruda.    • 5/9/19 - Vitamin B12 229.  Ferritin 61.  Folate 14.3.  Iron saturation 26%, TIBC 304, serum iron 79.  • 5/31/19 - Patient received Keytruda 200 mg cycle 1, day 1.    • 5/31/19 - Free T4 0.99.  Creatinine 0.9, BUN 9.  LFTs normal.  TSH 3.99.  Folate 14.3.  Iron saturation 26%.    • 6/21/2019 patient received Keytruda cycle 2  • 7/12/2019: Patient received Keytruda cycle 3  • 8/2/2019 patient received cycle 4  Keytruda WBC 7.2, hemoglobin 11.8 platelet count 163  • 8/23/2019 creatinine 0.9, LFTs normal, WBC 6.4, hemoglobin 11.5, platelets 179, TSH 2.1   • 8/23/2019 patient received cycle 5 of Keytruda  • 9/3/2019 CT chest abdomen and pelvis with contrast: . Positive response to therapy in the abdomen since 05/01/2019. Pathologically enlarged retroperitoneal lymph nodes, predominantly in the left periaortic region, have diminished in size.a 1.3 x 2.1 cm left periaortic node (image 60), previously measured 3.5 x 2.3 cm. A 1.6 cm index node near the level of the left renal vein previously measured 2.0 cm No new or progressive adenopathy. 2. Stable findings in the chest. Noncalcified right upper lobe nodules are unchanged. There is no evidence of new or progressive metastatic disease within the chest. 3. 8 mm indeterminate low-density lesion in the right hepatic lobe, is stable. No new liver lesions.  • 9/6/2019 WBC 5.6, hemoglobin 11.2, platelets of 192, MCV 91.8  • 9/13/2019 patient received Keytruda cycle 6, albumin 3.2  • 10/4/2019 patient received cycle 7 of Keytruda, TSH 4.8, free T4 0.86  • 10/9/2019 WBC 5.8, hemoglobin 11.7, platelets 174  • 10/25/2019 patient received cycle 8 of Keytruda.  WBC 5.9, hemoglobin 11.5, platelets 152, creatinine 0.75, cortisol 15.2, TSH 6.48 high , free T4 1.13 normal  • 11/6/2019 WBC 6.6, hemoglobin 11.8, MCV 91.5, platelets 187  • 11/15/2019 patient received cycle 9 of Keytruda, WBC 6.6, hemoglobin 11.9, platelets 161, cortisol level 8.86, TSH 2.68, free T3 2.8, free T4 1.5  • 12/4/2019 WBC 6.9, hemoglobin 12.0, platelets 180, MCV 92.9  • 12/06/2019-Keytruda Cycle 10  • 12/27/2019- Cycle 11 LFTs normal, WBC 6.7, hemoglobin 11.4, platelets 168, MCV 93, TSH 2.4,  • 1/28/2020: CT chest abdomen and pelvis with contrast:  Single (aortic lymph node in the abdominal retroperitoneum has increased.  Rest of the retroperitoneal lymph nodes have decreased in size.  Mediastinal adenopathy appears  unchanged.  Noncalcified bilateral pulmonary nodules are stable  • 1/17/2020 patient received Keytruda cycle 12:.  • 2/3/2020 WBC 7.1, hemoglobin 12.2, platelets 171  • 02/10/2020 patient received cycle 13 of Keytruda: WBC 7.3, hemoglobin 12.1, platelets 166, creatinine 0.82  • 3/6/2020 patient is of Keytruda 200 mg, hemoglobin 10.8  • 3/27/2020 patient received Keytruda 200 mg, hemoglobin 11.4, TSH 1.95  • 4/17/2020 patient received Keytruda cycle 16 200 mg, WBC 6.1, hemoglobin 11.6, platelets 150  • 5/8/2020: Patient received cycle 17 Keytruda, WBC 6.3, hemoglobin 9.6, platelets 137, TSH 2.05, free T4 1.54, creatinine 0.93, LFTs normal,  • 5/29/2020: Patient received cycle 18 Keytruda, WBC 6.5, hemoglobin 11.7, platelets 129, creatinine 0.83, TSH 1.69  • 6/26/2020: Patient receiving Keytruda.   • 7/17/2020: TSH 2.06, WBC 5.7, hemoglobin 11.4, platelets 177, MCV 92.4, Keytruda 200 mg cycle 20  • 7/27/2020: CT chest abdomen pelvis with contrast: Stable findings in the chest abdomen pelvis since 1/28/2020.  Noncalcified bilateral pulmonary nodules are stable.  Stable retroperitoneal left perinephric adenopathy in the abdomen.  • 8/7/2020: Patient received cycle 21 of Keytruda  • 8/28/2020 patient is a cycle 22 of Keytruda.  WBC 6.02, hemoglobin 11.3, platelets 161  • 9/11/2020 WBC 6.9, hemoglobin 12.4, platelets 192  • 9/18/2020: Cycle 23 of Keytruda, TSH 1.19  • 10/9/2020: Cycle 24 of Keytruda, WBC 5.9, hemoglobin 11.9, platelets 178, creatinine 0.84, TSH 2.56, free T3 2.4, CMP normal  • 10/23/2020: WBC 7.34, hemoglobin 11.6, platelets 175  • 10/30/2020:.  Keytruda 200 mg  • 11/20/2020: Keytruda 200 mg  • 12/11/2020 Keytruda 200 mg.  WBC 5.5, hemoglobin 11.4, platelets 176, creatinine 0.8, LFTs normal, TSH 2.2, cortisol 8.3  • 12/10/2020: CT chest abdomen and pelvis with contrast: Previously seen left para-aortic lymph nodes within the abdomen are decreased in size.  No pathological lymphadenopathy.  No evidence of  residual malignancy..  Small noncalcified pulmonary nodules are stable.  No new nodules.  Stable mediastinal lymph nodes.  • 3/12/2021: WBC 6.7, hemoglobin 11.3, platelets 17  • 3/12/2021: CMP normal, TSH 2.9, cortisol 11.95, 1  • 3/15/2021: CT chest with contrast: Stable findings in the chest with postsurgical changes of esophageal resection and gastric pull-through.  Stable right lower lobe lung nodule measures 11 mm.  Stable size and appearance of the mediastinal and upper abdominal lymph nodes.  • 3/23/2021: Doppler of upper extremity: Normal.  • 3/24/2021: CT abdomen and pelvis with contrast: Postoperative changes.  Left periaortic nodes are stable.  • 4/12/2021: TSH 4.1, cortisol 12.4  • 6/22/2021: CMP normal,Cortisol 10.08,, WBC 5.8, hemoglobin 11.2, platelets 109, TSH 2.59  • 7/12/2021: CT scan of the chest abdomen pelvis with contrast: Dominant 10 x 10 left periaortic lymph node is stable since 3/22/2021.  Dominant lymph nodes in the right lower paratracheal and AP window distribution are stable since 3/15/2021.  No evidence of progressive adenopathy in the chest abdomen or pelvis.    • 01/11/2022 -PET/CT findings consistent with new cedric metastatic disease in the retroperitoneum.  2 new pathologically enlarged hypermetabolic retroperitoneal lymph nodes.  No convincing evidence of recurrent disease within the chest neck pelvis or skeleton.  • 1/20/2022 -CT-guided needle biopsy consistent with poorly differentiated carcinoma.  CDX2 positive consistent with metastases from known esophageal primary  • 2/9/2022 - Pembrolizumab complicated with fatigue, joint pains, nausea, diarrhea. Symptoms decrease in intensity over a week.  • 3/30/2022 -CT chest and pelvis with stable metastatic lymph nodes in the left periaortic retroperitoneum.  No evidence of disease progression or new lesions.  Stable lymphadenopathy in the distal paratracheal mediastinal area  • 4/13/2022 - Keytruda 200  • 5/4/2022 - Keytruda  200  • Continued keytruda  • 7/11/2022 - CT abdomen pelvis with decrease in size of left perioaortic lymph node. No other areas of disease.  • Continues to be on Keytruda  • 10/18/2022 - stable CT imaging  • Early December hospitalized till December 29th at [facility] with  \"COVID infection,\" and \"E. Coli in lung\" - IV antibiotics x 10 days.  • Jan 1st hospitalized for vomiting blood with EGD, possible esophageal tear vs esophagitis. Repeat EGD in 4 weeks after prilosec tx and possibly stretch stricture of esophagus seen during EGD  • 1/10/23: post hospital visit for erosive esophagitis and esophageal stricture.  WBC 3.93; Hgb 8.5; Platelets 299. Restart Keytruda    Subjective:  The patient presents for FU of esophageal cancer, hypothyroid, and treatment with immunotherapy, and post hospital visit. Denies chest pain, some epigastric pain at stricture site.  Still has some nausea, using Zofran and it helps.  He had problems tolerating the prilosec and was switched per GI to Protonix yesterday. He has diarrhea every 3-4 days and usually just once, otherwise bowels move daily with formed stool. The patient also takes metformin that is known for side effect of diarrhea.  The patient feels well and has a good appetite but has to be careful with stricture. He verbalizes that he wants to get treatment restarted since last infusion was in November. He denies fever, CP, increased SOB.  He continues his levothyroxine for his hypothyroidism and will restart his Vitamin B12 that he had stopped when he was sick.    Patient states he had \"COVID and E coli in lung\" - hospitalized first of December 2022 at [facility] - waiting on documents. Fever, weakness with COVID infection, productive yellow - green sputum. He has recovered well from this but does still use 1 liter of oxygen when he sleeps at night for comfort or overexerts self during the day.  Otherwise he spends days without need for oxygen.      The patient was also  recently hospitalized for dysphagia with vomiting of blood x 1.  His EGD revealed erosive esophagitis with stricture.  He is being treated with protonix for 4 weeks and then having EGD for the dilation.        Past Medical History:   Diagnosis Date   • B12 deficiency    • Blockage of coronary artery of heart (HCC)    • Depression    • DM (diabetes mellitus) (HCC)    • Dysphagia    • HTN (hypertension)    • Hyperlipidemia        Past Surgical History:   Procedure Laterality Date   • ABDOMINAL WALL ABSCESS INCISION AND DRAINAGE  10/22/2018    WITH DEBRIDEMENT  1.5CM X 1.5 CM X 1.5 CM    DR. PURI   • CATARACT EXTRACTION  2018   • COLONOSCOPY     • CORONARY ARTERY BYPASS GRAFT  2015   • ENDOSCOPY  12/20/2016   • ENDOSCOPY N/A 1/2/2023    Procedure: ESOPHAGOGASTRODUODENOSCOPY;  Surgeon: Esvin Morgan MD;  Location: [facility] ENDOSCOPY;  Service: Gastroenterology;  Laterality: N/A;  post: anastamosis stricture   • ESOPHAGECTOMY  05/04/2017    BROOKS PETTY ESOPHAGECTOMY, FEEDING JEJNOSTOMOY, ABDOMINAL AND MEDIASTINAL LYMPH NODE DISECTION, PEDICLED MUSCLE FLAP COVERAGE DR. PURI   • ESOPHAGOSCOPY / EGD  07/12/2017    WITH BALLOON DILATION   • ESOPHAGOSCOPY / EGD  07/26/2017    WITH BALLOON DILATION   • ESOPHAGOSCOPY / EGD  08/11/2017    WITH BALLOON DILATION   • ESOPHAGOSCOPY / EGD  08/28/2017    WITH BALLOON DILATION   • ESOPHAGOSCOPY / EGD  09/27/2017    WITH BALLOON DILATION   • LYMPH NODE BIOPSY  01/30/2019   • PACEMAKER IMPLANTATION  11/21/2016       Current Outpatient Medications:   •  clonazePAM (KlonoPIN) 0.5 MG tablet, Take 0.25 mg by mouth 2 (Two) Times a Day As Needed., Disp: , Rfl: 1  •  clonazePAM (KlonoPIN) 0.5 MG tablet, Take 0.125 mg by mouth Daily As Needed., Disp: , Rfl:   •  cyanocobalamin (VITAMIN B-12) 1000 MCG tablet, Take 1,000 mcg by mouth Daily., Disp: , Rfl:   •  folic acid (FOLVITE) 1 MG tablet, Take 1 mg by mouth Daily., Disp: , Rfl:   •  levothyroxine (Euthyrox) 50 MCG tablet, Take 1 tablet  by mouth Daily., Disp: 90 tablet, Rfl: 0  •  metFORMIN ER (GLUCOPHAGE-XR) 500 MG 24 hr tablet, Take 1,000 mg by mouth 2 (Two) Times a Day., Disp: , Rfl:   •  metoprolol tartrate (LOPRESSOR) 25 MG tablet, Take 12.5 mg by mouth 2 (Two) Times a Day., Disp: , Rfl:   •  omeprazole (priLOSEC) 40 MG capsule, Take 1 capsule by mouth 2 (Two) Times a Day for 60 days., Disp: 60 capsule, Rfl: 1  •  ondansetron (ZOFRAN) 4 MG tablet, Take 4 mg by mouth Every 8 (Eight) Hours As Needed for Nausea or Vomiting., Disp: , Rfl:   •  pravastatin (PRAVACHOL) 10 MG tablet, Take 10 mg by mouth Every Other Day., Disp: , Rfl:   •  saccharomyces boulardii (FLORASTOR) 250 MG capsule, Take 1 capsule by mouth 2 (Two) Times a Day for 60 days., Disp: 60 capsule, Rfl: 1  No current facility-administered medications for this visit.    Facility-Administered Medications Ordered in Other Visits:   •  sodium chloride 0.9 % flush 20 mL, 20 mL, Intravenous, PRN, Jean Pierre Elizondo MD, 20 mL at 01/10/23 1216    Allergies   Allergen Reactions   • Ace Inhibitors Unknown (See Comments)     Unknown reaction     • Heparin Other (See Comments)     Fever/chills, weakness in legs   • Sulfa Antibiotics Other (See Comments)     Mouth sores       Family History   Problem Relation Age of Onset   • Mental illness Mother    • Heart disease Mother    • Hypertension Father    • Cancer Father    • Heart disease Sister    • Heart disease Brother      Cancer-related family history includes Cancer in his father.    Social History     Tobacco Use   • Smoking status: Former     Packs/day: 2.00     Years: 50.00     Pack years: 100.00     Types: Cigarettes     Quit date: 2015     Years since quittin.5   • Smokeless tobacco: Former     Types: Chew     Quit date: 1989   Substance Use Topics   • Alcohol use: No   • Drug use: No     ROS:   Review of Systems   Constitutional: Negative for chills, fatigue and fever.   HENT: Negative.    Eyes: Negative.    Respiratory: Positive  for shortness of breath (mild at night since COVID infection). Negative for cough.    Cardiovascular: Negative for chest pain and palpitations.   Gastrointestinal: Positive for abdominal pain (mild at stricture site), diarrhea (once every 3-4 days) and nausea (mild intermittent). Negative for blood in stool and vomiting.   Endocrine: Negative.    Genitourinary: Negative for dysuria.   Musculoskeletal: Negative.    Skin: Negative for rash and wound.   Neurological: Negative for dizziness and headaches.   Psychiatric/Behavioral: Negative for behavioral problems.       Objective:    Vitals:    01/10/23 1011   BP: 110/62   Pulse: 79   Resp: 18   Temp: 97.6 °F (36.4 °C)   TempSrc: Oral   SpO2: 98%   Weight: 63.5 kg (140 lb)   Height: 167.6 cm (66\")   PainSc: 0-No pain       (1) Restricted in physically strenuous activity, ambulatory and able to do work of light nature    Physical Exam:     Physical Exam   Constitutional: He is oriented to person, place, and time. He appears well-developed. No distress.   HENT:   Head: Normocephalic and atraumatic.   Right Ear: Tympanic membrane normal.   Nose: Nose normal.   Mouth/Throat: Mucous membranes are moist.   Eyes: Pupils are equal, round, and reactive to light.   Neck: No thyromegaly present.   Cardiovascular: Normal rate, regular rhythm, normal heart sounds and normal pulses. Exam reveals no gallop and no friction rub.   Pulmonary/Chest: Effort normal. He has rhonchi.   Abdominal: Soft. Normal appearance and bowel sounds are normal.   No tenderness   Musculoskeletal: Normal range of motion. No deformity or signs of injury.      Right lower leg: No edema.   Neurological: He is alert and oriented to person, place, and time. He exhibits normal muscle tone.   Skin: Skin is warm and dry. No rash noted. He is not diaphoretic. No erythema. No jaundice.   Right side chest  port       Lab Results - Last 18 Months   Lab Units 01/10/23  1108 01/02/23  2341 01/02/23  0616 01/02/23  0005    WBC 10*3/mm3 3.93 4.50  --  3.70   HEMOGLOBIN g/dL 8.5* 8.8* 8.9* 8.8*   HEMATOCRIT % 26.8* 26.8* 26.7* 26.3*   PLATELETS 10*3/mm3 299 134*  --  126*   MCV fL 100.0* 93.1  --  92.2     Lab Results - Last 18 Months   Lab Units 01/02/23  2341 01/02/23  0005 01/01/23  1505 11/21/22  1004 10/10/22  1019   SODIUM mmol/L 141 138 139 137 138   POTASSIUM mmol/L 5.2 5.2 5.4* 4.7 5.0   CHLORIDE mmol/L 106 103 105 105 105   CO2 mmol/L 26.0 27.0 25.0 23.0 21.0*   BUN mg/dL 21 22 22 15 14   CREATININE mg/dL 0.88 0.84 0.92 0.89 0.87   CALCIUM mg/dL 8.4* 8.4* 8.7 9.2 9.1   BILIRUBIN mg/dL  --   --  0.3 0.4 0.3   ALK PHOS U/L  --   --  51 37* 35*   ALT (SGPT) U/L  --   --  21 5 12   AST (SGOT) U/L  --   --  47* 12 20   GLUCOSE mg/dL 76 112* 103* 134* 181*     Assessment & Plan     Assessment:    Metastatic esophageal carcinoma:    Patient has a history of GE junction poorly differentiated adenocarcinoma for which he had chemoradiation followed by Noel Gurwinder resection:  Pathology showed ypT3,pN1 disease.  Tumor was Stage IIIA, diagnosed in 2016.  He received concurrent chemoradiation with weekly Carboplatin and Taxol and radiation finishing on 3/15/17.  CT scan on 1/10/19 showed new retroperitoneal lymph nodes.  These lymph nodes were biopsied on 1/30/19 and it shows metastatic esophageal cancer.  [facility] Next Gen testing was performed on the sample and it shows KRAS mutation, microsatellite stable tumor.  PD-L1 is positive.  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  HER2/marvel (ERBB2) was not amplified and was negative.  His CT scan on 5/1/19 shows evidence of progression.  Patient has started receiving Keytruda on 5/31/19.  He has received 19 cycles so far.   CT scan on 1/28/2020 shows improvement and continued response.  CT scan chest abdomen pelvis with contrast on 12/10/2020 does not show any evidence of disease progression.  On 12/22/2020 decision made to hold Keytruda per patient's request to get a treatment break.. Repeat CT scan chest  7/12/2021 does not show any evidence of disease progression.  Now CT imaging in January 2021 with a CT showing increased abdominal left para-aortic adenopathy.  Subsequent PET/CT with new cedric metastatic disease in the retroperitoneum of the abdomen.  New pathologically enlarged hypermetabolic retroperitoneal lymph nodes.  This would be concerning for disease recurrence/progression.  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  I discussed with him the treatment options going forward.  He has had a good response with immunotherapy pembrolizumab in the past. This was  restarted. subsequent imaging with treatment response. Now recent imaging with ongoing response. Continue treatment as per plan indefinitely for now with recurrence.  Patient has been off treatment since November 21st with COVID infection and erosive esophagitis with esophageal stricture.  The patient feels well today and wants to restart treatment. CT scans ordered for surveillance of disease.    Shortness of breath  Improved, has occasional shortness of breath, likely copd  I have recommended using albuterol. Continue, no increase.  Uses oxygen at 1 liter during night, none at daytime unless exerting self - since the COVID infection      Reflux/heartburn/dysphagia:   Symptoms improved.  Also has a history of strictures. Status post EGD and balloon dilation.   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  Continues on Synthroid monitor TSH and fT4 with treatment. Monitor   No excessive fatigue, continues synthroid      Mild diarrhea:  diarrhea seems to be waxing and waning in nature.  Related to immunotherapy, but  not severe. Improved  Has one episode of diarrhea every 3-4 days and normal the other days, also takes metformin that can induce diarrhea      PD-L1 positive, HER2 negative, p53 and KRAS positive.  Repeat Caris testing with repeat biopsy with no target mutations.      B12 deficiency: Continue B12 1000 mcg twice daily - restart B12, has not been taking due illnesses      Anemia - Hb improved and stable. Hgb 8.5      RTC Dr Elizondo in 6 weeks with labs and treatment       I spent 40 minutes in physical exam, assessment of nausea-epigastric discomfort-diarrhea-fatigue-reflux-oxygen thearapy, assessment of toxicities from immunotherapy, review of recent illnesses and next EGD with dilation, discussion of tx of esophagitis with protonix, discussion of continuation of synthroid, discussion to restart Vitamin B12 which he has not taken since November, discussion of any signs of bleeding, discussion of restarting Keytruda, discussion of survellience CT scans, ordering, discussion of contacting clinic for any signs of infections or problems, and documentation.      Electronically signed by DAT Killian, 01/10/23, 1:13 PM EST.

## 2023-01-10 NOTE — PATIENT INSTRUCTIONS
Call for excessive fatigue, increased diarrhea like 4 or 5 daily, increased SOB with persistent cough, fevers  Call for any unusual problems

## 2023-01-10 NOTE — PROGRESS NOTES
Pt seen by DAT Hoff for f/u and OK to treat as long as lab results within parameters.  Pt port accessed and labs drawn. CBC and glucose reviewed and within parameters.  Tx given per MAR without difficulty.  Pt tolerated well.  Pt d/c home with AVS given.

## 2023-01-16 ENCOUNTER — READMISSION MANAGEMENT (OUTPATIENT)
Dept: CALL CENTER | Facility: HOSPITAL | Age: 73
End: 2023-01-16
Payer: MEDICARE

## 2023-01-17 NOTE — OUTREACH NOTE
"Medical Week 2 Survey    Flowsheet Row Responses   [facility] patient discharged from? [facility]   Does the patient have one of the following disease processes/diagnoses(primary or secondary)? Other   Week 2 attempt successful? Yes   Call start time 1908   Discharge diagnosis Hematemesis, EGD   Call end time 1913   Is the patient taking all medications as directed (includes completed medication regime)? Yes   Has the patient kept scheduled appointments due by today? Yes   What is the Home health agency?  [facility]     Has home health visited the patient within 72 hours of discharge? Yes   Psychosocial issues? No   What is the patient's perception of their health status since discharge? Improving   Is the patient/caregiver able to teach back signs and symptoms related to disease process for when to call PCP? Yes   Is the patient/caregiver able to teach back signs and symptoms related to disease process for when to call 911? Yes   Is the patient/caregiver able to teach back the hierarchy of who to call/visit for symptoms/problems? PCP, Specialist, Home health nurse, Urgent Care, ED, 911 Yes   Additional teach back comments States he is doing better.  Did have a cancer treatment and that made him sick.  He has been trying to get a hold of someone at [facility] to schedule getting his \"throat stretched\".  Home health nurse tried today but it was a holiday and they weren't in.     Week 2 Call Completed? Yes   Wrap up additional comments Pt will attempt again to get ahold of some one at [facility].           OLESYA TRONCOSO - Licensed Nurse  "

## 2023-01-20 ENCOUNTER — HOSPITAL ENCOUNTER (OUTPATIENT)
Dept: PET IMAGING | Facility: HOSPITAL | Age: 73
Discharge: HOME | End: 2023-01-20
Admitting: NURSE PRACTITIONER
Payer: MEDICARE

## 2023-01-20 DIAGNOSIS — E03.9 HYPOTHYROIDISM (ACQUIRED): ICD-10-CM

## 2023-01-20 DIAGNOSIS — C15.5 PRIMARY MALIGNANT NEOPLASM OF LOWER THIRD OF ESOPHAGUS: ICD-10-CM

## 2023-01-20 DIAGNOSIS — D64.9 ANEMIA, UNSPECIFIED TYPE: ICD-10-CM

## 2023-01-20 DIAGNOSIS — C15.9 ESOPHAGEAL CANCER, STAGE IV: ICD-10-CM

## 2023-01-20 PROCEDURE — 0 IOPAMIDOL PER 1 ML: Performed by: NURSE PRACTITIONER

## 2023-01-20 PROCEDURE — 71260 CT THORAX DX C+: CPT

## 2023-01-20 PROCEDURE — 74177 CT ABD & PELVIS W/CONTRAST: CPT

## 2023-01-20 RX ADMIN — IOPAMIDOL 100 ML: 755 INJECTION, SOLUTION INTRAVENOUS at 11:19

## 2023-01-24 ENCOUNTER — READMISSION MANAGEMENT (OUTPATIENT)
Dept: CALL CENTER | Facility: HOSPITAL | Age: 73
End: 2023-01-24
Payer: MEDICARE

## 2023-01-24 NOTE — OUTREACH NOTE
Medical Week 3 Survey    Flowsheet Row Responses   [facility] patient discharged from? [facility]   Does the patient have one of the following disease processes/diagnoses(primary or secondary)? Other   Week 3 attempt successful? Yes   Call start time 1301   Call end time 1305   Discharge diagnosis Hematemesis, EGD   Is the patient taking all medications as directed (includes completed medication regime)? Yes   Does the patient have a primary care provider?  Yes   Has the patient kept scheduled appointments due by today? Yes   What is the Home health agency?  [facility]     Has home health visited the patient within 72 hours of discharge? Yes   Psychosocial issues? No   What is the patient's perception of their health status since discharge? Improving   Is the patient/caregiver able to teach back signs and symptoms related to disease process for when to call PCP? Yes   Is the patient/caregiver able to teach back signs and symptoms related to disease process for when to call 911? Yes   Is the patient/caregiver able to teach back the hierarchy of who to call/visit for symptoms/problems? PCP, Specialist, Home health nurse, Urgent Care, ED, 911 Yes   If the patient is a current smoker, are they able to teach back resources for cessation? Not a smoker   Additional teach back comments Has appt with [facility].  Doing better since change in medication and has gained 5 lbs.  Getting strength back.    Week 3 Call Completed? Yes   Graduated Yes   Graduated/Revoked comments Denies questions or needs at this time.          OLESYA TRONCOSO - Licensed Nurse

## 2023-01-25 ENCOUNTER — TELEPHONE (OUTPATIENT)
Dept: ONCOLOGY | Facility: CLINIC | Age: 73
End: 2023-01-25
Payer: MEDICARE

## 2023-01-25 NOTE — TELEPHONE ENCOUNTER
Per Dr. Elizondo I called the pt and moved his follow up appt up to Monday 1/30. The pt v/u and had no other questions.

## 2023-01-26 NOTE — PROGRESS NOTES
Hematology-Oncology Follow-up Note       Artie Mc  1950    Primary Care Physician: Chanel Carter, DAT  Referring Physician: No ref. provider found    Reason For Visit:  Chief Complaint   Patient presents with   • Follow-up     Primary malignant neoplasm of lower third of esophagus (HCC)     Metastatic esophageal cancer  Monitoring for adverse effects related to immunotherapy.  Hypothyroidism    HPI:   Mr. Mc is a pleasant 72 y.o. gentleman with a history of gastroesophageal reflux disease, diabetes, hypertension, heart block status post pacemaker placement and history of CABG.  He was having symptoms of dysphagia, weight loss since September 2016. He was reporting symptoms of food getting stuck in the lower chest.  He has transitioned himself to a liquid diet.  The patient reports losing about 30 pounds over this duration.  The patient underwent upper GI endoscopy on 12/20/16 with Dr. Esvin Barrera.  Endoscopy showed necrotic, circumferential and moderately obstructive mass at the distal esophagus between 42 cm and 36 cm.      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  There was also evidence of mild subacute inflammation in the duodenum.  Dr. Barrera ordered a CT scan of the chest and abdomen with contrast on 12/20/16.  The scan was done at [facility].  The scan showed a new ill-defined mass in the anterior wall of the distal esophagus at the GE junction measuring 2.3 x 2.2 x 1.6 cm, worrisome for cancer.  There were four subcentimeter liver lesions which had appeared stable in comparison to another CT scan from 2009 and they likely represent small cysts.  There was also mild lymphadenopathy in the gastrohepatic ligament.  There were at least six borderline enlarged lymph nodes measuring up to 1.3 x 1 cm in the region.  The patient is referred to us for further oncological evaluation.    1/5/2017 -  The patient presents for initial consultation.  He reports persistent dysphagia and also has symptoms of regurgitation.  He cannot tolerate solid foods and is dependent on liquid diet such as Ensure.  He also reports fatigue. Symptoms of dysphagia have been present for the past four months.  When he eats any solids, the food gets stuck in the lower chest.    • 1/5/17 - Vitamin B12 200 (L).  CEA 0.8.  Ferritin 35.  Iron saturation 16% (L), serum iron 56, TIBC 354.  Creatinine 0.9.  LFTs normal.  Folate 10.2.  • 1/12/17 - PET/CT scan:  Intense abnormal activity corresponding to the region of the GE junction and proximal stomach.  SUV is 11.06.  There is a suggestion of an abnormal mass or abnormal wall thickening in the region measuring 3.9 x 4.8 cm.  No additional abnormalities.  No evidence of metastases or other lymphadenopathy.  Scattered nonspecific subcentimeter lymph nodes involving the mediastinum and within the central upper abdomen.  • 1/13/17 - Creatinine 0.9.  LFTs normal.    • 1/17/17 - Patient seen by thoracic surgeon, Dr. Bradley.  PET scan was reviewed.  He has recommended neoadjuvant chemoradiation therapy followed by [place] Gurwinder esophagogastrectomy.  Port-A-Cath is being arranged.    • 1/20/17 - WBC 6.3, hemoglobin 12.1, platelet count 198,000, MCV 86.4.  • 1/30/17 - Patient started weekly Carboplatin and Taxol (Carboplatin AUC 2 and Taxol 50 mg/M2).  Patient received Injectafer 750 mg.  WBC 5.8, hemoglobin 12.4, platelet count 244,000.   • 1/31/17 - Patient seen by Dr. Eduardo Oleary.  The plan was to give 5040 cGy in 28 fractions.   • 2/6/17 - WBC 4.4, hemoglobin 11.6, platelet count 201,000.  Patient received cycle 2 of weekly Carboplatin and Taxol (Carboplatin AUC 2 and Taxol 50 mg/M2).  • 2/13/17 - Patient received Carboplatin and Taxol weekly cycle 3.  • 2/13/17 - Patient started concurrent radiation therapy.  Total planned dose is 4140 cGy.    • 2/13/17 to 2/15/17 - Patient received 4140 cGy of  neoadjuvant radiation.  Radiation was finished on 3/15/17.    • 2/20/17 - WBC 1.8, hemoglobin 10.6, platelet count 217,000.  Creatinine 0.5. Folate 15 (L).  Ferritin 361.  Haptoglobin 214.  Iron saturation 33%, TIBC 263, serum iron 88.    • 2/20/17 - Patient received Carboplatin and Taxol weekly cycle 4.   • 2/27/17 - WBC 3.7, hemoglobin 10.7, platelet count 177,000, MCV 85.3.    • 3/6/17 - Patient has received 16 out of the 23 planned radiation treatments.  Total planned dose is 4140 cGy.    • 3/6/17 - WBC 5.4, hemoglobin 11.2, platelet count 113,000.  • 3/6/17 - Patient received cycle 5 of weekly Carboplatin and Taxol.   • 3/13/17 - Patient received cycle 6 of weekly Carboplatin and Taxol.  WBC 3.9, hemoglobin 11.3, platelet count 93,000.     • 3/20/17 - WBC 1.4, hemoglobin 10.0, platelet count 118,000, MCV 86.1   • 3/27/17 - WBC 3.8, hemoglobin 9.5, platelet count 176,000, MCV 87.3.    • 4/7/17 - Stress test:  No evidence of reversible myocardial ischemia.  Normal left ventricular ejection fraction of 50%.    • 4/17/17 - Upper GI endoscopy by Dr. 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  The GE junction adenocarcinoma lesion seems to have a very good response to chemotherapy and radiation.  The lumen was open.  • 5/1/17 - WBC 7.0, hemoglobin 10.2, platelet count 175,000.    • 5/4/17 - Patient underwent [place] Gurwinder esophagectomy with pyloroplasty, feeding jejunostomy tube, abdominal and mediastinal lymph node dissection.    Surgical Pathology:  Moderately to poorly differentiated adenocarcinoma measuring 1.3 cm at the GE junction.  Resection margins are negative for malignancy.  Tumor margins are negative.  There is effective treatment response.  No evidence of lymphovascular invasion.  Staging is ypT3,pN1.  One out of fourteen lymph nodes involved.   • 5/22/17 - WBC 6.8, hemoglobin 10.3, platelet count 312,000.    • 6/19/17 - WBC 5.7, hemoglobin 10.5, platelet count  204,000, MCV 92.1.    • 7/6/17 - EGD:  Status post balloon dilation of esophageal stricture.  • 7/12/17 - EGD with balloon dilation of esophageal stricture.  • 7/26/17 - EGD and balloon dilation of esophageal stricture.  • 7/31/17 - PET/CT scan:  There is mild metabolic activity seen at the thoracic esophagus just at and below the surgical margins.  Some mild wall thickening.  This could be from recent surgery.  A residual cancer seems unlikely.  There is a precarinal lymph node with mild metabolic activity with SUV of 3 measuring 1 x 1.3 cm.  Again this appears to be nonspecific in my opinion.    • 8/7/17 - WBC 5.9, hemoglobin 11.9, platelet count 171,000, MCV 88.7.    • 10/19/17 - WBC 7.5, hemoglobin 11.8, platelet count 216,000.    • 1/8/18 - PET scan:  No evidence of residual disease or recurrent disease.  There is an esophageal stent in the mid esophagus with a large debris air level.  No evidence of any metastases in the chest, abdomen or pelvis.  Mild nodule uptake in the vocal cords with fullness in the right vocal cord.  This could be physiologic.    • 7/9/18 - CT scan of chest with contrast:  Prominent mediastinal lymph nodes appear stable since February.  Changes of gastric pull-through procedure for esophageal cancer again noted.  Tree-in-bud infiltrates in the left lung base, consistent with infection versus inflammation.    • 1/10/19 - CT chest, abdomen and pelvis with contrast:  No evidence of mets in the chest; however, interval development of metastatic lymphadenopathy in the left side of the retroperitoneum.  For instance, there is a 17 mm rounded lymph node, 10 mm lymph node and also a 14 mm lymph node.  These are all new.  Stable 9 mm hypodense lesion within the right hepatic lobe, which could reflect hemangioma or complex cyst.    • 1/30/19 - CT-guided core biopsy of retroperitoneal lymph node:  Poorly differentiated adenocarcinoma consistent with metastases from patient’s known esophageal  primary.  CK20 positive, CDX2 positive, cytokeratin 7 negative, TTF-1 negative.    • 2/1/19 - Creatinine 0.9, BUN 18, calcium 9.3; these are normal.    • 2/28/19 - [facility] Next Gen sequencing testing on retroperitoneal lymph node specimen:  PD-L1 positive.  CPS = 3.  This implies responsiveness to pembrolizumab.  Mismatch repair status is proficient (no evidence of microsatellite instability).  Tumor mutational burden is low = 6 mutations/Mb.  CDH1 indeterminate.  KRAS mutation positive.  TP53 mutation positive.  CDK6 amplified.  [employer] PD-L1 testing by IHC:  PD-L1 expression 1% positive (CPS =1).  HER2/marvel by IHC is negative.  HER2/marvel by CISH not amplified.  Microsatellite stable tumor.    • 3/7/19 - WBC 7.3, hemoglobin 12, platelet count 128,000, MCV 92.     • 3/14/19 - Patient was seen by radiation oncologist, Dr. Chahal.  He has recommended observation versus systemic chemoimmunotherapy as needed.  No plans for radiation therapy.   • 5/1/19 - CT scan of chest with contrast:  No definitive findings of new metastatic disease in the chest.  Emphysema noted.    • 5/1/19 - CT abdomen and pelvis:  There is interval progression of metastatic retroperitoneal adenopathy.  Left periaortic adenopathy with lymph node measuring up to 2 cm, previously 1.7 cm.  Another lymph node now measures 2.3, previously 1.4 cm.  New enlarged lymph node on image 147 measures 1.4 cm, previously 0.4 cm.  A 9 mm hypodense right hepatic lobe lesion appears stable.    • 5/9/19 - Decision made to start patient on immunotherapy Keytruda.    • 5/9/19 - Vitamin B12 229.  Ferritin 61.  Folate 14.3.  Iron saturation 26%, TIBC 304, serum iron 79.  • 5/31/19 - Patient received Keytruda 200 mg cycle 1, day 1.    • 5/31/19 - Free T4 0.99.  Creatinine 0.9, BUN 9.  LFTs normal.  TSH 3.99.  Folate 14.3.  Iron saturation 26%.    • 6/21/2019 patient received Keytruda cycle 2  • 7/12/2019: Patient received Keytruda cycle 3  • 8/2/2019 patient received cycle 4  Keytruda WBC 7.2, hemoglobin 11.8 platelet count 163  • 8/23/2019 creatinine 0.9, LFTs normal, WBC 6.4, hemoglobin 11.5, platelets 179, TSH 2.1   • 8/23/2019 patient received cycle 5 of Keytruda  • 9/3/2019 CT chest abdomen and pelvis with contrast: . Positive response to therapy in the abdomen since 05/01/2019. Pathologically enlarged retroperitoneal lymph nodes, predominantly in the left periaortic region, have diminished in size.a 1.3 x 2.1 cm left periaortic node (image 60), previously measured 3.5 x 2.3 cm. A 1.6 cm index node near the level of the left renal vein previously measured 2.0 cm No new or progressive adenopathy. 2. Stable findings in the chest. Noncalcified right upper lobe nodules are unchanged. There is no evidence of new or progressive metastatic disease within the chest. 3. 8 mm indeterminate low-density lesion in the right hepatic lobe, is stable. No new liver lesions.  • 9/6/2019 WBC 5.6, hemoglobin 11.2, platelets of 192, MCV 91.8  • 9/13/2019 patient received Keytruda cycle 6, albumin 3.2  • 10/4/2019 patient received cycle 7 of Keytruda, TSH 4.8, free T4 0.86  • 10/9/2019 WBC 5.8, hemoglobin 11.7, platelets 174  • 10/25/2019 patient received cycle 8 of Keytruda.  WBC 5.9, hemoglobin 11.5, platelets 152, creatinine 0.75, cortisol 15.2, TSH 6.48 high , free T4 1.13 normal  • 11/6/2019 WBC 6.6, hemoglobin 11.8, MCV 91.5, platelets 187  • 11/15/2019 patient received cycle 9 of Keytruda, WBC 6.6, hemoglobin 11.9, platelets 161, cortisol level 8.86, TSH 2.68, free T3 2.8, free T4 1.5  • 12/4/2019 WBC 6.9, hemoglobin 12.0, platelets 180, MCV 92.9  • 12/06/2019-Keytruda Cycle 10  • 12/27/2019- Cycle 11 LFTs normal, WBC 6.7, hemoglobin 11.4, platelets 168, MCV 93, TSH 2.4,  • 1/28/2020: CT chest abdomen and pelvis with contrast:  Single (aortic lymph node in the abdominal retroperitoneum has increased.  Rest of the retroperitoneal lymph nodes have decreased in size.  Mediastinal adenopathy appears  unchanged.  Noncalcified bilateral pulmonary nodules are stable  • 1/17/2020 patient received Keytruda cycle 12:.  • 2/3/2020 WBC 7.1, hemoglobin 12.2, platelets 171  • 02/10/2020 patient received cycle 13 of Keytruda: WBC 7.3, hemoglobin 12.1, platelets 166, creatinine 0.82  • 3/6/2020 patient is of Keytruda 200 mg, hemoglobin 10.8  • 3/27/2020 patient received Keytruda 200 mg, hemoglobin 11.4, TSH 1.95  • 4/17/2020 patient received Keytruda cycle 16 200 mg, WBC 6.1, hemoglobin 11.6, platelets 150  • 5/8/2020: Patient received cycle 17 Keytruda, WBC 6.3, hemoglobin 9.6, platelets 137, TSH 2.05, free T4 1.54, creatinine 0.93, LFTs normal,  • 5/29/2020: Patient received cycle 18 Keytruda, WBC 6.5, hemoglobin 11.7, platelets 129, creatinine 0.83, TSH 1.69  • 6/26/2020: Patient receiving Keytruda.   • 7/17/2020: TSH 2.06, WBC 5.7, hemoglobin 11.4, platelets 177, MCV 92.4, Keytruda 200 mg cycle 20  • 7/27/2020: CT chest abdomen pelvis with contrast: Stable findings in the chest abdomen pelvis since 1/28/2020.  Noncalcified bilateral pulmonary nodules are stable.  Stable retroperitoneal left perinephric adenopathy in the abdomen.  • 8/7/2020: Patient received cycle 21 of Keytruda  • 8/28/2020 patient is a cycle 22 of Keytruda.  WBC 6.02, hemoglobin 11.3, platelets 161  • 9/11/2020 WBC 6.9, hemoglobin 12.4, platelets 192  • 9/18/2020: Cycle 23 of Keytruda, TSH 1.19  • 10/9/2020: Cycle 24 of Keytruda, WBC 5.9, hemoglobin 11.9, platelets 178, creatinine 0.84, TSH 2.56, free T3 2.4, CMP normal  • 10/23/2020: WBC 7.34, hemoglobin 11.6, platelets 175  • 10/30/2020:.  Keytruda 200 mg  • 11/20/2020: Keytruda 200 mg  • 12/11/2020 Keytruda 200 mg.  WBC 5.5, hemoglobin 11.4, platelets 176, creatinine 0.8, LFTs normal, TSH 2.2, cortisol 8.3  • 12/10/2020: CT chest abdomen and pelvis with contrast: Previously seen left para-aortic lymph nodes within the abdomen are decreased in size.  No pathological lymphadenopathy.  No evidence of  residual malignancy..  Small noncalcified pulmonary nodules are stable.  No new nodules.  Stable mediastinal lymph nodes.  • 3/12/2021: WBC 6.7, hemoglobin 11.3, platelets 17  • 3/12/2021: CMP normal, TSH 2.9, cortisol 11.95, 1  • 3/15/2021: CT chest with contrast: Stable findings in the chest with postsurgical changes of esophageal resection and gastric pull-through.  Stable right lower lobe lung nodule measures 11 mm.  Stable size and appearance of the mediastinal and upper abdominal lymph nodes.  • 3/23/2021: Doppler of upper extremity: Normal.  • 3/24/2021: CT abdomen and pelvis with contrast: Postoperative changes.  Left periaortic nodes are stable.  • 4/12/2021: TSH 4.1, cortisol 12.4  • 6/22/2021: CMP normal,Cortisol 10.08,, WBC 5.8, hemoglobin 11.2, platelets 109, TSH 2.59  • 7/12/2021: CT scan of the chest abdomen pelvis with contrast: Dominant 10 x 10 left periaortic lymph node is stable since 3/22/2021.  Dominant lymph nodes in the right lower paratracheal and AP window distribution are stable since 3/15/2021.  No evidence of progressive adenopathy in the chest abdomen or pelvis.    • 01/11/2022 -PET/CT findings consistent with new cedric metastatic disease in the retroperitoneum.  2 new pathologically enlarged hypermetabolic retroperitoneal lymph nodes.  No convincing evidence of recurrent disease within the chest neck pelvis or skeleton.  • 1/20/2022 -CT-guided needle biopsy consistent with poorly differentiated carcinoma.  CDX2 positive consistent with metastases from known esophageal primary  • 2/9/2022 - Pembrolizumab complicated with fatigue, joint pains, nausea, diarrhea. Symptoms decrease in intensity over a week.  • 3/30/2022 -CT chest and pelvis with stable metastatic lymph nodes in the left periaortic retroperitoneum.  No evidence of disease progression or new lesions.  Stable lymphadenopathy in the distal paratracheal mediastinal area  • 4/13/2022 - Keytruda 200  • 5/4/2022 - Keytruda  200  • Continued keytruda  • 7/11/2022 - CT abdomen pelvis with decrease in size of left perioaortic lymph node. No other areas of disease.  • Continues to be on Keytruda  • 10/18/2022 - stable CT imaging  • 1/20/2023 - CT imaging with stable disease. No significant change.    Subjective:    Has had covid and has been on home o2, has had ongoing nausea.    Past Medical History:   Diagnosis Date   • B12 deficiency    • Blockage of coronary artery of heart (HCC)    • Depression    • DM (diabetes mellitus) (HCC)    • Dysphagia    • HTN (hypertension)    • Hyperlipidemia        Past Surgical History:   Procedure Laterality Date   • ABDOMINAL WALL ABSCESS INCISION AND DRAINAGE  10/22/2018    WITH DEBRIDEMENT  1.5CM X 1.5 CM X 1.5 CM    DR. PURI   • CATARACT EXTRACTION  2018   • COLONOSCOPY     • CORONARY ARTERY BYPASS GRAFT  2015   • ENDOSCOPY  12/20/2016   • ENDOSCOPY N/A 1/2/2023    Procedure: ESOPHAGOGASTRODUODENOSCOPY;  Surgeon: Esvin Morgan MD;  Location: [facility] ENDOSCOPY;  Service: Gastroenterology;  Laterality: N/A;  post: anastamosis stricture   • ESOPHAGECTOMY  05/04/2017    BROOKS PETTY ESOPHAGECTOMY, FEEDING JEJNOSTOMOY, ABDOMINAL AND MEDIASTINAL LYMPH NODE DISECTION, PEDICLED MUSCLE FLAP COVERAGE DR. PURI   • ESOPHAGOSCOPY / EGD  07/12/2017    WITH BALLOON DILATION   • ESOPHAGOSCOPY / EGD  07/26/2017    WITH BALLOON DILATION   • ESOPHAGOSCOPY / EGD  08/11/2017    WITH BALLOON DILATION   • ESOPHAGOSCOPY / EGD  08/28/2017    WITH BALLOON DILATION   • ESOPHAGOSCOPY / EGD  09/27/2017    WITH BALLOON DILATION   • LYMPH NODE BIOPSY  01/30/2019   • PACEMAKER IMPLANTATION  11/21/2016       Current Outpatient Medications:   •  clonazePAM (KlonoPIN) 0.5 MG tablet, Take 0.25 mg by mouth 2 (Two) Times a Day As Needed., Disp: , Rfl: 1  •  clonazePAM (KlonoPIN) 0.5 MG tablet, Take 0.125 mg by mouth Daily As Needed., Disp: , Rfl:   •  cyanocobalamin (VITAMIN B-12) 1000 MCG tablet, Take 1,000 mcg by mouth Daily.,  Disp: , Rfl:   •  folic acid (FOLVITE) 1 MG tablet, Take 1 mg by mouth Daily., Disp: , Rfl:   •  levothyroxine (Euthyrox) 50 MCG tablet, Take 1 tablet by mouth Daily., Disp: 90 tablet, Rfl: 0  •  metFORMIN ER (GLUCOPHAGE-XR) 500 MG 24 hr tablet, Take 1,000 mg by mouth 2 (Two) Times a Day., Disp: , Rfl:   •  metoprolol tartrate (LOPRESSOR) 25 MG tablet, Take 12.5 mg by mouth 2 (Two) Times a Day., Disp: , Rfl:   •  omeprazole (priLOSEC) 40 MG capsule, Take 1 capsule by mouth 2 (Two) Times a Day for 60 days., Disp: 60 capsule, Rfl: 1  •  ondansetron (ZOFRAN) 4 MG tablet, Take 4 mg by mouth Every 8 (Eight) Hours As Needed for Nausea or Vomiting., Disp: , Rfl:   •  pantoprazole (PROTONIX) 40 MG EC tablet, Take 40 mg by mouth Daily., Disp: , Rfl:   •  pravastatin (PRAVACHOL) 10 MG tablet, Take 10 mg by mouth Every Other Day., Disp: , Rfl:   •  saccharomyces boulardii (FLORASTOR) 250 MG capsule, Take 1 capsule by mouth 2 (Two) Times a Day for 60 days., Disp: 60 capsule, Rfl: 1    Allergies   Allergen Reactions   • Ace Inhibitors Unknown (See Comments)     Unknown reaction     • Heparin Other (See Comments)     Fever/chills, weakness in legs   • Sulfa Antibiotics Other (See Comments)     Mouth sores       Family History   Problem Relation Age of Onset   • Mental illness Mother    • Heart disease Mother    • Hypertension Father    • Cancer Father    • Heart disease Sister    • Heart disease Brother      Cancer-related family history includes Cancer in his father.    Social History     Tobacco Use   • Smoking status: Former     Packs/day: 2.00     Years: 50.00     Pack years: 100.00     Types: Cigarettes     Quit date: 2015     Years since quittin.6   • Smokeless tobacco: Former     Types: Chew     Quit date: 1989   Substance Use Topics   • Alcohol use: No   • Drug use: No     ROS:     Objective:    Vitals:    23 1158   BP: 149/66   Pulse: 88   Resp: 18   Temp: 97.8 °F (36.6 °C)   SpO2: 99%   Weight: 67.1  "kg (148 lb)   Height: 167.6 cm (66\")   PainSc: 0-No pain       (1) Restricted in physically strenuous activity, ambulatory and able to do work of light nature    Physical Exam:     Physical Exam   Constitutional: He is oriented to person, place, and time. He appears well-developed. No distress.   HENT:   Head: Normocephalic and atraumatic.   Mouth/Throat: Mucous membranes are moist.   Eyes: Pupils are equal, round, and reactive to light.   Neck: No thyromegaly present.   Cardiovascular: Normal rate, regular rhythm, normal heart sounds and normal pulses. Exam reveals no gallop and no friction rub.   Pulmonary/Chest: Effort normal. He has rhonchi.   Abdominal: Soft. Normal appearance and bowel sounds are normal.   No tenderness   Musculoskeletal: Normal range of motion. No deformity or signs of injury.      Right lower leg: No edema.   Neurological: He is alert and oriented to person, place, and time. He exhibits normal muscle tone.   Skin: Skin is warm and dry. No rash noted. He is not diaphoretic. No erythema. No jaundice.   Right side chest  port       Lab Results - Last 18 Months   Lab Units 01/30/23  1122 01/10/23  1108 01/02/23  2341   WBC 10*3/mm3 5.70 3.93 4.50   HEMOGLOBIN g/dL 9.6* 8.5* 8.8*   HEMATOCRIT % 30.4* 26.8* 26.8*   PLATELETS 10*3/mm3 192 299 134*   MCV fL 97.4* 100.0* 93.1     Lab Results - Last 18 Months   Lab Units 01/10/23  1108 01/02/23  2341 01/02/23  0005 01/01/23  1505 11/21/22  1004   SODIUM mmol/L 139 141 138 139 137   POTASSIUM mmol/L 4.4 5.2 5.2 5.4* 4.7   CHLORIDE mmol/L 103 106 103 105 105   CO2 mmol/L 24.0 26.0 27.0 25.0 23.0   BUN mg/dL 17 21 22 22 15   CREATININE mg/dL 0.75* 0.88 0.84 0.92 0.89   CALCIUM mg/dL 8.5* 8.4* 8.4* 8.7 9.2   BILIRUBIN mg/dL 0.2  --   --  0.3 0.4   ALK PHOS U/L 38*  --   --  51 37*   ALT (SGPT) U/L 8  --   --  21 5   AST (SGOT) U/L 16  --   --  47* 12   GLUCOSE mg/dL 91 76 112* 103* 134*     Assessment & Plan     Assessment:    Metastatic esophageal " carcinoma:    Patient has a history of GE junction poorly differentiated adenocarcinoma for which he had chemoradiation followed by [place] Gurwinder resection:  Pathology showed ypT3,pN1 disease.  Tumor was Stage IIIA, diagnosed in 2016.  He received concurrent chemoradiation with weekly Carboplatin and Taxol and radiation finishing on 3/15/17.  CT scan on 1/10/19 showed new retroperitoneal lymph nodes.  These lymph nodes were biopsied on 1/30/19 and it shows metastatic esophageal cancer.  [employer] Next Gen testing was performed on the sample and it shows KRAS mutation, microsatellite stable tumor.  PD-L1 is positive.  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  HER2/marvel (ERBB2) was not amplified and was negative.  His CT scan on 5/1/19 shows evidence of progression.  Patient has started receiving Keytruda on 5/31/19.  He has received 19 cycles so far.   CT scan on 1/28/2020 shows improvement and continued response.  CT scan chest abdomen pelvis with contrast on 12/10/2020 does not show any evidence of disease progression.  On 12/22/2020 decision made to hold Keytruda per patient's request to get a treatment break.. Repeat CT scan chest 7/12/2021 does not show any evidence of disease progression.  Now CT imaging in January 2021 with a CT showing increased abdominal left para-aortic adenopathy.  Subsequent PET/CT with new cedric metastatic disease in the retroperitoneum of the abdomen.  New pathologically enlarged hypermetabolic retroperitoneal lymph nodes.  This would be concerning for disease recurrence/progression.  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  I discussed with him the treatment options going forward.  He has had a good response with immunotherapy pembrolizumab in the past. This was  restarted. subsequent imaging with treatment response. Recent imaging with good response. Continue immunotherapy.     Shortness of breath  Improved, has occasional shortness of breath, likely copd  I have recommended using  albuterol  Now on home o2 with recent covid    Reflux/heartburn/dysphagia:   Symptoms improved.  Also has a history of strictures. Status post EGD and balloon dilation.  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  Continues on Synthroid monitor TSH and fT4 with treatment. Monitor     Mild diarrhea:  diarrhea seems to be waxing and waning in nature.  Related to immunotherapy, but not severe. improved    PD-L1 positive, HER2 negative, p53 and KRAS positive.  Repeat [facility] testing with repeat biopsy with no target mutations.    B12 deficiency: Continue B12 1000 mcg twice daily.    Anemia - Hb improved and stable.    F/u in 6 weeks        Jean Pierre Elizondo MD 01/30/23     No orders of the defined types were placed in this encounter.

## 2023-01-30 ENCOUNTER — LAB (OUTPATIENT)
Dept: LAB | Facility: HOSPITAL | Age: 73
End: 2023-01-30
Payer: MEDICARE

## 2023-01-30 ENCOUNTER — OFFICE VISIT (OUTPATIENT)
Dept: ONCOLOGY | Facility: CLINIC | Age: 73
End: 2023-01-30
Payer: MEDICARE

## 2023-01-30 VITALS
BODY MASS INDEX: 23.78 KG/M2 | HEIGHT: 66 IN | DIASTOLIC BLOOD PRESSURE: 66 MMHG | WEIGHT: 148 LBS | RESPIRATION RATE: 18 BRPM | OXYGEN SATURATION: 99 % | HEART RATE: 88 BPM | TEMPERATURE: 97.8 F | SYSTOLIC BLOOD PRESSURE: 149 MMHG

## 2023-01-30 DIAGNOSIS — C15.9 ESOPHAGEAL CANCER, STAGE IV: Primary | ICD-10-CM

## 2023-01-30 DIAGNOSIS — C15.9 ESOPHAGEAL CANCER, STAGE IV: ICD-10-CM

## 2023-01-30 DIAGNOSIS — C16.0 MALIGNANT NEOPLASM OF CARDIA: Primary | ICD-10-CM

## 2023-01-30 LAB
BASOPHILS # BLD AUTO: 0.04 10*3/MM3 (ref 0–0.2)
BASOPHILS NFR BLD AUTO: 0.7 % (ref 0–1.5)
DEPRECATED RDW RBC AUTO: 47.5 FL (ref 37–54)
EOSINOPHIL # BLD AUTO: 0.08 10*3/MM3 (ref 0–0.4)
EOSINOPHIL NFR BLD AUTO: 1.4 % (ref 0.3–6.2)
ERYTHROCYTE [DISTWIDTH] IN BLOOD BY AUTOMATED COUNT: 14.1 % (ref 12.3–15.4)
HCT VFR BLD AUTO: 30.4 % (ref 37.5–51)
HGB BLD-MCNC: 9.6 G/DL (ref 13–17.7)
HOLD SPECIMEN: NORMAL
HOLD SPECIMEN: NORMAL
LYMPHOCYTES # BLD AUTO: 1.53 10*3/MM3 (ref 0.7–3.1)
LYMPHOCYTES NFR BLD AUTO: 26.8 % (ref 19.6–45.3)
MCH RBC QN AUTO: 30.8 PG (ref 26.6–33)
MCHC RBC AUTO-ENTMCNC: 31.6 G/DL (ref 31.5–35.7)
MCV RBC AUTO: 97.4 FL (ref 79–97)
MONOCYTES # BLD AUTO: 0.7 10*3/MM3 (ref 0.1–0.9)
MONOCYTES NFR BLD AUTO: 12.3 % (ref 5–12)
NEUTROPHILS NFR BLD AUTO: 3.35 10*3/MM3 (ref 1.7–7)
NEUTROPHILS NFR BLD AUTO: 58.8 % (ref 42.7–76)
PLATELET # BLD AUTO: 192 10*3/MM3 (ref 140–450)
PMV BLD AUTO: 9.3 FL (ref 6–12)
RBC # BLD AUTO: 3.12 10*6/MM3 (ref 4.14–5.8)
WBC NRBC COR # BLD: 5.7 10*3/MM3 (ref 3.4–10.8)

## 2023-01-30 PROCEDURE — 36415 COLL VENOUS BLD VENIPUNCTURE: CPT

## 2023-01-30 PROCEDURE — 85025 COMPLETE CBC W/AUTO DIFF WBC: CPT

## 2023-01-30 PROCEDURE — 99214 OFFICE O/P EST MOD 30 MIN: CPT | Performed by: INTERNAL MEDICINE

## 2023-01-30 RX ORDER — PANTOPRAZOLE SODIUM 40 MG/1
40 TABLET, DELAYED RELEASE ORAL 2 TIMES DAILY
COMMUNITY

## 2023-02-08 NOTE — H&P
GI CONSULT  NOTE:    Referring Provider:    Emma Chanel BURNSDAT  [unfilled]    Chief complaint: Dysphagia    History of present illness:      Artie Mc is a 72 y.o. male who presents today for Procedure(s):  ESOPHAGOGASTRODUODENOSCOPY for the indications listed below.     The updated Patient Profile was reviewed prior to the procedure, in conjunction with the Physical Exam, including medical conditions, surgical procedures, medications, allergies, family history and social history.     Pre-operatively, I reviewed the indication(s) for the procedure, the risks of the procedure [including but not limited to: unexpected bleeding possibly requiring hospitalization and/or unplanned repeat procedures, perforation possibly requiring surgical treatment, missed lesions and complications of sedation/MAC (also explained by anesthesia staff)].     I have evaluated the patient for risks associated with the planned anesthesia and the procedure to be performed and find the patient an acceptable candidate for IV sedation.    Multiple opportunities were provided for any questions or concerns, and all questions were answered satisfactorily before any anesthesia was administered. We will proceed with the planned procedure.    Past Medical History:  Past Medical History:   Diagnosis Date   • B12 deficiency    • Blockage of coronary artery of heart (HCC)    • Depression    • DM (diabetes mellitus) (HCC)    • Dysphagia    • GERD (gastroesophageal reflux disease)    • HTN (hypertension)    • Hyperlipidemia        Past Surgical History:  Past Surgical History:   Procedure Laterality Date   • ABDOMINAL WALL ABSCESS INCISION AND DRAINAGE  10/22/2018    WITH DEBRIDEMENT  1.5CM X 1.5 CM X 1.5 CM    DR. PURI   • CATARACT EXTRACTION  2018   • COLONOSCOPY     • CORONARY ARTERY BYPASS GRAFT  2015   • ENDOSCOPY  12/20/2016   • ENDOSCOPY N/A 1/2/2023    Procedure: ESOPHAGOGASTRODUODENOSCOPY;  Surgeon: Esvin Morgan MD;   Location: [facility] ENDOSCOPY;  Service: Gastroenterology;  Laterality: N/A;  post: anastamosis stricture   • ESOPHAGECTOMY  2017    BROOKS PETTY ESOPHAGECTOMY, FEEDING JEJNOSTOMOY, ABDOMINAL AND MEDIASTINAL LYMPH NODE DISECTION, PEDICLED MUSCLE FLAP COVERAGE DR. PURI   • ESOPHAGOSCOPY / EGD  2017    WITH BALLOON DILATION   • ESOPHAGOSCOPY / EGD  2017    WITH BALLOON DILATION   • ESOPHAGOSCOPY / EGD  2017    WITH BALLOON DILATION   • ESOPHAGOSCOPY / EGD  2017    WITH BALLOON DILATION   • ESOPHAGOSCOPY / EGD  2017    WITH BALLOON DILATION   • LYMPH NODE BIOPSY  2019   • PACEMAKER IMPLANTATION  2016       Social History:  Social History     Tobacco Use   • Smoking status: Former     Packs/day: 2.00     Years: 50.00     Pack years: 100.00     Types: Cigarettes     Quit date: 2015     Years since quittin.6   • Smokeless tobacco: Former     Types: Chew     Quit date: 1989   Vaping Use   • Vaping Use: Never used   Substance Use Topics   • Alcohol use: No   • Drug use: No       Family History:  Family History   Problem Relation Age of Onset   • Mental illness Mother    • Heart disease Mother    • Hypertension Father    • Cancer Father    • Heart disease Sister    • Heart disease Brother        Medications:  No medications prior to admission.       Scheduled Meds:  Continuous Infusions:No current facility-administered medications for this encounter.    PRN Meds:.    ALLERGIES:  Ace inhibitors, Heparin, and Sulfa antibiotics    ROS:  The following systems were reviewed and negative;   Constitution:  No fevers, chills, no unintentional weight loss  Skin: no rash, no jaundice  Eyes:  No blurry vision, no eye pain  HENT:  No change in hearing or smell  Resp:  No dyspnea or cough  CV:  No chest pain or palpitations  :  No dysuria, hematuria  Musculoskeletal:  No leg cramps or arthralgias  Neuro:  No tremor, no numbness  Psych:  No depression or confusion    Objective  "    Vital Signs:   Vitals:    01/31/23 1112   Weight: 67.1 kg (148 lb)   Height: 167.6 cm (66\")       Physical Exam:       General Appearance:    Awake and alert, in no acute distress   Head:    Normocephalic, without obvious abnormality, atraumatic   Throat:   No oral lesions, no thrush, oral mucosa moist   Lungs:     respirations regular, even and unlabored   Skin:   No rash, no jaundice       Results Review:  Lab Results (last 24 hours)     ** No results found for the last 24 hours. **          Imaging Results (Last 24 Hours)     ** No results found for the last 24 hours. **           I reviewed the patient's labs and imaging.    ASSESSMENT AND PLAN:      Principal Problem:    Dysphagia       Procedure(s):  ESOPHAGOGASTRODUODENOSCOPY      I discussed the patients findings and my recommendations with the patient.    Electronically signed by Tino Villafana MD, 02/08/23, 6:38 PM EST.            "

## 2023-02-09 ENCOUNTER — ANESTHESIA EVENT (OUTPATIENT)
Dept: GASTROENTEROLOGY | Facility: HOSPITAL | Age: 73
End: 2023-02-09
Payer: MEDICARE

## 2023-02-09 ENCOUNTER — HOSPITAL ENCOUNTER (OUTPATIENT)
Facility: HOSPITAL | Age: 73
Setting detail: HOSPITAL OUTPATIENT SURGERY
Discharge: HOME | End: 2023-02-09
Attending: INTERNAL MEDICINE | Admitting: INTERNAL MEDICINE
Payer: MEDICARE

## 2023-02-09 ENCOUNTER — ON CAMPUS - OUTPATIENT (AMBULATORY)
Dept: URBAN - METROPOLITAN AREA HOSPITAL 85 | Facility: HOSPITAL | Age: 73
End: 2023-02-09
Payer: COMMERCIAL

## 2023-02-09 ENCOUNTER — ANESTHESIA (OUTPATIENT)
Dept: GASTROENTEROLOGY | Facility: HOSPITAL | Age: 73
End: 2023-02-09
Payer: MEDICARE

## 2023-02-09 VITALS — SYSTOLIC BLOOD PRESSURE: 102 MMHG | OXYGEN SATURATION: 100 % | HEART RATE: 77 BPM | DIASTOLIC BLOOD PRESSURE: 50 MMHG

## 2023-02-09 VITALS
OXYGEN SATURATION: 94 % | HEART RATE: 70 BPM | RESPIRATION RATE: 12 BRPM | WEIGHT: 148 LBS | DIASTOLIC BLOOD PRESSURE: 54 MMHG | HEIGHT: 66 IN | BODY MASS INDEX: 23.78 KG/M2 | SYSTOLIC BLOOD PRESSURE: 118 MMHG

## 2023-02-09 DIAGNOSIS — K91.89 OTHER POSTPROCEDURAL COMPLICATIONS AND DISORDERS OF DIGESTIV: ICD-10-CM

## 2023-02-09 DIAGNOSIS — Z98.890 OTHER SPECIFIED POSTPROCEDURAL STATES: ICD-10-CM

## 2023-02-09 DIAGNOSIS — K20.80 OTHER ESOPHAGITIS WITHOUT BLEEDING: ICD-10-CM

## 2023-02-09 DIAGNOSIS — Z85.01 PERSONAL HISTORY OF MALIGNANT NEOPLASM OF ESOPHAGUS: ICD-10-CM

## 2023-02-09 DIAGNOSIS — Z90.49 ACQUIRED ABSENCE OF OTHER SPECIFIED PARTS OF DIGESTIVE TRACT: ICD-10-CM

## 2023-02-09 LAB — GLUCOSE BLDC GLUCOMTR-MCNC: 93 MG/DL (ref 70–105)

## 2023-02-09 PROCEDURE — C1726 CATH, BAL DIL, NON-VASCULAR: HCPCS | Performed by: INTERNAL MEDICINE

## 2023-02-09 PROCEDURE — 25010000002 PROPOFOL 200 MG/20ML EMULSION: Performed by: NURSE ANESTHETIST, CERTIFIED REGISTERED

## 2023-02-09 PROCEDURE — 82962 GLUCOSE BLOOD TEST: CPT

## 2023-02-09 PROCEDURE — 43249 ESOPH EGD DILATION <30 MM: CPT | Performed by: INTERNAL MEDICINE

## 2023-02-09 RX ORDER — LIDOCAINE HYDROCHLORIDE 20 MG/ML
INJECTION, SOLUTION EPIDURAL; INFILTRATION; INTRACAUDAL; PERINEURAL AS NEEDED
Status: DISCONTINUED | OUTPATIENT
Start: 2023-02-09 | End: 2023-02-09 | Stop reason: SURG

## 2023-02-09 RX ORDER — PROPOFOL 10 MG/ML
INJECTION, EMULSION INTRAVENOUS AS NEEDED
Status: DISCONTINUED | OUTPATIENT
Start: 2023-02-09 | End: 2023-02-09 | Stop reason: SURG

## 2023-02-09 RX ORDER — ONDANSETRON 2 MG/ML
4 INJECTION INTRAMUSCULAR; INTRAVENOUS ONCE AS NEEDED
Status: DISCONTINUED | OUTPATIENT
Start: 2023-02-09 | End: 2023-02-09 | Stop reason: HOSPADM

## 2023-02-09 RX ORDER — SODIUM CHLORIDE 9 MG/ML
INJECTION, SOLUTION INTRAVENOUS CONTINUOUS PRN
Status: DISCONTINUED | OUTPATIENT
Start: 2023-02-09 | End: 2023-02-09 | Stop reason: SURG

## 2023-02-09 RX ADMIN — PROPOFOL 30 MG: 10 INJECTION, EMULSION INTRAVENOUS at 13:18

## 2023-02-09 RX ADMIN — SODIUM CHLORIDE: 0.9 INJECTION, SOLUTION INTRAVENOUS at 13:11

## 2023-02-09 RX ADMIN — LIDOCAINE HYDROCHLORIDE 100 MG: 20 INJECTION, SOLUTION EPIDURAL; INFILTRATION; INTRACAUDAL; PERINEURAL at 13:15

## 2023-02-09 RX ADMIN — PROPOFOL 80 MG: 10 INJECTION, EMULSION INTRAVENOUS at 13:15

## 2023-02-09 NOTE — ANESTHESIA PREPROCEDURE EVALUATION
Anesthesia Evaluation     Patient summary reviewed and Nursing notes reviewed   no history of anesthetic complications:  NPO Solid Status: > 8 hours  NPO Liquid Status: > 8 hours           Airway   Mallampati: II  Dental    (+) edentulous    Pulmonary    (+) a smoker Former,   Cardiovascular     (+) pacemaker pacemaker interrogated unknown, hypertension, CAD, CABG >6 Months, hyperlipidemia,       Neuro/Psych  (+) seizures, dizziness/light headedness, syncope, psychiatric history Depression and Anxiety,    GI/Hepatic/Renal/Endo    (+)  GERD, PUD, GI bleeding , diabetes mellitus type 2 well controlled, thyroid problem hypothyroidism    Musculoskeletal     Abdominal    Substance History      OB/GYN          Other                        Anesthesia Plan    ASA 3     general     (For EGD  Erosive esophagitis, last examined 1/2023  Remote hx of cardia resection for esophageal CA 4-5 years ago  CABG 7 years ago    No new complaints    Plan GA)  intravenous induction     Anesthetic plan, risks, benefits, and alternatives have been provided, discussed and informed consent has been obtained with: patient.        CODE STATUS:

## 2023-02-09 NOTE — ANESTHESIA POSTPROCEDURE EVALUATION
Patient: Artie Mc    Procedure Summary     Date: 02/09/23 Room / Location: [facility] ENDOSCOPY 1 / [facility] ENDOSCOPY    Anesthesia Start: 1311 Anesthesia Stop: 1327    Procedure: ESOPHAGOGASTRODUODENOSCOPY, non-wire guided balloon dilation (12-14mm) of esophageal stricture Diagnosis:       Dysphagia      Hematemesis with nausea      (Dysphagia [R13.10])      (Hematemesis with nausea [K92.0])    Surgeons: Tino Villafana MD Provider: Roro Denise DO    Anesthesia Type: general ASA Status: 3          Anesthesia Type: general    Vitals  Vitals Value Taken Time   /54 02/09/23 1345   Temp     Pulse 70 02/09/23 1345   Resp 12 02/09/23 1345   SpO2 94 % 02/09/23 1345           Post Anesthesia Care and Evaluation    Patient location during evaluation: PHASE II  Patient participation: complete - patient participated  Level of consciousness: awake  Pain management: adequate    Airway patency: patent  Anesthetic complications: No anesthetic complications  PONV Status: none  Cardiovascular status: acceptable  Respiratory status: acceptable  Hydration status: acceptable  No anesthesia care post op

## 2023-02-09 NOTE — DISCHARGE INSTRUCTIONS
A responsible adult should stay with you and you should rest quietly for the rest of the day.    Do not drink alcohol, drive operate any heavy machinery or power tools or make any legal/important decisions for the next 24 hours.    Progress your diet as tolerated.  If you begin to experience severe pain, increased shortness of breath, racing heartbeat or a fever above 101F, seek immediate medical attention.     Follow up with MD as instructed.  Call office for results in 3 to 5 days if needed.    Impression:  72-year-old male with a history of esophageal cancer status post esophagectomy with recurrent anastomotic stricture and erosive esophagitis.  EGD shows resolution of esophagitis and balloon dilation of the stricture was performed to 14 mm.     Recommendations:  Monitor for postoperative complications  Continue PPI daily  Follow-up with GI nurse practitioner in 2 to 3 months  Repeat EGD as needed for dysphagia     We appreciate the referral     Electronically signed by Tino Villafana MD, 02/09/23, 1:29 PM EST.

## 2023-02-09 NOTE — OP NOTE
ESOPHAGOGASTRODUODENOSCOPY Procedure Report    Patient Name:  Artie Mc  YOB: 1950    Date of Surgery:  2/9/2023     Preoperative diagnosis:  Anastomotic stricture  Erosive esophagitis  History of esophageal cancer status post esophagectomy    Postoperative diagnosis:  Complete resolution of his erosive esophagitis  Status post [place] Gurwinder esophagectomy  Anastomotic stricture  Normal stomach and duodenum        Procedure(s):  ESOPHAGOGASTRODUODENOSCOPY, non-wire guided balloon dilation (12-14mm) of esophageal stricture    Staff:  Surgeon(s):  Tino Villafana MD      Anesthesia: Monitored Anesthesia Care    Implants:    Nothing was implanted during the procedure    Specimen:        See Below    Estimated blood loss: Minimal     Complications:  None    Description of Procedure:  Informed consent was obtained for the procedure, including sedation.  Risks of perforation, hemorrhage, adverse drug reaction and aspiration were discussed.  The patient was brought into the endoscopy suite. Continuous cardiopulmonary monitoring was performed. The patient was placed in the left lateral decubitus position.  The bite block was inserted into the patient's mouth. After adequate sedation was attained, the Olympus gastroscope was inserted into the patient's mouth and advanced to the second portion of the duodenum without difficulty.  Circumferential examination was performed. A retroflex exam was performed in the patient's stomach.  On completion of the exam, the bowel was decompressed, the scope was removed from the patient, the patient tolerated the procedure well, there were no immediate post-operative complications.     Examination of the esophagus: Evidence of [place] Gurwinder esophagectomy with anastomotic stricture measuring 10 mm in diameter and 5 mm in length.  There was no evidence of tumor recurrence and esophagitis had resolved.  A through-the-scope balloon was inflated across the stricture up to  12 mm, then 13.5, then finally 14 mm and held for 30 seconds.  The balloon was deflated and showed effective dilation.  Examination of the stomach: Normal mucosa  Examination of the duodenum: Normal to second portion of duodenum    Impression:  72-year-old male with a history of esophageal cancer status post esophagectomy with recurrent anastomotic stricture and erosive esophagitis.  EGD shows resolution of esophagitis and balloon dilation of the stricture was performed to 14 mm.    Recommendations:  Monitor for postoperative complications  Continue PPI daily  Follow-up with GI nurse practitioner in 2 to 3 months  Repeat EGD as needed for dysphagia    We appreciate the referral    Electronically signed by Tino Villafana MD, 02/09/23, 1:29 PM EST.

## 2023-02-21 ENCOUNTER — OFFICE VISIT (OUTPATIENT)
Dept: ONCOLOGY | Facility: CLINIC | Age: 73
End: 2023-02-21
Payer: MEDICARE

## 2023-02-21 ENCOUNTER — HOSPITAL ENCOUNTER (OUTPATIENT)
Dept: ONCOLOGY | Facility: HOSPITAL | Age: 73
Discharge: HOME | End: 2023-02-21
Admitting: INTERNAL MEDICINE
Payer: MEDICARE

## 2023-02-21 VITALS
OXYGEN SATURATION: 97 % | TEMPERATURE: 97.4 F | HEIGHT: 66 IN | BODY MASS INDEX: 23.46 KG/M2 | HEART RATE: 71 BPM | RESPIRATION RATE: 16 BRPM | DIASTOLIC BLOOD PRESSURE: 69 MMHG | WEIGHT: 146 LBS | SYSTOLIC BLOOD PRESSURE: 145 MMHG

## 2023-02-21 VITALS
BODY MASS INDEX: 23.46 KG/M2 | DIASTOLIC BLOOD PRESSURE: 69 MMHG | WEIGHT: 146 LBS | RESPIRATION RATE: 16 BRPM | HEART RATE: 71 BPM | SYSTOLIC BLOOD PRESSURE: 145 MMHG | TEMPERATURE: 97.4 F | OXYGEN SATURATION: 97 % | HEIGHT: 66 IN

## 2023-02-21 DIAGNOSIS — C15.9 ESOPHAGEAL CANCER, STAGE IV: Primary | ICD-10-CM

## 2023-02-21 DIAGNOSIS — E03.9 HYPOTHYROIDISM (ACQUIRED): ICD-10-CM

## 2023-02-21 DIAGNOSIS — E61.1 IRON DEFICIENCY: ICD-10-CM

## 2023-02-21 DIAGNOSIS — C16.0 MALIGNANT NEOPLASM OF CARDIA: Primary | ICD-10-CM

## 2023-02-21 DIAGNOSIS — Z51.81 ENCOUNTER FOR THERAPEUTIC DRUG MONITORING: ICD-10-CM

## 2023-02-21 DIAGNOSIS — Z51.81 ENCOUNTER FOR THERAPEUTIC DRUG MONITORING: Primary | ICD-10-CM

## 2023-02-21 DIAGNOSIS — Z51.11 ENCOUNTER FOR ANTINEOPLASTIC CHEMOTHERAPY: ICD-10-CM

## 2023-02-21 DIAGNOSIS — D64.9 ANEMIA, UNSPECIFIED TYPE: ICD-10-CM

## 2023-02-21 DIAGNOSIS — C16.0 MALIGNANT NEOPLASM OF CARDIA: ICD-10-CM

## 2023-02-21 LAB
ALBUMIN SERPL-MCNC: 3.6 G/DL (ref 3.5–5.2)
ALBUMIN/GLOB SERPL: 1.4 G/DL
ALP SERPL-CCNC: 45 U/L (ref 39–117)
ALT SERPL W P-5'-P-CCNC: 7 U/L (ref 1–41)
ANION GAP SERPL CALCULATED.3IONS-SCNC: 13 MMOL/L (ref 5–15)
AST SERPL-CCNC: 17 U/L (ref 1–40)
BASOPHILS # BLD AUTO: 0.02 10*3/MM3 (ref 0–0.2)
BASOPHILS NFR BLD AUTO: 0.4 % (ref 0–1.5)
BILIRUB SERPL-MCNC: 0.2 MG/DL (ref 0–1.2)
BUN SERPL-MCNC: 12 MG/DL (ref 8–23)
BUN/CREAT SERPL: 15.6 (ref 7–25)
CALCIUM SPEC-SCNC: 8.8 MG/DL (ref 8.6–10.5)
CHLORIDE SERPL-SCNC: 103 MMOL/L (ref 98–107)
CO2 SERPL-SCNC: 20 MMOL/L (ref 22–29)
CREAT SERPL-MCNC: 0.77 MG/DL (ref 0.76–1.27)
DEPRECATED RDW RBC AUTO: 44.1 FL (ref 37–54)
EGFRCR SERPLBLD CKD-EPI 2021: 94.5 ML/MIN/1.73
EOSINOPHIL # BLD AUTO: 0.12 10*3/MM3 (ref 0–0.4)
EOSINOPHIL NFR BLD AUTO: 2.2 % (ref 0.3–6.2)
ERYTHROCYTE [DISTWIDTH] IN BLOOD BY AUTOMATED COUNT: 13.8 % (ref 12.3–15.4)
GLOBULIN UR ELPH-MCNC: 2.5 GM/DL
GLUCOSE BLDC GLUCOMTR-MCNC: 162 MG/DL (ref 70–105)
GLUCOSE SERPL-MCNC: 164 MG/DL (ref 65–99)
HCT VFR BLD AUTO: 31.1 % (ref 37.5–51)
HGB BLD-MCNC: 10.1 G/DL (ref 13–17.7)
LYMPHOCYTES # BLD AUTO: 1.52 10*3/MM3 (ref 0.7–3.1)
LYMPHOCYTES NFR BLD AUTO: 27.9 % (ref 19.6–45.3)
MCH RBC QN AUTO: 29.9 PG (ref 26.6–33)
MCHC RBC AUTO-ENTMCNC: 32.5 G/DL (ref 31.5–35.7)
MCV RBC AUTO: 92 FL (ref 79–97)
MONOCYTES # BLD AUTO: 0.69 10*3/MM3 (ref 0.1–0.9)
MONOCYTES NFR BLD AUTO: 12.7 % (ref 5–12)
NEUTROPHILS NFR BLD AUTO: 3.1 10*3/MM3 (ref 1.7–7)
NEUTROPHILS NFR BLD AUTO: 56.8 % (ref 42.7–76)
PLATELET # BLD AUTO: 224 10*3/MM3 (ref 140–450)
PMV BLD AUTO: 10 FL (ref 6–12)
POTASSIUM SERPL-SCNC: 4.2 MMOL/L (ref 3.5–5.2)
PROT SERPL-MCNC: 6.1 G/DL (ref 6–8.5)
RBC # BLD AUTO: 3.38 10*6/MM3 (ref 4.14–5.8)
SODIUM SERPL-SCNC: 136 MMOL/L (ref 136–145)
T4 SERPL-MCNC: 6.88 MCG/DL (ref 4.5–11.7)
TSH SERPL DL<=0.05 MIU/L-ACNC: 2.96 UIU/ML (ref 0.27–4.2)
WBC NRBC COR # BLD: 5.45 10*3/MM3 (ref 3.4–10.8)

## 2023-02-21 PROCEDURE — 82962 GLUCOSE BLOOD TEST: CPT

## 2023-02-21 PROCEDURE — 99214 OFFICE O/P EST MOD 30 MIN: CPT | Performed by: INTERNAL MEDICINE

## 2023-02-21 PROCEDURE — 25010000002 PEMBROLIZUMAB 100 MG/4ML SOLUTION 4 ML VIAL: Performed by: INTERNAL MEDICINE

## 2023-02-21 PROCEDURE — 80053 COMPREHEN METABOLIC PANEL: CPT | Performed by: INTERNAL MEDICINE

## 2023-02-21 PROCEDURE — 85025 COMPLETE CBC W/AUTO DIFF WBC: CPT | Performed by: INTERNAL MEDICINE

## 2023-02-21 PROCEDURE — 84436 ASSAY OF TOTAL THYROXINE: CPT | Performed by: INTERNAL MEDICINE

## 2023-02-21 PROCEDURE — 96413 CHEMO IV INFUSION 1 HR: CPT

## 2023-02-21 PROCEDURE — 84443 ASSAY THYROID STIM HORMONE: CPT | Performed by: INTERNAL MEDICINE

## 2023-02-21 RX ORDER — SODIUM CHLORIDE 9 MG/ML
250 INJECTION, SOLUTION INTRAVENOUS ONCE
Status: COMPLETED | OUTPATIENT
Start: 2023-02-21 | End: 2023-02-21

## 2023-02-21 RX ORDER — SODIUM CHLORIDE 9 MG/ML
250 INJECTION, SOLUTION INTRAVENOUS ONCE
Status: CANCELLED | OUTPATIENT
Start: 2023-02-21

## 2023-02-21 RX ADMIN — SODIUM CHLORIDE 400 MG: 9 INJECTION, SOLUTION INTRAVENOUS at 10:38

## 2023-02-21 RX ADMIN — SODIUM CHLORIDE 250 ML: 9 INJECTION, SOLUTION INTRAVENOUS at 10:40

## 2023-02-21 NOTE — PROGRESS NOTES
Pt to the clinic for Keytruda and MD visit.  Port accessed and flushed with good blood return noted. 10cc of blood wasted prior to specimen collection. Blood specimen obtained and sent to lab for processing per protocol. Treatment given and tolerated well.  Port flushed with saline prior to needle removal.

## 2023-02-21 NOTE — PROGRESS NOTES
Hematology-Oncology Follow-up Note       Artie Mc  1950    Primary Care Physician: Chanel Carter APRN  Referring Physician: Chanel Carter APRN    Reason For Visit:  Chief Complaint   Patient presents with   • Follow-up     Esophageal cancer, stage IV (HCC)     Metastatic esophageal cancer  Monitoring for adverse effects related to immunotherapy.  Hypothyroidism    HPI:   Mr. Mc is a pleasant 72 y.o. gentleman with a history of gastroesophageal reflux disease, diabetes, hypertension, heart block status post pacemaker placement and history of CABG.  He was having symptoms of dysphagia, weight loss since September 2016. He was reporting symptoms of food getting stuck in the lower chest.  He has transitioned himself to a liquid diet.  The patient reports losing about 30 pounds over this duration.  The patient underwent upper GI endoscopy on 12/20/16 with Dr. Esvin Barrera.  Endoscopy showed necrotic, circumferential and moderately obstructive mass at the distal esophagus between 42 cm and 36 cm.      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  There was also evidence of mild subacute inflammation in the duodenum.  Dr. Barrera ordered a CT scan of the chest and abdomen with contrast on 12/20/16.  The scan was done at [facility].  The scan showed a new ill-defined mass in the anterior wall of the distal esophagus at the GE junction measuring 2.3 x 2.2 x 1.6 cm, worrisome for cancer.  There were four subcentimeter liver lesions which had appeared stable in comparison to another CT scan from 2009 and they likely represent small cysts.  There was also mild lymphadenopathy in the gastrohepatic ligament.  There were at least six borderline enlarged lymph nodes measuring up to 1.3 x 1 cm in the region.  The patient is referred to us for further oncological evaluation.    1/5/2017 - The patient presents for  initial consultation.  He reports persistent dysphagia and also has symptoms of regurgitation.  He cannot tolerate solid foods and is dependent on liquid diet such as Ensure.  He also reports fatigue. Symptoms of dysphagia have been present for the past four months.  When he eats any solids, the food gets stuck in the lower chest.    • 1/5/17 - Vitamin B12 200 (L).  CEA 0.8.  Ferritin 35.  Iron saturation 16% (L), serum iron 56, TIBC 354.  Creatinine 0.9.  LFTs normal.  Folate 10.2.  • 1/12/17 - PET/CT scan:  Intense abnormal activity corresponding to the region of the GE junction and proximal stomach.  SUV is 11.06.  There is a suggestion of an abnormal mass or abnormal wall thickening in the region measuring 3.9 x 4.8 cm.  No additional abnormalities.  No evidence of metastases or other lymphadenopathy.  Scattered nonspecific subcentimeter lymph nodes involving the mediastinum and within the central upper abdomen.  • 1/13/17 - Creatinine 0.9.  LFTs normal.    • 1/17/17 - Patient seen by thoracic surgeon, Dr. Bradley.  PET scan was reviewed.  He has recommended neoadjuvant chemoradiation therapy followed by Galloway Gurwinder esophagogastrectomy.  Port-A-Cath is being arranged.    • 1/20/17 - WBC 6.3, hemoglobin 12.1, platelet count 198,000, MCV 86.4.  • 1/30/17 - Patient started weekly Carboplatin and Taxol (Carboplatin AUC 2 and Taxol 50 mg/M2).  Patient received Injectafer 750 mg.  WBC 5.8, hemoglobin 12.4, platelet count 244,000.   • 1/31/17 - Patient seen by Dr. Eduardo Oleary.  The plan was to give 5040 cGy in 28 fractions.   • 2/6/17 - WBC 4.4, hemoglobin 11.6, platelet count 201,000.  Patient received cycle 2 of weekly Carboplatin and Taxol (Carboplatin AUC 2 and Taxol 50 mg/M2).  • 2/13/17 - Patient received Carboplatin and Taxol weekly cycle 3.  • 2/13/17 - Patient started concurrent radiation therapy.  Total planned dose is 4140 cGy.    • 2/13/17 to 2/15/17 - Patient received 4140 cGy of neoadjuvant radiation.   Radiation was finished on 3/15/17.    • 2/20/17 - WBC 1.8, hemoglobin 10.6, platelet count 217,000.  Creatinine 0.5. Folate 15 (L).  Ferritin 361.  Haptoglobin 214.  Iron saturation 33%, TIBC 263, serum iron 88.    • 2/20/17 - Patient received Carboplatin and Taxol weekly cycle 4.   • 2/27/17 - WBC 3.7, hemoglobin 10.7, platelet count 177,000, MCV 85.3.    • 3/6/17 - Patient has received 16 out of the 23 planned radiation treatments.  Total planned dose is 4140 cGy.    • 3/6/17 - WBC 5.4, hemoglobin 11.2, platelet count 113,000.  • 3/6/17 - Patient received cycle 5 of weekly Carboplatin and Taxol.   • 3/13/17 - Patient received cycle 6 of weekly Carboplatin and Taxol.  WBC 3.9, hemoglobin 11.3, platelet count 93,000.     • 3/20/17 - WBC 1.4, hemoglobin 10.0, platelet count 118,000, MCV 86.1   • 3/27/17 - WBC 3.8, hemoglobin 9.5, platelet count 176,000, MCV 87.3.    • 4/7/17 - Stress test:  No evidence of reversible myocardial ischemia.  Normal left ventricular ejection fraction of 50%.    • 4/17/17 - Upper GI endoscopy by Dr. 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  The GE junction adenocarcinoma lesion seems to have a very good response to chemotherapy and radiation.  The lumen was open.  • 5/1/17 - WBC 7.0, hemoglobin 10.2, platelet count 175,000.    • 5/4/17 - Patient underwent Noel Gurwinder esophagectomy with pyloroplasty, feeding jejunostomy tube, abdominal and mediastinal lymph node dissection.    Surgical Pathology:  Moderately to poorly differentiated adenocarcinoma measuring 1.3 cm at the GE junction.  Resection margins are negative for malignancy.  Tumor margins are negative.  There is effective treatment response.  No evidence of lymphovascular invasion.  Staging is ypT3,pN1.  One out of fourteen lymph nodes involved.   • 5/22/17 - WBC 6.8, hemoglobin 10.3, platelet count 312,000.    • 6/19/17 - WBC 5.7, hemoglobin 10.5, platelet count 204,000, MCV 92.1.     • 7/6/17 - EGD:  Status post balloon dilation of esophageal stricture.  • 7/12/17 - EGD with balloon dilation of esophageal stricture.  • 7/26/17 - EGD and balloon dilation of esophageal stricture.  • 7/31/17 - PET/CT scan:  There is mild metabolic activity seen at the thoracic esophagus just at and below the surgical margins.  Some mild wall thickening.  This could be from recent surgery.  A residual cancer seems unlikely.  There is a precarinal lymph node with mild metabolic activity with SUV of 3 measuring 1 x 1.3 cm.  Again this appears to be nonspecific in my opinion.    • 8/7/17 - WBC 5.9, hemoglobin 11.9, platelet count 171,000, MCV 88.7.    • 10/19/17 - WBC 7.5, hemoglobin 11.8, platelet count 216,000.    • 1/8/18 - PET scan:  No evidence of residual disease or recurrent disease.  There is an esophageal stent in the mid esophagus with a large debris air level.  No evidence of any metastases in the chest, abdomen or pelvis.  Mild nodule uptake in the vocal cords with fullness in the right vocal cord.  This could be physiologic.    • 7/9/18 - CT scan of chest with contrast:  Prominent mediastinal lymph nodes appear stable since February.  Changes of gastric pull-through procedure for esophageal cancer again noted.  Tree-in-bud infiltrates in the left lung base, consistent with infection versus inflammation.    • 1/10/19 - CT chest, abdomen and pelvis with contrast:  No evidence of mets in the chest; however, interval development of metastatic lymphadenopathy in the left side of the retroperitoneum.  For instance, there is a 17 mm rounded lymph node, 10 mm lymph node and also a 14 mm lymph node.  These are all new.  Stable 9 mm hypodense lesion within the right hepatic lobe, which could reflect hemangioma or complex cyst.    • 1/30/19 - CT-guided core biopsy of retroperitoneal lymph node:  Poorly differentiated adenocarcinoma consistent with metastases from patient’s known esophageal primary.  CK20 positive,  CDX2 positive, cytokeratin 7 negative, TTF-1 negative.    • 2/1/19 - Creatinine 0.9, BUN 18, calcium 9.3; these are normal.    • 2/28/19 - [facility] Next Gen sequencing testing on retroperitoneal lymph node specimen:  PD-L1 positive.  CPS = 3.  This implies responsiveness to pembrolizumab.  Mismatch repair status is proficient (no evidence of microsatellite instability).  Tumor mutational burden is low = 6 mutations/Mb.  CDH1 indeterminate.  KRAS mutation positive.  TP53 mutation positive.  CDK6 amplified.  [employer] PD-L1 testing by IHC:  PD-L1 expression 1% positive (CPS =1).  HER2/marvel by IHC is negative.  HER2/marvel by CISH not amplified.  Microsatellite stable tumor.    • 3/7/19 - WBC 7.3, hemoglobin 12, platelet count 128,000, MCV 92.     • 3/14/19 - Patient was seen by radiation oncologist, Dr. Chahal.  He has recommended observation versus systemic chemoimmunotherapy as needed.  No plans for radiation therapy.   • 5/1/19 - CT scan of chest with contrast:  No definitive findings of new metastatic disease in the chest.  Emphysema noted.    • 5/1/19 - CT abdomen and pelvis:  There is interval progression of metastatic retroperitoneal adenopathy.  Left periaortic adenopathy with lymph node measuring up to 2 cm, previously 1.7 cm.  Another lymph node now measures 2.3, previously 1.4 cm.  New enlarged lymph node on image 147 measures 1.4 cm, previously 0.4 cm.  A 9 mm hypodense right hepatic lobe lesion appears stable.    • 5/9/19 - Decision made to start patient on immunotherapy Keytruda.    • 5/9/19 - Vitamin B12 229.  Ferritin 61.  Folate 14.3.  Iron saturation 26%, TIBC 304, serum iron 79.  • 5/31/19 - Patient received Keytruda 200 mg cycle 1, day 1.    • 5/31/19 - Free T4 0.99.  Creatinine 0.9, BUN 9.  LFTs normal.  TSH 3.99.  Folate 14.3.  Iron saturation 26%.    • 6/21/2019 patient received Keytruda cycle 2  • 7/12/2019: Patient received Keytruda cycle 3  • 8/2/2019 patient received cycle 4 Keytruda WBC 7.2,  hemoglobin 11.8 platelet count 163  • 8/23/2019 creatinine 0.9, LFTs normal, WBC 6.4, hemoglobin 11.5, platelets 179, TSH 2.1   • 8/23/2019 patient received cycle 5 of Keytruda  • 9/3/2019 CT chest abdomen and pelvis with contrast: . Positive response to therapy in the abdomen since 05/01/2019. Pathologically enlarged retroperitoneal lymph nodes, predominantly in the left periaortic region, have diminished in size.a 1.3 x 2.1 cm left periaortic node (image 60), previously measured 3.5 x 2.3 cm. A 1.6 cm index node near the level of the left renal vein previously measured 2.0 cm No new or progressive adenopathy. 2. Stable findings in the chest. Noncalcified right upper lobe nodules are unchanged. There is no evidence of new or progressive metastatic disease within the chest. 3. 8 mm indeterminate low-density lesion in the right hepatic lobe, is stable. No new liver lesions.  • 9/6/2019 WBC 5.6, hemoglobin 11.2, platelets of 192, MCV 91.8  • 9/13/2019 patient received Keytruda cycle 6, albumin 3.2  • 10/4/2019 patient received cycle 7 of Keytruda, TSH 4.8, free T4 0.86  • 10/9/2019 WBC 5.8, hemoglobin 11.7, platelets 174  • 10/25/2019 patient received cycle 8 of Keytruda.  WBC 5.9, hemoglobin 11.5, platelets 152, creatinine 0.75, cortisol 15.2, TSH 6.48 high , free T4 1.13 normal  • 11/6/2019 WBC 6.6, hemoglobin 11.8, MCV 91.5, platelets 187  • 11/15/2019 patient received cycle 9 of Keytruda, WBC 6.6, hemoglobin 11.9, platelets 161, cortisol level 8.86, TSH 2.68, free T3 2.8, free T4 1.5  • 12/4/2019 WBC 6.9, hemoglobin 12.0, platelets 180, MCV 92.9  • 12/06/2019-Keytruda Cycle 10  • 12/27/2019- Cycle 11 LFTs normal, WBC 6.7, hemoglobin 11.4, platelets 168, MCV 93, TSH 2.4,  • 1/28/2020: CT chest abdomen and pelvis with contrast:  Single (aortic lymph node in the abdominal retroperitoneum has increased.  Rest of the retroperitoneal lymph nodes have decreased in size.  Mediastinal adenopathy appears unchanged.   Noncalcified bilateral pulmonary nodules are stable  • 1/17/2020 patient received Keytruda cycle 12:.  • 2/3/2020 WBC 7.1, hemoglobin 12.2, platelets 171  • 02/10/2020 patient received cycle 13 of Keytruda: WBC 7.3, hemoglobin 12.1, platelets 166, creatinine 0.82  • 3/6/2020 patient is of Keytruda 200 mg, hemoglobin 10.8  • 3/27/2020 patient received Keytruda 200 mg, hemoglobin 11.4, TSH 1.95  • 4/17/2020 patient received Keytruda cycle 16 200 mg, WBC 6.1, hemoglobin 11.6, platelets 150  • 5/8/2020: Patient received cycle 17 Keytruda, WBC 6.3, hemoglobin 9.6, platelets 137, TSH 2.05, free T4 1.54, creatinine 0.93, LFTs normal,  • 5/29/2020: Patient received cycle 18 Keytruda, WBC 6.5, hemoglobin 11.7, platelets 129, creatinine 0.83, TSH 1.69  • 6/26/2020: Patient receiving Keytruda.   • 7/17/2020: TSH 2.06, WBC 5.7, hemoglobin 11.4, platelets 177, MCV 92.4, Keytruda 200 mg cycle 20  • 7/27/2020: CT chest abdomen pelvis with contrast: Stable findings in the chest abdomen pelvis since 1/28/2020.  Noncalcified bilateral pulmonary nodules are stable.  Stable retroperitoneal left perinephric adenopathy in the abdomen.  • 8/7/2020: Patient received cycle 21 of Keytruda  • 8/28/2020 patient is a cycle 22 of Keytruda.  WBC 6.02, hemoglobin 11.3, platelets 161  • 9/11/2020 WBC 6.9, hemoglobin 12.4, platelets 192  • 9/18/2020: Cycle 23 of Keytruda, TSH 1.19  • 10/9/2020: Cycle 24 of Keytruda, WBC 5.9, hemoglobin 11.9, platelets 178, creatinine 0.84, TSH 2.56, free T3 2.4, CMP normal  • 10/23/2020: WBC 7.34, hemoglobin 11.6, platelets 175  • 10/30/2020:.  Keytruda 200 mg  • 11/20/2020: Keytruda 200 mg  • 12/11/2020 Keytruda 200 mg.  WBC 5.5, hemoglobin 11.4, platelets 176, creatinine 0.8, LFTs normal, TSH 2.2, cortisol 8.3  • 12/10/2020: CT chest abdomen and pelvis with contrast: Previously seen left para-aortic lymph nodes within the abdomen are decreased in size.  No pathological lymphadenopathy.  No evidence of residual  malignancy..  Small noncalcified pulmonary nodules are stable.  No new nodules.  Stable mediastinal lymph nodes.  • 3/12/2021: WBC 6.7, hemoglobin 11.3, platelets 17  • 3/12/2021: CMP normal, TSH 2.9, cortisol 11.95, 1  • 3/15/2021: CT chest with contrast: Stable findings in the chest with postsurgical changes of esophageal resection and gastric pull-through.  Stable right lower lobe lung nodule measures 11 mm.  Stable size and appearance of the mediastinal and upper abdominal lymph nodes.  • 3/23/2021: Doppler of upper extremity: Normal.  • 3/24/2021: CT abdomen and pelvis with contrast: Postoperative changes.  Left periaortic nodes are stable.  • 4/12/2021: TSH 4.1, cortisol 12.4  • 6/22/2021: CMP normal,Cortisol 10.08,, WBC 5.8, hemoglobin 11.2, platelets 109, TSH 2.59  • 7/12/2021: CT scan of the chest abdomen pelvis with contrast: Dominant 10 x 10 left periaortic lymph node is stable since 3/22/2021.  Dominant lymph nodes in the right lower paratracheal and AP window distribution are stable since 3/15/2021.  No evidence of progressive adenopathy in the chest abdomen or pelvis.    • 01/11/2022 -PET/CT findings consistent with new cedric metastatic disease in the retroperitoneum.  2 new pathologically enlarged hypermetabolic retroperitoneal lymph nodes.  No convincing evidence of recurrent disease within the chest neck pelvis or skeleton.  • 1/20/2022 -CT-guided needle biopsy consistent with poorly differentiated carcinoma.  CDX2 positive consistent with metastases from known esophageal primary  • 2/9/2022 - Pembrolizumab complicated with fatigue, joint pains, nausea, diarrhea. Symptoms decrease in intensity over a week.  • 3/30/2022 -CT chest and pelvis with stable metastatic lymph nodes in the left periaortic retroperitoneum.  No evidence of disease progression or new lesions.  Stable lymphadenopathy in the distal paratracheal mediastinal area  • 4/13/2022 - Keytruda 200  • 5/4/2022 - Keytruda 200  • Continued  keytruda  • 7/11/2022 - CT abdomen pelvis with decrease in size of left perioaortic lymph node. No other areas of disease.  • Continues to be on Keytruda  • 10/18/2022 - stable CT imaging  • 1/20/2023 - CT imaging with stable disease. No significant change.  • Patient was hospitalized with esophagitis, subsequent EGD with improvement, no recurrence of disease.    Subjective:    Patient was seen with having some pain in the abdomen epigastric. No other new symptoms..    Past Medical History:   Diagnosis Date   • B12 deficiency    • Blockage of coronary artery of heart (HCC)    • Depression    • DM (diabetes mellitus) (HCC)    • Dysphagia    • GERD (gastroesophageal reflux disease)    • HTN (hypertension)    • Hyperlipidemia        Past Surgical History:   Procedure Laterality Date   • ABDOMINAL WALL ABSCESS INCISION AND DRAINAGE  10/22/2018    WITH DEBRIDEMENT  1.5CM X 1.5 CM X 1.5 CM    DR. PURI   • CATARACT EXTRACTION  2018   • COLONOSCOPY     • CORONARY ARTERY BYPASS GRAFT  2015   • ENDOSCOPY  12/20/2016   • ENDOSCOPY N/A 1/2/2023    Procedure: ESOPHAGOGASTRODUODENOSCOPY;  Surgeon: Esvin Morgan MD;  Location: [facility] ENDOSCOPY;  Service: Gastroenterology;  Laterality: N/A;  post: anastamosis stricture   • ENDOSCOPY N/A 2/9/2023    Procedure: ESOPHAGOGASTRODUODENOSCOPY, non-wire guided balloon dilation (12-14mm) of esophageal stricture;  Surgeon: Tino Villafana MD;  Location: [facility] ENDOSCOPY;  Service: Gastroenterology;  Laterality: N/A;  post: esophageal anastamotic stricture, post surgical changes   • ESOPHAGECTOMY  05/04/2017    BROOKS PETTY ESOPHAGECTOMY, FEEDING JEJNOSTOMOY, ABDOMINAL AND MEDIASTINAL LYMPH NODE DISECTION, PEDICLED MUSCLE FLAP COVERAGE DR. PURI   • ESOPHAGOSCOPY / EGD  07/12/2017    WITH BALLOON DILATION   • ESOPHAGOSCOPY / EGD  07/26/2017    WITH BALLOON DILATION   • ESOPHAGOSCOPY / EGD  08/11/2017    WITH BALLOON DILATION   • ESOPHAGOSCOPY / EGD  08/28/2017    WITH  BALLOON DILATION   • ESOPHAGOSCOPY / EGD  09/27/2017    WITH BALLOON DILATION   • LYMPH NODE BIOPSY  01/30/2019   • PACEMAKER IMPLANTATION  11/21/2016       Current Outpatient Medications:   •  clonazePAM (KlonoPIN) 0.5 MG tablet, Take 0.25 mg by mouth 2 (Two) Times a Day As Needed for Anxiety., Disp: , Rfl: 1  •  clonazePAM (KlonoPIN) 0.5 MG tablet, Take 0.125 mg by mouth Daily As Needed for Anxiety., Disp: , Rfl:   •  cyanocobalamin (VITAMIN B-12) 1000 MCG tablet, Take 1,000 mcg by mouth Daily., Disp: , Rfl:   •  folic acid (FOLVITE) 1 MG tablet, Take 1 mg by mouth Daily., Disp: , Rfl:   •  levothyroxine (Euthyrox) 50 MCG tablet, Take 1 tablet by mouth Daily., Disp: 90 tablet, Rfl: 0  •  metFORMIN ER (GLUCOPHAGE-XR) 500 MG 24 hr tablet, Take 1,000 mg by mouth 2 (Two) Times a Day., Disp: , Rfl:   •  metoprolol tartrate (LOPRESSOR) 25 MG tablet, Take 12.5 mg by mouth 2 (Two) Times a Day., Disp: , Rfl:   •  ondansetron (ZOFRAN) 4 MG tablet, Take 4 mg by mouth Every 8 (Eight) Hours As Needed for Nausea or Vomiting., Disp: , Rfl:   •  pantoprazole (PROTONIX) 40 MG EC tablet, Take 40 mg by mouth 2 (Two) Times a Day., Disp: , Rfl:   •  pravastatin (PRAVACHOL) 10 MG tablet, Take 10 mg by mouth Every Other Day., Disp: , Rfl:   •  saccharomyces boulardii (FLORASTOR) 250 MG capsule, Take 1 capsule by mouth 2 (Two) Times a Day for 60 days., Disp: 60 capsule, Rfl: 1  No current facility-administered medications for this visit.    Allergies   Allergen Reactions   • Ace Inhibitors Unknown (See Comments)     Unknown reaction     • Heparin Other (See Comments)     Fever/chills, weakness in legs   • Sulfa Antibiotics Other (See Comments)     Mouth sores       Family History   Problem Relation Age of Onset   • Mental illness Mother    • Heart disease Mother    • Hypertension Father    • Cancer Father    • Heart disease Sister    • Heart disease Brother      Cancer-related family history includes Cancer in his father.    Social  "History     Tobacco Use   • Smoking status: Former     Packs/day: 2.00     Years: 50.00     Pack years: 100.00     Types: Cigarettes     Quit date: 2015     Years since quittin.6   • Smokeless tobacco: Former     Types: Chew     Quit date: 1989   Vaping Use   • Vaping Use: Never used   Substance Use Topics   • Alcohol use: No   • Drug use: No     ROS:     Objective:    Vitals:    23 0939   BP: 145/69   Pulse: 71   Resp: 16   Temp: 97.4 °F (36.3 °C)   SpO2: 97%   Weight: 66.2 kg (146 lb)   Height: 167.6 cm (66\")   PainSc: 0-No pain       (1) Restricted in physically strenuous activity, ambulatory and able to do work of light nature    Physical Exam:     Physical Exam   Constitutional: He is oriented to person, place, and time. He appears well-developed. No distress.   HENT:   Head: Normocephalic and atraumatic.   Nose: Nose normal.   Mouth/Throat: Mucous membranes are moist.   Eyes: Pupils are equal, round, and reactive to light.   Neck: No thyromegaly present.   Cardiovascular: Normal rate, regular rhythm, normal heart sounds and normal pulses. Exam reveals no gallop and no friction rub.   Pulmonary/Chest: Effort normal. He has rhonchi.   Abdominal: Soft. Normal appearance and bowel sounds are normal.   No tenderness   Musculoskeletal: Normal range of motion. No deformity or signs of injury.      Right lower leg: No edema.   Neurological: He is alert and oriented to person, place, and time. He exhibits normal muscle tone.   Skin: Skin is warm and dry. No rash noted. He is not diaphoretic. No erythema. No jaundice.   Right side chest  port       Lab Results - Last 18 Months   Lab Units 23  0937 23  1122 01/10/23  1108   WBC 10*3/mm3 5.45 5.70 3.93   HEMOGLOBIN g/dL 10.1* 9.6* 8.5*   HEMATOCRIT % 31.1* 30.4* 26.8*   PLATELETS 10*3/mm3 224 192 299   MCV fL 92.0 97.4* 100.0*     Lab Results - Last 18 Months   Lab Units 01/10/23  1108 23  2341 23  0005 23  1505 " 11/21/22  1004   SODIUM mmol/L 139 141 138 139 137   POTASSIUM mmol/L 4.4 5.2 5.2 5.4* 4.7   CHLORIDE mmol/L 103 106 103 105 105   CO2 mmol/L 24.0 26.0 27.0 25.0 23.0   BUN mg/dL 17 21 22 22 15   CREATININE mg/dL 0.75* 0.88 0.84 0.92 0.89   CALCIUM mg/dL 8.5* 8.4* 8.4* 8.7 9.2   BILIRUBIN mg/dL 0.2  --   --  0.3 0.4   ALK PHOS U/L 38*  --   --  51 37*   ALT (SGPT) U/L 8  --   --  21 5   AST (SGOT) U/L 16  --   --  47* 12   GLUCOSE mg/dL 91 76 112* 103* 134*     Assessment & Plan     Assessment:    Metastatic esophageal carcinoma:    Patient has a history of GE junction poorly differentiated adenocarcinoma for which he had chemoradiation followed by [place] Gurwinder resection:  Pathology showed ypT3,pN1 disease.  Tumor was Stage IIIA, diagnosed in 2016.  He received concurrent chemoradiation with weekly Carboplatin and Taxol and radiation finishing on 3/15/17.  CT scan on 1/10/19 showed new retroperitoneal lymph nodes.  These lymph nodes were biopsied on 1/30/19 and it shows metastatic esophageal cancer.  [facility] Next Gen testing was performed on the sample and it shows KRAS mutation, microsatellite stable tumor.  PD-L1 is positive.  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  HER2/marvel (ERBB2) was not amplified and was negative.  His CT scan on 5/1/19 shows evidence of progression.  Patient has started receiving Keytruda on 5/31/19.  He has received 19 cycles so far.   CT scan on 1/28/2020 shows improvement and continued response.  CT scan chest abdomen pelvis with contrast on 12/10/2020 does not show any evidence of disease progression.  On 12/22/2020 decision made to hold Keytruda per patient's request to get a treatment break.. Repeat CT scan chest 7/12/2021 does not show any evidence of disease progression.  Now CT imaging in January 2021 with a CT showing increased abdominal left para-aortic adenopathy.  Subsequent PET/CT with new cedric metastatic disease in the retroperitoneum of the abdomen.  New pathologically enlarged  hypermetabolic retroperitoneal lymph nodes.  This would be concerning for disease recurrence/progression.  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  I discussed with him the treatment options going forward.  He has had a good response with immunotherapy pembrolizumab in the past. This was  restarted. subsequent imaging with treatment response. Recent imaging with good response. Continue treatment.    Shortness of breath  Improved, has occasional shortness of breath, likely copd  I have recommended using albuterol  Symptoms have improved    Reflux/heartburn/dysphagia:   Symptoms improved.  Also has a history of strictures. Status post EGD and balloon dilation.  Omeprazole once daily, famotidine as needed, symptoms stable, continue  Pain could be related to gastritis, will monitor for now, its not severe pain.    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  Continues on Synthroid monitor TSH and fT4 with treatment. Monitor     Mild diarrhea:  diarrhea seems to be waxing and waning in nature.  Related to immunotherapy, but not severe. improved    PD-L1 positive, HER2 negative, p53 and KRAS positive.  Repeat Caris testing with repeat biopsy with no target mutations.    B12 deficiency: Continue B12 1000 mcg twice daily.    Anemia - Hb improved and stable.    F/u in 3 weeks to follow up on pain and need for repeat imaging.       Jean Pierre Elizondo MD 02/21/23     No orders of the defined types were placed in this encounter.

## 2023-02-28 DIAGNOSIS — E03.9 HYPOTHYROIDISM (ACQUIRED): ICD-10-CM

## 2023-02-28 DIAGNOSIS — R79.89 ELEVATED TSH: ICD-10-CM

## 2023-02-28 RX ORDER — LEVOTHYROXINE SODIUM 0.05 MG/1
50 TABLET ORAL DAILY
Qty: 90 TABLET | Refills: 0 | Status: SHIPPED | OUTPATIENT
Start: 2023-02-28

## 2023-02-28 RX ORDER — LEVOTHYROXINE SODIUM 0.05 MG/1
TABLET ORAL
Qty: 90 TABLET | Refills: 0 | OUTPATIENT
Start: 2023-02-28

## 2023-02-28 NOTE — TELEPHONE ENCOUNTER
Caller: Artie Mc    Relationship: Self    Best call back number: [phone]    What is the best time to reach you: ASAP    Who are you requesting to speak with (clinical staff, provider,  specific staff member): CLINICAL    What was the call regarding: [facility] REQUESTED THIS RX BUT IT WAS DENIED, PT SAYS HE REQUESTED A BIT EARLY BECAUSE IT TOOK ABOUT A WEEK TO GET IT LAST TIME.  PLEASE CALL PT BACK TO ADVISE WHY THIS WAS DENIED AND IF/WHEN IT CAN BE SENT BY OUR OFFICE.  ALSO PLEASE BE SURE THE 3/15 APPT WITH DR. SAAVEDRA IS CORRECT, SINCE HE WAS JUST SEEN ON 2/21.    Do you require a callback: YES

## 2023-03-10 NOTE — PROGRESS NOTES
Hematology-Oncology Follow-up Note       Artie Mc  1950    Primary Care Physician: Chanel Carter APRN  Referring Physician: Chanel Carter APRN    Reason For Visit:  Chief Complaint   Patient presents with   • Follow-up     Esophageal cancer, stage IV (HCC)     Metastatic esophageal cancer  Monitoring for adverse effects related to immunotherapy.  Hypothyroidism    HPI:   Mr. Mc is a pleasant 72 y.o. gentleman with a history of gastroesophageal reflux disease, diabetes, hypertension, heart block status post pacemaker placement and history of CABG.  He was having symptoms of dysphagia, weight loss since September 2016. He was reporting symptoms of food getting stuck in the lower chest.  He has transitioned himself to a liquid diet.  The patient reports losing about 30 pounds over this duration.  The patient underwent upper GI endoscopy on 12/20/16 with Dr. Esvin Barrera.  Endoscopy showed necrotic, circumferential and moderately obstructive mass at the distal esophagus between 42 cm and 36 cm.      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  There was also evidence of mild subacute inflammation in the duodenum.  Dr. Barrera ordered a CT scan of the chest and abdomen with contrast on 12/20/16.  The scan was done at [facility].  The scan showed a new ill-defined mass in the anterior wall of the distal esophagus at the GE junction measuring 2.3 x 2.2 x 1.6 cm, worrisome for cancer.  There were four subcentimeter liver lesions which had appeared stable in comparison to another CT scan from 2009 and they likely represent small cysts.  There was also mild lymphadenopathy in the gastrohepatic ligament.  There were at least six borderline enlarged lymph nodes measuring up to 1.3 x 1 cm in the region.  The patient is referred to us for further oncological evaluation.    1/5/2017 - The patient presents for  initial consultation.  He reports persistent dysphagia and also has symptoms of regurgitation.  He cannot tolerate solid foods and is dependent on liquid diet such as Ensure.  He also reports fatigue. Symptoms of dysphagia have been present for the past four months.  When he eats any solids, the food gets stuck in the lower chest.    • 1/5/17 - Vitamin B12 200 (L).  CEA 0.8.  Ferritin 35.  Iron saturation 16% (L), serum iron 56, TIBC 354.  Creatinine 0.9.  LFTs normal.  Folate 10.2.  • 1/12/17 - PET/CT scan:  Intense abnormal activity corresponding to the region of the GE junction and proximal stomach.  SUV is 11.06.  There is a suggestion of an abnormal mass or abnormal wall thickening in the region measuring 3.9 x 4.8 cm.  No additional abnormalities.  No evidence of metastases or other lymphadenopathy.  Scattered nonspecific subcentimeter lymph nodes involving the mediastinum and within the central upper abdomen.  • 1/13/17 - Creatinine 0.9.  LFTs normal.    • 1/17/17 - Patient seen by thoracic surgeon, Dr. Bradley.  PET scan was reviewed.  He has recommended neoadjuvant chemoradiation therapy followed by [place] Gurwinder esophagogastrectomy.  Port-A-Cath is being arranged.    • 1/20/17 - WBC 6.3, hemoglobin 12.1, platelet count 198,000, MCV 86.4.  • 1/30/17 - Patient started weekly Carboplatin and Taxol (Carboplatin AUC 2 and Taxol 50 mg/M2).  Patient received Injectafer 750 mg.  WBC 5.8, hemoglobin 12.4, platelet count 244,000.   • 1/31/17 - Patient seen by Dr. Eduardo Oleary.  The plan was to give 5040 cGy in 28 fractions.   • 2/6/17 - WBC 4.4, hemoglobin 11.6, platelet count 201,000.  Patient received cycle 2 of weekly Carboplatin and Taxol (Carboplatin AUC 2 and Taxol 50 mg/M2).  • 2/13/17 - Patient received Carboplatin and Taxol weekly cycle 3.  • 2/13/17 - Patient started concurrent radiation therapy.  Total planned dose is 4140 cGy.    • 2/13/17 to 2/15/17 - Patient received 4140 cGy of neoadjuvant radiation.   Radiation was finished on 3/15/17.    • 2/20/17 - WBC 1.8, hemoglobin 10.6, platelet count 217,000.  Creatinine 0.5. Folate 15 (L).  Ferritin 361.  Haptoglobin 214.  Iron saturation 33%, TIBC 263, serum iron 88.    • 2/20/17 - Patient received Carboplatin and Taxol weekly cycle 4.   • 2/27/17 - WBC 3.7, hemoglobin 10.7, platelet count 177,000, MCV 85.3.    • 3/6/17 - Patient has received 16 out of the 23 planned radiation treatments.  Total planned dose is 4140 cGy.    • 3/6/17 - WBC 5.4, hemoglobin 11.2, platelet count 113,000.  • 3/6/17 - Patient received cycle 5 of weekly Carboplatin and Taxol.   • 3/13/17 - Patient received cycle 6 of weekly Carboplatin and Taxol.  WBC 3.9, hemoglobin 11.3, platelet count 93,000.     • 3/20/17 - WBC 1.4, hemoglobin 10.0, platelet count 118,000, MCV 86.1   • 3/27/17 - WBC 3.8, hemoglobin 9.5, platelet count 176,000, MCV 87.3.    • 4/7/17 - Stress test:  No evidence of reversible myocardial ischemia.  Normal left ventricular ejection fraction of 50%.    • 4/17/17 - Upper GI endoscopy by Dr. 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  The GE junction adenocarcinoma lesion seems to have a very good response to chemotherapy and radiation.  The lumen was open.  • 5/1/17 - WBC 7.0, hemoglobin 10.2, platelet count 175,000.    • 5/4/17 - Patient underwent Noel Gurwinder esophagectomy with pyloroplasty, feeding jejunostomy tube, abdominal and mediastinal lymph node dissection.    Surgical Pathology:  Moderately to poorly differentiated adenocarcinoma measuring 1.3 cm at the GE junction.  Resection margins are negative for malignancy.  Tumor margins are negative.  There is effective treatment response.  No evidence of lymphovascular invasion.  Staging is ypT3,pN1.  One out of fourteen lymph nodes involved.   • 5/22/17 - WBC 6.8, hemoglobin 10.3, platelet count 312,000.    • 6/19/17 - WBC 5.7, hemoglobin 10.5, platelet count 204,000, MCV 92.1.     • 7/6/17 - EGD:  Status post balloon dilation of esophageal stricture.  • 7/12/17 - EGD with balloon dilation of esophageal stricture.  • 7/26/17 - EGD and balloon dilation of esophageal stricture.  • 7/31/17 - PET/CT scan:  There is mild metabolic activity seen at the thoracic esophagus just at and below the surgical margins.  Some mild wall thickening.  This could be from recent surgery.  A residual cancer seems unlikely.  There is a precarinal lymph node with mild metabolic activity with SUV of 3 measuring 1 x 1.3 cm.  Again this appears to be nonspecific in my opinion.    • 8/7/17 - WBC 5.9, hemoglobin 11.9, platelet count 171,000, MCV 88.7.    • 10/19/17 - WBC 7.5, hemoglobin 11.8, platelet count 216,000.    • 1/8/18 - PET scan:  No evidence of residual disease or recurrent disease.  There is an esophageal stent in the mid esophagus with a large debris air level.  No evidence of any metastases in the chest, abdomen or pelvis.  Mild nodule uptake in the vocal cords with fullness in the right vocal cord.  This could be physiologic.    • 7/9/18 - CT scan of chest with contrast:  Prominent mediastinal lymph nodes appear stable since February.  Changes of gastric pull-through procedure for esophageal cancer again noted.  Tree-in-bud infiltrates in the left lung base, consistent with infection versus inflammation.    • 1/10/19 - CT chest, abdomen and pelvis with contrast:  No evidence of mets in the chest; however, interval development of metastatic lymphadenopathy in the left side of the retroperitoneum.  For instance, there is a 17 mm rounded lymph node, 10 mm lymph node and also a 14 mm lymph node.  These are all new.  Stable 9 mm hypodense lesion within the right hepatic lobe, which could reflect hemangioma or complex cyst.    • 1/30/19 - CT-guided core biopsy of retroperitoneal lymph node:  Poorly differentiated adenocarcinoma consistent with metastases from patient’s known esophageal primary.  CK20 positive,  CDX2 positive, cytokeratin 7 negative, TTF-1 negative.    • 2/1/19 - Creatinine 0.9, BUN 18, calcium 9.3; these are normal.    • 2/28/19 - [facility] Next Gen sequencing testing on retroperitoneal lymph node specimen:  PD-L1 positive.  CPS = 3.  This implies responsiveness to pembrolizumab.  Mismatch repair status is proficient (no evidence of microsatellite instability).  Tumor mutational burden is low = 6 mutations/Mb.  CDH1 indeterminate.  KRAS mutation positive.  TP53 mutation positive.  CDK6 amplified.  [employer] PD-L1 testing by IHC:  PD-L1 expression 1% positive (CPS =1).  HER2/marvel by IHC is negative.  HER2/marvel by CISH not amplified.  Microsatellite stable tumor.    • 3/7/19 - WBC 7.3, hemoglobin 12, platelet count 128,000, MCV 92.     • 3/14/19 - Patient was seen by radiation oncologist, Dr. Chahal.  He has recommended observation versus systemic chemoimmunotherapy as needed.  No plans for radiation therapy.   • 5/1/19 - CT scan of chest with contrast:  No definitive findings of new metastatic disease in the chest.  Emphysema noted.    • 5/1/19 - CT abdomen and pelvis:  There is interval progression of metastatic retroperitoneal adenopathy.  Left periaortic adenopathy with lymph node measuring up to 2 cm, previously 1.7 cm.  Another lymph node now measures 2.3, previously 1.4 cm.  New enlarged lymph node on image 147 measures 1.4 cm, previously 0.4 cm.  A 9 mm hypodense right hepatic lobe lesion appears stable.    • 5/9/19 - Decision made to start patient on immunotherapy Keytruda.    • 5/9/19 - Vitamin B12 229.  Ferritin 61.  Folate 14.3.  Iron saturation 26%, TIBC 304, serum iron 79.  • 5/31/19 - Patient received Keytruda 200 mg cycle 1, day 1.    • 5/31/19 - Free T4 0.99.  Creatinine 0.9, BUN 9.  LFTs normal.  TSH 3.99.  Folate 14.3.  Iron saturation 26%.    • 6/21/2019 patient received Keytruda cycle 2  • 7/12/2019: Patient received Keytruda cycle 3  • 8/2/2019 patient received cycle 4 Keytruda WBC 7.2,  hemoglobin 11.8 platelet count 163  • 8/23/2019 creatinine 0.9, LFTs normal, WBC 6.4, hemoglobin 11.5, platelets 179, TSH 2.1   • 8/23/2019 patient received cycle 5 of Keytruda  • 9/3/2019 CT chest abdomen and pelvis with contrast: . Positive response to therapy in the abdomen since 05/01/2019. Pathologically enlarged retroperitoneal lymph nodes, predominantly in the left periaortic region, have diminished in size.a 1.3 x 2.1 cm left periaortic node (image 60), previously measured 3.5 x 2.3 cm. A 1.6 cm index node near the level of the left renal vein previously measured 2.0 cm No new or progressive adenopathy. 2. Stable findings in the chest. Noncalcified right upper lobe nodules are unchanged. There is no evidence of new or progressive metastatic disease within the chest. 3. 8 mm indeterminate low-density lesion in the right hepatic lobe, is stable. No new liver lesions.  • 9/6/2019 WBC 5.6, hemoglobin 11.2, platelets of 192, MCV 91.8  • 9/13/2019 patient received Keytruda cycle 6, albumin 3.2  • 10/4/2019 patient received cycle 7 of Keytruda, TSH 4.8, free T4 0.86  • 10/9/2019 WBC 5.8, hemoglobin 11.7, platelets 174  • 10/25/2019 patient received cycle 8 of Keytruda.  WBC 5.9, hemoglobin 11.5, platelets 152, creatinine 0.75, cortisol 15.2, TSH 6.48 high , free T4 1.13 normal  • 11/6/2019 WBC 6.6, hemoglobin 11.8, MCV 91.5, platelets 187  • 11/15/2019 patient received cycle 9 of Keytruda, WBC 6.6, hemoglobin 11.9, platelets 161, cortisol level 8.86, TSH 2.68, free T3 2.8, free T4 1.5  • 12/4/2019 WBC 6.9, hemoglobin 12.0, platelets 180, MCV 92.9  • 12/06/2019-Keytruda Cycle 10  • 12/27/2019- Cycle 11 LFTs normal, WBC 6.7, hemoglobin 11.4, platelets 168, MCV 93, TSH 2.4,  • 1/28/2020: CT chest abdomen and pelvis with contrast:  Single (aortic lymph node in the abdominal retroperitoneum has increased.  Rest of the retroperitoneal lymph nodes have decreased in size.  Mediastinal adenopathy appears unchanged.   Noncalcified bilateral pulmonary nodules are stable  • 1/17/2020 patient received Keytruda cycle 12:.  • 2/3/2020 WBC 7.1, hemoglobin 12.2, platelets 171  • 02/10/2020 patient received cycle 13 of Keytruda: WBC 7.3, hemoglobin 12.1, platelets 166, creatinine 0.82  • 3/6/2020 patient is of Keytruda 200 mg, hemoglobin 10.8  • 3/27/2020 patient received Keytruda 200 mg, hemoglobin 11.4, TSH 1.95  • 4/17/2020 patient received Keytruda cycle 16 200 mg, WBC 6.1, hemoglobin 11.6, platelets 150  • 5/8/2020: Patient received cycle 17 Keytruda, WBC 6.3, hemoglobin 9.6, platelets 137, TSH 2.05, free T4 1.54, creatinine 0.93, LFTs normal,  • 5/29/2020: Patient received cycle 18 Keytruda, WBC 6.5, hemoglobin 11.7, platelets 129, creatinine 0.83, TSH 1.69  • 6/26/2020: Patient receiving Keytruda.   • 7/17/2020: TSH 2.06, WBC 5.7, hemoglobin 11.4, platelets 177, MCV 92.4, Keytruda 200 mg cycle 20  • 7/27/2020: CT chest abdomen pelvis with contrast: Stable findings in the chest abdomen pelvis since 1/28/2020.  Noncalcified bilateral pulmonary nodules are stable.  Stable retroperitoneal left perinephric adenopathy in the abdomen.  • 8/7/2020: Patient received cycle 21 of Keytruda  • 8/28/2020 patient is a cycle 22 of Keytruda.  WBC 6.02, hemoglobin 11.3, platelets 161  • 9/11/2020 WBC 6.9, hemoglobin 12.4, platelets 192  • 9/18/2020: Cycle 23 of Keytruda, TSH 1.19  • 10/9/2020: Cycle 24 of Keytruda, WBC 5.9, hemoglobin 11.9, platelets 178, creatinine 0.84, TSH 2.56, free T3 2.4, CMP normal  • 10/23/2020: WBC 7.34, hemoglobin 11.6, platelets 175  • 10/30/2020:.  Keytruda 200 mg  • 11/20/2020: Keytruda 200 mg  • 12/11/2020 Keytruda 200 mg.  WBC 5.5, hemoglobin 11.4, platelets 176, creatinine 0.8, LFTs normal, TSH 2.2, cortisol 8.3  • 12/10/2020: CT chest abdomen and pelvis with contrast: Previously seen left para-aortic lymph nodes within the abdomen are decreased in size.  No pathological lymphadenopathy.  No evidence of residual  malignancy..  Small noncalcified pulmonary nodules are stable.  No new nodules.  Stable mediastinal lymph nodes.  • 3/12/2021: WBC 6.7, hemoglobin 11.3, platelets 17  • 3/12/2021: CMP normal, TSH 2.9, cortisol 11.95, 1  • 3/15/2021: CT chest with contrast: Stable findings in the chest with postsurgical changes of esophageal resection and gastric pull-through.  Stable right lower lobe lung nodule measures 11 mm.  Stable size and appearance of the mediastinal and upper abdominal lymph nodes.  • 3/23/2021: Doppler of upper extremity: Normal.  • 3/24/2021: CT abdomen and pelvis with contrast: Postoperative changes.  Left periaortic nodes are stable.  • 4/12/2021: TSH 4.1, cortisol 12.4  • 6/22/2021: CMP normal,Cortisol 10.08,, WBC 5.8, hemoglobin 11.2, platelets 109, TSH 2.59  • 7/12/2021: CT scan of the chest abdomen pelvis with contrast: Dominant 10 x 10 left periaortic lymph node is stable since 3/22/2021.  Dominant lymph nodes in the right lower paratracheal and AP window distribution are stable since 3/15/2021.  No evidence of progressive adenopathy in the chest abdomen or pelvis.    • 01/11/2022 -PET/CT findings consistent with new cedric metastatic disease in the retroperitoneum.  2 new pathologically enlarged hypermetabolic retroperitoneal lymph nodes.  No convincing evidence of recurrent disease within the chest neck pelvis or skeleton.  • 1/20/2022 -CT-guided needle biopsy consistent with poorly differentiated carcinoma.  CDX2 positive consistent with metastases from known esophageal primary  • 2/9/2022 - Pembrolizumab complicated with fatigue, joint pains, nausea, diarrhea. Symptoms decrease in intensity over a week.  • 3/30/2022 -CT chest and pelvis with stable metastatic lymph nodes in the left periaortic retroperitoneum.  No evidence of disease progression or new lesions.  Stable lymphadenopathy in the distal paratracheal mediastinal area  • 4/13/2022 - Keytruda 200  • 5/4/2022 - Keytruda 200  • Continued  keytruda  • 7/11/2022 - CT abdomen pelvis with decrease in size of left perioaortic lymph node. No other areas of disease.  • Continues to be on Keytruda  • 10/18/2022 - stable CT imaging  • 1/20/2023 - CT imaging with stable disease. No significant change.  • Patient was hospitalized with esophagitis, subsequent EGD with improvement, no recurrence of disease.  • 2/21/2023 - Keytruda    Subjective:    Continues to have some diarrhea, has some shortness of breath. Abdominal pain has resolved.    Past Medical History:   Diagnosis Date   • B12 deficiency    • Blockage of coronary artery of heart (HCC)    • Depression    • DM (diabetes mellitus) (HCC)    • Dysphagia    • GERD (gastroesophageal reflux disease)    • HTN (hypertension)    • Hyperlipidemia        Past Surgical History:   Procedure Laterality Date   • ABDOMINAL WALL ABSCESS INCISION AND DRAINAGE  10/22/2018    WITH DEBRIDEMENT  1.5CM X 1.5 CM X 1.5 CM    DR. PURI   • CATARACT EXTRACTION  2018   • COLONOSCOPY     • CORONARY ARTERY BYPASS GRAFT  2015   • ENDOSCOPY  12/20/2016   • ENDOSCOPY N/A 1/2/2023    Procedure: ESOPHAGOGASTRODUODENOSCOPY;  Surgeon: Esvin Morgan MD;  Location: [facility] ENDOSCOPY;  Service: Gastroenterology;  Laterality: N/A;  post: anastamosis stricture   • ENDOSCOPY N/A 2/9/2023    Procedure: ESOPHAGOGASTRODUODENOSCOPY, non-wire guided balloon dilation (12-14mm) of esophageal stricture;  Surgeon: Tino Villafana MD;  Location: [facility] ENDOSCOPY;  Service: Gastroenterology;  Laterality: N/A;  post: esophageal anastamotic stricture, post surgical changes   • ESOPHAGECTOMY  05/04/2017    BROOKS PETTY ESOPHAGECTOMY, FEEDING JEJNOSTOMOY, ABDOMINAL AND MEDIASTINAL LYMPH NODE DISECTION, PEDICLED MUSCLE FLAP COVERAGE DR. PURI   • ESOPHAGOSCOPY / EGD  07/12/2017    WITH BALLOON DILATION   • ESOPHAGOSCOPY / EGD  07/26/2017    WITH BALLOON DILATION   • ESOPHAGOSCOPY / EGD  08/11/2017    WITH BALLOON DILATION   • ESOPHAGOSCOPY / EGD   08/28/2017    WITH BALLOON DILATION   • ESOPHAGOSCOPY / EGD  09/27/2017    WITH BALLOON DILATION   • LYMPH NODE BIOPSY  01/30/2019   • PACEMAKER IMPLANTATION  11/21/2016       Current Outpatient Medications:   •  clonazePAM (KlonoPIN) 0.5 MG tablet, Take 0.25 mg by mouth 2 (Two) Times a Day As Needed for Anxiety., Disp: , Rfl: 1  •  clonazePAM (KlonoPIN) 0.5 MG tablet, Take 0.125 mg by mouth Daily As Needed for Anxiety., Disp: , Rfl:   •  cyanocobalamin (VITAMIN B-12) 1000 MCG tablet, Take 1 tablet by mouth Daily., Disp: , Rfl:   •  folic acid (FOLVITE) 1 MG tablet, Take 1 tablet by mouth Daily., Disp: , Rfl:   •  levothyroxine (Euthyrox) 50 MCG tablet, Take 1 tablet by mouth Daily., Disp: 90 tablet, Rfl: 0  •  metFORMIN (GLUCOPHAGE) 1000 MG tablet, Take 2 tablets by mouth Daily., Disp: , Rfl:   •  metoprolol succinate XL (TOPROL-XL) 25 MG 24 hr tablet, Take 1 tablet by mouth Daily., Disp: 90 tablet, Rfl: 3  •  ondansetron (ZOFRAN) 4 MG tablet, Take 1 tablet by mouth Every 8 (Eight) Hours As Needed for Nausea or Vomiting., Disp: , Rfl:   •  pantoprazole (PROTONIX) 40 MG EC tablet, Take 1 tablet by mouth 2 (Two) Times a Day., Disp: , Rfl:   •  pravastatin (PRAVACHOL) 10 MG tablet, Take 1 tablet by mouth Every Other Day., Disp: , Rfl:   •  spironolactone (ALDACTONE) 25 MG tablet, Take 0.5 tablets by mouth Daily., Disp: 90 tablet, Rfl: 3    Allergies   Allergen Reactions   • Ace Inhibitors Unknown (See Comments)     Unknown reaction     • Heparin Other (See Comments)     Fever/chills, weakness in legs   • Sulfa Antibiotics Other (See Comments)     Mouth sores       Family History   Problem Relation Age of Onset   • Mental illness Mother    • Heart disease Mother    • Hypertension Father    • Cancer Father    • Heart disease Sister    • Heart disease Brother      Cancer-related family history includes Cancer in his father.    Social History     Tobacco Use   • Smoking status: Former     Packs/day: 2.00     Years: 50.00  "    Pack years: 100.00     Types: Cigarettes     Quit date: 2015     Years since quittin.7   • Smokeless tobacco: Former     Types: Chew     Quit date: 1989   Vaping Use   • Vaping Use: Never used   Substance Use Topics   • Alcohol use: No   • Drug use: No     ROS:     Objective:    Vitals:    03/15/23 1027   BP: 131/73   Pulse: 69   Resp: 16   Temp: 97.8 °F (36.6 °C)   SpO2: 100%   Weight: 67.5 kg (148 lb 12.8 oz)   Height: 167.6 cm (66\")   PainSc: 0-No pain       (1) Restricted in physically strenuous activity, ambulatory and able to do work of light nature    Physical Exam:     Physical Exam   Constitutional: He is oriented to person, place, and time. He appears well-developed. No distress.   HENT:   Head: Normocephalic and atraumatic.   Nose: Nose normal.   Mouth/Throat: Mucous membranes are moist.   Eyes: Pupils are equal, round, and reactive to light.   Neck: No thyromegaly present.   Cardiovascular: Normal rate, regular rhythm, normal heart sounds and normal pulses. Exam reveals no gallop and no friction rub.   Pulmonary/Chest: Effort normal. He has rhonchi.   Abdominal: Soft. Normal appearance and bowel sounds are normal.   No tenderness   Musculoskeletal: Normal range of motion. No deformity or signs of injury.      Right lower leg: No edema.   Neurological: He is alert and oriented to person, place, and time. He exhibits normal muscle tone.   Skin: Skin is warm and dry. No rash noted. He is not diaphoretic. No erythema. No jaundice.   Right side chest  port       Lab Results - Last 18 Months   Lab Units 23  0937 23  1122 01/10/23  1108   WBC 10*3/mm3 5.45 5.70 3.93   HEMOGLOBIN g/dL 10.1* 9.6* 8.5*   HEMATOCRIT % 31.1* 30.4* 26.8*   PLATELETS 10*3/mm3 224 192 299   MCV fL 92.0 97.4* 100.0*     Lab Results - Last 18 Months   Lab Units 23  0937 01/10/23  1108 23  2341 23  0005 23  1505   SODIUM mmol/L 136 139 141   < > 139   POTASSIUM mmol/L 4.2 4.4 5.2   < > " 5.4*   CHLORIDE mmol/L 103 103 106   < > 105   CO2 mmol/L 20.0* 24.0 26.0   < > 25.0   BUN mg/dL 12 17 21   < > 22   CREATININE mg/dL 0.77 0.75* 0.88   < > 0.92   CALCIUM mg/dL 8.8 8.5* 8.4*   < > 8.7   BILIRUBIN mg/dL 0.2 0.2  --   --  0.3   ALK PHOS U/L 45 38*  --   --  51   ALT (SGPT) U/L 7 8  --   --  21   AST (SGOT) U/L 17 16  --   --  47*   GLUCOSE mg/dL 164* 91 76   < > 103*    < > = values in this interval not displayed.     Assessment & Plan     Assessment:    Metastatic esophageal carcinoma:    Patient has a history of GE junction poorly differentiated adenocarcinoma for which he had chemoradiation followed by [place] Gurwinder resection:  Pathology showed ypT3,pN1 disease.  Tumor was Stage IIIA, diagnosed in 2016.  He received concurrent chemoradiation with weekly Carboplatin and Taxol and radiation finishing on 3/15/17.  CT scan on 1/10/19 showed new retroperitoneal lymph nodes.  These lymph nodes were biopsied on 1/30/19 and it shows metastatic esophageal cancer.  [facility] Next Gen testing was performed on the sample and it shows KRAS mutation, microsatellite stable tumor.  PD-L1 is positive.  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  HER2/marvel (ERBB2) was not amplified and was negative.  His CT scan on 5/1/19 shows evidence of progression.  Patient has started receiving Keytruda on 5/31/19.  He has received 19 cycles so far.   CT scan on 1/28/2020 shows improvement and continued response.  CT scan chest abdomen pelvis with contrast on 12/10/2020 does not show any evidence of disease progression.  On 12/22/2020 decision made to hold Keytruda per patient's request to get a treatment break.. Repeat CT scan chest 7/12/2021 does not show any evidence of disease progression.  Now CT imaging in January 2021 with a CT showing increased abdominal left para-aortic adenopathy.  Subsequent PET/CT with new cedric metastatic disease in the retroperitoneum of the abdomen.  New pathologically enlarged hypermetabolic retroperitoneal lymph  nodes.  This would be concerning for disease recurrence/progression.  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  I discussed with him the treatment options going forward.  He has had a good response with immunotherapy pembrolizumab in the past. This was  restarted. subsequent imaging with treatment response. 1/2023 imaging with good response. Continue treatment will repeat imaging in a month.    Shortness of breath  Improved, has occasional shortness of breath, likely copd  I have recommended using albuterol  Symptoms have improved, continue     Reflux/heartburn/dysphagia:   Symptoms improved.  Also has a history of strictures. Status post EGD and balloon dilation.  Omeprazole once daily, famotidine as needed, symptoms stable, continue  Pain could be related to gastritis, will monitor for now, this has resolved.    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  Continues on Synthroid monitor TSH and fT4 with treatment. Monitor     Mild diarrhea:  diarrhea seems to be waxing and waning in nature.  Related to immunotherapy, but not severe. Improved continue to monitor    PD-L1 positive, HER2 negative, p53 and KRAS positive.  Repeat Caris testing with repeat biopsy with no target mutations.    B12 deficiency: Continue B12 1000 mcg twice daily.    Anemia - Hb improved and stable.    F/u in 4 weeks with imaging.        Jean Pierre Elizondo MD 03/15/23     No orders of the defined types were placed in this encounter.

## 2023-03-14 ENCOUNTER — CLINICAL SUPPORT NO REQUIREMENTS (OUTPATIENT)
Dept: CARDIOLOGY | Facility: CLINIC | Age: 73
End: 2023-03-14
Payer: MEDICARE

## 2023-03-14 ENCOUNTER — OUTSIDE FACILITY SERVICE (OUTPATIENT)
Dept: CARDIOLOGY | Facility: CLINIC | Age: 73
End: 2023-03-14
Payer: MEDICARE

## 2023-03-14 DIAGNOSIS — Z95.0 PRESENCE OF CARDIAC PACEMAKER: Primary | ICD-10-CM

## 2023-03-14 DIAGNOSIS — Z95.0 PRESENCE OF CARDIAC PACEMAKER: ICD-10-CM

## 2023-03-14 DIAGNOSIS — R00.1 BRADYCARDIA: Primary | ICD-10-CM

## 2023-03-14 DIAGNOSIS — R00.2 PALPITATIONS: ICD-10-CM

## 2023-03-14 DIAGNOSIS — M79.89 SWELLING OF BOTH LOWER EXTREMITIES: ICD-10-CM

## 2023-03-14 DIAGNOSIS — R00.1 BRADYCARDIA, UNSPECIFIED: ICD-10-CM

## 2023-03-14 PROCEDURE — 93280 PM DEVICE PROGR EVAL DUAL: CPT | Performed by: INTERNAL MEDICINE

## 2023-03-14 PROCEDURE — 99214 OFFICE O/P EST MOD 30 MIN: CPT | Performed by: INTERNAL MEDICINE

## 2023-03-14 RX ORDER — METOPROLOL SUCCINATE 25 MG/1
25 TABLET, EXTENDED RELEASE ORAL DAILY
Qty: 90 TABLET | Refills: 3 | Status: SHIPPED | OUTPATIENT
Start: 2023-03-14

## 2023-03-14 RX ORDER — SPIRONOLACTONE 25 MG/1
12.5 TABLET ORAL DAILY
Qty: 90 TABLET | Refills: 3 | Status: SHIPPED | OUTPATIENT
Start: 2023-03-14

## 2023-03-15 ENCOUNTER — OFFICE VISIT (OUTPATIENT)
Dept: ONCOLOGY | Facility: CLINIC | Age: 73
End: 2023-03-15
Payer: MEDICARE

## 2023-03-15 VITALS
HEART RATE: 69 BPM | DIASTOLIC BLOOD PRESSURE: 73 MMHG | WEIGHT: 148.8 LBS | OXYGEN SATURATION: 100 % | TEMPERATURE: 97.8 F | SYSTOLIC BLOOD PRESSURE: 131 MMHG | BODY MASS INDEX: 23.91 KG/M2 | RESPIRATION RATE: 16 BRPM | HEIGHT: 66 IN

## 2023-03-15 DIAGNOSIS — C16.0 MALIGNANT NEOPLASM OF CARDIA: ICD-10-CM

## 2023-03-15 DIAGNOSIS — C15.9 ESOPHAGEAL CANCER, STAGE IV: Primary | ICD-10-CM

## 2023-03-15 PROCEDURE — 3075F SYST BP GE 130 - 139MM HG: CPT | Performed by: INTERNAL MEDICINE

## 2023-03-15 PROCEDURE — 3078F DIAST BP <80 MM HG: CPT | Performed by: INTERNAL MEDICINE

## 2023-03-15 PROCEDURE — 1159F MED LIST DOCD IN RCRD: CPT | Performed by: INTERNAL MEDICINE

## 2023-03-15 PROCEDURE — 99214 OFFICE O/P EST MOD 30 MIN: CPT | Performed by: INTERNAL MEDICINE

## 2023-03-15 PROCEDURE — 1126F AMNT PAIN NOTED NONE PRSNT: CPT | Performed by: INTERNAL MEDICINE

## 2023-03-15 PROCEDURE — 1160F RVW MEDS BY RX/DR IN RCRD: CPT | Performed by: INTERNAL MEDICINE

## 2023-03-30 ENCOUNTER — TELEPHONE (OUTPATIENT)
Dept: ONCOLOGY | Facility: CLINIC | Age: 73
End: 2023-03-30

## 2023-03-30 PROCEDURE — 93296 REM INTERROG EVL PM/IDS: CPT | Performed by: INTERNAL MEDICINE

## 2023-03-30 PROCEDURE — 93294 REM INTERROG EVL PM/LDLS PM: CPT | Performed by: INTERNAL MEDICINE

## 2023-03-30 NOTE — TELEPHONE ENCOUNTER
Caller: Artie Mc    Relationship: Self    Best call back number: [phone]    What does billing need from the patient: PATIENT CALLING TO VERIFY ESTIMATE OR IF CT SCAN AND KEYTRUDA RECEIVED PRIOR AUTHORIZATION

## 2023-04-04 ENCOUNTER — HOSPITAL ENCOUNTER (OUTPATIENT)
Dept: ONCOLOGY | Facility: HOSPITAL | Age: 73
Discharge: HOME | End: 2023-04-04
Admitting: INTERNAL MEDICINE
Payer: MEDICARE

## 2023-04-04 ENCOUNTER — HOSPITAL ENCOUNTER (OUTPATIENT)
Dept: PET IMAGING | Facility: HOSPITAL | Age: 73
Discharge: HOME | End: 2023-04-04
Admitting: INTERNAL MEDICINE
Payer: MEDICARE

## 2023-04-04 VITALS
WEIGHT: 149 LBS | RESPIRATION RATE: 16 BRPM | HEART RATE: 82 BPM | DIASTOLIC BLOOD PRESSURE: 76 MMHG | OXYGEN SATURATION: 98 % | TEMPERATURE: 97.7 F | SYSTOLIC BLOOD PRESSURE: 157 MMHG | HEIGHT: 66 IN | BODY MASS INDEX: 23.95 KG/M2

## 2023-04-04 DIAGNOSIS — Z51.81 ENCOUNTER FOR THERAPEUTIC DRUG MONITORING: Primary | ICD-10-CM

## 2023-04-04 DIAGNOSIS — C15.9 ESOPHAGEAL CANCER, STAGE IV: ICD-10-CM

## 2023-04-04 DIAGNOSIS — Z51.11 ENCOUNTER FOR ANTINEOPLASTIC CHEMOTHERAPY: ICD-10-CM

## 2023-04-04 DIAGNOSIS — C16.0 MALIGNANT NEOPLASM OF CARDIA: ICD-10-CM

## 2023-04-04 LAB
ALBUMIN SERPL-MCNC: 4.1 G/DL (ref 3.5–5.2)
ALBUMIN/GLOB SERPL: 1.6 G/DL
ALP SERPL-CCNC: 36 U/L (ref 39–117)
ALT SERPL W P-5'-P-CCNC: 8 U/L (ref 1–41)
ANION GAP SERPL CALCULATED.3IONS-SCNC: 12 MMOL/L (ref 5–15)
AST SERPL-CCNC: 18 U/L (ref 1–40)
BASOPHILS # BLD AUTO: 0.04 10*3/MM3 (ref 0–0.2)
BASOPHILS NFR BLD AUTO: 0.8 % (ref 0–1.5)
BILIRUB SERPL-MCNC: 0.2 MG/DL (ref 0–1.2)
BUN SERPL-MCNC: 16 MG/DL (ref 8–23)
BUN/CREAT SERPL: 21.1 (ref 7–25)
CALCIUM SPEC-SCNC: 9 MG/DL (ref 8.6–10.5)
CHLORIDE SERPL-SCNC: 107 MMOL/L (ref 98–107)
CO2 SERPL-SCNC: 21 MMOL/L (ref 22–29)
CREAT BLDA-MCNC: 0.8 MG/DL (ref 0.6–1.3)
CREAT SERPL-MCNC: 0.76 MG/DL (ref 0.76–1.27)
DEPRECATED RDW RBC AUTO: 43.5 FL (ref 37–54)
EGFRCR SERPLBLD CKD-EPI 2021: 93.4 ML/MIN/1.73
EGFRCR SERPLBLD CKD-EPI 2021: 94.9 ML/MIN/1.73
EOSINOPHIL # BLD AUTO: 0.1 10*3/MM3 (ref 0–0.4)
EOSINOPHIL NFR BLD AUTO: 1.9 % (ref 0.3–6.2)
ERYTHROCYTE [DISTWIDTH] IN BLOOD BY AUTOMATED COUNT: 14 % (ref 12.3–15.4)
GLOBULIN UR ELPH-MCNC: 2.5 GM/DL
GLUCOSE SERPL-MCNC: 111 MG/DL (ref 65–99)
HCT VFR BLD AUTO: 31.1 % (ref 37.5–51)
HGB BLD-MCNC: 10 G/DL (ref 13–17.7)
LYMPHOCYTES # BLD AUTO: 1.6 10*3/MM3 (ref 0.7–3.1)
LYMPHOCYTES NFR BLD AUTO: 30.2 % (ref 19.6–45.3)
MCH RBC QN AUTO: 28.5 PG (ref 26.6–33)
MCHC RBC AUTO-ENTMCNC: 32.2 G/DL (ref 31.5–35.7)
MCV RBC AUTO: 88.6 FL (ref 79–97)
MONOCYTES # BLD AUTO: 0.74 10*3/MM3 (ref 0.1–0.9)
MONOCYTES NFR BLD AUTO: 14 % (ref 5–12)
NEUTROPHILS NFR BLD AUTO: 2.82 10*3/MM3 (ref 1.7–7)
NEUTROPHILS NFR BLD AUTO: 53.1 % (ref 42.7–76)
PLATELET # BLD AUTO: 220 10*3/MM3 (ref 140–450)
PMV BLD AUTO: 10.3 FL (ref 6–12)
POTASSIUM SERPL-SCNC: 4.6 MMOL/L (ref 3.5–5.2)
PROT SERPL-MCNC: 6.6 G/DL (ref 6–8.5)
RBC # BLD AUTO: 3.51 10*6/MM3 (ref 4.14–5.8)
SODIUM SERPL-SCNC: 140 MMOL/L (ref 136–145)
T4 SERPL-MCNC: 7.46 MCG/DL (ref 4.5–11.7)
TSH SERPL DL<=0.05 MIU/L-ACNC: 3.98 UIU/ML (ref 0.27–4.2)
WBC NRBC COR # BLD: 5.3 10*3/MM3 (ref 3.4–10.8)

## 2023-04-04 PROCEDURE — 36591 DRAW BLOOD OFF VENOUS DEVICE: CPT

## 2023-04-04 PROCEDURE — 71260 CT THORAX DX C+: CPT

## 2023-04-04 PROCEDURE — 84443 ASSAY THYROID STIM HORMONE: CPT | Performed by: INTERNAL MEDICINE

## 2023-04-04 PROCEDURE — 85025 COMPLETE CBC W/AUTO DIFF WBC: CPT | Performed by: INTERNAL MEDICINE

## 2023-04-04 PROCEDURE — 80053 COMPREHEN METABOLIC PANEL: CPT | Performed by: INTERNAL MEDICINE

## 2023-04-04 PROCEDURE — 74177 CT ABD & PELVIS W/CONTRAST: CPT

## 2023-04-04 PROCEDURE — 82565 ASSAY OF CREATININE: CPT

## 2023-04-04 PROCEDURE — 25510000001 IOPAMIDOL PER 1 ML: Performed by: INTERNAL MEDICINE

## 2023-04-04 PROCEDURE — 84436 ASSAY OF TOTAL THYROXINE: CPT | Performed by: INTERNAL MEDICINE

## 2023-04-04 RX ORDER — SODIUM CHLORIDE 0.9 % (FLUSH) 0.9 %
20 SYRINGE (ML) INJECTION AS NEEDED
Status: DISCONTINUED | OUTPATIENT
Start: 2023-04-04 | End: 2023-04-05 | Stop reason: HOSPADM

## 2023-04-04 RX ORDER — SODIUM CHLORIDE 0.9 % (FLUSH) 0.9 %
20 SYRINGE (ML) INJECTION AS NEEDED
OUTPATIENT
Start: 2023-04-04

## 2023-04-04 RX ADMIN — Medication 20 ML: at 12:59

## 2023-04-04 RX ADMIN — IOPAMIDOL 100 ML: 755 INJECTION, SOLUTION INTRAVENOUS at 12:41

## 2023-04-04 NOTE — PROGRESS NOTES
After CT scan completed .Patient brought to my area for port to be flushed. Port flushed with 20cc Normal Saline and Olson Needle removed. Sterile gauze and band-aid applied. Patient aware of next appointment.

## 2023-04-04 NOTE — PROGRESS NOTES
"Pt to the clinic for Keytruda.  Patient reports that Dr. Simmons told him \"that his last echo showed his heart function dropped and the he believes his treatment is the cause.\"  Echo has been requested from [facility] by WILBERT Angulo.  Office report reviewed with Dr. Elizondo.  Orders given to hold treatment today.  Have patient get CT as planned today and follow up with Dr. Elizondo next week as scheduled. Port accessed and flushed with good blood return noted. 10cc of blood wasted prior to specimen collection. Blood specimen obtained and sent to lab for processing per protocol.  Port left accessed for CT use. Pt notified and v/u  "

## 2023-04-12 ENCOUNTER — OFFICE VISIT (OUTPATIENT)
Dept: ONCOLOGY | Facility: CLINIC | Age: 73
End: 2023-04-12
Payer: MEDICARE

## 2023-04-12 VITALS
TEMPERATURE: 98.2 F | SYSTOLIC BLOOD PRESSURE: 128 MMHG | WEIGHT: 150.8 LBS | BODY MASS INDEX: 24.23 KG/M2 | DIASTOLIC BLOOD PRESSURE: 62 MMHG | OXYGEN SATURATION: 99 % | HEIGHT: 66 IN | HEART RATE: 74 BPM | RESPIRATION RATE: 16 BRPM

## 2023-04-12 DIAGNOSIS — C16.0 MALIGNANT NEOPLASM OF CARDIA: Primary | ICD-10-CM

## 2023-04-12 PROCEDURE — 3078F DIAST BP <80 MM HG: CPT | Performed by: INTERNAL MEDICINE

## 2023-04-12 PROCEDURE — 1126F AMNT PAIN NOTED NONE PRSNT: CPT | Performed by: INTERNAL MEDICINE

## 2023-04-12 PROCEDURE — 99214 OFFICE O/P EST MOD 30 MIN: CPT | Performed by: INTERNAL MEDICINE

## 2023-04-12 PROCEDURE — 3074F SYST BP LT 130 MM HG: CPT | Performed by: INTERNAL MEDICINE

## 2023-05-01 NOTE — PROGRESS NOTES
Hematology-Oncology Follow-up Note       Artie Mc  1950    Primary Care Physician: Chanel Carter APRN  Referring Physician: Chanel Carter APRN    Reason For Visit:  Chief Complaint   Patient presents with   • Follow-up     Esophageal cancer, stage IV     Metastatic esophageal cancer  Monitoring for adverse effects related to immunotherapy.  Hypothyroidism    HPI:   Mr. Mc is a pleasant 72 y.o. gentleman with a history of gastroesophageal reflux disease, diabetes, hypertension, heart block status post pacemaker placement and history of CABG.  He was having symptoms of dysphagia, weight loss since September 2016. He was reporting symptoms of food getting stuck in the lower chest.  He has transitioned himself to a liquid diet.  The patient reports losing about 30 pounds over this duration.  The patient underwent upper GI endoscopy on 12/20/16 with Dr. Esvin Barrera.  Endoscopy showed necrotic, circumferential and moderately obstructive mass at the distal esophagus between 42 cm and 36 cm.      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  There was also evidence of mild subacute inflammation in the duodenum.  Dr. Barrera ordered a CT scan of the chest and abdomen with contrast on 12/20/16.  The scan was done at [facility].  The scan showed a new ill-defined mass in the anterior wall of the distal esophagus at the GE junction measuring 2.3 x 2.2 x 1.6 cm, worrisome for cancer.  There were four subcentimeter liver lesions which had appeared stable in comparison to another CT scan from 2009 and they likely represent small cysts.  There was also mild lymphadenopathy in the gastrohepatic ligament.  There were at least six borderline enlarged lymph nodes measuring up to 1.3 x 1 cm in the region.  The patient is referred to us for further oncological evaluation.    1/5/2017 - The patient presents for initial  consultation.  He reports persistent dysphagia and also has symptoms of regurgitation.  He cannot tolerate solid foods and is dependent on liquid diet such as Ensure.  He also reports fatigue. Symptoms of dysphagia have been present for the past four months.  When he eats any solids, the food gets stuck in the lower chest.    • 1/5/17 - Vitamin B12 200 (L).  CEA 0.8.  Ferritin 35.  Iron saturation 16% (L), serum iron 56, TIBC 354.  Creatinine 0.9.  LFTs normal.  Folate 10.2.  • 1/12/17 - PET/CT scan:  Intense abnormal activity corresponding to the region of the GE junction and proximal stomach.  SUV is 11.06.  There is a suggestion of an abnormal mass or abnormal wall thickening in the region measuring 3.9 x 4.8 cm.  No additional abnormalities.  No evidence of metastases or other lymphadenopathy.  Scattered nonspecific subcentimeter lymph nodes involving the mediastinum and within the central upper abdomen.  • 1/13/17 - Creatinine 0.9.  LFTs normal.    • 1/17/17 - Patient seen by thoracic surgeon, Dr. Bradley.  PET scan was reviewed.  He has recommended neoadjuvant chemoradiation therapy followed by [place] Gurwinder esophagogastrectomy.  Port-A-Cath is being arranged.    • 1/20/17 - WBC 6.3, hemoglobin 12.1, platelet count 198,000, MCV 86.4.  • 1/30/17 - Patient started weekly Carboplatin and Taxol (Carboplatin AUC 2 and Taxol 50 mg/M2).  Patient received Injectafer 750 mg.  WBC 5.8, hemoglobin 12.4, platelet count 244,000.   • 1/31/17 - Patient seen by Dr. Eduardo Oleary.  The plan was to give 5040 cGy in 28 fractions.   • 2/6/17 - WBC 4.4, hemoglobin 11.6, platelet count 201,000.  Patient received cycle 2 of weekly Carboplatin and Taxol (Carboplatin AUC 2 and Taxol 50 mg/M2).  • 2/13/17 - Patient received Carboplatin and Taxol weekly cycle 3.  • 2/13/17 - Patient started concurrent radiation therapy.  Total planned dose is 4140 cGy.    • 2/13/17 to 2/15/17 - Patient received 4140 cGy of neoadjuvant radiation.   Radiation was finished on 3/15/17.    • 2/20/17 - WBC 1.8, hemoglobin 10.6, platelet count 217,000.  Creatinine 0.5. Folate 15 (L).  Ferritin 361.  Haptoglobin 214.  Iron saturation 33%, TIBC 263, serum iron 88.    • 2/20/17 - Patient received Carboplatin and Taxol weekly cycle 4.   • 2/27/17 - WBC 3.7, hemoglobin 10.7, platelet count 177,000, MCV 85.3.    • 3/6/17 - Patient has received 16 out of the 23 planned radiation treatments.  Total planned dose is 4140 cGy.    • 3/6/17 - WBC 5.4, hemoglobin 11.2, platelet count 113,000.  • 3/6/17 - Patient received cycle 5 of weekly Carboplatin and Taxol.   • 3/13/17 - Patient received cycle 6 of weekly Carboplatin and Taxol.  WBC 3.9, hemoglobin 11.3, platelet count 93,000.     • 3/20/17 - WBC 1.4, hemoglobin 10.0, platelet count 118,000, MCV 86.1   • 3/27/17 - WBC 3.8, hemoglobin 9.5, platelet count 176,000, MCV 87.3.    • 4/7/17 - Stress test:  No evidence of reversible myocardial ischemia.  Normal left ventricular ejection fraction of 50%.    • 4/17/17 - Upper GI endoscopy by Dr. 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  The GE junction adenocarcinoma lesion seems to have a very good response to chemotherapy and radiation.  The lumen was open.  • 5/1/17 - WBC 7.0, hemoglobin 10.2, platelet count 175,000.    • 5/4/17 - Patient underwent [place] Gurwinder esophagectomy with pyloroplasty, feeding jejunostomy tube, abdominal and mediastinal lymph node dissection.    Surgical Pathology:  Moderately to poorly differentiated adenocarcinoma measuring 1.3 cm at the GE junction.  Resection margins are negative for malignancy.  Tumor margins are negative.  There is effective treatment response.  No evidence of lymphovascular invasion.  Staging is ypT3,pN1.  One out of fourteen lymph nodes involved.   • 5/22/17 - WBC 6.8, hemoglobin 10.3, platelet count 312,000.    • 6/19/17 - WBC 5.7, hemoglobin 10.5, platelet count 204,000, MCV 92.1.     • 7/6/17 - EGD:  Status post balloon dilation of esophageal stricture.  • 7/12/17 - EGD with balloon dilation of esophageal stricture.  • 7/26/17 - EGD and balloon dilation of esophageal stricture.  • 7/31/17 - PET/CT scan:  There is mild metabolic activity seen at the thoracic esophagus just at and below the surgical margins.  Some mild wall thickening.  This could be from recent surgery.  A residual cancer seems unlikely.  There is a precarinal lymph node with mild metabolic activity with SUV of 3 measuring 1 x 1.3 cm.  Again this appears to be nonspecific in my opinion.    • 8/7/17 - WBC 5.9, hemoglobin 11.9, platelet count 171,000, MCV 88.7.    • 10/19/17 - WBC 7.5, hemoglobin 11.8, platelet count 216,000.    • 1/8/18 - PET scan:  No evidence of residual disease or recurrent disease.  There is an esophageal stent in the mid esophagus with a large debris air level.  No evidence of any metastases in the chest, abdomen or pelvis.  Mild nodule uptake in the vocal cords with fullness in the right vocal cord.  This could be physiologic.    • 7/9/18 - CT scan of chest with contrast:  Prominent mediastinal lymph nodes appear stable since February.  Changes of gastric pull-through procedure for esophageal cancer again noted.  Tree-in-bud infiltrates in the left lung base, consistent with infection versus inflammation.    • 1/10/19 - CT chest, abdomen and pelvis with contrast:  No evidence of mets in the chest; however, interval development of metastatic lymphadenopathy in the left side of the retroperitoneum.  For instance, there is a 17 mm rounded lymph node, 10 mm lymph node and also a 14 mm lymph node.  These are all new.  Stable 9 mm hypodense lesion within the right hepatic lobe, which could reflect hemangioma or complex cyst.    • 1/30/19 - CT-guided core biopsy of retroperitoneal lymph node:  Poorly differentiated adenocarcinoma consistent with metastases from patient’s known esophageal primary.  CK20 positive,  CDX2 positive, cytokeratin 7 negative, TTF-1 negative.    • 2/1/19 - Creatinine 0.9, BUN 18, calcium 9.3; these are normal.    • 2/28/19 - [facility] Next Gen sequencing testing on retroperitoneal lymph node specimen:  PD-L1 positive.  CPS = 3.  This implies responsiveness to pembrolizumab.  Mismatch repair status is proficient (no evidence of microsatellite instability).  Tumor mutational burden is low = 6 mutations/Mb.  CDH1 indeterminate.  KRAS mutation positive.  TP53 mutation positive.  CDK6 amplified.  [employer] PD-L1 testing by IHC:  PD-L1 expression 1% positive (CPS =1).  HER2/marvel by IHC is negative.  HER2/marvel by CISH not amplified.  Microsatellite stable tumor.    • 3/7/19 - WBC 7.3, hemoglobin 12, platelet count 128,000, MCV 92.     • 3/14/19 - Patient was seen by radiation oncologist, Dr. Chahal.  He has recommended observation versus systemic chemoimmunotherapy as needed.  No plans for radiation therapy.   • 5/1/19 - CT scan of chest with contrast:  No definitive findings of new metastatic disease in the chest.  Emphysema noted.    • 5/1/19 - CT abdomen and pelvis:  There is interval progression of metastatic retroperitoneal adenopathy.  Left periaortic adenopathy with lymph node measuring up to 2 cm, previously 1.7 cm.  Another lymph node now measures 2.3, previously 1.4 cm.  New enlarged lymph node on image 147 measures 1.4 cm, previously 0.4 cm.  A 9 mm hypodense right hepatic lobe lesion appears stable.    • 5/9/19 - Decision made to start patient on immunotherapy Keytruda.    • 5/9/19 - Vitamin B12 229.  Ferritin 61.  Folate 14.3.  Iron saturation 26%, TIBC 304, serum iron 79.  • 5/31/19 - Patient received Keytruda 200 mg cycle 1, day 1.    • 5/31/19 - Free T4 0.99.  Creatinine 0.9, BUN 9.  LFTs normal.  TSH 3.99.  Folate 14.3.  Iron saturation 26%.    • 6/21/2019 patient received Keytruda cycle 2  • 7/12/2019: Patient received Keytruda cycle 3  • 8/2/2019 patient received cycle 4 Keytruda WBC 7.2,  hemoglobin 11.8 platelet count 163  • 8/23/2019 creatinine 0.9, LFTs normal, WBC 6.4, hemoglobin 11.5, platelets 179, TSH 2.1   • 8/23/2019 patient received cycle 5 of Keytruda  • 9/3/2019 CT chest abdomen and pelvis with contrast: . Positive response to therapy in the abdomen since 05/01/2019. Pathologically enlarged retroperitoneal lymph nodes, predominantly in the left periaortic region, have diminished in size.a 1.3 x 2.1 cm left periaortic node (image 60), previously measured 3.5 x 2.3 cm. A 1.6 cm index node near the level of the left renal vein previously measured 2.0 cm No new or progressive adenopathy. 2. Stable findings in the chest. Noncalcified right upper lobe nodules are unchanged. There is no evidence of new or progressive metastatic disease within the chest. 3. 8 mm indeterminate low-density lesion in the right hepatic lobe, is stable. No new liver lesions.  • 9/6/2019 WBC 5.6, hemoglobin 11.2, platelets of 192, MCV 91.8  • 9/13/2019 patient received Keytruda cycle 6, albumin 3.2  • 10/4/2019 patient received cycle 7 of Keytruda, TSH 4.8, free T4 0.86  • 10/9/2019 WBC 5.8, hemoglobin 11.7, platelets 174  • 10/25/2019 patient received cycle 8 of Keytruda.  WBC 5.9, hemoglobin 11.5, platelets 152, creatinine 0.75, cortisol 15.2, TSH 6.48 high , free T4 1.13 normal  • 11/6/2019 WBC 6.6, hemoglobin 11.8, MCV 91.5, platelets 187  • 11/15/2019 patient received cycle 9 of Keytruda, WBC 6.6, hemoglobin 11.9, platelets 161, cortisol level 8.86, TSH 2.68, free T3 2.8, free T4 1.5  • 12/4/2019 WBC 6.9, hemoglobin 12.0, platelets 180, MCV 92.9  • 12/06/2019-Keytruda Cycle 10  • 12/27/2019- Cycle 11 LFTs normal, WBC 6.7, hemoglobin 11.4, platelets 168, MCV 93, TSH 2.4,  • 1/28/2020: CT chest abdomen and pelvis with contrast:  Single (aortic lymph node in the abdominal retroperitoneum has increased.  Rest of the retroperitoneal lymph nodes have decreased in size.  Mediastinal adenopathy appears unchanged.   Noncalcified bilateral pulmonary nodules are stable  • 1/17/2020 patient received Keytruda cycle 12:.  • 2/3/2020 WBC 7.1, hemoglobin 12.2, platelets 171  • 02/10/2020 patient received cycle 13 of Keytruda: WBC 7.3, hemoglobin 12.1, platelets 166, creatinine 0.82  • 3/6/2020 patient is of Keytruda 200 mg, hemoglobin 10.8  • 3/27/2020 patient received Keytruda 200 mg, hemoglobin 11.4, TSH 1.95  • 4/17/2020 patient received Keytruda cycle 16 200 mg, WBC 6.1, hemoglobin 11.6, platelets 150  • 5/8/2020: Patient received cycle 17 Keytruda, WBC 6.3, hemoglobin 9.6, platelets 137, TSH 2.05, free T4 1.54, creatinine 0.93, LFTs normal,  • 5/29/2020: Patient received cycle 18 Keytruda, WBC 6.5, hemoglobin 11.7, platelets 129, creatinine 0.83, TSH 1.69  • 6/26/2020: Patient receiving Keytruda.   • 7/17/2020: TSH 2.06, WBC 5.7, hemoglobin 11.4, platelets 177, MCV 92.4, Keytruda 200 mg cycle 20  • 7/27/2020: CT chest abdomen pelvis with contrast: Stable findings in the chest abdomen pelvis since 1/28/2020.  Noncalcified bilateral pulmonary nodules are stable.  Stable retroperitoneal left perinephric adenopathy in the abdomen.  • 8/7/2020: Patient received cycle 21 of Keytruda  • 8/28/2020 patient is a cycle 22 of Keytruda.  WBC 6.02, hemoglobin 11.3, platelets 161  • 9/11/2020 WBC 6.9, hemoglobin 12.4, platelets 192  • 9/18/2020: Cycle 23 of Keytruda, TSH 1.19  • 10/9/2020: Cycle 24 of Keytruda, WBC 5.9, hemoglobin 11.9, platelets 178, creatinine 0.84, TSH 2.56, free T3 2.4, CMP normal  • 10/23/2020: WBC 7.34, hemoglobin 11.6, platelets 175  • 10/30/2020:.  Keytruda 200 mg  • 11/20/2020: Keytruda 200 mg  • 12/11/2020 Keytruda 200 mg.  WBC 5.5, hemoglobin 11.4, platelets 176, creatinine 0.8, LFTs normal, TSH 2.2, cortisol 8.3  • 12/10/2020: CT chest abdomen and pelvis with contrast: Previously seen left para-aortic lymph nodes within the abdomen are decreased in size.  No pathological lymphadenopathy.  No evidence of residual  malignancy..  Small noncalcified pulmonary nodules are stable.  No new nodules.  Stable mediastinal lymph nodes.  • 3/12/2021: WBC 6.7, hemoglobin 11.3, platelets 17  • 3/12/2021: CMP normal, TSH 2.9, cortisol 11.95, 1  • 3/15/2021: CT chest with contrast: Stable findings in the chest with postsurgical changes of esophageal resection and gastric pull-through.  Stable right lower lobe lung nodule measures 11 mm.  Stable size and appearance of the mediastinal and upper abdominal lymph nodes.  • 3/23/2021: Doppler of upper extremity: Normal.  • 3/24/2021: CT abdomen and pelvis with contrast: Postoperative changes.  Left periaortic nodes are stable.  • 4/12/2021: TSH 4.1, cortisol 12.4  • 6/22/2021: CMP normal,Cortisol 10.08,, WBC 5.8, hemoglobin 11.2, platelets 109, TSH 2.59  • 7/12/2021: CT scan of the chest abdomen pelvis with contrast: Dominant 10 x 10 left periaortic lymph node is stable since 3/22/2021.  Dominant lymph nodes in the right lower paratracheal and AP window distribution are stable since 3/15/2021.  No evidence of progressive adenopathy in the chest abdomen or pelvis.    • 01/11/2022 -PET/CT findings consistent with new cedric metastatic disease in the retroperitoneum.  2 new pathologically enlarged hypermetabolic retroperitoneal lymph nodes.  No convincing evidence of recurrent disease within the chest neck pelvis or skeleton.  • 1/20/2022 -CT-guided needle biopsy consistent with poorly differentiated carcinoma.  CDX2 positive consistent with metastases from known esophageal primary  • 2/9/2022 - Pembrolizumab complicated with fatigue, joint pains, nausea, diarrhea. Symptoms decrease in intensity over a week.  • 3/30/2022 -CT chest and pelvis with stable metastatic lymph nodes in the left periaortic retroperitoneum.  No evidence of disease progression or new lesions.  Stable lymphadenopathy in the distal paratracheal mediastinal area  • 4/13/2022 - Keytruda 200  • 5/4/2022 - Keytruda 200  • Continued  keytruda  • 7/11/2022 - CT abdomen pelvis with decrease in size of left perioaortic lymph node. No other areas of disease.  • Continues to be on Keytruda  • 10/18/2022 - stable CT imaging  • 1/20/2023 - CT imaging with stable disease. No significant change.  • Patient was hospitalized with esophagitis, subsequent EGD with improvement, no recurrence of disease.  • 2/21/2023 - Keytruda  • CT CAP with increased retroperitoneal adenopathy.   • ECHO with EF 35-40% (concern for immunotherapy related )immunotherapy held with ongoing investigation for cause.  • 4/4/2023 - CT abdomen pelvis with interval retroperitoneal lymph node enlargement compatible with metastatic adenopathyl.    Subjective:    Shortness of breath has improved. No other new symptoms.     Past Medical History:   Diagnosis Date   • B12 deficiency    • Blockage of coronary artery of heart    • Depression    • DM (diabetes mellitus)    • Dysphagia    • GERD (gastroesophageal reflux disease)    • HTN (hypertension)    • Hyperlipidemia        Past Surgical History:   Procedure Laterality Date   • ABDOMINAL WALL ABSCESS INCISION AND DRAINAGE  10/22/2018    WITH DEBRIDEMENT  1.5CM X 1.5 CM X 1.5 CM    DR. PURI   • CATARACT EXTRACTION  2018   • COLONOSCOPY     • CORONARY ARTERY BYPASS GRAFT  2015   • ENDOSCOPY  12/20/2016   • ENDOSCOPY N/A 1/2/2023    Procedure: ESOPHAGOGASTRODUODENOSCOPY;  Surgeon: Esvin Morgan MD;  Location: [facility] ENDOSCOPY;  Service: Gastroenterology;  Laterality: N/A;  post: anastamosis stricture   • ENDOSCOPY N/A 2/9/2023    Procedure: ESOPHAGOGASTRODUODENOSCOPY, non-wire guided balloon dilation (12-14mm) of esophageal stricture;  Surgeon: Tino Villafana MD;  Location: [facility] ENDOSCOPY;  Service: Gastroenterology;  Laterality: N/A;  post: esophageal anastamotic stricture, post surgical changes   • ESOPHAGECTOMY  05/04/2017    BROOKS PETTY ESOPHAGECTOMY, FEEDING JEJNOSTOMOY, ABDOMINAL AND MEDIASTINAL LYMPH NODE DISECTION,  PEDICLED MUSCLE FLAP COVERAGE DR. PURI   • ESOPHAGOSCOPY / EGD  07/12/2017    WITH BALLOON DILATION   • ESOPHAGOSCOPY / EGD  07/26/2017    WITH BALLOON DILATION   • ESOPHAGOSCOPY / EGD  08/11/2017    WITH BALLOON DILATION   • ESOPHAGOSCOPY / EGD  08/28/2017    WITH BALLOON DILATION   • ESOPHAGOSCOPY / EGD  09/27/2017    WITH BALLOON DILATION   • LYMPH NODE BIOPSY  01/30/2019   • PACEMAKER IMPLANTATION  11/21/2016       Current Outpatient Medications:   •  clonazePAM (KlonoPIN) 0.5 MG tablet, Take 0.25 mg by mouth 2 (Two) Times a Day As Needed for Anxiety., Disp: , Rfl: 1  •  cyanocobalamin (VITAMIN B-12) 1000 MCG tablet, Take 1 tablet by mouth Daily., Disp: , Rfl:   •  folic acid (FOLVITE) 1 MG tablet, Take 1 tablet by mouth Daily., Disp: , Rfl:   •  levothyroxine (Euthyrox) 50 MCG tablet, Take 1 tablet by mouth Daily., Disp: 90 tablet, Rfl: 0  •  metFORMIN (GLUCOPHAGE) 1000 MG tablet, Take 2 tablets by mouth Daily., Disp: , Rfl:   •  metoprolol succinate XL (TOPROL-XL) 25 MG 24 hr tablet, Take 1 tablet by mouth Daily., Disp: 90 tablet, Rfl: 3  •  ondansetron (ZOFRAN) 4 MG tablet, Take 1 tablet by mouth Every 8 (Eight) Hours As Needed for Nausea or Vomiting., Disp: , Rfl:   •  pantoprazole (PROTONIX) 40 MG EC tablet, Take 1 tablet by mouth 2 (Two) Times a Day., Disp: , Rfl:   •  pravastatin (PRAVACHOL) 10 MG tablet, Take 1 tablet by mouth Every Other Day., Disp: , Rfl:   •  spironolactone (ALDACTONE) 25 MG tablet, Take 0.5 tablets by mouth Daily., Disp: 90 tablet, Rfl: 3  No current facility-administered medications for this visit.    Facility-Administered Medications Ordered in Other Visits:   •  sodium chloride 0.9 % flush 20 mL, 20 mL, Intravenous, PRN, Jean Pierre Elizondo MD, 20 mL at 05/02/23 1156    Allergies   Allergen Reactions   • Ace Inhibitors Unknown (See Comments)     Unknown reaction     • Heparin Other (See Comments)     Fever/chills, weakness in legs   • Sulfa Antibiotics Other (See Comments)     Mouth  "ran       Family History   Problem Relation Age of Onset   • Mental illness Mother    • Heart disease Mother    • Hypertension Father    • Cancer Father    • Heart disease Sister    • Heart disease Brother      Cancer-related family history includes Cancer in his father.    Social History     Tobacco Use   • Smoking status: Former     Packs/day: 2.00     Years: 50.00     Pack years: 100.00     Types: Cigarettes     Quit date: 2015     Years since quittin.8   • Smokeless tobacco: Former     Types: Chew     Quit date: 1989   Vaping Use   • Vaping Use: Never used   Substance Use Topics   • Alcohol use: No   • Drug use: No     ROS:     Objective:    Vitals:    23 1208   BP: 157/77   Pulse: 82   Resp: 16   Temp: 98.2 °F (36.8 °C)   SpO2: 98%   Weight: 69.2 kg (152 lb 9.6 oz)   Height: 167.6 cm (66\")   PainSc: 0-No pain       (1) Restricted in physically strenuous activity, ambulatory and able to do work of light nature    Physical Exam:     Physical Exam   Constitutional: He is oriented to person, place, and time. He appears well-developed. No distress.   HENT:   Head: Normocephalic and atraumatic.   Nose: Nose normal.   Mouth/Throat: Mucous membranes are moist.   Eyes: Pupils are equal, round, and reactive to light.   Neck: No thyromegaly present.   Cardiovascular: Normal rate, regular rhythm, normal heart sounds and normal pulses. Exam reveals no gallop and no friction rub.   Pulmonary/Chest: Effort normal. He has rhonchi.   Abdominal: Soft. Normal appearance and bowel sounds are normal.   No tenderness   Musculoskeletal: Normal range of motion. No deformity or signs of injury.      Right lower leg: No edema.   Neurological: He is alert and oriented to person, place, and time. He exhibits normal muscle tone.   Skin: Skin is warm and dry. No rash noted. He is not diaphoretic. No erythema. No jaundice.   Right side chest  port       Lab Results - Last 18 Months   Lab Units 23  1033 " 02/21/23  0937 01/30/23  1122   WBC 10*3/mm3 5.30 5.45 5.70   HEMOGLOBIN g/dL 10.0* 10.1* 9.6*   HEMATOCRIT % 31.1* 31.1* 30.4*   PLATELETS 10*3/mm3 220 224 192   MCV fL 88.6 92.0 97.4*     Lab Results - Last 18 Months   Lab Units 04/04/23  1232 04/04/23  1033 02/21/23  0937 01/10/23  1108   SODIUM mmol/L  --  140 136 139   POTASSIUM mmol/L  --  4.6 4.2 4.4   CHLORIDE mmol/L  --  107 103 103   CO2 mmol/L  --  21.0* 20.0* 24.0   BUN mg/dL  --  16 12 17   CREATININE mg/dL 0.80 0.76 0.77 0.75*   CALCIUM mg/dL  --  9.0 8.8 8.5*   BILIRUBIN mg/dL  --  0.2 0.2 0.2   ALK PHOS U/L  --  36* 45 38*   ALT (SGPT) U/L  --  8 7 8   AST (SGOT) U/L  --  18 17 16   GLUCOSE mg/dL  --  111* 164* 91     Assessment & Plan     Assessment:    Metastatic esophageal carcinoma:    Patient has a history of GE junction poorly differentiated adenocarcinoma for which he had chemoradiation followed by [place] Gurwinder resection:  Pathology showed ypT3,pN1 disease.  Tumor was Stage IIIA, diagnosed in 2016.  He received concurrent chemoradiation with weekly Carboplatin and Taxol and radiation finishing on 3/15/17.  CT scan on 1/10/19 showed new retroperitoneal lymph nodes.  These lymph nodes were biopsied on 1/30/19 and it shows metastatic esophageal cancer.  [facility] Next Gen testing was performed on the sample and it shows KRAS mutation, microsatellite stable tumor.  PD-L1 is positive.  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  HER2/marvel (ERBB2) was not amplified and was negative.  His CT scan on 5/1/19 shows evidence of progression.  Patient has started receiving Keytruda on 5/31/19.  He has received 19 cycles so far.   CT scan on 1/28/2020 shows improvement and continued response.  CT scan chest abdomen pelvis with contrast on 12/10/2020 does not show any evidence of disease progression.  On 12/22/2020 decision made to hold Keytruda per patient's request to get a treatment break.. Repeat CT scan chest 7/12/2021 does not show any evidence of disease progression.  Now  CT imaging in January 2021 with a CT showing increased abdominal left para-aortic adenopathy.  Subsequent PET/CT with new cedric metastatic disease in the retroperitoneum of the abdomen.  New pathologically enlarged hypermetabolic retroperitoneal lymph nodes.  This would be concerning for disease recurrence/progression.  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  I discussed with him the treatment options going forward.  He has had a good response with immunotherapy pembrolizumab in the past. This was  restarted. subsequent imaging with treatment response. 1/2023 imaging with good response. Continued treatment  Now with increased adenopathy 2 new lymph nodes. Holding immunotherapy for now.   Consider getting cardiac MR discussed with Dr. Culp. While immunotherapy on hold, increased adenopathy is concerning. Could be pseudoprogression vs true progression. PET CT will be ordered. Discussed switching treatment to FOLFOX. Side effects discussed in detail with the patient. HE is agreeable to proceed with the same.     Shortness of breath  Improved, has occasional shortness of breath, likely copd  I have recommended using albuterol  Symptoms have improved since stopping immunotherapy.    Reflux/heartburn/dysphagia:   Symptoms improved.  Also has a history of strictures. Status post EGD and balloon dilation.  Omeprazole once daily, famotidine as needed, symptoms stable, continue  Pain could be related to gastritis, will monitor for now, this has resolved.    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  Continues on Synthroid     Mild diarrhea:  diarrhea seems to be waxing and waning in nature.  Related to immunotherapy, but not severe. Improved continue to monitor    PD-L1 positive, HER2 negative, p53 and KRAS positive.  Repeat Caris testing with repeat biopsy with no target mutations.    B12 deficiency: Continue B12 1000 mcg twice daily.    Anemia - Hb improved and stable.    F/u after chemotherapy  start.    Time spent on encounter including record review, history taking, exam, discussion, counseling and documentation at: 42 minutes       Jean Pierre Elizondo MD 05/02/23     Orders Placed This Encounter   Procedures   • NM PET/CT Skull Base to Mid Thigh     Standing Status:   Future     Standing Expiration Date:   5/2/2024     Scheduling Instructions:      Schedule ASAP     Order Specific Question:   Release to patient     Answer:   Routine Release     Order Specific Question:   What radiopharmaceutical is preferred for this exam?     Answer:   FDG  (offered at all sites)

## 2023-05-02 ENCOUNTER — HOSPITAL ENCOUNTER (OUTPATIENT)
Dept: ONCOLOGY | Facility: HOSPITAL | Age: 73
Discharge: HOME | End: 2023-05-02
Admitting: INTERNAL MEDICINE
Payer: MEDICARE

## 2023-05-02 ENCOUNTER — OFFICE VISIT (OUTPATIENT)
Dept: ONCOLOGY | Facility: CLINIC | Age: 73
End: 2023-05-02
Payer: MEDICARE

## 2023-05-02 VITALS
RESPIRATION RATE: 16 BRPM | WEIGHT: 152.6 LBS | TEMPERATURE: 98.2 F | BODY MASS INDEX: 24.53 KG/M2 | HEIGHT: 66 IN | DIASTOLIC BLOOD PRESSURE: 77 MMHG | OXYGEN SATURATION: 98 % | HEART RATE: 82 BPM | SYSTOLIC BLOOD PRESSURE: 157 MMHG

## 2023-05-02 DIAGNOSIS — C16.0 MALIGNANT NEOPLASM OF CARDIA: Primary | ICD-10-CM

## 2023-05-02 DIAGNOSIS — C15.9 ESOPHAGEAL CANCER, STAGE IV: Primary | ICD-10-CM

## 2023-05-02 DIAGNOSIS — C16.0 MALIGNANT NEOPLASM OF CARDIA: ICD-10-CM

## 2023-05-02 PROCEDURE — 96523 IRRIG DRUG DELIVERY DEVICE: CPT

## 2023-05-02 RX ORDER — SODIUM CHLORIDE 0.9 % (FLUSH) 0.9 %
20 SYRINGE (ML) INJECTION AS NEEDED
Status: DISCONTINUED | OUTPATIENT
Start: 2023-05-02 | End: 2023-05-03 | Stop reason: HOSPADM

## 2023-05-02 RX ADMIN — Medication 20 ML: at 11:56

## 2023-05-05 ENCOUNTER — HOSPITAL ENCOUNTER (OUTPATIENT)
Dept: PET IMAGING | Facility: HOSPITAL | Age: 73
Discharge: HOME | End: 2023-05-05
Payer: MEDICARE

## 2023-05-05 DIAGNOSIS — C16.0 MALIGNANT NEOPLASM OF CARDIA: ICD-10-CM

## 2023-05-05 DIAGNOSIS — C15.9 ESOPHAGEAL CANCER, STAGE IV: ICD-10-CM

## 2023-05-05 LAB — GLUCOSE BLDC GLUCOMTR-MCNC: 118 MG/DL (ref 70–105)

## 2023-05-05 PROCEDURE — 82948 REAGENT STRIP/BLOOD GLUCOSE: CPT

## 2023-05-05 PROCEDURE — 0 FLUDEOXYGLUCOSE F18 SOLUTION: Performed by: INTERNAL MEDICINE

## 2023-05-05 PROCEDURE — A9552 F18 FDG: HCPCS | Performed by: INTERNAL MEDICINE

## 2023-05-05 PROCEDURE — 78815 PET IMAGE W/CT SKULL-THIGH: CPT

## 2023-05-05 RX ADMIN — FLUDEOXYGLUCOSE F18 1 DOSE: 300 INJECTION INTRAVENOUS at 09:20

## 2023-05-09 ENCOUNTER — OUTSIDE FACILITY SERVICE (OUTPATIENT)
Dept: CARDIOLOGY | Facility: CLINIC | Age: 73
End: 2023-05-09
Payer: MEDICARE

## 2023-05-09 DIAGNOSIS — M79.89 SWELLING OF BOTH LOWER EXTREMITIES: Primary | ICD-10-CM

## 2023-05-09 DIAGNOSIS — R00.1 BRADYCARDIA, UNSPECIFIED: ICD-10-CM

## 2023-05-09 DIAGNOSIS — I42.0 CARDIOMYOPATHY, DILATED: ICD-10-CM

## 2023-05-09 DIAGNOSIS — R00.2 PALPITATIONS: ICD-10-CM

## 2023-05-09 PROCEDURE — 99214 OFFICE O/P EST MOD 30 MIN: CPT | Performed by: INTERNAL MEDICINE

## 2023-05-10 ENCOUNTER — OFFICE VISIT (OUTPATIENT)
Dept: ONCOLOGY | Facility: CLINIC | Age: 73
End: 2023-05-10
Payer: MEDICARE

## 2023-05-10 ENCOUNTER — HOSPITAL ENCOUNTER (OUTPATIENT)
Dept: ONCOLOGY | Facility: HOSPITAL | Age: 73
Discharge: HOME | End: 2023-05-10
Payer: MEDICARE

## 2023-05-10 VITALS
RESPIRATION RATE: 16 BRPM | WEIGHT: 148.4 LBS | HEIGHT: 66 IN | DIASTOLIC BLOOD PRESSURE: 72 MMHG | BODY MASS INDEX: 23.85 KG/M2 | SYSTOLIC BLOOD PRESSURE: 156 MMHG | OXYGEN SATURATION: 99 % | HEART RATE: 77 BPM | TEMPERATURE: 97.8 F

## 2023-05-10 DIAGNOSIS — D50.9 IRON DEFICIENCY ANEMIA, UNSPECIFIED IRON DEFICIENCY ANEMIA TYPE: ICD-10-CM

## 2023-05-10 DIAGNOSIS — C15.9 ESOPHAGEAL CANCER, STAGE IV: Primary | ICD-10-CM

## 2023-05-10 PROCEDURE — 99214 OFFICE O/P EST MOD 30 MIN: CPT | Performed by: INTERNAL MEDICINE

## 2023-05-10 PROCEDURE — 3077F SYST BP >= 140 MM HG: CPT | Performed by: INTERNAL MEDICINE

## 2023-05-10 PROCEDURE — 1126F AMNT PAIN NOTED NONE PRSNT: CPT | Performed by: INTERNAL MEDICINE

## 2023-05-10 PROCEDURE — 3078F DIAST BP <80 MM HG: CPT | Performed by: INTERNAL MEDICINE

## 2023-05-10 NOTE — PROGRESS NOTES
Hematology-Oncology Follow-up Note       Artie Mc  1950    Primary Care Physician: Chanel Carter, DAT  Referring Physician: No ref. provider found    Reason For Visit:  Chief Complaint   Patient presents with   • Follow-up     Esophageal cancer, stage IV     Metastatic esophageal cancer  Monitoring for adverse effects related to immunotherapy.  Hypothyroidism    HPI:   Mr. Mc is a pleasant 73 y.o. gentleman with a history of gastroesophageal reflux disease, diabetes, hypertension, heart block status post pacemaker placement and history of CABG.  He was having symptoms of dysphagia, weight loss since September 2016. He was reporting symptoms of food getting stuck in the lower chest.  He has transitioned himself to a liquid diet.  The patient reports losing about 30 pounds over this duration.  The patient underwent upper GI endoscopy on 12/20/16 with Dr. Esvin Barrera.  Endoscopy showed necrotic, circumferential and moderately obstructive mass at the distal esophagus between 42 cm and 36 cm.      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  There was also evidence of mild subacute inflammation in the duodenum.  Dr. Barrera ordered a CT scan of the chest and abdomen with contrast on 12/20/16.  The scan was done at [facility].  The scan showed a new ill-defined mass in the anterior wall of the distal esophagus at the GE junction measuring 2.3 x 2.2 x 1.6 cm, worrisome for cancer.  There were four subcentimeter liver lesions which had appeared stable in comparison to another CT scan from 2009 and they likely represent small cysts.  There was also mild lymphadenopathy in the gastrohepatic ligament.  There were at least six borderline enlarged lymph nodes measuring up to 1.3 x 1 cm in the region.  The patient is referred to us for further oncological evaluation.    1/5/2017 - The patient presents for initial  consultation.  He reports persistent dysphagia and also has symptoms of regurgitation.  He cannot tolerate solid foods and is dependent on liquid diet such as Ensure.  He also reports fatigue. Symptoms of dysphagia have been present for the past four months.  When he eats any solids, the food gets stuck in the lower chest.    • 1/5/17 - Vitamin B12 200 (L).  CEA 0.8.  Ferritin 35.  Iron saturation 16% (L), serum iron 56, TIBC 354.  Creatinine 0.9.  LFTs normal.  Folate 10.2.  • 1/12/17 - PET/CT scan:  Intense abnormal activity corresponding to the region of the GE junction and proximal stomach.  SUV is 11.06.  There is a suggestion of an abnormal mass or abnormal wall thickening in the region measuring 3.9 x 4.8 cm.  No additional abnormalities.  No evidence of metastases or other lymphadenopathy.  Scattered nonspecific subcentimeter lymph nodes involving the mediastinum and within the central upper abdomen.  • 1/13/17 - Creatinine 0.9.  LFTs normal.    • 1/17/17 - Patient seen by thoracic surgeon, Dr. Bradley.  PET scan was reviewed.  He has recommended neoadjuvant chemoradiation therapy followed by Alston Gurwinder esophagogastrectomy.  Port-A-Cath is being arranged.    • 1/20/17 - WBC 6.3, hemoglobin 12.1, platelet count 198,000, MCV 86.4.  • 1/30/17 - Patient started weekly Carboplatin and Taxol (Carboplatin AUC 2 and Taxol 50 mg/M2).  Patient received Injectafer 750 mg.  WBC 5.8, hemoglobin 12.4, platelet count 244,000.   • 1/31/17 - Patient seen by Dr. Eduardo Oleary.  The plan was to give 5040 cGy in 28 fractions.   • 2/6/17 - WBC 4.4, hemoglobin 11.6, platelet count 201,000.  Patient received cycle 2 of weekly Carboplatin and Taxol (Carboplatin AUC 2 and Taxol 50 mg/M2).  • 2/13/17 - Patient received Carboplatin and Taxol weekly cycle 3.  • 2/13/17 - Patient started concurrent radiation therapy.  Total planned dose is 4140 cGy.    • 2/13/17 to 2/15/17 - Patient received 4140 cGy of neoadjuvant radiation.   Radiation was finished on 3/15/17.    • 2/20/17 - WBC 1.8, hemoglobin 10.6, platelet count 217,000.  Creatinine 0.5. Folate 15 (L).  Ferritin 361.  Haptoglobin 214.  Iron saturation 33%, TIBC 263, serum iron 88.    • 2/20/17 - Patient received Carboplatin and Taxol weekly cycle 4.   • 2/27/17 - WBC 3.7, hemoglobin 10.7, platelet count 177,000, MCV 85.3.    • 3/6/17 - Patient has received 16 out of the 23 planned radiation treatments.  Total planned dose is 4140 cGy.    • 3/6/17 - WBC 5.4, hemoglobin 11.2, platelet count 113,000.  • 3/6/17 - Patient received cycle 5 of weekly Carboplatin and Taxol.   • 3/13/17 - Patient received cycle 6 of weekly Carboplatin and Taxol.  WBC 3.9, hemoglobin 11.3, platelet count 93,000.     • 3/20/17 - WBC 1.4, hemoglobin 10.0, platelet count 118,000, MCV 86.1   • 3/27/17 - WBC 3.8, hemoglobin 9.5, platelet count 176,000, MCV 87.3.    • 4/7/17 - Stress test:  No evidence of reversible myocardial ischemia.  Normal left ventricular ejection fraction of 50%.    • 4/17/17 - Upper GI endoscopy by Dr. 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  The GE junction adenocarcinoma lesion seems to have a very good response to chemotherapy and radiation.  The lumen was open.  • 5/1/17 - WBC 7.0, hemoglobin 10.2, platelet count 175,000.    • 5/4/17 - Patient underwent Noel Gurwinder esophagectomy with pyloroplasty, feeding jejunostomy tube, abdominal and mediastinal lymph node dissection.    Surgical Pathology:  Moderately to poorly differentiated adenocarcinoma measuring 1.3 cm at the GE junction.  Resection margins are negative for malignancy.  Tumor margins are negative.  There is effective treatment response.  No evidence of lymphovascular invasion.  Staging is ypT3,pN1.  One out of fourteen lymph nodes involved.   • 5/22/17 - WBC 6.8, hemoglobin 10.3, platelet count 312,000.    • 6/19/17 - WBC 5.7, hemoglobin 10.5, platelet count 204,000, MCV 92.1.     • 7/6/17 - EGD:  Status post balloon dilation of esophageal stricture.  • 7/12/17 - EGD with balloon dilation of esophageal stricture.  • 7/26/17 - EGD and balloon dilation of esophageal stricture.  • 7/31/17 - PET/CT scan:  There is mild metabolic activity seen at the thoracic esophagus just at and below the surgical margins.  Some mild wall thickening.  This could be from recent surgery.  A residual cancer seems unlikely.  There is a precarinal lymph node with mild metabolic activity with SUV of 3 measuring 1 x 1.3 cm.  Again this appears to be nonspecific in my opinion.    • 8/7/17 - WBC 5.9, hemoglobin 11.9, platelet count 171,000, MCV 88.7.    • 10/19/17 - WBC 7.5, hemoglobin 11.8, platelet count 216,000.    • 1/8/18 - PET scan:  No evidence of residual disease or recurrent disease.  There is an esophageal stent in the mid esophagus with a large debris air level.  No evidence of any metastases in the chest, abdomen or pelvis.  Mild nodule uptake in the vocal cords with fullness in the right vocal cord.  This could be physiologic.    • 7/9/18 - CT scan of chest with contrast:  Prominent mediastinal lymph nodes appear stable since February.  Changes of gastric pull-through procedure for esophageal cancer again noted.  Tree-in-bud infiltrates in the left lung base, consistent with infection versus inflammation.    • 1/10/19 - CT chest, abdomen and pelvis with contrast:  No evidence of mets in the chest; however, interval development of metastatic lymphadenopathy in the left side of the retroperitoneum.  For instance, there is a 17 mm rounded lymph node, 10 mm lymph node and also a 14 mm lymph node.  These are all new.  Stable 9 mm hypodense lesion within the right hepatic lobe, which could reflect hemangioma or complex cyst.    • 1/30/19 - CT-guided core biopsy of retroperitoneal lymph node:  Poorly differentiated adenocarcinoma consistent with metastases from patient’s known esophageal primary.  CK20 positive,  CDX2 positive, cytokeratin 7 negative, TTF-1 negative.    • 2/1/19 - Creatinine 0.9, BUN 18, calcium 9.3; these are normal.    • 2/28/19 - [facility] Next Gen sequencing testing on retroperitoneal lymph node specimen:  PD-L1 positive.  CPS = 3.  This implies responsiveness to pembrolizumab.  Mismatch repair status is proficient (no evidence of microsatellite instability).  Tumor mutational burden is low = 6 mutations/Mb.  CDH1 indeterminate.  KRAS mutation positive.  TP53 mutation positive.  CDK6 amplified.  [employer] PD-L1 testing by IHC:  PD-L1 expression 1% positive (CPS =1).  HER2/marvel by IHC is negative.  HER2/marvel by CISH not amplified.  Microsatellite stable tumor.    • 3/7/19 - WBC 7.3, hemoglobin 12, platelet count 128,000, MCV 92.     • 3/14/19 - Patient was seen by radiation oncologist, Dr. Chahal.  He has recommended observation versus systemic chemoimmunotherapy as needed.  No plans for radiation therapy.   • 5/1/19 - CT scan of chest with contrast:  No definitive findings of new metastatic disease in the chest.  Emphysema noted.    • 5/1/19 - CT abdomen and pelvis:  There is interval progression of metastatic retroperitoneal adenopathy.  Left periaortic adenopathy with lymph node measuring up to 2 cm, previously 1.7 cm.  Another lymph node now measures 2.3, previously 1.4 cm.  New enlarged lymph node on image 147 measures 1.4 cm, previously 0.4 cm.  A 9 mm hypodense right hepatic lobe lesion appears stable.    • 5/9/19 - Decision made to start patient on immunotherapy Keytruda.    • 5/9/19 - Vitamin B12 229.  Ferritin 61.  Folate 14.3.  Iron saturation 26%, TIBC 304, serum iron 79.  • 5/31/19 - Patient received Keytruda 200 mg cycle 1, day 1.    • 5/31/19 - Free T4 0.99.  Creatinine 0.9, BUN 9.  LFTs normal.  TSH 3.99.  Folate 14.3.  Iron saturation 26%.    • 6/21/2019 patient received Keytruda cycle 2  • 7/12/2019: Patient received Keytruda cycle 3  • 8/2/2019 patient received cycle 4 Keytruda WBC 7.2,  hemoglobin 11.8 platelet count 163  • 8/23/2019 creatinine 0.9, LFTs normal, WBC 6.4, hemoglobin 11.5, platelets 179, TSH 2.1   • 8/23/2019 patient received cycle 5 of Keytruda  • 9/3/2019 CT chest abdomen and pelvis with contrast: . Positive response to therapy in the abdomen since 05/01/2019. Pathologically enlarged retroperitoneal lymph nodes, predominantly in the left periaortic region, have diminished in size.a 1.3 x 2.1 cm left periaortic node (image 60), previously measured 3.5 x 2.3 cm. A 1.6 cm index node near the level of the left renal vein previously measured 2.0 cm No new or progressive adenopathy. 2. Stable findings in the chest. Noncalcified right upper lobe nodules are unchanged. There is no evidence of new or progressive metastatic disease within the chest. 3. 8 mm indeterminate low-density lesion in the right hepatic lobe, is stable. No new liver lesions.  • 9/6/2019 WBC 5.6, hemoglobin 11.2, platelets of 192, MCV 91.8  • 9/13/2019 patient received Keytruda cycle 6, albumin 3.2  • 10/4/2019 patient received cycle 7 of Keytruda, TSH 4.8, free T4 0.86  • 10/9/2019 WBC 5.8, hemoglobin 11.7, platelets 174  • 10/25/2019 patient received cycle 8 of Keytruda.  WBC 5.9, hemoglobin 11.5, platelets 152, creatinine 0.75, cortisol 15.2, TSH 6.48 high , free T4 1.13 normal  • 11/6/2019 WBC 6.6, hemoglobin 11.8, MCV 91.5, platelets 187  • 11/15/2019 patient received cycle 9 of Keytruda, WBC 6.6, hemoglobin 11.9, platelets 161, cortisol level 8.86, TSH 2.68, free T3 2.8, free T4 1.5  • 12/4/2019 WBC 6.9, hemoglobin 12.0, platelets 180, MCV 92.9  • 12/06/2019-Keytruda Cycle 10  • 12/27/2019- Cycle 11 LFTs normal, WBC 6.7, hemoglobin 11.4, platelets 168, MCV 93, TSH 2.4,  • 1/28/2020: CT chest abdomen and pelvis with contrast:  Single (aortic lymph node in the abdominal retroperitoneum has increased.  Rest of the retroperitoneal lymph nodes have decreased in size.  Mediastinal adenopathy appears unchanged.   Noncalcified bilateral pulmonary nodules are stable  • 1/17/2020 patient received Keytruda cycle 12:.  • 2/3/2020 WBC 7.1, hemoglobin 12.2, platelets 171  • 02/10/2020 patient received cycle 13 of Keytruda: WBC 7.3, hemoglobin 12.1, platelets 166, creatinine 0.82  • 3/6/2020 patient is of Keytruda 200 mg, hemoglobin 10.8  • 3/27/2020 patient received Keytruda 200 mg, hemoglobin 11.4, TSH 1.95  • 4/17/2020 patient received Keytruda cycle 16 200 mg, WBC 6.1, hemoglobin 11.6, platelets 150  • 5/8/2020: Patient received cycle 17 Keytruda, WBC 6.3, hemoglobin 9.6, platelets 137, TSH 2.05, free T4 1.54, creatinine 0.93, LFTs normal,  • 5/29/2020: Patient received cycle 18 Keytruda, WBC 6.5, hemoglobin 11.7, platelets 129, creatinine 0.83, TSH 1.69  • 6/26/2020: Patient receiving Keytruda.   • 7/17/2020: TSH 2.06, WBC 5.7, hemoglobin 11.4, platelets 177, MCV 92.4, Keytruda 200 mg cycle 20  • 7/27/2020: CT chest abdomen pelvis with contrast: Stable findings in the chest abdomen pelvis since 1/28/2020.  Noncalcified bilateral pulmonary nodules are stable.  Stable retroperitoneal left perinephric adenopathy in the abdomen.  • 8/7/2020: Patient received cycle 21 of Keytruda  • 8/28/2020 patient is a cycle 22 of Keytruda.  WBC 6.02, hemoglobin 11.3, platelets 161  • 9/11/2020 WBC 6.9, hemoglobin 12.4, platelets 192  • 9/18/2020: Cycle 23 of Keytruda, TSH 1.19  • 10/9/2020: Cycle 24 of Keytruda, WBC 5.9, hemoglobin 11.9, platelets 178, creatinine 0.84, TSH 2.56, free T3 2.4, CMP normal  • 10/23/2020: WBC 7.34, hemoglobin 11.6, platelets 175  • 10/30/2020:.  Keytruda 200 mg  • 11/20/2020: Keytruda 200 mg  • 12/11/2020 Keytruda 200 mg.  WBC 5.5, hemoglobin 11.4, platelets 176, creatinine 0.8, LFTs normal, TSH 2.2, cortisol 8.3  • 12/10/2020: CT chest abdomen and pelvis with contrast: Previously seen left para-aortic lymph nodes within the abdomen are decreased in size.  No pathological lymphadenopathy.  No evidence of residual  malignancy..  Small noncalcified pulmonary nodules are stable.  No new nodules.  Stable mediastinal lymph nodes.  • 3/12/2021: WBC 6.7, hemoglobin 11.3, platelets 17  • 3/12/2021: CMP normal, TSH 2.9, cortisol 11.95, 1  • 3/15/2021: CT chest with contrast: Stable findings in the chest with postsurgical changes of esophageal resection and gastric pull-through.  Stable right lower lobe lung nodule measures 11 mm.  Stable size and appearance of the mediastinal and upper abdominal lymph nodes.  • 3/23/2021: Doppler of upper extremity: Normal.  • 3/24/2021: CT abdomen and pelvis with contrast: Postoperative changes.  Left periaortic nodes are stable.  • 4/12/2021: TSH 4.1, cortisol 12.4  • 6/22/2021: CMP normal,Cortisol 10.08,, WBC 5.8, hemoglobin 11.2, platelets 109, TSH 2.59  • 7/12/2021: CT scan of the chest abdomen pelvis with contrast: Dominant 10 x 10 left periaortic lymph node is stable since 3/22/2021.  Dominant lymph nodes in the right lower paratracheal and AP window distribution are stable since 3/15/2021.  No evidence of progressive adenopathy in the chest abdomen or pelvis.    • 01/11/2022 -PET/CT findings consistent with new cedric metastatic disease in the retroperitoneum.  2 new pathologically enlarged hypermetabolic retroperitoneal lymph nodes.  No convincing evidence of recurrent disease within the chest neck pelvis or skeleton.  • 1/20/2022 -CT-guided needle biopsy consistent with poorly differentiated carcinoma.  CDX2 positive consistent with metastases from known esophageal primary  • 2/9/2022 - Pembrolizumab complicated with fatigue, joint pains, nausea, diarrhea. Symptoms decrease in intensity over a week.  • 3/30/2022 -CT chest and pelvis with stable metastatic lymph nodes in the left periaortic retroperitoneum.  No evidence of disease progression or new lesions.  Stable lymphadenopathy in the distal paratracheal mediastinal area  • 4/13/2022 - Keytruda 200  • 5/4/2022 - Keytruda 200  • Continued  keytruda  • 7/11/2022 - CT abdomen pelvis with decrease in size of left perioaortic lymph node. No other areas of disease.  • Continues to be on Keytruda  • 10/18/2022 - stable CT imaging  • 1/20/2023 - CT imaging with stable disease. No significant change.  • Patient was hospitalized with esophagitis, subsequent EGD with improvement, no recurrence of disease.  • 2/21/2023 - Keytruda  • CT CAP with increased retroperitoneal adenopathy.   • ECHO with EF 35-40% (concern for immunotherapy related )immunotherapy held with ongoing investigation for cause.  • 4/4/2023 - CT abdomen pelvis with interval retroperitoneal lymph node enlargement compatible with metastatic adenopathyl.  • 5/5/2023 - PET CT with mixed response. No clear evidence of progression    Subjective:    Patients shortness of breath continues to be improved. Has seen cardiology plan for eCHO.    Past Medical History:   Diagnosis Date   • B12 deficiency    • Blockage of coronary artery of heart    • Depression    • DM (diabetes mellitus)    • Dysphagia    • GERD (gastroesophageal reflux disease)    • HTN (hypertension)    • Hyperlipidemia        Past Surgical History:   Procedure Laterality Date   • ABDOMINAL WALL ABSCESS INCISION AND DRAINAGE  10/22/2018    WITH DEBRIDEMENT  1.5CM X 1.5 CM X 1.5 CM    DR. PURI   • CATARACT EXTRACTION  2018   • COLONOSCOPY     • CORONARY ARTERY BYPASS GRAFT  2015   • ENDOSCOPY  12/20/2016   • ENDOSCOPY N/A 1/2/2023    Procedure: ESOPHAGOGASTRODUODENOSCOPY;  Surgeon: Esvin Morgan MD;  Location: [facility] ENDOSCOPY;  Service: Gastroenterology;  Laterality: N/A;  post: anastamosis stricture   • ENDOSCOPY N/A 2/9/2023    Procedure: ESOPHAGOGASTRODUODENOSCOPY, non-wire guided balloon dilation (12-14mm) of esophageal stricture;  Surgeon: Tino Villafana MD;  Location: [facility] ENDOSCOPY;  Service: Gastroenterology;  Laterality: N/A;  post: esophageal anastamotic stricture, post surgical changes   • ESOPHAGECTOMY   05/04/2017    BROOKS PETTY ESOPHAGECTOMY, FEEDING JEJNOSTOMOY, ABDOMINAL AND MEDIASTINAL LYMPH NODE DISECTION, PEDICLED MUSCLE FLAP COVERAGE DR. PURI   • ESOPHAGOSCOPY / EGD  07/12/2017    WITH BALLOON DILATION   • ESOPHAGOSCOPY / EGD  07/26/2017    WITH BALLOON DILATION   • ESOPHAGOSCOPY / EGD  08/11/2017    WITH BALLOON DILATION   • ESOPHAGOSCOPY / EGD  08/28/2017    WITH BALLOON DILATION   • ESOPHAGOSCOPY / EGD  09/27/2017    WITH BALLOON DILATION   • LYMPH NODE BIOPSY  01/30/2019   • PACEMAKER IMPLANTATION  11/21/2016       Current Outpatient Medications:   •  clonazePAM (KlonoPIN) 0.5 MG tablet, Take 0.25 mg by mouth 2 (Two) Times a Day As Needed for Anxiety., Disp: , Rfl: 1  •  cyanocobalamin (VITAMIN B-12) 1000 MCG tablet, Take 1 tablet by mouth Daily., Disp: , Rfl:   •  folic acid (FOLVITE) 1 MG tablet, Take 1 tablet by mouth Daily., Disp: , Rfl:   •  levothyroxine (Euthyrox) 50 MCG tablet, Take 1 tablet by mouth Daily., Disp: 90 tablet, Rfl: 0  •  metFORMIN (GLUCOPHAGE) 1000 MG tablet, Take 2 tablets by mouth Daily., Disp: , Rfl:   •  metoprolol succinate XL (TOPROL-XL) 25 MG 24 hr tablet, Take 1 tablet by mouth Daily., Disp: 90 tablet, Rfl: 3  •  ondansetron (ZOFRAN) 4 MG tablet, Take 1 tablet by mouth Every 8 (Eight) Hours As Needed for Nausea or Vomiting., Disp: , Rfl:   •  pantoprazole (PROTONIX) 40 MG EC tablet, Take 1 tablet by mouth 2 (Two) Times a Day., Disp: , Rfl:   •  pravastatin (PRAVACHOL) 10 MG tablet, Take 1 tablet by mouth Every Other Day., Disp: , Rfl:   •  spironolactone (ALDACTONE) 25 MG tablet, Take 0.5 tablets by mouth Daily., Disp: 90 tablet, Rfl: 3    Allergies   Allergen Reactions   • Ace Inhibitors Unknown (See Comments)     Unknown reaction     • Heparin Other (See Comments)     Fever/chills, weakness in legs   • Sulfa Antibiotics Other (See Comments)     Mouth sores       Family History   Problem Relation Age of Onset   • Mental illness Mother    • Heart disease Mother    •  "Hypertension Father    • Cancer Father    • Heart disease Sister    • Heart disease Brother      Cancer-related family history includes Cancer in his father.    Social History     Tobacco Use   • Smoking status: Former     Packs/day: 2.00     Years: 50.00     Pack years: 100.00     Types: Cigarettes     Quit date: 2015     Years since quittin.8   • Smokeless tobacco: Former     Types: Chew     Quit date: 1989   Vaping Use   • Vaping Use: Never used   Substance Use Topics   • Alcohol use: No   • Drug use: No     ROS:     Objective:    Vitals:    05/10/23 0858   BP: 156/72   Pulse: 77   Resp: 16   Temp: 97.8 °F (36.6 °C)   SpO2: 99%   Weight: 67.3 kg (148 lb 6.4 oz)   Height: 167.6 cm (66\")   PainSc: 0-No pain       (1) Restricted in physically strenuous activity, ambulatory and able to do work of light nature    Physical Exam:     Physical Exam   Constitutional: He is oriented to person, place, and time. He appears well-developed. No distress.   HENT:   Head: Normocephalic and atraumatic.   Nose: Nose normal.   Mouth/Throat: Mucous membranes are moist.   Eyes: Pupils are equal, round, and reactive to light.   Neck: No thyromegaly present.   Cardiovascular: Normal rate, regular rhythm, normal heart sounds and normal pulses. Exam reveals no gallop and no friction rub.   Pulmonary/Chest: Effort normal and breath sounds normal.   Abdominal: Soft. Normal appearance and bowel sounds are normal.   No tenderness   Musculoskeletal: Normal range of motion. No deformity or signs of injury.      Right lower leg: No edema.   Neurological: He is alert and oriented to person, place, and time. He exhibits normal muscle tone.   Skin: Skin is warm and dry. No rash noted. He is not diaphoretic. No erythema. No jaundice.   Right side chest  port       Lab Results - Last 18 Months   Lab Units 23  1033 23  0937 23  1122   WBC 10*3/mm3 5.30 5.45 5.70   HEMOGLOBIN g/dL 10.0* 10.1* 9.6*   HEMATOCRIT % 31.1* " 31.1* 30.4*   PLATELETS 10*3/mm3 220 224 192   MCV fL 88.6 92.0 97.4*     Lab Results - Last 18 Months   Lab Units 04/04/23  1232 04/04/23  1033 02/21/23  0937 01/10/23  1108   SODIUM mmol/L  --  140 136 139   POTASSIUM mmol/L  --  4.6 4.2 4.4   CHLORIDE mmol/L  --  107 103 103   CO2 mmol/L  --  21.0* 20.0* 24.0   BUN mg/dL  --  16 12 17   CREATININE mg/dL 0.80 0.76 0.77 0.75*   CALCIUM mg/dL  --  9.0 8.8 8.5*   BILIRUBIN mg/dL  --  0.2 0.2 0.2   ALK PHOS U/L  --  36* 45 38*   ALT (SGPT) U/L  --  8 7 8   AST (SGOT) U/L  --  18 17 16   GLUCOSE mg/dL  --  111* 164* 91     Assessment & Plan     Assessment:    Metastatic esophageal carcinoma:    Patient has a history of GE junction poorly differentiated adenocarcinoma for which he had chemoradiation followed by Noel Gurwinder resection:  Pathology showed ypT3,pN1 disease.  Tumor was Stage IIIA, diagnosed in 2016.  He received concurrent chemoradiation with weekly Carboplatin and Taxol and radiation finishing on 3/15/17.  CT scan on 1/10/19 showed new retroperitoneal lymph nodes.  These lymph nodes were biopsied on 1/30/19 and it shows metastatic esophageal cancer.  [facility] Next Gen testing was performed on the sample and it shows KRAS mutation, microsatellite stable tumor.  PD-L1 is positive.  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  HER2/marvel (ERBB2) was not amplified and was negative.  His CT scan on 5/1/19 shows evidence of progression.  Patient has started receiving Keytruda on 5/31/19.  He has received 19 cycles so far.   CT scan on 1/28/2020 shows improvement and continued response.  CT scan chest abdomen pelvis with contrast on 12/10/2020 does not show any evidence of disease progression.  On 12/22/2020 decision made to hold Keytruda per patient's request to get a treatment break.. Repeat CT scan chest 7/12/2021 does not show any evidence of disease progression.  Now CT imaging in January 2021 with a CT showing increased abdominal left para-aortic adenopathy.  Subsequent PET/CT  with new cedric metastatic disease in the retroperitoneum of the abdomen.  New pathologically enlarged hypermetabolic retroperitoneal lymph nodes.  This would be concerning for disease recurrence/progression.  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  I discussed with him the treatment options going forward.  He has had a good response with immunotherapy pembrolizumab in the past. This was  restarted. subsequent imaging with treatment response. 1/2023 imaging with good response. Continued treatment  Now with increased adenopathy 2 new lymph nodes. Holding immunotherapy for now.   Consider getting cardiac MR discussed with Dr. Culp. While immunotherapy on hold, increased adenopathy is concerning. Could be pseudoprogression vs true progression. PET CT reviewed could indicate pseudoprogression since he has had a good response to immunotherapy I would recommend continuing the same, will need to get cardiac workup first. Echo has been ordered and is pending. Will plan on resuming after that patient understands the risks vs benefits.     Shortness of breath  Improved, has occasional shortness of breath, likely copd  I have recommended using albuterol  Symptoms have improved since stopping immunotherapy. Questionable pneumonitis will monitor closely with immunotherapy  Cardiology has evaluated for myocarditis related to immunotherapy however this is rare. Echo pending.     Reflux/heartburn/dysphagia:   Symptoms improved.  Also has a history of strictures. Status post EGD and balloon dilation.  Omeprazole once daily, famotidine as needed, symptoms stable, continue  Pain could be related to gastritis, will monitor for now, this has resolved.    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  Continues on Synthroid     Mild diarrhea:  diarrhea seems to be waxing and waning in nature.  Related to immunotherapy, but not severe. Improved continue to monitor    PD-L1 positive, HER2 negative, p53 and KRAS  positive.  Repeat Caris testing with repeat biopsy with no target mutations.    B12 deficiency: Continue B12 1000 mcg twice daily.    Anemia - Hb improved and stable.    F/u after immunotherapy start       Jean Pierre Elizondo MD 05/10/23     No orders of the defined types were placed in this encounter.

## 2023-05-17 ENCOUNTER — HOSPITAL ENCOUNTER (OUTPATIENT)
Dept: CARDIOLOGY | Facility: HOSPITAL | Age: 73
Discharge: HOME | End: 2023-05-17
Admitting: INTERNAL MEDICINE
Payer: MEDICARE

## 2023-05-17 VITALS
DIASTOLIC BLOOD PRESSURE: 78 MMHG | BODY MASS INDEX: 23.78 KG/M2 | HEART RATE: 66 BPM | HEIGHT: 66 IN | SYSTOLIC BLOOD PRESSURE: 144 MMHG | WEIGHT: 148 LBS

## 2023-05-17 DIAGNOSIS — R00.2 PALPITATIONS: ICD-10-CM

## 2023-05-17 DIAGNOSIS — R00.1 BRADYCARDIA, UNSPECIFIED: ICD-10-CM

## 2023-05-17 DIAGNOSIS — M79.89 SWELLING OF BOTH LOWER EXTREMITIES: ICD-10-CM

## 2023-05-17 DIAGNOSIS — Z95.0 PRESENCE OF CARDIAC PACEMAKER: ICD-10-CM

## 2023-05-17 PROCEDURE — 93306 TTE W/DOPPLER COMPLETE: CPT

## 2023-05-18 DIAGNOSIS — Z95.0 PRESENCE OF CARDIAC PACEMAKER: Primary | ICD-10-CM

## 2023-05-18 DIAGNOSIS — I42.0 DILATED CARDIOMYOPATHY: ICD-10-CM

## 2023-05-18 DIAGNOSIS — I42.0 CARDIOMYOPATHY, DILATED: ICD-10-CM

## 2023-05-18 LAB
BH CV ECHO MEAS - ACS: 1.99 CM
BH CV ECHO MEAS - AI P1/2T: 464.8 MSEC
BH CV ECHO MEAS - AO MAX PG: 7.9 MMHG
BH CV ECHO MEAS - AO MEAN PG: 4 MMHG
BH CV ECHO MEAS - AO ROOT DIAM: 3.6 CM
BH CV ECHO MEAS - AO V2 MAX: 140.8 CM/SEC
BH CV ECHO MEAS - AO V2 VTI: 28.2 CM
BH CV ECHO MEAS - AVA(I,D): 2.04 CM2
BH CV ECHO MEAS - EDV(CUBED): 189.5 ML
BH CV ECHO MEAS - EDV(MOD-SP4): 123.5 ML
BH CV ECHO MEAS - EF(MOD-BP): 44 %
BH CV ECHO MEAS - EF(MOD-SP4): 43.7 %
BH CV ECHO MEAS - ESV(CUBED): 83.1 ML
BH CV ECHO MEAS - ESV(MOD-SP4): 69.5 ML
BH CV ECHO MEAS - FS: 24 %
BH CV ECHO MEAS - IVS/LVPW: 0.87 CM
BH CV ECHO MEAS - IVSD: 1.05 CM
BH CV ECHO MEAS - LA DIMENSION: 3.6 CM
BH CV ECHO MEAS - LV DIASTOLIC VOL/BSA (35-75): 70.2 CM2
BH CV ECHO MEAS - LV MASS(C)D: 269.4 GRAMS
BH CV ECHO MEAS - LV MAX PG: 2.7 MMHG
BH CV ECHO MEAS - LV MEAN PG: 1.23 MMHG
BH CV ECHO MEAS - LV SYSTOLIC VOL/BSA (12-30): 39.5 CM2
BH CV ECHO MEAS - LV V1 MAX: 81.9 CM/SEC
BH CV ECHO MEAS - LV V1 VTI: 15.3 CM
BH CV ECHO MEAS - LVIDD: 5.7 CM
BH CV ECHO MEAS - LVIDS: 4.4 CM
BH CV ECHO MEAS - LVOT AREA: 3.8 CM2
BH CV ECHO MEAS - LVOT DIAM: 2.19 CM
BH CV ECHO MEAS - LVPWD: 1.21 CM
BH CV ECHO MEAS - MR MAX PG: 140.7 MMHG
BH CV ECHO MEAS - MR MAX VEL: 593.1 CM/SEC
BH CV ECHO MEAS - MV A MAX VEL: 119.8 CM/SEC
BH CV ECHO MEAS - MV DEC SLOPE: 668.5 CM/SEC2
BH CV ECHO MEAS - MV DEC TIME: 0.15 MSEC
BH CV ECHO MEAS - MV E MAX VEL: 102.4 CM/SEC
BH CV ECHO MEAS - MV E/A: 0.85
BH CV ECHO MEAS - MV MAX PG: 5.2 MMHG
BH CV ECHO MEAS - MV MEAN PG: 2.15 MMHG
BH CV ECHO MEAS - MV V2 VTI: 29.2 CM
BH CV ECHO MEAS - MVA(VTI): 1.97 CM2
BH CV ECHO MEAS - PA ACC TIME: 0.08 SEC
BH CV ECHO MEAS - PA PR(ACCEL): 43.2 MMHG
BH CV ECHO MEAS - PA V2 MAX: 69.6 CM/SEC
BH CV ECHO MEAS - PI END-D VEL: 131 CM/SEC
BH CV ECHO MEAS - PULM DIAS VEL: 36.2 CM/SEC
BH CV ECHO MEAS - PULM S/D: 1.61
BH CV ECHO MEAS - PULM SYS VEL: 58.4 CM/SEC
BH CV ECHO MEAS - RV MAX PG: 1.4 MMHG
BH CV ECHO MEAS - RV V1 MAX: 59.1 CM/SEC
BH CV ECHO MEAS - RV V1 VTI: 14.3 CM
BH CV ECHO MEAS - RVDD: 2.29 CM
BH CV ECHO MEAS - SI(MOD-SP4): 30.6 ML/M2
BH CV ECHO MEAS - SV(LVOT): 57.6 ML
BH CV ECHO MEAS - SV(MOD-SP4): 53.9 ML
BH CV ECHO MEAS - TR MAX PG: 10.6 MMHG
BH CV ECHO MEAS - TR MAX VEL: 156.7 CM/SEC
MAXIMAL PREDICTED HEART RATE: 146 BPM
STRESS TARGET HR: 124 BPM

## 2023-05-22 DIAGNOSIS — R79.89 ELEVATED TSH: ICD-10-CM

## 2023-05-22 DIAGNOSIS — E03.9 HYPOTHYROIDISM (ACQUIRED): ICD-10-CM

## 2023-05-22 RX ORDER — LEVOTHYROXINE SODIUM 0.05 MG/1
TABLET ORAL
Qty: 90 TABLET | Refills: 0 | Status: SHIPPED | OUTPATIENT
Start: 2023-05-22

## 2023-05-23 RX ORDER — HEPARIN SODIUM (PORCINE) LOCK FLUSH IV SOLN 100 UNIT/ML 100 UNIT/ML
500 SOLUTION INTRAVENOUS AS NEEDED
OUTPATIENT
Start: 2023-05-23

## 2023-05-23 RX ORDER — SODIUM CHLORIDE 0.9 % (FLUSH) 0.9 %
20 SYRINGE (ML) INJECTION AS NEEDED
OUTPATIENT
Start: 2023-05-23

## 2023-06-02 ENCOUNTER — HOSPITAL ENCOUNTER (OUTPATIENT)
Dept: ONCOLOGY | Facility: HOSPITAL | Age: 73
Discharge: HOME | End: 2023-06-02

## 2023-06-02 VITALS
RESPIRATION RATE: 16 BRPM | HEART RATE: 75 BPM | SYSTOLIC BLOOD PRESSURE: 143 MMHG | HEIGHT: 66 IN | DIASTOLIC BLOOD PRESSURE: 74 MMHG | BODY MASS INDEX: 23.78 KG/M2 | WEIGHT: 148 LBS | TEMPERATURE: 97.3 F | OXYGEN SATURATION: 99 %

## 2023-06-02 DIAGNOSIS — C15.5 PRIMARY MALIGNANT NEOPLASM OF LOWER THIRD OF ESOPHAGUS: ICD-10-CM

## 2023-06-02 DIAGNOSIS — C16.0 MALIGNANT NEOPLASM OF CARDIA: Primary | ICD-10-CM

## 2023-06-02 LAB
ALBUMIN SERPL-MCNC: 4 G/DL (ref 3.5–5.2)
ALBUMIN/GLOB SERPL: 1.7 G/DL
ALP SERPL-CCNC: 33 U/L (ref 39–117)
ALT SERPL W P-5'-P-CCNC: 5 U/L (ref 1–41)
ANION GAP SERPL CALCULATED.3IONS-SCNC: 10 MMOL/L (ref 5–15)
AST SERPL-CCNC: 13 U/L (ref 1–40)
BASOPHILS # BLD AUTO: 0.04 10*3/MM3 (ref 0–0.2)
BASOPHILS NFR BLD AUTO: 0.7 % (ref 0–1.5)
BILIRUB SERPL-MCNC: 0.3 MG/DL (ref 0–1.2)
BUN SERPL-MCNC: 16 MG/DL (ref 8–23)
BUN/CREAT SERPL: 17.8 (ref 7–25)
CALCIUM SPEC-SCNC: 8.8 MG/DL (ref 8.6–10.5)
CHLORIDE SERPL-SCNC: 108 MMOL/L (ref 98–107)
CO2 SERPL-SCNC: 20 MMOL/L (ref 22–29)
CREAT SERPL-MCNC: 0.9 MG/DL (ref 0.76–1.27)
DEPRECATED RDW RBC AUTO: 50.2 FL (ref 37–54)
EGFRCR SERPLBLD CKD-EPI 2021: 89.6 ML/MIN/1.73
EOSINOPHIL # BLD AUTO: 0.11 10*3/MM3 (ref 0–0.4)
EOSINOPHIL NFR BLD AUTO: 1.9 % (ref 0.3–6.2)
ERYTHROCYTE [DISTWIDTH] IN BLOOD BY AUTOMATED COUNT: 16.6 % (ref 12.3–15.4)
GLOBULIN UR ELPH-MCNC: 2.3 GM/DL
GLUCOSE BLDC GLUCOMTR-MCNC: 109 MG/DL (ref 70–105)
GLUCOSE SERPL-MCNC: 120 MG/DL (ref 65–99)
HCT VFR BLD AUTO: 30.3 % (ref 37.5–51)
HGB BLD-MCNC: 9.9 G/DL (ref 13–17.7)
LYMPHOCYTES # BLD AUTO: 1.4 10*3/MM3 (ref 0.7–3.1)
LYMPHOCYTES NFR BLD AUTO: 24.7 % (ref 19.6–45.3)
MCH RBC QN AUTO: 28 PG (ref 26.6–33)
MCHC RBC AUTO-ENTMCNC: 32.7 G/DL (ref 31.5–35.7)
MCV RBC AUTO: 85.8 FL (ref 79–97)
MONOCYTES # BLD AUTO: 0.69 10*3/MM3 (ref 0.1–0.9)
MONOCYTES NFR BLD AUTO: 12.2 % (ref 5–12)
NEUTROPHILS NFR BLD AUTO: 3.43 10*3/MM3 (ref 1.7–7)
NEUTROPHILS NFR BLD AUTO: 60.5 % (ref 42.7–76)
PLATELET # BLD AUTO: 222 10*3/MM3 (ref 140–450)
PMV BLD AUTO: 10.5 FL (ref 6–12)
POTASSIUM SERPL-SCNC: 4.9 MMOL/L (ref 3.5–5.2)
PROT SERPL-MCNC: 6.3 G/DL (ref 6–8.5)
RBC # BLD AUTO: 3.53 10*6/MM3 (ref 4.14–5.8)
SODIUM SERPL-SCNC: 138 MMOL/L (ref 136–145)
T4 FREE SERPL-MCNC: 1.35 NG/DL (ref 0.93–1.7)
TROPONIN T SERPL HS-MCNC: 12 NG/L
TSH SERPL DL<=0.05 MIU/L-ACNC: 3.64 UIU/ML (ref 0.27–4.2)
WBC NRBC COR # BLD: 5.67 10*3/MM3 (ref 3.4–10.8)

## 2023-06-02 PROCEDURE — 36591 DRAW BLOOD OFF VENOUS DEVICE: CPT

## 2023-06-02 PROCEDURE — 84439 ASSAY OF FREE THYROXINE: CPT | Performed by: INTERNAL MEDICINE

## 2023-06-02 PROCEDURE — 25010000002 PEMBROLIZUMAB 100 MG/4ML SOLUTION 4 ML VIAL: Performed by: INTERNAL MEDICINE

## 2023-06-02 PROCEDURE — 84484 ASSAY OF TROPONIN QUANT: CPT | Performed by: INTERNAL MEDICINE

## 2023-06-02 PROCEDURE — 80053 COMPREHEN METABOLIC PANEL: CPT | Performed by: INTERNAL MEDICINE

## 2023-06-02 PROCEDURE — 96413 CHEMO IV INFUSION 1 HR: CPT

## 2023-06-02 PROCEDURE — 82948 REAGENT STRIP/BLOOD GLUCOSE: CPT

## 2023-06-02 PROCEDURE — 84443 ASSAY THYROID STIM HORMONE: CPT | Performed by: INTERNAL MEDICINE

## 2023-06-02 PROCEDURE — 85025 COMPLETE CBC W/AUTO DIFF WBC: CPT | Performed by: INTERNAL MEDICINE

## 2023-06-02 RX ORDER — SODIUM CHLORIDE 0.9 % (FLUSH) 0.9 %
20 SYRINGE (ML) INJECTION AS NEEDED
Status: DISCONTINUED | OUTPATIENT
Start: 2023-06-02 | End: 2023-06-04 | Stop reason: HOSPADM

## 2023-06-02 RX ORDER — SODIUM CHLORIDE 9 MG/ML
250 INJECTION, SOLUTION INTRAVENOUS ONCE
Status: CANCELLED | OUTPATIENT
Start: 2023-06-02

## 2023-06-02 RX ORDER — HEPARIN SODIUM (PORCINE) LOCK FLUSH IV SOLN 100 UNIT/ML 100 UNIT/ML
500 SOLUTION INTRAVENOUS AS NEEDED
OUTPATIENT
Start: 2023-06-02

## 2023-06-02 RX ORDER — SODIUM CHLORIDE 0.9 % (FLUSH) 0.9 %
20 SYRINGE (ML) INJECTION AS NEEDED
OUTPATIENT
Start: 2023-06-02

## 2023-06-02 RX ORDER — SODIUM CHLORIDE 9 MG/ML
250 INJECTION, SOLUTION INTRAVENOUS ONCE
Status: COMPLETED | OUTPATIENT
Start: 2023-06-02 | End: 2023-06-02

## 2023-06-02 RX ADMIN — Medication 20 ML: at 10:39

## 2023-06-02 RX ADMIN — SODIUM CHLORIDE 250 ML: 9 INJECTION, SOLUTION INTRAVENOUS at 10:03

## 2023-06-02 RX ADMIN — SODIUM CHLORIDE 200 MG: 9 INJECTION, SOLUTION INTRAVENOUS at 10:03

## 2023-06-02 NOTE — PROGRESS NOTES
Patient here for C1 Keytruda.  Port accessed by Sabra Jensen RN with sterile technique and positive blood return noted.  Blood drawn from port.  10 cc blood wasted prior to specimen collection.  Specimens sent to lab for processing. Patient tolerated treatment, AVS given and discharged. Port was flushed with 20 cc NS and deaccesed. Patient has allergy to heparin.

## 2023-06-02 NOTE — ADDENDUM NOTE
Encounter addended by: Sabra Jensen RN on: 6/2/2023 11:03 AM   Actions taken: Charge Capture section accepted

## 2023-06-15 ENCOUNTER — HOSPITAL ENCOUNTER (OUTPATIENT)
Dept: CARDIOLOGY | Facility: HOSPITAL | Age: 73
Discharge: HOME | End: 2023-06-15
Admitting: INTERNAL MEDICINE
Payer: MEDICARE

## 2023-06-15 ENCOUNTER — APPOINTMENT (OUTPATIENT)
Dept: LAB | Facility: HOSPITAL | Age: 73
End: 2023-06-15
Payer: MEDICARE

## 2023-06-15 VITALS
HEIGHT: 66 IN | SYSTOLIC BLOOD PRESSURE: 134 MMHG | HEART RATE: 79 BPM | WEIGHT: 145 LBS | DIASTOLIC BLOOD PRESSURE: 50 MMHG | BODY MASS INDEX: 23.3 KG/M2

## 2023-06-15 DIAGNOSIS — C16.0 MALIGNANT NEOPLASM OF CARDIA: ICD-10-CM

## 2023-06-15 DIAGNOSIS — C16.0 MALIGNANT NEOPLASM OF CARDIA: Primary | ICD-10-CM

## 2023-06-15 DIAGNOSIS — Z51.11 ENCOUNTER FOR ANTINEOPLASTIC CHEMOTHERAPY: ICD-10-CM

## 2023-06-15 DIAGNOSIS — C15.9 ESOPHAGEAL CANCER, STAGE IV: ICD-10-CM

## 2023-06-15 LAB
BH CV ECHO MEAS - AO MAX PG: 12 MMHG
BH CV ECHO MEAS - AO MEAN PG: 5.1 MMHG
BH CV ECHO MEAS - AO ROOT DIAM: 3.1 CM
BH CV ECHO MEAS - AO V2 MAX: 173.3 CM/SEC
BH CV ECHO MEAS - AO V2 VTI: 27.6 CM
BH CV ECHO MEAS - AVA(I,D): 1.77 CM2
BH CV ECHO MEAS - EDV(CUBED): 147.3 ML
BH CV ECHO MEAS - EDV(MOD-SP4): 82.2 ML
BH CV ECHO MEAS - EF(MOD-SP4): 30.7 %
BH CV ECHO MEAS - ESV(CUBED): 56.5 ML
BH CV ECHO MEAS - ESV(MOD-SP4): 57 ML
BH CV ECHO MEAS - FS: 27.3 %
BH CV ECHO MEAS - IVS/LVPW: 0.96 CM
BH CV ECHO MEAS - IVSD: 1 CM
BH CV ECHO MEAS - LA DIMENSION: 3.7 CM
BH CV ECHO MEAS - LV DIASTOLIC VOL/BSA (35-75): 47.1 CM2
BH CV ECHO MEAS - LV MASS(C)D: 203.5 GRAMS
BH CV ECHO MEAS - LV MAX PG: 1.32 MMHG
BH CV ECHO MEAS - LV MEAN PG: 0.72 MMHG
BH CV ECHO MEAS - LV SYSTOLIC VOL/BSA (12-30): 32.7 CM2
BH CV ECHO MEAS - LV V1 MAX: 57.4 CM/SEC
BH CV ECHO MEAS - LV V1 VTI: 11.6 CM
BH CV ECHO MEAS - LVIDD: 5.3 CM
BH CV ECHO MEAS - LVIDS: 3.8 CM
BH CV ECHO MEAS - LVOT AREA: 4.2 CM2
BH CV ECHO MEAS - LVOT DIAM: 2.31 CM
BH CV ECHO MEAS - LVPWD: 1.04 CM
BH CV ECHO MEAS - MR MAX PG: 83.4 MMHG
BH CV ECHO MEAS - MR MAX VEL: 456.4 CM/SEC
BH CV ECHO MEAS - MV A MAX VEL: 115.2 CM/SEC
BH CV ECHO MEAS - MV DEC SLOPE: 520.2 CM/SEC2
BH CV ECHO MEAS - MV DEC TIME: 0.16 MSEC
BH CV ECHO MEAS - MV E MAX VEL: 84.6 CM/SEC
BH CV ECHO MEAS - MV E/A: 0.73
BH CV ECHO MEAS - MV MAX PG: 5.7 MMHG
BH CV ECHO MEAS - MV MEAN PG: 2.07 MMHG
BH CV ECHO MEAS - MV V2 VTI: 29.2 CM
BH CV ECHO MEAS - MVA(VTI): 1.67 CM2
BH CV ECHO MEAS - PA V2 MAX: 84.4 CM/SEC
BH CV ECHO MEAS - RAP SYSTOLE: 3 MMHG
BH CV ECHO MEAS - RV MAX PG: 1.07 MMHG
BH CV ECHO MEAS - RV V1 MAX: 51.7 CM/SEC
BH CV ECHO MEAS - RV V1 VTI: 11 CM
BH CV ECHO MEAS - SI(MOD-SP4): 14.5 ML/M2
BH CV ECHO MEAS - SV(LVOT): 48.7 ML
BH CV ECHO MEAS - SV(MOD-SP4): 25.2 ML
HOLD SPECIMEN: NORMAL
HOLD SPECIMEN: NORMAL
LV EF 2D ECHO EST: 40 %
MAXIMAL PREDICTED HEART RATE: 146 BPM
STRESS TARGET HR: 124 BPM

## 2023-06-15 PROCEDURE — 36415 COLL VENOUS BLD VENIPUNCTURE: CPT

## 2023-06-15 PROCEDURE — 84484 ASSAY OF TROPONIN QUANT: CPT | Performed by: INTERNAL MEDICINE

## 2023-06-15 PROCEDURE — 93306 TTE W/DOPPLER COMPLETE: CPT

## 2023-06-15 PROCEDURE — 83880 ASSAY OF NATRIURETIC PEPTIDE: CPT | Performed by: INTERNAL MEDICINE

## 2023-06-19 ENCOUNTER — TELEPHONE (OUTPATIENT)
Dept: ONCOLOGY | Facility: CLINIC | Age: 73
End: 2023-06-19
Payer: MEDICARE

## 2023-06-30 ENCOUNTER — TELEPHONE (OUTPATIENT)
Dept: ONCOLOGY | Facility: CLINIC | Age: 73
End: 2023-06-30

## 2023-06-30 NOTE — TELEPHONE ENCOUNTER
Caller: Artie Mc    Relationship: Self    Best call back number: [phone]    What is the best time to reach you: ANYTIME    Who are you requesting to speak with (clinical staff, provider,  specific staff member): MARSHAL ATKINS         What was the call regarding: SOMEONE WAS SUPPOSED CALL ABOUT MRI OF HEART.  SOMEONE HAD CALLED BUT NEVER GOT BACK REGARDING BEING SCHEDULED, WAS CHECKING ON IF MRI MACHINE WAS COMPATIBLE WITH PACE MAKER.    WAS GOING TO PLACE IN [place] , BUT WOULD LIKE TRY TO SCHEDULED IN [place] IF CAN.     ASK FOR MARSHAL TO CALL ARTIE TO DISCUSS

## 2023-07-06 ENCOUNTER — TELEPHONE (OUTPATIENT)
Dept: CARDIOLOGY | Facility: CLINIC | Age: 73
End: 2023-07-06

## 2023-07-06 NOTE — TELEPHONE ENCOUNTER
Caller: Artie Mc    Relationship: Self    Best call back number: [phone]    What is the best time to reach you: ANYTIME    Who are you requesting to speak with (clinical staff, provider,  specific staff member): ANYONE    What was the call regarding: PT IS CALLING TO SCHEDULE APPT FOR MRI. HE SAID [facility] IS A LOT CLOSER TO HIM THAN [facility] AND WOULD PREFER TO GO TO [place].    Is it okay if the provider responds through MyChart: NO

## 2023-07-07 NOTE — H&P (VIEW-ONLY)
CC=== coronary disease and pacemaker    Sub  73-year-old male patient has a [employer] dual-chamber pacemaker and history of coronary disease with prior bypass surgery.  His history of hypertension hyperlipidemia esophageal cancer.  Patient has history of LV dysfunction EF 35 to 40%.  Patient comes in for evaluation and is followed by Dr. Culp regularly infusion clinic.  Patient's latest ejection fraction is around 40%.  Patient was recommended possible cardiac MRI and came for evaluation to see if he can get an MRI since the patient has a pacemaker.  Patient is competent functional class II.  Patient is allergic to ACE inhibitors.    Past Medical History:   Diagnosis Date   • B12 deficiency    • Blockage of coronary artery of heart    • Depression    • DM (diabetes mellitus)    • Dysphagia    • GERD (gastroesophageal reflux disease)    • HTN (hypertension)    • Hyperlipidemia      Past Surgical History:   Procedure Laterality Date   • ABDOMINAL WALL ABSCESS INCISION AND DRAINAGE  10/22/2018    WITH DEBRIDEMENT  1.5CM X 1.5 CM X 1.5 CM    DR. PURI   • CATARACT EXTRACTION  2018   • COLONOSCOPY     • CORONARY ARTERY BYPASS GRAFT  2015   • ENDOSCOPY  12/20/2016   • ENDOSCOPY N/A 1/2/2023    Procedure: ESOPHAGOGASTRODUODENOSCOPY;  Surgeon: Esvin Morgan MD;  Location: [facility] ENDOSCOPY;  Service: Gastroenterology;  Laterality: N/A;  post: anastamosis stricture   • ENDOSCOPY N/A 2/9/2023    Procedure: ESOPHAGOGASTRODUODENOSCOPY, non-wire guided balloon dilation (12-14mm) of esophageal stricture;  Surgeon: Tino Villafana MD;  Location: [facility] ENDOSCOPY;  Service: Gastroenterology;  Laterality: N/A;  post: esophageal anastamotic stricture, post surgical changes   • ESOPHAGECTOMY  05/04/2017    BROOKS PETTY ESOPHAGECTOMY, FEEDING JEJNOSTOMOY, ABDOMINAL AND MEDIASTINAL LYMPH NODE DISECTION, PEDICLED MUSCLE FLAP COVERAGE DR. PURI   • ESOPHAGOSCOPY / EGD  07/12/2017    WITH BALLOON DILATION   •  ESOPHAGOSCOPY / EGD  2017    WITH BALLOON DILATION   • ESOPHAGOSCOPY / EGD  2017    WITH BALLOON DILATION   • ESOPHAGOSCOPY / EGD  2017    WITH BALLOON DILATION   • ESOPHAGOSCOPY / EGD  2017    WITH BALLOON DILATION   • LYMPH NODE BIOPSY  2019   • PACEMAKER IMPLANTATION  2016     Family History   Problem Relation Age of Onset   • Mental illness Mother    • Heart disease Mother    • Hypertension Father    • Cancer Father    • Heart disease Sister    • Heart disease Brother      Social History     Tobacco Use   • Smoking status: Former     Packs/day: 2.00     Years: 50.00     Pack years: 100.00     Types: Cigarettes     Quit date: 2015     Years since quittin.0   • Smokeless tobacco: Former     Types: Chew     Quit date: 1989   Vaping Use   • Vaping Use: Never used   Substance Use Topics   • Alcohol use: No   • Drug use: No         Physical Exam    General:      well developed, well nourished, in no acute distress.    Head:      normocephalic and atraumatic.    Eyes:      PERRL/EOM intact, conjunctivae and sclerae clear without nystagmus.    Neck:      no  thyromegaly, trachea central with normal respiratory effort  Lungs:      clear bilaterally to auscultation.    Heart:       regular rate and rhythm, S1, S2 without murmurs, rubs, or gallops  Skin:      intact without lesions or rashes.    Psych:      alert and cooperative; normal mood and affect; normal attention span and concentration.          Assessment and plan    Cardiology notes and prior investigations including echo reviewed.  Hemoglobin of 10 g potassium of 4.9 with normal creatinine and normal TSH.  Patient has dual-chamber pacemaker  changed company and the pacemaker can is not MRI conditional.  Discussed with the patient.  Patient also has LV dysfunction which could be multifactorial since the patient is RV paced all the time.  His EF is around 40% and I will discuss with Dr. Culp about  possible role of upgrading to BiV pacing system to alleviate LV function.  Patient is not a candidate for ACE or ARB since the patient is allergic  History of coronary disease with bypass surgery stable without angina  Hypertension well-controlled  Hypothyroidism supplemented and diabetes treated  Follow-up in 6 months  Medications reviewed  From oncology reviewed      ECG 12 Lead    Date/Time: 7/7/2023 8:53 AM  Performed by: Sumit Crowell MD  Authorized by: Sumit Crowell MD   Comparison: compared with previous ECG   Similar to previous ECG  Rhythm: sinus rhythm and paced  Rate: normal         Electronically signed by Sumit Crowell MD, 07/07/23, 8:53 AM EDT.           Electronically signed by Sumit Crowell MD, 07/07/23, 8:51 AM EDT.   Hyperlipidemia treated  History of esophageal cancer followed by hematology oncology

## 2023-07-11 PROBLEM — I42.0 CARDIOMYOPATHY, DILATED: Status: ACTIVE | Noted: 2023-07-11

## 2023-07-11 PROBLEM — I25.5 ISCHEMIC CARDIOMYOPATHY: Status: ACTIVE | Noted: 2023-07-11

## 2023-07-20 ENCOUNTER — HOSPITAL ENCOUNTER (OUTPATIENT)
Facility: HOSPITAL | Age: 73
Setting detail: HOSPITAL OUTPATIENT SURGERY
Discharge: HOME | End: 2023-07-20
Attending: INTERNAL MEDICINE | Admitting: INTERNAL MEDICINE
Payer: MEDICARE

## 2023-07-20 VITALS
DIASTOLIC BLOOD PRESSURE: 72 MMHG | BODY MASS INDEX: 23.84 KG/M2 | TEMPERATURE: 98.5 F | WEIGHT: 143.08 LBS | HEIGHT: 65 IN | HEART RATE: 76 BPM | SYSTOLIC BLOOD PRESSURE: 118 MMHG | OXYGEN SATURATION: 97 % | RESPIRATION RATE: 18 BRPM

## 2023-07-20 DIAGNOSIS — Z95.0 PRESENCE OF CARDIAC PACEMAKER: ICD-10-CM

## 2023-07-20 DIAGNOSIS — I50.43 ACUTE ON CHRONIC COMBINED SYSTOLIC AND DIASTOLIC CHF (CONGESTIVE HEART FAILURE): ICD-10-CM

## 2023-07-20 DIAGNOSIS — I25.10 CORONARY ARTERY DISEASE INVOLVING NATIVE CORONARY ARTERY OF NATIVE HEART WITHOUT ANGINA PECTORIS: ICD-10-CM

## 2023-07-20 DIAGNOSIS — I25.10 CHRONIC CORONARY ARTERY DISEASE: Primary | ICD-10-CM

## 2023-07-20 DIAGNOSIS — I25.5 ISCHEMIC CARDIOMYOPATHY: ICD-10-CM

## 2023-07-20 DIAGNOSIS — I42.0 CARDIOMYOPATHY, DILATED: ICD-10-CM

## 2023-07-20 LAB
BASE DEFICIT: ABNORMAL
BASE EXCESS BLDA CALC-SCNC: <0 MMOL/L (ref 0–3)
CA-I BLDA-SCNC: 1.26 MMOL/L (ref 1.12–1.32)
CO2 BLDA-SCNC: 21 MMOL/L (ref 23–27)
GLUCOSE BLDC GLUCOMTR-MCNC: 115 MG/DL (ref 70–105)
GLUCOSE BLDC GLUCOMTR-MCNC: 131 MG/DL (ref 70–105)
GLUCOSE BLDC GLUCOMTR-MCNC: 85 MG/DL (ref 70–105)
HCO3 BLDA-SCNC: 20.2 MMOL/L (ref 22–26)
HCT VFR BLDA CALC: 29 % (ref 38–51)
HGB BLDA-MCNC: 9.9 G/DL (ref 12–17)
PCO2 BLDA: 35.5 MM HG (ref 35–45)
PH BLDA: 7.36 PH UNITS (ref 7.35–7.45)
PO2 BLDA: 122 MM HG (ref 80–105)
POTASSIUM BLDA-SCNC: 5.1 MMOL/L (ref 3.5–4.9)
SAO2 % BLDCOA: 99 % (ref 95–98)
SODIUM BLD-SCNC: 139 MMOL/L (ref 138–146)

## 2023-07-20 PROCEDURE — 99153 MOD SED SAME PHYS/QHP EA: CPT | Performed by: INTERNAL MEDICINE

## 2023-07-20 PROCEDURE — 84132 ASSAY OF SERUM POTASSIUM: CPT

## 2023-07-20 PROCEDURE — C1894 INTRO/SHEATH, NON-LASER: HCPCS | Performed by: INTERNAL MEDICINE

## 2023-07-20 PROCEDURE — 85014 HEMATOCRIT: CPT

## 2023-07-20 PROCEDURE — 99152 MOD SED SAME PHYS/QHP 5/>YRS: CPT | Performed by: INTERNAL MEDICINE

## 2023-07-20 PROCEDURE — 25010000002 FENTANYL CITRATE (PF) 100 MCG/2ML SOLUTION: Performed by: INTERNAL MEDICINE

## 2023-07-20 PROCEDURE — C1769 GUIDE WIRE: HCPCS | Performed by: INTERNAL MEDICINE

## 2023-07-20 PROCEDURE — 82330 ASSAY OF CALCIUM: CPT

## 2023-07-20 PROCEDURE — 96360 HYDRATION IV INFUSION INIT: CPT | Performed by: INTERNAL MEDICINE

## 2023-07-20 PROCEDURE — 25510000001 IOPAMIDOL PER 1 ML: Performed by: INTERNAL MEDICINE

## 2023-07-20 PROCEDURE — 82803 BLOOD GASES ANY COMBINATION: CPT

## 2023-07-20 PROCEDURE — 93459 L HRT ART/GRFT ANGIO: CPT | Performed by: INTERNAL MEDICINE

## 2023-07-20 PROCEDURE — 82947 ASSAY GLUCOSE BLOOD QUANT: CPT

## 2023-07-20 PROCEDURE — 82948 REAGENT STRIP/BLOOD GLUCOSE: CPT

## 2023-07-20 PROCEDURE — C1760 CLOSURE DEV, VASC: HCPCS | Performed by: INTERNAL MEDICINE

## 2023-07-20 PROCEDURE — 36415 COLL VENOUS BLD VENIPUNCTURE: CPT | Performed by: INTERNAL MEDICINE

## 2023-07-20 PROCEDURE — 0 LIDOCAINE 1 % SOLUTION: Performed by: INTERNAL MEDICINE

## 2023-07-20 PROCEDURE — 84295 ASSAY OF SERUM SODIUM: CPT

## 2023-07-20 PROCEDURE — 25010000002 MIDAZOLAM PER 1 MG: Performed by: INTERNAL MEDICINE

## 2023-07-20 RX ORDER — ACETAMINOPHEN 325 MG/1
650 TABLET ORAL EVERY 4 HOURS PRN
Status: DISCONTINUED | OUTPATIENT
Start: 2023-07-20 | End: 2023-07-20 | Stop reason: HOSPADM

## 2023-07-20 RX ORDER — ONDANSETRON 4 MG/1
4 TABLET, FILM COATED ORAL EVERY 6 HOURS PRN
Status: DISCONTINUED | OUTPATIENT
Start: 2023-07-20 | End: 2023-07-20 | Stop reason: HOSPADM

## 2023-07-20 RX ORDER — SODIUM CHLORIDE 9 MG/ML
INJECTION, SOLUTION INTRAVENOUS
Status: COMPLETED | OUTPATIENT
Start: 2023-07-20 | End: 2023-07-20

## 2023-07-20 RX ORDER — SODIUM CHLORIDE 9 MG/ML
1 INJECTION, SOLUTION INTRAVENOUS CONTINUOUS
Status: DISCONTINUED | OUTPATIENT
Start: 2023-07-20 | End: 2023-07-20 | Stop reason: HOSPADM

## 2023-07-20 RX ORDER — ONDANSETRON 2 MG/ML
4 INJECTION INTRAMUSCULAR; INTRAVENOUS EVERY 6 HOURS PRN
Status: DISCONTINUED | OUTPATIENT
Start: 2023-07-20 | End: 2023-07-20 | Stop reason: HOSPADM

## 2023-07-20 RX ORDER — MIDAZOLAM HYDROCHLORIDE 1 MG/ML
INJECTION INTRAMUSCULAR; INTRAVENOUS
Status: DISCONTINUED | OUTPATIENT
Start: 2023-07-20 | End: 2023-07-20 | Stop reason: HOSPADM

## 2023-07-20 RX ORDER — LIDOCAINE HYDROCHLORIDE 10 MG/ML
INJECTION, SOLUTION INFILTRATION; PERINEURAL
Status: DISCONTINUED | OUTPATIENT
Start: 2023-07-20 | End: 2023-07-20 | Stop reason: HOSPADM

## 2023-07-20 RX ORDER — FENTANYL CITRATE 50 UG/ML
INJECTION, SOLUTION INTRAMUSCULAR; INTRAVENOUS
Status: DISCONTINUED | OUTPATIENT
Start: 2023-07-20 | End: 2023-07-20 | Stop reason: HOSPADM

## 2023-07-20 RX ORDER — NITROGLYCERIN 0.4 MG/1
0.4 TABLET SUBLINGUAL
Status: DISCONTINUED | OUTPATIENT
Start: 2023-07-20 | End: 2023-07-20 | Stop reason: HOSPADM

## 2023-07-20 RX ADMIN — SODIUM CHLORIDE 1 ML/KG/HR: 9 INJECTION, SOLUTION INTRAVENOUS at 14:30

## 2023-07-20 NOTE — Clinical Note
JAVAN  Doing well, +FM  Denies LOF/VB/uctx  SVE done, now 2 cm.  Membrane sweep performed    GBS positive  Rubella equiv - needs MMR PP   RTC 1 week  Will schedule IOL for post dates inserted over wire.

## 2023-07-24 ENCOUNTER — TELEPHONE (OUTPATIENT)
Dept: CARDIOLOGY | Facility: CLINIC | Age: 73
End: 2023-07-24

## 2023-07-24 NOTE — TELEPHONE ENCOUNTER
Spoke with patient- he will see if his PCP can provide samples- we do not have staff going to that location, only the Provider and he will not be coming by the office to get anything before going out.

## 2023-07-24 NOTE — TELEPHONE ENCOUNTER
We have samples for Dr. Culp to take to [place] next Tuesday but he said when he went to the pharmacy it was going to be $550 for Jardiance. Is there any patient assistance we can do for him?

## 2023-07-24 NOTE — TELEPHONE ENCOUNTER
Caller: Artie Mc    Relationship: Self    Best call back number:  [phone]      PATIENT IS REQUESTING SAMPLES OF JARDIANCE IF AVAILABLE. PLEASE CALL TO CONFIRM.  PATIENT WOULD LIKE TO  FROM [place] OFFICE IF AVAILABLE.

## 2023-07-27 ENCOUNTER — TELEPHONE (OUTPATIENT)
Dept: CARDIOLOGY | Facility: CLINIC | Age: 73
End: 2023-07-27
Payer: MEDICARE

## 2023-07-27 NOTE — TELEPHONE ENCOUNTER
THE LAB IN [place] IS REQUESTING THE ORDER FOR THE PATIENTS BMP TO BE FAXED [phone]    PATIENT IS THERE NOW TO GET IT DONE.

## 2023-08-16 ENCOUNTER — TELEPHONE (OUTPATIENT)
Dept: ONCOLOGY | Facility: CLINIC | Age: 73
End: 2023-08-16

## 2023-08-16 NOTE — TELEPHONE ENCOUNTER
Caller: Artie Mc    Relationship to patient: Self    Best call back number: [phone]    Chief complaint: SCHEDULING    Type of visit: F/U1    Requested date: 8-25 BEFORE NOON, 8-30 AFTERNOON    Additional notes: PLEASE CALL TO ADVISE

## 2023-08-23 NOTE — PROGRESS NOTES
Hematology-Oncology Follow-up Note       Artie Mc  1950    Primary Care Physician: Chanel Carter, DAT  Referring Physician: No ref. provider found    Reason For Visit:  Chief Complaint   Patient presents with    Follow-up     Esophageal cancer, stage IV       Metastatic esophageal cancer  Monitoring for adverse effects related to immunotherapy.  Hypothyroidism    HPI:   Mr. Mc is a pleasant 73 y.o. gentleman with a history of gastroesophageal reflux disease, diabetes, hypertension, heart block status post pacemaker placement and history of CABG.  He was having symptoms of dysphagia, weight loss since September 2016. He was reporting symptoms of food getting stuck in the lower chest.  He has transitioned himself to a liquid diet.  The patient reports losing about 30 pounds over this duration.  The patient underwent upper GI endoscopy on 12/20/16 with Dr. Esvin Barrera.  Endoscopy showed necrotic, circumferential and moderately obstructive mass at the distal esophagus between 42 cm and 36 cm.      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  There was also evidence of mild subacute inflammation in the duodenum.  Dr. Barrera ordered a CT scan of the chest and abdomen with contrast on 12/20/16.  The scan was done at [facility].  The scan showed a new ill-defined mass in the anterior wall of the distal esophagus at the GE junction measuring 2.3 x 2.2 x 1.6 cm, worrisome for cancer.  There were four subcentimeter liver lesions which had appeared stable in comparison to another CT scan from 2009 and they likely represent small cysts.  There was also mild lymphadenopathy in the gastrohepatic ligament.  There were at least six borderline enlarged lymph nodes measuring up to 1.3 x 1 cm in the region.  The patient is referred to us for further oncological evaluation.    1/5/2017 - The patient presents for initial  consultation.  He reports persistent dysphagia and also has symptoms of regurgitation.  He cannot tolerate solid foods and is dependent on liquid diet such as Ensure.  He also reports fatigue. Symptoms of dysphagia have been present for the past four months.  When he eats any solids, the food gets stuck in the lower chest.    1/5/17 - Vitamin B12 200 (L).  CEA 0.8.  Ferritin 35.  Iron saturation 16% (L), serum iron 56, TIBC 354.  Creatinine 0.9.  LFTs normal.  Folate 10.2.  1/12/17 - PET/CT scan:  Intense abnormal activity corresponding to the region of the GE junction and proximal stomach.  SUV is 11.06.  There is a suggestion of an abnormal mass or abnormal wall thickening in the region measuring 3.9 x 4.8 cm.  No additional abnormalities.  No evidence of metastases or other lymphadenopathy.  Scattered nonspecific subcentimeter lymph nodes involving the mediastinum and within the central upper abdomen.  1/13/17 - Creatinine 0.9.  LFTs normal.    1/17/17 - Patient seen by thoracic surgeon, Dr. Bradley.  PET scan was reviewed.  He has recommended neoadjuvant chemoradiation therapy followed by [place] Gurwinder esophagogastrectomy.  Port-A-Cath is being arranged.    1/20/17 - WBC 6.3, hemoglobin 12.1, platelet count 198,000, MCV 86.4.  1/30/17 - Patient started weekly Carboplatin and Taxol (Carboplatin AUC 2 and Taxol 50 mg/M2).  Patient received Injectafer 750 mg.  WBC 5.8, hemoglobin 12.4, platelet count 244,000.   1/31/17 - Patient seen by Dr. Eduardo Oleary.  The plan was to give 5040 cGy in 28 fractions.   2/6/17 - WBC 4.4, hemoglobin 11.6, platelet count 201,000.  Patient received cycle 2 of weekly Carboplatin and Taxol (Carboplatin AUC 2 and Taxol 50 mg/M2).  2/13/17 - Patient received Carboplatin and Taxol weekly cycle 3.  2/13/17 - Patient started concurrent radiation therapy.  Total planned dose is 4140 cGy.    2/13/17 to 2/15/17 - Patient received 4140 cGy of neoadjuvant radiation.  Radiation was finished on  3/15/17.    2/20/17 - WBC 1.8, hemoglobin 10.6, platelet count 217,000.  Creatinine 0.5. Folate 15 (L).  Ferritin 361.  Haptoglobin 214.  Iron saturation 33%, TIBC 263, serum iron 88.    2/20/17 - Patient received Carboplatin and Taxol weekly cycle 4.   2/27/17 - WBC 3.7, hemoglobin 10.7, platelet count 177,000, MCV 85.3.    3/6/17 - Patient has received 16 out of the 23 planned radiation treatments.  Total planned dose is 4140 cGy.    3/6/17 - WBC 5.4, hemoglobin 11.2, platelet count 113,000.  3/6/17 - Patient received cycle 5 of weekly Carboplatin and Taxol.   3/13/17 - Patient received cycle 6 of weekly Carboplatin and Taxol.  WBC 3.9, hemoglobin 11.3, platelet count 93,000.     3/20/17 - WBC 1.4, hemoglobin 10.0, platelet count 118,000, MCV 86.1   3/27/17 - WBC 3.8, hemoglobin 9.5, platelet count 176,000, MCV 87.3.    4/7/17 - Stress test:  No evidence of reversible myocardial ischemia.  Normal left ventricular ejection fraction of 50%.    4/17/17 - Upper GI endoscopy by Dr. 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  The GE junction adenocarcinoma lesion seems to have a very good response to chemotherapy and radiation.  The lumen was open.  5/1/17 - WBC 7.0, hemoglobin 10.2, platelet count 175,000.    5/4/17 - Patient underwent [place] Gurwinder esophagectomy with pyloroplasty, feeding jejunostomy tube, abdominal and mediastinal lymph node dissection.    Surgical Pathology:  Moderately to poorly differentiated adenocarcinoma measuring 1.3 cm at the GE junction.  Resection margins are negative for malignancy.  Tumor margins are negative.  There is effective treatment response.  No evidence of lymphovascular invasion.  Staging is ypT3,pN1.  One out of fourteen lymph nodes involved.   5/22/17 - WBC 6.8, hemoglobin 10.3, platelet count 312,000.    6/19/17 - WBC 5.7, hemoglobin 10.5, platelet count 204,000, MCV 92.1.    7/6/17 - EGD:  Status post balloon dilation of esophageal  stricture.  7/12/17 - EGD with balloon dilation of esophageal stricture.  7/26/17 - EGD and balloon dilation of esophageal stricture.  7/31/17 - PET/CT scan:  There is mild metabolic activity seen at the thoracic esophagus just at and below the surgical margins.  Some mild wall thickening.  This could be from recent surgery.  A residual cancer seems unlikely.  There is a precarinal lymph node with mild metabolic activity with SUV of 3 measuring 1 x 1.3 cm.  Again this appears to be nonspecific in my opinion.    8/7/17 - WBC 5.9, hemoglobin 11.9, platelet count 171,000, MCV 88.7.    10/19/17 - WBC 7.5, hemoglobin 11.8, platelet count 216,000.    1/8/18 - PET scan:  No evidence of residual disease or recurrent disease.  There is an esophageal stent in the mid esophagus with a large debris air level.  No evidence of any metastases in the chest, abdomen or pelvis.  Mild nodule uptake in the vocal cords with fullness in the right vocal cord.  This could be physiologic.    7/9/18 - CT scan of chest with contrast:  Prominent mediastinal lymph nodes appear stable since February.  Changes of gastric pull-through procedure for esophageal cancer again noted.  Tree-in-bud infiltrates in the left lung base, consistent with infection versus inflammation.    1/10/19 - CT chest, abdomen and pelvis with contrast:  No evidence of mets in the chest; however, interval development of metastatic lymphadenopathy in the left side of the retroperitoneum.  For instance, there is a 17 mm rounded lymph node, 10 mm lymph node and also a 14 mm lymph node.  These are all new.  Stable 9 mm hypodense lesion within the right hepatic lobe, which could reflect hemangioma or complex cyst.    1/30/19 - CT-guided core biopsy of retroperitoneal lymph node:  Poorly differentiated adenocarcinoma consistent with metastases from patient's known esophageal primary.  CK20 positive, CDX2 positive, cytokeratin 7 negative, TTF-1 negative.    2/1/19 - Creatinine  0.9, BUN 18, calcium 9.3; these are normal.    2/28/19 - [facility] Next Gen sequencing testing on retroperitoneal lymph node specimen:  PD-L1 positive.  CPS = 3.  This implies responsiveness to pembrolizumab.  Mismatch repair status is proficient (no evidence of microsatellite instability).  Tumor mutational burden is low = 6 mutations/Mb.  CDH1 indeterminate.  KRAS mutation positive.  TP53 mutation positive.  CDK6 amplified.  [employer] PD-L1 testing by IHC:  PD-L1 expression 1% positive (CPS =1).  HER2/marvel by IHC is negative.  HER2/marvel by CISH not amplified.  Microsatellite stable tumor.    3/7/19 - WBC 7.3, hemoglobin 12, platelet count 128,000, MCV 92.     3/14/19 - Patient was seen by radiation oncologist, Dr. Chahal.  He has recommended observation versus systemic chemoimmunotherapy as needed.  No plans for radiation therapy.   5/1/19 - CT scan of chest with contrast:  No definitive findings of new metastatic disease in the chest.  Emphysema noted.    5/1/19 - CT abdomen and pelvis:  There is interval progression of metastatic retroperitoneal adenopathy.  Left periaortic adenopathy with lymph node measuring up to 2 cm, previously 1.7 cm.  Another lymph node now measures 2.3, previously 1.4 cm.  New enlarged lymph node on image 147 measures 1.4 cm, previously 0.4 cm.  A 9 mm hypodense right hepatic lobe lesion appears stable.    5/9/19 - Decision made to start patient on immunotherapy Keytruda.    5/9/19 - Vitamin B12 229.  Ferritin 61.  Folate 14.3.  Iron saturation 26%, TIBC 304, serum iron 79.  5/31/19 - Patient received Keytruda 200 mg cycle 1, day 1.    5/31/19 - Free T4 0.99.  Creatinine 0.9, BUN 9.  LFTs normal.  TSH 3.99.  Folate 14.3.  Iron saturation 26%.    6/21/2019 patient received Keytruda cycle 2  7/12/2019: Patient received Keytruda cycle 3  8/2/2019 patient received cycle 4 Keytruda WBC 7.2, hemoglobin 11.8 platelet count 163  8/23/2019 creatinine 0.9, LFTs normal, WBC 6.4, hemoglobin 11.5, platelets  179, TSH 2.1   8/23/2019 patient received cycle 5 of Keytruda  9/3/2019 CT chest abdomen and pelvis with contrast: . Positive response to therapy in the abdomen since 05/01/2019. Pathologically enlarged retroperitoneal lymph nodes, predominantly in the left periaortic region, have diminished in size.a 1.3 x 2.1 cm left periaortic node (image 60), previously measured 3.5 x 2.3 cm. A 1.6 cm index node near the level of the left renal vein previously measured 2.0 cm No new or progressive adenopathy. 2. Stable findings in the chest. Noncalcified right upper lobe nodules are unchanged. There is no evidence of new or progressive metastatic disease within the chest. 3. 8 mm indeterminate low-density lesion in the right hepatic lobe, is stable. No new liver lesions.  9/6/2019 WBC 5.6, hemoglobin 11.2, platelets of 192, MCV 91.8  9/13/2019 patient received Keytruda cycle 6, albumin 3.2  10/4/2019 patient received cycle 7 of Keytruda, TSH 4.8, free T4 0.86  10/9/2019 WBC 5.8, hemoglobin 11.7, platelets 174  10/25/2019 patient received cycle 8 of Keytruda.  WBC 5.9, hemoglobin 11.5, platelets 152, creatinine 0.75, cortisol 15.2, TSH 6.48 high , free T4 1.13 normal  11/6/2019 WBC 6.6, hemoglobin 11.8, MCV 91.5, platelets 187  11/15/2019 patient received cycle 9 of Keytruda, WBC 6.6, hemoglobin 11.9, platelets 161, cortisol level 8.86, TSH 2.68, free T3 2.8, free T4 1.5  12/4/2019 WBC 6.9, hemoglobin 12.0, platelets 180, MCV 92.9  12/06/2019-Keytruda Cycle 10  12/27/2019- Cycle 11 LFTs normal, WBC 6.7, hemoglobin 11.4, platelets 168, MCV 93, TSH 2.4,  1/28/2020: CT chest abdomen and pelvis with contrast:  Single (aortic lymph node in the abdominal retroperitoneum has increased.  Rest of the retroperitoneal lymph nodes have decreased in size.  Mediastinal adenopathy appears unchanged.  Noncalcified bilateral pulmonary nodules are stable  1/17/2020 patient received Keytruda cycle 12:.  2/3/2020 WBC 7.1, hemoglobin 12.2, platelets  171  02/10/2020 patient received cycle 13 of Keytruda: WBC 7.3, hemoglobin 12.1, platelets 166, creatinine 0.82  3/6/2020 patient is of Keytruda 200 mg, hemoglobin 10.8  3/27/2020 patient received Keytruda 200 mg, hemoglobin 11.4, TSH 1.95  4/17/2020 patient received Keytruda cycle 16 200 mg, WBC 6.1, hemoglobin 11.6, platelets 150  5/8/2020: Patient received cycle 17 Keytruda, WBC 6.3, hemoglobin 9.6, platelets 137, TSH 2.05, free T4 1.54, creatinine 0.93, LFTs normal,  5/29/2020: Patient received cycle 18 Keytruda, WBC 6.5, hemoglobin 11.7, platelets 129, creatinine 0.83, TSH 1.69  6/26/2020: Patient receiving Keytruda.   7/17/2020: TSH 2.06, WBC 5.7, hemoglobin 11.4, platelets 177, MCV 92.4, Keytruda 200 mg cycle 20  7/27/2020: CT chest abdomen pelvis with contrast: Stable findings in the chest abdomen pelvis since 1/28/2020.  Noncalcified bilateral pulmonary nodules are stable.  Stable retroperitoneal left perinephric adenopathy in the abdomen.  8/7/2020: Patient received cycle 21 of Keytruda  8/28/2020 patient is a cycle 22 of Keytruda.  WBC 6.02, hemoglobin 11.3, platelets 161  9/11/2020 WBC 6.9, hemoglobin 12.4, platelets 192  9/18/2020: Cycle 23 of Keytruda, TSH 1.19  10/9/2020: Cycle 24 of Keytruda, WBC 5.9, hemoglobin 11.9, platelets 178, creatinine 0.84, TSH 2.56, free T3 2.4, CMP normal  10/23/2020: WBC 7.34, hemoglobin 11.6, platelets 175  10/30/2020:.  Keytruda 200 mg  11/20/2020: Keytruda 200 mg  12/11/2020 Keytruda 200 mg.  WBC 5.5, hemoglobin 11.4, platelets 176, creatinine 0.8, LFTs normal, TSH 2.2, cortisol 8.3  12/10/2020: CT chest abdomen and pelvis with contrast: Previously seen left para-aortic lymph nodes within the abdomen are decreased in size.  No pathological lymphadenopathy.  No evidence of residual malignancy..  Small noncalcified pulmonary nodules are stable.  No new nodules.  Stable mediastinal lymph nodes.  3/12/2021: WBC 6.7, hemoglobin 11.3, platelets 17  3/12/2021: CMP normal, TSH  2.9, cortisol 11.95, 1  3/15/2021: CT chest with contrast: Stable findings in the chest with postsurgical changes of esophageal resection and gastric pull-through.  Stable right lower lobe lung nodule measures 11 mm.  Stable size and appearance of the mediastinal and upper abdominal lymph nodes.  3/23/2021: Doppler of upper extremity: Normal.  3/24/2021: CT abdomen and pelvis with contrast: Postoperative changes.  Left periaortic nodes are stable.  4/12/2021: TSH 4.1, cortisol 12.4  6/22/2021: CMP normal,Cortisol 10.08,, WBC 5.8, hemoglobin 11.2, platelets 109, TSH 2.59  7/12/2021: CT scan of the chest abdomen pelvis with contrast: Dominant 10 x 10 left periaortic lymph node is stable since 3/22/2021.  Dominant lymph nodes in the right lower paratracheal and AP window distribution are stable since 3/15/2021.  No evidence of progressive adenopathy in the chest abdomen or pelvis.    01/11/2022 -PET/CT findings consistent with new cedric metastatic disease in the retroperitoneum.  2 new pathologically enlarged hypermetabolic retroperitoneal lymph nodes.  No convincing evidence of recurrent disease within the chest neck pelvis or skeleton.  1/20/2022 -CT-guided needle biopsy consistent with poorly differentiated carcinoma.  CDX2 positive consistent with metastases from known esophageal primary  2/9/2022 - Pembrolizumab complicated with fatigue, joint pains, nausea, diarrhea. Symptoms decrease in intensity over a week.  3/30/2022 -CT chest and pelvis with stable metastatic lymph nodes in the left periaortic retroperitoneum.  No evidence of disease progression or new lesions.  Stable lymphadenopathy in the distal paratracheal mediastinal area  4/13/2022 - Keytruda 200  5/4/2022 - Keytruda 200  Continued keytruda  7/11/2022 - CT abdomen pelvis with decrease in size of left perioaortic lymph node. No other areas of disease.  Continues to be on Keytruda  10/18/2022 - stable CT imaging  1/20/2023 - CT imaging with stable  disease. No significant change.  Patient was hospitalized with esophagitis, subsequent EGD with improvement, no recurrence of disease.  2/21/2023 - Keytruda  CT CAP with increased retroperitoneal adenopathy.   ECHO with EF 35-40% (concern for immunotherapy related )immunotherapy held with ongoing investigation for cause.  Subsequent improvement in EF to 45%  4/4/2023 - CT abdomen pelvis with interval retroperitoneal lymph node enlargement compatible with metastatic adenopathyl.  5/5/2023 - PET CT with mixed response. No clear evidence of progression  6/2/23 -started pembrolizumab 200 mg IV  Posttreatment had symptoms of chest tightness troponin and proBNP negative, echocardiogram with EF 40%  Subsequent cardiac cath with EF down to 30% elevated proBNP, troponin no significant obstructive disease in the graft vessels  Cardiac MRI unable to obtain given pacemaker noncompliant.  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  No other new symptoms    Past Medical History:   Diagnosis Date    B12 deficiency     Blockage of coronary artery of heart     Depression     DM (diabetes mellitus)     Dysphagia     GERD (gastroesophageal reflux disease)     HTN (hypertension)     Hyperlipidemia        Past Surgical History:   Procedure Laterality Date    ABDOMINAL WALL ABSCESS INCISION AND DRAINAGE  10/22/2018    WITH DEBRIDEMENT  1.5CM X 1.5 CM X 1.5 CM    DR. PURI    CARDIAC CATHETERIZATION Right 7/20/2023    Procedure: Left Heart Cath;  Surgeon: Mazin Simmons MD;  Location: [facility] INVASIVE LOCATION;  Service: Cardiovascular;  Laterality: Right;    CATARACT EXTRACTION  2018    COLONOSCOPY      CORONARY ARTERY BYPASS GRAFT  2015    ENDOSCOPY  12/20/2016    ENDOSCOPY N/A 01/02/2023    Procedure: ESOPHAGOGASTRODUODENOSCOPY;  Surgeon: Esvin Morgan MD;   Location: [facility] ENDOSCOPY;  Service: Gastroenterology;  Laterality: N/A;  post: anastamosis stricture    ENDOSCOPY N/A 02/09/2023    Procedure: ESOPHAGOGASTRODUODENOSCOPY, non-wire guided balloon dilation (12-14mm) of esophageal stricture;  Surgeon: Tino Villafana MD;  Location: [facility] ENDOSCOPY;  Service: Gastroenterology;  Laterality: N/A;  post: esophageal anastamotic stricture, post surgical changes    ESOPHAGECTOMY  05/04/2017    BROOKS PETTY ESOPHAGECTOMY, FEEDING JEJNOSTOMOY, ABDOMINAL AND MEDIASTINAL LYMPH NODE DISECTION, PEDICLED MUSCLE FLAP COVERAGE DR. PURI    ESOPHAGOSCOPY / EGD  07/12/2017    WITH BALLOON DILATION    ESOPHAGOSCOPY / EGD  07/26/2017    WITH BALLOON DILATION    ESOPHAGOSCOPY / EGD  08/11/2017    WITH BALLOON DILATION    ESOPHAGOSCOPY / EGD  08/28/2017    WITH BALLOON DILATION    ESOPHAGOSCOPY / EGD  09/27/2017    WITH BALLOON DILATION    INSERT / REPLACE / REMOVE PACEMAKER      LYMPH NODE BIOPSY  01/30/2019    PACEMAKER IMPLANTATION  11/21/2016       Current Outpatient Medications:     clonazePAM (KlonoPIN) 0.5 MG tablet, Take 0.5 tablets by mouth 2 (Two) Times a Day As Needed for Anxiety., Disp: , Rfl: 1    cyanocobalamin (VITAMIN B-12) 1000 MCG tablet, Take 1 tablet by mouth Daily., Disp: , Rfl:     folic acid (FOLVITE) 1 MG tablet, Take 1 tablet by mouth Daily., Disp: , Rfl:     levothyroxine (SYNTHROID, LEVOTHROID) 50 MCG tablet, Take 1 tablet by mouth once daily, Disp: 90 tablet, Rfl: 0    metFORMIN (GLUCOPHAGE) 1000 MG tablet, Take 0.5 tablets by mouth Daily., Disp: 60 tablet, Rfl: 2    metoprolol succinate XL (TOPROL-XL) 25 MG 24 hr tablet, Take 1 tablet by mouth Daily., Disp: 90 tablet, Rfl: 3    ondansetron (ZOFRAN) 4 MG tablet, Take 1 tablet by mouth Every 8 (Eight) Hours As Needed for Nausea or Vomiting., Disp: , Rfl:     pantoprazole (PROTONIX) 40 MG EC tablet, Take 1 tablet by mouth 2 (Two) Times a Day., Disp: , Rfl:     pravastatin (PRAVACHOL) 10 MG tablet,  "Take 1 tablet by mouth Every Other Day., Disp: , Rfl:     spironolactone (ALDACTONE) 25 MG tablet, Take 0.5 tablets by mouth Daily., Disp: 30 tablet, Rfl: 2    ondansetron (ZOFRAN) 8 MG tablet, Take 1 tablet by mouth 3 (Three) Times a Day As Needed for Nausea or Vomiting., Disp: 30 tablet, Rfl: 5  No current facility-administered medications for this visit.    Facility-Administered Medications Ordered in Other Visits:     sodium chloride 0.9 % flush 20 mL, 20 mL, Intravenous, PRN, Jean Pierre Elizondo MD, 20 mL at 23 1033    Allergies   Allergen Reactions    Ace Inhibitors Unknown (See Comments)     Unknown reaction      Heparin Other (See Comments)     Fever/chills, weakness in legs    Sulfa Antibiotics Other (See Comments)     Mouth sores       Family History   Problem Relation Age of Onset    Mental illness Mother     Heart disease Mother     Hypertension Father     Cancer Father     Heart disease Sister     Heart disease Brother      Cancer-related family history includes Cancer in his father.    Social History     Tobacco Use    Smoking status: Former     Packs/day: 2.00     Years: 50.00     Pack years: 100.00     Types: Cigarettes     Quit date: 2015     Years since quittin.1    Smokeless tobacco: Former     Types: Chew     Quit date: 1989   Vaping Use    Vaping Use: Never used   Substance Use Topics    Alcohol use: No    Drug use: No     ROS:     Objective:    Vitals:    23 1046   BP: 153/85   Pulse: 65   Resp: 16   Temp: 98.4 øF (36.9 øC)   SpO2: 100%   Weight: 65 kg (143 lb 6.4 oz)   Height: 165.1 cm (65\")   PainSc: 0-No pain       (1) Restricted in physically strenuous activity, ambulatory and able to do work of light nature    Physical Exam:     Physical Exam   Constitutional: He is oriented to person, place, and time. He appears well-developed. No distress.   HENT:   Head: Normocephalic and atraumatic.   Nose: Nose normal.   Mouth/Throat: Mucous membranes are moist.   Eyes: Pupils are " equal, round, and reactive to light.   Neck: No thyromegaly present.   Cardiovascular: Normal rate, regular rhythm, normal heart sounds and normal pulses. Exam reveals no gallop and no friction rub.   Pulmonary/Chest: Effort normal and breath sounds normal.   Abdominal: Soft. Normal appearance and bowel sounds are normal.   No tenderness   Musculoskeletal: Normal range of motion. No deformity or signs of injury.      Right lower leg: No edema.   Neurological: He is alert and oriented to person, place, and time. He exhibits normal muscle tone.   Skin: Skin is warm and dry. No rash noted. He is not diaphoretic. No erythema. No jaundice.   Right side chest  port     Lab Results - Last 18 Months   Lab Units 08/25/23  1038 07/20/23  1311 06/27/23  1440 06/15/23  1007   WBC 10*3/mm3 4.57  --  6.43 5.09   HEMOGLOBIN g/dL 9.1*  --  10.5* 9.9*   HEMOGLOBIN, POC g/dL  --  9.9*  --   --    HEMATOCRIT % 28.9*  --  32.5* 31.1*   HEMATOCRIT POC %  --  29*  --   --    PLATELETS 10*3/mm3 179  --  205 186   MCV fL 90.3  --  88.8 86.4     Lab Results - Last 18 Months   Lab Units 06/02/23  0924 04/04/23  1232 04/04/23  1033 02/21/23  0937   SODIUM mmol/L 138  --  140 136   POTASSIUM mmol/L 4.9  --  4.6 4.2   CHLORIDE mmol/L 108*  --  107 103   CO2 mmol/L 20.0*  --  21.0* 20.0*   BUN mg/dL 16  --  16 12   CREATININE mg/dL 0.90 0.80 0.76 0.77   CALCIUM mg/dL 8.8  --  9.0 8.8   BILIRUBIN mg/dL 0.3  --  0.2 0.2   ALK PHOS U/L 33*  --  36* 45   ALT (SGPT) U/L 5  --  8 7   AST (SGOT) U/L 13  --  18 17   GLUCOSE mg/dL 120*  --  111* 164*     Assessment & Plan     Assessment:    Metastatic esophageal carcinoma:    Patient has a history of GE junction poorly differentiated adenocarcinoma for which he had chemoradiation followed by [place] Gurwinder resection:  Pathology showed ypT3,pN1 disease.  Tumor was Stage IIIA, diagnosed in 2016.  He received concurrent chemoradiation with weekly Carboplatin and Taxol and radiation finishing on 3/15/17.  CT  scan on 1/10/19 showed new retroperitoneal lymph nodes.  These lymph nodes were biopsied on 1/30/19 and it shows metastatic esophageal cancer.  [facility] Next Gen testing was performed on the sample and it shows KRAS mutation, microsatellite stable tumor.  PD-L1 is positive.  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  HER2/marvel (ERBB2) was not amplified and was negative.  His CT scan on 5/1/19 shows evidence of progression.  Patient has started receiving Keytruda on 5/31/19.  He has received 19 cycles so far.   CT scan on 1/28/2020 shows improvement and continued response.  CT scan chest abdomen pelvis with contrast on 12/10/2020 does not show any evidence of disease progression.  On 12/22/2020 decision made to hold Keytruda per patient's request to get a treatment break.. Repeat CT scan chest 7/12/2021 does not show any evidence of disease progression.  Now CT imaging in January 2021 with a CT showing increased abdominal left para-aortic adenopathy.  Subsequent PET/CT with new cedric metastatic disease in the retroperitoneum of the abdomen.  New pathologically enlarged hypermetabolic retroperitoneal lymph nodes.  This would be concerning for disease recurrence/progression.  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  I discussed with him the treatment options going forward.  He has had a good response with immunotherapy pembrolizumab in the past. This was  restarted. subsequent imaging with treatment response. 1/2023 imaging with good response. Continued treatment  Now with increased adenopathy 2 new lymph nodes. Holding immunotherapy for now.   Consider getting cardiac MR discussed with Dr. Culp. While immunotherapy on hold, increased adenopathy is concerning. Could be pseudoprogression vs true progression. PET CT reviewed could indicate pseudoprogression since he has had a good response to immunotherapy  Now immunotherapy was restarted and has been on hold with patient's symptomatology cardiac MRI cannot be  obtained, no ischemic heart disease leading to cardiomyopathy this most likely is related to Keytruda.  We will stop Keytruda going forward  Repeat CT imaging now as patient has been off treatment.  Plan for switching to FOLFOX chemotherapy discussed side effects in detail with the patient he is agreeable to start  Chemotherapy side effects include, but not limited to, nausea, vomiting, bone marrow suppression, which can result in blood, platelet transfusion. There is also risk of permanent bone marrow destruction, which can cause myelodysplastic syndrome or leukemia years down the line. There is risk of infection which can result in hospitalization and even death. There is also risk of fatigue, asthenia, alopecia which could become permanent. Chemo will help to reduce risk of relapse of cancer, but does not eliminate risk completely.      Possible myocarditis  Symptoms started after last immunotherapy  Treatment on hold for now, symptoms have improved, cardiac markers were negative, echocardiogram with EF 40%, cardiac MRI cannot be obtained cardiac cath with EF down to 30% management per cardiology    Shortness of breath  Improved, has occasional shortness of breath, likely copd  I have recommended using albuterol  Symptoms have improved since stopping immunotherapy. Questionable pneumonitis will monitor closely with immunotherapy  Cardiology has evaluated for myocarditis related to immunotherapy symptomatic improvement now    Reflux/heartburn/dysphagia:   Symptoms improved.  Also has a history of strictures. Status post EGD and balloon dilation.  Omeprazole once daily, famotidine as needed, symptoms stable, continue  Pain could be related to gastritis, will monitor for now, this has resolved.    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  Continues on Synthroid     Mild diarrhea:  diarrhea seems to be waxing and waning in nature.  Diarrhea related to immunotherapy now improved    PD-L1 positive, HER2 negative,  p53 and KRAS positive.  Repeat Caris testing with repeat biopsy with no target mutations.    B12 deficiency: Continue B12 1000 mcg twice daily.    Anemia - Hb improved and stable.    Follow-up after patient started FOLFOX chemotherapy       Jean Pierre Elizondo MD 08/25/23     Orders Placed This Encounter   Procedures    CT abdomen pelvis w contrast     Standing Status:   Future     Standing Expiration Date:   8/25/2024     Scheduling Instructions:      STAT     Order Specific Question:   Will Oral Contrast be needed for this procedure?     Answer:   No     Order Specific Question:   Release to patient     Answer:   Routine Release [[id number]]    CT chest w contrast     Standing Status:   Future     Standing Expiration Date:   8/25/2024     Scheduling Instructions:      STAT     Order Specific Question:   Release to patient     Answer:   Routine Release [[id number]]    CBC Auto Differential     Order Specific Question:   Release to patient     Answer:   Routine Release [[id number]]    CBC & Differential     Order Specific Question:   Release to patient     Answer:   Routine Release [[id number]]       Time spent on encounter including record review, history taking, exam, discussion, counseling and documentation at: 40 minutes

## 2023-08-25 ENCOUNTER — HOSPITAL ENCOUNTER (OUTPATIENT)
Dept: PET IMAGING | Facility: HOSPITAL | Age: 73
Discharge: HOME | End: 2023-08-25
Admitting: INTERNAL MEDICINE
Payer: MEDICARE

## 2023-08-25 ENCOUNTER — OFFICE VISIT (OUTPATIENT)
Dept: ONCOLOGY | Facility: CLINIC | Age: 73
End: 2023-08-25
Payer: MEDICARE

## 2023-08-25 ENCOUNTER — OFFICE VISIT (OUTPATIENT)
Dept: CARDIOLOGY | Facility: CLINIC | Age: 73
End: 2023-08-25
Payer: MEDICARE

## 2023-08-25 ENCOUNTER — APPOINTMENT (OUTPATIENT)
Dept: LAB | Facility: HOSPITAL | Age: 73
End: 2023-08-25
Payer: MEDICARE

## 2023-08-25 ENCOUNTER — HOSPITAL ENCOUNTER (OUTPATIENT)
Dept: ONCOLOGY | Facility: HOSPITAL | Age: 73
Discharge: HOME | End: 2023-08-25
Payer: MEDICARE

## 2023-08-25 VITALS
BODY MASS INDEX: 23.89 KG/M2 | OXYGEN SATURATION: 100 % | WEIGHT: 143.4 LBS | DIASTOLIC BLOOD PRESSURE: 85 MMHG | SYSTOLIC BLOOD PRESSURE: 153 MMHG | HEART RATE: 65 BPM | HEIGHT: 65 IN | RESPIRATION RATE: 16 BRPM | TEMPERATURE: 98.4 F

## 2023-08-25 VITALS
BODY MASS INDEX: 23.99 KG/M2 | HEART RATE: 64 BPM | DIASTOLIC BLOOD PRESSURE: 74 MMHG | HEIGHT: 65 IN | SYSTOLIC BLOOD PRESSURE: 133 MMHG | OXYGEN SATURATION: 98 % | WEIGHT: 144 LBS

## 2023-08-25 DIAGNOSIS — C15.5 PRIMARY MALIGNANT NEOPLASM OF LOWER THIRD OF ESOPHAGUS: ICD-10-CM

## 2023-08-25 DIAGNOSIS — I25.10 CORONARY ARTERY DISEASE INVOLVING NATIVE CORONARY ARTERY OF NATIVE HEART WITHOUT ANGINA PECTORIS: ICD-10-CM

## 2023-08-25 DIAGNOSIS — C15.9 ESOPHAGEAL CANCER, STAGE IV: ICD-10-CM

## 2023-08-25 DIAGNOSIS — C16.0 MALIGNANT NEOPLASM OF CARDIA: Primary | ICD-10-CM

## 2023-08-25 DIAGNOSIS — C48.0 PRIMARY MALIGNANT NEOPLASM OF RETROPERITONEUM: ICD-10-CM

## 2023-08-25 DIAGNOSIS — I25.5 ISCHEMIC CARDIOMYOPATHY: ICD-10-CM

## 2023-08-25 DIAGNOSIS — Z95.0 PRESENCE OF CARDIAC PACEMAKER: Primary | ICD-10-CM

## 2023-08-25 DIAGNOSIS — C16.0 MALIGNANT NEOPLASM OF CARDIA: ICD-10-CM

## 2023-08-25 LAB
BASOPHILS # BLD AUTO: 0.04 10*3/MM3 (ref 0–0.2)
BASOPHILS NFR BLD AUTO: 0.9 % (ref 0–1.5)
CREAT BLDA-MCNC: 1.2 MG/DL (ref 0.6–1.3)
DEPRECATED RDW RBC AUTO: 46.2 FL (ref 37–54)
EGFRCR SERPLBLD CKD-EPI 2021: 63.5 ML/MIN/1.73
EOSINOPHIL # BLD AUTO: 0.12 10*3/MM3 (ref 0–0.4)
EOSINOPHIL NFR BLD AUTO: 2.6 % (ref 0.3–6.2)
ERYTHROCYTE [DISTWIDTH] IN BLOOD BY AUTOMATED COUNT: 14.5 % (ref 12.3–15.4)
HCT VFR BLD AUTO: 28.9 % (ref 37.5–51)
HGB BLD-MCNC: 9.1 G/DL (ref 13–17.7)
LYMPHOCYTES # BLD AUTO: 1.62 10*3/MM3 (ref 0.7–3.1)
LYMPHOCYTES NFR BLD AUTO: 35.4 % (ref 19.6–45.3)
MCH RBC QN AUTO: 28.4 PG (ref 26.6–33)
MCHC RBC AUTO-ENTMCNC: 31.5 G/DL (ref 31.5–35.7)
MCV RBC AUTO: 90.3 FL (ref 79–97)
MONOCYTES # BLD AUTO: 0.59 10*3/MM3 (ref 0.1–0.9)
MONOCYTES NFR BLD AUTO: 12.9 % (ref 5–12)
NEUTROPHILS NFR BLD AUTO: 2.2 10*3/MM3 (ref 1.7–7)
NEUTROPHILS NFR BLD AUTO: 48.2 % (ref 42.7–76)
PLATELET # BLD AUTO: 179 10*3/MM3 (ref 140–450)
PMV BLD AUTO: 9.8 FL (ref 6–12)
RBC # BLD AUTO: 3.2 10*6/MM3 (ref 4.14–5.8)
WBC NRBC COR # BLD: 4.57 10*3/MM3 (ref 3.4–10.8)

## 2023-08-25 PROCEDURE — 36591 DRAW BLOOD OFF VENOUS DEVICE: CPT

## 2023-08-25 PROCEDURE — 82565 ASSAY OF CREATININE: CPT

## 2023-08-25 PROCEDURE — 25510000001 IOPAMIDOL PER 1 ML: Performed by: INTERNAL MEDICINE

## 2023-08-25 PROCEDURE — 71260 CT THORAX DX C+: CPT

## 2023-08-25 PROCEDURE — 85025 COMPLETE CBC W/AUTO DIFF WBC: CPT | Performed by: INTERNAL MEDICINE

## 2023-08-25 PROCEDURE — 74177 CT ABD & PELVIS W/CONTRAST: CPT

## 2023-08-25 RX ORDER — ONDANSETRON HYDROCHLORIDE 8 MG/1
8 TABLET, FILM COATED ORAL 3 TIMES DAILY PRN
Qty: 30 TABLET | Refills: 5 | Status: SHIPPED | OUTPATIENT
Start: 2023-08-25

## 2023-08-25 RX ORDER — SODIUM CHLORIDE 0.9 % (FLUSH) 0.9 %
20 SYRINGE (ML) INJECTION AS NEEDED
OUTPATIENT
Start: 2023-08-25

## 2023-08-25 RX ORDER — HEPARIN SODIUM (PORCINE) LOCK FLUSH IV SOLN 100 UNIT/ML 100 UNIT/ML
500 SOLUTION INTRAVENOUS AS NEEDED
Status: CANCELLED | OUTPATIENT
Start: 2023-08-25

## 2023-08-25 RX ORDER — SODIUM CHLORIDE 0.9 % (FLUSH) 0.9 %
20 SYRINGE (ML) INJECTION AS NEEDED
Status: DISCONTINUED | OUTPATIENT
Start: 2023-08-25 | End: 2023-08-26 | Stop reason: HOSPADM

## 2023-08-25 RX ADMIN — Medication 20 ML: at 10:33

## 2023-08-25 RX ADMIN — IOPAMIDOL 100 ML: 755 INJECTION, SOLUTION INTRAVENOUS at 12:00

## 2023-08-25 NOTE — PROGRESS NOTES
Port accessed and flushed with good blood return noted. 10cc of blood wasted prior to specimen collection. Blood specimen obtained and sent to lab for processing per protocol.  Port flushed with saline prior to needle removal.

## 2023-08-25 NOTE — PROGRESS NOTES
CC=== coronary disease and pacemaker    Sub  73-year-old male patient underwent cardiac catheterization revealed EF of 30% with no further targets for revascularization.  Patient has recurrence of his metastatic esophageal cancer and awaiting chemo therapy initiation.  Patient has dual-chamber pacemaker with iatrogenic left bundle branch block with RV pacing and EF is down to 30% and recommended for possible upgrade on this patient.  proBNP is normal and patient is fairly well compensated currently with medical therapy.  Prior history of bypass surgery esophageal cancer hypertension hyperlipidemia.    Recent cardiac catheterization results below for reference    Severe LV dysfunction with estimated LV ejection fraction of 30%  Normal left-sided filling pressures  Severe native three-vessel coronary artery disease with 100% occlusion of the ostial % occlusion of the mid circumflex artery and 100% occlusion of the proximal right coronary artery  Patent LIMA to the LAD  Patient vein graft to the diagonal and marginal branch   Patent vein graft to the PDA    Past Medical History:   Diagnosis Date    B12 deficiency     Blockage of coronary artery of heart     Depression     DM (diabetes mellitus)     Dysphagia     GERD (gastroesophageal reflux disease)     HTN (hypertension)     Hyperlipidemia      Past Surgical History:   Procedure Laterality Date    ABDOMINAL WALL ABSCESS INCISION AND DRAINAGE  10/22/2018    WITH DEBRIDEMENT  1.5CM X 1.5 CM X 1.5 CM    DR. PURI    CATARACT EXTRACTION  2018    COLONOSCOPY      CORONARY ARTERY BYPASS GRAFT  2015    ENDOSCOPY  12/20/2016    ENDOSCOPY N/A 1/2/2023    Procedure: ESOPHAGOGASTRODUODENOSCOPY;  Surgeon: Esvin Morgan MD;  Location: [facility] ENDOSCOPY;  Service: Gastroenterology;  Laterality: N/A;  post: anastamosis stricture    ENDOSCOPY N/A 2/9/2023    Procedure: ESOPHAGOGASTRODUODENOSCOPY, non-wire guided balloon dilation (12-14mm) of esophageal stricture;   Surgeon: Tino Villafana MD;  Location: [facility] ENDOSCOPY;  Service: Gastroenterology;  Laterality: N/A;  post: esophageal anastamotic stricture, post surgical changes    ESOPHAGECTOMY  05/04/2017    BROOKS PETTY ESOPHAGECTOMY, FEEDING JEJNOSTOMOY, ABDOMINAL AND MEDIASTINAL LYMPH NODE DISECTION, PEDICLED MUSCLE FLAP COVERAGE DR. PURI    ESOPHAGOSCOPY / EGD  07/12/2017    WITH BALLOON DILATION    ESOPHAGOSCOPY / EGD  07/26/2017    WITH BALLOON DILATION    ESOPHAGOSCOPY / EGD  08/11/2017    WITH BALLOON DILATION    ESOPHAGOSCOPY / EGD  08/28/2017    WITH BALLOON DILATION    ESOPHAGOSCOPY / EGD  09/27/2017    WITH BALLOON DILATION    LYMPH NODE BIOPSY  01/30/2019    PACEMAKER IMPLANTATION  11/21/2016         Physical Exam    General:      well developed, well nourished, in no acute distress.    Head:      normocephalic and atraumatic.    Eyes:      PERRL/EOM intact, conjunctivae and sclerae clear without nystagmus.    Neck:      no  thyromegaly, trachea central with normal respiratory effort  Lungs:      clear bilaterally to auscultation.    Heart:       regular rate and rhythm, S1, S2 without murmurs, rubs, or gallops  Skin:      intact without lesions or rashes.    Psych:      alert and cooperative; normal mood and affect; normal attention span and concentration.            Assessment and plan  Hemo-globin today is 9.1 with normal platelets  Ischemic cardiomyopathy with EF around 30% with frequent RV pacing with iatrogenic left bundle branch block  Dual-chamber pacemaker in situ  Metastatic esophageal cancer awaiting chemo therapy  Coronary artery disease stable without angina  Ischemic cardiomyopathy on metoprolol and spironolactone stable  Allergic to ACE inhibitors    I discussed with the patient about upgrade his device to biventricular pacemaker versus defibrillator  Patient wants to continue current medical treatment especially in the office recurrence of metastatic esophageal cancer awaiting  chemotherapy  Patient medications are reviewed and follow-up in 3 months  Diabetes on metformin managed by primary care  Hyperlipidemia on pravastatin        Electronically signed by Sumit Crowell MD, 08/25/23, 3:03 PM EDT.

## 2023-08-29 ENCOUNTER — HOSPITAL ENCOUNTER (OUTPATIENT)
Dept: ONCOLOGY | Facility: HOSPITAL | Age: 73
Discharge: HOME | End: 2023-08-29
Payer: MEDICARE

## 2023-08-29 VITALS
OXYGEN SATURATION: 99 % | HEART RATE: 71 BPM | TEMPERATURE: 97.8 F | DIASTOLIC BLOOD PRESSURE: 67 MMHG | HEIGHT: 65 IN | SYSTOLIC BLOOD PRESSURE: 125 MMHG | BODY MASS INDEX: 24.16 KG/M2 | RESPIRATION RATE: 16 BRPM | WEIGHT: 145 LBS

## 2023-08-29 DIAGNOSIS — C16.0 MALIGNANT NEOPLASM OF CARDIA: Primary | ICD-10-CM

## 2023-08-29 DIAGNOSIS — C15.5 PRIMARY MALIGNANT NEOPLASM OF LOWER THIRD OF ESOPHAGUS: ICD-10-CM

## 2023-08-29 LAB
ALBUMIN SERPL-MCNC: 3.8 G/DL (ref 3.5–5.2)
ALBUMIN/GLOB SERPL: 1.6 G/DL
ALP SERPL-CCNC: 32 U/L (ref 39–117)
ALT SERPL W P-5'-P-CCNC: 6 U/L (ref 1–41)
ANION GAP SERPL CALCULATED.3IONS-SCNC: 10 MMOL/L (ref 5–15)
AST SERPL-CCNC: 14 U/L (ref 1–40)
BASOPHILS # BLD AUTO: 0.03 10*3/MM3 (ref 0–0.2)
BASOPHILS NFR BLD AUTO: 0.6 % (ref 0–1.5)
BILIRUB SERPL-MCNC: 0.2 MG/DL (ref 0–1.2)
BUN SERPL-MCNC: 16 MG/DL (ref 8–23)
BUN/CREAT SERPL: 17 (ref 7–25)
CALCIUM SPEC-SCNC: 8.7 MG/DL (ref 8.6–10.5)
CHLORIDE SERPL-SCNC: 107 MMOL/L (ref 98–107)
CO2 SERPL-SCNC: 22 MMOL/L (ref 22–29)
CREAT SERPL-MCNC: 0.94 MG/DL (ref 0.76–1.27)
DEPRECATED RDW RBC AUTO: 46 FL (ref 37–54)
EGFRCR SERPLBLD CKD-EPI 2021: 85.1 ML/MIN/1.73
EOSINOPHIL # BLD AUTO: 0.15 10*3/MM3 (ref 0–0.4)
EOSINOPHIL NFR BLD AUTO: 2.9 % (ref 0.3–6.2)
ERYTHROCYTE [DISTWIDTH] IN BLOOD BY AUTOMATED COUNT: 14.5 % (ref 12.3–15.4)
GLOBULIN UR ELPH-MCNC: 2.4 GM/DL
GLUCOSE SERPL-MCNC: 147 MG/DL (ref 65–99)
HCT VFR BLD AUTO: 29.1 % (ref 37.5–51)
HGB BLD-MCNC: 9.4 G/DL (ref 13–17.7)
LYMPHOCYTES # BLD AUTO: 1.52 10*3/MM3 (ref 0.7–3.1)
LYMPHOCYTES NFR BLD AUTO: 29.8 % (ref 19.6–45.3)
MCH RBC QN AUTO: 28.9 PG (ref 26.6–33)
MCHC RBC AUTO-ENTMCNC: 32.3 G/DL (ref 31.5–35.7)
MCV RBC AUTO: 89.5 FL (ref 79–97)
MONOCYTES # BLD AUTO: 0.64 10*3/MM3 (ref 0.1–0.9)
MONOCYTES NFR BLD AUTO: 12.5 % (ref 5–12)
NEUTROPHILS NFR BLD AUTO: 2.76 10*3/MM3 (ref 1.7–7)
NEUTROPHILS NFR BLD AUTO: 54.2 % (ref 42.7–76)
PLATELET # BLD AUTO: 187 10*3/MM3 (ref 140–450)
PMV BLD AUTO: 10.5 FL (ref 6–12)
POTASSIUM SERPL-SCNC: 4.6 MMOL/L (ref 3.5–5.2)
PROT SERPL-MCNC: 6.2 G/DL (ref 6–8.5)
RBC # BLD AUTO: 3.25 10*6/MM3 (ref 4.14–5.8)
SODIUM SERPL-SCNC: 139 MMOL/L (ref 136–145)
WBC NRBC COR # BLD: 5.1 10*3/MM3 (ref 3.4–10.8)

## 2023-08-29 PROCEDURE — 96367 TX/PROPH/DG ADDL SEQ IV INF: CPT

## 2023-08-29 PROCEDURE — 25010000002 FLUOROURACIL PER 500 MG: Performed by: INTERNAL MEDICINE

## 2023-08-29 PROCEDURE — 96375 TX/PRO/DX INJ NEW DRUG ADDON: CPT

## 2023-08-29 PROCEDURE — 25010000002 LEUCOVORIN CALCIUM PER 50 MG: Performed by: INTERNAL MEDICINE

## 2023-08-29 PROCEDURE — G0498 CHEMO EXTEND IV INFUS W/PUMP: HCPCS

## 2023-08-29 PROCEDURE — 96415 CHEMO IV INFUSION ADDL HR: CPT

## 2023-08-29 PROCEDURE — 96366 THER/PROPH/DIAG IV INF ADDON: CPT

## 2023-08-29 PROCEDURE — 96416 CHEMO PROLONG INFUSE W/PUMP: CPT

## 2023-08-29 PROCEDURE — 25010000002 DEXAMETHASONE SODIUM PHOSPHATE 120 MG/30ML SOLUTION: Performed by: INTERNAL MEDICINE

## 2023-08-29 PROCEDURE — 96413 CHEMO IV INFUSION 1 HR: CPT

## 2023-08-29 PROCEDURE — 96368 THER/DIAG CONCURRENT INF: CPT

## 2023-08-29 PROCEDURE — 96411 CHEMO IV PUSH ADDL DRUG: CPT

## 2023-08-29 PROCEDURE — 25010000002 PALONOSETRON 0.25 MG/5ML SOLUTION PREFILLED SYRINGE: Performed by: INTERNAL MEDICINE

## 2023-08-29 PROCEDURE — 85025 COMPLETE CBC W/AUTO DIFF WBC: CPT | Performed by: INTERNAL MEDICINE

## 2023-08-29 PROCEDURE — 25010000002 OXALIPLATIN PER 0.5 MG: Performed by: INTERNAL MEDICINE

## 2023-08-29 PROCEDURE — 80053 COMPREHEN METABOLIC PANEL: CPT | Performed by: INTERNAL MEDICINE

## 2023-08-29 RX ORDER — FAMOTIDINE 10 MG/ML
20 INJECTION, SOLUTION INTRAVENOUS AS NEEDED
Status: CANCELLED | OUTPATIENT
Start: 2023-08-29

## 2023-08-29 RX ORDER — DIPHENHYDRAMINE HYDROCHLORIDE 50 MG/ML
50 INJECTION INTRAMUSCULAR; INTRAVENOUS AS NEEDED
Status: CANCELLED | OUTPATIENT
Start: 2023-08-29

## 2023-08-29 RX ORDER — FLUOROURACIL 50 MG/ML
400 INJECTION, SOLUTION INTRAVENOUS ONCE
Status: COMPLETED | OUTPATIENT
Start: 2023-08-29 | End: 2023-08-29

## 2023-08-29 RX ORDER — PALONOSETRON 0.05 MG/ML
0.25 INJECTION, SOLUTION INTRAVENOUS ONCE
Status: COMPLETED | OUTPATIENT
Start: 2023-08-29 | End: 2023-08-29

## 2023-08-29 RX ORDER — DEXTROSE MONOHYDRATE 50 MG/ML
250 INJECTION, SOLUTION INTRAVENOUS ONCE
Status: COMPLETED | OUTPATIENT
Start: 2023-08-29 | End: 2023-08-29

## 2023-08-29 RX ORDER — FLUOROURACIL 50 MG/ML
400 INJECTION, SOLUTION INTRAVENOUS ONCE
Status: CANCELLED | OUTPATIENT
Start: 2023-08-29

## 2023-08-29 RX ADMIN — DEXAMETHASONE SODIUM PHOSPHATE 12 MG: 4 INJECTION, SOLUTION INTRA-ARTICULAR; INTRALESIONAL; INTRAMUSCULAR; INTRAVENOUS; SOFT TISSUE at 10:48

## 2023-08-29 RX ADMIN — FLUOROURACIL 690 MG: 50 INJECTION, SOLUTION INTRAVENOUS at 14:08

## 2023-08-29 RX ADMIN — LEUCOVORIN CALCIUM 690 MG: 350 INJECTION, POWDER, LYOPHILIZED, FOR SUSPENSION INTRAMUSCULAR; INTRAVENOUS at 11:54

## 2023-08-29 RX ADMIN — DEXTROSE MONOHYDRATE 250 ML: 50 INJECTION, SOLUTION INTRAVENOUS at 10:48

## 2023-08-29 RX ADMIN — PALONOSETRON 0.25 MG: 0.25 INJECTION, SOLUTION INTRAVENOUS at 10:48

## 2023-08-29 RX ADMIN — FLUOROURACIL 4130 MG: 50 INJECTION, SOLUTION INTRAVENOUS at 14:13

## 2023-08-29 RX ADMIN — OXALIPLATIN 145 MG: 5 INJECTION, SOLUTION INTRAVENOUS at 11:54

## 2023-08-29 NOTE — PROGRESS NOTES
[facility] Medical Oncology     Education for Administration of Chemotherapy and/or Biotherapy     08/29/23    Artie Mc  [id number]    Mr.Jerry ABDI Mc is here today for education on their upcoming Chemotherapy and/or Biotherapy.     I will be going over their treatment options, obtain signed consent and answer any questions that they may have in regards to the administration of folfox.     Artie Mc has already consulted with  for the treatment of metastatic esophageal carcinoma. The provider has gone over the same treatment options with the patient and answered their question prior to today's visit.     The goal of the treatment is to:    [] Cure my cancer - means treatment that kills cancer cells to the point my doctor                                     cannot find them in my body and they will not grow back.    [x] Control my cancer - means treatment that keeps cancer from spreading or growing.    [x] Relieve my cancer symptoms - means treatment that helps problems such as pain or pressure.     This treatment has been explained to Artie Mc. Alternative methods of treatment, if any, have been explained to Artie Mc as have the benefits and risks of each. Based on the physician's explanation of the benefits and risks of this treatment and any alternatives available, The patient agrees that the potential benefit's out weighs the risks involved. I have explained to the patient the most likely complications that might occur from this treatment. The patient understands that along with the treatment additional medications may be necessary to lesson the side effects. Possible side effect may include but are not limited to, any of the following, or a combination of the following:          []  Abdominal pain  []  Fatigue []  Insomnia []  Petechiae   [x]  Allergic Reaction []  Fertility effects  []  Itching []  Photosensitivity    []  Anemia [x]  Fevers []  Joint pain []  Pleural  effusion    []  Anxiety []  Fistula formation [] Kidney damage/toxicity []  Proteinuria    []  Back pain []  Flu-like symptoms []  LFT imbalances [x]  Rash   []  Blood clots (DVT/PE) []  Fluid retention []  Liver damage []  Secondary malignancies   [x]  Bleeding []  Forgetfulness []  Loss of appetite []  Sexual side effects    []  Bone pain []  Gastrointestinal perforation []  Low blood pressure []  Shortness of breath   []  Bruising []  Hand foot syndrome []  Lung damage []  Skin changes   []  Cardiovascular events  []  Hair loss/discoloration []  Menopausal symptoms []  Sore throat   []  Central neurotoxicity []  Headaches []  Menstrual irregularities []  Swelling   []  Chest pain [] Hearing loss/change []  Mood changes []  Taste changes   []  Chills []  Heart damage []  Mouth sores []  Temperature sensitivity   []  Confusion []  Hematuria []  Muscle aches  []    Thrombocytopenia   []  Congestive heart failure []  Hemorrhage []  Nephrotic syndrome []  Thyroid changes   [x]  Constipation []  Hypertension []  Nail changes []  Tinnitus   []  Cough []  Hypertensive crisis [x]  Nausea  []  Upper respiratory tract infection    []  Depression []  Hypertriglyceridemia  []  Neck pain  []  Visual changes   [x]  Diarrhea []  Hypoglycemia []  Neutropenia [x]  Vomiting   []  Dizziness []  Immune-mediated reaction []  Nosebleeds []  Watery eyes   []  Dry skin []  Infection  []  Pain in arms/legs []  Weakness   []  Electrolyte imbalances [x]  Infusion reaction  []  Pericardial effusion  []  Weight changes   []  Elevated LDH []  Injection site reaction  []  Peripheral neuropathy []  Wound healing complication   []  Eye irritation []  Insomnia       While receiving treatment, it has been explained to the patient with regards to their blood counts. This may include but not limited to CBC, Neutropenia ,Anemia, Thrombocytopenia. This handout has been explained and given to the patient.     It was explained to the patient about  nutrition and how important it is while undergoing Chemotherapy and/or Biotherapy. Certain medications will be prescribed during the treatment which may change the way foods taste or smell. These changes may cause poor or no appetite. Food is fuel for your body, and if it does not get the fuel it needs, your body may become mal-nourished, which can lead to sever fatigue.   Information was given to Artie Mc in regards to some good protein sources to help maintain muscle mass and help to prevent malnutrition   We also discussed with the patient how important it is to drink/eat every 2-3 hours while awake regardless of hunger. We discussed fluid intake of at least 64 ounces liquids per day to stay hydrated.     It has been discussed with the patient the risks of becoming pregnant while receiving Chemotherapy and/or Biotherapy. We also discussed the importance of using reliable barrier methods while participating in intimate activities as this may expose their partners to a potentially harmful drug.     Further home instructions were given to the patient in regards to symptoms, treatment and how to handle those situations as well as when to contact the treatment team or the providers office.     Artie Mc was given handouts on:  Home Instructions  Tips from your chemotherapy nurse  Nutrition during cancer therapy  Cancer related fatigue  Fluids/Dehydration  Hair and Prosthesis solutions  Mouth care  Nausea/Appetite  Constipation and Diarrhea  Understanding your blood  Improving your veins  Oxaliplatin, leucovorin, adrucil from [email or web address]    I have discussed and gone over the full consent with the patient and answered all their questions regarding the medication they are to receive.  Written information has been provided and reviewed with Artie Mc. The patient and their family had a chance to ask any questions about the treatment medications and are satisfied with the information that was provided to  them.     The patient has read and completed the consent form. They understand the possible risks and benefits of the recommended treatment plan and voluntarily agree to undergo the planned treatment. Should they change their mind and decide to stop treatment at any time, they will notify the providers office.       Maddison Mcneil RN  08/29/2023  15:16 EDT

## 2023-08-29 NOTE — PROGRESS NOTES
Pt here for C1 D1 folfox. Using sterile technique, port accessed for blood collection. Port aspirated, positive blood return noted. 10 ml blood wasted prior to blood for labs being collected. Pt tolerated today's treatment well. AVS given at discharge and he verbalized understanding.

## 2023-08-30 ENCOUNTER — HOSPITAL ENCOUNTER (OUTPATIENT)
Dept: CARDIOLOGY | Facility: HOSPITAL | Age: 73
Discharge: HOME | End: 2023-08-30
Admitting: INTERNAL MEDICINE
Payer: MEDICARE

## 2023-08-30 DIAGNOSIS — I42.0 DILATED CARDIOMYOPATHY: ICD-10-CM

## 2023-08-30 DIAGNOSIS — R79.89 ELEVATED TSH: ICD-10-CM

## 2023-08-30 DIAGNOSIS — Z95.0 PRESENCE OF CARDIAC PACEMAKER: ICD-10-CM

## 2023-08-30 DIAGNOSIS — I42.0 CARDIOMYOPATHY, DILATED: ICD-10-CM

## 2023-08-30 DIAGNOSIS — E03.9 HYPOTHYROIDISM (ACQUIRED): ICD-10-CM

## 2023-08-30 PROCEDURE — 93306 TTE W/DOPPLER COMPLETE: CPT

## 2023-08-30 RX ORDER — LEVOTHYROXINE SODIUM 0.05 MG/1
TABLET ORAL
Qty: 90 TABLET | Refills: 0 | Status: SHIPPED | OUTPATIENT
Start: 2023-08-30

## 2023-08-31 ENCOUNTER — HOSPITAL ENCOUNTER (OUTPATIENT)
Dept: ONCOLOGY | Facility: HOSPITAL | Age: 73
Discharge: HOME | End: 2023-08-31
Admitting: INTERNAL MEDICINE
Payer: MEDICARE

## 2023-08-31 DIAGNOSIS — C15.9 ESOPHAGEAL CANCER, STAGE IV: ICD-10-CM

## 2023-08-31 DIAGNOSIS — C15.9 ESOPHAGEAL CANCER, STAGE IV: Primary | ICD-10-CM

## 2023-08-31 DIAGNOSIS — C16.0 MALIGNANT NEOPLASM OF CARDIA: Primary | ICD-10-CM

## 2023-08-31 DIAGNOSIS — C15.5 PRIMARY MALIGNANT NEOPLASM OF LOWER THIRD OF ESOPHAGUS: ICD-10-CM

## 2023-08-31 PROCEDURE — 96360 HYDRATION IV INFUSION INIT: CPT

## 2023-08-31 RX ORDER — SODIUM CHLORIDE 0.9 % (FLUSH) 0.9 %
20 SYRINGE (ML) INJECTION AS NEEDED
Status: DISCONTINUED | OUTPATIENT
Start: 2023-08-31 | End: 2023-09-01 | Stop reason: HOSPADM

## 2023-08-31 RX ORDER — SODIUM CHLORIDE 0.9 % (FLUSH) 0.9 %
20 SYRINGE (ML) INJECTION AS NEEDED
OUTPATIENT
Start: 2023-08-31

## 2023-08-31 RX ADMIN — Medication 20 ML: at 16:42

## 2023-08-31 RX ADMIN — SODIUM CHLORIDE 500 ML: 9 INJECTION, SOLUTION INTRAVENOUS at 15:45

## 2023-08-31 NOTE — PROGRESS NOTES
"Note per MICHELL Jorgensen RN:  patient is here for pump discontinue and he said its been hard for him to drink a lot. his mouth/throat \"feels like when you burn your mouth whith microwaved food\" he is also not feeling the greatest and thinks fluids may help since he is not drinking. would it be ok to give him some fluids today?    had some diarrhea yesterday 3 times and has not eaten today due to being afraid of having diarrhea-he did not take meds for it yet     Order received from Dr. Elizondo to give pt  ml over 1 hour.  Pt to take Imodium and Ananya Magic Mouthwash will be sent in for pt.  Pt instructed on how to take Imodium and MMP and he v/u.  Informed pt that MMP has been sent in to [facility] on [address] and they will call him when it is ready for .  He v/u.   IVF's completed and pt states that he feels better.  Port flushed and needle removed.  Pt discharged from clinic.  "

## 2023-09-01 LAB
BH CV ECHO MEAS - AI P1/2T: 577.6 MSEC
BH CV ECHO MEAS - AO MAX PG: 7.4 MMHG
BH CV ECHO MEAS - AO MEAN PG: 4 MMHG
BH CV ECHO MEAS - AO V2 MAX: 136 CM/SEC
BH CV ECHO MEAS - AO V2 VTI: 30.9 CM
BH CV ECHO MEAS - AVA(I,D): 2.49 CM2
BH CV ECHO MEAS - EDV(CUBED): 175.6 ML
BH CV ECHO MEAS - EDV(MOD-SP4): 78.2 ML
BH CV ECHO MEAS - EF(MOD-SP4): 45.3 %
BH CV ECHO MEAS - ESV(CUBED): 64 ML
BH CV ECHO MEAS - ESV(MOD-SP4): 42.8 ML
BH CV ECHO MEAS - FS: 28.6 %
BH CV ECHO MEAS - IVS/LVPW: 1 CM
BH CV ECHO MEAS - IVSD: 1 CM
BH CV ECHO MEAS - LA DIMENSION: 3.1 CM
BH CV ECHO MEAS - LAT PEAK E' VEL: 11.4 CM/SEC
BH CV ECHO MEAS - LV DIASTOLIC VOL/BSA (35-75): 45.3 CM2
BH CV ECHO MEAS - LV MASS(C)D: 219.7 GRAMS
BH CV ECHO MEAS - LV MAX PG: 3.2 MMHG
BH CV ECHO MEAS - LV MEAN PG: 2 MMHG
BH CV ECHO MEAS - LV SYSTOLIC VOL/BSA (12-30): 24.8 CM2
BH CV ECHO MEAS - LV V1 MAX: 89.1 CM/SEC
BH CV ECHO MEAS - LV V1 VTI: 20.2 CM
BH CV ECHO MEAS - LVIDD: 5.6 CM
BH CV ECHO MEAS - LVIDS: 4 CM
BH CV ECHO MEAS - LVOT AREA: 3.8 CM2
BH CV ECHO MEAS - LVOT DIAM: 2.2 CM
BH CV ECHO MEAS - LVPWD: 1 CM
BH CV ECHO MEAS - MED PEAK E' VEL: 7 CM/SEC
BH CV ECHO MEAS - MR MAX PG: 69.9 MMHG
BH CV ECHO MEAS - MR MAX VEL: 418 CM/SEC
BH CV ECHO MEAS - MV A MAX VEL: 93.8 CM/SEC
BH CV ECHO MEAS - MV DEC SLOPE: 514 CM/SEC2
BH CV ECHO MEAS - MV DEC TIME: 0.19 MSEC
BH CV ECHO MEAS - MV E MAX VEL: 89.6 CM/SEC
BH CV ECHO MEAS - MV E/A: 0.96
BH CV ECHO MEAS - MV MAX PG: 4.2 MMHG
BH CV ECHO MEAS - MV MEAN PG: 2 MMHG
BH CV ECHO MEAS - MV P1/2T: 57 MSEC
BH CV ECHO MEAS - MV V2 VTI: 25.6 CM
BH CV ECHO MEAS - MVA(P1/2T): 3.9 CM2
BH CV ECHO MEAS - MVA(VTI): 3 CM2
BH CV ECHO MEAS - PA V2 MAX: 89.6 CM/SEC
BH CV ECHO MEAS - QP/QS: 0.63
BH CV ECHO MEAS - RV MAX PG: 1.36 MMHG
BH CV ECHO MEAS - RV V1 MAX: 58.3 CM/SEC
BH CV ECHO MEAS - RV V1 VTI: 12.8 CM
BH CV ECHO MEAS - RVDD: 2.2 CM
BH CV ECHO MEAS - RVOT DIAM: 2.2 CM
BH CV ECHO MEAS - SI(MOD-SP4): 20.5 ML/M2
BH CV ECHO MEAS - SV(LVOT): 76.8 ML
BH CV ECHO MEAS - SV(MOD-SP4): 35.4 ML
BH CV ECHO MEAS - SV(RVOT): 48.7 ML
BH CV ECHO MEAS - TAPSE (>1.6): 3.1 CM
BH CV ECHO MEASUREMENTS AVERAGE E/E' RATIO: 9.74
BH CV XLRA - TDI S': 6.2 CM/SEC
LEFT ATRIUM VOLUME INDEX: 19.7 ML/M2
SINUS: 3.8 CM

## 2023-09-07 ENCOUNTER — TELEPHONE (OUTPATIENT)
Dept: CARDIOLOGY | Facility: CLINIC | Age: 73
End: 2023-09-07
Payer: MEDICARE

## 2023-09-07 NOTE — TELEPHONE ENCOUNTER
Notified pt.     Mazin Simmons MD Fields, Aimee, MA  LV function improved compared to prior cardiac cath  Continue the same therapy and follow-up as scheduled

## 2023-09-11 ENCOUNTER — TELEPHONE (OUTPATIENT)
Dept: ONCOLOGY | Facility: CLINIC | Age: 73
End: 2023-09-11

## 2023-09-11 NOTE — TELEPHONE ENCOUNTER
Caller: Artie Mc    Relationship: Self    Best call back number: [phone]    What is the best time to reach you: ANYTIME     Who are you requesting to speak with (clinical staff, provider,  specific staff member): CLINICAL     What was the call regarding: PATIENT HAD A HARD TIME WITH THE LAST CHEMO WOULD LIKE TO TAKE A COUPLE WEEKS OFF TO FEE BETTER HE HAS APPT ON 9/12/2023 & 9/13/2023?    WOULD LIKE TO KNOW THE RESULTS OF THE CT SCAN & ECHO?    NEED TO KNOW WHAT IMMUNIZATIONS HE CAN GET?    CALL TO ADVISE       Is it okay if the provider responds through MyChart: NO

## 2023-09-11 NOTE — TELEPHONE ENCOUNTER
SW patient and made him aware it would be best he is seen by Dr. Elizondo. He v/u and appt was made for 9/12.

## 2023-09-12 ENCOUNTER — OFFICE VISIT (OUTPATIENT)
Dept: ONCOLOGY | Facility: CLINIC | Age: 73
End: 2023-09-12
Payer: MEDICARE

## 2023-09-12 VITALS
SYSTOLIC BLOOD PRESSURE: 125 MMHG | HEART RATE: 71 BPM | TEMPERATURE: 98.2 F | HEIGHT: 65 IN | DIASTOLIC BLOOD PRESSURE: 67 MMHG | BODY MASS INDEX: 23.69 KG/M2 | OXYGEN SATURATION: 99 % | RESPIRATION RATE: 18 BRPM | WEIGHT: 142.2 LBS

## 2023-09-12 DIAGNOSIS — C15.9 ESOPHAGEAL CANCER, STAGE IV: Primary | ICD-10-CM

## 2023-09-12 DIAGNOSIS — D50.9 IRON DEFICIENCY ANEMIA, UNSPECIFIED IRON DEFICIENCY ANEMIA TYPE: ICD-10-CM

## 2023-09-12 NOTE — PROGRESS NOTES
Hematology-Oncology Follow-up Note       Artie Mc  1950    Primary Care Physician: Chanel Carter APRN  Referring Physician: Chanel Carter APRN    Reason For Visit:  Chief Complaint   Patient presents with    Follow-up     Malignant neoplasm of cardia         Metastatic esophageal cancer  Monitoring for adverse effects related to immunotherapy.  Hypothyroidism    HPI:   Mr. Mc is a pleasant 73 y.o. gentleman with a history of gastroesophageal reflux disease, diabetes, hypertension, heart block status post pacemaker placement and history of CABG.  He was having symptoms of dysphagia, weight loss since September 2016. He was reporting symptoms of food getting stuck in the lower chest.  He has transitioned himself to a liquid diet.  The patient reports losing about 30 pounds over this duration.  The patient underwent upper GI endoscopy on 12/20/16 with Dr. Esvin Barrera.  Endoscopy showed necrotic, circumferential and moderately obstructive mass at the distal esophagus between 42 cm and 36 cm.      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  There was also evidence of mild subacute inflammation in the duodenum.  Dr. Barrera ordered a CT scan of the chest and abdomen with contrast on 12/20/16.  The scan was done at [facility].  The scan showed a new ill-defined mass in the anterior wall of the distal esophagus at the GE junction measuring 2.3 x 2.2 x 1.6 cm, worrisome for cancer.  There were four subcentimeter liver lesions which had appeared stable in comparison to another CT scan from 2009 and they likely represent small cysts.  There was also mild lymphadenopathy in the gastrohepatic ligament.  There were at least six borderline enlarged lymph nodes measuring up to 1.3 x 1 cm in the region.  The patient is referred to us for further oncological evaluation.    1/5/2017 - The patient presents for  initial consultation.  He reports persistent dysphagia and also has symptoms of regurgitation.  He cannot tolerate solid foods and is dependent on liquid diet such as Ensure.  He also reports fatigue. Symptoms of dysphagia have been present for the past four months.  When he eats any solids, the food gets stuck in the lower chest.    1/5/17 - Vitamin B12 200 (L).  CEA 0.8.  Ferritin 35.  Iron saturation 16% (L), serum iron 56, TIBC 354.  Creatinine 0.9.  LFTs normal.  Folate 10.2.  1/12/17 - PET/CT scan:  Intense abnormal activity corresponding to the region of the GE junction and proximal stomach.  SUV is 11.06.  There is a suggestion of an abnormal mass or abnormal wall thickening in the region measuring 3.9 x 4.8 cm.  No additional abnormalities.  No evidence of metastases or other lymphadenopathy.  Scattered nonspecific subcentimeter lymph nodes involving the mediastinum and within the central upper abdomen.  1/13/17 - Creatinine 0.9.  LFTs normal.    1/17/17 - Patient seen by thoracic surgeon, Dr. Bradley.  PET scan was reviewed.  He has recommended neoadjuvant chemoradiation therapy followed by [place] Gurwinder esophagogastrectomy.  Port-A-Cath is being arranged.    1/20/17 - WBC 6.3, hemoglobin 12.1, platelet count 198,000, MCV 86.4.  1/30/17 - Patient started weekly Carboplatin and Taxol (Carboplatin AUC 2 and Taxol 50 mg/M2).  Patient received Injectafer 750 mg.  WBC 5.8, hemoglobin 12.4, platelet count 244,000.   1/31/17 - Patient seen by Dr. Eduardo Oleary.  The plan was to give 5040 cGy in 28 fractions.   2/6/17 - WBC 4.4, hemoglobin 11.6, platelet count 201,000.  Patient received cycle 2 of weekly Carboplatin and Taxol (Carboplatin AUC 2 and Taxol 50 mg/M2).  2/13/17 - Patient received Carboplatin and Taxol weekly cycle 3.  2/13/17 - Patient started concurrent radiation therapy.  Total planned dose is 4140 cGy.    2/13/17 to 2/15/17 - Patient received 4140 cGy of neoadjuvant radiation.  Radiation was finished  on 3/15/17.    2/20/17 - WBC 1.8, hemoglobin 10.6, platelet count 217,000.  Creatinine 0.5. Folate 15 (L).  Ferritin 361.  Haptoglobin 214.  Iron saturation 33%, TIBC 263, serum iron 88.    2/20/17 - Patient received Carboplatin and Taxol weekly cycle 4.   2/27/17 - WBC 3.7, hemoglobin 10.7, platelet count 177,000, MCV 85.3.    3/6/17 - Patient has received 16 out of the 23 planned radiation treatments.  Total planned dose is 4140 cGy.    3/6/17 - WBC 5.4, hemoglobin 11.2, platelet count 113,000.  3/6/17 - Patient received cycle 5 of weekly Carboplatin and Taxol.   3/13/17 - Patient received cycle 6 of weekly Carboplatin and Taxol.  WBC 3.9, hemoglobin 11.3, platelet count 93,000.     3/20/17 - WBC 1.4, hemoglobin 10.0, platelet count 118,000, MCV 86.1   3/27/17 - WBC 3.8, hemoglobin 9.5, platelet count 176,000, MCV 87.3.    4/7/17 - Stress test:  No evidence of reversible myocardial ischemia.  Normal left ventricular ejection fraction of 50%.    4/17/17 - Upper GI endoscopy by Dr. 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  The GE junction adenocarcinoma lesion seems to have a very good response to chemotherapy and radiation.  The lumen was open.  5/1/17 - WBC 7.0, hemoglobin 10.2, platelet count 175,000.    5/4/17 - Patient underwent [place] Gurwinder esophagectomy with pyloroplasty, feeding jejunostomy tube, abdominal and mediastinal lymph node dissection.    Surgical Pathology:  Moderately to poorly differentiated adenocarcinoma measuring 1.3 cm at the GE junction.  Resection margins are negative for malignancy.  Tumor margins are negative.  There is effective treatment response.  No evidence of lymphovascular invasion.  Staging is ypT3,pN1.  One out of fourteen lymph nodes involved.   5/22/17 - WBC 6.8, hemoglobin 10.3, platelet count 312,000.    6/19/17 - WBC 5.7, hemoglobin 10.5, platelet count 204,000, MCV 92.1.    7/6/17 - EGD:  Status post balloon dilation of esophageal  stricture.  7/12/17 - EGD with balloon dilation of esophageal stricture.  7/26/17 - EGD and balloon dilation of esophageal stricture.  7/31/17 - PET/CT scan:  There is mild metabolic activity seen at the thoracic esophagus just at and below the surgical margins.  Some mild wall thickening.  This could be from recent surgery.  A residual cancer seems unlikely.  There is a precarinal lymph node with mild metabolic activity with SUV of 3 measuring 1 x 1.3 cm.  Again this appears to be nonspecific in my opinion.    8/7/17 - WBC 5.9, hemoglobin 11.9, platelet count 171,000, MCV 88.7.    10/19/17 - WBC 7.5, hemoglobin 11.8, platelet count 216,000.    1/8/18 - PET scan:  No evidence of residual disease or recurrent disease.  There is an esophageal stent in the mid esophagus with a large debris air level.  No evidence of any metastases in the chest, abdomen or pelvis.  Mild nodule uptake in the vocal cords with fullness in the right vocal cord.  This could be physiologic.    7/9/18 - CT scan of chest with contrast:  Prominent mediastinal lymph nodes appear stable since February.  Changes of gastric pull-through procedure for esophageal cancer again noted.  Tree-in-bud infiltrates in the left lung base, consistent with infection versus inflammation.    1/10/19 - CT chest, abdomen and pelvis with contrast:  No evidence of mets in the chest; however, interval development of metastatic lymphadenopathy in the left side of the retroperitoneum.  For instance, there is a 17 mm rounded lymph node, 10 mm lymph node and also a 14 mm lymph node.  These are all new.  Stable 9 mm hypodense lesion within the right hepatic lobe, which could reflect hemangioma or complex cyst.    1/30/19 - CT-guided core biopsy of retroperitoneal lymph node:  Poorly differentiated adenocarcinoma consistent with metastases from patient’s known esophageal primary.  CK20 positive, CDX2 positive, cytokeratin 7 negative, TTF-1 negative.    2/1/19 - Creatinine  0.9, BUN 18, calcium 9.3; these are normal.    2/28/19 - [facility] Next Gen sequencing testing on retroperitoneal lymph node specimen:  PD-L1 positive.  CPS = 3.  This implies responsiveness to pembrolizumab.  Mismatch repair status is proficient (no evidence of microsatellite instability).  Tumor mutational burden is low = 6 mutations/Mb.  CDH1 indeterminate.  KRAS mutation positive.  TP53 mutation positive.  CDK6 amplified.  [employer] PD-L1 testing by IHC:  PD-L1 expression 1% positive (CPS =1).  HER2/marvel by IHC is negative.  HER2/marvel by CISH not amplified.  Microsatellite stable tumor.    3/7/19 - WBC 7.3, hemoglobin 12, platelet count 128,000, MCV 92.     3/14/19 - Patient was seen by radiation oncologist, Dr. Chahal.  He has recommended observation versus systemic chemoimmunotherapy as needed.  No plans for radiation therapy.   5/1/19 - CT scan of chest with contrast:  No definitive findings of new metastatic disease in the chest.  Emphysema noted.    5/1/19 - CT abdomen and pelvis:  There is interval progression of metastatic retroperitoneal adenopathy.  Left periaortic adenopathy with lymph node measuring up to 2 cm, previously 1.7 cm.  Another lymph node now measures 2.3, previously 1.4 cm.  New enlarged lymph node on image 147 measures 1.4 cm, previously 0.4 cm.  A 9 mm hypodense right hepatic lobe lesion appears stable.    5/9/19 - Decision made to start patient on immunotherapy Keytruda.    5/9/19 - Vitamin B12 229.  Ferritin 61.  Folate 14.3.  Iron saturation 26%, TIBC 304, serum iron 79.  5/31/19 - Patient received Keytruda 200 mg cycle 1, day 1.    5/31/19 - Free T4 0.99.  Creatinine 0.9, BUN 9.  LFTs normal.  TSH 3.99.  Folate 14.3.  Iron saturation 26%.    6/21/2019 patient received Keytruda cycle 2  7/12/2019: Patient received Keytruda cycle 3  8/2/2019 patient received cycle 4 Keytruda WBC 7.2, hemoglobin 11.8 platelet count 163  8/23/2019 creatinine 0.9, LFTs normal, WBC 6.4, hemoglobin 11.5, platelets  179, TSH 2.1   8/23/2019 patient received cycle 5 of Keytruda  9/3/2019 CT chest abdomen and pelvis with contrast: . Positive response to therapy in the abdomen since 05/01/2019. Pathologically enlarged retroperitoneal lymph nodes, predominantly in the left periaortic region, have diminished in size.a 1.3 x 2.1 cm left periaortic node (image 60), previously measured 3.5 x 2.3 cm. A 1.6 cm index node near the level of the left renal vein previously measured 2.0 cm No new or progressive adenopathy. 2. Stable findings in the chest. Noncalcified right upper lobe nodules are unchanged. There is no evidence of new or progressive metastatic disease within the chest. 3. 8 mm indeterminate low-density lesion in the right hepatic lobe, is stable. No new liver lesions.  9/6/2019 WBC 5.6, hemoglobin 11.2, platelets of 192, MCV 91.8  9/13/2019 patient received Keytruda cycle 6, albumin 3.2  10/4/2019 patient received cycle 7 of Keytruda, TSH 4.8, free T4 0.86  10/9/2019 WBC 5.8, hemoglobin 11.7, platelets 174  10/25/2019 patient received cycle 8 of Keytruda.  WBC 5.9, hemoglobin 11.5, platelets 152, creatinine 0.75, cortisol 15.2, TSH 6.48 high , free T4 1.13 normal  11/6/2019 WBC 6.6, hemoglobin 11.8, MCV 91.5, platelets 187  11/15/2019 patient received cycle 9 of Keytruda, WBC 6.6, hemoglobin 11.9, platelets 161, cortisol level 8.86, TSH 2.68, free T3 2.8, free T4 1.5  12/4/2019 WBC 6.9, hemoglobin 12.0, platelets 180, MCV 92.9  12/06/2019-Keytruda Cycle 10  12/27/2019- Cycle 11 LFTs normal, WBC 6.7, hemoglobin 11.4, platelets 168, MCV 93, TSH 2.4,  1/28/2020: CT chest abdomen and pelvis with contrast:  Single (aortic lymph node in the abdominal retroperitoneum has increased.  Rest of the retroperitoneal lymph nodes have decreased in size.  Mediastinal adenopathy appears unchanged.  Noncalcified bilateral pulmonary nodules are stable  1/17/2020 patient received Keytruda cycle 12:.  2/3/2020 WBC 7.1, hemoglobin 12.2, platelets  171  02/10/2020 patient received cycle 13 of Keytruda: WBC 7.3, hemoglobin 12.1, platelets 166, creatinine 0.82  3/6/2020 patient is of Keytruda 200 mg, hemoglobin 10.8  3/27/2020 patient received Keytruda 200 mg, hemoglobin 11.4, TSH 1.95  4/17/2020 patient received Keytruda cycle 16 200 mg, WBC 6.1, hemoglobin 11.6, platelets 150  5/8/2020: Patient received cycle 17 Keytruda, WBC 6.3, hemoglobin 9.6, platelets 137, TSH 2.05, free T4 1.54, creatinine 0.93, LFTs normal,  5/29/2020: Patient received cycle 18 Keytruda, WBC 6.5, hemoglobin 11.7, platelets 129, creatinine 0.83, TSH 1.69  6/26/2020: Patient receiving Keytruda.   7/17/2020: TSH 2.06, WBC 5.7, hemoglobin 11.4, platelets 177, MCV 92.4, Keytruda 200 mg cycle 20  7/27/2020: CT chest abdomen pelvis with contrast: Stable findings in the chest abdomen pelvis since 1/28/2020.  Noncalcified bilateral pulmonary nodules are stable.  Stable retroperitoneal left perinephric adenopathy in the abdomen.  8/7/2020: Patient received cycle 21 of Keytruda  8/28/2020 patient is a cycle 22 of Keytruda.  WBC 6.02, hemoglobin 11.3, platelets 161  9/11/2020 WBC 6.9, hemoglobin 12.4, platelets 192  9/18/2020: Cycle 23 of Keytruda, TSH 1.19  10/9/2020: Cycle 24 of Keytruda, WBC 5.9, hemoglobin 11.9, platelets 178, creatinine 0.84, TSH 2.56, free T3 2.4, CMP normal  10/23/2020: WBC 7.34, hemoglobin 11.6, platelets 175  10/30/2020:.  Keytruda 200 mg  11/20/2020: Keytruda 200 mg  12/11/2020 Keytruda 200 mg.  WBC 5.5, hemoglobin 11.4, platelets 176, creatinine 0.8, LFTs normal, TSH 2.2, cortisol 8.3  12/10/2020: CT chest abdomen and pelvis with contrast: Previously seen left para-aortic lymph nodes within the abdomen are decreased in size.  No pathological lymphadenopathy.  No evidence of residual malignancy..  Small noncalcified pulmonary nodules are stable.  No new nodules.  Stable mediastinal lymph nodes.  3/12/2021: WBC 6.7, hemoglobin 11.3, platelets 17  3/12/2021: CMP normal, TSH  2.9, cortisol 11.95, 1  3/15/2021: CT chest with contrast: Stable findings in the chest with postsurgical changes of esophageal resection and gastric pull-through.  Stable right lower lobe lung nodule measures 11 mm.  Stable size and appearance of the mediastinal and upper abdominal lymph nodes.  3/23/2021: Doppler of upper extremity: Normal.  3/24/2021: CT abdomen and pelvis with contrast: Postoperative changes.  Left periaortic nodes are stable.  4/12/2021: TSH 4.1, cortisol 12.4  6/22/2021: CMP normal,Cortisol 10.08,, WBC 5.8, hemoglobin 11.2, platelets 109, TSH 2.59  7/12/2021: CT scan of the chest abdomen pelvis with contrast: Dominant 10 x 10 left periaortic lymph node is stable since 3/22/2021.  Dominant lymph nodes in the right lower paratracheal and AP window distribution are stable since 3/15/2021.  No evidence of progressive adenopathy in the chest abdomen or pelvis.    01/11/2022 -PET/CT findings consistent with new cedric metastatic disease in the retroperitoneum.  2 new pathologically enlarged hypermetabolic retroperitoneal lymph nodes.  No convincing evidence of recurrent disease within the chest neck pelvis or skeleton.  1/20/2022 -CT-guided needle biopsy consistent with poorly differentiated carcinoma.  CDX2 positive consistent with metastases from known esophageal primary  2/9/2022 - Pembrolizumab complicated with fatigue, joint pains, nausea, diarrhea. Symptoms decrease in intensity over a week.  3/30/2022 -CT chest and pelvis with stable metastatic lymph nodes in the left periaortic retroperitoneum.  No evidence of disease progression or new lesions.  Stable lymphadenopathy in the distal paratracheal mediastinal area  4/13/2022 - Keytruda 200  5/4/2022 - Keytruda 200  Continued keytruda  7/11/2022 - CT abdomen pelvis with decrease in size of left perioaortic lymph node. No other areas of disease.  Continues to be on Keytruda  10/18/2022 - stable CT imaging  1/20/2023 - CT imaging with stable  disease. No significant change.  Patient was hospitalized with esophagitis, subsequent EGD with improvement, no recurrence of disease.  2/21/2023 - Keytruda  CT CAP with increased retroperitoneal adenopathy.   ECHO with EF 35-40% (concern for immunotherapy related )immunotherapy held with ongoing investigation for cause.  Subsequent improvement in EF to 45%  4/4/2023 - CT abdomen pelvis with interval retroperitoneal lymph node enlargement compatible with metastatic adenopathyl.  5/5/2023 - PET CT with mixed response. No clear evidence of progression  6/2/23 -started pembrolizumab 200 mg IV  Posttreatment had symptoms of chest tightness troponin and proBNP negative, echocardiogram with EF 40%  Subsequent cardiac cath with EF down to 30% elevated proBNP, troponin no significant obstructive disease in the graft vessels  Cardiac MRI unable to obtain given pacemaker noncompliant.  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 Two enlarged mediastinal lymph nodes in the precarinal and AP window distributions are unchanged since 4/4/2023. No new or progressive adenopathy in the chest.Stable 3 mm or less noncalcified right lung nodules since 4/4/2023. Surgical changes of distal esophagectomy with gastric pull-through, without evidence of local disease recurrence  8/29/23 - started FOLFOX    Subjective:    Patient has had significant side effects with chemotherapy has had a significant effect on quality of life.     Past Medical History:   Diagnosis Date    B12 deficiency     Blockage of coronary artery of heart     Depression     DM (diabetes mellitus)     Dysphagia     GERD (gastroesophageal reflux disease)     HTN (hypertension)     Hyperlipidemia        Past Surgical History:   Procedure  Laterality Date    ABDOMINAL WALL ABSCESS INCISION AND DRAINAGE  10/22/2018    WITH DEBRIDEMENT  1.5CM X 1.5 CM X 1.5 CM    DR. PURI    CARDIAC CATHETERIZATION Right 7/20/2023    Procedure: Left Heart Cath;  Surgeon: Mazin Simmons MD;  Location: [facility] CATH INVASIVE LOCATION;  Service: Cardiovascular;  Laterality: Right;    CATARACT EXTRACTION  2018    COLONOSCOPY      CORONARY ARTERY BYPASS GRAFT  2015    ENDOSCOPY  12/20/2016    ENDOSCOPY N/A 01/02/2023    Procedure: ESOPHAGOGASTRODUODENOSCOPY;  Surgeon: Esvin Morgan MD;  Location: [facility] ENDOSCOPY;  Service: Gastroenterology;  Laterality: N/A;  post: anastamosis stricture    ENDOSCOPY N/A 02/09/2023    Procedure: ESOPHAGOGASTRODUODENOSCOPY, non-wire guided balloon dilation (12-14mm) of esophageal stricture;  Surgeon: Tino Villafana MD;  Location: [facility] ENDOSCOPY;  Service: Gastroenterology;  Laterality: N/A;  post: esophageal anastamotic stricture, post surgical changes    ESOPHAGECTOMY  05/04/2017    BROOKS PETTY ESOPHAGECTOMY, FEEDING JEJNOSTOMOY, ABDOMINAL AND MEDIASTINAL LYMPH NODE DISECTION, PEDICLED MUSCLE FLAP COVERAGE DR. PURI    ESOPHAGOSCOPY / EGD  07/12/2017    WITH BALLOON DILATION    ESOPHAGOSCOPY / EGD  07/26/2017    WITH BALLOON DILATION    ESOPHAGOSCOPY / EGD  08/11/2017    WITH BALLOON DILATION    ESOPHAGOSCOPY / EGD  08/28/2017    WITH BALLOON DILATION    ESOPHAGOSCOPY / EGD  09/27/2017    WITH BALLOON DILATION    INSERT / REPLACE / REMOVE PACEMAKER      LYMPH NODE BIOPSY  01/30/2019    PACEMAKER IMPLANTATION  11/21/2016       Current Outpatient Medications:     clonazePAM (KlonoPIN) 0.5 MG tablet, Take 0.5 tablets by mouth 2 (Two) Times a Day As Needed for Anxiety., Disp: , Rfl: 1    cyanocobalamin (VITAMIN B-12) 1000 MCG tablet, Take 1 tablet by mouth Daily., Disp: , Rfl:     folic acid (FOLVITE) 1 MG tablet, Take 1 tablet by mouth Daily., Disp: , Rfl:     levothyroxine (SYNTHROID, LEVOTHROID) 50 MCG tablet,  Take 1 tablet by mouth once daily, Disp: 90 tablet, Rfl: 0    magic mouthwash oral suspension, SWISH AND SPIT FIVE ML FOUR TIMES DAILY AS NEEDED, Disp: , Rfl:     metFORMIN (GLUCOPHAGE) 1000 MG tablet, Take 0.5 tablets by mouth Daily., Disp: 60 tablet, Rfl: 2    metoprolol succinate XL (TOPROL-XL) 25 MG 24 hr tablet, Take 1 tablet by mouth Daily., Disp: 90 tablet, Rfl: 3    ondansetron (ZOFRAN) 4 MG tablet, Take 1 tablet by mouth Every 8 (Eight) Hours As Needed for Nausea or Vomiting., Disp: , Rfl:     ondansetron (ZOFRAN) 8 MG tablet, Take 1 tablet by mouth 3 (Three) Times a Day As Needed for Nausea or Vomiting., Disp: 30 tablet, Rfl: 5    pantoprazole (PROTONIX) 40 MG EC tablet, Take 1 tablet by mouth 2 (Two) Times a Day., Disp: , Rfl:     pravastatin (PRAVACHOL) 10 MG tablet, Take 1 tablet by mouth Every Other Day., Disp: , Rfl:     spironolactone (ALDACTONE) 25 MG tablet, Take 0.5 tablets by mouth Daily., Disp: 30 tablet, Rfl: 2    Allergies   Allergen Reactions    Ace Inhibitors Unknown (See Comments)     Unknown reaction      Heparin Other (See Comments)     Fever/chills, weakness in legs    Sulfa Antibiotics Other (See Comments)     Mouth sores       Family History   Problem Relation Age of Onset    Mental illness Mother     Heart disease Mother     Hypertension Father     Cancer Father     Heart disease Sister     Heart disease Brother      Cancer-related family history includes Cancer in his father.    Social History     Tobacco Use    Smoking status: Former     Packs/day: 2.00     Years: 50.00     Pack years: 100.00     Types: Cigarettes     Quit date: 2015     Years since quittin.2    Smokeless tobacco: Former     Types: Chew     Quit date: 1989   Vaping Use    Vaping Use: Never used   Substance Use Topics    Alcohol use: No    Drug use: No     ROS:     Objective:    Vitals:    23 0924   BP: 125/67   Pulse: 71   Resp: 18   Temp: 98.2 °F (36.8 °C)   SpO2: 99%   Weight: 64.5 kg (142 lb  "3.2 oz)   Height: 165.1 cm (65\")   PainSc: 0-No pain       (1) Restricted in physically strenuous activity, ambulatory and able to do work of light nature    Physical Exam:     Physical Exam   Constitutional: He is oriented to person, place, and time. He appears well-developed. No distress.   HENT:   Head: Normocephalic and atraumatic.   Nose: Nose normal.   Mouth/Throat: Mucous membranes are moist.   Eyes: Pupils are equal, round, and reactive to light.   Neck: No thyromegaly present.   Cardiovascular: Normal rate, regular rhythm, normal heart sounds and normal pulses. Exam reveals no gallop and no friction rub.   Pulmonary/Chest: Effort normal and breath sounds normal.   Abdominal: Soft. Normal appearance and bowel sounds are normal.   No tenderness   Musculoskeletal: Normal range of motion. No deformity or signs of injury.      Right lower leg: No edema.   Neurological: He is alert and oriented to person, place, and time. He exhibits normal muscle tone.   Skin: Skin is warm and dry. No rash noted. He is not diaphoretic. No erythema. No jaundice.   Right side chest  port     Lab Results - Last 18 Months   Lab Units 08/29/23  1010 08/25/23  1038 07/20/23  1311 06/27/23  1440   WBC 10*3/mm3 5.10 4.57  --  6.43   HEMOGLOBIN g/dL 9.4* 9.1*  --  10.5*   HEMOGLOBIN, POC g/dL  --   --  9.9*  --    HEMATOCRIT % 29.1* 28.9*  --  32.5*   HEMATOCRIT POC %  --   --  29*  --    PLATELETS 10*3/mm3 187 179  --  205   MCV fL 89.5 90.3  --  88.8     Lab Results - Last 18 Months   Lab Units 08/29/23  1010 08/25/23  1139 06/02/23  0924 04/04/23  1232 04/04/23  1033   SODIUM mmol/L 139  --  138  --  140   POTASSIUM mmol/L 4.6  --  4.9  --  4.6   CHLORIDE mmol/L 107  --  108*  --  107   CO2 mmol/L 22.0  --  20.0*  --  21.0*   BUN mg/dL 16  --  16  --  16   CREATININE mg/dL 0.94 1.20 0.90   < > 0.76   CALCIUM mg/dL 8.7  --  8.8  --  9.0   BILIRUBIN mg/dL 0.2  --  0.3  --  0.2   ALK PHOS U/L 32*  --  33*  --  36*   ALT (SGPT) U/L 6  --  " 5  --  8   AST (SGOT) U/L 14  --  13  --  18   GLUCOSE mg/dL 147*  --  120*  --  111*    < > = values in this interval not displayed.     Assessment & Plan     Assessment:    Metastatic esophageal carcinoma:    Patient has a history of GE junction poorly differentiated adenocarcinoma for which he had chemoradiation followed by [place] Gurwinder resection:  Pathology showed ypT3,pN1 disease.  Tumor was Stage IIIA, diagnosed in 2016.  He received concurrent chemoradiation with weekly Carboplatin and Taxol and radiation finishing on 3/15/17.  CT scan on 1/10/19 showed new retroperitoneal lymph nodes.  These lymph nodes were biopsied on 1/30/19 and it shows metastatic esophageal cancer.  [email or web address] Gen testing was performed on the sample and it shows KRAS mutation, microsatellite stable tumor.  PD-L1 is positive.  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  HER2/marvel (ERBB2) was not amplified and was negative.  His CT scan on 5/1/19 shows evidence of progression.  Patient has started receiving Keytruda on 5/31/19.  He has received 19 cycles so far.   CT scan on 1/28/2020 shows improvement and continued response.  CT scan chest abdomen pelvis with contrast on 12/10/2020 does not show any evidence of disease progression.  On 12/22/2020 decision made to hold Keytruda per patient's request to get a treatment break.. Repeat CT scan chest 7/12/2021 does not show any evidence of disease progression.  Now CT imaging in January 2021 with a CT showing increased abdominal left para-aortic adenopathy.  Subsequent PET/CT with new cedric metastatic disease in the retroperitoneum of the abdomen.  New pathologically enlarged hypermetabolic retroperitoneal lymph nodes.  This would be concerning for disease recurrence/progression.  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  I discussed with him the treatment options going forward.  He has had a good response with immunotherapy pembrolizumab in the past. This was  restarted. subsequent  imaging with treatment response. 1/2023 imaging with good response. Continued treatment  Now with increased adenopathy 2 new lymph nodes. Holding immunotherapy for now.   Consider getting cardiac MR discussed with Dr. Culp. While immunotherapy on hold, increased adenopathy is concerning. Could be pseudoprogression vs true progression. PET CT reviewed could indicate pseudoprogression since he has had a good response to immunotherapy  Now immunotherapy was restarted and has been on hold with patient's symptomatology cardiac MRI cannot be obtained, no ischemic heart disease leading to cardiomyopathy this most likely is related to Keytruda.  We will stop Keytruda going forward  CT imaging with stable disease, FOLFOX was started which he had significant side effects  With imaging being stable and significant side effects with FOLFOX, we discussed today about options with wait and watch vs 5FU only, patient prefers to be on maintenance. We will plan the same.     Possible myocarditis  Symptoms started after last immunotherapy  Treatment on hold for now, symptoms have improved, cardiac markers were negative, echocardiogram with EF 40%, cardiac MRI cannot be obtained cardiac cath with EF down to 30% management per cardiology  Now EF up to 40-45%    Shortness of breath  Improved, has occasional shortness of breath, likely copd  I have recommended using albuterol  Symptoms have improved since stopping immunotherapy. Questionable pneumonitis will monitor closely with immunotherapy  Cardiology has evaluated for myocarditis related to immunotherapy symptomatic improvement now    Reflux/heartburn/dysphagia:   Symptoms improved.  Also has a history of strictures. Status post EGD and balloon dilation.  Omeprazole once daily, famotidine as needed, symptoms stable, continue  Pain could be related to gastritis, will monitor for now, this has resolved.    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  Continues on Synthroid     Mild  diarrhea:  diarrhea seems to be waxing and waning in nature.  Diarrhea related to immunotherapy now improved    PD-L1 positive, HER2 negative, p53 and KRAS positive.  Repeat [facility] testing with repeat biopsy with no target mutations.    B12 deficiency: Continue B12 1000 mcg twice daily.    Anemia - Hb improved and stable.           Jean Pierre Elizondo MD 09/12/23     No orders of the defined types were placed in this encounter.    Time spent on encounter including record review, history taking, exam, discussion, counseling and documentation at: 40 minutes

## 2023-09-20 ENCOUNTER — HOSPITAL ENCOUNTER (OUTPATIENT)
Dept: ONCOLOGY | Facility: HOSPITAL | Age: 73
Discharge: HOME | End: 2023-09-20
Admitting: INTERNAL MEDICINE
Payer: MEDICARE

## 2023-09-20 VITALS
RESPIRATION RATE: 16 BRPM | OXYGEN SATURATION: 95 % | HEIGHT: 65 IN | TEMPERATURE: 97.5 F | SYSTOLIC BLOOD PRESSURE: 143 MMHG | DIASTOLIC BLOOD PRESSURE: 77 MMHG | HEART RATE: 79 BPM | BODY MASS INDEX: 23.99 KG/M2 | WEIGHT: 144 LBS

## 2023-09-20 DIAGNOSIS — C15.5 PRIMARY MALIGNANT NEOPLASM OF LOWER THIRD OF ESOPHAGUS: ICD-10-CM

## 2023-09-20 DIAGNOSIS — C16.0 MALIGNANT NEOPLASM OF CARDIA: Primary | ICD-10-CM

## 2023-09-20 DIAGNOSIS — C48.0 PRIMARY MALIGNANT NEOPLASM OF RETROPERITONEUM: ICD-10-CM

## 2023-09-20 LAB
ALBUMIN SERPL-MCNC: 3.9 G/DL (ref 3.5–5.2)
ALBUMIN/GLOB SERPL: 1.9 G/DL
ALP SERPL-CCNC: 45 U/L (ref 39–117)
ALT SERPL W P-5'-P-CCNC: 8 U/L (ref 1–41)
ANION GAP SERPL CALCULATED.3IONS-SCNC: 11 MMOL/L (ref 5–15)
AST SERPL-CCNC: 17 U/L (ref 1–40)
BACTERIA UR QL AUTO: ABNORMAL /HPF
BASOPHILS # BLD AUTO: 0.04 10*3/MM3 (ref 0–0.2)
BASOPHILS NFR BLD AUTO: 1.2 % (ref 0–1.5)
BILIRUB SERPL-MCNC: 0.2 MG/DL (ref 0–1.2)
BILIRUB UR QL STRIP: NEGATIVE
BUN SERPL-MCNC: 21 MG/DL (ref 8–23)
BUN/CREAT SERPL: 16.4 (ref 7–25)
CALCIUM SPEC-SCNC: 9.2 MG/DL (ref 8.6–10.5)
CHLORIDE SERPL-SCNC: 107 MMOL/L (ref 98–107)
CLARITY UR: CLEAR
CO2 SERPL-SCNC: 23 MMOL/L (ref 22–29)
COLOR UR: YELLOW
CREAT SERPL-MCNC: 1.28 MG/DL (ref 0.76–1.27)
DEPRECATED RDW RBC AUTO: 45.2 FL (ref 37–54)
EGFRCR SERPLBLD CKD-EPI 2021: 58.7 ML/MIN/1.73
EOSINOPHIL # BLD AUTO: 0.14 10*3/MM3 (ref 0–0.4)
EOSINOPHIL NFR BLD AUTO: 4.1 % (ref 0.3–6.2)
ERYTHROCYTE [DISTWIDTH] IN BLOOD BY AUTOMATED COUNT: 14.4 % (ref 12.3–15.4)
GLOBULIN UR ELPH-MCNC: 2.1 GM/DL
GLUCOSE SERPL-MCNC: 110 MG/DL (ref 65–99)
GLUCOSE UR STRIP-MCNC: NEGATIVE MG/DL
HCT VFR BLD AUTO: 29.1 % (ref 37.5–51)
HGB BLD-MCNC: 9.3 G/DL (ref 13–17.7)
HGB UR QL STRIP.AUTO: NEGATIVE
HYALINE CASTS UR QL AUTO: ABNORMAL /LPF
KETONES UR QL STRIP: NEGATIVE
LEUKOCYTE ESTERASE UR QL STRIP.AUTO: NEGATIVE
LYMPHOCYTES # BLD AUTO: 1.44 10*3/MM3 (ref 0.7–3.1)
LYMPHOCYTES NFR BLD AUTO: 41.7 % (ref 19.6–45.3)
MCH RBC QN AUTO: 29.1 PG (ref 26.6–33)
MCHC RBC AUTO-ENTMCNC: 32 G/DL (ref 31.5–35.7)
MCV RBC AUTO: 90.9 FL (ref 79–97)
MONOCYTES # BLD AUTO: 0.87 10*3/MM3 (ref 0.1–0.9)
MONOCYTES NFR BLD AUTO: 25.2 % (ref 5–12)
NEUTROPHILS NFR BLD AUTO: 0.96 10*3/MM3 (ref 1.7–7)
NEUTROPHILS NFR BLD AUTO: 27.8 % (ref 42.7–76)
NITRITE UR QL STRIP: NEGATIVE
PH UR STRIP.AUTO: 5.5 [PH] (ref 5–8)
PLATELET # BLD AUTO: 252 10*3/MM3 (ref 140–450)
PMV BLD AUTO: 10.9 FL (ref 6–12)
POTASSIUM SERPL-SCNC: 4.3 MMOL/L (ref 3.5–5.2)
PROT SERPL-MCNC: 6 G/DL (ref 6–8.5)
PROT UR QL STRIP: ABNORMAL
RBC # BLD AUTO: 3.2 10*6/MM3 (ref 4.14–5.8)
RBC # UR STRIP: ABNORMAL /HPF
REF LAB TEST METHOD: ABNORMAL
SODIUM SERPL-SCNC: 141 MMOL/L (ref 136–145)
SP GR UR STRIP: 1.02 (ref 1–1.03)
SQUAMOUS #/AREA URNS HPF: ABNORMAL /HPF
UROBILINOGEN UR QL STRIP: ABNORMAL
WBC # UR STRIP: ABNORMAL /HPF
WBC NRBC COR # BLD: 3.45 10*3/MM3 (ref 3.4–10.8)

## 2023-09-20 PROCEDURE — 96367 TX/PROPH/DG ADDL SEQ IV INF: CPT

## 2023-09-20 PROCEDURE — 96375 TX/PRO/DX INJ NEW DRUG ADDON: CPT

## 2023-09-20 PROCEDURE — 96416 CHEMO PROLONG INFUSE W/PUMP: CPT

## 2023-09-20 PROCEDURE — 96365 THER/PROPH/DIAG IV INF INIT: CPT

## 2023-09-20 PROCEDURE — 81001 URINALYSIS AUTO W/SCOPE: CPT | Performed by: INTERNAL MEDICINE

## 2023-09-20 PROCEDURE — 85025 COMPLETE CBC W/AUTO DIFF WBC: CPT | Performed by: INTERNAL MEDICINE

## 2023-09-20 PROCEDURE — 25010000002 DEXAMETHASONE SODIUM PHOSPHATE 120 MG/30ML SOLUTION: Performed by: INTERNAL MEDICINE

## 2023-09-20 PROCEDURE — G0498 CHEMO EXTEND IV INFUS W/PUMP: HCPCS

## 2023-09-20 PROCEDURE — 25010000002 FLUOROURACIL PER 500 MG: Performed by: INTERNAL MEDICINE

## 2023-09-20 PROCEDURE — 80053 COMPREHEN METABOLIC PANEL: CPT | Performed by: INTERNAL MEDICINE

## 2023-09-20 PROCEDURE — 96360 HYDRATION IV INFUSION INIT: CPT

## 2023-09-20 PROCEDURE — 96361 HYDRATE IV INFUSION ADD-ON: CPT

## 2023-09-20 PROCEDURE — 25010000002 LEUCOVORIN CALCIUM PER 50 MG: Performed by: INTERNAL MEDICINE

## 2023-09-20 RX ORDER — DIPHENHYDRAMINE HYDROCHLORIDE 50 MG/ML
50 INJECTION INTRAMUSCULAR; INTRAVENOUS AS NEEDED
Status: CANCELLED | OUTPATIENT
Start: 2023-09-20

## 2023-09-20 RX ORDER — FAMOTIDINE 10 MG/ML
20 INJECTION, SOLUTION INTRAVENOUS AS NEEDED
Status: CANCELLED | OUTPATIENT
Start: 2023-09-20

## 2023-09-20 RX ORDER — DEXTROSE MONOHYDRATE 50 MG/ML
250 INJECTION, SOLUTION INTRAVENOUS ONCE
Status: COMPLETED | OUTPATIENT
Start: 2023-09-20 | End: 2023-09-20

## 2023-09-20 RX ADMIN — SODIUM CHLORIDE 690 MG: 9 INJECTION, SOLUTION INTRAVENOUS at 10:33

## 2023-09-20 RX ADMIN — FLUOROURACIL 4130 MG: 50 INJECTION, SOLUTION INTRAVENOUS at 12:39

## 2023-09-20 RX ADMIN — SODIUM CHLORIDE 500 ML: 9 INJECTION, SOLUTION INTRAVENOUS at 11:30

## 2023-09-20 RX ADMIN — DEXAMETHASONE SODIUM PHOSPHATE 12 MG: 4 INJECTION, SOLUTION INTRA-ARTICULAR; INTRALESIONAL; INTRAMUSCULAR; INTRAVENOUS; SOFT TISSUE at 10:07

## 2023-09-20 RX ADMIN — DEXTROSE MONOHYDRATE 250 ML: 50 INJECTION, SOLUTION INTRAVENOUS at 10:07

## 2023-09-20 NOTE — PROGRESS NOTES
Patient is here for C2D1 leucovorin and 5FU pump. Port accessed and flushed with good blood return noted. 10cc of blood wasted prior to specimen collection. Blood specimen obtained and sent to lab for processing per protocol. Dr. Elizondo notified of patient thinking he has a UTI still-Dr. Elizondo verbally ordered a UA microscopic with culture if indicated-after the results were back from our lab, he stated he wanted to see the WBC count. Per perla in lab it will be done at the hospital. Dr. Elizondo notified that patient was also having some occasional cramping in his left hand and some numbness occasionally in his feet. Dr. Elizondo notified of ANC being 0.96 and sent his u/a from here results and the cbc. Per Dr. Elizondo ok to treat. Dr. Elizondo sent the UA results after the microscopy was done at the hospital and patients cmp results. Dr. Elizondo responded: ok no UTI. lets give him a small 500 ml NS bolus. Patient tolerated treatment and bolus and was discharged with AVS.

## 2023-09-22 ENCOUNTER — HOSPITAL ENCOUNTER (OUTPATIENT)
Dept: ONCOLOGY | Facility: HOSPITAL | Age: 73
Discharge: HOME | End: 2023-09-22
Admitting: INTERNAL MEDICINE
Payer: MEDICARE

## 2023-09-22 DIAGNOSIS — C16.0 MALIGNANT NEOPLASM OF CARDIA: Primary | ICD-10-CM

## 2023-09-22 DIAGNOSIS — C15.5 PRIMARY MALIGNANT NEOPLASM OF LOWER THIRD OF ESOPHAGUS: ICD-10-CM

## 2023-09-22 LAB
ANION GAP SERPL CALCULATED.3IONS-SCNC: 10 MMOL/L (ref 5–15)
BUN SERPL-MCNC: 15 MG/DL (ref 8–23)
BUN/CREAT SERPL: 12.5 (ref 7–25)
CALCIUM SPEC-SCNC: 8.1 MG/DL (ref 8.6–10.5)
CHLORIDE SERPL-SCNC: 107 MMOL/L (ref 98–107)
CO2 SERPL-SCNC: 23 MMOL/L (ref 22–29)
CREAT SERPL-MCNC: 1.2 MG/DL (ref 0.76–1.27)
EGFRCR SERPLBLD CKD-EPI 2021: 63.5 ML/MIN/1.73
GLUCOSE SERPL-MCNC: 144 MG/DL (ref 65–99)
POTASSIUM SERPL-SCNC: 4.4 MMOL/L (ref 3.5–5.2)
SODIUM SERPL-SCNC: 140 MMOL/L (ref 136–145)

## 2023-09-22 PROCEDURE — 96360 HYDRATION IV INFUSION INIT: CPT

## 2023-09-22 PROCEDURE — 80048 BASIC METABOLIC PNL TOTAL CA: CPT | Performed by: INTERNAL MEDICINE

## 2023-09-22 RX ORDER — SODIUM CHLORIDE 0.9 % (FLUSH) 0.9 %
20 SYRINGE (ML) INJECTION AS NEEDED
OUTPATIENT
Start: 2023-09-22

## 2023-09-22 RX ORDER — SODIUM CHLORIDE 0.9 % (FLUSH) 0.9 %
20 SYRINGE (ML) INJECTION AS NEEDED
Status: DISCONTINUED | OUTPATIENT
Start: 2023-09-22 | End: 2023-09-23 | Stop reason: HOSPADM

## 2023-09-22 RX ADMIN — Medication 20 ML: at 15:15

## 2023-09-22 RX ADMIN — SODIUM CHLORIDE 500 ML: 9 INJECTION, SOLUTION INTRAVENOUS at 14:11

## 2023-09-22 NOTE — PROGRESS NOTES
"5fu pump d/c'ed by TODD Jorgensen RN patient reports not drinking much and \"feels like dragging\". Reviewed with Dr Beth and ok to give  ml IV today.  "

## 2023-10-03 NOTE — PROGRESS NOTES
Hematology-Oncology Follow-up Note       Artie Mc  1950    Primary Care Physician: Chanel Carter APRN  Referring Physician: Chanel Carter APRN    Reason For Visit:  No chief complaint on file.    Metastatic esophageal cancer  Monitoring for adverse effects related to immunotherapy.  Hypothyroidism    HPI:   Mr. Mc is a pleasant 73 y.o. gentleman with a history of gastroesophageal reflux disease, diabetes, hypertension, heart block status post pacemaker placement and history of CABG.  He was having symptoms of dysphagia, weight loss since September 2016. He was reporting symptoms of food getting stuck in the lower chest.  He has transitioned himself to a liquid diet.  The patient reports losing about 30 pounds over this duration.  The patient underwent upper GI endoscopy on 12/20/16 with Dr. Esvin Barrera.  Endoscopy showed necrotic, circumferential and moderately obstructive mass at the distal esophagus between 42 cm and 36 cm.      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  There was also evidence of mild subacute inflammation in the duodenum.  Dr. Barrera ordered a CT scan of the chest and abdomen with contrast on 12/20/16.  The scan was done at [facility].  The scan showed a new ill-defined mass in the anterior wall of the distal esophagus at the GE junction measuring 2.3 x 2.2 x 1.6 cm, worrisome for cancer.  There were four subcentimeter liver lesions which had appeared stable in comparison to another CT scan from 2009 and they likely represent small cysts.  There was also mild lymphadenopathy in the gastrohepatic ligament.  There were at least six borderline enlarged lymph nodes measuring up to 1.3 x 1 cm in the region.  The patient is referred to us for further oncological evaluation.    1/5/2017 - The patient presents for initial consultation.  He reports persistent dysphagia and also has  symptoms of regurgitation.  He cannot tolerate solid foods and is dependent on liquid diet such as Ensure.  He also reports fatigue. Symptoms of dysphagia have been present for the past four months.  When he eats any solids, the food gets stuck in the lower chest.    1/5/17 - Vitamin B12 200 (L).  CEA 0.8.  Ferritin 35.  Iron saturation 16% (L), serum iron 56, TIBC 354.  Creatinine 0.9.  LFTs normal.  Folate 10.2.  1/12/17 - PET/CT scan:  Intense abnormal activity corresponding to the region of the GE junction and proximal stomach.  SUV is 11.06.  There is a suggestion of an abnormal mass or abnormal wall thickening in the region measuring 3.9 x 4.8 cm.  No additional abnormalities.  No evidence of metastases or other lymphadenopathy.  Scattered nonspecific subcentimeter lymph nodes involving the mediastinum and within the central upper abdomen.  1/13/17 - Creatinine 0.9.  LFTs normal.    1/17/17 - Patient seen by thoracic surgeon, Dr. Bradley.  PET scan was reviewed.  He has recommended neoadjuvant chemoradiation therapy followed by Noel Gurwinder esophagogastrectomy.  Port-A-Cath is being arranged.    1/20/17 - WBC 6.3, hemoglobin 12.1, platelet count 198,000, MCV 86.4.  1/30/17 - Patient started weekly Carboplatin and Taxol (Carboplatin AUC 2 and Taxol 50 mg/M2).  Patient received Injectafer 750 mg.  WBC 5.8, hemoglobin 12.4, platelet count 244,000.   1/31/17 - Patient seen by Dr. Eduardo Oleary.  The plan was to give 5040 cGy in 28 fractions.   2/6/17 - WBC 4.4, hemoglobin 11.6, platelet count 201,000.  Patient received cycle 2 of weekly Carboplatin and Taxol (Carboplatin AUC 2 and Taxol 50 mg/M2).  2/13/17 - Patient received Carboplatin and Taxol weekly cycle 3.  2/13/17 - Patient started concurrent radiation therapy.  Total planned dose is 4140 cGy.    2/13/17 to 2/15/17 - Patient received 4140 cGy of neoadjuvant radiation.  Radiation was finished on 3/15/17.    2/20/17 - WBC 1.8, hemoglobin 10.6, platelet count  217,000.  Creatinine 0.5. Folate 15 (L).  Ferritin 361.  Haptoglobin 214.  Iron saturation 33%, TIBC 263, serum iron 88.    2/20/17 - Patient received Carboplatin and Taxol weekly cycle 4.   2/27/17 - WBC 3.7, hemoglobin 10.7, platelet count 177,000, MCV 85.3.    3/6/17 - Patient has received 16 out of the 23 planned radiation treatments.  Total planned dose is 4140 cGy.    3/6/17 - WBC 5.4, hemoglobin 11.2, platelet count 113,000.  3/6/17 - Patient received cycle 5 of weekly Carboplatin and Taxol.   3/13/17 - Patient received cycle 6 of weekly Carboplatin and Taxol.  WBC 3.9, hemoglobin 11.3, platelet count 93,000.     3/20/17 - WBC 1.4, hemoglobin 10.0, platelet count 118,000, MCV 86.1   3/27/17 - WBC 3.8, hemoglobin 9.5, platelet count 176,000, MCV 87.3.    4/7/17 - Stress test:  No evidence of reversible myocardial ischemia.  Normal left ventricular ejection fraction of 50%.    4/17/17 - Upper GI endoscopy by Dr. 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  The GE junction adenocarcinoma lesion seems to have a very good response to chemotherapy and radiation.  The lumen was open.  5/1/17 - WBC 7.0, hemoglobin 10.2, platelet count 175,000.    5/4/17 - Patient underwent Graford Gurwinder esophagectomy with pyloroplasty, feeding jejunostomy tube, abdominal and mediastinal lymph node dissection.    Surgical Pathology:  Moderately to poorly differentiated adenocarcinoma measuring 1.3 cm at the GE junction.  Resection margins are negative for malignancy.  Tumor margins are negative.  There is effective treatment response.  No evidence of lymphovascular invasion.  Staging is ypT3,pN1.  One out of fourteen lymph nodes involved.   5/22/17 - WBC 6.8, hemoglobin 10.3, platelet count 312,000.    6/19/17 - WBC 5.7, hemoglobin 10.5, platelet count 204,000, MCV 92.1.    7/6/17 - EGD:  Status post balloon dilation of esophageal stricture.  7/12/17 - EGD with balloon dilation of esophageal  stricture.  7/26/17 - EGD and balloon dilation of esophageal stricture.  7/31/17 - PET/CT scan:  There is mild metabolic activity seen at the thoracic esophagus just at and below the surgical margins.  Some mild wall thickening.  This could be from recent surgery.  A residual cancer seems unlikely.  There is a precarinal lymph node with mild metabolic activity with SUV of 3 measuring 1 x 1.3 cm.  Again this appears to be nonspecific in my opinion.    8/7/17 - WBC 5.9, hemoglobin 11.9, platelet count 171,000, MCV 88.7.    10/19/17 - WBC 7.5, hemoglobin 11.8, platelet count 216,000.    1/8/18 - PET scan:  No evidence of residual disease or recurrent disease.  There is an esophageal stent in the mid esophagus with a large debris air level.  No evidence of any metastases in the chest, abdomen or pelvis.  Mild nodule uptake in the vocal cords with fullness in the right vocal cord.  This could be physiologic.    7/9/18 - CT scan of chest with contrast:  Prominent mediastinal lymph nodes appear stable since February.  Changes of gastric pull-through procedure for esophageal cancer again noted.  Tree-in-bud infiltrates in the left lung base, consistent with infection versus inflammation.    1/10/19 - CT chest, abdomen and pelvis with contrast:  No evidence of mets in the chest; however, interval development of metastatic lymphadenopathy in the left side of the retroperitoneum.  For instance, there is a 17 mm rounded lymph node, 10 mm lymph node and also a 14 mm lymph node.  These are all new.  Stable 9 mm hypodense lesion within the right hepatic lobe, which could reflect hemangioma or complex cyst.    1/30/19 - CT-guided core biopsy of retroperitoneal lymph node:  Poorly differentiated adenocarcinoma consistent with metastases from patient’s known esophageal primary.  CK20 positive, CDX2 positive, cytokeratin 7 negative, TTF-1 negative.    2/1/19 - Creatinine 0.9, BUN 18, calcium 9.3; these are normal.    2/28/19 - [facility]  Next Gen sequencing testing on retroperitoneal lymph node specimen:  PD-L1 positive.  CPS = 3.  This implies responsiveness to pembrolizumab.  Mismatch repair status is proficient (no evidence of microsatellite instability).  Tumor mutational burden is low = 6 mutations/Mb.  CDH1 indeterminate.  KRAS mutation positive.  TP53 mutation positive.  CDK6 amplified.  [employer] PD-L1 testing by IHC:  PD-L1 expression 1% positive (CPS =1).  HER2/marvel by IHC is negative.  HER2/marvel by CISH not amplified.  Microsatellite stable tumor.    3/7/19 - WBC 7.3, hemoglobin 12, platelet count 128,000, MCV 92.     3/14/19 - Patient was seen by radiation oncologist, Dr. Chahal.  He has recommended observation versus systemic chemoimmunotherapy as needed.  No plans for radiation therapy.   5/1/19 - CT scan of chest with contrast:  No definitive findings of new metastatic disease in the chest.  Emphysema noted.    5/1/19 - CT abdomen and pelvis:  There is interval progression of metastatic retroperitoneal adenopathy.  Left periaortic adenopathy with lymph node measuring up to 2 cm, previously 1.7 cm.  Another lymph node now measures 2.3, previously 1.4 cm.  New enlarged lymph node on image 147 measures 1.4 cm, previously 0.4 cm.  A 9 mm hypodense right hepatic lobe lesion appears stable.    5/9/19 - Decision made to start patient on immunotherapy Keytruda.    5/9/19 - Vitamin B12 229.  Ferritin 61.  Folate 14.3.  Iron saturation 26%, TIBC 304, serum iron 79.  5/31/19 - Patient received Keytruda 200 mg cycle 1, day 1.    5/31/19 - Free T4 0.99.  Creatinine 0.9, BUN 9.  LFTs normal.  TSH 3.99.  Folate 14.3.  Iron saturation 26%.    6/21/2019 patient received Keytruda cycle 2  7/12/2019: Patient received Keytruda cycle 3  8/2/2019 patient received cycle 4 Keytruda WBC 7.2, hemoglobin 11.8 platelet count 163  8/23/2019 creatinine 0.9, LFTs normal, WBC 6.4, hemoglobin 11.5, platelets 179, TSH 2.1   8/23/2019 patient received cycle 5 of  Keytruda  9/3/2019 CT chest abdomen and pelvis with contrast: . Positive response to therapy in the abdomen since 05/01/2019. Pathologically enlarged retroperitoneal lymph nodes, predominantly in the left periaortic region, have diminished in size.a 1.3 x 2.1 cm left periaortic node (image 60), previously measured 3.5 x 2.3 cm. A 1.6 cm index node near the level of the left renal vein previously measured 2.0 cm No new or progressive adenopathy. 2. Stable findings in the chest. Noncalcified right upper lobe nodules are unchanged. There is no evidence of new or progressive metastatic disease within the chest. 3. 8 mm indeterminate low-density lesion in the right hepatic lobe, is stable. No new liver lesions.  9/6/2019 WBC 5.6, hemoglobin 11.2, platelets of 192, MCV 91.8  9/13/2019 patient received Keytruda cycle 6, albumin 3.2  10/4/2019 patient received cycle 7 of Keytruda, TSH 4.8, free T4 0.86  10/9/2019 WBC 5.8, hemoglobin 11.7, platelets 174  10/25/2019 patient received cycle 8 of Keytruda.  WBC 5.9, hemoglobin 11.5, platelets 152, creatinine 0.75, cortisol 15.2, TSH 6.48 high , free T4 1.13 normal  11/6/2019 WBC 6.6, hemoglobin 11.8, MCV 91.5, platelets 187  11/15/2019 patient received cycle 9 of Keytruda, WBC 6.6, hemoglobin 11.9, platelets 161, cortisol level 8.86, TSH 2.68, free T3 2.8, free T4 1.5  12/4/2019 WBC 6.9, hemoglobin 12.0, platelets 180, MCV 92.9  12/06/2019-Keytruda Cycle 10  12/27/2019- Cycle 11 LFTs normal, WBC 6.7, hemoglobin 11.4, platelets 168, MCV 93, TSH 2.4,  1/28/2020: CT chest abdomen and pelvis with contrast:  Single (aortic lymph node in the abdominal retroperitoneum has increased.  Rest of the retroperitoneal lymph nodes have decreased in size.  Mediastinal adenopathy appears unchanged.  Noncalcified bilateral pulmonary nodules are stable  1/17/2020 patient received Keytruda cycle 12:.  2/3/2020 WBC 7.1, hemoglobin 12.2, platelets 171  02/10/2020 patient received cycle 13 of  Keytruda: WBC 7.3, hemoglobin 12.1, platelets 166, creatinine 0.82  3/6/2020 patient is of Keytruda 200 mg, hemoglobin 10.8  3/27/2020 patient received Keytruda 200 mg, hemoglobin 11.4, TSH 1.95  4/17/2020 patient received Keytruda cycle 16 200 mg, WBC 6.1, hemoglobin 11.6, platelets 150  5/8/2020: Patient received cycle 17 Keytruda, WBC 6.3, hemoglobin 9.6, platelets 137, TSH 2.05, free T4 1.54, creatinine 0.93, LFTs normal,  5/29/2020: Patient received cycle 18 Keytruda, WBC 6.5, hemoglobin 11.7, platelets 129, creatinine 0.83, TSH 1.69  6/26/2020: Patient receiving Keytruda.   7/17/2020: TSH 2.06, WBC 5.7, hemoglobin 11.4, platelets 177, MCV 92.4, Keytruda 200 mg cycle 20  7/27/2020: CT chest abdomen pelvis with contrast: Stable findings in the chest abdomen pelvis since 1/28/2020.  Noncalcified bilateral pulmonary nodules are stable.  Stable retroperitoneal left perinephric adenopathy in the abdomen.  8/7/2020: Patient received cycle 21 of Keytruda  8/28/2020 patient is a cycle 22 of Keytruda.  WBC 6.02, hemoglobin 11.3, platelets 161  9/11/2020 WBC 6.9, hemoglobin 12.4, platelets 192  9/18/2020: Cycle 23 of Keytruda, TSH 1.19  10/9/2020: Cycle 24 of Keytruda, WBC 5.9, hemoglobin 11.9, platelets 178, creatinine 0.84, TSH 2.56, free T3 2.4, CMP normal  10/23/2020: WBC 7.34, hemoglobin 11.6, platelets 175  10/30/2020:.  Keytruda 200 mg  11/20/2020: Keytruda 200 mg  12/11/2020 Keytruda 200 mg.  WBC 5.5, hemoglobin 11.4, platelets 176, creatinine 0.8, LFTs normal, TSH 2.2, cortisol 8.3  12/10/2020: CT chest abdomen and pelvis with contrast: Previously seen left para-aortic lymph nodes within the abdomen are decreased in size.  No pathological lymphadenopathy.  No evidence of residual malignancy..  Small noncalcified pulmonary nodules are stable.  No new nodules.  Stable mediastinal lymph nodes.  3/12/2021: WBC 6.7, hemoglobin 11.3, platelets 17  3/12/2021: CMP normal, TSH 2.9, cortisol 11.95, 1  3/15/2021: CT chest  with contrast: Stable findings in the chest with postsurgical changes of esophageal resection and gastric pull-through.  Stable right lower lobe lung nodule measures 11 mm.  Stable size and appearance of the mediastinal and upper abdominal lymph nodes.  3/23/2021: Doppler of upper extremity: Normal.  3/24/2021: CT abdomen and pelvis with contrast: Postoperative changes.  Left periaortic nodes are stable.  4/12/2021: TSH 4.1, cortisol 12.4  6/22/2021: CMP normal,Cortisol 10.08,, WBC 5.8, hemoglobin 11.2, platelets 109, TSH 2.59  7/12/2021: CT scan of the chest abdomen pelvis with contrast: Dominant 10 x 10 left periaortic lymph node is stable since 3/22/2021.  Dominant lymph nodes in the right lower paratracheal and AP window distribution are stable since 3/15/2021.  No evidence of progressive adenopathy in the chest abdomen or pelvis.    01/11/2022 -PET/CT findings consistent with new cedric metastatic disease in the retroperitoneum.  2 new pathologically enlarged hypermetabolic retroperitoneal lymph nodes.  No convincing evidence of recurrent disease within the chest neck pelvis or skeleton.  1/20/2022 -CT-guided needle biopsy consistent with poorly differentiated carcinoma.  CDX2 positive consistent with metastases from known esophageal primary  2/9/2022 - Pembrolizumab complicated with fatigue, joint pains, nausea, diarrhea. Symptoms decrease in intensity over a week.  3/30/2022 -CT chest and pelvis with stable metastatic lymph nodes in the left periaortic retroperitoneum.  No evidence of disease progression or new lesions.  Stable lymphadenopathy in the distal paratracheal mediastinal area  4/13/2022 - Keytruda 200  5/4/2022 - Keytruda 200  Continued keytruda  7/11/2022 - CT abdomen pelvis with decrease in size of left perioaortic lymph node. No other areas of disease.  Continues to be on Keytruda  10/18/2022 - stable CT imaging  1/20/2023 - CT imaging with stable disease. No significant change.  Patient was  hospitalized with esophagitis, subsequent EGD with improvement, no recurrence of disease.  2/21/2023 - Keytruda  CT CAP with increased retroperitoneal adenopathy.   ECHO with EF 35-40% (concern for immunotherapy related )immunotherapy held with ongoing investigation for cause.  Subsequent improvement in EF to 45%  4/4/2023 - CT abdomen pelvis with interval retroperitoneal lymph node enlargement compatible with metastatic adenopathyl.  5/5/2023 - PET CT with mixed response. No clear evidence of progression  6/2/23 -started pembrolizumab 200 mg IV  Posttreatment had symptoms of chest tightness troponin and proBNP negative, echocardiogram with EF 40%  Subsequent cardiac cath with EF down to 30% elevated proBNP, troponin no significant obstructive disease in the graft vessels  Cardiac MRI unable to obtain given pacemaker noncompliant.  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 Two enlarged mediastinal lymph nodes in the precarinal and AP window distributions are unchanged since 4/4/2023. No new or progressive adenopathy in the chest.Stable 3 mm or less noncalcified right lung nodules since 4/4/2023. Surgical changes of distal esophagectomy with gastric pull-through, without evidence of local disease recurrence  8/29/23 - started FOLFOX had significant side effects  9/20/23 - stopped oxaliplatin and continued 5FU only    Subjective:    Significant fatigue with chemotherapy. Has had increased groin pain when he urinates.    Past Medical History:   Diagnosis Date    B12 deficiency     Blockage of coronary artery of heart     Depression     DM (diabetes mellitus)     Dysphagia     GERD (gastroesophageal reflux disease)     HTN (hypertension)     Hyperlipidemia        Past Surgical History:    Procedure Laterality Date    ABDOMINAL WALL ABSCESS INCISION AND DRAINAGE  10/22/2018    WITH DEBRIDEMENT  1.5CM X 1.5 CM X 1.5 CM    DR. PURI    CARDIAC CATHETERIZATION Right 7/20/2023    Procedure: Left Heart Cath;  Surgeon: Mazin Simmons MD;  Location: [facility] CATH INVASIVE LOCATION;  Service: Cardiovascular;  Laterality: Right;    CATARACT EXTRACTION  2018    COLONOSCOPY      CORONARY ARTERY BYPASS GRAFT  2015    ENDOSCOPY  12/20/2016    ENDOSCOPY N/A 01/02/2023    Procedure: ESOPHAGOGASTRODUODENOSCOPY;  Surgeon: Esvin Morgan MD;  Location: [facility] ENDOSCOPY;  Service: Gastroenterology;  Laterality: N/A;  post: anastamosis stricture    ENDOSCOPY N/A 02/09/2023    Procedure: ESOPHAGOGASTRODUODENOSCOPY, non-wire guided balloon dilation (12-14mm) of esophageal stricture;  Surgeon: Tino Villafana MD;  Location: [facility] ENDOSCOPY;  Service: Gastroenterology;  Laterality: N/A;  post: esophageal anastamotic stricture, post surgical changes    ESOPHAGECTOMY  05/04/2017    BROOKS PETTY ESOPHAGECTOMY, FEEDING JEJNOSTOMOY, ABDOMINAL AND MEDIASTINAL LYMPH NODE DISECTION, PEDICLED MUSCLE FLAP COVERAGE DR. PURI    ESOPHAGOSCOPY / EGD  07/12/2017    WITH BALLOON DILATION    ESOPHAGOSCOPY / EGD  07/26/2017    WITH BALLOON DILATION    ESOPHAGOSCOPY / EGD  08/11/2017    WITH BALLOON DILATION    ESOPHAGOSCOPY / EGD  08/28/2017    WITH BALLOON DILATION    ESOPHAGOSCOPY / EGD  09/27/2017    WITH BALLOON DILATION    INSERT / REPLACE / REMOVE PACEMAKER      LYMPH NODE BIOPSY  01/30/2019    PACEMAKER IMPLANTATION  11/21/2016       Current Outpatient Medications:     clonazePAM (KlonoPIN) 0.5 MG tablet, Take 0.5 tablets by mouth 2 (Two) Times a Day As Needed for Anxiety., Disp: , Rfl: 1    cyanocobalamin (VITAMIN B-12) 1000 MCG tablet, Take 1 tablet by mouth Daily., Disp: , Rfl:     folic acid (FOLVITE) 1 MG tablet, Take 1 tablet by mouth Daily., Disp: , Rfl:     levothyroxine (SYNTHROID, LEVOTHROID) 50  MCG tablet, Take 1 tablet by mouth once daily, Disp: 90 tablet, Rfl: 0    magic mouthwash oral suspension, SWISH AND SPIT FIVE ML FOUR TIMES DAILY AS NEEDED, Disp: , Rfl:     metFORMIN (GLUCOPHAGE) 1000 MG tablet, Take 0.5 tablets by mouth Daily., Disp: 60 tablet, Rfl: 2    metoprolol succinate XL (TOPROL-XL) 25 MG 24 hr tablet, Take 1 tablet by mouth Daily., Disp: 90 tablet, Rfl: 3    ondansetron (ZOFRAN) 4 MG tablet, Take 1 tablet by mouth Every 8 (Eight) Hours As Needed for Nausea or Vomiting., Disp: , Rfl:     ondansetron (ZOFRAN) 8 MG tablet, Take 1 tablet by mouth 3 (Three) Times a Day As Needed for Nausea or Vomiting., Disp: 30 tablet, Rfl: 5    pantoprazole (PROTONIX) 40 MG EC tablet, Take 1 tablet by mouth 2 (Two) Times a Day. (Patient not taking: Reported on 2023), Disp: , Rfl:     pravastatin (PRAVACHOL) 10 MG tablet, Take 1 tablet by mouth Every Other Day., Disp: , Rfl:     spironolactone (ALDACTONE) 25 MG tablet, Take 0.5 tablets by mouth Daily. (Patient taking differently: Take 0.5 tablets by mouth Daily. BP was dropping to low. Patient stopped taking a couple days ago), Disp: 30 tablet, Rfl: 2    Allergies   Allergen Reactions    Ace Inhibitors Unknown (See Comments)     Unknown reaction      Heparin Other (See Comments)     Fever/chills, weakness in legs    Sulfa Antibiotics Other (See Comments)     Mouth sores       Family History   Problem Relation Age of Onset    Mental illness Mother     Heart disease Mother     Hypertension Father     Cancer Father     Heart disease Sister     Heart disease Brother      Cancer-related family history includes Cancer in his father.    Social History     Tobacco Use    Smoking status: Former     Packs/day: 2.00     Years: 50.00     Pack years: 100.00     Types: Cigarettes     Quit date: 2015     Years since quittin.2    Smokeless tobacco: Former     Types: Chew     Quit date: 1989   Vaping Use    Vaping Use: Never used   Substance Use Topics     Alcohol use: No    Drug use: No     ROS:     Objective:    There were no vitals filed for this visit.      (1) Restricted in physically strenuous activity, ambulatory and able to do work of light nature    Physical Exam:     Physical Exam   Constitutional: He is oriented to person, place, and time. He appears well-developed. No distress.   HENT:   Head: Normocephalic and atraumatic.   Nose: Nose normal.   Mouth/Throat: Mucous membranes are moist.   Eyes: Pupils are equal, round, and reactive to light.   Neck: No thyromegaly present.   Cardiovascular: Normal rate, regular rhythm, normal heart sounds and normal pulses. Exam reveals no gallop and no friction rub.   Pulmonary/Chest: Effort normal and breath sounds normal.   Abdominal: Soft. Normal appearance and bowel sounds are normal.   No tenderness   Musculoskeletal: Normal range of motion. No deformity or signs of injury.      Right lower leg: No edema.   Neurological: He is alert and oriented to person, place, and time. He exhibits normal muscle tone.   Skin: Skin is warm and dry. No rash noted. He is not diaphoretic. No erythema. No jaundice.   Right side chest  port     Lab Results - Last 18 Months   Lab Units 10/04/23  0955 09/20/23  0915 08/29/23  1010   WBC 10*3/mm3 4.95 3.45 5.10   HEMOGLOBIN g/dL 9.3* 9.3* 9.4*   HEMATOCRIT % 28.6* 29.1* 29.1*   PLATELETS 10*3/mm3 160 252 187   MCV fL 89.4 90.9 89.5     Lab Results - Last 18 Months   Lab Units 09/22/23  1519 09/20/23  0915 08/29/23  1010 08/25/23  1139 06/02/23  0924   SODIUM mmol/L 140 141 139  --  138   POTASSIUM mmol/L 4.4 4.3 4.6  --  4.9   CHLORIDE mmol/L 107 107 107  --  108*   CO2 mmol/L 23.0 23.0 22.0  --  20.0*   BUN mg/dL 15 21 16  --  16   CREATININE mg/dL 1.20 1.28* 0.94   < > 0.90   CALCIUM mg/dL 8.1* 9.2 8.7  --  8.8   BILIRUBIN mg/dL  --  0.2 0.2  --  0.3   ALK PHOS U/L  --  45 32*  --  33*   ALT (SGPT) U/L  --  8 6  --  5   AST (SGOT) U/L  --  17 14  --  13   GLUCOSE mg/dL 144* 110* 147*  --   120*    < > = values in this interval not displayed.     Assessment & Plan     Assessment:    Metastatic esophageal carcinoma:    Patient has a history of GE junction poorly differentiated adenocarcinoma for which he had chemoradiation followed by Noel Gurwinder resection:  Pathology showed ypT3,pN1 disease.  Tumor was Stage IIIA, diagnosed in 2016.  He received concurrent chemoradiation with weekly Carboplatin and Taxol and radiation finishing on 3/15/17.  CT scan on 1/10/19 showed new retroperitoneal lymph nodes.  These lymph nodes were biopsied on 1/30/19 and it shows metastatic esophageal cancer.  [employer] Next Gen testing was performed on the sample and it shows KRAS mutation, microsatellite stable tumor.  PD-L1 is positive.  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  HER2/marvel (ERBB2) was not amplified and was negative.  His CT scan on 5/1/19 shows evidence of progression.  Patient has started receiving Keytruda on 5/31/19.  He has received 19 cycles so far.   CT scan on 1/28/2020 shows improvement and continued response.  CT scan chest abdomen pelvis with contrast on 12/10/2020 does not show any evidence of disease progression.  On 12/22/2020 decision made to hold Keytruda per patient's request to get a treatment break.. Repeat CT scan chest 7/12/2021 does not show any evidence of disease progression.  Now CT imaging in January 2021 with a CT showing increased abdominal left para-aortic adenopathy.  Subsequent PET/CT with new cedric metastatic disease in the retroperitoneum of the abdomen.  New pathologically enlarged hypermetabolic retroperitoneal lymph nodes.  This would be concerning for disease recurrence/progression.  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  I discussed with him the treatment options going forward.  He has had a good response with immunotherapy pembrolizumab in the past. This was  restarted. subsequent imaging with treatment response. 1/2023 imaging with good response. Continued  treatment  Now with increased adenopathy 2 new lymph nodes. Holding immunotherapy for now.   Consider getting cardiac MR discussed with Dr. Culp. While immunotherapy on hold, increased adenopathy is concerning. Could be pseudoprogression vs true progression. PET CT reviewed could indicate pseudoprogression since he has had a good response to immunotherapy  Now immunotherapy was restarted and has been on hold with patient's symptomatology cardiac MRI cannot be obtained, no ischemic heart disease leading to cardiomyopathy this most likely is related to Keytruda.  We will stop Keytruda going forward  CT imaging with stable disease, FOLFOX was started which he had significant side effects  With imaging being stable and significant side effects with FOLFOX, we discussed today about options with wait and watch vs 5FU only, patient prefers to be on maintenance.   We started 5FU only, has had improvement in side effects. Will continue treatment and repeat ct imaging after next treatment    Possible myocarditis  Symptoms started after last immunotherapy  Treatment on hold for now, symptoms have improved, cardiac markers were negative, echocardiogram with EF 40%, cardiac MRI cannot be obtained cardiac cath with EF down to 30% management per cardiology  Now EF up to 40-45% monitor.    Shortness of breath  Improved, has occasional shortness of breath, likely copd  I have recommended using albuterol  Symptoms have improved since stopping immunotherapy. Questionable pneumonitis will monitor closely with immunotherapy  Cardiology has evaluated for myocarditis related to immunotherapy symptomatic improvement now    Reflux/heartburn/dysphagia:   Symptoms improved.  Also has a history of strictures. Status post EGD and balloon dilation.  Omeprazole once daily, famotidine as needed, symptoms stable, continue  Pain could be related to gastritis, will monitor for now, this has resolved.    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   Continues on Synthroid     Mild diarrhea:  diarrhea seems to be waxing and waning in nature.  Diarrhea related to immunotherapy now improved    PD-L1 positive, HER2 negative, p53 and KRAS positive.  Repeat [employer] testing with repeat biopsy with no target mutations.    B12 deficiency: Continue B12 1000 mcg twice daily.    Anemia - Hb improved and stable.    Dysuria - Urinalysis, will discuss with his PCP to rule out STIs.           Jean Pierre Elizondo MD 10/04/23     Orders Placed This Encounter   Procedures    CBC & Differential     Order Specific Question:   Release to patient     Answer:   Routine Release [[id number]]

## 2023-10-04 ENCOUNTER — OFFICE VISIT (OUTPATIENT)
Dept: ONCOLOGY | Facility: CLINIC | Age: 73
End: 2023-10-04
Payer: MEDICARE

## 2023-10-04 ENCOUNTER — HOSPITAL ENCOUNTER (OUTPATIENT)
Dept: ONCOLOGY | Facility: HOSPITAL | Age: 73
Discharge: HOME | End: 2023-10-04
Admitting: INTERNAL MEDICINE
Payer: MEDICARE

## 2023-10-04 ENCOUNTER — TELEPHONE (OUTPATIENT)
Dept: ONCOLOGY | Facility: HOSPITAL | Age: 73
End: 2023-10-04

## 2023-10-04 VITALS
DIASTOLIC BLOOD PRESSURE: 68 MMHG | BODY MASS INDEX: 23.32 KG/M2 | HEART RATE: 78 BPM | OXYGEN SATURATION: 98 % | TEMPERATURE: 97.7 F | HEIGHT: 65 IN | RESPIRATION RATE: 16 BRPM | WEIGHT: 140 LBS | SYSTOLIC BLOOD PRESSURE: 147 MMHG

## 2023-10-04 VITALS
HEIGHT: 65 IN | WEIGHT: 140 LBS | BODY MASS INDEX: 23.32 KG/M2 | TEMPERATURE: 97.7 F | HEART RATE: 78 BPM | DIASTOLIC BLOOD PRESSURE: 68 MMHG | SYSTOLIC BLOOD PRESSURE: 147 MMHG | RESPIRATION RATE: 16 BRPM | OXYGEN SATURATION: 98 %

## 2023-10-04 DIAGNOSIS — C15.5 PRIMARY MALIGNANT NEOPLASM OF LOWER THIRD OF ESOPHAGUS: ICD-10-CM

## 2023-10-04 DIAGNOSIS — R39.9 UTI SYMPTOMS: ICD-10-CM

## 2023-10-04 DIAGNOSIS — C16.0 MALIGNANT NEOPLASM OF CARDIA: Primary | ICD-10-CM

## 2023-10-04 DIAGNOSIS — C16.0 MALIGNANT NEOPLASM OF CARDIA: ICD-10-CM

## 2023-10-04 DIAGNOSIS — R39.9 UTI SYMPTOMS: Primary | ICD-10-CM

## 2023-10-04 DIAGNOSIS — C48.0 PRIMARY MALIGNANT NEOPLASM OF RETROPERITONEUM: ICD-10-CM

## 2023-10-04 LAB
ALBUMIN SERPL-MCNC: 4 G/DL (ref 3.5–5.2)
ALBUMIN/GLOB SERPL: 2 G/DL
ALP SERPL-CCNC: 36 U/L (ref 39–117)
ALT SERPL W P-5'-P-CCNC: 8 U/L (ref 1–41)
ANION GAP SERPL CALCULATED.3IONS-SCNC: 13 MMOL/L (ref 5–15)
AST SERPL-CCNC: 19 U/L (ref 1–40)
BACTERIA UR QL AUTO: ABNORMAL /HPF
BASOPHILS # BLD AUTO: 0.03 10*3/MM3 (ref 0–0.2)
BASOPHILS NFR BLD AUTO: 0.6 % (ref 0–1.5)
BILIRUB SERPL-MCNC: 0.3 MG/DL (ref 0–1.2)
BILIRUB UR QL STRIP: NEGATIVE
BUN SERPL-MCNC: 17 MG/DL (ref 8–23)
BUN/CREAT SERPL: 13.9 (ref 7–25)
CALCIUM SPEC-SCNC: 9.2 MG/DL (ref 8.6–10.5)
CHLORIDE SERPL-SCNC: 104 MMOL/L (ref 98–107)
CLARITY UR: CLEAR
CO2 SERPL-SCNC: 22 MMOL/L (ref 22–29)
COLOR UR: YELLOW
CREAT SERPL-MCNC: 1.22 MG/DL (ref 0.76–1.27)
DEPRECATED RDW RBC AUTO: 45.8 FL (ref 37–54)
EGFRCR SERPLBLD CKD-EPI 2021: 62.2 ML/MIN/1.73
EOSINOPHIL # BLD AUTO: 0.13 10*3/MM3 (ref 0–0.4)
EOSINOPHIL NFR BLD AUTO: 2.6 % (ref 0.3–6.2)
ERYTHROCYTE [DISTWIDTH] IN BLOOD BY AUTOMATED COUNT: 14.9 % (ref 12.3–15.4)
GLOBULIN UR ELPH-MCNC: 2 GM/DL
GLUCOSE SERPL-MCNC: 132 MG/DL (ref 65–99)
GLUCOSE UR STRIP-MCNC: NEGATIVE MG/DL
HCT VFR BLD AUTO: 28.6 % (ref 37.5–51)
HGB BLD-MCNC: 9.3 G/DL (ref 13–17.7)
HGB UR QL STRIP.AUTO: NEGATIVE
HYALINE CASTS UR QL AUTO: ABNORMAL /LPF
KETONES UR QL STRIP: NEGATIVE
LEUKOCYTE ESTERASE UR QL STRIP.AUTO: ABNORMAL
LYMPHOCYTES # BLD AUTO: 1.37 10*3/MM3 (ref 0.7–3.1)
LYMPHOCYTES NFR BLD AUTO: 27.7 % (ref 19.6–45.3)
MCH RBC QN AUTO: 29.1 PG (ref 26.6–33)
MCHC RBC AUTO-ENTMCNC: 32.5 G/DL (ref 31.5–35.7)
MCV RBC AUTO: 89.4 FL (ref 79–97)
MONOCYTES # BLD AUTO: 0.73 10*3/MM3 (ref 0.1–0.9)
MONOCYTES NFR BLD AUTO: 14.7 % (ref 5–12)
NEUTROPHILS NFR BLD AUTO: 2.69 10*3/MM3 (ref 1.7–7)
NEUTROPHILS NFR BLD AUTO: 54.4 % (ref 42.7–76)
NITRITE UR QL STRIP: NEGATIVE
PH UR STRIP.AUTO: 5.5 [PH] (ref 5–8)
PLATELET # BLD AUTO: 160 10*3/MM3 (ref 140–450)
PMV BLD AUTO: 9.8 FL (ref 6–12)
POTASSIUM SERPL-SCNC: 4.7 MMOL/L (ref 3.5–5.2)
PROT SERPL-MCNC: 6 G/DL (ref 6–8.5)
PROT UR QL STRIP: NEGATIVE
RBC # BLD AUTO: 3.2 10*6/MM3 (ref 4.14–5.8)
RBC # UR STRIP: ABNORMAL /HPF
REF LAB TEST METHOD: ABNORMAL
SODIUM SERPL-SCNC: 139 MMOL/L (ref 136–145)
SP GR UR STRIP: 1.02 (ref 1–1.03)
SQUAMOUS #/AREA URNS HPF: ABNORMAL /HPF
UROBILINOGEN UR QL STRIP: ABNORMAL
WBC # UR STRIP: ABNORMAL /HPF
WBC NRBC COR # BLD: 4.95 10*3/MM3 (ref 3.4–10.8)

## 2023-10-04 PROCEDURE — 63710000001 ONDANSETRON PER 8 MG: Performed by: INTERNAL MEDICINE

## 2023-10-04 PROCEDURE — A9270 NON-COVERED ITEM OR SERVICE: HCPCS | Performed by: INTERNAL MEDICINE

## 2023-10-04 PROCEDURE — 25010000002 FLUOROURACIL PER 500 MG: Performed by: INTERNAL MEDICINE

## 2023-10-04 PROCEDURE — 0 DEXTROSE 5 % SOLUTION: Performed by: INTERNAL MEDICINE

## 2023-10-04 PROCEDURE — 96360 HYDRATION IV INFUSION INIT: CPT

## 2023-10-04 PROCEDURE — 3077F SYST BP >= 140 MM HG: CPT | Performed by: INTERNAL MEDICINE

## 2023-10-04 PROCEDURE — 3078F DIAST BP <80 MM HG: CPT | Performed by: INTERNAL MEDICINE

## 2023-10-04 PROCEDURE — 96416 CHEMO PROLONG INFUSE W/PUMP: CPT

## 2023-10-04 PROCEDURE — 96375 TX/PRO/DX INJ NEW DRUG ADDON: CPT

## 2023-10-04 PROCEDURE — 25810000003 SODIUM CHLORIDE 0.9 % SOLUTION 250 ML FLEX CONT: Performed by: INTERNAL MEDICINE

## 2023-10-04 PROCEDURE — 81015 MICROSCOPIC EXAM OF URINE: CPT | Performed by: INTERNAL MEDICINE

## 2023-10-04 PROCEDURE — 25810000003 SODIUM CHLORIDE 0.9 % SOLUTION: Performed by: INTERNAL MEDICINE

## 2023-10-04 PROCEDURE — G0498 CHEMO EXTEND IV INFUS W/PUMP: HCPCS

## 2023-10-04 PROCEDURE — 25010000002 LEUCOVORIN CALCIUM PER 50 MG: Performed by: INTERNAL MEDICINE

## 2023-10-04 PROCEDURE — 80053 COMPREHEN METABOLIC PANEL: CPT | Performed by: INTERNAL MEDICINE

## 2023-10-04 PROCEDURE — 96367 TX/PROPH/DG ADDL SEQ IV INF: CPT

## 2023-10-04 PROCEDURE — 85025 COMPLETE CBC W/AUTO DIFF WBC: CPT | Performed by: INTERNAL MEDICINE

## 2023-10-04 PROCEDURE — 96365 THER/PROPH/DIAG IV INF INIT: CPT

## 2023-10-04 PROCEDURE — 25010000002 DEXAMETHASONE SODIUM PHOSPHATE 120 MG/30ML SOLUTION: Performed by: INTERNAL MEDICINE

## 2023-10-04 PROCEDURE — 1126F AMNT PAIN NOTED NONE PRSNT: CPT | Performed by: INTERNAL MEDICINE

## 2023-10-04 PROCEDURE — 81003 URINALYSIS AUTO W/O SCOPE: CPT | Performed by: INTERNAL MEDICINE

## 2023-10-04 RX ORDER — DEXTROSE MONOHYDRATE 50 MG/ML
250 INJECTION, SOLUTION INTRAVENOUS ONCE
Status: CANCELLED | OUTPATIENT
Start: 2023-10-04

## 2023-10-04 RX ORDER — DEXTROSE MONOHYDRATE 50 MG/ML
250 INJECTION, SOLUTION INTRAVENOUS ONCE
Status: COMPLETED | OUTPATIENT
Start: 2023-10-04 | End: 2023-10-04

## 2023-10-04 RX ORDER — ONDANSETRON 4 MG/1
8 TABLET, FILM COATED ORAL ONCE
Status: CANCELLED
Start: 2023-10-04 | End: 2023-10-04

## 2023-10-04 RX ORDER — FAMOTIDINE 10 MG/ML
20 INJECTION, SOLUTION INTRAVENOUS AS NEEDED
Status: CANCELLED | OUTPATIENT
Start: 2023-10-04

## 2023-10-04 RX ORDER — ONDANSETRON HYDROCHLORIDE 8 MG/1
8 TABLET, FILM COATED ORAL ONCE
Status: COMPLETED | OUTPATIENT
Start: 2023-10-04 | End: 2023-10-04

## 2023-10-04 RX ORDER — DIPHENHYDRAMINE HYDROCHLORIDE 50 MG/ML
50 INJECTION INTRAMUSCULAR; INTRAVENOUS AS NEEDED
Status: CANCELLED | OUTPATIENT
Start: 2023-10-04

## 2023-10-04 RX ADMIN — FLUOROURACIL 4130 MG: 50 INJECTION, SOLUTION INTRAVENOUS at 13:20

## 2023-10-04 RX ADMIN — LEUCOVORIN CALCIUM 690 MG: 350 INJECTION, POWDER, LYOPHILIZED, FOR SOLUTION INTRAMUSCULAR; INTRAVENOUS at 11:40

## 2023-10-04 RX ADMIN — DEXAMETHASONE SODIUM PHOSPHATE 12 MG: 4 INJECTION, SOLUTION INTRA-ARTICULAR; INTRALESIONAL; INTRAMUSCULAR; INTRAVENOUS; SOFT TISSUE at 11:06

## 2023-10-04 RX ADMIN — DEXTROSE MONOHYDRATE 250 ML: 50 INJECTION, SOLUTION INTRAVENOUS at 11:05

## 2023-10-04 RX ADMIN — SODIUM CHLORIDE 500 ML: 9 INJECTION, SOLUTION INTRAVENOUS at 12:16

## 2023-10-04 RX ADMIN — ONDANSETRON HYDROCHLORIDE 8 MG: 8 TABLET, FILM COATED ORAL at 12:54

## 2023-10-04 NOTE — PROGRESS NOTES
Patient is her for C3D1 leucovorin and 5FU pump only. Port accessed by reji and flushed with good blood return noted. 10cc of blood wasted prior to specimen collection. Blood specimen obtained and sent to lab for processing per protocol. Following message sent to Dr. Elizondo: Patient is here for C3D1 leucovorin and 5FU. Patient stated his eyes feel really dry today and he thinks he probably is dehydrated. he stated he drinks 1 propel a day usually. Patient stated the 500ml bolus we gave him last time on treatment day and pump d/c day did seem to help. He is still having the pain in his groin area and he gets SOA after eating and stated he has more trouble eating after treatment and it last about a week then gets better. He stated he is just now starting to be able to eat and swallow better since his last treatment. His calcium on 9/22 was 8.1. His labs today are within parameters and you are seeing him today-he is in room 21. is it ok to start treatment? if it is ok for treatment can you please sign the treatment plan? thank you. Dr. Elizondo responded: let me see him first. Dr. Elizondo saw the patient and verbalized he was ok for treatment today and to obtain a UA with microscopy. Patient also stated he felt dehydrated and Dr. Elizondo ordered 500ml over 1 hour. Patient did stated after he ate some soup he was nauseated-Dr. Elizondo notified and orders for zofram received. Patient stated prior to discharge that the zofran did help with his nausea and he felt better after the fluids. Patient was given a copy of his AVS and the time the zofran was given was written on the top of the paper in case he needed to take more at home.  Patient was discharged and wanted to be notified by phone about his UA results. Patient called by TODD Howe RN after speaking with Dr. Elizondo that he did not have a UTI and needed to contact his primary care physician. DR. Elizondo also wanted me to let the patient know :please tell him to call us if he starts  swelling up or gets short of breath-Patient verbalized understanding.

## 2023-10-06 ENCOUNTER — HOSPITAL ENCOUNTER (OUTPATIENT)
Dept: ONCOLOGY | Facility: HOSPITAL | Age: 73
Discharge: HOME | End: 2023-10-06
Payer: MEDICARE

## 2023-10-06 VITALS
SYSTOLIC BLOOD PRESSURE: 150 MMHG | WEIGHT: 143.3 LBS | BODY MASS INDEX: 23.88 KG/M2 | RESPIRATION RATE: 18 BRPM | OXYGEN SATURATION: 99 % | HEIGHT: 65 IN | HEART RATE: 59 BPM | DIASTOLIC BLOOD PRESSURE: 78 MMHG

## 2023-10-06 DIAGNOSIS — C16.0 MALIGNANT NEOPLASM OF CARDIA: Primary | ICD-10-CM

## 2023-10-06 DIAGNOSIS — C15.5 PRIMARY MALIGNANT NEOPLASM OF LOWER THIRD OF ESOPHAGUS: ICD-10-CM

## 2023-10-06 PROCEDURE — 96360 HYDRATION IV INFUSION INIT: CPT

## 2023-10-06 PROCEDURE — 25810000003 SODIUM CHLORIDE 0.9 % SOLUTION: Performed by: INTERNAL MEDICINE

## 2023-10-06 RX ORDER — SODIUM CHLORIDE 0.9 % (FLUSH) 0.9 %
20 SYRINGE (ML) INJECTION AS NEEDED
OUTPATIENT
Start: 2023-10-06

## 2023-10-06 RX ORDER — SODIUM CHLORIDE 0.9 % (FLUSH) 0.9 %
20 SYRINGE (ML) INJECTION AS NEEDED
Status: DISCONTINUED | OUTPATIENT
Start: 2023-10-06 | End: 2023-10-07 | Stop reason: HOSPADM

## 2023-10-06 RX ADMIN — Medication 20 ML: at 14:00

## 2023-10-06 RX ADMIN — SODIUM CHLORIDE 500 ML: 9 INJECTION, SOLUTION INTRAVENOUS at 12:55

## 2023-10-17 NOTE — PROGRESS NOTES
Hematology-Oncology Follow-up Note       Artie Mc  1950    Primary Care Physician: Chanel Carter, DAT  Referring Physician: No ref. provider found    Reason For Visit:  Chief Complaint   Patient presents with    Follow-up     Esophageal cancer, stage IV     Metastatic esophageal cancer  Monitoring for adverse effects related to immunotherapy.  Hypothyroidism    HPI:   Mr. Mc is a pleasant 73 y.o. gentleman with a history of gastroesophageal reflux disease, diabetes, hypertension, heart block status post pacemaker placement and history of CABG.  He was having symptoms of dysphagia, weight loss since September 2016. He was reporting symptoms of food getting stuck in the lower chest.  He has transitioned himself to a liquid diet.  The patient reports losing about 30 pounds over this duration.  The patient underwent upper GI endoscopy on 12/20/16 with Dr. Esvin Barrera.  Endoscopy showed necrotic, circumferential and moderately obstructive mass at the distal esophagus between 42 cm and 36 cm.      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  There was also evidence of mild subacute inflammation in the duodenum.  Dr. Barrera ordered a CT scan of the chest and abdomen with contrast on 12/20/16.  The scan was done at [facility].  The scan showed a new ill-defined mass in the anterior wall of the distal esophagus at the GE junction measuring 2.3 x 2.2 x 1.6 cm, worrisome for cancer.  There were four subcentimeter liver lesions which had appeared stable in comparison to another CT scan from 2009 and they likely represent small cysts.  There was also mild lymphadenopathy in the gastrohepatic ligament.  There were at least six borderline enlarged lymph nodes measuring up to 1.3 x 1 cm in the region.  The patient is referred to us for further oncological evaluation.    1/5/2017 - The patient presents for initial  consultation.  He reports persistent dysphagia and also has symptoms of regurgitation.  He cannot tolerate solid foods and is dependent on liquid diet such as Ensure.  He also reports fatigue. Symptoms of dysphagia have been present for the past four months.  When he eats any solids, the food gets stuck in the lower chest.    1/5/17 - Vitamin B12 200 (L).  CEA 0.8.  Ferritin 35.  Iron saturation 16% (L), serum iron 56, TIBC 354.  Creatinine 0.9.  LFTs normal.  Folate 10.2.  1/12/17 - PET/CT scan:  Intense abnormal activity corresponding to the region of the GE junction and proximal stomach.  SUV is 11.06.  There is a suggestion of an abnormal mass or abnormal wall thickening in the region measuring 3.9 x 4.8 cm.  No additional abnormalities.  No evidence of metastases or other lymphadenopathy.  Scattered nonspecific subcentimeter lymph nodes involving the mediastinum and within the central upper abdomen.  1/13/17 - Creatinine 0.9.  LFTs normal.    1/17/17 - Patient seen by thoracic surgeon, Dr. Bradley.  PET scan was reviewed.  He has recommended neoadjuvant chemoradiation therapy followed by [place] Gurwinder esophagogastrectomy.  Port-A-Cath is being arranged.    1/20/17 - WBC 6.3, hemoglobin 12.1, platelet count 198,000, MCV 86.4.  1/30/17 - Patient started weekly Carboplatin and Taxol (Carboplatin AUC 2 and Taxol 50 mg/M2).  Patient received Injectafer 750 mg.  WBC 5.8, hemoglobin 12.4, platelet count 244,000.   1/31/17 - Patient seen by Dr. Eduardo Oleary.  The plan was to give 5040 cGy in 28 fractions.   2/6/17 - WBC 4.4, hemoglobin 11.6, platelet count 201,000.  Patient received cycle 2 of weekly Carboplatin and Taxol (Carboplatin AUC 2 and Taxol 50 mg/M2).  2/13/17 - Patient received Carboplatin and Taxol weekly cycle 3.  2/13/17 - Patient started concurrent radiation therapy.  Total planned dose is 4140 cGy.    2/13/17 to 2/15/17 - Patient received 4140 cGy of neoadjuvant radiation.  Radiation was finished on  3/15/17.    2/20/17 - WBC 1.8, hemoglobin 10.6, platelet count 217,000.  Creatinine 0.5. Folate 15 (L).  Ferritin 361.  Haptoglobin 214.  Iron saturation 33%, TIBC 263, serum iron 88.    2/20/17 - Patient received Carboplatin and Taxol weekly cycle 4.   2/27/17 - WBC 3.7, hemoglobin 10.7, platelet count 177,000, MCV 85.3.    3/6/17 - Patient has received 16 out of the 23 planned radiation treatments.  Total planned dose is 4140 cGy.    3/6/17 - WBC 5.4, hemoglobin 11.2, platelet count 113,000.  3/6/17 - Patient received cycle 5 of weekly Carboplatin and Taxol.   3/13/17 - Patient received cycle 6 of weekly Carboplatin and Taxol.  WBC 3.9, hemoglobin 11.3, platelet count 93,000.     3/20/17 - WBC 1.4, hemoglobin 10.0, platelet count 118,000, MCV 86.1   3/27/17 - WBC 3.8, hemoglobin 9.5, platelet count 176,000, MCV 87.3.    4/7/17 - Stress test:  No evidence of reversible myocardial ischemia.  Normal left ventricular ejection fraction of 50%.    4/17/17 - Upper GI endoscopy by Dr. 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  The GE junction adenocarcinoma lesion seems to have a very good response to chemotherapy and radiation.  The lumen was open.  5/1/17 - WBC 7.0, hemoglobin 10.2, platelet count 175,000.    5/4/17 - Patient underwent [place] Gurwinder esophagectomy with pyloroplasty, feeding jejunostomy tube, abdominal and mediastinal lymph node dissection.    Surgical Pathology:  Moderately to poorly differentiated adenocarcinoma measuring 1.3 cm at the GE junction.  Resection margins are negative for malignancy.  Tumor margins are negative.  There is effective treatment response.  No evidence of lymphovascular invasion.  Staging is ypT3,pN1.  One out of fourteen lymph nodes involved.   5/22/17 - WBC 6.8, hemoglobin 10.3, platelet count 312,000.    6/19/17 - WBC 5.7, hemoglobin 10.5, platelet count 204,000, MCV 92.1.    7/6/17 - EGD:  Status post balloon dilation of esophageal  stricture.  7/12/17 - EGD with balloon dilation of esophageal stricture.  7/26/17 - EGD and balloon dilation of esophageal stricture.  7/31/17 - PET/CT scan:  There is mild metabolic activity seen at the thoracic esophagus just at and below the surgical margins.  Some mild wall thickening.  This could be from recent surgery.  A residual cancer seems unlikely.  There is a precarinal lymph node with mild metabolic activity with SUV of 3 measuring 1 x 1.3 cm.  Again this appears to be nonspecific in my opinion.    8/7/17 - WBC 5.9, hemoglobin 11.9, platelet count 171,000, MCV 88.7.    10/19/17 - WBC 7.5, hemoglobin 11.8, platelet count 216,000.    1/8/18 - PET scan:  No evidence of residual disease or recurrent disease.  There is an esophageal stent in the mid esophagus with a large debris air level.  No evidence of any metastases in the chest, abdomen or pelvis.  Mild nodule uptake in the vocal cords with fullness in the right vocal cord.  This could be physiologic.    7/9/18 - CT scan of chest with contrast:  Prominent mediastinal lymph nodes appear stable since February.  Changes of gastric pull-through procedure for esophageal cancer again noted.  Tree-in-bud infiltrates in the left lung base, consistent with infection versus inflammation.    1/10/19 - CT chest, abdomen and pelvis with contrast:  No evidence of mets in the chest; however, interval development of metastatic lymphadenopathy in the left side of the retroperitoneum.  For instance, there is a 17 mm rounded lymph node, 10 mm lymph node and also a 14 mm lymph node.  These are all new.  Stable 9 mm hypodense lesion within the right hepatic lobe, which could reflect hemangioma or complex cyst.    1/30/19 - CT-guided core biopsy of retroperitoneal lymph node:  Poorly differentiated adenocarcinoma consistent with metastases from patient’s known esophageal primary.  CK20 positive, CDX2 positive, cytokeratin 7 negative, TTF-1 negative.    2/1/19 - Creatinine  0.9, BUN 18, calcium 9.3; these are normal.    2/28/19 - [facility] Next Gen sequencing testing on retroperitoneal lymph node specimen:  PD-L1 positive.  CPS = 3.  This implies responsiveness to pembrolizumab.  Mismatch repair status is proficient (no evidence of microsatellite instability).  Tumor mutational burden is low = 6 mutations/Mb.  CDH1 indeterminate.  KRAS mutation positive.  TP53 mutation positive.  CDK6 amplified.  [employer] PD-L1 testing by IHC:  PD-L1 expression 1% positive (CPS =1).  HER2/marvel by IHC is negative.  HER2/marvel by CISH not amplified.  Microsatellite stable tumor.    3/7/19 - WBC 7.3, hemoglobin 12, platelet count 128,000, MCV 92.     3/14/19 - Patient was seen by radiation oncologist, Dr. Chahal.  He has recommended observation versus systemic chemoimmunotherapy as needed.  No plans for radiation therapy.   5/1/19 - CT scan of chest with contrast:  No definitive findings of new metastatic disease in the chest.  Emphysema noted.    5/1/19 - CT abdomen and pelvis:  There is interval progression of metastatic retroperitoneal adenopathy.  Left periaortic adenopathy with lymph node measuring up to 2 cm, previously 1.7 cm.  Another lymph node now measures 2.3, previously 1.4 cm.  New enlarged lymph node on image 147 measures 1.4 cm, previously 0.4 cm.  A 9 mm hypodense right hepatic lobe lesion appears stable.    5/9/19 - Decision made to start patient on immunotherapy Keytruda.    5/9/19 - Vitamin B12 229.  Ferritin 61.  Folate 14.3.  Iron saturation 26%, TIBC 304, serum iron 79.  5/31/19 - Patient received Keytruda 200 mg cycle 1, day 1.    5/31/19 - Free T4 0.99.  Creatinine 0.9, BUN 9.  LFTs normal.  TSH 3.99.  Folate 14.3.  Iron saturation 26%.    6/21/2019 patient received Keytruda cycle 2  7/12/2019: Patient received Keytruda cycle 3  8/2/2019 patient received cycle 4 Keytruda WBC 7.2, hemoglobin 11.8 platelet count 163  8/23/2019 creatinine 0.9, LFTs normal, WBC 6.4, hemoglobin 11.5, platelets  179, TSH 2.1   8/23/2019 patient received cycle 5 of Keytruda  9/3/2019 CT chest abdomen and pelvis with contrast: . Positive response to therapy in the abdomen since 05/01/2019. Pathologically enlarged retroperitoneal lymph nodes, predominantly in the left periaortic region, have diminished in size.a 1.3 x 2.1 cm left periaortic node (image 60), previously measured 3.5 x 2.3 cm. A 1.6 cm index node near the level of the left renal vein previously measured 2.0 cm No new or progressive adenopathy. 2. Stable findings in the chest. Noncalcified right upper lobe nodules are unchanged. There is no evidence of new or progressive metastatic disease within the chest. 3. 8 mm indeterminate low-density lesion in the right hepatic lobe, is stable. No new liver lesions.  9/6/2019 WBC 5.6, hemoglobin 11.2, platelets of 192, MCV 91.8  9/13/2019 patient received Keytruda cycle 6, albumin 3.2  10/4/2019 patient received cycle 7 of Keytruda, TSH 4.8, free T4 0.86  10/9/2019 WBC 5.8, hemoglobin 11.7, platelets 174  10/25/2019 patient received cycle 8 of Keytruda.  WBC 5.9, hemoglobin 11.5, platelets 152, creatinine 0.75, cortisol 15.2, TSH 6.48 high , free T4 1.13 normal  11/6/2019 WBC 6.6, hemoglobin 11.8, MCV 91.5, platelets 187  11/15/2019 patient received cycle 9 of Keytruda, WBC 6.6, hemoglobin 11.9, platelets 161, cortisol level 8.86, TSH 2.68, free T3 2.8, free T4 1.5  12/4/2019 WBC 6.9, hemoglobin 12.0, platelets 180, MCV 92.9  12/06/2019-Keytruda Cycle 10  12/27/2019- Cycle 11 LFTs normal, WBC 6.7, hemoglobin 11.4, platelets 168, MCV 93, TSH 2.4,  1/28/2020: CT chest abdomen and pelvis with contrast:  Single (aortic lymph node in the abdominal retroperitoneum has increased.  Rest of the retroperitoneal lymph nodes have decreased in size.  Mediastinal adenopathy appears unchanged.  Noncalcified bilateral pulmonary nodules are stable  1/17/2020 patient received Keytruda cycle 12:.  2/3/2020 WBC 7.1, hemoglobin 12.2, platelets  171  02/10/2020 patient received cycle 13 of Keytruda: WBC 7.3, hemoglobin 12.1, platelets 166, creatinine 0.82  3/6/2020 patient is of Keytruda 200 mg, hemoglobin 10.8  3/27/2020 patient received Keytruda 200 mg, hemoglobin 11.4, TSH 1.95  4/17/2020 patient received Keytruda cycle 16 200 mg, WBC 6.1, hemoglobin 11.6, platelets 150  5/8/2020: Patient received cycle 17 Keytruda, WBC 6.3, hemoglobin 9.6, platelets 137, TSH 2.05, free T4 1.54, creatinine 0.93, LFTs normal,  5/29/2020: Patient received cycle 18 Keytruda, WBC 6.5, hemoglobin 11.7, platelets 129, creatinine 0.83, TSH 1.69  6/26/2020: Patient receiving Keytruda.   7/17/2020: TSH 2.06, WBC 5.7, hemoglobin 11.4, platelets 177, MCV 92.4, Keytruda 200 mg cycle 20  7/27/2020: CT chest abdomen pelvis with contrast: Stable findings in the chest abdomen pelvis since 1/28/2020.  Noncalcified bilateral pulmonary nodules are stable.  Stable retroperitoneal left perinephric adenopathy in the abdomen.  8/7/2020: Patient received cycle 21 of Keytruda  8/28/2020 patient is a cycle 22 of Keytruda.  WBC 6.02, hemoglobin 11.3, platelets 161  9/11/2020 WBC 6.9, hemoglobin 12.4, platelets 192  9/18/2020: Cycle 23 of Keytruda, TSH 1.19  10/9/2020: Cycle 24 of Keytruda, WBC 5.9, hemoglobin 11.9, platelets 178, creatinine 0.84, TSH 2.56, free T3 2.4, CMP normal  10/23/2020: WBC 7.34, hemoglobin 11.6, platelets 175  10/30/2020:.  Keytruda 200 mg  11/20/2020: Keytruda 200 mg  12/11/2020 Keytruda 200 mg.  WBC 5.5, hemoglobin 11.4, platelets 176, creatinine 0.8, LFTs normal, TSH 2.2, cortisol 8.3  12/10/2020: CT chest abdomen and pelvis with contrast: Previously seen left para-aortic lymph nodes within the abdomen are decreased in size.  No pathological lymphadenopathy.  No evidence of residual malignancy..  Small noncalcified pulmonary nodules are stable.  No new nodules.  Stable mediastinal lymph nodes.  3/12/2021: WBC 6.7, hemoglobin 11.3, platelets 17  3/12/2021: CMP normal, TSH  2.9, cortisol 11.95, 1  3/15/2021: CT chest with contrast: Stable findings in the chest with postsurgical changes of esophageal resection and gastric pull-through.  Stable right lower lobe lung nodule measures 11 mm.  Stable size and appearance of the mediastinal and upper abdominal lymph nodes.  3/23/2021: Doppler of upper extremity: Normal.  3/24/2021: CT abdomen and pelvis with contrast: Postoperative changes.  Left periaortic nodes are stable.  4/12/2021: TSH 4.1, cortisol 12.4  6/22/2021: CMP normal,Cortisol 10.08,, WBC 5.8, hemoglobin 11.2, platelets 109, TSH 2.59  7/12/2021: CT scan of the chest abdomen pelvis with contrast: Dominant 10 x 10 left periaortic lymph node is stable since 3/22/2021.  Dominant lymph nodes in the right lower paratracheal and AP window distribution are stable since 3/15/2021.  No evidence of progressive adenopathy in the chest abdomen or pelvis.    01/11/2022 -PET/CT findings consistent with new cedric metastatic disease in the retroperitoneum.  2 new pathologically enlarged hypermetabolic retroperitoneal lymph nodes.  No convincing evidence of recurrent disease within the chest neck pelvis or skeleton.  1/20/2022 -CT-guided needle biopsy consistent with poorly differentiated carcinoma.  CDX2 positive consistent with metastases from known esophageal primary  2/9/2022 - Pembrolizumab complicated with fatigue, joint pains, nausea, diarrhea. Symptoms decrease in intensity over a week.  3/30/2022 -CT chest and pelvis with stable metastatic lymph nodes in the left periaortic retroperitoneum.  No evidence of disease progression or new lesions.  Stable lymphadenopathy in the distal paratracheal mediastinal area  4/13/2022 - Keytruda 200  5/4/2022 - Keytruda 200  Continued keytruda  7/11/2022 - CT abdomen pelvis with decrease in size of left perioaortic lymph node. No other areas of disease.  Continues to be on Keytruda  10/18/2022 - stable CT imaging  1/20/2023 - CT imaging with stable  disease. No significant change.  Patient was hospitalized with esophagitis, subsequent EGD with improvement, no recurrence of disease.  2/21/2023 - Keytruda  CT CAP with increased retroperitoneal adenopathy.   ECHO with EF 35-40% (concern for immunotherapy related )immunotherapy held with ongoing investigation for cause.  Subsequent improvement in EF to 45%  4/4/2023 - CT abdomen pelvis with interval retroperitoneal lymph node enlargement compatible with metastatic adenopathyl.  5/5/2023 - PET CT with mixed response. No clear evidence of progression  6/2/23 -started pembrolizumab 200 mg IV  Posttreatment had symptoms of chest tightness troponin and proBNP negative, echocardiogram with EF 40%  Subsequent cardiac cath with EF down to 30% elevated proBNP, troponin no significant obstructive disease in the graft vessels  Cardiac MRI unable to obtain given pacemaker noncompliant.  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 Two enlarged mediastinal lymph nodes in the precarinal and AP window distributions are unchanged since 4/4/2023. No new or progressive adenopathy in the chest.Stable 3 mm or less noncalcified right lung nodules since 4/4/2023. Surgical changes of distal esophagectomy with gastric pull-through, without evidence of local disease recurrence  8/29/23 - started FOLFOX had significant side effects  9/20/23 - stopped oxaliplatin and continued 5FU only  Continues 5FU leucovorin    Subjective:  Groin pain improved with yeast infection likely on treatment now. Has had fatigue with treatment.     Past Medical History:   Diagnosis Date    B12 deficiency     Blockage of coronary artery of heart     Depression     DM (diabetes mellitus)     Dysphagia     GERD (gastroesophageal  reflux disease)     HTN (hypertension)     Hyperlipidemia        Past Surgical History:   Procedure Laterality Date    ABDOMINAL WALL ABSCESS INCISION AND DRAINAGE  10/22/2018    WITH DEBRIDEMENT  1.5CM X 1.5 CM X 1.5 CM    DR. PURI    CARDIAC CATHETERIZATION Right 7/20/2023    Procedure: Left Heart Cath;  Surgeon: Mazin Simmons MD;  Location: [facility] CATH INVASIVE LOCATION;  Service: Cardiovascular;  Laterality: Right;    CATARACT EXTRACTION  2018    COLONOSCOPY      CORONARY ARTERY BYPASS GRAFT  2015    ENDOSCOPY  12/20/2016    ENDOSCOPY N/A 01/02/2023    Procedure: ESOPHAGOGASTRODUODENOSCOPY;  Surgeon: Esvin Morgan MD;  Location: [facility] ENDOSCOPY;  Service: Gastroenterology;  Laterality: N/A;  post: anastamosis stricture    ENDOSCOPY N/A 02/09/2023    Procedure: ESOPHAGOGASTRODUODENOSCOPY, non-wire guided balloon dilation (12-14mm) of esophageal stricture;  Surgeon: Tino Villafana MD;  Location: [facility] ENDOSCOPY;  Service: Gastroenterology;  Laterality: N/A;  post: esophageal anastamotic stricture, post surgical changes    ESOPHAGECTOMY  05/04/2017    BROOKS PETTY ESOPHAGECTOMY, FEEDING JEJNOSTOMOY, ABDOMINAL AND MEDIASTINAL LYMPH NODE DISECTION, PEDICLED MUSCLE FLAP COVERAGE DR. PURI    ESOPHAGOSCOPY / EGD  07/12/2017    WITH BALLOON DILATION    ESOPHAGOSCOPY / EGD  07/26/2017    WITH BALLOON DILATION    ESOPHAGOSCOPY / EGD  08/11/2017    WITH BALLOON DILATION    ESOPHAGOSCOPY / EGD  08/28/2017    WITH BALLOON DILATION    ESOPHAGOSCOPY / EGD  09/27/2017    WITH BALLOON DILATION    INSERT / REPLACE / REMOVE PACEMAKER      LYMPH NODE BIOPSY  01/30/2019    PACEMAKER IMPLANTATION  11/21/2016       Current Outpatient Medications:     clonazePAM (KlonoPIN) 0.5 MG tablet, Take 0.5 tablets by mouth 2 (Two) Times a Day As Needed for Anxiety., Disp: , Rfl: 1    cyanocobalamin (VITAMIN B-12) 1000 MCG tablet, Take 1 tablet by mouth Daily., Disp: , Rfl:     folic acid (FOLVITE) 1 MG tablet,  Take 1 tablet by mouth Daily., Disp: , Rfl:     levothyroxine (SYNTHROID, LEVOTHROID) 50 MCG tablet, Take 1 tablet by mouth once daily, Disp: 90 tablet, Rfl: 0    magic mouthwash oral suspension, SWISH AND SPIT FIVE ML FOUR TIMES DAILY AS NEEDED, Disp: , Rfl:     metFORMIN (GLUCOPHAGE) 1000 MG tablet, Take 0.5 tablets by mouth Daily., Disp: 60 tablet, Rfl: 2    metoprolol succinate XL (TOPROL-XL) 25 MG 24 hr tablet, Take 1 tablet by mouth Daily., Disp: 90 tablet, Rfl: 3    ondansetron (ZOFRAN) 4 MG tablet, Take 1 tablet by mouth Every 8 (Eight) Hours As Needed for Nausea or Vomiting., Disp: , Rfl:     ondansetron (ZOFRAN) 8 MG tablet, Take 1 tablet by mouth 3 (Three) Times a Day As Needed for Nausea or Vomiting., Disp: 30 tablet, Rfl: 5    pantoprazole (PROTONIX) 40 MG EC tablet, Take 1 tablet by mouth 2 (Two) Times a Day. (Patient not taking: Reported on 9/20/2023), Disp: , Rfl:     pravastatin (PRAVACHOL) 10 MG tablet, Take 1 tablet by mouth Every Other Day., Disp: , Rfl:     spironolactone (ALDACTONE) 25 MG tablet, Take 0.5 tablets by mouth Daily. (Patient taking differently: Take 0.5 tablets by mouth Daily. BP was dropping to low. Patient stopped taking a couple days ago), Disp: 30 tablet, Rfl: 2  No current facility-administered medications for this visit.    Facility-Administered Medications Ordered in Other Visits:     fluorouracil (ADRUCIL) 4,130 mg in sodium chloride 0.9 % 94 mL chemo infusion - FOR HOME USE, 2,400 mg/m2 (Treatment Plan Recorded), Intravenous, Once, Jean Pierre Elizondo MD    leucovorin 690 mg in sodium chloride 0.9 % 284.5 mL IVPB, 400 mg/m2 (Treatment Plan Recorded), Intravenous, Once, Jean Pierre Elizondo MD, Last Rate: 570 mL/hr at 10/18/23 1122, 690 mg at 10/18/23 1122    sodium chloride 0.9 % flush 20 mL, 20 mL, Intravenous, PRN, Jean Pierre Elizondo MD    Allergies   Allergen Reactions    Ace Inhibitors Unknown (See Comments)     Unknown reaction      Heparin Other (See Comments)     Fever/chills,  "weakness in legs    Sulfa Antibiotics Other (See Comments)     Mouth sores       Family History   Problem Relation Age of Onset    Mental illness Mother     Heart disease Mother     Hypertension Father     Cancer Father     Heart disease Sister     Heart disease Brother      Cancer-related family history includes Cancer in his father.    Social History     Tobacco Use    Smoking status: Former     Packs/day: 2.00     Years: 50.00     Additional pack years: 0.00     Total pack years: 100.00     Types: Cigarettes     Quit date: 2015     Years since quittin.3    Smokeless tobacco: Former     Types: Chew     Quit date: 1989   Vaping Use    Vaping Use: Never used   Substance Use Topics    Alcohol use: No    Drug use: No     ROS:     Objective:    Vitals:    10/18/23 0959   BP: 131/65   Pulse: 75   Resp: 16   Temp: 97.7 °F (36.5 °C)   SpO2: 92%   Weight: 65.8 kg (145 lb)   Height: 165.1 cm (65\")   PainSc: 0-No pain         (1) Restricted in physically strenuous activity, ambulatory and able to do work of light nature    Physical Exam:     Physical Exam   Constitutional: He is oriented to person, place, and time. He appears well-developed. No distress.   HENT:   Head: Normocephalic and atraumatic.   Nose: Nose normal.   Mouth/Throat: Mucous membranes are moist.   Eyes: Pupils are equal, round, and reactive to light.   Neck: No thyromegaly present.   Cardiovascular: Normal rate, regular rhythm, normal heart sounds and normal pulses. Exam reveals no gallop and no friction rub.   Pulmonary/Chest: Effort normal and breath sounds normal.   Abdominal: Soft. Normal appearance and bowel sounds are normal.   No tenderness   Musculoskeletal: Normal range of motion. No deformity or signs of injury.      Right lower leg: No edema.   Neurological: He is alert and oriented to person, place, and time. He exhibits normal muscle tone.   Skin: Skin is warm and dry. No rash noted. He is not diaphoretic. No erythema. No jaundice. "   Right side chest  port       Lab Results - Last 18 Months   Lab Units 10/18/23  0958 10/04/23  0955 09/20/23  0915   WBC 10*3/mm3 4.28 4.95 3.45   HEMOGLOBIN g/dL 9.0* 9.3* 9.3*   HEMATOCRIT % 28.5* 28.6* 29.1*   PLATELETS 10*3/mm3 198 160 252   MCV fL 92.5 89.4 90.9     Lab Results - Last 18 Months   Lab Units 10/18/23  0958 10/04/23  0955 09/22/23  1519 09/20/23  0915   SODIUM mmol/L 140 139 140 141   POTASSIUM mmol/L 4.6 4.7 4.4 4.3   CHLORIDE mmol/L 106 104 107 107   CO2 mmol/L 23.0 22.0 23.0 23.0   BUN mg/dL 18 17 15 20   CREATININE mg/dL 1.18 1.22 1.20 1.28*   CALCIUM mg/dL 8.8 9.2 8.1* 9.2   BILIRUBIN mg/dL 0.3 0.3  --  0.2   ALK PHOS U/L 38* 36*  --  45   ALT (SGPT) U/L <5 8  --  8   AST (SGOT) U/L 10 19  --  17   GLUCOSE mg/dL 158* 132* 144* 110*     Assessment & Plan     Assessment:    Metastatic esophageal carcinoma:    Patient has a history of GE junction poorly differentiated adenocarcinoma for which he had chemoradiation followed by Noel Gurwinder resection:  Pathology showed ypT3,pN1 disease.  Tumor was Stage IIIA, diagnosed in 2016.  He received concurrent chemoradiation with weekly Carboplatin and Taxol and radiation finishing on 3/15/17.  CT scan on 1/10/19 showed new retroperitoneal lymph nodes.  These lymph nodes were biopsied on 1/30/19 and it shows metastatic esophageal cancer.  [facility] Next Gen testing was performed on the sample and it shows KRAS mutation, microsatellite stable tumor.  PD-L1 is positive.  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  HER2/marvel (ERBB2) was not amplified and was negative.  His CT scan on 5/1/19 shows evidence of progression.  Patient has started receiving Keytruda on 5/31/19.  He has received 19 cycles so far.   CT scan on 1/28/2020 shows improvement and continued response.  CT scan chest abdomen pelvis with contrast on 12/10/2020 does not show any evidence of disease progression.  On 12/22/2020 decision made to hold Keytruda per patient's request to get a treatment break.. Repeat  CT scan chest 7/12/2021 does not show any evidence of disease progression.  Now CT imaging in January 2021 with a CT showing increased abdominal left para-aortic adenopathy.  Subsequent PET/CT with new cedric metastatic disease in the retroperitoneum of the abdomen.  New pathologically enlarged hypermetabolic retroperitoneal lymph nodes.  This would be concerning for disease recurrence/progression.  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  I discussed with him the treatment options going forward.  He has had a good response with immunotherapy pembrolizumab in the past. This was  restarted. subsequent imaging with treatment response. 1/2023 imaging with good response. Continued treatment  Now with increased adenopathy 2 new lymph nodes. Holding immunotherapy for now.   Consider getting cardiac MR discussed with Dr. Culp. While immunotherapy on hold, increased adenopathy is concerning. Could be pseudoprogression vs true progression. PET CT reviewed could indicate pseudoprogression since he has had a good response to immunotherapy  Now immunotherapy was restarted and has been on hold with patient's symptomatology cardiac MRI cannot be obtained, no ischemic heart disease leading to cardiomyopathy this most likely is related to Keytruda.  We will stop Keytruda going forward  CT imaging with stable disease, FOLFOX was started which he had significant side effects  With imaging being stable and significant side effects with FOLFOX, we discussed today about options with wait and watch vs 5FU only, patient prefers to be on maintenance.   We started 5FU only, has had improvement in side effects. Continue treatment for now repeat imaging in end of November.     Possible myocarditis  Symptoms started after last immunotherapy  Treatment on hold for now, symptoms have improved, cardiac markers were negative, echocardiogram with EF 40%, cardiac MRI cannot be obtained cardiac cath with EF down to 30%  management per cardiology  Now EF up to 40-45% monitor. No symptoms now.     Shortness of breath  Improved, has occasional shortness of breath, likely copd  I have recommended using albuterol  Symptoms have improved since stopping immunotherapy. Questionable pneumonitis will monitor closely with immunotherapy  Cardiology has evaluated for myocarditis related to immunotherapy symptomatic improvement now    Reflux/heartburn/dysphagia:   Symptoms improved.  Also has a history of strictures. Status post EGD and balloon dilation.  Omeprazole once daily, famotidine as needed, symptoms stable, continue  Pain could be related to gastritis, will monitor for now, this has resolved.    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  Continues on Synthroid     Mild diarrhea:  diarrhea seems to be waxing and waning in nature.  Diarrhea related to immunotherapy now improved    PD-L1 positive, HER2 negative, p53 and KRAS positive.  Repeat [facility] testing with repeat biopsy with no target mutations.    B12 deficiency: Continue B12 1000 mcg twice daily.    Anemia - Hb improved and stable.    Dysuria - Urinalysis, will discuss with his PCP to rule out STIs.this was done started monistat improved symptoms now.            Jean Pierre Elizondo MD 10/18/23     Orders Placed This Encounter   Procedures    CBC & Differential     Order Specific Question:   Release to patient     Answer:   Routine Release [[id number]]

## 2023-10-18 ENCOUNTER — HOSPITAL ENCOUNTER (OUTPATIENT)
Dept: ONCOLOGY | Facility: HOSPITAL | Age: 73
Discharge: HOME | End: 2023-10-18
Admitting: INTERNAL MEDICINE
Payer: MEDICARE

## 2023-10-18 ENCOUNTER — OFFICE VISIT (OUTPATIENT)
Dept: ONCOLOGY | Facility: CLINIC | Age: 73
End: 2023-10-18
Payer: MEDICARE

## 2023-10-18 VITALS
RESPIRATION RATE: 16 BRPM | DIASTOLIC BLOOD PRESSURE: 65 MMHG | HEART RATE: 75 BPM | SYSTOLIC BLOOD PRESSURE: 131 MMHG | TEMPERATURE: 97.7 F | HEIGHT: 65 IN | BODY MASS INDEX: 24.16 KG/M2 | OXYGEN SATURATION: 92 % | WEIGHT: 145 LBS

## 2023-10-18 VITALS
TEMPERATURE: 97.7 F | HEIGHT: 65 IN | WEIGHT: 145.6 LBS | SYSTOLIC BLOOD PRESSURE: 131 MMHG | OXYGEN SATURATION: 92 % | HEART RATE: 75 BPM | RESPIRATION RATE: 16 BRPM | DIASTOLIC BLOOD PRESSURE: 65 MMHG | BODY MASS INDEX: 24.26 KG/M2

## 2023-10-18 DIAGNOSIS — C16.0 MALIGNANT NEOPLASM OF CARDIA: Primary | ICD-10-CM

## 2023-10-18 DIAGNOSIS — C15.5 PRIMARY MALIGNANT NEOPLASM OF LOWER THIRD OF ESOPHAGUS: ICD-10-CM

## 2023-10-18 LAB
ALBUMIN SERPL-MCNC: 3.9 G/DL (ref 3.5–5.2)
ALBUMIN/GLOB SERPL: 1.9 G/DL
ALP SERPL-CCNC: 38 U/L (ref 39–117)
ALT SERPL W P-5'-P-CCNC: <5 U/L (ref 1–41)
ANION GAP SERPL CALCULATED.3IONS-SCNC: 11 MMOL/L (ref 5–15)
AST SERPL-CCNC: 10 U/L (ref 1–40)
BASOPHILS # BLD AUTO: 0.04 10*3/MM3 (ref 0–0.2)
BASOPHILS NFR BLD AUTO: 0.9 % (ref 0–1.5)
BILIRUB SERPL-MCNC: 0.3 MG/DL (ref 0–1.2)
BUN SERPL-MCNC: 18 MG/DL (ref 8–23)
BUN/CREAT SERPL: 15.3 (ref 7–25)
CALCIUM SPEC-SCNC: 8.8 MG/DL (ref 8.6–10.5)
CHLORIDE SERPL-SCNC: 106 MMOL/L (ref 98–107)
CO2 SERPL-SCNC: 23 MMOL/L (ref 22–29)
CREAT SERPL-MCNC: 1.18 MG/DL (ref 0.76–1.27)
DEPRECATED RDW RBC AUTO: 50.1 FL (ref 37–54)
EGFRCR SERPLBLD CKD-EPI 2021: 64.8 ML/MIN/1.73
EOSINOPHIL # BLD AUTO: 0.25 10*3/MM3 (ref 0–0.4)
EOSINOPHIL NFR BLD AUTO: 5.8 % (ref 0.3–6.2)
ERYTHROCYTE [DISTWIDTH] IN BLOOD BY AUTOMATED COUNT: 15.8 % (ref 12.3–15.4)
GLOBULIN UR ELPH-MCNC: 2.1 GM/DL
GLUCOSE SERPL-MCNC: 158 MG/DL (ref 65–99)
HCT VFR BLD AUTO: 28.5 % (ref 37.5–51)
HGB BLD-MCNC: 9 G/DL (ref 13–17.7)
LYMPHOCYTES # BLD AUTO: 1.26 10*3/MM3 (ref 0.7–3.1)
LYMPHOCYTES NFR BLD AUTO: 29.4 % (ref 19.6–45.3)
MCH RBC QN AUTO: 29.2 PG (ref 26.6–33)
MCHC RBC AUTO-ENTMCNC: 31.6 G/DL (ref 31.5–35.7)
MCV RBC AUTO: 92.5 FL (ref 79–97)
MONOCYTES # BLD AUTO: 0.68 10*3/MM3 (ref 0.1–0.9)
MONOCYTES NFR BLD AUTO: 15.9 % (ref 5–12)
NEUTROPHILS NFR BLD AUTO: 2.05 10*3/MM3 (ref 1.7–7)
NEUTROPHILS NFR BLD AUTO: 48 % (ref 42.7–76)
PLATELET # BLD AUTO: 198 10*3/MM3 (ref 140–450)
PMV BLD AUTO: 10 FL (ref 6–12)
POTASSIUM SERPL-SCNC: 4.6 MMOL/L (ref 3.5–5.2)
PROT SERPL-MCNC: 6 G/DL (ref 6–8.5)
RBC # BLD AUTO: 3.08 10*6/MM3 (ref 4.14–5.8)
SODIUM SERPL-SCNC: 140 MMOL/L (ref 136–145)
WBC NRBC COR # BLD: 4.28 10*3/MM3 (ref 3.4–10.8)

## 2023-10-18 PROCEDURE — 25010000002 FLUOROURACIL PER 500 MG: Performed by: INTERNAL MEDICINE

## 2023-10-18 PROCEDURE — 36591 DRAW BLOOD OFF VENOUS DEVICE: CPT

## 2023-10-18 PROCEDURE — 85025 COMPLETE CBC W/AUTO DIFF WBC: CPT | Performed by: INTERNAL MEDICINE

## 2023-10-18 PROCEDURE — 96375 TX/PRO/DX INJ NEW DRUG ADDON: CPT

## 2023-10-18 PROCEDURE — 25010000002 LEUCOVORIN CALCIUM PER 50 MG: Performed by: INTERNAL MEDICINE

## 2023-10-18 PROCEDURE — 3075F SYST BP GE 130 - 139MM HG: CPT | Performed by: INTERNAL MEDICINE

## 2023-10-18 PROCEDURE — 96367 TX/PROPH/DG ADDL SEQ IV INF: CPT

## 2023-10-18 PROCEDURE — 3078F DIAST BP <80 MM HG: CPT | Performed by: INTERNAL MEDICINE

## 2023-10-18 PROCEDURE — G0498 CHEMO EXTEND IV INFUS W/PUMP: HCPCS

## 2023-10-18 PROCEDURE — 80053 COMPREHEN METABOLIC PANEL: CPT | Performed by: INTERNAL MEDICINE

## 2023-10-18 PROCEDURE — 96416 CHEMO PROLONG INFUSE W/PUMP: CPT

## 2023-10-18 PROCEDURE — 0 DEXTROSE 5 % SOLUTION: Performed by: INTERNAL MEDICINE

## 2023-10-18 PROCEDURE — 25810000003 SODIUM CHLORIDE 0.9 % SOLUTION 250 ML FLEX CONT: Performed by: INTERNAL MEDICINE

## 2023-10-18 PROCEDURE — 96365 THER/PROPH/DIAG IV INF INIT: CPT

## 2023-10-18 PROCEDURE — 25010000002 DEXAMETHASONE SODIUM PHOSPHATE 120 MG/30ML SOLUTION: Performed by: INTERNAL MEDICINE

## 2023-10-18 PROCEDURE — 1126F AMNT PAIN NOTED NONE PRSNT: CPT | Performed by: INTERNAL MEDICINE

## 2023-10-18 RX ORDER — DIPHENHYDRAMINE HYDROCHLORIDE 50 MG/ML
50 INJECTION INTRAMUSCULAR; INTRAVENOUS AS NEEDED
Status: CANCELLED | OUTPATIENT
Start: 2023-10-18

## 2023-10-18 RX ORDER — DEXTROSE MONOHYDRATE 50 MG/ML
250 INJECTION, SOLUTION INTRAVENOUS ONCE
Status: COMPLETED | OUTPATIENT
Start: 2023-10-18 | End: 2023-10-18

## 2023-10-18 RX ORDER — SODIUM CHLORIDE 0.9 % (FLUSH) 0.9 %
20 SYRINGE (ML) INJECTION AS NEEDED
Status: CANCELLED | OUTPATIENT
Start: 2023-10-18

## 2023-10-18 RX ORDER — DEXTROSE MONOHYDRATE 50 MG/ML
250 INJECTION, SOLUTION INTRAVENOUS ONCE
Status: CANCELLED | OUTPATIENT
Start: 2023-10-18

## 2023-10-18 RX ORDER — FAMOTIDINE 10 MG/ML
20 INJECTION, SOLUTION INTRAVENOUS AS NEEDED
Status: CANCELLED | OUTPATIENT
Start: 2023-10-18

## 2023-10-18 RX ORDER — SODIUM CHLORIDE 0.9 % (FLUSH) 0.9 %
20 SYRINGE (ML) INJECTION AS NEEDED
Status: DISCONTINUED | OUTPATIENT
Start: 2023-10-18 | End: 2023-10-19 | Stop reason: HOSPADM

## 2023-10-18 RX ADMIN — DEXTROSE MONOHYDRATE 250 ML: 50 INJECTION, SOLUTION INTRAVENOUS at 11:02

## 2023-10-18 RX ADMIN — LEUCOVORIN CALCIUM 690 MG: 350 INJECTION, POWDER, LYOPHILIZED, FOR SOLUTION INTRAMUSCULAR; INTRAVENOUS at 11:22

## 2023-10-18 RX ADMIN — DEXAMETHASONE SODIUM PHOSPHATE 12 MG: 4 INJECTION, SOLUTION INTRA-ARTICULAR; INTRALESIONAL; INTRAMUSCULAR; INTRAVENOUS; SOFT TISSUE at 11:02

## 2023-10-18 RX ADMIN — FLUOROURACIL 4130 MG: 50 INJECTION, SOLUTION INTRAVENOUS at 11:58

## 2023-10-20 ENCOUNTER — HOSPITAL ENCOUNTER (OUTPATIENT)
Dept: ONCOLOGY | Facility: HOSPITAL | Age: 73
Discharge: HOME | End: 2023-10-20
Payer: MEDICARE

## 2023-10-20 DIAGNOSIS — C15.5 PRIMARY MALIGNANT NEOPLASM OF LOWER THIRD OF ESOPHAGUS: ICD-10-CM

## 2023-10-20 DIAGNOSIS — C16.0 MALIGNANT NEOPLASM OF CARDIA: Primary | ICD-10-CM

## 2023-10-20 PROCEDURE — 96523 IRRIG DRUG DELIVERY DEVICE: CPT

## 2023-10-20 RX ORDER — SODIUM CHLORIDE 0.9 % (FLUSH) 0.9 %
20 SYRINGE (ML) INJECTION AS NEEDED
OUTPATIENT
Start: 2023-10-20

## 2023-10-20 RX ORDER — SODIUM CHLORIDE 0.9 % (FLUSH) 0.9 %
20 SYRINGE (ML) INJECTION AS NEEDED
Status: DISCONTINUED | OUTPATIENT
Start: 2023-10-20 | End: 2023-10-21 | Stop reason: HOSPADM

## 2023-10-20 RX ADMIN — Medication 20 ML: at 13:24

## 2023-10-31 NOTE — PROGRESS NOTES
Hematology-Oncology Follow-up Note       Artie Mc  1950    Primary Care Physician: Chanel Carter APRN  Referring Physician: Chanel Carter APRN    Reason For Visit:  Chief Complaint   Patient presents with    Follow-up     Esophageal cancer, stage IV     Metastatic esophageal cancer  Monitoring for adverse effects related to immunotherapy.  Hypothyroidism    HPI:   Mr. Mc is a pleasant 73 y.o. gentleman with a history of gastroesophageal reflux disease, diabetes, hypertension, heart block status post pacemaker placement and history of CABG.  He was having symptoms of dysphagia, weight loss since September 2016. He was reporting symptoms of food getting stuck in the lower chest.  He has transitioned himself to a liquid diet.  The patient reports losing about 30 pounds over this duration.  The patient underwent upper GI endoscopy on 12/20/16 with Dr. Esvin Barrera.  Endoscopy showed necrotic, circumferential and moderately obstructive mass at the distal esophagus between 42 cm and 36 cm.      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  There was also evidence of mild subacute inflammation in the duodenum.  Dr. Barrera ordered a CT scan of the chest and abdomen with contrast on 12/20/16.  The scan was done at [facility].  The scan showed a new ill-defined mass in the anterior wall of the distal esophagus at the GE junction measuring 2.3 x 2.2 x 1.6 cm, worrisome for cancer.  There were four subcentimeter liver lesions which had appeared stable in comparison to another CT scan from 2009 and they likely represent small cysts.  There was also mild lymphadenopathy in the gastrohepatic ligament.  There were at least six borderline enlarged lymph nodes measuring up to 1.3 x 1 cm in the region.  The patient is referred to us for further oncological evaluation.    1/5/2017 - The patient presents for initial  consultation.  He reports persistent dysphagia and also has symptoms of regurgitation.  He cannot tolerate solid foods and is dependent on liquid diet such as Ensure.  He also reports fatigue. Symptoms of dysphagia have been present for the past four months.  When he eats any solids, the food gets stuck in the lower chest.    1/5/17 - Vitamin B12 200 (L).  CEA 0.8.  Ferritin 35.  Iron saturation 16% (L), serum iron 56, TIBC 354.  Creatinine 0.9.  LFTs normal.  Folate 10.2.  1/12/17 - PET/CT scan:  Intense abnormal activity corresponding to the region of the GE junction and proximal stomach.  SUV is 11.06.  There is a suggestion of an abnormal mass or abnormal wall thickening in the region measuring 3.9 x 4.8 cm.  No additional abnormalities.  No evidence of metastases or other lymphadenopathy.  Scattered nonspecific subcentimeter lymph nodes involving the mediastinum and within the central upper abdomen.  1/13/17 - Creatinine 0.9.  LFTs normal.    1/17/17 - Patient seen by thoracic surgeon, Dr. Bradley.  PET scan was reviewed.  He has recommended neoadjuvant chemoradiation therapy followed by [place] Gurwinder esophagogastrectomy.  Port-A-Cath is being arranged.    1/20/17 - WBC 6.3, hemoglobin 12.1, platelet count 198,000, MCV 86.4.  1/30/17 - Patient started weekly Carboplatin and Taxol (Carboplatin AUC 2 and Taxol 50 mg/M2).  Patient received Injectafer 750 mg.  WBC 5.8, hemoglobin 12.4, platelet count 244,000.   1/31/17 - Patient seen by Dr. Eduardo Oleary.  The plan was to give 5040 cGy in 28 fractions.   2/6/17 - WBC 4.4, hemoglobin 11.6, platelet count 201,000.  Patient received cycle 2 of weekly Carboplatin and Taxol (Carboplatin AUC 2 and Taxol 50 mg/M2).  2/13/17 - Patient received Carboplatin and Taxol weekly cycle 3.  2/13/17 - Patient started concurrent radiation therapy.  Total planned dose is 4140 cGy.    2/13/17 to 2/15/17 - Patient received 4140 cGy of neoadjuvant radiation.  Radiation was finished on  3/15/17.    2/20/17 - WBC 1.8, hemoglobin 10.6, platelet count 217,000.  Creatinine 0.5. Folate 15 (L).  Ferritin 361.  Haptoglobin 214.  Iron saturation 33%, TIBC 263, serum iron 88.    2/20/17 - Patient received Carboplatin and Taxol weekly cycle 4.   2/27/17 - WBC 3.7, hemoglobin 10.7, platelet count 177,000, MCV 85.3.    3/6/17 - Patient has received 16 out of the 23 planned radiation treatments.  Total planned dose is 4140 cGy.    3/6/17 - WBC 5.4, hemoglobin 11.2, platelet count 113,000.  3/6/17 - Patient received cycle 5 of weekly Carboplatin and Taxol.   3/13/17 - Patient received cycle 6 of weekly Carboplatin and Taxol.  WBC 3.9, hemoglobin 11.3, platelet count 93,000.     3/20/17 - WBC 1.4, hemoglobin 10.0, platelet count 118,000, MCV 86.1   3/27/17 - WBC 3.8, hemoglobin 9.5, platelet count 176,000, MCV 87.3.    4/7/17 - Stress test:  No evidence of reversible myocardial ischemia.  Normal left ventricular ejection fraction of 50%.    4/17/17 - Upper GI endoscopy by Dr. 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  The GE junction adenocarcinoma lesion seems to have a very good response to chemotherapy and radiation.  The lumen was open.  5/1/17 - WBC 7.0, hemoglobin 10.2, platelet count 175,000.    5/4/17 - Patient underwent [place] Gurwinder esophagectomy with pyloroplasty, feeding jejunostomy tube, abdominal and mediastinal lymph node dissection.    Surgical Pathology:  Moderately to poorly differentiated adenocarcinoma measuring 1.3 cm at the GE junction.  Resection margins are negative for malignancy.  Tumor margins are negative.  There is effective treatment response.  No evidence of lymphovascular invasion.  Staging is ypT3,pN1.  One out of fourteen lymph nodes involved.   5/22/17 - WBC 6.8, hemoglobin 10.3, platelet count 312,000.    6/19/17 - WBC 5.7, hemoglobin 10.5, platelet count 204,000, MCV 92.1.    7/6/17 - EGD:  Status post balloon dilation of esophageal  stricture.  7/12/17 - EGD with balloon dilation of esophageal stricture.  7/26/17 - EGD and balloon dilation of esophageal stricture.  7/31/17 - PET/CT scan:  There is mild metabolic activity seen at the thoracic esophagus just at and below the surgical margins.  Some mild wall thickening.  This could be from recent surgery.  A residual cancer seems unlikely.  There is a precarinal lymph node with mild metabolic activity with SUV of 3 measuring 1 x 1.3 cm.  Again this appears to be nonspecific in my opinion.    8/7/17 - WBC 5.9, hemoglobin 11.9, platelet count 171,000, MCV 88.7.    10/19/17 - WBC 7.5, hemoglobin 11.8, platelet count 216,000.    1/8/18 - PET scan:  No evidence of residual disease or recurrent disease.  There is an esophageal stent in the mid esophagus with a large debris air level.  No evidence of any metastases in the chest, abdomen or pelvis.  Mild nodule uptake in the vocal cords with fullness in the right vocal cord.  This could be physiologic.    7/9/18 - CT scan of chest with contrast:  Prominent mediastinal lymph nodes appear stable since February.  Changes of gastric pull-through procedure for esophageal cancer again noted.  Tree-in-bud infiltrates in the left lung base, consistent with infection versus inflammation.    1/10/19 - CT chest, abdomen and pelvis with contrast:  No evidence of mets in the chest; however, interval development of metastatic lymphadenopathy in the left side of the retroperitoneum.  For instance, there is a 17 mm rounded lymph node, 10 mm lymph node and also a 14 mm lymph node.  These are all new.  Stable 9 mm hypodense lesion within the right hepatic lobe, which could reflect hemangioma or complex cyst.    1/30/19 - CT-guided core biopsy of retroperitoneal lymph node:  Poorly differentiated adenocarcinoma consistent with metastases from patient’s known esophageal primary.  CK20 positive, CDX2 positive, cytokeratin 7 negative, TTF-1 negative.    2/1/19 - Creatinine  0.9, BUN 18, calcium 9.3; these are normal.    2/28/19 - [facility] Next Gen sequencing testing on retroperitoneal lymph node specimen:  PD-L1 positive.  CPS = 3.  This implies responsiveness to pembrolizumab.  Mismatch repair status is proficient (no evidence of microsatellite instability).  Tumor mutational burden is low = 6 mutations/Mb.  CDH1 indeterminate.  KRAS mutation positive.  TP53 mutation positive.  CDK6 amplified.  [employer] PD-L1 testing by IHC:  PD-L1 expression 1% positive (CPS =1).  HER2/marvel by IHC is negative.  HER2/marvel by CISH not amplified.  Microsatellite stable tumor.    3/7/19 - WBC 7.3, hemoglobin 12, platelet count 128,000, MCV 92.     3/14/19 - Patient was seen by radiation oncologist, Dr. Chahal.  He has recommended observation versus systemic chemoimmunotherapy as needed.  No plans for radiation therapy.   5/1/19 - CT scan of chest with contrast:  No definitive findings of new metastatic disease in the chest.  Emphysema noted.    5/1/19 - CT abdomen and pelvis:  There is interval progression of metastatic retroperitoneal adenopathy.  Left periaortic adenopathy with lymph node measuring up to 2 cm, previously 1.7 cm.  Another lymph node now measures 2.3, previously 1.4 cm.  New enlarged lymph node on image 147 measures 1.4 cm, previously 0.4 cm.  A 9 mm hypodense right hepatic lobe lesion appears stable.    5/9/19 - Decision made to start patient on immunotherapy Keytruda.    5/9/19 - Vitamin B12 229.  Ferritin 61.  Folate 14.3.  Iron saturation 26%, TIBC 304, serum iron 79.  5/31/19 - Patient received Keytruda 200 mg cycle 1, day 1.    5/31/19 - Free T4 0.99.  Creatinine 0.9, BUN 9.  LFTs normal.  TSH 3.99.  Folate 14.3.  Iron saturation 26%.    6/21/2019 patient received Keytruda cycle 2  7/12/2019: Patient received Keytruda cycle 3  8/2/2019 patient received cycle 4 Keytruda WBC 7.2, hemoglobin 11.8 platelet count 163  8/23/2019 creatinine 0.9, LFTs normal, WBC 6.4, hemoglobin 11.5, platelets  179, TSH 2.1   8/23/2019 patient received cycle 5 of Keytruda  9/3/2019 CT chest abdomen and pelvis with contrast: . Positive response to therapy in the abdomen since 05/01/2019. Pathologically enlarged retroperitoneal lymph nodes, predominantly in the left periaortic region, have diminished in size.a 1.3 x 2.1 cm left periaortic node (image 60), previously measured 3.5 x 2.3 cm. A 1.6 cm index node near the level of the left renal vein previously measured 2.0 cm No new or progressive adenopathy. 2. Stable findings in the chest. Noncalcified right upper lobe nodules are unchanged. There is no evidence of new or progressive metastatic disease within the chest. 3. 8 mm indeterminate low-density lesion in the right hepatic lobe, is stable. No new liver lesions.  9/6/2019 WBC 5.6, hemoglobin 11.2, platelets of 192, MCV 91.8  9/13/2019 patient received Keytruda cycle 6, albumin 3.2  10/4/2019 patient received cycle 7 of Keytruda, TSH 4.8, free T4 0.86  10/9/2019 WBC 5.8, hemoglobin 11.7, platelets 174  10/25/2019 patient received cycle 8 of Keytruda.  WBC 5.9, hemoglobin 11.5, platelets 152, creatinine 0.75, cortisol 15.2, TSH 6.48 high , free T4 1.13 normal  11/6/2019 WBC 6.6, hemoglobin 11.8, MCV 91.5, platelets 187  11/15/2019 patient received cycle 9 of Keytruda, WBC 6.6, hemoglobin 11.9, platelets 161, cortisol level 8.86, TSH 2.68, free T3 2.8, free T4 1.5  12/4/2019 WBC 6.9, hemoglobin 12.0, platelets 180, MCV 92.9  12/06/2019-Keytruda Cycle 10  12/27/2019- Cycle 11 LFTs normal, WBC 6.7, hemoglobin 11.4, platelets 168, MCV 93, TSH 2.4,  1/28/2020: CT chest abdomen and pelvis with contrast:  Single (aortic lymph node in the abdominal retroperitoneum has increased.  Rest of the retroperitoneal lymph nodes have decreased in size.  Mediastinal adenopathy appears unchanged.  Noncalcified bilateral pulmonary nodules are stable  1/17/2020 patient received Keytruda cycle 12:.  2/3/2020 WBC 7.1, hemoglobin 12.2, platelets  171  02/10/2020 patient received cycle 13 of Keytruda: WBC 7.3, hemoglobin 12.1, platelets 166, creatinine 0.82  3/6/2020 patient is of Keytruda 200 mg, hemoglobin 10.8  3/27/2020 patient received Keytruda 200 mg, hemoglobin 11.4, TSH 1.95  4/17/2020 patient received Keytruda cycle 16 200 mg, WBC 6.1, hemoglobin 11.6, platelets 150  5/8/2020: Patient received cycle 17 Keytruda, WBC 6.3, hemoglobin 9.6, platelets 137, TSH 2.05, free T4 1.54, creatinine 0.93, LFTs normal,  5/29/2020: Patient received cycle 18 Keytruda, WBC 6.5, hemoglobin 11.7, platelets 129, creatinine 0.83, TSH 1.69  6/26/2020: Patient receiving Keytruda.   7/17/2020: TSH 2.06, WBC 5.7, hemoglobin 11.4, platelets 177, MCV 92.4, Keytruda 200 mg cycle 20  7/27/2020: CT chest abdomen pelvis with contrast: Stable findings in the chest abdomen pelvis since 1/28/2020.  Noncalcified bilateral pulmonary nodules are stable.  Stable retroperitoneal left perinephric adenopathy in the abdomen.  8/7/2020: Patient received cycle 21 of Keytruda  8/28/2020 patient is a cycle 22 of Keytruda.  WBC 6.02, hemoglobin 11.3, platelets 161  9/11/2020 WBC 6.9, hemoglobin 12.4, platelets 192  9/18/2020: Cycle 23 of Keytruda, TSH 1.19  10/9/2020: Cycle 24 of Keytruda, WBC 5.9, hemoglobin 11.9, platelets 178, creatinine 0.84, TSH 2.56, free T3 2.4, CMP normal  10/23/2020: WBC 7.34, hemoglobin 11.6, platelets 175  10/30/2020:.  Keytruda 200 mg  11/20/2020: Keytruda 200 mg  12/11/2020 Keytruda 200 mg.  WBC 5.5, hemoglobin 11.4, platelets 176, creatinine 0.8, LFTs normal, TSH 2.2, cortisol 8.3  12/10/2020: CT chest abdomen and pelvis with contrast: Previously seen left para-aortic lymph nodes within the abdomen are decreased in size.  No pathological lymphadenopathy.  No evidence of residual malignancy..  Small noncalcified pulmonary nodules are stable.  No new nodules.  Stable mediastinal lymph nodes.  3/12/2021: WBC 6.7, hemoglobin 11.3, platelets 17  3/12/2021: CMP normal, TSH  2.9, cortisol 11.95, 1  3/15/2021: CT chest with contrast: Stable findings in the chest with postsurgical changes of esophageal resection and gastric pull-through.  Stable right lower lobe lung nodule measures 11 mm.  Stable size and appearance of the mediastinal and upper abdominal lymph nodes.  3/23/2021: Doppler of upper extremity: Normal.  3/24/2021: CT abdomen and pelvis with contrast: Postoperative changes.  Left periaortic nodes are stable.  4/12/2021: TSH 4.1, cortisol 12.4  6/22/2021: CMP normal,Cortisol 10.08,, WBC 5.8, hemoglobin 11.2, platelets 109, TSH 2.59  7/12/2021: CT scan of the chest abdomen pelvis with contrast: Dominant 10 x 10 left periaortic lymph node is stable since 3/22/2021.  Dominant lymph nodes in the right lower paratracheal and AP window distribution are stable since 3/15/2021.  No evidence of progressive adenopathy in the chest abdomen or pelvis.    01/11/2022 -PET/CT findings consistent with new cedric metastatic disease in the retroperitoneum.  2 new pathologically enlarged hypermetabolic retroperitoneal lymph nodes.  No convincing evidence of recurrent disease within the chest neck pelvis or skeleton.  1/20/2022 -CT-guided needle biopsy consistent with poorly differentiated carcinoma.  CDX2 positive consistent with metastases from known esophageal primary  2/9/2022 - Pembrolizumab complicated with fatigue, joint pains, nausea, diarrhea. Symptoms decrease in intensity over a week.  3/30/2022 -CT chest and pelvis with stable metastatic lymph nodes in the left periaortic retroperitoneum.  No evidence of disease progression or new lesions.  Stable lymphadenopathy in the distal paratracheal mediastinal area  4/13/2022 - Keytruda 200  5/4/2022 - Keytruda 200  Continued keytruda  7/11/2022 - CT abdomen pelvis with decrease in size of left perioaortic lymph node. No other areas of disease.  Continues to be on Keytruda  10/18/2022 - stable CT imaging  1/20/2023 - CT imaging with stable  disease. No significant change.  Patient was hospitalized with esophagitis, subsequent EGD with improvement, no recurrence of disease.  2/21/2023 - Keytruda  CT CAP with increased retroperitoneal adenopathy.   ECHO with EF 35-40% (concern for immunotherapy related )immunotherapy held with ongoing investigation for cause.  Subsequent improvement in EF to 45%  4/4/2023 - CT abdomen pelvis with interval retroperitoneal lymph node enlargement compatible with metastatic adenopathyl.  5/5/2023 - PET CT with mixed response. No clear evidence of progression  6/2/23 -started pembrolizumab 200 mg IV  Posttreatment had symptoms of chest tightness troponin and proBNP negative, echocardiogram with EF 40%  Subsequent cardiac cath with EF down to 30% elevated proBNP, troponin no significant obstructive disease in the graft vessels  Cardiac MRI unable to obtain given pacemaker noncompliant.  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 Two enlarged mediastinal lymph nodes in the precarinal and AP window distributions are unchanged since 4/4/2023. No new or progressive adenopathy in the chest.Stable 3 mm or less noncalcified right lung nodules since 4/4/2023. Surgical changes of distal esophagectomy with gastric pull-through, without evidence of local disease recurrence  8/29/23 - started FOLFOX had significant side effects  9/20/23 - stopped oxaliplatin and continued 5FU only  Continues 5FU leucovorin    Subjective:  Pain has improved. Has significant fatigue with chemo. Feels difficulty swallowing for a few days after chemotherapy which gradually improves.    Past Medical History:   Diagnosis Date    B12 deficiency     Blockage of coronary artery of heart     Depression     DM (diabetes mellitus)      Dysphagia     GERD (gastroesophageal reflux disease)     HTN (hypertension)     Hyperlipidemia        Past Surgical History:   Procedure Laterality Date    ABDOMINAL WALL ABSCESS INCISION AND DRAINAGE  10/22/2018    WITH DEBRIDEMENT  1.5CM X 1.5 CM X 1.5 CM    DR. PURI    CARDIAC CATHETERIZATION Right 7/20/2023    Procedure: Left Heart Cath;  Surgeon: Mazin Simmons MD;  Location: [facility] CATH INVASIVE LOCATION;  Service: Cardiovascular;  Laterality: Right;    CATARACT EXTRACTION  2018    COLONOSCOPY      CORONARY ARTERY BYPASS GRAFT  2015    ENDOSCOPY  12/20/2016    ENDOSCOPY N/A 01/02/2023    Procedure: ESOPHAGOGASTRODUODENOSCOPY;  Surgeon: Esvin Morgan MD;  Location: [facility] ENDOSCOPY;  Service: Gastroenterology;  Laterality: N/A;  post: anastamosis stricture    ENDOSCOPY N/A 02/09/2023    Procedure: ESOPHAGOGASTRODUODENOSCOPY, non-wire guided balloon dilation (12-14mm) of esophageal stricture;  Surgeon: Tino Villafana MD;  Location: [facility] ENDOSCOPY;  Service: Gastroenterology;  Laterality: N/A;  post: esophageal anastamotic stricture, post surgical changes    ESOPHAGECTOMY  05/04/2017    BROOKS PETTY ESOPHAGECTOMY, FEEDING JEJNOSTOMOY, ABDOMINAL AND MEDIASTINAL LYMPH NODE DISECTION, PEDICLED MUSCLE FLAP COVERAGE DR. PURI    ESOPHAGOSCOPY / EGD  07/12/2017    WITH BALLOON DILATION    ESOPHAGOSCOPY / EGD  07/26/2017    WITH BALLOON DILATION    ESOPHAGOSCOPY / EGD  08/11/2017    WITH BALLOON DILATION    ESOPHAGOSCOPY / EGD  08/28/2017    WITH BALLOON DILATION    ESOPHAGOSCOPY / EGD  09/27/2017    WITH BALLOON DILATION    INSERT / REPLACE / REMOVE PACEMAKER      LYMPH NODE BIOPSY  01/30/2019    PACEMAKER IMPLANTATION  11/21/2016       Current Outpatient Medications:     clonazePAM (KlonoPIN) 0.5 MG tablet, Take 0.5 tablets by mouth 2 (Two) Times a Day As Needed for Anxiety., Disp: , Rfl: 1    cyanocobalamin (VITAMIN B-12) 1000 MCG tablet, Take 1 tablet by mouth Daily., Disp: , Rfl:      folic acid (FOLVITE) 1 MG tablet, Take 1 tablet by mouth Daily., Disp: , Rfl:     levothyroxine (SYNTHROID, LEVOTHROID) 50 MCG tablet, Take 1 tablet by mouth once daily, Disp: 90 tablet, Rfl: 0    magic mouthwash oral suspension, SWISH AND SPIT FIVE ML FOUR TIMES DAILY AS NEEDED, Disp: , Rfl:     metFORMIN (GLUCOPHAGE) 1000 MG tablet, Take 0.5 tablets by mouth Daily., Disp: 60 tablet, Rfl: 2    metoprolol succinate XL (TOPROL-XL) 25 MG 24 hr tablet, Take 1 tablet by mouth Daily., Disp: 90 tablet, Rfl: 3    ondansetron (ZOFRAN) 4 MG tablet, Take 1 tablet by mouth Every 8 (Eight) Hours As Needed for Nausea or Vomiting., Disp: , Rfl:     ondansetron (ZOFRAN) 8 MG tablet, Take 1 tablet by mouth 3 (Three) Times a Day As Needed for Nausea or Vomiting., Disp: 30 tablet, Rfl: 5    pantoprazole (PROTONIX) 40 MG EC tablet, Take 1 tablet by mouth 2 (Two) Times a Day., Disp: , Rfl:     pravastatin (PRAVACHOL) 10 MG tablet, Take 1 tablet by mouth Every Other Day., Disp: , Rfl:     spironolactone (ALDACTONE) 25 MG tablet, Take 0.5 tablets by mouth Daily. (Patient not taking: Reported on 11/1/2023), Disp: 30 tablet, Rfl: 2  No current facility-administered medications for this visit.    Facility-Administered Medications Ordered in Other Visits:     fluorouracil (ADRUCIL) 4,130 mg in sodium chloride 0.9 % 94 mL chemo infusion - FOR HOME USE, 2,400 mg/m2 (Treatment Plan Recorded), Intravenous, Once, Jean Pierre Elizondo MD, 4,130 mg at 11/01/23 1228    Allergies   Allergen Reactions    Ace Inhibitors Unknown (See Comments)     Unknown reaction      Heparin Other (See Comments)     Fever/chills, weakness in legs    Sulfa Antibiotics Other (See Comments)     Mouth sores       Family History   Problem Relation Age of Onset    Mental illness Mother     Heart disease Mother     Hypertension Father     Cancer Father     Heart disease Sister     Heart disease Brother      Cancer-related family history includes Cancer in his father.    Social  "History     Tobacco Use    Smoking status: Former     Packs/day: 2.00     Years: 50.00     Additional pack years: 0.00     Total pack years: 100.00     Types: Cigarettes     Quit date: 2015     Years since quittin.3    Smokeless tobacco: Former     Types: Chew     Quit date: 1989   Vaping Use    Vaping Use: Never used   Substance Use Topics    Alcohol use: No    Drug use: No     ROS:     Objective:    Vitals:    23 0955   BP: 150/66   Pulse: 68   Resp: 16   Temp: 97.3 °F (36.3 °C)   SpO2: 100%   Weight: 66.7 kg (147 lb)   Height: 165.1 cm (65\")   PainSc: 0-No pain           (1) Restricted in physically strenuous activity, ambulatory and able to do work of light nature    Physical Exam:     Physical Exam   Constitutional: He is oriented to person, place, and time. He appears well-developed. No distress.   HENT:   Head: Normocephalic and atraumatic.   Nose: Nose normal.   Mouth/Throat: Mucous membranes are moist.   Eyes: Pupils are equal, round, and reactive to light.   Neck: No thyromegaly present.   Cardiovascular: Normal rate, regular rhythm, normal heart sounds and normal pulses. Exam reveals no gallop and no friction rub.   Pulmonary/Chest: Effort normal and breath sounds normal.   Abdominal: Soft. Normal appearance and bowel sounds are normal.   No tenderness   Musculoskeletal: Normal range of motion. No deformity or signs of injury.      Right lower leg: No edema.   Neurological: He is alert and oriented to person, place, and time. He exhibits normal muscle tone.   Skin: Skin is warm and dry. No rash noted. He is not diaphoretic. No erythema. No jaundice.   Right side chest  port       Lab Results - Last 18 Months   Lab Units 23  1009 10/18/23  0958 10/04/23  0955   WBC 10*3/mm3 5.15 4.28 4.95   HEMOGLOBIN g/dL 9.0* 9.0* 9.3*   HEMATOCRIT % 28.5* 28.5* 28.6*   PLATELETS 10*3/mm3 164 198 160   MCV fL 91.9 92.5 89.4     Lab Results - Last 18 Months   Lab Units 10/18/23  0958 " 10/04/23  0955 09/22/23  1519 09/20/23  0915   SODIUM mmol/L 140 139 140 141   POTASSIUM mmol/L 4.6 4.7 4.4 4.3   CHLORIDE mmol/L 106 104 107 107   CO2 mmol/L 23.0 22.0 23.0 23.0   BUN mg/dL 18 17 15 21   CREATININE mg/dL 1.18 1.22 1.20 1.28*   CALCIUM mg/dL 8.8 9.2 8.1* 9.2   BILIRUBIN mg/dL 0.3 0.3  --  0.2   ALK PHOS U/L 38* 36*  --  45   ALT (SGPT) U/L <5 8  --  8   AST (SGOT) U/L 10 19  --  17   GLUCOSE mg/dL 158* 132* 144* 110*     Assessment & Plan     Assessment:    Metastatic esophageal carcinoma:    Patient has a history of GE junction poorly differentiated adenocarcinoma for which he had chemoradiation followed by [facility] resection:  Pathology showed ypT3,pN1 disease.  Tumor was Stage IIIA, diagnosed in 2016.  He received concurrent chemoradiation with weekly Carboplatin and Taxol and radiation finishing on 3/15/17.  CT scan on 1/10/19 showed new retroperitoneal lymph nodes.  These lymph nodes were biopsied on 1/30/19 and it shows metastatic esophageal cancer.  [employer] testing was performed on the sample and it shows KRAS mutation, microsatellite stable tumor.  PD-L1 is positive.  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  HER2/marvel (ERBB2) was not amplified and was negative.  His CT scan on 5/1/19 shows evidence of progression.  Patient has started receiving Keytruda on 5/31/19.  He has received 19 cycles so far.   CT scan on 1/28/2020 shows improvement and continued response.  CT scan chest abdomen pelvis with contrast on 12/10/2020 does not show any evidence of disease progression.  On 12/22/2020 decision made to hold Keytruda per patient's request to get a treatment break.. Repeat CT scan chest 7/12/2021 does not show any evidence of disease progression.  Now CT imaging in January 2021 with a CT showing increased abdominal left para-aortic adenopathy.  Subsequent PET/CT with new cedric metastatic disease in the retroperitoneum of the abdomen.  New pathologically enlarged hypermetabolic retroperitoneal lymph  nodes.  This would be concerning for disease recurrence/progression.  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  I discussed with him the treatment options going forward.  He has had a good response with immunotherapy pembrolizumab in the past. This was  restarted. subsequent imaging with treatment response. 1/2023 imaging with good response. Continued treatment  Now with increased adenopathy 2 new lymph nodes. Holding immunotherapy for now.   Consider getting cardiac MR discussed with Dr. Culp. While immunotherapy on hold, increased adenopathy is concerning. Could be pseudoprogression vs true progression. PET CT reviewed could indicate pseudoprogression since he has had a good response to immunotherapy  immunotherapy was restarted and has been on hold with patient's symptomatology cardiac MRI cannot be obtained, no ischemic heart disease leading to cardiomyopathy this most likely is related to Keytruda.  We will stop Keytruda going forward  CT imaging with stable disease, FOLFOX was started which he had significant side effects  With imaging being stable and significant side effects with FOLFOX, we discussed  about options with wait and watch vs 5FU only, patient prefers to be on maintenance.   We started 5FU only . Has increased swallowing difficulty, will try MMM to see if this helps.   Repeat CT imaging in end of November,    Possible myocarditis  Symptoms started after last immunotherapy  Treatment on hold for now, symptoms have improved, cardiac markers were negative, echocardiogram with EF 40%, cardiac MRI cannot be obtained cardiac cath with EF down to 30% management per cardiology  Now EF up to 40-45% monitor. No symptoms now.     Shortness of breath  Improved, has occasional shortness of breath, likely copd  I have recommended using albuterol  Symptoms have improved since stopping immunotherapy. Questionable pneumonitis will monitor closely with immunotherapy  Cardiology has evaluated  for myocarditis related to immunotherapy symptomatic improvement now    Reflux/heartburn/dysphagia:   Symptoms improved.  Also has a history of strictures. Status post EGD and balloon dilation.  Omeprazole once daily, famotidine as needed, symptoms stable, continue  Pain could be related to gastritis, will monitor for now, this has resolved.    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  Continues on Synthroid     Mild diarrhea:  diarrhea seems to be waxing and waning in nature.  Diarrhea related to immunotherapy now improved    PD-L1 positive, HER2 negative, p53 and KRAS positive.  Repeat [employer] testing with repeat biopsy with no target mutations.    B12 deficiency: Continue B12 1000 mcg twice daily.    Anemia - Hb improved and stable.    Dysuria - improved, ruled out UTI, STI           Jean Pierre Elizondo MD 11/01/23     No orders of the defined types were placed in this encounter.

## 2023-11-01 ENCOUNTER — OFFICE VISIT (OUTPATIENT)
Dept: ONCOLOGY | Facility: CLINIC | Age: 73
End: 2023-11-01
Payer: MEDICARE

## 2023-11-01 ENCOUNTER — HOSPITAL ENCOUNTER (OUTPATIENT)
Dept: ONCOLOGY | Facility: HOSPITAL | Age: 73
Discharge: HOME | End: 2023-11-01
Admitting: INTERNAL MEDICINE
Payer: MEDICARE

## 2023-11-01 VITALS
SYSTOLIC BLOOD PRESSURE: 150 MMHG | RESPIRATION RATE: 16 BRPM | HEIGHT: 65 IN | HEART RATE: 68 BPM | OXYGEN SATURATION: 100 % | WEIGHT: 147.3 LBS | TEMPERATURE: 97.3 F | DIASTOLIC BLOOD PRESSURE: 66 MMHG | BODY MASS INDEX: 24.54 KG/M2

## 2023-11-01 VITALS
BODY MASS INDEX: 24.49 KG/M2 | OXYGEN SATURATION: 100 % | WEIGHT: 147 LBS | HEART RATE: 68 BPM | DIASTOLIC BLOOD PRESSURE: 66 MMHG | HEIGHT: 65 IN | TEMPERATURE: 97.3 F | RESPIRATION RATE: 16 BRPM | SYSTOLIC BLOOD PRESSURE: 150 MMHG

## 2023-11-01 DIAGNOSIS — C15.5 PRIMARY MALIGNANT NEOPLASM OF LOWER THIRD OF ESOPHAGUS: ICD-10-CM

## 2023-11-01 DIAGNOSIS — C16.0 MALIGNANT NEOPLASM OF CARDIA: Primary | ICD-10-CM

## 2023-11-01 LAB
ALBUMIN SERPL-MCNC: 3.8 G/DL (ref 3.5–5.2)
ALBUMIN/GLOB SERPL: 1.6 G/DL
ALP SERPL-CCNC: 38 U/L (ref 39–117)
ALT SERPL W P-5'-P-CCNC: 6 U/L (ref 1–41)
ANION GAP SERPL CALCULATED.3IONS-SCNC: 12 MMOL/L (ref 5–15)
AST SERPL-CCNC: 15 U/L (ref 1–40)
BASOPHILS # BLD AUTO: 0.02 10*3/MM3 (ref 0–0.2)
BASOPHILS NFR BLD AUTO: 0.4 % (ref 0–1.5)
BILIRUB SERPL-MCNC: 0.3 MG/DL (ref 0–1.2)
BUN SERPL-MCNC: 14 MG/DL (ref 8–23)
BUN/CREAT SERPL: 11.6 (ref 7–25)
CALCIUM SPEC-SCNC: 9.1 MG/DL (ref 8.6–10.5)
CHLORIDE SERPL-SCNC: 106 MMOL/L (ref 98–107)
CO2 SERPL-SCNC: 22 MMOL/L (ref 22–29)
CREAT SERPL-MCNC: 1.21 MG/DL (ref 0.76–1.27)
DEPRECATED RDW RBC AUTO: 50.2 FL (ref 37–54)
EGFRCR SERPLBLD CKD-EPI 2021: 62.8 ML/MIN/1.73
EOSINOPHIL # BLD AUTO: 0.18 10*3/MM3 (ref 0–0.4)
EOSINOPHIL NFR BLD AUTO: 3.5 % (ref 0.3–6.2)
ERYTHROCYTE [DISTWIDTH] IN BLOOD BY AUTOMATED COUNT: 16.3 % (ref 12.3–15.4)
GLOBULIN UR ELPH-MCNC: 2.4 GM/DL
GLUCOSE SERPL-MCNC: 130 MG/DL (ref 65–99)
HCT VFR BLD AUTO: 28.5 % (ref 37.5–51)
HGB BLD-MCNC: 9 G/DL (ref 13–17.7)
LYMPHOCYTES # BLD AUTO: 1.31 10*3/MM3 (ref 0.7–3.1)
LYMPHOCYTES NFR BLD AUTO: 25.4 % (ref 19.6–45.3)
MCH RBC QN AUTO: 29 PG (ref 26.6–33)
MCHC RBC AUTO-ENTMCNC: 31.6 G/DL (ref 31.5–35.7)
MCV RBC AUTO: 91.9 FL (ref 79–97)
MONOCYTES # BLD AUTO: 0.64 10*3/MM3 (ref 0.1–0.9)
MONOCYTES NFR BLD AUTO: 12.4 % (ref 5–12)
NEUTROPHILS NFR BLD AUTO: 3 10*3/MM3 (ref 1.7–7)
NEUTROPHILS NFR BLD AUTO: 58.3 % (ref 42.7–76)
PLATELET # BLD AUTO: 164 10*3/MM3 (ref 140–450)
PMV BLD AUTO: 9.9 FL (ref 6–12)
POTASSIUM SERPL-SCNC: 4.6 MMOL/L (ref 3.5–5.2)
PROT SERPL-MCNC: 6.2 G/DL (ref 6–8.5)
RBC # BLD AUTO: 3.1 10*6/MM3 (ref 4.14–5.8)
SODIUM SERPL-SCNC: 140 MMOL/L (ref 136–145)
WBC NRBC COR # BLD: 5.15 10*3/MM3 (ref 3.4–10.8)

## 2023-11-01 PROCEDURE — 96367 TX/PROPH/DG ADDL SEQ IV INF: CPT

## 2023-11-01 PROCEDURE — 3078F DIAST BP <80 MM HG: CPT | Performed by: INTERNAL MEDICINE

## 2023-11-01 PROCEDURE — 3077F SYST BP >= 140 MM HG: CPT | Performed by: INTERNAL MEDICINE

## 2023-11-01 PROCEDURE — 80053 COMPREHEN METABOLIC PANEL: CPT | Performed by: INTERNAL MEDICINE

## 2023-11-01 PROCEDURE — 1126F AMNT PAIN NOTED NONE PRSNT: CPT | Performed by: INTERNAL MEDICINE

## 2023-11-01 PROCEDURE — 25010000002 DEXAMETHASONE SODIUM PHOSPHATE 120 MG/30ML SOLUTION: Performed by: INTERNAL MEDICINE

## 2023-11-01 PROCEDURE — 0 DEXTROSE 5 % SOLUTION: Performed by: INTERNAL MEDICINE

## 2023-11-01 PROCEDURE — 85025 COMPLETE CBC W/AUTO DIFF WBC: CPT | Performed by: INTERNAL MEDICINE

## 2023-11-01 PROCEDURE — 25010000002 LEUCOVORIN CALCIUM PER 50 MG: Performed by: INTERNAL MEDICINE

## 2023-11-01 PROCEDURE — 25010000002 FLUOROURACIL PER 500 MG: Performed by: INTERNAL MEDICINE

## 2023-11-01 PROCEDURE — 25810000003 SODIUM CHLORIDE 0.9 % SOLUTION 250 ML FLEX CONT: Performed by: INTERNAL MEDICINE

## 2023-11-01 PROCEDURE — 96365 THER/PROPH/DIAG IV INF INIT: CPT

## 2023-11-01 PROCEDURE — G0498 CHEMO EXTEND IV INFUS W/PUMP: HCPCS

## 2023-11-01 PROCEDURE — 96416 CHEMO PROLONG INFUSE W/PUMP: CPT

## 2023-11-01 RX ORDER — DEXTROSE MONOHYDRATE 50 MG/ML
250 INJECTION, SOLUTION INTRAVENOUS ONCE
Status: COMPLETED | OUTPATIENT
Start: 2023-11-01 | End: 2023-11-01

## 2023-11-01 RX ORDER — DEXTROSE MONOHYDRATE 50 MG/ML
250 INJECTION, SOLUTION INTRAVENOUS ONCE
Status: CANCELLED | OUTPATIENT
Start: 2023-11-01

## 2023-11-01 RX ORDER — DIPHENHYDRAMINE HYDROCHLORIDE 50 MG/ML
50 INJECTION INTRAMUSCULAR; INTRAVENOUS AS NEEDED
Status: CANCELLED | OUTPATIENT
Start: 2023-11-01

## 2023-11-01 RX ORDER — FAMOTIDINE 10 MG/ML
20 INJECTION, SOLUTION INTRAVENOUS AS NEEDED
Status: CANCELLED | OUTPATIENT
Start: 2023-11-01

## 2023-11-01 RX ADMIN — DEXAMETHASONE SODIUM PHOSPHATE 12 MG: 4 INJECTION, SOLUTION INTRA-ARTICULAR; INTRALESIONAL; INTRAMUSCULAR; INTRAVENOUS; SOFT TISSUE at 11:28

## 2023-11-01 RX ADMIN — FLUOROURACIL 4130 MG: 50 INJECTION, SOLUTION INTRAVENOUS at 12:28

## 2023-11-01 RX ADMIN — DEXTROSE MONOHYDRATE 250 ML: 50 INJECTION, SOLUTION INTRAVENOUS at 11:27

## 2023-11-01 RX ADMIN — LEUCOVORIN CALCIUM 690 MG: 350 INJECTION, POWDER, LYOPHILIZED, FOR SUSPENSION INTRAMUSCULAR; INTRAVENOUS at 11:51

## 2023-11-01 NOTE — PROGRESS NOTES
Pt here for Dr. Elizondo f/u and chemotherapy.  Port accessed using sterile technique and labs drawn.  Dr. Elizondo at chairside for f/u appt.  Ok to proceed with tx.  Tx given per MAR.  Pt tolerated well.  Pt d/c home with AVS given and 5FU pump infusing.

## 2023-11-03 ENCOUNTER — HOSPITAL ENCOUNTER (OUTPATIENT)
Dept: ONCOLOGY | Facility: HOSPITAL | Age: 73
Discharge: HOME | End: 2023-11-03
Payer: MEDICARE

## 2023-11-03 VITALS
OXYGEN SATURATION: 100 % | HEART RATE: 72 BPM | HEIGHT: 65 IN | BODY MASS INDEX: 24.47 KG/M2 | WEIGHT: 146.9 LBS | TEMPERATURE: 97.9 F | SYSTOLIC BLOOD PRESSURE: 151 MMHG | DIASTOLIC BLOOD PRESSURE: 82 MMHG

## 2023-11-03 DIAGNOSIS — C15.5 PRIMARY MALIGNANT NEOPLASM OF LOWER THIRD OF ESOPHAGUS: ICD-10-CM

## 2023-11-03 DIAGNOSIS — C16.0 MALIGNANT NEOPLASM OF CARDIA: Primary | ICD-10-CM

## 2023-11-03 PROCEDURE — 25810000003 SODIUM CHLORIDE 0.9 % SOLUTION: Performed by: INTERNAL MEDICINE

## 2023-11-03 PROCEDURE — 96360 HYDRATION IV INFUSION INIT: CPT

## 2023-11-03 RX ORDER — SODIUM CHLORIDE 0.9 % (FLUSH) 0.9 %
20 SYRINGE (ML) INJECTION AS NEEDED
Status: DISCONTINUED | OUTPATIENT
Start: 2023-11-03 | End: 2023-11-04 | Stop reason: HOSPADM

## 2023-11-03 RX ORDER — SODIUM CHLORIDE 0.9 % (FLUSH) 0.9 %
20 SYRINGE (ML) INJECTION AS NEEDED
OUTPATIENT
Start: 2023-11-03

## 2023-11-03 RX ADMIN — SODIUM CHLORIDE 500 ML: 9 INJECTION, SOLUTION INTRAVENOUS at 13:19

## 2023-11-03 RX ADMIN — Medication 20 ML: at 14:24

## 2023-11-14 NOTE — PROGRESS NOTES
Hematology-Oncology Follow-up Note       Artie Mc  1950    Primary Care Physician: Chanel Carter APRN  Referring Physician: Chanel Carter APRN    Reason For Visit:  Chief Complaint   Patient presents with    Follow-up     Malignant neoplasm of cardia     Metastatic esophageal cancer  Monitoring for adverse effects related to immunotherapy.  Hypothyroidism    HPI:   Mr. Mc is a pleasant 73 y.o. gentleman with a history of gastroesophageal reflux disease, diabetes, hypertension, heart block status post pacemaker placement and history of CABG.  He was having symptoms of dysphagia, weight loss since September 2016. He was reporting symptoms of food getting stuck in the lower chest.  He has transitioned himself to a liquid diet.  The patient reports losing about 30 pounds over this duration.  The patient underwent upper GI endoscopy on 12/20/16 with Dr. Esvin Barrera.  Endoscopy showed necrotic, circumferential and moderately obstructive mass at the distal esophagus between 42 cm and 36 cm.      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  There was also evidence of mild subacute inflammation in the duodenum.  Dr. Barrera ordered a CT scan of the chest and abdomen with contrast on 12/20/16.  The scan was done at [facility].  The scan showed a new ill-defined mass in the anterior wall of the distal esophagus at the GE junction measuring 2.3 x 2.2 x 1.6 cm, worrisome for cancer.  There were four subcentimeter liver lesions which had appeared stable in comparison to another CT scan from 2009 and they likely represent small cysts.  There was also mild lymphadenopathy in the gastrohepatic ligament.  There were at least six borderline enlarged lymph nodes measuring up to 1.3 x 1 cm in the region.  The patient is referred to us for further oncological evaluation.    1/5/2017 - The patient presents for initial  consultation.  He reports persistent dysphagia and also has symptoms of regurgitation.  He cannot tolerate solid foods and is dependent on liquid diet such as Ensure.  He also reports fatigue. Symptoms of dysphagia have been present for the past four months.  When he eats any solids, the food gets stuck in the lower chest.    1/5/17 - Vitamin B12 200 (L).  CEA 0.8.  Ferritin 35.  Iron saturation 16% (L), serum iron 56, TIBC 354.  Creatinine 0.9.  LFTs normal.  Folate 10.2.  1/12/17 - PET/CT scan:  Intense abnormal activity corresponding to the region of the GE junction and proximal stomach.  SUV is 11.06.  There is a suggestion of an abnormal mass or abnormal wall thickening in the region measuring 3.9 x 4.8 cm.  No additional abnormalities.  No evidence of metastases or other lymphadenopathy.  Scattered nonspecific subcentimeter lymph nodes involving the mediastinum and within the central upper abdomen.  1/13/17 - Creatinine 0.9.  LFTs normal.    1/17/17 - Patient seen by thoracic surgeon, Dr. Bradley.  PET scan was reviewed.  He has recommended neoadjuvant chemoradiation therapy followed by Hinckley Gurwinder esophagogastrectomy.  Port-A-Cath is being arranged.    1/20/17 - WBC 6.3, hemoglobin 12.1, platelet count 198,000, MCV 86.4.  1/30/17 - Patient started weekly Carboplatin and Taxol (Carboplatin AUC 2 and Taxol 50 mg/M2).  Patient received Injectafer 750 mg.  WBC 5.8, hemoglobin 12.4, platelet count 244,000.   1/31/17 - Patient seen by Dr. Eduardo Oleary.  The plan was to give 5040 cGy in 28 fractions.   2/6/17 - WBC 4.4, hemoglobin 11.6, platelet count 201,000.  Patient received cycle 2 of weekly Carboplatin and Taxol (Carboplatin AUC 2 and Taxol 50 mg/M2).  2/13/17 - Patient received Carboplatin and Taxol weekly cycle 3.  2/13/17 - Patient started concurrent radiation therapy.  Total planned dose is 4140 cGy.    2/13/17 to 2/15/17 - Patient received 4140 cGy of neoadjuvant radiation.  Radiation was finished on  3/15/17.    2/20/17 - WBC 1.8, hemoglobin 10.6, platelet count 217,000.  Creatinine 0.5. Folate 15 (L).  Ferritin 361.  Haptoglobin 214.  Iron saturation 33%, TIBC 263, serum iron 88.    2/20/17 - Patient received Carboplatin and Taxol weekly cycle 4.   2/27/17 - WBC 3.7, hemoglobin 10.7, platelet count 177,000, MCV 85.3.    3/6/17 - Patient has received 16 out of the 23 planned radiation treatments.  Total planned dose is 4140 cGy.    3/6/17 - WBC 5.4, hemoglobin 11.2, platelet count 113,000.  3/6/17 - Patient received cycle 5 of weekly Carboplatin and Taxol.   3/13/17 - Patient received cycle 6 of weekly Carboplatin and Taxol.  WBC 3.9, hemoglobin 11.3, platelet count 93,000.     3/20/17 - WBC 1.4, hemoglobin 10.0, platelet count 118,000, MCV 86.1   3/27/17 - WBC 3.8, hemoglobin 9.5, platelet count 176,000, MCV 87.3.    4/7/17 - Stress test:  No evidence of reversible myocardial ischemia.  Normal left ventricular ejection fraction of 50%.    4/17/17 - Upper GI endoscopy by Dr. 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  The GE junction adenocarcinoma lesion seems to have a very good response to chemotherapy and radiation.  The lumen was open.  5/1/17 - WBC 7.0, hemoglobin 10.2, platelet count 175,000.    5/4/17 - Patient underwent Hadley Gurwinder esophagectomy with pyloroplasty, feeding jejunostomy tube, abdominal and mediastinal lymph node dissection.    Surgical Pathology:  Moderately to poorly differentiated adenocarcinoma measuring 1.3 cm at the GE junction.  Resection margins are negative for malignancy.  Tumor margins are negative.  There is effective treatment response.  No evidence of lymphovascular invasion.  Staging is ypT3,pN1.  One out of fourteen lymph nodes involved.   5/22/17 - WBC 6.8, hemoglobin 10.3, platelet count 312,000.    6/19/17 - WBC 5.7, hemoglobin 10.5, platelet count 204,000, MCV 92.1.    7/6/17 - EGD:  Status post balloon dilation of esophageal  stricture.  7/12/17 - EGD with balloon dilation of esophageal stricture.  7/26/17 - EGD and balloon dilation of esophageal stricture.  7/31/17 - PET/CT scan:  There is mild metabolic activity seen at the thoracic esophagus just at and below the surgical margins.  Some mild wall thickening.  This could be from recent surgery.  A residual cancer seems unlikely.  There is a precarinal lymph node with mild metabolic activity with SUV of 3 measuring 1 x 1.3 cm.  Again this appears to be nonspecific in my opinion.    8/7/17 - WBC 5.9, hemoglobin 11.9, platelet count 171,000, MCV 88.7.    10/19/17 - WBC 7.5, hemoglobin 11.8, platelet count 216,000.    1/8/18 - PET scan:  No evidence of residual disease or recurrent disease.  There is an esophageal stent in the mid esophagus with a large debris air level.  No evidence of any metastases in the chest, abdomen or pelvis.  Mild nodule uptake in the vocal cords with fullness in the right vocal cord.  This could be physiologic.    7/9/18 - CT scan of chest with contrast:  Prominent mediastinal lymph nodes appear stable since February.  Changes of gastric pull-through procedure for esophageal cancer again noted.  Tree-in-bud infiltrates in the left lung base, consistent with infection versus inflammation.    1/10/19 - CT chest, abdomen and pelvis with contrast:  No evidence of mets in the chest; however, interval development of metastatic lymphadenopathy in the left side of the retroperitoneum.  For instance, there is a 17 mm rounded lymph node, 10 mm lymph node and also a 14 mm lymph node.  These are all new.  Stable 9 mm hypodense lesion within the right hepatic lobe, which could reflect hemangioma or complex cyst.    1/30/19 - CT-guided core biopsy of retroperitoneal lymph node:  Poorly differentiated adenocarcinoma consistent with metastases from patient’s known esophageal primary.  CK20 positive, CDX2 positive, cytokeratin 7 negative, TTF-1 negative.    2/1/19 - Creatinine  0.9, BUN 18, calcium 9.3; these are normal.    2/28/19 - [employer] Next Gen sequencing testing on retroperitoneal lymph node specimen:  PD-L1 positive.  CPS = 3.  This implies responsiveness to pembrolizumab.  Mismatch repair status is proficient (no evidence of microsatellite instability).  Tumor mutational burden is low = 6 mutations/Mb.  CDH1 indeterminate.  KRAS mutation positive.  TP53 mutation positive.  CDK6 amplified.  [employer] PD-L1 testing by IHC:  PD-L1 expression 1% positive (CPS =1).  HER2/marvel by IHC is negative.  HER2/marvel by CISH not amplified.  Microsatellite stable tumor.    3/7/19 - WBC 7.3, hemoglobin 12, platelet count 128,000, MCV 92.     3/14/19 - Patient was seen by radiation oncologist, Dr. Chahal.  He has recommended observation versus systemic chemoimmunotherapy as needed.  No plans for radiation therapy.   5/1/19 - CT scan of chest with contrast:  No definitive findings of new metastatic disease in the chest.  Emphysema noted.    5/1/19 - CT abdomen and pelvis:  There is interval progression of metastatic retroperitoneal adenopathy.  Left periaortic adenopathy with lymph node measuring up to 2 cm, previously 1.7 cm.  Another lymph node now measures 2.3, previously 1.4 cm.  New enlarged lymph node on image 147 measures 1.4 cm, previously 0.4 cm.  A 9 mm hypodense right hepatic lobe lesion appears stable.    5/9/19 - Decision made to start patient on immunotherapy Keytruda.    5/9/19 - Vitamin B12 229.  Ferritin 61.  Folate 14.3.  Iron saturation 26%, TIBC 304, serum iron 79.  5/31/19 - Patient received Keytruda 200 mg cycle 1, day 1.    5/31/19 - Free T4 0.99.  Creatinine 0.9, BUN 9.  LFTs normal.  TSH 3.99.  Folate 14.3.  Iron saturation 26%.    6/21/2019 patient received Keytruda cycle 2  7/12/2019: Patient received Keytruda cycle 3  8/2/2019 patient received cycle 4 Keytruda WBC 7.2, hemoglobin 11.8 platelet count 163  8/23/2019 creatinine 0.9, LFTs normal, WBC 6.4, hemoglobin 11.5, platelets  179, TSH 2.1   8/23/2019 patient received cycle 5 of Keytruda  9/3/2019 CT chest abdomen and pelvis with contrast: . Positive response to therapy in the abdomen since 05/01/2019. Pathologically enlarged retroperitoneal lymph nodes, predominantly in the left periaortic region, have diminished in size.a 1.3 x 2.1 cm left periaortic node (image 60), previously measured 3.5 x 2.3 cm. A 1.6 cm index node near the level of the left renal vein previously measured 2.0 cm No new or progressive adenopathy. 2. Stable findings in the chest. Noncalcified right upper lobe nodules are unchanged. There is no evidence of new or progressive metastatic disease within the chest. 3. 8 mm indeterminate low-density lesion in the right hepatic lobe, is stable. No new liver lesions.  9/6/2019 WBC 5.6, hemoglobin 11.2, platelets of 192, MCV 91.8  9/13/2019 patient received Keytruda cycle 6, albumin 3.2  10/4/2019 patient received cycle 7 of Keytruda, TSH 4.8, free T4 0.86  10/9/2019 WBC 5.8, hemoglobin 11.7, platelets 174  10/25/2019 patient received cycle 8 of Keytruda.  WBC 5.9, hemoglobin 11.5, platelets 152, creatinine 0.75, cortisol 15.2, TSH 6.48 high , free T4 1.13 normal  11/6/2019 WBC 6.6, hemoglobin 11.8, MCV 91.5, platelets 187  11/15/2019 patient received cycle 9 of Keytruda, WBC 6.6, hemoglobin 11.9, platelets 161, cortisol level 8.86, TSH 2.68, free T3 2.8, free T4 1.5  12/4/2019 WBC 6.9, hemoglobin 12.0, platelets 180, MCV 92.9  12/06/2019-Keytruda Cycle 10  12/27/2019- Cycle 11 LFTs normal, WBC 6.7, hemoglobin 11.4, platelets 168, MCV 93, TSH 2.4,  1/28/2020: CT chest abdomen and pelvis with contrast:  Single (aortic lymph node in the abdominal retroperitoneum has increased.  Rest of the retroperitoneal lymph nodes have decreased in size.  Mediastinal adenopathy appears unchanged.  Noncalcified bilateral pulmonary nodules are stable  1/17/2020 patient received Keytruda cycle 12:.  2/3/2020 WBC 7.1, hemoglobin 12.2, platelets  171  02/10/2020 patient received cycle 13 of Keytruda: WBC 7.3, hemoglobin 12.1, platelets 166, creatinine 0.82  3/6/2020 patient is of Keytruda 200 mg, hemoglobin 10.8  3/27/2020 patient received Keytruda 200 mg, hemoglobin 11.4, TSH 1.95  4/17/2020 patient received Keytruda cycle 16 200 mg, WBC 6.1, hemoglobin 11.6, platelets 150  5/8/2020: Patient received cycle 17 Keytruda, WBC 6.3, hemoglobin 9.6, platelets 137, TSH 2.05, free T4 1.54, creatinine 0.93, LFTs normal,  5/29/2020: Patient received cycle 18 Keytruda, WBC 6.5, hemoglobin 11.7, platelets 129, creatinine 0.83, TSH 1.69  6/26/2020: Patient receiving Keytruda.   7/17/2020: TSH 2.06, WBC 5.7, hemoglobin 11.4, platelets 177, MCV 92.4, Keytruda 200 mg cycle 20  7/27/2020: CT chest abdomen pelvis with contrast: Stable findings in the chest abdomen pelvis since 1/28/2020.  Noncalcified bilateral pulmonary nodules are stable.  Stable retroperitoneal left perinephric adenopathy in the abdomen.  8/7/2020: Patient received cycle 21 of Keytruda  8/28/2020 patient is a cycle 22 of Keytruda.  WBC 6.02, hemoglobin 11.3, platelets 161  9/11/2020 WBC 6.9, hemoglobin 12.4, platelets 192  9/18/2020: Cycle 23 of Keytruda, TSH 1.19  10/9/2020: Cycle 24 of Keytruda, WBC 5.9, hemoglobin 11.9, platelets 178, creatinine 0.84, TSH 2.56, free T3 2.4, CMP normal  10/23/2020: WBC 7.34, hemoglobin 11.6, platelets 175  10/30/2020:.  Keytruda 200 mg  11/20/2020: Keytruda 200 mg  12/11/2020 Keytruda 200 mg.  WBC 5.5, hemoglobin 11.4, platelets 176, creatinine 0.8, LFTs normal, TSH 2.2, cortisol 8.3  12/10/2020: CT chest abdomen and pelvis with contrast: Previously seen left para-aortic lymph nodes within the abdomen are decreased in size.  No pathological lymphadenopathy.  No evidence of residual malignancy..  Small noncalcified pulmonary nodules are stable.  No new nodules.  Stable mediastinal lymph nodes.  3/12/2021: WBC 6.7, hemoglobin 11.3, platelets 17  3/12/2021: CMP normal, TSH  2.9, cortisol 11.95, 1  3/15/2021: CT chest with contrast: Stable findings in the chest with postsurgical changes of esophageal resection and gastric pull-through.  Stable right lower lobe lung nodule measures 11 mm.  Stable size and appearance of the mediastinal and upper abdominal lymph nodes.  3/23/2021: Doppler of upper extremity: Normal.  3/24/2021: CT abdomen and pelvis with contrast: Postoperative changes.  Left periaortic nodes are stable.  4/12/2021: TSH 4.1, cortisol 12.4  6/22/2021: CMP normal,Cortisol 10.08,, WBC 5.8, hemoglobin 11.2, platelets 109, TSH 2.59  7/12/2021: CT scan of the chest abdomen pelvis with contrast: Dominant 10 x 10 left periaortic lymph node is stable since 3/22/2021.  Dominant lymph nodes in the right lower paratracheal and AP window distribution are stable since 3/15/2021.  No evidence of progressive adenopathy in the chest abdomen or pelvis.    01/11/2022 -PET/CT findings consistent with new cedric metastatic disease in the retroperitoneum.  2 new pathologically enlarged hypermetabolic retroperitoneal lymph nodes.  No convincing evidence of recurrent disease within the chest neck pelvis or skeleton.  1/20/2022 -CT-guided needle biopsy consistent with poorly differentiated carcinoma.  CDX2 positive consistent with metastases from known esophageal primary  2/9/2022 - Pembrolizumab complicated with fatigue, joint pains, nausea, diarrhea. Symptoms decrease in intensity over a week.  3/30/2022 -CT chest and pelvis with stable metastatic lymph nodes in the left periaortic retroperitoneum.  No evidence of disease progression or new lesions.  Stable lymphadenopathy in the distal paratracheal mediastinal area  4/13/2022 - Keytruda 200  5/4/2022 - Keytruda 200  Continued keytruda  7/11/2022 - CT abdomen pelvis with decrease in size of left perioaortic lymph node. No other areas of disease.  Continues to be on Keytruda  10/18/2022 - stable CT imaging  1/20/2023 - CT imaging with stable  disease. No significant change.  Patient was hospitalized with esophagitis, subsequent EGD with improvement, no recurrence of disease.  2/21/2023 - Keytruda  CT CAP with increased retroperitoneal adenopathy.   ECHO with EF 35-40% (concern for immunotherapy related )immunotherapy held with ongoing investigation for cause.  Subsequent improvement in EF to 45%  4/4/2023 - CT abdomen pelvis with interval retroperitoneal lymph node enlargement compatible with metastatic adenopathyl.  5/5/2023 - PET CT with mixed response. No clear evidence of progression  6/2/23 -started pembrolizumab 200 mg IV  Posttreatment had symptoms of chest tightness troponin and proBNP negative, echocardiogram with EF 40%  Subsequent cardiac cath with EF down to 30% elevated proBNP, troponin no significant obstructive disease in the graft vessels  Cardiac MRI unable to obtain given pacemaker noncompliant.  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 Two enlarged mediastinal lymph nodes in the precarinal and AP window distributions are unchanged since 4/4/2023. No new or progressive adenopathy in the chest.Stable 3 mm or less noncalcified right lung nodules since 4/4/2023. Surgical changes of distal esophagectomy with gastric pull-through, without evidence of local disease recurrence  8/29/23 - started FOLFOX had significant side effects  9/20/23 - stopped oxaliplatin and continued 5FU only  Continues 5FU leucovorin    Subjective:  Continues to have dysphagia, has not been consistently using ernst magic mouthwash, has ongoing fatigue,     Past Medical History:   Diagnosis Date    B12 deficiency     Blockage of coronary artery of heart     Depression     DM (diabetes mellitus)     Dysphagia     GERD  (gastroesophageal reflux disease)     HTN (hypertension)     Hyperlipidemia        Past Surgical History:   Procedure Laterality Date    ABDOMINAL WALL ABSCESS INCISION AND DRAINAGE  10/22/2018    WITH DEBRIDEMENT  1.5CM X 1.5 CM X 1.5 CM    DR. PURI    CARDIAC CATHETERIZATION Right 7/20/2023    Procedure: Left Heart Cath;  Surgeon: Mazin Simmons MD;  Location: [facility] CATH INVASIVE LOCATION;  Service: Cardiovascular;  Laterality: Right;    CATARACT EXTRACTION  2018    COLONOSCOPY      CORONARY ARTERY BYPASS GRAFT  2015    ENDOSCOPY  12/20/2016    ENDOSCOPY N/A 01/02/2023    Procedure: ESOPHAGOGASTRODUODENOSCOPY;  Surgeon: Esvin Morgan MD;  Location: [facility] ENDOSCOPY;  Service: Gastroenterology;  Laterality: N/A;  post: anastamosis stricture    ENDOSCOPY N/A 02/09/2023    Procedure: ESOPHAGOGASTRODUODENOSCOPY, non-wire guided balloon dilation (12-14mm) of esophageal stricture;  Surgeon: Tino Villafana MD;  Location: [facility] ENDOSCOPY;  Service: Gastroenterology;  Laterality: N/A;  post: esophageal anastamotic stricture, post surgical changes    ESOPHAGECTOMY  05/04/2017    BROOKS PETTY ESOPHAGECTOMY, FEEDING JEJNOSTOMOY, ABDOMINAL AND MEDIASTINAL LYMPH NODE DISECTION, PEDICLED MUSCLE FLAP COVERAGE DR. PURI    ESOPHAGOSCOPY / EGD  07/12/2017    WITH BALLOON DILATION    ESOPHAGOSCOPY / EGD  07/26/2017    WITH BALLOON DILATION    ESOPHAGOSCOPY / EGD  08/11/2017    WITH BALLOON DILATION    ESOPHAGOSCOPY / EGD  08/28/2017    WITH BALLOON DILATION    ESOPHAGOSCOPY / EGD  09/27/2017    WITH BALLOON DILATION    INSERT / REPLACE / REMOVE PACEMAKER      LYMPH NODE BIOPSY  01/30/2019    PACEMAKER IMPLANTATION  11/21/2016       Current Outpatient Medications:     clonazePAM (KlonoPIN) 0.5 MG tablet, Take 0.5 tablets by mouth 2 (Two) Times a Day As Needed for Anxiety., Disp: , Rfl: 1    cyanocobalamin (VITAMIN B-12) 1000 MCG tablet, Take 1 tablet by mouth Daily., Disp: , Rfl:     folic acid  (FOLVITE) 1 MG tablet, Take 1 tablet by mouth Daily., Disp: , Rfl:     levothyroxine (SYNTHROID, LEVOTHROID) 50 MCG tablet, Take 1 tablet by mouth once daily, Disp: 90 tablet, Rfl: 0    magic mouthwash oral suspension, SWISH AND SPIT FIVE ML FOUR TIMES DAILY AS NEEDED, Disp: , Rfl:     metFORMIN (GLUCOPHAGE) 1000 MG tablet, Take 0.5 tablets by mouth Daily., Disp: 60 tablet, Rfl: 2    metoprolol succinate XL (TOPROL-XL) 25 MG 24 hr tablet, Take 1 tablet by mouth Daily., Disp: 90 tablet, Rfl: 3    ondansetron (ZOFRAN) 4 MG tablet, Take 1 tablet by mouth Every 8 (Eight) Hours As Needed for Nausea or Vomiting., Disp: , Rfl:     ondansetron (ZOFRAN) 8 MG tablet, Take 1 tablet by mouth 3 (Three) Times a Day As Needed for Nausea or Vomiting., Disp: 30 tablet, Rfl: 5    pantoprazole (PROTONIX) 40 MG EC tablet, Take 1 tablet by mouth 2 (Two) Times a Day., Disp: , Rfl:     pravastatin (PRAVACHOL) 10 MG tablet, Take 1 tablet by mouth Every Other Day., Disp: , Rfl:     spironolactone (ALDACTONE) 25 MG tablet, Take 0.5 tablets by mouth Daily. (Patient not taking: Reported on 11/1/2023), Disp: 30 tablet, Rfl: 2  No current facility-administered medications for this visit.    Facility-Administered Medications Ordered in Other Visits:     dexAMETHasone (DECADRON) IVPB 12 mg, 12 mg, Intravenous, Once, Jean Pierre Elizondo MD, Last Rate: 200 mL/hr at 11/15/23 1016, 12 mg at 11/15/23 1016    fluorouracil (ADRUCIL) 4,130 mg in sodium chloride 0.9 % 94 mL chemo infusion - FOR HOME USE, 2,400 mg/m2 (Treatment Plan Recorded), Intravenous, Once, Jean Pierre Elizondo MD    leucovorin 690 mg in sodium chloride 0.9 % 284.5 mL IVPB, 400 mg/m2 (Treatment Plan Recorded), Intravenous, Once, Jean Pierre Elizondo MD    Allergies   Allergen Reactions    Ace Inhibitors Unknown (See Comments)     Unknown reaction      Heparin Other (See Comments)     Fever/chills, weakness in legs    Sulfa Antibiotics Other (See Comments)     Mouth sores       Family History   Problem  "Relation Age of Onset    Mental illness Mother     Heart disease Mother     Hypertension Father     Cancer Father     Heart disease Sister     Heart disease Brother      Cancer-related family history includes Cancer in his father.    Social History     Tobacco Use    Smoking status: Former     Packs/day: 2.00     Years: 50.00     Additional pack years: 0.00     Total pack years: 100.00     Types: Cigarettes     Quit date: 2015     Years since quittin.4    Smokeless tobacco: Former     Types: Chew     Quit date: 1989   Vaping Use    Vaping Use: Never used   Substance Use Topics    Alcohol use: No    Drug use: No     ROS:     Objective:    Vitals:    11/15/23 0932   BP: 141/65   Pulse: 78   Resp: 16   Temp: 97.6 °F (36.4 °C)   SpO2: 100%   Weight: 65.8 kg (145 lb)   Height: 165.1 cm (65\")   PainSc: 0-No pain             (1) Restricted in physically strenuous activity, ambulatory and able to do work of light nature    Physical Exam:     Physical Exam   Constitutional: He is oriented to person, place, and time. He appears well-developed. No distress.   HENT:   Head: Normocephalic and atraumatic.   Nose: Nose normal.   Mouth/Throat: Mucous membranes are moist.   Eyes: Pupils are equal, round, and reactive to light.   Neck: No thyromegaly present.   Cardiovascular: Normal rate, regular rhythm, normal heart sounds and normal pulses. Exam reveals no gallop and no friction rub.   Pulmonary/Chest: Effort normal and breath sounds normal.   Abdominal: Soft. Normal appearance and bowel sounds are normal.   No tenderness   Musculoskeletal: Normal range of motion. No deformity or signs of injury.      Right lower leg: No edema.   Neurological: He is alert and oriented to person, place, and time. He exhibits normal muscle tone.   Skin: Skin is warm and dry. No rash noted. He is not diaphoretic. No erythema. No jaundice.   Right side chest  port       Lab Results - Last 18 Months   Lab Units 11/15/23  0937 " 11/01/23  1009 10/18/23  0958   WBC 10*3/mm3 4.66 5.15 4.28   HEMOGLOBIN g/dL 8.9* 9.0* 9.0*   HEMATOCRIT % 28.4* 28.5* 28.5*   PLATELETS 10*3/mm3 180 164 198   MCV fL 92.2 91.9 92.5     Lab Results - Last 18 Months   Lab Units 11/01/23  1009 10/18/23  0958 10/04/23  0955   SODIUM mmol/L 140 140 139   POTASSIUM mmol/L 4.6 4.6 4.7   CHLORIDE mmol/L 106 106 104   CO2 mmol/L 22.0 23.0 22.0   BUN mg/dL 14 18 17   CREATININE mg/dL 1.21 1.18 1.22   CALCIUM mg/dL 9.1 8.8 9.2   BILIRUBIN mg/dL 0.3 0.3 0.3   ALK PHOS U/L 38* 38* 36*   ALT (SGPT) U/L 6 <5 8   AST (SGOT) U/L 15 10 19   GLUCOSE mg/dL 130* 158* 132*     Assessment & Plan     Assessment:    Metastatic esophageal carcinoma:    Patient has a history of GE junction poorly differentiated adenocarcinoma for which he had chemoradiation followed by Noel Gurwinder resection:  Pathology showed ypT3,pN1 disease.  Tumor was Stage IIIA, diagnosed in 2016.  He received concurrent chemoradiation with weekly Carboplatin and Taxol and radiation finishing on 3/15/17.  CT scan on 1/10/19 showed new retroperitoneal lymph nodes.  These lymph nodes were biopsied on 1/30/19 and it shows metastatic esophageal cancer.  [facility] Next Gen testing was performed on the sample and it shows KRAS mutation, microsatellite stable tumor.  PD-L1 is positive.  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  HER2/marvel (ERBB2) was not amplified and was negative.  His CT scan on 5/1/19 shows evidence of progression.  Patient has started receiving Keytruda on 5/31/19.  He has received 19 cycles so far.   CT scan on 1/28/2020 shows improvement and continued response.  CT scan chest abdomen pelvis with contrast on 12/10/2020 does not show any evidence of disease progression.  On 12/22/2020 decision made to hold Keytruda per patient's request to get a treatment break.. Repeat CT scan chest 7/12/2021 does not show any evidence of disease progression.  Now CT imaging in January 2021 with a CT showing increased abdominal left  para-aortic adenopathy.  Subsequent PET/CT with new cedric metastatic disease in the retroperitoneum of the abdomen.  New pathologically enlarged hypermetabolic retroperitoneal lymph nodes.  This would be concerning for disease recurrence/progression.  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  I discussed with him the treatment options going forward.  He has had a good response with immunotherapy pembrolizumab in the past. This was  restarted. subsequent imaging with treatment response. 1/2023 imaging with good response. Continued treatment  Now with increased adenopathy 2 new lymph nodes. Holding immunotherapy for now.   Consider getting cardiac MR discussed with Dr. Culp. While immunotherapy on hold, increased adenopathy is concerning. Could be pseudoprogression vs true progression. PET CT reviewed could indicate pseudoprogression since he has had a good response to immunotherapy  immunotherapy was restarted and has been on hold with patient's symptomatology cardiac MRI cannot be obtained, no ischemic heart disease leading to cardiomyopathy this most likely is related to Keytruda.  We will stop Keytruda going forward  CT imaging with stable disease, FOLFOX was started which he had significant side effects  With imaging being stable and significant side effects with FOLFOX, we discussed  about options with wait and watch vs 5FU only, patient prefers to be on maintenance.   We started 5FU only . Has increased swallowing difficulty, will try MMM to see if this helps.   Repeat CT week prior to follow up. Continue treatment.    Possible myocarditis  Symptoms started after last immunotherapy  Treatment on hold for now, symptoms have improved, cardiac markers were negative, echocardiogram with EF 40%, cardiac MRI cannot be obtained cardiac cath with EF down to 30% management per cardiology  Now EF up to 40-45% monitor. No significant symptoms.    Shortness of breath  Improved, has occasional  shortness of breath, likely copd  I have recommended using albuterol  Symptoms have improved since stopping immunotherapy. Questionable pneumonitis will monitor closely with immunotherapy  Cardiology has evaluated for myocarditis related to immunotherapy symptomatic improvement now    Reflux/heartburn/dysphagia:   Symptoms improved.  Also has a history of strictures. Status post EGD and balloon dilation.   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  Continues on Synthroid stable now    Mild diarrhea:  diarrhea seems to be waxing and waning in nature.  Diarrhea related to immunotherapy now improved    PD-L1 positive, HER2 negative, p53 and KRAS positive.  Repeat [facility] testing with repeat biopsy with no target mutations.    B12 deficiency: Continue B12 1000 mcg twice daily.    Anemia - Hb improved and stable.    Dysuria - has some dysuria, this has improved.           Jean Pierre Elizondo MD 11/15/23     No orders of the defined types were placed in this encounter.

## 2023-11-15 ENCOUNTER — HOSPITAL ENCOUNTER (OUTPATIENT)
Dept: ONCOLOGY | Facility: HOSPITAL | Age: 73
Discharge: HOME | End: 2023-11-15
Admitting: INTERNAL MEDICINE
Payer: MEDICARE

## 2023-11-15 ENCOUNTER — OFFICE VISIT (OUTPATIENT)
Dept: ONCOLOGY | Facility: CLINIC | Age: 73
End: 2023-11-15
Payer: MEDICARE

## 2023-11-15 VITALS
TEMPERATURE: 97.6 F | RESPIRATION RATE: 16 BRPM | BODY MASS INDEX: 24.16 KG/M2 | WEIGHT: 145 LBS | HEART RATE: 78 BPM | OXYGEN SATURATION: 100 % | SYSTOLIC BLOOD PRESSURE: 141 MMHG | HEIGHT: 65 IN | DIASTOLIC BLOOD PRESSURE: 65 MMHG

## 2023-11-15 VITALS
DIASTOLIC BLOOD PRESSURE: 65 MMHG | OXYGEN SATURATION: 100 % | BODY MASS INDEX: 24.16 KG/M2 | TEMPERATURE: 97.6 F | HEIGHT: 65 IN | SYSTOLIC BLOOD PRESSURE: 141 MMHG | RESPIRATION RATE: 16 BRPM | HEART RATE: 78 BPM | WEIGHT: 145 LBS

## 2023-11-15 DIAGNOSIS — R79.89 ELEVATED TSH: ICD-10-CM

## 2023-11-15 DIAGNOSIS — C15.5 PRIMARY MALIGNANT NEOPLASM OF LOWER THIRD OF ESOPHAGUS: ICD-10-CM

## 2023-11-15 DIAGNOSIS — E03.9 HYPOTHYROIDISM (ACQUIRED): ICD-10-CM

## 2023-11-15 DIAGNOSIS — D50.9 IRON DEFICIENCY ANEMIA, UNSPECIFIED IRON DEFICIENCY ANEMIA TYPE: ICD-10-CM

## 2023-11-15 DIAGNOSIS — C16.0 MALIGNANT NEOPLASM OF CARDIA: Primary | ICD-10-CM

## 2023-11-15 LAB
ALBUMIN SERPL-MCNC: 4 G/DL (ref 3.5–5.2)
ALBUMIN/GLOB SERPL: 1.9 G/DL
ALP SERPL-CCNC: 39 U/L (ref 39–117)
ALT SERPL W P-5'-P-CCNC: 5 U/L (ref 1–41)
ANION GAP SERPL CALCULATED.3IONS-SCNC: 14 MMOL/L (ref 5–15)
AST SERPL-CCNC: 10 U/L (ref 1–40)
BASOPHILS # BLD AUTO: 0.03 10*3/MM3 (ref 0–0.2)
BASOPHILS NFR BLD AUTO: 0.6 % (ref 0–1.5)
BILIRUB SERPL-MCNC: 0.3 MG/DL (ref 0–1.2)
BUN SERPL-MCNC: 14 MG/DL (ref 8–23)
BUN/CREAT SERPL: 11 (ref 7–25)
CALCIUM SPEC-SCNC: 8.9 MG/DL (ref 8.6–10.5)
CHLORIDE SERPL-SCNC: 107 MMOL/L (ref 98–107)
CO2 SERPL-SCNC: 20 MMOL/L (ref 22–29)
CREAT SERPL-MCNC: 1.27 MG/DL (ref 0.76–1.27)
DEPRECATED RDW RBC AUTO: 54.1 FL (ref 37–54)
EGFRCR SERPLBLD CKD-EPI 2021: 59.3 ML/MIN/1.73
EOSINOPHIL # BLD AUTO: 0.21 10*3/MM3 (ref 0–0.4)
EOSINOPHIL NFR BLD AUTO: 4.5 % (ref 0.3–6.2)
ERYTHROCYTE [DISTWIDTH] IN BLOOD BY AUTOMATED COUNT: 17.3 % (ref 12.3–15.4)
GLOBULIN UR ELPH-MCNC: 2.1 GM/DL
GLUCOSE SERPL-MCNC: 145 MG/DL (ref 65–99)
HCT VFR BLD AUTO: 28.4 % (ref 37.5–51)
HGB BLD-MCNC: 8.9 G/DL (ref 13–17.7)
LYMPHOCYTES # BLD AUTO: 1.26 10*3/MM3 (ref 0.7–3.1)
LYMPHOCYTES NFR BLD AUTO: 27 % (ref 19.6–45.3)
MCH RBC QN AUTO: 28.9 PG (ref 26.6–33)
MCHC RBC AUTO-ENTMCNC: 31.3 G/DL (ref 31.5–35.7)
MCV RBC AUTO: 92.2 FL (ref 79–97)
MONOCYTES # BLD AUTO: 0.63 10*3/MM3 (ref 0.1–0.9)
MONOCYTES NFR BLD AUTO: 13.5 % (ref 5–12)
NEUTROPHILS NFR BLD AUTO: 2.53 10*3/MM3 (ref 1.7–7)
NEUTROPHILS NFR BLD AUTO: 54.4 % (ref 42.7–76)
PLATELET # BLD AUTO: 180 10*3/MM3 (ref 140–450)
PMV BLD AUTO: 9.8 FL (ref 6–12)
POTASSIUM SERPL-SCNC: 4.7 MMOL/L (ref 3.5–5.2)
PROT SERPL-MCNC: 6.1 G/DL (ref 6–8.5)
RBC # BLD AUTO: 3.08 10*6/MM3 (ref 4.14–5.8)
SODIUM SERPL-SCNC: 141 MMOL/L (ref 136–145)
WBC NRBC COR # BLD: 4.66 10*3/MM3 (ref 3.4–10.8)

## 2023-11-15 PROCEDURE — 80053 COMPREHEN METABOLIC PANEL: CPT | Performed by: INTERNAL MEDICINE

## 2023-11-15 PROCEDURE — 99214 OFFICE O/P EST MOD 30 MIN: CPT | Performed by: INTERNAL MEDICINE

## 2023-11-15 PROCEDURE — 3077F SYST BP >= 140 MM HG: CPT | Performed by: INTERNAL MEDICINE

## 2023-11-15 PROCEDURE — 25010000002 DEXAMETHASONE SODIUM PHOSPHATE 120 MG/30ML SOLUTION: Performed by: INTERNAL MEDICINE

## 2023-11-15 PROCEDURE — 0 DEXTROSE 5 % SOLUTION: Performed by: INTERNAL MEDICINE

## 2023-11-15 PROCEDURE — 3078F DIAST BP <80 MM HG: CPT | Performed by: INTERNAL MEDICINE

## 2023-11-15 PROCEDURE — 96375 TX/PRO/DX INJ NEW DRUG ADDON: CPT

## 2023-11-15 PROCEDURE — 96365 THER/PROPH/DIAG IV INF INIT: CPT

## 2023-11-15 PROCEDURE — 25010000002 FLUOROURACIL PER 500 MG: Performed by: INTERNAL MEDICINE

## 2023-11-15 PROCEDURE — 25810000003 SODIUM CHLORIDE 0.9 % SOLUTION 250 ML FLEX CONT: Performed by: INTERNAL MEDICINE

## 2023-11-15 PROCEDURE — 1126F AMNT PAIN NOTED NONE PRSNT: CPT | Performed by: INTERNAL MEDICINE

## 2023-11-15 PROCEDURE — 85025 COMPLETE CBC W/AUTO DIFF WBC: CPT | Performed by: INTERNAL MEDICINE

## 2023-11-15 PROCEDURE — 96367 TX/PROPH/DG ADDL SEQ IV INF: CPT

## 2023-11-15 PROCEDURE — G0498 CHEMO EXTEND IV INFUS W/PUMP: HCPCS

## 2023-11-15 PROCEDURE — 96416 CHEMO PROLONG INFUSE W/PUMP: CPT

## 2023-11-15 PROCEDURE — 25010000002 LEUCOVORIN CALCIUM PER 50 MG: Performed by: INTERNAL MEDICINE

## 2023-11-15 RX ORDER — FAMOTIDINE 10 MG/ML
20 INJECTION, SOLUTION INTRAVENOUS AS NEEDED
Status: CANCELLED | OUTPATIENT
Start: 2023-11-15

## 2023-11-15 RX ORDER — DIPHENHYDRAMINE HYDROCHLORIDE 50 MG/ML
50 INJECTION INTRAMUSCULAR; INTRAVENOUS AS NEEDED
Status: CANCELLED | OUTPATIENT
Start: 2023-11-15

## 2023-11-15 RX ORDER — LEVOTHYROXINE SODIUM 0.05 MG/1
TABLET ORAL
Qty: 90 TABLET | Refills: 0 | Status: SHIPPED | OUTPATIENT
Start: 2023-11-15

## 2023-11-15 RX ORDER — DEXTROSE MONOHYDRATE 50 MG/ML
250 INJECTION, SOLUTION INTRAVENOUS ONCE
Status: COMPLETED | OUTPATIENT
Start: 2023-11-15 | End: 2023-11-15

## 2023-11-15 RX ADMIN — DEXTROSE MONOHYDRATE 250 ML: 50 INJECTION, SOLUTION INTRAVENOUS at 10:11

## 2023-11-15 RX ADMIN — FLUOROURACIL 4130 MG: 50 INJECTION, SOLUTION INTRAVENOUS at 11:20

## 2023-11-15 RX ADMIN — LEUCOVORIN CALCIUM 690 MG: 350 INJECTION, POWDER, LYOPHILIZED, FOR SUSPENSION INTRAMUSCULAR; INTRAVENOUS at 10:40

## 2023-11-15 RX ADMIN — DEXAMETHASONE SODIUM PHOSPHATE 12 MG: 4 INJECTION, SOLUTION INTRA-ARTICULAR; INTRALESIONAL; INTRAMUSCULAR; INTRAVENOUS; SOFT TISSUE at 10:16

## 2023-11-15 NOTE — PROGRESS NOTES
Patient is here for C6D1 leucovorin and 5FU pump only. Port accessed and flushed with good blood return noted. 10cc of blood wasted prior to specimen collection. Blood specimen obtained and sent to lab for processing per protocol.  Dr. Elizondo sent: Patient is here for C6D1 leucovorin and 5FU pump only. Patients stated that he does take claritin and tylenol when he get home after treatment due to feeling very drained and tired and it seems to help. he stated he has been doing this for awhile but just wanted to let you know-otherwise no new complaints. all labs are within parameters. is it ok to start treatment prior to you seeing him? and if it is ok can you please sign the plan? thank you11/15/23  [ Day 1 ], Cycle 6  WBC: 4.66  Neutrophils Absolute: 2.53  Hemoglobin: 8.9 (L)  Hematocrit: 28.4 (L)  Platelets: 180   Dr. Elizondo replied: ok to treat   Patient tolerated treatment and was discharged with AVS.

## 2023-11-17 ENCOUNTER — HOSPITAL ENCOUNTER (OUTPATIENT)
Dept: ONCOLOGY | Facility: HOSPITAL | Age: 73
Discharge: HOME | End: 2023-11-17
Payer: MEDICARE

## 2023-11-17 VITALS — DIASTOLIC BLOOD PRESSURE: 63 MMHG | HEART RATE: 89 BPM | SYSTOLIC BLOOD PRESSURE: 147 MMHG

## 2023-11-17 DIAGNOSIS — C16.0 MALIGNANT NEOPLASM OF CARDIA: Primary | ICD-10-CM

## 2023-11-17 DIAGNOSIS — C15.5 PRIMARY MALIGNANT NEOPLASM OF LOWER THIRD OF ESOPHAGUS: ICD-10-CM

## 2023-11-17 PROCEDURE — 96360 HYDRATION IV INFUSION INIT: CPT

## 2023-11-17 PROCEDURE — 25810000003 SODIUM CHLORIDE 0.9 % SOLUTION: Performed by: INTERNAL MEDICINE

## 2023-11-17 RX ORDER — SODIUM CHLORIDE 0.9 % (FLUSH) 0.9 %
20 SYRINGE (ML) INJECTION AS NEEDED
Status: DISCONTINUED | OUTPATIENT
Start: 2023-11-17 | End: 2023-11-18 | Stop reason: HOSPADM

## 2023-11-17 RX ORDER — SODIUM CHLORIDE 0.9 % (FLUSH) 0.9 %
20 SYRINGE (ML) INJECTION AS NEEDED
OUTPATIENT
Start: 2023-11-17

## 2023-11-17 RX ADMIN — Medication 20 ML: at 14:02

## 2023-11-17 RX ADMIN — SODIUM CHLORIDE 500 ML: 9 INJECTION, SOLUTION INTRAVENOUS at 13:04

## 2023-11-21 ENCOUNTER — CLINICAL SUPPORT NO REQUIREMENTS (OUTPATIENT)
Dept: CARDIOLOGY | Facility: CLINIC | Age: 73
End: 2023-11-21
Payer: MEDICARE

## 2023-11-21 ENCOUNTER — TELEPHONE (OUTPATIENT)
Dept: ONCOLOGY | Facility: CLINIC | Age: 73
End: 2023-11-21

## 2023-11-21 DIAGNOSIS — Z95.0 PRESENCE OF CARDIAC PACEMAKER: ICD-10-CM

## 2023-11-21 DIAGNOSIS — R00.1 BRADYCARDIA: Primary | ICD-10-CM

## 2023-11-21 NOTE — TELEPHONE ENCOUNTER
Caller: Artie Mc    Relationship: Self    Best call back number: [phone]    What orders are you requesting (i.e. lab or imaging): CT SCAN    In what timeframe would the patient need to come in: PRIOR TO 11/29    Where will you receive your lab/imaging services: [facility]    Additional notes: PLEASE CALL ONCE ORDERS ARE PUT IN.

## 2023-11-27 NOTE — PROGRESS NOTES
Hematology-Oncology Follow-up Note       Artie Mc  1950    Primary Care Physician: Chanel Carter, DAT  Referring Physician: No ref. provider found    Reason For Visit:  Chief Complaint   Patient presents with    Follow-up     Malignant neoplasm of cardia       Metastatic esophageal cancer  Monitoring for adverse effects related to immunotherapy.  Hypothyroidism    HPI:   Mr. Mc is a pleasant 73 y.o. gentleman with a history of gastroesophageal reflux disease, diabetes, hypertension, heart block status post pacemaker placement and history of CABG.  He was having symptoms of dysphagia, weight loss since September 2016. He was reporting symptoms of food getting stuck in the lower chest.  He has transitioned himself to a liquid diet.  The patient reports losing about 30 pounds over this duration.  The patient underwent upper GI endoscopy on 12/20/16 with Dr. Esvin Barrera.  Endoscopy showed necrotic, circumferential and moderately obstructive mass at the distal esophagus between 42 cm and 36 cm.      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  There was also evidence of mild subacute inflammation in the duodenum.  Dr. Barrera ordered a CT scan of the chest and abdomen with contrast on 12/20/16.  The scan was done at [facility].  The scan showed a new ill-defined mass in the anterior wall of the distal esophagus at the GE junction measuring 2.3 x 2.2 x 1.6 cm, worrisome for cancer.  There were four subcentimeter liver lesions which had appeared stable in comparison to another CT scan from 2009 and they likely represent small cysts.  There was also mild lymphadenopathy in the gastrohepatic ligament.  There were at least six borderline enlarged lymph nodes measuring up to 1.3 x 1 cm in the region.  The patient is referred to us for further oncological evaluation.    1/5/2017 - The patient presents for initial  consultation.  He reports persistent dysphagia and also has symptoms of regurgitation.  He cannot tolerate solid foods and is dependent on liquid diet such as Ensure.  He also reports fatigue. Symptoms of dysphagia have been present for the past four months.  When he eats any solids, the food gets stuck in the lower chest.    1/5/17 - Vitamin B12 200 (L).  CEA 0.8.  Ferritin 35.  Iron saturation 16% (L), serum iron 56, TIBC 354.  Creatinine 0.9.  LFTs normal.  Folate 10.2.  1/12/17 - PET/CT scan:  Intense abnormal activity corresponding to the region of the GE junction and proximal stomach.  SUV is 11.06.  There is a suggestion of an abnormal mass or abnormal wall thickening in the region measuring 3.9 x 4.8 cm.  No additional abnormalities.  No evidence of metastases or other lymphadenopathy.  Scattered nonspecific subcentimeter lymph nodes involving the mediastinum and within the central upper abdomen.  1/13/17 - Creatinine 0.9.  LFTs normal.    1/17/17 - Patient seen by thoracic surgeon, Dr. Bradley.  PET scan was reviewed.  He has recommended neoadjuvant chemoradiation therapy followed by [place] Gurwinder esophagogastrectomy.  Port-A-Cath is being arranged.    1/20/17 - WBC 6.3, hemoglobin 12.1, platelet count 198,000, MCV 86.4.  1/30/17 - Patient started weekly Carboplatin and Taxol (Carboplatin AUC 2 and Taxol 50 mg/M2).  Patient received Injectafer 750 mg.  WBC 5.8, hemoglobin 12.4, platelet count 244,000.   1/31/17 - Patient seen by Dr. Eduardo Oleary.  The plan was to give 5040 cGy in 28 fractions.   2/6/17 - WBC 4.4, hemoglobin 11.6, platelet count 201,000.  Patient received cycle 2 of weekly Carboplatin and Taxol (Carboplatin AUC 2 and Taxol 50 mg/M2).  2/13/17 - Patient received Carboplatin and Taxol weekly cycle 3.  2/13/17 - Patient started concurrent radiation therapy.  Total planned dose is 4140 cGy.    2/13/17 to 2/15/17 - Patient received 4140 cGy of neoadjuvant radiation.  Radiation was finished on  3/15/17.    2/20/17 - WBC 1.8, hemoglobin 10.6, platelet count 217,000.  Creatinine 0.5. Folate 15 (L).  Ferritin 361.  Haptoglobin 214.  Iron saturation 33%, TIBC 263, serum iron 88.    2/20/17 - Patient received Carboplatin and Taxol weekly cycle 4.   2/27/17 - WBC 3.7, hemoglobin 10.7, platelet count 177,000, MCV 85.3.    3/6/17 - Patient has received 16 out of the 23 planned radiation treatments.  Total planned dose is 4140 cGy.    3/6/17 - WBC 5.4, hemoglobin 11.2, platelet count 113,000.  3/6/17 - Patient received cycle 5 of weekly Carboplatin and Taxol.   3/13/17 - Patient received cycle 6 of weekly Carboplatin and Taxol.  WBC 3.9, hemoglobin 11.3, platelet count 93,000.     3/20/17 - WBC 1.4, hemoglobin 10.0, platelet count 118,000, MCV 86.1   3/27/17 - WBC 3.8, hemoglobin 9.5, platelet count 176,000, MCV 87.3.    4/7/17 - Stress test:  No evidence of reversible myocardial ischemia.  Normal left ventricular ejection fraction of 50%.    4/17/17 - Upper GI endoscopy by Dr. 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  The GE junction adenocarcinoma lesion seems to have a very good response to chemotherapy and radiation.  The lumen was open.  5/1/17 - WBC 7.0, hemoglobin 10.2, platelet count 175,000.    5/4/17 - Patient underwent [place] Gurwinder esophagectomy with pyloroplasty, feeding jejunostomy tube, abdominal and mediastinal lymph node dissection.    Surgical Pathology:  Moderately to poorly differentiated adenocarcinoma measuring 1.3 cm at the GE junction.  Resection margins are negative for malignancy.  Tumor margins are negative.  There is effective treatment response.  No evidence of lymphovascular invasion.  Staging is ypT3,pN1.  One out of fourteen lymph nodes involved.   5/22/17 - WBC 6.8, hemoglobin 10.3, platelet count 312,000.    6/19/17 - WBC 5.7, hemoglobin 10.5, platelet count 204,000, MCV 92.1.    7/6/17 - EGD:  Status post balloon dilation of esophageal  stricture.  7/12/17 - EGD with balloon dilation of esophageal stricture.  7/26/17 - EGD and balloon dilation of esophageal stricture.  7/31/17 - PET/CT scan:  There is mild metabolic activity seen at the thoracic esophagus just at and below the surgical margins.  Some mild wall thickening.  This could be from recent surgery.  A residual cancer seems unlikely.  There is a precarinal lymph node with mild metabolic activity with SUV of 3 measuring 1 x 1.3 cm.  Again this appears to be nonspecific in my opinion.    8/7/17 - WBC 5.9, hemoglobin 11.9, platelet count 171,000, MCV 88.7.    10/19/17 - WBC 7.5, hemoglobin 11.8, platelet count 216,000.    1/8/18 - PET scan:  No evidence of residual disease or recurrent disease.  There is an esophageal stent in the mid esophagus with a large debris air level.  No evidence of any metastases in the chest, abdomen or pelvis.  Mild nodule uptake in the vocal cords with fullness in the right vocal cord.  This could be physiologic.    7/9/18 - CT scan of chest with contrast:  Prominent mediastinal lymph nodes appear stable since February.  Changes of gastric pull-through procedure for esophageal cancer again noted.  Tree-in-bud infiltrates in the left lung base, consistent with infection versus inflammation.    1/10/19 - CT chest, abdomen and pelvis with contrast:  No evidence of mets in the chest; however, interval development of metastatic lymphadenopathy in the left side of the retroperitoneum.  For instance, there is a 17 mm rounded lymph node, 10 mm lymph node and also a 14 mm lymph node.  These are all new.  Stable 9 mm hypodense lesion within the right hepatic lobe, which could reflect hemangioma or complex cyst.    1/30/19 - CT-guided core biopsy of retroperitoneal lymph node:  Poorly differentiated adenocarcinoma consistent with metastases from patient’s known esophageal primary.  CK20 positive, CDX2 positive, cytokeratin 7 negative, TTF-1 negative.    2/1/19 - Creatinine  0.9, BUN 18, calcium 9.3; these are normal.    2/28/19 - [facility] Next Gen sequencing testing on retroperitoneal lymph node specimen:  PD-L1 positive.  CPS = 3.  This implies responsiveness to pembrolizumab.  Mismatch repair status is proficient (no evidence of microsatellite instability).  Tumor mutational burden is low = 6 mutations/Mb.  CDH1 indeterminate.  KRAS mutation positive.  TP53 mutation positive.  CDK6 amplified.  [employer] PD-L1 testing by IHC:  PD-L1 expression 1% positive (CPS =1).  HER2/marvel by IHC is negative.  HER2/marvel by CISH not amplified.  Microsatellite stable tumor.    3/7/19 - WBC 7.3, hemoglobin 12, platelet count 128,000, MCV 92.     3/14/19 - Patient was seen by radiation oncologist, Dr. Chahal.  He has recommended observation versus systemic chemoimmunotherapy as needed.  No plans for radiation therapy.   5/1/19 - CT scan of chest with contrast:  No definitive findings of new metastatic disease in the chest.  Emphysema noted.    5/1/19 - CT abdomen and pelvis:  There is interval progression of metastatic retroperitoneal adenopathy.  Left periaortic adenopathy with lymph node measuring up to 2 cm, previously 1.7 cm.  Another lymph node now measures 2.3, previously 1.4 cm.  New enlarged lymph node on image 147 measures 1.4 cm, previously 0.4 cm.  A 9 mm hypodense right hepatic lobe lesion appears stable.    5/9/19 - Decision made to start patient on immunotherapy Keytruda.    5/9/19 - Vitamin B12 229.  Ferritin 61.  Folate 14.3.  Iron saturation 26%, TIBC 304, serum iron 79.  5/31/19 - Patient received Keytruda 200 mg cycle 1, day 1.    5/31/19 - Free T4 0.99.  Creatinine 0.9, BUN 9.  LFTs normal.  TSH 3.99.  Folate 14.3.  Iron saturation 26%.    6/21/2019 patient received Keytruda cycle 2  7/12/2019: Patient received Keytruda cycle 3  8/2/2019 patient received cycle 4 Keytruda WBC 7.2, hemoglobin 11.8 platelet count 163  8/23/2019 creatinine 0.9, LFTs normal, WBC 6.4, hemoglobin 11.5, platelets  179, TSH 2.1   8/23/2019 patient received cycle 5 of Keytruda  9/3/2019 CT chest abdomen and pelvis with contrast: . Positive response to therapy in the abdomen since 05/01/2019. Pathologically enlarged retroperitoneal lymph nodes, predominantly in the left periaortic region, have diminished in size.a 1.3 x 2.1 cm left periaortic node (image 60), previously measured 3.5 x 2.3 cm. A 1.6 cm index node near the level of the left renal vein previously measured 2.0 cm No new or progressive adenopathy. 2. Stable findings in the chest. Noncalcified right upper lobe nodules are unchanged. There is no evidence of new or progressive metastatic disease within the chest. 3. 8 mm indeterminate low-density lesion in the right hepatic lobe, is stable. No new liver lesions.  9/6/2019 WBC 5.6, hemoglobin 11.2, platelets of 192, MCV 91.8  9/13/2019 patient received Keytruda cycle 6, albumin 3.2  10/4/2019 patient received cycle 7 of Keytruda, TSH 4.8, free T4 0.86  10/9/2019 WBC 5.8, hemoglobin 11.7, platelets 174  10/25/2019 patient received cycle 8 of Keytruda.  WBC 5.9, hemoglobin 11.5, platelets 152, creatinine 0.75, cortisol 15.2, TSH 6.48 high , free T4 1.13 normal  11/6/2019 WBC 6.6, hemoglobin 11.8, MCV 91.5, platelets 187  11/15/2019 patient received cycle 9 of Keytruda, WBC 6.6, hemoglobin 11.9, platelets 161, cortisol level 8.86, TSH 2.68, free T3 2.8, free T4 1.5  12/4/2019 WBC 6.9, hemoglobin 12.0, platelets 180, MCV 92.9  12/06/2019-Keytruda Cycle 10  12/27/2019- Cycle 11 LFTs normal, WBC 6.7, hemoglobin 11.4, platelets 168, MCV 93, TSH 2.4,  1/28/2020: CT chest abdomen and pelvis with contrast:  Single (aortic lymph node in the abdominal retroperitoneum has increased.  Rest of the retroperitoneal lymph nodes have decreased in size.  Mediastinal adenopathy appears unchanged.  Noncalcified bilateral pulmonary nodules are stable  1/17/2020 patient received Keytruda cycle 12:.  2/3/2020 WBC 7.1, hemoglobin 12.2, platelets  171  02/10/2020 patient received cycle 13 of Keytruda: WBC 7.3, hemoglobin 12.1, platelets 166, creatinine 0.82  3/6/2020 patient is of Keytruda 200 mg, hemoglobin 10.8  3/27/2020 patient received Keytruda 200 mg, hemoglobin 11.4, TSH 1.95  4/17/2020 patient received Keytruda cycle 16 200 mg, WBC 6.1, hemoglobin 11.6, platelets 150  5/8/2020: Patient received cycle 17 Keytruda, WBC 6.3, hemoglobin 9.6, platelets 137, TSH 2.05, free T4 1.54, creatinine 0.93, LFTs normal,  5/29/2020: Patient received cycle 18 Keytruda, WBC 6.5, hemoglobin 11.7, platelets 129, creatinine 0.83, TSH 1.69  6/26/2020: Patient receiving Keytruda.   7/17/2020: TSH 2.06, WBC 5.7, hemoglobin 11.4, platelets 177, MCV 92.4, Keytruda 200 mg cycle 20  7/27/2020: CT chest abdomen pelvis with contrast: Stable findings in the chest abdomen pelvis since 1/28/2020.  Noncalcified bilateral pulmonary nodules are stable.  Stable retroperitoneal left perinephric adenopathy in the abdomen.  8/7/2020: Patient received cycle 21 of Keytruda  8/28/2020 patient is a cycle 22 of Keytruda.  WBC 6.02, hemoglobin 11.3, platelets 161  9/11/2020 WBC 6.9, hemoglobin 12.4, platelets 192  9/18/2020: Cycle 23 of Keytruda, TSH 1.19  10/9/2020: Cycle 24 of Keytruda, WBC 5.9, hemoglobin 11.9, platelets 178, creatinine 0.84, TSH 2.56, free T3 2.4, CMP normal  10/23/2020: WBC 7.34, hemoglobin 11.6, platelets 175  10/30/2020:.  Keytruda 200 mg  11/20/2020: Keytruda 200 mg  12/11/2020 Keytruda 200 mg.  WBC 5.5, hemoglobin 11.4, platelets 176, creatinine 0.8, LFTs normal, TSH 2.2, cortisol 8.3  12/10/2020: CT chest abdomen and pelvis with contrast: Previously seen left para-aortic lymph nodes within the abdomen are decreased in size.  No pathological lymphadenopathy.  No evidence of residual malignancy..  Small noncalcified pulmonary nodules are stable.  No new nodules.  Stable mediastinal lymph nodes.  3/12/2021: WBC 6.7, hemoglobin 11.3, platelets 17  3/12/2021: CMP normal, TSH  2.9, cortisol 11.95, 1  3/15/2021: CT chest with contrast: Stable findings in the chest with postsurgical changes of esophageal resection and gastric pull-through.  Stable right lower lobe lung nodule measures 11 mm.  Stable size and appearance of the mediastinal and upper abdominal lymph nodes.  3/23/2021: Doppler of upper extremity: Normal.  3/24/2021: CT abdomen and pelvis with contrast: Postoperative changes.  Left periaortic nodes are stable.  4/12/2021: TSH 4.1, cortisol 12.4  6/22/2021: CMP normal,Cortisol 10.08,, WBC 5.8, hemoglobin 11.2, platelets 109, TSH 2.59  7/12/2021: CT scan of the chest abdomen pelvis with contrast: Dominant 10 x 10 left periaortic lymph node is stable since 3/22/2021.  Dominant lymph nodes in the right lower paratracheal and AP window distribution are stable since 3/15/2021.  No evidence of progressive adenopathy in the chest abdomen or pelvis.    01/11/2022 -PET/CT findings consistent with new cedric metastatic disease in the retroperitoneum.  2 new pathologically enlarged hypermetabolic retroperitoneal lymph nodes.  No convincing evidence of recurrent disease within the chest neck pelvis or skeleton.  1/20/2022 -CT-guided needle biopsy consistent with poorly differentiated carcinoma.  CDX2 positive consistent with metastases from known esophageal primary  2/9/2022 - Pembrolizumab complicated with fatigue, joint pains, nausea, diarrhea. Symptoms decrease in intensity over a week.  3/30/2022 -CT chest and pelvis with stable metastatic lymph nodes in the left periaortic retroperitoneum.  No evidence of disease progression or new lesions.  Stable lymphadenopathy in the distal paratracheal mediastinal area  4/13/2022 - Keytruda 200  5/4/2022 - Keytruda 200  Continued keytruda  7/11/2022 - CT abdomen pelvis with decrease in size of left perioaortic lymph node. No other areas of disease.  Continues to be on Keytruda  10/18/2022 - stable CT imaging  1/20/2023 - CT imaging with stable  disease. No significant change.  Patient was hospitalized with esophagitis, subsequent EGD with improvement, no recurrence of disease.  2/21/2023 - Keytruda  CT CAP with increased retroperitoneal adenopathy.   ECHO with EF 35-40% (concern for immunotherapy related )immunotherapy held with ongoing investigation for cause.  Subsequent improvement in EF to 45%  4/4/2023 - CT abdomen pelvis with interval retroperitoneal lymph node enlargement compatible with metastatic adenopathyl.  5/5/2023 - PET CT with mixed response. No clear evidence of progression  6/2/23 -started pembrolizumab 200 mg IV  Posttreatment had symptoms of chest tightness troponin and proBNP negative, echocardiogram with EF 40%  Subsequent cardiac cath with EF down to 30% elevated proBNP, troponin no significant obstructive disease in the graft vessels  Cardiac MRI unable to obtain given pacemaker noncompliant.  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 Two enlarged mediastinal lymph nodes in the precarinal and AP window distributions are unchanged since 4/4/2023. No new or progressive adenopathy in the chest.Stable 3 mm or less noncalcified right lung nodules since 4/4/2023. Surgical changes of distal esophagectomy with gastric pull-through, without evidence of local disease recurrence  8/29/23 - started FOLFOX had significant side effects  9/20/23 - stopped oxaliplatin and continued 5FU only  Continues 5FU leucovorin    Subjective:  Patient has ongoing fatigue with 5FU leucovorin.    Past Medical History:   Diagnosis Date    B12 deficiency     Blockage of coronary artery of heart     Depression     DM (diabetes mellitus)     Dysphagia     GERD (gastroesophageal reflux disease)     HTN (hypertension)      Hyperlipidemia        Past Surgical History:   Procedure Laterality Date    ABDOMINAL WALL ABSCESS INCISION AND DRAINAGE  10/22/2018    WITH DEBRIDEMENT  1.5CM X 1.5 CM X 1.5 CM    DR. PURI    CARDIAC CATHETERIZATION Right 7/20/2023    Procedure: Left Heart Cath;  Surgeon: Mazin Simmons MD;  Location: [facility] CATH INVASIVE LOCATION;  Service: Cardiovascular;  Laterality: Right;    CATARACT EXTRACTION  2018    COLONOSCOPY      CORONARY ARTERY BYPASS GRAFT  2015    ENDOSCOPY  12/20/2016    ENDOSCOPY N/A 01/02/2023    Procedure: ESOPHAGOGASTRODUODENOSCOPY;  Surgeon: Esvin Morgan MD;  Location: [facility] ENDOSCOPY;  Service: Gastroenterology;  Laterality: N/A;  post: anastamosis stricture    ENDOSCOPY N/A 02/09/2023    Procedure: ESOPHAGOGASTRODUODENOSCOPY, non-wire guided balloon dilation (12-14mm) of esophageal stricture;  Surgeon: Tino Villafana MD;  Location: [facility] ENDOSCOPY;  Service: Gastroenterology;  Laterality: N/A;  post: esophageal anastamotic stricture, post surgical changes    ESOPHAGECTOMY  05/04/2017    BROOKS PETTY ESOPHAGECTOMY, FEEDING JEJNOSTOMOY, ABDOMINAL AND MEDIASTINAL LYMPH NODE DISECTION, PEDICLED MUSCLE FLAP COVERAGE DR. PURI    ESOPHAGOSCOPY / EGD  07/12/2017    WITH BALLOON DILATION    ESOPHAGOSCOPY / EGD  07/26/2017    WITH BALLOON DILATION    ESOPHAGOSCOPY / EGD  08/11/2017    WITH BALLOON DILATION    ESOPHAGOSCOPY / EGD  08/28/2017    WITH BALLOON DILATION    ESOPHAGOSCOPY / EGD  09/27/2017    WITH BALLOON DILATION    INSERT / REPLACE / REMOVE PACEMAKER      LYMPH NODE BIOPSY  01/30/2019    PACEMAKER IMPLANTATION  11/21/2016       Current Outpatient Medications:     clonazePAM (KlonoPIN) 0.5 MG tablet, Take 0.5 tablets by mouth 2 (Two) Times a Day As Needed for Anxiety., Disp: , Rfl: 1    cyanocobalamin (VITAMIN B-12) 1000 MCG tablet, Take 1 tablet by mouth Daily., Disp: , Rfl:     folic acid (FOLVITE) 1 MG tablet, Take 1 tablet by mouth Daily., Disp: , Rfl:      levothyroxine (SYNTHROID, LEVOTHROID) 50 MCG tablet, Take 1 tablet by mouth once daily, Disp: 90 tablet, Rfl: 0    magic mouthwash oral suspension, SWISH AND SPIT FIVE ML FOUR TIMES DAILY AS NEEDED, Disp: , Rfl:     metFORMIN (GLUCOPHAGE) 1000 MG tablet, Take 0.5 tablets by mouth Daily., Disp: 60 tablet, Rfl: 2    metoprolol succinate XL (TOPROL-XL) 25 MG 24 hr tablet, Take 1 tablet by mouth Daily., Disp: 90 tablet, Rfl: 3    ondansetron (ZOFRAN) 4 MG tablet, Take 1 tablet by mouth Every 8 (Eight) Hours As Needed for Nausea or Vomiting., Disp: , Rfl:     ondansetron (ZOFRAN) 8 MG tablet, Take 1 tablet by mouth 3 (Three) Times a Day As Needed for Nausea or Vomiting., Disp: 30 tablet, Rfl: 5    pantoprazole (PROTONIX) 40 MG EC tablet, Take 1 tablet by mouth 2 (Two) Times a Day., Disp: , Rfl:     pravastatin (PRAVACHOL) 10 MG tablet, Take 1 tablet by mouth Every Other Day., Disp: , Rfl:     spironolactone (ALDACTONE) 25 MG tablet, Take 0.5 tablets by mouth Daily. (Patient not taking: Reported on 2023), Disp: 30 tablet, Rfl: 2    Allergies   Allergen Reactions    Ace Inhibitors Unknown (See Comments)     Unknown reaction      Heparin Other (See Comments)     Fever/chills, weakness in legs    Sulfa Antibiotics Other (See Comments)     Mouth sores       Family History   Problem Relation Age of Onset    Mental illness Mother     Heart disease Mother     Hypertension Father     Cancer Father     Heart disease Sister     Heart disease Brother      Cancer-related family history includes Cancer in his father.    Social History     Tobacco Use    Smoking status: Former     Packs/day: 2.00     Years: 50.00     Additional pack years: 0.00     Total pack years: 100.00     Types: Cigarettes     Quit date: 2015     Years since quittin.4    Smokeless tobacco: Former     Types: Chew     Quit date: 1989   Vaping Use    Vaping Use: Never used   Substance Use Topics    Alcohol use: No    Drug use: No     ROS:  "    Objective:    Vitals:    11/29/23 0948   BP: 133/67   Pulse: 73   Resp: 18   Temp: 97.8 °F (36.6 °C)   SpO2: 100%   Weight: 65.8 kg (145 lb)   Height: 165.1 cm (65\")   PainSc: 0-No pain               (1) Restricted in physically strenuous activity, ambulatory and able to do work of light nature    Physical Exam:     Physical Exam   Constitutional: He is oriented to person, place, and time. He appears well-developed. No distress.   HENT:   Head: Normocephalic and atraumatic.   Nose: Nose normal.   Mouth/Throat: Mucous membranes are moist.   Eyes: Pupils are equal, round, and reactive to light.   Neck: No thyromegaly present.   Cardiovascular: Normal rate, regular rhythm, normal heart sounds and normal pulses. Exam reveals no gallop and no friction rub.   Pulmonary/Chest: Effort normal and breath sounds normal.   Abdominal: Soft. Normal appearance and bowel sounds are normal.   No tenderness   Musculoskeletal: Normal range of motion. No deformity or signs of injury.      Right lower leg: No edema.   Neurological: He is alert and oriented to person, place, and time. He exhibits normal muscle tone.   Skin: Skin is warm and dry. No rash noted. He is not diaphoretic. No erythema. No jaundice.   Right side chest  port       Lab Results - Last 18 Months   Lab Units 11/29/23  0923 11/15/23  0937 11/01/23  1009   WBC 10*3/mm3 5.10 4.66 5.15   HEMOGLOBIN g/dL 8.8* 8.9* 9.0*   HEMATOCRIT % 27.7* 28.4* 28.5*   PLATELETS 10*3/mm3 194 180 164   MCV fL 91.7 92.2 91.9     Lab Results - Last 18 Months   Lab Units 11/15/23  0937 11/01/23  1009 10/18/23  0958   SODIUM mmol/L 141 140 140   POTASSIUM mmol/L 4.7 4.6 4.6   CHLORIDE mmol/L 107 106 106   CO2 mmol/L 20.0* 22.0 23.0   BUN mg/dL 14 14 18   CREATININE mg/dL 1.27 1.21 1.18   CALCIUM mg/dL 8.9 9.1 8.8   BILIRUBIN mg/dL 0.3 0.3 0.3   ALK PHOS U/L 39 38* 38*   ALT (SGPT) U/L 5 6 <5   AST (SGOT) U/L 10 15 10   GLUCOSE mg/dL 145* 130* 158*     Assessment & Plan "     Assessment:    Metastatic esophageal carcinoma:    Patient has a history of GE junction poorly differentiated adenocarcinoma for which he had chemoradiation followed by [place] [facility]winder resection:  Pathology showed ypT3,pN1 disease.  Tumor was Stage IIIA, diagnosed in 2016.  He received concurrent chemoradiation with weekly Carboplatin and Taxol and radiation finishing on 3/15/17.  CT scan on 1/10/19 showed new retroperitoneal lymph nodes.  These lymph nodes were biopsied on 1/30/19 and it shows metastatic esophageal cancer.  [employer] Next Gen testing was performed on the sample and it shows KRAS mutation, microsatellite stable tumor.  PD-L1 is positive.  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  HER2/marvel (ERBB2) was not amplified and was negative.  His CT scan on 5/1/19 shows evidence of progression.  Patient has started receiving Keytruda on 5/31/19.  He has received 19 cycles so far.   CT scan on 1/28/2020 shows improvement and continued response.  CT scan chest abdomen pelvis with contrast on 12/10/2020 does not show any evidence of disease progression.  On 12/22/2020 decision made to hold Keytruda per patient's request to get a treatment break.. Repeat CT scan chest 7/12/2021 does not show any evidence of disease progression.  Now CT imaging in January 2021 with a CT showing increased abdominal left para-aortic adenopathy.  Subsequent PET/CT with new cedric metastatic disease in the retroperitoneum of the abdomen.  New pathologically enlarged hypermetabolic retroperitoneal lymph nodes.  This would be concerning for disease recurrence/progression.  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  I discussed with him the treatment options going forward.  He has had a good response with immunotherapy pembrolizumab in the past. This was  restarted. subsequent imaging with treatment response. 1/2023 imaging with good response. Continued treatment  Now with increased adenopathy 2 new lymph nodes. Holding immunotherapy  for now.   Consider getting cardiac MR discussed with Dr. Culp. While immunotherapy on hold, increased adenopathy is concerning. Could be pseudoprogression vs true progression. PET CT reviewed could indicate pseudoprogression since he has had a good response to immunotherapy  immunotherapy was restarted and has been on hold with patient's symptomatology cardiac MRI cannot be obtained, no ischemic heart disease leading to cardiomyopathy this most likely is related to Keytruda.  We will stop Keytruda going forward  CT imaging with stable disease, FOLFOX was started which he had significant side effects  With imaging being stable and significant side effects with FOLFOX, we discussed  about options with wait and watch vs 5FU only, patient prefers to be on maintenance.   We started 5FU only .   Tolerating relatively well, has significant fatigue  CT imaging today, f/u to discuss results.      Possible myocarditis  Symptoms started after last immunotherapy  Treatment on hold for now, symptoms have improved, cardiac markers were negative, echocardiogram with EF 40%, cardiac MRI cannot be obtained cardiac cath with EF down to 30% management per cardiology  Now EF up to 40-45% monitor. No significant symptoms.    Shortness of breath  Improved, has occasional shortness of breath, likely copd  I have recommended using albuterol  Symptoms have improved since stopping immunotherapy. Questionable pneumonitis will monitor closely with immunotherapy  Cardiology has evaluated for myocarditis related to immunotherapy symptomatic improvement now    Reflux/heartburn/dysphagia:   Symptoms improved.  Also has a history of strictures. Status post EGD and balloon dilation.   Continues to be on pantoprazole symptoms stable.    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  Continues on Synthroid stable now    Mild diarrhea:  diarrhea seems to be waxing and waning in nature.  Diarrhea related to immunotherapy now improved    PD-L1 positive,  HER2 negative, p53 and KRAS positive.  Repeat [facility] testing with repeat biopsy with no target mutations.    B12 deficiency: Continue B12 1000 mcg twice daily.    Anemia - Hb improved and stable.    Dysuria - has some dysuria, this has improved.    Groin pain - negative for UTI, STI, CT today         Jean Pierre Elizondo MD 11/29/23     Orders Placed This Encounter   Procedures    CT abdomen pelvis w contrast     Standing Status:   Future     Standing Expiration Date:   11/28/2024     Scheduling Instructions:      Schedule ASAP     Order Specific Question:   Will Oral Contrast be needed for this procedure?     Answer:   No     Order Specific Question:   Release to patient     Answer:   Routine Release [[id number]]    CT chest w contrast     Standing Status:   Future     Standing Expiration Date:   11/28/2024     Scheduling Instructions:      Schedule ASAP     Order Specific Question:   Release to patient     Answer:   Routine Release [[id number]]

## 2023-11-29 ENCOUNTER — OFFICE VISIT (OUTPATIENT)
Dept: ONCOLOGY | Facility: CLINIC | Age: 73
End: 2023-11-29
Payer: MEDICARE

## 2023-11-29 ENCOUNTER — TELEPHONE (OUTPATIENT)
Dept: ONCOLOGY | Facility: CLINIC | Age: 73
End: 2023-11-29

## 2023-11-29 ENCOUNTER — HOSPITAL ENCOUNTER (OUTPATIENT)
Dept: PET IMAGING | Facility: HOSPITAL | Age: 73
Discharge: HOME | End: 2023-11-29
Admitting: INTERNAL MEDICINE
Payer: MEDICARE

## 2023-11-29 ENCOUNTER — HOSPITAL ENCOUNTER (OUTPATIENT)
Dept: ONCOLOGY | Facility: HOSPITAL | Age: 73
Discharge: HOME | End: 2023-11-29
Admitting: INTERNAL MEDICINE
Payer: MEDICARE

## 2023-11-29 VITALS
DIASTOLIC BLOOD PRESSURE: 67 MMHG | HEART RATE: 73 BPM | SYSTOLIC BLOOD PRESSURE: 133 MMHG | RESPIRATION RATE: 18 BRPM | TEMPERATURE: 97.8 F | WEIGHT: 145 LBS | HEIGHT: 65 IN | OXYGEN SATURATION: 100 % | BODY MASS INDEX: 24.16 KG/M2

## 2023-11-29 VITALS
HEIGHT: 65 IN | DIASTOLIC BLOOD PRESSURE: 67 MMHG | TEMPERATURE: 97.8 F | WEIGHT: 145 LBS | OXYGEN SATURATION: 100 % | RESPIRATION RATE: 18 BRPM | SYSTOLIC BLOOD PRESSURE: 133 MMHG | BODY MASS INDEX: 24.16 KG/M2 | HEART RATE: 73 BPM

## 2023-11-29 DIAGNOSIS — C15.5 PRIMARY MALIGNANT NEOPLASM OF LOWER THIRD OF ESOPHAGUS: ICD-10-CM

## 2023-11-29 DIAGNOSIS — C16.0 MALIGNANT NEOPLASM OF CARDIA: ICD-10-CM

## 2023-11-29 DIAGNOSIS — C16.0 MALIGNANT NEOPLASM OF CARDIA: Primary | ICD-10-CM

## 2023-11-29 DIAGNOSIS — C15.5 PRIMARY MALIGNANT NEOPLASM OF LOWER THIRD OF ESOPHAGUS: Primary | ICD-10-CM

## 2023-11-29 DIAGNOSIS — C15.9 ESOPHAGEAL CANCER, STAGE IV: ICD-10-CM

## 2023-11-29 LAB
ALBUMIN SERPL-MCNC: 4 G/DL (ref 3.5–5.2)
ALBUMIN/GLOB SERPL: 2 G/DL
ALP SERPL-CCNC: 36 U/L (ref 39–117)
ALT SERPL W P-5'-P-CCNC: 6 U/L (ref 1–41)
ANION GAP SERPL CALCULATED.3IONS-SCNC: 11 MMOL/L (ref 5–15)
AST SERPL-CCNC: 13 U/L (ref 1–40)
BASOPHILS # BLD AUTO: 0.02 10*3/MM3 (ref 0–0.2)
BASOPHILS NFR BLD AUTO: 0.4 % (ref 0–1.5)
BILIRUB SERPL-MCNC: 0.3 MG/DL (ref 0–1.2)
BUN SERPL-MCNC: 17 MG/DL (ref 8–23)
BUN/CREAT SERPL: 13.1 (ref 7–25)
CALCIUM SPEC-SCNC: 9.2 MG/DL (ref 8.6–10.5)
CHLORIDE SERPL-SCNC: 106 MMOL/L (ref 98–107)
CO2 SERPL-SCNC: 23 MMOL/L (ref 22–29)
CREAT SERPL-MCNC: 1.3 MG/DL (ref 0.76–1.27)
DEPRECATED RDW RBC AUTO: 56.5 FL (ref 37–54)
EGFRCR SERPLBLD CKD-EPI 2021: 57.6 ML/MIN/1.73
EOSINOPHIL # BLD AUTO: 0.25 10*3/MM3 (ref 0–0.4)
EOSINOPHIL NFR BLD AUTO: 4.9 % (ref 0.3–6.2)
ERYTHROCYTE [DISTWIDTH] IN BLOOD BY AUTOMATED COUNT: 18.3 % (ref 12.3–15.4)
GLOBULIN UR ELPH-MCNC: 2 GM/DL
GLUCOSE SERPL-MCNC: 117 MG/DL (ref 65–99)
HCT VFR BLD AUTO: 27.7 % (ref 37.5–51)
HGB BLD-MCNC: 8.8 G/DL (ref 13–17.7)
LYMPHOCYTES # BLD AUTO: 1.38 10*3/MM3 (ref 0.7–3.1)
LYMPHOCYTES NFR BLD AUTO: 27.1 % (ref 19.6–45.3)
MCH RBC QN AUTO: 29.1 PG (ref 26.6–33)
MCHC RBC AUTO-ENTMCNC: 31.8 G/DL (ref 31.5–35.7)
MCV RBC AUTO: 91.7 FL (ref 79–97)
MONOCYTES # BLD AUTO: 0.57 10*3/MM3 (ref 0.1–0.9)
MONOCYTES NFR BLD AUTO: 11.2 % (ref 5–12)
NEUTROPHILS NFR BLD AUTO: 2.88 10*3/MM3 (ref 1.7–7)
NEUTROPHILS NFR BLD AUTO: 56.4 % (ref 42.7–76)
PLATELET # BLD AUTO: 194 10*3/MM3 (ref 140–450)
PMV BLD AUTO: 9.8 FL (ref 6–12)
POTASSIUM SERPL-SCNC: 4.7 MMOL/L (ref 3.5–5.2)
PROT SERPL-MCNC: 6 G/DL (ref 6–8.5)
RBC # BLD AUTO: 3.02 10*6/MM3 (ref 4.14–5.8)
SODIUM SERPL-SCNC: 140 MMOL/L (ref 136–145)
WBC NRBC COR # BLD AUTO: 5.1 10*3/MM3 (ref 3.4–10.8)

## 2023-11-29 PROCEDURE — 25010000002 DEXAMETHASONE SODIUM PHOSPHATE 120 MG/30ML SOLUTION: Performed by: INTERNAL MEDICINE

## 2023-11-29 PROCEDURE — 96367 TX/PROPH/DG ADDL SEQ IV INF: CPT

## 2023-11-29 PROCEDURE — 1160F RVW MEDS BY RX/DR IN RCRD: CPT | Performed by: INTERNAL MEDICINE

## 2023-11-29 PROCEDURE — 3075F SYST BP GE 130 - 139MM HG: CPT | Performed by: INTERNAL MEDICINE

## 2023-11-29 PROCEDURE — 25810000003 SODIUM CHLORIDE 0.9 % SOLUTION 250 ML FLEX CONT: Performed by: INTERNAL MEDICINE

## 2023-11-29 PROCEDURE — 71260 CT THORAX DX C+: CPT

## 2023-11-29 PROCEDURE — G0498 CHEMO EXTEND IV INFUS W/PUMP: HCPCS

## 2023-11-29 PROCEDURE — 1126F AMNT PAIN NOTED NONE PRSNT: CPT | Performed by: INTERNAL MEDICINE

## 2023-11-29 PROCEDURE — 1159F MED LIST DOCD IN RCRD: CPT | Performed by: INTERNAL MEDICINE

## 2023-11-29 PROCEDURE — 99214 OFFICE O/P EST MOD 30 MIN: CPT | Performed by: INTERNAL MEDICINE

## 2023-11-29 PROCEDURE — 96375 TX/PRO/DX INJ NEW DRUG ADDON: CPT

## 2023-11-29 PROCEDURE — 85025 COMPLETE CBC W/AUTO DIFF WBC: CPT | Performed by: INTERNAL MEDICINE

## 2023-11-29 PROCEDURE — 25010000002 FLUOROURACIL PER 500 MG: Performed by: INTERNAL MEDICINE

## 2023-11-29 PROCEDURE — 96365 THER/PROPH/DIAG IV INF INIT: CPT

## 2023-11-29 PROCEDURE — 80053 COMPREHEN METABOLIC PANEL: CPT | Performed by: INTERNAL MEDICINE

## 2023-11-29 PROCEDURE — 3078F DIAST BP <80 MM HG: CPT | Performed by: INTERNAL MEDICINE

## 2023-11-29 PROCEDURE — 0 DEXTROSE 5 % SOLUTION: Performed by: INTERNAL MEDICINE

## 2023-11-29 PROCEDURE — 74177 CT ABD & PELVIS W/CONTRAST: CPT

## 2023-11-29 PROCEDURE — 25510000001 IOPAMIDOL PER 1 ML: Performed by: INTERNAL MEDICINE

## 2023-11-29 PROCEDURE — 96416 CHEMO PROLONG INFUSE W/PUMP: CPT

## 2023-11-29 PROCEDURE — 25010000002 LEUCOVORIN CALCIUM PER 50 MG: Performed by: INTERNAL MEDICINE

## 2023-11-29 RX ORDER — DEXTROSE MONOHYDRATE 50 MG/ML
250 INJECTION, SOLUTION INTRAVENOUS ONCE
Status: COMPLETED | OUTPATIENT
Start: 2023-11-29 | End: 2023-11-29

## 2023-11-29 RX ORDER — DIPHENHYDRAMINE HYDROCHLORIDE 50 MG/ML
50 INJECTION INTRAMUSCULAR; INTRAVENOUS AS NEEDED
Status: CANCELLED | OUTPATIENT
Start: 2023-11-29

## 2023-11-29 RX ORDER — FAMOTIDINE 10 MG/ML
20 INJECTION, SOLUTION INTRAVENOUS AS NEEDED
Status: CANCELLED | OUTPATIENT
Start: 2023-11-29

## 2023-11-29 RX ADMIN — LEUCOVORIN CALCIUM 690 MG: 350 INJECTION, POWDER, LYOPHILIZED, FOR SOLUTION INTRAMUSCULAR; INTRAVENOUS at 10:40

## 2023-11-29 RX ADMIN — DEXAMETHASONE SODIUM PHOSPHATE 12 MG: 4 INJECTION, SOLUTION INTRA-ARTICULAR; INTRALESIONAL; INTRAMUSCULAR; INTRAVENOUS; SOFT TISSUE at 10:14

## 2023-11-29 RX ADMIN — IOPAMIDOL 100 ML: 755 INJECTION, SOLUTION INTRAVENOUS at 12:03

## 2023-11-29 RX ADMIN — FLUOROURACIL 4130 MG: 50 INJECTION, SOLUTION INTRAVENOUS at 12:02

## 2023-11-29 RX ADMIN — DEXTROSE MONOHYDRATE 250 ML: 50 INJECTION, SOLUTION INTRAVENOUS at 10:13

## 2023-11-29 NOTE — PROGRESS NOTES
Message below sent to MD Dr. Elizondo, pt. is here for C7D1 Leucovorin, 5FU, pt. states he is still having the episodes of belching with some nausea after he eats. He states the nausea only last for about 45min. right after he eats something but, then it gets better. He states he is still having the bilateral groin pain at times but, his PCP has checked him for UTI but, nothing showed up. CBC results within parameters for treatment. Do you need to see him prior to treatment?   Pt. Was seen by Dr. Elizondo prior to treatment today.   OK to treat per Dr. Elizondo.   Treatment given as ordered and pt. Tolerated well.   Pt. Discharged from clinic with no complaints.

## 2023-11-29 NOTE — TELEPHONE ENCOUNTER
Spoke w/ pt and let him know that Dr. Elizondo said the lymph nodes with the cancer had decreased in size on his scan from today.  I let him know that Dr. Elizondo said the chemo seems to be working.  Pt v/u.

## 2023-12-01 ENCOUNTER — HOSPITAL ENCOUNTER (OUTPATIENT)
Dept: ONCOLOGY | Facility: HOSPITAL | Age: 73
Discharge: HOME | End: 2023-12-01
Payer: MEDICARE

## 2023-12-01 VITALS — DIASTOLIC BLOOD PRESSURE: 67 MMHG | HEART RATE: 80 BPM | SYSTOLIC BLOOD PRESSURE: 164 MMHG

## 2023-12-01 DIAGNOSIS — C15.5 PRIMARY MALIGNANT NEOPLASM OF LOWER THIRD OF ESOPHAGUS: ICD-10-CM

## 2023-12-01 DIAGNOSIS — C16.0 MALIGNANT NEOPLASM OF CARDIA: Primary | ICD-10-CM

## 2023-12-01 PROCEDURE — 25810000003 SODIUM CHLORIDE 0.9 % SOLUTION: Performed by: INTERNAL MEDICINE

## 2023-12-01 PROCEDURE — 96360 HYDRATION IV INFUSION INIT: CPT

## 2023-12-01 RX ORDER — SODIUM CHLORIDE 0.9 % (FLUSH) 0.9 %
20 SYRINGE (ML) INJECTION AS NEEDED
OUTPATIENT
Start: 2023-12-01

## 2023-12-01 RX ORDER — SODIUM CHLORIDE 0.9 % (FLUSH) 0.9 %
20 SYRINGE (ML) INJECTION AS NEEDED
Status: DISCONTINUED | OUTPATIENT
Start: 2023-12-01 | End: 2023-12-02 | Stop reason: HOSPADM

## 2023-12-01 RX ADMIN — SODIUM CHLORIDE 500 ML: 9 INJECTION, SOLUTION INTRAVENOUS at 13:42

## 2023-12-01 RX ADMIN — Medication 20 ML: at 14:49

## 2023-12-11 NOTE — PROGRESS NOTES
Hematology-Oncology Follow-up Note       Artie Mc  1950    Primary Care Physician: Chanel Carter, DAT  Referring Physician: No ref. provider found    Reason For Visit:  Chief Complaint   Patient presents with    Follow-up     Primary malignant neoplasm of lower third of esophagus     Metastatic esophageal cancer  Monitoring for adverse effects related to immunotherapy.  Hypothyroidism    HPI:   Mr. Mc is a pleasant 73 y.o. gentleman with a history of gastroesophageal reflux disease, diabetes, hypertension, heart block status post pacemaker placement and history of CABG.  He was having symptoms of dysphagia, weight loss since September 2016. He was reporting symptoms of food getting stuck in the lower chest.  He has transitioned himself to a liquid diet.  The patient reports losing about 30 pounds over this duration.  The patient underwent upper GI endoscopy on 12/20/16 with Dr. Esvin Barrera.  Endoscopy showed necrotic, circumferential and moderately obstructive mass at the distal esophagus between 42 cm and 36 cm.      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  There was also evidence of mild subacute inflammation in the duodenum.  Dr. Barrera ordered a CT scan of the chest and abdomen with contrast on 12/20/16.  The scan was done at [facility].  The scan showed a new ill-defined mass in the anterior wall of the distal esophagus at the GE junction measuring 2.3 x 2.2 x 1.6 cm, worrisome for cancer.  There were four subcentimeter liver lesions which had appeared stable in comparison to another CT scan from 2009 and they likely represent small cysts.  There was also mild lymphadenopathy in the gastrohepatic ligament.  There were at least six borderline enlarged lymph nodes measuring up to 1.3 x 1 cm in the region.  The patient is referred to us for further oncological evaluation.    1/5/2017 - The  patient presents for initial consultation.  He reports persistent dysphagia and also has symptoms of regurgitation.  He cannot tolerate solid foods and is dependent on liquid diet such as Ensure.  He also reports fatigue. Symptoms of dysphagia have been present for the past four months.  When he eats any solids, the food gets stuck in the lower chest.    1/5/17 - Vitamin B12 200 (L).  CEA 0.8.  Ferritin 35.  Iron saturation 16% (L), serum iron 56, TIBC 354.  Creatinine 0.9.  LFTs normal.  Folate 10.2.  1/12/17 - PET/CT scan:  Intense abnormal activity corresponding to the region of the GE junction and proximal stomach.  SUV is 11.06.  There is a suggestion of an abnormal mass or abnormal wall thickening in the region measuring 3.9 x 4.8 cm.  No additional abnormalities.  No evidence of metastases or other lymphadenopathy.  Scattered nonspecific subcentimeter lymph nodes involving the mediastinum and within the central upper abdomen.  1/13/17 - Creatinine 0.9.  LFTs normal.    1/17/17 - Patient seen by thoracic surgeon, Dr. Bradley.  PET scan was reviewed.  He has recommended neoadjuvant chemoradiation therapy followed by [place] Gurwinder esophagogastrectomy.  Port-A-Cath is being arranged.    1/20/17 - WBC 6.3, hemoglobin 12.1, platelet count 198,000, MCV 86.4.  1/30/17 - Patient started weekly Carboplatin and Taxol (Carboplatin AUC 2 and Taxol 50 mg/M2).  Patient received Injectafer 750 mg.  WBC 5.8, hemoglobin 12.4, platelet count 244,000.   1/31/17 - Patient seen by Dr. Eduardo Oleary.  The plan was to give 5040 cGy in 28 fractions.   2/6/17 - WBC 4.4, hemoglobin 11.6, platelet count 201,000.  Patient received cycle 2 of weekly Carboplatin and Taxol (Carboplatin AUC 2 and Taxol 50 mg/M2).  2/13/17 - Patient received Carboplatin and Taxol weekly cycle 3.  2/13/17 - Patient started concurrent radiation therapy.  Total planned dose is 4140 cGy.    2/13/17 to 2/15/17 - Patient received 4140 cGy of neoadjuvant radiation.   Radiation was finished on 3/15/17.    2/20/17 - WBC 1.8, hemoglobin 10.6, platelet count 217,000.  Creatinine 0.5. Folate 15 (L).  Ferritin 361.  Haptoglobin 214.  Iron saturation 33%, TIBC 263, serum iron 88.    2/20/17 - Patient received Carboplatin and Taxol weekly cycle 4.   2/27/17 - WBC 3.7, hemoglobin 10.7, platelet count 177,000, MCV 85.3.    3/6/17 - Patient has received 16 out of the 23 planned radiation treatments.  Total planned dose is 4140 cGy.    3/6/17 - WBC 5.4, hemoglobin 11.2, platelet count 113,000.  3/6/17 - Patient received cycle 5 of weekly Carboplatin and Taxol.   3/13/17 - Patient received cycle 6 of weekly Carboplatin and Taxol.  WBC 3.9, hemoglobin 11.3, platelet count 93,000.     3/20/17 - WBC 1.4, hemoglobin 10.0, platelet count 118,000, MCV 86.1   3/27/17 - WBC 3.8, hemoglobin 9.5, platelet count 176,000, MCV 87.3.    4/7/17 - Stress test:  No evidence of reversible myocardial ischemia.  Normal left ventricular ejection fraction of 50%.    4/17/17 - Upper GI endoscopy by Dr. 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  The GE junction adenocarcinoma lesion seems to have a very good response to chemotherapy and radiation.  The lumen was open.  5/1/17 - WBC 7.0, hemoglobin 10.2, platelet count 175,000.    5/4/17 - Patient underwent [place] Gurwinder esophagectomy with pyloroplasty, feeding jejunostomy tube, abdominal and mediastinal lymph node dissection.    Surgical Pathology:  Moderately to poorly differentiated adenocarcinoma measuring 1.3 cm at the GE junction.  Resection margins are negative for malignancy.  Tumor margins are negative.  There is effective treatment response.  No evidence of lymphovascular invasion.  Staging is ypT3,pN1.  One out of fourteen lymph nodes involved.   5/22/17 - WBC 6.8, hemoglobin 10.3, platelet count 312,000.    6/19/17 - WBC 5.7, hemoglobin 10.5, platelet count 204,000, MCV 92.1.    7/6/17 - EGD:  Status post balloon  dilation of esophageal stricture.  7/12/17 - EGD with balloon dilation of esophageal stricture.  7/26/17 - EGD and balloon dilation of esophageal stricture.  7/31/17 - PET/CT scan:  There is mild metabolic activity seen at the thoracic esophagus just at and below the surgical margins.  Some mild wall thickening.  This could be from recent surgery.  A residual cancer seems unlikely.  There is a precarinal lymph node with mild metabolic activity with SUV of 3 measuring 1 x 1.3 cm.  Again this appears to be nonspecific in my opinion.    8/7/17 - WBC 5.9, hemoglobin 11.9, platelet count 171,000, MCV 88.7.    10/19/17 - WBC 7.5, hemoglobin 11.8, platelet count 216,000.    1/8/18 - PET scan:  No evidence of residual disease or recurrent disease.  There is an esophageal stent in the mid esophagus with a large debris air level.  No evidence of any metastases in the chest, abdomen or pelvis.  Mild nodule uptake in the vocal cords with fullness in the right vocal cord.  This could be physiologic.    7/9/18 - CT scan of chest with contrast:  Prominent mediastinal lymph nodes appear stable since February.  Changes of gastric pull-through procedure for esophageal cancer again noted.  Tree-in-bud infiltrates in the left lung base, consistent with infection versus inflammation.    1/10/19 - CT chest, abdomen and pelvis with contrast:  No evidence of mets in the chest; however, interval development of metastatic lymphadenopathy in the left side of the retroperitoneum.  For instance, there is a 17 mm rounded lymph node, 10 mm lymph node and also a 14 mm lymph node.  These are all new.  Stable 9 mm hypodense lesion within the right hepatic lobe, which could reflect hemangioma or complex cyst.    1/30/19 - CT-guided core biopsy of retroperitoneal lymph node:  Poorly differentiated adenocarcinoma consistent with metastases from patient’s known esophageal primary.  CK20 positive, CDX2 positive, cytokeratin 7 negative, TTF-1 negative.     2/1/19 - Creatinine 0.9, BUN 18, calcium 9.3; these are normal.    2/28/19 - [facility] Next Gen sequencing testing on retroperitoneal lymph node specimen:  PD-L1 positive.  CPS = 3.  This implies responsiveness to pembrolizumab.  Mismatch repair status is proficient (no evidence of microsatellite instability).  Tumor mutational burden is low = 6 mutations/Mb.  CDH1 indeterminate.  KRAS mutation positive.  TP53 mutation positive.  CDK6 amplified.  [employer] PD-L1 testing by IHC:  PD-L1 expression 1% positive (CPS =1).  HER2/marvel by IHC is negative.  HER2/marvel by CISH not amplified.  Microsatellite stable tumor.    3/7/19 - WBC 7.3, hemoglobin 12, platelet count 128,000, MCV 92.     3/14/19 - Patient was seen by radiation oncologist, Dr. Chahal.  He has recommended observation versus systemic chemoimmunotherapy as needed.  No plans for radiation therapy.   5/1/19 - CT scan of chest with contrast:  No definitive findings of new metastatic disease in the chest.  Emphysema noted.    5/1/19 - CT abdomen and pelvis:  There is interval progression of metastatic retroperitoneal adenopathy.  Left periaortic adenopathy with lymph node measuring up to 2 cm, previously 1.7 cm.  Another lymph node now measures 2.3, previously 1.4 cm.  New enlarged lymph node on image 147 measures 1.4 cm, previously 0.4 cm.  A 9 mm hypodense right hepatic lobe lesion appears stable.    5/9/19 - Decision made to start patient on immunotherapy Keytruda.    5/9/19 - Vitamin B12 229.  Ferritin 61.  Folate 14.3.  Iron saturation 26%, TIBC 304, serum iron 79.  5/31/19 - Patient received Keytruda 200 mg cycle 1, day 1.    5/31/19 - Free T4 0.99.  Creatinine 0.9, BUN 9.  LFTs normal.  TSH 3.99.  Folate 14.3.  Iron saturation 26%.    6/21/2019 patient received Keytruda cycle 2  7/12/2019: Patient received Keytruda cycle 3  8/2/2019 patient received cycle 4 Keytruda WBC 7.2, hemoglobin 11.8 platelet count 163  8/23/2019 creatinine 0.9, LFTs normal, WBC 6.4,  hemoglobin 11.5, platelets 179, TSH 2.1   8/23/2019 patient received cycle 5 of Keytruda  9/3/2019 CT chest abdomen and pelvis with contrast: . Positive response to therapy in the abdomen since 05/01/2019. Pathologically enlarged retroperitoneal lymph nodes, predominantly in the left periaortic region, have diminished in size.a 1.3 x 2.1 cm left periaortic node (image 60), previously measured 3.5 x 2.3 cm. A 1.6 cm index node near the level of the left renal vein previously measured 2.0 cm No new or progressive adenopathy. 2. Stable findings in the chest. Noncalcified right upper lobe nodules are unchanged. There is no evidence of new or progressive metastatic disease within the chest. 3. 8 mm indeterminate low-density lesion in the right hepatic lobe, is stable. No new liver lesions.  9/6/2019 WBC 5.6, hemoglobin 11.2, platelets of 192, MCV 91.8  9/13/2019 patient received Keytruda cycle 6, albumin 3.2  10/4/2019 patient received cycle 7 of Keytruda, TSH 4.8, free T4 0.86  10/9/2019 WBC 5.8, hemoglobin 11.7, platelets 174  10/25/2019 patient received cycle 8 of Keytruda.  WBC 5.9, hemoglobin 11.5, platelets 152, creatinine 0.75, cortisol 15.2, TSH 6.48 high , free T4 1.13 normal  11/6/2019 WBC 6.6, hemoglobin 11.8, MCV 91.5, platelets 187  11/15/2019 patient received cycle 9 of Keytruda, WBC 6.6, hemoglobin 11.9, platelets 161, cortisol level 8.86, TSH 2.68, free T3 2.8, free T4 1.5  12/4/2019 WBC 6.9, hemoglobin 12.0, platelets 180, MCV 92.9  12/06/2019-Keytruda Cycle 10  12/27/2019- Cycle 11 LFTs normal, WBC 6.7, hemoglobin 11.4, platelets 168, MCV 93, TSH 2.4,  1/28/2020: CT chest abdomen and pelvis with contrast:  Single (aortic lymph node in the abdominal retroperitoneum has increased.  Rest of the retroperitoneal lymph nodes have decreased in size.  Mediastinal adenopathy appears unchanged.  Noncalcified bilateral pulmonary nodules are stable  1/17/2020 patient received Keytruda cycle 12:.  2/3/2020 WBC 7.1,  hemoglobin 12.2, platelets 171  02/10/2020 patient received cycle 13 of Keytruda: WBC 7.3, hemoglobin 12.1, platelets 166, creatinine 0.82  3/6/2020 patient is of Keytruda 200 mg, hemoglobin 10.8  3/27/2020 patient received Keytruda 200 mg, hemoglobin 11.4, TSH 1.95  4/17/2020 patient received Keytruda cycle 16 200 mg, WBC 6.1, hemoglobin 11.6, platelets 150  5/8/2020: Patient received cycle 17 Keytruda, WBC 6.3, hemoglobin 9.6, platelets 137, TSH 2.05, free T4 1.54, creatinine 0.93, LFTs normal,  5/29/2020: Patient received cycle 18 Keytruda, WBC 6.5, hemoglobin 11.7, platelets 129, creatinine 0.83, TSH 1.69  6/26/2020: Patient receiving Keytruda.   7/17/2020: TSH 2.06, WBC 5.7, hemoglobin 11.4, platelets 177, MCV 92.4, Keytruda 200 mg cycle 20  7/27/2020: CT chest abdomen pelvis with contrast: Stable findings in the chest abdomen pelvis since 1/28/2020.  Noncalcified bilateral pulmonary nodules are stable.  Stable retroperitoneal left perinephric adenopathy in the abdomen.  8/7/2020: Patient received cycle 21 of Keytruda  8/28/2020 patient is a cycle 22 of Keytruda.  WBC 6.02, hemoglobin 11.3, platelets 161  9/11/2020 WBC 6.9, hemoglobin 12.4, platelets 192  9/18/2020: Cycle 23 of Keytruda, TSH 1.19  10/9/2020: Cycle 24 of Keytruda, WBC 5.9, hemoglobin 11.9, platelets 178, creatinine 0.84, TSH 2.56, free T3 2.4, CMP normal  10/23/2020: WBC 7.34, hemoglobin 11.6, platelets 175  10/30/2020:.  Keytruda 200 mg  11/20/2020: Keytruda 200 mg  12/11/2020 Keytruda 200 mg.  WBC 5.5, hemoglobin 11.4, platelets 176, creatinine 0.8, LFTs normal, TSH 2.2, cortisol 8.3  12/10/2020: CT chest abdomen and pelvis with contrast: Previously seen left para-aortic lymph nodes within the abdomen are decreased in size.  No pathological lymphadenopathy.  No evidence of residual malignancy..  Small noncalcified pulmonary nodules are stable.  No new nodules.  Stable mediastinal lymph nodes.  3/12/2021: WBC 6.7, hemoglobin 11.3, platelets  17  3/12/2021: CMP normal, TSH 2.9, cortisol 11.95, 1  3/15/2021: CT chest with contrast: Stable findings in the chest with postsurgical changes of esophageal resection and gastric pull-through.  Stable right lower lobe lung nodule measures 11 mm.  Stable size and appearance of the mediastinal and upper abdominal lymph nodes.  3/23/2021: Doppler of upper extremity: Normal.  3/24/2021: CT abdomen and pelvis with contrast: Postoperative changes.  Left periaortic nodes are stable.  4/12/2021: TSH 4.1, cortisol 12.4  6/22/2021: CMP normal,Cortisol 10.08,, WBC 5.8, hemoglobin 11.2, platelets 109, TSH 2.59  7/12/2021: CT scan of the chest abdomen pelvis with contrast: Dominant 10 x 10 left periaortic lymph node is stable since 3/22/2021.  Dominant lymph nodes in the right lower paratracheal and AP window distribution are stable since 3/15/2021.  No evidence of progressive adenopathy in the chest abdomen or pelvis.    01/11/2022 -PET/CT findings consistent with new cedric metastatic disease in the retroperitoneum.  2 new pathologically enlarged hypermetabolic retroperitoneal lymph nodes.  No convincing evidence of recurrent disease within the chest neck pelvis or skeleton.  1/20/2022 -CT-guided needle biopsy consistent with poorly differentiated carcinoma.  CDX2 positive consistent with metastases from known esophageal primary  2/9/2022 - Pembrolizumab complicated with fatigue, joint pains, nausea, diarrhea. Symptoms decrease in intensity over a week.  3/30/2022 -CT chest and pelvis with stable metastatic lymph nodes in the left periaortic retroperitoneum.  No evidence of disease progression or new lesions.  Stable lymphadenopathy in the distal paratracheal mediastinal area  4/13/2022 - Keytruda 200  5/4/2022 - Keytruda 200  Continued keytruda  7/11/2022 - CT abdomen pelvis with decrease in size of left perioaortic lymph node. No other areas of disease.  Continues to be on Keytruda  10/18/2022 - stable CT imaging  1/20/2023  - CT imaging with stable disease. No significant change.  Patient was hospitalized with esophagitis, subsequent EGD with improvement, no recurrence of disease.  2/21/2023 - Keytruda  CT CAP with increased retroperitoneal adenopathy.   ECHO with EF 35-40% (concern for immunotherapy related )immunotherapy held with ongoing investigation for cause.  Subsequent improvement in EF to 45%  4/4/2023 - CT abdomen pelvis with interval retroperitoneal lymph node enlargement compatible with metastatic adenopathyl.  5/5/2023 - PET CT with mixed response. No clear evidence of progression  6/2/23 -started pembrolizumab 200 mg IV  Posttreatment had symptoms of chest tightness troponin and proBNP negative, echocardiogram with EF 40%  Subsequent cardiac cath with EF down to 30% elevated proBNP, troponin no significant obstructive disease in the graft vessels  Cardiac MRI unable to obtain given pacemaker noncompliant.  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 Two enlarged mediastinal lymph nodes in the precarinal and AP window distributions are unchanged since 4/4/2023. No new or progressive adenopathy in the chest.Stable 3 mm or less noncalcified right lung nodules since 4/4/2023. Surgical changes of distal esophagectomy with gastric pull-through, without evidence of local disease recurrence  8/29/23 - started FOLFOX had significant side effects  9/20/23 - stopped oxaliplatin and continued 5FU only'  11/29/23 - CT CAP with decrease size of retroperitoneal adenopathy, no other concerning findings.  Continues 5FU leucovorin     Subjective:  Patient has ongoing fatigue with 5FU leucovorin. Continues to tolerate well otherwise,    Past Medical History:   Diagnosis Date    B12 deficiency     Blockage of  coronary artery of heart     Depression     DM (diabetes mellitus)     Dysphagia     GERD (gastroesophageal reflux disease)     HTN (hypertension)     Hyperlipidemia        Past Surgical History:   Procedure Laterality Date    ABDOMINAL WALL ABSCESS INCISION AND DRAINAGE  10/22/2018    WITH DEBRIDEMENT  1.5CM X 1.5 CM X 1.5 CM    DR. PURI    CARDIAC CATHETERIZATION Right 7/20/2023    Procedure: Left Heart Cath;  Surgeon: Mazin Simmons MD;  Location: [facility] CATH INVASIVE LOCATION;  Service: Cardiovascular;  Laterality: Right;    CATARACT EXTRACTION  2018    COLONOSCOPY      CORONARY ARTERY BYPASS GRAFT  2015    ENDOSCOPY  12/20/2016    ENDOSCOPY N/A 01/02/2023    Procedure: ESOPHAGOGASTRODUODENOSCOPY;  Surgeon: Esvin Morgan MD;  Location: [facility] ENDOSCOPY;  Service: Gastroenterology;  Laterality: N/A;  post: anastamosis stricture    ENDOSCOPY N/A 02/09/2023    Procedure: ESOPHAGOGASTRODUODENOSCOPY, non-wire guided balloon dilation (12-14mm) of esophageal stricture;  Surgeon: Tino Villafana MD;  Location: [facility] ENDOSCOPY;  Service: Gastroenterology;  Laterality: N/A;  post: esophageal anastamotic stricture, post surgical changes    ESOPHAGECTOMY  05/04/2017    BROOKS PETTY ESOPHAGECTOMY, FEEDING JEJNOSTOMOY, ABDOMINAL AND MEDIASTINAL LYMPH NODE DISECTION, PEDICLED MUSCLE FLAP COVERAGE DR. PURI    ESOPHAGOSCOPY / EGD  07/12/2017    WITH BALLOON DILATION    ESOPHAGOSCOPY / EGD  07/26/2017    WITH BALLOON DILATION    ESOPHAGOSCOPY / EGD  08/11/2017    WITH BALLOON DILATION    ESOPHAGOSCOPY / EGD  08/28/2017    WITH BALLOON DILATION    ESOPHAGOSCOPY / EGD  09/27/2017    WITH BALLOON DILATION    INSERT / REPLACE / REMOVE PACEMAKER      LYMPH NODE BIOPSY  01/30/2019    PACEMAKER IMPLANTATION  11/21/2016       Current Outpatient Medications:     clonazePAM (KlonoPIN) 0.5 MG tablet, Take 0.5 tablets by mouth 2 (Two) Times a Day As Needed for Anxiety., Disp: , Rfl: 1    cyanocobalamin (VITAMIN  B-12) 1000 MCG tablet, Take 1 tablet by mouth Daily., Disp: , Rfl:     folic acid (FOLVITE) 1 MG tablet, Take 1 tablet by mouth Daily., Disp: , Rfl:     levothyroxine (SYNTHROID, LEVOTHROID) 50 MCG tablet, Take 1 tablet by mouth once daily, Disp: 90 tablet, Rfl: 0    magic mouthwash oral suspension, SWISH AND SPIT FIVE ML FOUR TIMES DAILY AS NEEDED, Disp: , Rfl:     metFORMIN (GLUCOPHAGE) 1000 MG tablet, Take 0.5 tablets by mouth Daily., Disp: 60 tablet, Rfl: 2    metoprolol succinate XL (TOPROL-XL) 25 MG 24 hr tablet, Take 1 tablet by mouth Daily., Disp: 90 tablet, Rfl: 3    ondansetron (ZOFRAN) 4 MG tablet, Take 1 tablet by mouth Every 8 (Eight) Hours As Needed for Nausea or Vomiting., Disp: , Rfl:     ondansetron (ZOFRAN) 8 MG tablet, Take 1 tablet by mouth 3 (Three) Times a Day As Needed for Nausea or Vomiting., Disp: 30 tablet, Rfl: 5    pantoprazole (PROTONIX) 40 MG EC tablet, Take 1 tablet by mouth 2 (Two) Times a Day., Disp: , Rfl:     pravastatin (PRAVACHOL) 10 MG tablet, Take 1 tablet by mouth Every Other Day., Disp: , Rfl:     spironolactone (ALDACTONE) 25 MG tablet, Take 0.5 tablets by mouth Daily. (Patient not taking: Reported on 11/1/2023), Disp: 30 tablet, Rfl: 2  No current facility-administered medications for this visit.    Facility-Administered Medications Ordered in Other Visits:     fluorouracil (ADRUCIL) 4,130 mg in sodium chloride 0.9 % 94 mL chemo infusion - FOR HOME USE, 2,400 mg/m2 (Treatment Plan Recorded), Intravenous, Once, Jean Pierre Elizondo MD, 4,130 mg at 12/13/23 1212    Allergies   Allergen Reactions    Ace Inhibitors Unknown (See Comments)     Unknown reaction      Heparin Other (See Comments)     Fever/chills, weakness in legs    Sulfa Antibiotics Other (See Comments)     Mouth sores       Family History   Problem Relation Age of Onset    Mental illness Mother     Heart disease Mother     Hypertension Father     Cancer Father     Heart disease Sister     Heart disease Brother   "    Cancer-related family history includes Cancer in his father.    Social History     Tobacco Use    Smoking status: Former     Packs/day: 2.00     Years: 50.00     Additional pack years: 0.00     Total pack years: 100.00     Types: Cigarettes     Quit date: 2015     Years since quittin.4    Smokeless tobacco: Former     Types: Chew     Quit date: 1989   Vaping Use    Vaping Use: Never used   Substance Use Topics    Alcohol use: No    Drug use: No     ROS:     Objective:    Vitals:    23 1037   BP: 152/65   Pulse: 85   Resp: 16   Temp: 97.5 °F (36.4 °C)   SpO2: 100%   Weight: 66.2 kg (146 lb)   Height: 165.1 cm (65\")   PainSc: 0-No pain                 (1) Restricted in physically strenuous activity, ambulatory and able to do work of light nature    Physical Exam:     Physical Exam   Constitutional: He is oriented to person, place, and time. He appears well-developed. No distress.   HENT:   Head: Normocephalic and atraumatic.   Nose: Nose normal.   Mouth/Throat: Mucous membranes are moist.   Eyes: Pupils are equal, round, and reactive to light.   Neck: No thyromegaly present.   Cardiovascular: Normal rate, regular rhythm, normal heart sounds and normal pulses. Exam reveals no gallop and no friction rub.   Pulmonary/Chest: Effort normal and breath sounds normal.   Abdominal: Soft. Normal appearance and bowel sounds are normal.   No tenderness   Musculoskeletal: Normal range of motion. No deformity or signs of injury.      Right lower leg: No edema.   Neurological: He is alert and oriented to person, place, and time. He exhibits normal muscle tone.   Skin: Skin is warm and dry. No rash noted. He is not diaphoretic. No erythema. No jaundice.   Right side chest  port       Lab Results - Last 18 Months   Lab Units 23  1028 23  0923 11/15/23  0937   WBC 10*3/mm3 3.70 5.10 4.66   HEMOGLOBIN g/dL 9.0* 8.8* 8.9*   HEMATOCRIT % 27.8* 27.7* 28.4*   PLATELETS 10*3/mm3 180 194 180   MCV fL 92.7 " 91.7 92.2     Lab Results - Last 18 Months   Lab Units 11/29/23  0923 11/15/23  0937 11/01/23  1009   SODIUM mmol/L 140 141 140   POTASSIUM mmol/L 4.7 4.7 4.6   CHLORIDE mmol/L 106 107 106   CO2 mmol/L 23.0 20.0* 22.0   BUN mg/dL 17 14 14   CREATININE mg/dL 1.30* 1.27 1.21   CALCIUM mg/dL 9.2 8.9 9.1   BILIRUBIN mg/dL 0.3 0.3 0.3   ALK PHOS U/L 36* 39 38*   ALT (SGPT) U/L 6 5 6   AST (SGOT) U/L 13 10 15   GLUCOSE mg/dL 117* 145* 130*     Assessment & Plan     Assessment:    Metastatic esophageal carcinoma:    Patient has a history of GE junction poorly differentiated adenocarcinoma for which he had chemoradiation followed by [facility] resection:  Pathology showed ypT3,pN1 disease.  Tumor was Stage IIIA, diagnosed in 2016.  He received concurrent chemoradiation with weekly Carboplatin and Taxol and radiation finishing on 3/15/17.  CT scan on 1/10/19 showed new retroperitoneal lymph nodes.  These lymph nodes were biopsied on 1/30/19 and it shows metastatic esophageal cancer.  [facility] Next Gen testing was performed on the sample and it shows KRAS mutation, microsatellite stable tumor.  PD-L1 is positive.  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  HER2/marvel (ERBB2) was not amplified and was negative.  His CT scan on 5/1/19 shows evidence of progression.  Patient has started receiving Keytruda on 5/31/19.  He has received 19 cycles so far.   CT scan on 1/28/2020 shows improvement and continued response.  CT scan chest abdomen pelvis with contrast on 12/10/2020 does not show any evidence of disease progression.  On 12/22/2020 decision made to hold Keytruda per patient's request to get a treatment break.. Repeat CT scan chest 7/12/2021 does not show any evidence of disease progression.  Now CT imaging in January 2021 with a CT showing increased abdominal left para-aortic adenopathy.  Subsequent PET/CT with new cedric metastatic disease in the retroperitoneum of the abdomen.  New pathologically enlarged hypermetabolic retroperitoneal lymph  nodes.  This would be concerning for disease recurrence/progression.  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  I discussed with him the treatment options going forward.  He has had a good response with immunotherapy pembrolizumab in the past. This was  restarted. subsequent imaging with treatment response. 1/2023 imaging with good response. Continued treatment  Now with increased adenopathy 2 new lymph nodes. Holding immunotherapy for now.   Consider getting cardiac MR discussed with Dr. Culp. While immunotherapy on hold, increased adenopathy is concerning. Could be pseudoprogression vs true progression. PET CT reviewed could indicate pseudoprogression since he has had a good response to immunotherapy  immunotherapy was restarted and has been on hold with patient's symptomatology cardiac MRI cannot be obtained, no ischemic heart disease leading to cardiomyopathy this most likely is related to Keytruda.  We will stop Keytruda going forward  CT imaging with stable disease, FOLFOX was started which he had significant side effects  With imaging being stable and significant side effects with FOLFOX, we discussed  about options with wait and watch vs 5FU only, patient prefers to be on maintenance.   We started 5FU only .   Tolerating relatively well, has significant fatigue. CT imaging now with ongoing response. We discussed that he would have been on treatment for 2 years following recurrence with good response. We can continue treatment now and get a PET CT in 2/2024. At that point we can consider treatment break.    Possible myocarditis  Symptoms started after last immunotherapy  Treatment on hold for now, symptoms have improved, cardiac markers were negative, echocardiogram with EF 40%, cardiac MRI cannot be obtained cardiac cath with EF down to 30% management per cardiology  Now EF up to 40-45% monitor. No significant symptoms.    Shortness of breath  Improved, has occasional shortness of  breath, likely copd  I have recommended using albuterol  Symptoms have improved since stopping immunotherapy. Questionable pneumonitis will monitor closely with immunotherapy  Cardiology has evaluated for myocarditis related to immunotherapy symptomatic improvement now    Reflux/heartburn/dysphagia:   Symptoms improved.  Also has a history of strictures. Status post EGD and balloon dilation.   Continues to be on pantoprazole symptoms stable.    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  Continues on Synthroid stable now    Mild diarrhea:  diarrhea seems to be waxing and waning in nature.  Diarrhea related to immunotherapy now improved    PD-L1 positive, HER2 negative, p53 and KRAS positive.  Repeat [facility] testing with repeat biopsy with no target mutations.    B12 deficiency: Continue B12 1000 mcg twice daily.    Anemia - Hb improved and stable.    Dysuria - has some dysuria, this has improved.    Groin pain - negative for UTI, STI, has seen urology, likely prostatits, started cipro         Jean Pierre Elizondo MD 12/13/23     No orders of the defined types were placed in this encounter.

## 2023-12-13 ENCOUNTER — OFFICE VISIT (OUTPATIENT)
Dept: ONCOLOGY | Facility: CLINIC | Age: 73
End: 2023-12-13
Payer: MEDICARE

## 2023-12-13 ENCOUNTER — HOSPITAL ENCOUNTER (OUTPATIENT)
Dept: ONCOLOGY | Facility: HOSPITAL | Age: 73
Discharge: HOME | End: 2023-12-13
Admitting: INTERNAL MEDICINE
Payer: MEDICARE

## 2023-12-13 VITALS
RESPIRATION RATE: 16 BRPM | DIASTOLIC BLOOD PRESSURE: 65 MMHG | HEART RATE: 85 BPM | SYSTOLIC BLOOD PRESSURE: 152 MMHG | HEIGHT: 65 IN | BODY MASS INDEX: 24.32 KG/M2 | TEMPERATURE: 97.5 F | OXYGEN SATURATION: 100 % | WEIGHT: 146 LBS

## 2023-12-13 VITALS
TEMPERATURE: 97.5 F | SYSTOLIC BLOOD PRESSURE: 152 MMHG | HEIGHT: 65 IN | BODY MASS INDEX: 24.32 KG/M2 | OXYGEN SATURATION: 100 % | RESPIRATION RATE: 16 BRPM | WEIGHT: 146 LBS | DIASTOLIC BLOOD PRESSURE: 65 MMHG | HEART RATE: 85 BPM

## 2023-12-13 DIAGNOSIS — C15.5 PRIMARY MALIGNANT NEOPLASM OF LOWER THIRD OF ESOPHAGUS: ICD-10-CM

## 2023-12-13 DIAGNOSIS — C16.0 MALIGNANT NEOPLASM OF CARDIA: Primary | ICD-10-CM

## 2023-12-13 LAB
ALBUMIN SERPL-MCNC: 4.1 G/DL (ref 3.5–5.2)
ALBUMIN/GLOB SERPL: 1.9 G/DL
ALP SERPL-CCNC: 40 U/L (ref 39–117)
ALT SERPL W P-5'-P-CCNC: 6 U/L (ref 1–41)
ANION GAP SERPL CALCULATED.3IONS-SCNC: 11 MMOL/L (ref 5–15)
AST SERPL-CCNC: 14 U/L (ref 1–40)
BASOPHILS # BLD AUTO: 0.03 10*3/MM3 (ref 0–0.2)
BASOPHILS NFR BLD AUTO: 0.8 % (ref 0–1.5)
BILIRUB SERPL-MCNC: 0.4 MG/DL (ref 0–1.2)
BUN SERPL-MCNC: 13 MG/DL (ref 8–23)
BUN/CREAT SERPL: 10.6 (ref 7–25)
CALCIUM SPEC-SCNC: 9.1 MG/DL (ref 8.6–10.5)
CHLORIDE SERPL-SCNC: 104 MMOL/L (ref 98–107)
CO2 SERPL-SCNC: 23 MMOL/L (ref 22–29)
CREAT SERPL-MCNC: 1.23 MG/DL (ref 0.76–1.27)
DEPRECATED RDW RBC AUTO: 60.2 FL (ref 37–54)
EGFRCR SERPLBLD CKD-EPI 2021: 61.6 ML/MIN/1.73
EOSINOPHIL # BLD AUTO: 0.12 10*3/MM3 (ref 0–0.4)
EOSINOPHIL NFR BLD AUTO: 3.2 % (ref 0.3–6.2)
ERYTHROCYTE [DISTWIDTH] IN BLOOD BY AUTOMATED COUNT: 19.1 % (ref 12.3–15.4)
GLOBULIN UR ELPH-MCNC: 2.2 GM/DL
GLUCOSE SERPL-MCNC: 121 MG/DL (ref 65–99)
HCT VFR BLD AUTO: 27.8 % (ref 37.5–51)
HGB BLD-MCNC: 9 G/DL (ref 13–17.7)
LYMPHOCYTES # BLD AUTO: 0.85 10*3/MM3 (ref 0.7–3.1)
LYMPHOCYTES NFR BLD AUTO: 23 % (ref 19.6–45.3)
MCH RBC QN AUTO: 30 PG (ref 26.6–33)
MCHC RBC AUTO-ENTMCNC: 32.4 G/DL (ref 31.5–35.7)
MCV RBC AUTO: 92.7 FL (ref 79–97)
MONOCYTES # BLD AUTO: 0.65 10*3/MM3 (ref 0.1–0.9)
MONOCYTES NFR BLD AUTO: 17.6 % (ref 5–12)
NEUTROPHILS NFR BLD AUTO: 2.05 10*3/MM3 (ref 1.7–7)
NEUTROPHILS NFR BLD AUTO: 55.4 % (ref 42.7–76)
PLATELET # BLD AUTO: 180 10*3/MM3 (ref 140–450)
PMV BLD AUTO: 9.7 FL (ref 6–12)
POTASSIUM SERPL-SCNC: 4.5 MMOL/L (ref 3.5–5.2)
PROT SERPL-MCNC: 6.3 G/DL (ref 6–8.5)
RBC # BLD AUTO: 3 10*6/MM3 (ref 4.14–5.8)
SODIUM SERPL-SCNC: 138 MMOL/L (ref 136–145)
WBC NRBC COR # BLD AUTO: 3.7 10*3/MM3 (ref 3.4–10.8)

## 2023-12-13 PROCEDURE — 25810000003 SODIUM CHLORIDE 0.9 % SOLUTION 250 ML FLEX CONT: Performed by: INTERNAL MEDICINE

## 2023-12-13 PROCEDURE — 85025 COMPLETE CBC W/AUTO DIFF WBC: CPT | Performed by: INTERNAL MEDICINE

## 2023-12-13 PROCEDURE — 3078F DIAST BP <80 MM HG: CPT | Performed by: INTERNAL MEDICINE

## 2023-12-13 PROCEDURE — 25010000002 LEUCOVORIN CALCIUM PER 50 MG: Performed by: INTERNAL MEDICINE

## 2023-12-13 PROCEDURE — 0 DEXTROSE 5 % SOLUTION: Performed by: INTERNAL MEDICINE

## 2023-12-13 PROCEDURE — 96375 TX/PRO/DX INJ NEW DRUG ADDON: CPT

## 2023-12-13 PROCEDURE — 25010000002 DEXAMETHASONE SODIUM PHOSPHATE 120 MG/30ML SOLUTION: Performed by: INTERNAL MEDICINE

## 2023-12-13 PROCEDURE — 99214 OFFICE O/P EST MOD 30 MIN: CPT | Performed by: INTERNAL MEDICINE

## 2023-12-13 PROCEDURE — 80053 COMPREHEN METABOLIC PANEL: CPT | Performed by: INTERNAL MEDICINE

## 2023-12-13 PROCEDURE — G0498 CHEMO EXTEND IV INFUS W/PUMP: HCPCS

## 2023-12-13 PROCEDURE — 1126F AMNT PAIN NOTED NONE PRSNT: CPT | Performed by: INTERNAL MEDICINE

## 2023-12-13 PROCEDURE — 96416 CHEMO PROLONG INFUSE W/PUMP: CPT

## 2023-12-13 PROCEDURE — 25010000002 FLUOROURACIL PER 500 MG: Performed by: INTERNAL MEDICINE

## 2023-12-13 PROCEDURE — 96367 TX/PROPH/DG ADDL SEQ IV INF: CPT

## 2023-12-13 PROCEDURE — 96365 THER/PROPH/DIAG IV INF INIT: CPT

## 2023-12-13 PROCEDURE — 3077F SYST BP >= 140 MM HG: CPT | Performed by: INTERNAL MEDICINE

## 2023-12-13 RX ORDER — DEXTROSE MONOHYDRATE 50 MG/ML
250 INJECTION, SOLUTION INTRAVENOUS ONCE
Status: CANCELLED | OUTPATIENT
Start: 2023-12-13

## 2023-12-13 RX ORDER — DEXTROSE MONOHYDRATE 50 MG/ML
250 INJECTION, SOLUTION INTRAVENOUS ONCE
Status: COMPLETED | OUTPATIENT
Start: 2023-12-13 | End: 2023-12-13

## 2023-12-13 RX ORDER — FAMOTIDINE 10 MG/ML
20 INJECTION, SOLUTION INTRAVENOUS AS NEEDED
Status: CANCELLED | OUTPATIENT
Start: 2023-12-13

## 2023-12-13 RX ORDER — DIPHENHYDRAMINE HYDROCHLORIDE 50 MG/ML
50 INJECTION INTRAMUSCULAR; INTRAVENOUS AS NEEDED
Status: CANCELLED | OUTPATIENT
Start: 2023-12-13

## 2023-12-13 RX ADMIN — DEXAMETHASONE SODIUM PHOSPHATE 12 MG: 4 INJECTION, SOLUTION INTRA-ARTICULAR; INTRALESIONAL; INTRAMUSCULAR; INTRAVENOUS; SOFT TISSUE at 11:08

## 2023-12-13 RX ADMIN — LEUCOVORIN CALCIUM 690 MG: 350 INJECTION, POWDER, LYOPHILIZED, FOR SUSPENSION INTRAMUSCULAR; INTRAVENOUS at 11:29

## 2023-12-13 RX ADMIN — FLUOROURACIL 4130 MG: 50 INJECTION, SOLUTION INTRAVENOUS at 12:12

## 2023-12-13 RX ADMIN — DEXTROSE MONOHYDRATE 250 ML: 50 INJECTION, SOLUTION INTRAVENOUS at 11:07

## 2023-12-15 ENCOUNTER — HOSPITAL ENCOUNTER (OUTPATIENT)
Dept: ONCOLOGY | Facility: HOSPITAL | Age: 73
Discharge: HOME | End: 2023-12-15
Payer: MEDICARE

## 2023-12-15 VITALS
SYSTOLIC BLOOD PRESSURE: 128 MMHG | TEMPERATURE: 97.9 F | BODY MASS INDEX: 24.6 KG/M2 | HEART RATE: 73 BPM | DIASTOLIC BLOOD PRESSURE: 70 MMHG | WEIGHT: 147.8 LBS | RESPIRATION RATE: 20 BRPM

## 2023-12-15 DIAGNOSIS — C16.0 MALIGNANT NEOPLASM OF CARDIA: Primary | ICD-10-CM

## 2023-12-15 DIAGNOSIS — C15.5 PRIMARY MALIGNANT NEOPLASM OF LOWER THIRD OF ESOPHAGUS: ICD-10-CM

## 2023-12-15 PROCEDURE — 25810000003 SODIUM CHLORIDE 0.9 % SOLUTION: Performed by: INTERNAL MEDICINE

## 2023-12-15 PROCEDURE — 96360 HYDRATION IV INFUSION INIT: CPT

## 2023-12-15 RX ORDER — SODIUM CHLORIDE 0.9 % (FLUSH) 0.9 %
20 SYRINGE (ML) INJECTION AS NEEDED
Status: DISCONTINUED | OUTPATIENT
Start: 2023-12-15 | End: 2023-12-16 | Stop reason: HOSPADM

## 2023-12-15 RX ORDER — SODIUM CHLORIDE 0.9 % (FLUSH) 0.9 %
20 SYRINGE (ML) INJECTION AS NEEDED
OUTPATIENT
Start: 2023-12-15

## 2023-12-15 RX ADMIN — Medication 20 ML: at 12:07

## 2023-12-15 RX ADMIN — SODIUM CHLORIDE 500 ML: 9 INJECTION, SOLUTION INTRAVENOUS at 11:05

## 2023-12-27 ENCOUNTER — HOSPITAL ENCOUNTER (OUTPATIENT)
Dept: ONCOLOGY | Facility: HOSPITAL | Age: 73
Discharge: HOME | End: 2023-12-27
Admitting: INTERNAL MEDICINE
Payer: MEDICARE

## 2023-12-27 VITALS
HEART RATE: 80 BPM | RESPIRATION RATE: 16 BRPM | SYSTOLIC BLOOD PRESSURE: 130 MMHG | WEIGHT: 146.3 LBS | DIASTOLIC BLOOD PRESSURE: 63 MMHG | TEMPERATURE: 97.8 F | OXYGEN SATURATION: 98 % | BODY MASS INDEX: 24.37 KG/M2 | HEIGHT: 65 IN

## 2023-12-27 DIAGNOSIS — C16.0 MALIGNANT NEOPLASM OF CARDIA: Primary | ICD-10-CM

## 2023-12-27 DIAGNOSIS — R79.89 ELEVATED SERUM CREATININE: Primary | ICD-10-CM

## 2023-12-27 DIAGNOSIS — C15.5 PRIMARY MALIGNANT NEOPLASM OF LOWER THIRD OF ESOPHAGUS: ICD-10-CM

## 2023-12-27 LAB
ALBUMIN SERPL-MCNC: 4 G/DL (ref 3.5–5.2)
ALBUMIN/GLOB SERPL: 2.1 G/DL
ALP SERPL-CCNC: 43 U/L (ref 39–117)
ALT SERPL W P-5'-P-CCNC: 8 U/L (ref 1–41)
ANION GAP SERPL CALCULATED.3IONS-SCNC: 14 MMOL/L (ref 5–15)
AST SERPL-CCNC: 16 U/L (ref 1–40)
BASOPHILS # BLD AUTO: 0.03 10*3/MM3 (ref 0–0.2)
BASOPHILS NFR BLD AUTO: 0.6 % (ref 0–1.5)
BILIRUB SERPL-MCNC: 0.2 MG/DL (ref 0–1.2)
BUN SERPL-MCNC: 17 MG/DL (ref 8–23)
BUN/CREAT SERPL: 11.7 (ref 7–25)
CALCIUM SPEC-SCNC: 8.8 MG/DL (ref 8.6–10.5)
CHLORIDE SERPL-SCNC: 103 MMOL/L (ref 98–107)
CO2 SERPL-SCNC: 22 MMOL/L (ref 22–29)
CREAT SERPL-MCNC: 1.45 MG/DL (ref 0.76–1.27)
DEPRECATED RDW RBC AUTO: 62.3 FL (ref 37–54)
EGFRCR SERPLBLD CKD-EPI 2021: 50.6 ML/MIN/1.73
EOSINOPHIL # BLD AUTO: 0.14 10*3/MM3 (ref 0–0.4)
EOSINOPHIL NFR BLD AUTO: 2.8 % (ref 0.3–6.2)
ERYTHROCYTE [DISTWIDTH] IN BLOOD BY AUTOMATED COUNT: 19.2 % (ref 12.3–15.4)
GLOBULIN UR ELPH-MCNC: 1.9 GM/DL
GLUCOSE SERPL-MCNC: 160 MG/DL (ref 65–99)
HCT VFR BLD AUTO: 26.9 % (ref 37.5–51)
HGB BLD-MCNC: 8.4 G/DL (ref 13–17.7)
LYMPHOCYTES # BLD AUTO: 1.29 10*3/MM3 (ref 0.7–3.1)
LYMPHOCYTES NFR BLD AUTO: 25.7 % (ref 19.6–45.3)
MCH RBC QN AUTO: 29.4 PG (ref 26.6–33)
MCHC RBC AUTO-ENTMCNC: 31.2 G/DL (ref 31.5–35.7)
MCV RBC AUTO: 94.1 FL (ref 79–97)
MONOCYTES # BLD AUTO: 0.65 10*3/MM3 (ref 0.1–0.9)
MONOCYTES NFR BLD AUTO: 12.9 % (ref 5–12)
NEUTROPHILS NFR BLD AUTO: 2.91 10*3/MM3 (ref 1.7–7)
NEUTROPHILS NFR BLD AUTO: 58 % (ref 42.7–76)
PLATELET # BLD AUTO: 203 10*3/MM3 (ref 140–450)
PMV BLD AUTO: 9.6 FL (ref 6–12)
POTASSIUM SERPL-SCNC: 4.8 MMOL/L (ref 3.5–5.2)
PROT SERPL-MCNC: 5.9 G/DL (ref 6–8.5)
RBC # BLD AUTO: 2.86 10*6/MM3 (ref 4.14–5.8)
SODIUM SERPL-SCNC: 139 MMOL/L (ref 136–145)
WBC NRBC COR # BLD AUTO: 5.02 10*3/MM3 (ref 3.4–10.8)

## 2023-12-27 PROCEDURE — G0498 CHEMO EXTEND IV INFUS W/PUMP: HCPCS

## 2023-12-27 PROCEDURE — 96375 TX/PRO/DX INJ NEW DRUG ADDON: CPT

## 2023-12-27 PROCEDURE — 25810000003 SODIUM CHLORIDE 0.9 % SOLUTION 250 ML FLEX CONT: Performed by: INTERNAL MEDICINE

## 2023-12-27 PROCEDURE — 25010000002 LEUCOVORIN CALCIUM PER 50 MG: Performed by: INTERNAL MEDICINE

## 2023-12-27 PROCEDURE — 96365 THER/PROPH/DIAG IV INF INIT: CPT

## 2023-12-27 PROCEDURE — 80053 COMPREHEN METABOLIC PANEL: CPT | Performed by: INTERNAL MEDICINE

## 2023-12-27 PROCEDURE — 85025 COMPLETE CBC W/AUTO DIFF WBC: CPT | Performed by: INTERNAL MEDICINE

## 2023-12-27 PROCEDURE — 25010000002 FLUOROURACIL PER 500 MG: Performed by: INTERNAL MEDICINE

## 2023-12-27 PROCEDURE — 0 DEXTROSE 5 % SOLUTION: Performed by: INTERNAL MEDICINE

## 2023-12-27 PROCEDURE — 96416 CHEMO PROLONG INFUSE W/PUMP: CPT

## 2023-12-27 PROCEDURE — 25010000002 DEXAMETHASONE SODIUM PHOSPHATE 120 MG/30ML SOLUTION: Performed by: INTERNAL MEDICINE

## 2023-12-27 PROCEDURE — 96367 TX/PROPH/DG ADDL SEQ IV INF: CPT

## 2023-12-27 RX ORDER — SODIUM CHLORIDE 0.9 % (FLUSH) 0.9 %
20 SYRINGE (ML) INJECTION AS NEEDED
Status: CANCELLED | OUTPATIENT
Start: 2023-12-27

## 2023-12-27 RX ORDER — SODIUM CHLORIDE 0.9 % (FLUSH) 0.9 %
20 SYRINGE (ML) INJECTION AS NEEDED
Status: DISCONTINUED | OUTPATIENT
Start: 2023-12-27 | End: 2023-12-28 | Stop reason: HOSPADM

## 2023-12-27 RX ORDER — DIPHENHYDRAMINE HYDROCHLORIDE 50 MG/ML
50 INJECTION INTRAMUSCULAR; INTRAVENOUS AS NEEDED
Status: CANCELLED | OUTPATIENT
Start: 2023-12-27

## 2023-12-27 RX ORDER — DEXTROSE MONOHYDRATE 50 MG/ML
250 INJECTION, SOLUTION INTRAVENOUS ONCE
Status: COMPLETED | OUTPATIENT
Start: 2023-12-27 | End: 2023-12-27

## 2023-12-27 RX ORDER — FAMOTIDINE 10 MG/ML
20 INJECTION, SOLUTION INTRAVENOUS AS NEEDED
Status: CANCELLED | OUTPATIENT
Start: 2023-12-27

## 2023-12-27 RX ADMIN — Medication 20 ML: at 11:24

## 2023-12-27 RX ADMIN — DEXAMETHASONE SODIUM PHOSPHATE 12 MG: 4 INJECTION, SOLUTION INTRA-ARTICULAR; INTRALESIONAL; INTRAMUSCULAR; INTRAVENOUS; SOFT TISSUE at 10:25

## 2023-12-27 RX ADMIN — LEUCOVORIN CALCIUM 690 MG: 350 INJECTION, POWDER, LYOPHILIZED, FOR SUSPENSION INTRAMUSCULAR; INTRAVENOUS at 10:50

## 2023-12-27 RX ADMIN — DEXTROSE MONOHYDRATE 250 ML: 50 INJECTION, SOLUTION INTRAVENOUS at 10:25

## 2023-12-27 RX ADMIN — FLUOROURACIL 4130 MG: 50 INJECTION, SOLUTION INTRAVENOUS at 11:25

## 2023-12-27 NOTE — PROGRESS NOTES
Spoke w/ pt and let him know to increase his fluid intake due to his creatinine being elevated.  Per Dr. Elizondo we will recheck his CMP on Friday 12/29/23 with his pump d/c.  Pt v/u.

## 2023-12-29 ENCOUNTER — HOSPITAL ENCOUNTER (OUTPATIENT)
Dept: ONCOLOGY | Facility: HOSPITAL | Age: 73
Discharge: HOME | End: 2023-12-29
Payer: MEDICARE

## 2023-12-29 VITALS — DIASTOLIC BLOOD PRESSURE: 60 MMHG | SYSTOLIC BLOOD PRESSURE: 146 MMHG | HEART RATE: 85 BPM

## 2023-12-29 DIAGNOSIS — R79.89 ELEVATED SERUM CREATININE: ICD-10-CM

## 2023-12-29 DIAGNOSIS — C16.0 MALIGNANT NEOPLASM OF CARDIA: Primary | ICD-10-CM

## 2023-12-29 DIAGNOSIS — C15.5 PRIMARY MALIGNANT NEOPLASM OF LOWER THIRD OF ESOPHAGUS: ICD-10-CM

## 2023-12-29 LAB
ALBUMIN SERPL-MCNC: 4 G/DL (ref 3.5–5.2)
ALBUMIN/GLOB SERPL: 2 G/DL
ALP SERPL-CCNC: 40 U/L (ref 39–117)
ALT SERPL W P-5'-P-CCNC: 6 U/L (ref 1–41)
ANION GAP SERPL CALCULATED.3IONS-SCNC: 15 MMOL/L (ref 5–15)
AST SERPL-CCNC: 15 U/L (ref 1–40)
BILIRUB SERPL-MCNC: 0.3 MG/DL (ref 0–1.2)
BUN SERPL-MCNC: 19 MG/DL (ref 8–23)
BUN/CREAT SERPL: 13.5 (ref 7–25)
CALCIUM SPEC-SCNC: 8.9 MG/DL (ref 8.6–10.5)
CHLORIDE SERPL-SCNC: 102 MMOL/L (ref 98–107)
CO2 SERPL-SCNC: 22 MMOL/L (ref 22–29)
CREAT SERPL-MCNC: 1.41 MG/DL (ref 0.76–1.27)
EGFRCR SERPLBLD CKD-EPI 2021: 52.3 ML/MIN/1.73
GLOBULIN UR ELPH-MCNC: 2 GM/DL
GLUCOSE SERPL-MCNC: 228 MG/DL (ref 65–99)
POTASSIUM SERPL-SCNC: 4.6 MMOL/L (ref 3.5–5.2)
PROT SERPL-MCNC: 6 G/DL (ref 6–8.5)
SODIUM SERPL-SCNC: 139 MMOL/L (ref 136–145)

## 2023-12-29 PROCEDURE — 80053 COMPREHEN METABOLIC PANEL: CPT | Performed by: INTERNAL MEDICINE

## 2023-12-29 PROCEDURE — 96360 HYDRATION IV INFUSION INIT: CPT

## 2023-12-29 PROCEDURE — 25810000003 SODIUM CHLORIDE 0.9 % SOLUTION: Performed by: INTERNAL MEDICINE

## 2023-12-29 RX ORDER — SODIUM CHLORIDE 0.9 % (FLUSH) 0.9 %
20 SYRINGE (ML) INJECTION AS NEEDED
OUTPATIENT
Start: 2023-12-29

## 2023-12-29 RX ORDER — SODIUM CHLORIDE 0.9 % (FLUSH) 0.9 %
20 SYRINGE (ML) INJECTION AS NEEDED
Status: DISCONTINUED | OUTPATIENT
Start: 2023-12-29 | End: 2023-12-30 | Stop reason: HOSPADM

## 2023-12-29 RX ADMIN — Medication 20 ML: at 13:01

## 2023-12-29 RX ADMIN — SODIUM CHLORIDE 500 ML: 9 INJECTION, SOLUTION INTRAVENOUS at 11:50

## 2024-01-08 ENCOUNTER — TELEPHONE (OUTPATIENT)
Dept: ONCOLOGY | Facility: HOSPITAL | Age: 74
End: 2024-01-08
Payer: MEDICARE

## 2024-01-08 NOTE — TELEPHONE ENCOUNTER
Spoke to patient in regards to his apt on 1/8. Patient is agreeable to come in at 0800. Patient confirmed.

## 2024-01-10 ENCOUNTER — HOSPITAL ENCOUNTER (OUTPATIENT)
Dept: ONCOLOGY | Facility: HOSPITAL | Age: 74
Discharge: HOME | End: 2024-01-10
Admitting: INTERNAL MEDICINE
Payer: MEDICARE

## 2024-01-10 VITALS
DIASTOLIC BLOOD PRESSURE: 67 MMHG | WEIGHT: 144.5 LBS | HEIGHT: 65 IN | SYSTOLIC BLOOD PRESSURE: 121 MMHG | TEMPERATURE: 97.6 F | OXYGEN SATURATION: 97 % | HEART RATE: 73 BPM | RESPIRATION RATE: 16 BRPM | BODY MASS INDEX: 24.07 KG/M2

## 2024-01-10 DIAGNOSIS — C15.5 PRIMARY MALIGNANT NEOPLASM OF LOWER THIRD OF ESOPHAGUS: ICD-10-CM

## 2024-01-10 DIAGNOSIS — C16.0 MALIGNANT NEOPLASM OF CARDIA: Primary | ICD-10-CM

## 2024-01-10 LAB
ALBUMIN SERPL-MCNC: 4.2 G/DL (ref 3.5–5.2)
ALBUMIN/GLOB SERPL: 2.2 G/DL
ALP SERPL-CCNC: 37 U/L (ref 39–117)
ALT SERPL W P-5'-P-CCNC: 6 U/L (ref 1–41)
ANION GAP SERPL CALCULATED.3IONS-SCNC: 15 MMOL/L (ref 5–15)
AST SERPL-CCNC: 15 U/L (ref 1–40)
BASOPHILS # BLD AUTO: 0.01 10*3/MM3 (ref 0–0.2)
BASOPHILS NFR BLD AUTO: 0.2 % (ref 0–1.5)
BILIRUB SERPL-MCNC: 0.2 MG/DL (ref 0–1.2)
BUN SERPL-MCNC: 22 MG/DL (ref 8–23)
BUN/CREAT SERPL: 12.9 (ref 7–25)
CALCIUM SPEC-SCNC: 9.2 MG/DL (ref 8.6–10.5)
CHLORIDE SERPL-SCNC: 103 MMOL/L (ref 98–107)
CO2 SERPL-SCNC: 20 MMOL/L (ref 22–29)
CREAT SERPL-MCNC: 1.7 MG/DL (ref 0.76–1.27)
DEPRECATED RDW RBC AUTO: 62.9 FL (ref 37–54)
EGFRCR SERPLBLD CKD-EPI 2021: 41.8 ML/MIN/1.73
EOSINOPHIL # BLD AUTO: 0.12 10*3/MM3 (ref 0–0.4)
EOSINOPHIL NFR BLD AUTO: 2 % (ref 0.3–6.2)
ERYTHROCYTE [DISTWIDTH] IN BLOOD BY AUTOMATED COUNT: 19.5 % (ref 12.3–15.4)
GLOBULIN UR ELPH-MCNC: 1.9 GM/DL
GLUCOSE SERPL-MCNC: 271 MG/DL (ref 65–99)
HCT VFR BLD AUTO: 27.7 % (ref 37.5–51)
HGB BLD-MCNC: 8.7 G/DL (ref 13–17.7)
LYMPHOCYTES # BLD AUTO: 1.44 10*3/MM3 (ref 0.7–3.1)
LYMPHOCYTES NFR BLD AUTO: 23.7 % (ref 19.6–45.3)
MCH RBC QN AUTO: 29.6 PG (ref 26.6–33)
MCHC RBC AUTO-ENTMCNC: 31.4 G/DL (ref 31.5–35.7)
MCV RBC AUTO: 94.2 FL (ref 79–97)
MONOCYTES # BLD AUTO: 0.76 10*3/MM3 (ref 0.1–0.9)
MONOCYTES NFR BLD AUTO: 12.5 % (ref 5–12)
NEUTROPHILS NFR BLD AUTO: 3.75 10*3/MM3 (ref 1.7–7)
NEUTROPHILS NFR BLD AUTO: 61.6 % (ref 42.7–76)
PLATELET # BLD AUTO: 199 10*3/MM3 (ref 140–450)
PMV BLD AUTO: 10.1 FL (ref 6–12)
POTASSIUM SERPL-SCNC: 5.3 MMOL/L (ref 3.5–5.2)
PROT SERPL-MCNC: 6.1 G/DL (ref 6–8.5)
RBC # BLD AUTO: 2.94 10*6/MM3 (ref 4.14–5.8)
SODIUM SERPL-SCNC: 138 MMOL/L (ref 136–145)
WBC NRBC COR # BLD AUTO: 6.08 10*3/MM3 (ref 3.4–10.8)

## 2024-01-10 PROCEDURE — 80053 COMPREHEN METABOLIC PANEL: CPT | Performed by: INTERNAL MEDICINE

## 2024-01-10 PROCEDURE — 25010000002 FLUOROURACIL PER 500 MG: Performed by: INTERNAL MEDICINE

## 2024-01-10 PROCEDURE — 0 DEXTROSE 5 % SOLUTION: Performed by: INTERNAL MEDICINE

## 2024-01-10 PROCEDURE — 96375 TX/PRO/DX INJ NEW DRUG ADDON: CPT

## 2024-01-10 PROCEDURE — 25010000002 LEUCOVORIN CALCIUM PER 50 MG: Performed by: INTERNAL MEDICINE

## 2024-01-10 PROCEDURE — G0498 CHEMO EXTEND IV INFUS W/PUMP: HCPCS

## 2024-01-10 PROCEDURE — 25810000003 SODIUM CHLORIDE 0.9 % SOLUTION 250 ML FLEX CONT: Performed by: INTERNAL MEDICINE

## 2024-01-10 PROCEDURE — 85025 COMPLETE CBC W/AUTO DIFF WBC: CPT | Performed by: INTERNAL MEDICINE

## 2024-01-10 PROCEDURE — 96416 CHEMO PROLONG INFUSE W/PUMP: CPT

## 2024-01-10 PROCEDURE — 25010000002 DEXAMETHASONE SODIUM PHOSPHATE 120 MG/30ML SOLUTION: Performed by: INTERNAL MEDICINE

## 2024-01-10 PROCEDURE — 96367 TX/PROPH/DG ADDL SEQ IV INF: CPT

## 2024-01-10 PROCEDURE — 96365 THER/PROPH/DIAG IV INF INIT: CPT

## 2024-01-10 RX ORDER — DEXTROSE MONOHYDRATE 50 MG/ML
250 INJECTION, SOLUTION INTRAVENOUS ONCE
Status: COMPLETED | OUTPATIENT
Start: 2024-01-10 | End: 2024-01-10

## 2024-01-10 RX ORDER — FAMOTIDINE 10 MG/ML
20 INJECTION, SOLUTION INTRAVENOUS AS NEEDED
Status: CANCELLED | OUTPATIENT
Start: 2024-01-10

## 2024-01-10 RX ORDER — DIPHENHYDRAMINE HYDROCHLORIDE 50 MG/ML
50 INJECTION INTRAMUSCULAR; INTRAVENOUS AS NEEDED
Status: CANCELLED | OUTPATIENT
Start: 2024-01-10

## 2024-01-10 RX ADMIN — DEXAMETHASONE SODIUM PHOSPHATE 12 MG: 4 INJECTION, SOLUTION INTRA-ARTICULAR; INTRALESIONAL; INTRAMUSCULAR; INTRAVENOUS; SOFT TISSUE at 08:41

## 2024-01-10 RX ADMIN — LEUCOVORIN CALCIUM 690 MG: 350 INJECTION, POWDER, LYOPHILIZED, FOR SUSPENSION INTRAMUSCULAR; INTRAVENOUS at 09:25

## 2024-01-10 RX ADMIN — DEXTROSE MONOHYDRATE 250 ML: 50 INJECTION, SOLUTION INTRAVENOUS at 08:41

## 2024-01-10 RX ADMIN — FLUOROURACIL 4130 MG: 50 INJECTION, SOLUTION INTRAVENOUS at 09:58

## 2024-01-10 NOTE — PROGRESS NOTES
Pt  here for C10D1 LCV/5FU (he is a pt of Dr Elizondo). CBC ok this am, he does c/o SOA with exertion that he reports has been ongoing and denies any change to symptoms (oxygen 97% on room air) He reports he is on antibiotic for prostatitis that was prescribed by Dr Kimball (he believes it is ampicillin but is unsure-he reports has been on for 7 days and thinks he has around 7 days left and denies fever and reports he is urinating ok). He reports he had episode x1 vomiting on Sun and he has not had any further episodes, he reports that following treatments he has stomach pains for few days following each treatment and has not had any change in symptoms, reviewed above with Dr Nassar and pt to proceed with treatment as planned.

## 2024-01-11 ENCOUNTER — TELEPHONE (OUTPATIENT)
Dept: ONCOLOGY | Facility: CLINIC | Age: 74
End: 2024-01-11
Payer: MEDICARE

## 2024-01-11 NOTE — TELEPHONE ENCOUNTER
Spoke w/ pt and let him know that his creatinine was elevated and that the NP would like for him to increase his oral fluid intake to 64 ounces a day.  I let him know that we will recheck his labs when he comes in tomorrow for his pump d/c.  I also let him know that we will send it STAT so that if he needs IVF's they can be given while he is here.  Spoke w/ Cara RN in infusion and she would like for pt to come in at 11:00 instead of 2:15 in case he needs fluids.  Pt v/u of the above and his new appointment time.  Cara notified that pt is able to come in at 11:00 tomorrow.

## 2024-01-12 ENCOUNTER — HOSPITAL ENCOUNTER (OUTPATIENT)
Dept: ONCOLOGY | Facility: HOSPITAL | Age: 74
Discharge: HOME | End: 2024-01-12
Payer: MEDICARE

## 2024-01-12 VITALS
WEIGHT: 146 LBS | BODY MASS INDEX: 24.3 KG/M2 | DIASTOLIC BLOOD PRESSURE: 61 MMHG | SYSTOLIC BLOOD PRESSURE: 121 MMHG | HEART RATE: 72 BPM

## 2024-01-12 DIAGNOSIS — C15.5 PRIMARY MALIGNANT NEOPLASM OF LOWER THIRD OF ESOPHAGUS: ICD-10-CM

## 2024-01-12 DIAGNOSIS — C16.0 MALIGNANT NEOPLASM OF CARDIA: Primary | ICD-10-CM

## 2024-01-12 DIAGNOSIS — R79.89 ELEVATED SERUM CREATININE: Primary | ICD-10-CM

## 2024-01-12 LAB
ANION GAP SERPL CALCULATED.3IONS-SCNC: 15 MMOL/L (ref 5–15)
BUN SERPL-MCNC: 25 MG/DL (ref 8–23)
BUN/CREAT SERPL: 18.2 (ref 7–25)
CALCIUM SPEC-SCNC: 8.9 MG/DL (ref 8.6–10.5)
CHLORIDE SERPL-SCNC: 103 MMOL/L (ref 98–107)
CO2 SERPL-SCNC: 23 MMOL/L (ref 22–29)
CREAT SERPL-MCNC: 1.37 MG/DL (ref 0.76–1.27)
EGFRCR SERPLBLD CKD-EPI 2021: 54.1 ML/MIN/1.73
GLUCOSE SERPL-MCNC: 227 MG/DL (ref 65–99)
POTASSIUM SERPL-SCNC: 4.4 MMOL/L (ref 3.5–5.2)
SODIUM SERPL-SCNC: 141 MMOL/L (ref 136–145)

## 2024-01-12 PROCEDURE — 80048 BASIC METABOLIC PNL TOTAL CA: CPT | Performed by: NURSE PRACTITIONER

## 2024-01-12 PROCEDURE — 25810000003 SODIUM CHLORIDE 0.9 % SOLUTION: Performed by: INTERNAL MEDICINE

## 2024-01-12 PROCEDURE — 96360 HYDRATION IV INFUSION INIT: CPT

## 2024-01-12 RX ORDER — SODIUM CHLORIDE 0.9 % (FLUSH) 0.9 %
20 SYRINGE (ML) INJECTION AS NEEDED
OUTPATIENT
Start: 2024-01-12

## 2024-01-12 RX ORDER — SODIUM CHLORIDE 0.9 % (FLUSH) 0.9 %
20 SYRINGE (ML) INJECTION AS NEEDED
Status: DISCONTINUED | OUTPATIENT
Start: 2024-01-12 | End: 2024-01-13 | Stop reason: HOSPADM

## 2024-01-12 RX ADMIN — Medication 20 ML: at 12:47

## 2024-01-12 RX ADMIN — SODIUM CHLORIDE 500 ML: 9 INJECTION, SOLUTION INTRAVENOUS at 11:41

## 2024-01-12 NOTE — PROGRESS NOTES
"Pt here for [employer]ru[employer] pump DC. Pump empty. Site clean, dry, and intact.  Pt denies having any issues. Port flushed with good blood return noted 10cc wasted. Blood specimen collected and sent to lab for processing per protocol. Lynette DAUGHERTY notified about pt's lab results. Lynette gave verbal order to continue with the 500mL of NS over one hour as ordered. Port flushed with saline prior to needle removal. Pt always refuses heparin flush d/t it \"making him feel weird.\" Pt given AVS and d/c home.   "

## 2024-01-17 RX ORDER — METOPROLOL SUCCINATE 25 MG/1
25 TABLET, EXTENDED RELEASE ORAL DAILY
Qty: 90 TABLET | Refills: 0 | Status: SHIPPED | OUTPATIENT
Start: 2024-01-17

## 2024-01-22 NOTE — PROGRESS NOTES
Hematology-Oncology Follow-up Note       Artie Mc  1950    Primary Care Physician: Chanel Carter APRN  Referring Physician: Chanel Carter APRN    Reason For Visit:  Chief Complaint   Patient presents with    Follow-up     Primary malignant neoplasm of lower third of esophagus     Metastatic esophageal cancer  Monitoring for adverse effects related to immunotherapy.  Hypothyroidism    HPI:   Mr. Mc is a pleasant 73 y.o. gentleman with a history of gastroesophageal reflux disease, diabetes, hypertension, heart block status post pacemaker placement and history of CABG.  He was having symptoms of dysphagia, weight loss since September 2016. He was reporting symptoms of food getting stuck in the lower chest.  He has transitioned himself to a liquid diet.  The patient reports losing about 30 pounds over this duration.  The patient underwent upper GI endoscopy on 12/20/16 with Dr. Esvin Barrera.  Endoscopy showed necrotic, circumferential and moderately obstructive mass at the distal esophagus between 42 cm and 36 cm.      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  There was also evidence of mild subacute inflammation in the duodenum.  Dr. Barrera ordered a CT scan of the chest and abdomen with contrast on 12/20/16.  The scan was done at [facility].  The scan showed a new ill-defined mass in the anterior wall of the distal esophagus at the GE junction measuring 2.3 x 2.2 x 1.6 cm, worrisome for cancer.  There were four subcentimeter liver lesions which had appeared stable in comparison to another CT scan from 2009 and they likely represent small cysts.  There was also mild lymphadenopathy in the gastrohepatic ligament.  There were at least six borderline enlarged lymph nodes measuring up to 1.3 x 1 cm in the region.  The patient is referred to us for further oncological evaluation.    1/5/2017 - The  patient presents for initial consultation.  He reports persistent dysphagia and also has symptoms of regurgitation.  He cannot tolerate solid foods and is dependent on liquid diet such as Ensure.  He also reports fatigue. Symptoms of dysphagia have been present for the past four months.  When he eats any solids, the food gets stuck in the lower chest.    1/5/17 - Vitamin B12 200 (L).  CEA 0.8.  Ferritin 35.  Iron saturation 16% (L), serum iron 56, TIBC 354.  Creatinine 0.9.  LFTs normal.  Folate 10.2.  1/12/17 - PET/CT scan:  Intense abnormal activity corresponding to the region of the GE junction and proximal stomach.  SUV is 11.06.  There is a suggestion of an abnormal mass or abnormal wall thickening in the region measuring 3.9 x 4.8 cm.  No additional abnormalities.  No evidence of metastases or other lymphadenopathy.  Scattered nonspecific subcentimeter lymph nodes involving the mediastinum and within the central upper abdomen.  1/13/17 - Creatinine 0.9.  LFTs normal.    1/17/17 - Patient seen by thoracic surgeon, Dr. Bradley.  PET scan was reviewed.  He has recommended neoadjuvant chemoradiation therapy followed by [place] Gurwinder esophagogastrectomy.  Port-A-Cath is being arranged.    1/20/17 - WBC 6.3, hemoglobin 12.1, platelet count 198,000, MCV 86.4.  1/30/17 - Patient started weekly Carboplatin and Taxol (Carboplatin AUC 2 and Taxol 50 mg/M2).  Patient received Injectafer 750 mg.  WBC 5.8, hemoglobin 12.4, platelet count 244,000.   1/31/17 - Patient seen by Dr. Eduardo Oleary.  The plan was to give 5040 cGy in 28 fractions.   2/6/17 - WBC 4.4, hemoglobin 11.6, platelet count 201,000.  Patient received cycle 2 of weekly Carboplatin and Taxol (Carboplatin AUC 2 and Taxol 50 mg/M2).  2/13/17 - Patient received Carboplatin and Taxol weekly cycle 3.  2/13/17 - Patient started concurrent radiation therapy.  Total planned dose is 4140 cGy.    2/13/17 to 2/15/17 - Patient received 4140 cGy of neoadjuvant radiation.   Radiation was finished on 3/15/17.    2/20/17 - WBC 1.8, hemoglobin 10.6, platelet count 217,000.  Creatinine 0.5. Folate 15 (L).  Ferritin 361.  Haptoglobin 214.  Iron saturation 33%, TIBC 263, serum iron 88.    2/20/17 - Patient received Carboplatin and Taxol weekly cycle 4.   2/27/17 - WBC 3.7, hemoglobin 10.7, platelet count 177,000, MCV 85.3.    3/6/17 - Patient has received 16 out of the 23 planned radiation treatments.  Total planned dose is 4140 cGy.    3/6/17 - WBC 5.4, hemoglobin 11.2, platelet count 113,000.  3/6/17 - Patient received cycle 5 of weekly Carboplatin and Taxol.   3/13/17 - Patient received cycle 6 of weekly Carboplatin and Taxol.  WBC 3.9, hemoglobin 11.3, platelet count 93,000.     3/20/17 - WBC 1.4, hemoglobin 10.0, platelet count 118,000, MCV 86.1   3/27/17 - WBC 3.8, hemoglobin 9.5, platelet count 176,000, MCV 87.3.    4/7/17 - Stress test:  No evidence of reversible myocardial ischemia.  Normal left ventricular ejection fraction of 50%.    4/17/17 - Upper GI endoscopy by Dr. 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  The GE junction adenocarcinoma lesion seems to have a very good response to chemotherapy and radiation.  The lumen was open.  5/1/17 - WBC 7.0, hemoglobin 10.2, platelet count 175,000.    5/4/17 - Patient underwent [facility]el Gurwinder esophagectomy with pyloroplasty, feeding jejunostomy tube, abdominal and mediastinal lymph node dissection.    Surgical Pathology:  Moderately to poorly differentiated adenocarcinoma measuring 1.3 cm at the GE junction.  Resection margins are negative for malignancy.  Tumor margins are negative.  There is effective treatment response.  No evidence of lymphovascular invasion.  Staging is ypT3,pN1.  One out of fourteen lymph nodes involved.   5/22/17 - WBC 6.8, hemoglobin 10.3, platelet count 312,000.    6/19/17 - WBC 5.7, hemoglobin 10.5, platelet count 204,000, MCV 92.1.    7/6/17 - EGD:  Status post balloon  dilation of esophageal stricture.  7/12/17 - EGD with balloon dilation of esophageal stricture.  7/26/17 - EGD and balloon dilation of esophageal stricture.  7/31/17 - PET/CT scan:  There is mild metabolic activity seen at the thoracic esophagus just at and below the surgical margins.  Some mild wall thickening.  This could be from recent surgery.  A residual cancer seems unlikely.  There is a precarinal lymph node with mild metabolic activity with SUV of 3 measuring 1 x 1.3 cm.  Again this appears to be nonspecific in my opinion.    8/7/17 - WBC 5.9, hemoglobin 11.9, platelet count 171,000, MCV 88.7.    10/19/17 - WBC 7.5, hemoglobin 11.8, platelet count 216,000.    1/8/18 - PET scan:  No evidence of residual disease or recurrent disease.  There is an esophageal stent in the mid esophagus with a large debris air level.  No evidence of any metastases in the chest, abdomen or pelvis.  Mild nodule uptake in the vocal cords with fullness in the right vocal cord.  This could be physiologic.    7/9/18 - CT scan of chest with contrast:  Prominent mediastinal lymph nodes appear stable since February.  Changes of gastric pull-through procedure for esophageal cancer again noted.  Tree-in-bud infiltrates in the left lung base, consistent with infection versus inflammation.    1/10/19 - CT chest, abdomen and pelvis with contrast:  No evidence of mets in the chest; however, interval development of metastatic lymphadenopathy in the left side of the retroperitoneum.  For instance, there is a 17 mm rounded lymph node, 10 mm lymph node and also a 14 mm lymph node.  These are all new.  Stable 9 mm hypodense lesion within the right hepatic lobe, which could reflect hemangioma or complex cyst.    1/30/19 - CT-guided core biopsy of retroperitoneal lymph node:  Poorly differentiated adenocarcinoma consistent with metastases from patient’s known esophageal primary.  CK20 positive, CDX2 positive, cytokeratin 7 negative, TTF-1 negative.     2/1/19 - Creatinine 0.9, BUN 18, calcium 9.3; these are normal.    2/28/19 - [facility] Next Gen sequencing testing on retroperitoneal lymph node specimen:  PD-L1 positive.  CPS = 3.  This implies responsiveness to pembrolizumab.  Mismatch repair status is proficient (no evidence of microsatellite instability).  Tumor mutational burden is low = 6 mutations/Mb.  CDH1 indeterminate.  KRAS mutation positive.  TP53 mutation positive.  CDK6 amplified.  [employer] PD-L1 testing by IHC:  PD-L1 expression 1% positive (CPS =1).  HER2/marvel by IHC is negative.  HER2/marvel by CISH not amplified.  Microsatellite stable tumor.    3/7/19 - WBC 7.3, hemoglobin 12, platelet count 128,000, MCV 92.     3/14/19 - Patient was seen by radiation oncologist, Dr. Chahal.  He has recommended observation versus systemic chemoimmunotherapy as needed.  No plans for radiation therapy.   5/1/19 - CT scan of chest with contrast:  No definitive findings of new metastatic disease in the chest.  Emphysema noted.    5/1/19 - CT abdomen and pelvis:  There is interval progression of metastatic retroperitoneal adenopathy.  Left periaortic adenopathy with lymph node measuring up to 2 cm, previously 1.7 cm.  Another lymph node now measures 2.3, previously 1.4 cm.  New enlarged lymph node on image 147 measures 1.4 cm, previously 0.4 cm.  A 9 mm hypodense right hepatic lobe lesion appears stable.    5/9/19 - Decision made to start patient on immunotherapy Keytruda.    5/9/19 - Vitamin B12 229.  Ferritin 61.  Folate 14.3.  Iron saturation 26%, TIBC 304, serum iron 79.  5/31/19 - Patient received Keytruda 200 mg cycle 1, day 1.    5/31/19 - Free T4 0.99.  Creatinine 0.9, BUN 9.  LFTs normal.  TSH 3.99.  Folate 14.3.  Iron saturation 26%.    6/21/2019 patient received Keytruda cycle 2  7/12/2019: Patient received Keytruda cycle 3  8/2/2019 patient received cycle 4 Keytruda WBC 7.2, hemoglobin 11.8 platelet count 163  8/23/2019 creatinine 0.9, LFTs normal, WBC 6.4,  hemoglobin 11.5, platelets 179, TSH 2.1   8/23/2019 patient received cycle 5 of Keytruda  9/3/2019 CT chest abdomen and pelvis with contrast: . Positive response to therapy in the abdomen since 05/01/2019. Pathologically enlarged retroperitoneal lymph nodes, predominantly in the left periaortic region, have diminished in size.a 1.3 x 2.1 cm left periaortic node (image 60), previously measured 3.5 x 2.3 cm. A 1.6 cm index node near the level of the left renal vein previously measured 2.0 cm No new or progressive adenopathy. 2. Stable findings in the chest. Noncalcified right upper lobe nodules are unchanged. There is no evidence of new or progressive metastatic disease within the chest. 3. 8 mm indeterminate low-density lesion in the right hepatic lobe, is stable. No new liver lesions.  9/6/2019 WBC 5.6, hemoglobin 11.2, platelets of 192, MCV 91.8  9/13/2019 patient received Keytruda cycle 6, albumin 3.2  10/4/2019 patient received cycle 7 of Keytruda, TSH 4.8, free T4 0.86  10/9/2019 WBC 5.8, hemoglobin 11.7, platelets 174  10/25/2019 patient received cycle 8 of Keytruda.  WBC 5.9, hemoglobin 11.5, platelets 152, creatinine 0.75, cortisol 15.2, TSH 6.48 high , free T4 1.13 normal  11/6/2019 WBC 6.6, hemoglobin 11.8, MCV 91.5, platelets 187  11/15/2019 patient received cycle 9 of Keytruda, WBC 6.6, hemoglobin 11.9, platelets 161, cortisol level 8.86, TSH 2.68, free T3 2.8, free T4 1.5  12/4/2019 WBC 6.9, hemoglobin 12.0, platelets 180, MCV 92.9  12/06/2019-Keytruda Cycle 10  12/27/2019- Cycle 11 LFTs normal, WBC 6.7, hemoglobin 11.4, platelets 168, MCV 93, TSH 2.4,  1/28/2020: CT chest abdomen and pelvis with contrast:  Single (aortic lymph node in the abdominal retroperitoneum has increased.  Rest of the retroperitoneal lymph nodes have decreased in size.  Mediastinal adenopathy appears unchanged.  Noncalcified bilateral pulmonary nodules are stable  1/17/2020 patient received Keytruda cycle 12:.  2/3/2020 WBC 7.1,  hemoglobin 12.2, platelets 171  02/10/2020 patient received cycle 13 of Keytruda: WBC 7.3, hemoglobin 12.1, platelets 166, creatinine 0.82  3/6/2020 patient is of Keytruda 200 mg, hemoglobin 10.8  3/27/2020 patient received Keytruda 200 mg, hemoglobin 11.4, TSH 1.95  4/17/2020 patient received Keytruda cycle 16 200 mg, WBC 6.1, hemoglobin 11.6, platelets 150  5/8/2020: Patient received cycle 17 Keytruda, WBC 6.3, hemoglobin 9.6, platelets 137, TSH 2.05, free T4 1.54, creatinine 0.93, LFTs normal,  5/29/2020: Patient received cycle 18 Keytruda, WBC 6.5, hemoglobin 11.7, platelets 129, creatinine 0.83, TSH 1.69  6/26/2020: Patient receiving Keytruda.   7/17/2020: TSH 2.06, WBC 5.7, hemoglobin 11.4, platelets 177, MCV 92.4, Keytruda 200 mg cycle 20  7/27/2020: CT chest abdomen pelvis with contrast: Stable findings in the chest abdomen pelvis since 1/28/2020.  Noncalcified bilateral pulmonary nodules are stable.  Stable retroperitoneal left perinephric adenopathy in the abdomen.  8/7/2020: Patient received cycle 21 of Keytruda  8/28/2020 patient is a cycle 22 of Keytruda.  WBC 6.02, hemoglobin 11.3, platelets 161  9/11/2020 WBC 6.9, hemoglobin 12.4, platelets 192  9/18/2020: Cycle 23 of Keytruda, TSH 1.19  10/9/2020: Cycle 24 of Keytruda, WBC 5.9, hemoglobin 11.9, platelets 178, creatinine 0.84, TSH 2.56, free T3 2.4, CMP normal  10/23/2020: WBC 7.34, hemoglobin 11.6, platelets 175  10/30/2020:.  Keytruda 200 mg  11/20/2020: Keytruda 200 mg  12/11/2020 Keytruda 200 mg.  WBC 5.5, hemoglobin 11.4, platelets 176, creatinine 0.8, LFTs normal, TSH 2.2, cortisol 8.3  12/10/2020: CT chest abdomen and pelvis with contrast: Previously seen left para-aortic lymph nodes within the abdomen are decreased in size.  No pathological lymphadenopathy.  No evidence of residual malignancy..  Small noncalcified pulmonary nodules are stable.  No new nodules.  Stable mediastinal lymph nodes.  3/12/2021: WBC 6.7, hemoglobin 11.3, platelets  17  3/12/2021: CMP normal, TSH 2.9, cortisol 11.95, 1  3/15/2021: CT chest with contrast: Stable findings in the chest with postsurgical changes of esophageal resection and gastric pull-through.  Stable right lower lobe lung nodule measures 11 mm.  Stable size and appearance of the mediastinal and upper abdominal lymph nodes.  3/23/2021: Doppler of upper extremity: Normal.  3/24/2021: CT abdomen and pelvis with contrast: Postoperative changes.  Left periaortic nodes are stable.  4/12/2021: TSH 4.1, cortisol 12.4  6/22/2021: CMP normal,Cortisol 10.08,, WBC 5.8, hemoglobin 11.2, platelets 109, TSH 2.59  7/12/2021: CT scan of the chest abdomen pelvis with contrast: Dominant 10 x 10 left periaortic lymph node is stable since 3/22/2021.  Dominant lymph nodes in the right lower paratracheal and AP window distribution are stable since 3/15/2021.  No evidence of progressive adenopathy in the chest abdomen or pelvis.    01/11/2022 -PET/CT findings consistent with new cedric metastatic disease in the retroperitoneum.  2 new pathologically enlarged hypermetabolic retroperitoneal lymph nodes.  No convincing evidence of recurrent disease within the chest neck pelvis or skeleton.  1/20/2022 -CT-guided needle biopsy consistent with poorly differentiated carcinoma.  CDX2 positive consistent with metastases from known esophageal primary  2/9/2022 - Pembrolizumab complicated with fatigue, joint pains, nausea, diarrhea. Symptoms decrease in intensity over a week.  3/30/2022 -CT chest and pelvis with stable metastatic lymph nodes in the left periaortic retroperitoneum.  No evidence of disease progression or new lesions.  Stable lymphadenopathy in the distal paratracheal mediastinal area  4/13/2022 - Keytruda 200  5/4/2022 - Keytruda 200  Continued keytruda  7/11/2022 - CT abdomen pelvis with decrease in size of left perioaortic lymph node. No other areas of disease.  Continues to be on Keytruda  10/18/2022 - stable CT imaging  1/20/2023  - CT imaging with stable disease. No significant change.  Patient was hospitalized with esophagitis, subsequent EGD with improvement, no recurrence of disease.  2/21/2023 - Keytruda  CT CAP with increased retroperitoneal adenopathy.   ECHO with EF 35-40% (concern for immunotherapy related )immunotherapy held with ongoing investigation for cause.  Subsequent improvement in EF to 45%  4/4/2023 - CT abdomen pelvis with interval retroperitoneal lymph node enlargement compatible with metastatic adenopathyl.  5/5/2023 - PET CT with mixed response. No clear evidence of progression  6/2/23 -started pembrolizumab 200 mg IV  Posttreatment had symptoms of chest tightness troponin and proBNP negative, echocardiogram with EF 40%  Subsequent cardiac cath with EF down to 30% elevated proBNP, troponin no significant obstructive disease in the graft vessels  Cardiac MRI unable to obtain given pacemaker noncompliant.  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 Two enlarged mediastinal lymph nodes in the precarinal and AP window distributions are unchanged since 4/4/2023. No new or progressive adenopathy in the chest.Stable 3 mm or less noncalcified right lung nodules since 4/4/2023. Surgical changes of distal esophagectomy with gastric pull-through, without evidence of local disease recurrence  8/29/23 - started FOLFOX had significant side effects  9/20/23 - stopped oxaliplatin and continued 5FU only'  11/29/23 - CT CAP with decrease size of retroperitoneal adenopathy, no other concerning findings.  Continues 5FU leucovorin     Subjective:  Complains of increasing nausea, has some fatigue, renal function has been slightly abnormal    Past Medical History:   Diagnosis Date    B12 deficiency      Blockage of coronary artery of heart     Depression     DM (diabetes mellitus)     Dysphagia     GERD (gastroesophageal reflux disease)     HTN (hypertension)     Hyperlipidemia        Past Surgical History:   Procedure Laterality Date    ABDOMINAL WALL ABSCESS INCISION AND DRAINAGE  10/22/2018    WITH DEBRIDEMENT  1.5CM X 1.5 CM X 1.5 CM    DR. PURI    CARDIAC CATHETERIZATION Right 7/20/2023    Procedure: Left Heart Cath;  Surgeon: Mazin Simmons MD;  Location: [facility] CATH INVASIVE LOCATION;  Service: Cardiovascular;  Laterality: Right;    CATARACT EXTRACTION  2018    COLONOSCOPY      CORONARY ARTERY BYPASS GRAFT  2015    ENDOSCOPY  12/20/2016    ENDOSCOPY N/A 01/02/2023    Procedure: ESOPHAGOGASTRODUODENOSCOPY;  Surgeon: Esvin Morgan MD;  Location: [facility] ENDOSCOPY;  Service: Gastroenterology;  Laterality: N/A;  post: anastamosis stricture    ENDOSCOPY N/A 02/09/2023    Procedure: ESOPHAGOGASTRODUODENOSCOPY, non-wire guided balloon dilation (12-14mm) of esophageal stricture;  Surgeon: Tino Villafana MD;  Location: [facility] ENDOSCOPY;  Service: Gastroenterology;  Laterality: N/A;  post: esophageal anastamotic stricture, post surgical changes    ESOPHAGECTOMY  05/04/2017    BROOKS PETTY ESOPHAGECTOMY, FEEDING JEJNOSTOMOY, ABDOMINAL AND MEDIASTINAL LYMPH NODE DISECTION, PEDICLED MUSCLE FLAP COVERAGE DR. PURI    ESOPHAGOSCOPY / EGD  07/12/2017    WITH BALLOON DILATION    ESOPHAGOSCOPY / EGD  07/26/2017    WITH BALLOON DILATION    ESOPHAGOSCOPY / EGD  08/11/2017    WITH BALLOON DILATION    ESOPHAGOSCOPY / EGD  08/28/2017    WITH BALLOON DILATION    ESOPHAGOSCOPY / EGD  09/27/2017    WITH BALLOON DILATION    INSERT / REPLACE / REMOVE PACEMAKER      LYMPH NODE BIOPSY  01/30/2019    PACEMAKER IMPLANTATION  11/21/2016       Current Outpatient Medications:     clonazePAM (KlonoPIN) 0.5 MG tablet, Take 0.5 tablets by mouth 2 (Two) Times a Day As Needed for Anxiety., Disp: , Rfl: 1     cyanocobalamin (VITAMIN B-12) 1000 MCG tablet, Take 1 tablet by mouth Daily., Disp: , Rfl:     folic acid (FOLVITE) 1 MG tablet, Take 1 tablet by mouth Daily., Disp: , Rfl:     levothyroxine (SYNTHROID, LEVOTHROID) 50 MCG tablet, Take 1 tablet by mouth once daily, Disp: 90 tablet, Rfl: 0    magic mouthwash oral suspension, SWISH AND SPIT FIVE ML FOUR TIMES DAILY AS NEEDED, Disp: , Rfl:     metFORMIN (GLUCOPHAGE) 1000 MG tablet, Take 0.5 tablets by mouth Daily., Disp: 60 tablet, Rfl: 2    metoprolol succinate XL (TOPROL-XL) 25 MG 24 hr tablet, Take 1 tablet by mouth once daily, Disp: 90 tablet, Rfl: 0    ondansetron (ZOFRAN) 4 MG tablet, Take 1 tablet by mouth Every 8 (Eight) Hours As Needed for Nausea or Vomiting., Disp: , Rfl:     ondansetron (ZOFRAN) 8 MG tablet, Take 1 tablet by mouth 3 (Three) Times a Day As Needed for Nausea or Vomiting., Disp: 30 tablet, Rfl: 5    pantoprazole (PROTONIX) 40 MG EC tablet, Take 1 tablet by mouth 2 (Two) Times a Day., Disp: , Rfl:     pravastatin (PRAVACHOL) 10 MG tablet, Take 1 tablet by mouth Every Other Day., Disp: , Rfl:     spironolactone (ALDACTONE) 25 MG tablet, Take 0.5 tablets by mouth Daily. (Patient not taking: Reported on 11/1/2023), Disp: 30 tablet, Rfl: 2  No current facility-administered medications for this visit.    Facility-Administered Medications Ordered in Other Visits:     dextrose (D5W) 5 % infusion 250 mL, 250 mL, Intravenous, Once, Jean Pierre Elizondo MD    fluorouracil (ADRUCIL) 4,130 mg in sodium chloride 0.9 % 94 mL chemo infusion - FOR HOME USE, 2,400 mg/m2 (Treatment Plan Recorded), Intravenous, Once, Jean Pierre Elizondo MD    leucovorin 690 mg in sodium chloride 0.9 % 284.5 mL IVPB, 400 mg/m2 (Treatment Plan Recorded), Intravenous, Once, Jean Pierre Elizondo MD    Allergies   Allergen Reactions    Ace Inhibitors Unknown (See Comments)     Unknown reaction      Heparin Other (See Comments)     Fever/chills, weakness in legs    Sulfa Antibiotics Other (See Comments)      "Mouth sores    Ciprofloxacin GI Intolerance     Pt reports pain to abd       Family History   Problem Relation Age of Onset    Mental illness Mother     Heart disease Mother     Hypertension Father     Cancer Father     Heart disease Sister     Heart disease Brother      Cancer-related family history includes Cancer in his father.    Social History     Tobacco Use    Smoking status: Former     Packs/day: 2.00     Years: 50.00     Additional pack years: 0.00     Total pack years: 100.00     Types: Cigarettes     Quit date: 2015     Years since quittin.6    Smokeless tobacco: Former     Types: Chew     Quit date: 1989   Vaping Use    Vaping Use: Never used   Substance Use Topics    Alcohol use: No    Drug use: No     ROS:     Objective:    Vitals:    24 1013   BP: 123/61   Pulse: 76   Resp: 16   Temp: 97.5 °F (36.4 °C)   SpO2: 98%   Weight: 67.1 kg (148 lb)   Height: 165.1 cm (65\")   PainSc: 0-No pain       (1) Restricted in physically strenuous activity, ambulatory and able to do work of light nature    Physical Exam:     Physical Exam   Constitutional: He is oriented to person, place, and time. He appears well-developed. No distress.   HENT:   Head: Normocephalic and atraumatic.   Nose: Nose normal.   Mouth/Throat: Mucous membranes are moist.   Eyes: Pupils are equal, round, and reactive to light.   Neck: No thyromegaly present.   Cardiovascular: Normal rate, regular rhythm, normal heart sounds and normal pulses. Exam reveals no gallop and no friction rub.   Pulmonary/Chest: Effort normal and breath sounds normal.   Abdominal: Soft. Normal appearance and bowel sounds are normal.   No tenderness   Musculoskeletal: Normal range of motion. No deformity or signs of injury.      Right lower leg: No edema.   Neurological: He is alert and oriented to person, place, and time. He exhibits normal muscle tone.   Skin: Skin is warm and dry. No rash noted. He is not diaphoretic. No erythema. No jaundice. "   Right side chest  port       Lab Results - Last 18 Months   Lab Units 01/24/24  1016 01/10/24  0804 12/27/23  0943   WBC 10*3/mm3 4.35 6.08 5.02   HEMOGLOBIN g/dL 8.4* 8.7* 8.4*   HEMATOCRIT % 27.0* 27.7* 26.9*   PLATELETS 10*3/mm3 173 199 203   MCV fL 95.1 94.2 94.1     Lab Results - Last 18 Months   Lab Units 01/24/24  1043 01/12/24  1028 01/10/24  0804 12/29/23  1153 12/27/23  0943   SODIUM mmol/L  --  141 138 139 139   POTASSIUM mmol/L  --  4.4 5.3* 4.6 4.8   CHLORIDE mmol/L  --  103 103 102 103   CO2 mmol/L  --  23.0 20.0* 22.0 22.0   BUN mg/dL  --  25* 22 19 17   CREATININE mg/dL 1.50* 1.37* 1.70* 1.41* 1.45*   CALCIUM mg/dL  --  8.9 9.2 8.9 8.8   BILIRUBIN mg/dL  --   --  0.2 0.3 0.2   ALK PHOS U/L  --   --  37* 40 43   ALT (SGPT) U/L  --   --  6 6 8   AST (SGOT) U/L  --   --  15 15 16   GLUCOSE mg/dL  --  227* 271* 228* 160*     Assessment & Plan     Assessment:    Metastatic esophageal carcinoma:    Patient has a history of GE junction poorly differentiated adenocarcinoma for which he had chemoradiation followed by [place] [facility] resection:  Pathology showed ypT3,pN1 disease.  Tumor was Stage IIIA, diagnosed in 2016.  He received concurrent chemoradiation with weekly Carboplatin and Taxol and radiation finishing on 3/15/17.  CT scan on 1/10/19 showed new retroperitoneal lymph nodes.  These lymph nodes were biopsied on 1/30/19 and it shows metastatic esophageal cancer.  [facility] Next Gen testing was performed on the sample and it shows KRAS mutation, microsatellite stable tumor.  PD-L1 is positive.  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  HER2/marvel (ERBB2) was not amplified and was negative.  His CT scan on 5/1/19 shows evidence of progression.  Patient has started receiving Keytruda on 5/31/19.  He has received 19 cycles so far.   CT scan on 1/28/2020 shows improvement and continued response.  CT scan chest abdomen pelvis with contrast on 12/10/2020 does not show any evidence of disease progression.  On 12/22/2020 decision  made to hold Keytruda per patient's request to get a treatment break.. Repeat CT scan chest 7/12/2021 does not show any evidence of disease progression.  Now CT imaging in January 2021 with a CT showing increased abdominal left para-aortic adenopathy.  Subsequent PET/CT with new cedric metastatic disease in the retroperitoneum of the abdomen.  New pathologically enlarged hypermetabolic retroperitoneal lymph nodes.  This would be concerning for disease recurrence/progression.  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  I discussed with him the treatment options going forward.  He has had a good response with immunotherapy pembrolizumab in the past. This was  restarted. subsequent imaging with treatment response. 1/2023 imaging with good response. Continued treatment  Now with increased adenopathy 2 new lymph nodes. Holding immunotherapy for now.   Consider getting cardiac MR discussed with Dr. Culp. While immunotherapy on hold, increased adenopathy is concerning. Could be pseudoprogression vs true progression. PET CT reviewed could indicate pseudoprogression since he has had a good response to immunotherapy  immunotherapy was restarted and has been on hold with patient's symptomatology cardiac MRI cannot be obtained, no ischemic heart disease leading to cardiomyopathy this most likely is related to Keytruda.  We will stop Keytruda going forward  CT imaging with stable disease, FOLFOX was started which he had significant side effects  With imaging being stable and significant side effects with FOLFOX, we discussed  about options with wait and watch vs 5FU only, patient prefers to be on maintenance.   We started 5FU only .   Tolerating relatively well, has significant fatigue. CT imaging now with ongoing response. We discussed that he would have been on treatment for 2 years following recurrence with good response. We can continue treatment now and get a PET CT in 2/2024.will repeat prior to follow  up. Get signatera. Discuss discontinuation of chemotherapy.    Possible myocarditis  Symptoms started after last immunotherapy  Treatment on hold for now, symptoms have improved, cardiac markers were negative, echocardiogram with EF 40%, cardiac MRI cannot be obtained cardiac cath with EF down to 30% management per cardiology  Now EF up to 40-45% monitor. No significant symptoms.    Shortness of breath  Improved, has occasional shortness of breath, likely copd  I have recommended using albuterol  Symptoms have improved since stopping immunotherapy. Questionable pneumonitis will monitor closely with immunotherapy  Cardiology has evaluated for myocarditis related to immunotherapy symptomatic improvement now    Reflux/heartburn/dysphagia:   Symptoms improved.  Also has a history of strictures. Status post EGD and balloon dilation.   Continues to be on pantoprazole symptoms stable.    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  Continues on Synthroid stable now    Mild diarrhea:  diarrhea seems to be waxing and waning in nature.  Diarrhea related to immunotherapy now improved    PD-L1 positive, HER2 negative, p53 and KRAS positive.  Repeat Caris testing with repeat biopsy with no target mutations.    B12 deficiency: Continue B12 1000 mcg twice daily.    Anemia - Hb improved and stable.    Dysuria - has some dysuria, this has improved.    Groin pain - negative for UTI, STI, has seen urology, likely prostatits, started cipro improvement noted.         Jean Pierre Elizondo MD 01/24/24     No orders of the defined types were placed in this encounter.

## 2024-01-24 ENCOUNTER — OFFICE VISIT (OUTPATIENT)
Dept: ONCOLOGY | Facility: CLINIC | Age: 74
End: 2024-01-24
Payer: MEDICARE

## 2024-01-24 ENCOUNTER — HOSPITAL ENCOUNTER (OUTPATIENT)
Dept: ONCOLOGY | Facility: HOSPITAL | Age: 74
Discharge: HOME | End: 2024-01-24
Admitting: INTERNAL MEDICINE
Payer: MEDICARE

## 2024-01-24 VITALS
WEIGHT: 148 LBS | HEART RATE: 76 BPM | RESPIRATION RATE: 16 BRPM | HEIGHT: 65 IN | DIASTOLIC BLOOD PRESSURE: 61 MMHG | OXYGEN SATURATION: 98 % | SYSTOLIC BLOOD PRESSURE: 123 MMHG | TEMPERATURE: 97.5 F | BODY MASS INDEX: 24.66 KG/M2

## 2024-01-24 VITALS
RESPIRATION RATE: 16 BRPM | WEIGHT: 148.5 LBS | SYSTOLIC BLOOD PRESSURE: 123 MMHG | HEART RATE: 76 BPM | BODY MASS INDEX: 24.74 KG/M2 | OXYGEN SATURATION: 98 % | HEIGHT: 65 IN | TEMPERATURE: 97.5 F | DIASTOLIC BLOOD PRESSURE: 61 MMHG

## 2024-01-24 DIAGNOSIS — C16.0 MALIGNANT NEOPLASM OF CARDIA: ICD-10-CM

## 2024-01-24 DIAGNOSIS — C15.5 PRIMARY MALIGNANT NEOPLASM OF LOWER THIRD OF ESOPHAGUS: ICD-10-CM

## 2024-01-24 DIAGNOSIS — C15.9 ESOPHAGEAL CANCER, STAGE IV: ICD-10-CM

## 2024-01-24 DIAGNOSIS — C16.0 MALIGNANT NEOPLASM OF CARDIA: Primary | ICD-10-CM

## 2024-01-24 DIAGNOSIS — C15.5 PRIMARY MALIGNANT NEOPLASM OF LOWER THIRD OF ESOPHAGUS: Primary | ICD-10-CM

## 2024-01-24 LAB
ALBUMIN SERPL-MCNC: 4 G/DL (ref 3.5–5.2)
ALBUMIN/GLOB SERPL: 2.1 G/DL
ALP SERPL-CCNC: 36 U/L (ref 39–117)
ALT SERPL W P-5'-P-CCNC: 5 U/L (ref 1–41)
ANION GAP SERPL CALCULATED.3IONS-SCNC: 13 MMOL/L (ref 5–15)
AST SERPL-CCNC: 14 U/L (ref 1–40)
BASOPHILS # BLD AUTO: 0.02 10*3/MM3 (ref 0–0.2)
BASOPHILS NFR BLD AUTO: 0.5 % (ref 0–1.5)
BILIRUB SERPL-MCNC: 0.2 MG/DL (ref 0–1.2)
BUN SERPL-MCNC: 19 MG/DL (ref 8–23)
BUN/CREAT SERPL: 12.7 (ref 7–25)
CALCIUM SPEC-SCNC: 9 MG/DL (ref 8.6–10.5)
CHLORIDE SERPL-SCNC: 108 MMOL/L (ref 98–107)
CO2 SERPL-SCNC: 19 MMOL/L (ref 22–29)
CREAT BLDA-MCNC: 1.5 MG/DL (ref 0.6–1.3)
CREAT SERPL-MCNC: 1.5 MG/DL (ref 0.76–1.27)
DEPRECATED RDW RBC AUTO: 66.3 FL (ref 37–54)
EGFRCR SERPLBLD CKD-EPI 2021: 48.6 ML/MIN/1.73
EGFRCR SERPLBLD CKD-EPI 2021: 48.6 ML/MIN/1.73
EOSINOPHIL # BLD AUTO: 0.11 10*3/MM3 (ref 0–0.4)
EOSINOPHIL NFR BLD AUTO: 2.5 % (ref 0.3–6.2)
ERYTHROCYTE [DISTWIDTH] IN BLOOD BY AUTOMATED COUNT: 20 % (ref 12.3–15.4)
GLOBULIN UR ELPH-MCNC: 1.9 GM/DL
GLUCOSE SERPL-MCNC: 242 MG/DL (ref 65–99)
HCT VFR BLD AUTO: 27 % (ref 37.5–51)
HGB BLD-MCNC: 8.4 G/DL (ref 13–17.7)
LYMPHOCYTES # BLD AUTO: 1.24 10*3/MM3 (ref 0.7–3.1)
LYMPHOCYTES NFR BLD AUTO: 28.5 % (ref 19.6–45.3)
MCH RBC QN AUTO: 29.6 PG (ref 26.6–33)
MCHC RBC AUTO-ENTMCNC: 31.1 G/DL (ref 31.5–35.7)
MCV RBC AUTO: 95.1 FL (ref 79–97)
MONOCYTES # BLD AUTO: 0.55 10*3/MM3 (ref 0.1–0.9)
MONOCYTES NFR BLD AUTO: 12.6 % (ref 5–12)
NEUTROPHILS NFR BLD AUTO: 2.43 10*3/MM3 (ref 1.7–7)
NEUTROPHILS NFR BLD AUTO: 55.9 % (ref 42.7–76)
PLATELET # BLD AUTO: 173 10*3/MM3 (ref 140–450)
PMV BLD AUTO: 10.7 FL (ref 6–12)
POTASSIUM SERPL-SCNC: 4.8 MMOL/L (ref 3.5–5.2)
PROT SERPL-MCNC: 5.9 G/DL (ref 6–8.5)
RBC # BLD AUTO: 2.84 10*6/MM3 (ref 4.14–5.8)
SODIUM SERPL-SCNC: 140 MMOL/L (ref 136–145)
WBC NRBC COR # BLD AUTO: 4.35 10*3/MM3 (ref 3.4–10.8)

## 2024-01-24 PROCEDURE — 25010000002 FLUOROURACIL PER 500 MG: Performed by: INTERNAL MEDICINE

## 2024-01-24 PROCEDURE — 25810000003 SODIUM CHLORIDE 0.9 % SOLUTION: Performed by: INTERNAL MEDICINE

## 2024-01-24 PROCEDURE — 85025 COMPLETE CBC W/AUTO DIFF WBC: CPT | Performed by: INTERNAL MEDICINE

## 2024-01-24 PROCEDURE — 96416 CHEMO PROLONG INFUSE W/PUMP: CPT

## 2024-01-24 PROCEDURE — 25010000002 LEUCOVORIN CALCIUM PER 50 MG: Performed by: INTERNAL MEDICINE

## 2024-01-24 PROCEDURE — 96360 HYDRATION IV INFUSION INIT: CPT

## 2024-01-24 PROCEDURE — 3078F DIAST BP <80 MM HG: CPT | Performed by: INTERNAL MEDICINE

## 2024-01-24 PROCEDURE — 25810000003 SODIUM CHLORIDE 0.9 % SOLUTION 250 ML FLEX CONT: Performed by: INTERNAL MEDICINE

## 2024-01-24 PROCEDURE — G0498 CHEMO EXTEND IV INFUS W/PUMP: HCPCS

## 2024-01-24 PROCEDURE — 96361 HYDRATE IV INFUSION ADD-ON: CPT

## 2024-01-24 PROCEDURE — 96367 TX/PROPH/DG ADDL SEQ IV INF: CPT

## 2024-01-24 PROCEDURE — 96365 THER/PROPH/DIAG IV INF INIT: CPT

## 2024-01-24 PROCEDURE — 82565 ASSAY OF CREATININE: CPT

## 2024-01-24 PROCEDURE — 3074F SYST BP LT 130 MM HG: CPT | Performed by: INTERNAL MEDICINE

## 2024-01-24 PROCEDURE — 96375 TX/PRO/DX INJ NEW DRUG ADDON: CPT

## 2024-01-24 PROCEDURE — 1126F AMNT PAIN NOTED NONE PRSNT: CPT | Performed by: INTERNAL MEDICINE

## 2024-01-24 PROCEDURE — 80053 COMPREHEN METABOLIC PANEL: CPT | Performed by: INTERNAL MEDICINE

## 2024-01-24 PROCEDURE — 25010000002 DEXAMETHASONE SODIUM PHOSPHATE 120 MG/30ML SOLUTION: Performed by: INTERNAL MEDICINE

## 2024-01-24 RX ORDER — DIPHENHYDRAMINE HYDROCHLORIDE 50 MG/ML
50 INJECTION INTRAMUSCULAR; INTRAVENOUS AS NEEDED
Status: CANCELLED | OUTPATIENT
Start: 2024-01-24

## 2024-01-24 RX ORDER — DEXTROSE MONOHYDRATE 50 MG/ML
250 INJECTION, SOLUTION INTRAVENOUS ONCE
Status: DISCONTINUED | OUTPATIENT
Start: 2024-01-24 | End: 2024-01-25 | Stop reason: HOSPADM

## 2024-01-24 RX ORDER — FAMOTIDINE 10 MG/ML
20 INJECTION, SOLUTION INTRAVENOUS AS NEEDED
Status: CANCELLED | OUTPATIENT
Start: 2024-01-24

## 2024-01-24 RX ORDER — DEXTROSE MONOHYDRATE 50 MG/ML
250 INJECTION, SOLUTION INTRAVENOUS ONCE
Status: CANCELLED | OUTPATIENT
Start: 2024-01-24

## 2024-01-24 RX ADMIN — FLUOROURACIL 4130 MG: 50 INJECTION, SOLUTION INTRAVENOUS at 12:49

## 2024-01-24 RX ADMIN — DEXAMETHASONE SODIUM PHOSPHATE 12 MG: 4 INJECTION, SOLUTION INTRA-ARTICULAR; INTRALESIONAL; INTRAMUSCULAR; INTRAVENOUS; SOFT TISSUE at 11:29

## 2024-01-24 RX ADMIN — SODIUM CHLORIDE 1000 ML: 9 INJECTION, SOLUTION INTRAVENOUS at 11:02

## 2024-01-24 RX ADMIN — LEUCOVORIN CALCIUM 690 MG: 350 INJECTION, POWDER, LYOPHILIZED, FOR SUSPENSION INTRAMUSCULAR; INTRAVENOUS at 12:09

## 2024-01-26 ENCOUNTER — HOSPITAL ENCOUNTER (OUTPATIENT)
Dept: ONCOLOGY | Facility: HOSPITAL | Age: 74
Discharge: HOME | End: 2024-01-26
Payer: MEDICARE

## 2024-01-26 DIAGNOSIS — C16.0 MALIGNANT NEOPLASM OF CARDIA: Primary | ICD-10-CM

## 2024-01-26 DIAGNOSIS — C15.5 PRIMARY MALIGNANT NEOPLASM OF LOWER THIRD OF ESOPHAGUS: ICD-10-CM

## 2024-01-26 RX ORDER — SODIUM CHLORIDE 0.9 % (FLUSH) 0.9 %
20 SYRINGE (ML) INJECTION AS NEEDED
Status: DISCONTINUED | OUTPATIENT
Start: 2024-01-26 | End: 2024-01-27 | Stop reason: HOSPADM

## 2024-01-26 RX ORDER — SODIUM CHLORIDE 0.9 % (FLUSH) 0.9 %
20 SYRINGE (ML) INJECTION AS NEEDED
OUTPATIENT
Start: 2024-01-26

## 2024-01-26 RX ADMIN — Medication 20 ML: at 12:55

## 2024-01-26 NOTE — PROGRESS NOTES
Pt here for Adrucil pump DC. Pump empty. Site clean, dry, and intact.  Pt denies having any issues. Pt does not want IV fluids today. Port flushed with good blood return noted.  Port flushed with saline only because heparin makes him feel weird.  Pt didn't need an AVS and was d/c home.

## 2024-01-30 ENCOUNTER — OUTSIDE FACILITY SERVICE (OUTPATIENT)
Dept: CARDIOLOGY | Facility: CLINIC | Age: 74
End: 2024-01-30
Payer: MEDICARE

## 2024-02-07 ENCOUNTER — HOSPITAL ENCOUNTER (OUTPATIENT)
Dept: ONCOLOGY | Facility: HOSPITAL | Age: 74
Discharge: HOME | End: 2024-02-07
Admitting: INTERNAL MEDICINE
Payer: MEDICARE

## 2024-02-07 VITALS
SYSTOLIC BLOOD PRESSURE: 118 MMHG | BODY MASS INDEX: 25.39 KG/M2 | HEART RATE: 74 BPM | RESPIRATION RATE: 16 BRPM | WEIGHT: 152.4 LBS | OXYGEN SATURATION: 97 % | HEIGHT: 65 IN | DIASTOLIC BLOOD PRESSURE: 64 MMHG | TEMPERATURE: 97.7 F

## 2024-02-07 DIAGNOSIS — C16.0 MALIGNANT NEOPLASM OF CARDIA: Primary | ICD-10-CM

## 2024-02-07 DIAGNOSIS — C15.5 PRIMARY MALIGNANT NEOPLASM OF LOWER THIRD OF ESOPHAGUS: ICD-10-CM

## 2024-02-07 DIAGNOSIS — T45.1X5S ADVERSE EFFECT OF CHEMOTHERAPY, SEQUELA: ICD-10-CM

## 2024-02-07 LAB
ALBUMIN SERPL-MCNC: 3.8 G/DL (ref 3.5–5.2)
ALBUMIN/GLOB SERPL: 1.8 G/DL
ALP SERPL-CCNC: 40 U/L (ref 39–117)
ALT SERPL W P-5'-P-CCNC: 6 U/L (ref 1–41)
ANION GAP SERPL CALCULATED.3IONS-SCNC: 9 MMOL/L (ref 5–15)
AST SERPL-CCNC: 14 U/L (ref 1–40)
BASOPHILS # BLD AUTO: 0.03 10*3/MM3 (ref 0–0.2)
BASOPHILS NFR BLD AUTO: 0.6 % (ref 0–1.5)
BILIRUB SERPL-MCNC: 0.3 MG/DL (ref 0–1.2)
BUN SERPL-MCNC: 16 MG/DL (ref 8–23)
BUN/CREAT SERPL: 12.2 (ref 7–25)
CALCIUM SPEC-SCNC: 8.8 MG/DL (ref 8.6–10.5)
CHLORIDE SERPL-SCNC: 106 MMOL/L (ref 98–107)
CO2 SERPL-SCNC: 24 MMOL/L (ref 22–29)
CREAT BLDA-MCNC: 1.4 MG/DL (ref 0.6–1.3)
CREAT SERPL-MCNC: 1.31 MG/DL (ref 0.76–1.27)
DEPRECATED RDW RBC AUTO: 66.1 FL (ref 37–54)
EGFRCR SERPLBLD CKD-EPI 2021: 52.7 ML/MIN/1.73
EGFRCR SERPLBLD CKD-EPI 2021: 57.1 ML/MIN/1.73
EOSINOPHIL # BLD AUTO: 0.13 10*3/MM3 (ref 0–0.4)
EOSINOPHIL NFR BLD AUTO: 2.8 % (ref 0.3–6.2)
ERYTHROCYTE [DISTWIDTH] IN BLOOD BY AUTOMATED COUNT: 20.2 % (ref 12.3–15.4)
GLOBULIN UR ELPH-MCNC: 2.1 GM/DL
GLUCOSE SERPL-MCNC: 123 MG/DL (ref 65–99)
HCT VFR BLD AUTO: 25.8 % (ref 37.5–51)
HGB BLD-MCNC: 8.1 G/DL (ref 13–17.7)
LYMPHOCYTES # BLD AUTO: 1.18 10*3/MM3 (ref 0.7–3.1)
LYMPHOCYTES NFR BLD AUTO: 25.5 % (ref 19.6–45.3)
MCH RBC QN AUTO: 29.7 PG (ref 26.6–33)
MCHC RBC AUTO-ENTMCNC: 31.4 G/DL (ref 31.5–35.7)
MCV RBC AUTO: 94.5 FL (ref 79–97)
MONOCYTES # BLD AUTO: 0.61 10*3/MM3 (ref 0.1–0.9)
MONOCYTES NFR BLD AUTO: 13.2 % (ref 5–12)
NEUTROPHILS NFR BLD AUTO: 2.67 10*3/MM3 (ref 1.7–7)
NEUTROPHILS NFR BLD AUTO: 57.9 % (ref 42.7–76)
PLATELET # BLD AUTO: 195 10*3/MM3 (ref 140–450)
PMV BLD AUTO: 10.1 FL (ref 6–12)
POTASSIUM SERPL-SCNC: 4.7 MMOL/L (ref 3.5–5.2)
PROT SERPL-MCNC: 5.9 G/DL (ref 6–8.5)
RBC # BLD AUTO: 2.73 10*6/MM3 (ref 4.14–5.8)
SODIUM SERPL-SCNC: 139 MMOL/L (ref 136–145)
WBC NRBC COR # BLD AUTO: 4.62 10*3/MM3 (ref 3.4–10.8)

## 2024-02-07 PROCEDURE — 25010000002 LEUCOVORIN CALCIUM PER 50 MG: Performed by: INTERNAL MEDICINE

## 2024-02-07 PROCEDURE — 82565 ASSAY OF CREATININE: CPT

## 2024-02-07 PROCEDURE — 96416 CHEMO PROLONG INFUSE W/PUMP: CPT

## 2024-02-07 PROCEDURE — 0 DEXTROSE 5 % SOLUTION: Performed by: INTERNAL MEDICINE

## 2024-02-07 PROCEDURE — 25810000003 SODIUM CHLORIDE 0.9 % SOLUTION 250 ML FLEX CONT: Performed by: INTERNAL MEDICINE

## 2024-02-07 PROCEDURE — 96367 TX/PROPH/DG ADDL SEQ IV INF: CPT

## 2024-02-07 PROCEDURE — 96375 TX/PRO/DX INJ NEW DRUG ADDON: CPT

## 2024-02-07 PROCEDURE — 80053 COMPREHEN METABOLIC PANEL: CPT | Performed by: INTERNAL MEDICINE

## 2024-02-07 PROCEDURE — G0498 CHEMO EXTEND IV INFUS W/PUMP: HCPCS

## 2024-02-07 PROCEDURE — 96365 THER/PROPH/DIAG IV INF INIT: CPT

## 2024-02-07 PROCEDURE — 25810000003 SODIUM CHLORIDE 0.9 % SOLUTION 500 ML FLEX CONT: Performed by: INTERNAL MEDICINE

## 2024-02-07 PROCEDURE — 25010000002 DEXAMETHASONE SODIUM PHOSPHATE 120 MG/30ML SOLUTION: Performed by: INTERNAL MEDICINE

## 2024-02-07 PROCEDURE — 25010000002 FLUOROURACIL PER 500 MG: Performed by: INTERNAL MEDICINE

## 2024-02-07 PROCEDURE — 85025 COMPLETE CBC W/AUTO DIFF WBC: CPT | Performed by: INTERNAL MEDICINE

## 2024-02-07 RX ORDER — DEXTROSE MONOHYDRATE 50 MG/ML
250 INJECTION, SOLUTION INTRAVENOUS ONCE
Status: COMPLETED | OUTPATIENT
Start: 2024-02-07 | End: 2024-02-07

## 2024-02-07 RX ADMIN — DEXAMETHASONE SODIUM PHOSPHATE 12 MG: 4 INJECTION, SOLUTION INTRA-ARTICULAR; INTRALESIONAL; INTRAMUSCULAR; INTRAVENOUS; SOFT TISSUE at 10:02

## 2024-02-07 RX ADMIN — FLUOROURACIL 4130 MG: 50 INJECTION, SOLUTION INTRAVENOUS at 11:03

## 2024-02-07 RX ADMIN — SODIUM CHLORIDE 690 MG: 9 INJECTION, SOLUTION INTRAVENOUS at 10:26

## 2024-02-07 RX ADMIN — DEXTROSE MONOHYDRATE 250 ML: 50 INJECTION, SOLUTION INTRAVENOUS at 09:59

## 2024-02-07 NOTE — PROGRESS NOTES
Patient is here for C12D1 leucovorin and 5FU. Port accessed and flushed with good blood return noted. 10cc of blood wasted prior to specimen collection. Blood specimen obtained and sent to lab for processing per protocol.  Dr. Elizondo sent: Patient is here for C12D1 leucovorin and 5FU pump only. The only new complaint is the nausea is lasting longer after each treatment. He stated sometimes the zofran helps and sometimes it doesn't. Patient's labs are within parameters but I saw where on his treatment on 1/24 they did a POC creatinine which was 1.5 and the CMP matched it at 1.5-his creatinine is going up with each visit. Do you want me to do a POC creatinine today or send his CMP STAT to the hospital or [facility] or is he ok for treatment with the 1.5 on 1/24? If he is ok for treatment can you please sign the treatment plan? Dr. Elizondo responded: yes lets get POC creatinine. Dr. Elizondo sent: His POC creatinine was 1.4. he is also having a procedure done with the urologist today-he stated it was just a scope (camera looking at things) that he knows of. His hgb was 8.1 as well. Dr. Elizondo responded: Ok to treat. Patient tolerated treatment and was discharged with AVS.

## 2024-02-09 ENCOUNTER — HOSPITAL ENCOUNTER (OUTPATIENT)
Dept: ONCOLOGY | Facility: HOSPITAL | Age: 74
Discharge: HOME | End: 2024-02-09
Payer: MEDICARE

## 2024-02-09 VITALS
DIASTOLIC BLOOD PRESSURE: 70 MMHG | HEART RATE: 77 BPM | TEMPERATURE: 97.8 F | OXYGEN SATURATION: 98 % | SYSTOLIC BLOOD PRESSURE: 148 MMHG

## 2024-02-09 DIAGNOSIS — C16.0 MALIGNANT NEOPLASM OF CARDIA: Primary | ICD-10-CM

## 2024-02-09 DIAGNOSIS — C15.5 PRIMARY MALIGNANT NEOPLASM OF LOWER THIRD OF ESOPHAGUS: ICD-10-CM

## 2024-02-09 PROCEDURE — 25810000003 SODIUM CHLORIDE 0.9 % SOLUTION: Performed by: INTERNAL MEDICINE

## 2024-02-09 PROCEDURE — 96360 HYDRATION IV INFUSION INIT: CPT

## 2024-02-09 RX ORDER — SODIUM CHLORIDE 0.9 % (FLUSH) 0.9 %
20 SYRINGE (ML) INJECTION AS NEEDED
OUTPATIENT
Start: 2024-02-09

## 2024-02-09 RX ORDER — SODIUM CHLORIDE 0.9 % (FLUSH) 0.9 %
20 SYRINGE (ML) INJECTION AS NEEDED
Status: DISCONTINUED | OUTPATIENT
Start: 2024-02-09 | End: 2024-02-10 | Stop reason: HOSPADM

## 2024-02-09 RX ADMIN — SODIUM CHLORIDE 500 ML: 9 INJECTION, SOLUTION INTRAVENOUS at 13:40

## 2024-02-09 RX ADMIN — Medication 20 ML: at 14:45

## 2024-02-09 NOTE — PROGRESS NOTES
Pt here for CIV DC, pump empty with blood return noted,  pt with signatera order from 1/24/24.  Reviewed with Lissett SIDDIQUI and Meche GALARZA and kits not available at present and [employer] rep reported they would put rush on delivery of kits.  Pt coming in for PET scan on 2/12/24 at 1000.  Instructed pt to ask lab if kits available when he comes in for PET on 2/12/24, pt v/u and agreement.

## 2024-02-12 ENCOUNTER — HOSPITAL ENCOUNTER (OUTPATIENT)
Dept: PET IMAGING | Facility: HOSPITAL | Age: 74
Discharge: HOME | End: 2024-02-12
Admitting: INTERNAL MEDICINE
Payer: MEDICARE

## 2024-02-12 ENCOUNTER — LAB (OUTPATIENT)
Dept: LAB | Facility: HOSPITAL | Age: 74
End: 2024-02-12
Payer: MEDICARE

## 2024-02-12 DIAGNOSIS — C15.5 PRIMARY MALIGNANT NEOPLASM OF LOWER THIRD OF ESOPHAGUS: ICD-10-CM

## 2024-02-12 DIAGNOSIS — C16.0 MALIGNANT NEOPLASM OF CARDIA: ICD-10-CM

## 2024-02-12 LAB — GLUCOSE BLDC GLUCOMTR-MCNC: 122 MG/DL (ref 70–105)

## 2024-02-12 PROCEDURE — 0 FLUDEOXYGLUCOSE F18 SOLUTION: Performed by: INTERNAL MEDICINE

## 2024-02-12 PROCEDURE — 78815 PET IMAGE W/CT SKULL-THIGH: CPT

## 2024-02-12 PROCEDURE — 82948 REAGENT STRIP/BLOOD GLUCOSE: CPT

## 2024-02-12 PROCEDURE — A9552 F18 FDG: HCPCS | Performed by: INTERNAL MEDICINE

## 2024-02-12 RX ADMIN — FLUDEOXYGLUCOSE F 18 1 DOSE: 200 INJECTION, SOLUTION INTRAVENOUS at 09:25

## 2024-02-16 NOTE — PROGRESS NOTES
Hematology-Oncology Follow-up Note       Artie Mc  1950    Primary Care Physician: Chanel Carter APRN  Referring Physician: Chanel Carter APRN    Reason For Visit:  Chief Complaint   Patient presents with    Follow-up     Primary malignant neoplasm of lower third of esophagus         Metastatic esophageal cancer  Monitoring for adverse effects related to immunotherapy.  Hypothyroidism    HPI:   Mr. Mc is a pleasant 73 y.o. gentleman with a history of gastroesophageal reflux disease, diabetes, hypertension, heart block status post pacemaker placement and history of CABG.  He was having symptoms of dysphagia, weight loss since September 2016. He was reporting symptoms of food getting stuck in the lower chest.  He has transitioned himself to a liquid diet.  The patient reports losing about 30 pounds over this duration.  The patient underwent upper GI endoscopy on 12/20/16 with Dr. Esvin Barrera.  Endoscopy showed necrotic, circumferential and moderately obstructive mass at the distal esophagus between 42 cm and 36 cm.      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  There was also evidence of mild subacute inflammation in the duodenum.  Dr. Barrera ordered a CT scan of the chest and abdomen with contrast on 12/20/16.  The scan was done at [facility].  The scan showed a new ill-defined mass in the anterior wall of the distal esophagus at the GE junction measuring 2.3 x 2.2 x 1.6 cm, worrisome for cancer.  There were four subcentimeter liver lesions which had appeared stable in comparison to another CT scan from 2009 and they likely represent small cysts.  There was also mild lymphadenopathy in the gastrohepatic ligament.  There were at least six borderline enlarged lymph nodes measuring up to 1.3 x 1 cm in the region.  The patient is referred to us for further oncological evaluation.    1/5/2017 - The  patient presents for initial consultation.  He reports persistent dysphagia and also has symptoms of regurgitation.  He cannot tolerate solid foods and is dependent on liquid diet such as Ensure.  He also reports fatigue. Symptoms of dysphagia have been present for the past four months.  When he eats any solids, the food gets stuck in the lower chest.    1/5/17 - Vitamin B12 200 (L).  CEA 0.8.  Ferritin 35.  Iron saturation 16% (L), serum iron 56, TIBC 354.  Creatinine 0.9.  LFTs normal.  Folate 10.2.  1/12/17 - PET/CT scan:  Intense abnormal activity corresponding to the region of the GE junction and proximal stomach.  SUV is 11.06.  There is a suggestion of an abnormal mass or abnormal wall thickening in the region measuring 3.9 x 4.8 cm.  No additional abnormalities.  No evidence of metastases or other lymphadenopathy.  Scattered nonspecific subcentimeter lymph nodes involving the mediastinum and within the central upper abdomen.  1/13/17 - Creatinine 0.9.  LFTs normal.    1/17/17 - Patient seen by thoracic surgeon, Dr. Bradley.  PET scan was reviewed.  He has recommended neoadjuvant chemoradiation therapy followed by Virginia Gurwinder esophagogastrectomy.  Port-A-Cath is being arranged.    1/20/17 - WBC 6.3, hemoglobin 12.1, platelet count 198,000, MCV 86.4.  1/30/17 - Patient started weekly Carboplatin and Taxol (Carboplatin AUC 2 and Taxol 50 mg/M2).  Patient received Injectafer 750 mg.  WBC 5.8, hemoglobin 12.4, platelet count 244,000.   1/31/17 - Patient seen by Dr. Eduardo Oleary.  The plan was to give 5040 cGy in 28 fractions.   2/6/17 - WBC 4.4, hemoglobin 11.6, platelet count 201,000.  Patient received cycle 2 of weekly Carboplatin and Taxol (Carboplatin AUC 2 and Taxol 50 mg/M2).  2/13/17 - Patient received Carboplatin and Taxol weekly cycle 3.  2/13/17 - Patient started concurrent radiation therapy.  Total planned dose is 4140 cGy.    2/13/17 to 2/15/17 - Patient received 4140 cGy of neoadjuvant radiation.   Radiation was finished on 3/15/17.    2/20/17 - WBC 1.8, hemoglobin 10.6, platelet count 217,000.  Creatinine 0.5. Folate 15 (L).  Ferritin 361.  Haptoglobin 214.  Iron saturation 33%, TIBC 263, serum iron 88.    2/20/17 - Patient received Carboplatin and Taxol weekly cycle 4.   2/27/17 - WBC 3.7, hemoglobin 10.7, platelet count 177,000, MCV 85.3.    3/6/17 - Patient has received 16 out of the 23 planned radiation treatments.  Total planned dose is 4140 cGy.    3/6/17 - WBC 5.4, hemoglobin 11.2, platelet count 113,000.  3/6/17 - Patient received cycle 5 of weekly Carboplatin and Taxol.   3/13/17 - Patient received cycle 6 of weekly Carboplatin and Taxol.  WBC 3.9, hemoglobin 11.3, platelet count 93,000.     3/20/17 - WBC 1.4, hemoglobin 10.0, platelet count 118,000, MCV 86.1   3/27/17 - WBC 3.8, hemoglobin 9.5, platelet count 176,000, MCV 87.3.    4/7/17 - Stress test:  No evidence of reversible myocardial ischemia.  Normal left ventricular ejection fraction of 50%.    4/17/17 - Upper GI endoscopy by Dr. 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  The GE junction adenocarcinoma lesion seems to have a very good response to chemotherapy and radiation.  The lumen was open.  5/1/17 - WBC 7.0, hemoglobin 10.2, platelet count 175,000.    5/4/17 - Patient underwent Noel Gurwinder esophagectomy with pyloroplasty, feeding jejunostomy tube, abdominal and mediastinal lymph node dissection.    Surgical Pathology:  Moderately to poorly differentiated adenocarcinoma measuring 1.3 cm at the GE junction.  Resection margins are negative for malignancy.  Tumor margins are negative.  There is effective treatment response.  No evidence of lymphovascular invasion.  Staging is ypT3,pN1.  One out of fourteen lymph nodes involved.   5/22/17 - WBC 6.8, hemoglobin 10.3, platelet count 312,000.    6/19/17 - WBC 5.7, hemoglobin 10.5, platelet count 204,000, MCV 92.1.    7/6/17 - EGD:  Status post balloon  dilation of esophageal stricture.  7/12/17 - EGD with balloon dilation of esophageal stricture.  7/26/17 - EGD and balloon dilation of esophageal stricture.  7/31/17 - PET/CT scan:  There is mild metabolic activity seen at the thoracic esophagus just at and below the surgical margins.  Some mild wall thickening.  This could be from recent surgery.  A residual cancer seems unlikely.  There is a precarinal lymph node with mild metabolic activity with SUV of 3 measuring 1 x 1.3 cm.  Again this appears to be nonspecific in my opinion.    8/7/17 - WBC 5.9, hemoglobin 11.9, platelet count 171,000, MCV 88.7.    10/19/17 - WBC 7.5, hemoglobin 11.8, platelet count 216,000.    1/8/18 - PET scan:  No evidence of residual disease or recurrent disease.  There is an esophageal stent in the mid esophagus with a large debris air level.  No evidence of any metastases in the chest, abdomen or pelvis.  Mild nodule uptake in the vocal cords with fullness in the right vocal cord.  This could be physiologic.    7/9/18 - CT scan of chest with contrast:  Prominent mediastinal lymph nodes appear stable since February.  Changes of gastric pull-through procedure for esophageal cancer again noted.  Tree-in-bud infiltrates in the left lung base, consistent with infection versus inflammation.    1/10/19 - CT chest, abdomen and pelvis with contrast:  No evidence of mets in the chest; however, interval development of metastatic lymphadenopathy in the left side of the retroperitoneum.  For instance, there is a 17 mm rounded lymph node, 10 mm lymph node and also a 14 mm lymph node.  These are all new.  Stable 9 mm hypodense lesion within the right hepatic lobe, which could reflect hemangioma or complex cyst.    1/30/19 - CT-guided core biopsy of retroperitoneal lymph node:  Poorly differentiated adenocarcinoma consistent with metastases from patient’s known esophageal primary.  CK20 positive, CDX2 positive, cytokeratin 7 negative, TTF-1 negative.     2/1/19 - Creatinine 0.9, BUN 18, calcium 9.3; these are normal.    2/28/19 - [employer] Next Gen sequencing testing on retroperitoneal lymph node specimen:  PD-L1 positive.  CPS = 3.  This implies responsiveness to pembrolizumab.  Mismatch repair status is proficient (no evidence of microsatellite instability).  Tumor mutational burden is low = 6 mutations/Mb.  CDH1 indeterminate.  KRAS mutation positive.  TP53 mutation positive.  CDK6 amplified.  [employer] PD-L1 testing by IHC:  PD-L1 expression 1% positive (CPS =1).  HER2/marvel by IHC is negative.  HER2/marvel by CISH not amplified.  Microsatellite stable tumor.    3/7/19 - WBC 7.3, hemoglobin 12, platelet count 128,000, MCV 92.     3/14/19 - Patient was seen by radiation oncologist, Dr. Chahal.  He has recommended observation versus systemic chemoimmunotherapy as needed.  No plans for radiation therapy.   5/1/19 - CT scan of chest with contrast:  No definitive findings of new metastatic disease in the chest.  Emphysema noted.    5/1/19 - CT abdomen and pelvis:  There is interval progression of metastatic retroperitoneal adenopathy.  Left periaortic adenopathy with lymph node measuring up to 2 cm, previously 1.7 cm.  Another lymph node now measures 2.3, previously 1.4 cm.  New enlarged lymph node on image 147 measures 1.4 cm, previously 0.4 cm.  A 9 mm hypodense right hepatic lobe lesion appears stable.    5/9/19 - Decision made to start patient on immunotherapy Keytruda.    5/9/19 - Vitamin B12 229.  Ferritin 61.  Folate 14.3.  Iron saturation 26%, TIBC 304, serum iron 79.  5/31/19 - Patient received Keytruda 200 mg cycle 1, day 1.    5/31/19 - Free T4 0.99.  Creatinine 0.9, BUN 9.  LFTs normal.  TSH 3.99.  Folate 14.3.  Iron saturation 26%.    6/21/2019 patient received Keytruda cycle 2  7/12/2019: Patient received Keytruda cycle 3  8/2/2019 patient received cycle 4 Keytruda WBC 7.2, hemoglobin 11.8 platelet count 163  8/23/2019 creatinine 0.9, LFTs normal, WBC 6.4,  hemoglobin 11.5, platelets 179, TSH 2.1   8/23/2019 patient received cycle 5 of Keytruda  9/3/2019 CT chest abdomen and pelvis with contrast: . Positive response to therapy in the abdomen since 05/01/2019. Pathologically enlarged retroperitoneal lymph nodes, predominantly in the left periaortic region, have diminished in size.a 1.3 x 2.1 cm left periaortic node (image 60), previously measured 3.5 x 2.3 cm. A 1.6 cm index node near the level of the left renal vein previously measured 2.0 cm No new or progressive adenopathy. 2. Stable findings in the chest. Noncalcified right upper lobe nodules are unchanged. There is no evidence of new or progressive metastatic disease within the chest. 3. 8 mm indeterminate low-density lesion in the right hepatic lobe, is stable. No new liver lesions.  9/6/2019 WBC 5.6, hemoglobin 11.2, platelets of 192, MCV 91.8  9/13/2019 patient received Keytruda cycle 6, albumin 3.2  10/4/2019 patient received cycle 7 of Keytruda, TSH 4.8, free T4 0.86  10/9/2019 WBC 5.8, hemoglobin 11.7, platelets 174  10/25/2019 patient received cycle 8 of Keytruda.  WBC 5.9, hemoglobin 11.5, platelets 152, creatinine 0.75, cortisol 15.2, TSH 6.48 high , free T4 1.13 normal  11/6/2019 WBC 6.6, hemoglobin 11.8, MCV 91.5, platelets 187  11/15/2019 patient received cycle 9 of Keytruda, WBC 6.6, hemoglobin 11.9, platelets 161, cortisol level 8.86, TSH 2.68, free T3 2.8, free T4 1.5  12/4/2019 WBC 6.9, hemoglobin 12.0, platelets 180, MCV 92.9  12/06/2019-Keytruda Cycle 10  12/27/2019- Cycle 11 LFTs normal, WBC 6.7, hemoglobin 11.4, platelets 168, MCV 93, TSH 2.4,  1/28/2020: CT chest abdomen and pelvis with contrast:  Single (aortic lymph node in the abdominal retroperitoneum has increased.  Rest of the retroperitoneal lymph nodes have decreased in size.  Mediastinal adenopathy appears unchanged.  Noncalcified bilateral pulmonary nodules are stable  1/17/2020 patient received Keytruda cycle 12:.  2/3/2020 WBC 7.1,  hemoglobin 12.2, platelets 171  02/10/2020 patient received cycle 13 of Keytruda: WBC 7.3, hemoglobin 12.1, platelets 166, creatinine 0.82  3/6/2020 patient is of Keytruda 200 mg, hemoglobin 10.8  3/27/2020 patient received Keytruda 200 mg, hemoglobin 11.4, TSH 1.95  4/17/2020 patient received Keytruda cycle 16 200 mg, WBC 6.1, hemoglobin 11.6, platelets 150  5/8/2020: Patient received cycle 17 Keytruda, WBC 6.3, hemoglobin 9.6, platelets 137, TSH 2.05, free T4 1.54, creatinine 0.93, LFTs normal,  5/29/2020: Patient received cycle 18 Keytruda, WBC 6.5, hemoglobin 11.7, platelets 129, creatinine 0.83, TSH 1.69  6/26/2020: Patient receiving Keytruda.   7/17/2020: TSH 2.06, WBC 5.7, hemoglobin 11.4, platelets 177, MCV 92.4, Keytruda 200 mg cycle 20  7/27/2020: CT chest abdomen pelvis with contrast: Stable findings in the chest abdomen pelvis since 1/28/2020.  Noncalcified bilateral pulmonary nodules are stable.  Stable retroperitoneal left perinephric adenopathy in the abdomen.  8/7/2020: Patient received cycle 21 of Keytruda  8/28/2020 patient is a cycle 22 of Keytruda.  WBC 6.02, hemoglobin 11.3, platelets 161  9/11/2020 WBC 6.9, hemoglobin 12.4, platelets 192  9/18/2020: Cycle 23 of Keytruda, TSH 1.19  10/9/2020: Cycle 24 of Keytruda, WBC 5.9, hemoglobin 11.9, platelets 178, creatinine 0.84, TSH 2.56, free T3 2.4, CMP normal  10/23/2020: WBC 7.34, hemoglobin 11.6, platelets 175  10/30/2020:.  Keytruda 200 mg  11/20/2020: Keytruda 200 mg  12/11/2020 Keytruda 200 mg.  WBC 5.5, hemoglobin 11.4, platelets 176, creatinine 0.8, LFTs normal, TSH 2.2, cortisol 8.3  12/10/2020: CT chest abdomen and pelvis with contrast: Previously seen left para-aortic lymph nodes within the abdomen are decreased in size.  No pathological lymphadenopathy.  No evidence of residual malignancy..  Small noncalcified pulmonary nodules are stable.  No new nodules.  Stable mediastinal lymph nodes.  3/12/2021: WBC 6.7, hemoglobin 11.3, platelets  17  3/12/2021: CMP normal, TSH 2.9, cortisol 11.95, 1  3/15/2021: CT chest with contrast: Stable findings in the chest with postsurgical changes of esophageal resection and gastric pull-through.  Stable right lower lobe lung nodule measures 11 mm.  Stable size and appearance of the mediastinal and upper abdominal lymph nodes.  3/23/2021: Doppler of upper extremity: Normal.  3/24/2021: CT abdomen and pelvis with contrast: Postoperative changes.  Left periaortic nodes are stable.  4/12/2021: TSH 4.1, cortisol 12.4  6/22/2021: CMP normal,Cortisol 10.08,, WBC 5.8, hemoglobin 11.2, platelets 109, TSH 2.59  7/12/2021: CT scan of the chest abdomen pelvis with contrast: Dominant 10 x 10 left periaortic lymph node is stable since 3/22/2021.  Dominant lymph nodes in the right lower paratracheal and AP window distribution are stable since 3/15/2021.  No evidence of progressive adenopathy in the chest abdomen or pelvis.    01/11/2022 -PET/CT findings consistent with new cedric metastatic disease in the retroperitoneum.  2 new pathologically enlarged hypermetabolic retroperitoneal lymph nodes.  No convincing evidence of recurrent disease within the chest neck pelvis or skeleton.  1/20/2022 -CT-guided needle biopsy consistent with poorly differentiated carcinoma.  CDX2 positive consistent with metastases from known esophageal primary  2/9/2022 - Pembrolizumab complicated with fatigue, joint pains, nausea, diarrhea. Symptoms decrease in intensity over a week.  3/30/2022 -CT chest and pelvis with stable metastatic lymph nodes in the left periaortic retroperitoneum.  No evidence of disease progression or new lesions.  Stable lymphadenopathy in the distal paratracheal mediastinal area  4/13/2022 - Keytruda 200  5/4/2022 - Keytruda 200  Continued keytruda  7/11/2022 - CT abdomen pelvis with decrease in size of left perioaortic lymph node. No other areas of disease.  Continues to be on Keytruda  10/18/2022 - stable CT imaging  1/20/2023  - CT imaging with stable disease. No significant change.  Patient was hospitalized with esophagitis, subsequent EGD with improvement, no recurrence of disease.  2/21/2023 - Keytruda  CT CAP with increased retroperitoneal adenopathy.   ECHO with EF 35-40% (concern for immunotherapy related )immunotherapy held with ongoing investigation for cause.  Subsequent improvement in EF to 45%  4/4/2023 - CT abdomen pelvis with interval retroperitoneal lymph node enlargement compatible with metastatic adenopathyl.  5/5/2023 - PET CT with mixed response. No clear evidence of progression  6/2/23 -started pembrolizumab 200 mg IV  Posttreatment had symptoms of chest tightness troponin and proBNP negative, echocardiogram with EF 40%  Subsequent cardiac cath with EF down to 30% elevated proBNP, troponin no significant obstructive disease in the graft vessels  Cardiac MRI unable to obtain given pacemaker noncompliant.  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 Two enlarged mediastinal lymph nodes in the precarinal and AP window distributions are unchanged since 4/4/2023. No new or progressive adenopathy in the chest.Stable 3 mm or less noncalcified right lung nodules since 4/4/2023. Surgical changes of distal esophagectomy with gastric pull-through, without evidence of local disease recurrence  8/29/23 - started FOLFOX had significant side effects  9/20/23 - stopped oxaliplatin and continued 5FU only'  11/29/23 - CT CAP with decrease size of retroperitoneal adenopathy, no other concerning findings.  Continues 5FU leucovorin   2/12/24 - PET CT There is some metabolic activity within a mediastinal lymph node which has been suggested with slightly decreased metabolic activity. Whether this is reactive  or pathologic is uncertain.   Previously noted abnormal retroperitoneal lymph node is no longer identified     Subjective:  Ongoing nausea, stable no worsening,     Past Medical History:   Diagnosis Date    B12 deficiency     Blockage of coronary artery of heart     Depression     DM (diabetes mellitus)     Dysphagia     GERD (gastroesophageal reflux disease)     HTN (hypertension)     Hyperlipidemia        Past Surgical History:   Procedure Laterality Date    ABDOMINAL WALL ABSCESS INCISION AND DRAINAGE  10/22/2018    WITH DEBRIDEMENT  1.5CM X 1.5 CM X 1.5 CM    DR. PURI    CARDIAC CATHETERIZATION Right 7/20/2023    Procedure: Left Heart Cath;  Surgeon: Mazin Simmons MD;  Location: [facility] CATH INVASIVE LOCATION;  Service: Cardiovascular;  Laterality: Right;    CATARACT EXTRACTION  2018    COLONOSCOPY      CORONARY ARTERY BYPASS GRAFT  2015    ENDOSCOPY  12/20/2016    ENDOSCOPY N/A 01/02/2023    Procedure: ESOPHAGOGASTRODUODENOSCOPY;  Surgeon: Esvin Morgan MD;  Location: [facility] ENDOSCOPY;  Service: Gastroenterology;  Laterality: N/A;  post: anastamosis stricture    ENDOSCOPY N/A 02/09/2023    Procedure: ESOPHAGOGASTRODUODENOSCOPY, non-wire guided balloon dilation (12-14mm) of esophageal stricture;  Surgeon: Tino Villafana MD;  Location: [facility] ENDOSCOPY;  Service: Gastroenterology;  Laterality: N/A;  post: esophageal anastamotic stricture, post surgical changes    ESOPHAGECTOMY  05/04/2017    BROOKS PETTY ESOPHAGECTOMY, FEEDING JEJNOSTOMOY, ABDOMINAL AND MEDIASTINAL LYMPH NODE DISECTION, PEDICLED MUSCLE FLAP COVERAGE DR. PURI    ESOPHAGOSCOPY / EGD  07/12/2017    WITH BALLOON DILATION    ESOPHAGOSCOPY / EGD  07/26/2017    WITH BALLOON DILATION    ESOPHAGOSCOPY / EGD  08/11/2017    WITH BALLOON DILATION    ESOPHAGOSCOPY / EGD  08/28/2017    WITH BALLOON DILATION    ESOPHAGOSCOPY / EGD  09/27/2017    WITH BALLOON DILATION    INSERT / REPLACE / REMOVE PACEMAKER      LYMPH NODE BIOPSY   01/30/2019    PACEMAKER IMPLANTATION  11/21/2016       Current Outpatient Medications:     clonazePAM (KlonoPIN) 0.5 MG tablet, Take 0.5 tablets by mouth 2 (Two) Times a Day As Needed for Anxiety., Disp: , Rfl: 1    cyanocobalamin (VITAMIN B-12) 1000 MCG tablet, Take 1 tablet by mouth Daily. Stopped but going to start back, Disp: , Rfl:     folic acid (FOLVITE) 1 MG tablet, Take 1 tablet by mouth Daily., Disp: , Rfl:     levothyroxine (SYNTHROID, LEVOTHROID) 50 MCG tablet, Take 1 tablet by mouth once daily, Disp: 90 tablet, Rfl: 0    magic mouthwash oral suspension, SWISH AND SPIT FIVE ML FOUR TIMES DAILY AS NEEDED (Patient not taking: Reported on 2/7/2024), Disp: , Rfl:     metFORMIN (GLUCOPHAGE) 1000 MG tablet, Take 0.5 tablets by mouth Daily., Disp: 60 tablet, Rfl: 2    metoprolol succinate XL (TOPROL-XL) 25 MG 24 hr tablet, Take 1 tablet by mouth once daily, Disp: 90 tablet, Rfl: 0    ondansetron (ZOFRAN) 4 MG tablet, Take 1 tablet by mouth Every 8 (Eight) Hours As Needed for Nausea or Vomiting., Disp: , Rfl:     ondansetron (ZOFRAN) 8 MG tablet, Take 1 tablet by mouth 3 (Three) Times a Day As Needed for Nausea or Vomiting., Disp: 30 tablet, Rfl: 5    pantoprazole (PROTONIX) 40 MG EC tablet, Take 1 tablet by mouth 2 (Two) Times a Day., Disp: , Rfl:     pravastatin (PRAVACHOL) 10 MG tablet, Take 1 tablet by mouth Every Other Day., Disp: , Rfl:     spironolactone (ALDACTONE) 25 MG tablet, Take 0.5 tablets by mouth Daily. (Patient not taking: Reported on 11/1/2023), Disp: 30 tablet, Rfl: 2  No current facility-administered medications for this visit.    Facility-Administered Medications Ordered in Other Visits:     fluorouracil (ADRUCIL) 4,130 mg in sodium chloride 0.9 % 94 mL chemo infusion - FOR HOME USE, 2,400 mg/m2 (Treatment Plan Recorded), Intravenous, Once, Jean Pierre Elizondo MD    Allergies   Allergen Reactions    Ace Inhibitors Unknown (See Comments)     Unknown reaction      Heparin Other (See Comments)      "Fever/chills, weakness in legs    Sulfa Antibiotics Other (See Comments)     Mouth sores    Ciprofloxacin GI Intolerance     Pt reports pain to abd       Family History   Problem Relation Age of Onset    Mental illness Mother     Heart disease Mother     Hypertension Father     Cancer Father     Heart disease Sister     Heart disease Brother      Cancer-related family history includes Cancer in his father.    Social History     Tobacco Use    Smoking status: Former     Packs/day: 2.00     Years: 50.00     Additional pack years: 0.00     Total pack years: 100.00     Types: Cigarettes     Quit date: 2015     Years since quittin.6    Smokeless tobacco: Former     Types: Chew     Quit date: 1989   Vaping Use    Vaping Use: Never used   Substance Use Topics    Alcohol use: No    Drug use: No     ROS:     Objective:    Vitals:    24 1213   BP: 160/79   Pulse: 77   Resp: 14   Temp: 97.8 °F (36.6 °C)   SpO2: 99%   Weight: 68.5 kg (151 lb)   Height: 165.1 cm (65\")   PainSc: 0-No pain         (1) Restricted in physically strenuous activity, ambulatory and able to do work of light nature    Physical Exam:     Physical Exam   Constitutional: He is oriented to person, place, and time. He appears well-developed. No distress.   HENT:   Head: Normocephalic and atraumatic.   Nose: Nose normal.   Mouth/Throat: Mucous membranes are moist.   Eyes: Pupils are equal, round, and reactive to light.   Neck: No thyromegaly present.   Cardiovascular: Normal rate, regular rhythm, normal heart sounds and normal pulses. Exam reveals no gallop and no friction rub.   Pulmonary/Chest: Effort normal and breath sounds normal.   Abdominal: Soft. Normal appearance and bowel sounds are normal.   No tenderness   Musculoskeletal: Normal range of motion. No deformity or signs of injury.      Right lower leg: No edema.   Neurological: He is alert and oriented to person, place, and time. He exhibits normal muscle tone.   Skin: Skin is warm " and dry. No rash noted. He is not diaphoretic. No erythema. No jaundice.   Right side chest  port       Lab Results - Last 18 Months   Lab Units 02/21/24  1138 02/07/24  0910 01/24/24  1016   WBC 10*3/mm3 4.19 4.62 4.35   HEMOGLOBIN g/dL 7.9* 8.1* 8.4*   HEMATOCRIT % 25.1* 25.8* 27.0*   PLATELETS 10*3/mm3 199 195 173   MCV fL 94.7 94.5 95.1     Lab Results - Last 18 Months   Lab Units 02/07/24  0933 02/07/24  0910 01/24/24  1043 01/24/24  1016 01/12/24  1028 01/10/24  0804   SODIUM mmol/L  --  139  --  140 141 138   POTASSIUM mmol/L  --  4.7  --  4.8 4.4 5.3*   CHLORIDE mmol/L  --  106  --  108* 103 103   CO2 mmol/L  --  24.0  --  19.0* 23.0 20.0*   BUN mg/dL  --  16  --  19 25* 22   CREATININE mg/dL 1.40* 1.31* 1.50* 1.50* 1.37* 1.70*   CALCIUM mg/dL  --  8.8  --  9.0 8.9 9.2   BILIRUBIN mg/dL  --  0.3  --  0.2  --  0.2   ALK PHOS U/L  --  40  --  36*  --  37*   ALT (SGPT) U/L  --  6  --  5  --  6   AST (SGOT) U/L  --  14  --  14  --  15   GLUCOSE mg/dL  --  123*  --  242* 227* 271*     Assessment & Plan     Assessment:    Metastatic esophageal carcinoma:    Patient has a history of GE junction poorly differentiated adenocarcinoma for which he had chemoradiation followed by Noel Gurwinder resection:  Pathology showed ypT3,pN1 disease.  Tumor was Stage IIIA, diagnosed in 2016.  He received concurrent chemoradiation with weekly Carboplatin and Taxol and radiation finishing on 3/15/17.  CT scan on 1/10/19 showed new retroperitoneal lymph nodes.  These lymph nodes were biopsied on 1/30/19 and it shows metastatic esophageal cancer.  [facility] Next Gen testing was performed on the sample and it shows KRAS mutation, microsatellite stable tumor.  PD-L1 is positive.  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  HER2/marvel (ERBB2) was not amplified and was negative.  His CT scan on 5/1/19 shows evidence of progression.  Patient has started receiving Keytruda on 5/31/19.  He has received 19 cycles so far.   CT scan on 1/28/2020 shows improvement and  continued response.  CT scan chest abdomen pelvis with contrast on 12/10/2020 does not show any evidence of disease progression.  On 12/22/2020 decision made to hold Keytruda per patient's request to get a treatment break.. Repeat CT scan chest 7/12/2021 does not show any evidence of disease progression.  Now CT imaging in January 2021 with a CT showing increased abdominal left para-aortic adenopathy.  Subsequent PET/CT with new cedric metastatic disease in the retroperitoneum of the abdomen.  New pathologically enlarged hypermetabolic retroperitoneal lymph nodes.  This would be concerning for disease recurrence/progression.  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  I discussed with him the treatment options going forward.  He has had a good response with immunotherapy pembrolizumab in the past. This was  restarted. subsequent imaging with treatment response. 1/2023 imaging with good response. Continued treatment  Now with increased adenopathy 2 new lymph nodes. Holding immunotherapy for now.   Consider getting cardiac MR discussed with Dr. Culp. While immunotherapy on hold, increased adenopathy is concerning. Could be pseudoprogression vs true progression. PET CT reviewed could indicate pseudoprogression since he has had a good response to immunotherapy  immunotherapy was restarted and has been on hold with patient's symptomatology cardiac MRI cannot be obtained, no ischemic heart disease leading to cardiomyopathy this most likely is related to Keytruda.  We will stop Keytruda going forward  CT imaging with stable disease, FOLFOX was started which he had significant side effects  With imaging being stable and significant side effects with FOLFOX, we discussed  about options with wait and watch vs 5FU only, patient prefers to be on maintenance.   We started 5FU only .   Tolerating relatively well, has significant fatigue. CT imaging now with ongoing response. We discussed that he would have  been on treatment for 2 years following recurrence with good response. We can continue treatment now and get a PET CT in 2/2024.will repeat prior to follow up. Get signatera. Signatera is pending, PET with some residual activity in the mediastinal lymph node.   After discussion we decided to continue treatment for now, repeat another scan in 3 months.     Possible myocarditis  Symptoms started after last immunotherapy  Treatment on hold for now, symptoms have improved, cardiac markers were negative, echocardiogram with EF 40%, cardiac MRI cannot be obtained cardiac cath with EF down to 30% management per cardiology  Now EF up to 40-45% monitor. No significant symptoms. No worsening of symptoms.    Shortness of breath  Improved, has occasional shortness of breath, likely copd  I have recommended using albuterol  Symptoms have improved since stopping immunotherapy. Questionable pneumonitis will monitor closely with immunotherapy  Cardiology has evaluated for myocarditis related to immunotherapy symptomatic improvement now continue to monitor.    Reflux/heartburn/dysphagia:   Symptoms improved.  Also has a history of strictures. Status post EGD and balloon dilation.   Continues to be on pantoprazole symptoms stable.    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  Continues on Synthroid stable now no changes    Mild diarrhea:  diarrhea seems to be waxing and waning in nature.  Diarrhea related to immunotherapy now improved stable.    PD-L1 positive, HER2 negative, p53 and KRAS positive.  Repeat Caris testing with repeat biopsy with no target mutations.    B12 deficiency: Continue B12 1000 mcg twice daily.    Anemia - Hb improved and stable.    Dysuria - has some dysuria, this has improved.    Groin pain - negative for UTI, STI, has seen urology, likely prostatits, started cipro improvement noted. Had cystoscopy, was started on flomax, improvement in symptoms.         Jean Pierre Elizondo MD 02/21/24     Orders Placed This  Encounter   Procedures    CBC & Differential     Order Specific Question:   Release to patient     Answer:   Routine Release [[id number]]

## 2024-02-20 DIAGNOSIS — C16.0 MALIGNANT NEOPLASM OF CARDIA: Primary | ICD-10-CM

## 2024-02-20 DIAGNOSIS — C15.5 PRIMARY MALIGNANT NEOPLASM OF LOWER THIRD OF ESOPHAGUS: ICD-10-CM

## 2024-02-21 ENCOUNTER — HOSPITAL ENCOUNTER (OUTPATIENT)
Dept: ONCOLOGY | Facility: HOSPITAL | Age: 74
Discharge: HOME | End: 2024-02-21
Admitting: INTERNAL MEDICINE
Payer: MEDICARE

## 2024-02-21 ENCOUNTER — OFFICE VISIT (OUTPATIENT)
Dept: ONCOLOGY | Facility: CLINIC | Age: 74
End: 2024-02-21
Payer: MEDICARE

## 2024-02-21 VITALS
TEMPERATURE: 97.8 F | HEIGHT: 65 IN | OXYGEN SATURATION: 99 % | WEIGHT: 151.4 LBS | DIASTOLIC BLOOD PRESSURE: 79 MMHG | SYSTOLIC BLOOD PRESSURE: 160 MMHG | RESPIRATION RATE: 14 BRPM | BODY MASS INDEX: 25.22 KG/M2 | HEART RATE: 77 BPM

## 2024-02-21 VITALS
WEIGHT: 151 LBS | SYSTOLIC BLOOD PRESSURE: 160 MMHG | HEART RATE: 77 BPM | OXYGEN SATURATION: 99 % | RESPIRATION RATE: 14 BRPM | DIASTOLIC BLOOD PRESSURE: 79 MMHG | HEIGHT: 65 IN | BODY MASS INDEX: 25.16 KG/M2 | TEMPERATURE: 97.8 F

## 2024-02-21 DIAGNOSIS — C16.0 MALIGNANT NEOPLASM OF CARDIA: ICD-10-CM

## 2024-02-21 DIAGNOSIS — C16.0 MALIGNANT NEOPLASM OF CARDIA: Primary | ICD-10-CM

## 2024-02-21 DIAGNOSIS — C15.5 PRIMARY MALIGNANT NEOPLASM OF LOWER THIRD OF ESOPHAGUS: Primary | ICD-10-CM

## 2024-02-21 DIAGNOSIS — C15.5 PRIMARY MALIGNANT NEOPLASM OF LOWER THIRD OF ESOPHAGUS: ICD-10-CM

## 2024-02-21 LAB
ALBUMIN SERPL-MCNC: 4 G/DL (ref 3.5–5.2)
ALBUMIN/GLOB SERPL: 2.1 G/DL
ALP SERPL-CCNC: 42 U/L (ref 39–117)
ALT SERPL W P-5'-P-CCNC: 7 U/L (ref 1–41)
ANION GAP SERPL CALCULATED.3IONS-SCNC: 11 MMOL/L (ref 5–15)
AST SERPL-CCNC: 14 U/L (ref 1–40)
BASOPHILS # BLD AUTO: 0.03 10*3/MM3 (ref 0–0.2)
BASOPHILS NFR BLD AUTO: 0.7 % (ref 0–1.5)
BILIRUB SERPL-MCNC: 0.3 MG/DL (ref 0–1.2)
BUN SERPL-MCNC: 15 MG/DL (ref 8–23)
BUN/CREAT SERPL: 11 (ref 7–25)
CALCIUM SPEC-SCNC: 8.7 MG/DL (ref 8.6–10.5)
CHLORIDE SERPL-SCNC: 105 MMOL/L (ref 98–107)
CO2 SERPL-SCNC: 22 MMOL/L (ref 22–29)
CREAT SERPL-MCNC: 1.36 MG/DL (ref 0.76–1.27)
DEPRECATED RDW RBC AUTO: 64.8 FL (ref 37–54)
EGFRCR SERPLBLD CKD-EPI 2021: 54.6 ML/MIN/1.73
EOSINOPHIL # BLD AUTO: 0.12 10*3/MM3 (ref 0–0.4)
EOSINOPHIL NFR BLD AUTO: 2.9 % (ref 0.3–6.2)
ERYTHROCYTE [DISTWIDTH] IN BLOOD BY AUTOMATED COUNT: 19.8 % (ref 12.3–15.4)
GLOBULIN UR ELPH-MCNC: 1.9 GM/DL
GLUCOSE SERPL-MCNC: 314 MG/DL (ref 65–99)
HCT VFR BLD AUTO: 25.1 % (ref 37.5–51)
HGB BLD-MCNC: 7.9 G/DL (ref 13–17.7)
LYMPHOCYTES # BLD AUTO: 1.01 10*3/MM3 (ref 0.7–3.1)
LYMPHOCYTES NFR BLD AUTO: 24.1 % (ref 19.6–45.3)
MCH RBC QN AUTO: 29.8 PG (ref 26.6–33)
MCHC RBC AUTO-ENTMCNC: 31.5 G/DL (ref 31.5–35.7)
MCV RBC AUTO: 94.7 FL (ref 79–97)
MONOCYTES # BLD AUTO: 0.53 10*3/MM3 (ref 0.1–0.9)
MONOCYTES NFR BLD AUTO: 12.6 % (ref 5–12)
NEUTROPHILS NFR BLD AUTO: 2.5 10*3/MM3 (ref 1.7–7)
NEUTROPHILS NFR BLD AUTO: 59.7 % (ref 42.7–76)
PLATELET # BLD AUTO: 199 10*3/MM3 (ref 140–450)
PMV BLD AUTO: 10.2 FL (ref 6–12)
POTASSIUM SERPL-SCNC: 4.5 MMOL/L (ref 3.5–5.2)
PROT SERPL-MCNC: 5.9 G/DL (ref 6–8.5)
RBC # BLD AUTO: 2.65 10*6/MM3 (ref 4.14–5.8)
SODIUM SERPL-SCNC: 138 MMOL/L (ref 136–145)
WBC NRBC COR # BLD AUTO: 4.19 10*3/MM3 (ref 3.4–10.8)

## 2024-02-21 PROCEDURE — 1126F AMNT PAIN NOTED NONE PRSNT: CPT | Performed by: INTERNAL MEDICINE

## 2024-02-21 PROCEDURE — 25810000003 SODIUM CHLORIDE 0.9 % SOLUTION 250 ML FLEX CONT: Performed by: INTERNAL MEDICINE

## 2024-02-21 PROCEDURE — G0498 CHEMO EXTEND IV INFUS W/PUMP: HCPCS

## 2024-02-21 PROCEDURE — 3077F SYST BP >= 140 MM HG: CPT | Performed by: INTERNAL MEDICINE

## 2024-02-21 PROCEDURE — 25010000002 LEUCOVORIN CALCIUM PER 50 MG: Performed by: INTERNAL MEDICINE

## 2024-02-21 PROCEDURE — 0 DEXTROSE 5 % SOLUTION: Performed by: INTERNAL MEDICINE

## 2024-02-21 PROCEDURE — 96416 CHEMO PROLONG INFUSE W/PUMP: CPT

## 2024-02-21 PROCEDURE — 80053 COMPREHEN METABOLIC PANEL: CPT | Performed by: INTERNAL MEDICINE

## 2024-02-21 PROCEDURE — 96365 THER/PROPH/DIAG IV INF INIT: CPT

## 2024-02-21 PROCEDURE — 3078F DIAST BP <80 MM HG: CPT | Performed by: INTERNAL MEDICINE

## 2024-02-21 PROCEDURE — 25010000002 DEXAMETHASONE SODIUM PHOSPHATE 120 MG/30ML SOLUTION: Performed by: INTERNAL MEDICINE

## 2024-02-21 PROCEDURE — 85025 COMPLETE CBC W/AUTO DIFF WBC: CPT | Performed by: INTERNAL MEDICINE

## 2024-02-21 PROCEDURE — 25010000002 FLUOROURACIL PER 500 MG: Performed by: INTERNAL MEDICINE

## 2024-02-21 PROCEDURE — 96367 TX/PROPH/DG ADDL SEQ IV INF: CPT

## 2024-02-21 PROCEDURE — 99214 OFFICE O/P EST MOD 30 MIN: CPT | Performed by: INTERNAL MEDICINE

## 2024-02-21 PROCEDURE — 96375 TX/PRO/DX INJ NEW DRUG ADDON: CPT

## 2024-02-21 RX ORDER — FAMOTIDINE 10 MG/ML
20 INJECTION, SOLUTION INTRAVENOUS AS NEEDED
Status: CANCELLED | OUTPATIENT
Start: 2024-02-21

## 2024-02-21 RX ORDER — DEXTROSE MONOHYDRATE 50 MG/ML
250 INJECTION, SOLUTION INTRAVENOUS ONCE
Status: CANCELLED | OUTPATIENT
Start: 2024-02-21

## 2024-02-21 RX ORDER — DIPHENHYDRAMINE HYDROCHLORIDE 50 MG/ML
50 INJECTION INTRAMUSCULAR; INTRAVENOUS AS NEEDED
Status: CANCELLED | OUTPATIENT
Start: 2024-02-21

## 2024-02-21 RX ORDER — DEXTROSE MONOHYDRATE 50 MG/ML
250 INJECTION, SOLUTION INTRAVENOUS ONCE
Status: COMPLETED | OUTPATIENT
Start: 2024-02-21 | End: 2024-02-21

## 2024-02-21 RX ADMIN — FLUOROURACIL 4130 MG: 50 INJECTION, SOLUTION INTRAVENOUS at 14:18

## 2024-02-21 RX ADMIN — DEXAMETHASONE SODIUM PHOSPHATE 12 MG: 4 INJECTION, SOLUTION INTRA-ARTICULAR; INTRALESIONAL; INTRAMUSCULAR; INTRAVENOUS; SOFT TISSUE at 12:42

## 2024-02-21 RX ADMIN — SODIUM CHLORIDE 690 MG: 9 INJECTION, SOLUTION INTRAVENOUS at 13:08

## 2024-02-21 RX ADMIN — DEXTROSE MONOHYDRATE 250 ML: 50 INJECTION, SOLUTION INTRAVENOUS at 12:42

## 2024-02-21 NOTE — PROGRESS NOTES
Pt  is here for C13D1 LCV/FU and scheduled visit with Dr Elizondo at 1 pm, he had CT done 2/12/24 and has had signatera drawn 2/12/24 Hgb is 7.9, he reports he has had nausea and feels like has had difficulty swallowing following infusions. Reviewed above with Dr Elizondo and pt to proceed with plan.   Reviewed with pt and v/u

## 2024-02-23 ENCOUNTER — HOSPITAL ENCOUNTER (OUTPATIENT)
Dept: ONCOLOGY | Facility: HOSPITAL | Age: 74
Discharge: HOME | End: 2024-02-23
Payer: MEDICARE

## 2024-02-23 VITALS
HEART RATE: 77 BPM | WEIGHT: 152.5 LBS | DIASTOLIC BLOOD PRESSURE: 74 MMHG | HEIGHT: 65 IN | RESPIRATION RATE: 16 BRPM | SYSTOLIC BLOOD PRESSURE: 164 MMHG | BODY MASS INDEX: 25.41 KG/M2 | OXYGEN SATURATION: 99 %

## 2024-02-23 DIAGNOSIS — C15.5 PRIMARY MALIGNANT NEOPLASM OF LOWER THIRD OF ESOPHAGUS: ICD-10-CM

## 2024-02-23 DIAGNOSIS — C16.0 MALIGNANT NEOPLASM OF CARDIA: Primary | ICD-10-CM

## 2024-02-23 LAB
LAB AP CARIS, ADDENDUM: NORMAL
LAB AP CASE REPORT: NORMAL
LAB AP DIAGNOSIS COMMENT: NORMAL
Lab: NORMAL
PATH REPORT.ADDENDUM SPEC: NORMAL
PATH REPORT.FINAL DX SPEC: NORMAL
PATH REPORT.GROSS SPEC: NORMAL

## 2024-02-23 PROCEDURE — 25810000003 SODIUM CHLORIDE 0.9 % SOLUTION: Performed by: INTERNAL MEDICINE

## 2024-02-23 PROCEDURE — 96360 HYDRATION IV INFUSION INIT: CPT

## 2024-02-23 RX ORDER — SODIUM CHLORIDE 0.9 % (FLUSH) 0.9 %
20 SYRINGE (ML) INJECTION AS NEEDED
Status: DISCONTINUED | OUTPATIENT
Start: 2024-02-23 | End: 2024-02-24 | Stop reason: HOSPADM

## 2024-02-23 RX ORDER — SODIUM CHLORIDE 0.9 % (FLUSH) 0.9 %
20 SYRINGE (ML) INJECTION AS NEEDED
OUTPATIENT
Start: 2024-02-23

## 2024-02-23 RX ADMIN — Medication 20 ML: at 12:34

## 2024-02-23 RX ADMIN — SODIUM CHLORIDE 500 ML: 9 INJECTION, SOLUTION INTRAVENOUS at 11:30

## 2024-03-06 ENCOUNTER — HOSPITAL ENCOUNTER (OUTPATIENT)
Dept: ONCOLOGY | Facility: HOSPITAL | Age: 74
Discharge: HOME | End: 2024-03-06
Admitting: INTERNAL MEDICINE
Payer: MEDICARE

## 2024-03-06 VITALS
HEIGHT: 65 IN | SYSTOLIC BLOOD PRESSURE: 130 MMHG | RESPIRATION RATE: 14 BRPM | TEMPERATURE: 97.6 F | BODY MASS INDEX: 24.69 KG/M2 | OXYGEN SATURATION: 98 % | WEIGHT: 148.2 LBS | DIASTOLIC BLOOD PRESSURE: 58 MMHG | HEART RATE: 86 BPM

## 2024-03-06 DIAGNOSIS — C15.5 PRIMARY MALIGNANT NEOPLASM OF LOWER THIRD OF ESOPHAGUS: ICD-10-CM

## 2024-03-06 DIAGNOSIS — C16.0 MALIGNANT NEOPLASM OF CARDIA: Primary | ICD-10-CM

## 2024-03-06 LAB
ALBUMIN SERPL-MCNC: 4.2 G/DL (ref 3.5–5.2)
ALBUMIN/GLOB SERPL: 2.8 G/DL
ALP SERPL-CCNC: 45 U/L (ref 39–117)
ALT SERPL W P-5'-P-CCNC: 6 U/L (ref 1–41)
ANION GAP SERPL CALCULATED.3IONS-SCNC: 13 MMOL/L (ref 5–15)
AST SERPL-CCNC: 13 U/L (ref 1–40)
BASOPHILS # BLD AUTO: 0.02 10*3/MM3 (ref 0–0.2)
BASOPHILS NFR BLD AUTO: 0.5 % (ref 0–1.5)
BILIRUB SERPL-MCNC: 0.3 MG/DL (ref 0–1.2)
BUN SERPL-MCNC: 15 MG/DL (ref 8–23)
BUN/CREAT SERPL: 9.7 (ref 7–25)
CALCIUM SPEC-SCNC: 8.6 MG/DL (ref 8.6–10.5)
CHLORIDE SERPL-SCNC: 108 MMOL/L (ref 98–107)
CO2 SERPL-SCNC: 19 MMOL/L (ref 22–29)
CREAT SERPL-MCNC: 1.54 MG/DL (ref 0.76–1.27)
DEPRECATED RDW RBC AUTO: 63.4 FL (ref 37–54)
EGFRCR SERPLBLD CKD-EPI 2021: 47 ML/MIN/1.73
EOSINOPHIL # BLD AUTO: 0.12 10*3/MM3 (ref 0–0.4)
EOSINOPHIL NFR BLD AUTO: 2.9 % (ref 0.3–6.2)
ERYTHROCYTE [DISTWIDTH] IN BLOOD BY AUTOMATED COUNT: 19.3 % (ref 12.3–15.4)
GLOBULIN UR ELPH-MCNC: 1.5 GM/DL
GLUCOSE SERPL-MCNC: 275 MG/DL (ref 65–99)
HCT VFR BLD AUTO: 25.8 % (ref 37.5–51)
HGB BLD-MCNC: 8.1 G/DL (ref 13–17.7)
LYMPHOCYTES # BLD AUTO: 1.2 10*3/MM3 (ref 0.7–3.1)
LYMPHOCYTES NFR BLD AUTO: 29.3 % (ref 19.6–45.3)
MCH RBC QN AUTO: 29.3 PG (ref 26.6–33)
MCHC RBC AUTO-ENTMCNC: 31.4 G/DL (ref 31.5–35.7)
MCV RBC AUTO: 93.5 FL (ref 79–97)
MONOCYTES # BLD AUTO: 0.47 10*3/MM3 (ref 0.1–0.9)
MONOCYTES NFR BLD AUTO: 11.5 % (ref 5–12)
NEUTROPHILS NFR BLD AUTO: 2.28 10*3/MM3 (ref 1.7–7)
NEUTROPHILS NFR BLD AUTO: 55.8 % (ref 42.7–76)
PLATELET # BLD AUTO: 169 10*3/MM3 (ref 140–450)
PMV BLD AUTO: 9.6 FL (ref 6–12)
POTASSIUM SERPL-SCNC: 4.8 MMOL/L (ref 3.5–5.2)
PROT SERPL-MCNC: 5.7 G/DL (ref 6–8.5)
RBC # BLD AUTO: 2.76 10*6/MM3 (ref 4.14–5.8)
SODIUM SERPL-SCNC: 140 MMOL/L (ref 136–145)
WBC NRBC COR # BLD AUTO: 4.09 10*3/MM3 (ref 3.4–10.8)

## 2024-03-06 PROCEDURE — 96365 THER/PROPH/DIAG IV INF INIT: CPT

## 2024-03-06 PROCEDURE — 96375 TX/PRO/DX INJ NEW DRUG ADDON: CPT

## 2024-03-06 PROCEDURE — 25010000002 FLUOROURACIL PER 500 MG: Performed by: INTERNAL MEDICINE

## 2024-03-06 PROCEDURE — 25010000002 DEXAMETHASONE SODIUM PHOSPHATE 120 MG/30ML SOLUTION: Performed by: INTERNAL MEDICINE

## 2024-03-06 PROCEDURE — 96416 CHEMO PROLONG INFUSE W/PUMP: CPT

## 2024-03-06 PROCEDURE — G0498 CHEMO EXTEND IV INFUS W/PUMP: HCPCS

## 2024-03-06 PROCEDURE — 0 DEXTROSE 5 % SOLUTION: Performed by: INTERNAL MEDICINE

## 2024-03-06 PROCEDURE — 85025 COMPLETE CBC W/AUTO DIFF WBC: CPT | Performed by: INTERNAL MEDICINE

## 2024-03-06 PROCEDURE — 25810000003 SODIUM CHLORIDE 0.9 % SOLUTION 250 ML FLEX CONT: Performed by: INTERNAL MEDICINE

## 2024-03-06 PROCEDURE — 96367 TX/PROPH/DG ADDL SEQ IV INF: CPT

## 2024-03-06 PROCEDURE — 25010000002 LEUCOVORIN CALCIUM PER 50 MG: Performed by: INTERNAL MEDICINE

## 2024-03-06 PROCEDURE — 80053 COMPREHEN METABOLIC PANEL: CPT | Performed by: INTERNAL MEDICINE

## 2024-03-06 RX ORDER — DIPHENHYDRAMINE HYDROCHLORIDE 50 MG/ML
50 INJECTION INTRAMUSCULAR; INTRAVENOUS AS NEEDED
Status: CANCELLED | OUTPATIENT
Start: 2024-03-06

## 2024-03-06 RX ORDER — DEXTROSE MONOHYDRATE 50 MG/ML
250 INJECTION, SOLUTION INTRAVENOUS ONCE
Status: COMPLETED | OUTPATIENT
Start: 2024-03-06 | End: 2024-03-06

## 2024-03-06 RX ORDER — DEXTROSE MONOHYDRATE 50 MG/ML
250 INJECTION, SOLUTION INTRAVENOUS ONCE
Status: CANCELLED | OUTPATIENT
Start: 2024-03-06

## 2024-03-06 RX ORDER — FAMOTIDINE 10 MG/ML
20 INJECTION, SOLUTION INTRAVENOUS AS NEEDED
Status: CANCELLED | OUTPATIENT
Start: 2024-03-06

## 2024-03-06 RX ADMIN — DEXTROSE MONOHYDRATE 250 ML: 50 INJECTION, SOLUTION INTRAVENOUS at 12:11

## 2024-03-06 RX ADMIN — DEXAMETHASONE SODIUM PHOSPHATE 12 MG: 4 INJECTION, SOLUTION INTRA-ARTICULAR; INTRALESIONAL; INTRAMUSCULAR; INTRAVENOUS; SOFT TISSUE at 12:11

## 2024-03-06 RX ADMIN — SODIUM CHLORIDE 690 MG: 9 INJECTION, SOLUTION INTRAVENOUS at 12:34

## 2024-03-06 RX ADMIN — FLUOROURACIL 4130 MG: 50 INJECTION, SOLUTION INTRAVENOUS at 13:08

## 2024-03-07 ENCOUNTER — TELEPHONE (OUTPATIENT)
Dept: ONCOLOGY | Facility: CLINIC | Age: 74
End: 2024-03-07
Payer: MEDICARE

## 2024-03-07 ENCOUNTER — TELEPHONE (OUTPATIENT)
Dept: ONCOLOGY | Facility: HOSPITAL | Age: 74
End: 2024-03-07
Payer: MEDICARE

## 2024-03-07 DIAGNOSIS — R79.89 ELEVATED SERUM CREATININE: Primary | ICD-10-CM

## 2024-03-07 NOTE — TELEPHONE ENCOUNTER
Spoke w/ pt to let him know that his kidney function is decreased and that the NP would like for him to increase his oral fluid intake to 64 oz/day and to avoid NSAID's.  Pt scheduled for next week to recheck CMP.  He v/u.

## 2024-03-08 ENCOUNTER — HOSPITAL ENCOUNTER (OUTPATIENT)
Dept: ONCOLOGY | Facility: HOSPITAL | Age: 74
Discharge: HOME | End: 2024-03-08
Payer: MEDICARE

## 2024-03-08 VITALS — HEART RATE: 72 BPM | DIASTOLIC BLOOD PRESSURE: 56 MMHG | SYSTOLIC BLOOD PRESSURE: 110 MMHG

## 2024-03-08 DIAGNOSIS — C16.0 MALIGNANT NEOPLASM OF CARDIA: Primary | ICD-10-CM

## 2024-03-08 DIAGNOSIS — C15.5 PRIMARY MALIGNANT NEOPLASM OF LOWER THIRD OF ESOPHAGUS: ICD-10-CM

## 2024-03-08 PROCEDURE — 25810000003 SODIUM CHLORIDE 0.9 % SOLUTION: Performed by: INTERNAL MEDICINE

## 2024-03-08 PROCEDURE — 96360 HYDRATION IV INFUSION INIT: CPT

## 2024-03-08 RX ORDER — SODIUM CHLORIDE 0.9 % (FLUSH) 0.9 %
20 SYRINGE (ML) INJECTION AS NEEDED
Status: DISCONTINUED | OUTPATIENT
Start: 2024-03-08 | End: 2024-03-09 | Stop reason: HOSPADM

## 2024-03-08 RX ORDER — SODIUM CHLORIDE 0.9 % (FLUSH) 0.9 %
20 SYRINGE (ML) INJECTION AS NEEDED
OUTPATIENT
Start: 2024-03-08

## 2024-03-08 RX ADMIN — SODIUM CHLORIDE 500 ML: 9 INJECTION, SOLUTION INTRAVENOUS at 11:51

## 2024-03-08 RX ADMIN — Medication 20 ML: at 12:59

## 2024-03-08 NOTE — PROGRESS NOTES
Pt here for CIV DC, pump empty with blood return noted,  pt denies new problems/issues.  IVF administered as ordered.

## 2024-03-13 ENCOUNTER — LAB (OUTPATIENT)
Dept: LAB | Facility: HOSPITAL | Age: 74
End: 2024-03-13
Payer: MEDICARE

## 2024-03-13 DIAGNOSIS — R79.89 ELEVATED SERUM CREATININE: ICD-10-CM

## 2024-03-13 LAB
ALBUMIN SERPL-MCNC: 4.3 G/DL (ref 3.5–5.2)
ALBUMIN/GLOB SERPL: 3.1 G/DL
ALP SERPL-CCNC: 35 U/L (ref 39–117)
ALT SERPL W P-5'-P-CCNC: 5 U/L (ref 1–41)
ANION GAP SERPL CALCULATED.3IONS-SCNC: 12 MMOL/L (ref 5–15)
AST SERPL-CCNC: 13 U/L (ref 1–40)
BILIRUB SERPL-MCNC: 0.2 MG/DL (ref 0–1.2)
BUN SERPL-MCNC: 16 MG/DL (ref 8–23)
BUN/CREAT SERPL: 12.5 (ref 7–25)
CALCIUM SPEC-SCNC: 8.8 MG/DL (ref 8.6–10.5)
CHLORIDE SERPL-SCNC: 108 MMOL/L (ref 98–107)
CO2 SERPL-SCNC: 22 MMOL/L (ref 22–29)
CREAT SERPL-MCNC: 1.28 MG/DL (ref 0.76–1.27)
EGFRCR SERPLBLD CKD-EPI 2021: 58.7 ML/MIN/1.73
GLOBULIN UR ELPH-MCNC: 1.4 GM/DL
GLUCOSE SERPL-MCNC: 176 MG/DL (ref 65–99)
POTASSIUM SERPL-SCNC: 4.9 MMOL/L (ref 3.5–5.2)
PROT SERPL-MCNC: 5.7 G/DL (ref 6–8.5)
SODIUM SERPL-SCNC: 142 MMOL/L (ref 136–145)

## 2024-03-13 PROCEDURE — 80053 COMPREHEN METABOLIC PANEL: CPT | Performed by: NURSE PRACTITIONER

## 2024-03-13 PROCEDURE — 36415 COLL VENOUS BLD VENIPUNCTURE: CPT

## 2024-03-18 NOTE — PROGRESS NOTES
Hematology-Oncology Follow-up Note       Artie Mc  1950    Primary Care Physician: Chanel Carter APRN  Referring Physician: Chanel Carter APRN    Reason For Visit:  Chief Complaint   Patient presents with    Follow-up     Primary malignant neoplasm of lower third of esophagus         Metastatic esophageal cancer  Monitoring for adverse effects related to immunotherapy.  Hypothyroidism    HPI:   Mr. Mc is a pleasant 73 y.o. gentleman with a history of gastroesophageal reflux disease, diabetes, hypertension, heart block status post pacemaker placement and history of CABG.  He was having symptoms of dysphagia, weight loss since September 2016. He was reporting symptoms of food getting stuck in the lower chest.  He has transitioned himself to a liquid diet.  The patient reports losing about 30 pounds over this duration.  The patient underwent upper GI endoscopy on 12/20/16 with Dr. Esvin Barrera.  Endoscopy showed necrotic, circumferential and moderately obstructive mass at the distal esophagus between 42 cm and 36 cm.      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  There was also evidence of mild subacute inflammation in the duodenum.  Dr. Barrera ordered a CT scan of the chest and abdomen with contrast on 12/20/16.  The scan was done at [facility].  The scan showed a new ill-defined mass in the anterior wall of the distal esophagus at the GE junction measuring 2.3 x 2.2 x 1.6 cm, worrisome for cancer.  There were four subcentimeter liver lesions which had appeared stable in comparison to another CT scan from 2009 and they likely represent small cysts.  There was also mild lymphadenopathy in the gastrohepatic ligament.  There were at least six borderline enlarged lymph nodes measuring up to 1.3 x 1 cm in the region.  The patient is referred to us for further oncological evaluation.    1/5/2017 - The  patient presents for initial consultation.  He reports persistent dysphagia and also has symptoms of regurgitation.  He cannot tolerate solid foods and is dependent on liquid diet such as Ensure.  He also reports fatigue. Symptoms of dysphagia have been present for the past four months.  When he eats any solids, the food gets stuck in the lower chest.    1/5/17 - Vitamin B12 200 (L).  CEA 0.8.  Ferritin 35.  Iron saturation 16% (L), serum iron 56, TIBC 354.  Creatinine 0.9.  LFTs normal.  Folate 10.2.  1/12/17 - PET/CT scan:  Intense abnormal activity corresponding to the region of the GE junction and proximal stomach.  SUV is 11.06.  There is a suggestion of an abnormal mass or abnormal wall thickening in the region measuring 3.9 x 4.8 cm.  No additional abnormalities.  No evidence of metastases or other lymphadenopathy.  Scattered nonspecific subcentimeter lymph nodes involving the mediastinum and within the central upper abdomen.  1/13/17 - Creatinine 0.9.  LFTs normal.    1/17/17 - Patient seen by thoracic surgeon, Dr. Bradley.  PET scan was reviewed.  He has recommended neoadjuvant chemoradiation therapy followed by [place] Gurwinder esophagogastrectomy.  Port-A-Cath is being arranged.    1/20/17 - WBC 6.3, hemoglobin 12.1, platelet count 198,000, MCV 86.4.  1/30/17 - Patient started weekly Carboplatin and Taxol (Carboplatin AUC 2 and Taxol 50 mg/M2).  Patient received Injectafer 750 mg.  WBC 5.8, hemoglobin 12.4, platelet count 244,000.   1/31/17 - Patient seen by Dr. Eduardo Oleary.  The plan was to give 5040 cGy in 28 fractions.   2/6/17 - WBC 4.4, hemoglobin 11.6, platelet count 201,000.  Patient received cycle 2 of weekly Carboplatin and Taxol (Carboplatin AUC 2 and Taxol 50 mg/M2).  2/13/17 - Patient received Carboplatin and Taxol weekly cycle 3.  2/13/17 - Patient started concurrent radiation therapy.  Total planned dose is 4140 cGy.    2/13/17 to 2/15/17 - Patient received 4140 cGy of neoadjuvant radiation.   Radiation was finished on 3/15/17.    2/20/17 - WBC 1.8, hemoglobin 10.6, platelet count 217,000.  Creatinine 0.5. Folate 15 (L).  Ferritin 361.  Haptoglobin 214.  Iron saturation 33%, TIBC 263, serum iron 88.    2/20/17 - Patient received Carboplatin and Taxol weekly cycle 4.   2/27/17 - WBC 3.7, hemoglobin 10.7, platelet count 177,000, MCV 85.3.    3/6/17 - Patient has received 16 out of the 23 planned radiation treatments.  Total planned dose is 4140 cGy.    3/6/17 - WBC 5.4, hemoglobin 11.2, platelet count 113,000.  3/6/17 - Patient received cycle 5 of weekly Carboplatin and Taxol.   3/13/17 - Patient received cycle 6 of weekly Carboplatin and Taxol.  WBC 3.9, hemoglobin 11.3, platelet count 93,000.     3/20/17 - WBC 1.4, hemoglobin 10.0, platelet count 118,000, MCV 86.1   3/27/17 - WBC 3.8, hemoglobin 9.5, platelet count 176,000, MCV 87.3.    4/7/17 - Stress test:  No evidence of reversible myocardial ischemia.  Normal left ventricular ejection fraction of 50%.    4/17/17 - Upper GI endoscopy by Dr. 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  The GE junction adenocarcinoma lesion seems to have a very good response to chemotherapy and radiation.  The lumen was open.  5/1/17 - WBC 7.0, hemoglobin 10.2, platelet count 175,000.    5/4/17 - Patient underwent [place] Gurwinder esophagectomy with pyloroplasty, feeding jejunostomy tube, abdominal and mediastinal lymph node dissection.    Surgical Pathology:  Moderately to poorly differentiated adenocarcinoma measuring 1.3 cm at the GE junction.  Resection margins are negative for malignancy.  Tumor margins are negative.  There is effective treatment response.  No evidence of lymphovascular invasion.  Staging is ypT3,pN1.  One out of fourteen lymph nodes involved.   5/22/17 - WBC 6.8, hemoglobin 10.3, platelet count 312,000.    6/19/17 - WBC 5.7, hemoglobin 10.5, platelet count 204,000, MCV 92.1.    7/6/17 - EGD:  Status post balloon  dilation of esophageal stricture.  7/12/17 - EGD with balloon dilation of esophageal stricture.  7/26/17 - EGD and balloon dilation of esophageal stricture.  7/31/17 - PET/CT scan:  There is mild metabolic activity seen at the thoracic esophagus just at and below the surgical margins.  Some mild wall thickening.  This could be from recent surgery.  A residual cancer seems unlikely.  There is a precarinal lymph node with mild metabolic activity with SUV of 3 measuring 1 x 1.3 cm.  Again this appears to be nonspecific in my opinion.    8/7/17 - WBC 5.9, hemoglobin 11.9, platelet count 171,000, MCV 88.7.    10/19/17 - WBC 7.5, hemoglobin 11.8, platelet count 216,000.    1/8/18 - PET scan:  No evidence of residual disease or recurrent disease.  There is an esophageal stent in the mid esophagus with a large debris air level.  No evidence of any metastases in the chest, abdomen or pelvis.  Mild nodule uptake in the vocal cords with fullness in the right vocal cord.  This could be physiologic.    7/9/18 - CT scan of chest with contrast:  Prominent mediastinal lymph nodes appear stable since February.  Changes of gastric pull-through procedure for esophageal cancer again noted.  Tree-in-bud infiltrates in the left lung base, consistent with infection versus inflammation.    1/10/19 - CT chest, abdomen and pelvis with contrast:  No evidence of mets in the chest; however, interval development of metastatic lymphadenopathy in the left side of the retroperitoneum.  For instance, there is a 17 mm rounded lymph node, 10 mm lymph node and also a 14 mm lymph node.  These are all new.  Stable 9 mm hypodense lesion within the right hepatic lobe, which could reflect hemangioma or complex cyst.    1/30/19 - CT-guided core biopsy of retroperitoneal lymph node:  Poorly differentiated adenocarcinoma consistent with metastases from patient’s known esophageal primary.  CK20 positive, CDX2 positive, cytokeratin 7 negative, TTF-1 negative.     2/1/19 - Creatinine 0.9, BUN 18, calcium 9.3; these are normal.    2/28/19 - [facility] Next Gen sequencing testing on retroperitoneal lymph node specimen:  PD-L1 positive.  CPS = 3.  This implies responsiveness to pembrolizumab.  Mismatch repair status is proficient (no evidence of microsatellite instability).  Tumor mutational burden is low = 6 mutations/Mb.  CDH1 indeterminate.  KRAS mutation positive.  TP53 mutation positive.  CDK6 amplified.  [employer] PD-L1 testing by IHC:  PD-L1 expression 1% positive (CPS =1).  HER2/marvel by IHC is negative.  HER2/marvel by CISH not amplified.  Microsatellite stable tumor.    3/7/19 - WBC 7.3, hemoglobin 12, platelet count 128,000, MCV 92.     3/14/19 - Patient was seen by radiation oncologist, Dr. Chahal.  He has recommended observation versus systemic chemoimmunotherapy as needed.  No plans for radiation therapy.   5/1/19 - CT scan of chest with contrast:  No definitive findings of new metastatic disease in the chest.  Emphysema noted.    5/1/19 - CT abdomen and pelvis:  There is interval progression of metastatic retroperitoneal adenopathy.  Left periaortic adenopathy with lymph node measuring up to 2 cm, previously 1.7 cm.  Another lymph node now measures 2.3, previously 1.4 cm.  New enlarged lymph node on image 147 measures 1.4 cm, previously 0.4 cm.  A 9 mm hypodense right hepatic lobe lesion appears stable.    5/9/19 - Decision made to start patient on immunotherapy Keytruda.    5/9/19 - Vitamin B12 229.  Ferritin 61.  Folate 14.3.  Iron saturation 26%, TIBC 304, serum iron 79.  5/31/19 - Patient received Keytruda 200 mg cycle 1, day 1.    5/31/19 - Free T4 0.99.  Creatinine 0.9, BUN 9.  LFTs normal.  TSH 3.99.  Folate 14.3.  Iron saturation 26%.    6/21/2019 patient received Keytruda cycle 2  7/12/2019: Patient received Keytruda cycle 3  8/2/2019 patient received cycle 4 Keytruda WBC 7.2, hemoglobin 11.8 platelet count 163  8/23/2019 creatinine 0.9, LFTs normal, WBC 6.4,  hemoglobin 11.5, platelets 179, TSH 2.1   8/23/2019 patient received cycle 5 of Keytruda  9/3/2019 CT chest abdomen and pelvis with contrast: . Positive response to therapy in the abdomen since 05/01/2019. Pathologically enlarged retroperitoneal lymph nodes, predominantly in the left periaortic region, have diminished in size.a 1.3 x 2.1 cm left periaortic node (image 60), previously measured 3.5 x 2.3 cm. A 1.6 cm index node near the level of the left renal vein previously measured 2.0 cm No new or progressive adenopathy. 2. Stable findings in the chest. Noncalcified right upper lobe nodules are unchanged. There is no evidence of new or progressive metastatic disease within the chest. 3. 8 mm indeterminate low-density lesion in the right hepatic lobe, is stable. No new liver lesions.  9/6/2019 WBC 5.6, hemoglobin 11.2, platelets of 192, MCV 91.8  9/13/2019 patient received Keytruda cycle 6, albumin 3.2  10/4/2019 patient received cycle 7 of Keytruda, TSH 4.8, free T4 0.86  10/9/2019 WBC 5.8, hemoglobin 11.7, platelets 174  10/25/2019 patient received cycle 8 of Keytruda.  WBC 5.9, hemoglobin 11.5, platelets 152, creatinine 0.75, cortisol 15.2, TSH 6.48 high , free T4 1.13 normal  11/6/2019 WBC 6.6, hemoglobin 11.8, MCV 91.5, platelets 187  11/15/2019 patient received cycle 9 of Keytruda, WBC 6.6, hemoglobin 11.9, platelets 161, cortisol level 8.86, TSH 2.68, free T3 2.8, free T4 1.5  12/4/2019 WBC 6.9, hemoglobin 12.0, platelets 180, MCV 92.9  12/06/2019-Keytruda Cycle 10  12/27/2019- Cycle 11 LFTs normal, WBC 6.7, hemoglobin 11.4, platelets 168, MCV 93, TSH 2.4,  1/28/2020: CT chest abdomen and pelvis with contrast:  Single (aortic lymph node in the abdominal retroperitoneum has increased.  Rest of the retroperitoneal lymph nodes have decreased in size.  Mediastinal adenopathy appears unchanged.  Noncalcified bilateral pulmonary nodules are stable  1/17/2020 patient received Keytruda cycle 12:.  2/3/2020 WBC 7.1,  hemoglobin 12.2, platelets 171  02/10/2020 patient received cycle 13 of Keytruda: WBC 7.3, hemoglobin 12.1, platelets 166, creatinine 0.82  3/6/2020 patient is of Keytruda 200 mg, hemoglobin 10.8  3/27/2020 patient received Keytruda 200 mg, hemoglobin 11.4, TSH 1.95  4/17/2020 patient received Keytruda cycle 16 200 mg, WBC 6.1, hemoglobin 11.6, platelets 150  5/8/2020: Patient received cycle 17 Keytruda, WBC 6.3, hemoglobin 9.6, platelets 137, TSH 2.05, free T4 1.54, creatinine 0.93, LFTs normal,  5/29/2020: Patient received cycle 18 Keytruda, WBC 6.5, hemoglobin 11.7, platelets 129, creatinine 0.83, TSH 1.69  6/26/2020: Patient receiving Keytruda.   7/17/2020: TSH 2.06, WBC 5.7, hemoglobin 11.4, platelets 177, MCV 92.4, Keytruda 200 mg cycle 20  7/27/2020: CT chest abdomen pelvis with contrast: Stable findings in the chest abdomen pelvis since 1/28/2020.  Noncalcified bilateral pulmonary nodules are stable.  Stable retroperitoneal left perinephric adenopathy in the abdomen.  8/7/2020: Patient received cycle 21 of Keytruda  8/28/2020 patient is a cycle 22 of Keytruda.  WBC 6.02, hemoglobin 11.3, platelets 161  9/11/2020 WBC 6.9, hemoglobin 12.4, platelets 192  9/18/2020: Cycle 23 of Keytruda, TSH 1.19  10/9/2020: Cycle 24 of Keytruda, WBC 5.9, hemoglobin 11.9, platelets 178, creatinine 0.84, TSH 2.56, free T3 2.4, CMP normal  10/23/2020: WBC 7.34, hemoglobin 11.6, platelets 175  10/30/2020:.  Keytruda 200 mg  11/20/2020: Keytruda 200 mg  12/11/2020 Keytruda 200 mg.  WBC 5.5, hemoglobin 11.4, platelets 176, creatinine 0.8, LFTs normal, TSH 2.2, cortisol 8.3  12/10/2020: CT chest abdomen and pelvis with contrast: Previously seen left para-aortic lymph nodes within the abdomen are decreased in size.  No pathological lymphadenopathy.  No evidence of residual malignancy..  Small noncalcified pulmonary nodules are stable.  No new nodules.  Stable mediastinal lymph nodes.  3/12/2021: WBC 6.7, hemoglobin 11.3, platelets  17  3/12/2021: CMP normal, TSH 2.9, cortisol 11.95, 1  3/15/2021: CT chest with contrast: Stable findings in the chest with postsurgical changes of esophageal resection and gastric pull-through.  Stable right lower lobe lung nodule measures 11 mm.  Stable size and appearance of the mediastinal and upper abdominal lymph nodes.  3/23/2021: Doppler of upper extremity: Normal.  3/24/2021: CT abdomen and pelvis with contrast: Postoperative changes.  Left periaortic nodes are stable.  4/12/2021: TSH 4.1, cortisol 12.4  6/22/2021: CMP normal,Cortisol 10.08,, WBC 5.8, hemoglobin 11.2, platelets 109, TSH 2.59  7/12/2021: CT scan of the chest abdomen pelvis with contrast: Dominant 10 x 10 left periaortic lymph node is stable since 3/22/2021.  Dominant lymph nodes in the right lower paratracheal and AP window distribution are stable since 3/15/2021.  No evidence of progressive adenopathy in the chest abdomen or pelvis.    01/11/2022 -PET/CT findings consistent with new cedric metastatic disease in the retroperitoneum.  2 new pathologically enlarged hypermetabolic retroperitoneal lymph nodes.  No convincing evidence of recurrent disease within the chest neck pelvis or skeleton.  1/20/2022 -CT-guided needle biopsy consistent with poorly differentiated carcinoma.  CDX2 positive consistent with metastases from known esophageal primary  2/9/2022 - Pembrolizumab complicated with fatigue, joint pains, nausea, diarrhea. Symptoms decrease in intensity over a week.  3/30/2022 -CT chest and pelvis with stable metastatic lymph nodes in the left periaortic retroperitoneum.  No evidence of disease progression or new lesions.  Stable lymphadenopathy in the distal paratracheal mediastinal area  4/13/2022 - Keytruda 200  5/4/2022 - Keytruda 200  Continued keytruda  7/11/2022 - CT abdomen pelvis with decrease in size of left perioaortic lymph node. No other areas of disease.  Continues to be on Keytruda  10/18/2022 - stable CT imaging  1/20/2023  - CT imaging with stable disease. No significant change.  Patient was hospitalized with esophagitis, subsequent EGD with improvement, no recurrence of disease.  2/21/2023 - Keytruda  CT CAP with increased retroperitoneal adenopathy.   ECHO with EF 35-40% (concern for immunotherapy related )immunotherapy held with ongoing investigation for cause.  Subsequent improvement in EF to 45%  4/4/2023 - CT abdomen pelvis with interval retroperitoneal lymph node enlargement compatible with metastatic adenopathyl.  5/5/2023 - PET CT with mixed response. No clear evidence of progression  6/2/23 -started pembrolizumab 200 mg IV  Posttreatment had symptoms of chest tightness troponin and proBNP negative, echocardiogram with EF 40%  Subsequent cardiac cath with EF down to 30% elevated proBNP, troponin no significant obstructive disease in the graft vessels  Cardiac MRI unable to obtain given pacemaker noncompliant.  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 Two enlarged mediastinal lymph nodes in the precarinal and AP window distributions are unchanged since 4/4/2023. No new or progressive adenopathy in the chest.Stable 3 mm or less noncalcified right lung nodules since 4/4/2023. Surgical changes of distal esophagectomy with gastric pull-through, without evidence of local disease recurrence  8/29/23 - started FOLFOX had significant side effects  9/20/23 - stopped oxaliplatin and continued 5FU only'  11/29/23 - CT CAP with decrease size of retroperitoneal adenopathy, no other concerning findings.  Continues 5FU leucovorin   2/12/24 - PET CT There is some metabolic activity within a mediastinal lymph node which has been suggested with slightly decreased metabolic activity. Whether this is reactive  or pathologic is uncertain.   Previously noted abnormal retroperitoneal lymph node is no longer identified       Subjective:  Continues to have mild pain in the right chest mostly when he eats. Continues to have nausea with eating as well.     Past Medical History:   Diagnosis Date    B12 deficiency     Blockage of coronary artery of heart     Depression     DM (diabetes mellitus)     Dysphagia     GERD (gastroesophageal reflux disease)     HTN (hypertension)     Hyperlipidemia        Past Surgical History:   Procedure Laterality Date    ABDOMINAL WALL ABSCESS INCISION AND DRAINAGE  10/22/2018    WITH DEBRIDEMENT  1.5CM X 1.5 CM X 1.5 CM    DR. PURI    CARDIAC CATHETERIZATION Right 7/20/2023    Procedure: Left Heart Cath;  Surgeon: Mazin Simmons MD;  Location: [facility] CATH INVASIVE LOCATION;  Service: Cardiovascular;  Laterality: Right;    CATARACT EXTRACTION  2018    COLONOSCOPY      CORONARY ARTERY BYPASS GRAFT  2015    ENDOSCOPY  12/20/2016    ENDOSCOPY N/A 01/02/2023    Procedure: ESOPHAGOGASTRODUODENOSCOPY;  Surgeon: Esvin Morgan MD;  Location: [facility] ENDOSCOPY;  Service: Gastroenterology;  Laterality: N/A;  post: anastamosis stricture    ENDOSCOPY N/A 02/09/2023    Procedure: ESOPHAGOGASTRODUODENOSCOPY, non-wire guided balloon dilation (12-14mm) of esophageal stricture;  Surgeon: Tino Villafana MD;  Location: [facility] ENDOSCOPY;  Service: Gastroenterology;  Laterality: N/A;  post: esophageal anastamotic stricture, post surgical changes    ESOPHAGECTOMY  05/04/2017    BROOKS PETTY ESOPHAGECTOMY, FEEDING JEJNOSTOMOY, ABDOMINAL AND MEDIASTINAL LYMPH NODE DISECTION, PEDICLED MUSCLE FLAP COVERAGE DR. PURI    ESOPHAGOSCOPY / EGD  07/12/2017    WITH BALLOON DILATION    ESOPHAGOSCOPY / EGD  07/26/2017    WITH BALLOON DILATION    ESOPHAGOSCOPY / EGD  08/11/2017    WITH BALLOON DILATION    ESOPHAGOSCOPY / EGD  08/28/2017    WITH BALLOON DILATION    ESOPHAGOSCOPY / EGD  09/27/2017    WITH  BALLOON DILATION    INSERT / REPLACE / REMOVE PACEMAKER      LYMPH NODE BIOPSY  01/30/2019    PACEMAKER IMPLANTATION  11/21/2016       Current Outpatient Medications:     clonazePAM (KlonoPIN) 0.5 MG tablet, Take 0.5 tablets by mouth 2 (Two) Times a Day As Needed for Anxiety., Disp: , Rfl: 1    cyanocobalamin (VITAMIN B-12) 1000 MCG tablet, Take 1 tablet by mouth Daily. Stopped but going to start back, Disp: , Rfl:     folic acid (FOLVITE) 1 MG tablet, Take 1 tablet by mouth Daily., Disp: , Rfl:     levothyroxine (SYNTHROID, LEVOTHROID) 50 MCG tablet, Take 1 tablet by mouth once daily, Disp: 90 tablet, Rfl: 0    magic mouthwash oral suspension, SWISH AND SPIT FIVE ML FOUR TIMES DAILY AS NEEDED (Patient not taking: Reported on 2/7/2024), Disp: , Rfl:     metFORMIN (GLUCOPHAGE) 1000 MG tablet, Take 0.5 tablets by mouth Daily., Disp: 60 tablet, Rfl: 2    metoprolol succinate XL (TOPROL-XL) 25 MG 24 hr tablet, Take 1 tablet by mouth once daily, Disp: 90 tablet, Rfl: 0    ondansetron (ZOFRAN) 4 MG tablet, Take 1 tablet by mouth Every 8 (Eight) Hours As Needed for Nausea or Vomiting., Disp: , Rfl:     ondansetron (ZOFRAN) 8 MG tablet, Take 1 tablet by mouth 3 (Three) Times a Day As Needed for Nausea or Vomiting., Disp: 30 tablet, Rfl: 5    pantoprazole (PROTONIX) 40 MG EC tablet, Take 1 tablet by mouth 2 (Two) Times a Day., Disp: , Rfl:     pravastatin (PRAVACHOL) 10 MG tablet, Take 1 tablet by mouth Every Other Day., Disp: , Rfl:     spironolactone (ALDACTONE) 25 MG tablet, Take 0.5 tablets by mouth Daily. (Patient not taking: Reported on 11/1/2023), Disp: 30 tablet, Rfl: 2  No current facility-administered medications for this visit.    Facility-Administered Medications Ordered in Other Visits:     fluorouracil (ADRUCIL) 4,130 mg in sodium chloride 0.9 % 94 mL chemo infusion - FOR HOME USE, 2,400 mg/m2 (Treatment Plan Recorded), Intravenous, Once, Jean Pierre Elizondo MD, 4,130 mg at 03/20/24 1454    Allergies   Allergen  Reactions    Ace Inhibitors Unknown (See Comments)     Unknown reaction      Heparin Other (See Comments)     Fever/chills, weakness in legs    Sulfa Antibiotics Other (See Comments)     Mouth sores    Ciprofloxacin GI Intolerance     Pt reports pain to abd       Family History   Problem Relation Age of Onset    Mental illness Mother     Heart disease Mother     Hypertension Father     Cancer Father     Heart disease Sister     Heart disease Brother      Cancer-related family history includes Cancer in his father.    Social History     Tobacco Use    Smoking status: Former     Current packs/day: 0.00     Average packs/day: 2.0 packs/day for 50.0 years (100.0 ttl pk-yrs)     Types: Cigarettes     Start date: 1965     Quit date: 2015     Years since quittin.7    Smokeless tobacco: Former     Types: Chew     Quit date: 1989   Vaping Use    Vaping status: Never Used   Substance Use Topics    Alcohol use: No    Drug use: No     ROS:     Objective:    There were no vitals filed for this visit.      (1) Restricted in physically strenuous activity, ambulatory and able to do work of light nature    Physical Exam:     Physical Exam   Constitutional: He is oriented to person, place, and time. He appears well-developed. No distress.   HENT:   Head: Normocephalic and atraumatic.   Nose: Nose normal.   Mouth/Throat: Mucous membranes are moist.   Eyes: Pupils are equal, round, and reactive to light.   Neck: No thyromegaly present.   Cardiovascular: Normal rate, regular rhythm, normal heart sounds and normal pulses. Exam reveals no gallop and no friction rub.   Pulmonary/Chest: Effort normal and breath sounds normal.   Abdominal: Soft. Normal appearance and bowel sounds are normal.   No tenderness   Musculoskeletal: Normal range of motion. No deformity or signs of injury.      Right lower leg: No edema.   Neurological: He is alert and oriented to person, place, and time. He exhibits normal muscle tone.   Skin:  Skin is warm and dry. No rash noted. He is not diaphoretic. No erythema. No jaundice.   Right side chest  port       Lab Results - Last 18 Months   Lab Units 03/20/24  1309 03/06/24  1052 02/21/24  1138   WBC 10*3/mm3 4.30 4.09 4.19   HEMOGLOBIN g/dL 7.4* 8.1* 7.9*   HEMATOCRIT % 23.3* 25.8* 25.1*   PLATELETS 10*3/mm3 169 169 199   MCV fL 92.8 93.5 94.7     Lab Results - Last 18 Months   Lab Units 03/20/24  1309 03/13/24  0921 03/06/24  1052   SODIUM mmol/L 139 142 140   POTASSIUM mmol/L 4.6 4.9 4.8   CHLORIDE mmol/L 107 108* 108*   CO2 mmol/L 22.0 22.0 19.0*   BUN mg/dL 14 16 15   CREATININE mg/dL 1.47* 1.28* 1.54*   CALCIUM mg/dL 8.3* 8.8 8.6   BILIRUBIN mg/dL 0.3 0.2 0.3   ALK PHOS U/L 40 35* 45   ALT (SGPT) U/L 5 5 6   AST (SGOT) U/L 12 13 13   GLUCOSE mg/dL 211* 176* 275*     Assessment & Plan     Assessment:    Metastatic esophageal carcinoma:    Patient has a history of GE junction poorly differentiated adenocarcinoma for which he had chemoradiation followed by Noel Gurwinder resection:  Pathology showed ypT3,pN1 disease.  Tumor was Stage IIIA, diagnosed in 2016.  He received concurrent chemoradiation with weekly Carboplatin and Taxol and radiation finishing on 3/15/17.  CT scan on 1/10/19 showed new retroperitoneal lymph nodes.  These lymph nodes were biopsied on 1/30/19 and it shows metastatic esophageal cancer.  [facility] Next Gen testing was performed on the sample and it shows KRAS mutation, microsatellite stable tumor.  PD-L1 is positive.  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  HER2/marvel (ERBB2) was not amplified and was negative.  His CT scan on 5/1/19 shows evidence of progression.  Patient has started receiving Keytruda on 5/31/19.  He has received 19 cycles so far.   CT scan on 1/28/2020 shows improvement and continued response.  CT scan chest abdomen pelvis with contrast on 12/10/2020 does not show any evidence of disease progression.  On 12/22/2020 decision made to hold Keytruda per patient's request to get a  treatment break.. Repeat CT scan chest 7/12/2021 does not show any evidence of disease progression.  Now CT imaging in January 2021 with a CT showing increased abdominal left para-aortic adenopathy.  Subsequent PET/CT with new cedric metastatic disease in the retroperitoneum of the abdomen.  New pathologically enlarged hypermetabolic retroperitoneal lymph nodes.  This would be concerning for disease recurrence/progression.  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  I discussed with him the treatment options going forward.  He has had a good response with immunotherapy pembrolizumab in the past. This was  restarted. subsequent imaging with treatment response. 1/2023 imaging with good response. Continued treatment  Now with increased adenopathy 2 new lymph nodes. Holding immunotherapy for now.   Consider getting cardiac MR discussed with Dr. Culp. While immunotherapy on hold, increased adenopathy is concerning. Could be pseudoprogression vs true progression. PET CT reviewed could indicate pseudoprogression since he has had a good response to immunotherapy  immunotherapy was restarted and has been on hold with patient's symptomatology cardiac MRI cannot be obtained, no ischemic heart disease leading to cardiomyopathy this most likely is related to Keytruda.  We will stop Keytruda going forward  CT imaging with stable disease, FOLFOX was started which he had significant side effects  With imaging being stable and significant side effects with FOLFOX, we discussed  about options with wait and watch vs 5FU only, patient prefers to be on maintenance.   We started 5FU only .   Tolerating relatively well, has significant fatigue. CT imaging now with ongoing response. We discussed that he would have been on treatment for 2 years following recurrence with good response. We can continue treatment now and get a PET CT in 2/2024.will repeat prior to follow up. Get signatera. Signatera is pending, PET with  some residual activity in the mediastinal lymph node.   For now continue treatment pending signatera.     Possible myocarditis  Symptoms started after last immunotherapy  Treatment on hold for now, symptoms have improved, cardiac markers were negative, echocardiogram with EF 40%, cardiac MRI cannot be obtained cardiac cath with EF down to 30% management per cardiology  Now EF up to 40-45% monitor. No significant symptoms. Has some ongoing shortness of breath. No leg swelling.    Shortness of breath  Improved, has occasional shortness of breath, likely copd  I have recommended using albuterol  Symptoms have improved since stopping immunotherapy. Questionable pneumonitis will monitor closely with immunotherapy  Cardiology has evaluated for myocarditis related to immunotherapy symptomatic improvement now continue to monitor.    Reflux/heartburn/dysphagia:   Symptoms improved.  Also has a history of strictures. Status post EGD and balloon dilation.   Continues to be on pantoprazole symptoms stable.    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  Continues on Synthroid stable now no changes    Mild diarrhea:  diarrhea seems to be waxing and waning in nature.  Diarrhea related to immunotherapy now improved stable.    PD-L1 positive, HER2 negative, p53 and KRAS positive.  Repeat Caris testing with repeat biopsy with no target mutations.    B12 deficiency: Continue B12 1000 mcg twice daily.    Anemia - Hb improved and stable.    Dysuria - has some dysuria, this has improved.    Groin pain - negative for UTI, STI, has seen urology, likely prostatits, started cipro improvement noted. Had cystoscopy, was started on flomax, pain has continued intermittent. No worsening noted.    Chronic kidney disease creatinine has worsened overall compared to 8/2023         Jean Pierre Elizondo MD 03/20/24     Orders Placed This Encounter   Procedures    CBC & Differential     Order Specific Question:   Release to patient     Answer:   Routine  Release [[id number]]

## 2024-03-19 DIAGNOSIS — C15.5 PRIMARY MALIGNANT NEOPLASM OF LOWER THIRD OF ESOPHAGUS: ICD-10-CM

## 2024-03-19 DIAGNOSIS — C16.0 MALIGNANT NEOPLASM OF CARDIA: Primary | ICD-10-CM

## 2024-03-20 ENCOUNTER — HOSPITAL ENCOUNTER (OUTPATIENT)
Dept: ONCOLOGY | Facility: HOSPITAL | Age: 74
Discharge: HOME | End: 2024-03-20
Admitting: INTERNAL MEDICINE
Payer: MEDICARE

## 2024-03-20 ENCOUNTER — OFFICE VISIT (OUTPATIENT)
Dept: ONCOLOGY | Facility: CLINIC | Age: 74
End: 2024-03-20
Payer: MEDICARE

## 2024-03-20 VITALS
OXYGEN SATURATION: 96 % | DIASTOLIC BLOOD PRESSURE: 55 MMHG | SYSTOLIC BLOOD PRESSURE: 120 MMHG | WEIGHT: 151.4 LBS | HEIGHT: 65 IN | HEART RATE: 82 BPM | RESPIRATION RATE: 16 BRPM | TEMPERATURE: 98 F | BODY MASS INDEX: 25.22 KG/M2

## 2024-03-20 DIAGNOSIS — C15.5 PRIMARY MALIGNANT NEOPLASM OF LOWER THIRD OF ESOPHAGUS: ICD-10-CM

## 2024-03-20 DIAGNOSIS — C16.0 MALIGNANT NEOPLASM OF CARDIA: ICD-10-CM

## 2024-03-20 DIAGNOSIS — R39.9 UTI SYMPTOMS: Primary | ICD-10-CM

## 2024-03-20 DIAGNOSIS — D63.0 ANEMIA IN NEOPLASTIC DISEASE: ICD-10-CM

## 2024-03-20 DIAGNOSIS — C15.5 PRIMARY MALIGNANT NEOPLASM OF LOWER THIRD OF ESOPHAGUS: Primary | ICD-10-CM

## 2024-03-20 LAB
ALBUMIN SERPL-MCNC: 3.7 G/DL (ref 3.5–5.2)
ALBUMIN/GLOB SERPL: 2.3 G/DL
ALP SERPL-CCNC: 40 U/L (ref 39–117)
ALT SERPL W P-5'-P-CCNC: 5 U/L (ref 1–41)
ANION GAP SERPL CALCULATED.3IONS-SCNC: 10 MMOL/L (ref 5–15)
AST SERPL-CCNC: 12 U/L (ref 1–40)
BASOPHILS # BLD AUTO: 0.02 10*3/MM3 (ref 0–0.2)
BASOPHILS NFR BLD AUTO: 0.5 % (ref 0–1.5)
BILIRUB SERPL-MCNC: 0.3 MG/DL (ref 0–1.2)
BUN SERPL-MCNC: 14 MG/DL (ref 8–23)
BUN/CREAT SERPL: 9.5 (ref 7–25)
CALCIUM SPEC-SCNC: 8.3 MG/DL (ref 8.6–10.5)
CHLORIDE SERPL-SCNC: 107 MMOL/L (ref 98–107)
CO2 SERPL-SCNC: 22 MMOL/L (ref 22–29)
CREAT SERPL-MCNC: 1.47 MG/DL (ref 0.76–1.27)
DEPRECATED RDW RBC AUTO: 62.7 FL (ref 37–54)
EGFRCR SERPLBLD CKD-EPI 2021: 49.7 ML/MIN/1.73
EOSINOPHIL # BLD AUTO: 0.12 10*3/MM3 (ref 0–0.4)
EOSINOPHIL NFR BLD AUTO: 2.8 % (ref 0.3–6.2)
ERYTHROCYTE [DISTWIDTH] IN BLOOD BY AUTOMATED COUNT: 19.5 % (ref 12.3–15.4)
GLOBULIN UR ELPH-MCNC: 1.6 GM/DL
GLUCOSE SERPL-MCNC: 211 MG/DL (ref 65–99)
HCT VFR BLD AUTO: 23.3 % (ref 37.5–51)
HGB BLD-MCNC: 7.4 G/DL (ref 13–17.7)
LYMPHOCYTES # BLD AUTO: 1.16 10*3/MM3 (ref 0.7–3.1)
LYMPHOCYTES NFR BLD AUTO: 27 % (ref 19.6–45.3)
MCH RBC QN AUTO: 29.5 PG (ref 26.6–33)
MCHC RBC AUTO-ENTMCNC: 31.8 G/DL (ref 31.5–35.7)
MCV RBC AUTO: 92.8 FL (ref 79–97)
MONOCYTES # BLD AUTO: 0.58 10*3/MM3 (ref 0.1–0.9)
MONOCYTES NFR BLD AUTO: 13.5 % (ref 5–12)
NEUTROPHILS NFR BLD AUTO: 2.42 10*3/MM3 (ref 1.7–7)
NEUTROPHILS NFR BLD AUTO: 56.2 % (ref 42.7–76)
PLATELET # BLD AUTO: 169 10*3/MM3 (ref 140–450)
PMV BLD AUTO: 10 FL (ref 6–12)
POTASSIUM SERPL-SCNC: 4.6 MMOL/L (ref 3.5–5.2)
PROT SERPL-MCNC: 5.3 G/DL (ref 6–8.5)
RBC # BLD AUTO: 2.51 10*6/MM3 (ref 4.14–5.8)
SODIUM SERPL-SCNC: 139 MMOL/L (ref 136–145)
WBC NRBC COR # BLD AUTO: 4.3 10*3/MM3 (ref 3.4–10.8)

## 2024-03-20 PROCEDURE — 85025 COMPLETE CBC W/AUTO DIFF WBC: CPT | Performed by: INTERNAL MEDICINE

## 2024-03-20 PROCEDURE — G0498 CHEMO EXTEND IV INFUS W/PUMP: HCPCS

## 2024-03-20 PROCEDURE — 0 DEXTROSE 5 % SOLUTION 250 ML FLEX CONT: Performed by: INTERNAL MEDICINE

## 2024-03-20 PROCEDURE — 96365 THER/PROPH/DIAG IV INF INIT: CPT

## 2024-03-20 PROCEDURE — 80053 COMPREHEN METABOLIC PANEL: CPT | Performed by: INTERNAL MEDICINE

## 2024-03-20 PROCEDURE — 0 DEXTROSE 5 % SOLUTION: Performed by: INTERNAL MEDICINE

## 2024-03-20 PROCEDURE — 96416 CHEMO PROLONG INFUSE W/PUMP: CPT

## 2024-03-20 PROCEDURE — 25010000002 DEXAMETHASONE SODIUM PHOSPHATE 120 MG/30ML SOLUTION: Performed by: INTERNAL MEDICINE

## 2024-03-20 PROCEDURE — 25010000002 LEUCOVORIN CALCIUM PER 50 MG: Performed by: INTERNAL MEDICINE

## 2024-03-20 PROCEDURE — 25010000002 FLUOROURACIL PER 500 MG: Performed by: INTERNAL MEDICINE

## 2024-03-20 PROCEDURE — 25810000003 SODIUM CHLORIDE 0.9 % SOLUTION 250 ML FLEX CONT: Performed by: INTERNAL MEDICINE

## 2024-03-20 PROCEDURE — 96367 TX/PROPH/DG ADDL SEQ IV INF: CPT

## 2024-03-20 PROCEDURE — 96375 TX/PRO/DX INJ NEW DRUG ADDON: CPT

## 2024-03-20 RX ORDER — DEXTROSE MONOHYDRATE 50 MG/ML
250 INJECTION, SOLUTION INTRAVENOUS ONCE
Status: COMPLETED | OUTPATIENT
Start: 2024-03-20 | End: 2024-03-20

## 2024-03-20 RX ORDER — DIPHENHYDRAMINE HYDROCHLORIDE 50 MG/ML
50 INJECTION INTRAMUSCULAR; INTRAVENOUS AS NEEDED
Status: CANCELLED | OUTPATIENT
Start: 2024-03-20

## 2024-03-20 RX ORDER — FAMOTIDINE 10 MG/ML
20 INJECTION, SOLUTION INTRAVENOUS AS NEEDED
Status: CANCELLED | OUTPATIENT
Start: 2024-03-20

## 2024-03-20 RX ORDER — DEXTROSE MONOHYDRATE 50 MG/ML
250 INJECTION, SOLUTION INTRAVENOUS ONCE
Status: CANCELLED | OUTPATIENT
Start: 2024-03-20

## 2024-03-20 RX ADMIN — FLUOROURACIL 4130 MG: 50 INJECTION, SOLUTION INTRAVENOUS at 14:54

## 2024-03-20 RX ADMIN — DEXTROSE MONOHYDRATE 700 MG: 50 INJECTION, SOLUTION INTRAVENOUS at 14:20

## 2024-03-20 RX ADMIN — DEXAMETHASONE SODIUM PHOSPHATE 12 MG: 4 INJECTION, SOLUTION INTRA-ARTICULAR; INTRALESIONAL; INTRAMUSCULAR; INTRAVENOUS; SOFT TISSUE at 13:57

## 2024-03-20 RX ADMIN — DEXTROSE MONOHYDRATE 250 ML: 50 INJECTION, SOLUTION INTRAVENOUS at 13:55

## 2024-03-21 LAB
NTRA SIGNATERA MTM READOUT: 0 MTM/ML
NTRA SIGNATERA TEST RESULT: NEGATIVE

## 2024-03-22 ENCOUNTER — HOSPITAL ENCOUNTER (OUTPATIENT)
Dept: ONCOLOGY | Facility: HOSPITAL | Age: 74
Discharge: HOME | End: 2024-03-22
Payer: MEDICARE

## 2024-03-22 VITALS
HEART RATE: 64 BPM | OXYGEN SATURATION: 98 % | DIASTOLIC BLOOD PRESSURE: 50 MMHG | TEMPERATURE: 97.7 F | SYSTOLIC BLOOD PRESSURE: 112 MMHG

## 2024-03-22 DIAGNOSIS — C16.0 MALIGNANT NEOPLASM OF CARDIA: Primary | ICD-10-CM

## 2024-03-22 DIAGNOSIS — C15.5 PRIMARY MALIGNANT NEOPLASM OF LOWER THIRD OF ESOPHAGUS: ICD-10-CM

## 2024-03-22 PROCEDURE — 96360 HYDRATION IV INFUSION INIT: CPT

## 2024-03-22 PROCEDURE — 25810000003 SODIUM CHLORIDE 0.9 % SOLUTION: Performed by: INTERNAL MEDICINE

## 2024-03-22 RX ADMIN — SODIUM CHLORIDE 500 ML: 9 INJECTION, SOLUTION INTRAVENOUS at 11:15

## 2024-03-22 NOTE — PROGRESS NOTES
Here for CIV d/c. IVFs given per orders, he states that he eats/drinks less the days after chemotherapy.

## 2024-04-03 ENCOUNTER — HOSPITAL ENCOUNTER (OUTPATIENT)
Dept: ONCOLOGY | Facility: HOSPITAL | Age: 74
Discharge: HOME | End: 2024-04-03
Admitting: INTERNAL MEDICINE
Payer: MEDICARE

## 2024-04-03 VITALS
TEMPERATURE: 98.7 F | BODY MASS INDEX: 26.06 KG/M2 | SYSTOLIC BLOOD PRESSURE: 132 MMHG | WEIGHT: 156.4 LBS | HEIGHT: 65 IN | RESPIRATION RATE: 14 BRPM | OXYGEN SATURATION: 97 % | DIASTOLIC BLOOD PRESSURE: 65 MMHG | HEART RATE: 80 BPM

## 2024-04-03 DIAGNOSIS — R30.0 DYSURIA: ICD-10-CM

## 2024-04-03 DIAGNOSIS — C15.9 ESOPHAGEAL CANCER, STAGE IV: Primary | ICD-10-CM

## 2024-04-03 DIAGNOSIS — D63.0 ANEMIA IN NEOPLASTIC DISEASE: ICD-10-CM

## 2024-04-03 DIAGNOSIS — C16.0 MALIGNANT NEOPLASM OF CARDIA: Primary | ICD-10-CM

## 2024-04-03 DIAGNOSIS — C15.9 ESOPHAGEAL CANCER, STAGE IV: ICD-10-CM

## 2024-04-03 DIAGNOSIS — C15.5 PRIMARY MALIGNANT NEOPLASM OF LOWER THIRD OF ESOPHAGUS: ICD-10-CM

## 2024-04-03 LAB
ALP BLD-CCNC: 41 U/L (ref 53–128)
BACTERIA UR QL AUTO: NORMAL /HPF
BASOPHILS # BLD AUTO: 0.02 10*3/MM3 (ref 0–0.2)
BASOPHILS NFR BLD AUTO: 0.5 % (ref 0–1.5)
BILIRUB UR QL STRIP: NEGATIVE
BUN BLDA-MCNC: 11 MG/DL (ref 7–22)
CALCIUM BLD QL: 8.4 MG/DL (ref 8–10.3)
CHLORIDE BLDA-SCNC: 109 MMOL/L (ref 98–108)
CLARITY UR: CLEAR
CO2 BLDA-SCNC: 22 MMOL/L (ref 18–33)
COLOR UR: YELLOW
CREAT BLDA-MCNC: 1.4 MG/DL (ref 0.6–1.2)
DEPRECATED RDW RBC AUTO: 65.7 FL (ref 37–54)
EGFRCR SERPLBLD CKD-EPI 2021: 52.7 ML/MIN/1.73
EOSINOPHIL # BLD AUTO: 0.11 10*3/MM3 (ref 0–0.4)
EOSINOPHIL NFR BLD AUTO: 2.8 % (ref 0.3–6.2)
ERYTHROCYTE [DISTWIDTH] IN BLOOD BY AUTOMATED COUNT: 20.6 % (ref 12.3–15.4)
GLUCOSE BLDC GLUCOMTR-MCNC: 222 MG/DL (ref 73–118)
GLUCOSE UR STRIP-MCNC: ABNORMAL MG/DL
HCT VFR BLD AUTO: 23.1 % (ref 37.5–51)
HGB BLD-MCNC: 7.2 G/DL (ref 13–17.7)
HGB UR QL STRIP.AUTO: NEGATIVE
HYALINE CASTS UR QL AUTO: NORMAL /LPF
KETONES UR QL STRIP: NEGATIVE
LEUKOCYTE ESTERASE UR QL STRIP.AUTO: ABNORMAL
LYMPHOCYTES # BLD AUTO: 1.05 10*3/MM3 (ref 0.7–3.1)
LYMPHOCYTES NFR BLD AUTO: 26.3 % (ref 19.6–45.3)
MCH RBC QN AUTO: 29 PG (ref 26.6–33)
MCHC RBC AUTO-ENTMCNC: 31.2 G/DL (ref 31.5–35.7)
MCV RBC AUTO: 93.1 FL (ref 79–97)
MONOCYTES # BLD AUTO: 0.49 10*3/MM3 (ref 0.1–0.9)
MONOCYTES NFR BLD AUTO: 12.3 % (ref 5–12)
NEUTROPHILS NFR BLD AUTO: 2.32 10*3/MM3 (ref 1.7–7)
NEUTROPHILS NFR BLD AUTO: 58.1 % (ref 42.7–76)
NITRITE UR QL STRIP: NEGATIVE
PH UR STRIP.AUTO: 5.5 [PH] (ref 5–8)
PLATELET # BLD AUTO: 184 10*3/MM3 (ref 140–450)
PMV BLD AUTO: 10.3 FL (ref 6–12)
POC ALBUMIN: 3 G/L (ref 3.3–5.5)
POC ALT (SGPT): 10 U/L (ref 10–47)
POC AST (SGOT): 17 U/L (ref 11–38)
POC TOTAL BILIRUBIN: 0.5 MG/DL (ref 0.2–1.6)
POC TOTAL PROTEIN: 5.3 G/DL (ref 6.4–8.1)
POTASSIUM BLDA-SCNC: 4 MMOL/L (ref 3.6–5.1)
PROT UR QL STRIP: NEGATIVE
RBC # BLD AUTO: 2.48 10*6/MM3 (ref 4.14–5.8)
RBC # UR STRIP: NORMAL /HPF
REF LAB TEST METHOD: NORMAL
SODIUM BLD-SCNC: 139 MMOL/L (ref 128–145)
SP GR UR STRIP: 1.02 (ref 1–1.03)
SQUAMOUS #/AREA URNS HPF: NORMAL /HPF
UROBILINOGEN UR QL STRIP: ABNORMAL
WBC # UR STRIP: NORMAL /HPF
WBC NRBC COR # BLD AUTO: 3.99 10*3/MM3 (ref 3.4–10.8)

## 2024-04-03 PROCEDURE — 25010000002 DEXAMETHASONE SODIUM PHOSPHATE 120 MG/30ML SOLUTION: Performed by: INTERNAL MEDICINE

## 2024-04-03 PROCEDURE — 80053 COMPREHEN METABOLIC PANEL: CPT

## 2024-04-03 PROCEDURE — 81001 URINALYSIS AUTO W/SCOPE: CPT | Performed by: INTERNAL MEDICINE

## 2024-04-03 PROCEDURE — 96365 THER/PROPH/DIAG IV INF INIT: CPT

## 2024-04-03 PROCEDURE — G0498 CHEMO EXTEND IV INFUS W/PUMP: HCPCS

## 2024-04-03 PROCEDURE — 0 DEXTROSE 5 % SOLUTION: Performed by: INTERNAL MEDICINE

## 2024-04-03 PROCEDURE — 25010000002 FLUOROURACIL PER 500 MG: Performed by: INTERNAL MEDICINE

## 2024-04-03 PROCEDURE — 25010000002 LEUCOVORIN CALCIUM PER 50 MG: Performed by: INTERNAL MEDICINE

## 2024-04-03 PROCEDURE — 25810000003 SODIUM CHLORIDE 0.9 % SOLUTION 250 ML FLEX CONT: Performed by: INTERNAL MEDICINE

## 2024-04-03 PROCEDURE — 96367 TX/PROPH/DG ADDL SEQ IV INF: CPT

## 2024-04-03 PROCEDURE — 96416 CHEMO PROLONG INFUSE W/PUMP: CPT

## 2024-04-03 PROCEDURE — 85025 COMPLETE CBC W/AUTO DIFF WBC: CPT | Performed by: INTERNAL MEDICINE

## 2024-04-03 PROCEDURE — 96375 TX/PRO/DX INJ NEW DRUG ADDON: CPT

## 2024-04-03 RX ORDER — TAMSULOSIN HYDROCHLORIDE 0.4 MG/1
1 CAPSULE ORAL DAILY
COMMUNITY

## 2024-04-03 RX ORDER — SODIUM CHLORIDE 9 MG/ML
250 INJECTION, SOLUTION INTRAVENOUS AS NEEDED
OUTPATIENT
Start: 2024-04-03

## 2024-04-03 RX ORDER — DEXTROSE MONOHYDRATE 50 MG/ML
250 INJECTION, SOLUTION INTRAVENOUS ONCE
Status: COMPLETED | OUTPATIENT
Start: 2024-04-03 | End: 2024-04-03

## 2024-04-03 RX ORDER — SODIUM CHLORIDE 0.9 % (FLUSH) 0.9 %
20 SYRINGE (ML) INJECTION AS NEEDED
Status: CANCELLED | OUTPATIENT
Start: 2024-04-03

## 2024-04-03 RX ADMIN — DEXTROSE MONOHYDRATE 250 ML: 50 INJECTION, SOLUTION INTRAVENOUS at 14:39

## 2024-04-03 RX ADMIN — DEXAMETHASONE SODIUM PHOSPHATE 12 MG: 4 INJECTION, SOLUTION INTRA-ARTICULAR; INTRALESIONAL; INTRAMUSCULAR; INTRAVENOUS; SOFT TISSUE at 14:39

## 2024-04-03 RX ADMIN — FLUOROURACIL 4130 MG: 50 INJECTION, SOLUTION INTRAVENOUS at 15:37

## 2024-04-03 RX ADMIN — SODIUM CHLORIDE 690 MG: 9 INJECTION, SOLUTION INTRAVENOUS at 15:01

## 2024-04-03 NOTE — PROGRESS NOTES
Pt here for C16D1 leucovorin and 5FU. Port accessed and flushed with good blood return noted. 10cc of blood wasted prior to specimen collection. Blood specimen obtained and sent to lab for processing per protocol. Pt reports he is fatigue but states no more than his normal. Pt also reports dysuria so a UA was collected and sent to lab. Dr. Elizondo notified of pt's Hgb of 7.2 and pt's UA have glucose in the urine. Dr. Elizondo gave verbal orders for pt to get a blood transfusion. Dr. Elizondo stated it would be ok for pt to wait until 4/5 to get the transfusion since he will be in town for his pump d/c. Pt instructed to go to [facility] on 04/05 at 0830. Pt v/u. Port flushed with saline and heparin prior to needle removal. Pt given AVS and d/c by self.

## 2024-04-04 RX ORDER — METOPROLOL SUCCINATE 25 MG/1
12.5 TABLET, EXTENDED RELEASE ORAL 2 TIMES DAILY
COMMUNITY

## 2024-04-05 ENCOUNTER — HOSPITAL ENCOUNTER (OUTPATIENT)
Dept: ONCOLOGY | Facility: HOSPITAL | Age: 74
Discharge: HOME | End: 2024-04-05
Payer: MEDICARE

## 2024-04-05 ENCOUNTER — HOSPITAL ENCOUNTER (OUTPATIENT)
Dept: INFUSION THERAPY | Facility: HOSPITAL | Age: 74
Discharge: HOME | End: 2024-04-05
Payer: MEDICARE

## 2024-04-05 DIAGNOSIS — C16.0 MALIGNANT NEOPLASM OF CARDIA: ICD-10-CM

## 2024-04-05 DIAGNOSIS — D63.0 ANEMIA IN NEOPLASTIC DISEASE: ICD-10-CM

## 2024-04-05 DIAGNOSIS — C15.5 PRIMARY MALIGNANT NEOPLASM OF LOWER THIRD OF ESOPHAGUS: ICD-10-CM

## 2024-04-05 DIAGNOSIS — C16.0 MALIGNANT NEOPLASM OF CARDIA: Primary | ICD-10-CM

## 2024-04-05 DIAGNOSIS — C15.9 ESOPHAGEAL CANCER, STAGE IV: ICD-10-CM

## 2024-04-05 LAB
BASOPHILS # BLD AUTO: 0.02 10*3/MM3 (ref 0–0.2)
BASOPHILS NFR BLD AUTO: 0.3 % (ref 0–1.5)
DEPRECATED RDW RBC AUTO: 66 FL (ref 37–54)
EOSINOPHIL # BLD AUTO: 0.08 10*3/MM3 (ref 0–0.4)
EOSINOPHIL NFR BLD AUTO: 1.2 % (ref 0.3–6.2)
ERYTHROCYTE [DISTWIDTH] IN BLOOD BY AUTOMATED COUNT: 20.3 % (ref 12.3–15.4)
FERRITIN SERPL-MCNC: 14.67 NG/ML (ref 30–400)
FOLATE SERPL-MCNC: >20 NG/ML (ref 4.78–24.2)
HCT VFR BLD AUTO: 25.7 % (ref 37.5–51)
HGB BLD-MCNC: 8 G/DL (ref 13–17.7)
IMM GRANULOCYTES # BLD AUTO: 0.02 10*3/MM3 (ref 0–0.05)
IMM GRANULOCYTES NFR BLD AUTO: 0.3 % (ref 0–0.5)
IRON 24H UR-MRATE: 43 MCG/DL (ref 59–158)
IRON SATN MFR SERPL: 10 % (ref 20–50)
LYMPHOCYTES # BLD AUTO: 1.35 10*3/MM3 (ref 0.7–3.1)
LYMPHOCYTES NFR BLD AUTO: 20.8 % (ref 19.6–45.3)
MCH RBC QN AUTO: 28.3 PG (ref 26.6–33)
MCHC RBC AUTO-ENTMCNC: 31.1 G/DL (ref 31.5–35.7)
MCV RBC AUTO: 90.8 FL (ref 79–97)
MONOCYTES # BLD AUTO: 0.4 10*3/MM3 (ref 0.1–0.9)
MONOCYTES NFR BLD AUTO: 6.2 % (ref 5–12)
NEUTROPHILS NFR BLD AUTO: 4.62 10*3/MM3 (ref 1.7–7)
NEUTROPHILS NFR BLD AUTO: 71.2 % (ref 42.7–76)
NRBC BLD AUTO-RTO: 0 /100 WBC (ref 0–0.2)
PLATELET # BLD AUTO: 177 10*3/MM3 (ref 140–450)
PMV BLD AUTO: 10.2 FL (ref 6–12)
RBC # BLD AUTO: 2.83 10*6/MM3 (ref 4.14–5.8)
TIBC SERPL-MCNC: 432 MCG/DL (ref 298–536)
TRANSFERRIN SERPL-MCNC: 290 MG/DL (ref 200–360)
VIT B12 BLD-MCNC: 350 PG/ML (ref 211–946)
WBC NRBC COR # BLD AUTO: 6.49 10*3/MM3 (ref 3.4–10.8)

## 2024-04-05 PROCEDURE — 83540 ASSAY OF IRON: CPT | Performed by: INTERNAL MEDICINE

## 2024-04-05 PROCEDURE — 82607 VITAMIN B-12: CPT | Performed by: INTERNAL MEDICINE

## 2024-04-05 PROCEDURE — 82728 ASSAY OF FERRITIN: CPT | Performed by: INTERNAL MEDICINE

## 2024-04-05 PROCEDURE — 85025 COMPLETE CBC W/AUTO DIFF WBC: CPT | Performed by: INTERNAL MEDICINE

## 2024-04-05 PROCEDURE — 36415 COLL VENOUS BLD VENIPUNCTURE: CPT

## 2024-04-05 PROCEDURE — 84466 ASSAY OF TRANSFERRIN: CPT | Performed by: INTERNAL MEDICINE

## 2024-04-05 PROCEDURE — 82746 ASSAY OF FOLIC ACID SERUM: CPT | Performed by: INTERNAL MEDICINE

## 2024-04-05 PROCEDURE — G0463 HOSPITAL OUTPT CLINIC VISIT: HCPCS

## 2024-04-05 RX ORDER — SODIUM CHLORIDE 0.9 % (FLUSH) 0.9 %
20 SYRINGE (ML) INJECTION AS NEEDED
OUTPATIENT
Start: 2024-04-05

## 2024-04-05 RX ORDER — SODIUM CHLORIDE 0.9 % (FLUSH) 0.9 %
20 SYRINGE (ML) INJECTION AS NEEDED
Status: DISCONTINUED | OUTPATIENT
Start: 2024-04-05 | End: 2024-04-06 | Stop reason: HOSPADM

## 2024-04-05 RX ADMIN — Medication 20 ML: at 09:51

## 2024-04-05 NOTE — CODE DOCUMENTATION
Messaged Sabra RN, Dr. Elizondo's nurse that hgb is 8.0 today and patient not having any symptoms.  Sabra said that patient does not need the blood today.  Patient with verbal understanding.  Patient is going to go to the [facility] and have chemo unhooked once leaving here.

## 2024-04-05 NOTE — PROGRESS NOTES
Patient is here for 5FU pump discontinue. Patient had new complaints today, just stated he was tired like his normal. Patient stated he did not think he need fluids today. 5FU pump empty, positive blood return noted and labs drawn per protcol. Port Flushed with NS and patient discharged after port de-accessed.

## 2024-04-08 ENCOUNTER — TELEPHONE (OUTPATIENT)
Dept: ONCOLOGY | Facility: CLINIC | Age: 74
End: 2024-04-08
Payer: MEDICARE

## 2024-04-08 NOTE — TELEPHONE ENCOUNTER
Spoke w/ pt and let him know that Dr. Elizondo has ordered Venofer 300 x 3 doses.  I let him know that they will need to be given at least a week apart.  I also let him know that we will need to get insurance approval and then will call to schedule him.  He v/u.

## 2024-04-08 NOTE — TELEPHONE ENCOUNTER
Caller: Artie Mc    Relationship: Self    Best call back number:     [phone]   PT CAN NOT ACCESS VM - PLEASE SEND A TEXT IF NEEDING A CALL BACK.     What is the best time to reach you: ANYTIME    Who are you requesting to speak with (clinical staff, provider,  specific staff member): CLINICAL    What was the call regarding: PT HAD A MISSED CALLED AND IS NOT SURE WHO THE CALL IS FROM CAN NOT ACCESS VM. PLEASE CALL BACK.    Is it okay if the provider responds through [employer]: NO

## 2024-04-09 ENCOUNTER — DOCUMENTATION (OUTPATIENT)
Dept: ONCOLOGY | Facility: CLINIC | Age: 74
End: 2024-04-09
Payer: MEDICARE

## 2024-04-09 NOTE — PROGRESS NOTES
Per Dr. Elizondo, pt requires IV Venofer due to iron deficiency anemia related to malabsorption of iron.

## 2024-04-11 NOTE — PROGRESS NOTES
Hematology-Oncology Follow-up Note       Artie Mc  1950    Primary Care Physician: Chanel Carter, DAT  Referring Physician: No ref. provider found    Reason For Visit:  Chief Complaint   Patient presents with    Follow-up     Primary malignant neoplasm of lower third of esophagus       Metastatic esophageal cancer  Monitoring for adverse effects related to immunotherapy.  Hypothyroidism    HPI:   Mr. Mc is a pleasant 73 y.o. gentleman with a history of gastroesophageal reflux disease, diabetes, hypertension, heart block status post pacemaker placement and history of CABG.  He was having symptoms of dysphagia, weight loss since September 2016. He was reporting symptoms of food getting stuck in the lower chest.  He has transitioned himself to a liquid diet.  The patient reports losing about 30 pounds over this duration.  The patient underwent upper GI endoscopy on 12/20/16 with Dr. Esvin Barrera.  Endoscopy showed necrotic, circumferential and moderately obstructive mass at the distal esophagus between 42 cm and 36 cm.      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  There was also evidence of mild subacute inflammation in the duodenum.  Dr. Barrera ordered a CT scan of the chest and abdomen with contrast on 12/20/16.  The scan was done at [facility].  The scan showed a new ill-defined mass in the anterior wall of the distal esophagus at the GE junction measuring 2.3 x 2.2 x 1.6 cm, worrisome for cancer.  There were four subcentimeter liver lesions which had appeared stable in comparison to another CT scan from 2009 and they likely represent small cysts.  There was also mild lymphadenopathy in the gastrohepatic ligament.  There were at least six borderline enlarged lymph nodes measuring up to 1.3 x 1 cm in the region.  The patient is referred to us for further oncological evaluation.    1/5/2017 - The  patient presents for initial consultation.  He reports persistent dysphagia and also has symptoms of regurgitation.  He cannot tolerate solid foods and is dependent on liquid diet such as Ensure.  He also reports fatigue. Symptoms of dysphagia have been present for the past four months.  When he eats any solids, the food gets stuck in the lower chest.    1/5/17 - Vitamin B12 200 (L).  CEA 0.8.  Ferritin 35.  Iron saturation 16% (L), serum iron 56, TIBC 354.  Creatinine 0.9.  LFTs normal.  Folate 10.2.  1/12/17 - PET/CT scan:  Intense abnormal activity corresponding to the region of the GE junction and proximal stomach.  SUV is 11.06.  There is a suggestion of an abnormal mass or abnormal wall thickening in the region measuring 3.9 x 4.8 cm.  No additional abnormalities.  No evidence of metastases or other lymphadenopathy.  Scattered nonspecific subcentimeter lymph nodes involving the mediastinum and within the central upper abdomen.  1/13/17 - Creatinine 0.9.  LFTs normal.    1/17/17 - Patient seen by thoracic surgeon, Dr. Bradley.  PET scan was reviewed.  He has recommended neoadjuvant chemoradiation therapy followed by [place] Gurwinder esophagogastrectomy.  Port-A-Cath is being arranged.    1/20/17 - WBC 6.3, hemoglobin 12.1, platelet count 198,000, MCV 86.4.  1/30/17 - Patient started weekly Carboplatin and Taxol (Carboplatin AUC 2 and Taxol 50 mg/M2).  Patient received Injectafer 750 mg.  WBC 5.8, hemoglobin 12.4, platelet count 244,000.   1/31/17 - Patient seen by Dr. Eduardo Oleary.  The plan was to give 5040 cGy in 28 fractions.   2/6/17 - WBC 4.4, hemoglobin 11.6, platelet count 201,000.  Patient received cycle 2 of weekly Carboplatin and Taxol (Carboplatin AUC 2 and Taxol 50 mg/M2).  2/13/17 - Patient received Carboplatin and Taxol weekly cycle 3.  2/13/17 - Patient started concurrent radiation therapy.  Total planned dose is 4140 cGy.    2/13/17 to 2/15/17 - Patient received 4140 cGy of neoadjuvant radiation.   Radiation was finished on 3/15/17.    2/20/17 - WBC 1.8, hemoglobin 10.6, platelet count 217,000.  Creatinine 0.5. Folate 15 (L).  Ferritin 361.  Haptoglobin 214.  Iron saturation 33%, TIBC 263, serum iron 88.    2/20/17 - Patient received Carboplatin and Taxol weekly cycle 4.   2/27/17 - WBC 3.7, hemoglobin 10.7, platelet count 177,000, MCV 85.3.    3/6/17 - Patient has received 16 out of the 23 planned radiation treatments.  Total planned dose is 4140 cGy.    3/6/17 - WBC 5.4, hemoglobin 11.2, platelet count 113,000.  3/6/17 - Patient received cycle 5 of weekly Carboplatin and Taxol.   3/13/17 - Patient received cycle 6 of weekly Carboplatin and Taxol.  WBC 3.9, hemoglobin 11.3, platelet count 93,000.     3/20/17 - WBC 1.4, hemoglobin 10.0, platelet count 118,000, MCV 86.1   3/27/17 - WBC 3.8, hemoglobin 9.5, platelet count 176,000, MCV 87.3.    4/7/17 - Stress test:  No evidence of reversible myocardial ischemia.  Normal left ventricular ejection fraction of 50%.    4/17/17 - Upper GI endoscopy by Dr. 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  The GE junction adenocarcinoma lesion seems to have a very good response to chemotherapy and radiation.  The lumen was open.  5/1/17 - WBC 7.0, hemoglobin 10.2, platelet count 175,000.    5/4/17 - Patient underwent [place] Gurwinder esophagectomy with pyloroplasty, feeding jejunostomy tube, abdominal and mediastinal lymph node dissection.    Surgical Pathology:  Moderately to poorly differentiated adenocarcinoma measuring 1.3 cm at the GE junction.  Resection margins are negative for malignancy.  Tumor margins are negative.  There is effective treatment response.  No evidence of lymphovascular invasion.  Staging is ypT3,pN1.  One out of fourteen lymph nodes involved.   5/22/17 - WBC 6.8, hemoglobin 10.3, platelet count 312,000.    6/19/17 - WBC 5.7, hemoglobin 10.5, platelet count 204,000, MCV 92.1.    7/6/17 - EGD:  Status post balloon  dilation of esophageal stricture.  7/12/17 - EGD with balloon dilation of esophageal stricture.  7/26/17 - EGD and balloon dilation of esophageal stricture.  7/31/17 - PET/CT scan:  There is mild metabolic activity seen at the thoracic esophagus just at and below the surgical margins.  Some mild wall thickening.  This could be from recent surgery.  A residual cancer seems unlikely.  There is a precarinal lymph node with mild metabolic activity with SUV of 3 measuring 1 x 1.3 cm.  Again this appears to be nonspecific in my opinion.    8/7/17 - WBC 5.9, hemoglobin 11.9, platelet count 171,000, MCV 88.7.    10/19/17 - WBC 7.5, hemoglobin 11.8, platelet count 216,000.    1/8/18 - PET scan:  No evidence of residual disease or recurrent disease.  There is an esophageal stent in the mid esophagus with a large debris air level.  No evidence of any metastases in the chest, abdomen or pelvis.  Mild nodule uptake in the vocal cords with fullness in the right vocal cord.  This could be physiologic.    7/9/18 - CT scan of chest with contrast:  Prominent mediastinal lymph nodes appear stable since February.  Changes of gastric pull-through procedure for esophageal cancer again noted.  Tree-in-bud infiltrates in the left lung base, consistent with infection versus inflammation.    1/10/19 - CT chest, abdomen and pelvis with contrast:  No evidence of mets in the chest; however, interval development of metastatic lymphadenopathy in the left side of the retroperitoneum.  For instance, there is a 17 mm rounded lymph node, 10 mm lymph node and also a 14 mm lymph node.  These are all new.  Stable 9 mm hypodense lesion within the right hepatic lobe, which could reflect hemangioma or complex cyst.    1/30/19 - CT-guided core biopsy of retroperitoneal lymph node:  Poorly differentiated adenocarcinoma consistent with metastases from patient’s known esophageal primary.  CK20 positive, CDX2 positive, cytokeratin 7 negative, TTF-1 negative.     2/1/19 - Creatinine 0.9, BUN 18, calcium 9.3; these are normal.    2/28/19 - [facility] Next Gen sequencing testing on retroperitoneal lymph node specimen:  PD-L1 positive.  CPS = 3.  This implies responsiveness to pembrolizumab.  Mismatch repair status is proficient (no evidence of microsatellite instability).  Tumor mutational burden is low = 6 mutations/Mb.  CDH1 indeterminate.  KRAS mutation positive.  TP53 mutation positive.  CDK6 amplified.  [employer] PD-L1 testing by IHC:  PD-L1 expression 1% positive (CPS =1).  HER2/marvel by IHC is negative.  HER2/marvel by CISH not amplified.  Microsatellite stable tumor.    3/7/19 - WBC 7.3, hemoglobin 12, platelet count 128,000, MCV 92.     3/14/19 - Patient was seen by radiation oncologist, Dr. Chahal.  He has recommended observation versus systemic chemoimmunotherapy as needed.  No plans for radiation therapy.   5/1/19 - CT scan of chest with contrast:  No definitive findings of new metastatic disease in the chest.  Emphysema noted.    5/1/19 - CT abdomen and pelvis:  There is interval progression of metastatic retroperitoneal adenopathy.  Left periaortic adenopathy with lymph node measuring up to 2 cm, previously 1.7 cm.  Another lymph node now measures 2.3, previously 1.4 cm.  New enlarged lymph node on image 147 measures 1.4 cm, previously 0.4 cm.  A 9 mm hypodense right hepatic lobe lesion appears stable.    5/9/19 - Decision made to start patient on immunotherapy Keytruda.    5/9/19 - Vitamin B12 229.  Ferritin 61.  Folate 14.3.  Iron saturation 26%, TIBC 304, serum iron 79.  5/31/19 - Patient received Keytruda 200 mg cycle 1, day 1.    5/31/19 - Free T4 0.99.  Creatinine 0.9, BUN 9.  LFTs normal.  TSH 3.99.  Folate 14.3.  Iron saturation 26%.    6/21/2019 patient received Keytruda cycle 2  7/12/2019: Patient received Keytruda cycle 3  8/2/2019 patient received cycle 4 Keytruda WBC 7.2, hemoglobin 11.8 platelet count 163  8/23/2019 creatinine 0.9, LFTs normal, WBC 6.4,  hemoglobin 11.5, platelets 179, TSH 2.1   8/23/2019 patient received cycle 5 of Keytruda  9/3/2019 CT chest abdomen and pelvis with contrast: . Positive response to therapy in the abdomen since 05/01/2019. Pathologically enlarged retroperitoneal lymph nodes, predominantly in the left periaortic region, have diminished in size.a 1.3 x 2.1 cm left periaortic node (image 60), previously measured 3.5 x 2.3 cm. A 1.6 cm index node near the level of the left renal vein previously measured 2.0 cm No new or progressive adenopathy. 2. Stable findings in the chest. Noncalcified right upper lobe nodules are unchanged. There is no evidence of new or progressive metastatic disease within the chest. 3. 8 mm indeterminate low-density lesion in the right hepatic lobe, is stable. No new liver lesions.  9/6/2019 WBC 5.6, hemoglobin 11.2, platelets of 192, MCV 91.8  9/13/2019 patient received Keytruda cycle 6, albumin 3.2  10/4/2019 patient received cycle 7 of Keytruda, TSH 4.8, free T4 0.86  10/9/2019 WBC 5.8, hemoglobin 11.7, platelets 174  10/25/2019 patient received cycle 8 of Keytruda.  WBC 5.9, hemoglobin 11.5, platelets 152, creatinine 0.75, cortisol 15.2, TSH 6.48 high , free T4 1.13 normal  11/6/2019 WBC 6.6, hemoglobin 11.8, MCV 91.5, platelets 187  11/15/2019 patient received cycle 9 of Keytruda, WBC 6.6, hemoglobin 11.9, platelets 161, cortisol level 8.86, TSH 2.68, free T3 2.8, free T4 1.5  12/4/2019 WBC 6.9, hemoglobin 12.0, platelets 180, MCV 92.9  12/06/2019-Keytruda Cycle 10  12/27/2019- Cycle 11 LFTs normal, WBC 6.7, hemoglobin 11.4, platelets 168, MCV 93, TSH 2.4,  1/28/2020: CT chest abdomen and pelvis with contrast:  Single (aortic lymph node in the abdominal retroperitoneum has increased.  Rest of the retroperitoneal lymph nodes have decreased in size.  Mediastinal adenopathy appears unchanged.  Noncalcified bilateral pulmonary nodules are stable  1/17/2020 patient received Keytruda cycle 12:.  2/3/2020 WBC 7.1,  hemoglobin 12.2, platelets 171  02/10/2020 patient received cycle 13 of Keytruda: WBC 7.3, hemoglobin 12.1, platelets 166, creatinine 0.82  3/6/2020 patient is of Keytruda 200 mg, hemoglobin 10.8  3/27/2020 patient received Keytruda 200 mg, hemoglobin 11.4, TSH 1.95  4/17/2020 patient received Keytruda cycle 16 200 mg, WBC 6.1, hemoglobin 11.6, platelets 150  5/8/2020: Patient received cycle 17 Keytruda, WBC 6.3, hemoglobin 9.6, platelets 137, TSH 2.05, free T4 1.54, creatinine 0.93, LFTs normal,  5/29/2020: Patient received cycle 18 Keytruda, WBC 6.5, hemoglobin 11.7, platelets 129, creatinine 0.83, TSH 1.69  6/26/2020: Patient receiving Keytruda.   7/17/2020: TSH 2.06, WBC 5.7, hemoglobin 11.4, platelets 177, MCV 92.4, Keytruda 200 mg cycle 20  7/27/2020: CT chest abdomen pelvis with contrast: Stable findings in the chest abdomen pelvis since 1/28/2020.  Noncalcified bilateral pulmonary nodules are stable.  Stable retroperitoneal left perinephric adenopathy in the abdomen.  8/7/2020: Patient received cycle 21 of Keytruda  8/28/2020 patient is a cycle 22 of Keytruda.  WBC 6.02, hemoglobin 11.3, platelets 161  9/11/2020 WBC 6.9, hemoglobin 12.4, platelets 192  9/18/2020: Cycle 23 of Keytruda, TSH 1.19  10/9/2020: Cycle 24 of Keytruda, WBC 5.9, hemoglobin 11.9, platelets 178, creatinine 0.84, TSH 2.56, free T3 2.4, CMP normal  10/23/2020: WBC 7.34, hemoglobin 11.6, platelets 175  10/30/2020:.  Keytruda 200 mg  11/20/2020: Keytruda 200 mg  12/11/2020 Keytruda 200 mg.  WBC 5.5, hemoglobin 11.4, platelets 176, creatinine 0.8, LFTs normal, TSH 2.2, cortisol 8.3  12/10/2020: CT chest abdomen and pelvis with contrast: Previously seen left para-aortic lymph nodes within the abdomen are decreased in size.  No pathological lymphadenopathy.  No evidence of residual malignancy..  Small noncalcified pulmonary nodules are stable.  No new nodules.  Stable mediastinal lymph nodes.  3/12/2021: WBC 6.7, hemoglobin 11.3, platelets  17  3/12/2021: CMP normal, TSH 2.9, cortisol 11.95, 1  3/15/2021: CT chest with contrast: Stable findings in the chest with postsurgical changes of esophageal resection and gastric pull-through.  Stable right lower lobe lung nodule measures 11 mm.  Stable size and appearance of the mediastinal and upper abdominal lymph nodes.  3/23/2021: Doppler of upper extremity: Normal.  3/24/2021: CT abdomen and pelvis with contrast: Postoperative changes.  Left periaortic nodes are stable.  4/12/2021: TSH 4.1, cortisol 12.4  6/22/2021: CMP normal,Cortisol 10.08,, WBC 5.8, hemoglobin 11.2, platelets 109, TSH 2.59  7/12/2021: CT scan of the chest abdomen pelvis with contrast: Dominant 10 x 10 left periaortic lymph node is stable since 3/22/2021.  Dominant lymph nodes in the right lower paratracheal and AP window distribution are stable since 3/15/2021.  No evidence of progressive adenopathy in the chest abdomen or pelvis.    01/11/2022 -PET/CT findings consistent with new cedric metastatic disease in the retroperitoneum.  2 new pathologically enlarged hypermetabolic retroperitoneal lymph nodes.  No convincing evidence of recurrent disease within the chest neck pelvis or skeleton.  1/20/2022 -CT-guided needle biopsy consistent with poorly differentiated carcinoma.  CDX2 positive consistent with metastases from known esophageal primary  2/9/2022 - Pembrolizumab complicated with fatigue, joint pains, nausea, diarrhea. Symptoms decrease in intensity over a week.  3/30/2022 -CT chest and pelvis with stable metastatic lymph nodes in the left periaortic retroperitoneum.  No evidence of disease progression or new lesions.  Stable lymphadenopathy in the distal paratracheal mediastinal area  4/13/2022 - Keytruda 200  5/4/2022 - Keytruda 200  Continued keytruda  7/11/2022 - CT abdomen pelvis with decrease in size of left perioaortic lymph node. No other areas of disease.  Continues to be on Keytruda  10/18/2022 - stable CT imaging  1/20/2023  - CT imaging with stable disease. No significant change.  Patient was hospitalized with esophagitis, subsequent EGD with improvement, no recurrence of disease.  2/21/2023 - Keytruda  CT CAP with increased retroperitoneal adenopathy.   ECHO with EF 35-40% (concern for immunotherapy related )immunotherapy held with ongoing investigation for cause.  Subsequent improvement in EF to 45%  4/4/2023 - CT abdomen pelvis with interval retroperitoneal lymph node enlargement compatible with metastatic adenopathyl.  5/5/2023 - PET CT with mixed response. No clear evidence of progression  6/2/23 -started pembrolizumab 200 mg IV  Posttreatment had symptoms of chest tightness troponin and proBNP negative, echocardiogram with EF 40%  Subsequent cardiac cath with EF down to 30% elevated proBNP, troponin no significant obstructive disease in the graft vessels  Cardiac MRI unable to obtain given pacemaker noncompliant.  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 Two enlarged mediastinal lymph nodes in the precarinal and AP window distributions are unchanged since 4/4/2023. No new or progressive adenopathy in the chest.Stable 3 mm or less noncalcified right lung nodules since 4/4/2023. Surgical changes of distal esophagectomy with gastric pull-through, without evidence of local disease recurrence  8/29/23 - started FOLFOX had significant side effects  9/20/23 - stopped oxaliplatin and continued 5FU only'  11/29/23 - CT CAP with decrease size of retroperitoneal adenopathy, no other concerning findings.  Continues 5FU leucovorin   2/12/24 - PET CT There is some metabolic activity within a mediastinal lymph node which has been suggested with slightly decreased metabolic activity. Whether this is reactive  or pathologic is uncertain. Previously noted abnormal retroperitoneal lymph node is no longer identified continued treatmen  4/10/24 - iron sat 10, ferritin  14, plan for venofer  Continued treatment signatera negative      Subjective:  Has some ongoing fatigue, no chest pain anymore    Past Medical History:   Diagnosis Date    B12 deficiency     Blockage of coronary artery of heart     Depression     DM (diabetes mellitus)     Dysphagia     GERD (gastroesophageal reflux disease)     HTN (hypertension)     Hyperlipidemia        Past Surgical History:   Procedure Laterality Date    ABDOMINAL WALL ABSCESS INCISION AND DRAINAGE  10/22/2018    WITH DEBRIDEMENT  1.5CM X 1.5 CM X 1.5 CM    DR. PURI    CARDIAC CATHETERIZATION Right 7/20/2023    Procedure: Left Heart Cath;  Surgeon: Mazin Simmons MD;  Location: [facility] CATH INVASIVE LOCATION;  Service: Cardiovascular;  Laterality: Right;    CATARACT EXTRACTION  2018    COLONOSCOPY      CORONARY ARTERY BYPASS GRAFT  2015    ENDOSCOPY  12/20/2016    ENDOSCOPY N/A 01/02/2023    Procedure: ESOPHAGOGASTRODUODENOSCOPY;  Surgeon: Esvin Morgan MD;  Location: [facility] ENDOSCOPY;  Service: Gastroenterology;  Laterality: N/A;  post: anastamosis stricture    ENDOSCOPY N/A 02/09/2023    Procedure: ESOPHAGOGASTRODUODENOSCOPY, non-wire guided balloon dilation (12-14mm) of esophageal stricture;  Surgeon: Tino Villafana MD;  Location: [facility] ENDOSCOPY;  Service: Gastroenterology;  Laterality: N/A;  post: esophageal anastamotic stricture, post surgical changes    ESOPHAGECTOMY  05/04/2017    BROOKS PETTY ESOPHAGECTOMY, FEEDING JEJNOSTOMOY, ABDOMINAL AND MEDIASTINAL LYMPH NODE DISECTION, PEDICLED MUSCLE FLAP COVERAGE DR. PURI    ESOPHAGOSCOPY / EGD  07/12/2017    WITH BALLOON DILATION    ESOPHAGOSCOPY / EGD  07/26/2017    WITH BALLOON DILATION    ESOPHAGOSCOPY / EGD  08/11/2017    WITH BALLOON DILATION    ESOPHAGOSCOPY / EGD  08/28/2017    WITH BALLOON DILATION     ESOPHAGOSCOPY / EGD  09/27/2017    WITH BALLOON DILATION    INSERT / REPLACE / REMOVE PACEMAKER      LYMPH NODE BIOPSY  01/30/2019    PACEMAKER IMPLANTATION  11/21/2016       Current Outpatient Medications:     clonazePAM (KlonoPIN) 0.5 MG tablet, Take 0.5 tablets by mouth 2 (Two) Times a Day As Needed for Anxiety., Disp: , Rfl: 1    cyanocobalamin (VITAMIN B-12) 1000 MCG tablet, Take 1 tablet by mouth Daily. Stopped but going to start back, Disp: , Rfl:     folic acid (FOLVITE) 1 MG tablet, Take 1 tablet by mouth Daily., Disp: , Rfl:     levothyroxine (SYNTHROID, LEVOTHROID) 50 MCG tablet, Take 1 tablet by mouth once daily, Disp: 90 tablet, Rfl: 0    magic mouthwash oral suspension, , Disp: , Rfl:     metFORMIN (GLUCOPHAGE) 1000 MG tablet, Take 0.5 tablets by mouth Daily., Disp: 60 tablet, Rfl: 2    metoprolol succinate XL (TOPROL-XL) 25 MG 24 hr tablet, Take 1 tablet by mouth once daily, Disp: 90 tablet, Rfl: 0    ondansetron (ZOFRAN) 4 MG tablet, TAKE 1 TABLET BY MOUTH EVERY 8 HOURS AS NEEDED FOR NAUSEA FOR VOMITING, Disp: 90 tablet, Rfl: 0    ondansetron (ZOFRAN) 8 MG tablet, Take 1 tablet by mouth 3 (Three) Times a Day As Needed for Nausea or Vomiting., Disp: 30 tablet, Rfl: 5    pantoprazole (PROTONIX) 40 MG EC tablet, Take 1 tablet by mouth 2 (Two) Times a Day., Disp: , Rfl:     pravastatin (PRAVACHOL) 10 MG tablet, Take 1 tablet by mouth Every Other Day., Disp: , Rfl:     spironolactone (ALDACTONE) 25 MG tablet, Take 0.5 tablets by mouth Daily., Disp: 30 tablet, Rfl: 2    tamsulosin (FLOMAX) 0.4 MG capsule 24 hr capsule, Take 1 capsule by mouth Daily., Disp: , Rfl:   No current facility-administered medications for this visit.    Facility-Administered Medications Ordered in Other Visits:     sodium chloride 0.9 % flush 20 mL, 20 mL, Intravenous, PRN, Jean Pierre Elizondo MD    Allergies   Allergen Reactions    Ace Inhibitors Unknown (See Comments)     Unknown reaction      Heparin Other (See Comments)      "Fever/chills, weakness in legs    Sulfa Antibiotics Other (See Comments)     Mouth sores    Ciprofloxacin GI Intolerance     Pt reports pain to abd       Family History   Problem Relation Age of Onset    Mental illness Mother     Heart disease Mother     Hypertension Father     Cancer Father     Heart disease Sister     Heart disease Brother      Cancer-related family history includes Cancer in his father.    Social History     Tobacco Use    Smoking status: Former     Current packs/day: 0.00     Average packs/day: 2.0 packs/day for 50.0 years (100.0 ttl pk-yrs)     Types: Cigarettes     Start date: 1965     Quit date: 2015     Years since quittin.8    Smokeless tobacco: Former     Types: Chew     Quit date: 1989   Vaping Use    Vaping status: Never Used   Substance Use Topics    Alcohol use: No    Drug use: No     ROS:     Objective:    Vitals:    24 0900   BP: 150/74   Pulse: 78   Resp: 16   Temp: 98.2 °F (36.8 °C)   SpO2: 97%   Weight: 68.9 kg (152 lb)   Height: 165.1 cm (65\")   PainSc: 0-No pain         (1) Restricted in physically strenuous activity, ambulatory and able to do work of light nature    Physical Exam:     Physical Exam   Constitutional: He is oriented to person, place, and time. He appears well-developed. No distress.   HENT:   Head: Normocephalic and atraumatic.   Nose: Nose normal.   Mouth/Throat: Mucous membranes are moist.   Eyes: Pupils are equal, round, and reactive to light.   Neck: No thyromegaly present.   Cardiovascular: Normal rate, regular rhythm, normal heart sounds and normal pulses. Exam reveals no gallop and no friction rub.   Pulmonary/Chest: Effort normal and breath sounds normal.   Abdominal: Soft. Normal appearance and bowel sounds are normal.   No tenderness   Musculoskeletal: Normal range of motion. No deformity or signs of injury.      Right lower leg: No edema.   Neurological: He is alert and oriented to person, place, and time. He exhibits normal " muscle tone.   Skin: Skin is warm and dry. No rash noted. He is not diaphoretic. No erythema. No jaundice.   Right side chest  port       Lab Results - Last 18 Months   Lab Units 04/05/24  0838 04/03/24  1317 03/20/24  1309   WBC 10*3/mm3 6.49 3.99 4.30   HEMOGLOBIN g/dL 8.0* 7.2* 7.4*   HEMATOCRIT % 25.7* 23.1* 23.3*   PLATELETS 10*3/mm3 177 184 169   MCV fL 90.8 93.1 92.8     Lab Results - Last 18 Months   Lab Units 04/03/24  1321 03/20/24  1309 03/13/24  0921 03/06/24  1052   SODIUM mmol/L  --  139 142 140   POTASSIUM mmol/L  --  4.6 4.9 4.8   CHLORIDE mmol/L  --  107 108* 108*   CO2 mmol/L  --  22.0 22.0 19.0*   BUN mg/dL  --  14 16 15   CREATININE mg/dL 1.40* 1.47* 1.28* 1.54*   CALCIUM mg/dL  --  8.3* 8.8 8.6   BILIRUBIN mg/dL  --  0.3 0.2 0.3   ALK PHOS U/L  --  40 35* 45   ALT (SGPT) U/L  --  5 5 6   AST (SGOT) U/L  --  12 13 13   GLUCOSE mg/dL  --  211* 176* 275*     Assessment & Plan     Assessment:    Metastatic esophageal carcinoma:    Patient has a history of GE junction poorly differentiated adenocarcinoma for which he had chemoradiation followed by [place] [facility] resection:  Pathology showed ypT3,pN1 disease.  Tumor was Stage IIIA, diagnosed in 2016.  He received concurrent chemoradiation with weekly Carboplatin and Taxol and radiation finishing on 3/15/17.  CT scan on 1/10/19 showed new retroperitoneal lymph nodes.  These lymph nodes were biopsied on 1/30/19 and it shows metastatic esophageal cancer.  [facility] Next Gen testing was performed on the sample and it shows KRAS mutation, microsatellite stable tumor.  PD-L1 is positive.  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  HER2/marvel (ERBB2) was not amplified and was negative.  His CT scan on 5/1/19 shows evidence of progression.  Patient has started receiving Keytruda on 5/31/19.  He has received 19 cycles so far.   CT scan on 1/28/2020 shows improvement and continued response.  CT scan chest abdomen pelvis with contrast on 12/10/2020 does not show any evidence of disease  progression.  On 12/22/2020 decision made to hold Keytruda per patient's request to get a treatment break.. Repeat CT scan chest 7/12/2021 does not show any evidence of disease progression.  Now CT imaging in January 2021 with a CT showing increased abdominal left para-aortic adenopathy.  Subsequent PET/CT with new cedric metastatic disease in the retroperitoneum of the abdomen.  New pathologically enlarged hypermetabolic retroperitoneal lymph nodes.  This would be concerning for disease recurrence/progression.  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  I discussed with him the treatment options going forward.  He has had a good response with immunotherapy pembrolizumab in the past. This was  restarted. subsequent imaging with treatment response. 1/2023 imaging with good response. Continued treatment  Now with increased adenopathy 2 new lymph nodes. Holding immunotherapy for now.   Consider getting cardiac MR discussed with Dr. Culp. While immunotherapy on hold, increased adenopathy is concerning. Could be pseudoprogression vs true progression. PET CT reviewed could indicate pseudoprogression since he has had a good response to immunotherapy  immunotherapy was restarted and has been on hold with patient's symptomatology cardiac MRI cannot be obtained, no ischemic heart disease leading to cardiomyopathy this most likely is related to Keytruda.  We will stop Keytruda going forward  CT imaging with stable disease, FOLFOX was started which he had significant side effects  With imaging being stable and significant side effects with FOLFOX, we discussed  about options with wait and watch vs 5FU only, patient prefers to be on maintenance.   We started 5FU only .   Tolerating relatively well, has significant fatigue. CT imaging now with ongoing response. We discussed that he would have been on treatment for 2 years following recurrence with good response. We can continue treatment now and get a PET CT  in 2/2024.will repeat prior to follow up. , PET with some residual activity in the mediastinal lymph node.   We continued treatment and now signatera negative. I had a long discussion with the patient today. We decided to repeat CT chest now and if no adenopathy, consider holding treatment. And repeat CT in 3 months. Understands risk of recurrence    Possible myocarditis  Symptoms started after last immunotherapy  Treatment on hold for now, symptoms have improved, cardiac markers were negative, echocardiogram with EF 40%, cardiac MRI cannot be obtained cardiac cath with EF down to 30% management per cardiology  Now EF up to 40-45% monitor. No significant symptoms. Has some ongoing shortness of breath. No leg swelling.    Shortness of breath  Improved, has occasional shortness of breath, likely copd  I have recommended using albuterol  Symptoms have improved since stopping immunotherapy. Questionable pneumonitis will monitor closely with immunotherapy  Cardiology has evaluated for myocarditis related to immunotherapy symptomatic improvement now continue to monitor.    Reflux/heartburn/dysphagia:   Symptoms improved.  Also has a history of strictures. Status post EGD and balloon dilation.   Continues to be on pantoprazole symptoms stable.    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  Continues on Synthroid stable now no changes    Mild diarrhea:  diarrhea seems to be waxing and waning in nature.  Diarrhea related to immunotherapy now improved stable.    PD-L1 positive, HER2 negative, p53 and KRAS positive.  Repeat Caris testing with repeat biopsy with no target mutations.    B12 deficiency: Continue B12 1000 mcg twice daily.    Anemia - Hb improved and stable.    Dysuria - has some dysuria, this has improved.    Groin pain - negative for UTI, STI, has seen urology, likely prostatits, started cipro improvement noted. Had cystoscopy, was started on flomax, pain has continued intermittent. No worsening  noted.    Chronic kidney disease creatinine has worsened overall compared to 8/2023 continues to be in this range. Will recommend nephrology follow up.       Time spent on encounter including record review, history taking, exam, discussion, counseling and documentation at: 40 minutes     Jean Pierre Elizondo MD 04/17/24     No orders of the defined types were placed in this encounter.

## 2024-04-16 DIAGNOSIS — C15.5 PRIMARY MALIGNANT NEOPLASM OF LOWER THIRD OF ESOPHAGUS: ICD-10-CM

## 2024-04-16 DIAGNOSIS — E03.9 HYPOTHYROIDISM (ACQUIRED): ICD-10-CM

## 2024-04-16 DIAGNOSIS — C16.0 MALIGNANT NEOPLASM OF CARDIA: Primary | ICD-10-CM

## 2024-04-16 DIAGNOSIS — R79.89 ELEVATED TSH: ICD-10-CM

## 2024-04-16 RX ORDER — METOPROLOL SUCCINATE 25 MG/1
25 TABLET, EXTENDED RELEASE ORAL DAILY
Qty: 90 TABLET | Refills: 0 | Status: SHIPPED | OUTPATIENT
Start: 2024-04-16

## 2024-04-16 RX ORDER — LEVOTHYROXINE SODIUM 0.05 MG/1
TABLET ORAL
Qty: 90 TABLET | Refills: 0 | Status: SHIPPED | OUTPATIENT
Start: 2024-04-16 | End: 2024-04-17 | Stop reason: SDUPTHER

## 2024-04-16 RX ORDER — ONDANSETRON 4 MG/1
TABLET, FILM COATED ORAL
Qty: 90 TABLET | Refills: 0 | Status: SHIPPED | OUTPATIENT
Start: 2024-04-16

## 2024-04-17 ENCOUNTER — OFFICE VISIT (OUTPATIENT)
Dept: ONCOLOGY | Facility: CLINIC | Age: 74
End: 2024-04-17
Payer: MEDICARE

## 2024-04-17 ENCOUNTER — HOSPITAL ENCOUNTER (OUTPATIENT)
Dept: ONCOLOGY | Facility: HOSPITAL | Age: 74
Discharge: HOME | End: 2024-04-17
Payer: MEDICARE

## 2024-04-17 VITALS
HEIGHT: 65 IN | OXYGEN SATURATION: 97 % | WEIGHT: 152 LBS | SYSTOLIC BLOOD PRESSURE: 150 MMHG | TEMPERATURE: 98.2 F | DIASTOLIC BLOOD PRESSURE: 74 MMHG | BODY MASS INDEX: 25.33 KG/M2 | RESPIRATION RATE: 16 BRPM | HEART RATE: 78 BPM

## 2024-04-17 VITALS — SYSTOLIC BLOOD PRESSURE: 145 MMHG | DIASTOLIC BLOOD PRESSURE: 76 MMHG | HEART RATE: 70 BPM

## 2024-04-17 DIAGNOSIS — C15.5 PRIMARY MALIGNANT NEOPLASM OF LOWER THIRD OF ESOPHAGUS: ICD-10-CM

## 2024-04-17 DIAGNOSIS — D50.9 IRON DEFICIENCY ANEMIA, UNSPECIFIED IRON DEFICIENCY ANEMIA TYPE: ICD-10-CM

## 2024-04-17 DIAGNOSIS — C16.0 MALIGNANT NEOPLASM OF CARDIA: ICD-10-CM

## 2024-04-17 DIAGNOSIS — C48.0 PRIMARY MALIGNANT NEOPLASM OF RETROPERITONEUM: Primary | ICD-10-CM

## 2024-04-17 DIAGNOSIS — R30.0 DYSURIA: Primary | ICD-10-CM

## 2024-04-17 DIAGNOSIS — R79.89 ELEVATED TSH: ICD-10-CM

## 2024-04-17 DIAGNOSIS — E03.9 HYPOTHYROIDISM (ACQUIRED): ICD-10-CM

## 2024-04-17 DIAGNOSIS — K90.9 MALABSORPTION OF IRON: Primary | ICD-10-CM

## 2024-04-17 LAB
ALP BLD-CCNC: 47 U/L (ref 53–128)
BASOPHILS # BLD AUTO: 0.03 10*3/MM3 (ref 0–0.2)
BASOPHILS NFR BLD AUTO: 0.7 % (ref 0–1.5)
BUN BLDA-MCNC: 13 MG/DL (ref 7–22)
CALCIUM BLD QL: 9.2 MG/DL (ref 8–10.3)
CHLORIDE BLDA-SCNC: 110 MMOL/L (ref 98–108)
CO2 BLDA-SCNC: 24 MMOL/L (ref 18–33)
CREAT BLDA-MCNC: 1.3 MG/DL (ref 0.6–1.2)
DEPRECATED RDW RBC AUTO: 65.5 FL (ref 37–54)
EGFRCR SERPLBLD CKD-EPI 2021: 57.6 ML/MIN/1.73
EOSINOPHIL # BLD AUTO: 0.09 10*3/MM3 (ref 0–0.4)
EOSINOPHIL NFR BLD AUTO: 2 % (ref 0.3–6.2)
ERYTHROCYTE [DISTWIDTH] IN BLOOD BY AUTOMATED COUNT: 21.2 % (ref 12.3–15.4)
GLUCOSE BLDC GLUCOMTR-MCNC: 138 MG/DL (ref 73–118)
HCT VFR BLD AUTO: 25.2 % (ref 37.5–51)
HGB BLD-MCNC: 8 G/DL (ref 13–17.7)
LYMPHOCYTES # BLD AUTO: 1.19 10*3/MM3 (ref 0.7–3.1)
LYMPHOCYTES NFR BLD AUTO: 26 % (ref 19.6–45.3)
MCH RBC QN AUTO: 28.8 PG (ref 26.6–33)
MCHC RBC AUTO-ENTMCNC: 31.7 G/DL (ref 31.5–35.7)
MCV RBC AUTO: 90.6 FL (ref 79–97)
MONOCYTES # BLD AUTO: 0.61 10*3/MM3 (ref 0.1–0.9)
MONOCYTES NFR BLD AUTO: 13.3 % (ref 5–12)
NEUTROPHILS NFR BLD AUTO: 2.65 10*3/MM3 (ref 1.7–7)
NEUTROPHILS NFR BLD AUTO: 58 % (ref 42.7–76)
PLATELET # BLD AUTO: 165 10*3/MM3 (ref 140–450)
PMV BLD AUTO: 10 FL (ref 6–12)
POC ALBUMIN: 3.4 G/L (ref 3.3–5.5)
POC ALT (SGPT): 12 U/L (ref 10–47)
POC AST (SGOT): 22 U/L (ref 11–38)
POC TOTAL BILIRUBIN: 0.8 MG/DL (ref 0.2–1.6)
POC TOTAL PROTEIN: 6 G/DL (ref 6.4–8.1)
POTASSIUM BLDA-SCNC: 4.7 MMOL/L (ref 3.6–5.1)
RBC # BLD AUTO: 2.78 10*6/MM3 (ref 4.14–5.8)
SODIUM BLD-SCNC: 143 MMOL/L (ref 128–145)
WBC NRBC COR # BLD AUTO: 4.57 10*3/MM3 (ref 3.4–10.8)

## 2024-04-17 PROCEDURE — 25810000003 SODIUM CHLORIDE 0.9 % SOLUTION: Performed by: INTERNAL MEDICINE

## 2024-04-17 PROCEDURE — G0498 CHEMO EXTEND IV INFUS W/PUMP: HCPCS

## 2024-04-17 PROCEDURE — 25010000002 IRON SUCROSE PER 1 MG: Performed by: INTERNAL MEDICINE

## 2024-04-17 PROCEDURE — 0 DEXTROSE 5 % SOLUTION: Performed by: INTERNAL MEDICINE

## 2024-04-17 PROCEDURE — 25010000002 DEXAMETHASONE SODIUM PHOSPHATE 120 MG/30ML SOLUTION: Performed by: INTERNAL MEDICINE

## 2024-04-17 PROCEDURE — 25810000003 SODIUM CHLORIDE 0.9 % SOLUTION 250 ML FLEX CONT: Performed by: INTERNAL MEDICINE

## 2024-04-17 PROCEDURE — 96366 THER/PROPH/DIAG IV INF ADDON: CPT

## 2024-04-17 PROCEDURE — 96365 THER/PROPH/DIAG IV INF INIT: CPT

## 2024-04-17 PROCEDURE — 96375 TX/PRO/DX INJ NEW DRUG ADDON: CPT

## 2024-04-17 PROCEDURE — 96416 CHEMO PROLONG INFUSE W/PUMP: CPT

## 2024-04-17 PROCEDURE — 3078F DIAST BP <80 MM HG: CPT | Performed by: INTERNAL MEDICINE

## 2024-04-17 PROCEDURE — 25010000002 FLUOROURACIL PER 500 MG: Performed by: INTERNAL MEDICINE

## 2024-04-17 PROCEDURE — 3077F SYST BP >= 140 MM HG: CPT | Performed by: INTERNAL MEDICINE

## 2024-04-17 PROCEDURE — 1126F AMNT PAIN NOTED NONE PRSNT: CPT | Performed by: INTERNAL MEDICINE

## 2024-04-17 PROCEDURE — 96367 TX/PROPH/DG ADDL SEQ IV INF: CPT

## 2024-04-17 PROCEDURE — 80053 COMPREHEN METABOLIC PANEL: CPT

## 2024-04-17 PROCEDURE — 85025 COMPLETE CBC W/AUTO DIFF WBC: CPT | Performed by: INTERNAL MEDICINE

## 2024-04-17 PROCEDURE — 25010000002 LEUCOVORIN CALCIUM PER 50 MG: Performed by: INTERNAL MEDICINE

## 2024-04-17 RX ORDER — SODIUM CHLORIDE 0.9 % (FLUSH) 0.9 %
20 SYRINGE (ML) INJECTION AS NEEDED
Status: CANCELLED | OUTPATIENT
Start: 2024-04-17

## 2024-04-17 RX ORDER — DEXTROSE MONOHYDRATE 50 MG/ML
250 INJECTION, SOLUTION INTRAVENOUS ONCE
Status: COMPLETED | OUTPATIENT
Start: 2024-04-17 | End: 2024-04-17

## 2024-04-17 RX ORDER — FAMOTIDINE 10 MG/ML
20 INJECTION, SOLUTION INTRAVENOUS AS NEEDED
Status: CANCELLED | OUTPATIENT
Start: 2024-04-17

## 2024-04-17 RX ORDER — SODIUM CHLORIDE 0.9 % (FLUSH) 0.9 %
20 SYRINGE (ML) INJECTION AS NEEDED
Status: DISCONTINUED | OUTPATIENT
Start: 2024-04-17 | End: 2024-04-18 | Stop reason: HOSPADM

## 2024-04-17 RX ORDER — LEVOTHYROXINE SODIUM 0.05 MG/1
50 TABLET ORAL DAILY
Qty: 90 TABLET | Refills: 0 | Status: SHIPPED | OUTPATIENT
Start: 2024-04-17

## 2024-04-17 RX ORDER — DIPHENHYDRAMINE HYDROCHLORIDE 50 MG/ML
50 INJECTION INTRAMUSCULAR; INTRAVENOUS AS NEEDED
Status: CANCELLED | OUTPATIENT
Start: 2024-04-17

## 2024-04-17 RX ORDER — SODIUM CHLORIDE 9 MG/ML
20 INJECTION, SOLUTION INTRAVENOUS ONCE
Status: COMPLETED | OUTPATIENT
Start: 2024-04-17 | End: 2024-04-17

## 2024-04-17 RX ORDER — DEXTROSE MONOHYDRATE 50 MG/ML
250 INJECTION, SOLUTION INTRAVENOUS ONCE
Status: CANCELLED | OUTPATIENT
Start: 2024-04-17

## 2024-04-17 RX ADMIN — DEXTROSE MONOHYDRATE 250 ML: 50 INJECTION, SOLUTION INTRAVENOUS at 11:25

## 2024-04-17 RX ADMIN — IRON SUCROSE 300 MG: 20 INJECTION, SOLUTION INTRAVENOUS at 09:51

## 2024-04-17 RX ADMIN — SODIUM CHLORIDE 690 MG: 9 INJECTION, SOLUTION INTRAVENOUS at 11:47

## 2024-04-17 RX ADMIN — DEXAMETHASONE SODIUM PHOSPHATE 12 MG: 4 INJECTION, SOLUTION INTRA-ARTICULAR; INTRALESIONAL; INTRAMUSCULAR; INTRAVENOUS; SOFT TISSUE at 11:25

## 2024-04-17 RX ADMIN — SODIUM CHLORIDE 20 ML/HR: 9 INJECTION, SOLUTION INTRAVENOUS at 09:51

## 2024-04-17 RX ADMIN — FLUOROURACIL 4130 MG: 50 INJECTION, SOLUTION INTRAVENOUS at 12:22

## 2024-04-17 NOTE — PROGRESS NOTES
Pt to infusion clinic after Dr. Elizondo f/u appointment for his iron and chemotherapy.  Port noted with positive blood return.  Tx given per MAR.  Pt tolerated well.  Pt d/c home with 5FU pump infusing and AVS given.

## 2024-04-19 ENCOUNTER — HOSPITAL ENCOUNTER (OUTPATIENT)
Dept: ONCOLOGY | Facility: HOSPITAL | Age: 74
Discharge: HOME | End: 2024-04-19
Payer: MEDICARE

## 2024-04-19 VITALS
TEMPERATURE: 97 F | HEART RATE: 80 BPM | OXYGEN SATURATION: 97 % | SYSTOLIC BLOOD PRESSURE: 150 MMHG | RESPIRATION RATE: 16 BRPM | DIASTOLIC BLOOD PRESSURE: 73 MMHG

## 2024-04-19 DIAGNOSIS — C15.5 PRIMARY MALIGNANT NEOPLASM OF LOWER THIRD OF ESOPHAGUS: ICD-10-CM

## 2024-04-19 DIAGNOSIS — C15.9 ESOPHAGEAL CANCER, STAGE IV: Primary | ICD-10-CM

## 2024-04-19 DIAGNOSIS — C16.0 MALIGNANT NEOPLASM OF CARDIA: Primary | ICD-10-CM

## 2024-04-19 PROCEDURE — 25810000003 SODIUM CHLORIDE 0.9 % SOLUTION: Performed by: INTERNAL MEDICINE

## 2024-04-19 PROCEDURE — 96360 HYDRATION IV INFUSION INIT: CPT

## 2024-04-19 RX ORDER — SODIUM CHLORIDE 0.9 % (FLUSH) 0.9 %
20 SYRINGE (ML) INJECTION AS NEEDED
Status: DISCONTINUED | OUTPATIENT
Start: 2024-04-19 | End: 2024-04-20 | Stop reason: HOSPADM

## 2024-04-19 RX ORDER — SODIUM CHLORIDE 0.9 % (FLUSH) 0.9 %
20 SYRINGE (ML) INJECTION AS NEEDED
Status: CANCELLED | OUTPATIENT
Start: 2024-04-19

## 2024-04-19 RX ORDER — SODIUM CHLORIDE 0.9 % (FLUSH) 0.9 %
20 SYRINGE (ML) INJECTION AS NEEDED
OUTPATIENT
Start: 2024-04-19

## 2024-04-19 RX ADMIN — Medication 20 ML: at 13:30

## 2024-04-19 RX ADMIN — SODIUM CHLORIDE 500 ML: 9 INJECTION, SOLUTION INTRAVENOUS at 12:18

## 2024-04-19 NOTE — PROGRESS NOTES
Pt here for chemo pump d/c and IV fluids. 5FU pump is empty. Port aspirated and positive blood return noted. Port flushed with 10 ml NS, connected to IV fluids and pt moved to infusion.

## 2024-04-23 ENCOUNTER — HOSPITAL ENCOUNTER (OUTPATIENT)
Dept: ONCOLOGY | Facility: HOSPITAL | Age: 74
Discharge: HOME | End: 2024-04-23
Admitting: INTERNAL MEDICINE
Payer: MEDICARE

## 2024-04-23 VITALS
HEIGHT: 65 IN | HEART RATE: 71 BPM | RESPIRATION RATE: 14 BRPM | WEIGHT: 150 LBS | BODY MASS INDEX: 24.99 KG/M2 | DIASTOLIC BLOOD PRESSURE: 69 MMHG | SYSTOLIC BLOOD PRESSURE: 126 MMHG | TEMPERATURE: 98 F | OXYGEN SATURATION: 100 %

## 2024-04-23 DIAGNOSIS — K90.9 MALABSORPTION OF IRON: Primary | ICD-10-CM

## 2024-04-23 DIAGNOSIS — D50.9 IRON DEFICIENCY ANEMIA, UNSPECIFIED IRON DEFICIENCY ANEMIA TYPE: ICD-10-CM

## 2024-04-23 DIAGNOSIS — C16.0 MALIGNANT NEOPLASM OF CARDIA: ICD-10-CM

## 2024-04-23 PROCEDURE — 96365 THER/PROPH/DIAG IV INF INIT: CPT

## 2024-04-23 PROCEDURE — 25010000002 IRON SUCROSE PER 1 MG: Performed by: INTERNAL MEDICINE

## 2024-04-23 PROCEDURE — 25810000003 SODIUM CHLORIDE 0.9 % SOLUTION: Performed by: INTERNAL MEDICINE

## 2024-04-23 PROCEDURE — 96366 THER/PROPH/DIAG IV INF ADDON: CPT

## 2024-04-23 RX ORDER — SODIUM CHLORIDE 0.9 % (FLUSH) 0.9 %
20 SYRINGE (ML) INJECTION AS NEEDED
Status: DISCONTINUED | OUTPATIENT
Start: 2024-04-23 | End: 2024-04-24 | Stop reason: HOSPADM

## 2024-04-23 RX ORDER — SODIUM CHLORIDE 0.9 % (FLUSH) 0.9 %
20 SYRINGE (ML) INJECTION AS NEEDED
OUTPATIENT
Start: 2024-04-23

## 2024-04-23 RX ORDER — SODIUM CHLORIDE 9 MG/ML
20 INJECTION, SOLUTION INTRAVENOUS ONCE
Status: COMPLETED | OUTPATIENT
Start: 2024-04-23 | End: 2024-04-23

## 2024-04-23 RX ADMIN — SODIUM CHLORIDE 20 ML/HR: 9 INJECTION, SOLUTION INTRAVENOUS at 10:25

## 2024-04-23 RX ADMIN — Medication 20 ML: at 12:20

## 2024-04-23 RX ADMIN — IRON SUCROSE 300 MG: 20 INJECTION, SOLUTION INTRAVENOUS at 10:25

## 2024-05-01 ENCOUNTER — HOSPITAL ENCOUNTER (OUTPATIENT)
Dept: ONCOLOGY | Facility: HOSPITAL | Age: 74
Discharge: HOME | End: 2024-05-01
Admitting: INTERNAL MEDICINE
Payer: MEDICARE

## 2024-05-01 VITALS
HEIGHT: 65 IN | TEMPERATURE: 98.5 F | WEIGHT: 148 LBS | RESPIRATION RATE: 14 BRPM | DIASTOLIC BLOOD PRESSURE: 61 MMHG | HEART RATE: 80 BPM | OXYGEN SATURATION: 99 % | BODY MASS INDEX: 24.66 KG/M2 | SYSTOLIC BLOOD PRESSURE: 121 MMHG

## 2024-05-01 DIAGNOSIS — C16.0 MALIGNANT NEOPLASM OF CARDIA: Primary | ICD-10-CM

## 2024-05-01 DIAGNOSIS — D50.9 IRON DEFICIENCY ANEMIA, UNSPECIFIED IRON DEFICIENCY ANEMIA TYPE: ICD-10-CM

## 2024-05-01 DIAGNOSIS — C15.5 PRIMARY MALIGNANT NEOPLASM OF LOWER THIRD OF ESOPHAGUS: ICD-10-CM

## 2024-05-01 DIAGNOSIS — K90.9 MALABSORPTION OF IRON: ICD-10-CM

## 2024-05-01 LAB
ALBUMIN SERPL-MCNC: 4.1 G/DL (ref 3.5–5.2)
ALBUMIN/GLOB SERPL: 2.2 G/DL
ALP SERPL-CCNC: 36 U/L (ref 39–117)
ALT SERPL W P-5'-P-CCNC: 5 U/L (ref 1–41)
ANION GAP SERPL CALCULATED.3IONS-SCNC: 11 MMOL/L (ref 5–15)
AST SERPL-CCNC: 13 U/L (ref 1–40)
BASOPHILS # BLD AUTO: 0.02 10*3/MM3 (ref 0–0.2)
BASOPHILS NFR BLD AUTO: 0.6 % (ref 0–1.5)
BILIRUB SERPL-MCNC: 0.4 MG/DL (ref 0–1.2)
BUN SERPL-MCNC: 13 MG/DL (ref 8–23)
BUN/CREAT SERPL: 9.2 (ref 7–25)
CALCIUM SPEC-SCNC: 9.1 MG/DL (ref 8.6–10.5)
CHLORIDE SERPL-SCNC: 109 MMOL/L (ref 98–107)
CO2 SERPL-SCNC: 21 MMOL/L (ref 22–29)
CREAT SERPL-MCNC: 1.42 MG/DL (ref 0.76–1.27)
DEPRECATED RDW RBC AUTO: 77.8 FL (ref 37–54)
EGFRCR SERPLBLD CKD-EPI 2021: 51.9 ML/MIN/1.73
EOSINOPHIL # BLD AUTO: 0.07 10*3/MM3 (ref 0–0.4)
EOSINOPHIL NFR BLD AUTO: 1.9 % (ref 0.3–6.2)
ERYTHROCYTE [DISTWIDTH] IN BLOOD BY AUTOMATED COUNT: 24.1 % (ref 12.3–15.4)
GLOBULIN UR ELPH-MCNC: 1.9 GM/DL
GLUCOSE SERPL-MCNC: 136 MG/DL (ref 65–99)
HCT VFR BLD AUTO: 27 % (ref 37.5–51)
HGB BLD-MCNC: 8.3 G/DL (ref 13–17.7)
LYMPHOCYTES # BLD AUTO: 0.9 10*3/MM3 (ref 0.7–3.1)
LYMPHOCYTES NFR BLD AUTO: 24.8 % (ref 19.6–45.3)
MCH RBC QN AUTO: 28.8 PG (ref 26.6–33)
MCHC RBC AUTO-ENTMCNC: 30.7 G/DL (ref 31.5–35.7)
MCV RBC AUTO: 93.8 FL (ref 79–97)
MONOCYTES # BLD AUTO: 0.5 10*3/MM3 (ref 0.1–0.9)
MONOCYTES NFR BLD AUTO: 13.8 % (ref 5–12)
NEUTROPHILS NFR BLD AUTO: 2.14 10*3/MM3 (ref 1.7–7)
NEUTROPHILS NFR BLD AUTO: 58.9 % (ref 42.7–76)
PLATELET # BLD AUTO: 178 10*3/MM3 (ref 140–450)
PMV BLD AUTO: 10.4 FL (ref 6–12)
POTASSIUM SERPL-SCNC: 4 MMOL/L (ref 3.5–5.2)
PROT SERPL-MCNC: 6 G/DL (ref 6–8.5)
RBC # BLD AUTO: 2.88 10*6/MM3 (ref 4.14–5.8)
SODIUM SERPL-SCNC: 141 MMOL/L (ref 136–145)
WBC NRBC COR # BLD AUTO: 3.63 10*3/MM3 (ref 3.4–10.8)

## 2024-05-01 PROCEDURE — 25810000003 SODIUM CHLORIDE 0.9 % SOLUTION: Performed by: INTERNAL MEDICINE

## 2024-05-01 PROCEDURE — G0498 CHEMO EXTEND IV INFUS W/PUMP: HCPCS

## 2024-05-01 PROCEDURE — 96365 THER/PROPH/DIAG IV INF INIT: CPT

## 2024-05-01 PROCEDURE — 25010000002 FLUOROURACIL PER 500 MG: Performed by: INTERNAL MEDICINE

## 2024-05-01 PROCEDURE — 25010000002 IRON SUCROSE PER 1 MG: Performed by: INTERNAL MEDICINE

## 2024-05-01 PROCEDURE — 96367 TX/PROPH/DG ADDL SEQ IV INF: CPT

## 2024-05-01 PROCEDURE — 25010000002 LEUCOVORIN CALCIUM PER 50 MG: Performed by: INTERNAL MEDICINE

## 2024-05-01 PROCEDURE — 96416 CHEMO PROLONG INFUSE W/PUMP: CPT

## 2024-05-01 PROCEDURE — 0 DEXTROSE 5 % SOLUTION: Performed by: INTERNAL MEDICINE

## 2024-05-01 PROCEDURE — 80053 COMPREHEN METABOLIC PANEL: CPT | Performed by: INTERNAL MEDICINE

## 2024-05-01 PROCEDURE — 96375 TX/PRO/DX INJ NEW DRUG ADDON: CPT

## 2024-05-01 PROCEDURE — 85025 COMPLETE CBC W/AUTO DIFF WBC: CPT | Performed by: INTERNAL MEDICINE

## 2024-05-01 PROCEDURE — 25010000002 DEXAMETHASONE SODIUM PHOSPHATE 120 MG/30ML SOLUTION: Performed by: INTERNAL MEDICINE

## 2024-05-01 PROCEDURE — 25810000003 SODIUM CHLORIDE 0.9 % SOLUTION 250 ML FLEX CONT: Performed by: INTERNAL MEDICINE

## 2024-05-01 RX ORDER — DEXTROSE MONOHYDRATE 50 MG/ML
250 INJECTION, SOLUTION INTRAVENOUS ONCE
Status: COMPLETED | OUTPATIENT
Start: 2024-05-01 | End: 2024-05-01

## 2024-05-01 RX ORDER — DIPHENHYDRAMINE HYDROCHLORIDE 50 MG/ML
50 INJECTION INTRAMUSCULAR; INTRAVENOUS AS NEEDED
Status: CANCELLED | OUTPATIENT
Start: 2024-05-01

## 2024-05-01 RX ORDER — FAMOTIDINE 10 MG/ML
20 INJECTION, SOLUTION INTRAVENOUS AS NEEDED
Status: CANCELLED | OUTPATIENT
Start: 2024-05-01

## 2024-05-01 RX ORDER — SODIUM CHLORIDE 9 MG/ML
20 INJECTION, SOLUTION INTRAVENOUS ONCE
Status: COMPLETED | OUTPATIENT
Start: 2024-05-01 | End: 2024-05-01

## 2024-05-01 RX ADMIN — DEXAMETHASONE SODIUM PHOSPHATE 12 MG: 4 INJECTION, SOLUTION INTRA-ARTICULAR; INTRALESIONAL; INTRAMUSCULAR; INTRAVENOUS; SOFT TISSUE at 09:15

## 2024-05-01 RX ADMIN — FLUOROURACIL 4130 MG: 50 INJECTION, SOLUTION INTRAVENOUS at 10:33

## 2024-05-01 RX ADMIN — DEXTROSE MONOHYDRATE 250 ML: 50 INJECTION, SOLUTION INTRAVENOUS at 09:14

## 2024-05-01 RX ADMIN — SODIUM CHLORIDE 20 ML/HR: 9 INJECTION, SOLUTION INTRAVENOUS at 10:43

## 2024-05-01 RX ADMIN — IRON SUCROSE 300 MG: 20 INJECTION, SOLUTION INTRAVENOUS at 10:43

## 2024-05-01 RX ADMIN — SODIUM CHLORIDE 690 MG: 9 INJECTION, SOLUTION INTRAVENOUS at 09:56

## 2024-05-01 RX ADMIN — FLUOROURACIL 4130 MG: 50 INJECTION, SOLUTION INTRAVENOUS at 12:20

## 2024-05-03 ENCOUNTER — HOSPITAL ENCOUNTER (OUTPATIENT)
Dept: ONCOLOGY | Facility: HOSPITAL | Age: 74
Discharge: HOME | End: 2024-05-03
Payer: MEDICARE

## 2024-05-03 VITALS — HEART RATE: 67 BPM | SYSTOLIC BLOOD PRESSURE: 136 MMHG | DIASTOLIC BLOOD PRESSURE: 55 MMHG

## 2024-05-03 DIAGNOSIS — C16.0 MALIGNANT NEOPLASM OF CARDIA: Primary | ICD-10-CM

## 2024-05-03 DIAGNOSIS — C15.5 PRIMARY MALIGNANT NEOPLASM OF LOWER THIRD OF ESOPHAGUS: ICD-10-CM

## 2024-05-03 PROCEDURE — 96360 HYDRATION IV INFUSION INIT: CPT

## 2024-05-03 PROCEDURE — 25810000003 SODIUM CHLORIDE 0.9 % SOLUTION: Performed by: INTERNAL MEDICINE

## 2024-05-03 RX ORDER — SODIUM CHLORIDE 0.9 % (FLUSH) 0.9 %
20 SYRINGE (ML) INJECTION AS NEEDED
Status: DISCONTINUED | OUTPATIENT
Start: 2024-05-03 | End: 2024-05-04 | Stop reason: HOSPADM

## 2024-05-03 RX ORDER — SODIUM CHLORIDE 0.9 % (FLUSH) 0.9 %
20 SYRINGE (ML) INJECTION AS NEEDED
Status: CANCELLED | OUTPATIENT
Start: 2024-05-03

## 2024-05-03 RX ORDER — SODIUM CHLORIDE 0.9 % (FLUSH) 0.9 %
20 SYRINGE (ML) INJECTION AS NEEDED
OUTPATIENT
Start: 2024-05-03

## 2024-05-03 RX ADMIN — Medication 20 ML: at 14:41

## 2024-05-03 RX ADMIN — SODIUM CHLORIDE 500 ML: 900 INJECTION, SOLUTION INTRAVENOUS at 13:25

## 2024-05-07 ENCOUNTER — HOSPITAL ENCOUNTER (OUTPATIENT)
Dept: PET IMAGING | Facility: HOSPITAL | Age: 74
Discharge: HOME | End: 2024-05-07
Admitting: INTERNAL MEDICINE
Payer: MEDICARE

## 2024-05-07 DIAGNOSIS — C16.0 MALIGNANT NEOPLASM OF CARDIA: ICD-10-CM

## 2024-05-07 DIAGNOSIS — C15.5 PRIMARY MALIGNANT NEOPLASM OF LOWER THIRD OF ESOPHAGUS: ICD-10-CM

## 2024-05-07 PROCEDURE — 71250 CT THORAX DX C-: CPT

## 2024-05-13 ENCOUNTER — TELEPHONE (OUTPATIENT)
Dept: ONCOLOGY | Facility: CLINIC | Age: 74
End: 2024-05-13

## 2024-05-13 NOTE — TELEPHONE ENCOUNTER
Caller: Artie Mc    Relationship to patient: Self    Best call back number: [phone]    Type of visit: LAB, F/U 1 & INFUSION     Requested date: 5/15/2024 LATER IN THE MORNING, CALL R/S     If rescheduling, when is the original appointment: 5/15/2024    Additional notes:

## 2024-05-14 DIAGNOSIS — C16.0 MALIGNANT NEOPLASM OF CARDIA: Primary | ICD-10-CM

## 2024-05-14 DIAGNOSIS — C15.5 PRIMARY MALIGNANT NEOPLASM OF LOWER THIRD OF ESOPHAGUS: ICD-10-CM

## 2024-05-15 ENCOUNTER — HOSPITAL ENCOUNTER (OUTPATIENT)
Dept: ONCOLOGY | Facility: HOSPITAL | Age: 74
Discharge: HOME | End: 2024-05-15
Payer: MEDICARE

## 2024-05-15 ENCOUNTER — OFFICE VISIT (OUTPATIENT)
Dept: ONCOLOGY | Facility: CLINIC | Age: 74
End: 2024-05-15
Payer: MEDICARE

## 2024-05-15 VITALS
OXYGEN SATURATION: 97 % | RESPIRATION RATE: 16 BRPM | HEART RATE: 72 BPM | HEIGHT: 65 IN | DIASTOLIC BLOOD PRESSURE: 70 MMHG | BODY MASS INDEX: 25.46 KG/M2 | TEMPERATURE: 98.5 F | SYSTOLIC BLOOD PRESSURE: 126 MMHG | WEIGHT: 152.8 LBS

## 2024-05-15 DIAGNOSIS — C16.0 MALIGNANT NEOPLASM OF CARDIA: Primary | ICD-10-CM

## 2024-05-15 DIAGNOSIS — C15.5 PRIMARY MALIGNANT NEOPLASM OF LOWER THIRD OF ESOPHAGUS: ICD-10-CM

## 2024-05-15 DIAGNOSIS — E03.9 HYPOTHYROIDISM (ACQUIRED): ICD-10-CM

## 2024-05-15 LAB
ALP BLD-CCNC: 40 U/L (ref 53–128)
BASOPHILS # BLD AUTO: 0.02 10*3/MM3 (ref 0–0.2)
BASOPHILS NFR BLD AUTO: 0.6 % (ref 0–1.5)
BUN BLDA-MCNC: 14 MG/DL (ref 7–22)
CALCIUM BLD QL: 8.9 MG/DL (ref 8–10.3)
CHLORIDE BLDA-SCNC: 110 MMOL/L (ref 98–108)
CO2 BLDA-SCNC: 27 MMOL/L (ref 18–33)
CREAT BLDA-MCNC: 1.3 MG/DL (ref 0.6–1.2)
DEPRECATED RDW RBC AUTO: 84.9 FL (ref 37–54)
EGFRCR SERPLBLD CKD-EPI 2021: 57.3 ML/MIN/1.73
EOSINOPHIL # BLD AUTO: 0.06 10*3/MM3 (ref 0–0.4)
EOSINOPHIL NFR BLD AUTO: 1.7 % (ref 0.3–6.2)
ERYTHROCYTE [DISTWIDTH] IN BLOOD BY AUTOMATED COUNT: 24.9 % (ref 12.3–15.4)
GLUCOSE BLDC GLUCOMTR-MCNC: 144 MG/DL (ref 73–118)
HCT VFR BLD AUTO: 27.6 % (ref 37.5–51)
HGB BLD-MCNC: 8.5 G/DL (ref 13–17.7)
LYMPHOCYTES # BLD AUTO: 0.92 10*3/MM3 (ref 0.7–3.1)
LYMPHOCYTES NFR BLD AUTO: 25.9 % (ref 19.6–45.3)
MCH RBC QN AUTO: 30.1 PG (ref 26.6–33)
MCHC RBC AUTO-ENTMCNC: 30.8 G/DL (ref 31.5–35.7)
MCV RBC AUTO: 97.9 FL (ref 79–97)
MONOCYTES # BLD AUTO: 0.52 10*3/MM3 (ref 0.1–0.9)
MONOCYTES NFR BLD AUTO: 14.6 % (ref 5–12)
NEUTROPHILS NFR BLD AUTO: 2.03 10*3/MM3 (ref 1.7–7)
NEUTROPHILS NFR BLD AUTO: 57.2 % (ref 42.7–76)
PLATELET # BLD AUTO: 162 10*3/MM3 (ref 140–450)
PMV BLD AUTO: 11.2 FL (ref 6–12)
POC ALBUMIN: 3.2 G/L (ref 3.3–5.5)
POC ALT (SGPT): 6 U/L (ref 10–47)
POC AST (SGOT): 19 U/L (ref 11–38)
POC TOTAL BILIRUBIN: 0.6 MG/DL (ref 0.2–1.6)
POC TOTAL PROTEIN: 5.5 G/DL (ref 6.4–8.1)
POTASSIUM BLDA-SCNC: 4.2 MMOL/L (ref 3.6–5.1)
RBC # BLD AUTO: 2.82 10*6/MM3 (ref 4.14–5.8)
SODIUM BLD-SCNC: 145 MMOL/L (ref 128–145)
WBC NRBC COR # BLD AUTO: 3.55 10*3/MM3 (ref 3.4–10.8)

## 2024-05-15 PROCEDURE — 96365 THER/PROPH/DIAG IV INF INIT: CPT

## 2024-05-15 PROCEDURE — 25810000003 SODIUM CHLORIDE 0.9 % SOLUTION 250 ML FLEX CONT: Performed by: INTERNAL MEDICINE

## 2024-05-15 PROCEDURE — 25010000002 FLUOROURACIL PER 500 MG: Performed by: INTERNAL MEDICINE

## 2024-05-15 PROCEDURE — 85025 COMPLETE CBC W/AUTO DIFF WBC: CPT | Performed by: INTERNAL MEDICINE

## 2024-05-15 PROCEDURE — 25010000002 DEXAMETHASONE SODIUM PHOSPHATE 120 MG/30ML SOLUTION: Performed by: INTERNAL MEDICINE

## 2024-05-15 PROCEDURE — 0 DEXTROSE 5 % SOLUTION: Performed by: INTERNAL MEDICINE

## 2024-05-15 PROCEDURE — 96361 HYDRATE IV INFUSION ADD-ON: CPT

## 2024-05-15 PROCEDURE — G0498 CHEMO EXTEND IV INFUS W/PUMP: HCPCS

## 2024-05-15 PROCEDURE — 96375 TX/PRO/DX INJ NEW DRUG ADDON: CPT

## 2024-05-15 PROCEDURE — 25010000002 LEUCOVORIN CALCIUM PER 50 MG: Performed by: INTERNAL MEDICINE

## 2024-05-15 PROCEDURE — 80053 COMPREHEN METABOLIC PANEL: CPT

## 2024-05-15 RX ORDER — DEXTROSE MONOHYDRATE 50 MG/ML
250 INJECTION, SOLUTION INTRAVENOUS ONCE
Status: COMPLETED | OUTPATIENT
Start: 2024-05-15 | End: 2024-05-15

## 2024-05-15 RX ORDER — FAMOTIDINE 10 MG/ML
20 INJECTION, SOLUTION INTRAVENOUS AS NEEDED
Status: CANCELLED | OUTPATIENT
Start: 2024-05-15

## 2024-05-15 RX ORDER — SODIUM CHLORIDE 0.9 % (FLUSH) 0.9 %
20 SYRINGE (ML) INJECTION AS NEEDED
Status: DISCONTINUED | OUTPATIENT
Start: 2024-05-15 | End: 2024-05-16 | Stop reason: HOSPADM

## 2024-05-15 RX ORDER — SODIUM CHLORIDE 0.9 % (FLUSH) 0.9 %
20 SYRINGE (ML) INJECTION AS NEEDED
Status: CANCELLED | OUTPATIENT
Start: 2024-05-15

## 2024-05-15 RX ORDER — DIPHENHYDRAMINE HYDROCHLORIDE 50 MG/ML
50 INJECTION INTRAMUSCULAR; INTRAVENOUS AS NEEDED
Status: CANCELLED | OUTPATIENT
Start: 2024-05-15

## 2024-05-15 RX ORDER — DEXTROSE MONOHYDRATE 50 MG/ML
250 INJECTION, SOLUTION INTRAVENOUS ONCE
Status: CANCELLED | OUTPATIENT
Start: 2024-05-15

## 2024-05-15 RX ADMIN — Medication 20 ML: at 09:28

## 2024-05-15 RX ADMIN — FLUOROURACIL 4130 MG: 50 INJECTION, SOLUTION INTRAVENOUS at 12:19

## 2024-05-15 RX ADMIN — DEXTROSE MONOHYDRATE 250 ML: 50 INJECTION, SOLUTION INTRAVENOUS at 10:53

## 2024-05-15 RX ADMIN — SODIUM CHLORIDE 690 MG: 9 INJECTION, SOLUTION INTRAVENOUS at 11:39

## 2024-05-15 RX ADMIN — DEXAMETHASONE SODIUM PHOSPHATE 12 MG: 4 INJECTION, SOLUTION INTRA-ARTICULAR; INTRALESIONAL; INTRAMUSCULAR; INTRAVENOUS; SOFT TISSUE at 10:54

## 2024-05-15 NOTE — PROGRESS NOTES
Hematology-Oncology Follow-up Note       Artie Mc  1950    Primary Care Physician: Chanel Carter APRN  Referring Physician: Chanel Carter APRN    Reason For Visit:  Chief Complaint   Patient presents with    Follow-up     Malignant neoplasm of cardia       Metastatic esophageal cancer  Monitoring for adverse effects related to immunotherapy.  Hypothyroidism    HPI:   Mr. Mc is a pleasant 74 y.o. gentleman with a history of gastroesophageal reflux disease, diabetes, hypertension, heart block status post pacemaker placement and history of CABG.  He was having symptoms of dysphagia, weight loss since September 2016. He was reporting symptoms of food getting stuck in the lower chest.  He has transitioned himself to a liquid diet.  The patient reports losing about 30 pounds over this duration.  The patient underwent upper GI endoscopy on 12/20/16 with Dr. Esvin Barrera.  Endoscopy showed necrotic, circumferential and moderately obstructive mass at the distal esophagus between 42 cm and 36 cm.      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  There was also evidence of mild subacute inflammation in the duodenum.  Dr. Barrera ordered a CT scan of the chest and abdomen with contrast on 12/20/16.  The scan was done at [facility].  The scan showed a new ill-defined mass in the anterior wall of the distal esophagus at the GE junction measuring 2.3 x 2.2 x 1.6 cm, worrisome for cancer.  There were four subcentimeter liver lesions which had appeared stable in comparison to another CT scan from 2009 and they likely represent small cysts.  There was also mild lymphadenopathy in the gastrohepatic ligament.  There were at least six borderline enlarged lymph nodes measuring up to 1.3 x 1 cm in the region.  The patient is referred to us for further oncological evaluation.    1/5/2017 - The patient presents for  initial consultation.  He reports persistent dysphagia and also has symptoms of regurgitation.  He cannot tolerate solid foods and is dependent on liquid diet such as Ensure.  He also reports fatigue. Symptoms of dysphagia have been present for the past four months.  When he eats any solids, the food gets stuck in the lower chest.    1/5/17 - Vitamin B12 200 (L).  CEA 0.8.  Ferritin 35.  Iron saturation 16% (L), serum iron 56, TIBC 354.  Creatinine 0.9.  LFTs normal.  Folate 10.2.  1/12/17 - PET/CT scan:  Intense abnormal activity corresponding to the region of the GE junction and proximal stomach.  SUV is 11.06.  There is a suggestion of an abnormal mass or abnormal wall thickening in the region measuring 3.9 x 4.8 cm.  No additional abnormalities.  No evidence of metastases or other lymphadenopathy.  Scattered nonspecific subcentimeter lymph nodes involving the mediastinum and within the central upper abdomen.  1/13/17 - Creatinine 0.9.  LFTs normal.    1/17/17 - Patient seen by thoracic surgeon, Dr. Bradley.  PET scan was reviewed.  He has recommended neoadjuvant chemoradiation therapy followed by [place] Gurwinder esophagogastrectomy.  Port-A-Cath is being arranged.    1/20/17 - WBC 6.3, hemoglobin 12.1, platelet count 198,000, MCV 86.4.  1/30/17 - Patient started weekly Carboplatin and Taxol (Carboplatin AUC 2 and Taxol 50 mg/M2).  Patient received Injectafer 750 mg.  WBC 5.8, hemoglobin 12.4, platelet count 244,000.   1/31/17 - Patient seen by Dr. Eduardo Oleary.  The plan was to give 5040 cGy in 28 fractions.   2/6/17 - WBC 4.4, hemoglobin 11.6, platelet count 201,000.  Patient received cycle 2 of weekly Carboplatin and Taxol (Carboplatin AUC 2 and Taxol 50 mg/M2).  2/13/17 - Patient received Carboplatin and Taxol weekly cycle 3.  2/13/17 - Patient started concurrent radiation therapy.  Total planned dose is 4140 cGy.    2/13/17 to 2/15/17 - Patient received 4140 cGy of neoadjuvant radiation.  Radiation was finished  on 3/15/17.    2/20/17 - WBC 1.8, hemoglobin 10.6, platelet count 217,000.  Creatinine 0.5. Folate 15 (L).  Ferritin 361.  Haptoglobin 214.  Iron saturation 33%, TIBC 263, serum iron 88.    2/20/17 - Patient received Carboplatin and Taxol weekly cycle 4.   2/27/17 - WBC 3.7, hemoglobin 10.7, platelet count 177,000, MCV 85.3.    3/6/17 - Patient has received 16 out of the 23 planned radiation treatments.  Total planned dose is 4140 cGy.    3/6/17 - WBC 5.4, hemoglobin 11.2, platelet count 113,000.  3/6/17 - Patient received cycle 5 of weekly Carboplatin and Taxol.   3/13/17 - Patient received cycle 6 of weekly Carboplatin and Taxol.  WBC 3.9, hemoglobin 11.3, platelet count 93,000.     3/20/17 - WBC 1.4, hemoglobin 10.0, platelet count 118,000, MCV 86.1   3/27/17 - WBC 3.8, hemoglobin 9.5, platelet count 176,000, MCV 87.3.    4/7/17 - Stress test:  No evidence of reversible myocardial ischemia.  Normal left ventricular ejection fraction of 50%.    4/17/17 - Upper GI endoscopy by Dr. 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  The GE junction adenocarcinoma lesion seems to have a very good response to chemotherapy and radiation.  The lumen was open.  5/1/17 - WBC 7.0, hemoglobin 10.2, platelet count 175,000.    5/4/17 - Patient underwent [place] Gur[facility]inder esophagectomy with pyloroplasty, feeding jejunostomy tube, abdominal and mediastinal lymph node dissection.    Surgical Pathology:  Moderately to poorly differentiated adenocarcinoma measuring 1.3 cm at the GE junction.  Resection margins are negative for malignancy.  Tumor margins are negative.  There is effective treatment response.  No evidence of lymphovascular invasion.  Staging is ypT3,pN1.  One out of fourteen lymph nodes involved.   5/22/17 - WBC 6.8, hemoglobin 10.3, platelet count 312,000.    6/19/17 - WBC 5.7, hemoglobin 10.5, platelet count 204,000, MCV 92.1.    7/6/17 - EGD:  Status post balloon dilation of esophageal  stricture.  7/12/17 - EGD with balloon dilation of esophageal stricture.  7/26/17 - EGD and balloon dilation of esophageal stricture.  7/31/17 - PET/CT scan:  There is mild metabolic activity seen at the thoracic esophagus just at and below the surgical margins.  Some mild wall thickening.  This could be from recent surgery.  A residual cancer seems unlikely.  There is a precarinal lymph node with mild metabolic activity with SUV of 3 measuring 1 x 1.3 cm.  Again this appears to be nonspecific in my opinion.    8/7/17 - WBC 5.9, hemoglobin 11.9, platelet count 171,000, MCV 88.7.    10/19/17 - WBC 7.5, hemoglobin 11.8, platelet count 216,000.    1/8/18 - PET scan:  No evidence of residual disease or recurrent disease.  There is an esophageal stent in the mid esophagus with a large debris air level.  No evidence of any metastases in the chest, abdomen or pelvis.  Mild nodule uptake in the vocal cords with fullness in the right vocal cord.  This could be physiologic.    7/9/18 - CT scan of chest with contrast:  Prominent mediastinal lymph nodes appear stable since February.  Changes of gastric pull-through procedure for esophageal cancer again noted.  Tree-in-bud infiltrates in the left lung base, consistent with infection versus inflammation.    1/10/19 - CT chest, abdomen and pelvis with contrast:  No evidence of mets in the chest; however, interval development of metastatic lymphadenopathy in the left side of the retroperitoneum.  For instance, there is a 17 mm rounded lymph node, 10 mm lymph node and also a 14 mm lymph node.  These are all new.  Stable 9 mm hypodense lesion within the right hepatic lobe, which could reflect hemangioma or complex cyst.    1/30/19 - CT-guided core biopsy of retroperitoneal lymph node:  Poorly differentiated adenocarcinoma consistent with metastases from patient’s known esophageal primary.  CK20 positive, CDX2 positive, cytokeratin 7 negative, TTF-1 negative.    2/1/19 - Creatinine  0.9, BUN 18, calcium 9.3; these are normal.    2/28/19 - [facility] Next Gen sequencing testing on retroperitoneal lymph node specimen:  PD-L1 positive.  CPS = 3.  This implies responsiveness to pembrolizumab.  Mismatch repair status is proficient (no evidence of microsatellite instability).  Tumor mutational burden is low = 6 mutations/Mb.  CDH1 indeterminate.  KRAS mutation positive.  TP53 mutation positive.  CDK6 amplified.  [employer] PD-L1 testing by IHC:  PD-L1 expression 1% positive (CPS =1).  HER2/marvel by IHC is negative.  HER2/marvel by CISH not amplified.  Microsatellite stable tumor.    3/7/19 - WBC 7.3, hemoglobin 12, platelet count 128,000, MCV 92.     3/14/19 - Patient was seen by radiation oncologist, Dr. Chahal.  He has recommended observation versus systemic chemoimmunotherapy as needed.  No plans for radiation therapy.   5/1/19 - CT scan of chest with contrast:  No definitive findings of new metastatic disease in the chest.  Emphysema noted.    5/1/19 - CT abdomen and pelvis:  There is interval progression of metastatic retroperitoneal adenopathy.  Left periaortic adenopathy with lymph node measuring up to 2 cm, previously 1.7 cm.  Another lymph node now measures 2.3, previously 1.4 cm.  New enlarged lymph node on image 147 measures 1.4 cm, previously 0.4 cm.  A 9 mm hypodense right hepatic lobe lesion appears stable.    5/9/19 - Decision made to start patient on immunotherapy Keytruda.    5/9/19 - Vitamin B12 229.  Ferritin 61.  Folate 14.3.  Iron saturation 26%, TIBC 304, serum iron 79.  5/31/19 - Patient received Keytruda 200 mg cycle 1, day 1.    5/31/19 - Free T4 0.99.  Creatinine 0.9, BUN 9.  LFTs normal.  TSH 3.99.  Folate 14.3.  Iron saturation 26%.    6/21/2019 patient received Keytruda cycle 2  7/12/2019: Patient received Keytruda cycle 3  8/2/2019 patient received cycle 4 Keytruda WBC 7.2, hemoglobin 11.8 platelet count 163  8/23/2019 creatinine 0.9, LFTs normal, WBC 6.4, hemoglobin 11.5, platelets  179, TSH 2.1   8/23/2019 patient received cycle 5 of Keytruda  9/3/2019 CT chest abdomen and pelvis with contrast: . Positive response to therapy in the abdomen since 05/01/2019. Pathologically enlarged retroperitoneal lymph nodes, predominantly in the left periaortic region, have diminished in size.a 1.3 x 2.1 cm left periaortic node (image 60), previously measured 3.5 x 2.3 cm. A 1.6 cm index node near the level of the left renal vein previously measured 2.0 cm No new or progressive adenopathy. 2. Stable findings in the chest. Noncalcified right upper lobe nodules are unchanged. There is no evidence of new or progressive metastatic disease within the chest. 3. 8 mm indeterminate low-density lesion in the right hepatic lobe, is stable. No new liver lesions.  9/6/2019 WBC 5.6, hemoglobin 11.2, platelets of 192, MCV 91.8  9/13/2019 patient received Keytruda cycle 6, albumin 3.2  10/4/2019 patient received cycle 7 of Keytruda, TSH 4.8, free T4 0.86  10/9/2019 WBC 5.8, hemoglobin 11.7, platelets 174  10/25/2019 patient received cycle 8 of Keytruda.  WBC 5.9, hemoglobin 11.5, platelets 152, creatinine 0.75, cortisol 15.2, TSH 6.48 high , free T4 1.13 normal  11/6/2019 WBC 6.6, hemoglobin 11.8, MCV 91.5, platelets 187  11/15/2019 patient received cycle 9 of Keytruda, WBC 6.6, hemoglobin 11.9, platelets 161, cortisol level 8.86, TSH 2.68, free T3 2.8, free T4 1.5  12/4/2019 WBC 6.9, hemoglobin 12.0, platelets 180, MCV 92.9  12/06/2019-Keytruda Cycle 10  12/27/2019- Cycle 11 LFTs normal, WBC 6.7, hemoglobin 11.4, platelets 168, MCV 93, TSH 2.4,  1/28/2020: CT chest abdomen and pelvis with contrast:  Single (aortic lymph node in the abdominal retroperitoneum has increased.  Rest of the retroperitoneal lymph nodes have decreased in size.  Mediastinal adenopathy appears unchanged.  Noncalcified bilateral pulmonary nodules are stable  1/17/2020 patient received Keytruda cycle 12:.  2/3/2020 WBC 7.1, hemoglobin 12.2, platelets  171  02/10/2020 patient received cycle 13 of Keytruda: WBC 7.3, hemoglobin 12.1, platelets 166, creatinine 0.82  3/6/2020 patient is of Keytruda 200 mg, hemoglobin 10.8  3/27/2020 patient received Keytruda 200 mg, hemoglobin 11.4, TSH 1.95  4/17/2020 patient received Keytruda cycle 16 200 mg, WBC 6.1, hemoglobin 11.6, platelets 150  5/8/2020: Patient received cycle 17 Keytruda, WBC 6.3, hemoglobin 9.6, platelets 137, TSH 2.05, free T4 1.54, creatinine 0.93, LFTs normal,  5/29/2020: Patient received cycle 18 Keytruda, WBC 6.5, hemoglobin 11.7, platelets 129, creatinine 0.83, TSH 1.69  6/26/2020: Patient receiving Keytruda.   7/17/2020: TSH 2.06, WBC 5.7, hemoglobin 11.4, platelets 177, MCV 92.4, Keytruda 200 mg cycle 20  7/27/2020: CT chest abdomen pelvis with contrast: Stable findings in the chest abdomen pelvis since 1/28/2020.  Noncalcified bilateral pulmonary nodules are stable.  Stable retroperitoneal left perinephric adenopathy in the abdomen.  8/7/2020: Patient received cycle 21 of Keytruda  8/28/2020 patient is a cycle 22 of Keytruda.  WBC 6.02, hemoglobin 11.3, platelets 161  9/11/2020 WBC 6.9, hemoglobin 12.4, platelets 192  9/18/2020: Cycle 23 of Keytruda, TSH 1.19  10/9/2020: Cycle 24 of Keytruda, WBC 5.9, hemoglobin 11.9, platelets 178, creatinine 0.84, TSH 2.56, free T3 2.4, CMP normal  10/23/2020: WBC 7.34, hemoglobin 11.6, platelets 175  10/30/2020:.  Keytruda 200 mg  11/20/2020: Keytruda 200 mg  12/11/2020 Keytruda 200 mg.  WBC 5.5, hemoglobin 11.4, platelets 176, creatinine 0.8, LFTs normal, TSH 2.2, cortisol 8.3  12/10/2020: CT chest abdomen and pelvis with contrast: Previously seen left para-aortic lymph nodes within the abdomen are decreased in size.  No pathological lymphadenopathy.  No evidence of residual malignancy..  Small noncalcified pulmonary nodules are stable.  No new nodules.  Stable mediastinal lymph nodes.  3/12/2021: WBC 6.7, hemoglobin 11.3, platelets 17  3/12/2021: CMP normal, TSH  2.9, cortisol 11.95, 1  3/15/2021: CT chest with contrast: Stable findings in the chest with postsurgical changes of esophageal resection and gastric pull-through.  Stable right lower lobe lung nodule measures 11 mm.  Stable size and appearance of the mediastinal and upper abdominal lymph nodes.  3/23/2021: Doppler of upper extremity: Normal.  3/24/2021: CT abdomen and pelvis with contrast: Postoperative changes.  Left periaortic nodes are stable.  4/12/2021: TSH 4.1, cortisol 12.4  6/22/2021: CMP normal,Cortisol 10.08,, WBC 5.8, hemoglobin 11.2, platelets 109, TSH 2.59  7/12/2021: CT scan of the chest abdomen pelvis with contrast: Dominant 10 x 10 left periaortic lymph node is stable since 3/22/2021.  Dominant lymph nodes in the right lower paratracheal and AP window distribution are stable since 3/15/2021.  No evidence of progressive adenopathy in the chest abdomen or pelvis.    01/11/2022 -PET/CT findings consistent with new cedric metastatic disease in the retroperitoneum.  2 new pathologically enlarged hypermetabolic retroperitoneal lymph nodes.  No convincing evidence of recurrent disease within the chest neck pelvis or skeleton.  1/20/2022 -CT-guided needle biopsy consistent with poorly differentiated carcinoma.  CDX2 positive consistent with metastases from known esophageal primary  2/9/2022 - Pembrolizumab complicated with fatigue, joint pains, nausea, diarrhea. Symptoms decrease in intensity over a week.  3/30/2022 -CT chest and pelvis with stable metastatic lymph nodes in the left periaortic retroperitoneum.  No evidence of disease progression or new lesions.  Stable lymphadenopathy in the distal paratracheal mediastinal area  4/13/2022 - Keytruda 200  5/4/2022 - Keytruda 200  Continued keytruda  7/11/2022 - CT abdomen pelvis with decrease in size of left perioaortic lymph node. No other areas of disease.  Continues to be on Keytruda  10/18/2022 - stable CT imaging  1/20/2023 - CT imaging with stable  disease. No significant change.  Patient was hospitalized with esophagitis, subsequent EGD with improvement, no recurrence of disease.  2/21/2023 - Keytruda  CT CAP with increased retroperitoneal adenopathy.   ECHO with EF 35-40% (concern for immunotherapy related )immunotherapy held with ongoing investigation for cause.  Subsequent improvement in EF to 45%  4/4/2023 - CT abdomen pelvis with interval retroperitoneal lymph node enlargement compatible with metastatic adenopathyl.  5/5/2023 - PET CT with mixed response. No clear evidence of progression  6/2/23 -started pembrolizumab 200 mg IV  Posttreatment had symptoms of chest tightness troponin and proBNP negative, echocardiogram with EF 40%  Subsequent cardiac cath with EF down to 30% elevated proBNP, troponin no significant obstructive disease in the graft vessels  Cardiac MRI unable to obtain given pacemaker noncompliant.  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 Two enlarged mediastinal lymph nodes in the precarinal and AP window distributions are unchanged since 4/4/2023. No new or progressive adenopathy in the chest.Stable 3 mm or less noncalcified right lung nodules since 4/4/2023. Surgical changes of distal esophagectomy with gastric pull-through, without evidence of local disease recurrence  8/29/23 - started FOLFOX had significant side effects  9/20/23 - stopped oxaliplatin and continued 5FU only'  11/29/23 - CT CAP with decrease size of retroperitoneal adenopathy, no other concerning findings.  Continues 5FU leucovorin   2/12/24 - PET CT There is some metabolic activity within a mediastinal lymph node which has been suggested with slightly decreased metabolic activity. Whether this is reactive or pathologic is  uncertain. Previously noted abnormal retroperitoneal lymph node is no longer identified continued treatmen  4/10/24 - iron sat 10, ferritin  14, plan for venofer  Continued treatment signatera negative  5/7/24 - CT chest without contrast. Unchanged mildly enlarged right paratracheal lymph lymph node measuring 1.1 cm in short axis (series 2 image 40) and left paratracheal measuring 1.1 cm in short axis (series 2 image 39). No new or progressive lymphadenopathy.       Subjective:  Ongoing fatigue, no chest pain,     Past Medical History:   Diagnosis Date    B12 deficiency     Blockage of coronary artery of heart     Depression     DM (diabetes mellitus)     Dysphagia     GERD (gastroesophageal reflux disease)     HTN (hypertension)     Hyperlipidemia        Past Surgical History:   Procedure Laterality Date    ABDOMINAL WALL ABSCESS INCISION AND DRAINAGE  10/22/2018    WITH DEBRIDEMENT  1.5CM X 1.5 CM X 1.5 CM    DR. PURI    CARDIAC CATHETERIZATION Right 7/20/2023    Procedure: Left Heart Cath;  Surgeon: Mazin Simmons MD;  Location: [facility] CATH INVASIVE LOCATION;  Service: Cardiovascular;  Laterality: Right;    CATARACT EXTRACTION  2018    COLONOSCOPY      CORONARY ARTERY BYPASS GRAFT  2015    ENDOSCOPY  12/20/2016    ENDOSCOPY N/A 01/02/2023    Procedure: ESOPHAGOGASTRODUODENOSCOPY;  Surgeon: Esvin Morgan MD;  Location: [facility] ENDOSCOPY;  Service: Gastroenterology;  Laterality: N/A;  post: anastamosis stricture    ENDOSCOPY N/A 02/09/2023    Procedure: ESOPHAGOGASTRODUODENOSCOPY, non-wire guided balloon dilation (12-14mm) of esophageal stricture;  Surgeon: Tino Villafana MD;  Location: [facility] ENDOSCOPY;  Service: Gastroenterology;  Laterality: N/A;  post: esophageal anastamotic stricture, post surgical changes    ESOPHAGECTOMY  05/04/2017    BROOKS PETTY ESOPHAGECTOMY, FEEDING JEJNOSTOMOY, ABDOMINAL AND MEDIASTINAL LYMPH NODE DISECTION, PEDICLED MUSCLE FLAP COVERAGE DR. PURI     ESOPHAGOSCOPY / EGD  07/12/2017    WITH BALLOON DILATION    ESOPHAGOSCOPY / EGD  07/26/2017    WITH BALLOON DILATION    ESOPHAGOSCOPY / EGD  08/11/2017    WITH BALLOON DILATION    ESOPHAGOSCOPY / EGD  08/28/2017    WITH BALLOON DILATION    ESOPHAGOSCOPY / EGD  09/27/2017    WITH BALLOON DILATION    INSERT / REPLACE / REMOVE PACEMAKER      LYMPH NODE BIOPSY  01/30/2019    PACEMAKER IMPLANTATION  11/21/2016       Current Outpatient Medications:     clonazePAM (KlonoPIN) 0.5 MG tablet, Take 0.5 tablets by mouth 2 (Two) Times a Day As Needed for Anxiety., Disp: , Rfl: 1    cyanocobalamin (VITAMIN B-12) 1000 MCG tablet, Take 1 tablet by mouth Daily. Stopped but going to start back, Disp: , Rfl:     folic acid (FOLVITE) 1 MG tablet, Take 1 tablet by mouth Daily., Disp: , Rfl:     levothyroxine (SYNTHROID, LEVOTHROID) 50 MCG tablet, Take 1 tablet by mouth Daily., Disp: 90 tablet, Rfl: 0    magic mouthwash oral suspension, , Disp: , Rfl:     metFORMIN (GLUCOPHAGE) 1000 MG tablet, Take 0.5 tablets by mouth Daily., Disp: 60 tablet, Rfl: 2    metoprolol succinate XL (TOPROL-XL) 25 MG 24 hr tablet, Take 1 tablet by mouth once daily, Disp: 90 tablet, Rfl: 0    ondansetron (ZOFRAN) 4 MG tablet, TAKE 1 TABLET BY MOUTH EVERY 8 HOURS AS NEEDED FOR NAUSEA FOR VOMITING, Disp: 90 tablet, Rfl: 0    ondansetron (ZOFRAN) 8 MG tablet, Take 1 tablet by mouth 3 (Three) Times a Day As Needed for Nausea or Vomiting., Disp: 30 tablet, Rfl: 5    pantoprazole (PROTONIX) 40 MG EC tablet, Take 1 tablet by mouth 2 (Two) Times a Day., Disp: , Rfl:     pravastatin (PRAVACHOL) 10 MG tablet, Take 1 tablet by mouth Every Other Day., Disp: , Rfl:     spironolactone (ALDACTONE) 25 MG tablet, Take 0.5 tablets by mouth Daily., Disp: 30 tablet, Rfl: 2    tamsulosin (FLOMAX) 0.4 MG capsule 24 hr capsule, Take 1 capsule by mouth Daily., Disp: , Rfl:   No current facility-administered medications for this visit.    Facility-Administered Medications Ordered in  "Other Visits:     sodium chloride 0.9 % flush 20 mL, 20 mL, Intravenous, PRN, Jean Pierre Elizondo MD, 20 mL at 05/15/24 0928    Allergies   Allergen Reactions    Ace Inhibitors Unknown (See Comments)     Unknown reaction      Heparin Other (See Comments)     Fever/chills, weakness in legs    Sulfa Antibiotics Other (See Comments)     Mouth sores    Ciprofloxacin GI Intolerance     Pt reports pain to abd       Family History   Problem Relation Age of Onset    Mental illness Mother     Heart disease Mother     Hypertension Father     Cancer Father     Heart disease Sister     Heart disease Brother      Cancer-related family history includes Cancer in his father.    Social History     Tobacco Use    Smoking status: Former     Current packs/day: 0.00     Average packs/day: 2.0 packs/day for 50.0 years (100.0 ttl pk-yrs)     Types: Cigarettes     Start date: 1965     Quit date: 2015     Years since quittin.9    Smokeless tobacco: Former     Types: Chew     Quit date: 1989   Vaping Use    Vaping status: Never Used   Substance Use Topics    Alcohol use: No    Drug use: No     ROS:     Objective:    Vitals:    05/15/24 0949   BP: 126/70   Pulse: 72   Resp: 16   Temp: 98.5 °F (36.9 °C)   SpO2: 97%   Weight: 69.3 kg (152 lb 12.8 oz)   Height: 165.1 cm (65\")   PainSc: 0-No pain       (1) Restricted in physically strenuous activity, ambulatory and able to do work of light nature    Physical Exam:     Physical Exam   Constitutional: He is oriented to person, place, and time. He appears well-developed. No distress.   HENT:   Head: Normocephalic and atraumatic.   Nose: Nose normal.   Mouth/Throat: Mucous membranes are moist.   Eyes: Pupils are equal, round, and reactive to light.   Neck: No thyromegaly present.   Cardiovascular: Normal rate, regular rhythm, normal heart sounds and normal pulses. Exam reveals no gallop and no friction rub.   Pulmonary/Chest: Effort normal and breath sounds normal.   Abdominal: Soft. " Normal appearance and bowel sounds are normal.   No tenderness   Musculoskeletal: Normal range of motion. No deformity or signs of injury.      Right lower leg: No edema.   Neurological: He is alert and oriented to person, place, and time. He exhibits normal muscle tone.   Skin: Skin is warm and dry. No rash noted. He is not diaphoretic. No erythema. No jaundice.   Right side chest  port       Lab Results - Last 18 Months   Lab Units 05/15/24  0928 05/01/24  0842 04/17/24  0942   WBC 10*3/mm3 3.55 3.63 4.57   HEMOGLOBIN g/dL 8.5* 8.3* 8.0*   HEMATOCRIT % 27.6* 27.0* 25.2*   PLATELETS 10*3/mm3 162 178 165   MCV fL 97.9* 93.8 90.6     Lab Results - Last 18 Months   Lab Units 05/01/24  0842 04/17/24  1035 04/03/24  1321 03/20/24  1309 03/13/24  0921   SODIUM mmol/L 141  --   --  139 142   POTASSIUM mmol/L 4.0  --   --  4.6 4.9   CHLORIDE mmol/L 109*  --   --  107 108*   CO2 mmol/L 21.0*  --   --  22.0 22.0   BUN mg/dL 13  --   --  14 16   CREATININE mg/dL 1.42* 1.30* 1.40* 1.47* 1.28*   CALCIUM mg/dL 9.1  --   --  8.3* 8.8   BILIRUBIN mg/dL 0.4  --   --  0.3 0.2   ALK PHOS U/L 36*  --   --  40 35*   ALT (SGPT) U/L 5  --   --  5 5   AST (SGOT) U/L 13  --   --  12 13   GLUCOSE mg/dL 136*  --   --  211* 176*     Assessment & Plan     Assessment:    Metastatic esophageal carcinoma:    Patient has a history of GE junction poorly differentiated adenocarcinoma for which he had chemoradiation followed by Noel Gurwinder resection:  Pathology showed ypT3,pN1 disease.  Tumor was Stage IIIA, diagnosed in 2016.  He received concurrent chemoradiation with weekly Carboplatin and Taxol and radiation finishing on 3/15/17.  CT scan on 1/10/19 showed new retroperitoneal lymph nodes.  These lymph nodes were biopsied on 1/30/19 and it shows metastatic esophageal cancer.  [facility] Next Gen testing was performed on the sample and it shows KRAS mutation, microsatellite stable tumor.  PD-L1 is positive.  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  HER2/marvel (ERBB2)  was not amplified and was negative.  His CT scan on 5/1/19 shows evidence of progression.  Patient has started receiving Keytruda on 5/31/19.  He has received 19 cycles so far.   CT scan on 1/28/2020 shows improvement and continued response.  CT scan chest abdomen pelvis with contrast on 12/10/2020 does not show any evidence of disease progression.  On 12/22/2020 decision made to hold Keytruda per patient's request to get a treatment break.. Repeat CT scan chest 7/12/2021 does not show any evidence of disease progression.  Now CT imaging in January 2021 with a CT showing increased abdominal left para-aortic adenopathy.  Subsequent PET/CT with new cedric metastatic disease in the retroperitoneum of the abdomen.  New pathologically enlarged hypermetabolic retroperitoneal lymph nodes.  This would be concerning for disease recurrence/progression.  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  I discussed with him the treatment options going forward.  He has had a good response with immunotherapy pembrolizumab in the past. This was  restarted. subsequent imaging with treatment response. 1/2023 imaging with good response. Continued treatment  Now with increased adenopathy 2 new lymph nodes. Holding immunotherapy for now.   Consider getting cardiac MR discussed with Dr. Culp. While immunotherapy on hold, increased adenopathy is concerning. Could be pseudoprogression vs true progression. PET CT reviewed could indicate pseudoprogression since he has had a good response to immunotherapy  immunotherapy was restarted and has been on hold with patient's symptomatology cardiac MRI cannot be obtained, no ischemic heart disease leading to cardiomyopathy this most likely is related to Keytruda.  We will stop Keytruda going forward  CT imaging with stable disease, FOLFOX was started which he had significant side effects  With imaging being stable and significant side effects with FOLFOX, we discussed  about options  with wait and watch vs 5FU only, patient prefers to be on maintenance.   We started 5FU only .   Tolerating relatively well, has significant fatigue. CT imaging now with ongoing response. We discussed that he would have been on treatment for 2 years following recurrence with good response. We can continue treatment now and get a PET CT in 2/2024.will repeat prior to follow up. , PET with some residual activity in the mediastinal lymph node.   We continued treatment and now signatera negative. I had a long discussion with the patient. We decided to repeat CT chest now and if no adenopathy, consider holding treatment.   Now CT chest repeated with persistent adenopathy. Discussed biopsy/mediastinoscopy vs continued treatment. Has fairly been tolerating well except fatigue. After discussion we decided to continue chemotherapy. Repeat imaging in 3-6 months and then decide.  Will decrease frequency of treatment to every 3 weeks.    Possible myocarditis  Symptoms started after last immunotherapy  Treatment on hold for now, symptoms have improved, cardiac markers were negative, echocardiogram with EF 40%, cardiac MRI cannot be obtained cardiac cath with EF down to 30% management per cardiology  Now EF up to 40-45% monitor. No significant symptoms. Has some ongoing shortness of breath. No leg swelling.    Shortness of breath  Improved, has occasional shortness of breath, likely copd  I have recommended using albuterol  Symptoms have improved since stopping immunotherapy. Questionable pneumonitis will monitor closely with immunotherapy  Cardiology has evaluated for myocarditis related to immunotherapy symptomatic improvement now continue to monitor. Symptomatic improvement.    Reflux/heartburn/dysphagia:   Symptoms improved.  Also has a history of strictures. Status post EGD and balloon dilation.   Continues to be on pantoprazole symptoms stable.    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  Continues on Synthroid stable  now no changes    Mild diarrhea:  diarrhea seems to be waxing and waning in nature.  Diarrhea related to immunotherapy now improved stable.    PD-L1 positive, HER2 negative, p53 and KRAS positive.  Repeat Caris testing with repeat biopsy with no target mutations.    B12 deficiency: Continue B12 1000 mcg twice daily.    Anemia - Hb  was low iron sat low at 10, given venofer 300 x3 monitor for now. Intolerant to oral iron.    Dysuria - has some dysuria, this has improved.    Groin pain - negative for UTI, STI, has seen urology, likely prostatits, started cipro improvement noted. Had cystoscopy, was started on flomax, pain has continued intermittent. No worsening noted.    Chronic kidney disease creatinine has worsened overall compared to 8/2023 continues to be in this range. Will recommend nephrology follow up.          Jean Pierre Elizondo MD 05/15/24     No orders of the defined types were placed in this encounter.    Time spent on encounter including record review, history taking, exam, discussion, counseling and documentation at: 40 minutes

## 2024-05-17 ENCOUNTER — HOSPITAL ENCOUNTER (OUTPATIENT)
Dept: ONCOLOGY | Facility: HOSPITAL | Age: 74
Discharge: HOME | End: 2024-05-17
Payer: MEDICARE

## 2024-05-17 VITALS
DIASTOLIC BLOOD PRESSURE: 66 MMHG | HEIGHT: 65 IN | SYSTOLIC BLOOD PRESSURE: 131 MMHG | OXYGEN SATURATION: 95 % | HEART RATE: 84 BPM | BODY MASS INDEX: 25.43 KG/M2 | RESPIRATION RATE: 14 BRPM

## 2024-05-17 DIAGNOSIS — C16.0 MALIGNANT NEOPLASM OF CARDIA: Primary | ICD-10-CM

## 2024-05-17 DIAGNOSIS — C15.5 PRIMARY MALIGNANT NEOPLASM OF LOWER THIRD OF ESOPHAGUS: ICD-10-CM

## 2024-05-17 PROCEDURE — 96360 HYDRATION IV INFUSION INIT: CPT

## 2024-05-17 PROCEDURE — 25810000003 SODIUM CHLORIDE 0.9 % SOLUTION: Performed by: INTERNAL MEDICINE

## 2024-05-17 RX ORDER — SODIUM CHLORIDE 0.9 % (FLUSH) 0.9 %
20 SYRINGE (ML) INJECTION AS NEEDED
Status: DISCONTINUED | OUTPATIENT
Start: 2024-05-17 | End: 2024-05-18 | Stop reason: HOSPADM

## 2024-05-17 RX ORDER — SODIUM CHLORIDE 0.9 % (FLUSH) 0.9 %
20 SYRINGE (ML) INJECTION AS NEEDED
OUTPATIENT
Start: 2024-05-17

## 2024-05-17 RX ADMIN — SODIUM CHLORIDE 500 ML: 9 INJECTION, SOLUTION INTRAVENOUS at 13:32

## 2024-05-17 RX ADMIN — Medication 20 ML: at 14:42

## 2024-05-31 NOTE — PROGRESS NOTES
Hematology-Oncology Follow-up Note       Artie Mc  1950    Primary Care Physician: Chanel Carter APRN  Referring Physician: Chanel Carter APRN    Reason For Visit:  Chief Complaint   Patient presents with    Follow-up     Malignant neoplasm of cardia         Metastatic esophageal cancer  Monitoring for adverse effects related to immunotherapy.  Hypothyroidism    HPI:   Mr. Mc is a pleasant 74 y.o. gentleman with a history of gastroesophageal reflux disease, diabetes, hypertension, heart block status post pacemaker placement and history of CABG.  He was having symptoms of dysphagia, weight loss since September 2016. He was reporting symptoms of food getting stuck in the lower chest.  He has transitioned himself to a liquid diet.  The patient reports losing about 30 pounds over this duration.  The patient underwent upper GI endoscopy on 12/20/16 with Dr. Esvin Barrera.  Endoscopy showed necrotic, circumferential and moderately obstructive mass at the distal esophagus between 42 cm and 36 cm.      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  There was also evidence of mild subacute inflammation in the duodenum.  Dr. Barrera ordered a CT scan of the chest and abdomen with contrast on 12/20/16.  The scan was done at [facility].  The scan showed a new ill-defined mass in the anterior wall of the distal esophagus at the GE junction measuring 2.3 x 2.2 x 1.6 cm, worrisome for cancer.  There were four subcentimeter liver lesions which had appeared stable in comparison to another CT scan from 2009 and they likely represent small cysts.  There was also mild lymphadenopathy in the gastrohepatic ligament.  There were at least six borderline enlarged lymph nodes measuring up to 1.3 x 1 cm in the region.  The patient is referred to us for further oncological evaluation.    1/5/2017 - The patient presents for  initial consultation.  He reports persistent dysphagia and also has symptoms of regurgitation.  He cannot tolerate solid foods and is dependent on liquid diet such as Ensure.  He also reports fatigue. Symptoms of dysphagia have been present for the past four months.  When he eats any solids, the food gets stuck in the lower chest.    1/5/17 - Vitamin B12 200 (L).  CEA 0.8.  Ferritin 35.  Iron saturation 16% (L), serum iron 56, TIBC 354.  Creatinine 0.9.  LFTs normal.  Folate 10.2.  1/12/17 - PET/CT scan:  Intense abnormal activity corresponding to the region of the GE junction and proximal stomach.  SUV is 11.06.  There is a suggestion of an abnormal mass or abnormal wall thickening in the region measuring 3.9 x 4.8 cm.  No additional abnormalities.  No evidence of metastases or other lymphadenopathy.  Scattered nonspecific subcentimeter lymph nodes involving the mediastinum and within the central upper abdomen.  1/13/17 - Creatinine 0.9.  LFTs normal.    1/17/17 - Patient seen by thoracic surgeon, Dr. Bradley.  PET scan was reviewed.  He has recommended neoadjuvant chemoradiation therapy followed by [place] Gurwinder esophagogastrectomy.  Port-A-Cath is being arranged.    1/20/17 - WBC 6.3, hemoglobin 12.1, platelet count 198,000, MCV 86.4.  1/30/17 - Patient started weekly Carboplatin and Taxol (Carboplatin AUC 2 and Taxol 50 mg/M2).  Patient received Injectafer 750 mg.  WBC 5.8, hemoglobin 12.4, platelet count 244,000.   1/31/17 - Patient seen by Dr. Eduardo Oleary.  The plan was to give 5040 cGy in 28 fractions.   2/6/17 - WBC 4.4, hemoglobin 11.6, platelet count 201,000.  Patient received cycle 2 of weekly Carboplatin and Taxol (Carboplatin AUC 2 and Taxol 50 mg/M2).  2/13/17 - Patient received Carboplatin and Taxol weekly cycle 3.  2/13/17 - Patient started concurrent radiation therapy.  Total planned dose is 4140 cGy.    2/13/17 to 2/15/17 - Patient received 4140 cGy of neoadjuvant radiation.  Radiation was finished  on 3/15/17.    2/20/17 - WBC 1.8, hemoglobin 10.6, platelet count 217,000.  Creatinine 0.5. Folate 15 (L).  Ferritin 361.  Haptoglobin 214.  Iron saturation 33%, TIBC 263, serum iron 88.    2/20/17 - Patient received Carboplatin and Taxol weekly cycle 4.   2/27/17 - WBC 3.7, hemoglobin 10.7, platelet count 177,000, MCV 85.3.    3/6/17 - Patient has received 16 out of the 23 planned radiation treatments.  Total planned dose is 4140 cGy.    3/6/17 - WBC 5.4, hemoglobin 11.2, platelet count 113,000.  3/6/17 - Patient received cycle 5 of weekly Carboplatin and Taxol.   3/13/17 - Patient received cycle 6 of weekly Carboplatin and Taxol.  WBC 3.9, hemoglobin 11.3, platelet count 93,000.     3/20/17 - WBC 1.4, hemoglobin 10.0, platelet count 118,000, MCV 86.1   3/27/17 - WBC 3.8, hemoglobin 9.5, platelet count 176,000, MCV 87.3.    4/7/17 - Stress test:  No evidence of reversible myocardial ischemia.  Normal left ventricular ejection fraction of 50%.    4/17/17 - Upper GI endoscopy by Dr. 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  The GE junction adenocarcinoma lesion seems to have a very good response to chemotherapy and radiation.  The lumen was open.  5/1/17 - WBC 7.0, hemoglobin 10.2, platelet count 175,000.    5/4/17 - Patient underwent [place] Gurwinder esophagectomy with pyloroplasty, feeding jejunostomy tube, abdominal and mediastinal lymph node dissection.    Surgical Pathology:  Moderately to poorly differentiated adenocarcinoma measuring 1.3 cm at the GE junction.  Resection margins are negative for malignancy.  Tumor margins are negative.  There is effective treatment response.  No evidence of lymphovascular invasion.  Staging is ypT3,pN1.  One out of fourteen lymph nodes involved.   5/22/17 - WBC 6.8, hemoglobin 10.3, platelet count 312,000.    6/19/17 - WBC 5.7, hemoglobin 10.5, platelet count 204,000, MCV 92.1.    7/6/17 - EGD:  Status post balloon dilation of esophageal  stricture.  7/12/17 - EGD with balloon dilation of esophageal stricture.  7/26/17 - EGD and balloon dilation of esophageal stricture.  7/31/17 - PET/CT scan:  There is mild metabolic activity seen at the thoracic esophagus just at and below the surgical margins.  Some mild wall thickening.  This could be from recent surgery.  A residual cancer seems unlikely.  There is a precarinal lymph node with mild metabolic activity with SUV of 3 measuring 1 x 1.3 cm.  Again this appears to be nonspecific in my opinion.    8/7/17 - WBC 5.9, hemoglobin 11.9, platelet count 171,000, MCV 88.7.    10/19/17 - WBC 7.5, hemoglobin 11.8, platelet count 216,000.    1/8/18 - PET scan:  No evidence of residual disease or recurrent disease.  There is an esophageal stent in the mid esophagus with a large debris air level.  No evidence of any metastases in the chest, abdomen or pelvis.  Mild nodule uptake in the vocal cords with fullness in the right vocal cord.  This could be physiologic.    7/9/18 - CT scan of chest with contrast:  Prominent mediastinal lymph nodes appear stable since February.  Changes of gastric pull-through procedure for esophageal cancer again noted.  Tree-in-bud infiltrates in the left lung base, consistent with infection versus inflammation.    1/10/19 - CT chest, abdomen and pelvis with contrast:  No evidence of mets in the chest; however, interval development of metastatic lymphadenopathy in the left side of the retroperitoneum.  For instance, there is a 17 mm rounded lymph node, 10 mm lymph node and also a 14 mm lymph node.  These are all new.  Stable 9 mm hypodense lesion within the right hepatic lobe, which could reflect hemangioma or complex cyst.    1/30/19 - CT-guided core biopsy of retroperitoneal lymph node:  Poorly differentiated adenocarcinoma consistent with metastases from patient’s known esophageal primary.  CK20 positive, CDX2 positive, cytokeratin 7 negative, TTF-1 negative.    2/1/19 - Creatinine  0.9, BUN 18, calcium 9.3; these are normal.    2/28/19 - [facility] Next Gen sequencing testing on retroperitoneal lymph node specimen:  PD-L1 positive.  CPS = 3.  This implies responsiveness to pembrolizumab.  Mismatch repair status is proficient (no evidence of microsatellite instability).  Tumor mutational burden is low = 6 mutations/Mb.  CDH1 indeterminate.  KRAS mutation positive.  TP53 mutation positive.  CDK6 amplified.  [employer] PD-L1 testing by IHC:  PD-L1 expression 1% positive (CPS =1).  HER2/marvel by IHC is negative.  HER2/marvel by CISH not amplified.  Microsatellite stable tumor.    3/7/19 - WBC 7.3, hemoglobin 12, platelet count 128,000, MCV 92.     3/14/19 - Patient was seen by radiation oncologist, Dr. Chahal.  He has recommended observation versus systemic chemoimmunotherapy as needed.  No plans for radiation therapy.   5/1/19 - CT scan of chest with contrast:  No definitive findings of new metastatic disease in the chest.  Emphysema noted.    5/1/19 - CT abdomen and pelvis:  There is interval progression of metastatic retroperitoneal adenopathy.  Left periaortic adenopathy with lymph node measuring up to 2 cm, previously 1.7 cm.  Another lymph node now measures 2.3, previously 1.4 cm.  New enlarged lymph node on image 147 measures 1.4 cm, previously 0.4 cm.  A 9 mm hypodense right hepatic lobe lesion appears stable.    5/9/19 - Decision made to start patient on immunotherapy Keytruda.    5/9/19 - Vitamin B12 229.  Ferritin 61.  Folate 14.3.  Iron saturation 26%, TIBC 304, serum iron 79.  5/31/19 - Patient received Keytruda 200 mg cycle 1, day 1.    5/31/19 - Free T4 0.99.  Creatinine 0.9, BUN 9.  LFTs normal.  TSH 3.99.  Folate 14.3.  Iron saturation 26%.    6/21/2019 patient received Keytruda cycle 2  7/12/2019: Patient received Keytruda cycle 3  8/2/2019 patient received cycle 4 Keytruda WBC 7.2, hemoglobin 11.8 platelet count 163  8/23/2019 creatinine 0.9, LFTs normal, WBC 6.4, hemoglobin 11.5, platelets  179, TSH 2.1   8/23/2019 patient received cycle 5 of Keytruda  9/3/2019 CT chest abdomen and pelvis with contrast: . Positive response to therapy in the abdomen since 05/01/2019. Pathologically enlarged retroperitoneal lymph nodes, predominantly in the left periaortic region, have diminished in size.a 1.3 x 2.1 cm left periaortic node (image 60), previously measured 3.5 x 2.3 cm. A 1.6 cm index node near the level of the left renal vein previously measured 2.0 cm No new or progressive adenopathy. 2. Stable findings in the chest. Noncalcified right upper lobe nodules are unchanged. There is no evidence of new or progressive metastatic disease within the chest. 3. 8 mm indeterminate low-density lesion in the right hepatic lobe, is stable. No new liver lesions.  9/6/2019 WBC 5.6, hemoglobin 11.2, platelets of 192, MCV 91.8  9/13/2019 patient received Keytruda cycle 6, albumin 3.2  10/4/2019 patient received cycle 7 of Keytruda, TSH 4.8, free T4 0.86  10/9/2019 WBC 5.8, hemoglobin 11.7, platelets 174  10/25/2019 patient received cycle 8 of Keytruda.  WBC 5.9, hemoglobin 11.5, platelets 152, creatinine 0.75, cortisol 15.2, TSH 6.48 high , free T4 1.13 normal  11/6/2019 WBC 6.6, hemoglobin 11.8, MCV 91.5, platelets 187  11/15/2019 patient received cycle 9 of Keytruda, WBC 6.6, hemoglobin 11.9, platelets 161, cortisol level 8.86, TSH 2.68, free T3 2.8, free T4 1.5  12/4/2019 WBC 6.9, hemoglobin 12.0, platelets 180, MCV 92.9  12/06/2019-Keytruda Cycle 10  12/27/2019- Cycle 11 LFTs normal, WBC 6.7, hemoglobin 11.4, platelets 168, MCV 93, TSH 2.4,  1/28/2020: CT chest abdomen and pelvis with contrast:  Single (aortic lymph node in the abdominal retroperitoneum has increased.  Rest of the retroperitoneal lymph nodes have decreased in size.  Mediastinal adenopathy appears unchanged.  Noncalcified bilateral pulmonary nodules are stable  1/17/2020 patient received Keytruda cycle 12:.  2/3/2020 WBC 7.1, hemoglobin 12.2, platelets  171  02/10/2020 patient received cycle 13 of Keytruda: WBC 7.3, hemoglobin 12.1, platelets 166, creatinine 0.82  3/6/2020 patient is of Keytruda 200 mg, hemoglobin 10.8  3/27/2020 patient received Keytruda 200 mg, hemoglobin 11.4, TSH 1.95  4/17/2020 patient received Keytruda cycle 16 200 mg, WBC 6.1, hemoglobin 11.6, platelets 150  5/8/2020: Patient received cycle 17 Keytruda, WBC 6.3, hemoglobin 9.6, platelets 137, TSH 2.05, free T4 1.54, creatinine 0.93, LFTs normal,  5/29/2020: Patient received cycle 18 Keytruda, WBC 6.5, hemoglobin 11.7, platelets 129, creatinine 0.83, TSH 1.69  6/26/2020: Patient receiving Keytruda.   7/17/2020: TSH 2.06, WBC 5.7, hemoglobin 11.4, platelets 177, MCV 92.4, Keytruda 200 mg cycle 20  7/27/2020: CT chest abdomen pelvis with contrast: Stable findings in the chest abdomen pelvis since 1/28/2020.  Noncalcified bilateral pulmonary nodules are stable.  Stable retroperitoneal left perinephric adenopathy in the abdomen.  8/7/2020: Patient received cycle 21 of Keytruda  8/28/2020 patient is a cycle 22 of Keytruda.  WBC 6.02, hemoglobin 11.3, platelets 161  9/11/2020 WBC 6.9, hemoglobin 12.4, platelets 192  9/18/2020: Cycle 23 of Keytruda, TSH 1.19  10/9/2020: Cycle 24 of Keytruda, WBC 5.9, hemoglobin 11.9, platelets 178, creatinine 0.84, TSH 2.56, free T3 2.4, CMP normal  10/23/2020: WBC 7.34, hemoglobin 11.6, platelets 175  10/30/2020:.  Keytruda 200 mg  11/20/2020: Keytruda 200 mg  12/11/2020 Keytruda 200 mg.  WBC 5.5, hemoglobin 11.4, platelets 176, creatinine 0.8, LFTs normal, TSH 2.2, cortisol 8.3  12/10/2020: CT chest abdomen and pelvis with contrast: Previously seen left para-aortic lymph nodes within the abdomen are decreased in size.  No pathological lymphadenopathy.  No evidence of residual malignancy..  Small noncalcified pulmonary nodules are stable.  No new nodules.  Stable mediastinal lymph nodes.  3/12/2021: WBC 6.7, hemoglobin 11.3, platelets 17  3/12/2021: CMP normal, TSH  2.9, cortisol 11.95, 1  3/15/2021: CT chest with contrast: Stable findings in the chest with postsurgical changes of esophageal resection and gastric pull-through.  Stable right lower lobe lung nodule measures 11 mm.  Stable size and appearance of the mediastinal and upper abdominal lymph nodes.  3/23/2021: Doppler of upper extremity: Normal.  3/24/2021: CT abdomen and pelvis with contrast: Postoperative changes.  Left periaortic nodes are stable.  4/12/2021: TSH 4.1, cortisol 12.4  6/22/2021: CMP normal,Cortisol 10.08,, WBC 5.8, hemoglobin 11.2, platelets 109, TSH 2.59  7/12/2021: CT scan of the chest abdomen pelvis with contrast: Dominant 10 x 10 left periaortic lymph node is stable since 3/22/2021.  Dominant lymph nodes in the right lower paratracheal and AP window distribution are stable since 3/15/2021.  No evidence of progressive adenopathy in the chest abdomen or pelvis.    01/11/2022 -PET/CT findings consistent with new cedric metastatic disease in the retroperitoneum.  2 new pathologically enlarged hypermetabolic retroperitoneal lymph nodes.  No convincing evidence of recurrent disease within the chest neck pelvis or skeleton.  1/20/2022 -CT-guided needle biopsy consistent with poorly differentiated carcinoma.  CDX2 positive consistent with metastases from known esophageal primary  2/9/2022 - Pembrolizumab complicated with fatigue, joint pains, nausea, diarrhea. Symptoms decrease in intensity over a week.  3/30/2022 -CT chest and pelvis with stable metastatic lymph nodes in the left periaortic retroperitoneum.  No evidence of disease progression or new lesions.  Stable lymphadenopathy in the distal paratracheal mediastinal area  4/13/2022 - Keytruda 200  5/4/2022 - Keytruda 200  Continued keytruda  7/11/2022 - CT abdomen pelvis with decrease in size of left perioaortic lymph node. No other areas of disease.  Continues to be on Keytruda  10/18/2022 - stable CT imaging  1/20/2023 - CT imaging with stable  disease. No significant change.  Patient was hospitalized with esophagitis, subsequent EGD with improvement, no recurrence of disease.  2/21/2023 - Keytruda  CT CAP with increased retroperitoneal adenopathy.   ECHO with EF 35-40% (concern for immunotherapy related )immunotherapy held with ongoing investigation for cause.  Subsequent improvement in EF to 45%  4/4/2023 - CT abdomen pelvis with interval retroperitoneal lymph node enlargement compatible with metastatic adenopathyl.  5/5/2023 - PET CT with mixed response. No clear evidence of progression  6/2/23 -started pembrolizumab 200 mg IV  Posttreatment had symptoms of chest tightness troponin and proBNP negative, echocardiogram with EF 40%  Subsequent cardiac cath with EF down to 30% elevated proBNP, troponin no significant obstructive disease in the graft vessels  Cardiac MRI unable to obtain given pacemaker noncompliant.  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 Two enlarged mediastinal lymph nodes in the precarinal and AP window distributions are unchanged since 4/4/2023. No new or progressive adenopathy in the chest.Stable 3 mm or less noncalcified right lung nodules since 4/4/2023. Surgical changes of distal esophagectomy with gastric pull-through, without evidence of local disease recurrence  8/29/23 - started FOLFOX had significant side effects  9/20/23 - stopped oxaliplatin and continued 5FU only'  11/29/23 - CT CAP with decrease size of retroperitoneal adenopathy, no other concerning findings.  Continues 5FU leucovorin   2/12/24 - PET CT There is some metabolic activity within a mediastinal lymph node which has been suggested with slightly decreased metabolic activity. Whether this is reactive or pathologic is  uncertain. Previously noted abnormal retroperitoneal lymph node is no longer identified continued treatmen  4/10/24 - iron sat 10, ferritin  14, plan for venofer  Continued treatment signatera negative  5/7/24 - CT chest without contrast. Unchanged mildly enlarged right paratracheal lymph lymph node measuring 1.1 cm in short axis (series 2 image 40) and left paratracheal measuring 1.1 cm in short axis (series 2 image 39). No new or progressive lymphadenopathy.  Continued maintenance 5FU  changed frequency to every 3 weeks.       Subjective:  Ongoing fatigue, chest pain stable, improved now.     Past Medical History:   Diagnosis Date    B12 deficiency     Blockage of coronary artery of heart     Depression     DM (diabetes mellitus)     Dysphagia     GERD (gastroesophageal reflux disease)     HTN (hypertension)     Hyperlipidemia        Past Surgical History:   Procedure Laterality Date    ABDOMINAL WALL ABSCESS INCISION AND DRAINAGE  10/22/2018    WITH DEBRIDEMENT  1.5CM X 1.5 CM X 1.5 CM    DR. PURI    CARDIAC CATHETERIZATION Right 7/20/2023    Procedure: Left Heart Cath;  Surgeon: Mazin Simmons MD;  Location: [facility] CATH INVASIVE LOCATION;  Service: Cardiovascular;  Laterality: Right;    CATARACT EXTRACTION  2018    COLONOSCOPY      CORONARY ARTERY BYPASS GRAFT  2015    ENDOSCOPY  12/20/2016    ENDOSCOPY N/A 01/02/2023    Procedure: ESOPHAGOGASTRODUODENOSCOPY;  Surgeon: Esvin Morgan MD;  Location: [facility] ENDOSCOPY;  Service: Gastroenterology;  Laterality: N/A;  post: anastamosis stricture    ENDOSCOPY N/A 02/09/2023    Procedure: ESOPHAGOGASTRODUODENOSCOPY, non-wire guided balloon dilation (12-14mm) of esophageal stricture;  Surgeon: Tino Villafana MD;  Location: [facility] ENDOSCOPY;  Service: Gastroenterology;  Laterality: N/A;  post: esophageal anastamotic stricture, post surgical changes    ESOPHAGECTOMY  05/04/2017    BROOKS PETTY ESOPHAGECTOMY, FEEDING JEJNOSTOMOY, ABDOMINAL AND  MEDIASTINAL LYMPH NODE DISECTION, PEDICLED MUSCLE FLAP COVERAGE DR. PURI    ESOPHAGOSCOPY / EGD  07/12/2017    WITH BALLOON DILATION    ESOPHAGOSCOPY / EGD  07/26/2017    WITH BALLOON DILATION    ESOPHAGOSCOPY / EGD  08/11/2017    WITH BALLOON DILATION    ESOPHAGOSCOPY / EGD  08/28/2017    WITH BALLOON DILATION    ESOPHAGOSCOPY / EGD  09/27/2017    WITH BALLOON DILATION    INSERT / REPLACE / REMOVE PACEMAKER      LYMPH NODE BIOPSY  01/30/2019    PACEMAKER IMPLANTATION  11/21/2016       Current Outpatient Medications:     clonazePAM (KlonoPIN) 0.5 MG tablet, Take 0.5 tablets by mouth 2 (Two) Times a Day As Needed for Anxiety., Disp: , Rfl: 1    cyanocobalamin (VITAMIN B-12) 1000 MCG tablet, Take 1 tablet by mouth Daily. Stopped but going to start back, Disp: , Rfl:     folic acid (FOLVITE) 1 MG tablet, Take 1 tablet by mouth Daily., Disp: , Rfl:     levothyroxine (SYNTHROID, LEVOTHROID) 50 MCG tablet, Take 1 tablet by mouth Daily., Disp: 90 tablet, Rfl: 0    magic mouthwash oral suspension, , Disp: , Rfl:     metFORMIN (GLUCOPHAGE) 1000 MG tablet, Take 0.5 tablets by mouth Daily., Disp: 60 tablet, Rfl: 2    metoprolol succinate XL (TOPROL-XL) 25 MG 24 hr tablet, Take 1 tablet by mouth once daily, Disp: 90 tablet, Rfl: 0    ondansetron (ZOFRAN) 4 MG tablet, TAKE 1 TABLET BY MOUTH EVERY 8 HOURS AS NEEDED FOR NAUSEA FOR VOMITING, Disp: 90 tablet, Rfl: 0    ondansetron (ZOFRAN) 8 MG tablet, Take 1 tablet by mouth 3 (Three) Times a Day As Needed for Nausea or Vomiting., Disp: 30 tablet, Rfl: 5    pantoprazole (PROTONIX) 40 MG EC tablet, Take 1 tablet by mouth 2 (Two) Times a Day., Disp: , Rfl:     pravastatin (PRAVACHOL) 10 MG tablet, Take 1 tablet by mouth Every Other Day., Disp: , Rfl:     spironolactone (ALDACTONE) 25 MG tablet, Take 0.5 tablets by mouth Daily., Disp: 30 tablet, Rfl: 2    tamsulosin (FLOMAX) 0.4 MG capsule 24 hr capsule, Take 1 capsule by mouth Daily., Disp: , Rfl:   No current  "facility-administered medications for this visit.    Facility-Administered Medications Ordered in Other Visits:     fluorouracil (ADRUCIL) 4,130 mg in sodium chloride 0.9 % 94 mL chemo infusion - FOR HOME USE, 2,400 mg/m2 (Treatment Plan Recorded), Intravenous, Once, Jean Pierre Elizondo MD, 4,130 mg at 24 1227    Allergies   Allergen Reactions    Ace Inhibitors Unknown (See Comments)     Unknown reaction      Heparin Other (See Comments)     Fever/chills, weakness in legs    Sulfa Antibiotics Other (See Comments)     Mouth sores    Ciprofloxacin GI Intolerance     Pt reports pain to abd       Family History   Problem Relation Age of Onset    Mental illness Mother     Heart disease Mother     Hypertension Father     Cancer Father     Heart disease Sister     Heart disease Brother      Cancer-related family history includes Cancer in his father.    Social History     Tobacco Use    Smoking status: Former     Current packs/day: 0.00     Average packs/day: 2.0 packs/day for 50.0 years (100.0 ttl pk-yrs)     Types: Cigarettes     Start date: 1965     Quit date: 2015     Years since quittin.9    Smokeless tobacco: Former     Types: Chew     Quit date: 1989   Vaping Use    Vaping status: Never Used   Substance Use Topics    Alcohol use: No    Drug use: No     ROS:     Objective:    Vitals:    24 1034   BP: 129/68   Pulse: 79   Resp: 16   Temp: 97.8 °F (36.6 °C)   SpO2: 98%   Weight: 67.6 kg (149 lb)   Height: 165.1 cm (65\")   PainSc: 0-No pain         (1) Restricted in physically strenuous activity, ambulatory and able to do work of light nature    Physical Exam:     Physical Exam   Constitutional: He is oriented to person, place, and time. He appears well-developed. No distress.   HENT:   Head: Normocephalic and atraumatic.   Nose: Nose normal.   Mouth/Throat: Mucous membranes are moist.   Eyes: Pupils are equal, round, and reactive to light.   Neck: No thyromegaly present.   Cardiovascular: Normal " rate, regular rhythm, normal heart sounds and normal pulses. Exam reveals no gallop and no friction rub.   Pulmonary/Chest: Effort normal and breath sounds normal.   Abdominal: Soft. Normal appearance and bowel sounds are normal.   No tenderness   Musculoskeletal: Normal range of motion. No deformity or signs of injury.      Right lower leg: No edema.   Neurological: He is alert and oriented to person, place, and time. He exhibits normal muscle tone.   Skin: Skin is warm and dry. No rash noted. He is not diaphoretic. No erythema. No jaundice.   Right side chest  port       Lab Results - Last 18 Months   Lab Units 06/05/24  1022 05/15/24  0928 05/01/24  0842   WBC 10*3/mm3 3.50 3.55 3.63   HEMOGLOBIN g/dL 9.4* 8.5* 8.3*   HEMATOCRIT % 30.2* 27.6* 27.0*   PLATELETS 10*3/mm3 206 162 178   MCV fL 98.4* 97.9* 93.8     Lab Results - Last 18 Months   Lab Units 06/05/24  1039 05/15/24  1020 05/01/24  0842 04/03/24  1321 03/20/24  1309 03/13/24  0921   SODIUM mmol/L  --   --  141  --  139 142   POTASSIUM mmol/L  --   --  4.0  --  4.6 4.9   CHLORIDE mmol/L  --   --  109*  --  107 108*   CO2 mmol/L  --   --  21.0*  --  22.0 22.0   BUN mg/dL  --   --  13  --  14 16   CREATININE mg/dL 1.30* 1.30* 1.42*   < > 1.47* 1.28*   CALCIUM mg/dL  --   --  9.1  --  8.3* 8.8   BILIRUBIN mg/dL  --   --  0.4  --  0.3 0.2   ALK PHOS U/L  --   --  36*  --  40 35*   ALT (SGPT) U/L  --   --  5  --  5 5   AST (SGOT) U/L  --   --  13  --  12 13   GLUCOSE mg/dL  --   --  136*  --  211* 176*    < > = values in this interval not displayed.     Assessment & Plan     Assessment:    Metastatic esophageal carcinoma:    Patient has a history of GE junction poorly differentiated adenocarcinoma for which he had chemoradiation followed by [place] [facility] resection:  Pathology showed ypT3,pN1 disease.  Tumor was Stage IIIA, diagnosed in 2016.  He received concurrent chemoradiation with weekly Carboplatin and Taxol and radiation finishing on 3/15/17.  CT scan on  1/10/19 showed new retroperitoneal lymph nodes.  These lymph nodes were biopsied on 1/30/19 and it shows metastatic esophageal cancer.  [facility] Next Gen testing was performed on the sample and it shows KRAS mutation, microsatellite stable tumor.  PD-L1 is positive.  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  HER2/marvel (ERBB2) was not amplified and was negative.  His CT scan on 5/1/19 shows evidence of progression.  Patient has started receiving Keytruda on 5/31/19.  He has received 19 cycles so far.   CT scan on 1/28/2020 shows improvement and continued response.  CT scan chest abdomen pelvis with contrast on 12/10/2020 does not show any evidence of disease progression.  On 12/22/2020 decision made to hold Keytruda per patient's request to get a treatment break.. Repeat CT scan chest 7/12/2021 does not show any evidence of disease progression.  Now CT imaging in January 2021 with a CT showing increased abdominal left para-aortic adenopathy.  Subsequent PET/CT with new cedric metastatic disease in the retroperitoneum of the abdomen.  New pathologically enlarged hypermetabolic retroperitoneal lymph nodes.  This would be concerning for disease recurrence/progression.  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  I discussed with him the treatment options going forward.  He has had a good response with immunotherapy pembrolizumab in the past. This was  restarted. subsequent imaging with treatment response. 1/2023 imaging with good response. Continued treatment  Now with increased adenopathy 2 new lymph nodes. Holding immunotherapy for now.   Consider getting cardiac MR discussed with Dr. Culp. While immunotherapy on hold, increased adenopathy is concerning. Could be pseudoprogression vs true progression. PET CT reviewed could indicate pseudoprogression since he has had a good response to immunotherapy  immunotherapy was restarted and has been on hold with patient's symptomatology cardiac MRI cannot be obtained, no  ischemic heart disease leading to cardiomyopathy this most likely is related to Keytruda.  We will stop Keytruda going forward  CT imaging with stable disease, FOLFOX was started which he had significant side effects  With imaging being stable and significant side effects with FOLFOX, we discussed  about options with wait and watch vs 5FU only, patient prefers to be on maintenance.   We started 5FU only .   Tolerating relatively well, has significant fatigue. CT imaging now with ongoing response. We discussed that he would have been on treatment for 2 years following recurrence with good response. We can continue treatment now and get a PET CT in 2/2024.will repeat prior to follow up. , PET with some residual activity in the mediastinal lymph node.   We continued treatment and now signatera negative. I had a long discussion with the patient. We decided to repeat CT chest now and if no adenopathy, consider holding treatment.   Now CT chest repeated with persistent adenopathy. Discussed biopsy/mediastinoscopy vs continued treatment. Has fairly been tolerating well except fatigue. After discussion we decided to continue chemotherapy. Repeat imaging in 3-6 months and then decide.  Decreased frequency to every 3 weeks,     Possible myocarditis  Symptoms started after last immunotherapy  Treatment on hold for now, symptoms have improved, cardiac markers were negative, echocardiogram with EF 40%, cardiac MRI cannot be obtained cardiac cath with EF down to 30% management per cardiology  Now EF up to 40-45% monitor. No significant symptoms. Has some ongoing shortness of breath. No leg swelling. Now symptoms improved.    Shortness of breath  Improved, has occasional shortness of breath, likely copd  I have recommended using albuterol  Symptoms have improved since stopping immunotherapy. Questionable pneumonitis will monitor closely with immunotherapy  Cardiology has evaluated for myocarditis related to immunotherapy  symptomatic improvement now continue to monitor. Symptomatic improvement.    Reflux/heartburn/dysphagia:   Symptoms improved.  Also has a history of strictures. Status post EGD and balloon dilation.   Continues to be on pantoprazole symptoms stable. No worsening noted.    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  Continues on Synthroid stable now no changes pending today     Mild diarrhea:  diarrhea seems to be waxing and waning in nature.  Diarrhea related to immunotherapy now improved stable. Diarrhea has improved no imodium needed recently.    PD-L1 positive, HER2 negative, p53 and KRAS positive.  Repeat Caris testing with repeat biopsy with no target mutations.    B12 deficiency: Continue B12 1000 mcg twice daily.    Anemia - Hb  was low iron sat low at 10, given venofer 300 x3 monitor for now. Intolerant to oral iron. Repeat iron levels today .    Groin pain - negative for UTI, STI, has seen urology, likely prostatits, started cipro improvement noted. Had cystoscopy, was started on flomax, now improved symptoms.     Chronic kidney disease creatinine has worsened overall compared to 8/2023 continues to be in this range. Will recommend nephrology follow up. Has an appointment.         Jean Pierre Elizondo MD 06/05/24     Orders Placed This Encounter   Procedures    Comprehensive metabolic panel     Standing Status:   Future     Standing Expiration Date:   6/5/2025     Order Specific Question:   Release to patient     Answer:   Routine Release [[id number]]    CBC & Differential     Order Specific Question:   Release to patient     Answer:   Routine Release [[id number]]

## 2024-06-05 ENCOUNTER — OFFICE VISIT (OUTPATIENT)
Dept: ONCOLOGY | Facility: CLINIC | Age: 74
End: 2024-06-05
Payer: MEDICARE

## 2024-06-05 ENCOUNTER — HOSPITAL ENCOUNTER (OUTPATIENT)
Dept: ONCOLOGY | Facility: HOSPITAL | Age: 74
Discharge: HOME | End: 2024-06-05
Payer: MEDICARE

## 2024-06-05 VITALS
BODY MASS INDEX: 24.83 KG/M2 | TEMPERATURE: 97.8 F | DIASTOLIC BLOOD PRESSURE: 68 MMHG | HEART RATE: 79 BPM | HEIGHT: 65 IN | SYSTOLIC BLOOD PRESSURE: 129 MMHG | OXYGEN SATURATION: 98 % | WEIGHT: 149 LBS | RESPIRATION RATE: 16 BRPM

## 2024-06-05 VITALS
SYSTOLIC BLOOD PRESSURE: 129 MMHG | OXYGEN SATURATION: 98 % | HEIGHT: 65 IN | WEIGHT: 149.9 LBS | TEMPERATURE: 97.8 F | DIASTOLIC BLOOD PRESSURE: 68 MMHG | HEART RATE: 79 BPM | BODY MASS INDEX: 24.97 KG/M2

## 2024-06-05 DIAGNOSIS — C15.5 PRIMARY MALIGNANT NEOPLASM OF LOWER THIRD OF ESOPHAGUS: ICD-10-CM

## 2024-06-05 DIAGNOSIS — C16.0 MALIGNANT NEOPLASM OF CARDIA: Primary | ICD-10-CM

## 2024-06-05 DIAGNOSIS — D63.0 ANEMIA IN NEOPLASTIC DISEASE: ICD-10-CM

## 2024-06-05 LAB
ALP BLD-CCNC: 43 U/L (ref 53–128)
BASOPHILS # BLD AUTO: 0.03 10*3/MM3 (ref 0–0.2)
BASOPHILS NFR BLD AUTO: 0.9 % (ref 0–1.5)
BUN BLDA-MCNC: 13 MG/DL (ref 7–22)
CALCIUM BLD QL: 9 MG/DL (ref 8–10.3)
CHLORIDE BLDA-SCNC: 109 MMOL/L (ref 98–108)
CO2 BLDA-SCNC: 26 MMOL/L (ref 18–33)
CREAT BLDA-MCNC: 1.3 MG/DL (ref 0.6–1.2)
DEPRECATED RDW RBC AUTO: 78.7 FL (ref 37–54)
EGFRCR SERPLBLD CKD-EPI 2021: 57.3 ML/MIN/1.73
EOSINOPHIL # BLD AUTO: 0.07 10*3/MM3 (ref 0–0.4)
EOSINOPHIL NFR BLD AUTO: 2 % (ref 0.3–6.2)
ERYTHROCYTE [DISTWIDTH] IN BLOOD BY AUTOMATED COUNT: 23 % (ref 12.3–15.4)
FERRITIN SERPL-MCNC: 82.4 NG/ML (ref 30–400)
GLUCOSE BLDC GLUCOMTR-MCNC: 134 MG/DL (ref 73–118)
HCT VFR BLD AUTO: 30.2 % (ref 37.5–51)
HGB BLD-MCNC: 9.4 G/DL (ref 13–17.7)
IRON 24H UR-MRATE: 57 MCG/DL (ref 59–158)
IRON SATN MFR SERPL: 16 % (ref 20–50)
LYMPHOCYTES # BLD AUTO: 0.92 10*3/MM3 (ref 0.7–3.1)
LYMPHOCYTES NFR BLD AUTO: 26.3 % (ref 19.6–45.3)
MCH RBC QN AUTO: 30.6 PG (ref 26.6–33)
MCHC RBC AUTO-ENTMCNC: 31.1 G/DL (ref 31.5–35.7)
MCV RBC AUTO: 98.4 FL (ref 79–97)
MONOCYTES # BLD AUTO: 0.53 10*3/MM3 (ref 0.1–0.9)
MONOCYTES NFR BLD AUTO: 15.1 % (ref 5–12)
NEUTROPHILS NFR BLD AUTO: 1.95 10*3/MM3 (ref 1.7–7)
NEUTROPHILS NFR BLD AUTO: 55.7 % (ref 42.7–76)
PLATELET # BLD AUTO: 206 10*3/MM3 (ref 140–450)
PMV BLD AUTO: 10.6 FL (ref 6–12)
POC ALBUMIN: 3.3 G/L (ref 3.3–5.5)
POC ALT (SGPT): 8 U/L (ref 10–47)
POC AST (SGOT): 20 U/L (ref 11–38)
POC TOTAL BILIRUBIN: 0.5 MG/DL (ref 0.2–1.6)
POC TOTAL PROTEIN: 6 G/DL (ref 6.4–8.1)
POTASSIUM BLDA-SCNC: 4.8 MMOL/L (ref 3.6–5.1)
RBC # BLD AUTO: 3.07 10*6/MM3 (ref 4.14–5.8)
SODIUM BLD-SCNC: 144 MMOL/L (ref 128–145)
T3FREE SERPL-MCNC: 2.66 PG/ML (ref 2–4.4)
T4 FREE SERPL-MCNC: 1.41 NG/DL (ref 0.93–1.7)
TIBC SERPL-MCNC: 359 MCG/DL (ref 298–536)
TRANSFERRIN SERPL-MCNC: 241 MG/DL (ref 200–360)
TSH SERPL DL<=0.05 MIU/L-ACNC: 2.89 UIU/ML (ref 0.27–4.2)
WBC NRBC COR # BLD AUTO: 3.5 10*3/MM3 (ref 3.4–10.8)

## 2024-06-05 PROCEDURE — 84439 ASSAY OF FREE THYROXINE: CPT | Performed by: INTERNAL MEDICINE

## 2024-06-05 PROCEDURE — 84481 FREE ASSAY (FT-3): CPT | Performed by: INTERNAL MEDICINE

## 2024-06-05 PROCEDURE — 25810000003 SODIUM CHLORIDE 0.9 % SOLUTION 250 ML FLEX CONT: Performed by: INTERNAL MEDICINE

## 2024-06-05 PROCEDURE — 96367 TX/PROPH/DG ADDL SEQ IV INF: CPT

## 2024-06-05 PROCEDURE — 25010000002 FLUOROURACIL PER 500 MG: Performed by: INTERNAL MEDICINE

## 2024-06-05 PROCEDURE — 80053 COMPREHEN METABOLIC PANEL: CPT

## 2024-06-05 PROCEDURE — 84443 ASSAY THYROID STIM HORMONE: CPT | Performed by: INTERNAL MEDICINE

## 2024-06-05 PROCEDURE — 96365 THER/PROPH/DIAG IV INF INIT: CPT

## 2024-06-05 PROCEDURE — 25010000002 DEXAMETHASONE SODIUM PHOSPHATE 120 MG/30ML SOLUTION: Performed by: INTERNAL MEDICINE

## 2024-06-05 PROCEDURE — 85025 COMPLETE CBC W/AUTO DIFF WBC: CPT | Performed by: INTERNAL MEDICINE

## 2024-06-05 PROCEDURE — G0498 CHEMO EXTEND IV INFUS W/PUMP: HCPCS

## 2024-06-05 PROCEDURE — 82728 ASSAY OF FERRITIN: CPT | Performed by: INTERNAL MEDICINE

## 2024-06-05 PROCEDURE — 25010000002 LEUCOVORIN CALCIUM PER 50 MG: Performed by: INTERNAL MEDICINE

## 2024-06-05 PROCEDURE — 84466 ASSAY OF TRANSFERRIN: CPT | Performed by: INTERNAL MEDICINE

## 2024-06-05 PROCEDURE — 0 DEXTROSE 5 % SOLUTION: Performed by: INTERNAL MEDICINE

## 2024-06-05 PROCEDURE — 96375 TX/PRO/DX INJ NEW DRUG ADDON: CPT

## 2024-06-05 PROCEDURE — 83540 ASSAY OF IRON: CPT | Performed by: INTERNAL MEDICINE

## 2024-06-05 RX ORDER — DEXTROSE MONOHYDRATE 50 MG/ML
250 INJECTION, SOLUTION INTRAVENOUS ONCE
Status: COMPLETED | OUTPATIENT
Start: 2024-06-05 | End: 2024-06-05

## 2024-06-05 RX ORDER — FAMOTIDINE 10 MG/ML
20 INJECTION, SOLUTION INTRAVENOUS AS NEEDED
Status: CANCELLED | OUTPATIENT
Start: 2024-06-05

## 2024-06-05 RX ORDER — DEXTROSE MONOHYDRATE 50 MG/ML
250 INJECTION, SOLUTION INTRAVENOUS ONCE
Status: CANCELLED | OUTPATIENT
Start: 2024-06-05

## 2024-06-05 RX ORDER — DIPHENHYDRAMINE HYDROCHLORIDE 50 MG/ML
50 INJECTION INTRAMUSCULAR; INTRAVENOUS AS NEEDED
Status: CANCELLED | OUTPATIENT
Start: 2024-06-05

## 2024-06-05 RX ADMIN — FLUOROURACIL 4130 MG: 50 INJECTION, SOLUTION INTRAVENOUS at 12:27

## 2024-06-05 RX ADMIN — DEXTROSE MONOHYDRATE 250 ML: 50 INJECTION, SOLUTION INTRAVENOUS at 11:23

## 2024-06-05 RX ADMIN — SODIUM CHLORIDE 690 MG: 9 INJECTION, SOLUTION INTRAVENOUS at 11:49

## 2024-06-05 RX ADMIN — DEXAMETHASONE SODIUM PHOSPHATE 12 MG: 4 INJECTION, SOLUTION INTRA-ARTICULAR; INTRALESIONAL; INTRAMUSCULAR; INTRAVENOUS; SOFT TISSUE at 11:27

## 2024-06-05 NOTE — PROGRESS NOTES
Patient is her for leucovorin and 5FU. Port accessed and flushed with good blood return noted. 10cc of blood wasted prior to specimen collection. Blood specimen obtained and sent to lab for processing per protocol. Dr. Elizondo sent: Patient is here for C20D1 leucovorin and 5FU. patient has no new complaints today. Labs are in parameters-creatinine was 1.3. he was also wondering if you wanted to recheck his iron profile since he finished his iron on 5/1/24. Dr. Elizondo responded: ok for treatment and ok to draw iron studies. Patient verbalized understanding for the plan. Patient tolerated treatment and was discharged with AVS.

## 2024-06-06 NOTE — PROGRESS NOTES
Hematology-Oncology Follow-up Note       Artie Mc  1950    Primary Care Physician: Chanel Carter APRN  Referring Physician: Chanel Carter APRN    Reason For Visit:  Chief Complaint   Patient presents with    Follow-up     Malignant neoplasm of cardia     Metastatic esophageal cancer  Monitoring for adverse effects related to immunotherapy.  Hypothyroidism    HPI:   Mr. Mc is a pleasant 74 y.o. gentleman with a history of gastroesophageal reflux disease, diabetes, hypertension, heart block status post pacemaker placement and history of CABG.  He was having symptoms of dysphagia, weight loss since September 2016. He was reporting symptoms of food getting stuck in the lower chest.  He has transitioned himself to a liquid diet.  The patient reports losing about 30 pounds over this duration.  The patient underwent upper GI endoscopy on 12/20/16 with Dr. Esvin Barrera.  Endoscopy showed necrotic, circumferential and moderately obstructive mass at the distal esophagus between 42 cm and 36 cm.      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  There was also evidence of mild subacute inflammation in the duodenum.  Dr. Barrera ordered a CT scan of the chest and abdomen with contrast on 12/20/16.  The scan was done at [facility].  The scan showed a new ill-defined mass in the anterior wall of the distal esophagus at the GE junction measuring 2.3 x 2.2 x 1.6 cm, worrisome for cancer.  There were four subcentimeter liver lesions which had appeared stable in comparison to another CT scan from 2009 and they likely represent small cysts.  There was also mild lymphadenopathy in the gastrohepatic ligament.  There were at least six borderline enlarged lymph nodes measuring up to 1.3 x 1 cm in the region.  The patient is referred to us for further oncological evaluation.    1/5/2017 - The patient presents for initial  consultation.  He reports persistent dysphagia and also has symptoms of regurgitation.  He cannot tolerate solid foods and is dependent on liquid diet such as Ensure.  He also reports fatigue. Symptoms of dysphagia have been present for the past four months.  When he eats any solids, the food gets stuck in the lower chest.    1/5/17 - Vitamin B12 200 (L).  CEA 0.8.  Ferritin 35.  Iron saturation 16% (L), serum iron 56, TIBC 354.  Creatinine 0.9.  LFTs normal.  Folate 10.2.  1/12/17 - PET/CT scan:  Intense abnormal activity corresponding to the region of the GE junction and proximal stomach.  SUV is 11.06.  There is a suggestion of an abnormal mass or abnormal wall thickening in the region measuring 3.9 x 4.8 cm.  No additional abnormalities.  No evidence of metastases or other lymphadenopathy.  Scattered nonspecific subcentimeter lymph nodes involving the mediastinum and within the central upper abdomen.  1/13/17 - Creatinine 0.9.  LFTs normal.    1/17/17 - Patient seen by thoracic surgeon, Dr. Bradley.  PET scan was reviewed.  He has recommended neoadjuvant chemoradiation therapy followed by [place] Gurwinder esophagogastrectomy.  Port-A-Cath is being arranged.    1/20/17 - WBC 6.3, hemoglobin 12.1, platelet count 198,000, MCV 86.4.  1/30/17 - Patient started weekly Carboplatin and Taxol (Carboplatin AUC 2 and Taxol 50 mg/M2).  Patient received Injectafer 750 mg.  WBC 5.8, hemoglobin 12.4, platelet count 244,000.   1/31/17 - Patient seen by Dr. Eduardo Oleary.  The plan was to give 5040 cGy in 28 fractions.   2/6/17 - WBC 4.4, hemoglobin 11.6, platelet count 201,000.  Patient received cycle 2 of weekly Carboplatin and Taxol (Carboplatin AUC 2 and Taxol 50 mg/M2).  2/13/17 - Patient received Carboplatin and Taxol weekly cycle 3.  2/13/17 - Patient started concurrent radiation therapy.  Total planned dose is 4140 cGy.    2/13/17 to 2/15/17 - Patient received 4140 cGy of neoadjuvant radiation.  Radiation was finished on  3/15/17.    2/20/17 - WBC 1.8, hemoglobin 10.6, platelet count 217,000.  Creatinine 0.5. Folate 15 (L).  Ferritin 361.  Haptoglobin 214.  Iron saturation 33%, TIBC 263, serum iron 88.    2/20/17 - Patient received Carboplatin and Taxol weekly cycle 4.   2/27/17 - WBC 3.7, hemoglobin 10.7, platelet count 177,000, MCV 85.3.    3/6/17 - Patient has received 16 out of the 23 planned radiation treatments.  Total planned dose is 4140 cGy.    3/6/17 - WBC 5.4, hemoglobin 11.2, platelet count 113,000.  3/6/17 - Patient received cycle 5 of weekly Carboplatin and Taxol.   3/13/17 - Patient received cycle 6 of weekly Carboplatin and Taxol.  WBC 3.9, hemoglobin 11.3, platelet count 93,000.     3/20/17 - WBC 1.4, hemoglobin 10.0, platelet count 118,000, MCV 86.1   3/27/17 - WBC 3.8, hemoglobin 9.5, platelet count 176,000, MCV 87.3.    4/7/17 - Stress test:  No evidence of reversible myocardial ischemia.  Normal left ventricular ejection fraction of 50%.    4/17/17 - Upper GI endoscopy by Dr. 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  The GE junction adenocarcinoma lesion seems to have a very good response to chemotherapy and radiation.  The lumen was open.  5/1/17 - WBC 7.0, hemoglobin 10.2, platelet count 175,000.    5/4/17 - Patient underwent [place] Gurwinder esophagectomy with pyloroplasty, feeding jejunostomy tube, abdominal and mediastinal lymph node dissection.    Surgical Pathology:  Moderately to poorly differentiated adenocarcinoma measuring 1.3 cm at the GE junction.  Resection margins are negative for malignancy.  Tumor margins are negative.  There is effective treatment response.  No evidence of lymphovascular invasion.  Staging is ypT3,pN1.  One out of fourteen lymph nodes involved.   5/22/17 - WBC 6.8, hemoglobin 10.3, platelet count 312,000.    6/19/17 - WBC 5.7, hemoglobin 10.5, platelet count 204,000, MCV 92.1.    7/6/17 - EGD:  Status post balloon dilation of esophageal  stricture.  7/12/17 - EGD with balloon dilation of esophageal stricture.  7/26/17 - EGD and balloon dilation of esophageal stricture.  7/31/17 - PET/CT scan:  There is mild metabolic activity seen at the thoracic esophagus just at and below the surgical margins.  Some mild wall thickening.  This could be from recent surgery.  A residual cancer seems unlikely.  There is a precarinal lymph node with mild metabolic activity with SUV of 3 measuring 1 x 1.3 cm.  Again this appears to be nonspecific in my opinion.    8/7/17 - WBC 5.9, hemoglobin 11.9, platelet count 171,000, MCV 88.7.    10/19/17 - WBC 7.5, hemoglobin 11.8, platelet count 216,000.    1/8/18 - PET scan:  No evidence of residual disease or recurrent disease.  There is an esophageal stent in the mid esophagus with a large debris air level.  No evidence of any metastases in the chest, abdomen or pelvis.  Mild nodule uptake in the vocal cords with fullness in the right vocal cord.  This could be physiologic.    7/9/18 - CT scan of chest with contrast:  Prominent mediastinal lymph nodes appear stable since February.  Changes of gastric pull-through procedure for esophageal cancer again noted.  Tree-in-bud infiltrates in the left lung base, consistent with infection versus inflammation.    1/10/19 - CT chest, abdomen and pelvis with contrast:  No evidence of mets in the chest; however, interval development of metastatic lymphadenopathy in the left side of the retroperitoneum.  For instance, there is a 17 mm rounded lymph node, 10 mm lymph node and also a 14 mm lymph node.  These are all new.  Stable 9 mm hypodense lesion within the right hepatic lobe, which could reflect hemangioma or complex cyst.    1/30/19 - CT-guided core biopsy of retroperitoneal lymph node:  Poorly differentiated adenocarcinoma consistent with metastases from patient’s known esophageal primary.  CK20 positive, CDX2 positive, cytokeratin 7 negative, TTF-1 negative.    2/1/19 - Creatinine  0.9, BUN 18, calcium 9.3; these are normal.    2/28/19 - [facility] Next Gen sequencing testing on retroperitoneal lymph node specimen:  PD-L1 positive.  CPS = 3.  This implies responsiveness to pembrolizumab.  Mismatch repair status is proficient (no evidence of microsatellite instability).  Tumor mutational burden is low = 6 mutations/Mb.  CDH1 indeterminate.  KRAS mutation positive.  TP53 mutation positive.  CDK6 amplified.  [employer] PD-L1 testing by IHC:  PD-L1 expression 1% positive (CPS =1).  HER2/marvel by IHC is negative.  HER2/marvel by CISH not amplified.  Microsatellite stable tumor.    3/7/19 - WBC 7.3, hemoglobin 12, platelet count 128,000, MCV 92.     3/14/19 - Patient was seen by radiation oncologist, Dr. Chahal.  He has recommended observation versus systemic chemoimmunotherapy as needed.  No plans for radiation therapy.   5/1/19 - CT scan of chest with contrast:  No definitive findings of new metastatic disease in the chest.  Emphysema noted.    5/1/19 - CT abdomen and pelvis:  There is interval progression of metastatic retroperitoneal adenopathy.  Left periaortic adenopathy with lymph node measuring up to 2 cm, previously 1.7 cm.  Another lymph node now measures 2.3, previously 1.4 cm.  New enlarged lymph node on image 147 measures 1.4 cm, previously 0.4 cm.  A 9 mm hypodense right hepatic lobe lesion appears stable.    5/9/19 - Decision made to start patient on immunotherapy Keytruda.    5/9/19 - Vitamin B12 229.  Ferritin 61.  Folate 14.3.  Iron saturation 26%, TIBC 304, serum iron 79.  5/31/19 - Patient received Keytruda 200 mg cycle 1, day 1.    5/31/19 - Free T4 0.99.  Creatinine 0.9, BUN 9.  LFTs normal.  TSH 3.99.  Folate 14.3.  Iron saturation 26%.    6/21/2019 patient received Keytruda cycle 2  7/12/2019: Patient received Keytruda cycle 3  8/2/2019 patient received cycle 4 Keytruda WBC 7.2, hemoglobin 11.8 platelet count 163  8/23/2019 creatinine 0.9, LFTs normal, WBC 6.4, hemoglobin 11.5, platelets  179, TSH 2.1   8/23/2019 patient received cycle 5 of Keytruda  9/3/2019 CT chest abdomen and pelvis with contrast: . Positive response to therapy in the abdomen since 05/01/2019. Pathologically enlarged retroperitoneal lymph nodes, predominantly in the left periaortic region, have diminished in size.a 1.3 x 2.1 cm left periaortic node (image 60), previously measured 3.5 x 2.3 cm. A 1.6 cm index node near the level of the left renal vein previously measured 2.0 cm No new or progressive adenopathy. 2. Stable findings in the chest. Noncalcified right upper lobe nodules are unchanged. There is no evidence of new or progressive metastatic disease within the chest. 3. 8 mm indeterminate low-density lesion in the right hepatic lobe, is stable. No new liver lesions.  9/6/2019 WBC 5.6, hemoglobin 11.2, platelets of 192, MCV 91.8  9/13/2019 patient received Keytruda cycle 6, albumin 3.2  10/4/2019 patient received cycle 7 of Keytruda, TSH 4.8, free T4 0.86  10/9/2019 WBC 5.8, hemoglobin 11.7, platelets 174  10/25/2019 patient received cycle 8 of Keytruda.  WBC 5.9, hemoglobin 11.5, platelets 152, creatinine 0.75, cortisol 15.2, TSH 6.48 high , free T4 1.13 normal  11/6/2019 WBC 6.6, hemoglobin 11.8, MCV 91.5, platelets 187  11/15/2019 patient received cycle 9 of Keytruda, WBC 6.6, hemoglobin 11.9, platelets 161, cortisol level 8.86, TSH 2.68, free T3 2.8, free T4 1.5  12/4/2019 WBC 6.9, hemoglobin 12.0, platelets 180, MCV 92.9  12/06/2019-Keytruda Cycle 10  12/27/2019- Cycle 11 LFTs normal, WBC 6.7, hemoglobin 11.4, platelets 168, MCV 93, TSH 2.4,  1/28/2020: CT chest abdomen and pelvis with contrast:  Single (aortic lymph node in the abdominal retroperitoneum has increased.  Rest of the retroperitoneal lymph nodes have decreased in size.  Mediastinal adenopathy appears unchanged.  Noncalcified bilateral pulmonary nodules are stable  1/17/2020 patient received Keytruda cycle 12:.  2/3/2020 WBC 7.1, hemoglobin 12.2, platelets  171  02/10/2020 patient received cycle 13 of Keytruda: WBC 7.3, hemoglobin 12.1, platelets 166, creatinine 0.82  3/6/2020 patient is of Keytruda 200 mg, hemoglobin 10.8  3/27/2020 patient received Keytruda 200 mg, hemoglobin 11.4, TSH 1.95  4/17/2020 patient received Keytruda cycle 16 200 mg, WBC 6.1, hemoglobin 11.6, platelets 150  5/8/2020: Patient received cycle 17 Keytruda, WBC 6.3, hemoglobin 9.6, platelets 137, TSH 2.05, free T4 1.54, creatinine 0.93, LFTs normal,  5/29/2020: Patient received cycle 18 Keytruda, WBC 6.5, hemoglobin 11.7, platelets 129, creatinine 0.83, TSH 1.69  6/26/2020: Patient receiving Keytruda.   7/17/2020: TSH 2.06, WBC 5.7, hemoglobin 11.4, platelets 177, MCV 92.4, Keytruda 200 mg cycle 20  7/27/2020: CT chest abdomen pelvis with contrast: Stable findings in the chest abdomen pelvis since 1/28/2020.  Noncalcified bilateral pulmonary nodules are stable.  Stable retroperitoneal left perinephric adenopathy in the abdomen.  8/7/2020: Patient received cycle 21 of Keytruda  8/28/2020 patient is a cycle 22 of Keytruda.  WBC 6.02, hemoglobin 11.3, platelets 161  9/11/2020 WBC 6.9, hemoglobin 12.4, platelets 192  9/18/2020: Cycle 23 of Keytruda, TSH 1.19  10/9/2020: Cycle 24 of Keytruda, WBC 5.9, hemoglobin 11.9, platelets 178, creatinine 0.84, TSH 2.56, free T3 2.4, CMP normal  10/23/2020: WBC 7.34, hemoglobin 11.6, platelets 175  10/30/2020:.  Keytruda 200 mg  11/20/2020: Keytruda 200 mg  12/11/2020 Keytruda 200 mg.  WBC 5.5, hemoglobin 11.4, platelets 176, creatinine 0.8, LFTs normal, TSH 2.2, cortisol 8.3  12/10/2020: CT chest abdomen and pelvis with contrast: Previously seen left para-aortic lymph nodes within the abdomen are decreased in size.  No pathological lymphadenopathy.  No evidence of residual malignancy..  Small noncalcified pulmonary nodules are stable.  No new nodules.  Stable mediastinal lymph nodes.  3/12/2021: WBC 6.7, hemoglobin 11.3, platelets 17  3/12/2021: CMP normal, TSH  2.9, cortisol 11.95, 1  3/15/2021: CT chest with contrast: Stable findings in the chest with postsurgical changes of esophageal resection and gastric pull-through.  Stable right lower lobe lung nodule measures 11 mm.  Stable size and appearance of the mediastinal and upper abdominal lymph nodes.  3/23/2021: Doppler of upper extremity: Normal.  3/24/2021: CT abdomen and pelvis with contrast: Postoperative changes.  Left periaortic nodes are stable.  4/12/2021: TSH 4.1, cortisol 12.4  6/22/2021: CMP normal,Cortisol 10.08,, WBC 5.8, hemoglobin 11.2, platelets 109, TSH 2.59  7/12/2021: CT scan of the chest abdomen pelvis with contrast: Dominant 10 x 10 left periaortic lymph node is stable since 3/22/2021.  Dominant lymph nodes in the right lower paratracheal and AP window distribution are stable since 3/15/2021.  No evidence of progressive adenopathy in the chest abdomen or pelvis.    01/11/2022 -PET/CT findings consistent with new cedric metastatic disease in the retroperitoneum.  2 new pathologically enlarged hypermetabolic retroperitoneal lymph nodes.  No convincing evidence of recurrent disease within the chest neck pelvis or skeleton.  1/20/2022 -CT-guided needle biopsy consistent with poorly differentiated carcinoma.  CDX2 positive consistent with metastases from known esophageal primary  2/9/2022 - Pembrolizumab complicated with fatigue, joint pains, nausea, diarrhea. Symptoms decrease in intensity over a week.  3/30/2022 -CT chest and pelvis with stable metastatic lymph nodes in the left periaortic retroperitoneum.  No evidence of disease progression or new lesions.  Stable lymphadenopathy in the distal paratracheal mediastinal area  4/13/2022 - Keytruda 200  5/4/2022 - Keytruda 200  Continued keytruda  7/11/2022 - CT abdomen pelvis with decrease in size of left perioaortic lymph node. No other areas of disease.  Continues to be on Keytruda  10/18/2022 - stable CT imaging  1/20/2023 - CT imaging with stable  disease. No significant change.  Patient was hospitalized with esophagitis, subsequent EGD with improvement, no recurrence of disease.  2/21/2023 - Keytruda  CT CAP with increased retroperitoneal adenopathy.   ECHO with EF 35-40% (concern for immunotherapy related )immunotherapy held with ongoing investigation for cause.  Subsequent improvement in EF to 45%  4/4/2023 - CT abdomen pelvis with interval retroperitoneal lymph node enlargement compatible with metastatic adenopathyl.  5/5/2023 - PET CT with mixed response. No clear evidence of progression  6/2/23 -started pembrolizumab 200 mg IV  Posttreatment had symptoms of chest tightness troponin and proBNP negative, echocardiogram with EF 40%  Subsequent cardiac cath with EF down to 30% elevated proBNP, troponin no significant obstructive disease in the graft vessels  Cardiac MRI unable to obtain given pacemaker noncompliant.  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 Two enlarged mediastinal lymph nodes in the precarinal and AP window distributions are unchanged since 4/4/2023. No new or progressive adenopathy in the chest.Stable 3 mm or less noncalcified right lung nodules since 4/4/2023. Surgical changes of distal esophagectomy with gastric pull-through, without evidence of local disease recurrence  8/29/23 - started FOLFOX had significant side effects  9/20/23 - stopped oxaliplatin and continued 5FU only'  11/29/23 - CT CAP with decrease size of retroperitoneal adenopathy, no other concerning findings.  Continues 5FU leucovorin   2/12/24 - PET CT There is some metabolic activity within a mediastinal lymph node which has been suggested with slightly decreased metabolic activity. Whether this is reactive or pathologic is  uncertain. Previously noted abnormal retroperitoneal lymph node is no longer identified continued treatmen  4/10/24 - iron sat 10, ferritin  14, plan for venofer  Continued treatment signatera negative  5/7/24 - CT chest without contrast. Unchanged mildly enlarged right paratracheal lymph lymph node measuring 1.1 cm in short axis (series 2 image 40) and left paratracheal measuring 1.1 cm in short axis (series 2 image 39). No new or progressive lymphadenopathy.  Continued maintenance 5FU  changed frequency to every 3 weeks.       Subjective:  Continues to have fatigue no other new symptoms.    Past Medical History:   Diagnosis Date    B12 deficiency     Blockage of coronary artery of heart     Depression     DM (diabetes mellitus)     Dysphagia     GERD (gastroesophageal reflux disease)     HTN (hypertension)     Hyperlipidemia        Past Surgical History:   Procedure Laterality Date    ABDOMINAL WALL ABSCESS INCISION AND DRAINAGE  10/22/2018    WITH DEBRIDEMENT  1.5CM X 1.5 CM X 1.5 CM    DR. PURI    CARDIAC CATHETERIZATION Right 7/20/2023    Procedure: Left Heart Cath;  Surgeon: aMzin Simmons MD;  Location: [facility] CATH INVASIVE LOCATION;  Service: Cardiovascular;  Laterality: Right;    CATARACT EXTRACTION  2018    COLONOSCOPY      CORONARY ARTERY BYPASS GRAFT  2015    ENDOSCOPY  12/20/2016    ENDOSCOPY N/A 01/02/2023    Procedure: ESOPHAGOGASTRODUODENOSCOPY;  Surgeon: Esvin Morgan MD;  Location: [facility] ENDOSCOPY;  Service: Gastroenterology;  Laterality: N/A;  post: anastamosis stricture    ENDOSCOPY N/A 02/09/2023    Procedure: ESOPHAGOGASTRODUODENOSCOPY, non-wire guided balloon dilation (12-14mm) of esophageal stricture;  Surgeon: Tino Villafana MD;  Location: [facility] ENDOSCOPY;  Service: Gastroenterology;  Laterality: N/A;  post: esophageal anastamotic stricture, post surgical changes    ESOPHAGECTOMY  05/04/2017    BROOKS PETTY ESOPHAGECTOMY, FEEDING JEJNOSTOMOY, ABDOMINAL AND  MEDIASTINAL LYMPH NODE DISECTION, PEDICLED MUSCLE FLAP COVERAGE DR. PURI    ESOPHAGOSCOPY / EGD  07/12/2017    WITH BALLOON DILATION    ESOPHAGOSCOPY / EGD  07/26/2017    WITH BALLOON DILATION    ESOPHAGOSCOPY / EGD  08/11/2017    WITH BALLOON DILATION    ESOPHAGOSCOPY / EGD  08/28/2017    WITH BALLOON DILATION    ESOPHAGOSCOPY / EGD  09/27/2017    WITH BALLOON DILATION    INSERT / REPLACE / REMOVE PACEMAKER      LYMPH NODE BIOPSY  01/30/2019    PACEMAKER IMPLANTATION  11/21/2016       Current Outpatient Medications:     clonazePAM (KlonoPIN) 0.5 MG tablet, Take 0.5 tablets by mouth 2 (Two) Times a Day As Needed for Anxiety., Disp: , Rfl: 1    cyanocobalamin (VITAMIN B-12) 1000 MCG tablet, Take 1 tablet by mouth Daily. Stopped but going to start back, Disp: , Rfl:     folic acid (FOLVITE) 1 MG tablet, Take 1 tablet by mouth Daily., Disp: , Rfl:     levothyroxine (SYNTHROID, LEVOTHROID) 50 MCG tablet, Take 1 tablet by mouth Daily., Disp: 90 tablet, Rfl: 0    magic mouthwash oral suspension, , Disp: , Rfl:     metFORMIN (GLUCOPHAGE) 1000 MG tablet, Take 0.5 tablets by mouth Daily., Disp: 60 tablet, Rfl: 2    metoprolol succinate XL (TOPROL-XL) 25 MG 24 hr tablet, Take 1 tablet by mouth once daily (Patient not taking: Reported on 6/26/2024), Disp: 90 tablet, Rfl: 0    ondansetron (ZOFRAN) 4 MG tablet, TAKE 1 TABLET BY MOUTH EVERY 8 HOURS AS NEEDED FOR NAUSEA FOR VOMITING, Disp: 90 tablet, Rfl: 0    ondansetron (ZOFRAN) 8 MG tablet, Take 1 tablet by mouth 3 (Three) Times a Day As Needed for Nausea or Vomiting., Disp: 30 tablet, Rfl: 5    pantoprazole (PROTONIX) 40 MG EC tablet, Take 1 tablet by mouth 2 (Two) Times a Day., Disp: , Rfl:     pravastatin (PRAVACHOL) 10 MG tablet, Take 1 tablet by mouth Every Other Day., Disp: , Rfl:     spironolactone (ALDACTONE) 25 MG tablet, Take 0.5 tablets by mouth Daily., Disp: 30 tablet, Rfl: 2    tamsulosin (FLOMAX) 0.4 MG capsule 24 hr capsule, Take 1 capsule by mouth Daily.,  "Disp: , Rfl:   No current facility-administered medications for this visit.    Facility-Administered Medications Ordered in Other Visits:     fluorouracil (ADRUCIL) 4,130 mg in sodium chloride 0.9 % 94 mL chemo infusion - FOR HOME USE, 2,400 mg/m2 (Treatment Plan Recorded), Intravenous, Once, Lynette Ordonez APRN, 4,130 mg at 24 1404    Allergies   Allergen Reactions    Ace Inhibitors Unknown (See Comments)     Unknown reaction      Heparin Other (See Comments)     Fever/chills, weakness in legs    Sulfa Antibiotics Other (See Comments)     Mouth sores    Ciprofloxacin GI Intolerance     Pt reports pain to abd       Family History   Problem Relation Age of Onset    Mental illness Mother     Heart disease Mother     Hypertension Father     Cancer Father     Heart disease Sister     Heart disease Brother      Cancer-related family history includes Cancer in his father.    Social History     Tobacco Use    Smoking status: Former     Current packs/day: 0.00     Average packs/day: 2.0 packs/day for 50.0 years (100.0 ttl pk-yrs)     Types: Cigarettes     Start date: 1965     Quit date: 2015     Years since quittin.0    Smokeless tobacco: Former     Types: Chew     Quit date: 1989   Vaping Use    Vaping status: Never Used   Substance Use Topics    Alcohol use: No    Drug use: No     ROS:     Objective:    Vitals:    24 1203   Height: 165.1 cm (65\")       (1) Restricted in physically strenuous activity, ambulatory and able to do work of light nature    Physical Exam:     Physical Exam   Constitutional: He is oriented to person, place, and time. He appears well-developed. No distress.   HENT:   Head: Normocephalic and atraumatic.   Nose: Nose normal.   Mouth/Throat: Mucous membranes are moist.   Eyes: Pupils are equal, round, and reactive to light.   Neck: No thyromegaly present.   Cardiovascular: Normal rate, regular rhythm, normal heart sounds and normal pulses. Exam reveals no gallop " and no friction rub.   Pulmonary/Chest: Effort normal and breath sounds normal.   Abdominal: Soft. Normal appearance and bowel sounds are normal.   No tenderness   Musculoskeletal: Normal range of motion. No deformity or signs of injury.      Right lower leg: No edema.   Neurological: He is alert and oriented to person, place, and time. He exhibits normal muscle tone.   Skin: Skin is warm and dry. No rash noted. He is not diaphoretic. No erythema. No jaundice.   Right side chest  port       Lab Results - Last 18 Months   Lab Units 06/26/24  1151 06/05/24  1022 05/15/24  0928   WBC 10*3/mm3 4.03 3.50 3.55   HEMOGLOBIN g/dL 9.8* 9.4* 8.5*   HEMATOCRIT % 31.4* 30.2* 27.6*   PLATELETS 10*3/mm3 233 206 162   MCV fL 98.1* 98.4* 97.9*     Lab Results - Last 18 Months   Lab Units 06/26/24  1158 06/10/24  1118 06/05/24  1039 05/15/24  1020 05/01/24  0842 04/03/24  1321 03/20/24  1309 03/13/24  0921   SODIUM mmol/L  --  140  --   --  141  --  139 142   POTASSIUM mmol/L  --  4.7  --   --  4.0  --  4.6 4.9   CHLORIDE mmol/L  --  105  --   --  109*  --  107 108*   CO2 mmol/L  --  22.9  --   --  21.0*  --  22.0 22.0   BUN mg/dL  --  11  --   --  13  --  14 16   CREATININE mg/dL 1.10 1.20 1.30*   < > 1.42*   < > 1.47* 1.28*   CALCIUM mg/dL  --  8.7  --   --  9.1  --  8.3* 8.8   BILIRUBIN mg/dL  --   --   --   --  0.4  --  0.3 0.2   ALK PHOS U/L  --   --   --   --  36*  --  40 35*   ALT (SGPT) U/L  --   --   --   --  5  --  5 5   AST (SGOT) U/L  --   --   --   --  13  --  12 13   GLUCOSE mg/dL  --  132*  --   --  136*  --  211* 176*    < > = values in this interval not displayed.     Assessment & Plan     Assessment:    Metastatic esophageal carcinoma:    Patient has a history of GE junction poorly differentiated adenocarcinoma for which he had chemoradiation followed by [place] [facility] resection:  Pathology showed ypT3,pN1 disease.  Tumor was Stage IIIA, diagnosed in 2016.  He received concurrent chemoradiation with weekly Carboplatin  and Taxol and radiation finishing on 3/15/17.  CT scan on 1/10/19 showed new retroperitoneal lymph nodes.  These lymph nodes were biopsied on 1/30/19 and it shows metastatic esophageal cancer.  [facility] Next Gen testing was performed on the sample and it shows KRAS mutation, microsatellite stable tumor.  PD-L1 is positive.  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  HER2/marvel (ERBB2) was not amplified and was negative.  His CT scan on 5/1/19 shows evidence of progression.  Patient has started receiving Keytruda on 5/31/19.  He has received 19 cycles so far.   CT scan on 1/28/2020 shows improvement and continued response.  CT scan chest abdomen pelvis with contrast on 12/10/2020 does not show any evidence of disease progression.  On 12/22/2020 decision made to hold Keytruda per patient's request to get a treatment break.. Repeat CT scan chest 7/12/2021 does not show any evidence of disease progression.  Now CT imaging in January 2021 with a CT showing increased abdominal left para-aortic adenopathy.  Subsequent PET/CT with new cedric metastatic disease in the retroperitoneum of the abdomen.  New pathologically enlarged hypermetabolic retroperitoneal lymph nodes.  This would be concerning for disease recurrence/progression.  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  I discussed with him the treatment options going forward.  He has had a good response with immunotherapy pembrolizumab in the past. This was  restarted. subsequent imaging with treatment response. 1/2023 imaging with good response. Continued treatment  Now with increased adenopathy 2 new lymph nodes. Holding immunotherapy for now.   Consider getting cardiac MR discussed with Dr. Culp. While immunotherapy on hold, increased adenopathy is concerning. Could be pseudoprogression vs true progression. PET CT reviewed could indicate pseudoprogression since he has had a good response to immunotherapy  immunotherapy was restarted and has been on hold with  patient's symptomatology cardiac MRI cannot be obtained, no ischemic heart disease leading to cardiomyopathy this most likely is related to Keytruda.  We will stop Keytruda going forward  CT imaging with stable disease, FOLFOX was started which he had significant side effects  With imaging being stable and significant side effects with FOLFOX, we discussed  about options with wait and watch vs 5FU only, patient prefers to be on maintenance.   We started 5FU only .   Tolerating relatively well, has significant fatigue. CT imaging now with ongoing response. We discussed that he would have been on treatment for 2 years following recurrence with good response. We can continue treatment now and get a PET CT in 2/2024.will repeat prior to follow up. , PET with some residual activity in the mediastinal lymph node.   We continued treatment and now signatera negative. I had a long discussion with the patient. We decided to repeat CT chest now and if no adenopathy, consider holding treatment.   Now CT chest repeated with persistent adenopathy. Discussed biopsy/mediastinoscopy vs continued treatment. Has fairly been tolerating well except fatigue. After discussion we decided to continue chemotherapy.  I will repeat CT imaging in 3 weeks and discuss again.  If we are seeing improvement in adenopathy or regardless at that point we can decide if he went to hold treatment and do a treatment break and monitor the lymphadenopathy going forward.  He will also think about this in the meantime.  Will discuss on follow-up    Possible myocarditis  Symptoms started after last immunotherapy  Treatment on hold for now, symptoms have improved, cardiac markers were negative, echocardiogram with EF 40%, cardiac MRI cannot be obtained cardiac cath with EF down to 30% management per cardiology  Now EF up to 40-45% monitor. No significant symptoms.  Symptoms continued stable    Shortness of breath  Improved, has occasional shortness of breath,  likely copd  I have recommended using albuterol  Symptoms have improved since stopping immunotherapy. Questionable pneumonitis will monitor closely with immunotherapy  Cardiology has evaluated for myocarditis related to immunotherapy symptomatic improvement now continue to monitor. Symptomatic improvement.  No changes.    Reflux/heartburn/dysphagia:   Symptoms improved.  Also has a history of strictures. Status post EGD and balloon dilation.   Continues to be on pantoprazole symptoms stable.  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  Continues on Synthroid stable now no heart function stable in normal range    Mild diarrhea:  diarrhea seems to be waxing and waning in nature.  Diarrhea related to immunotherapy now improved stable.  No need for Imodium currently diarrhea improved    PD-L1 positive, HER2 negative, p53 and KRAS positive.  Repeat Caris testing with repeat biopsy with no target mutations.    B12 deficiency: Continue B12 1000 mcg twice daily.    Anemia - Hb  was low iron sat low at 10, given venofer 300 x3 monitor for now. Intolerant to oral iron. Repeat iron levels today .    Groin pain - negative for UTI, STI, has seen urology, likely prostatits, started cipro improvement noted. Had cystoscopy, was started on flomax, continued improvement in symptoms    Chronic kidney disease creatinine has worsened overall compared to 8/2023 has seen Dr. Posadas with nephrology adjusted blood pressure meds creatinine has improved now blood pressure under better control.  Discussed with Dr. Cuauhtemoc Elizondo MD 06/26/24     Orders Placed This Encounter   Procedures    CT Chest Without Contrast     Standing Status:   Future     Standing Expiration Date:   6/26/2025     Scheduling Instructions:      Schedule one week prior to pt's follow up on 7/17/24 with Dr. Elizondo in 3 weeks      Schedule at [facility] [place].     Order Specific Question:   Does this exam require Chava Bronch Protocol?      Answer:   No     Order Specific Question:   Reason for Exam:     Answer:   metastatic esophageal cancer     Order Specific Question:   Release to patient     Answer:   Routine Release [[id number]]    CBC & Differential     Standing Status:   Future     Standing Expiration Date:   6/6/2025     Order Specific Question:   Release to patient     Answer:   Routine Release [[id number]]         MDM  Chemotherapy requiring intensive monitoring  Labs reviewed, labs CT imaging ordered, outside records notes reviewed

## 2024-06-07 ENCOUNTER — HOSPITAL ENCOUNTER (OUTPATIENT)
Dept: ONCOLOGY | Facility: HOSPITAL | Age: 74
Discharge: HOME | End: 2024-06-07
Payer: MEDICARE

## 2024-06-07 VITALS
WEIGHT: 150.2 LBS | HEART RATE: 69 BPM | BODY MASS INDEX: 25.02 KG/M2 | HEIGHT: 65 IN | OXYGEN SATURATION: 99 % | SYSTOLIC BLOOD PRESSURE: 134 MMHG | DIASTOLIC BLOOD PRESSURE: 72 MMHG | TEMPERATURE: 97.9 F

## 2024-06-07 DIAGNOSIS — C15.5 PRIMARY MALIGNANT NEOPLASM OF LOWER THIRD OF ESOPHAGUS: ICD-10-CM

## 2024-06-07 DIAGNOSIS — C16.0 MALIGNANT NEOPLASM OF CARDIA: Primary | ICD-10-CM

## 2024-06-07 PROCEDURE — 25810000003 SODIUM CHLORIDE 0.9 % SOLUTION: Performed by: INTERNAL MEDICINE

## 2024-06-07 PROCEDURE — 96360 HYDRATION IV INFUSION INIT: CPT

## 2024-06-07 RX ORDER — SODIUM CHLORIDE 0.9 % (FLUSH) 0.9 %
20 SYRINGE (ML) INJECTION AS NEEDED
Status: DISCONTINUED | OUTPATIENT
Start: 2024-06-07 | End: 2024-06-08 | Stop reason: HOSPADM

## 2024-06-07 RX ORDER — SODIUM CHLORIDE 0.9 % (FLUSH) 0.9 %
20 SYRINGE (ML) INJECTION AS NEEDED
OUTPATIENT
Start: 2024-06-07

## 2024-06-07 RX ADMIN — SODIUM CHLORIDE 500 ML: 9 INJECTION, SOLUTION INTRAVENOUS at 13:02

## 2024-06-07 RX ADMIN — Medication 20 ML: at 14:09

## 2024-06-10 ENCOUNTER — LAB (OUTPATIENT)
Dept: LAB | Facility: HOSPITAL | Age: 74
End: 2024-06-10
Payer: MEDICARE

## 2024-06-10 ENCOUNTER — TRANSCRIBE ORDERS (OUTPATIENT)
Dept: ADMINISTRATIVE | Facility: HOSPITAL | Age: 74
End: 2024-06-10
Payer: MEDICARE

## 2024-06-10 DIAGNOSIS — I12.9 HYPERTENSIVE NEPHROPATHY: ICD-10-CM

## 2024-06-10 DIAGNOSIS — N18.31 CHRONIC KIDNEY DISEASE (CKD) STAGE G3A/A1, MODERATELY DECREASED GLOMERULAR FILTRATION RATE (GFR) BETWEEN 45-59 ML/MIN/1.73 SQUARE METER AND ALBUMINURIA CREATININE RATIO LESS THAN 30 MG/G (CMS/H*: Primary | ICD-10-CM

## 2024-06-10 DIAGNOSIS — N40.0 ENLARGED PROSTATE: ICD-10-CM

## 2024-06-10 DIAGNOSIS — N18.31 CHRONIC KIDNEY DISEASE (CKD) STAGE G3A/A1, MODERATELY DECREASED GLOMERULAR FILTRATION RATE (GFR) BETWEEN 45-59 ML/MIN/1.73 SQUARE METER AND ALBUMINURIA CREATININE RATIO LESS THAN 30 MG/G (CMS/H*: ICD-10-CM

## 2024-06-10 LAB
ALBUMIN SERPL-MCNC: 4 G/DL (ref 3.5–5.2)
ALBUMIN UR-MCNC: 2.8 MG/DL
ANION GAP SERPL CALCULATED.3IONS-SCNC: 12.1 MMOL/L (ref 5–15)
BACTERIA UR QL AUTO: ABNORMAL /HPF
BILIRUB UR QL STRIP: NEGATIVE
BUN SERPL-MCNC: 11 MG/DL (ref 8–23)
BUN/CREAT SERPL: 9.2 (ref 7–25)
CALCIUM SPEC-SCNC: 8.7 MG/DL (ref 8.6–10.5)
CHLORIDE SERPL-SCNC: 105 MMOL/L (ref 98–107)
CLARITY UR: CLEAR
CO2 SERPL-SCNC: 22.9 MMOL/L (ref 22–29)
COLOR UR: YELLOW
CREAT SERPL-MCNC: 1.2 MG/DL (ref 0.76–1.27)
CREAT UR-MCNC: 43.6 MG/DL
EGFRCR SERPLBLD CKD-EPI 2021: 63.1 ML/MIN/1.73
GLUCOSE SERPL-MCNC: 132 MG/DL (ref 65–99)
GLUCOSE UR STRIP-MCNC: NEGATIVE MG/DL
HGB UR QL STRIP.AUTO: NEGATIVE
HYALINE CASTS UR QL AUTO: ABNORMAL /LPF
KETONES UR QL STRIP: NEGATIVE
LEUKOCYTE ESTERASE UR QL STRIP.AUTO: ABNORMAL
MICROALBUMIN/CREAT UR: 64.2 MG/G (ref 0–29)
NITRITE UR QL STRIP: NEGATIVE
PH UR STRIP.AUTO: 6 [PH] (ref 5–8)
PHOSPHATE SERPL-MCNC: 3.7 MG/DL (ref 2.5–4.5)
POTASSIUM SERPL-SCNC: 4.7 MMOL/L (ref 3.5–5.2)
PROT UR QL STRIP: NEGATIVE
RBC # UR STRIP: ABNORMAL /HPF
REF LAB TEST METHOD: ABNORMAL
SODIUM SERPL-SCNC: 140 MMOL/L (ref 136–145)
SP GR UR STRIP: 1.01 (ref 1–1.03)
SQUAMOUS #/AREA URNS HPF: ABNORMAL /HPF
UROBILINOGEN UR QL STRIP: ABNORMAL
WBC # UR STRIP: ABNORMAL /HPF

## 2024-06-10 PROCEDURE — 36415 COLL VENOUS BLD VENIPUNCTURE: CPT

## 2024-06-10 PROCEDURE — 84165 PROTEIN E-PHORESIS SERUM: CPT

## 2024-06-10 PROCEDURE — 81001 URINALYSIS AUTO W/SCOPE: CPT

## 2024-06-10 PROCEDURE — 82043 UR ALBUMIN QUANTITATIVE: CPT

## 2024-06-10 PROCEDURE — 82570 ASSAY OF URINE CREATININE: CPT

## 2024-06-10 PROCEDURE — 80069 RENAL FUNCTION PANEL: CPT

## 2024-06-10 PROCEDURE — 84155 ASSAY OF PROTEIN SERUM: CPT

## 2024-06-11 LAB
ALBUMIN SERPL ELPH-MCNC: 3.6 G/DL (ref 2.9–4.4)
ALBUMIN/GLOB SERPL: 1.8 {RATIO} (ref 0.7–1.7)
ALPHA1 GLOB SERPL ELPH-MCNC: 0.2 G/DL (ref 0–0.4)
ALPHA2 GLOB SERPL ELPH-MCNC: 0.6 G/DL (ref 0.4–1)
B-GLOBULIN SERPL ELPH-MCNC: 0.7 G/DL (ref 0.7–1.3)
GAMMA GLOB SERPL ELPH-MCNC: 0.5 G/DL (ref 0.4–1.8)
GLOBULIN SER CALC-MCNC: 2 G/DL (ref 2.2–3.9)
LABORATORY COMMENT REPORT: ABNORMAL
M PROTEIN SERPL ELPH-MCNC: ABNORMAL G/DL
PROT SERPL-MCNC: 5.6 G/DL (ref 6–8.5)

## 2024-06-19 ENCOUNTER — DOCUMENTATION (OUTPATIENT)
Dept: ONCOLOGY | Facility: CLINIC | Age: 74
End: 2024-06-19
Payer: MEDICARE

## 2024-06-19 ENCOUNTER — TRANSCRIBE ORDERS (OUTPATIENT)
Dept: ADMINISTRATIVE | Facility: HOSPITAL | Age: 74
End: 2024-06-19
Payer: MEDICARE

## 2024-06-19 DIAGNOSIS — N18.31 CHRONIC KIDNEY DISEASE (CKD) STAGE G3A/A1, MODERATELY DECREASED GLOMERULAR FILTRATION RATE (GFR) BETWEEN 45-59 ML/MIN/1.73 SQUARE METER AND ALBUMINURIA CREATININE RATIO LESS THAN 30 MG/G (CMS/H*: Primary | ICD-10-CM

## 2024-06-25 ENCOUNTER — HOSPITAL ENCOUNTER (OUTPATIENT)
Dept: ULTRASOUND IMAGING | Facility: HOSPITAL | Age: 74
Discharge: HOME | End: 2024-06-25
Admitting: INTERNAL MEDICINE
Payer: MEDICARE

## 2024-06-25 DIAGNOSIS — N18.31 CHRONIC KIDNEY DISEASE (CKD) STAGE G3A/A1, MODERATELY DECREASED GLOMERULAR FILTRATION RATE (GFR) BETWEEN 45-59 ML/MIN/1.73 SQUARE METER AND ALBUMINURIA CREATININE RATIO LESS THAN 30 MG/G (CMS/H*: ICD-10-CM

## 2024-06-25 PROCEDURE — 76775 US EXAM ABDO BACK WALL LIM: CPT

## 2024-06-26 ENCOUNTER — OFFICE VISIT (OUTPATIENT)
Dept: ONCOLOGY | Facility: CLINIC | Age: 74
End: 2024-06-26
Payer: MEDICARE

## 2024-06-26 ENCOUNTER — HOSPITAL ENCOUNTER (OUTPATIENT)
Dept: ONCOLOGY | Facility: HOSPITAL | Age: 74
Discharge: HOME | End: 2024-06-26
Admitting: INTERNAL MEDICINE
Payer: MEDICARE

## 2024-06-26 VITALS
TEMPERATURE: 98.1 F | WEIGHT: 150.8 LBS | BODY MASS INDEX: 25.12 KG/M2 | HEART RATE: 71 BPM | HEIGHT: 65 IN | DIASTOLIC BLOOD PRESSURE: 71 MMHG | OXYGEN SATURATION: 91 % | SYSTOLIC BLOOD PRESSURE: 147 MMHG | RESPIRATION RATE: 14 BRPM

## 2024-06-26 VITALS — BODY MASS INDEX: 25.09 KG/M2 | HEIGHT: 65 IN

## 2024-06-26 DIAGNOSIS — C15.5 PRIMARY MALIGNANT NEOPLASM OF LOWER THIRD OF ESOPHAGUS: ICD-10-CM

## 2024-06-26 DIAGNOSIS — C16.0 MALIGNANT NEOPLASM OF CARDIA: Primary | ICD-10-CM

## 2024-06-26 LAB
ALP BLD-CCNC: 44 U/L (ref 53–128)
BASOPHILS # BLD AUTO: 0.03 10*3/MM3 (ref 0–0.2)
BASOPHILS NFR BLD AUTO: 0.7 % (ref 0–1.5)
BUN BLDA-MCNC: 15 MG/DL (ref 7–22)
CALCIUM BLD QL: 8.3 MG/DL (ref 8–10.3)
CHLORIDE BLDA-SCNC: 107 MMOL/L (ref 98–108)
CO2 BLDA-SCNC: 26 MMOL/L (ref 18–33)
CREAT BLDA-MCNC: 1.1 MG/DL (ref 0.6–1.2)
DEPRECATED RDW RBC AUTO: 67.3 FL (ref 37–54)
EGFRCR SERPLBLD CKD-EPI 2021: 70 ML/MIN/1.73
EOSINOPHIL # BLD AUTO: 0.11 10*3/MM3 (ref 0–0.4)
EOSINOPHIL NFR BLD AUTO: 2.7 % (ref 0.3–6.2)
ERYTHROCYTE [DISTWIDTH] IN BLOOD BY AUTOMATED COUNT: 19.1 % (ref 12.3–15.4)
GLUCOSE BLDC GLUCOMTR-MCNC: 179 MG/DL (ref 73–118)
HCT VFR BLD AUTO: 31.4 % (ref 37.5–51)
HGB BLD-MCNC: 9.8 G/DL (ref 13–17.7)
LYMPHOCYTES # BLD AUTO: 1.11 10*3/MM3 (ref 0.7–3.1)
LYMPHOCYTES NFR BLD AUTO: 27.5 % (ref 19.6–45.3)
MCH RBC QN AUTO: 30.6 PG (ref 26.6–33)
MCHC RBC AUTO-ENTMCNC: 31.2 G/DL (ref 31.5–35.7)
MCV RBC AUTO: 98.1 FL (ref 79–97)
MONOCYTES # BLD AUTO: 0.73 10*3/MM3 (ref 0.1–0.9)
MONOCYTES NFR BLD AUTO: 18.1 % (ref 5–12)
NEUTROPHILS NFR BLD AUTO: 2.05 10*3/MM3 (ref 1.7–7)
NEUTROPHILS NFR BLD AUTO: 51 % (ref 42.7–76)
PLATELET # BLD AUTO: 233 10*3/MM3 (ref 140–450)
PMV BLD AUTO: 10.4 FL (ref 6–12)
POC ALBUMIN: 3.3 G/L (ref 3.3–5.5)
POC ALT (SGPT): 17 U/L (ref 10–47)
POC AST (SGOT): 25 U/L (ref 11–38)
POC TOTAL BILIRUBIN: 0.6 MG/DL (ref 0.2–1.6)
POC TOTAL PROTEIN: 6.2 G/DL (ref 6.4–8.1)
POTASSIUM BLDA-SCNC: 4.9 MMOL/L (ref 3.6–5.1)
RBC # BLD AUTO: 3.2 10*6/MM3 (ref 4.14–5.8)
SODIUM BLD-SCNC: 140 MMOL/L (ref 128–145)
WBC NRBC COR # BLD AUTO: 4.03 10*3/MM3 (ref 3.4–10.8)

## 2024-06-26 PROCEDURE — 96367 TX/PROPH/DG ADDL SEQ IV INF: CPT

## 2024-06-26 PROCEDURE — 25810000003 SODIUM CHLORIDE 0.9 % SOLUTION 500 ML FLEX CONT: Performed by: NURSE PRACTITIONER

## 2024-06-26 PROCEDURE — 1126F AMNT PAIN NOTED NONE PRSNT: CPT | Performed by: INTERNAL MEDICINE

## 2024-06-26 PROCEDURE — G2211 COMPLEX E/M VISIT ADD ON: HCPCS | Performed by: INTERNAL MEDICINE

## 2024-06-26 PROCEDURE — G0498 CHEMO EXTEND IV INFUS W/PUMP: HCPCS

## 2024-06-26 PROCEDURE — 96375 TX/PRO/DX INJ NEW DRUG ADDON: CPT

## 2024-06-26 PROCEDURE — 25010000002 DEXAMETHASONE SODIUM PHOSPHATE 120 MG/30ML SOLUTION: Performed by: NURSE PRACTITIONER

## 2024-06-26 PROCEDURE — 80053 COMPREHEN METABOLIC PANEL: CPT

## 2024-06-26 PROCEDURE — 25810000003 SODIUM CHLORIDE 0.9 % SOLUTION 250 ML FLEX CONT: Performed by: NURSE PRACTITIONER

## 2024-06-26 PROCEDURE — 99215 OFFICE O/P EST HI 40 MIN: CPT | Performed by: INTERNAL MEDICINE

## 2024-06-26 PROCEDURE — 0 DEXTROSE 5 % SOLUTION: Performed by: NURSE PRACTITIONER

## 2024-06-26 PROCEDURE — 25010000002 FLUOROURACIL PER 500 MG: Performed by: NURSE PRACTITIONER

## 2024-06-26 PROCEDURE — 85025 COMPLETE CBC W/AUTO DIFF WBC: CPT | Performed by: INTERNAL MEDICINE

## 2024-06-26 PROCEDURE — 96365 THER/PROPH/DIAG IV INF INIT: CPT

## 2024-06-26 PROCEDURE — 25010000002 LEUCOVORIN CALCIUM PER 50 MG: Performed by: NURSE PRACTITIONER

## 2024-06-26 RX ORDER — DIPHENHYDRAMINE HYDROCHLORIDE 50 MG/ML
50 INJECTION INTRAMUSCULAR; INTRAVENOUS AS NEEDED
Status: CANCELLED | OUTPATIENT
Start: 2024-06-26

## 2024-06-26 RX ORDER — DEXTROSE MONOHYDRATE 50 MG/ML
250 INJECTION, SOLUTION INTRAVENOUS ONCE
Status: COMPLETED | OUTPATIENT
Start: 2024-06-26 | End: 2024-06-26

## 2024-06-26 RX ORDER — FAMOTIDINE 10 MG/ML
20 INJECTION, SOLUTION INTRAVENOUS AS NEEDED
Status: CANCELLED | OUTPATIENT
Start: 2024-06-26

## 2024-06-26 RX ORDER — DEXTROSE MONOHYDRATE 50 MG/ML
250 INJECTION, SOLUTION INTRAVENOUS ONCE
Status: CANCELLED | OUTPATIENT
Start: 2024-06-26

## 2024-06-26 RX ADMIN — FLUOROURACIL 4130 MG: 50 INJECTION, SOLUTION INTRAVENOUS at 14:04

## 2024-06-26 RX ADMIN — SODIUM CHLORIDE 690 MG: 9 INJECTION, SOLUTION INTRAVENOUS at 13:09

## 2024-06-26 RX ADMIN — DEXTROSE MONOHYDRATE 250 ML: 50 INJECTION, SOLUTION INTRAVENOUS at 12:47

## 2024-06-26 RX ADMIN — DEXAMETHASONE SODIUM PHOSPHATE 12 MG: 4 INJECTION, SOLUTION INTRA-ARTICULAR; INTRALESIONAL; INTRAMUSCULAR; INTRAVENOUS; SOFT TISSUE at 12:49

## 2024-06-26 NOTE — PROGRESS NOTES
Pt here today for md appt/C21D1. [facility] infusaport accessed per protocol. 10cc wasted. CBC/CMP drawn and resulted. Chemo infused as directed without adverse reaction. AVS printed.

## 2024-06-28 ENCOUNTER — HOSPITAL ENCOUNTER (OUTPATIENT)
Dept: ONCOLOGY | Facility: HOSPITAL | Age: 74
Discharge: HOME | End: 2024-06-28
Payer: MEDICARE

## 2024-06-28 VITALS — TEMPERATURE: 97.6 F | HEART RATE: 66 BPM | DIASTOLIC BLOOD PRESSURE: 78 MMHG | SYSTOLIC BLOOD PRESSURE: 157 MMHG

## 2024-06-28 DIAGNOSIS — C16.0 MALIGNANT NEOPLASM OF CARDIA: Primary | ICD-10-CM

## 2024-06-28 DIAGNOSIS — D50.9 IRON DEFICIENCY ANEMIA, UNSPECIFIED IRON DEFICIENCY ANEMIA TYPE: ICD-10-CM

## 2024-06-28 DIAGNOSIS — K90.9 MALABSORPTION OF IRON: ICD-10-CM

## 2024-06-28 DIAGNOSIS — C15.5 PRIMARY MALIGNANT NEOPLASM OF LOWER THIRD OF ESOPHAGUS: ICD-10-CM

## 2024-06-28 PROCEDURE — 25810000003 SODIUM CHLORIDE 0.9 % SOLUTION: Performed by: INTERNAL MEDICINE

## 2024-06-28 PROCEDURE — 96365 THER/PROPH/DIAG IV INF INIT: CPT

## 2024-06-28 PROCEDURE — 25010000002 FERUMOXYTOL 510 MG/17ML SOLUTION 17 ML VIAL: Performed by: INTERNAL MEDICINE

## 2024-06-28 RX ORDER — SODIUM CHLORIDE 0.9 % (FLUSH) 0.9 %
20 SYRINGE (ML) INJECTION AS NEEDED
Status: CANCELLED | OUTPATIENT
Start: 2024-06-28

## 2024-06-28 RX ORDER — SODIUM CHLORIDE 9 MG/ML
20 INJECTION, SOLUTION INTRAVENOUS ONCE
Status: COMPLETED | OUTPATIENT
Start: 2024-06-28 | End: 2024-06-28

## 2024-06-28 RX ORDER — SODIUM CHLORIDE 0.9 % (FLUSH) 0.9 %
20 SYRINGE (ML) INJECTION AS NEEDED
Status: DISCONTINUED | OUTPATIENT
Start: 2024-06-28 | End: 2024-06-29 | Stop reason: HOSPADM

## 2024-06-28 RX ORDER — SODIUM CHLORIDE 0.9 % (FLUSH) 0.9 %
20 SYRINGE (ML) INJECTION AS NEEDED
OUTPATIENT
Start: 2024-06-28

## 2024-06-28 RX ADMIN — Medication 20 ML: at 14:40

## 2024-06-28 RX ADMIN — SODIUM CHLORIDE 20 ML/HR: 9 INJECTION, SOLUTION INTRAVENOUS at 14:11

## 2024-06-28 RX ADMIN — FERUMOXYTOL 510 MG: 510 INJECTION INTRAVENOUS at 14:11

## 2024-06-28 NOTE — PROGRESS NOTES
Pt here for pump DC only.  Upon review of the chart was found that Dr. Elizondo ordered a 1 time dose of feraheme.  Patient would like to get the iron today as [place].  Appt made for infusion

## 2024-06-28 NOTE — PROGRESS NOTES
Pt here for CIV DC and to receive D1 Feraheme,  pt declines need for IVF,  Feraheme administered per MAR and pt tolerated well.  Pt declined observation and discharged.

## 2024-07-01 RX ORDER — ONDANSETRON 4 MG/1
TABLET, FILM COATED ORAL
Qty: 90 TABLET | Refills: 0 | Status: SHIPPED | OUTPATIENT
Start: 2024-07-01

## 2024-07-11 ENCOUNTER — HOSPITAL ENCOUNTER (OUTPATIENT)
Dept: PET IMAGING | Facility: HOSPITAL | Age: 74
Discharge: HOME | End: 2024-07-11
Admitting: INTERNAL MEDICINE
Payer: MEDICARE

## 2024-07-11 DIAGNOSIS — C15.5 PRIMARY MALIGNANT NEOPLASM OF LOWER THIRD OF ESOPHAGUS: ICD-10-CM

## 2024-07-11 PROCEDURE — 71250 CT THORAX DX C-: CPT

## 2024-07-12 NOTE — PROGRESS NOTES
Hematology-Oncology Follow-up Note       Artie Mc  1950    Primary Care Physician: Chanel Carter APRN  Referring Physician: Chanel Carter APRN    Reason For Visit:  Chief Complaint   Patient presents with    Follow-up     Malignant neoplasm of cardia         Metastatic esophageal cancer  Monitoring for adverse effects related to immunotherapy.  Hypothyroidism    HPI:   Mr. Mc is a pleasant 74 y.o. gentleman with a history of gastroesophageal reflux disease, diabetes, hypertension, heart block status post pacemaker placement and history of CABG.  He was having symptoms of dysphagia, weight loss since September 2016. He was reporting symptoms of food getting stuck in the lower chest.  He has transitioned himself to a liquid diet.  The patient reports losing about 30 pounds over this duration.  The patient underwent upper GI endoscopy on 12/20/16 with Dr. Esvin Barrera.  Endoscopy showed necrotic, circumferential and moderately obstructive mass at the distal esophagus between 42 cm and 36 cm.      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  There was also evidence of mild subacute inflammation in the duodenum.  Dr. Barrera ordered a CT scan of the chest and abdomen with contrast on 12/20/16.  The scan was done at [facility].  The scan showed a new ill-defined mass in the anterior wall of the distal esophagus at the GE junction measuring 2.3 x 2.2 x 1.6 cm, worrisome for cancer.  There were four subcentimeter liver lesions which had appeared stable in comparison to another CT scan from 2009 and they likely represent small cysts.  There was also mild lymphadenopathy in the gastrohepatic ligament.  There were at least six borderline enlarged lymph nodes measuring up to 1.3 x 1 cm in the region.  The patient is referred to us for further oncological evaluation.    1/5/2017 - The patient presents for  initial consultation.  He reports persistent dysphagia and also has symptoms of regurgitation.  He cannot tolerate solid foods and is dependent on liquid diet such as Ensure.  He also reports fatigue. Symptoms of dysphagia have been present for the past four months.  When he eats any solids, the food gets stuck in the lower chest.    1/5/17 - Vitamin B12 200 (L).  CEA 0.8.  Ferritin 35.  Iron saturation 16% (L), serum iron 56, TIBC 354.  Creatinine 0.9.  LFTs normal.  Folate 10.2.  1/12/17 - PET/CT scan:  Intense abnormal activity corresponding to the region of the GE junction and proximal stomach.  SUV is 11.06.  There is a suggestion of an abnormal mass or abnormal wall thickening in the region measuring 3.9 x 4.8 cm.  No additional abnormalities.  No evidence of metastases or other lymphadenopathy.  Scattered nonspecific subcentimeter lymph nodes involving the mediastinum and within the central upper abdomen.  1/13/17 - Creatinine 0.9.  LFTs normal.    1/17/17 - Patient seen by thoracic surgeon, Dr. Bradley.  PET scan was reviewed.  He has recommended neoadjuvant chemoradiation therapy followed by Morgan Gurwinder esophagogastrectomy.  Port-A-Cath is being arranged.    1/20/17 - WBC 6.3, hemoglobin 12.1, platelet count 198,000, MCV 86.4.  1/30/17 - Patient started weekly Carboplatin and Taxol (Carboplatin AUC 2 and Taxol 50 mg/M2).  Patient received Injectafer 750 mg.  WBC 5.8, hemoglobin 12.4, platelet count 244,000.   1/31/17 - Patient seen by Dr. Eduardo Oleary.  The plan was to give 5040 cGy in 28 fractions.   2/6/17 - WBC 4.4, hemoglobin 11.6, platelet count 201,000.  Patient received cycle 2 of weekly Carboplatin and Taxol (Carboplatin AUC 2 and Taxol 50 mg/M2).  2/13/17 - Patient received Carboplatin and Taxol weekly cycle 3.  2/13/17 - Patient started concurrent radiation therapy.  Total planned dose is 4140 cGy.    2/13/17 to 2/15/17 - Patient received 4140 cGy of neoadjuvant radiation.  Radiation was finished  on 3/15/17.    2/20/17 - WBC 1.8, hemoglobin 10.6, platelet count 217,000.  Creatinine 0.5. Folate 15 (L).  Ferritin 361.  Haptoglobin 214.  Iron saturation 33%, TIBC 263, serum iron 88.    2/20/17 - Patient received Carboplatin and Taxol weekly cycle 4.   2/27/17 - WBC 3.7, hemoglobin 10.7, platelet count 177,000, MCV 85.3.    3/6/17 - Patient has received 16 out of the 23 planned radiation treatments.  Total planned dose is 4140 cGy.    3/6/17 - WBC 5.4, hemoglobin 11.2, platelet count 113,000.  3/6/17 - Patient received cycle 5 of weekly Carboplatin and Taxol.   3/13/17 - Patient received cycle 6 of weekly Carboplatin and Taxol.  WBC 3.9, hemoglobin 11.3, platelet count 93,000.     3/20/17 - WBC 1.4, hemoglobin 10.0, platelet count 118,000, MCV 86.1   3/27/17 - WBC 3.8, hemoglobin 9.5, platelet count 176,000, MCV 87.3.    4/7/17 - Stress test:  No evidence of reversible myocardial ischemia.  Normal left ventricular ejection fraction of 50%.    4/17/17 - Upper GI endoscopy by Dr. 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  The GE junction adenocarcinoma lesion seems to have a very good response to chemotherapy and radiation.  The lumen was open.  5/1/17 - WBC 7.0, hemoglobin 10.2, platelet count 175,000.    5/4/17 - Patient underwent [place] Gurwinder esophagectomy with pyloroplasty, feeding jejunostomy tube, abdominal and mediastinal lymph node dissection.    Surgical Pathology:  Moderately to poorly differentiated adenocarcinoma measuring 1.3 cm at the GE junction.  Resection margins are negative for malignancy.  Tumor margins are negative.  There is effective treatment response.  No evidence of lymphovascular invasion.  Staging is ypT3,pN1.  One out of fourteen lymph nodes involved.   5/22/17 - WBC 6.8, hemoglobin 10.3, platelet count 312,000.    6/19/17 - WBC 5.7, hemoglobin 10.5, platelet count 204,000, MCV 92.1.    7/6/17 - EGD:  Status post balloon dilation of esophageal  stricture.  7/12/17 - EGD with balloon dilation of esophageal stricture.  7/26/17 - EGD and balloon dilation of esophageal stricture.  7/31/17 - PET/CT scan:  There is mild metabolic activity seen at the thoracic esophagus just at and below the surgical margins.  Some mild wall thickening.  This could be from recent surgery.  A residual cancer seems unlikely.  There is a precarinal lymph node with mild metabolic activity with SUV of 3 measuring 1 x 1.3 cm.  Again this appears to be nonspecific in my opinion.    8/7/17 - WBC 5.9, hemoglobin 11.9, platelet count 171,000, MCV 88.7.    10/19/17 - WBC 7.5, hemoglobin 11.8, platelet count 216,000.    1/8/18 - PET scan:  No evidence of residual disease or recurrent disease.  There is an esophageal stent in the mid esophagus with a large debris air level.  No evidence of any metastases in the chest, abdomen or pelvis.  Mild nodule uptake in the vocal cords with fullness in the right vocal cord.  This could be physiologic.    7/9/18 - CT scan of chest with contrast:  Prominent mediastinal lymph nodes appear stable since February.  Changes of gastric pull-through procedure for esophageal cancer again noted.  Tree-in-bud infiltrates in the left lung base, consistent with infection versus inflammation.    1/10/19 - CT chest, abdomen and pelvis with contrast:  No evidence of mets in the chest; however, interval development of metastatic lymphadenopathy in the left side of the retroperitoneum.  For instance, there is a 17 mm rounded lymph node, 10 mm lymph node and also a 14 mm lymph node.  These are all new.  Stable 9 mm hypodense lesion within the right hepatic lobe, which could reflect hemangioma or complex cyst.    1/30/19 - CT-guided core biopsy of retroperitoneal lymph node:  Poorly differentiated adenocarcinoma consistent with metastases from patient’s known esophageal primary.  CK20 positive, CDX2 positive, cytokeratin 7 negative, TTF-1 negative.    2/1/19 - Creatinine  0.9, BUN 18, calcium 9.3; these are normal.    2/28/19 - [employer] Next Gen sequencing testing on retroperitoneal lymph node specimen:  PD-L1 positive.  CPS = 3.  This implies responsiveness to pembrolizumab.  Mismatch repair status is proficient (no evidence of microsatellite instability).  Tumor mutational burden is low = 6 mutations/Mb.  CDH1 indeterminate.  KRAS mutation positive.  TP53 mutation positive.  CDK6 amplified.  [employer] PD-L1 testing by IHC:  PD-L1 expression 1% positive (CPS =1).  HER2/marvel by IHC is negative.  HER2/marvel by CISH not amplified.  Microsatellite stable tumor.    3/7/19 - WBC 7.3, hemoglobin 12, platelet count 128,000, MCV 92.     3/14/19 - Patient was seen by radiation oncologist, Dr. Chahal.  He has recommended observation versus systemic chemoimmunotherapy as needed.  No plans for radiation therapy.   5/1/19 - CT scan of chest with contrast:  No definitive findings of new metastatic disease in the chest.  Emphysema noted.    5/1/19 - CT abdomen and pelvis:  There is interval progression of metastatic retroperitoneal adenopathy.  Left periaortic adenopathy with lymph node measuring up to 2 cm, previously 1.7 cm.  Another lymph node now measures 2.3, previously 1.4 cm.  New enlarged lymph node on image 147 measures 1.4 cm, previously 0.4 cm.  A 9 mm hypodense right hepatic lobe lesion appears stable.    5/9/19 - Decision made to start patient on immunotherapy Keytruda.    5/9/19 - Vitamin B12 229.  Ferritin 61.  Folate 14.3.  Iron saturation 26%, TIBC 304, serum iron 79.  5/31/19 - Patient received Keytruda 200 mg cycle 1, day 1.    5/31/19 - Free T4 0.99.  Creatinine 0.9, BUN 9.  LFTs normal.  TSH 3.99.  Folate 14.3.  Iron saturation 26%.    6/21/2019 patient received Keytruda cycle 2  7/12/2019: Patient received Keytruda cycle 3  8/2/2019 patient received cycle 4 Keytruda WBC 7.2, hemoglobin 11.8 platelet count 163  8/23/2019 creatinine 0.9, LFTs normal, WBC 6.4, hemoglobin 11.5, platelets  179, TSH 2.1   8/23/2019 patient received cycle 5 of Keytruda  9/3/2019 CT chest abdomen and pelvis with contrast: . Positive response to therapy in the abdomen since 05/01/2019. Pathologically enlarged retroperitoneal lymph nodes, predominantly in the left periaortic region, have diminished in size.a 1.3 x 2.1 cm left periaortic node (image 60), previously measured 3.5 x 2.3 cm. A 1.6 cm index node near the level of the left renal vein previously measured 2.0 cm No new or progressive adenopathy. 2. Stable findings in the chest. Noncalcified right upper lobe nodules are unchanged. There is no evidence of new or progressive metastatic disease within the chest. 3. 8 mm indeterminate low-density lesion in the right hepatic lobe, is stable. No new liver lesions.  9/6/2019 WBC 5.6, hemoglobin 11.2, platelets of 192, MCV 91.8  9/13/2019 patient received Keytruda cycle 6, albumin 3.2  10/4/2019 patient received cycle 7 of Keytruda, TSH 4.8, free T4 0.86  10/9/2019 WBC 5.8, hemoglobin 11.7, platelets 174  10/25/2019 patient received cycle 8 of Keytruda.  WBC 5.9, hemoglobin 11.5, platelets 152, creatinine 0.75, cortisol 15.2, TSH 6.48 high , free T4 1.13 normal  11/6/2019 WBC 6.6, hemoglobin 11.8, MCV 91.5, platelets 187  11/15/2019 patient received cycle 9 of Keytruda, WBC 6.6, hemoglobin 11.9, platelets 161, cortisol level 8.86, TSH 2.68, free T3 2.8, free T4 1.5  12/4/2019 WBC 6.9, hemoglobin 12.0, platelets 180, MCV 92.9  12/06/2019-Keytruda Cycle 10  12/27/2019- Cycle 11 LFTs normal, WBC 6.7, hemoglobin 11.4, platelets 168, MCV 93, TSH 2.4,  1/28/2020: CT chest abdomen and pelvis with contrast:  Single (aortic lymph node in the abdominal retroperitoneum has increased.  Rest of the retroperitoneal lymph nodes have decreased in size.  Mediastinal adenopathy appears unchanged.  Noncalcified bilateral pulmonary nodules are stable  1/17/2020 patient received Keytruda cycle 12:.  2/3/2020 WBC 7.1, hemoglobin 12.2, platelets  171  02/10/2020 patient received cycle 13 of Keytruda: WBC 7.3, hemoglobin 12.1, platelets 166, creatinine 0.82  3/6/2020 patient is of Keytruda 200 mg, hemoglobin 10.8  3/27/2020 patient received Keytruda 200 mg, hemoglobin 11.4, TSH 1.95  4/17/2020 patient received Keytruda cycle 16 200 mg, WBC 6.1, hemoglobin 11.6, platelets 150  5/8/2020: Patient received cycle 17 Keytruda, WBC 6.3, hemoglobin 9.6, platelets 137, TSH 2.05, free T4 1.54, creatinine 0.93, LFTs normal,  5/29/2020: Patient received cycle 18 Keytruda, WBC 6.5, hemoglobin 11.7, platelets 129, creatinine 0.83, TSH 1.69  6/26/2020: Patient receiving Keytruda.   7/17/2020: TSH 2.06, WBC 5.7, hemoglobin 11.4, platelets 177, MCV 92.4, Keytruda 200 mg cycle 20  7/27/2020: CT chest abdomen pelvis with contrast: Stable findings in the chest abdomen pelvis since 1/28/2020.  Noncalcified bilateral pulmonary nodules are stable.  Stable retroperitoneal left perinephric adenopathy in the abdomen.  8/7/2020: Patient received cycle 21 of Keytruda  8/28/2020 patient is a cycle 22 of Keytruda.  WBC 6.02, hemoglobin 11.3, platelets 161  9/11/2020 WBC 6.9, hemoglobin 12.4, platelets 192  9/18/2020: Cycle 23 of Keytruda, TSH 1.19  10/9/2020: Cycle 24 of Keytruda, WBC 5.9, hemoglobin 11.9, platelets 178, creatinine 0.84, TSH 2.56, free T3 2.4, CMP normal  10/23/2020: WBC 7.34, hemoglobin 11.6, platelets 175  10/30/2020:.  Keytruda 200 mg  11/20/2020: Keytruda 200 mg  12/11/2020 Keytruda 200 mg.  WBC 5.5, hemoglobin 11.4, platelets 176, creatinine 0.8, LFTs normal, TSH 2.2, cortisol 8.3  12/10/2020: CT chest abdomen and pelvis with contrast: Previously seen left para-aortic lymph nodes within the abdomen are decreased in size.  No pathological lymphadenopathy.  No evidence of residual malignancy..  Small noncalcified pulmonary nodules are stable.  No new nodules.  Stable mediastinal lymph nodes.  3/12/2021: WBC 6.7, hemoglobin 11.3, platelets 17  3/12/2021: CMP normal, TSH  2.9, cortisol 11.95, 1  3/15/2021: CT chest with contrast: Stable findings in the chest with postsurgical changes of esophageal resection and gastric pull-through.  Stable right lower lobe lung nodule measures 11 mm.  Stable size and appearance of the mediastinal and upper abdominal lymph nodes.  3/23/2021: Doppler of upper extremity: Normal.  3/24/2021: CT abdomen and pelvis with contrast: Postoperative changes.  Left periaortic nodes are stable.  4/12/2021: TSH 4.1, cortisol 12.4  6/22/2021: CMP normal,Cortisol 10.08,, WBC 5.8, hemoglobin 11.2, platelets 109, TSH 2.59  7/12/2021: CT scan of the chest abdomen pelvis with contrast: Dominant 10 x 10 left periaortic lymph node is stable since 3/22/2021.  Dominant lymph nodes in the right lower paratracheal and AP window distribution are stable since 3/15/2021.  No evidence of progressive adenopathy in the chest abdomen or pelvis.    01/11/2022 -PET/CT findings consistent with new cedric metastatic disease in the retroperitoneum.  2 new pathologically enlarged hypermetabolic retroperitoneal lymph nodes.  No convincing evidence of recurrent disease within the chest neck pelvis or skeleton.  1/20/2022 -CT-guided needle biopsy consistent with poorly differentiated carcinoma.  CDX2 positive consistent with metastases from known esophageal primary  2/9/2022 - Pembrolizumab complicated with fatigue, joint pains, nausea, diarrhea. Symptoms decrease in intensity over a week.  3/30/2022 -CT chest and pelvis with stable metastatic lymph nodes in the left periaortic retroperitoneum.  No evidence of disease progression or new lesions.  Stable lymphadenopathy in the distal paratracheal mediastinal area  4/13/2022 - Keytruda 200  5/4/2022 - Keytruda 200  Continued keytruda  7/11/2022 - CT abdomen pelvis with decrease in size of left perioaortic lymph node. No other areas of disease.  Continues to be on Keytruda  10/18/2022 - stable CT imaging  1/20/2023 - CT imaging with stable  disease. No significant change.  Patient was hospitalized with esophagitis, subsequent EGD with improvement, no recurrence of disease.  2/21/2023 - Keytruda  CT CAP with increased retroperitoneal adenopathy.   ECHO with EF 35-40% (concern for immunotherapy related )immunotherapy held with ongoing investigation for cause.  Subsequent improvement in EF to 45%  4/4/2023 - CT abdomen pelvis with interval retroperitoneal lymph node enlargement compatible with metastatic adenopathyl.  5/5/2023 - PET CT with mixed response. No clear evidence of progression  6/2/23 -started pembrolizumab 200 mg IV  Posttreatment had symptoms of chest tightness troponin and proBNP negative, echocardiogram with EF 40%  Subsequent cardiac cath with EF down to 30% elevated proBNP, troponin no significant obstructive disease in the graft vessels  Cardiac MRI unable to obtain given pacemaker noncompliant.  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 Two enlarged mediastinal lymph nodes in the precarinal and AP window distributions are unchanged since 4/4/2023. No new or progressive adenopathy in the chest.Stable 3 mm or less noncalcified right lung nodules since 4/4/2023. Surgical changes of distal esophagectomy with gastric pull-through, without evidence of local disease recurrence  8/29/23 - started FOLFOX had significant side effects  9/20/23 - stopped oxaliplatin and continued 5FU only'  11/29/23 - CT CAP with decrease size of retroperitoneal adenopathy, no other concerning findings.  Continues 5FU leucovorin   2/12/24 - PET CT There is some metabolic activity within a mediastinal lymph node which has been suggested with slightly decreased metabolic activity. Whether this is reactive or pathologic is  uncertain. Previously noted abnormal retroperitoneal lymph node is no longer identified continued treatmen  4/10/24 - iron sat 10, ferritin  14, plan for venofer  Continued treatment signatera negative  5/7/24 - CT chest without contrast. Unchanged mildly enlarged right paratracheal lymph lymph node measuring 1.1 cm in short axis (series 2 image 40) and left paratracheal measuring 1.1 cm in short axis (series 2 image 39). No new or progressive lymphadenopathy.  Continued maintenance 5FU  changed frequency to every 3 weeks.   7/11/2024 CT chest with stable lung nodule, mildly prominent precarinal lymph node      Subjective:  Still has ongoing fatigue with maintenance chemotherapy    Past Medical History:   Diagnosis Date    B12 deficiency     Blockage of coronary artery of heart     Depression     Disease of thyroid gland     DM (diabetes mellitus)     Dysphagia     Esophageal cancer     GERD (gastroesophageal reflux disease)     HTN (hypertension)     Hyperlipidemia        Past Surgical History:   Procedure Laterality Date    ABDOMINAL WALL ABSCESS INCISION AND DRAINAGE  10/22/2018    WITH DEBRIDEMENT  1.5CM X 1.5 CM X 1.5 CM    DR. PURI    CARDIAC CATHETERIZATION Right 7/20/2023    Procedure: Left Heart Cath;  Surgeon: Mazin Simmons MD;  Location: [facility] CATH INVASIVE LOCATION;  Service: Cardiovascular;  Laterality: Right;    CATARACT EXTRACTION  2018    COLONOSCOPY      CORONARY ARTERY BYPASS GRAFT  2015    ENDOSCOPY  12/20/2016    ENDOSCOPY N/A 01/02/2023    Procedure: ESOPHAGOGASTRODUODENOSCOPY;  Surgeon: Esvin Morgan MD;  Location: [facility] ENDOSCOPY;  Service: Gastroenterology;  Laterality: N/A;  post: anastamosis stricture    ENDOSCOPY N/A 02/09/2023    Procedure: ESOPHAGOGASTRODUODENOSCOPY, non-wire guided balloon dilation (12-14mm) of esophageal stricture;  Surgeon: Tino Villafana MD;  Location: [facility] ENDOSCOPY;  Service: Gastroenterology;  Laterality: N/A;  post: esophageal  anastamotic stricture, post surgical changes    ESOPHAGECTOMY  05/04/2017    BROOKS PETTY ESOPHAGECTOMY, FEEDING JEJNOSTOMOY, ABDOMINAL AND MEDIASTINAL LYMPH NODE DISECTION, PEDICLED MUSCLE FLAP COVERAGE DR. PURI    ESOPHAGOSCOPY / EGD  07/12/2017    WITH BALLOON DILATION    ESOPHAGOSCOPY / EGD  07/26/2017    WITH BALLOON DILATION    ESOPHAGOSCOPY / EGD  08/11/2017    WITH BALLOON DILATION    ESOPHAGOSCOPY / EGD  08/28/2017    WITH BALLOON DILATION    ESOPHAGOSCOPY / EGD  09/27/2017    WITH BALLOON DILATION    INSERT / REPLACE / REMOVE PACEMAKER      LYMPH NODE BIOPSY  01/30/2019    PACEMAKER IMPLANTATION  11/21/2016       Current Outpatient Medications:     clonazePAM (KlonoPIN) 0.5 MG tablet, Take 0.5 tablets by mouth 2 (Two) Times a Day As Needed for Anxiety., Disp: , Rfl: 1    cyanocobalamin (VITAMIN B-12) 1000 MCG tablet, Take 1 tablet by mouth Daily., Disp: , Rfl:     folic acid (FOLVITE) 1 MG tablet, Take 1 tablet by mouth Daily., Disp: , Rfl:     levothyroxine (SYNTHROID, LEVOTHROID) 50 MCG tablet, Take 1 tablet by mouth Daily., Disp: 90 tablet, Rfl: 0    losartan (COZAAR) 25 MG tablet, Take 1 tablet by mouth Every Night., Disp: , Rfl:     magic mouthwash oral suspension, , Disp: , Rfl:     metFORMIN (GLUCOPHAGE) 1000 MG tablet, Take 0.5 tablets by mouth Daily. (Patient taking differently: Take 0.5 tablets by mouth 2 (Two) Times a Day With Meals.), Disp: 60 tablet, Rfl: 2    nebivolol (BYSTOLIC) 5 MG tablet, Take 1 tablet by mouth Daily., Disp: , Rfl:     ondansetron (ZOFRAN) 4 MG tablet, TAKE 1 TABLET BY MOUTH EVERY 8 HOURS AS NEEDED FOR NAUSEA FOR VOMITING, Disp: 90 tablet, Rfl: 0    ondansetron (ZOFRAN) 8 MG tablet, Take 1 tablet by mouth 3 (Three) Times a Day As Needed for Nausea or Vomiting., Disp: 30 tablet, Rfl: 5    pantoprazole (PROTONIX) 40 MG EC tablet, Take 1 tablet by mouth 2 (Two) Times a Day., Disp: , Rfl:     pravastatin (PRAVACHOL) 10 MG tablet, Take 0.5 tablets by mouth Every Night.,  "Disp: , Rfl:     tamsulosin (FLOMAX) 0.4 MG capsule 24 hr capsule, Take 1 capsule by mouth 2 (Two) Times a Day., Disp: , Rfl:     Allergies   Allergen Reactions    Ace Inhibitors Unknown (See Comments)     Unknown reaction      Heparin Other (See Comments)     Fever/chills, weakness in legs    Sulfa Antibiotics Other (See Comments)     Mouth sores    Ciprofloxacin GI Intolerance     Pt reports pain to abd       Family History   Problem Relation Age of Onset    Mental illness Mother     Heart disease Mother     Hypertension Father     Cancer Father     Heart disease Sister     Heart disease Brother      Cancer-related family history includes Cancer in his father.    Social History     Tobacco Use    Smoking status: Former     Current packs/day: 0.00     Average packs/day: 2.0 packs/day for 50.0 years (100.0 ttl pk-yrs)     Types: Cigarettes     Start date: 1965     Quit date: 2015     Years since quittin.0    Smokeless tobacco: Former     Types: Chew     Quit date: 1989   Vaping Use    Vaping status: Never Used   Substance Use Topics    Alcohol use: No    Drug use: No     ROS:     Objective:    Vitals:    24 0931   BP: 125/72   Pulse: 69   Resp: 18   Temp: 97.6 °F (36.4 °C)   SpO2: 99%   Weight: 66.7 kg (147 lb)   Height: 165.1 cm (65\")   PainSc: 0-No pain         (1) Restricted in physically strenuous activity, ambulatory and able to do work of light nature    Physical Exam:     Physical Exam   Constitutional: He is oriented to person, place, and time. He appears well-developed. No distress.   HENT:   Head: Normocephalic and atraumatic.   Nose: Nose normal.   Mouth/Throat: Mucous membranes are moist.   Eyes: Pupils are equal, round, and reactive to light.   Neck: No thyromegaly present.   Cardiovascular: Normal rate, regular rhythm, normal heart sounds and normal pulses. Exam reveals no gallop and no friction rub.   Pulmonary/Chest: Effort normal and breath sounds normal.   Abdominal: Soft. " Normal appearance and bowel sounds are normal.   No tenderness   Musculoskeletal: Normal range of motion. No deformity or signs of injury.      Right lower leg: No edema.   Neurological: He is alert and oriented to person, place, and time. He exhibits normal muscle tone.   Skin: Skin is warm and dry. No rash noted. He is not diaphoretic. No erythema. No jaundice.   Right side chest  port       Lab Results - Last 18 Months   Lab Units 07/17/24  0951 06/26/24  1151 06/05/24  1022   WBC 10*3/mm3 4.08 4.03 3.50   HEMOGLOBIN g/dL 10.6* 9.8* 9.4*   HEMATOCRIT % 33.4* 31.4* 30.2*   PLATELETS 10*3/mm3 136* 233 206   MCV fL 97.1* 98.1* 98.4*     Lab Results - Last 18 Months   Lab Units 07/17/24  1017 07/17/24  0951 06/26/24  1158 06/10/24  1118 05/15/24  1020 05/01/24  0842 04/03/24  1321 03/20/24  1309   SODIUM mmol/L  --  139  --  140  --  141  --  139   POTASSIUM mmol/L  --  4.9  --  4.7  --  4.0  --  4.6   CHLORIDE mmol/L  --  105  --  105  --  109*  --  107   CO2 mmol/L  --  24.2  --  22.9  --  21.0*  --  22.0   BUN mg/dL  --  19  --  11  --  13  --  14   CREATININE mg/dL 1.20 1.46* 1.10 1.20   < > 1.42*   < > 1.47*   CALCIUM mg/dL  --  9.0  --  8.7  --  9.1  --  8.3*   BILIRUBIN mg/dL  --  0.3  --   --   --  0.4  --  0.3   ALK PHOS U/L  --  46  --   --   --  36*  --  40   ALT (SGPT) U/L  --  <5  --   --   --  5  --  5   AST (SGOT) U/L  --  18  --   --   --  13  --  12   GLUCOSE mg/dL  --  137*  --  132*  --  136*  --  211*    < > = values in this interval not displayed.     Assessment & Plan     Assessment:    Metastatic esophageal carcinoma:    Patient has a history of GE junction poorly differentiated adenocarcinoma for which he had chemoradiation followed by Sikes Gurwinder resection:  Pathology showed ypT3,pN1 disease.  Tumor was Stage IIIA, diagnosed in 2016.  He received concurrent chemoradiation with weekly Carboplatin and Taxol and radiation finishing on 3/15/17.  CT scan on 1/10/19 showed new retroperitoneal lymph  nodes.  These lymph nodes were biopsied on 1/30/19 and it shows metastatic esophageal cancer.  Caris Next Gen testing was performed on the sample and it shows KRAS mutation, microsatellite stable tumor.  PD-L1 is positive.  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  HER2/marvel (ERBB2) was not amplified and was negative.  His CT scan on 5/1/19 shows evidence of progression.  Patient has started receiving Keytruda on 5/31/19.  He has received 19 cycles so far.   CT scan on 1/28/2020 shows improvement and continued response.  CT scan chest abdomen pelvis with contrast on 12/10/2020 does not show any evidence of disease progression.  On 12/22/2020 decision made to hold Keytruda per patient's request to get a treatment break.. Repeat CT scan chest 7/12/2021 does not show any evidence of disease progression.  Now CT imaging in January 2021 with a CT showing increased abdominal left para-aortic adenopathy.  Subsequent PET/CT with new cedric metastatic disease in the retroperitoneum of the abdomen.  New pathologically enlarged hypermetabolic retroperitoneal lymph nodes.  This would be concerning for disease recurrence/progression.  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  I discussed with him the treatment options going forward.  He has had a good response with immunotherapy pembrolizumab in the past. This was  restarted. subsequent imaging with treatment response. 1/2023 imaging with good response. Continued treatment  Now with increased adenopathy 2 new lymph nodes. Holding immunotherapy for now.   Consider getting cardiac MR discussed with Dr. Culp. While immunotherapy on hold, increased adenopathy is concerning. Could be pseudoprogression vs true progression. PET CT reviewed could indicate pseudoprogression since he has had a good response to immunotherapy  immunotherapy was restarted and has been on hold with patient's symptomatology cardiac MRI cannot be obtained, no ischemic heart disease leading to  cardiomyopathy this most likely is related to Keytruda.  We will stop Keytruda going forward  CT imaging with stable disease, FOLFOX was started which he had significant side effects  With imaging being stable and significant side effects with FOLFOX, we discussed  about options with wait and watch vs 5FU only, patient prefers to be on maintenance.   We started 5FU only .   Tolerating relatively well, has significant fatigue. CT imaging now with ongoing response. We discussed that he would have been on treatment for 2 years following recurrence with good response. We can continue treatment now and get a PET CT in 2/2024.will repeat prior to follow up. , PET with some residual activity in the mediastinal lymph node.   We continued treatment and now signatera negative. I had a long discussion with the patient. We decided to repeat CT chest now and if no adenopathy, consider holding treatment.   Now CT chest repeated with persistent adenopathy. Discussed biopsy/mediastinoscopy vs continued treatment. Has fairly been tolerating well except fatigue. After discussion we decided to continue chemotherapy.  Now repeat CT imaging with no new concerning findings there is stable nodules which are subcentimeter in the lungs there is mildly prominent precarinal lymph node.  I discussed with the patient about this possibly being inflammatory rather than neoplastic.  After discussion we decided to proceed with EBUS and possible biopsy of the lymph node if negative for malignancy we can consider stopping maintenance treatment given Signatera is negative and patient has treatment related side effects which are affecting his quality of life    Possible myocarditis  Symptoms started after last immunotherapy  Treatment on hold for now, symptoms have improved, cardiac markers were negative, echocardiogram with EF 40%, cardiac MRI cannot be obtained cardiac cath with EF down to 30% management per cardiology  Now EF up to 40-45% monitor.   Symptomatic improvement now no shortness of breath    Shortness of breath  Improved, has occasional shortness of breath, likely copd  I have recommended using albuterol  Symptoms have improved since stopping immunotherapy. Questionable pneumonitis will monitor closely with immunotherapy  Cardiology has evaluated for myocarditis related to immunotherapy symptomatic improvement now continue to monitor. Symptomatic improvement as above.    Reflux/heartburn/dysphagia:   Symptoms improved.  Also has a history of strictures. Status post EGD and balloon dilation.   Continues to be on pantoprazole symptoms stable.  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  Continues on Synthroid stable now no heart function stable in normal range    Mild diarrhea:  diarrhea seems to be waxing and waning in nature.  Diarrhea related to immunotherapy now improved stable.  Diarrhea has improved    PD-L1 positive, HER2 negative, p53 and KRAS positive.  Repeat Caris testing with repeat biopsy with no target mutations.    B12 deficiency: Continue B12    Anemia - Hb  was low iron sat low at 10, given venofer 300 x3 monitor for now. Intolerant to oral iron.  Continued low iron saturation will plan on Feraheme for 2 doses    Groin pain - negative for UTI, STI, has seen urology, likely prostatits, started cipro improvement noted. Had cystoscopy, was started on flomax, continued improvement in symptoms    Chronic kidney disease creatinine has worsened overall compared to 8/2023 has seen Dr. Posadas with nephrology adjusted blood pressure meds creatinine has improved now blood pressure under better control.  Discussed with Dr. Cuauhtemoc Elizondo MD 07/23/24     Orders Placed This Encounter   Procedures    Ambulatory Referral to Pulmonology     Referral Priority:   Routine     Referral Type:   Consultation     Referral Reason:   Specialty Services Required     Referred to Provider:   Bonnie Smith MD     Requested Specialty:    Pulmonary Disease     Number of Visits Requested:   1         MDM  Chemotherapy requiring intensive monitoring  Case discussed with pulm plan for EBUS  Labs reviewed imaging reviewed independently, ordered chemotherapy ordered labs with chemotherapy.

## 2024-07-17 ENCOUNTER — OFFICE VISIT (OUTPATIENT)
Dept: ONCOLOGY | Facility: CLINIC | Age: 74
End: 2024-07-17
Payer: MEDICARE

## 2024-07-17 ENCOUNTER — TELEPHONE (OUTPATIENT)
Dept: ONCOLOGY | Facility: CLINIC | Age: 74
End: 2024-07-17
Payer: MEDICARE

## 2024-07-17 ENCOUNTER — HOSPITAL ENCOUNTER (OUTPATIENT)
Dept: ONCOLOGY | Facility: HOSPITAL | Age: 74
Discharge: HOME | End: 2024-07-17
Admitting: INTERNAL MEDICINE
Payer: MEDICARE

## 2024-07-17 VITALS
HEIGHT: 65 IN | RESPIRATION RATE: 18 BRPM | DIASTOLIC BLOOD PRESSURE: 72 MMHG | OXYGEN SATURATION: 99 % | SYSTOLIC BLOOD PRESSURE: 125 MMHG | BODY MASS INDEX: 24.53 KG/M2 | WEIGHT: 147.2 LBS | HEART RATE: 69 BPM | TEMPERATURE: 97.6 F

## 2024-07-17 VITALS
HEART RATE: 69 BPM | OXYGEN SATURATION: 99 % | BODY MASS INDEX: 24.49 KG/M2 | RESPIRATION RATE: 18 BRPM | TEMPERATURE: 97.6 F | SYSTOLIC BLOOD PRESSURE: 125 MMHG | DIASTOLIC BLOOD PRESSURE: 72 MMHG | WEIGHT: 147 LBS | HEIGHT: 65 IN

## 2024-07-17 DIAGNOSIS — C16.0 MALIGNANT NEOPLASM OF CARDIA: ICD-10-CM

## 2024-07-17 DIAGNOSIS — C15.5 PRIMARY MALIGNANT NEOPLASM OF LOWER THIRD OF ESOPHAGUS: ICD-10-CM

## 2024-07-17 DIAGNOSIS — C16.0 MALIGNANT NEOPLASM OF CARDIA: Primary | ICD-10-CM

## 2024-07-17 DIAGNOSIS — R06.02 SOB (SHORTNESS OF BREATH): Primary | ICD-10-CM

## 2024-07-17 LAB
ALBUMIN SERPL-MCNC: 4.3 G/DL (ref 3.5–5.2)
ALBUMIN/GLOB SERPL: 2.4 G/DL
ALP BLD-CCNC: 45 U/L (ref 53–128)
ALP SERPL-CCNC: 46 U/L (ref 39–117)
ALT SERPL W P-5'-P-CCNC: <5 U/L (ref 1–41)
ANION GAP SERPL CALCULATED.3IONS-SCNC: 9.8 MMOL/L (ref 5–15)
AST SERPL-CCNC: 18 U/L (ref 1–40)
BASOPHILS # BLD AUTO: 0.02 10*3/MM3 (ref 0–0.2)
BASOPHILS NFR BLD AUTO: 0.5 % (ref 0–1.5)
BILIRUB SERPL-MCNC: 0.3 MG/DL (ref 0–1.2)
BUN BLDA-MCNC: 18 MG/DL (ref 7–22)
BUN SERPL-MCNC: 19 MG/DL (ref 8–23)
BUN/CREAT SERPL: 13 (ref 7–25)
CALCIUM BLD QL: 9.1 MG/DL (ref 8–10.3)
CALCIUM SPEC-SCNC: 9 MG/DL (ref 8.6–10.5)
CHLORIDE BLDA-SCNC: 110 MMOL/L (ref 98–108)
CHLORIDE SERPL-SCNC: 105 MMOL/L (ref 98–107)
CO2 BLDA-SCNC: 27 MMOL/L (ref 18–33)
CO2 SERPL-SCNC: 24.2 MMOL/L (ref 22–29)
CREAT BLDA-MCNC: 1.2 MG/DL (ref 0.6–1.2)
CREAT SERPL-MCNC: 1.46 MG/DL (ref 0.76–1.27)
DEPRECATED RDW RBC AUTO: 58.3 FL (ref 37–54)
EGFRCR SERPLBLD CKD-EPI 2021: 49.8 ML/MIN/1.73
EGFRCR SERPLBLD CKD-EPI 2021: 63.1 ML/MIN/1.73
EOSINOPHIL # BLD AUTO: 0.14 10*3/MM3 (ref 0–0.4)
EOSINOPHIL NFR BLD AUTO: 3.4 % (ref 0.3–6.2)
ERYTHROCYTE [DISTWIDTH] IN BLOOD BY AUTOMATED COUNT: 17.4 % (ref 12.3–15.4)
GLOBULIN UR ELPH-MCNC: 1.8 GM/DL
GLUCOSE BLDC GLUCOMTR-MCNC: 136 MG/DL (ref 73–118)
GLUCOSE SERPL-MCNC: 137 MG/DL (ref 65–99)
HCT VFR BLD AUTO: 33.4 % (ref 37.5–51)
HGB BLD-MCNC: 10.6 G/DL (ref 13–17.7)
LYMPHOCYTES # BLD AUTO: 1.23 10*3/MM3 (ref 0.7–3.1)
LYMPHOCYTES NFR BLD AUTO: 30.1 % (ref 19.6–45.3)
MCH RBC QN AUTO: 30.8 PG (ref 26.6–33)
MCHC RBC AUTO-ENTMCNC: 31.7 G/DL (ref 31.5–35.7)
MCV RBC AUTO: 97.1 FL (ref 79–97)
MONOCYTES # BLD AUTO: 0.57 10*3/MM3 (ref 0.1–0.9)
MONOCYTES NFR BLD AUTO: 14 % (ref 5–12)
NEUTROPHILS NFR BLD AUTO: 2.12 10*3/MM3 (ref 1.7–7)
NEUTROPHILS NFR BLD AUTO: 52 % (ref 42.7–76)
PLATELET # BLD AUTO: 136 10*3/MM3 (ref 140–450)
PMV BLD AUTO: 11.3 FL (ref 6–12)
POC ALBUMIN: 3.3 G/L (ref 3.3–5.5)
POC ALT (SGPT): 7 U/L (ref 10–47)
POC AST (SGOT): 21 U/L (ref 11–38)
POC TOTAL BILIRUBIN: 0.5 MG/DL (ref 0.2–1.6)
POC TOTAL PROTEIN: 6.1 G/DL (ref 6.4–8.1)
POTASSIUM BLDA-SCNC: 4.7 MMOL/L (ref 3.6–5.1)
POTASSIUM SERPL-SCNC: 4.9 MMOL/L (ref 3.5–5.2)
PROT SERPL-MCNC: 6.1 G/DL (ref 6–8.5)
RBC # BLD AUTO: 3.44 10*6/MM3 (ref 4.14–5.8)
SODIUM BLD-SCNC: 142 MMOL/L (ref 128–145)
SODIUM SERPL-SCNC: 139 MMOL/L (ref 136–145)
WBC NRBC COR # BLD AUTO: 4.08 10*3/MM3 (ref 3.4–10.8)

## 2024-07-17 PROCEDURE — G0498 CHEMO EXTEND IV INFUS W/PUMP: HCPCS

## 2024-07-17 PROCEDURE — 1126F AMNT PAIN NOTED NONE PRSNT: CPT | Performed by: INTERNAL MEDICINE

## 2024-07-17 PROCEDURE — 96367 TX/PROPH/DG ADDL SEQ IV INF: CPT

## 2024-07-17 PROCEDURE — 3074F SYST BP LT 130 MM HG: CPT | Performed by: INTERNAL MEDICINE

## 2024-07-17 PROCEDURE — 96375 TX/PRO/DX INJ NEW DRUG ADDON: CPT

## 2024-07-17 PROCEDURE — 80053 COMPREHEN METABOLIC PANEL: CPT

## 2024-07-17 PROCEDURE — 25010000002 FLUOROURACIL PER 500 MG: Performed by: INTERNAL MEDICINE

## 2024-07-17 PROCEDURE — 96365 THER/PROPH/DIAG IV INF INIT: CPT

## 2024-07-17 PROCEDURE — G2211 COMPLEX E/M VISIT ADD ON: HCPCS | Performed by: INTERNAL MEDICINE

## 2024-07-17 PROCEDURE — 99215 OFFICE O/P EST HI 40 MIN: CPT | Performed by: INTERNAL MEDICINE

## 2024-07-17 PROCEDURE — 25810000003 SODIUM CHLORIDE 0.9 % SOLUTION 250 ML FLEX CONT: Performed by: INTERNAL MEDICINE

## 2024-07-17 PROCEDURE — 85025 COMPLETE CBC W/AUTO DIFF WBC: CPT | Performed by: INTERNAL MEDICINE

## 2024-07-17 PROCEDURE — 25010000002 DEXAMETHASONE SODIUM PHOSPHATE 120 MG/30ML SOLUTION: Performed by: INTERNAL MEDICINE

## 2024-07-17 PROCEDURE — 25010000002 LEUCOVORIN 500 MG RECONSTITUTED SOLUTION 1 EACH VIAL: Performed by: INTERNAL MEDICINE

## 2024-07-17 PROCEDURE — 80053 COMPREHEN METABOLIC PANEL: CPT | Performed by: INTERNAL MEDICINE

## 2024-07-17 PROCEDURE — 0 DEXTROSE 5 % SOLUTION: Performed by: INTERNAL MEDICINE

## 2024-07-17 PROCEDURE — 3078F DIAST BP <80 MM HG: CPT | Performed by: INTERNAL MEDICINE

## 2024-07-17 RX ORDER — DEXTROSE MONOHYDRATE 50 MG/ML
250 INJECTION, SOLUTION INTRAVENOUS ONCE
Status: CANCELLED | OUTPATIENT
Start: 2024-07-17

## 2024-07-17 RX ORDER — NEBIVOLOL 5 MG/1
5 TABLET ORAL DAILY
COMMUNITY

## 2024-07-17 RX ORDER — DIPHENHYDRAMINE HYDROCHLORIDE 50 MG/ML
50 INJECTION INTRAMUSCULAR; INTRAVENOUS AS NEEDED
Status: CANCELLED | OUTPATIENT
Start: 2024-07-17

## 2024-07-17 RX ORDER — FAMOTIDINE 10 MG/ML
20 INJECTION, SOLUTION INTRAVENOUS AS NEEDED
Status: CANCELLED | OUTPATIENT
Start: 2024-07-17

## 2024-07-17 RX ORDER — LOSARTAN POTASSIUM 25 MG/1
25 TABLET ORAL NIGHTLY
COMMUNITY
Start: 2024-07-01

## 2024-07-17 RX ORDER — DEXTROSE MONOHYDRATE 50 MG/ML
250 INJECTION, SOLUTION INTRAVENOUS ONCE
Status: COMPLETED | OUTPATIENT
Start: 2024-07-17 | End: 2024-07-17

## 2024-07-17 RX ADMIN — DEXAMETHASONE SODIUM PHOSPHATE 12 MG: 4 INJECTION, SOLUTION INTRA-ARTICULAR; INTRALESIONAL; INTRAMUSCULAR; INTRAVENOUS; SOFT TISSUE at 11:09

## 2024-07-17 RX ADMIN — DEXTROSE MONOHYDRATE 250 ML: 50 INJECTION, SOLUTION INTRAVENOUS at 11:07

## 2024-07-17 RX ADMIN — FLUOROURACIL 4130 MG: 50 INJECTION, SOLUTION INTRAVENOUS at 12:06

## 2024-07-17 RX ADMIN — LEUCOVORIN CALCIUM 690 MG: 500 INJECTION, POWDER, LYOPHILIZED, FOR SOLUTION INTRAMUSCULAR; INTRAVENOUS at 11:31

## 2024-07-17 NOTE — PROGRESS NOTES
Pt is here for C22 of Lcv/5fu. [facility] infusaport accessed per protocol. CBC/CMP drawn and resulted. Pt seen  today. Treatment infused as directed and without adverse effects. Pt rtc on 7/18/24 at 1330. Pt did not want to wait for AVS, He will get it on Friday.

## 2024-07-17 NOTE — TELEPHONE ENCOUNTER
We needed to get pt in to see Dr Beth before his infusion on 8/7, so I made some adjustments to the appt time and pt was notified by phone. He agreed to those changes.

## 2024-07-19 ENCOUNTER — HOSPITAL ENCOUNTER (OUTPATIENT)
Dept: ONCOLOGY | Facility: HOSPITAL | Age: 74
Discharge: HOME | End: 2024-07-19
Payer: MEDICARE

## 2024-07-19 VITALS — HEART RATE: 69 BPM | OXYGEN SATURATION: 98 % | DIASTOLIC BLOOD PRESSURE: 71 MMHG | SYSTOLIC BLOOD PRESSURE: 146 MMHG

## 2024-07-19 DIAGNOSIS — C16.0 MALIGNANT NEOPLASM OF CARDIA: Primary | ICD-10-CM

## 2024-07-19 DIAGNOSIS — C15.5 PRIMARY MALIGNANT NEOPLASM OF LOWER THIRD OF ESOPHAGUS: ICD-10-CM

## 2024-07-19 PROCEDURE — 96360 HYDRATION IV INFUSION INIT: CPT

## 2024-07-19 PROCEDURE — 25810000003 SODIUM CHLORIDE 0.9 % SOLUTION: Performed by: INTERNAL MEDICINE

## 2024-07-19 RX ORDER — SODIUM CHLORIDE 0.9 % (FLUSH) 0.9 %
20 SYRINGE (ML) INJECTION AS NEEDED
Status: DISCONTINUED | OUTPATIENT
Start: 2024-07-19 | End: 2024-07-20 | Stop reason: HOSPADM

## 2024-07-19 RX ORDER — SODIUM CHLORIDE 0.9 % (FLUSH) 0.9 %
20 SYRINGE (ML) INJECTION AS NEEDED
OUTPATIENT
Start: 2024-07-19

## 2024-07-19 RX ADMIN — SODIUM CHLORIDE 500 ML: 900 INJECTION, SOLUTION INTRAVENOUS at 13:04

## 2024-07-19 RX ADMIN — Medication 20 ML: at 14:08

## 2024-07-19 NOTE — PROGRESS NOTES
Pt here for Adrucil pump DC. Pump empty. Site clean, dry, and intact.  Pt denies having any issues. IVF given per MAR. Port flushed with good blood return noted.  Port flushed with saline and heparin prior to needle removal.  Pt given AVS and d/c home.

## 2024-07-22 PROCEDURE — 93005 ELECTROCARDIOGRAM TRACING: CPT | Performed by: INTERNAL MEDICINE

## 2024-07-24 ENCOUNTER — ANESTHESIA EVENT (OUTPATIENT)
Dept: GASTROENTEROLOGY | Facility: HOSPITAL | Age: 74
End: 2024-07-24
Payer: MEDICARE

## 2024-07-24 RX ORDER — SODIUM CHLORIDE 9 MG/ML
9 INJECTION, SOLUTION INTRAVENOUS CONTINUOUS PRN
Status: CANCELLED | OUTPATIENT
Start: 2024-07-24

## 2024-07-24 NOTE — ANESTHESIA PREPROCEDURE EVALUATION
Anesthesia Evaluation     Patient summary reviewed and Nursing notes reviewed   no history of anesthetic complications:   NPO Solid Status: > 8 hours  NPO Liquid Status: > 2 hours           Airway   Dental      Pulmonary    (+) a smoker Former,  Cardiovascular     ECG reviewed  Patient on routine beta blocker    (+) pacemaker pacemaker, hypertension, valvular problems/murmurs MR, CAD, CABG, CHF Systolic <55%, hyperlipidemia      Neuro/Psych  (+) seizures, dizziness/light headedness, syncope, psychiatric history Depression  GI/Hepatic/Renal/Endo    (+) GERD, GI bleeding , renal disease- CRI, diabetes mellitus, thyroid problem hypothyroidism    Musculoskeletal     Abdominal    Substance History      OB/GYN          Other   blood dyscrasia anemia thrombocytopenia,   history of cancer    ROS/Med Hx Other: Additional History:  Esophageal cancer, retroperitoneal neoplasm, lymphadenopathy, ischemic dilated cardiomyopathy, orthostatic hypotension, nausea, hematemesis, erosive esophagitis, dysphagia, panic d/o, chest wall abscess, iron malabsorption    Echo:  Echocardiogram Findings    Left Ventricle Left ventricular systolic function is moderately decreased. Left ventricular ejection fraction appears to be 41 - 45%.     The left ventricular cavity is moderately dilated. The following left ventricular wall segments are akinetic: apical anterior, apical lateral, apical inferior, apical septal and apex.  Right Ventricle Normal right ventricular cavity size and systolic function noted.  Left Atrium Normal left atrial size and volume noted.  Right Atrium Right atrium not well visualized.  Aortic Valve The aortic valve is not well visualized. Trace aortic valve regurgitation is present. No aortic valve stenosis is present.  Mitral Valve The mitral valve is grossly normal in structure. Mild mitral valve regurgitation is present.  Tricuspid Valve The tricuspid valve is grossly normal in structure. Trace tricuspid valve  regurgitation is present.  Pulmonic Valve The pulmonic valve is not well visualized. There is trace pulmonic valve regurgitation present.  Greater Vessels No dilation of the aortic root is present.  Pericardium There is no evidence of pericardial effusion. .        Stress Test: (2017)  No evidence for reversible myocardial ischemia noted.  Reverse redistribution noted in the inferior and apical wall without  any ischemia  Normal left ventricular ejection fraction of  50 %.      Cath:  Findings:     Hemodynamics Central aortic pressure systolic 132 diastolic 62 with a mean pressure of 88 mmHg  LV end-diastolic pressure of 8 mmHg  There was no gradient across the aortic valve on the pullback of the pigtail catheter  Left Main Left main is a large-caliber vessel heavily calcified distal left main has angiographic 90% stenosis trifurcates into LAD small caliber ramus intermediate branch and a moderate caliber left circumflex artery  RCA Large-caliber dominant vessel  Right kidney arteries 100% occluded in the proximal portion with right to right collaterals filling the distal right kidney  LAD Ostial 100% occlusion  Circ Moderate caliber vessel mid 100% occlusion  Small caliber ramus intermediate branch angiographically normal  SVG(s) Vein graft to the distal right coronary artery is patent without any significant stenosis involving the ostium/body at the anastomotic site  No significant obstructive disease involving the PDA and PLV branches of the right coronary artery after the vein graft touchdown     Vein graft to the marginal which is a jump graft that supplies the marginal branch of circumflex artery and also diagonal branch of the LAD is angiographically normal without any significant stenosis involving the ostium/body at the anastomotic site.  No significant obstructive disease involving the marginal branch of the left circumflex artery after the vein graft touchdown this graft retrogradely fills the second  marginal branch of the left circumflex artery  Jump graft portion of this vein graft supplies a diagonal branch of the LAD and there is no significant disease involving the diagonal branch after the vein graft touchdown  ERIK LIMA to LAD is patent without any significant stenosis involving the ostium/body at the anastomotic site  No significant disease involving the LAD after the LIMA touchdown   LV Moderate to severe hypokinesis involving the basal mid and distal inferior wall severe hypokinesis involving the distal inferior wall to LV apex and also anteroapical segment with mild to moderate hypokinesis involving the basal segments of the anterior and anterolateral wall with estimated LV ejection fraction of 30%  Coronary Dominance Right coronary artery     Estimated Blood Loss:  Minimal     Specimens: None         Complications:  None; patient tolerated the procedure well.           Disposition: PACU - hemodynamically stable.           Condition: stable     Impressions:  Severe LV dysfunction with estimated LV ejection fraction of 30%  Normal left-sided filling pressures  Severe native three-vessel coronary artery disease with 100% occlusion of the ostial % occlusion of the mid circumflex artery and 100% occlusion of the proximal right coronary artery  Patent LIMA to the LAD  Patient vein graft to the diagonal and marginal branch   Patent vein graft to the PDA     Recommendations:  Medical management for cardiomyopathy  Discontinue spironolactone secondary to hyperkalemia at least for now  Decrease the dose of metformin and add SGLT2 inhibitor  Patient is not on ACE inhibitors and angiotensin receptor blocker secondary to allergic reaction  Patient is currently on beta-blockers which will be continued  Low potassium diet  Recheck BMP in 1 week  EP evaluation for consideration for BiV upgrade to help with LV function and patient symptoms  We will discuss with oncology about cardiac findings and further  treatment options      PSH:  PACEMAKER IMPLANTATION CORONARY ARTERY BYPASS GRAFT  ENDOSCOPY COLONOSCOPY  ESOPHAGECTOMY ESOPHAGOSCOPY / EGD  ESOPHAGOSCOPY / EGD ESOPHAGOSCOPY / EGD  ESOPHAGOSCOPY / EGD ESOPHAGOSCOPY / EGD  ABDOMINAL WALL ABSCESS INCISION AND DRAINAGE CATARACT EXTRACTION  LYMPH NODE BIOPSY ENDOSCOPY  ENDOSCOPY INSERT / REPLACE / REMOVE PACEMAKER  CARDIAC CATHETERIZATION                 Anesthesia Plan    ASA 4     general     (Patient identified; pre-operative vital signs, all relevant labs/studies, complete medical/surgical/anesthetic history, full medication list, full allergy list, and NPO status obtained/reviewed; physical assessment performed; anesthetic options, side effects, potential complications, risks, and benefits discussed; questions answered; written anesthesia consent obtained; patient cleared for procedure; anesthesia machine and equipment checked and functioning)  intravenous induction     Anesthetic plan, risks, benefits, and alternatives have been provided, discussed and informed consent has been obtained with: patient.    Plan discussed with CRNA and CAA.    CODE STATUS:

## 2024-07-25 ENCOUNTER — HOSPITAL ENCOUNTER (OUTPATIENT)
Facility: HOSPITAL | Age: 74
Setting detail: HOSPITAL OUTPATIENT SURGERY
Discharge: HOME | End: 2024-07-25
Attending: INTERNAL MEDICINE | Admitting: INTERNAL MEDICINE
Payer: MEDICARE

## 2024-07-25 ENCOUNTER — ANESTHESIA (OUTPATIENT)
Dept: GASTROENTEROLOGY | Facility: HOSPITAL | Age: 74
End: 2024-07-25
Payer: MEDICARE

## 2024-07-25 VITALS
TEMPERATURE: 98.1 F | OXYGEN SATURATION: 99 % | WEIGHT: 149.03 LBS | BODY MASS INDEX: 23.95 KG/M2 | HEART RATE: 66 BPM | HEIGHT: 66 IN | DIASTOLIC BLOOD PRESSURE: 70 MMHG | RESPIRATION RATE: 12 BRPM | SYSTOLIC BLOOD PRESSURE: 135 MMHG

## 2024-07-25 DIAGNOSIS — Z85.01 HISTORY OF ESOPHAGEAL CANCER: ICD-10-CM

## 2024-07-25 LAB
B PARAPERT DNA SPEC QL NAA+PROBE: NOT DETECTED
B PERT DNA SPEC QL NAA+PROBE: NOT DETECTED
C PNEUM DNA NPH QL NAA+NON-PROBE: NOT DETECTED
FLUAV SUBTYP SPEC NAA+PROBE: NOT DETECTED
FLUBV RNA ISLT QL NAA+PROBE: NOT DETECTED
GLUCOSE BLDC GLUCOMTR-MCNC: 100 MG/DL (ref 70–105)
HADV DNA SPEC NAA+PROBE: NOT DETECTED
HCOV 229E RNA SPEC QL NAA+PROBE: NOT DETECTED
HCOV HKU1 RNA SPEC QL NAA+PROBE: NOT DETECTED
HCOV NL63 RNA SPEC QL NAA+PROBE: NOT DETECTED
HCOV OC43 RNA SPEC QL NAA+PROBE: NOT DETECTED
HMPV RNA NPH QL NAA+NON-PROBE: NOT DETECTED
HPIV1 RNA ISLT QL NAA+PROBE: NOT DETECTED
HPIV2 RNA SPEC QL NAA+PROBE: NOT DETECTED
HPIV3 RNA NPH QL NAA+PROBE: NOT DETECTED
HPIV4 P GENE NPH QL NAA+PROBE: NOT DETECTED
M PNEUMO IGG SER IA-ACNC: NOT DETECTED
QT INTERVAL: 454 MS
QTC INTERVAL: 454 MS
RHINOVIRUS RNA SPEC NAA+PROBE: NOT DETECTED
RSV RNA NPH QL NAA+NON-PROBE: NOT DETECTED
SARS-COV-2 RNA NPH QL NAA+NON-PROBE: NOT DETECTED

## 2024-07-25 PROCEDURE — 88177 CYTP FNA EVAL EA ADDL: CPT | Performed by: INTERNAL MEDICINE

## 2024-07-25 PROCEDURE — C1726 CATH, BAL DIL, NON-VASCULAR: HCPCS | Performed by: INTERNAL MEDICINE

## 2024-07-25 PROCEDURE — 87102 FUNGUS ISOLATION CULTURE: CPT | Performed by: INTERNAL MEDICINE

## 2024-07-25 PROCEDURE — 25810000003 SODIUM CHLORIDE 0.9 % SOLUTION: Performed by: INTERNAL MEDICINE

## 2024-07-25 PROCEDURE — 87206 SMEAR FLUORESCENT/ACID STAI: CPT | Performed by: INTERNAL MEDICINE

## 2024-07-25 PROCEDURE — 88305 TISSUE EXAM BY PATHOLOGIST: CPT | Performed by: INTERNAL MEDICINE

## 2024-07-25 PROCEDURE — 25010000002 SUCCINYLCHOLINE PER 20 MG: Performed by: NURSE ANESTHETIST, CERTIFIED REGISTERED

## 2024-07-25 PROCEDURE — 87798 DETECT AGENT NOS DNA AMP: CPT | Performed by: INTERNAL MEDICINE

## 2024-07-25 PROCEDURE — 93005 ELECTROCARDIOGRAM TRACING: CPT

## 2024-07-25 PROCEDURE — 25810000003 SODIUM CHLORIDE 0.9 % SOLUTION: Performed by: NURSE ANESTHETIST, CERTIFIED REGISTERED

## 2024-07-25 PROCEDURE — 87116 MYCOBACTERIA CULTURE: CPT | Performed by: INTERNAL MEDICINE

## 2024-07-25 PROCEDURE — 87205 SMEAR GRAM STAIN: CPT | Performed by: INTERNAL MEDICINE

## 2024-07-25 PROCEDURE — 93010 ELECTROCARDIOGRAM REPORT: CPT | Performed by: INTERNAL MEDICINE

## 2024-07-25 PROCEDURE — 82948 REAGENT STRIP/BLOOD GLUCOSE: CPT

## 2024-07-25 PROCEDURE — 87070 CULTURE OTHR SPECIMN AEROBIC: CPT | Performed by: INTERNAL MEDICINE

## 2024-07-25 PROCEDURE — 25010000002 ONDANSETRON PER 1 MG: Performed by: NURSE ANESTHETIST, CERTIFIED REGISTERED

## 2024-07-25 PROCEDURE — 0202U NFCT DS 22 TRGT SARS-COV-2: CPT | Performed by: INTERNAL MEDICINE

## 2024-07-25 PROCEDURE — 25010000002 PROPOFOL 500 MG/50ML EMULSION: Performed by: NURSE ANESTHETIST, CERTIFIED REGISTERED

## 2024-07-25 PROCEDURE — 25010000002 DEXAMETHASONE PER 1 MG: Performed by: NURSE ANESTHETIST, CERTIFIED REGISTERED

## 2024-07-25 PROCEDURE — 88172 CYTP DX EVAL FNA 1ST EA SITE: CPT | Performed by: INTERNAL MEDICINE

## 2024-07-25 PROCEDURE — 88108 CYTOPATH CONCENTRATE TECH: CPT | Performed by: INTERNAL MEDICINE

## 2024-07-25 RX ORDER — PROMETHAZINE HYDROCHLORIDE 25 MG/1
25 TABLET ORAL ONCE AS NEEDED
Status: DISCONTINUED | OUTPATIENT
Start: 2024-07-25 | End: 2024-07-25 | Stop reason: HOSPADM

## 2024-07-25 RX ORDER — PHENYLEPHRINE HCL IN 0.9% NACL 1 MG/10 ML
SYRINGE (ML) INTRAVENOUS AS NEEDED
Status: DISCONTINUED | OUTPATIENT
Start: 2024-07-25 | End: 2024-07-25 | Stop reason: SURG

## 2024-07-25 RX ORDER — ACETAMINOPHEN 325 MG/1
650 TABLET ORAL ONCE AS NEEDED
Status: DISCONTINUED | OUTPATIENT
Start: 2024-07-25 | End: 2024-07-25 | Stop reason: HOSPADM

## 2024-07-25 RX ORDER — NALOXONE HCL 0.4 MG/ML
0.4 VIAL (ML) INJECTION AS NEEDED
Status: DISCONTINUED | OUTPATIENT
Start: 2024-07-25 | End: 2024-07-25 | Stop reason: HOSPADM

## 2024-07-25 RX ORDER — SODIUM CHLORIDE 9 MG/ML
30 INJECTION, SOLUTION INTRAVENOUS CONTINUOUS
Status: DISCONTINUED | OUTPATIENT
Start: 2024-07-25 | End: 2024-07-25 | Stop reason: HOSPADM

## 2024-07-25 RX ORDER — SODIUM CHLORIDE 0.9 % (FLUSH) 0.9 %
10 SYRINGE (ML) INJECTION AS NEEDED
Status: DISCONTINUED | OUTPATIENT
Start: 2024-07-25 | End: 2024-07-25 | Stop reason: HOSPADM

## 2024-07-25 RX ORDER — DIPHENHYDRAMINE HYDROCHLORIDE 50 MG/ML
12.5 INJECTION INTRAMUSCULAR; INTRAVENOUS ONCE AS NEEDED
Status: DISCONTINUED | OUTPATIENT
Start: 2024-07-25 | End: 2024-07-25 | Stop reason: HOSPADM

## 2024-07-25 RX ORDER — MEPERIDINE HYDROCHLORIDE 25 MG/ML
12.5 INJECTION INTRAMUSCULAR; INTRAVENOUS; SUBCUTANEOUS
Status: DISCONTINUED | OUTPATIENT
Start: 2024-07-25 | End: 2024-07-25 | Stop reason: HOSPADM

## 2024-07-25 RX ORDER — SUCCINYLCHOLINE CHLORIDE 20 MG/ML
INJECTION INTRAMUSCULAR; INTRAVENOUS AS NEEDED
Status: DISCONTINUED | OUTPATIENT
Start: 2024-07-25 | End: 2024-07-25 | Stop reason: SURG

## 2024-07-25 RX ORDER — SODIUM CHLORIDE 0.9 % (FLUSH) 0.9 %
10 SYRINGE (ML) INJECTION EVERY 12 HOURS SCHEDULED
Status: DISCONTINUED | OUTPATIENT
Start: 2024-07-25 | End: 2024-07-25 | Stop reason: HOSPADM

## 2024-07-25 RX ORDER — LABETALOL HYDROCHLORIDE 5 MG/ML
5 INJECTION, SOLUTION INTRAVENOUS
Status: DISCONTINUED | OUTPATIENT
Start: 2024-07-25 | End: 2024-07-25 | Stop reason: HOSPADM

## 2024-07-25 RX ORDER — SODIUM CHLORIDE 9 MG/ML
INJECTION, SOLUTION INTRAVENOUS CONTINUOUS PRN
Status: DISCONTINUED | OUTPATIENT
Start: 2024-07-25 | End: 2024-07-25 | Stop reason: SURG

## 2024-07-25 RX ORDER — PROMETHAZINE HYDROCHLORIDE 25 MG/1
25 SUPPOSITORY RECTAL ONCE AS NEEDED
Status: DISCONTINUED | OUTPATIENT
Start: 2024-07-25 | End: 2024-07-25 | Stop reason: HOSPADM

## 2024-07-25 RX ORDER — DEXAMETHASONE SODIUM PHOSPHATE 4 MG/ML
INJECTION, SOLUTION INTRA-ARTICULAR; INTRALESIONAL; INTRAMUSCULAR; INTRAVENOUS; SOFT TISSUE AS NEEDED
Status: DISCONTINUED | OUTPATIENT
Start: 2024-07-25 | End: 2024-07-25 | Stop reason: SURG

## 2024-07-25 RX ORDER — ONDANSETRON 2 MG/ML
4 INJECTION INTRAMUSCULAR; INTRAVENOUS ONCE AS NEEDED
Status: DISCONTINUED | OUTPATIENT
Start: 2024-07-25 | End: 2024-07-25 | Stop reason: HOSPADM

## 2024-07-25 RX ORDER — ONDANSETRON 2 MG/ML
INJECTION INTRAMUSCULAR; INTRAVENOUS AS NEEDED
Status: DISCONTINUED | OUTPATIENT
Start: 2024-07-25 | End: 2024-07-25 | Stop reason: SURG

## 2024-07-25 RX ORDER — ALBUTEROL SULFATE 2.5 MG/3ML
2.5 SOLUTION RESPIRATORY (INHALATION) ONCE AS NEEDED
Status: DISCONTINUED | OUTPATIENT
Start: 2024-07-25 | End: 2024-07-25 | Stop reason: HOSPADM

## 2024-07-25 RX ORDER — FENTANYL CITRATE 50 UG/ML
50 INJECTION, SOLUTION INTRAMUSCULAR; INTRAVENOUS
Status: DISCONTINUED | OUTPATIENT
Start: 2024-07-25 | End: 2024-07-25 | Stop reason: HOSPADM

## 2024-07-25 RX ORDER — HYDRALAZINE HYDROCHLORIDE 20 MG/ML
5 INJECTION INTRAMUSCULAR; INTRAVENOUS
Status: DISCONTINUED | OUTPATIENT
Start: 2024-07-25 | End: 2024-07-25 | Stop reason: HOSPADM

## 2024-07-25 RX ORDER — ACETAMINOPHEN 650 MG/1
650 SUPPOSITORY RECTAL EVERY 4 HOURS PRN
Status: DISCONTINUED | OUTPATIENT
Start: 2024-07-25 | End: 2024-07-25 | Stop reason: HOSPADM

## 2024-07-25 RX ORDER — DROPERIDOL 2.5 MG/ML
0.62 INJECTION, SOLUTION INTRAMUSCULAR; INTRAVENOUS ONCE AS NEEDED
Status: DISCONTINUED | OUTPATIENT
Start: 2024-07-25 | End: 2024-07-25 | Stop reason: HOSPADM

## 2024-07-25 RX ORDER — DIPHENHYDRAMINE HYDROCHLORIDE 50 MG/ML
12.5 INJECTION INTRAMUSCULAR; INTRAVENOUS
Status: DISCONTINUED | OUTPATIENT
Start: 2024-07-25 | End: 2024-07-25 | Stop reason: HOSPADM

## 2024-07-25 RX ORDER — LIDOCAINE 50 MG/G
OINTMENT TOPICAL AS NEEDED
Status: DISCONTINUED | OUTPATIENT
Start: 2024-07-25 | End: 2024-07-25 | Stop reason: HOSPADM

## 2024-07-25 RX ORDER — PROPOFOL 10 MG/ML
INJECTION, EMULSION INTRAVENOUS AS NEEDED
Status: DISCONTINUED | OUTPATIENT
Start: 2024-07-25 | End: 2024-07-25 | Stop reason: SURG

## 2024-07-25 RX ORDER — HYDROCODONE BITARTRATE AND ACETAMINOPHEN 5; 325 MG/1; MG/1
1 TABLET ORAL ONCE AS NEEDED
Status: DISCONTINUED | OUTPATIENT
Start: 2024-07-25 | End: 2024-07-25 | Stop reason: HOSPADM

## 2024-07-25 RX ORDER — LIDOCAINE HYDROCHLORIDE 20 MG/ML
INJECTION, SOLUTION INFILTRATION; PERINEURAL AS NEEDED
Status: DISCONTINUED | OUTPATIENT
Start: 2024-07-25 | End: 2024-07-25 | Stop reason: SURG

## 2024-07-25 RX ADMIN — ONDANSETRON 4 MG: 2 INJECTION INTRAMUSCULAR; INTRAVENOUS at 08:27

## 2024-07-25 RX ADMIN — SODIUM CHLORIDE 30 ML/HR: 9 INJECTION, SOLUTION INTRAVENOUS at 07:03

## 2024-07-25 RX ADMIN — LIDOCAINE HYDROCHLORIDE 100 MG: 20 INJECTION, SOLUTION INFILTRATION; PERINEURAL at 08:16

## 2024-07-25 RX ADMIN — Medication 100 MCG: at 08:34

## 2024-07-25 RX ADMIN — SUCCINYLCHOLINE CHLORIDE 140 MG: 20 INJECTION, SOLUTION INTRAMUSCULAR; INTRAVENOUS at 08:16

## 2024-07-25 RX ADMIN — SODIUM CHLORIDE: 9 INJECTION, SOLUTION INTRAVENOUS at 08:11

## 2024-07-25 RX ADMIN — DEXAMETHASONE SODIUM PHOSPHATE 8 MG: 4 INJECTION, SOLUTION INTRAMUSCULAR; INTRAVENOUS at 08:23

## 2024-07-25 RX ADMIN — Medication 100 MCG: at 08:40

## 2024-07-25 RX ADMIN — PROPOFOL INJECTABLE EMULSION 200 MG: 10 INJECTION, EMULSION INTRAVENOUS at 08:16

## 2024-07-25 NOTE — H&P
Patient Care Team:  Chanel Carter APRN as PCP - General (Nurse Practitioner)  Chanel Carter APRN as PCP - Family Medicine  Vishnu Galvan MD as Consulting Physician (Cardiology)  Jean Pierre Elizondo MD as Consulting Physician (Hematology and Oncology)    Chief complaint abnormal PET scan        Assessment & Plan      74-year-old male with 50 pack year history of smoking, quit 2015     referred by oncology Dr. Elizondo for biopsy of  precarinal lymph node this measures 1.5 cm  maximal SUV 3.15      history of metastatic esophageal cancer     coronary artery disease    Plan:     EBUS bronchoscopy on Thursday at 7:30 a.m.   patient is not on blood thinner medication      History       74-year-old male with 50 pack year history of smoking, quit 2015   referred by oncology Dr. Elizondo for biopsy of  precarinal lymph node this measures 1.5 cm  maximal SUV 3.15    history of metastatic esophageal cancer   coronary artery disease            * No active hospital problems. *      Past Medical History:   Diagnosis Date    B12 deficiency     Blockage of coronary artery of heart     Depression     Disease of thyroid gland     DM (diabetes mellitus)     Dysphagia     Esophageal cancer     GERD (gastroesophageal reflux disease)     HTN (hypertension)     Hyperlipidemia        Past Surgical History:   Procedure Laterality Date    ABDOMINAL WALL ABSCESS INCISION AND DRAINAGE  10/22/2018    WITH DEBRIDEMENT  1.5CM X 1.5 CM X 1.5 CM    DR. PURI    CARDIAC CATHETERIZATION Right 7/20/2023    Procedure: Left Heart Cath;  Surgeon: Mazin Simmons MD;  Location: [facility] CATH INVASIVE LOCATION;  Service: Cardiovascular;  Laterality: Right;    CATARACT EXTRACTION  2018    COLONOSCOPY      CORONARY ARTERY BYPASS GRAFT  2015    ENDOSCOPY  12/20/2016    ENDOSCOPY N/A 01/02/2023    Procedure: ESOPHAGOGASTRODUODENOSCOPY;  Surgeon: Esvin Morgan MD;  Location: [facility] ENDOSCOPY;  Service: Gastroenterology;  Laterality: N/A;  post:  anastamosis stricture    ENDOSCOPY N/A 2023    Procedure: ESOPHAGOGASTRODUODENOSCOPY, non-wire guided balloon dilation (12-14mm) of esophageal stricture;  Surgeon: Tino Villafana MD;  Location: [facility] ENDOSCOPY;  Service: Gastroenterology;  Laterality: N/A;  post: esophageal anastamotic stricture, post surgical changes    ESOPHAGECTOMY  2017    BROOKS PETTY ESOPHAGECTOMY, FEEDING JEJNOSTOMOY, ABDOMINAL AND MEDIASTINAL LYMPH NODE DISECTION, PEDICLED MUSCLE FLAP COVERAGE DR. PURI    ESOPHAGOSCOPY / EGD  2017    WITH BALLOON DILATION    ESOPHAGOSCOPY / EGD  2017    WITH BALLOON DILATION    ESOPHAGOSCOPY / EGD  2017    WITH BALLOON DILATION    ESOPHAGOSCOPY / EGD  2017    WITH BALLOON DILATION    ESOPHAGOSCOPY / EGD  2017    WITH BALLOON DILATION    INSERT / REPLACE / REMOVE PACEMAKER      LYMPH NODE BIOPSY  2019    PACEMAKER IMPLANTATION  2016       Family History   Problem Relation Age of Onset    Mental illness Mother     Heart disease Mother     Hypertension Father     Cancer Father     Heart disease Sister     Heart disease Brother        Social History     Socioeconomic History    Marital status:    Tobacco Use    Smoking status: Former     Current packs/day: 0.00     Average packs/day: 2.0 packs/day for 50.0 years (100.0 ttl pk-yrs)     Types: Cigarettes     Start date: 1965     Quit date: 2015     Years since quittin.1    Smokeless tobacco: Former     Types: Chew     Quit date: 1989   Vaping Use    Vaping status: Never Used   Substance and Sexual Activity    Alcohol use: No    Drug use: No    Sexual activity: Defer       Review of Systems  Review of Systems   Respiratory:  Positive for cough and shortness of breath. Negative for wheezing and stridor.    Cardiovascular:  Negative for chest pain, palpitations and leg swelling.        Vital Signs  Temp:  [98 °F (36.7 °C)] 98 °F (36.7 °C)  Heart Rate:  [69] 69  Resp:  [15]  "15  BP: (156)/(69) 156/69    Physical Exam:  Physical Exam  Cardiovascular:      Heart sounds: Murmur heard.      No gallop.   Pulmonary:      Effort: No respiratory distress.      Breath sounds: No stridor. No wheezing, rhonchi or rales.   Chest:      Chest wall: No tenderness.           Radiology  Imaging Results (Last 24 Hours)       ** No results found for the last 24 hours. **            Labs:            Invalid input(s): \"LABALBU\", \"PROT\"                                    Meds:   SCHEDULE  sodium chloride, 10 mL, Intravenous, Q12H      Infusions  sodium chloride, 30 mL/hr, Last Rate: 30 mL/hr (07/25/24 0703)      PRNs    sodium chloride      I discussed the patient's findings and my recommendations with patient.     Bonnie Smith MD  07/25/24  08:10 EDT    Time: Critical care 60 min  "

## 2024-07-25 NOTE — DISCHARGE INSTRUCTIONS
Do not drink alcohol, drive, operate any heavy machinery or power tools, or make any important/legal decisions for the next 24 hours.    Call you doctor immediately if you experience severe chest pain, shortness of breath, bleeding or coughing up blood, or fever over 101 F.    After a bronchoscopy, you may experience a scratchy throat. This will gradually get better. You may gargle with warm salt water for this after the time noted above is over.     A responsible adult should stay with you and you should rest quietly for the rest of the day. Follow up with Dr Smith as scheduled. [phone]

## 2024-07-25 NOTE — ANESTHESIA POSTPROCEDURE EVALUATION
Patient: Artie Mc    Procedure Summary       Date: 07/25/24 Room / Location: [facility] ENDOSCOPY 3 / [facility] ENDOSCOPY    Anesthesia Start: 0811 Anesthesia Stop: 0902    Procedure: BRONCHOSCOPY WITH ENDOBRONCHIAL ULTRASOUND, LUNG WASHINGS, AND FINE NEEDLE ASPIRATIONS (Bronchus) Diagnosis:       History of esophageal cancer      (PRECARINAL LYMPHNODE)    Surgeons: Bonnie Smith MD Provider: Jose Puckett MD    Anesthesia Type: general ASA Status: 4            Anesthesia Type: general    Vitals  Vitals Value Taken Time   /70 07/25/24 0935   Temp 98.1 °F (36.7 °C) 07/25/24 0900   Pulse 66 07/25/24 0935   Resp 12 07/25/24 0935   SpO2 99 % 07/25/24 0935           Post Anesthesia Care and Evaluation    Patient location during evaluation: PACU  Patient participation: complete - patient participated  Level of consciousness: awake  Pain scale: See nurse's notes for pain score.  Pain management: adequate    Airway patency: patent  Anesthetic complications: No anesthetic complications  PONV Status: none  Cardiovascular status: acceptable  Respiratory status: acceptable and spontaneous ventilation  Hydration status: acceptable    Comments: Patient seen and examined postoperatively; vital signs stable; SpO2 greater than or equal to 90%; cardiopulmonary status stable; nausea/vomiting adequately controlled; pain adequately controlled; no apparent anesthesia complications; patient discharged from anesthesia care when discharge criteria were met

## 2024-07-25 NOTE — ANESTHESIA PROCEDURE NOTES
Airway  Urgency: elective    Date/Time: 7/25/2024 8:19 AM  Airway not difficult    General Information and Staff    Patient location during procedure: OR  CRNA/CAA: Laura Bangura CRNA    Indications and Patient Condition  Indications for airway management: airway protection    Preoxygenated: yes  MILS not maintained throughout  Mask difficulty assessment: 0 - not attempted    Final Airway Details  Final airway type: endotracheal airway      Successful airway: ETT  Cuffed: yes   Successful intubation technique: direct laryngoscopy  Facilitating devices/methods: intubating stylet  Endotracheal tube insertion site: oral  Blade: Pringle  Blade size: 3  ETT size (mm): 8.5  Cormack-Lehane Classification: grade I - full view of glottis  Placement verified by: chest auscultation and capnometry   Cuff volume (mL): 8  Measured from: lips  ETT/EBT  to lips (cm): 22  Number of attempts at approach: 1  Assessment: lips, teeth, and gum same as pre-op and atraumatic intubation    Additional Comments  Pt preoxygenated prior to induction, easy mask airway, atraumatic intubation,+ ETCO2, + bs bilat,  ETT secured and connected to ventilator.

## 2024-07-26 LAB
BEAKER LAB AP INTRAOPERATIVE CONSULTATION: NORMAL
LAB AP CASE REPORT: NORMAL
LAB AP CASE REPORT: NORMAL
NIGHT BLUE STAIN TISS: NORMAL
PATH REPORT.FINAL DX SPEC: NORMAL
PATH REPORT.FINAL DX SPEC: NORMAL
PATH REPORT.GROSS SPEC: NORMAL
PATH REPORT.GROSS SPEC: NORMAL

## 2024-07-27 LAB
BACTERIA SPEC RESP CULT: NORMAL
GRAM STN SPEC: NORMAL

## 2024-07-30 LAB
P JIROVECII DNA L RESP QL NAA+NON-PROBE: NEGATIVE
REF LAB TEST METHOD: NORMAL

## 2024-08-01 LAB
FUNGUS WND CULT: NORMAL
MYCOBACTERIUM SPEC CULT: NORMAL
NIGHT BLUE STAIN TISS: NORMAL
QT INTERVAL: 454 MS
QTC INTERVAL: 454 MS

## 2024-08-06 DIAGNOSIS — C16.0 MALIGNANT NEOPLASM OF CARDIA: Primary | ICD-10-CM

## 2024-08-06 DIAGNOSIS — C15.5 PRIMARY MALIGNANT NEOPLASM OF LOWER THIRD OF ESOPHAGUS: ICD-10-CM

## 2024-08-06 NOTE — PROGRESS NOTES
Hematology-Oncology Follow-up Note       Artie Mc  1950    Primary Care Physician: Chanel Carter APRN  Referring Physician: Chanel Carter APRN    Reason For Visit:  No chief complaint on file.    Metastatic esophageal cancer  Monitoring for adverse effects related to immunotherapy.  Hypothyroidism    HPI:   Mr. Mc is a pleasant 74 y.o. gentleman with a history of gastroesophageal reflux disease, diabetes, hypertension, heart block status post pacemaker placement and history of CABG.  He was having symptoms of dysphagia, weight loss since September 2016. He was reporting symptoms of food getting stuck in the lower chest.  He has transitioned himself to a liquid diet.  The patient reports losing about 30 pounds over this duration.  The patient underwent upper GI endoscopy on 12/20/16 with Dr. Esvin Barrera.  Endoscopy showed necrotic, circumferential and moderately obstructive mass at the distal esophagus between 42 cm and 36 cm.      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  There was also evidence of mild subacute inflammation in the duodenum.  Dr. Barrera ordered a CT scan of the chest and abdomen with contrast on 12/20/16.  The scan was done at [facility].  The scan showed a new ill-defined mass in the anterior wall of the distal esophagus at the GE junction measuring 2.3 x 2.2 x 1.6 cm, worrisome for cancer.  There were four subcentimeter liver lesions which had appeared stable in comparison to another CT scan from 2009 and they likely represent small cysts.  There was also mild lymphadenopathy in the gastrohepatic ligament.  There were at least six borderline enlarged lymph nodes measuring up to 1.3 x 1 cm in the region.  The patient is referred to us for further oncological evaluation.    1/5/2017 - The patient presents for initial consultation.  He reports persistent dysphagia and also has  symptoms of regurgitation.  He cannot tolerate solid foods and is dependent on liquid diet such as Ensure.  He also reports fatigue. Symptoms of dysphagia have been present for the past four months.  When he eats any solids, the food gets stuck in the lower chest.    1/5/17 - Vitamin B12 200 (L).  CEA 0.8.  Ferritin 35.  Iron saturation 16% (L), serum iron 56, TIBC 354.  Creatinine 0.9.  LFTs normal.  Folate 10.2.  1/12/17 - PET/CT scan:  Intense abnormal activity corresponding to the region of the GE junction and proximal stomach.  SUV is 11.06.  There is a suggestion of an abnormal mass or abnormal wall thickening in the region measuring 3.9 x 4.8 cm.  No additional abnormalities.  No evidence of metastases or other lymphadenopathy.  Scattered nonspecific subcentimeter lymph nodes involving the mediastinum and within the central upper abdomen.  1/13/17 - Creatinine 0.9.  LFTs normal.    1/17/17 - Patient seen by thoracic surgeon, Dr. Bradley.  PET scan was reviewed.  He has recommended neoadjuvant chemoradiation therapy followed by Noel Gurwinder esophagogastrectomy.  Port-A-Cath is being arranged.    1/20/17 - WBC 6.3, hemoglobin 12.1, platelet count 198,000, MCV 86.4.  1/30/17 - Patient started weekly Carboplatin and Taxol (Carboplatin AUC 2 and Taxol 50 mg/M2).  Patient received Injectafer 750 mg.  WBC 5.8, hemoglobin 12.4, platelet count 244,000.   1/31/17 - Patient seen by Dr. Eduardo Oleary.  The plan was to give 5040 cGy in 28 fractions.   2/6/17 - WBC 4.4, hemoglobin 11.6, platelet count 201,000.  Patient received cycle 2 of weekly Carboplatin and Taxol (Carboplatin AUC 2 and Taxol 50 mg/M2).  2/13/17 - Patient received Carboplatin and Taxol weekly cycle 3.  2/13/17 - Patient started concurrent radiation therapy.  Total planned dose is 4140 cGy.    2/13/17 to 2/15/17 - Patient received 4140 cGy of neoadjuvant radiation.  Radiation was finished on 3/15/17.    2/20/17 - WBC 1.8, hemoglobin 10.6, platelet count  217,000.  Creatinine 0.5. Folate 15 (L).  Ferritin 361.  Haptoglobin 214.  Iron saturation 33%, TIBC 263, serum iron 88.    2/20/17 - Patient received Carboplatin and Taxol weekly cycle 4.   2/27/17 - WBC 3.7, hemoglobin 10.7, platelet count 177,000, MCV 85.3.    3/6/17 - Patient has received 16 out of the 23 planned radiation treatments.  Total planned dose is 4140 cGy.    3/6/17 - WBC 5.4, hemoglobin 11.2, platelet count 113,000.  3/6/17 - Patient received cycle 5 of weekly Carboplatin and Taxol.   3/13/17 - Patient received cycle 6 of weekly Carboplatin and Taxol.  WBC 3.9, hemoglobin 11.3, platelet count 93,000.     3/20/17 - WBC 1.4, hemoglobin 10.0, platelet count 118,000, MCV 86.1   3/27/17 - WBC 3.8, hemoglobin 9.5, platelet count 176,000, MCV 87.3.    4/7/17 - Stress test:  No evidence of reversible myocardial ischemia.  Normal left ventricular ejection fraction of 50%.    4/17/17 - Upper GI endoscopy by Dr. 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  The GE junction adenocarcinoma lesion seems to have a very good response to chemotherapy and radiation.  The lumen was open.  5/1/17 - WBC 7.0, hemoglobin 10.2, platelet count 175,000.    5/4/17 - Patient underwent Hurlock Gurwinder esophagectomy with pyloroplasty, feeding jejunostomy tube, abdominal and mediastinal lymph node dissection.    Surgical Pathology:  Moderately to poorly differentiated adenocarcinoma measuring 1.3 cm at the GE junction.  Resection margins are negative for malignancy.  Tumor margins are negative.  There is effective treatment response.  No evidence of lymphovascular invasion.  Staging is ypT3,pN1.  One out of fourteen lymph nodes involved.   5/22/17 - WBC 6.8, hemoglobin 10.3, platelet count 312,000.    6/19/17 - WBC 5.7, hemoglobin 10.5, platelet count 204,000, MCV 92.1.    7/6/17 - EGD:  Status post balloon dilation of esophageal stricture.  7/12/17 - EGD with balloon dilation of esophageal  stricture.  7/26/17 - EGD and balloon dilation of esophageal stricture.  7/31/17 - PET/CT scan:  There is mild metabolic activity seen at the thoracic esophagus just at and below the surgical margins.  Some mild wall thickening.  This could be from recent surgery.  A residual cancer seems unlikely.  There is a precarinal lymph node with mild metabolic activity with SUV of 3 measuring 1 x 1.3 cm.  Again this appears to be nonspecific in my opinion.    8/7/17 - WBC 5.9, hemoglobin 11.9, platelet count 171,000, MCV 88.7.    10/19/17 - WBC 7.5, hemoglobin 11.8, platelet count 216,000.    1/8/18 - PET scan:  No evidence of residual disease or recurrent disease.  There is an esophageal stent in the mid esophagus with a large debris air level.  No evidence of any metastases in the chest, abdomen or pelvis.  Mild nodule uptake in the vocal cords with fullness in the right vocal cord.  This could be physiologic.    7/9/18 - CT scan of chest with contrast:  Prominent mediastinal lymph nodes appear stable since February.  Changes of gastric pull-through procedure for esophageal cancer again noted.  Tree-in-bud infiltrates in the left lung base, consistent with infection versus inflammation.    1/10/19 - CT chest, abdomen and pelvis with contrast:  No evidence of mets in the chest; however, interval development of metastatic lymphadenopathy in the left side of the retroperitoneum.  For instance, there is a 17 mm rounded lymph node, 10 mm lymph node and also a 14 mm lymph node.  These are all new.  Stable 9 mm hypodense lesion within the right hepatic lobe, which could reflect hemangioma or complex cyst.    1/30/19 - CT-guided core biopsy of retroperitoneal lymph node:  Poorly differentiated adenocarcinoma consistent with metastases from patient’s known esophageal primary.  CK20 positive, CDX2 positive, cytokeratin 7 negative, TTF-1 negative.    2/1/19 - Creatinine 0.9, BUN 18, calcium 9.3; these are normal.    2/28/19 - [facility]  Next Gen sequencing testing on retroperitoneal lymph node specimen:  PD-L1 positive.  CPS = 3.  This implies responsiveness to pembrolizumab.  Mismatch repair status is proficient (no evidence of microsatellite instability).  Tumor mutational burden is low = 6 mutations/Mb.  CDH1 indeterminate.  KRAS mutation positive.  TP53 mutation positive.  CDK6 amplified.  [employer] PD-L1 testing by IHC:  PD-L1 expression 1% positive (CPS =1).  HER2/marvel by IHC is negative.  HER2/marvel by CISH not amplified.  Microsatellite stable tumor.    3/7/19 - WBC 7.3, hemoglobin 12, platelet count 128,000, MCV 92.     3/14/19 - Patient was seen by radiation oncologist, Dr. Chahal.  He has recommended observation versus systemic chemoimmunotherapy as needed.  No plans for radiation therapy.   5/1/19 - CT scan of chest with contrast:  No definitive findings of new metastatic disease in the chest.  Emphysema noted.    5/1/19 - CT abdomen and pelvis:  There is interval progression of metastatic retroperitoneal adenopathy.  Left periaortic adenopathy with lymph node measuring up to 2 cm, previously 1.7 cm.  Another lymph node now measures 2.3, previously 1.4 cm.  New enlarged lymph node on image 147 measures 1.4 cm, previously 0.4 cm.  A 9 mm hypodense right hepatic lobe lesion appears stable.    5/9/19 - Decision made to start patient on immunotherapy Keytruda.    5/9/19 - Vitamin B12 229.  Ferritin 61.  Folate 14.3.  Iron saturation 26%, TIBC 304, serum iron 79.  5/31/19 - Patient received Keytruda 200 mg cycle 1, day 1.    5/31/19 - Free T4 0.99.  Creatinine 0.9, BUN 9.  LFTs normal.  TSH 3.99.  Folate 14.3.  Iron saturation 26%.    6/21/2019 patient received Keytruda cycle 2  7/12/2019: Patient received Keytruda cycle 3  8/2/2019 patient received cycle 4 Keytruda WBC 7.2, hemoglobin 11.8 platelet count 163  8/23/2019 creatinine 0.9, LFTs normal, WBC 6.4, hemoglobin 11.5, platelets 179, TSH 2.1   8/23/2019 patient received cycle 5 of  Keytruda  9/3/2019 CT chest abdomen and pelvis with contrast: . Positive response to therapy in the abdomen since 05/01/2019. Pathologically enlarged retroperitoneal lymph nodes, predominantly in the left periaortic region, have diminished in size.a 1.3 x 2.1 cm left periaortic node (image 60), previously measured 3.5 x 2.3 cm. A 1.6 cm index node near the level of the left renal vein previously measured 2.0 cm No new or progressive adenopathy. 2. Stable findings in the chest. Noncalcified right upper lobe nodules are unchanged. There is no evidence of new or progressive metastatic disease within the chest. 3. 8 mm indeterminate low-density lesion in the right hepatic lobe, is stable. No new liver lesions.  9/6/2019 WBC 5.6, hemoglobin 11.2, platelets of 192, MCV 91.8  9/13/2019 patient received Keytruda cycle 6, albumin 3.2  10/4/2019 patient received cycle 7 of Keytruda, TSH 4.8, free T4 0.86  10/9/2019 WBC 5.8, hemoglobin 11.7, platelets 174  10/25/2019 patient received cycle 8 of Keytruda.  WBC 5.9, hemoglobin 11.5, platelets 152, creatinine 0.75, cortisol 15.2, TSH 6.48 high , free T4 1.13 normal  11/6/2019 WBC 6.6, hemoglobin 11.8, MCV 91.5, platelets 187  11/15/2019 patient received cycle 9 of Keytruda, WBC 6.6, hemoglobin 11.9, platelets 161, cortisol level 8.86, TSH 2.68, free T3 2.8, free T4 1.5  12/4/2019 WBC 6.9, hemoglobin 12.0, platelets 180, MCV 92.9  12/06/2019-Keytruda Cycle 10  12/27/2019- Cycle 11 LFTs normal, WBC 6.7, hemoglobin 11.4, platelets 168, MCV 93, TSH 2.4,  1/28/2020: CT chest abdomen and pelvis with contrast:  Single (aortic lymph node in the abdominal retroperitoneum has increased.  Rest of the retroperitoneal lymph nodes have decreased in size.  Mediastinal adenopathy appears unchanged.  Noncalcified bilateral pulmonary nodules are stable  1/17/2020 patient received Keytruda cycle 12:.  2/3/2020 WBC 7.1, hemoglobin 12.2, platelets 171  02/10/2020 patient received cycle 13 of  Keytruda: WBC 7.3, hemoglobin 12.1, platelets 166, creatinine 0.82  3/6/2020 patient is of Keytruda 200 mg, hemoglobin 10.8  3/27/2020 patient received Keytruda 200 mg, hemoglobin 11.4, TSH 1.95  4/17/2020 patient received Keytruda cycle 16 200 mg, WBC 6.1, hemoglobin 11.6, platelets 150  5/8/2020: Patient received cycle 17 Keytruda, WBC 6.3, hemoglobin 9.6, platelets 137, TSH 2.05, free T4 1.54, creatinine 0.93, LFTs normal,  5/29/2020: Patient received cycle 18 Keytruda, WBC 6.5, hemoglobin 11.7, platelets 129, creatinine 0.83, TSH 1.69  6/26/2020: Patient receiving Keytruda.   7/17/2020: TSH 2.06, WBC 5.7, hemoglobin 11.4, platelets 177, MCV 92.4, Keytruda 200 mg cycle 20  7/27/2020: CT chest abdomen pelvis with contrast: Stable findings in the chest abdomen pelvis since 1/28/2020.  Noncalcified bilateral pulmonary nodules are stable.  Stable retroperitoneal left perinephric adenopathy in the abdomen.  8/7/2020: Patient received cycle 21 of Keytruda  8/28/2020 patient is a cycle 22 of Keytruda.  WBC 6.02, hemoglobin 11.3, platelets 161  9/11/2020 WBC 6.9, hemoglobin 12.4, platelets 192  9/18/2020: Cycle 23 of Keytruda, TSH 1.19  10/9/2020: Cycle 24 of Keytruda, WBC 5.9, hemoglobin 11.9, platelets 178, creatinine 0.84, TSH 2.56, free T3 2.4, CMP normal  10/23/2020: WBC 7.34, hemoglobin 11.6, platelets 175  10/30/2020:.  Keytruda 200 mg  11/20/2020: Keytruda 200 mg  12/11/2020 Keytruda 200 mg.  WBC 5.5, hemoglobin 11.4, platelets 176, creatinine 0.8, LFTs normal, TSH 2.2, cortisol 8.3  12/10/2020: CT chest abdomen and pelvis with contrast: Previously seen left para-aortic lymph nodes within the abdomen are decreased in size.  No pathological lymphadenopathy.  No evidence of residual malignancy..  Small noncalcified pulmonary nodules are stable.  No new nodules.  Stable mediastinal lymph nodes.  3/12/2021: WBC 6.7, hemoglobin 11.3, platelets 17  3/12/2021: CMP normal, TSH 2.9, cortisol 11.95, 1  3/15/2021: CT chest  with contrast: Stable findings in the chest with postsurgical changes of esophageal resection and gastric pull-through.  Stable right lower lobe lung nodule measures 11 mm.  Stable size and appearance of the mediastinal and upper abdominal lymph nodes.  3/23/2021: Doppler of upper extremity: Normal.  3/24/2021: CT abdomen and pelvis with contrast: Postoperative changes.  Left periaortic nodes are stable.  4/12/2021: TSH 4.1, cortisol 12.4  6/22/2021: CMP normal,Cortisol 10.08,, WBC 5.8, hemoglobin 11.2, platelets 109, TSH 2.59  7/12/2021: CT scan of the chest abdomen pelvis with contrast: Dominant 10 x 10 left periaortic lymph node is stable since 3/22/2021.  Dominant lymph nodes in the right lower paratracheal and AP window distribution are stable since 3/15/2021.  No evidence of progressive adenopathy in the chest abdomen or pelvis.    01/11/2022 -PET/CT findings consistent with new cedric metastatic disease in the retroperitoneum.  2 new pathologically enlarged hypermetabolic retroperitoneal lymph nodes.  No convincing evidence of recurrent disease within the chest neck pelvis or skeleton.  1/20/2022 -CT-guided needle biopsy consistent with poorly differentiated carcinoma.  CDX2 positive consistent with metastases from known esophageal primary  2/9/2022 - Pembrolizumab complicated with fatigue, joint pains, nausea, diarrhea. Symptoms decrease in intensity over a week.  3/30/2022 -CT chest and pelvis with stable metastatic lymph nodes in the left periaortic retroperitoneum.  No evidence of disease progression or new lesions.  Stable lymphadenopathy in the distal paratracheal mediastinal area  4/13/2022 - Keytruda 200  5/4/2022 - Keytruda 200  Continued keytruda  7/11/2022 - CT abdomen pelvis with decrease in size of left perioaortic lymph node. No other areas of disease.  Continues to be on Keytruda  10/18/2022 - stable CT imaging  1/20/2023 - CT imaging with stable disease. No significant change.  Patient was  hospitalized with esophagitis, subsequent EGD with improvement, no recurrence of disease.  2/21/2023 - Keytruda  CT CAP with increased retroperitoneal adenopathy.   ECHO with EF 35-40% (concern for immunotherapy related )immunotherapy held with ongoing investigation for cause.  Subsequent improvement in EF to 45%  4/4/2023 - CT abdomen pelvis with interval retroperitoneal lymph node enlargement compatible with metastatic adenopathyl.  5/5/2023 - PET CT with mixed response. No clear evidence of progression  6/2/23 -started pembrolizumab 200 mg IV  Posttreatment had symptoms of chest tightness troponin and proBNP negative, echocardiogram with EF 40%  Subsequent cardiac cath with EF down to 30% elevated proBNP, troponin no significant obstructive disease in the graft vessels  Cardiac MRI unable to obtain given pacemaker noncompliant.  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 Two enlarged mediastinal lymph nodes in the precarinal and AP window distributions are unchanged since 4/4/2023. No new or progressive adenopathy in the chest.Stable 3 mm or less noncalcified right lung nodules since 4/4/2023. Surgical changes of distal esophagectomy with gastric pull-through, without evidence of local disease recurrence  8/29/23 - started FOLFOX had significant side effects  9/20/23 - stopped oxaliplatin and continued 5FU only'  11/29/23 - CT CAP with decrease size of retroperitoneal adenopathy, no other concerning findings.  Continues 5FU leucovorin   2/12/24 - PET CT There is some metabolic activity within a mediastinal lymph node which has been suggested with slightly decreased metabolic activity. Whether this is reactive or pathologic is uncertain. Previously noted abnormal retroperitoneal  lymph node is no longer identified continued treatmen  4/10/24 - iron sat 10, ferritin  14, plan for venofer  Continued treatment signatera negative  5/7/24 - CT chest without contrast. Unchanged mildly enlarged right paratracheal lymph lymph node measuring 1.1 cm in short axis (series 2 image 40) and left paratracheal measuring 1.1 cm in short axis (series 2 image 39). No new or progressive lymphadenopathy.  Continued maintenance 5FU  changed frequency to every 3 weeks.   7/11/2024 CT chest with stable lung nodule, mildly prominent precarinal lymph node    7/25/2024:\  Bilateral lung, bronchial wash with smears and cytospin:  Acute inflammation, pulmonary macrophages and bronchial epithelial cells\    2.   Lymph node, precarinal station 4, fine-needle aspiration with smears and cell block:  Benign bronchial epithelial cells in a background of blood and lymphocytes consistent with lymph node aspirate  No malignancy identified    Subjective:  Seen today for initial evaluation by me.  He was previously being seen by Dr. Elizondo in our practice.  He has underlying history of metastatic esophageal adenocarcinoma and was on maintenance therapy with 5-FU/leucovorin.  His last treatment was on 7/17/2024.  He has intermittent bronchoscopy planned lymph node biopsy due to suspicion for metastatic disease on recent imaging.  This is reported as above.    Patient reported that he has had persistent fatigue with maintenance chemotherapy, also reported having GI side effects, such as nausea and poor appetite with chemo.  However, the patient denied any weight loss.  No other issues presently.    Past Medical History:   Diagnosis Date    B12 deficiency     Blockage of coronary artery of heart     Depression     Disease of thyroid gland     DM (diabetes mellitus)     Dysphagia     Esophageal cancer     GERD (gastroesophageal reflux disease)     HTN (hypertension)     Hyperlipidemia        Past Surgical History:   Procedure Laterality  Date    ABDOMINAL WALL ABSCESS INCISION AND DRAINAGE  10/22/2018    WITH DEBRIDEMENT  1.5CM X 1.5 CM X 1.5 CM    DR. PURI    BRONCHOSCOPY N/A 7/25/2024    Procedure: BRONCHOSCOPY WITH ENDOBRONCHIAL ULTRASOUND, LUNG WASHINGS, AND FINE NEEDLE ASPIRATIONS;  Surgeon: Bonnie Smith MD;  Location: [facility] ENDOSCOPY;  Service: Pulmonary;  Laterality: N/A;    CARDIAC CATHETERIZATION Right 7/20/2023    Procedure: Left Heart Cath;  Surgeon: Mazin Simmons MD;  Location: [facility] CATH INVASIVE LOCATION;  Service: Cardiovascular;  Laterality: Right;    CATARACT EXTRACTION  2018    COLONOSCOPY      CORONARY ARTERY BYPASS GRAFT  2015    ENDOSCOPY  12/20/2016    ENDOSCOPY N/A 01/02/2023    Procedure: ESOPHAGOGASTRODUODENOSCOPY;  Surgeon: Esvin Morgan MD;  Location: [facility] ENDOSCOPY;  Service: Gastroenterology;  Laterality: N/A;  post: anastamosis stricture    ENDOSCOPY N/A 02/09/2023    Procedure: ESOPHAGOGASTRODUODENOSCOPY, non-wire guided balloon dilation (12-14mm) of esophageal stricture;  Surgeon: Tino Villafana MD;  Location: [facility] ENDOSCOPY;  Service: Gastroenterology;  Laterality: N/A;  post: esophageal anastamotic stricture, post surgical changes    ESOPHAGECTOMY  05/04/2017    BROOKS PETTY ESOPHAGECTOMY, FEEDING JEJNOSTOMOY, ABDOMINAL AND MEDIASTINAL LYMPH NODE DISECTION, PEDICLED MUSCLE FLAP COVERAGE DR. PURI    ESOPHAGOSCOPY / EGD  07/12/2017    WITH BALLOON DILATION    ESOPHAGOSCOPY / EGD  07/26/2017    WITH BALLOON DILATION    ESOPHAGOSCOPY / EGD  08/11/2017    WITH BALLOON DILATION    ESOPHAGOSCOPY / EGD  08/28/2017    WITH BALLOON DILATION    ESOPHAGOSCOPY / EGD  09/27/2017    WITH BALLOON DILATION    INSERT / REPLACE / REMOVE PACEMAKER      LYMPH NODE BIOPSY  01/30/2019    PACEMAKER IMPLANTATION  11/21/2016       Current Outpatient Medications:     clonazePAM (KlonoPIN) 0.5 MG tablet, Take 0.5 tablets by mouth 2 (Two) Times a Day As Needed for Anxiety., Disp: , Rfl: 1    cyanocobalamin  (VITAMIN B-12) 1000 MCG tablet, Take 1 tablet by mouth Daily., Disp: , Rfl:     folic acid (FOLVITE) 1 MG tablet, Take 1 tablet by mouth Daily., Disp: , Rfl:     levothyroxine (SYNTHROID, LEVOTHROID) 50 MCG tablet, Take 1 tablet by mouth Daily., Disp: 90 tablet, Rfl: 0    losartan (COZAAR) 25 MG tablet, Take 1 tablet by mouth Every Night., Disp: , Rfl:     magic mouthwash oral suspension, , Disp: , Rfl:     metFORMIN (GLUCOPHAGE) 1000 MG tablet, Take 0.5 tablets by mouth Daily. (Patient taking differently: Take 0.5 tablets by mouth 2 (Two) Times a Day With Meals.), Disp: 60 tablet, Rfl: 2    nebivolol (BYSTOLIC) 5 MG tablet, Take 1 tablet by mouth Daily., Disp: , Rfl:     ondansetron (ZOFRAN) 4 MG tablet, TAKE 1 TABLET BY MOUTH EVERY 8 HOURS AS NEEDED FOR NAUSEA FOR VOMITING, Disp: 90 tablet, Rfl: 0    ondansetron (ZOFRAN) 8 MG tablet, Take 1 tablet by mouth 3 (Three) Times a Day As Needed for Nausea or Vomiting. (Patient not taking: Reported on 7/25/2024), Disp: 30 tablet, Rfl: 5    pantoprazole (PROTONIX) 40 MG EC tablet, Take 1 tablet by mouth 2 (Two) Times a Day., Disp: , Rfl:     pravastatin (PRAVACHOL) 10 MG tablet, Take 0.5 tablets by mouth Every Night., Disp: , Rfl:     tamsulosin (FLOMAX) 0.4 MG capsule 24 hr capsule, Take 1 capsule by mouth 2 (Two) Times a Day., Disp: , Rfl:     Allergies   Allergen Reactions    Ace Inhibitors Unknown (See Comments)     Unknown reaction      Heparin Other (See Comments)     Fever/chills, weakness in legs    Sulfa Antibiotics Other (See Comments)     Mouth sores    Ciprofloxacin GI Intolerance     Pt reports pain to abd       Family History   Problem Relation Age of Onset    Mental illness Mother     Heart disease Mother     Hypertension Father     Cancer Father     Heart disease Sister     Heart disease Brother      Cancer-related family history includes Cancer in his father.    Social History     Tobacco Use    Smoking status: Former     Current packs/day: 0.00      "Average packs/day: 2.0 packs/day for 50.0 years (100.0 ttl pk-yrs)     Types: Cigarettes     Start date: 1965     Quit date: 2015     Years since quittin.1    Smokeless tobacco: Former     Types: Chew     Quit date: 1989   Vaping Use    Vaping status: Never Used   Substance Use Topics    Alcohol use: No    Drug use: No     ROS:     Objective:    Vitals:    24 1010   BP: 131/75   Pulse: 59   SpO2: 100%   Weight: 67.2 kg (148 lb 3.2 oz)   Height: 167.6 cm (66\")   PainSc: 0-No pain           (1) Restricted in physically strenuous activity, ambulatory and able to do [employer]    Physical Exam:     Physical Exam   Constitutional: He is oriented to person, place, and time. He appears well-developed. No distress.   HENT:   Head: Normocephalic and atraumatic.   Nose: Nose normal.   Mouth/Throat: Mucous membranes are moist.   Eyes: Pupils are equal, round, and reactive to light.   Neck: No thyromegaly present.   Cardiovascular: Normal rate, regular rhythm, normal heart sounds and normal pulses. Exam reveals no gallop and no friction rub.   Pulmonary/Chest: Effort normal and breath sounds normal.   Abdominal: Soft. Normal appearance and bowel sounds are normal.   No tenderness   Musculoskeletal: Normal range of motion. No deformity or signs of injury.      Right lower leg: No edema.   Neurological: He is alert and oriented to person, place, and time. He exhibits normal muscle tone.   Skin: Skin is warm and dry. No rash noted. He is not diaphoretic. No erythema. No jaundice.   Right side chest  port       Lab Results - Last 18 Months   Lab Units 24  0954 24  0951 24  1151   WBC 10*3/mm3 4.67 4.08 4.03   HEMOGLOBIN g/dL 10.6* 10.6* 9.8*   HEMATOCRIT % 32.5* 33.4* 31.4*   PLATELETS 10*3/mm3 180 136* 233   MCV fL 97.0 97.1* 98.1*     Lab Results - Last 18 Months   Lab Units 24  1048 24  1017 24  0951 24  1158 06/10/24  1118 05/15/24  1020 24  0842 " 04/03/24  1321 03/20/24  1309   SODIUM mmol/L  --   --  139  --  140  --  141  --  139   POTASSIUM mmol/L  --   --  4.9  --  4.7  --  4.0  --  4.6   CHLORIDE mmol/L  --   --  105  --  105  --  109*  --  107   CO2 mmol/L  --   --  24.2  --  22.9  --  21.0*  --  22.0   BUN mg/dL  --   --  19  --  11  --  13  --  14   CREATININE mg/dL 1.40* 1.20 1.46*   < > 1.20   < > 1.42*   < > 1.47*   CALCIUM mg/dL  --   --  9.0  --  8.7  --  9.1  --  8.3*   BILIRUBIN mg/dL  --   --  0.3  --   --   --  0.4  --  0.3   ALK PHOS U/L  --   --  46  --   --   --  36*  --  40   ALT (SGPT) U/L  --   --  <5  --   --   --  5  --  5   AST (SGOT) U/L  --   --  18  --   --   --  13  --  12   GLUCOSE mg/dL  --   --  137*  --  132*  --  136*  --  211*    < > = values in this interval not displayed.     Assessment & Plan     Assessment:    Metastatic esophageal carcinoma:    Patient has a history of GE junction poorly differentiated adenocarcinoma for which he had chemoradiation followed by Noel Gurwinder resection:  Pathology showed ypT3,pN1 disease.  Tumor was Stage IIIA, diagnosed in 2016.  He received concurrent chemoradiation with weekly Carboplatin and Taxol and radiation finishing on 3/15/17.  CT scan on 1/10/19 showed new retroperitoneal lymph nodes.  These lymph nodes were biopsied on 1/30/19 and it shows metastatic esophageal cancer.  [facility] Next Gen testing was performed on the sample and it shows KRAS mutation, microsatellite stable tumor.  PD-L1 is positive.  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  HER2/marvel (ERBB2) was not amplified and was negative.  His CT scan on 5/1/19 shows evidence of progression.  Patient has started receiving Keytruda on 5/31/19.  He has received 19 cycles so far.   CT scan on 1/28/2020 shows improvement and continued response.  CT scan chest abdomen pelvis with contrast on 12/10/2020 does not show any evidence of disease progression.  On 12/22/2020 decision made to hold Keytruda per patient's request to get a treatment  break.. Repeat CT scan chest 7/12/2021 does not show any evidence of disease progression.  Now CT imaging in January 2021 with a CT showing increased abdominal left para-aortic adenopathy.  Subsequent PET/CT with new cedric metastatic disease in the retroperitoneum of the abdomen.  New pathologically enlarged hypermetabolic retroperitoneal lymph nodes.  This would be concerning for disease recurrence/progression.  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  I discussed with him the treatment options going forward.  He has had a good response with immunotherapy pembrolizumab in the past. This was  restarted. subsequent imaging with treatment response. 1/2023 imaging with good response. Continued treatment  Now with increased adenopathy 2 new lymph nodes. Holding immunotherapy for now.   Consider getting cardiac MR discussed with Dr. Culp. While immunotherapy on hold, increased adenopathy is concerning. Could be pseudoprogression vs true progression. PET CT reviewed could indicate pseudoprogression since he has had a good response to immunotherapy  immunotherapy was restarted and has been on hold with patient's symptomatology cardiac MRI cannot be obtained, no ischemic heart disease leading to cardiomyopathy this most likely is related to Keytruda.  We will stop Keytruda going forward  CT imaging with stable disease, FOLFOX was started which he had significant side effects  With imaging being stable and significant side effects with FOLFOX, we discussed  about options with wait and watch vs 5FU only, patient prefers to be on maintenance.   We started 5FU only .   Tolerating relatively well, has significant fatigue. CT imaging now with ongoing response. We discussed that he would have been on treatment for 2 years following recurrence with good response. We can continue treatment now and get a PET CT in 2/2024.will repeat prior to follow up. , PET with some residual activity in the mediastinal lymph  node.   We continued treatment and now signatera negative. I had a long discussion with the patient. We decided to repeat CT chest now and if no adenopathy, consider holding treatment.   Now CT chest repeated with persistent adenopathy. Discussed biopsy/mediastinoscopy vs continued treatment. Has fairly been tolerating well except fatigue. After discussion we decided to continue chemotherapy.  Now repeat CT imaging with no new concerning findings there is stable nodules which are subcentimeter in the lungs there is mildly prominent precarinal lymph node.  I discussed with the patient about this possibly being inflammatory rather than neoplastic.    -After discussion we decided to proceed with EBUS and possible biopsy of the lymph node if negative for malignancy we can consider stopping maintenance treatment given Signatera is negative and patient has treatment related side effects which are affecting his quality of life  -Bronchoscopy and precarinal lymph node biopsy reported negative as above.  -Repeat Signatera test also reported negative.  -In view of significant treatment-related toxicities and no concerns for disease progression at this time, we will plan for surveillance alone without additional systemic therapy.  -Will check repeat CT chest/abdomen/pelvis in 10-12 weeks continue to monitor labs and Signatera every 3 months.    Possible myocarditis  Symptoms started after last immunotherapy  Treatment on hold for now, symptoms have improved, cardiac markers were negative, echocardiogram with EF 40%, cardiac MRI cannot be obtained cardiac cath with EF down to 30% management per cardiology  Now EF up to 40-45% monitor.  Symptomatic improvement now no shortness of breath    Shortness of breath  Improved, has occasional shortness of breath, likely copd  I have recommended using albuterol  Symptoms have improved since stopping immunotherapy. Questionable pneumonitis will monitor closely with  immunotherapy  Cardiology has evaluated for myocarditis related to immunotherapy symptomatic improvement now continue to monitor. Symptomatic improvement as above.    Reflux/heartburn/dysphagia:   Symptoms improved.  Also has a history of strictures. Status post EGD and balloon dilation.   Continues to be on pantoprazole symptoms stable.  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  Continues on Synthroid stable now no heart function stable in normal range    Mild diarrhea:  diarrhea seems to be waxing and waning in nature.  Diarrhea related to immunotherapy now improved stable.  Diarrhea has improved    PD-L1 positive, HER2 negative, p53 and KRAS positive.  Repeat Caris testing with repeat biopsy with no target mutations.    B12 deficiency: Continue B12    Anemia - Hb  was low iron sat low at 10, given venofer 300 x3 monitor for now. Intolerant to oral iron.  Continued low iron saturation will plan on Feraheme for 2 doses    Groin pain - negative for UTI, STI, has seen urology, likely prostatits, started cipro improvement noted. Had cystoscopy, was started on flomax, continued improvement in symptoms    Chronic kidney disease creatinine has worsened overall compared to 8/2023 has seen Dr. Posadas with nephrology adjusted blood pressure meds creatinine has improved now blood pressure under better control.  Discussed with Dr. Posadas    Follow-up in 3 months with repeat labs and scans, sooner as needed

## 2024-08-07 ENCOUNTER — HOSPITAL ENCOUNTER (OUTPATIENT)
Dept: ONCOLOGY | Facility: HOSPITAL | Age: 74
Discharge: HOME | End: 2024-08-07
Payer: MEDICARE

## 2024-08-07 ENCOUNTER — CONSULT (OUTPATIENT)
Dept: ONCOLOGY | Facility: CLINIC | Age: 74
End: 2024-08-07
Payer: MEDICARE

## 2024-08-07 VITALS
DIASTOLIC BLOOD PRESSURE: 75 MMHG | OXYGEN SATURATION: 100 % | HEIGHT: 66 IN | BODY MASS INDEX: 23.82 KG/M2 | WEIGHT: 148.2 LBS | SYSTOLIC BLOOD PRESSURE: 131 MMHG | HEART RATE: 59 BPM

## 2024-08-07 DIAGNOSIS — R79.89 ELEVATED SERUM CREATININE: ICD-10-CM

## 2024-08-07 DIAGNOSIS — C15.5 PRIMARY MALIGNANT NEOPLASM OF LOWER THIRD OF ESOPHAGUS: ICD-10-CM

## 2024-08-07 DIAGNOSIS — D63.0 ANEMIA IN NEOPLASTIC DISEASE: ICD-10-CM

## 2024-08-07 DIAGNOSIS — C15.5 PRIMARY MALIGNANT NEOPLASM OF LOWER THIRD OF ESOPHAGUS: Primary | ICD-10-CM

## 2024-08-07 DIAGNOSIS — C16.0 MALIGNANT NEOPLASM OF CARDIA: Primary | ICD-10-CM

## 2024-08-07 DIAGNOSIS — C15.9 ESOPHAGEAL CANCER, STAGE IV: ICD-10-CM

## 2024-08-07 DIAGNOSIS — C16.0 MALIGNANT NEOPLASM OF CARDIA: ICD-10-CM

## 2024-08-07 LAB
ALP BLD-CCNC: 36 U/L (ref 53–128)
BASOPHILS # BLD AUTO: 0.03 10*3/MM3 (ref 0–0.2)
BASOPHILS NFR BLD AUTO: 0.6 % (ref 0–1.5)
BUN BLDA-MCNC: 13 MG/DL (ref 7–22)
CALCIUM BLD QL: 8.8 MG/DL (ref 8–10.3)
CHLORIDE BLDA-SCNC: 105 MMOL/L (ref 98–108)
CO2 BLDA-SCNC: 25 MMOL/L (ref 18–33)
CREAT BLDA-MCNC: 1.4 MG/DL (ref 0.6–1.2)
DEPRECATED RDW RBC AUTO: 52.4 FL (ref 37–54)
EGFRCR SERPLBLD CKD-EPI 2021: 52.4 ML/MIN/1.73
EOSINOPHIL # BLD AUTO: 0.07 10*3/MM3 (ref 0–0.4)
EOSINOPHIL NFR BLD AUTO: 1.5 % (ref 0.3–6.2)
ERYTHROCYTE [DISTWIDTH] IN BLOOD BY AUTOMATED COUNT: 15.8 % (ref 12.3–15.4)
GLUCOSE BLDC GLUCOMTR-MCNC: 125 MG/DL (ref 73–118)
HCT VFR BLD AUTO: 32.5 % (ref 37.5–51)
HGB BLD-MCNC: 10.6 G/DL (ref 13–17.7)
LYMPHOCYTES # BLD AUTO: 1.24 10*3/MM3 (ref 0.7–3.1)
LYMPHOCYTES NFR BLD AUTO: 26.6 % (ref 19.6–45.3)
MCH RBC QN AUTO: 31.6 PG (ref 26.6–33)
MCHC RBC AUTO-ENTMCNC: 32.6 G/DL (ref 31.5–35.7)
MCV RBC AUTO: 97 FL (ref 79–97)
MONOCYTES # BLD AUTO: 0.65 10*3/MM3 (ref 0.1–0.9)
MONOCYTES NFR BLD AUTO: 13.9 % (ref 5–12)
NEUTROPHILS NFR BLD AUTO: 2.68 10*3/MM3 (ref 1.7–7)
NEUTROPHILS NFR BLD AUTO: 57.4 % (ref 42.7–76)
PLATELET # BLD AUTO: 180 10*3/MM3 (ref 140–450)
PMV BLD AUTO: 10.4 FL (ref 6–12)
POC ALBUMIN: 3.3 G/L (ref 3.3–5.5)
POC ALT (SGPT): 12 U/L (ref 10–47)
POC AST (SGOT): 22 U/L (ref 11–38)
POC TOTAL BILIRUBIN: 0.6 MG/DL (ref 0.2–1.6)
POC TOTAL PROTEIN: 5.9 G/DL (ref 6.4–8.1)
POTASSIUM BLDA-SCNC: 4 MMOL/L (ref 3.6–5.1)
RBC # BLD AUTO: 3.35 10*6/MM3 (ref 4.14–5.8)
SODIUM BLD-SCNC: 142 MMOL/L (ref 128–145)
WBC NRBC COR # BLD AUTO: 4.67 10*3/MM3 (ref 3.4–10.8)

## 2024-08-07 PROCEDURE — 85025 COMPLETE CBC W/AUTO DIFF WBC: CPT | Performed by: STUDENT IN AN ORGANIZED HEALTH CARE EDUCATION/TRAINING PROGRAM

## 2024-08-07 PROCEDURE — 36591 DRAW BLOOD OFF VENOUS DEVICE: CPT

## 2024-08-07 PROCEDURE — 80053 COMPREHEN METABOLIC PANEL: CPT

## 2024-08-07 RX ORDER — SODIUM CHLORIDE 0.9 % (FLUSH) 0.9 %
20 SYRINGE (ML) INJECTION AS NEEDED
Status: DISCONTINUED | OUTPATIENT
Start: 2024-08-07 | End: 2024-08-08 | Stop reason: HOSPADM

## 2024-08-07 RX ORDER — DIPHENHYDRAMINE HYDROCHLORIDE 50 MG/ML
50 INJECTION INTRAMUSCULAR; INTRAVENOUS AS NEEDED
Status: CANCELLED | OUTPATIENT
Start: 2024-08-07

## 2024-08-07 RX ORDER — FAMOTIDINE 10 MG/ML
20 INJECTION, SOLUTION INTRAVENOUS AS NEEDED
Status: CANCELLED | OUTPATIENT
Start: 2024-08-07

## 2024-08-07 RX ORDER — DEXTROSE MONOHYDRATE 50 MG/ML
250 INJECTION, SOLUTION INTRAVENOUS ONCE
Status: CANCELLED | OUTPATIENT
Start: 2024-08-07

## 2024-08-07 RX ORDER — SODIUM CHLORIDE 0.9 % (FLUSH) 0.9 %
20 SYRINGE (ML) INJECTION AS NEEDED
OUTPATIENT
Start: 2024-08-07

## 2024-08-07 RX ADMIN — Medication 20 ML: at 12:00

## 2024-08-07 RX ADMIN — Medication 20 ML: at 09:54

## 2024-08-07 NOTE — PROGRESS NOTES
Dr Beth nurse April C.R.N. brought over blood kit that Dr Beth ordered for me to draw and informed me that patients chemo treatment cancelled today. Labs drawn for kit as directed and Port flushed without difficulty prior to needle removal. Patient gave AVS per M.D. staff.

## 2024-08-07 NOTE — PROGRESS NOTES
Port accessed and flushed with good blood return noted. Labs drawn from port as ordered.Port flushed with saline.

## 2024-08-08 LAB
FUNGUS WND CULT: NORMAL
MYCOBACTERIUM SPEC CULT: NORMAL
NIGHT BLUE STAIN TISS: NORMAL

## 2024-08-15 LAB
FUNGUS WND CULT: NORMAL
MYCOBACTERIUM SPEC CULT: NORMAL
NIGHT BLUE STAIN TISS: NORMAL

## 2024-08-15 RX ORDER — ONDANSETRON 4 MG/1
4 TABLET, FILM COATED ORAL EVERY 8 HOURS PRN
Qty: 90 TABLET | Refills: 0 | Status: SHIPPED | OUTPATIENT
Start: 2024-08-15

## 2024-08-15 NOTE — OP NOTE
Bronchoscopy Procedure Note    Timeout was done appropriately by staff    Procedure:  EBUS bronchoscopy  EBUS directed FNA of pretracheal node  Bilateral lung washing    Preoperative Diagnosis: Abnormal PET scan with large precarinal lymph node    Postoperative Diagnosis:     No endobronchial lesions  EBUS directed FNA of precarinal node no malignancy based on pathologist report at the bedside  Bilateral lung washing was done    Anesthesia: General Anesthesia    Procedure Details: Patient was consented for the procedure with all risks and benefits of the procedure explained in detail.  Patient was given the opportunity to ask questions and all concerns were answered.  The bronchocope was inserted into the main airway via the endotracheal tube. An anatomical survey was done of the main airways and the subsegmental bronchus to at least the first subsegmental level of all five lobes of both lungs.  The findings are reported below.  A bronchoalveolar lavage was performed using aliquots of normal saline instilled into the airways then aspirated back.      Findings:      Started with routine bronchoscopy which was introduced through the endotracheal tube all airways were visualized to at least the first subsegment level of all 5 lobes of both lungs.  Airways were of normal size and caliber.  No endobronchial lesions seen.    Mucoid secretions noted  Bilateral lung washing was obtained      We switched to EBUS bronchoscopy  We located the  pretracheal node, direct ultrasound guidance. Three passes were done utilizing size 21 gauge needle  No bleeding was noted    Patient tolerated procedure well        Estimated Blood Loss:  Minimal           Specimens:  Sent serosanguinous fluid                Complications:  None; patient tolerated the procedure well.           Disposition: PACU - hemodynamically stable.      Patient tolerated the procedure well.    Bonnie Smith MD  8/15/2024  08:07 EDT

## 2024-08-22 LAB
FUNGUS WND CULT: NORMAL
MYCOBACTERIUM SPEC CULT: NORMAL
NIGHT BLUE STAIN TISS: NORMAL

## 2024-08-27 ENCOUNTER — OUTSIDE FACILITY SERVICE (OUTPATIENT)
Dept: CARDIOLOGY | Facility: CLINIC | Age: 74
End: 2024-08-27
Payer: MEDICARE

## 2024-08-27 DIAGNOSIS — I42.0 CARDIOMYOPATHY, DILATED: ICD-10-CM

## 2024-08-27 DIAGNOSIS — Z95.0 PRESENCE OF CARDIAC PACEMAKER: Primary | ICD-10-CM

## 2024-08-29 LAB
MYCOBACTERIUM SPEC CULT: NORMAL
NIGHT BLUE STAIN TISS: NORMAL

## 2024-09-03 LAB
MYCOBACTERIUM SPEC CULT: NORMAL
NIGHT BLUE STAIN TISS: NORMAL

## 2024-09-05 ENCOUNTER — TELEPHONE (OUTPATIENT)
Dept: ONCOLOGY | Facility: CLINIC | Age: 74
End: 2024-09-05
Payer: MEDICARE

## 2024-09-05 NOTE — TELEPHONE ENCOUNTER
Phoned pt and informed him I will need to check on the status of his [facility] lab and call him back with an update.

## 2024-09-05 NOTE — TELEPHONE ENCOUNTER
Caller: Artie Mc    Relationship: Self    Best call back number: [phone]    Caller requesting test results: PT    What test was performed: LABS    When was the test performed: 8-7-2024    Where was the test performed: [facility]

## 2024-09-06 ENCOUNTER — TELEPHONE (OUTPATIENT)
Dept: ONCOLOGY | Facility: CLINIC | Age: 74
End: 2024-09-06

## 2024-09-06 NOTE — TELEPHONE ENCOUNTER
Caller: AUBREE    Relationship: PCP MABEL FITZPATRICK    Best call back number: [phone]    Who are you requesting to speak with (clinical staff, provider,  specific staff member): NON CLINICAL    What was the call regarding: AUBREE CALLING FROM PCP MABEL GAGNON OFFICE REQUESTING THE LAST OFFICE NOTE FAXED OVER [phone]

## 2024-09-10 ENCOUNTER — CLINICAL SUPPORT NO REQUIREMENTS (OUTPATIENT)
Dept: CARDIOLOGY | Facility: CLINIC | Age: 74
End: 2024-09-10
Payer: MEDICARE

## 2024-09-10 DIAGNOSIS — R00.1 BRADYCARDIA: Primary | ICD-10-CM

## 2024-09-10 DIAGNOSIS — Z95.0 PRESENCE OF CARDIAC PACEMAKER: ICD-10-CM

## 2024-09-11 ENCOUNTER — HOSPITAL ENCOUNTER (OUTPATIENT)
Dept: PET IMAGING | Facility: HOSPITAL | Age: 74
Discharge: HOME | End: 2024-09-11
Admitting: STUDENT IN AN ORGANIZED HEALTH CARE EDUCATION/TRAINING PROGRAM
Payer: MEDICARE

## 2024-09-11 DIAGNOSIS — C15.5 PRIMARY MALIGNANT NEOPLASM OF LOWER THIRD OF ESOPHAGUS: ICD-10-CM

## 2024-09-11 DIAGNOSIS — C16.0 MALIGNANT NEOPLASM OF CARDIA: ICD-10-CM

## 2024-09-11 LAB
CREAT BLDA-MCNC: 1.5 MG/DL (ref 0.6–1.3)
EGFRCR SERPLBLD CKD-EPI 2021: 48.2 ML/MIN/1.73

## 2024-09-11 PROCEDURE — 82565 ASSAY OF CREATININE: CPT

## 2024-09-11 PROCEDURE — 71260 CT THORAX DX C+: CPT

## 2024-09-11 PROCEDURE — 25510000001 IOPAMIDOL PER 1 ML: Performed by: STUDENT IN AN ORGANIZED HEALTH CARE EDUCATION/TRAINING PROGRAM

## 2024-09-11 PROCEDURE — 74177 CT ABD & PELVIS W/CONTRAST: CPT

## 2024-09-11 RX ORDER — IOPAMIDOL 755 MG/ML
100 INJECTION, SOLUTION INTRAVASCULAR
Status: COMPLETED | OUTPATIENT
Start: 2024-09-11 | End: 2024-09-11

## 2024-09-11 RX ADMIN — IOPAMIDOL 100 ML: 755 INJECTION, SOLUTION INTRAVENOUS at 09:46

## 2024-09-12 ENCOUNTER — TELEPHONE (OUTPATIENT)
Dept: ONCOLOGY | Facility: CLINIC | Age: 74
End: 2024-09-12
Payer: MEDICARE

## 2024-09-12 NOTE — TELEPHONE ENCOUNTER
----- Message from Dorian Beth sent at 9/11/2024  9:36 PM EDT -----  No signs of disease progression on CT scan. Will continue with Surveillance. No indication for resuming chemotherapy at this time.

## 2024-09-12 NOTE — TELEPHONE ENCOUNTER
Phoned pt and informed him Dr. Beth stated     No signs of disease progression on CT scan. Will continue with Surveillance. No indication for resuming chemotherapy at this time. Pt appreciated the phone call and asked about the results of her Signatera test will notify Dr. Beth he would like these results.

## 2024-09-16 RX ORDER — ONDANSETRON 4 MG/1
TABLET, FILM COATED ORAL
Qty: 90 TABLET | Refills: 0 | Status: SHIPPED | OUTPATIENT
Start: 2024-09-16

## 2024-09-18 ENCOUNTER — HOSPITAL ENCOUNTER (OUTPATIENT)
Dept: ONCOLOGY | Facility: HOSPITAL | Age: 74
Discharge: HOME | End: 2024-09-18
Admitting: INTERNAL MEDICINE
Payer: MEDICARE

## 2024-09-18 ENCOUNTER — TELEPHONE (OUTPATIENT)
Dept: ONCOLOGY | Facility: CLINIC | Age: 74
End: 2024-09-18
Payer: MEDICARE

## 2024-09-18 DIAGNOSIS — C15.5 PRIMARY MALIGNANT NEOPLASM OF LOWER THIRD OF ESOPHAGUS: ICD-10-CM

## 2024-09-18 DIAGNOSIS — C16.0 MALIGNANT NEOPLASM OF CARDIA: Primary | ICD-10-CM

## 2024-09-18 LAB
FERRITIN SERPL-MCNC: 171 NG/ML (ref 30–400)
FOLATE SERPL-MCNC: >20 NG/ML (ref 4.78–24.2)
IRON 24H UR-MRATE: 106 MCG/DL (ref 59–158)
IRON SATN MFR SERPL: 31 % (ref 20–50)
RETICS # AUTO: 0.02 10*6/MM3 (ref 0.02–0.13)
RETICS/RBC NFR AUTO: 0.67 % (ref 0.7–1.9)
TIBC SERPL-MCNC: 341 MCG/DL (ref 298–536)
TRANSFERRIN SERPL-MCNC: 229 MG/DL (ref 200–360)
VIT B12 BLD-MCNC: 386 PG/ML (ref 211–946)

## 2024-09-18 PROCEDURE — 82746 ASSAY OF FOLIC ACID SERUM: CPT | Performed by: STUDENT IN AN ORGANIZED HEALTH CARE EDUCATION/TRAINING PROGRAM

## 2024-09-18 PROCEDURE — 83540 ASSAY OF IRON: CPT | Performed by: STUDENT IN AN ORGANIZED HEALTH CARE EDUCATION/TRAINING PROGRAM

## 2024-09-18 PROCEDURE — 84466 ASSAY OF TRANSFERRIN: CPT | Performed by: STUDENT IN AN ORGANIZED HEALTH CARE EDUCATION/TRAINING PROGRAM

## 2024-09-18 PROCEDURE — 82728 ASSAY OF FERRITIN: CPT | Performed by: STUDENT IN AN ORGANIZED HEALTH CARE EDUCATION/TRAINING PROGRAM

## 2024-09-18 PROCEDURE — 85045 AUTOMATED RETICULOCYTE COUNT: CPT | Performed by: STUDENT IN AN ORGANIZED HEALTH CARE EDUCATION/TRAINING PROGRAM

## 2024-09-18 PROCEDURE — 36591 DRAW BLOOD OFF VENOUS DEVICE: CPT

## 2024-09-18 PROCEDURE — 82607 VITAMIN B-12: CPT | Performed by: STUDENT IN AN ORGANIZED HEALTH CARE EDUCATION/TRAINING PROGRAM

## 2024-09-18 RX ORDER — SODIUM CHLORIDE 0.9 % (FLUSH) 0.9 %
20 SYRINGE (ML) INJECTION AS NEEDED
Status: DISCONTINUED | OUTPATIENT
Start: 2024-09-18 | End: 2024-09-19 | Stop reason: HOSPADM

## 2024-09-18 RX ORDER — SODIUM CHLORIDE 0.9 % (FLUSH) 0.9 %
20 SYRINGE (ML) INJECTION AS NEEDED
OUTPATIENT
Start: 2024-09-18

## 2024-09-18 RX ADMIN — Medication 20 ML: at 13:02

## 2024-09-19 DIAGNOSIS — R79.89 ELEVATED TSH: ICD-10-CM

## 2024-09-19 DIAGNOSIS — E03.9 HYPOTHYROIDISM (ACQUIRED): ICD-10-CM

## 2024-09-20 RX ORDER — LEVOTHYROXINE SODIUM 50 UG/1
50 TABLET ORAL DAILY
Qty: 90 TABLET | Refills: 0 | OUTPATIENT
Start: 2024-09-20

## 2024-09-27 DIAGNOSIS — R79.89 ELEVATED TSH: ICD-10-CM

## 2024-09-27 DIAGNOSIS — E03.9 HYPOTHYROIDISM (ACQUIRED): ICD-10-CM

## 2024-09-27 RX ORDER — LEVOTHYROXINE SODIUM 50 UG/1
50 TABLET ORAL DAILY
Qty: 30 TABLET | Refills: 0 | Status: SHIPPED | OUTPATIENT
Start: 2024-09-27

## 2024-10-22 NOTE — PROGRESS NOTES
Hematology-Oncology Follow-up Note       Artie Mc  1950    Primary Care Physician: Chanel Carter APRN  Referring Physician: Chanel Carter APRN    Reason For Visit:  No chief complaint on file.    Metastatic esophageal cancer  Monitoring for adverse effects related to immunotherapy.  Hypothyroidism    HPI:   Mr. Mc is a pleasant 74 y.o. gentleman with a history of gastroesophageal reflux disease, diabetes, hypertension, heart block status post pacemaker placement and history of CABG.  He was having symptoms of dysphagia, weight loss since September 2016. He was reporting symptoms of food getting stuck in the lower chest.  He has transitioned himself to a liquid diet.  The patient reports losing about 30 pounds over this duration.  The patient underwent upper GI endoscopy on 12/20/16 with Dr. Esvin Barrera.  Endoscopy showed necrotic, circumferential and moderately obstructive mass at the distal esophagus between 42 cm and 36 cm.      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  There was also evidence of mild subacute inflammation in the duodenum.  Dr. Barrera ordered a CT scan of the chest and abdomen with contrast on 12/20/16.  The scan was done at [facility].  The scan showed a new ill-defined mass in the anterior wall of the distal esophagus at the GE junction measuring 2.3 x 2.2 x 1.6 cm, worrisome for cancer.  There were four subcentimeter liver lesions which had appeared stable in comparison to another CT scan from 2009 and they likely represent small cysts.  There was also mild lymphadenopathy in the gastrohepatic ligament.  There were at least six borderline enlarged lymph nodes measuring up to 1.3 x 1 cm in the region.  The patient is referred to us for further oncological evaluation.    1/5/2017 - The patient presents for initial consultation.  He reports persistent dysphagia and also has  symptoms of regurgitation.  He cannot tolerate solid foods and is dependent on liquid diet such as Ensure.  He also reports fatigue. Symptoms of dysphagia have been present for the past four months.  When he eats any solids, the food gets stuck in the lower chest.    1/5/17 - Vitamin B12 200 (L).  CEA 0.8.  Ferritin 35.  Iron saturation 16% (L), serum iron 56, TIBC 354.  Creatinine 0.9.  LFTs normal.  Folate 10.2.  1/12/17 - PET/CT scan:  Intense abnormal activity corresponding to the region of the GE junction and proximal stomach.  SUV is 11.06.  There is a suggestion of an abnormal mass or abnormal wall thickening in the region measuring 3.9 x 4.8 cm.  No additional abnormalities.  No evidence of metastases or other lymphadenopathy.  Scattered nonspecific subcentimeter lymph nodes involving the mediastinum and within the central upper abdomen.  1/13/17 - Creatinine 0.9.  LFTs normal.    1/17/17 - Patient seen by thoracic surgeon, Dr. Bradley.  PET scan was reviewed.  He has recommended neoadjuvant chemoradiation therapy followed by [facility] Gurwinder esophagogastrectomy.  Port-A-Cath is being arranged.    1/20/17 - WBC 6.3, hemoglobin 12.1, platelet count 198,000, MCV 86.4.  1/30/17 - Patient started weekly Carboplatin and Taxol (Carboplatin AUC 2 and Taxol 50 mg/M2).  Patient received Injectafer 750 mg.  WBC 5.8, hemoglobin 12.4, platelet count 244,000.   1/31/17 - Patient seen by Dr. Eduardo Oleary.  The plan was to give 5040 cGy in 28 fractions.   2/6/17 - WBC 4.4, hemoglobin 11.6, platelet count 201,000.  Patient received cycle 2 of weekly Carboplatin and Taxol (Carboplatin AUC 2 and Taxol 50 mg/M2).  2/13/17 - Patient received Carboplatin and Taxol weekly cycle 3.  2/13/17 - Patient started concurrent radiation therapy.  Total planned dose is 4140 cGy.    2/13/17 to 2/15/17 - Patient received 4140 cGy of neoadjuvant radiation.  Radiation was finished on 3/15/17.    2/20/17 - WBC 1.8, hemoglobin 10.6, platelet count  217,000.  Creatinine 0.5. Folate 15 (L).  Ferritin 361.  Haptoglobin 214.  Iron saturation 33%, TIBC 263, serum iron 88.    2/20/17 - Patient received Carboplatin and Taxol weekly cycle 4.   2/27/17 - WBC 3.7, hemoglobin 10.7, platelet count 177,000, MCV 85.3.    3/6/17 - Patient has received 16 out of the 23 planned radiation treatments.  Total planned dose is 4140 cGy.    3/6/17 - WBC 5.4, hemoglobin 11.2, platelet count 113,000.  3/6/17 - Patient received cycle 5 of weekly Carboplatin and Taxol.   3/13/17 - Patient received cycle 6 of weekly Carboplatin and Taxol.  WBC 3.9, hemoglobin 11.3, platelet count 93,000.     3/20/17 - WBC 1.4, hemoglobin 10.0, platelet count 118,000, MCV 86.1   3/27/17 - WBC 3.8, hemoglobin 9.5, platelet count 176,000, MCV 87.3.    4/7/17 - Stress test:  No evidence of reversible myocardial ischemia.  Normal left ventricular ejection fraction of 50%.    4/17/17 - Upper GI endoscopy by Dr. 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  The GE junction adenocarcinoma lesion seems to have a very good response to chemotherapy and radiation.  The lumen was open.  5/1/17 - WBC 7.0, hemoglobin 10.2, platelet count 175,000.    5/4/17 - Patient underwent [place] Gurwinder esophagectomy with pyloroplasty, feeding jejunostomy tube, abdominal and mediastinal lymph node dissection.    Surgical Pathology:  Moderately to poorly differentiated adenocarcinoma measuring 1.3 cm at the GE junction.  Resection margins are negative for malignancy.  Tumor margins are negative.  There is effective treatment response.  No evidence of lymphovascular invasion.  Staging is ypT3,pN1.  One out of fourteen lymph nodes involved.   5/22/17 - WBC 6.8, hemoglobin 10.3, platelet count 312,000.    6/19/17 - WBC 5.7, hemoglobin 10.5, platelet count 204,000, MCV 92.1.    7/6/17 - EGD:  Status post balloon dilation of esophageal stricture.  7/12/17 - EGD with balloon dilation of esophageal  stricture.  7/26/17 - EGD and balloon dilation of esophageal stricture.  7/31/17 - PET/CT scan:  There is mild metabolic activity seen at the thoracic esophagus just at and below the surgical margins.  Some mild wall thickening.  This could be from recent surgery.  A residual cancer seems unlikely.  There is a precarinal lymph node with mild metabolic activity with SUV of 3 measuring 1 x 1.3 cm.  Again this appears to be nonspecific in my opinion.    8/7/17 - WBC 5.9, hemoglobin 11.9, platelet count 171,000, MCV 88.7.    10/19/17 - WBC 7.5, hemoglobin 11.8, platelet count 216,000.    1/8/18 - PET scan:  No evidence of residual disease or recurrent disease.  There is an esophageal stent in the mid esophagus with a large debris air level.  No evidence of any metastases in the chest, abdomen or pelvis.  Mild nodule uptake in the vocal cords with fullness in the right vocal cord.  This could be physiologic.    7/9/18 - CT scan of chest with contrast:  Prominent mediastinal lymph nodes appear stable since February.  Changes of gastric pull-through procedure for esophageal cancer again noted.  Tree-in-bud infiltrates in the left lung base, consistent with infection versus inflammation.    1/10/19 - CT chest, abdomen and pelvis with contrast:  No evidence of mets in the chest; however, interval development of metastatic lymphadenopathy in the left side of the retroperitoneum.  For instance, there is a 17 mm rounded lymph node, 10 mm lymph node and also a 14 mm lymph node.  These are all new.  Stable 9 mm hypodense lesion within the right hepatic lobe, which could reflect hemangioma or complex cyst.    1/30/19 - CT-guided core biopsy of retroperitoneal lymph node:  Poorly differentiated adenocarcinoma consistent with metastases from patient’s known esophageal primary.  CK20 positive, CDX2 positive, cytokeratin 7 negative, TTF-1 negative.    2/1/19 - Creatinine 0.9, BUN 18, calcium 9.3; these are normal.    2/28/19 - [facility]  Next Gen sequencing testing on retroperitoneal lymph node specimen:  PD-L1 positive.  CPS = 3.  This implies responsiveness to pembrolizumab.  Mismatch repair status is proficient (no evidence of microsatellite instability).  Tumor mutational burden is low = 6 mutations/Mb.  CDH1 indeterminate.  KRAS mutation positive.  TP53 mutation positive.  CDK6 amplified.  [employer] PD-L1 testing by IHC:  PD-L1 expression 1% positive (CPS =1).  HER2/marvel by IHC is negative.  HER2/marvel by CISH not amplified.  Microsatellite stable tumor.    3/7/19 - WBC 7.3, hemoglobin 12, platelet count 128,000, MCV 92.     3/14/19 - Patient was seen by radiation oncologist, Dr. Chahal.  He has recommended observation versus systemic chemoimmunotherapy as needed.  No plans for radiation therapy.   5/1/19 - CT scan of chest with contrast:  No definitive findings of new metastatic disease in the chest.  Emphysema noted.    5/1/19 - CT abdomen and pelvis:  There is interval progression of metastatic retroperitoneal adenopathy.  Left periaortic adenopathy with lymph node measuring up to 2 cm, previously 1.7 cm.  Another lymph node now measures 2.3, previously 1.4 cm.  New enlarged lymph node on image 147 measures 1.4 cm, previously 0.4 cm.  A 9 mm hypodense right hepatic lobe lesion appears stable.    5/9/19 - Decision made to start patient on immunotherapy Keytruda.    5/9/19 - Vitamin B12 229.  Ferritin 61.  Folate 14.3.  Iron saturation 26%, TIBC 304, serum iron 79.  5/31/19 - Patient received Keytruda 200 mg cycle 1, day 1.    5/31/19 - Free T4 0.99.  Creatinine 0.9, BUN 9.  LFTs normal.  TSH 3.99.  Folate 14.3.  Iron saturation 26%.    6/21/2019 patient received Keytruda cycle 2  7/12/2019: Patient received Keytruda cycle 3  8/2/2019 patient received cycle 4 Keytruda WBC 7.2, hemoglobin 11.8 platelet count 163  8/23/2019 creatinine 0.9, LFTs normal, WBC 6.4, hemoglobin 11.5, platelets 179, TSH 2.1   8/23/2019 patient received cycle 5 of  Keytruda  9/3/2019 CT chest abdomen and pelvis with contrast: . Positive response to therapy in the abdomen since 05/01/2019. Pathologically enlarged retroperitoneal lymph nodes, predominantly in the left periaortic region, have diminished in size.a 1.3 x 2.1 cm left periaortic node (image 60), previously measured 3.5 x 2.3 cm. A 1.6 cm index node near the level of the left renal vein previously measured 2.0 cm No new or progressive adenopathy. 2. Stable findings in the chest. Noncalcified right upper lobe nodules are unchanged. There is no evidence of new or progressive metastatic disease within the chest. 3. 8 mm indeterminate low-density lesion in the right hepatic lobe, is stable. No new liver lesions.  9/6/2019 WBC 5.6, hemoglobin 11.2, platelets of 192, MCV 91.8  9/13/2019 patient received Keytruda cycle 6, albumin 3.2  10/4/2019 patient received cycle 7 of Keytruda, TSH 4.8, free T4 0.86  10/9/2019 WBC 5.8, hemoglobin 11.7, platelets 174  10/25/2019 patient received cycle 8 of Keytruda.  WBC 5.9, hemoglobin 11.5, platelets 152, creatinine 0.75, cortisol 15.2, TSH 6.48 high , free T4 1.13 normal  11/6/2019 WBC 6.6, hemoglobin 11.8, MCV 91.5, platelets 187  11/15/2019 patient received cycle 9 of Keytruda, WBC 6.6, hemoglobin 11.9, platelets 161, cortisol level 8.86, TSH 2.68, free T3 2.8, free T4 1.5  12/4/2019 WBC 6.9, hemoglobin 12.0, platelets 180, MCV 92.9  12/06/2019-Keytruda Cycle 10  12/27/2019- Cycle 11 LFTs normal, WBC 6.7, hemoglobin 11.4, platelets 168, MCV 93, TSH 2.4,  1/28/2020: CT chest abdomen and pelvis with contrast:  Single (aortic lymph node in the abdominal retroperitoneum has increased.  Rest of the retroperitoneal lymph nodes have decreased in size.  Mediastinal adenopathy appears unchanged.  Noncalcified bilateral pulmonary nodules are stable  1/17/2020 patient received Keytruda cycle 12:.  2/3/2020 WBC 7.1, hemoglobin 12.2, platelets 171  02/10/2020 patient received cycle 13 of  Keytruda: WBC 7.3, hemoglobin 12.1, platelets 166, creatinine 0.82  3/6/2020 patient is of Keytruda 200 mg, hemoglobin 10.8  3/27/2020 patient received Keytruda 200 mg, hemoglobin 11.4, TSH 1.95  4/17/2020 patient received Keytruda cycle 16 200 mg, WBC 6.1, hemoglobin 11.6, platelets 150  5/8/2020: Patient received cycle 17 Keytruda, WBC 6.3, hemoglobin 9.6, platelets 137, TSH 2.05, free T4 1.54, creatinine 0.93, LFTs normal,  5/29/2020: Patient received cycle 18 Keytruda, WBC 6.5, hemoglobin 11.7, platelets 129, creatinine 0.83, TSH 1.69  6/26/2020: Patient receiving Keytruda.   7/17/2020: TSH 2.06, WBC 5.7, hemoglobin 11.4, platelets 177, MCV 92.4, Keytruda 200 mg cycle 20  7/27/2020: CT chest abdomen pelvis with contrast: Stable findings in the chest abdomen pelvis since 1/28/2020.  Noncalcified bilateral pulmonary nodules are stable.  Stable retroperitoneal left perinephric adenopathy in the abdomen.  8/7/2020: Patient received cycle 21 of Keytruda  8/28/2020 patient is a cycle 22 of Keytruda.  WBC 6.02, hemoglobin 11.3, platelets 161  9/11/2020 WBC 6.9, hemoglobin 12.4, platelets 192  9/18/2020: Cycle 23 of Keytruda, TSH 1.19  10/9/2020: Cycle 24 of Keytruda, WBC 5.9, hemoglobin 11.9, platelets 178, creatinine 0.84, TSH 2.56, free T3 2.4, CMP normal  10/23/2020: WBC 7.34, hemoglobin 11.6, platelets 175  10/30/2020:.  Keytruda 200 mg  11/20/2020: Keytruda 200 mg  12/11/2020 Keytruda 200 mg.  WBC 5.5, hemoglobin 11.4, platelets 176, creatinine 0.8, LFTs normal, TSH 2.2, cortisol 8.3  12/10/2020: CT chest abdomen and pelvis with contrast: Previously seen left para-aortic lymph nodes within the abdomen are decreased in size.  No pathological lymphadenopathy.  No evidence of residual malignancy..  Small noncalcified pulmonary nodules are stable.  No new nodules.  Stable mediastinal lymph nodes.  3/12/2021: WBC 6.7, hemoglobin 11.3, platelets 17  3/12/2021: CMP normal, TSH 2.9, cortisol 11.95, 1  3/15/2021: CT chest  with contrast: Stable findings in the chest with postsurgical changes of esophageal resection and gastric pull-through.  Stable right lower lobe lung nodule measures 11 mm.  Stable size and appearance of the mediastinal and upper abdominal lymph nodes.  3/23/2021: Doppler of upper extremity: Normal.  3/24/2021: CT abdomen and pelvis with contrast: Postoperative changes.  Left periaortic nodes are stable.  4/12/2021: TSH 4.1, cortisol 12.4  6/22/2021: CMP normal,Cortisol 10.08,, WBC 5.8, hemoglobin 11.2, platelets 109, TSH 2.59  7/12/2021: CT scan of the chest abdomen pelvis with contrast: Dominant 10 x 10 left periaortic lymph node is stable since 3/22/2021.  Dominant lymph nodes in the right lower paratracheal and AP window distribution are stable since 3/15/2021.  No evidence of progressive adenopathy in the chest abdomen or pelvis.    01/11/2022 -PET/CT findings consistent with new cedric metastatic disease in the retroperitoneum.  2 new pathologically enlarged hypermetabolic retroperitoneal lymph nodes.  No convincing evidence of recurrent disease within the chest neck pelvis or skeleton.  1/20/2022 -CT-guided needle biopsy consistent with poorly differentiated carcinoma.  CDX2 positive consistent with metastases from known esophageal primary  2/9/2022 - Pembrolizumab complicated with fatigue, joint pains, nausea, diarrhea. Symptoms decrease in intensity over a week.  3/30/2022 -CT chest and pelvis with stable metastatic lymph nodes in the left periaortic retroperitoneum.  No evidence of disease progression or new lesions.  Stable lymphadenopathy in the distal paratracheal mediastinal area  4/13/2022 - Keytruda 200  5/4/2022 - Keytruda 200  Continued keytruda  7/11/2022 - CT abdomen pelvis with decrease in size of left perioaortic lymph node. No other areas of disease.  Continues to be on Keytruda  10/18/2022 - stable CT imaging  1/20/2023 - CT imaging with stable disease. No significant change.  Patient was  hospitalized with esophagitis, subsequent EGD with improvement, no recurrence of disease.  2/21/2023 - Keytruda  CT CAP with increased retroperitoneal adenopathy.   ECHO with EF 35-40% (concern for immunotherapy related )immunotherapy held with ongoing investigation for cause.  Subsequent improvement in EF to 45%  4/4/2023 - CT abdomen pelvis with interval retroperitoneal lymph node enlargement compatible with metastatic adenopathyl.  5/5/2023 - PET CT with mixed response. No clear evidence of progression  6/2/23 -started pembrolizumab 200 mg IV  Posttreatment had symptoms of chest tightness troponin and proBNP negative, echocardiogram with EF 40%  Subsequent cardiac cath with EF down to 30% elevated proBNP, troponin no significant obstructive disease in the graft vessels  Cardiac MRI unable to obtain given pacemaker noncompliant.  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 Two enlarged mediastinal lymph nodes in the precarinal and AP window distributions are unchanged since 4/4/2023. No new or progressive adenopathy in the chest.Stable 3 mm or less noncalcified right lung nodules since 4/4/2023. Surgical changes of distal esophagectomy with gastric pull-through, without evidence of local disease recurrence  8/29/23 - started FOLFOX had significant side effects  9/20/23 - stopped oxaliplatin and continued 5FU only'  11/29/23 - CT CAP with decrease size of retroperitoneal adenopathy, no other concerning findings.  Continues 5FU leucovorin   2/12/24 - PET CT There is some metabolic activity within a mediastinal lymph node which has been suggested with slightly decreased metabolic activity. Whether this is reactive or pathologic is uncertain. Previously noted abnormal retroperitoneal  lymph node is no longer identified continued treatmen  4/10/24 - iron sat 10, ferritin  14, plan for venofer  Continued treatment signatera negative  5/7/24 - CT chest without contrast. Unchanged mildly enlarged right paratracheal lymph lymph node measuring 1.1 cm in short axis (series 2 image 40) and left paratracheal measuring 1.1 cm in short axis (series 2 image 39). No new or progressive lymphadenopathy.  Continued maintenance 5FU  changed frequency to every 3 weeks.   7/11/2024 CT chest with stable lung nodule, mildly prominent precarinal lymph node    7/25/2024:\  Bilateral lung, bronchial wash with smears and cytospin:  Acute inflammation, pulmonary macrophages and bronchial epithelial cells\    2.   Lymph node, precarinal station 4, fine-needle aspiration with smears and cell block:  Benign bronchial epithelial cells in a background of blood and lymphocytes consistent with lymph node aspirate  No malignancy identified      8/7/24: Seen today for initial evaluation by me.  He was previously being seen by Dr. Elizondo in our practice.  He has underlying history of metastatic esophageal adenocarcinoma and was on maintenance therapy with 5-FU/leucovorin.  His last treatment was on 7/17/2024.  He has intermittent bronchoscopy planned lymph node biopsy due to suspicion for metastatic disease on recent imaging.  This is reported as above.    Patient reported that he has had persistent fatigue with maintenance chemotherapy, also reported having GI side effects, such as nausea and poor appetite with chemo.  However, the patient denied any weight loss.  No other issues presently.    9/11/2024: CT chest/abdomen/pelvis:  Impression:  1. Postsurgical changes from esophagectomy with gastric pull-through. No findings to suggest progression of disease within the thorax.  2. Stable subcentimeter pulmonary nodules measuring up to 4 mm in the right lower lobe, and mildly prominent precarinal lymph node. Continued attention on  follow-up suggested.  3. No findings to suggest progression of disease or metastatic disease within the abdomen or pelvis.  4. Additional findings as given above..    Subjective:  10/23/24: Patient seen today for follow-up visit with restaging scans.  Has ogoing sinus congestion for several months. Some sore throat.some cough, SOB has improved. Has ongoing fatigue, stable.     Past Medical History:   Diagnosis Date    B12 deficiency     Blockage of coronary artery of heart     Depression     Disease of thyroid gland     DM (diabetes mellitus)     Dysphagia     Esophageal cancer     GERD (gastroesophageal reflux disease)     HTN (hypertension)     Hyperlipidemia        Past Surgical History:   Procedure Laterality Date    ABDOMINAL WALL ABSCESS INCISION AND DRAINAGE  10/22/2018    WITH DEBRIDEMENT  1.5CM X 1.5 CM X 1.5 CM    DR. PURI    BRONCHOSCOPY N/A 7/25/2024    Procedure: BRONCHOSCOPY WITH ENDOBRONCHIAL ULTRASOUND, LUNG WASHINGS, AND FINE NEEDLE ASPIRATIONS;  Surgeon: Bonnie Smith MD;  Location: [facility] ENDOSCOPY;  Service: Pulmonary;  Laterality: N/A;    CARDIAC CATHETERIZATION Right 7/20/2023    Procedure: Left Heart Cath;  Surgeon: Mazin Simmons MD;  Location: [facility] CATH INVASIVE LOCATION;  Service: Cardiovascular;  Laterality: Right;    CATARACT EXTRACTION  2018    COLONOSCOPY      CORONARY ARTERY BYPASS GRAFT  2015    ENDOSCOPY  12/20/2016    ENDOSCOPY N/A 01/02/2023    Procedure: ESOPHAGOGASTRODUODENOSCOPY;  Surgeon: Esvin Morgan MD;  Location: [facility] ENDOSCOPY;  Service: Gastroenterology;  Laterality: N/A;  post: anastamosis stricture    ENDOSCOPY N/A 02/09/2023    Procedure: ESOPHAGOGASTRODUODENOSCOPY, non-wire guided balloon dilation (12-14mm) of esophageal stricture;  Surgeon: Tino Villafana MD;  Location: [facility] ENDOSCOPY;  Service: Gastroenterology;  Laterality: N/A;  post: esophageal anastamotic stricture, post surgical changes    ESOPHAGECTOMY  05/04/2017    BROOKS DOVE  ESOPHAGECTOMY, FEEDING JEJNOSTOMOY, ABDOMINAL AND MEDIASTINAL LYMPH NODE DISECTION, PEDICLED MUSCLE FLAP COVERAGE DR. PURI    ESOPHAGOSCOPY / EGD  07/12/2017    WITH BALLOON DILATION    ESOPHAGOSCOPY / EGD  07/26/2017    WITH BALLOON DILATION    ESOPHAGOSCOPY / EGD  08/11/2017    WITH BALLOON DILATION    ESOPHAGOSCOPY / EGD  08/28/2017    WITH BALLOON DILATION    ESOPHAGOSCOPY / EGD  09/27/2017    WITH BALLOON DILATION    INSERT / REPLACE / REMOVE PACEMAKER      LYMPH NODE BIOPSY  01/30/2019    PACEMAKER IMPLANTATION  11/21/2016       Current Outpatient Medications:     clonazePAM (KlonoPIN) 0.5 MG tablet, Take 0.5 tablets by mouth 2 (Two) Times a Day As Needed for Anxiety., Disp: , Rfl: 1    cyanocobalamin (VITAMIN B-12) 1000 MCG tablet, Take 1 tablet by mouth Daily. (Patient not taking: Reported on 8/7/2024), Disp: , Rfl:     folic acid (FOLVITE) 1 MG tablet, Take 1 tablet by mouth Daily. (Patient not taking: Reported on 8/7/2024), Disp: , Rfl:     levothyroxine (SYNTHROID, LEVOTHROID) 50 MCG tablet, Take 1 tablet by mouth Daily., Disp: 30 tablet, Rfl: 0    losartan (COZAAR) 25 MG tablet, Take 1 tablet by mouth Every Night., Disp: , Rfl:     magic mouthwash oral suspension, , Disp: , Rfl:     metFORMIN (GLUCOPHAGE) 1000 MG tablet, Take 0.5 tablets by mouth Daily. (Patient taking differently: Take 0.5 tablets by mouth 2 (Two) Times a Day With Meals.), Disp: 60 tablet, Rfl: 2    nebivolol (BYSTOLIC) 5 MG tablet, Take 1 tablet by mouth Daily., Disp: , Rfl:     ondansetron (ZOFRAN) 4 MG tablet, TAKE 1 TABLET BY MOUTH EVERY 8 HOURS AS NEEDED FOR NAUSEA FOR VOMITING, Disp: 90 tablet, Rfl: 0    ondansetron (ZOFRAN) 8 MG tablet, Take 1 tablet by mouth 3 (Three) Times a Day As Needed for Nausea or Vomiting. (Patient not taking: Reported on 7/25/2024), Disp: 30 tablet, Rfl: 5    pantoprazole (PROTONIX) 40 MG EC tablet, Take 1 tablet by mouth 2 (Two) Times a Day., Disp: , Rfl:     pravastatin (PRAVACHOL) 10 MG tablet,  "Take 0.5 tablets by mouth Every Night., Disp: , Rfl:     tamsulosin (FLOMAX) 0.4 MG capsule 24 hr capsule, Take 1 capsule by mouth 2 (Two) Times a Day., Disp: , Rfl:     Allergies   Allergen Reactions    Ace Inhibitors Unknown (See Comments)     Unknown reaction      Heparin Other (See Comments)     Fever/chills, weakness in legs    Sulfa Antibiotics Other (See Comments)     Mouth sores    Ciprofloxacin GI Intolerance     Pt reports pain to abd       Family History   Problem Relation Age of Onset    Mental illness Mother     Heart disease Mother     Hypertension Father     Cancer Father     Heart disease Sister     Heart disease Brother      Cancer-related family history includes Cancer in his father.    Social History     Tobacco Use    Smoking status: Former     Current packs/day: 0.00     Average packs/day: 2.0 packs/day for 50.0 years (100.0 ttl pk-yrs)     Types: Cigarettes     Start date: 1965     Quit date: 2015     Years since quittin.3    Smokeless tobacco: Former     Types: Chew     Quit date: 1989   Vaping Use    Vaping status: Never Used   Substance Use Topics    Alcohol use: No    Drug use: No     ROS:     Objective:    Vitals:    10/23/24 1049   BP: 146/72   Pulse: 60   SpO2: 97%   Weight: 67.9 kg (149 lb 12.8 oz)   Height: 167.6 cm (66\")   PainSc: 0-No pain             (1) Restricted in physically strenuous activity, ambulatory and able to do work of light nature    Physical Exam:     Physical Exam   Constitutional: He is oriented to person, place, and time. He appears well-developed. No distress.   HENT:   Head: Normocephalic and atraumatic.   Nose: Nose normal. Mouth/Throat: Mucous membranes are moist.   Eyes: Pupils are equal, round, and reactive to light.   Neck: No thyromegaly present.   Cardiovascular: Normal rate, regular rhythm, normal heart sounds and normal pulses. Exam reveals no gallop and no friction rub.   Pulmonary/Chest: Effort normal and breath sounds normal. "   Abdominal: Soft. Normal appearance and bowel sounds are normal.   No tenderness   Musculoskeletal: Normal range of motion. No deformity or signs of injury.      Right lower leg: No edema.   Neurological: He is alert and oriented to person, place, and time. He exhibits normal muscle tone.   Skin: Skin is warm and dry. No rash noted. He is not diaphoretic. No erythema. No jaundice.   Right side chest  port       Lab Results - Last 18 Months   Lab Units 08/07/24  0954 07/17/24  0951 06/26/24  1151   WBC 10*3/mm3 4.67 4.08 4.03   HEMOGLOBIN g/dL 10.6* 10.6* 9.8*   HEMATOCRIT % 32.5* 33.4* 31.4*   PLATELETS 10*3/mm3 180 136* 233   MCV fL 97.0 97.1* 98.1*     Lab Results - Last 18 Months   Lab Units 09/11/24  0934 08/07/24  1048 07/17/24  1017 07/17/24  0951 06/26/24  1158 06/10/24  1118 05/15/24  1020 05/01/24  0842 04/03/24  1321 03/20/24  1309   SODIUM mmol/L  --   --   --  139  --  140  --  141  --  139   POTASSIUM mmol/L  --   --   --  4.9  --  4.7  --  4.0  --  4.6   CHLORIDE mmol/L  --   --   --  105  --  105  --  109*  --  107   CO2 mmol/L  --   --   --  24.2  --  22.9  --  21.0*  --  22.0   BUN mg/dL  --   --   --  19  --  11  --  13  --  14   CREATININE mg/dL 1.50* 1.40* 1.20 1.46*   < > 1.20   < > 1.42*   < > 1.47*   CALCIUM mg/dL  --   --   --  9.0  --  8.7  --  9.1  --  8.3*   BILIRUBIN mg/dL  --   --   --  0.3  --   --   --  0.4  --  0.3   ALK PHOS U/L  --   --   --  46  --   --   --  36*  --  40   ALT (SGPT) U/L  --   --   --  <5  --   --   --  5  --  5   AST (SGOT) U/L  --   --   --  18  --   --   --  13  --  12   GLUCOSE mg/dL  --   --   --  137*  --  132*  --  136*  --  211*    < > = values in this interval not displayed.     CASE SUMMARY:  Patient has a history of GE junction poorly differentiated adenocarcinoma for which he had chemoradiation followed by Noel Gurwinder resection:  Pathology showed ypT3,pN1 disease.  Tumor was Stage IIIA, diagnosed in 2016.  He received concurrent chemoradiation with  weekly Carboplatin and Taxol and radiation finishing on 3/15/17.  CT scan on 1/10/19 showed new retroperitoneal lymph nodes.  These lymph nodes were biopsied on 1/30/19 and it shows metastatic esophageal cancer.  [facility] Next Gen testing was performed on the sample and it shows KRAS mutation, microsatellite stable tumor.  PD-L1 is positive.  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  HER2/marvel (ERBB2) was not amplified and was negative.  His CT scan on 5/1/19 shows evidence of progression.  Patient has started receiving Keytruda on 5/31/19.  He has received 19 cycles so far.   CT scan on 1/28/2020 shows improvement and continued response.  CT scan chest abdomen pelvis with contrast on 12/10/2020 does not show any evidence of disease progression.  On 12/22/2020 decision made to hold Keytruda per patient's request to get a treatment break.. Repeat CT scan chest 7/12/2021 does not show any evidence of disease progression.  Now CT imaging in January 2021 with a CT showing increased abdominal left para-aortic adenopathy.  Subsequent PET/CT with new cedric metastatic disease in the retroperitoneum of the abdomen.  New pathologically enlarged hypermetabolic retroperitoneal lymph nodes.  This would be concerning for disease recurrence/progression.  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  I discussed with him the treatment options going forward.  He has had a good response with immunotherapy pembrolizumab in the past. This was  restarted. subsequent imaging with treatment response. 1/2023 imaging with good response. Continued treatment  Now with increased adenopathy 2 new lymph nodes. Holding immunotherapy for now.   Consider getting cardiac MR discussed with Dr. Culp. While immunotherapy on hold, increased adenopathy is concerning. Could be pseudoprogression vs true progression. PET CT reviewed could indicate pseudoprogression since he has had a good response to immunotherapy  immunotherapy was restarted and has been  on hold with patient's symptomatology cardiac MRI cannot be obtained, no ischemic heart disease leading to cardiomyopathy this most likely is related to Keytruda.  We will stop Keytruda going forward  CT imaging with stable disease, FOLFOX was started which he had significant side effects  With imaging being stable and significant side effects with FOLFOX, we discussed  about options with wait and watch vs 5FU only, patient prefers to be on maintenance.   We started 5FU only .   Tolerating relatively well, has significant fatigue. CT imaging now with ongoing response. We discussed that he would have been on treatment for 2 years following recurrence with good response. We can continue treatment now and get a PET CT in 2/2024.will repeat prior to follow up. , PET with some residual activity in the mediastinal lymph node.   We continued treatment and now signatera negative. I had a long discussion with the patient. We decided to repeat CT chest now and if no adenopathy, consider holding treatment.   Now CT chest repeated with persistent adenopathy. Discussed biopsy/mediastinoscopy vs continued treatment. Has fairly been tolerating well except fatigue. After discussion we decided to continue chemotherapy.  Now repeat CT imaging with no new concerning findings there is stable nodules which are subcentimeter in the lungs there is mildly prominent precarinal lymph node.  I discussed with the patient about this possibly being inflammatory rather than neoplastic.    -After discussion we decided to proceed with EBUS and possible biopsy of the lymph node if negative for malignancy we can consider stopping maintenance treatment given Signatera is negative and patient has treatment related side effects which are affecting his quality of life  -Bronchoscopy and precarinal lymph node biopsy reported negative as above.  -Repeat Signatera test also reported negative.  -In view of significant treatment-related toxicities and no  concerns for disease progression at this time, we will plan for surveillance alone without additional systemic therapy.      Assessment & Plan     Assessment:    Metastatic esophageal carcinoma:    -Patient currently on surveillance.  -Restaging scans from 9/11/2024 as above, reported PAULIE.  -Will check repeat CT chest/abdomen/pelvis in 3 months, continue to monitor labs and Signatera every 3 months.      Possible myocarditis  Symptoms started after last immunotherapy  Treatment on hold for now, symptoms have improved, cardiac markers were negative, echocardiogram with EF 40%, cardiac MRI cannot be obtained cardiac cath with EF down to 30% management per cardiology  Now EF up to 40-45% monitor.  Symptomatic improvement now no shortness of breath    Shortness of breath  Improved, has occasional shortness of breath, likely copd  I have recommended using albuterol  Symptoms have improved since stopping immunotherapy. Questionable pneumonitis will monitor closely with immunotherapy  Cardiology has evaluated for myocarditis related to immunotherapy symptomatic improvement now continue to monitor. Symptomatic improvement as above.    Reflux/heartburn/dysphagia:   Symptoms improved.  Also has a history of strictures. Status post EGD and balloon dilation.   Continues to be on pantoprazole symptoms stable.  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  Continues on Synthroid stable now no heart function stable in normal range    Mild diarrhea:  diarrhea seems to be waxing and waning in nature.  Diarrhea related to immunotherapy now improved stable.  Diarrhea has improved    PD-L1 positive, HER2 negative, p53 and KRAS positive.  Repeat Caris testing with repeat biopsy with no target mutations.    B12 deficiency: Continue B12    Anemia - Hb  was low iron sat low at 10, given venofer 300 x3 monitor for now. Intolerant to oral iron.  Continued low iron saturation will plan on Feraheme for 2 doses    Groin pain -  negative for UTI, STI, has seen urology, likely prostatits, started cipro improvement noted. Had cystoscopy, was started on flomax, continued improvement in symptoms    Chronic kidney disease creatinine has worsened overall compared to 8/2023 has seen Dr. Posadas with nephrology adjusted blood pressure meds creatinine has improved now blood pressure under better control.  Discussed with Dr. Posadas    Follow-up in 3 months with repeat labs and scans, sooner as needed

## 2024-10-23 ENCOUNTER — HOSPITAL ENCOUNTER (OUTPATIENT)
Dept: ONCOLOGY | Facility: HOSPITAL | Age: 74
Discharge: HOME | End: 2024-10-23
Payer: MEDICARE

## 2024-10-23 ENCOUNTER — OFFICE VISIT (OUTPATIENT)
Dept: ONCOLOGY | Facility: CLINIC | Age: 74
End: 2024-10-23
Payer: MEDICARE

## 2024-10-23 ENCOUNTER — HOSPITAL ENCOUNTER (OUTPATIENT)
Dept: CARDIOLOGY | Facility: HOSPITAL | Age: 74
Discharge: HOME | End: 2024-10-23
Admitting: INTERNAL MEDICINE
Payer: MEDICARE

## 2024-10-23 ENCOUNTER — TELEPHONE (OUTPATIENT)
Dept: ONCOLOGY | Facility: CLINIC | Age: 74
End: 2024-10-23

## 2024-10-23 VITALS
HEART RATE: 60 BPM | DIASTOLIC BLOOD PRESSURE: 72 MMHG | HEIGHT: 66 IN | BODY MASS INDEX: 24.08 KG/M2 | OXYGEN SATURATION: 97 % | SYSTOLIC BLOOD PRESSURE: 146 MMHG | WEIGHT: 149.8 LBS

## 2024-10-23 DIAGNOSIS — C16.0 MALIGNANT NEOPLASM OF CARDIA: Primary | ICD-10-CM

## 2024-10-23 DIAGNOSIS — I42.0 CARDIOMYOPATHY, DILATED: ICD-10-CM

## 2024-10-23 DIAGNOSIS — Z95.0 PRESENCE OF CARDIAC PACEMAKER: ICD-10-CM

## 2024-10-23 DIAGNOSIS — E03.9 HYPOTHYROIDISM, UNSPECIFIED TYPE: ICD-10-CM

## 2024-10-23 DIAGNOSIS — C15.9 ESOPHAGEAL CANCER, STAGE IV: Primary | ICD-10-CM

## 2024-10-23 PROCEDURE — 93356 MYOCRD STRAIN IMG SPCKL TRCK: CPT

## 2024-10-23 PROCEDURE — 93306 TTE W/DOPPLER COMPLETE: CPT

## 2024-10-23 RX ORDER — SODIUM CHLORIDE 0.9 % (FLUSH) 0.9 %
20 SYRINGE (ML) INJECTION AS NEEDED
OUTPATIENT
Start: 2024-10-23

## 2024-10-23 RX ORDER — SODIUM CHLORIDE 0.9 % (FLUSH) 0.9 %
20 SYRINGE (ML) INJECTION AS NEEDED
Status: DISCONTINUED | OUTPATIENT
Start: 2024-10-23 | End: 2024-10-24 | Stop reason: HOSPADM

## 2024-10-23 RX ORDER — SODIUM CHLORIDE 0.9 % (FLUSH) 0.9 %
20 SYRINGE (ML) INJECTION AS NEEDED
Status: CANCELLED | OUTPATIENT
Start: 2024-10-23

## 2024-10-23 RX ADMIN — Medication 20 ML: at 10:15

## 2024-10-23 NOTE — PROGRESS NOTES
Port accessed for blood collection using sterile technique. Port aspirated and positive blood return noted. 10 ml blood wasted prior to blood for labs being collected. Port flushed with 20 ml NS, then de-accessed. Pt tolerated well.

## 2024-10-23 NOTE — TELEPHONE ENCOUNTER
Caller: Artie Mc    Relationship to patient: Self    Best call back number: [phone]     Chief complaint: PATIENT TO RESCHEDULE 1/17/25 APPTS    Type of visit: LAB AND FU    Requested date: 1/22/25 AFTER 11 AM

## 2024-10-24 LAB
AORTIC DIMENSIONLESS INDEX: 0.54 (DI)
BH CV ECHO LEFT VENTRICLE GLOBAL LONGITUDINAL STRAIN: -8.6 %
BH CV ECHO MEAS - AO MAX PG: 8.2 MMHG
BH CV ECHO MEAS - AO MEAN PG: 4 MMHG
BH CV ECHO MEAS - AO V2 MAX: 143 CM/SEC
BH CV ECHO MEAS - AO V2 VTI: 31 CM
BH CV ECHO MEAS - AVA(I,D): 2.19 CM2
BH CV ECHO MEAS - EDV(CUBED): 132.7 ML
BH CV ECHO MEAS - EDV(MOD-SP2): 123 ML
BH CV ECHO MEAS - EDV(MOD-SP4): 140 ML
BH CV ECHO MEAS - EF(MOD-BP): 42 %
BH CV ECHO MEAS - EF(MOD-SP2): 31.5 %
BH CV ECHO MEAS - EF(MOD-SP4): 52.1 %
BH CV ECHO MEAS - ESV(CUBED): 64 ML
BH CV ECHO MEAS - ESV(MOD-SP2): 84.3 ML
BH CV ECHO MEAS - ESV(MOD-SP4): 67.1 ML
BH CV ECHO MEAS - FS: 21.6 %
BH CV ECHO MEAS - IVS/LVPW: 1.09 CM
BH CV ECHO MEAS - IVSD: 1.2 CM
BH CV ECHO MEAS - LA DIMENSION: 4 CM
BH CV ECHO MEAS - LAT PEAK E' VEL: 6.5 CM/SEC
BH CV ECHO MEAS - LV DIASTOLIC VOL/BSA (35-75): 79.3 CM2
BH CV ECHO MEAS - LV MASS(C)D: 227.4 GRAMS
BH CV ECHO MEAS - LV MAX PG: 2.41 MMHG
BH CV ECHO MEAS - LV MEAN PG: 1 MMHG
BH CV ECHO MEAS - LV SYSTOLIC VOL/BSA (12-30): 38 CM2
BH CV ECHO MEAS - LV V1 MAX: 77.6 CM/SEC
BH CV ECHO MEAS - LV V1 VTI: 17.9 CM
BH CV ECHO MEAS - LVIDD: 5.1 CM
BH CV ECHO MEAS - LVIDS: 4 CM
BH CV ECHO MEAS - LVOT AREA: 3.8 CM2
BH CV ECHO MEAS - LVOT DIAM: 2.2 CM
BH CV ECHO MEAS - LVPWD: 1.1 CM
BH CV ECHO MEAS - MED PEAK E' VEL: 3.7 CM/SEC
BH CV ECHO MEAS - MR MAX PG: 88.7 MMHG
BH CV ECHO MEAS - MR MAX VEL: 471 CM/SEC
BH CV ECHO MEAS - MV A DUR: 0.12 SEC
BH CV ECHO MEAS - MV A MAX VEL: 102 CM/SEC
BH CV ECHO MEAS - MV DEC SLOPE: 504 CM/SEC2
BH CV ECHO MEAS - MV DEC TIME: 0.19 SEC
BH CV ECHO MEAS - MV E MAX VEL: 101 CM/SEC
BH CV ECHO MEAS - MV E/A: 0.99
BH CV ECHO MEAS - MV MAX PG: 3.9 MMHG
BH CV ECHO MEAS - MV MEAN PG: 1 MMHG
BH CV ECHO MEAS - MV P1/2T: 57.6 MSEC
BH CV ECHO MEAS - MV V2 VTI: 22.2 CM
BH CV ECHO MEAS - MVA(P1/2T): 3.8 CM2
BH CV ECHO MEAS - MVA(VTI): 3.1 CM2
BH CV ECHO MEAS - PA ACC TIME: 0.12 SEC
BH CV ECHO MEAS - PA V2 MAX: 76 CM/SEC
BH CV ECHO MEAS - PI END-D VEL: 105 CM/SEC
BH CV ECHO MEAS - RV MAX PG: 0.86 MMHG
BH CV ECHO MEAS - RV V1 MAX: 46.3 CM/SEC
BH CV ECHO MEAS - RV V1 VTI: 11 CM
BH CV ECHO MEAS - RVDD: 3 CM
BH CV ECHO MEAS - SV(LVOT): 68 ML
BH CV ECHO MEAS - SV(MOD-SP2): 38.7 ML
BH CV ECHO MEAS - SV(MOD-SP4): 72.9 ML
BH CV ECHO MEAS - SVI(LVOT): 38.6 ML/M2
BH CV ECHO MEAS - SVI(MOD-SP2): 21.9 ML/M2
BH CV ECHO MEAS - SVI(MOD-SP4): 41.3 ML/M2
BH CV ECHO MEAS - TAPSE (>1.6): 1.56 CM
BH CV ECHO MEASUREMENTS AVERAGE E/E' RATIO: 19.8
BH CV XLRA - TDI S': 5.4 CM/SEC
LEFT ATRIUM VOLUME INDEX: 25 ML/M2
SINUS: 3.5 CM
STJ: 2.7 CM

## 2024-10-30 DIAGNOSIS — R79.89 ELEVATED TSH: ICD-10-CM

## 2024-10-30 DIAGNOSIS — E03.9 HYPOTHYROIDISM (ACQUIRED): ICD-10-CM

## 2024-10-30 RX ORDER — LEVOTHYROXINE SODIUM 50 UG/1
50 TABLET ORAL DAILY
Qty: 30 TABLET | Refills: 0 | Status: SHIPPED | OUTPATIENT
Start: 2024-10-30

## 2024-11-11 ENCOUNTER — TELEPHONE (OUTPATIENT)
Dept: ONCOLOGY | Facility: CLINIC | Age: 74
End: 2024-11-11

## 2024-11-11 NOTE — TELEPHONE ENCOUNTER
Spoke w/ pt and let him know that if it's the signatera he can just bring the kit in when he comes in for his port flush and we can draw it then.  I let him know that he does not have to make an extra trip in to have it drawn.  He v/u and states that he will bring it with him.  Note added to his appointment that he will need signatera drawn with his port flush.

## 2024-11-11 NOTE — TELEPHONE ENCOUNTER
Caller: Artie Mc    Relationship: Self    Best call back number:  [phone]      Who are you requesting to speak with (clinical staff, provider,  specific staff member): CLINICAL      What was the call regarding: PT RECEIVED A BLOOD TEST KIT IN THE MAIL AND IT SAID TO NOTIFY HIS PROVIDER SO HE COULD BE SCHEDULED FOR A DRAW.  PT WANTS TO KNOW IF HE CAN HAVE IT DONE WITH HIS PORT FLUSH ON 11/27 OR DID HE NEED TO COME IN SOONER?    PLEASE ADVISE

## 2024-11-27 ENCOUNTER — HOSPITAL ENCOUNTER (OUTPATIENT)
Dept: ONCOLOGY | Facility: HOSPITAL | Age: 74
Discharge: HOME | End: 2024-11-27
Admitting: STUDENT IN AN ORGANIZED HEALTH CARE EDUCATION/TRAINING PROGRAM
Payer: MEDICARE

## 2024-11-27 DIAGNOSIS — E03.9 HYPOTHYROIDISM (ACQUIRED): ICD-10-CM

## 2024-11-27 DIAGNOSIS — R79.89 ELEVATED TSH: ICD-10-CM

## 2024-11-27 DIAGNOSIS — E03.9 HYPOTHYROIDISM, UNSPECIFIED TYPE: ICD-10-CM

## 2024-11-27 DIAGNOSIS — C15.5 PRIMARY MALIGNANT NEOPLASM OF LOWER THIRD OF ESOPHAGUS: ICD-10-CM

## 2024-11-27 DIAGNOSIS — C16.0 MALIGNANT NEOPLASM OF CARDIA: Primary | ICD-10-CM

## 2024-11-27 DIAGNOSIS — C15.9 ESOPHAGEAL CANCER, STAGE IV: ICD-10-CM

## 2024-11-27 LAB
ALBUMIN SERPL-MCNC: 4 G/DL (ref 3.5–5.2)
ALBUMIN/GLOB SERPL: 1.9 G/DL
ALP SERPL-CCNC: 30 U/L (ref 39–117)
ALT SERPL W P-5'-P-CCNC: 6 U/L (ref 1–41)
ANION GAP SERPL CALCULATED.3IONS-SCNC: 9.5 MMOL/L (ref 5–15)
AST SERPL-CCNC: 16 U/L (ref 1–40)
BASOPHILS # BLD AUTO: 0.03 10*3/MM3 (ref 0–0.2)
BASOPHILS NFR BLD AUTO: 0.6 % (ref 0–1.5)
BILIRUB SERPL-MCNC: 0.3 MG/DL (ref 0–1.2)
BUN SERPL-MCNC: 13 MG/DL (ref 8–23)
BUN/CREAT SERPL: 8.7 (ref 7–25)
CALCIUM SPEC-SCNC: 9 MG/DL (ref 8.6–10.5)
CHLORIDE SERPL-SCNC: 106 MMOL/L (ref 98–107)
CO2 SERPL-SCNC: 24.5 MMOL/L (ref 22–29)
CREAT SERPL-MCNC: 1.49 MG/DL (ref 0.76–1.27)
DEPRECATED RDW RBC AUTO: 42.5 FL (ref 37–54)
EGFRCR SERPLBLD CKD-EPI 2021: 48.6 ML/MIN/1.73
EOSINOPHIL # BLD AUTO: 0.07 10*3/MM3 (ref 0–0.4)
EOSINOPHIL NFR BLD AUTO: 1.4 % (ref 0.3–6.2)
ERYTHROCYTE [DISTWIDTH] IN BLOOD BY AUTOMATED COUNT: 12.6 % (ref 12.3–15.4)
FERRITIN SERPL-MCNC: 127 NG/ML (ref 30–400)
FOLATE SERPL-MCNC: 13.4 NG/ML (ref 4.78–24.2)
GLOBULIN UR ELPH-MCNC: 2.1 GM/DL
GLUCOSE SERPL-MCNC: 113 MG/DL (ref 65–99)
HCT VFR BLD AUTO: 33.7 % (ref 37.5–51)
HGB BLD-MCNC: 11.2 G/DL (ref 13–17.7)
IRON 24H UR-MRATE: 80 MCG/DL (ref 59–158)
IRON SATN MFR SERPL: 25 % (ref 20–50)
LYMPHOCYTES # BLD AUTO: 1.39 10*3/MM3 (ref 0.7–3.1)
LYMPHOCYTES NFR BLD AUTO: 28.4 % (ref 19.6–45.3)
MCH RBC QN AUTO: 31.5 PG (ref 26.6–33)
MCHC RBC AUTO-ENTMCNC: 33.2 G/DL (ref 31.5–35.7)
MCV RBC AUTO: 94.9 FL (ref 79–97)
MONOCYTES # BLD AUTO: 0.63 10*3/MM3 (ref 0.1–0.9)
MONOCYTES NFR BLD AUTO: 12.9 % (ref 5–12)
NEUTROPHILS NFR BLD AUTO: 2.78 10*3/MM3 (ref 1.7–7)
NEUTROPHILS NFR BLD AUTO: 56.7 % (ref 42.7–76)
PLATELET # BLD AUTO: 158 10*3/MM3 (ref 140–450)
PMV BLD AUTO: 10.8 FL (ref 6–12)
POTASSIUM SERPL-SCNC: 4.7 MMOL/L (ref 3.5–5.2)
PROT SERPL-MCNC: 6.1 G/DL (ref 6–8.5)
RBC # BLD AUTO: 3.55 10*6/MM3 (ref 4.14–5.8)
RETICS # AUTO: 0.03 10*6/MM3 (ref 0.02–0.13)
RETICS/RBC NFR AUTO: 0.85 % (ref 0.7–1.9)
SODIUM SERPL-SCNC: 140 MMOL/L (ref 136–145)
TIBC SERPL-MCNC: 314 MCG/DL (ref 298–536)
TRANSFERRIN SERPL-MCNC: 211 MG/DL (ref 200–360)
TSH SERPL DL<=0.05 MIU/L-ACNC: 4.1 UIU/ML (ref 0.27–4.2)
VIT B12 BLD-MCNC: 474 PG/ML (ref 211–946)
WBC NRBC COR # BLD AUTO: 4.9 10*3/MM3 (ref 3.4–10.8)

## 2024-11-27 PROCEDURE — 36591 DRAW BLOOD OFF VENOUS DEVICE: CPT

## 2024-11-27 PROCEDURE — 82728 ASSAY OF FERRITIN: CPT | Performed by: STUDENT IN AN ORGANIZED HEALTH CARE EDUCATION/TRAINING PROGRAM

## 2024-11-27 PROCEDURE — 85045 AUTOMATED RETICULOCYTE COUNT: CPT | Performed by: STUDENT IN AN ORGANIZED HEALTH CARE EDUCATION/TRAINING PROGRAM

## 2024-11-27 PROCEDURE — 84466 ASSAY OF TRANSFERRIN: CPT | Performed by: STUDENT IN AN ORGANIZED HEALTH CARE EDUCATION/TRAINING PROGRAM

## 2024-11-27 PROCEDURE — 82746 ASSAY OF FOLIC ACID SERUM: CPT | Performed by: STUDENT IN AN ORGANIZED HEALTH CARE EDUCATION/TRAINING PROGRAM

## 2024-11-27 PROCEDURE — 80053 COMPREHEN METABOLIC PANEL: CPT | Performed by: STUDENT IN AN ORGANIZED HEALTH CARE EDUCATION/TRAINING PROGRAM

## 2024-11-27 PROCEDURE — 82607 VITAMIN B-12: CPT | Performed by: STUDENT IN AN ORGANIZED HEALTH CARE EDUCATION/TRAINING PROGRAM

## 2024-11-27 PROCEDURE — 84443 ASSAY THYROID STIM HORMONE: CPT | Performed by: STUDENT IN AN ORGANIZED HEALTH CARE EDUCATION/TRAINING PROGRAM

## 2024-11-27 PROCEDURE — 85025 COMPLETE CBC W/AUTO DIFF WBC: CPT | Performed by: STUDENT IN AN ORGANIZED HEALTH CARE EDUCATION/TRAINING PROGRAM

## 2024-11-27 PROCEDURE — 83540 ASSAY OF IRON: CPT | Performed by: STUDENT IN AN ORGANIZED HEALTH CARE EDUCATION/TRAINING PROGRAM

## 2024-11-27 RX ORDER — SODIUM CHLORIDE 0.9 % (FLUSH) 0.9 %
20 SYRINGE (ML) INJECTION AS NEEDED
OUTPATIENT
Start: 2024-11-27

## 2024-11-27 RX ORDER — LEVOTHYROXINE SODIUM 50 UG/1
50 TABLET ORAL DAILY
Qty: 30 TABLET | Refills: 0 | Status: SHIPPED | OUTPATIENT
Start: 2024-11-27

## 2024-11-27 RX ORDER — SODIUM CHLORIDE 0.9 % (FLUSH) 0.9 %
20 SYRINGE (ML) INJECTION AS NEEDED
Status: DISCONTINUED | OUTPATIENT
Start: 2024-11-27 | End: 2024-11-28 | Stop reason: HOSPADM

## 2024-11-27 RX ADMIN — Medication 20 ML: at 09:52

## 2024-11-27 NOTE — PROGRESS NOTES
0949 Port accessed and flushed with good blood return noted. 10cc of blood wasted prior to specimen collection. Blood specimen obtained and sent to lab for processing per protocol.  Port flushed with saline and prior to needle removal. Signatera labs also drawn today.

## 2024-12-05 LAB
NTRA SIGNATERA MTM READOUT: 4.08 MTM/ML
NTRA SIGNATERA TEST RESULT: POSITIVE

## 2024-12-10 ENCOUNTER — TELEPHONE (OUTPATIENT)
Dept: ONCOLOGY | Facility: CLINIC | Age: 74
End: 2024-12-10

## 2024-12-10 NOTE — TELEPHONE ENCOUNTER
Caller: Artie Mc    Relationship: Self    Best call back number: [phone]     What test was performed: [facility]    When was the test performed: 12/5 (NOT EXACTLY SURE ON DATE)    Where was the test performed: [facility]

## 2024-12-13 NOTE — TELEPHONE ENCOUNTER
Caller: Artie Mc    Relationship: Self    Best call back number: [phone]    Who are you requesting to speak with (clinical staff, provider,  specific staff member): CLINICAL    What was the call regarding: REQUESTING CALL BACK FOR LAB RESULTS

## 2024-12-20 NOTE — TELEPHONE ENCOUNTER
Hub staff attempted to follow warm transfer process and was unsuccessful        Caller: Artie Mc     Relationship: Self     Best call back number: [phone]     Who are you requesting to speak with (clinical staff, provider,  specific staff member): CLINICAL     What was the call regarding: REQUESTING CALL BACK FOR LAB RESULTS

## 2024-12-26 DIAGNOSIS — R79.89 ELEVATED TSH: ICD-10-CM

## 2024-12-26 DIAGNOSIS — E03.9 HYPOTHYROIDISM (ACQUIRED): ICD-10-CM

## 2024-12-26 RX ORDER — LEVOTHYROXINE SODIUM 50 UG/1
50 TABLET ORAL DAILY
Qty: 30 TABLET | Refills: 0 | Status: SHIPPED | OUTPATIENT
Start: 2024-12-26

## 2024-12-30 RX ORDER — ONDANSETRON 4 MG/1
4 TABLET, FILM COATED ORAL EVERY 8 HOURS PRN
Qty: 90 TABLET | Refills: 0 | Status: SHIPPED | OUTPATIENT
Start: 2024-12-30

## 2025-01-01 ENCOUNTER — INPATIENT HOSPITAL (AMBULATORY)
Dept: URBAN - METROPOLITAN AREA HOSPITAL 84 | Facility: HOSPITAL | Age: 75
End: 2025-01-01
Payer: MEDICARE

## 2025-01-01 DIAGNOSIS — C15.9 MALIGNANT NEOPLASM OF ESOPHAGUS, UNSPECIFIED: ICD-10-CM

## 2025-01-01 DIAGNOSIS — R11.0 NAUSEA: ICD-10-CM

## 2025-01-01 DIAGNOSIS — D50.9 IRON DEFICIENCY ANEMIA, UNSPECIFIED: ICD-10-CM

## 2025-01-01 DIAGNOSIS — R19.4 CHANGE IN BOWEL HABIT: ICD-10-CM

## 2025-01-01 DIAGNOSIS — R93.3 ABNORMAL FINDINGS ON DIAGNOSTIC IMAGING OF OTHER PARTS OF DI: ICD-10-CM

## 2025-01-01 DIAGNOSIS — R13.10 DYSPHAGIA, UNSPECIFIED: ICD-10-CM

## 2025-01-01 DIAGNOSIS — R10.13 EPIGASTRIC PAIN: ICD-10-CM

## 2025-01-01 DIAGNOSIS — R74.01 ELEVATION OF LEVELS OF LIVER TRANSAMINASE LEVELS: ICD-10-CM

## 2025-01-01 DIAGNOSIS — R94.5 ABNORMAL RESULTS OF LIVER FUNCTION STUDIES: ICD-10-CM

## 2025-01-01 PROCEDURE — 99222 1ST HOSP IP/OBS MODERATE 55: CPT | Mod: FS | Performed by: NURSE PRACTITIONER

## 2025-01-01 PROCEDURE — 99232 SBSQ HOSP IP/OBS MODERATE 35: CPT | Mod: FS | Performed by: NURSE PRACTITIONER

## 2025-01-07 NOTE — PROGRESS NOTES
Hematology-Oncology Follow-up Note       Artie Mc  1950    Primary Care Physician: Chanel Carter APRN  Referring Physician: Chanel Carter APRN    Reason For Visit:  Chief Complaint   Patient presents with   • Follow-up     Esophageal cancer, stage IV     Metastatic esophageal cancer  Monitoring for adverse effects related to immunotherapy.  Hypothyroidism    HPI:   Mr. Mc is a pleasant 72 y.o. gentleman with a history of gastroesophageal reflux disease, diabetes, hypertension, heart block status post pacemaker placement and history of CABG.  He was having symptoms of dysphagia, weight loss since September 2016. He was reporting symptoms of food getting stuck in the lower chest.  He has transitioned himself to a liquid diet.  The patient reports losing about 30 pounds over this duration.  The patient underwent upper GI endoscopy on 12/20/16 with Dr. Esvin Barrera.  Endoscopy showed necrotic, circumferential and moderately obstructive mass at the distal esophagus between 42 cm and 36 cm.      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  There was also evidence of mild subacute inflammation in the duodenum.  Dr. Barrera ordered a CT scan of the chest and abdomen with contrast on 12/20/16.  The scan was done at [facility].  The scan showed a new ill-defined mass in the anterior wall of the distal esophagus at the GE junction measuring 2.3 x 2.2 x 1.6 cm, worrisome for cancer.  There were four subcentimeter liver lesions which had appeared stable in comparison to another CT scan from 2009 and they likely represent small cysts.  There was also mild lymphadenopathy in the gastrohepatic ligament.  There were at least six borderline enlarged lymph nodes measuring up to 1.3 x 1 cm in the region.  The patient is referred to us for further oncological evaluation.    1/5/2017 - The patient presents for initial  consultation.  He reports persistent dysphagia and also has symptoms of regurgitation.  He cannot tolerate solid foods and is dependent on liquid diet such as Ensure.  He also reports fatigue. Symptoms of dysphagia have been present for the past four months.  When he eats any solids, the food gets stuck in the lower chest.    • 1/5/17 - Vitamin B12 200 (L).  CEA 0.8.  Ferritin 35.  Iron saturation 16% (L), serum iron 56, TIBC 354.  Creatinine 0.9.  LFTs normal.  Folate 10.2.  • 1/12/17 - PET/CT scan:  Intense abnormal activity corresponding to the region of the GE junction and proximal stomach.  SUV is 11.06.  There is a suggestion of an abnormal mass or abnormal wall thickening in the region measuring 3.9 x 4.8 cm.  No additional abnormalities.  No evidence of metastases or other lymphadenopathy.  Scattered nonspecific subcentimeter lymph nodes involving the mediastinum and within the central upper abdomen.  • 1/13/17 - Creatinine 0.9.  LFTs normal.    • 1/17/17 - Patient seen by thoracic surgeon, Dr. Bradley.  PET scan was reviewed.  He has recommended neoadjuvant chemoradiation therapy followed by [facility]ley Gurwinder esophagogastrectomy.  Port-A-Cath is being arranged.    • 1/20/17 - WBC 6.3, hemoglobin 12.1, platelet count 198,000, MCV 86.4.  • 1/30/17 - Patient started weekly Carboplatin and Taxol (Carboplatin AUC 2 and Taxol 50 mg/M2).  Patient received Injectafer 750 mg.  WBC 5.8, hemoglobin 12.4, platelet count 244,000.   • 1/31/17 - Patient seen by Dr. Eduardo Oleary.  The plan was to give 5040 cGy in 28 fractions.   • 2/6/17 - WBC 4.4, hemoglobin 11.6, platelet count 201,000.  Patient received cycle 2 of weekly Carboplatin and Taxol (Carboplatin AUC 2 and Taxol 50 mg/M2).  • 2/13/17 - Patient received Carboplatin and Taxol weekly cycle 3.  • 2/13/17 - Patient started concurrent radiation therapy.  Total planned dose is 4140 cGy.    • 2/13/17 to 2/15/17 - Patient received 4140 cGy of neoadjuvant radiation.   Radiation was finished on 3/15/17.    • 2/20/17 - WBC 1.8, hemoglobin 10.6, platelet count 217,000.  Creatinine 0.5. Folate 15 (L).  Ferritin 361.  Haptoglobin 214.  Iron saturation 33%, TIBC 263, serum iron 88.    • 2/20/17 - Patient received Carboplatin and Taxol weekly cycle 4.   • 2/27/17 - WBC 3.7, hemoglobin 10.7, platelet count 177,000, MCV 85.3.    • 3/6/17 - Patient has received 16 out of the 23 planned radiation treatments.  Total planned dose is 4140 cGy.    • 3/6/17 - WBC 5.4, hemoglobin 11.2, platelet count 113,000.  • 3/6/17 - Patient received cycle 5 of weekly Carboplatin and Taxol.   • 3/13/17 - Patient received cycle 6 of weekly Carboplatin and Taxol.  WBC 3.9, hemoglobin 11.3, platelet count 93,000.     • 3/20/17 - WBC 1.4, hemoglobin 10.0, platelet count 118,000, MCV 86.1   • 3/27/17 - WBC 3.8, hemoglobin 9.5, platelet count 176,000, MCV 87.3.    • 4/7/17 - Stress test:  No evidence of reversible myocardial ischemia.  Normal left ventricular ejection fraction of 50%.    • 4/17/17 - Upper GI endoscopy by Dr. 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  The GE junction adenocarcinoma lesion seems to have a very good response to chemotherapy and radiation.  The lumen was open.  • 5/1/17 - WBC 7.0, hemoglobin 10.2, platelet count 175,000.    • 5/4/17 - Patient underwent [facility] Gurwinder esophagectomy with pyloroplasty, feeding jejunostomy tube, abdominal and mediastinal lymph node dissection.    Surgical Pathology:  Moderately to poorly differentiated adenocarcinoma measuring 1.3 cm at the GE junction.  Resection margins are negative for malignancy.  Tumor margins are negative.  There is effective treatment response.  No evidence of lymphovascular invasion.  Staging is ypT3,pN1.  One out of fourteen lymph nodes involved.   • 5/22/17 - WBC 6.8, hemoglobin 10.3, platelet count 312,000.    • 6/19/17 - WBC 5.7, hemoglobin 10.5, platelet count 204,000, MCV 92.1.     • 7/6/17 - EGD:  Status post balloon dilation of esophageal stricture.  • 7/12/17 - EGD with balloon dilation of esophageal stricture.  • 7/26/17 - EGD and balloon dilation of esophageal stricture.  • 7/31/17 - PET/CT scan:  There is mild metabolic activity seen at the thoracic esophagus just at and below the surgical margins.  Some mild wall thickening.  This could be from recent surgery.  A residual cancer seems unlikely.  There is a precarinal lymph node with mild metabolic activity with SUV of 3 measuring 1 x 1.3 cm.  Again this appears to be nonspecific in my opinion.    • 8/7/17 - WBC 5.9, hemoglobin 11.9, platelet count 171,000, MCV 88.7.    • 10/19/17 - WBC 7.5, hemoglobin 11.8, platelet count 216,000.    • 1/8/18 - PET scan:  No evidence of residual disease or recurrent disease.  There is an esophageal stent in the mid esophagus with a large debris air level.  No evidence of any metastases in the chest, abdomen or pelvis.  Mild nodule uptake in the vocal cords with fullness in the right vocal cord.  This could be physiologic.    • 7/9/18 - CT scan of chest with contrast:  Prominent mediastinal lymph nodes appear stable since February.  Changes of gastric pull-through procedure for esophageal cancer again noted.  Tree-in-bud infiltrates in the left lung base, consistent with infection versus inflammation.    • 1/10/19 - CT chest, abdomen and pelvis with contrast:  No evidence of mets in the chest; however, interval development of metastatic lymphadenopathy in the left side of the retroperitoneum.  For instance, there is a 17 mm rounded lymph node, 10 mm lymph node and also a 14 mm lymph node.  These are all new.  Stable 9 mm hypodense lesion within the right hepatic lobe, which could reflect hemangioma or complex cyst.    • 1/30/19 - CT-guided core biopsy of retroperitoneal lymph node:  Poorly differentiated adenocarcinoma consistent with metastases from patient’s known esophageal primary.  CK20 positive,  CDX2 positive, cytokeratin 7 negative, TTF-1 negative.    • 2/1/19 - Creatinine 0.9, BUN 18, calcium 9.3; these are normal.    • 2/28/19 - [employer] Next Gen sequencing testing on retroperitoneal lymph node specimen:  PD-L1 positive.  CPS = 3.  This implies responsiveness to pembrolizumab.  Mismatch repair status is proficient (no evidence of microsatellite instability).  Tumor mutational burden is low = 6 mutations/Mb.  CDH1 indeterminate.  KRAS mutation positive.  TP53 mutation positive.  CDK6 amplified.  [employer] PD-L1 testing by IHC:  PD-L1 expression 1% positive (CPS =1).  HER2/marvel by IHC is negative.  HER2/marvel by CISH not amplified.  Microsatellite stable tumor.    • 3/7/19 - WBC 7.3, hemoglobin 12, platelet count 128,000, MCV 92.     • 3/14/19 - Patient was seen by radiation oncologist, Dr. Chahal.  He has recommended observation versus systemic chemoimmunotherapy as needed.  No plans for radiation therapy.   • 5/1/19 - CT scan of chest with contrast:  No definitive findings of new metastatic disease in the chest.  Emphysema noted.    • 5/1/19 - CT abdomen and pelvis:  There is interval progression of metastatic retroperitoneal adenopathy.  Left periaortic adenopathy with lymph node measuring up to 2 cm, previously 1.7 cm.  Another lymph node now measures 2.3, previously 1.4 cm.  New enlarged lymph node on image 147 measures 1.4 cm, previously 0.4 cm.  A 9 mm hypodense right hepatic lobe lesion appears stable.    • 5/9/19 - Decision made to start patient on immunotherapy Keytruda.    • 5/9/19 - Vitamin B12 229.  Ferritin 61.  Folate 14.3.  Iron saturation 26%, TIBC 304, serum iron 79.  • 5/31/19 - Patient received Keytruda 200 mg cycle 1, day 1.    • 5/31/19 - Free T4 0.99.  Creatinine 0.9, BUN 9.  LFTs normal.  TSH 3.99.  Folate 14.3.  Iron saturation 26%.    • 6/21/2019 patient received Keytruda cycle 2  • 7/12/2019: Patient received Keytruda cycle 3  • 8/2/2019 patient received cycle 4 Keytruda WBC 7.2,  hemoglobin 11.8 platelet count 163  • 8/23/2019 creatinine 0.9, LFTs normal, WBC 6.4, hemoglobin 11.5, platelets 179, TSH 2.1   • 8/23/2019 patient received cycle 5 of Keytruda  • 9/3/2019 CT chest abdomen and pelvis with contrast: . Positive response to therapy in the abdomen since 05/01/2019. Pathologically enlarged retroperitoneal lymph nodes, predominantly in the left periaortic region, have diminished in size.a 1.3 x 2.1 cm left periaortic node (image 60), previously measured 3.5 x 2.3 cm. A 1.6 cm index node near the level of the left renal vein previously measured 2.0 cm No new or progressive adenopathy. 2. Stable findings in the chest. Noncalcified right upper lobe nodules are unchanged. There is no evidence of new or progressive metastatic disease within the chest. 3. 8 mm indeterminate low-density lesion in the right hepatic lobe, is stable. No new liver lesions.  • 9/6/2019 WBC 5.6, hemoglobin 11.2, platelets of 192, MCV 91.8  • 9/13/2019 patient received Keytruda cycle 6, albumin 3.2  • 10/4/2019 patient received cycle 7 of Keytruda, TSH 4.8, free T4 0.86  • 10/9/2019 WBC 5.8, hemoglobin 11.7, platelets 174  • 10/25/2019 patient received cycle 8 of Keytruda.  WBC 5.9, hemoglobin 11.5, platelets 152, creatinine 0.75, cortisol 15.2, TSH 6.48 high , free T4 1.13 normal  • 11/6/2019 WBC 6.6, hemoglobin 11.8, MCV 91.5, platelets 187  • 11/15/2019 patient received cycle 9 of Keytruda, WBC 6.6, hemoglobin 11.9, platelets 161, cortisol level 8.86, TSH 2.68, free T3 2.8, free T4 1.5  • 12/4/2019 WBC 6.9, hemoglobin 12.0, platelets 180, MCV 92.9  • 12/06/2019-Keytruda Cycle 10  • 12/27/2019- Cycle 11 LFTs normal, WBC 6.7, hemoglobin 11.4, platelets 168, MCV 93, TSH 2.4,  • 1/28/2020: CT chest abdomen and pelvis with contrast:  Single (aortic lymph node in the abdominal retroperitoneum has increased.  Rest of the retroperitoneal lymph nodes have decreased in size.  Mediastinal adenopathy appears unchanged.   Noncalcified bilateral pulmonary nodules are stable  • 1/17/2020 patient received Keytruda cycle 12:.  • 2/3/2020 WBC 7.1, hemoglobin 12.2, platelets 171  • 02/10/2020 patient received cycle 13 of Keytruda: WBC 7.3, hemoglobin 12.1, platelets 166, creatinine 0.82  • 3/6/2020 patient is of Keytruda 200 mg, hemoglobin 10.8  • 3/27/2020 patient received Keytruda 200 mg, hemoglobin 11.4, TSH 1.95  • 4/17/2020 patient received Keytruda cycle 16 200 mg, WBC 6.1, hemoglobin 11.6, platelets 150  • 5/8/2020: Patient received cycle 17 Keytruda, WBC 6.3, hemoglobin 9.6, platelets 137, TSH 2.05, free T4 1.54, creatinine 0.93, LFTs normal,  • 5/29/2020: Patient received cycle 18 Keytruda, WBC 6.5, hemoglobin 11.7, platelets 129, creatinine 0.83, TSH 1.69  • 6/26/2020: Patient receiving Keytruda.   • 7/17/2020: TSH 2.06, WBC 5.7, hemoglobin 11.4, platelets 177, MCV 92.4, Keytruda 200 mg cycle 20  • 7/27/2020: CT chest abdomen pelvis with contrast: Stable findings in the chest abdomen pelvis since 1/28/2020.  Noncalcified bilateral pulmonary nodules are stable.  Stable retroperitoneal left perinephric adenopathy in the abdomen.  • 8/7/2020: Patient received cycle 21 of Keytruda  • 8/28/2020 patient is a cycle 22 of Keytruda.  WBC 6.02, hemoglobin 11.3, platelets 161  • 9/11/2020 WBC 6.9, hemoglobin 12.4, platelets 192  • 9/18/2020: Cycle 23 of Keytruda, TSH 1.19  • 10/9/2020: Cycle 24 of Keytruda, WBC 5.9, hemoglobin 11.9, platelets 178, creatinine 0.84, TSH 2.56, free T3 2.4, CMP normal  • 10/23/2020: WBC 7.34, hemoglobin 11.6, platelets 175  • 10/30/2020:.  Keytruda 200 mg  • 11/20/2020: Keytruda 200 mg  • 12/11/2020 Keytruda 200 mg.  WBC 5.5, hemoglobin 11.4, platelets 176, creatinine 0.8, LFTs normal, TSH 2.2, cortisol 8.3  • 12/10/2020: CT chest abdomen and pelvis with contrast: Previously seen left para-aortic lymph nodes within the abdomen are decreased in size.  No pathological lymphadenopathy.  No evidence of residual  malignancy..  Small noncalcified pulmonary nodules are stable.  No new nodules.  Stable mediastinal lymph nodes.  • 3/12/2021: WBC 6.7, hemoglobin 11.3, platelets 17  • 3/12/2021: CMP normal, TSH 2.9, cortisol 11.95, 1  • 3/15/2021: CT chest with contrast: Stable findings in the chest with postsurgical changes of esophageal resection and gastric pull-through.  Stable right lower lobe lung nodule measures 11 mm.  Stable size and appearance of the mediastinal and upper abdominal lymph nodes.  • 3/23/2021: Doppler of upper extremity: Normal.  • 3/24/2021: CT abdomen and pelvis with contrast: Postoperative changes.  Left periaortic nodes are stable.  • 4/12/2021: TSH 4.1, cortisol 12.4  • 6/22/2021: CMP normal,Cortisol 10.08,, WBC 5.8, hemoglobin 11.2, platelets 109, TSH 2.59  • 7/12/2021: CT scan of the chest abdomen pelvis with contrast: Dominant 10 x 10 left periaortic lymph node is stable since 3/22/2021.  Dominant lymph nodes in the right lower paratracheal and AP window distribution are stable since 3/15/2021.  No evidence of progressive adenopathy in the chest abdomen or pelvis.    • 01/11/2022 -PET/CT findings consistent with new cedric metastatic disease in the retroperitoneum.  2 new pathologically enlarged hypermetabolic retroperitoneal lymph nodes.  No convincing evidence of recurrent disease within the chest neck pelvis or skeleton.  • 1/20/2022 -CT-guided needle biopsy consistent with poorly differentiated carcinoma.  CDX2 positive consistent with metastases from known esophageal primary  • 2/9/2022 - Pembrolizumab complicated with fatigue, joint pains, nausea, diarrhea. Symptoms decrease in intensity over a week.  • 3/30/2022 -CT chest and pelvis with stable metastatic lymph nodes in the left periaortic retroperitoneum.  No evidence of disease progression or new lesions.  Stable lymphadenopathy in the distal paratracheal mediastinal area  • 4/13/2022 - Keytruda 200  • 5/4/2022 - Keytruda 200  • Continued  keytruda  • 7/11/2022 - CT abdomen pelvis with decrease in size of left perioaortic lymph node. No other areas of disease.  • Continues to be on Keytruda  • 10/18/2022 - stable CT imaging  • 1/20/2023 - CT imaging with stable disease. No significant change.  • Patient was hospitalized with esophagitis, subsequent EGD with improvement, no recurrence of disease.  • 2/21/2023 - Keytruda  • CT CAP with increased retroperitoneal adenopathy.   • ECHO with EF 35-40%    Subjective:    Has had shortness of breath which has improved. No pedal edema.    Past Medical History:   Diagnosis Date   • B12 deficiency    • Blockage of coronary artery of heart    • Depression    • DM (diabetes mellitus)    • Dysphagia    • GERD (gastroesophageal reflux disease)    • HTN (hypertension)    • Hyperlipidemia        Past Surgical History:   Procedure Laterality Date   • ABDOMINAL WALL ABSCESS INCISION AND DRAINAGE  10/22/2018    WITH DEBRIDEMENT  1.5CM X 1.5 CM X 1.5 CM    DR. PURI   • CATARACT EXTRACTION  2018   • COLONOSCOPY     • CORONARY ARTERY BYPASS GRAFT  2015   • ENDOSCOPY  12/20/2016   • ENDOSCOPY N/A 1/2/2023    Procedure: ESOPHAGOGASTRODUODENOSCOPY;  Surgeon: Esvin Morgan MD;  Location: [facility] ENDOSCOPY;  Service: Gastroenterology;  Laterality: N/A;  post: anastamosis stricture   • ENDOSCOPY N/A 2/9/2023    Procedure: ESOPHAGOGASTRODUODENOSCOPY, non-wire guided balloon dilation (12-14mm) of esophageal stricture;  Surgeon: Tino Villafana MD;  Location: [facility] ENDOSCOPY;  Service: Gastroenterology;  Laterality: N/A;  post: esophageal anastamotic stricture, post surgical changes   • ESOPHAGECTOMY  05/04/2017    BROOKS PETTY ESOPHAGECTOMY, FEEDING JEJNOSTOMOY, ABDOMINAL AND MEDIASTINAL LYMPH NODE DISECTION, PEDICLED MUSCLE FLAP COVERAGE DR. PURI   • ESOPHAGOSCOPY / EGD  07/12/2017    WITH BALLOON DILATION   • ESOPHAGOSCOPY / EGD  07/26/2017    WITH BALLOON DILATION   • ESOPHAGOSCOPY / EGD  08/11/2017    WITH  BALLOON DILATION   • ESOPHAGOSCOPY / EGD  08/28/2017    WITH BALLOON DILATION   • ESOPHAGOSCOPY / EGD  09/27/2017    WITH BALLOON DILATION   • LYMPH NODE BIOPSY  01/30/2019   • PACEMAKER IMPLANTATION  11/21/2016       Current Outpatient Medications:   •  clonazePAM (KlonoPIN) 0.5 MG tablet, Take 0.25 mg by mouth 2 (Two) Times a Day As Needed for Anxiety., Disp: , Rfl: 1  •  cyanocobalamin (VITAMIN B-12) 1000 MCG tablet, Take 1 tablet by mouth Daily., Disp: , Rfl:   •  folic acid (FOLVITE) 1 MG tablet, Take 1 tablet by mouth Daily., Disp: , Rfl:   •  levothyroxine (Euthyrox) 50 MCG tablet, Take 1 tablet by mouth Daily., Disp: 90 tablet, Rfl: 0  •  metFORMIN (GLUCOPHAGE) 1000 MG tablet, Take 2 tablets by mouth Daily., Disp: , Rfl:   •  metoprolol succinate XL (TOPROL-XL) 25 MG 24 hr tablet, Take 1 tablet by mouth Daily., Disp: 90 tablet, Rfl: 3  •  ondansetron (ZOFRAN) 4 MG tablet, Take 1 tablet by mouth Every 8 (Eight) Hours As Needed for Nausea or Vomiting., Disp: , Rfl:   •  pantoprazole (PROTONIX) 40 MG EC tablet, Take 1 tablet by mouth 2 (Two) Times a Day., Disp: , Rfl:   •  pravastatin (PRAVACHOL) 10 MG tablet, Take 1 tablet by mouth Every Other Day., Disp: , Rfl:   •  spironolactone (ALDACTONE) 25 MG tablet, Take 0.5 tablets by mouth Daily., Disp: 90 tablet, Rfl: 3    Allergies   Allergen Reactions   • Ace Inhibitors Unknown (See Comments)     Unknown reaction     • Heparin Other (See Comments)     Fever/chills, weakness in legs   • Sulfa Antibiotics Other (See Comments)     Mouth sores       Family History   Problem Relation Age of Onset   • Mental illness Mother    • Heart disease Mother    • Hypertension Father    • Cancer Father    • Heart disease Sister    • Heart disease Brother      Cancer-related family history includes Cancer in his father.    Social History     Tobacco Use   • Smoking status: Former     Packs/day: 2.00     Years: 50.00     Pack years: 100.00     Types: Cigarettes     Quit date:  "2015     Years since quittin.8   • Smokeless tobacco: Former     Types: Chew     Quit date: 1989   Vaping Use   • Vaping Use: Never used   Substance Use Topics   • Alcohol use: No   • Drug use: No     ROS:     Objective:    Vitals:    23 1057   BP: 128/62   Pulse: 74   Resp: 16   Temp: 98.2 °F (36.8 °C)   SpO2: 99%   Weight: 68.4 kg (150 lb 12.8 oz)   Height: 167.6 cm (66\")   PainSc: 0-No pain       (1) Restricted in physically strenuous activity, ambulatory and able to do work of light nature    Physical Exam:     Physical Exam   Constitutional: He is oriented to person, place, and time. He appears well-developed. No distress.   HENT:   Head: Normocephalic and atraumatic.   Nose: Nose normal.   Mouth/Throat: Mucous membranes are moist.   Eyes: Pupils are equal, round, and reactive to light.   Neck: No thyromegaly present.   Cardiovascular: Normal rate, regular rhythm, normal heart sounds and normal pulses. Exam reveals no gallop and no friction rub.   Pulmonary/Chest: Effort normal. He has rhonchi.   Abdominal: Soft. Normal appearance and bowel sounds are normal.   No tenderness   Musculoskeletal: Normal range of motion. No deformity or signs of injury.      Right lower leg: No edema.   Neurological: He is alert and oriented to person, place, and time. He exhibits normal muscle tone.   Skin: Skin is warm and dry. No rash noted. He is not diaphoretic. No erythema. No jaundice.   Right side chest  port       Lab Results - Last 18 Months   Lab Units 23  1033 23  0937 23  1122   WBC 10*3/mm3 5.30 5.45 5.70   HEMOGLOBIN g/dL 10.0* 10.1* 9.6*   HEMATOCRIT % 31.1* 31.1* 30.4*   PLATELETS 10*3/mm3 220 224 192   MCV fL 88.6 92.0 97.4*     Lab Results - Last 18 Months   Lab Units 23  1232 23  1033 23  0937 01/10/23  1108   SODIUM mmol/L  --  140 136 139   POTASSIUM mmol/L  --  4.6 4.2 4.4   CHLORIDE mmol/L  --  107 103 103   CO2 mmol/L  --  21.0* 20.0* 24.0   BUN mg/dL  " --  16 12 17   CREATININE mg/dL 0.80 0.76 0.77 0.75*   CALCIUM mg/dL  --  9.0 8.8 8.5*   BILIRUBIN mg/dL  --  0.2 0.2 0.2   ALK PHOS U/L  --  36* 45 38*   ALT (SGPT) U/L  --  8 7 8   AST (SGOT) U/L  --  18 17 16   GLUCOSE mg/dL  --  111* 164* 91     Assessment & Plan     Assessment:    Metastatic esophageal carcinoma:    Patient has a history of GE junction poorly differentiated adenocarcinoma for which he had chemoradiation followed by [place] [facility] resection:  Pathology showed ypT3,pN1 disease.  Tumor was Stage IIIA, diagnosed in 2016.  He received concurrent chemoradiation with weekly Carboplatin and Taxol and radiation finishing on 3/15/17.  CT scan on 1/10/19 showed new retroperitoneal lymph nodes.  These lymph nodes were biopsied on 1/30/19 and it shows metastatic esophageal cancer.  [employer] Next Gen testing was performed on the sample and it shows KRAS mutation, microsatellite stable tumor.  PD-L1 is positive.  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  HER2/marvel (ERBB2) was not amplified and was negative.  His CT scan on 5/1/19 shows evidence of progression.  Patient has started receiving Keytruda on 5/31/19.  He has received 19 cycles so far.   CT scan on 1/28/2020 shows improvement and continued response.  CT scan chest abdomen pelvis with contrast on 12/10/2020 does not show any evidence of disease progression.  On 12/22/2020 decision made to hold Keytruda per patient's request to get a treatment break.. Repeat CT scan chest 7/12/2021 does not show any evidence of disease progression.  Now CT imaging in January 2021 with a CT showing increased abdominal left para-aortic adenopathy.  Subsequent PET/CT with new cedric metastatic disease in the retroperitoneum of the abdomen.  New pathologically enlarged hypermetabolic retroperitoneal lymph nodes.  This would be concerning for disease recurrence/progression.  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  I discussed with him the treatment options going forward.   He has had a good response with immunotherapy pembrolizumab in the past. This was  restarted. subsequent imaging with treatment response. 1/2023 imaging with good response. Continued treatment  Now with increased adenopathy 2 new lymph nodes. Holding immunotherapy for now.   Consider getting cardiac MR will discuss with Dr. Culp    Shortness of breath  Improved, has occasional shortness of breath, likely copd  I have recommended using albuterol  Symptoms have improved    Reflux/heartburn/dysphagia:   Symptoms improved.  Also has a history of strictures. Status post EGD and balloon dilation.  Omeprazole once daily, famotidine as needed, symptoms stable, continue  Pain could be related to gastritis, will monitor for now, this has resolved.    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  Continues on Synthroid monitor TSH and fT4 with treatment. Monitor     Mild diarrhea:  diarrhea seems to be waxing and waning in nature.  Related to immunotherapy, but not severe. Improved continue to monitor    PD-L1 positive, HER2 negative, p53 and KRAS positive.  Repeat Caris testing with repeat biopsy with no target mutations.    B12 deficiency: Continue B12 1000 mcg twice daily.    Anemia - Hb improved and stable.    F/u in 3 weeks.        Jean Pierre Elizondo MD 04/12/23     No orders of the defined types were placed in this encounter.               show

## 2025-01-10 ENCOUNTER — HOSPITAL ENCOUNTER (OUTPATIENT)
Dept: PET IMAGING | Facility: HOSPITAL | Age: 75
Discharge: HOME | End: 2025-01-10
Payer: MEDICARE

## 2025-01-10 ENCOUNTER — TELEPHONE (OUTPATIENT)
Dept: ONCOLOGY | Facility: CLINIC | Age: 75
End: 2025-01-10
Payer: MEDICARE

## 2025-01-10 DIAGNOSIS — E03.9 HYPOTHYROIDISM, UNSPECIFIED TYPE: ICD-10-CM

## 2025-01-10 DIAGNOSIS — C15.9 ESOPHAGEAL CANCER, STAGE IV: ICD-10-CM

## 2025-01-10 LAB
CREAT BLDA-MCNC: 1.7 MG/DL (ref 0.6–1.3)
EGFRCR SERPLBLD CKD-EPI 2021: 41.5 ML/MIN/1.73

## 2025-01-10 PROCEDURE — 25510000001 IOPAMIDOL PER 1 ML: Performed by: STUDENT IN AN ORGANIZED HEALTH CARE EDUCATION/TRAINING PROGRAM

## 2025-01-10 PROCEDURE — 74177 CT ABD & PELVIS W/CONTRAST: CPT

## 2025-01-10 PROCEDURE — 82565 ASSAY OF CREATININE: CPT

## 2025-01-10 PROCEDURE — 71260 CT THORAX DX C+: CPT

## 2025-01-10 RX ORDER — IOPAMIDOL 755 MG/ML
100 INJECTION, SOLUTION INTRAVASCULAR
Status: COMPLETED | OUTPATIENT
Start: 2025-01-10 | End: 2025-01-10

## 2025-01-10 RX ADMIN — IOPAMIDOL 100 ML: 755 INJECTION, SOLUTION INTRAVENOUS at 08:52

## 2025-01-10 NOTE — TELEPHONE ENCOUNTER
----- Message from Lynette Ordonez sent at 1/10/2025 11:38 AM EST -----  Please let the patient know his kidney function is a bit decreased from his normal baseline.  Ask him to increase his fluid intake and avoid NSAIDs.  We can recheck at next visit.  Thank you.

## 2025-01-16 NOTE — PROGRESS NOTES
Hematology-Oncology Follow-up Note       Artie Mc  1950    Primary Care Physician: Chanel Carter APRN  Referring Physician: Chanel Carter APRN    Reason For Visit:  No chief complaint on file.    Metastatic esophageal cancer  Monitoring for adverse effects related to immunotherapy.  Hypothyroidism    HPI:   Mr. Mc is a pleasant 74 y.o. gentleman with a history of gastroesophageal reflux disease, diabetes, hypertension, heart block status post pacemaker placement and history of CABG.  He was having symptoms of dysphagia, weight loss since September 2016. He was reporting symptoms of food getting stuck in the lower chest.  He has transitioned himself to a liquid diet.  The patient reports losing about 30 pounds over this duration.  The patient underwent upper GI endoscopy on 12/20/16 with Dr. Esvin Barrera.  Endoscopy showed necrotic, circumferential and moderately obstructive mass at the distal esophagus between 42 cm and 36 cm.      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  There was also evidence of mild subacute inflammation in the duodenum.  Dr. Barrera ordered a CT scan of the chest and abdomen with contrast on 12/20/16.  The scan was done at [facility].  The scan showed a new ill-defined mass in the anterior wall of the distal esophagus at the GE junction measuring 2.3 x 2.2 x 1.6 cm, worrisome for cancer.  There were four subcentimeter liver lesions which had appeared stable in comparison to another CT scan from 2009 and they likely represent small cysts.  There was also mild lymphadenopathy in the gastrohepatic ligament.  There were at least six borderline enlarged lymph nodes measuring up to 1.3 x 1 cm in the region.  The patient is referred to us for further oncological evaluation.    1/5/2017 - The patient presents for initial consultation.  He reports persistent dysphagia and also has  symptoms of regurgitation.  He cannot tolerate solid foods and is dependent on liquid diet such as Ensure.  He also reports fatigue. Symptoms of dysphagia have been present for the past four months.  When he eats any solids, the food gets stuck in the lower chest.    1/5/17 - Vitamin B12 200 (L).  CEA 0.8.  Ferritin 35.  Iron saturation 16% (L), serum iron 56, TIBC 354.  Creatinine 0.9.  LFTs normal.  Folate 10.2.  1/12/17 - PET/CT scan:  Intense abnormal activity corresponding to the region of the GE junction and proximal stomach.  SUV is 11.06.  There is a suggestion of an abnormal mass or abnormal wall thickening in the region measuring 3.9 x 4.8 cm.  No additional abnormalities.  No evidence of metastases or other lymphadenopathy.  Scattered nonspecific subcentimeter lymph nodes involving the mediastinum and within the central upper abdomen.  1/13/17 - Creatinine 0.9.  LFTs normal.    1/17/17 - Patient seen by thoracic surgeon, Dr. Bradley.  PET scan was reviewed.  He has recommended neoadjuvant chemoradiation therapy followed by Noel Gurwinder esophagogastrectomy.  Port-A-Cath is being arranged.    1/20/17 - WBC 6.3, hemoglobin 12.1, platelet count 198,000, MCV 86.4.  1/30/17 - Patient started weekly Carboplatin and Taxol (Carboplatin AUC 2 and Taxol 50 mg/M2).  Patient received Injectafer 750 mg.  WBC 5.8, hemoglobin 12.4, platelet count 244,000.   1/31/17 - Patient seen by Dr. Eduardo Oleary.  The plan was to give 5040 cGy in 28 fractions.   2/6/17 - WBC 4.4, hemoglobin 11.6, platelet count 201,000.  Patient received cycle 2 of weekly Carboplatin and Taxol (Carboplatin AUC 2 and Taxol 50 mg/M2).  2/13/17 - Patient received Carboplatin and Taxol weekly cycle 3.  2/13/17 - Patient started concurrent radiation therapy.  Total planned dose is 4140 cGy.    2/13/17 to 2/15/17 - Patient received 4140 cGy of neoadjuvant radiation.  Radiation was finished on 3/15/17.    2/20/17 - WBC 1.8, hemoglobin 10.6, platelet count  217,000.  Creatinine 0.5. Folate 15 (L).  Ferritin 361.  Haptoglobin 214.  Iron saturation 33%, TIBC 263, serum iron 88.    2/20/17 - Patient received Carboplatin and Taxol weekly cycle 4.   2/27/17 - WBC 3.7, hemoglobin 10.7, platelet count 177,000, MCV 85.3.    3/6/17 - Patient has received 16 out of the 23 planned radiation treatments.  Total planned dose is 4140 cGy.    3/6/17 - WBC 5.4, hemoglobin 11.2, platelet count 113,000.  3/6/17 - Patient received cycle 5 of weekly Carboplatin and Taxol.   3/13/17 - Patient received cycle 6 of weekly Carboplatin and Taxol.  WBC 3.9, hemoglobin 11.3, platelet count 93,000.     3/20/17 - WBC 1.4, hemoglobin 10.0, platelet count 118,000, MCV 86.1   3/27/17 - WBC 3.8, hemoglobin 9.5, platelet count 176,000, MCV 87.3.    4/7/17 - Stress test:  No evidence of reversible myocardial ischemia.  Normal left ventricular ejection fraction of 50%.    4/17/17 - Upper GI endoscopy by Dr. 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  The GE junction adenocarcinoma lesion seems to have a very good response to chemotherapy and radiation.  The lumen was open.  5/1/17 - WBC 7.0, hemoglobin 10.2, platelet count 175,000.    5/4/17 - Patient underwent [place] Gurwinder esophagectomy with pyloroplasty, feeding jejunostomy tube, abdominal and mediastinal lymph node dissection.    Surgical Pathology:  Moderately to poorly differentiated adenocarcinoma measuring 1.3 cm at the GE junction.  Resection margins are negative for malignancy.  Tumor margins are negative.  There is effective treatment response.  No evidence of lymphovascular invasion.  Staging is ypT3,pN1.  One out of fourteen lymph nodes involved.   5/22/17 - WBC 6.8, hemoglobin 10.3, platelet count 312,000.    6/19/17 - WBC 5.7, hemoglobin 10.5, platelet count 204,000, MCV 92.1.    7/6/17 - EGD:  Status post balloon dilation of esophageal stricture.  7/12/17 - EGD with balloon dilation of esophageal  stricture.  7/26/17 - EGD and balloon dilation of esophageal stricture.  7/31/17 - PET/CT scan:  There is mild metabolic activity seen at the thoracic esophagus just at and below the surgical margins.  Some mild wall thickening.  This could be from recent surgery.  A residual cancer seems unlikely.  There is a precarinal lymph node with mild metabolic activity with SUV of 3 measuring 1 x 1.3 cm.  Again this appears to be nonspecific in my opinion.    8/7/17 - WBC 5.9, hemoglobin 11.9, platelet count 171,000, MCV 88.7.    10/19/17 - WBC 7.5, hemoglobin 11.8, platelet count 216,000.    1/8/18 - PET scan:  No evidence of residual disease or recurrent disease.  There is an esophageal stent in the mid esophagus with a large debris air level.  No evidence of any metastases in the chest, abdomen or pelvis.  Mild nodule uptake in the vocal cords with fullness in the right vocal cord.  This could be physiologic.    7/9/18 - CT scan of chest with contrast:  Prominent mediastinal lymph nodes appear stable since February.  Changes of gastric pull-through procedure for esophageal cancer again noted.  Tree-in-bud infiltrates in the left lung base, consistent with infection versus inflammation.    1/10/19 - CT chest, abdomen and pelvis with contrast:  No evidence of mets in the chest; however, interval development of metastatic lymphadenopathy in the left side of the retroperitoneum.  For instance, there is a 17 mm rounded lymph node, 10 mm lymph node and also a 14 mm lymph node.  These are all new.  Stable 9 mm hypodense lesion within the right hepatic lobe, which could reflect hemangioma or complex cyst.    1/30/19 - CT-guided core biopsy of retroperitoneal lymph node:  Poorly differentiated adenocarcinoma consistent with metastases from patient’s known esophageal primary.  CK20 positive, CDX2 positive, cytokeratin 7 negative, TTF-1 negative.    2/1/19 - Creatinine 0.9, BUN 18, calcium 9.3; these are normal.    2/28/19 - [facility]  Next Gen sequencing testing on retroperitoneal lymph node specimen:  PD-L1 positive.  CPS = 3.  This implies responsiveness to pembrolizumab.  Mismatch repair status is proficient (no evidence of microsatellite instability).  Tumor mutational burden is low = 6 mutations/Mb.  CDH1 indeterminate.  KRAS mutation positive.  TP53 mutation positive.  CDK6 amplified.  [employer] PD-L1 testing by IHC:  PD-L1 expression 1% positive (CPS =1).  HER2/marvel by IHC is negative.  HER2/marvel by CISH not amplified.  Microsatellite stable tumor.    3/7/19 - WBC 7.3, hemoglobin 12, platelet count 128,000, MCV 92.     3/14/19 - Patient was seen by radiation oncologist, Dr. Chahal.  He has recommended observation versus systemic chemoimmunotherapy as needed.  No plans for radiation therapy.   5/1/19 - CT scan of chest with contrast:  No definitive findings of new metastatic disease in the chest.  Emphysema noted.    5/1/19 - CT abdomen and pelvis:  There is interval progression of metastatic retroperitoneal adenopathy.  Left periaortic adenopathy with lymph node measuring up to 2 cm, previously 1.7 cm.  Another lymph node now measures 2.3, previously 1.4 cm.  New enlarged lymph node on image 147 measures 1.4 cm, previously 0.4 cm.  A 9 mm hypodense right hepatic lobe lesion appears stable.    5/9/19 - Decision made to start patient on immunotherapy Keytruda.    5/9/19 - Vitamin B12 229.  Ferritin 61.  Folate 14.3.  Iron saturation 26%, TIBC 304, serum iron 79.  5/31/19 - Patient received Keytruda 200 mg cycle 1, day 1.    5/31/19 - Free T4 0.99.  Creatinine 0.9, BUN 9.  LFTs normal.  TSH 3.99.  Folate 14.3.  Iron saturation 26%.    6/21/2019 patient received Keytruda cycle 2  7/12/2019: Patient received Keytruda cycle 3  8/2/2019 patient received cycle 4 Keytruda WBC 7.2, hemoglobin 11.8 platelet count 163  8/23/2019 creatinine 0.9, LFTs normal, WBC 6.4, hemoglobin 11.5, platelets 179, TSH 2.1   8/23/2019 patient received cycle 5 of  Keytruda  9/3/2019 CT chest abdomen and pelvis with contrast: . Positive response to therapy in the abdomen since 05/01/2019. Pathologically enlarged retroperitoneal lymph nodes, predominantly in the left periaortic region, have diminished in size.a 1.3 x 2.1 cm left periaortic node (image 60), previously measured 3.5 x 2.3 cm. A 1.6 cm index node near the level of the left renal vein previously measured 2.0 cm No new or progressive adenopathy. 2. Stable findings in the chest. Noncalcified right upper lobe nodules are unchanged. There is no evidence of new or progressive metastatic disease within the chest. 3. 8 mm indeterminate low-density lesion in the right hepatic lobe, is stable. No new liver lesions.  9/6/2019 WBC 5.6, hemoglobin 11.2, platelets of 192, MCV 91.8  9/13/2019 patient received Keytruda cycle 6, albumin 3.2  10/4/2019 patient received cycle 7 of Keytruda, TSH 4.8, free T4 0.86  10/9/2019 WBC 5.8, hemoglobin 11.7, platelets 174  10/25/2019 patient received cycle 8 of Keytruda.  WBC 5.9, hemoglobin 11.5, platelets 152, creatinine 0.75, cortisol 15.2, TSH 6.48 high , free T4 1.13 normal  11/6/2019 WBC 6.6, hemoglobin 11.8, MCV 91.5, platelets 187  11/15/2019 patient received cycle 9 of Keytruda, WBC 6.6, hemoglobin 11.9, platelets 161, cortisol level 8.86, TSH 2.68, free T3 2.8, free T4 1.5  12/4/2019 WBC 6.9, hemoglobin 12.0, platelets 180, MCV 92.9  12/06/2019-Keytruda Cycle 10  12/27/2019- Cycle 11 LFTs normal, WBC 6.7, hemoglobin 11.4, platelets 168, MCV 93, TSH 2.4,  1/28/2020: CT chest abdomen and pelvis with contrast:  Single (aortic lymph node in the abdominal retroperitoneum has increased.  Rest of the retroperitoneal lymph nodes have decreased in size.  Mediastinal adenopathy appears unchanged.  Noncalcified bilateral pulmonary nodules are stable  1/17/2020 patient received Keytruda cycle 12:.  2/3/2020 WBC 7.1, hemoglobin 12.2, platelets 171  02/10/2020 patient received cycle 13 of  Keytruda: WBC 7.3, hemoglobin 12.1, platelets 166, creatinine 0.82  3/6/2020 patient is of Keytruda 200 mg, hemoglobin 10.8  3/27/2020 patient received Keytruda 200 mg, hemoglobin 11.4, TSH 1.95  4/17/2020 patient received Keytruda cycle 16 200 mg, WBC 6.1, hemoglobin 11.6, platelets 150  5/8/2020: Patient received cycle 17 Keytruda, WBC 6.3, hemoglobin 9.6, platelets 137, TSH 2.05, free T4 1.54, creatinine 0.93, LFTs normal,  5/29/2020: Patient received cycle 18 Keytruda, WBC 6.5, hemoglobin 11.7, platelets 129, creatinine 0.83, TSH 1.69  6/26/2020: Patient receiving Keytruda.   7/17/2020: TSH 2.06, WBC 5.7, hemoglobin 11.4, platelets 177, MCV 92.4, Keytruda 200 mg cycle 20  7/27/2020: CT chest abdomen pelvis with contrast: Stable findings in the chest abdomen pelvis since 1/28/2020.  Noncalcified bilateral pulmonary nodules are stable.  Stable retroperitoneal left perinephric adenopathy in the abdomen.  8/7/2020: Patient received cycle 21 of Keytruda  8/28/2020 patient is a cycle 22 of Keytruda.  WBC 6.02, hemoglobin 11.3, platelets 161  9/11/2020 WBC 6.9, hemoglobin 12.4, platelets 192  9/18/2020: Cycle 23 of Keytruda, TSH 1.19  10/9/2020: Cycle 24 of Keytruda, WBC 5.9, hemoglobin 11.9, platelets 178, creatinine 0.84, TSH 2.56, free T3 2.4, CMP normal  10/23/2020: WBC 7.34, hemoglobin 11.6, platelets 175  10/30/2020:.  Keytruda 200 mg  11/20/2020: Keytruda 200 mg  12/11/2020 Keytruda 200 mg.  WBC 5.5, hemoglobin 11.4, platelets 176, creatinine 0.8, LFTs normal, TSH 2.2, cortisol 8.3  12/10/2020: CT chest abdomen and pelvis with contrast: Previously seen left para-aortic lymph nodes within the abdomen are decreased in size.  No pathological lymphadenopathy.  No evidence of residual malignancy..  Small noncalcified pulmonary nodules are stable.  No new nodules.  Stable mediastinal lymph nodes.  3/12/2021: WBC 6.7, hemoglobin 11.3, platelets 17  3/12/2021: CMP normal, TSH 2.9, cortisol 11.95, 1  3/15/2021: CT chest  with contrast: Stable findings in the chest with postsurgical changes of esophageal resection and gastric pull-through.  Stable right lower lobe lung nodule measures 11 mm.  Stable size and appearance of the mediastinal and upper abdominal lymph nodes.  3/23/2021: Doppler of upper extremity: Normal.  3/24/2021: CT abdomen and pelvis with contrast: Postoperative changes.  Left periaortic nodes are stable.  4/12/2021: TSH 4.1, cortisol 12.4  6/22/2021: CMP normal,Cortisol 10.08,, WBC 5.8, hemoglobin 11.2, platelets 109, TSH 2.59  7/12/2021: CT scan of the chest abdomen pelvis with contrast: Dominant 10 x 10 left periaortic lymph node is stable since 3/22/2021.  Dominant lymph nodes in the right lower paratracheal and AP window distribution are stable since 3/15/2021.  No evidence of progressive adenopathy in the chest abdomen or pelvis.    01/11/2022 -PET/CT findings consistent with new cedric metastatic disease in the retroperitoneum.  2 new pathologically enlarged hypermetabolic retroperitoneal lymph nodes.  No convincing evidence of recurrent disease within the chest neck pelvis or skeleton.  1/20/2022 -CT-guided needle biopsy consistent with poorly differentiated carcinoma.  CDX2 positive consistent with metastases from known esophageal primary  2/9/2022 - Pembrolizumab complicated with fatigue, joint pains, nausea, diarrhea. Symptoms decrease in intensity over a week.  3/30/2022 -CT chest and pelvis with stable metastatic lymph nodes in the left periaortic retroperitoneum.  No evidence of disease progression or new lesions.  Stable lymphadenopathy in the distal paratracheal mediastinal area  4/13/2022 - Keytruda 200  5/4/2022 - Keytruda 200  Continued keytruda  7/11/2022 - CT abdomen pelvis with decrease in size of left perioaortic lymph node. No other areas of disease.  Continues to be on Keytruda  10/18/2022 - stable CT imaging  1/20/2023 - CT imaging with stable disease. No significant change.  Patient was  hospitalized with esophagitis, subsequent EGD with improvement, no recurrence of disease.  2/21/2023 - Keytruda  CT CAP with increased retroperitoneal adenopathy.   ECHO with EF 35-40% (concern for immunotherapy related )immunotherapy held with ongoing investigation for cause.  Subsequent improvement in EF to 45%  4/4/2023 - CT abdomen pelvis with interval retroperitoneal lymph node enlargement compatible with metastatic adenopathyl.  5/5/2023 - PET CT with mixed response. No clear evidence of progression  6/2/23 -started pembrolizumab 200 mg IV  Posttreatment had symptoms of chest tightness troponin and proBNP negative, echocardiogram with EF 40%  Subsequent cardiac cath with EF down to 30% elevated proBNP, troponin no significant obstructive disease in the graft vessels  Cardiac MRI unable to obtain given pacemaker noncompliant.  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 Two enlarged mediastinal lymph nodes in the precarinal and AP window distributions are unchanged since 4/4/2023. No new or progressive adenopathy in the chest.Stable 3 mm or less noncalcified right lung nodules since 4/4/2023. Surgical changes of distal esophagectomy with gastric pull-through, without evidence of local disease recurrence  8/29/23 - started FOLFOX had significant side effects  9/20/23 - stopped oxaliplatin and continued 5FU only'  11/29/23 - CT CAP with decrease size of retroperitoneal adenopathy, no other concerning findings.  Continues 5FU leucovorin   2/12/24 - PET CT There is some metabolic activity within a mediastinal lymph node which has been suggested with slightly decreased metabolic activity. Whether this is reactive or pathologic is uncertain. Previously noted abnormal retroperitoneal  lymph node is no longer identified continued treatmen  4/10/24 - iron sat 10, ferritin  14, plan for venofer  Continued treatment signatera negative  5/7/24 - CT chest without contrast. Unchanged mildly enlarged right paratracheal lymph lymph node measuring 1.1 cm in short axis (series 2 image 40) and left paratracheal measuring 1.1 cm in short axis (series 2 image 39). No new or progressive lymphadenopathy.  Continued maintenance 5FU  changed frequency to every 3 weeks.   7/11/2024 CT chest with stable lung nodule, mildly prominent precarinal lymph node    7/25/2024:  Bilateral lung, bronchial wash with smears and cytospin:  Acute inflammation, pulmonary macrophages and bronchial epithelial cells    2.   Lymph node, precarinal station 4, fine-needle aspiration with smears and cell block:  Benign bronchial epithelial cells in a background of blood and lymphocytes consistent with lymph node aspirate  No malignancy identified      8/7/24: Seen today for initial evaluation by me.  He was previously being seen by Dr. Elizondo in our practice.  He has underlying history of metastatic esophageal adenocarcinoma and was on maintenance therapy with 5-FU/leucovorin.  His last treatment was on 7/17/2024.  He has intermittent bronchoscopy planned lymph node biopsy due to suspicion for metastatic disease on recent imaging.  This is reported as above.    Patient reported that he has had persistent fatigue with maintenance chemotherapy, also reported having GI side effects, such as nausea and poor appetite with chemo.  However, the patient denied any weight loss.  No other issues presently.    9/11/2024: CT chest/abdomen/pelvis:  Impression:  1. Postsurgical changes from esophagectomy with gastric pull-through. No findings to suggest progression of disease within the thorax.  2. Stable subcentimeter pulmonary nodules measuring up to 4 mm in the right lower lobe, and mildly prominent precarinal lymph node. Continued attention on  follow-up suggested.  3. No findings to suggest progression of disease or metastatic disease within the abdomen or pelvis.  4. Additional findings as given above..      10/23/24: Patient seen today for follow-up visit with restaging scans.  Has ogoing sinus congestion for several months. Some sore throat.some cough, SOB has improved. Has ongoing fatigue, stable.     1/10/25: CT chest/Abdomen/Pelvis W contrast:  Impression:   1. Stable examination of the chest with postsurgical changes from an esophagectomy and gastric pull-through.   2. However, within the abdomen there is a new enhancing lesion in the posterior right hepatic lobe measuring 0.6 cm and interval enlargement of a para-aortic lymph node concerning for disease progression now measuring 1.9 x 1.1 cm.     Subjective:  1/17/2025: Patient seen today for follow-up visit with restaging scans.  No new complaints reported.  Continues to do well clinically    .PAIN  Past Medical History:   Diagnosis Date    B12 deficiency     Blockage of coronary artery of heart     Depression     Disease of thyroid gland     DM (diabetes mellitus)     Dysphagia     Esophageal cancer     GERD (gastroesophageal reflux disease)     HTN (hypertension)     Hyperlipidemia        Past Surgical History:   Procedure Laterality Date    ABDOMINAL WALL ABSCESS INCISION AND DRAINAGE  10/22/2018    WITH DEBRIDEMENT  1.5CM X 1.5 CM X 1.5 CM    DR. PURI    BRONCHOSCOPY N/A 7/25/2024    Procedure: BRONCHOSCOPY WITH ENDOBRONCHIAL ULTRASOUND, LUNG WASHINGS, AND FINE NEEDLE ASPIRATIONS;  Surgeon: Bonnie Smith MD;  Location: [facility] ENDOSCOPY;  Service: Pulmonary;  Laterality: N/A;    CARDIAC CATHETERIZATION Right 7/20/2023    Procedure: Left Heart Cath;  Surgeon: Mazin Simmons MD;  Location: [facility] CATH INVASIVE LOCATION;  Service: Cardiovascular;  Laterality: Right;    CATARACT EXTRACTION  2018    COLONOSCOPY      CORONARY ARTERY BYPASS GRAFT  2015    ENDOSCOPY  12/20/2016    ENDOSCOPY  N/A 01/02/2023    Procedure: ESOPHAGOGASTRODUODENOSCOPY;  Surgeon: Esvin Morgan MD;  Location: [facility] ENDOSCOPY;  Service: Gastroenterology;  Laterality: N/A;  post: anastamosis stricture    ENDOSCOPY N/A 02/09/2023    Procedure: ESOPHAGOGASTRODUODENOSCOPY, non-wire guided balloon dilation (12-14mm) of esophageal stricture;  Surgeon: Tino Villafana MD;  Location: [facility] ENDOSCOPY;  Service: Gastroenterology;  Laterality: N/A;  post: esophageal anastamotic stricture, post surgical changes    ESOPHAGECTOMY  05/04/2017    BROOKS PETTY ESOPHAGECTOMY, FEEDING JEJNOSTOMOY, ABDOMINAL AND MEDIASTINAL LYMPH NODE DISECTION, PEDICLED MUSCLE FLAP COVERAGE DR. PURI    ESOPHAGOSCOPY / EGD  07/12/2017    WITH BALLOON DILATION    ESOPHAGOSCOPY / EGD  07/26/2017    WITH BALLOON DILATION    ESOPHAGOSCOPY / EGD  08/11/2017    WITH BALLOON DILATION    ESOPHAGOSCOPY / EGD  08/28/2017    WITH BALLOON DILATION    ESOPHAGOSCOPY / EGD  09/27/2017    WITH BALLOON DILATION    INSERT / REPLACE / REMOVE PACEMAKER      LYMPH NODE BIOPSY  01/30/2019    PACEMAKER IMPLANTATION  11/21/2016       Current Outpatient Medications:     clonazePAM (KlonoPIN) 0.5 MG tablet, Take 0.5 tablets by mouth 2 (Two) Times a Day As Needed for Anxiety., Disp: , Rfl: 1    cyanocobalamin (VITAMIN B-12) 1000 MCG tablet, Take 1 tablet by mouth Daily., Disp: , Rfl:     folic acid (FOLVITE) 1 MG tablet, Take 1 tablet by mouth Daily., Disp: , Rfl:     levothyroxine (SYNTHROID, LEVOTHROID) 50 MCG tablet, Take 1 tablet by mouth once daily, Disp: 30 tablet, Rfl: 0    losartan (COZAAR) 25 MG tablet, Take 1 tablet by mouth Every Night., Disp: , Rfl:     magic mouthwash oral suspension, , Disp: , Rfl:     metFORMIN (GLUCOPHAGE) 1000 MG tablet, Take 0.5 tablets by mouth Daily. (Patient taking differently: Take 0.5 tablets by mouth 2 (Two) Times a Day With Meals.), Disp: 60 tablet, Rfl: 2    nebivolol (BYSTOLIC) 5 MG tablet, Take 1 tablet by mouth Daily., Disp: ,  "Rfl:     ondansetron (ZOFRAN) 4 MG tablet, TAKE 1 TABLET BY MOUTH EVERY 8 HOURS AS NEEDED FOR NAUSEA AND VOMITING, Disp: 90 tablet, Rfl: 0    ondansetron (ZOFRAN) 8 MG tablet, Take 1 tablet by mouth 3 (Three) Times a Day As Needed for Nausea or Vomiting. (Patient not taking: Reported on 10/23/2024), Disp: 30 tablet, Rfl: 5    pantoprazole (PROTONIX) 40 MG EC tablet, Take 1 tablet by mouth 2 (Two) Times a Day., Disp: , Rfl:     pravastatin (PRAVACHOL) 10 MG tablet, Take 0.5 tablets by mouth Every Night., Disp: , Rfl:     tamsulosin (FLOMAX) 0.4 MG capsule 24 hr capsule, Take 1 capsule by mouth 2 (Two) Times a Day., Disp: , Rfl:     Allergies   Allergen Reactions    Ace Inhibitors Unknown (See Comments)     Unknown reaction      Heparin Other (See Comments)     Fever/chills, weakness in legs    Sulfa Antibiotics Other (See Comments)     Mouth sores    Ciprofloxacin GI Intolerance     Pt reports pain to abd       Family History   Problem Relation Age of Onset    Mental illness Mother     Heart disease Mother     Hypertension Father     Cancer Father     Heart disease Sister     Heart disease Brother      Cancer-related family history includes Cancer in his father.    Social History     Tobacco Use    Smoking status: Former     Current packs/day: 0.00     Average packs/day: 2.0 packs/day for 50.0 years (100.0 ttl pk-yrs)     Types: Cigarettes     Start date: 1965     Quit date: 2015     Years since quittin.5    Smokeless tobacco: Former     Types: Chew     Quit date: 1989   Vaping Use    Vaping status: Never Used   Substance Use Topics    Alcohol use: No    Drug use: No     ROS:     Objective:    Vitals:    25 1228   BP: 117/68   Pulse: 68   SpO2: 97%   Weight: 67.1 kg (148 lb)   Height: 167.6 cm (66\")   PainSc:   7   PainLoc: Back  Comment: KIDNEY AREA               (1) Restricted in physically strenuous activity, ambulatory and able to do work of light nature    Physical Exam:     Physical Exam "   Constitutional: He is oriented to person, place, and time. He appears well-developed. No distress.   HENT:   Head: Normocephalic and atraumatic.   Nose: Nose normal. Mouth/Throat: Mucous membranes are moist.   Eyes: Pupils are equal, round, and reactive to light.   Neck: No thyromegaly present.   Cardiovascular: Normal rate, regular rhythm, normal heart sounds and normal pulses. Exam reveals no gallop and no friction rub.   Pulmonary/Chest: Effort normal and breath sounds normal.   Abdominal: Soft. Normal appearance and bowel sounds are normal.   No tenderness   Musculoskeletal: Normal range of motion. No deformity or signs of injury.      Right lower leg: No edema.   Neurological: He is alert and oriented to person, place, and time. He exhibits normal muscle tone.   Skin: Skin is warm and dry. No rash noted. He is not diaphoretic. No erythema. No jaundice.   Right side chest  port       Lab Results - Last 18 Months   Lab Units 11/27/24  0949 08/07/24  0954 07/17/24  0951   WBC 10*3/mm3 4.90 4.67 4.08   HEMOGLOBIN g/dL 11.2* 10.6* 10.6*   HEMATOCRIT % 33.7* 32.5* 33.4*   PLATELETS 10*3/mm3 158 180 136*   MCV fL 94.9 97.0 97.1*     Lab Results - Last 18 Months   Lab Units 01/10/25  0842 11/27/24  0949 09/11/24  0934 07/17/24  1017 07/17/24  0951 06/26/24  1158 06/10/24  1118 05/15/24  1020 05/01/24  0842   SODIUM mmol/L  --  140  --   --  139  --  140  --  141   POTASSIUM mmol/L  --  4.7  --   --  4.9  --  4.7  --  4.0   CHLORIDE mmol/L  --  106  --   --  105  --  105  --  109*   CO2 mmol/L  --  24.5  --   --  24.2  --  22.9  --  21.0*   BUN mg/dL  --  13  --   --  19  --  11  --  13   CREATININE mg/dL 1.70* 1.49* 1.50*   < > 1.46*   < > 1.20   < > 1.42*   CALCIUM mg/dL  --  9.0  --   --  9.0  --  8.7  --  9.1   BILIRUBIN mg/dL  --  0.3  --   --  0.3  --   --   --  0.4   ALK PHOS U/L  --  30*  --   --  46  --   --   --  36*   ALT (SGPT) U/L  --  6  --   --  <5  --   --   --  5   AST (SGOT) U/L  --  16  --   --  18   --   --   --  13   GLUCOSE mg/dL  --  113*  --   --  137*  --  132*  --  136*    < > = values in this interval not displayed.     CASE SUMMARY:  Patient has a history of GE junction poorly differentiated adenocarcinoma for which he had chemoradiation followed by Noel Gurwinder resection:  Pathology showed ypT3,pN1 disease.  Tumor was Stage IIIA, diagnosed in 2016.  He received concurrent chemoradiation with weekly Carboplatin and Taxol and radiation finishing on 3/15/17.  CT scan on 1/10/19 showed new retroperitoneal lymph nodes.  These lymph nodes were biopsied on 1/30/19 and it shows metastatic esophageal cancer.  [employer] testing was performed on the sample and it shows KRAS mutation, microsatellite stable tumor.  PD-L1 is positive.  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  HER2/marvel (ERBB2) was not amplified and was negative.  His CT scan on 5/1/19 shows evidence of progression.  Patient has started receiving Keytruda on 5/31/19.  He has received 19 cycles so far.   CT scan on 1/28/2020 shows improvement and continued response.  CT scan chest abdomen pelvis with contrast on 12/10/2020 does not show any evidence of disease progression.  On 12/22/2020 decision made to hold Keytruda per patient's request to get a treatment break.. Repeat CT scan chest 7/12/2021 does not show any evidence of disease progression.  Now CT imaging in January 2021 with a CT showing increased abdominal left para-aortic adenopathy.  Subsequent PET/CT with new cedric metastatic disease in the retroperitoneum of the abdomen.  New pathologically enlarged hypermetabolic retroperitoneal lymph nodes.  This would be concerning for disease recurrence/progression.  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  I discussed with him the treatment options going forward.  He has had a good response with immunotherapy pembrolizumab in the past. This was  restarted. subsequent imaging with treatment response. 1/2023 imaging with good response.  Continued treatment  Now with increased adenopathy 2 new lymph nodes. Holding immunotherapy for now.   Consider getting cardiac MR discussed with Dr. Culp. While immunotherapy on hold, increased adenopathy is concerning. Could be pseudoprogression vs true progression. PET CT reviewed could indicate pseudoprogression since he has had a good response to immunotherapy  immunotherapy was restarted and has been on hold with patient's symptomatology cardiac MRI cannot be obtained, no ischemic heart disease leading to cardiomyopathy this most likely is related to Keytruda.  We will stop Keytruda going forward  CT imaging with stable disease, FOLFOX was started which he had significant side effects  With imaging being stable and significant side effects with FOLFOX, we discussed  about options with wait and watch vs 5FU only, patient prefers to be on maintenance.   We started 5FU only .   Tolerating relatively well, has significant fatigue. CT imaging now with ongoing response. We discussed that he would have been on treatment for 2 years following recurrence with good response. We can continue treatment now and get a PET CT in 2/2024.will repeat prior to follow up. , PET with some residual activity in the mediastinal lymph node.   We continued treatment and now signatera negative. I had a long discussion with the patient. We decided to repeat CT chest now and if no adenopathy, consider holding treatment.   Now CT chest repeated with persistent adenopathy. Discussed biopsy/mediastinoscopy vs continued treatment. Has fairly been tolerating well except fatigue. After discussion we decided to continue chemotherapy.  Now repeat CT imaging with no new concerning findings there is stable nodules which are subcentimeter in the lungs there is mildly prominent precarinal lymph node.  I discussed with the patient about this possibly being inflammatory rather than neoplastic.    -After discussion we decided to proceed with EBUS and  possible biopsy of the lymph node if negative for malignancy we can consider stopping maintenance treatment given Signatera is negative and patient has treatment related side effects which are affecting his quality of life  -Bronchoscopy and precarinal lymph node biopsy reported negative as above.  -Repeat Signatera test also reported negative.  -In view of significant treatment-related toxicities and no concerns for disease progression at this time, we will plan for surveillance alone without additional systemic therapy.      Assessment & Plan     Assessment:    Metastatic esophageal carcinoma:    -Patient currently on surveillance and is off treatment  -Restaging scans from 9/11/2024 as above, reported PAULIE.  -Repeat CT chest abdomen pelvis reviewed, noted interval enlargement of a   para-aortic lymph node concerning for disease progression now measuring 1.9 x 1.1 cm.   -Also noted to have uptrending Signatera CT DNA levels.  Discussed with patient that these findings are consistent with disease recurrence.  -Given low disease burden at this time, will resume 5-FU/leucovorin maintenance, can consider adding irinotecan if noted disease progression moving forward  -Will obtain a PET/CT to evaluate for active disease.      Possible myocarditis  Symptoms started after last immunotherapy  Treatment on hold for now, symptoms have improved, cardiac markers were negative, echocardiogram with EF 40%, cardiac MRI cannot be obtained cardiac cath with EF down to 30% management per cardiology  Now EF up to 40-45% monitor.  Symptomatic improvement now no shortness of breath    Shortness of breath  Improved, has occasional shortness of breath, likely copd  I have recommended using albuterol  Symptoms have improved since stopping immunotherapy. Questionable pneumonitis will monitor closely with immunotherapy  Cardiology has evaluated for myocarditis related to immunotherapy symptomatic improvement now continue to monitor.  Symptomatic improvement as above.    Reflux/heartburn/dysphagia:   Symptoms improved.  Also has a history of strictures. Status post EGD and balloon dilation.   Continues to be on pantoprazole symptoms stable.  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  Continues on Synthroid stable now no heart function stable in normal range    Mild diarrhea:  diarrhea seems to be waxing and waning in nature.  Diarrhea related to immunotherapy now improved stable.  Diarrhea has improved    PD-L1 positive, HER2 negative, p53 and KRAS positive.  Repeat [employer] testing with repeat biopsy with no target mutations.    B12 deficiency: Continue B12    Anemia - Hb  was low iron sat low at 10, given venofer 300 x3 monitor for now. Intolerant to oral iron.  Continued low iron saturation will plan on Feraheme for 2 doses    Groin pain - negative for UTI, STI, has seen urology, likely prostatits, started cipro improvement noted. Had cystoscopy, was started on flomax, continued improvement in symptoms    Chronic kidney disease creatinine has worsened overall compared to 8/2023 has seen Dr. Posadas with nephrology adjusted blood pressure meds creatinine has improved now blood pressure under better control.      Follow-up in 4 weeks with treatment, sooner as needed

## 2025-01-17 ENCOUNTER — HOSPITAL ENCOUNTER (OUTPATIENT)
Dept: ONCOLOGY | Facility: HOSPITAL | Age: 75
Discharge: HOME | End: 2025-01-17
Payer: MEDICARE

## 2025-01-17 ENCOUNTER — OFFICE VISIT (OUTPATIENT)
Dept: ONCOLOGY | Facility: CLINIC | Age: 75
End: 2025-01-17
Payer: MEDICARE

## 2025-01-17 VITALS
DIASTOLIC BLOOD PRESSURE: 68 MMHG | SYSTOLIC BLOOD PRESSURE: 117 MMHG | HEIGHT: 66 IN | OXYGEN SATURATION: 97 % | HEART RATE: 68 BPM | WEIGHT: 148 LBS | BODY MASS INDEX: 23.78 KG/M2

## 2025-01-17 DIAGNOSIS — E03.9 HYPOTHYROIDISM (ACQUIRED): ICD-10-CM

## 2025-01-17 DIAGNOSIS — E03.9 HYPOTHYROIDISM, UNSPECIFIED TYPE: ICD-10-CM

## 2025-01-17 DIAGNOSIS — C15.5 PRIMARY MALIGNANT NEOPLASM OF LOWER THIRD OF ESOPHAGUS: ICD-10-CM

## 2025-01-17 DIAGNOSIS — K90.9 MALABSORPTION OF IRON: ICD-10-CM

## 2025-01-17 DIAGNOSIS — D50.9 IRON DEFICIENCY ANEMIA, UNSPECIFIED IRON DEFICIENCY ANEMIA TYPE: ICD-10-CM

## 2025-01-17 DIAGNOSIS — C16.0 MALIGNANT NEOPLASM OF CARDIA: Primary | ICD-10-CM

## 2025-01-17 DIAGNOSIS — D63.0 ANEMIA IN NEOPLASTIC DISEASE: ICD-10-CM

## 2025-01-17 DIAGNOSIS — C15.5 PRIMARY MALIGNANT NEOPLASM OF LOWER THIRD OF ESOPHAGUS: Primary | ICD-10-CM

## 2025-01-17 DIAGNOSIS — C16.0 MALIGNANT NEOPLASM OF CARDIA: ICD-10-CM

## 2025-01-17 LAB
ALBUMIN SERPL-MCNC: 4.1 G/DL (ref 3.5–5.2)
ALBUMIN/GLOB SERPL: 2 G/DL
ALP SERPL-CCNC: 37 U/L (ref 39–117)
ALT SERPL W P-5'-P-CCNC: 11 U/L (ref 1–41)
ANION GAP SERPL CALCULATED.3IONS-SCNC: 10.6 MMOL/L (ref 5–15)
AST SERPL-CCNC: 25 U/L (ref 1–40)
BASOPHILS # BLD AUTO: 0.04 10*3/MM3 (ref 0–0.2)
BASOPHILS NFR BLD AUTO: 0.7 % (ref 0–1.5)
BILIRUB SERPL-MCNC: 0.3 MG/DL (ref 0–1.2)
BUN SERPL-MCNC: 12 MG/DL (ref 8–23)
BUN/CREAT SERPL: 7.3 (ref 7–25)
CALCIUM SPEC-SCNC: 9.1 MG/DL (ref 8.6–10.5)
CHLORIDE SERPL-SCNC: 104 MMOL/L (ref 98–107)
CO2 SERPL-SCNC: 24.4 MMOL/L (ref 22–29)
CREAT SERPL-MCNC: 1.65 MG/DL (ref 0.76–1.27)
DEPRECATED RDW RBC AUTO: 43.1 FL (ref 37–54)
EGFRCR SERPLBLD CKD-EPI 2021: 43 ML/MIN/1.73
EOSINOPHIL # BLD AUTO: 0.06 10*3/MM3 (ref 0–0.4)
EOSINOPHIL NFR BLD AUTO: 1 % (ref 0.3–6.2)
ERYTHROCYTE [DISTWIDTH] IN BLOOD BY AUTOMATED COUNT: 12.8 % (ref 12.3–15.4)
FERRITIN SERPL-MCNC: 148 NG/ML (ref 30–400)
FOLATE SERPL-MCNC: 9.88 NG/ML (ref 4.78–24.2)
GLOBULIN UR ELPH-MCNC: 2.1 GM/DL
GLUCOSE SERPL-MCNC: 109 MG/DL (ref 65–99)
HCT VFR BLD AUTO: 34.5 % (ref 37.5–51)
HGB BLD-MCNC: 11.5 G/DL (ref 13–17.7)
IRON 24H UR-MRATE: 73 MCG/DL (ref 59–158)
IRON SATN MFR SERPL: 24 % (ref 20–50)
LYMPHOCYTES # BLD AUTO: 1.51 10*3/MM3 (ref 0.7–3.1)
LYMPHOCYTES NFR BLD AUTO: 26.2 % (ref 19.6–45.3)
MCH RBC QN AUTO: 31.5 PG (ref 26.6–33)
MCHC RBC AUTO-ENTMCNC: 33.3 G/DL (ref 31.5–35.7)
MCV RBC AUTO: 94.5 FL (ref 79–97)
MONOCYTES # BLD AUTO: 0.68 10*3/MM3 (ref 0.1–0.9)
MONOCYTES NFR BLD AUTO: 11.8 % (ref 5–12)
NEUTROPHILS NFR BLD AUTO: 3.48 10*3/MM3 (ref 1.7–7)
NEUTROPHILS NFR BLD AUTO: 60.3 % (ref 42.7–76)
PLATELET # BLD AUTO: 158 10*3/MM3 (ref 140–450)
PMV BLD AUTO: 10.6 FL (ref 6–12)
POTASSIUM SERPL-SCNC: 6 MMOL/L (ref 3.5–5.2)
PROT SERPL-MCNC: 6.2 G/DL (ref 6–8.5)
RBC # BLD AUTO: 3.65 10*6/MM3 (ref 4.14–5.8)
RETICS # AUTO: 0.03 10*6/MM3 (ref 0.02–0.13)
RETICS/RBC NFR AUTO: 0.87 % (ref 0.7–1.9)
SODIUM SERPL-SCNC: 139 MMOL/L (ref 136–145)
TIBC SERPL-MCNC: 302 MCG/DL (ref 298–536)
TRANSFERRIN SERPL-MCNC: 203 MG/DL (ref 200–360)
TSH SERPL DL<=0.05 MIU/L-ACNC: 5.62 UIU/ML (ref 0.27–4.2)
VIT B12 BLD-MCNC: 578 PG/ML (ref 211–946)
WBC NRBC COR # BLD AUTO: 5.77 10*3/MM3 (ref 3.4–10.8)

## 2025-01-17 PROCEDURE — 82746 ASSAY OF FOLIC ACID SERUM: CPT | Performed by: STUDENT IN AN ORGANIZED HEALTH CARE EDUCATION/TRAINING PROGRAM

## 2025-01-17 PROCEDURE — 36591 DRAW BLOOD OFF VENOUS DEVICE: CPT

## 2025-01-17 PROCEDURE — 85045 AUTOMATED RETICULOCYTE COUNT: CPT | Performed by: STUDENT IN AN ORGANIZED HEALTH CARE EDUCATION/TRAINING PROGRAM

## 2025-01-17 PROCEDURE — 83540 ASSAY OF IRON: CPT | Performed by: STUDENT IN AN ORGANIZED HEALTH CARE EDUCATION/TRAINING PROGRAM

## 2025-01-17 PROCEDURE — 85025 COMPLETE CBC W/AUTO DIFF WBC: CPT | Performed by: STUDENT IN AN ORGANIZED HEALTH CARE EDUCATION/TRAINING PROGRAM

## 2025-01-17 PROCEDURE — 82728 ASSAY OF FERRITIN: CPT | Performed by: STUDENT IN AN ORGANIZED HEALTH CARE EDUCATION/TRAINING PROGRAM

## 2025-01-17 PROCEDURE — 80053 COMPREHEN METABOLIC PANEL: CPT | Performed by: STUDENT IN AN ORGANIZED HEALTH CARE EDUCATION/TRAINING PROGRAM

## 2025-01-17 PROCEDURE — 82607 VITAMIN B-12: CPT | Performed by: STUDENT IN AN ORGANIZED HEALTH CARE EDUCATION/TRAINING PROGRAM

## 2025-01-17 PROCEDURE — 84443 ASSAY THYROID STIM HORMONE: CPT | Performed by: STUDENT IN AN ORGANIZED HEALTH CARE EDUCATION/TRAINING PROGRAM

## 2025-01-17 PROCEDURE — 84466 ASSAY OF TRANSFERRIN: CPT | Performed by: STUDENT IN AN ORGANIZED HEALTH CARE EDUCATION/TRAINING PROGRAM

## 2025-01-17 RX ORDER — NYSTATIN 100000 U/G
CREAM TOPICAL
COMMUNITY
Start: 2024-12-05

## 2025-01-17 RX ORDER — GABAPENTIN 100 MG/1
100 CAPSULE ORAL 3 TIMES DAILY
Qty: 90 CAPSULE | Refills: 1 | Status: SHIPPED | OUTPATIENT
Start: 2025-01-17

## 2025-01-17 RX ORDER — SODIUM CHLORIDE 0.9 % (FLUSH) 0.9 %
20 SYRINGE (ML) INJECTION AS NEEDED
Status: DISCONTINUED | OUTPATIENT
Start: 2025-01-17 | End: 2025-01-18 | Stop reason: HOSPADM

## 2025-01-17 RX ORDER — FLUOROURACIL 50 MG/ML
400 INJECTION, SOLUTION INTRAVENOUS ONCE
OUTPATIENT
Start: 2025-01-27

## 2025-01-17 RX ORDER — SODIUM CHLORIDE 0.9 % (FLUSH) 0.9 %
20 SYRINGE (ML) INJECTION AS NEEDED
OUTPATIENT
Start: 2025-01-17

## 2025-01-17 RX ORDER — NYSTATIN 100000 [USP'U]/ML
SUSPENSION ORAL
COMMUNITY
Start: 2024-12-05

## 2025-01-17 RX ORDER — SODIUM CHLORIDE 9 MG/ML
20 INJECTION, SOLUTION INTRAVENOUS ONCE
OUTPATIENT
Start: 2025-01-27

## 2025-01-17 RX ADMIN — Medication 20 ML: at 12:42

## 2025-01-17 NOTE — PROGRESS NOTES
Pt to injection room for PF and labs prior to appt with Dr. Beth. Port accessed and flushed with good blood return noted. 10cc of blood wasted prior to specimen collection. Blood specimen obtained and sent to lab for processing per protocol.  Port flushed with saline prior to needle removal. Pt to waiting room for next appt.

## 2025-01-20 ENCOUNTER — TELEPHONE (OUTPATIENT)
Dept: ONCOLOGY | Facility: CLINIC | Age: 75
End: 2025-01-20
Payer: MEDICARE

## 2025-01-20 DIAGNOSIS — E87.5 HYPERKALEMIA: ICD-10-CM

## 2025-01-20 DIAGNOSIS — C15.5 PRIMARY MALIGNANT NEOPLASM OF LOWER THIRD OF ESOPHAGUS: Primary | ICD-10-CM

## 2025-01-20 NOTE — TELEPHONE ENCOUNTER
Called patient to inform him of elevated potassium level and the need for a recheck. Patient also stated he eats a lot of bananas and potatoes, advised patient to reduce intake of potassium rich foods. Patient requests labs to be drawn at [facility], lab order faxed to them.

## 2025-01-20 NOTE — TELEPHONE ENCOUNTER
----- Message from Dorian Beth sent at 1/19/2025 10:48 AM EST -----  Potassium levels are reported high. Please schedule patient for repeat CMP check on 1/20/25 and advise him on need for low potassium diet.   Iron , B12, Folate levels are reported normal,

## 2025-01-23 ENCOUNTER — TELEPHONE (OUTPATIENT)
Dept: ONCOLOGY | Facility: CLINIC | Age: 75
End: 2025-01-23
Payer: MEDICARE

## 2025-01-23 DIAGNOSIS — R79.89 ELEVATED TSH: ICD-10-CM

## 2025-01-23 DIAGNOSIS — E03.9 HYPOTHYROIDISM (ACQUIRED): ICD-10-CM

## 2025-01-23 RX ORDER — LEVOTHYROXINE SODIUM 50 UG/1
50 TABLET ORAL DAILY
Qty: 30 TABLET | Refills: 0 | Status: SHIPPED | OUTPATIENT
Start: 2025-01-23

## 2025-01-23 NOTE — TELEPHONE ENCOUNTER
Caller: Artie Mc    Relationship: Self    Best call back number: [phone]     Caller requesting test results: CLINICAL    What test was performed: POTASSIUM    When was the test performed: 1/20/25    Where was the test performed: [facility]

## 2025-01-23 NOTE — TELEPHONE ENCOUNTER
Spoke with patient informed him recheck potassium level was 5.8 educated patient again on following a low potassium diet and that labs will be rechecked at next visit

## 2025-01-28 ENCOUNTER — HOSPITAL ENCOUNTER (OUTPATIENT)
Dept: ONCOLOGY | Facility: HOSPITAL | Age: 75
Discharge: HOME | End: 2025-01-28
Admitting: STUDENT IN AN ORGANIZED HEALTH CARE EDUCATION/TRAINING PROGRAM
Payer: MEDICARE

## 2025-01-28 ENCOUNTER — HOSPITAL ENCOUNTER (OUTPATIENT)
Dept: PET IMAGING | Facility: HOSPITAL | Age: 75
Discharge: HOME | End: 2025-01-28
Admitting: STUDENT IN AN ORGANIZED HEALTH CARE EDUCATION/TRAINING PROGRAM
Payer: MEDICARE

## 2025-01-28 VITALS
HEIGHT: 66 IN | RESPIRATION RATE: 16 BRPM | TEMPERATURE: 97 F | WEIGHT: 144.3 LBS | OXYGEN SATURATION: 97 % | BODY MASS INDEX: 23.19 KG/M2 | DIASTOLIC BLOOD PRESSURE: 69 MMHG | SYSTOLIC BLOOD PRESSURE: 164 MMHG | HEART RATE: 95 BPM

## 2025-01-28 DIAGNOSIS — C15.5 PRIMARY MALIGNANT NEOPLASM OF LOWER THIRD OF ESOPHAGUS: ICD-10-CM

## 2025-01-28 DIAGNOSIS — C16.0 MALIGNANT NEOPLASM OF CARDIA: Primary | ICD-10-CM

## 2025-01-28 LAB
ALP BLD-CCNC: 41 U/L (ref 53–128)
BASOPHILS # BLD AUTO: 0.02 10*3/MM3 (ref 0–0.2)
BASOPHILS NFR BLD AUTO: 0.4 % (ref 0–1.5)
BUN BLDA-MCNC: 17 MG/DL (ref 7–22)
CALCIUM BLD QL: 9.4 MG/DL (ref 8–10.3)
CHLORIDE BLDA-SCNC: 110 MMOL/L (ref 98–108)
CO2 BLDA-SCNC: 26 MMOL/L (ref 18–33)
CREAT BLDA-MCNC: 1.7 MG/DL (ref 0.6–1.2)
DEPRECATED RDW RBC AUTO: 42.3 FL (ref 37–54)
EGFRCR SERPLBLD CKD-EPI 2021: 41.5 ML/MIN/1.73
EOSINOPHIL # BLD AUTO: 0.06 10*3/MM3 (ref 0–0.4)
EOSINOPHIL NFR BLD AUTO: 1.3 % (ref 0.3–6.2)
ERYTHROCYTE [DISTWIDTH] IN BLOOD BY AUTOMATED COUNT: 12.7 % (ref 12.3–15.4)
GLUCOSE BLDC GLUCOMTR-MCNC: 110 MG/DL (ref 73–118)
GLUCOSE BLDC GLUCOMTR-MCNC: 114 MG/DL (ref 70–105)
HCT VFR BLD AUTO: 33.2 % (ref 37.5–51)
HGB BLD-MCNC: 11.1 G/DL (ref 13–17.7)
LYMPHOCYTES # BLD AUTO: 1.41 10*3/MM3 (ref 0.7–3.1)
LYMPHOCYTES NFR BLD AUTO: 30.3 % (ref 19.6–45.3)
MCH RBC QN AUTO: 31.5 PG (ref 26.6–33)
MCHC RBC AUTO-ENTMCNC: 33.4 G/DL (ref 31.5–35.7)
MCV RBC AUTO: 94.3 FL (ref 79–97)
MONOCYTES # BLD AUTO: 0.58 10*3/MM3 (ref 0.1–0.9)
MONOCYTES NFR BLD AUTO: 12.4 % (ref 5–12)
NEUTROPHILS NFR BLD AUTO: 2.59 10*3/MM3 (ref 1.7–7)
NEUTROPHILS NFR BLD AUTO: 55.6 % (ref 42.7–76)
PLATELET # BLD AUTO: 174 10*3/MM3 (ref 140–450)
PMV BLD AUTO: 10.5 FL (ref 6–12)
POC ALBUMIN: 3.7 G/L (ref 3.3–5.5)
POC ALT (SGPT): 10 U/L (ref 10–47)
POC AST (SGOT): 26 U/L (ref 11–38)
POC TOTAL BILIRUBIN: 0.7 MG/DL (ref 0.2–1.6)
POC TOTAL PROTEIN: 6.4 G/DL (ref 6.4–8.1)
POTASSIUM BLDA-SCNC: 5 MMOL/L (ref 3.6–5.1)
RBC # BLD AUTO: 3.52 10*6/MM3 (ref 4.14–5.8)
SODIUM BLD-SCNC: 143 MMOL/L (ref 128–145)
WBC NRBC COR # BLD AUTO: 4.66 10*3/MM3 (ref 3.4–10.8)

## 2025-01-28 PROCEDURE — A9552 F18 FDG: HCPCS | Performed by: STUDENT IN AN ORGANIZED HEALTH CARE EDUCATION/TRAINING PROGRAM

## 2025-01-28 PROCEDURE — 85025 COMPLETE CBC W/AUTO DIFF WBC: CPT | Performed by: STUDENT IN AN ORGANIZED HEALTH CARE EDUCATION/TRAINING PROGRAM

## 2025-01-28 PROCEDURE — 80053 COMPREHEN METABOLIC PANEL: CPT

## 2025-01-28 PROCEDURE — 96375 TX/PRO/DX INJ NEW DRUG ADDON: CPT

## 2025-01-28 PROCEDURE — 25010000002 LEUCOVORIN 500 MG RECONSTITUTED SOLUTION 1 EACH VIAL: Performed by: STUDENT IN AN ORGANIZED HEALTH CARE EDUCATION/TRAINING PROGRAM

## 2025-01-28 PROCEDURE — 25010000002 LEUCOVORIN 200 MG RECONSTITUTED SOLUTION 1 EACH VIAL: Performed by: STUDENT IN AN ORGANIZED HEALTH CARE EDUCATION/TRAINING PROGRAM

## 2025-01-28 PROCEDURE — 25010000002 FLUOROURACIL PER 500 MG: Performed by: STUDENT IN AN ORGANIZED HEALTH CARE EDUCATION/TRAINING PROGRAM

## 2025-01-28 PROCEDURE — 34310000005 FLUDEOXYGLUCOSE F18 SOLUTION: Performed by: STUDENT IN AN ORGANIZED HEALTH CARE EDUCATION/TRAINING PROGRAM

## 2025-01-28 PROCEDURE — 96416 CHEMO PROLONG INFUSE W/PUMP: CPT

## 2025-01-28 PROCEDURE — 96409 CHEMO IV PUSH SNGL DRUG: CPT

## 2025-01-28 PROCEDURE — 82948 REAGENT STRIP/BLOOD GLUCOSE: CPT

## 2025-01-28 PROCEDURE — 25010000002 DEXAMETHASONE PER 1 MG: Performed by: STUDENT IN AN ORGANIZED HEALTH CARE EDUCATION/TRAINING PROGRAM

## 2025-01-28 PROCEDURE — 96367 TX/PROPH/DG ADDL SEQ IV INF: CPT

## 2025-01-28 PROCEDURE — 78815 PET IMAGE W/CT SKULL-THIGH: CPT

## 2025-01-28 PROCEDURE — 25810000003 SODIUM CHLORIDE 0.9 % SOLUTION 250 ML FLEX CONT: Performed by: STUDENT IN AN ORGANIZED HEALTH CARE EDUCATION/TRAINING PROGRAM

## 2025-01-28 PROCEDURE — 25810000003 SODIUM CHLORIDE 0.9 % SOLUTION 500 ML FLEX CONT: Performed by: STUDENT IN AN ORGANIZED HEALTH CARE EDUCATION/TRAINING PROGRAM

## 2025-01-28 PROCEDURE — G0498 CHEMO EXTEND IV INFUS W/PUMP: HCPCS

## 2025-01-28 RX ORDER — SODIUM CHLORIDE 9 MG/ML
20 INJECTION, SOLUTION INTRAVENOUS ONCE
Status: DISCONTINUED | OUTPATIENT
Start: 2025-01-28 | End: 2025-01-29 | Stop reason: HOSPADM

## 2025-01-28 RX ORDER — FLUOROURACIL 50 MG/ML
400 INJECTION, SOLUTION INTRAVENOUS ONCE
Status: COMPLETED | OUTPATIENT
Start: 2025-01-28 | End: 2025-01-28

## 2025-01-28 RX ORDER — DEXAMETHASONE SODIUM PHOSPHATE 4 MG/ML
12 INJECTION, SOLUTION INTRA-ARTICULAR; INTRALESIONAL; INTRAMUSCULAR; INTRAVENOUS; SOFT TISSUE ONCE
Status: COMPLETED | OUTPATIENT
Start: 2025-01-28 | End: 2025-01-28

## 2025-01-28 RX ADMIN — LEUCOVORIN CALCIUM 700 MG: 500 INJECTION, POWDER, LYOPHILIZED, FOR SOLUTION INTRAMUSCULAR; INTRAVENOUS at 11:54

## 2025-01-28 RX ADMIN — FLUDEOXYGLUCOSE F 18 1 DOSE: 200 INJECTION, SOLUTION INTRAVENOUS at 08:24

## 2025-01-28 RX ADMIN — FLUOROURACIL 4220 MG: 50 INJECTION, SOLUTION INTRAVENOUS at 12:37

## 2025-01-28 RX ADMIN — DEXAMETHASONE SODIUM PHOSPHATE 12 MG: 4 INJECTION, SOLUTION INTRAMUSCULAR; INTRAVENOUS at 11:27

## 2025-01-28 RX ADMIN — FLUOROURACIL 700 MG: 50 INJECTION, SOLUTION INTRAVENOUS at 12:32

## 2025-01-30 ENCOUNTER — HOSPITAL ENCOUNTER (OUTPATIENT)
Dept: ONCOLOGY | Facility: HOSPITAL | Age: 75
Discharge: HOME | End: 2025-01-30
Payer: MEDICARE

## 2025-01-30 DIAGNOSIS — C16.0 MALIGNANT NEOPLASM OF CARDIA: Primary | ICD-10-CM

## 2025-01-30 DIAGNOSIS — C15.5 PRIMARY MALIGNANT NEOPLASM OF LOWER THIRD OF ESOPHAGUS: ICD-10-CM

## 2025-01-30 RX ORDER — SODIUM CHLORIDE 0.9 % (FLUSH) 0.9 %
20 SYRINGE (ML) INJECTION AS NEEDED
OUTPATIENT
Start: 2025-01-30

## 2025-01-30 RX ORDER — SODIUM CHLORIDE 0.9 % (FLUSH) 0.9 %
20 SYRINGE (ML) INJECTION AS NEEDED
Status: DISCONTINUED | OUTPATIENT
Start: 2025-01-30 | End: 2025-02-01 | Stop reason: HOSPADM

## 2025-01-30 RX ADMIN — Medication 20 ML: at 14:13

## 2025-01-30 NOTE — PROGRESS NOTES
Pt here for chemo pump d/c. 5FU pump is empty. Port aspirated and positive blood return noted. Port flushed with 20 ml NS, then de-accessed. Pt tolerated well.

## 2025-02-09 NOTE — PROGRESS NOTES
Hematology-Oncology Follow-up Note       Artie Mc  1950    Primary Care Physician: Chanel Carter APRN  Referring Physician: Chanel Carter APRN    Reason For Visit:  Chief Complaint   Patient presents with    Follow-up     Primary malignant neoplasm of lower third of esophagus     Metastatic esophageal cancer  Monitoring for adverse effects related to immunotherapy.  Hypothyroidism      Mr. Mc is a pleasant 74 y.o. gentleman with a history of gastroesophageal reflux disease, diabetes, hypertension, heart block status post pacemaker placement and history of CABG.  He was having symptoms of dysphagia, weight loss since September 2016. He was reporting symptoms of food getting stuck in the lower chest.  He has transitioned himself to a liquid diet.  The patient reports losing about 30 pounds over this duration.  The patient underwent upper GI endoscopy on 12/20/16 with Dr. Esvin Barrera.  Endoscopy showed necrotic, circumferential and moderately obstructive mass at the distal esophagus between 42 cm and 36 cm.      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  There was also evidence of mild subacute inflammation in the duodenum.  Dr. Barrera ordered a CT scan of the chest and abdomen with contrast on 12/20/16.  The scan was done at [facility].  The scan showed a new ill-defined mass in the anterior wall of the distal esophagus at the GE junction measuring 2.3 x 2.2 x 1.6 cm, worrisome for cancer.  There were four subcentimeter liver lesions which had appeared stable in comparison to another CT scan from 2009 and they likely represent small cysts.  There was also mild lymphadenopathy in the gastrohepatic ligament.  There were at least six borderline enlarged lymph nodes measuring up to 1.3 x 1 cm in the region.  The patient is referred to us for further oncological evaluation.    1/5/2017 - The patient  presents for initial consultation.  He reports persistent dysphagia and also has symptoms of regurgitation.  He cannot tolerate solid foods and is dependent on liquid diet such as Ensure.  He also reports fatigue. Symptoms of dysphagia have been present for the past four months.  When he eats any solids, the food gets stuck in the lower chest.    1/5/17 - Vitamin B12 200 (L).  CEA 0.8.  Ferritin 35.  Iron saturation 16% (L), serum iron 56, TIBC 354.  Creatinine 0.9.  LFTs normal.  Folate 10.2.  1/12/17 - PET/CT scan:  Intense abnormal activity corresponding to the region of the GE junction and proximal stomach.  SUV is 11.06.  There is a suggestion of an abnormal mass or abnormal wall thickening in the region measuring 3.9 x 4.8 cm.  No additional abnormalities.  No evidence of metastases or other lymphadenopathy.  Scattered nonspecific subcentimeter lymph nodes involving the mediastinum and within the central upper abdomen.  1/13/17 - Creatinine 0.9.  LFTs normal.    1/17/17 - Patient seen by thoracic surgeon, Dr. Bradley.  PET scan was reviewed.  He has recommended neoadjuvant chemoradiation therapy followed by Noel Gurwinder esophagogastrectomy.  Port-A-Cath is being arranged.    1/20/17 - WBC 6.3, hemoglobin 12.1, platelet count 198,000, MCV 86.4.  1/30/17 - Patient started weekly Carboplatin and Taxol (Carboplatin AUC 2 and Taxol 50 mg/M2).  Patient received Injectafer 750 mg.  WBC 5.8, hemoglobin 12.4, platelet count 244,000.   1/31/17 - Patient seen by Dr. Eduardo Oleary.  The plan was to give 5040 cGy in 28 fractions.   2/6/17 - WBC 4.4, hemoglobin 11.6, platelet count 201,000.  Patient received cycle 2 of weekly Carboplatin and Taxol (Carboplatin AUC 2 and Taxol 50 mg/M2).  2/13/17 - Patient received Carboplatin and Taxol weekly cycle 3.  2/13/17 - Patient started concurrent radiation therapy.  Total planned dose is 4140 cGy.    2/13/17 to 2/15/17 - Patient received 4140 cGy of neoadjuvant radiation.  Radiation  was finished on 3/15/17.    2/20/17 - WBC 1.8, hemoglobin 10.6, platelet count 217,000.  Creatinine 0.5. Folate 15 (L).  Ferritin 361.  Haptoglobin 214.  Iron saturation 33%, TIBC 263, serum iron 88.    2/20/17 - Patient received Carboplatin and Taxol weekly cycle 4.   2/27/17 - WBC 3.7, hemoglobin 10.7, platelet count 177,000, MCV 85.3.    3/6/17 - Patient has received 16 out of the 23 planned radiation treatments.  Total planned dose is 4140 cGy.    3/6/17 - WBC 5.4, hemoglobin 11.2, platelet count 113,000.  3/6/17 - Patient received cycle 5 of weekly Carboplatin and Taxol.   3/13/17 - Patient received cycle 6 of weekly Carboplatin and Taxol.  WBC 3.9, hemoglobin 11.3, platelet count 93,000.     3/20/17 - WBC 1.4, hemoglobin 10.0, platelet count 118,000, MCV 86.1   3/27/17 - WBC 3.8, hemoglobin 9.5, platelet count 176,000, MCV 87.3.    4/7/17 - Stress test:  No evidence of reversible myocardial ischemia.  Normal left ventricular ejection fraction of 50%.    4/17/17 - Upper GI endoscopy by Dr. 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  The GE junction adenocarcinoma lesion seems to have a very good response to chemotherapy and radiation.  The lumen was open.  5/1/17 - WBC 7.0, hemoglobin 10.2, platelet count 175,000.    5/4/17 - Patient underwent Noel Gurwinder esophagectomy with pyloroplasty, feeding jejunostomy tube, abdominal and mediastinal lymph node dissection.    Surgical Pathology:  Moderately to poorly differentiated adenocarcinoma measuring 1.3 cm at the GE junction.  Resection margins are negative for malignancy.  Tumor margins are negative.  There is effective treatment response.  No evidence of lymphovascular invasion.  Staging is ypT3,pN1.  One out of fourteen lymph nodes involved.   5/22/17 - WBC 6.8, hemoglobin 10.3, platelet count 312,000.    6/19/17 - WBC 5.7, hemoglobin 10.5, platelet count 204,000, MCV 92.1.    7/6/17 - EGD:  Status post balloon dilation of  esophageal stricture.  7/12/17 - EGD with balloon dilation of esophageal stricture.  7/26/17 - EGD and balloon dilation of esophageal stricture.  7/31/17 - PET/CT scan:  There is mild metabolic activity seen at the thoracic esophagus just at and below the surgical margins.  Some mild wall thickening.  This could be from recent surgery.  A residual cancer seems unlikely.  There is a precarinal lymph node with mild metabolic activity with SUV of 3 measuring 1 x 1.3 cm.  Again this appears to be nonspecific in my opinion.    8/7/17 - WBC 5.9, hemoglobin 11.9, platelet count 171,000, MCV 88.7.    10/19/17 - WBC 7.5, hemoglobin 11.8, platelet count 216,000.    1/8/18 - PET scan:  No evidence of residual disease or recurrent disease.  There is an esophageal stent in the mid esophagus with a large debris air level.  No evidence of any metastases in the chest, abdomen or pelvis.  Mild nodule uptake in the vocal cords with fullness in the right vocal cord.  This could be physiologic.    7/9/18 - CT scan of chest with contrast:  Prominent mediastinal lymph nodes appear stable since February.  Changes of gastric pull-through procedure for esophageal cancer again noted.  Tree-in-bud infiltrates in the left lung base, consistent with infection versus inflammation.    1/10/19 - CT chest, abdomen and pelvis with contrast:  No evidence of mets in the chest; however, interval development of metastatic lymphadenopathy in the left side of the retroperitoneum.  For instance, there is a 17 mm rounded lymph node, 10 mm lymph node and also a 14 mm lymph node.  These are all new.  Stable 9 mm hypodense lesion within the right hepatic lobe, which could reflect hemangioma or complex cyst.    1/30/19 - CT-guided core biopsy of retroperitoneal lymph node:  Poorly differentiated adenocarcinoma consistent with metastases from patient’s known esophageal primary.  CK20 positive, CDX2 positive, cytokeratin 7 negative, TTF-1 negative.    2/1/19 -  Creatinine 0.9, BUN 18, calcium 9.3; these are normal.    2/28/19 - [facility] Next Gen sequencing testing on retroperitoneal lymph node specimen:  PD-L1 positive.  CPS = 3.  This implies responsiveness to pembrolizumab.  Mismatch repair status is proficient (no evidence of microsatellite instability).  Tumor mutational burden is low = 6 mutations/Mb.  CDH1 indeterminate.  KRAS mutation positive.  TP53 mutation positive.  CDK6 amplified.  [employer] PD-L1 testing by IHC:  PD-L1 expression 1% positive (CPS =1).  HER2/marvel by IHC is negative.  HER2/marvel by CISH not amplified.  Microsatellite stable tumor.    3/7/19 - WBC 7.3, hemoglobin 12, platelet count 128,000, MCV 92.     3/14/19 - Patient was seen by radiation oncologist, Dr. Chahal.  He has recommended observation versus systemic chemoimmunotherapy as needed.  No plans for radiation therapy.   5/1/19 - CT scan of chest with contrast:  No definitive findings of new metastatic disease in the chest.  Emphysema noted.    5/1/19 - CT abdomen and pelvis:  There is interval progression of metastatic retroperitoneal adenopathy.  Left periaortic adenopathy with lymph node measuring up to 2 cm, previously 1.7 cm.  Another lymph node now measures 2.3, previously 1.4 cm.  New enlarged lymph node on image 147 measures 1.4 cm, previously 0.4 cm.  A 9 mm hypodense right hepatic lobe lesion appears stable.    5/9/19 - Decision made to start patient on immunotherapy Keytruda.    5/9/19 - Vitamin B12 229.  Ferritin 61.  Folate 14.3.  Iron saturation 26%, TIBC 304, serum iron 79.  5/31/19 - Patient received Keytruda 200 mg cycle 1, day 1.    5/31/19 - Free T4 0.99.  Creatinine 0.9, BUN 9.  LFTs normal.  TSH 3.99.  Folate 14.3.  Iron saturation 26%.    6/21/2019 patient received Keytruda cycle 2  7/12/2019: Patient received Keytruda cycle 3  8/2/2019 patient received cycle 4 Keytruda WBC 7.2, hemoglobin 11.8 platelet count 163  8/23/2019 creatinine 0.9, LFTs normal, WBC 6.4, hemoglobin 11.5,  platelets 179, TSH 2.1   8/23/2019 patient received cycle 5 of Keytruda  9/3/2019 CT chest abdomen and pelvis with contrast: . Positive response to therapy in the abdomen since 05/01/2019. Pathologically enlarged retroperitoneal lymph nodes, predominantly in the left periaortic region, have diminished in size.a 1.3 x 2.1 cm left periaortic node (image 60), previously measured 3.5 x 2.3 cm. A 1.6 cm index node near the level of the left renal vein previously measured 2.0 cm No new or progressive adenopathy. 2. Stable findings in the chest. Noncalcified right upper lobe nodules are unchanged. There is no evidence of new or progressive metastatic disease within the chest. 3. 8 mm indeterminate low-density lesion in the right hepatic lobe, is stable. No new liver lesions.  9/6/2019 WBC 5.6, hemoglobin 11.2, platelets of 192, MCV 91.8  9/13/2019 patient received Keytruda cycle 6, albumin 3.2  10/4/2019 patient received cycle 7 of Keytruda, TSH 4.8, free T4 0.86  10/9/2019 WBC 5.8, hemoglobin 11.7, platelets 174  10/25/2019 patient received cycle 8 of Keytruda.  WBC 5.9, hemoglobin 11.5, platelets 152, creatinine 0.75, cortisol 15.2, TSH 6.48 high , free T4 1.13 normal  11/6/2019 WBC 6.6, hemoglobin 11.8, MCV 91.5, platelets 187  11/15/2019 patient received cycle 9 of Keytruda, WBC 6.6, hemoglobin 11.9, platelets 161, cortisol level 8.86, TSH 2.68, free T3 2.8, free T4 1.5  12/4/2019 WBC 6.9, hemoglobin 12.0, platelets 180, MCV 92.9  12/06/2019-Keytruda Cycle 10  12/27/2019- Cycle 11 LFTs normal, WBC 6.7, hemoglobin 11.4, platelets 168, MCV 93, TSH 2.4,  1/28/2020: CT chest abdomen and pelvis with contrast:  Single (aortic lymph node in the abdominal retroperitoneum has increased.  Rest of the retroperitoneal lymph nodes have decreased in size.  Mediastinal adenopathy appears unchanged.  Noncalcified bilateral pulmonary nodules are stable  1/17/2020 patient received Keytruda cycle 12:.  2/3/2020 WBC 7.1, hemoglobin 12.2,  platelets 171  02/10/2020 patient received cycle 13 of Keytruda: WBC 7.3, hemoglobin 12.1, platelets 166, creatinine 0.82  3/6/2020 patient is of Keytruda 200 mg, hemoglobin 10.8  3/27/2020 patient received Keytruda 200 mg, hemoglobin 11.4, TSH 1.95  4/17/2020 patient received Keytruda cycle 16 200 mg, WBC 6.1, hemoglobin 11.6, platelets 150  5/8/2020: Patient received cycle 17 Keytruda, WBC 6.3, hemoglobin 9.6, platelets 137, TSH 2.05, free T4 1.54, creatinine 0.93, LFTs normal,  5/29/2020: Patient received cycle 18 Keytruda, WBC 6.5, hemoglobin 11.7, platelets 129, creatinine 0.83, TSH 1.69  6/26/2020: Patient receiving Keytruda.   7/17/2020: TSH 2.06, WBC 5.7, hemoglobin 11.4, platelets 177, MCV 92.4, Keytruda 200 mg cycle 20  7/27/2020: CT chest abdomen pelvis with contrast: Stable findings in the chest abdomen pelvis since 1/28/2020.  Noncalcified bilateral pulmonary nodules are stable.  Stable retroperitoneal left perinephric adenopathy in the abdomen.  8/7/2020: Patient received cycle 21 of Keytruda  8/28/2020 patient is a cycle 22 of Keytruda.  WBC 6.02, hemoglobin 11.3, platelets 161  9/11/2020 WBC 6.9, hemoglobin 12.4, platelets 192  9/18/2020: Cycle 23 of Keytruda, TSH 1.19  10/9/2020: Cycle 24 of Keytruda, WBC 5.9, hemoglobin 11.9, platelets 178, creatinine 0.84, TSH 2.56, free T3 2.4, CMP normal  10/23/2020: WBC 7.34, hemoglobin 11.6, platelets 175  10/30/2020:.  Keytruda 200 mg  11/20/2020: Keytruda 200 mg  12/11/2020 Keytruda 200 mg.  WBC 5.5, hemoglobin 11.4, platelets 176, creatinine 0.8, LFTs normal, TSH 2.2, cortisol 8.3  12/10/2020: CT chest abdomen and pelvis with contrast: Previously seen left para-aortic lymph nodes within the abdomen are decreased in size.  No pathological lymphadenopathy.  No evidence of residual malignancy..  Small noncalcified pulmonary nodules are stable.  No new nodules.  Stable mediastinal lymph nodes.  3/12/2021: WBC 6.7, hemoglobin 11.3, platelets 17  3/12/2021: CMP  normal, TSH 2.9, cortisol 11.95, 1  3/15/2021: CT chest with contrast: Stable findings in the chest with postsurgical changes of esophageal resection and gastric pull-through.  Stable right lower lobe lung nodule measures 11 mm.  Stable size and appearance of the mediastinal and upper abdominal lymph nodes.  3/23/2021: Doppler of upper extremity: Normal.  3/24/2021: CT abdomen and pelvis with contrast: Postoperative changes.  Left periaortic nodes are stable.  4/12/2021: TSH 4.1, cortisol 12.4  6/22/2021: CMP normal,Cortisol 10.08,, WBC 5.8, hemoglobin 11.2, platelets 109, TSH 2.59  7/12/2021: CT scan of the chest abdomen pelvis with contrast: Dominant 10 x 10 left periaortic lymph node is stable since 3/22/2021.  Dominant lymph nodes in the right lower paratracheal and AP window distribution are stable since 3/15/2021.  No evidence of progressive adenopathy in the chest abdomen or pelvis.    01/11/2022 -PET/CT findings consistent with new cedric metastatic disease in the retroperitoneum.  2 new pathologically enlarged hypermetabolic retroperitoneal lymph nodes.  No convincing evidence of recurrent disease within the chest neck pelvis or skeleton.  1/20/2022 -CT-guided needle biopsy consistent with poorly differentiated carcinoma.  CDX2 positive consistent with metastases from known esophageal primary  2/9/2022 - Pembrolizumab complicated with fatigue, joint pains, nausea, diarrhea. Symptoms decrease in intensity over a week.  3/30/2022 -CT chest and pelvis with stable metastatic lymph nodes in the left periaortic retroperitoneum.  No evidence of disease progression or new lesions.  Stable lymphadenopathy in the distal paratracheal mediastinal area  4/13/2022 - Keytruda 200  5/4/2022 - Keytruda 200  Continued keytruda  7/11/2022 - CT abdomen pelvis with decrease in size of left perioaortic lymph node. No other areas of disease.  Continues to be on Keytruda  10/18/2022 - stable CT imaging  1/20/2023 - CT imaging with  stable disease. No significant change.  Patient was hospitalized with esophagitis, subsequent EGD with improvement, no recurrence of disease.  2/21/2023 - Keytruda  CT CAP with increased retroperitoneal adenopathy.   ECHO with EF 35-40% (concern for immunotherapy related )immunotherapy held with ongoing investigation for cause.  Subsequent improvement in EF to 45%  4/4/2023 - CT abdomen pelvis with interval retroperitoneal lymph node enlargement compatible with metastatic adenopathyl.  5/5/2023 - PET CT with mixed response. No clear evidence of progression  6/2/23 -started pembrolizumab 200 mg IV  Posttreatment had symptoms of chest tightness troponin and proBNP negative, echocardiogram with EF 40%  Subsequent cardiac cath with EF down to 30% elevated proBNP, troponin no significant obstructive disease in the graft vessels  Cardiac MRI unable to obtain given pacemaker noncompliant.  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 Two enlarged mediastinal lymph nodes in the precarinal and AP window distributions are unchanged since 4/4/2023. No new or progressive adenopathy in the chest.Stable 3 mm or less noncalcified right lung nodules since 4/4/2023. Surgical changes of distal esophagectomy with gastric pull-through, without evidence of local disease recurrence  8/29/23 - started FOLFOX had significant side effects  9/20/23 - stopped oxaliplatin and continued 5FU only'  11/29/23 - CT CAP with decrease size of retroperitoneal adenopathy, no other concerning findings.  Continues 5FU leucovorin   2/12/24 - PET CT There is some metabolic activity within a mediastinal lymph node which has been suggested with slightly decreased metabolic activity. Whether this is reactive or pathologic is  uncertain. Previously noted abnormal retroperitoneal lymph node is no longer identified continued treatmen  4/10/24 - iron sat 10, ferritin  14, plan for venofer  Continued treatment signatera negative  5/7/24 - CT chest without contrast. Unchanged mildly enlarged right paratracheal lymph lymph node measuring 1.1 cm in short axis (series 2 image 40) and left paratracheal measuring 1.1 cm in short axis (series 2 image 39). No new or progressive lymphadenopathy.  Continued maintenance 5FU  changed frequency to every 3 weeks.   7/11/2024 CT chest with stable lung nodule, mildly prominent precarinal lymph node    7/25/2024:  Bilateral lung, bronchial wash with smears and cytospin:  Acute inflammation, pulmonary macrophages and bronchial epithelial cells    2.   Lymph node, precarinal station 4, fine-needle aspiration with smears and cell block:  Benign bronchial epithelial cells in a background of blood and lymphocytes consistent with lymph node aspirate  No malignancy identified      8/7/24: Seen today for initial evaluation by me.  He was previously being seen by Dr. Elizondo in our practice.  He has underlying history of metastatic esophageal adenocarcinoma and was on maintenance therapy with 5-FU/leucovorin.  His last treatment was on 7/17/2024.  He has intermittent bronchoscopy planned lymph node biopsy due to suspicion for metastatic disease on recent imaging.  This is reported as above.    Patient reported that he has had persistent fatigue with maintenance chemotherapy, also reported having GI side effects, such as nausea and poor appetite with chemo.  However, the patient denied any weight loss.  No other issues presently.    9/11/2024: CT chest/abdomen/pelvis:  Impression:  1. Postsurgical changes from esophagectomy with gastric pull-through. No findings to suggest progression of disease within the thorax.  2. Stable subcentimeter pulmonary nodules measuring up to 4 mm in the right lower lobe, and mildly prominent  precarinal lymph node. Continued attention on follow-up suggested.  3. No findings to suggest progression of disease or metastatic disease within the abdomen or pelvis.  4. Additional findings as given above..      10/23/24: Patient seen today for follow-up visit with restaging scans.  Has ogoing sinus congestion for several months. Some sore throat.some cough, SOB has improved. Has ongoing fatigue, stable.     1/10/25: CT chest/Abdomen/Pelvis W contrast:  Impression:   1. Stable examination of the chest with postsurgical changes from an esophagectomy and gastric pull-through.   2. However, within the abdomen there is a new enhancing lesion in the posterior right hepatic lobe measuring 0.6 cm and interval enlargement of a para-aortic lymph node concerning for disease progression now measuring 1.9 x 1.1 cm.     1/17/2025: Patient seen today for follow-up visit with restaging scans.  No new complaints reported.  Continues to do well clinically    1/28/2025 PET/CT:  Impression:  1. Isolated hypermetabolic left periaortic/retroperitoneal lymph nodes suspicious for metastatic disease in patient with history of esophageal malignancy. While almost certainly esophageal related, nonemergent testicular ultrasound would be reasonable in   a male with retroperitoneal adenopathy to exclude a solid testicular lesion.  2. Mild lower mediastinal adenopathy stable from 2024. No evidence of solid organ metastases.  3. Esophagectomy with gastric pull-through. Linear probably physiologic uptake in the stomach. Differential would include gastritis.  4. Emphysema. Emphysema on CT is an independent risk factor for lung cancer. Low dose lung cancer screening should be considered if patient qualifies based on smoking history or if not already enrolled in a screening program.    Subjective:  2/11/2025: Patient here today for routine follow-up prior to scheduled treatment.  He reports overall tolerating leucovorin and fluorouracil although he  did have mild intermittent diarrhea.  He also reports he has been drinking less fluids because he feels bloated.  He has no new pain.  He noted nausea following his infusion but this was managed with Zofran.    Past Medical History:   Diagnosis Date    B12 deficiency     Blockage of coronary artery of heart     Depression     Disease of thyroid gland     DM (diabetes mellitus)     Dysphagia     Esophageal cancer     GERD (gastroesophageal reflux disease)     HTN (hypertension)     Hyperlipidemia        Past Surgical History:   Procedure Laterality Date    ABDOMINAL WALL ABSCESS INCISION AND DRAINAGE  10/22/2018    WITH DEBRIDEMENT  1.5CM X 1.5 CM X 1.5 CM    DR. PURI    BRONCHOSCOPY N/A 7/25/2024    Procedure: BRONCHOSCOPY WITH ENDOBRONCHIAL ULTRASOUND, LUNG WASHINGS, AND FINE NEEDLE ASPIRATIONS;  Surgeon: Bonnie Smith MD;  Location: [facility] ENDOSCOPY;  Service: Pulmonary;  Laterality: N/A;    CARDIAC CATHETERIZATION Right 7/20/2023    Procedure: Left Heart Cath;  Surgeon: Mazin Simmons MD;  Location: [facility] CATH INVASIVE LOCATION;  Service: Cardiovascular;  Laterality: Right;    CATARACT EXTRACTION  2018    COLONOSCOPY      CORONARY ARTERY BYPASS GRAFT  2015    ENDOSCOPY  12/20/2016    ENDOSCOPY N/A 01/02/2023    Procedure: ESOPHAGOGASTRODUODENOSCOPY;  Surgeon: Esvin Morgan MD;  Location: [facility] ENDOSCOPY;  Service: Gastroenterology;  Laterality: N/A;  post: anastamosis stricture    ENDOSCOPY N/A 02/09/2023    Procedure: ESOPHAGOGASTRODUODENOSCOPY, non-wire guided balloon dilation (12-14mm) of esophageal stricture;  Surgeon: Tino Villafana MD;  Location: [facility] ENDOSCOPY;  Service: Gastroenterology;  Laterality: N/A;  post: esophageal anastamotic stricture, post surgical changes    ESOPHAGECTOMY  05/04/2017    BROOKS PETTY ESOPHAGECTOMY, FEEDING JEJNOSTOMOY, ABDOMINAL AND MEDIASTINAL LYMPH NODE DISECTION, PEDICLED MUSCLE FLAP COVERAGE DR. PURI    ESOPHAGOSCOPY / EGD  07/12/2017    WITH  BALLOON DILATION    ESOPHAGOSCOPY / EGD  07/26/2017    WITH BALLOON DILATION    ESOPHAGOSCOPY / EGD  08/11/2017    WITH BALLOON DILATION    ESOPHAGOSCOPY / EGD  08/28/2017    WITH BALLOON DILATION    ESOPHAGOSCOPY / EGD  09/27/2017    WITH BALLOON DILATION    INSERT / REPLACE / REMOVE PACEMAKER      LYMPH NODE BIOPSY  01/30/2019    PACEMAKER IMPLANTATION  11/21/2016       Current Outpatient Medications:     clonazePAM (KlonoPIN) 0.5 MG tablet, Take 0.5 tablets by mouth 2 (Two) Times a Day As Needed for Anxiety., Disp: , Rfl: 1    cyanocobalamin (VITAMIN B-12) 1000 MCG tablet, Take 1 tablet by mouth Daily., Disp: , Rfl:     folic acid (FOLVITE) 1 MG tablet, Take 1 tablet by mouth Daily., Disp: , Rfl:     gabapentin (NEURONTIN) 100 MG capsule, Take 1 capsule by mouth 3 (Three) Times a Day., Disp: 90 capsule, Rfl: 1    levothyroxine (SYNTHROID, LEVOTHROID) 50 MCG tablet, Take 1 tablet by mouth once daily, Disp: 30 tablet, Rfl: 0    losartan (COZAAR) 25 MG tablet, Take 1 tablet by mouth Every Night., Disp: , Rfl:     metFORMIN (GLUCOPHAGE) 1000 MG tablet, Take 0.5 tablets by mouth Daily. (Patient taking differently: Take 0.5 tablets by mouth 2 (Two) Times a Day With Meals.), Disp: 60 tablet, Rfl: 2    nebivolol (BYSTOLIC) 5 MG tablet, Take 1 tablet by mouth Daily., Disp: , Rfl:     nystatin (MYCOSTATIN) 100,000 unit/mL suspension, , Disp: , Rfl:     nystatin (MYCOSTATIN) [id number] UNIT/GM cream, , Disp: , Rfl:     ondansetron (ZOFRAN) 4 MG tablet, TAKE 1 TABLET BY MOUTH EVERY 8 HOURS AS NEEDED FOR NAUSEA AND VOMITING, Disp: 90 tablet, Rfl: 2    pantoprazole (PROTONIX) 40 MG EC tablet, Take 1 tablet by mouth 2 (Two) Times a Day., Disp: , Rfl:     pravastatin (PRAVACHOL) 10 MG tablet, Take 0.5 tablets by mouth Every Night., Disp: , Rfl:     tamsulosin (FLOMAX) 0.4 MG capsule 24 hr capsule, Take 1 capsule by mouth 2 (Two) Times a Day., Disp: , Rfl:   No current facility-administered medications for this  "visit.    Facility-Administered Medications Ordered in Other Visits:     sodium chloride 0.9 % bolus 1,000 mL, 1,000 mL, Intravenous, Once, Marleny Espana PA-C, Last Rate: 1,000 mL/hr at 25 1143, 1,000 mL at 25 1143    sodium chloride 0.9 % flush 20 mL, 20 mL, Intravenous, PRN, Dorian Beth MD, 20 mL at 25 1044    Allergies   Allergen Reactions    Ace Inhibitors Unknown (See Comments)     Unknown reaction      Heparin Other (See Comments)     Fever/chills, weakness in legs    Sulfa Antibiotics Other (See Comments)     Mouth sores    Ciprofloxacin GI Intolerance     Pt reports pain to abd       Family History   Problem Relation Age of Onset    Mental illness Mother     Heart disease Mother     Hypertension Father     Cancer Father     Heart disease Sister     Heart disease Brother      Cancer-related family history includes Cancer in his father.    Social History     Tobacco Use    Smoking status: Former     Current packs/day: 0.00     Average packs/day: 2.0 packs/day for 50.0 years (100.0 ttl pk-yrs)     Types: Cigarettes     Start date: 1965     Quit date: 2015     Years since quittin.6    Smokeless tobacco: Former     Types: Chew     Quit date: 1989   Vaping Use    Vaping status: Never Used   Substance Use Topics    Alcohol use: No    Drug use: No     ROS:     Objective:    Vitals:    25 1052   BP: 118/71   Pulse: 66   Temp: 98 °F (36.7 °C)   SpO2: 98%   Weight: 65.3 kg (144 lb)   Height: 167.6 cm (65.98\")   PainSc:   7   PainLoc: Back                 (1) Restricted in physically strenuous activity, ambulatory and able to do work of light nature    Physical Exam:     Physical Exam   Constitutional: He is oriented to person, place, and time. He appears well-developed. No distress.   HENT:   Head: Normocephalic and atraumatic.   Nose: Nose normal. Mouth/Throat: Mucous membranes are dry.   Eyes: Pupils are equal, round, and reactive to light. No scleral " icterus.   Neck: No thyromegaly present.   Cardiovascular: Normal rate, regular rhythm, normal heart sounds and normal pulses. Exam reveals no gallop and no friction rub.   Pulmonary/Chest: Effort normal and breath sounds normal. No respiratory distress. He has no wheezes.   Abdominal: Soft. Bowel sounds are normal.   No tenderness   Musculoskeletal: Normal range of motion. No swelling, deformity or signs of injury.      Right lower leg: No edema.   Neurological: He is alert and oriented to person, place, and time. He displays no weakness. He exhibits normal muscle tone.   Skin: Skin is warm and dry. No bruising and no rash noted. He is not diaphoretic. No erythema. No jaundice.   Right side chest  port   Psychiatric: His behavior is normal. Mood, judgment and thought content normal.   Vitals reviewed.      Lab Results - Last 18 Months   Lab Units 02/11/25  1044 01/28/25  0952 01/17/25  1241   WBC 10*3/mm3 4.53 4.66 5.77   HEMOGLOBIN g/dL 10.6* 11.1* 11.5*   HEMATOCRIT % 31.9* 33.2* 34.5*   PLATELETS 10*3/mm3 173 174 158   MCV fL 95.5 94.3 94.5     Lab Results - Last 18 Months   Lab Units 02/11/25  1057 01/28/25  1041 01/17/25  1241 01/10/25  0842 11/27/24  0949 07/17/24  1017 07/17/24  0951   SODIUM mmol/L  --   --  139  --  140  --  139   POTASSIUM mmol/L  --   --  6.0*  --  4.7  --  4.9   CHLORIDE mmol/L  --   --  104  --  106  --  105   CO2 mmol/L  --   --  24.4  --  24.5  --  24.2   BUN mg/dL  --   --  12  --  13  --  19   CREATININE mg/dL 2.20* 1.70* 1.65*   < > 1.49*   < > 1.46*   CALCIUM mg/dL  --   --  9.1  --  9.0  --  9.0   BILIRUBIN mg/dL  --   --  0.3  --  0.3  --  0.3   ALK PHOS U/L  --   --  37*  --  30*  --  46   ALT (SGPT) U/L  --   --  11  --  6  --  <5   AST (SGOT) U/L  --   --  25  --  16  --  18   GLUCOSE mg/dL  --   --  109*  --  113*  --  137*    < > = values in this interval not displayed.     CASE SUMMARY:  Patient has a history of GE junction poorly differentiated adenocarcinoma for which  he had chemoradiation followed by Noel Gurwinder resection:  Pathology showed ypT3,pN1 disease.  Tumor was Stage IIIA, diagnosed in 2016.  He received concurrent chemoradiation with weekly Carboplatin and Taxol and radiation finishing on 3/15/17.  CT scan on 1/10/19 showed new retroperitoneal lymph nodes.  These lymph nodes were biopsied on 1/30/19 and it shows metastatic esophageal cancer.  [facility] Next Gen testing was performed on the sample and it shows KRAS mutation, microsatellite stable tumor.  PD-L1 is positive.  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  HER2/marvel (ERBB2) was not amplified and was negative.  His CT scan on 5/1/19 shows evidence of progression.  Patient has started receiving Keytruda on 5/31/19.  He has received 19 cycles so far.   CT scan on 1/28/2020 shows improvement and continued response.  CT scan chest abdomen pelvis with contrast on 12/10/2020 does not show any evidence of disease progression.  On 12/22/2020 decision made to hold Keytruda per patient's request to get a treatment break.. Repeat CT scan chest 7/12/2021 does not show any evidence of disease progression.  Now CT imaging in January 2021 with a CT showing increased abdominal left para-aortic adenopathy.  Subsequent PET/CT with new cedric metastatic disease in the retroperitoneum of the abdomen.  New pathologically enlarged hypermetabolic retroperitoneal lymph nodes.  This would be concerning for disease recurrence/progression.  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  I discussed with him the treatment options going forward.  He has had a good response with immunotherapy pembrolizumab in the past. This was  restarted. subsequent imaging with treatment response. 1/2023 imaging with good response. Continued treatment  Now with increased adenopathy 2 new lymph nodes. Holding immunotherapy for now.   Consider getting cardiac MR discussed with Dr. Culp. While immunotherapy on hold, increased adenopathy is concerning. Could be  pseudoprogression vs true progression. PET CT reviewed could indicate pseudoprogression since he has had a good response to immunotherapy  immunotherapy was restarted and has been on hold with patient's symptomatology cardiac MRI cannot be obtained, no ischemic heart disease leading to cardiomyopathy this most likely is related to Keytruda.  We will stop Keytruda going forward  CT imaging with stable disease, FOLFOX was started which he had significant side effects  With imaging being stable and significant side effects with FOLFOX, we discussed  about options with wait and watch vs 5FU only, patient prefers to be on maintenance.   We started 5FU only .   Tolerating relatively well, has significant fatigue. CT imaging now with ongoing response. We discussed that he would have been on treatment for 2 years following recurrence with good response. We can continue treatment now and get a PET CT in 2/2024.will repeat prior to follow up. , PET with some residual activity in the mediastinal lymph node.   We continued treatment and now signatera negative. I had a long discussion with the patient. We decided to repeat CT chest now and if no adenopathy, consider holding treatment.   Now CT chest repeated with persistent adenopathy. Discussed biopsy/mediastinoscopy vs continued treatment. Has fairly been tolerating well except fatigue. After discussion we decided to continue chemotherapy.  Now repeat CT imaging with no new concerning findings there is stable nodules which are subcentimeter in the lungs there is mildly prominent precarinal lymph node.  I discussed with the patient about this possibly being inflammatory rather than neoplastic.    -After discussion we decided to proceed with EBUS and possible biopsy of the lymph node if negative for malignancy we can consider stopping maintenance treatment given Signatera is negative and patient has treatment related side effects which are affecting his quality of  life  -Bronchoscopy and precarinal lymph node biopsy reported negative as above.  -Repeat Signatera test also reported negative.  -In view of significant treatment-related toxicities and no concerns for disease progression at this time, we will plan for surveillance alone without additional systemic therapy.  -Repeat imaging with an interval enlargement of periaortic lymph node as well as uptrending Signatera CT DNA levels concerning for disease recurrence.  Given low disease burden, patient to resume 5-FU with leucovorin maintenance (1/28/2025).  Consider adding irinotecan if noted disease progression moving forward.      Assessment & Plan     Assessment:    Metastatic esophageal carcinoma:    -Patient currently on surveillance and is off treatment  -Restaging scans from 9/11/2024 as above, reported PAULIE.  -Repeat CT chest abdomen pelvis reviewed, noted interval enlargement of a   para-aortic lymph node concerning for disease progression now measuring 1.9 x 1.1 cm.   -Also noted to have uptrending Signatera CT DNA levels.  Discussed with patient that these findings are consistent with disease recurrence.  -Given low disease burden at this time, will resume 5-FU/leucovorin maintenance, can consider adding irinotecan if noted disease progression moving forward  -PET/CT completed on 1/28/2025 as above.      Possible myocarditis  Symptoms started after last immunotherapy  Treatment on hold for now, symptoms have improved, cardiac markers were negative, echocardiogram with EF 40%, cardiac MRI cannot be obtained cardiac cath with EF down to 30% management per cardiology  Now EF up to 40-45% monitor.  Symptomatic improvement now no shortness of breath    Shortness of breath  Improved, has occasional shortness of breath, likely copd  I have recommended using albuterol  Symptoms have improved since stopping immunotherapy. Questionable pneumonitis will monitor closely with immunotherapy  Cardiology has evaluated for  myocarditis related to immunotherapy symptomatic improvement now continue to monitor. Symptomatic improvement as above.    Reflux/heartburn/dysphagia:   Symptoms improved.  Also has a history of strictures. Status post EGD and balloon dilation.   Continues to be on pantoprazole symptoms stable.  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  Continues on Synthroid stable now no heart function stable in normal range    Mild diarrhea:  diarrhea seems to be waxing and waning in nature.  Diarrhea related to immunotherapy now improved stable.    Patient reports mild diarrhea following cycle 1 of leucovorin and fluorouracil.  He has not been taking any Imodium.    PD-L1 positive, HER2 negative, p53 and KRAS positive.  Repeat Caris testing with repeat biopsy with no target mutations.    B12 deficiency: Continue B12    Anemia - Hb  was low iron sat low at 10, given venofer 300 x3 monitor for now. Intolerant to oral iron.  Continued low iron saturation will plan on Feraheme for 2 doses. Hemoglobin remains stable.    Groin pain - negative for UTI, STI, has seen urology, likely prostatits, started cipro improvement noted. Had cystoscopy, was started on flomax, continued improvement in symptoms    Chronic kidney disease creatinine has worsened overall compared to 8/2023 has seen Dr. Posadas with nephrology adjusted blood pressure meds, now blood pressure under better control.      Creatinine recheck today 2.2.  May be SORIN due to dehydration with recent diarrhea and limited oral hydration.  Will give IV fluids today and defer treatment for 1 week.     Follow-up with Dr. Beth as previously scheduled, sooner if condition indicates    Electronically signed by Marleny Espana PA-C    Time spent on encounter including record review, history taking, exam, discussion, counseling and documentation at: 30 minutes

## 2025-02-11 ENCOUNTER — OFFICE VISIT (OUTPATIENT)
Dept: ONCOLOGY | Facility: CLINIC | Age: 75
End: 2025-02-11
Payer: MEDICARE

## 2025-02-11 ENCOUNTER — HOSPITAL ENCOUNTER (OUTPATIENT)
Dept: ONCOLOGY | Facility: HOSPITAL | Age: 75
Discharge: HOME | End: 2025-02-11
Payer: MEDICARE

## 2025-02-11 VITALS
HEART RATE: 66 BPM | WEIGHT: 144 LBS | HEIGHT: 66 IN | TEMPERATURE: 98 F | BODY MASS INDEX: 23.14 KG/M2 | SYSTOLIC BLOOD PRESSURE: 118 MMHG | OXYGEN SATURATION: 98 % | DIASTOLIC BLOOD PRESSURE: 71 MMHG

## 2025-02-11 DIAGNOSIS — C15.5 PRIMARY MALIGNANT NEOPLASM OF LOWER THIRD OF ESOPHAGUS: ICD-10-CM

## 2025-02-11 DIAGNOSIS — E03.9 HYPOTHYROIDISM, UNSPECIFIED TYPE: ICD-10-CM

## 2025-02-11 DIAGNOSIS — C16.0 MALIGNANT NEOPLASM OF CARDIA: Primary | ICD-10-CM

## 2025-02-11 DIAGNOSIS — C15.5 PRIMARY MALIGNANT NEOPLASM OF LOWER THIRD OF ESOPHAGUS: Primary | ICD-10-CM

## 2025-02-11 DIAGNOSIS — C15.9 ESOPHAGEAL CANCER, STAGE IV: ICD-10-CM

## 2025-02-11 DIAGNOSIS — C16.0 MALIGNANT NEOPLASM OF CARDIA: ICD-10-CM

## 2025-02-11 DIAGNOSIS — C15.9 ESOPHAGEAL CANCER, STAGE IV: Primary | ICD-10-CM

## 2025-02-11 LAB
ALBUMIN SERPL-MCNC: 4.1 G/DL (ref 3.5–5.2)
ALBUMIN/GLOB SERPL: 2.2 G/DL
ALP BLD-CCNC: 46 U/L (ref 53–128)
ALP SERPL-CCNC: 32 U/L (ref 39–117)
ALT SERPL W P-5'-P-CCNC: 6 U/L (ref 1–41)
ANION GAP SERPL CALCULATED.3IONS-SCNC: 12.5 MMOL/L (ref 5–15)
AST SERPL-CCNC: 15 U/L (ref 1–40)
BASOPHILS # BLD AUTO: 0.01 10*3/MM3 (ref 0–0.2)
BASOPHILS NFR BLD AUTO: 0.2 % (ref 0–1.5)
BILIRUB SERPL-MCNC: 0.4 MG/DL (ref 0–1.2)
BUN BLDA-MCNC: 18 MG/DL (ref 7–22)
BUN SERPL-MCNC: 19 MG/DL (ref 8–23)
BUN/CREAT SERPL: 9.1 (ref 7–25)
CALCIUM BLD QL: 9.3 MG/DL (ref 8–10.3)
CALCIUM SPEC-SCNC: 8.9 MG/DL (ref 8.6–10.5)
CHLORIDE BLDA-SCNC: 110 MMOL/L (ref 98–108)
CHLORIDE SERPL-SCNC: 107 MMOL/L (ref 98–107)
CO2 BLDA-SCNC: 24 MMOL/L (ref 18–33)
CO2 SERPL-SCNC: 20.5 MMOL/L (ref 22–29)
CREAT BLDA-MCNC: 2.2 MG/DL (ref 0.6–1.2)
CREAT SERPL-MCNC: 2.09 MG/DL (ref 0.76–1.27)
DEPRECATED RDW RBC AUTO: 42.1 FL (ref 37–54)
EGFRCR SERPLBLD CKD-EPI 2021: 30.5 ML/MIN/1.73
EGFRCR SERPLBLD CKD-EPI 2021: 32.4 ML/MIN/1.73
EOSINOPHIL # BLD AUTO: 0.09 10*3/MM3 (ref 0–0.4)
EOSINOPHIL NFR BLD AUTO: 2 % (ref 0.3–6.2)
ERYTHROCYTE [DISTWIDTH] IN BLOOD BY AUTOMATED COUNT: 12.9 % (ref 12.3–15.4)
FERRITIN SERPL-MCNC: 174 NG/ML (ref 30–400)
GLOBULIN UR ELPH-MCNC: 1.9 GM/DL
GLUCOSE BLDC GLUCOMTR-MCNC: 183 MG/DL (ref 73–118)
GLUCOSE SERPL-MCNC: 183 MG/DL (ref 65–99)
HCT VFR BLD AUTO: 31.9 % (ref 37.5–51)
HGB BLD-MCNC: 10.6 G/DL (ref 13–17.7)
LYMPHOCYTES # BLD AUTO: 1.39 10*3/MM3 (ref 0.7–3.1)
LYMPHOCYTES NFR BLD AUTO: 30.7 % (ref 19.6–45.3)
MAGNESIUM SERPL-MCNC: 1.9 MG/DL (ref 1.6–2.4)
MCH RBC QN AUTO: 31.7 PG (ref 26.6–33)
MCHC RBC AUTO-ENTMCNC: 33.2 G/DL (ref 31.5–35.7)
MCV RBC AUTO: 95.5 FL (ref 79–97)
MONOCYTES # BLD AUTO: 0.55 10*3/MM3 (ref 0.1–0.9)
MONOCYTES NFR BLD AUTO: 12.1 % (ref 5–12)
NEUTROPHILS NFR BLD AUTO: 2.49 10*3/MM3 (ref 1.7–7)
NEUTROPHILS NFR BLD AUTO: 55 % (ref 42.7–76)
PLATELET # BLD AUTO: 173 10*3/MM3 (ref 140–450)
PMV BLD AUTO: 9.9 FL (ref 6–12)
POC ALBUMIN: 3.6 G/L (ref 3.3–5.5)
POC ALT (SGPT): <5 U/L (ref 10–47)
POC AST (SGOT): 18 U/L (ref 11–38)
POC TOTAL BILIRUBIN: 0.6 MG/DL (ref 0.2–1.6)
POC TOTAL PROTEIN: 6.2 G/DL (ref 6.4–8.1)
POTASSIUM BLDA-SCNC: 5.1 MMOL/L (ref 3.6–5.1)
POTASSIUM SERPL-SCNC: 5.2 MMOL/L (ref 3.5–5.2)
PROT SERPL-MCNC: 6 G/DL (ref 6–8.5)
RBC # BLD AUTO: 3.34 10*6/MM3 (ref 4.14–5.8)
SODIUM BLD-SCNC: 142 MMOL/L (ref 128–145)
SODIUM SERPL-SCNC: 140 MMOL/L (ref 136–145)
TSH SERPL DL<=0.05 MIU/L-ACNC: 4.3 UIU/ML (ref 0.27–4.2)
WBC NRBC COR # BLD AUTO: 4.53 10*3/MM3 (ref 3.4–10.8)

## 2025-02-11 PROCEDURE — 25810000003 SODIUM CHLORIDE 0.9 % SOLUTION: Performed by: PHYSICIAN ASSISTANT

## 2025-02-11 PROCEDURE — 83735 ASSAY OF MAGNESIUM: CPT | Performed by: PHYSICIAN ASSISTANT

## 2025-02-11 PROCEDURE — 80053 COMPREHEN METABOLIC PANEL: CPT | Performed by: PHYSICIAN ASSISTANT

## 2025-02-11 PROCEDURE — 80053 COMPREHEN METABOLIC PANEL: CPT

## 2025-02-11 PROCEDURE — 82728 ASSAY OF FERRITIN: CPT | Performed by: STUDENT IN AN ORGANIZED HEALTH CARE EDUCATION/TRAINING PROGRAM

## 2025-02-11 PROCEDURE — 96360 HYDRATION IV INFUSION INIT: CPT

## 2025-02-11 PROCEDURE — 85025 COMPLETE CBC W/AUTO DIFF WBC: CPT | Performed by: STUDENT IN AN ORGANIZED HEALTH CARE EDUCATION/TRAINING PROGRAM

## 2025-02-11 PROCEDURE — 84443 ASSAY THYROID STIM HORMONE: CPT | Performed by: STUDENT IN AN ORGANIZED HEALTH CARE EDUCATION/TRAINING PROGRAM

## 2025-02-11 RX ORDER — SODIUM CHLORIDE 0.9 % (FLUSH) 0.9 %
20 SYRINGE (ML) INJECTION AS NEEDED
OUTPATIENT
Start: 2025-02-11

## 2025-02-11 RX ORDER — SODIUM CHLORIDE 0.9 % (FLUSH) 0.9 %
20 SYRINGE (ML) INJECTION AS NEEDED
Status: CANCELLED | OUTPATIENT
Start: 2025-02-11

## 2025-02-11 RX ORDER — ONDANSETRON 4 MG/1
4 TABLET, FILM COATED ORAL EVERY 8 HOURS PRN
Qty: 90 TABLET | Refills: 2 | Status: SHIPPED | OUTPATIENT
Start: 2025-02-11

## 2025-02-11 RX ORDER — SODIUM CHLORIDE 0.9 % (FLUSH) 0.9 %
20 SYRINGE (ML) INJECTION AS NEEDED
Status: DISCONTINUED | OUTPATIENT
Start: 2025-02-11 | End: 2025-02-12 | Stop reason: HOSPADM

## 2025-02-11 RX ADMIN — Medication 20 ML: at 10:44

## 2025-02-11 RX ADMIN — Medication 20 ML: at 12:49

## 2025-02-11 RX ADMIN — SODIUM CHLORIDE 1000 ML: 9 INJECTION, SOLUTION INTRAVENOUS at 11:43

## 2025-02-11 NOTE — ADDENDUM NOTE
Encounter addended by: Curtis Lehman on: 2/11/2025 11:33 AM   Actions taken: Child order released for a procedure order

## 2025-02-11 NOTE — PROGRESS NOTES
Pt to infusion clinic after [facility] Farmer f/u appt.  Order to send CMP/MG to hospital stat and give IVF only for now.  CMP/MG results sent to [facility] Farmer.  Hold chemo x1 week. Encourage pt to increase fluid intake and f/u with nephrologist and other physicians to see if any medicaitons are needed.  Pt notified of POC with understanding verbalized.  Pt d/c home with AVS given after IVF infused.

## 2025-02-18 ENCOUNTER — HOSPITAL ENCOUNTER (OUTPATIENT)
Dept: ONCOLOGY | Facility: HOSPITAL | Age: 75
Discharge: HOME | End: 2025-02-18
Admitting: STUDENT IN AN ORGANIZED HEALTH CARE EDUCATION/TRAINING PROGRAM
Payer: MEDICARE

## 2025-02-18 VITALS
SYSTOLIC BLOOD PRESSURE: 131 MMHG | WEIGHT: 144.3 LBS | HEIGHT: 66 IN | TEMPERATURE: 97.5 F | OXYGEN SATURATION: 99 % | BODY MASS INDEX: 23.19 KG/M2 | HEART RATE: 71 BPM | RESPIRATION RATE: 16 BRPM | DIASTOLIC BLOOD PRESSURE: 69 MMHG

## 2025-02-18 DIAGNOSIS — C15.5 PRIMARY MALIGNANT NEOPLASM OF LOWER THIRD OF ESOPHAGUS: ICD-10-CM

## 2025-02-18 DIAGNOSIS — C16.0 MALIGNANT NEOPLASM OF CARDIA: Primary | ICD-10-CM

## 2025-02-18 LAB
ALBUMIN SERPL-MCNC: 4 G/DL (ref 3.5–5.2)
ALBUMIN/GLOB SERPL: 2.2 G/DL
ALP SERPL-CCNC: 32 U/L (ref 39–117)
ALT SERPL W P-5'-P-CCNC: 5 U/L (ref 1–41)
ANION GAP SERPL CALCULATED.3IONS-SCNC: 11.8 MMOL/L (ref 5–15)
AST SERPL-CCNC: 13 U/L (ref 1–40)
BASOPHILS # BLD AUTO: 0.04 10*3/MM3 (ref 0–0.2)
BASOPHILS NFR BLD AUTO: 1.1 % (ref 0–1.5)
BILIRUB SERPL-MCNC: 0.3 MG/DL (ref 0–1.2)
BUN SERPL-MCNC: 16 MG/DL (ref 8–23)
BUN/CREAT SERPL: 8.9 (ref 7–25)
CALCIUM SPEC-SCNC: 9 MG/DL (ref 8.6–10.5)
CHLORIDE SERPL-SCNC: 107 MMOL/L (ref 98–107)
CO2 SERPL-SCNC: 23.2 MMOL/L (ref 22–29)
CREAT SERPL-MCNC: 1.8 MG/DL (ref 0.76–1.27)
DEPRECATED RDW RBC AUTO: 43.1 FL (ref 37–54)
EGFRCR SERPLBLD CKD-EPI 2021: 38.8 ML/MIN/1.73
EOSINOPHIL # BLD AUTO: 0.07 10*3/MM3 (ref 0–0.4)
EOSINOPHIL NFR BLD AUTO: 2 % (ref 0.3–6.2)
ERYTHROCYTE [DISTWIDTH] IN BLOOD BY AUTOMATED COUNT: 13.2 % (ref 12.3–15.4)
GLOBULIN UR ELPH-MCNC: 1.8 GM/DL
GLUCOSE SERPL-MCNC: 145 MG/DL (ref 65–99)
HCT VFR BLD AUTO: 31.1 % (ref 37.5–51)
HGB BLD-MCNC: 10.3 G/DL (ref 13–17.7)
LYMPHOCYTES # BLD AUTO: 1.39 10*3/MM3 (ref 0.7–3.1)
LYMPHOCYTES NFR BLD AUTO: 39.3 % (ref 19.6–45.3)
MCH RBC QN AUTO: 31.4 PG (ref 26.6–33)
MCHC RBC AUTO-ENTMCNC: 33.1 G/DL (ref 31.5–35.7)
MCV RBC AUTO: 94.8 FL (ref 79–97)
MONOCYTES # BLD AUTO: 0.61 10*3/MM3 (ref 0.1–0.9)
MONOCYTES NFR BLD AUTO: 17.2 % (ref 5–12)
NEUTROPHILS NFR BLD AUTO: 1.43 10*3/MM3 (ref 1.7–7)
NEUTROPHILS NFR BLD AUTO: 40.4 % (ref 42.7–76)
PLATELET # BLD AUTO: 179 10*3/MM3 (ref 140–450)
PMV BLD AUTO: 10.2 FL (ref 6–12)
POTASSIUM SERPL-SCNC: 5.1 MMOL/L (ref 3.5–5.2)
PROT SERPL-MCNC: 5.8 G/DL (ref 6–8.5)
RBC # BLD AUTO: 3.28 10*6/MM3 (ref 4.14–5.8)
SODIUM SERPL-SCNC: 142 MMOL/L (ref 136–145)
WBC NRBC COR # BLD AUTO: 3.54 10*3/MM3 (ref 3.4–10.8)

## 2025-02-18 PROCEDURE — 25810000003 SODIUM CHLORIDE 0.9 % SOLUTION 500 ML FLEX CONT: Performed by: PHYSICIAN ASSISTANT

## 2025-02-18 PROCEDURE — 80053 COMPREHEN METABOLIC PANEL: CPT | Performed by: STUDENT IN AN ORGANIZED HEALTH CARE EDUCATION/TRAINING PROGRAM

## 2025-02-18 PROCEDURE — G0498 CHEMO EXTEND IV INFUS W/PUMP: HCPCS

## 2025-02-18 PROCEDURE — 25810000003 SODIUM CHLORIDE 0.9 % SOLUTION: Performed by: PHYSICIAN ASSISTANT

## 2025-02-18 PROCEDURE — 85025 COMPLETE CBC W/AUTO DIFF WBC: CPT | Performed by: PHYSICIAN ASSISTANT

## 2025-02-18 PROCEDURE — 96409 CHEMO IV PUSH SNGL DRUG: CPT

## 2025-02-18 PROCEDURE — 25010000002 DEXAMETHASONE PER 1 MG: Performed by: STUDENT IN AN ORGANIZED HEALTH CARE EDUCATION/TRAINING PROGRAM

## 2025-02-18 PROCEDURE — 25010000002 LEUCOVORIN CALCIUM PER 50 MG: Performed by: PHYSICIAN ASSISTANT

## 2025-02-18 PROCEDURE — 96367 TX/PROPH/DG ADDL SEQ IV INF: CPT

## 2025-02-18 PROCEDURE — 96375 TX/PRO/DX INJ NEW DRUG ADDON: CPT

## 2025-02-18 PROCEDURE — 25010000002 FLUOROURACIL PER 500 MG: Performed by: PHYSICIAN ASSISTANT

## 2025-02-18 PROCEDURE — 25810000003 SODIUM CHLORIDE 0.9 % SOLUTION 250 ML FLEX CONT: Performed by: PHYSICIAN ASSISTANT

## 2025-02-18 RX ORDER — DEXAMETHASONE SODIUM PHOSPHATE 4 MG/ML
12 INJECTION, SOLUTION INTRA-ARTICULAR; INTRALESIONAL; INTRAMUSCULAR; INTRAVENOUS; SOFT TISSUE ONCE
Status: COMPLETED | OUTPATIENT
Start: 2025-02-18 | End: 2025-02-18

## 2025-02-18 RX ORDER — FLUOROURACIL 50 MG/ML
400 INJECTION, SOLUTION INTRAVENOUS ONCE
Status: COMPLETED | OUTPATIENT
Start: 2025-02-18 | End: 2025-02-18

## 2025-02-18 RX ORDER — SODIUM CHLORIDE 9 MG/ML
20 INJECTION, SOLUTION INTRAVENOUS ONCE
Status: COMPLETED | OUTPATIENT
Start: 2025-02-18 | End: 2025-02-18

## 2025-02-18 RX ORDER — SODIUM CHLORIDE 9 MG/ML
20 INJECTION, SOLUTION INTRAVENOUS ONCE
Status: CANCELLED | OUTPATIENT
Start: 2025-02-18

## 2025-02-18 RX ORDER — FLUOROURACIL 50 MG/ML
400 INJECTION, SOLUTION INTRAVENOUS ONCE
Status: CANCELLED | OUTPATIENT
Start: 2025-02-18

## 2025-02-18 RX ADMIN — FLUOROURACIL 4220 MG: 50 INJECTION, SOLUTION INTRAVENOUS at 14:06

## 2025-02-18 RX ADMIN — SODIUM CHLORIDE 20 ML/HR: 9 INJECTION, SOLUTION INTRAVENOUS at 12:58

## 2025-02-18 RX ADMIN — DEXAMETHASONE SODIUM PHOSPHATE 12 MG: 4 INJECTION INTRA-ARTICULAR; INTRALESIONAL; INTRAMUSCULAR; INTRAVENOUS; SOFT TISSUE at 12:58

## 2025-02-18 RX ADMIN — LEUCOVORIN CALCIUM 700 MG: 350 INJECTION, POWDER, LYOPHILIZED, FOR SOLUTION INTRAMUSCULAR; INTRAVENOUS at 13:26

## 2025-02-18 RX ADMIN — FLUOROURACIL 700 MG: 50 INJECTION, SOLUTION INTRAVENOUS at 14:00

## 2025-02-18 NOTE — PROGRESS NOTES
Pt here for tx.  Was held last week due to increase creatinine and given IVF only.  Port accessed using sterile technique and labs drawn.  Pt tolerated well.  Pt reports no changes from last week.  States he tried to increase water intake but feels bloated with drinking just small amounts.  States he got his kidney MD appointment with Dr. Posadas moved to March 7th instead of in July.  Has f/u with cardiologist on 3/11.  Tx given per MAR.  Pt tolerated well.  Pt d/c home with 5FU pump infusing and AVS given.

## 2025-02-19 DIAGNOSIS — E03.9 HYPOTHYROIDISM (ACQUIRED): ICD-10-CM

## 2025-02-19 DIAGNOSIS — R79.89 ELEVATED TSH: ICD-10-CM

## 2025-02-19 RX ORDER — LEVOTHYROXINE SODIUM 50 UG/1
50 TABLET ORAL DAILY
Qty: 30 TABLET | Refills: 0 | Status: SHIPPED | OUTPATIENT
Start: 2025-02-19

## 2025-02-20 ENCOUNTER — TELEPHONE (OUTPATIENT)
Dept: ONCOLOGY | Facility: CLINIC | Age: 75
End: 2025-02-20

## 2025-02-20 ENCOUNTER — HOSPITAL ENCOUNTER (OUTPATIENT)
Dept: ONCOLOGY | Facility: HOSPITAL | Age: 75
Discharge: HOME | End: 2025-02-20
Payer: MEDICARE

## 2025-02-20 DIAGNOSIS — C15.5 PRIMARY MALIGNANT NEOPLASM OF LOWER THIRD OF ESOPHAGUS: ICD-10-CM

## 2025-02-20 DIAGNOSIS — C16.0 MALIGNANT NEOPLASM OF CARDIA: Primary | ICD-10-CM

## 2025-02-20 RX ORDER — SODIUM CHLORIDE 0.9 % (FLUSH) 0.9 %
20 SYRINGE (ML) INJECTION AS NEEDED
OUTPATIENT
Start: 2025-02-20

## 2025-02-20 RX ORDER — SODIUM CHLORIDE 0.9 % (FLUSH) 0.9 %
20 SYRINGE (ML) INJECTION AS NEEDED
Status: DISCONTINUED | OUTPATIENT
Start: 2025-02-20 | End: 2025-02-21 | Stop reason: HOSPADM

## 2025-02-20 RX ADMIN — Medication 20 ML: at 14:56

## 2025-02-20 NOTE — PROGRESS NOTES
Pt here for Adrucil pump DC. Pump empty. Site clean, dry, and intact.  Pt reports he is tired but denies any other complaints. Port flushed with good blood return noted.  Port flushed with saline prior to needle removal.  Pt declined AVS and d/c home by self.

## 2025-02-20 NOTE — TELEPHONE ENCOUNTER
Hub staff attempted to follow warm transfer process and was unsuccessful     Caller: Artie Mc    Relationship to patient: Self    Best call back number:     [phone]     Patient is needing: PT WOULD LIKE TO SEE IF HE CAN GET IN EARLIER THAN HIS 3:00 APPT TODAY. IT WOULD TAKE HIM AN HOUR TO GET HERE. PLEASE CALL TO ADVISE.     PT IS THINKING HE MAY NEED FLUIDS AS WELL.

## 2025-02-28 ENCOUNTER — TELEPHONE (OUTPATIENT)
Dept: ONCOLOGY | Facility: CLINIC | Age: 75
End: 2025-02-28

## 2025-02-28 NOTE — TELEPHONE ENCOUNTER
Caller: Artie Mc    Relationship to patient: Self    Best call back number: [phone]    Type of visit: INFUSION    Requested date: 3/4 EARLIER APPT TIME - 9-11 AM WOULD WORK BETTER    If rescheduling, when is the original appointment: 3/4

## 2025-03-04 ENCOUNTER — HOSPITAL ENCOUNTER (OUTPATIENT)
Dept: ONCOLOGY | Facility: HOSPITAL | Age: 75
Discharge: HOME | End: 2025-03-04
Admitting: STUDENT IN AN ORGANIZED HEALTH CARE EDUCATION/TRAINING PROGRAM
Payer: MEDICARE

## 2025-03-04 VITALS
HEART RATE: 69 BPM | DIASTOLIC BLOOD PRESSURE: 58 MMHG | OXYGEN SATURATION: 95 % | TEMPERATURE: 98 F | HEIGHT: 66 IN | RESPIRATION RATE: 16 BRPM | SYSTOLIC BLOOD PRESSURE: 128 MMHG | BODY MASS INDEX: 23.45 KG/M2 | WEIGHT: 145.9 LBS

## 2025-03-04 DIAGNOSIS — C15.5 PRIMARY MALIGNANT NEOPLASM OF LOWER THIRD OF ESOPHAGUS: ICD-10-CM

## 2025-03-04 DIAGNOSIS — C16.0 MALIGNANT NEOPLASM OF CARDIA: Primary | ICD-10-CM

## 2025-03-04 DIAGNOSIS — C15.9 ESOPHAGEAL CANCER, STAGE IV: ICD-10-CM

## 2025-03-04 LAB
ALP BLD-CCNC: 21 U/L (ref 53–128)
BASOPHILS # BLD AUTO: 0.02 10*3/MM3 (ref 0–0.2)
BASOPHILS NFR BLD AUTO: 0.6 % (ref 0–1.5)
BILIRUB UR QL STRIP: NEGATIVE
BUN BLDA-MCNC: 16 MG/DL (ref 7–22)
CALCIUM BLD QL: 9.4 MG/DL (ref 8–10.3)
CHLORIDE BLDA-SCNC: 111 MMOL/L (ref 98–108)
CLARITY UR: CLEAR
CO2 BLDA-SCNC: 26 MMOL/L (ref 18–33)
COLOR UR: YELLOW
CREAT BLDA-MCNC: 1.8 MG/DL (ref 0.6–1.2)
DEPRECATED RDW RBC AUTO: 41.1 FL (ref 37–54)
EGFRCR SERPLBLD CKD-EPI 2021: 38.8 ML/MIN/1.73
EOSINOPHIL # BLD AUTO: 0.04 10*3/MM3 (ref 0–0.4)
EOSINOPHIL NFR BLD AUTO: 1.1 % (ref 0.3–6.2)
ERYTHROCYTE [DISTWIDTH] IN BLOOD BY AUTOMATED COUNT: 13.3 % (ref 12.3–15.4)
GLUCOSE BLDC GLUCOMTR-MCNC: 158 MG/DL (ref 73–118)
GLUCOSE UR STRIP-MCNC: ABNORMAL MG/DL
HCT VFR BLD AUTO: 29.4 % (ref 37.5–51)
HGB BLD-MCNC: 9.8 G/DL (ref 13–17.7)
HGB UR QL STRIP.AUTO: NEGATIVE
KETONES UR QL STRIP: NEGATIVE
LEUKOCYTE ESTERASE UR QL STRIP.AUTO: NEGATIVE
LYMPHOCYTES # BLD AUTO: 1.01 10*3/MM3 (ref 0.7–3.1)
LYMPHOCYTES NFR BLD AUTO: 28 % (ref 19.6–45.3)
MCH RBC QN AUTO: 31.5 PG (ref 26.6–33)
MCHC RBC AUTO-ENTMCNC: 33.3 G/DL (ref 31.5–35.7)
MCV RBC AUTO: 94.5 FL (ref 79–97)
MONOCYTES # BLD AUTO: 0.53 10*3/MM3 (ref 0.1–0.9)
MONOCYTES NFR BLD AUTO: 14.7 % (ref 5–12)
NEUTROPHILS NFR BLD AUTO: 2.01 10*3/MM3 (ref 1.7–7)
NEUTROPHILS NFR BLD AUTO: 55.6 % (ref 42.7–76)
NITRITE UR QL STRIP: NEGATIVE
PH UR STRIP.AUTO: 5.5 [PH] (ref 5–8)
PLATELET # BLD AUTO: 151 10*3/MM3 (ref 140–450)
PMV BLD AUTO: 9.6 FL (ref 6–12)
POC ALBUMIN: 4 G/L (ref 3.3–5.5)
POC ALT (SGPT): 8 U/L (ref 10–47)
POC AST (SGOT): 19 U/L (ref 11–38)
POC TOTAL BILIRUBIN: 0.6 MG/DL (ref 0.2–1.6)
POC TOTAL PROTEIN: 6.4 G/DL (ref 6.4–8.1)
POTASSIUM BLDA-SCNC: 5.1 MMOL/L (ref 3.6–5.1)
PROT UR QL STRIP: ABNORMAL
RBC # BLD AUTO: 3.11 10*6/MM3 (ref 4.14–5.8)
SODIUM BLD-SCNC: 145 MMOL/L (ref 128–145)
SP GR UR STRIP: 1.02 (ref 1–1.03)
UROBILINOGEN UR QL STRIP: ABNORMAL
WBC NRBC COR # BLD AUTO: 3.61 10*3/MM3 (ref 3.4–10.8)

## 2025-03-04 PROCEDURE — 25810000003 SODIUM CHLORIDE 0.9 % SOLUTION 500 ML FLEX CONT: Performed by: NURSE PRACTITIONER

## 2025-03-04 PROCEDURE — 25010000002 LEUCOVORIN CALCIUM PER 50 MG: Performed by: NURSE PRACTITIONER

## 2025-03-04 PROCEDURE — 85025 COMPLETE CBC W/AUTO DIFF WBC: CPT | Performed by: STUDENT IN AN ORGANIZED HEALTH CARE EDUCATION/TRAINING PROGRAM

## 2025-03-04 PROCEDURE — 81003 URINALYSIS AUTO W/O SCOPE: CPT | Performed by: NURSE PRACTITIONER

## 2025-03-04 PROCEDURE — 96375 TX/PRO/DX INJ NEW DRUG ADDON: CPT

## 2025-03-04 PROCEDURE — 25810000003 SODIUM CHLORIDE 0.9 % SOLUTION 250 ML FLEX CONT: Performed by: NURSE PRACTITIONER

## 2025-03-04 PROCEDURE — 96409 CHEMO IV PUSH SNGL DRUG: CPT

## 2025-03-04 PROCEDURE — 96367 TX/PROPH/DG ADDL SEQ IV INF: CPT

## 2025-03-04 PROCEDURE — 80053 COMPREHEN METABOLIC PANEL: CPT

## 2025-03-04 PROCEDURE — 96413 CHEMO IV INFUSION 1 HR: CPT

## 2025-03-04 PROCEDURE — 25010000002 DEXAMETHASONE PER 1 MG: Performed by: NURSE PRACTITIONER

## 2025-03-04 PROCEDURE — 25010000002 FLUOROURACIL PER 500 MG: Performed by: NURSE PRACTITIONER

## 2025-03-04 PROCEDURE — G0498 CHEMO EXTEND IV INFUS W/PUMP: HCPCS

## 2025-03-04 RX ORDER — FLUOROURACIL 50 MG/ML
400 INJECTION, SOLUTION INTRAVENOUS ONCE
Status: CANCELLED | OUTPATIENT
Start: 2025-03-04

## 2025-03-04 RX ORDER — DEXAMETHASONE SODIUM PHOSPHATE 4 MG/ML
12 INJECTION, SOLUTION INTRA-ARTICULAR; INTRALESIONAL; INTRAMUSCULAR; INTRAVENOUS; SOFT TISSUE ONCE
Status: COMPLETED | OUTPATIENT
Start: 2025-03-04 | End: 2025-03-04

## 2025-03-04 RX ORDER — SODIUM CHLORIDE 9 MG/ML
20 INJECTION, SOLUTION INTRAVENOUS ONCE
Status: DISCONTINUED | OUTPATIENT
Start: 2025-03-04 | End: 2025-03-05 | Stop reason: HOSPADM

## 2025-03-04 RX ORDER — SODIUM CHLORIDE 9 MG/ML
20 INJECTION, SOLUTION INTRAVENOUS ONCE
Status: CANCELLED | OUTPATIENT
Start: 2025-03-04

## 2025-03-04 RX ORDER — FLUOROURACIL 50 MG/ML
400 INJECTION, SOLUTION INTRAVENOUS ONCE
Status: COMPLETED | OUTPATIENT
Start: 2025-03-04 | End: 2025-03-04

## 2025-03-04 RX ORDER — DEXAMETHASONE SODIUM PHOSPHATE 4 MG/ML
12 INJECTION, SOLUTION INTRA-ARTICULAR; INTRALESIONAL; INTRAMUSCULAR; INTRAVENOUS; SOFT TISSUE ONCE
Status: CANCELLED
Start: 2025-03-04 | End: 2025-03-04

## 2025-03-04 RX ADMIN — FLUOROURACIL 4220 MG: 50 INJECTION, SOLUTION INTRAVENOUS at 13:44

## 2025-03-04 RX ADMIN — FLUOROURACIL 700 MG: 50 INJECTION, SOLUTION INTRAVENOUS at 13:39

## 2025-03-04 RX ADMIN — LEUCOVORIN CALCIUM 700 MG: 350 INJECTION, POWDER, LYOPHILIZED, FOR SOLUTION INTRAMUSCULAR; INTRAVENOUS at 12:27

## 2025-03-04 RX ADMIN — DEXAMETHASONE SODIUM PHOSPHATE 12 MG: 4 INJECTION, SOLUTION INTRAMUSCULAR; INTRAVENOUS at 12:06

## 2025-03-04 NOTE — PROGRESS NOTES
Pt here for tx,complaints of ongoing groin pain and bilateral lower back pain for the last few treatments.Per Lynette RAMIREZ urine sample ordered and collected. Plan signed and reviewed by Lynette RAMIREZ.  States he has a kidney MD appointment with Dr. Posadas March 7th and f/u with cardiologist on 3/11. Tx given per MAR.  Pt tolerated infusion well..  Pt d/c home with 5FU pump infusing and AVS given.

## 2025-03-06 ENCOUNTER — HOSPITAL ENCOUNTER (OUTPATIENT)
Dept: ONCOLOGY | Facility: HOSPITAL | Age: 75
Discharge: HOME | End: 2025-03-06
Payer: MEDICARE

## 2025-03-06 DIAGNOSIS — C15.5 PRIMARY MALIGNANT NEOPLASM OF LOWER THIRD OF ESOPHAGUS: ICD-10-CM

## 2025-03-06 DIAGNOSIS — C16.0 MALIGNANT NEOPLASM OF CARDIA: Primary | ICD-10-CM

## 2025-03-06 RX ORDER — SODIUM CHLORIDE 0.9 % (FLUSH) 0.9 %
20 SYRINGE (ML) INJECTION AS NEEDED
OUTPATIENT
Start: 2025-03-06

## 2025-03-06 RX ORDER — SODIUM CHLORIDE 0.9 % (FLUSH) 0.9 %
20 SYRINGE (ML) INJECTION AS NEEDED
Status: DISCONTINUED | OUTPATIENT
Start: 2025-03-06 | End: 2025-03-07 | Stop reason: HOSPADM

## 2025-03-06 RX ADMIN — Medication 20 ML: at 13:27

## 2025-03-06 NOTE — PROGRESS NOTES
Pt here for Adrucil pump DC. Pump empty. Site clean, dry, and intact.  Pt reports he is tired but denies any other complaints. Port flushed with good blood return noted.  Port flushed with saline prior to needle removal.  Pt discharged and  declined AVS

## 2025-03-10 DIAGNOSIS — R79.89 ELEVATED TSH: ICD-10-CM

## 2025-03-10 DIAGNOSIS — E03.9 HYPOTHYROIDISM (ACQUIRED): ICD-10-CM

## 2025-03-10 RX ORDER — LEVOTHYROXINE SODIUM 50 UG/1
50 TABLET ORAL DAILY
Qty: 30 TABLET | Refills: 0 | Status: SHIPPED | OUTPATIENT
Start: 2025-03-10

## 2025-03-11 ENCOUNTER — OUTSIDE FACILITY SERVICE (OUTPATIENT)
Dept: CARDIOLOGY | Facility: CLINIC | Age: 75
End: 2025-03-11
Payer: MEDICARE

## 2025-03-11 DIAGNOSIS — I42.0 CARDIOMYOPATHY, DILATED: Primary | ICD-10-CM

## 2025-03-11 DIAGNOSIS — I42.0 DILATED CARDIOMYOPATHY: ICD-10-CM

## 2025-03-13 NOTE — PROGRESS NOTES
Hematology-Oncology Follow-up Note       Artie Mc  1950    Primary Care Physician: Chanel Carter APRN  Referring Physician: Chanel Carter APRN    Reason For Visit:  No chief complaint on file.    Metastatic esophageal cancer  Monitoring for adverse effects related to immunotherapy.  Hypothyroidism      Mr. Mc is a pleasant 74 y.o. gentleman with a history of gastroesophageal reflux disease, diabetes, hypertension, heart block status post pacemaker placement and history of CABG.  He was having symptoms of dysphagia, weight loss since September 2016. He was reporting symptoms of food getting stuck in the lower chest.  He has transitioned himself to a liquid diet.  The patient reports losing about 30 pounds over this duration.  The patient underwent upper GI endoscopy on 12/20/16 with Dr. Esvin Barrera.  Endoscopy showed necrotic, circumferential and moderately obstructive mass at the distal esophagus between 42 cm and 36 cm.      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  There was also evidence of mild subacute inflammation in the duodenum.  Dr. Barrera ordered a CT scan of the chest and abdomen with contrast on 12/20/16.  The scan was done at [facility].  The scan showed a new ill-defined mass in the anterior wall of the distal esophagus at the GE junction measuring 2.3 x 2.2 x 1.6 cm, worrisome for cancer.  There were four subcentimeter liver lesions which had appeared stable in comparison to another CT scan from 2009 and they likely represent small cysts.  There was also mild lymphadenopathy in the gastrohepatic ligament.  There were at least six borderline enlarged lymph nodes measuring up to 1.3 x 1 cm in the region.  The patient is referred to us for further oncological evaluation.    1/5/2017 - The patient presents for initial consultation.  He reports persistent dysphagia and also has  symptoms of regurgitation.  He cannot tolerate solid foods and is dependent on liquid diet such as Ensure.  He also reports fatigue. Symptoms of dysphagia have been present for the past four months.  When he eats any solids, the food gets stuck in the lower chest.    1/5/17 - Vitamin B12 200 (L).  CEA 0.8.  Ferritin 35.  Iron saturation 16% (L), serum iron 56, TIBC 354.  Creatinine 0.9.  LFTs normal.  Folate 10.2.  1/12/17 - PET/CT scan:  Intense abnormal activity corresponding to the region of the GE junction and proximal stomach.  SUV is 11.06.  There is a suggestion of an abnormal mass or abnormal wall thickening in the region measuring 3.9 x 4.8 cm.  No additional abnormalities.  No evidence of metastases or other lymphadenopathy.  Scattered nonspecific subcentimeter lymph nodes involving the mediastinum and within the central upper abdomen.  1/13/17 - Creatinine 0.9.  LFTs normal.    1/17/17 - Patient seen by thoracic surgeon, Dr. Bradley.  PET scan was reviewed.  He has recommended neoadjuvant chemoradiation therapy followed by [place] Gurwinder esophagogastrectomy.  Port-A-Cath is being arranged.    1/20/17 - WBC 6.3, hemoglobin 12.1, platelet count 198,000, MCV 86.4.  1/30/17 - Patient started weekly Carboplatin and Taxol (Carboplatin AUC 2 and Taxol 50 mg/M2).  Patient received Injectafer 750 mg.  WBC 5.8, hemoglobin 12.4, platelet count 244,000.   1/31/17 - Patient seen by Dr. Eduardo Oleary.  The plan was to give 5040 cGy in 28 fractions.   2/6/17 - WBC 4.4, hemoglobin 11.6, platelet count 201,000.  Patient received cycle 2 of weekly Carboplatin and Taxol (Carboplatin AUC 2 and Taxol 50 mg/M2).  2/13/17 - Patient received Carboplatin and Taxol weekly cycle 3.  2/13/17 - Patient started concurrent radiation therapy.  Total planned dose is 4140 cGy.    2/13/17 to 2/15/17 - Patient received 4140 cGy of neoadjuvant radiation.  Radiation was finished on 3/15/17.    2/20/17 - WBC 1.8, hemoglobin 10.6, platelet count  217,000.  Creatinine 0.5. Folate 15 (L).  Ferritin 361.  Haptoglobin 214.  Iron saturation 33%, TIBC 263, serum iron 88.    2/20/17 - Patient received Carboplatin and Taxol weekly cycle 4.   2/27/17 - WBC 3.7, hemoglobin 10.7, platelet count 177,000, MCV 85.3.    3/6/17 - Patient has received 16 out of the 23 planned radiation treatments.  Total planned dose is 4140 cGy.    3/6/17 - WBC 5.4, hemoglobin 11.2, platelet count 113,000.  3/6/17 - Patient received cycle 5 of weekly Carboplatin and Taxol.   3/13/17 - Patient received cycle 6 of weekly Carboplatin and Taxol.  WBC 3.9, hemoglobin 11.3, platelet count 93,000.     3/20/17 - WBC 1.4, hemoglobin 10.0, platelet count 118,000, MCV 86.1   3/27/17 - WBC 3.8, hemoglobin 9.5, platelet count 176,000, MCV 87.3.    4/7/17 - Stress test:  No evidence of reversible myocardial ischemia.  Normal left ventricular ejection fraction of 50%.    4/17/17 - Upper GI endoscopy by Dr. 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  The GE junction adenocarcinoma lesion seems to have a very good response to chemotherapy and radiation.  The lumen was open.  5/1/17 - WBC 7.0, hemoglobin 10.2, platelet count 175,000.    5/4/17 - Patient underwent [place] Gurwinder esophagectomy with pyloroplasty, feeding jejunostomy tube, abdominal and mediastinal lymph node dissection.    Surgical Pathology:  Moderately to poorly differentiated adenocarcinoma measuring 1.3 cm at the GE junction.  Resection margins are negative for malignancy.  Tumor margins are negative.  There is effective treatment response.  No evidence of lymphovascular invasion.  Staging is ypT3,pN1.  One out of fourteen lymph nodes involved.   5/22/17 - WBC 6.8, hemoglobin 10.3, platelet count 312,000.    6/19/17 - WBC 5.7, hemoglobin 10.5, platelet count 204,000, MCV 92.1.    7/6/17 - EGD:  Status post balloon dilation of esophageal stricture.  7/12/17 - EGD with balloon dilation of esophageal  stricture.  7/26/17 - EGD and balloon dilation of esophageal stricture.  7/31/17 - PET/CT scan:  There is mild metabolic activity seen at the thoracic esophagus just at and below the surgical margins.  Some mild wall thickening.  This could be from recent surgery.  A residual cancer seems unlikely.  There is a precarinal lymph node with mild metabolic activity with SUV of 3 measuring 1 x 1.3 cm.  Again this appears to be nonspecific in my opinion.    8/7/17 - WBC 5.9, hemoglobin 11.9, platelet count 171,000, MCV 88.7.    10/19/17 - WBC 7.5, hemoglobin 11.8, platelet count 216,000.    1/8/18 - PET scan:  No evidence of residual disease or recurrent disease.  There is an esophageal stent in the mid esophagus with a large debris air level.  No evidence of any metastases in the chest, abdomen or pelvis.  Mild nodule uptake in the vocal cords with fullness in the right vocal cord.  This could be physiologic.    7/9/18 - CT scan of chest with contrast:  Prominent mediastinal lymph nodes appear stable since February.  Changes of gastric pull-through procedure for esophageal cancer again noted.  Tree-in-bud infiltrates in the left lung base, consistent with infection versus inflammation.    1/10/19 - CT chest, abdomen and pelvis with contrast:  No evidence of mets in the chest; however, interval development of metastatic lymphadenopathy in the left side of the retroperitoneum.  For instance, there is a 17 mm rounded lymph node, 10 mm lymph node and also a 14 mm lymph node.  These are all new.  Stable 9 mm hypodense lesion within the right hepatic lobe, which could reflect hemangioma or complex cyst.    1/30/19 - CT-guided core biopsy of retroperitoneal lymph node:  Poorly differentiated adenocarcinoma consistent with metastases from patient’s known esophageal primary.  CK20 positive, CDX2 positive, cytokeratin 7 negative, TTF-1 negative.    2/1/19 - Creatinine 0.9, BUN 18, calcium 9.3; these are normal.    2/28/19 - [facility]  Next Gen sequencing testing on retroperitoneal lymph node specimen:  PD-L1 positive.  CPS = 3.  This implies responsiveness to pembrolizumab.  Mismatch repair status is proficient (no evidence of microsatellite instability).  Tumor mutational burden is low = 6 mutations/Mb.  CDH1 indeterminate.  KRAS mutation positive.  TP53 mutation positive.  CDK6 amplified.  [employer] PD-L1 testing by IHC:  PD-L1 expression 1% positive (CPS =1).  HER2/marvel by IHC is negative.  HER2/marvel by CISH not amplified.  Microsatellite stable tumor.    3/7/19 - WBC 7.3, hemoglobin 12, platelet count 128,000, MCV 92.     3/14/19 - Patient was seen by radiation oncologist, Dr. Chahal.  He has recommended observation versus systemic chemoimmunotherapy as needed.  No plans for radiation therapy.   5/1/19 - CT scan of chest with contrast:  No definitive findings of new metastatic disease in the chest.  Emphysema noted.    5/1/19 - CT abdomen and pelvis:  There is interval progression of metastatic retroperitoneal adenopathy.  Left periaortic adenopathy with lymph node measuring up to 2 cm, previously 1.7 cm.  Another lymph node now measures 2.3, previously 1.4 cm.  New enlarged lymph node on image 147 measures 1.4 cm, previously 0.4 cm.  A 9 mm hypodense right hepatic lobe lesion appears stable.    5/9/19 - Decision made to start patient on immunotherapy Keytruda.    5/9/19 - Vitamin B12 229.  Ferritin 61.  Folate 14.3.  Iron saturation 26%, TIBC 304, serum iron 79.  5/31/19 - Patient received Keytruda 200 mg cycle 1, day 1.    5/31/19 - Free T4 0.99.  Creatinine 0.9, BUN 9.  LFTs normal.  TSH 3.99.  Folate 14.3.  Iron saturation 26%.    6/21/2019 patient received Keytruda cycle 2  7/12/2019: Patient received Keytruda cycle 3  8/2/2019 patient received cycle 4 Keytruda WBC 7.2, hemoglobin 11.8 platelet count 163  8/23/2019 creatinine 0.9, LFTs normal, WBC 6.4, hemoglobin 11.5, platelets 179, TSH 2.1   8/23/2019 patient received cycle 5 of  Keytruda  9/3/2019 CT chest abdomen and pelvis with contrast: . Positive response to therapy in the abdomen since 05/01/2019. Pathologically enlarged retroperitoneal lymph nodes, predominantly in the left periaortic region, have diminished in size.a 1.3 x 2.1 cm left periaortic node (image 60), previously measured 3.5 x 2.3 cm. A 1.6 cm index node near the level of the left renal vein previously measured 2.0 cm No new or progressive adenopathy. 2. Stable findings in the chest. Noncalcified right upper lobe nodules are unchanged. There is no evidence of new or progressive metastatic disease within the chest. 3. 8 mm indeterminate low-density lesion in the right hepatic lobe, is stable. No new liver lesions.  9/6/2019 WBC 5.6, hemoglobin 11.2, platelets of 192, MCV 91.8  9/13/2019 patient received Keytruda cycle 6, albumin 3.2  10/4/2019 patient received cycle 7 of Keytruda, TSH 4.8, free T4 0.86  10/9/2019 WBC 5.8, hemoglobin 11.7, platelets 174  10/25/2019 patient received cycle 8 of Keytruda.  WBC 5.9, hemoglobin 11.5, platelets 152, creatinine 0.75, cortisol 15.2, TSH 6.48 high , free T4 1.13 normal  11/6/2019 WBC 6.6, hemoglobin 11.8, MCV 91.5, platelets 187  11/15/2019 patient received cycle 9 of Keytruda, WBC 6.6, hemoglobin 11.9, platelets 161, cortisol level 8.86, TSH 2.68, free T3 2.8, free T4 1.5  12/4/2019 WBC 6.9, hemoglobin 12.0, platelets 180, MCV 92.9  12/06/2019-Keytruda Cycle 10  12/27/2019- Cycle 11 LFTs normal, WBC 6.7, hemoglobin 11.4, platelets 168, MCV 93, TSH 2.4,  1/28/2020: CT chest abdomen and pelvis with contrast:  Single (aortic lymph node in the abdominal retroperitoneum has increased.  Rest of the retroperitoneal lymph nodes have decreased in size.  Mediastinal adenopathy appears unchanged.  Noncalcified bilateral pulmonary nodules are stable  1/17/2020 patient received Keytruda cycle 12:.  2/3/2020 WBC 7.1, hemoglobin 12.2, platelets 171  02/10/2020 patient received cycle 13 of  Keytruda: WBC 7.3, hemoglobin 12.1, platelets 166, creatinine 0.82  3/6/2020 patient is of Keytruda 200 mg, hemoglobin 10.8  3/27/2020 patient received Keytruda 200 mg, hemoglobin 11.4, TSH 1.95  4/17/2020 patient received Keytruda cycle 16 200 mg, WBC 6.1, hemoglobin 11.6, platelets 150  5/8/2020: Patient received cycle 17 Keytruda, WBC 6.3, hemoglobin 9.6, platelets 137, TSH 2.05, free T4 1.54, creatinine 0.93, LFTs normal,  5/29/2020: Patient received cycle 18 Keytruda, WBC 6.5, hemoglobin 11.7, platelets 129, creatinine 0.83, TSH 1.69  6/26/2020: Patient receiving Keytruda.   7/17/2020: TSH 2.06, WBC 5.7, hemoglobin 11.4, platelets 177, MCV 92.4, Keytruda 200 mg cycle 20  7/27/2020: CT chest abdomen pelvis with contrast: Stable findings in the chest abdomen pelvis since 1/28/2020.  Noncalcified bilateral pulmonary nodules are stable.  Stable retroperitoneal left perinephric adenopathy in the abdomen.  8/7/2020: Patient received cycle 21 of Keytruda  8/28/2020 patient is a cycle 22 of Keytruda.  WBC 6.02, hemoglobin 11.3, platelets 161  9/11/2020 WBC 6.9, hemoglobin 12.4, platelets 192  9/18/2020: Cycle 23 of Keytruda, TSH 1.19  10/9/2020: Cycle 24 of Keytruda, WBC 5.9, hemoglobin 11.9, platelets 178, creatinine 0.84, TSH 2.56, free T3 2.4, CMP normal  10/23/2020: WBC 7.34, hemoglobin 11.6, platelets 175  10/30/2020:.  Keytruda 200 mg  11/20/2020: Keytruda 200 mg  12/11/2020 Keytruda 200 mg.  WBC 5.5, hemoglobin 11.4, platelets 176, creatinine 0.8, LFTs normal, TSH 2.2, cortisol 8.3  12/10/2020: CT chest abdomen and pelvis with contrast: Previously seen left para-aortic lymph nodes within the abdomen are decreased in size.  No pathological lymphadenopathy.  No evidence of residual malignancy..  Small noncalcified pulmonary nodules are stable.  No new nodules.  Stable mediastinal lymph nodes.  3/12/2021: WBC 6.7, hemoglobin 11.3, platelets 17  3/12/2021: CMP normal, TSH 2.9, cortisol 11.95, 1  3/15/2021: CT chest  with contrast: Stable findings in the chest with postsurgical changes of esophageal resection and gastric pull-through.  Stable right lower lobe lung nodule measures 11 mm.  Stable size and appearance of the mediastinal and upper abdominal lymph nodes.  3/23/2021: Doppler of upper extremity: Normal.  3/24/2021: CT abdomen and pelvis with contrast: Postoperative changes.  Left periaortic nodes are stable.  4/12/2021: TSH 4.1, cortisol 12.4  6/22/2021: CMP normal,Cortisol 10.08,, WBC 5.8, hemoglobin 11.2, platelets 109, TSH 2.59  7/12/2021: CT scan of the chest abdomen pelvis with contrast: Dominant 10 x 10 left periaortic lymph node is stable since 3/22/2021.  Dominant lymph nodes in the right lower paratracheal and AP window distribution are stable since 3/15/2021.  No evidence of progressive adenopathy in the chest abdomen or pelvis.    01/11/2022 -PET/CT findings consistent with new cedric metastatic disease in the retroperitoneum.  2 new pathologically enlarged hypermetabolic retroperitoneal lymph nodes.  No convincing evidence of recurrent disease within the chest neck pelvis or skeleton.  1/20/2022 -CT-guided needle biopsy consistent with poorly differentiated carcinoma.  CDX2 positive consistent with metastases from known esophageal primary  2/9/2022 - Pembrolizumab complicated with fatigue, joint pains, nausea, diarrhea. Symptoms decrease in intensity over a week.  3/30/2022 -CT chest and pelvis with stable metastatic lymph nodes in the left periaortic retroperitoneum.  No evidence of disease progression or new lesions.  Stable lymphadenopathy in the distal paratracheal mediastinal area  4/13/2022 - Keytruda 200  5/4/2022 - Keytruda 200  Continued keytruda  7/11/2022 - CT abdomen pelvis with decrease in size of left perioaortic lymph node. No other areas of disease.  Continues to be on Keytruda  10/18/2022 - stable CT imaging  1/20/2023 - CT imaging with stable disease. No significant change.  Patient was  hospitalized with esophagitis, subsequent EGD with improvement, no recurrence of disease.  2/21/2023 - Keytruda  CT CAP with increased retroperitoneal adenopathy.   ECHO with EF 35-40% (concern for immunotherapy related )immunotherapy held with ongoing investigation for cause.  Subsequent improvement in EF to 45%  4/4/2023 - CT abdomen pelvis with interval retroperitoneal lymph node enlargement compatible with metastatic adenopathyl.  5/5/2023 - PET CT with mixed response. No clear evidence of progression  6/2/23 -started pembrolizumab 200 mg IV  Posttreatment had symptoms of chest tightness troponin and proBNP negative, echocardiogram with EF 40%  Subsequent cardiac cath with EF down to 30% elevated proBNP, troponin no significant obstructive disease in the graft vessels  Cardiac MRI unable to obtain given pacemaker noncompliant.  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 Two enlarged mediastinal lymph nodes in the precarinal and AP window distributions are unchanged since 4/4/2023. No new or progressive adenopathy in the chest.Stable 3 mm or less noncalcified right lung nodules since 4/4/2023. Surgical changes of distal esophagectomy with gastric pull-through, without evidence of local disease recurrence  8/29/23 - started FOLFOX had significant side effects  9/20/23 - stopped oxaliplatin and continued 5FU only'  11/29/23 - CT CAP with decrease size of retroperitoneal adenopathy, no other concerning findings.  Continues 5FU leucovorin   2/12/24 - PET CT There is some metabolic activity within a mediastinal lymph node which has been suggested with slightly decreased metabolic activity. Whether this is reactive or pathologic is uncertain. Previously noted abnormal retroperitoneal  lymph node is no longer identified continued treatmen  4/10/24 - iron sat 10, ferritin  14, plan for venofer  Continued treatment signatera negative  5/7/24 - CT chest without contrast. Unchanged mildly enlarged right paratracheal lymph lymph node measuring 1.1 cm in short axis (series 2 image 40) and left paratracheal measuring 1.1 cm in short axis (series 2 image 39). No new or progressive lymphadenopathy.  Continued maintenance 5FU  changed frequency to every 3 weeks.   7/11/2024 CT chest with stable lung nodule, mildly prominent precarinal lymph node    7/25/2024:  Bilateral lung, bronchial wash with smears and cytospin:  Acute inflammation, pulmonary macrophages and bronchial epithelial cells    2.   Lymph node, precarinal station 4, fine-needle aspiration with smears and cell block:  Benign bronchial epithelial cells in a background of blood and lymphocytes consistent with lymph node aspirate  No malignancy identified      8/7/24: Seen today for initial evaluation by me.  He was previously being seen by Dr. Elizondo in our practice.  He has underlying history of metastatic esophageal adenocarcinoma and was on maintenance therapy with 5-FU/leucovorin.  His last treatment was on 7/17/2024.  He has intermittent bronchoscopy planned lymph node biopsy due to suspicion for metastatic disease on recent imaging.  This is reported as above.    Patient reported that he has had persistent fatigue with maintenance chemotherapy, also reported having GI side effects, such as nausea and poor appetite with chemo.  However, the patient denied any weight loss.  No other issues presently.    9/11/2024: CT chest/abdomen/pelvis:  Impression:  1. Postsurgical changes from esophagectomy with gastric pull-through. No findings to suggest progression of disease within the thorax.  2. Stable subcentimeter pulmonary nodules measuring up to 4 mm in the right lower lobe, and mildly prominent precarinal lymph node. Continued attention on  follow-up suggested.  3. No findings to suggest progression of disease or metastatic disease within the abdomen or pelvis.  4. Additional findings as given above..      10/23/24: Patient seen today for follow-up visit with restaging scans.  Has ogoing sinus congestion for several months. Some sore throat.some cough, SOB has improved. Has ongoing fatigue, stable.     1/10/25: CT chest/Abdomen/Pelvis W contrast:  Impression:   1. Stable examination of the chest with postsurgical changes from an esophagectomy and gastric pull-through.   2. However, within the abdomen there is a new enhancing lesion in the posterior right hepatic lobe measuring 0.6 cm and interval enlargement of a para-aortic lymph node concerning for disease progression now measuring 1.9 x 1.1 cm.     1/17/2025: Patient seen today for follow-up visit with restaging scans.  No new complaints reported.  Continues to do well clinically    1/28/2025 PET/CT:  Impression:  1. Isolated hypermetabolic left periaortic/retroperitoneal lymph nodes suspicious for metastatic disease in patient with history of esophageal malignancy. While almost certainly esophageal related, nonemergent testicular ultrasound would be reasonable in   a male with retroperitoneal adenopathy to exclude a solid testicular lesion.  2. Mild lower mediastinal adenopathy stable from 2024. No evidence of solid organ metastases.  3. Esophagectomy with gastric pull-through. Linear probably physiologic uptake in the stomach. Differential would include gastritis.  4. Emphysema. Emphysema on CT is an independent risk factor for lung cancer. Low dose lung cancer screening should be considered if patient qualifies based on smoking history or if not already enrolled in a screening program.      2/11/2025: Patient here today for routine follow-up prior to scheduled treatment.  He reports overall tolerating leucovorin and fluorouracil although he did have mild intermittent diarrhea.  He also reports he  has been drinking less fluids because he feels bloated.  He has no new pain.  He noted nausea following his infusion but this was managed with Zofran.        Subjective:  3/18/25: Patient seen today for follow-up prior to cycle 3 maintenance chemotherapy reported having off-and-on diarrhea and progressive fatigue after starting chemotherapy.  Also has intermittent nausea.  Weight/appetite is stable presently.  He reported using Imodium sparingly for his diarrhea    Past Medical History:   Diagnosis Date    B12 deficiency     Blockage of coronary artery of heart     Depression     Disease of thyroid gland     DM (diabetes mellitus)     Dysphagia     Esophageal cancer     GERD (gastroesophageal reflux disease)     HTN (hypertension)     Hyperlipidemia        Past Surgical History:   Procedure Laterality Date    ABDOMINAL WALL ABSCESS INCISION AND DRAINAGE  10/22/2018    WITH DEBRIDEMENT  1.5CM X 1.5 CM X 1.5 CM    DR. PURI    BRONCHOSCOPY N/A 7/25/2024    Procedure: BRONCHOSCOPY WITH ENDOBRONCHIAL ULTRASOUND, LUNG WASHINGS, AND FINE NEEDLE ASPIRATIONS;  Surgeon: Bonnie Smith MD;  Location: [facility] ENDOSCOPY;  Service: Pulmonary;  Laterality: N/A;    CARDIAC CATHETERIZATION Right 7/20/2023    Procedure: Left Heart Cath;  Surgeon: Mazin Simmons MD;  Location: [facility] CATH INVASIVE LOCATION;  Service: Cardiovascular;  Laterality: Right;    CATARACT EXTRACTION  2018    COLONOSCOPY      CORONARY ARTERY BYPASS GRAFT  2015    ENDOSCOPY  12/20/2016    ENDOSCOPY N/A 01/02/2023    Procedure: ESOPHAGOGASTRODUODENOSCOPY;  Surgeon: Esvin Morgan MD;  Location: [facility] ENDOSCOPY;  Service: Gastroenterology;  Laterality: N/A;  post: anastamosis stricture    ENDOSCOPY N/A 02/09/2023    Procedure: ESOPHAGOGASTRODUODENOSCOPY, non-wire guided balloon dilation (12-14mm) of esophageal stricture;  Surgeon: Tino Villafana MD;  Location: [facility] ENDOSCOPY;  Service: Gastroenterology;  Laterality: N/A;  post:  esophageal anastamotic stricture, post surgical changes    ESOPHAGECTOMY  05/04/2017    BROOKS PETTY ESOPHAGECTOMY, FEEDING JEJNOSTOMOY, ABDOMINAL AND MEDIASTINAL LYMPH NODE DISECTION, PEDICLED MUSCLE FLAP COVERAGE DR. PURI    ESOPHAGOSCOPY / EGD  07/12/2017    WITH BALLOON DILATION    ESOPHAGOSCOPY / EGD  07/26/2017    WITH BALLOON DILATION    ESOPHAGOSCOPY / EGD  08/11/2017    WITH BALLOON DILATION    ESOPHAGOSCOPY / EGD  08/28/2017    WITH BALLOON DILATION    ESOPHAGOSCOPY / EGD  09/27/2017    WITH BALLOON DILATION    INSERT / REPLACE / REMOVE PACEMAKER      LYMPH NODE BIOPSY  01/30/2019    PACEMAKER IMPLANTATION  11/21/2016       Current Outpatient Medications:     clonazePAM (KlonoPIN) 0.5 MG tablet, Take 0.5 tablets by mouth 2 (Two) Times a Day As Needed for Anxiety., Disp: , Rfl: 1    cyanocobalamin (VITAMIN B-12) 1000 MCG tablet, Take 1 tablet by mouth Daily. (Patient not taking: Reported on 2/18/2025), Disp: , Rfl:     folic acid (FOLVITE) 1 MG tablet, Take 1 tablet by mouth Daily. (Patient not taking: Reported on 2/18/2025), Disp: , Rfl:     gabapentin (NEURONTIN) 100 MG capsule, Take 1 capsule by mouth 3 (Three) Times a Day. (Patient not taking: Reported on 2/18/2025), Disp: 90 capsule, Rfl: 1    levothyroxine (SYNTHROID, LEVOTHROID) 50 MCG tablet, Take 1 tablet by mouth once daily, Disp: 30 tablet, Rfl: 0    losartan (COZAAR) 25 MG tablet, Take 1 tablet by mouth Every Night. (Patient not taking: Reported on 2/18/2025), Disp: , Rfl:     metFORMIN (GLUCOPHAGE) 1000 MG tablet, Take 0.5 tablets by mouth Daily. (Patient taking differently: Take 0.5 tablets by mouth 2 (Two) Times a Day With Meals.), Disp: 60 tablet, Rfl: 2    nebivolol (BYSTOLIC) 5 MG tablet, Take 1 tablet by mouth Daily., Disp: , Rfl:     nystatin (MYCOSTATIN) 100,000 unit/mL suspension, , Disp: , Rfl:     nystatin (MYCOSTATIN) 464352 UNIT/GM cream, , Disp: , Rfl:     ondansetron (ZOFRAN) 4 MG tablet, TAKE 1 TABLET BY MOUTH EVERY 8 HOURS AS  NEEDED FOR NAUSEA AND VOMITING, Disp: 90 tablet, Rfl: 2    pantoprazole (PROTONIX) 40 MG EC tablet, Take 1 tablet by mouth 2 (Two) Times a Day., Disp: , Rfl:     pravastatin (PRAVACHOL) 10 MG tablet, Take 0.5 tablets by mouth Every Night., Disp: , Rfl:     tamsulosin (FLOMAX) 0.4 MG capsule 24 hr capsule, Take 1 capsule by mouth 2 (Two) Times a Day., Disp: , Rfl:     Allergies   Allergen Reactions    Ace Inhibitors Unknown (See Comments)     Unknown reaction      Heparin Other (See Comments)     Fever/chills, weakness in legs    Sulfa Antibiotics Other (See Comments)     Mouth sores    Ciprofloxacin GI Intolerance     Pt reports pain to abd       Family History   Problem Relation Age of Onset    Mental illness Mother     Heart disease Mother     Hypertension Father     Cancer Father     Heart disease Sister     Heart disease Brother      Cancer-related family history includes Cancer in his father.    Social History     Tobacco Use    Smoking status: Former     Current packs/day: 0.00     Average packs/day: 2.0 packs/day for 50.0 years (100.0 ttl pk-yrs)     Types: Cigarettes     Start date: 1965     Quit date: 2015     Years since quittin.7    Smokeless tobacco: Former     Types: Chew     Quit date: 1989   Vaping Use    Vaping status: Never Used   Substance Use Topics    Alcohol use: No    Drug use: No     ROS:     Objective:    There were no vitals filed for this visit.                (1) Restricted in physically strenuous activity, ambulatory and able to do work of light nature    Physical Exam:     Physical Exam   Constitutional: He is oriented to person, place, and time. He appears well-developed. No distress.   HENT:   Head: Normocephalic and atraumatic.   Nose: Nose normal. Mouth/Throat: Mucous membranes are dry.   Eyes: Pupils are equal, round, and reactive to light. No scleral icterus.   Neck: No thyromegaly present.   Cardiovascular: Normal rate, regular rhythm, normal heart sounds and  normal pulses. Exam reveals no gallop and no friction rub.   Pulmonary/Chest: Effort normal and breath sounds normal. No respiratory distress. He has no wheezes.   Abdominal: Soft. Bowel sounds are normal.   No tenderness   Musculoskeletal: Normal range of motion. No swelling, deformity or signs of injury.      Right lower leg: No edema.   Neurological: He is alert and oriented to person, place, and time. He displays no weakness. He exhibits normal muscle tone.   Skin: Skin is warm and dry. No bruising and no rash noted. He is not diaphoretic. No erythema. No jaundice.   Right side chest  port   Psychiatric: His behavior is normal. Mood, judgment and thought content normal.   Vitals reviewed.      Lab Results - Last 18 Months   Lab Units 03/04/25  1103 02/18/25  1130 02/11/25  1044   WBC 10*3/mm3 3.61 3.54 4.53   HEMOGLOBIN g/dL 9.8* 10.3* 10.6*   HEMATOCRIT % 29.4* 31.1* 31.9*   PLATELETS 10*3/mm3 151 179 173   MCV fL 94.5 94.8 95.5     Lab Results - Last 18 Months   Lab Units 03/04/25  1124 02/18/25  1130 02/11/25  1057 02/11/25  1044 01/28/25  1041 01/17/25  1241   SODIUM mmol/L  --  142  --  140  --  139   POTASSIUM mmol/L  --  5.1  --  5.2  --  6.0*   CHLORIDE mmol/L  --  107  --  107  --  104   CO2 mmol/L  --  23.2  --  20.5*  --  24.4   BUN mg/dL  --  16  --  19  --  12   CREATININE mg/dL 1.80* 1.80* 2.20* 2.09*   < > 1.65*   CALCIUM mg/dL  --  9.0  --  8.9  --  9.1   BILIRUBIN mg/dL  --  0.3  --  0.4  --  0.3   ALK PHOS U/L  --  32*  --  32*  --  37*   ALT (SGPT) U/L  --  5  --  6  --  11   AST (SGOT) U/L  --  13  --  15  --  25   GLUCOSE mg/dL  --  145*  --  183*  --  109*    < > = values in this interval not displayed.     CASE SUMMARY:  Patient has a history of GE junction poorly differentiated adenocarcinoma for which he had chemoradiation followed by [facility] resection:  Pathology showed ypT3,pN1 disease.  Tumor was Stage IIIA, diagnosed in 2016.  He received concurrent chemoradiation with weekly  Carboplatin and Taxol and radiation finishing on 3/15/17.  CT scan on 1/10/19 showed new retroperitoneal lymph nodes.  These lymph nodes were biopsied on 1/30/19 and it shows metastatic esophageal cancer.  [facility] Next Gen testing was performed on the sample and it shows KRAS mutation, microsatellite stable tumor.  PD-L1 is positive.  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  HER2/marvel (ERBB2) was not amplified and was negative.  His CT scan on 5/1/19 shows evidence of progression.  Patient has started receiving Keytruda on 5/31/19.  He has received 19 cycles so far.   CT scan on 1/28/2020 shows improvement and continued response.  CT scan chest abdomen pelvis with contrast on 12/10/2020 does not show any evidence of disease progression.  On 12/22/2020 decision made to hold Keytruda per patient's request to get a treatment break.. Repeat CT scan chest 7/12/2021 does not show any evidence of disease progression.  Now CT imaging in January 2021 with a CT showing increased abdominal left para-aortic adenopathy.  Subsequent PET/CT with new cedric metastatic disease in the retroperitoneum of the abdomen.  New pathologically enlarged hypermetabolic retroperitoneal lymph nodes.  This would be concerning for disease recurrence/progression.  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  I discussed with him the treatment options going forward.  He has had a good response with immunotherapy pembrolizumab in the past. This was  restarted. subsequent imaging with treatment response. 1/2023 imaging with good response. Continued treatment  Now with increased adenopathy 2 new lymph nodes. Holding immunotherapy for now.   Consider getting cardiac MR discussed with Dr. Culp. While immunotherapy on hold, increased adenopathy is concerning. Could be pseudoprogression vs true progression. PET CT reviewed could indicate pseudoprogression since he has had a good response to immunotherapy  immunotherapy was restarted and has been on hold  with patient's symptomatology cardiac MRI cannot be obtained, no ischemic heart disease leading to cardiomyopathy this most likely is related to Keytruda.  We will stop Keytruda going forward  CT imaging with stable disease, FOLFOX was started which he had significant side effects  With imaging being stable and significant side effects with FOLFOX, we discussed  about options with wait and watch vs 5FU only, patient prefers to be on maintenance.   We started 5FU only .   Tolerating relatively well, has significant fatigue. CT imaging now with ongoing response. We discussed that he would have been on treatment for 2 years following recurrence with good response. We can continue treatment now and get a PET CT in 2/2024.will repeat prior to follow up. , PET with some residual activity in the mediastinal lymph node.   We continued treatment and now signatera negative. I had a long discussion with the patient. We decided to repeat CT chest now and if no adenopathy, consider holding treatment.   Now CT chest repeated with persistent adenopathy. Discussed biopsy/mediastinoscopy vs continued treatment. Has fairly been tolerating well except fatigue. After discussion we decided to continue chemotherapy.  Now repeat CT imaging with no new concerning findings there is stable nodules which are subcentimeter in the lungs there is mildly prominent precarinal lymph node.  I discussed with the patient about this possibly being inflammatory rather than neoplastic.    -After discussion we decided to proceed with EBUS and possible biopsy of the lymph node if negative for malignancy we can consider stopping maintenance treatment given Signatera is negative and patient has treatment related side effects which are affecting his quality of life  -Bronchoscopy and precarinal lymph node biopsy reported negative as above.  -Repeat Signatera test also reported negative.  -In view of significant treatment-related toxicities and no concerns for  disease progression at this time, we will plan for surveillance alone without additional systemic therapy.  -Repeat imaging with an interval enlargement of periaortic lymph node as well as uptrending Signatera CT DNA levels concerning for disease recurrence.  Given low disease burden, patient to resume 5-FU with leucovorin maintenance (1/28/2025).  Consider adding irinotecan if noted disease progression moving forward.      Assessment & Plan     Assessment:    Metastatic esophageal carcinoma:    -Patient currently on surveillance and is off treatment  -Restaging scans from 9/11/2024 as above, reported PAULIE.  -Repeat CT chest abdomen pelvis reviewed, noted interval enlargement of a   para-aortic lymph node concerning for disease progression now measuring 1.9 x 1.1 cm.   -Also noted to have uptrending Signatera CT DNA levels.  Discussed with patient that these findings are consistent with disease recurrence.  -Given low disease burden at this time, will resume 5-FU/leucovorin maintenance, can consider adding irinotecan if noted disease progression moving forward  -PET/CT completed on 1/28/2025 as above, consistent with disease progression. this was discussed with patient today.  -Continue maintenance 5-FU/leucovorin.  Patient has not treatment related side effects but no dose-limiting toxicities      Possible myocarditis  Symptoms started after last immunotherapy  Treatment on hold for now, symptoms have improved, cardiac markers were negative, echocardiogram with EF 40%, cardiac MRI cannot be obtained cardiac cath with EF down to 30% management per cardiology  Now EF up to 40-45% monitor.  Symptomatic improvement now no shortness of breath    Shortness of breath  Improved, has occasional shortness of breath, likely copd  I have recommended using albuterol  Symptoms have improved since stopping immunotherapy. Questionable pneumonitis will monitor closely with immunotherapy  Cardiology has evaluated for myocarditis  related to immunotherapy symptomatic improvement now continue to monitor. Symptomatic improvement as above.    Reflux/heartburn/dysphagia:   Symptoms improved.  Also has a history of strictures. Status post EGD and balloon dilation.   Continues to be on pantoprazole symptoms stable.  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  Continues on Synthroid stable now no heart function stable in normal range    Mild diarrhea:  diarrhea seems to be waxing and waning in nature.  Diarrhea related to immunotherapy now improved stable.    Patient reports mild diarrhea following cycle 1 of leucovorin and fluorouracil.  He has not been taking any Imodium.    PD-L1 positive, HER2 negative, p53 and KRAS positive.  Repeat Caris testing with repeat biopsy with no target mutations.    B12 deficiency: Continue B12    Anemia - Hb  was low iron sat low at 10, given venofer 300 x3 monitor for now. Intolerant to oral iron.  Continued low iron saturation will plan on Feraheme for 2 doses. Hemoglobin remains stable.    Groin pain - negative for UTI, STI, has seen urology, likely prostatits, started cipro improvement noted. Had cystoscopy, was started on flomax, continued improvement in symptoms    Chronic kidney disease creatinine has worsened overall compared to 8/2023 has seen Dr. Posadas with nephrology adjusted blood pressure meds, now blood pressure under better control.      SORIN on CKD,:  Limited oral intake:  -Counseled patient on importance of oral intake and nutrition while on chemotherapy  -Will plan for additional IV fluids with each chemotherapy cycle.  -Continue follow-up with nephrology for his underlying CKD      4-week follow-up with restaging scans, sooner as needed.

## 2025-03-18 ENCOUNTER — OFFICE VISIT (OUTPATIENT)
Dept: ONCOLOGY | Facility: CLINIC | Age: 75
End: 2025-03-18
Payer: MEDICARE

## 2025-03-18 ENCOUNTER — HOSPITAL ENCOUNTER (OUTPATIENT)
Dept: ONCOLOGY | Facility: HOSPITAL | Age: 75
Discharge: HOME | End: 2025-03-18
Payer: MEDICARE

## 2025-03-18 VITALS
HEART RATE: 80 BPM | OXYGEN SATURATION: 97 % | BODY MASS INDEX: 22.53 KG/M2 | SYSTOLIC BLOOD PRESSURE: 120 MMHG | HEIGHT: 66 IN | WEIGHT: 140.2 LBS | DIASTOLIC BLOOD PRESSURE: 71 MMHG

## 2025-03-18 DIAGNOSIS — C16.0 MALIGNANT NEOPLASM OF CARDIA: Primary | ICD-10-CM

## 2025-03-18 DIAGNOSIS — K90.9 MALABSORPTION OF IRON: ICD-10-CM

## 2025-03-18 DIAGNOSIS — C15.5 PRIMARY MALIGNANT NEOPLASM OF LOWER THIRD OF ESOPHAGUS: ICD-10-CM

## 2025-03-18 DIAGNOSIS — R79.89 ELEVATED SERUM CREATININE: ICD-10-CM

## 2025-03-18 DIAGNOSIS — E03.9 HYPOTHYROIDISM, UNSPECIFIED TYPE: ICD-10-CM

## 2025-03-18 LAB
ALP BLD-CCNC: 37 U/L (ref 53–128)
BASOPHILS # BLD AUTO: 0.04 10*3/MM3 (ref 0–0.2)
BASOPHILS NFR BLD AUTO: 0.9 % (ref 0–1.5)
BUN BLDA-MCNC: 14 MG/DL (ref 7–22)
CALCIUM BLD QL: 9.2 MG/DL (ref 8–10.3)
CHLORIDE BLDA-SCNC: 109 MMOL/L (ref 98–108)
CO2 BLDA-SCNC: 24 MMOL/L (ref 18–33)
CREAT BLDA-MCNC: 1.5 MG/DL (ref 0.6–1.2)
DEPRECATED RDW RBC AUTO: 49.3 FL (ref 37–54)
EGFRCR SERPLBLD CKD-EPI 2021: 48.2 ML/MIN/1.73
EOSINOPHIL # BLD AUTO: 0.11 10*3/MM3 (ref 0–0.4)
EOSINOPHIL NFR BLD AUTO: 2.5 % (ref 0.3–6.2)
ERYTHROCYTE [DISTWIDTH] IN BLOOD BY AUTOMATED COUNT: 15.2 % (ref 12.3–15.4)
GLUCOSE BLDC GLUCOMTR-MCNC: 193 MG/DL (ref 73–118)
HCT VFR BLD AUTO: 31.1 % (ref 37.5–51)
HGB BLD-MCNC: 10.1 G/DL (ref 13–17.7)
LYMPHOCYTES # BLD AUTO: 1.16 10*3/MM3 (ref 0.7–3.1)
LYMPHOCYTES NFR BLD AUTO: 26.5 % (ref 19.6–45.3)
MCH RBC QN AUTO: 31.8 PG (ref 26.6–33)
MCHC RBC AUTO-ENTMCNC: 32.5 G/DL (ref 31.5–35.7)
MCV RBC AUTO: 97.8 FL (ref 79–97)
MONOCYTES # BLD AUTO: 0.78 10*3/MM3 (ref 0.1–0.9)
MONOCYTES NFR BLD AUTO: 17.8 % (ref 5–12)
NEUTROPHILS NFR BLD AUTO: 2.28 10*3/MM3 (ref 1.7–7)
NEUTROPHILS NFR BLD AUTO: 52.3 % (ref 42.7–76)
PLATELET # BLD AUTO: 205 10*3/MM3 (ref 140–450)
PMV BLD AUTO: 9.8 FL (ref 6–12)
POC ALBUMIN: 3.5 G/L (ref 3.3–5.5)
POC ALT (SGPT): 8 U/L (ref 10–47)
POC AST (SGOT): 18 U/L (ref 11–38)
POC TOTAL BILIRUBIN: 0.8 MG/DL (ref 0.2–1.6)
POC TOTAL PROTEIN: 6 G/DL (ref 6.4–8.1)
POTASSIUM BLDA-SCNC: 4.5 MMOL/L (ref 3.6–5.1)
RBC # BLD AUTO: 3.18 10*6/MM3 (ref 4.14–5.8)
SODIUM BLD-SCNC: 142 MMOL/L (ref 128–145)
WBC NRBC COR # BLD AUTO: 4.37 10*3/MM3 (ref 3.4–10.8)

## 2025-03-18 PROCEDURE — 25010000002 DEXAMETHASONE PER 1 MG: Performed by: STUDENT IN AN ORGANIZED HEALTH CARE EDUCATION/TRAINING PROGRAM

## 2025-03-18 PROCEDURE — G0498 CHEMO EXTEND IV INFUS W/PUMP: HCPCS

## 2025-03-18 PROCEDURE — 25810000003 SODIUM CHLORIDE 0.9 % SOLUTION 250 ML FLEX CONT: Performed by: STUDENT IN AN ORGANIZED HEALTH CARE EDUCATION/TRAINING PROGRAM

## 2025-03-18 PROCEDURE — 25810000003 SODIUM CHLORIDE 0.9 % SOLUTION 500 ML FLEX CONT: Performed by: STUDENT IN AN ORGANIZED HEALTH CARE EDUCATION/TRAINING PROGRAM

## 2025-03-18 PROCEDURE — 96409 CHEMO IV PUSH SNGL DRUG: CPT

## 2025-03-18 PROCEDURE — 25810000003 SODIUM CHLORIDE 0.9 % SOLUTION: Performed by: STUDENT IN AN ORGANIZED HEALTH CARE EDUCATION/TRAINING PROGRAM

## 2025-03-18 PROCEDURE — 96375 TX/PRO/DX INJ NEW DRUG ADDON: CPT

## 2025-03-18 PROCEDURE — 85025 COMPLETE CBC W/AUTO DIFF WBC: CPT | Performed by: STUDENT IN AN ORGANIZED HEALTH CARE EDUCATION/TRAINING PROGRAM

## 2025-03-18 PROCEDURE — 25010000002 FLUOROURACIL PER 500 MG: Performed by: STUDENT IN AN ORGANIZED HEALTH CARE EDUCATION/TRAINING PROGRAM

## 2025-03-18 PROCEDURE — 25010000002 LEUCOVORIN CALCIUM PER 50 MG: Performed by: STUDENT IN AN ORGANIZED HEALTH CARE EDUCATION/TRAINING PROGRAM

## 2025-03-18 PROCEDURE — 96367 TX/PROPH/DG ADDL SEQ IV INF: CPT

## 2025-03-18 PROCEDURE — 80053 COMPREHEN METABOLIC PANEL: CPT

## 2025-03-18 RX ORDER — DEXAMETHASONE SODIUM PHOSPHATE 4 MG/ML
12 INJECTION, SOLUTION INTRA-ARTICULAR; INTRALESIONAL; INTRAMUSCULAR; INTRAVENOUS; SOFT TISSUE ONCE
Status: CANCELLED
Start: 2025-03-18 | End: 2025-03-18

## 2025-03-18 RX ORDER — FLUOROURACIL 50 MG/ML
400 INJECTION, SOLUTION INTRAVENOUS ONCE
Status: CANCELLED | OUTPATIENT
Start: 2025-03-18

## 2025-03-18 RX ORDER — SODIUM CHLORIDE 0.9 % (FLUSH) 0.9 %
20 SYRINGE (ML) INJECTION AS NEEDED
Status: CANCELLED | OUTPATIENT
Start: 2025-03-18

## 2025-03-18 RX ORDER — FLUOROURACIL 50 MG/ML
400 INJECTION, SOLUTION INTRAVENOUS ONCE
Status: COMPLETED | OUTPATIENT
Start: 2025-03-18 | End: 2025-03-18

## 2025-03-18 RX ORDER — DEXAMETHASONE SODIUM PHOSPHATE 4 MG/ML
12 INJECTION, SOLUTION INTRA-ARTICULAR; INTRALESIONAL; INTRAMUSCULAR; INTRAVENOUS; SOFT TISSUE ONCE
Status: COMPLETED | OUTPATIENT
Start: 2025-03-18 | End: 2025-03-18

## 2025-03-18 RX ORDER — SODIUM CHLORIDE 9 MG/ML
20 INJECTION, SOLUTION INTRAVENOUS ONCE
Status: COMPLETED | OUTPATIENT
Start: 2025-03-18 | End: 2025-03-18

## 2025-03-18 RX ORDER — SODIUM CHLORIDE 9 MG/ML
20 INJECTION, SOLUTION INTRAVENOUS ONCE
Status: CANCELLED | OUTPATIENT
Start: 2025-03-18

## 2025-03-18 RX ORDER — SODIUM CHLORIDE 0.9 % (FLUSH) 0.9 %
20 SYRINGE (ML) INJECTION AS NEEDED
Status: DISCONTINUED | OUTPATIENT
Start: 2025-03-18 | End: 2025-03-19 | Stop reason: HOSPADM

## 2025-03-18 RX ADMIN — FLUOROURACIL 4220 MG: 50 INJECTION, SOLUTION INTRAVENOUS at 13:27

## 2025-03-18 RX ADMIN — SODIUM CHLORIDE 700 MG: 9 INJECTION, SOLUTION INTRAVENOUS at 12:25

## 2025-03-18 RX ADMIN — FLUOROURACIL 700 MG: 50 INJECTION, SOLUTION INTRAVENOUS at 13:25

## 2025-03-18 RX ADMIN — Medication 10 ML: at 10:16

## 2025-03-18 RX ADMIN — DEXAMETHASONE SODIUM PHOSPHATE 12 MG: 4 INJECTION INTRA-ARTICULAR; INTRALESIONAL; INTRAMUSCULAR; INTRAVENOUS; SOFT TISSUE at 12:24

## 2025-03-18 RX ADMIN — SODIUM CHLORIDE 20 ML/HR: 9 INJECTION, SOLUTION INTRAVENOUS at 12:28

## 2025-03-18 NOTE — PROGRESS NOTES
Patient here for C4D1 leucovorin/5FU push and pump after seeing Dr Beth today. Tolerated treatment well. He will return for 5FU pump disconnect and IVF on 3/20/25. Printed AVS and he was discharged home.

## 2025-03-20 ENCOUNTER — HOSPITAL ENCOUNTER (OUTPATIENT)
Dept: ONCOLOGY | Facility: HOSPITAL | Age: 75
Discharge: HOME | End: 2025-03-20
Admitting: STUDENT IN AN ORGANIZED HEALTH CARE EDUCATION/TRAINING PROGRAM
Payer: MEDICARE

## 2025-03-20 VITALS
SYSTOLIC BLOOD PRESSURE: 150 MMHG | OXYGEN SATURATION: 96 % | WEIGHT: 140.2 LBS | BODY MASS INDEX: 22.53 KG/M2 | DIASTOLIC BLOOD PRESSURE: 78 MMHG | HEIGHT: 66 IN | HEART RATE: 71 BPM | TEMPERATURE: 97.5 F | RESPIRATION RATE: 14 BRPM

## 2025-03-20 DIAGNOSIS — C15.5 PRIMARY MALIGNANT NEOPLASM OF LOWER THIRD OF ESOPHAGUS: ICD-10-CM

## 2025-03-20 DIAGNOSIS — C16.0 MALIGNANT NEOPLASM OF CARDIA: Primary | ICD-10-CM

## 2025-03-20 PROCEDURE — 96360 HYDRATION IV INFUSION INIT: CPT

## 2025-03-20 PROCEDURE — 25810000003 SODIUM CHLORIDE 0.9 % SOLUTION: Performed by: STUDENT IN AN ORGANIZED HEALTH CARE EDUCATION/TRAINING PROGRAM

## 2025-03-20 RX ORDER — SODIUM CHLORIDE 0.9 % (FLUSH) 0.9 %
20 SYRINGE (ML) INJECTION AS NEEDED
Status: DISCONTINUED | OUTPATIENT
Start: 2025-03-20 | End: 2025-03-21 | Stop reason: HOSPADM

## 2025-03-20 RX ORDER — SODIUM CHLORIDE 0.9 % (FLUSH) 0.9 %
20 SYRINGE (ML) INJECTION AS NEEDED
OUTPATIENT
Start: 2025-03-20

## 2025-03-20 RX ORDER — SODIUM CHLORIDE 9 MG/ML
1000 INJECTION, SOLUTION INTRAVENOUS ONCE
Status: COMPLETED | OUTPATIENT
Start: 2025-03-20 | End: 2025-03-20

## 2025-03-20 RX ADMIN — Medication 20 ML: at 15:15

## 2025-03-20 RX ADMIN — SODIUM CHLORIDE 500 ML/HR: 0.9 INJECTION, SOLUTION INTRAVENOUS at 14:11

## 2025-03-20 NOTE — PROGRESS NOTES
Pt came in for CIV/DC and IVF.  CIV empty with blood return noted. Pt reports has hx of CHF (not noted on history)pt states has had 5 bypass.  Discussed with BENJA Merida  and gave 500 ml IVF per pt request for today, inbasket sent for future infusions, reviewed with pt and v/u.

## 2025-04-01 ENCOUNTER — HOSPITAL ENCOUNTER (OUTPATIENT)
Dept: ONCOLOGY | Facility: HOSPITAL | Age: 75
Discharge: HOME | End: 2025-04-01
Payer: MEDICARE

## 2025-04-01 VITALS
TEMPERATURE: 97.5 F | HEIGHT: 66 IN | BODY MASS INDEX: 21.21 KG/M2 | WEIGHT: 132 LBS | SYSTOLIC BLOOD PRESSURE: 121 MMHG | DIASTOLIC BLOOD PRESSURE: 63 MMHG | HEART RATE: 70 BPM | RESPIRATION RATE: 16 BRPM | OXYGEN SATURATION: 97 %

## 2025-04-01 DIAGNOSIS — C16.0 MALIGNANT NEOPLASM OF CARDIA: Primary | ICD-10-CM

## 2025-04-01 DIAGNOSIS — C15.5 PRIMARY MALIGNANT NEOPLASM OF LOWER THIRD OF ESOPHAGUS: ICD-10-CM

## 2025-04-01 LAB
ALP BLD-CCNC: 43 U/L (ref 53–128)
BASOPHILS # BLD AUTO: 0.08 10*3/MM3 (ref 0–0.2)
BASOPHILS NFR BLD AUTO: 2.4 % (ref 0–1.5)
BUN BLDA-MCNC: 14 MG/DL (ref 7–22)
CALCIUM BLD QL: 9.5 MG/DL (ref 8–10.3)
CHLORIDE BLDA-SCNC: 106 MMOL/L (ref 98–108)
CO2 BLDA-SCNC: 25 MMOL/L (ref 18–33)
CREAT BLDA-MCNC: 1.5 MG/DL (ref 0.6–1.2)
DEPRECATED RDW RBC AUTO: 50.3 FL (ref 37–54)
EGFRCR SERPLBLD CKD-EPI 2021: 48.2 ML/MIN/1.73
EOSINOPHIL # BLD AUTO: 0.12 10*3/MM3 (ref 0–0.4)
EOSINOPHIL NFR BLD AUTO: 3.6 % (ref 0.3–6.2)
ERYTHROCYTE [DISTWIDTH] IN BLOOD BY AUTOMATED COUNT: 15.1 % (ref 12.3–15.4)
GLUCOSE BLDC GLUCOMTR-MCNC: 168 MG/DL (ref 73–118)
HCT VFR BLD AUTO: 28 % (ref 37.5–51)
HGB BLD-MCNC: 9.4 G/DL (ref 13–17.7)
LYMPHOCYTES # BLD AUTO: 1.08 10*3/MM3 (ref 0.7–3.1)
LYMPHOCYTES NFR BLD AUTO: 32.7 % (ref 19.6–45.3)
MCH RBC QN AUTO: 32.6 PG (ref 26.6–33)
MCHC RBC AUTO-ENTMCNC: 33.6 G/DL (ref 31.5–35.7)
MCV RBC AUTO: 97.2 FL (ref 79–97)
MONOCYTES # BLD AUTO: 0.39 10*3/MM3 (ref 0.1–0.9)
MONOCYTES NFR BLD AUTO: 11.8 % (ref 5–12)
NEUTROPHILS NFR BLD AUTO: 1.63 10*3/MM3 (ref 1.7–7)
NEUTROPHILS NFR BLD AUTO: 49.5 % (ref 42.7–76)
PLATELET # BLD AUTO: 205 10*3/MM3 (ref 140–450)
PMV BLD AUTO: 9.8 FL (ref 6–12)
POC ALBUMIN: 3.5 G/L (ref 3.3–5.5)
POC ALT (SGPT): 12 U/L (ref 10–47)
POC AST (SGOT): 17 U/L (ref 11–38)
POC TOTAL BILIRUBIN: 1 MG/DL (ref 0.2–1.6)
POC TOTAL PROTEIN: 6 G/DL (ref 6.4–8.1)
POTASSIUM BLDA-SCNC: 4.8 MMOL/L (ref 3.6–5.1)
RBC # BLD AUTO: 2.88 10*6/MM3 (ref 4.14–5.8)
SODIUM BLD-SCNC: 142 MMOL/L (ref 128–145)
WBC NRBC COR # BLD AUTO: 3.3 10*3/MM3 (ref 3.4–10.8)

## 2025-04-01 PROCEDURE — 25010000002 FLUOROURACIL PER 500 MG: Performed by: PHYSICIAN ASSISTANT

## 2025-04-01 PROCEDURE — 80053 COMPREHEN METABOLIC PANEL: CPT

## 2025-04-01 PROCEDURE — 96409 CHEMO IV PUSH SNGL DRUG: CPT

## 2025-04-01 PROCEDURE — G0498 CHEMO EXTEND IV INFUS W/PUMP: HCPCS

## 2025-04-01 PROCEDURE — 96375 TX/PRO/DX INJ NEW DRUG ADDON: CPT

## 2025-04-01 PROCEDURE — 96416 CHEMO PROLONG INFUSE W/PUMP: CPT

## 2025-04-01 PROCEDURE — 25010000002 LEUCOVORIN CALCIUM PER 50 MG: Performed by: PHYSICIAN ASSISTANT

## 2025-04-01 PROCEDURE — 85025 COMPLETE CBC W/AUTO DIFF WBC: CPT | Performed by: STUDENT IN AN ORGANIZED HEALTH CARE EDUCATION/TRAINING PROGRAM

## 2025-04-01 PROCEDURE — 96367 TX/PROPH/DG ADDL SEQ IV INF: CPT

## 2025-04-01 PROCEDURE — 25810000003 SODIUM CHLORIDE 0.9 % SOLUTION 250 ML FLEX CONT: Performed by: PHYSICIAN ASSISTANT

## 2025-04-01 PROCEDURE — 25010000002 DEXAMETHASONE PER 1 MG: Performed by: PHYSICIAN ASSISTANT

## 2025-04-01 RX ORDER — FLUOROURACIL 50 MG/ML
400 INJECTION, SOLUTION INTRAVENOUS ONCE
Status: COMPLETED | OUTPATIENT
Start: 2025-04-01 | End: 2025-04-01

## 2025-04-01 RX ORDER — SODIUM CHLORIDE 9 MG/ML
500 INJECTION, SOLUTION INTRAVENOUS ONCE
Status: CANCELLED
Start: 2025-04-03 | End: 2025-04-03

## 2025-04-01 RX ORDER — DEXAMETHASONE SODIUM PHOSPHATE 4 MG/ML
12 INJECTION, SOLUTION INTRA-ARTICULAR; INTRALESIONAL; INTRAMUSCULAR; INTRAVENOUS; SOFT TISSUE ONCE
Status: CANCELLED
Start: 2025-04-01 | End: 2025-04-01

## 2025-04-01 RX ORDER — DEXAMETHASONE SODIUM PHOSPHATE 4 MG/ML
12 INJECTION, SOLUTION INTRA-ARTICULAR; INTRALESIONAL; INTRAMUSCULAR; INTRAVENOUS; SOFT TISSUE ONCE
Status: COMPLETED | OUTPATIENT
Start: 2025-04-01 | End: 2025-04-01

## 2025-04-01 RX ORDER — SODIUM CHLORIDE 9 MG/ML
20 INJECTION, SOLUTION INTRAVENOUS ONCE
Status: DISCONTINUED | OUTPATIENT
Start: 2025-04-01 | End: 2025-04-02 | Stop reason: HOSPADM

## 2025-04-01 RX ORDER — SODIUM CHLORIDE 9 MG/ML
20 INJECTION, SOLUTION INTRAVENOUS ONCE
Status: CANCELLED | OUTPATIENT
Start: 2025-04-01

## 2025-04-01 RX ORDER — FLUOROURACIL 50 MG/ML
400 INJECTION, SOLUTION INTRAVENOUS ONCE
Status: CANCELLED | OUTPATIENT
Start: 2025-04-01

## 2025-04-01 RX ADMIN — FLUOROURACIL 700 MG: 50 INJECTION, SOLUTION INTRAVENOUS at 15:37

## 2025-04-01 RX ADMIN — FLUOROURACIL 4220 MG: 50 INJECTION, SOLUTION INTRAVENOUS at 15:42

## 2025-04-01 RX ADMIN — DEXAMETHASONE SODIUM PHOSPHATE 12 MG: 4 INJECTION INTRA-ARTICULAR; INTRALESIONAL; INTRAMUSCULAR; INTRAVENOUS; SOFT TISSUE at 14:55

## 2025-04-01 RX ADMIN — LEUCOVORIN CALCIUM 700 MG: 350 INJECTION, POWDER, LYOPHILIZED, FOR SOLUTION INTRAMUSCULAR; INTRAVENOUS at 14:58

## 2025-04-01 NOTE — PROGRESS NOTES
Sent secure chat message to CECE Zavala, asking her to see him due to 13 lbs weight loss in the last month. I did send some Ensure's home with pt as well.

## 2025-04-02 ENCOUNTER — DOCUMENTATION (OUTPATIENT)
Dept: ONCOLOGY | Facility: CLINIC | Age: 75
End: 2025-04-02
Payer: MEDICARE

## 2025-04-02 NOTE — PROGRESS NOTES
I left two cases of Ensure for the pt at the . Pt struggling to swallow regular foods, but is able to get liquids down.    Lucas Brand, MS,RD,LD-[place],CD-[place]  Registered Dietitian

## 2025-04-03 ENCOUNTER — HOSPITAL ENCOUNTER (OUTPATIENT)
Dept: ONCOLOGY | Facility: HOSPITAL | Age: 75
Discharge: HOME | End: 2025-04-03
Admitting: PHYSICIAN ASSISTANT
Payer: MEDICARE

## 2025-04-03 VITALS
HEIGHT: 66 IN | BODY MASS INDEX: 21.47 KG/M2 | WEIGHT: 133.6 LBS | HEART RATE: 69 BPM | RESPIRATION RATE: 16 BRPM | SYSTOLIC BLOOD PRESSURE: 118 MMHG | OXYGEN SATURATION: 98 % | TEMPERATURE: 97.7 F | DIASTOLIC BLOOD PRESSURE: 69 MMHG

## 2025-04-03 DIAGNOSIS — C16.0 MALIGNANT NEOPLASM OF CARDIA: Primary | ICD-10-CM

## 2025-04-03 DIAGNOSIS — C15.5 PRIMARY MALIGNANT NEOPLASM OF LOWER THIRD OF ESOPHAGUS: ICD-10-CM

## 2025-04-03 PROCEDURE — 96360 HYDRATION IV INFUSION INIT: CPT

## 2025-04-03 PROCEDURE — 25810000003 SODIUM CHLORIDE 0.9 % SOLUTION: Performed by: PHYSICIAN ASSISTANT

## 2025-04-03 RX ORDER — SODIUM CHLORIDE 0.9 % (FLUSH) 0.9 %
20 SYRINGE (ML) INJECTION AS NEEDED
Status: DISCONTINUED | OUTPATIENT
Start: 2025-04-03 | End: 2025-04-04 | Stop reason: HOSPADM

## 2025-04-03 RX ORDER — SODIUM CHLORIDE 0.9 % (FLUSH) 0.9 %
20 SYRINGE (ML) INJECTION AS NEEDED
OUTPATIENT
Start: 2025-04-03

## 2025-04-03 RX ORDER — SODIUM CHLORIDE 9 MG/ML
500 INJECTION, SOLUTION INTRAVENOUS ONCE
Status: COMPLETED | OUTPATIENT
Start: 2025-04-03 | End: 2025-04-03

## 2025-04-03 RX ADMIN — Medication 20 ML: at 14:41

## 2025-04-03 RX ADMIN — SODIUM CHLORIDE 500 ML/HR: 0.9 INJECTION, SOLUTION INTRAVENOUS at 13:31

## 2025-04-08 ENCOUNTER — HOSPITAL ENCOUNTER (OUTPATIENT)
Dept: PET IMAGING | Facility: HOSPITAL | Age: 75
Discharge: HOME | End: 2025-04-08
Admitting: STUDENT IN AN ORGANIZED HEALTH CARE EDUCATION/TRAINING PROGRAM
Payer: MEDICARE

## 2025-04-08 DIAGNOSIS — C15.5 PRIMARY MALIGNANT NEOPLASM OF LOWER THIRD OF ESOPHAGUS: ICD-10-CM

## 2025-04-08 PROCEDURE — 71260 CT THORAX DX C+: CPT

## 2025-04-08 PROCEDURE — 25510000001 IOPAMIDOL PER 1 ML: Performed by: STUDENT IN AN ORGANIZED HEALTH CARE EDUCATION/TRAINING PROGRAM

## 2025-04-08 PROCEDURE — 74177 CT ABD & PELVIS W/CONTRAST: CPT

## 2025-04-08 RX ORDER — IOPAMIDOL 755 MG/ML
100 INJECTION, SOLUTION INTRAVASCULAR
Status: COMPLETED | OUTPATIENT
Start: 2025-04-08 | End: 2025-04-08

## 2025-04-08 RX ADMIN — IOPAMIDOL 100 ML: 755 INJECTION, SOLUTION INTRAVENOUS at 10:38

## 2025-04-10 NOTE — PROGRESS NOTES
Hematology-Oncology Follow-up Note       Artie Mc  1950    Primary Care Physician: Chanel Carter APRN  Referring Physician: Chanel Carter APRN    Reason For Visit:  No chief complaint on file.    Metastatic esophageal cancer  Monitoring for adverse effects related to immunotherapy.  Hypothyroidism      Mr. Mc is a pleasant 74 y.o. gentleman with a history of gastroesophageal reflux disease, diabetes, hypertension, heart block status post pacemaker placement and history of CABG.  He was having symptoms of dysphagia, weight loss since September 2016. He was reporting symptoms of food getting stuck in the lower chest.  He has transitioned himself to a liquid diet.  The patient reports losing about 30 pounds over this duration.  The patient underwent upper GI endoscopy on 12/20/16 with Dr. Esvin Barrera.  Endoscopy showed necrotic, circumferential and moderately obstructive mass at the distal esophagus between 42 cm and 36 cm.      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  There was also evidence of mild subacute inflammation in the duodenum.  Dr. Barrera ordered a CT scan of the chest and abdomen with contrast on 12/20/16.  The scan was done at [facility].  The scan showed a new ill-defined mass in the anterior wall of the distal esophagus at the GE junction measuring 2.3 x 2.2 x 1.6 cm, worrisome for cancer.  There were four subcentimeter liver lesions which had appeared stable in comparison to another CT scan from 2009 and they likely represent small cysts.  There was also mild lymphadenopathy in the gastrohepatic ligament.  There were at least six borderline enlarged lymph nodes measuring up to 1.3 x 1 cm in the region.  The patient is referred to us for further oncological evaluation.    1/5/2017 - The patient presents for initial consultation.  He reports persistent dysphagia and also has  symptoms of regurgitation.  He cannot tolerate solid foods and is dependent on liquid diet such as Ensure.  He also reports fatigue. Symptoms of dysphagia have been present for the past four months.  When he eats any solids, the food gets stuck in the lower chest.    1/5/17 - Vitamin B12 200 (L).  CEA 0.8.  Ferritin 35.  Iron saturation 16% (L), serum iron 56, TIBC 354.  Creatinine 0.9.  LFTs normal.  Folate 10.2.  1/12/17 - PET/CT scan:  Intense abnormal activity corresponding to the region of the GE junction and proximal stomach.  SUV is 11.06.  There is a suggestion of an abnormal mass or abnormal wall thickening in the region measuring 3.9 x 4.8 cm.  No additional abnormalities.  No evidence of metastases or other lymphadenopathy.  Scattered nonspecific subcentimeter lymph nodes involving the mediastinum and within the central upper abdomen.  1/13/17 - Creatinine 0.9.  LFTs normal.    1/17/17 - Patient seen by thoracic surgeon, Dr. Bradley.  PET scan was reviewed.  He has recommended neoadjuvant chemoradiation therapy followed by [place]el Gurwinder esophagogastrectomy.  Port-A-Cath is being arranged.    1/20/17 - WBC 6.3, hemoglobin 12.1, platelet count 198,000, MCV 86.4.  1/30/17 - Patient started weekly Carboplatin and Taxol (Carboplatin AUC 2 and Taxol 50 mg/M2).  Patient received Injectafer 750 mg.  WBC 5.8, hemoglobin 12.4, platelet count 244,000.   1/31/17 - Patient seen by Dr. Eduardo Oleary.  The plan was to give 5040 cGy in 28 fractions.   2/6/17 - WBC 4.4, hemoglobin 11.6, platelet count 201,000.  Patient received cycle 2 of weekly Carboplatin and Taxol (Carboplatin AUC 2 and Taxol 50 mg/M2).  2/13/17 - Patient received Carboplatin and Taxol weekly cycle 3.  2/13/17 - Patient started concurrent radiation therapy.  Total planned dose is 4140 cGy.    2/13/17 to 2/15/17 - Patient received 4140 cGy of neoadjuvant radiation.  Radiation was finished on 3/15/17.    2/20/17 - WBC 1.8, hemoglobin 10.6, platelet count  217,000.  Creatinine 0.5. Folate 15 (L).  Ferritin 361.  Haptoglobin 214.  Iron saturation 33%, TIBC 263, serum iron 88.    2/20/17 - Patient received Carboplatin and Taxol weekly cycle 4.   2/27/17 - WBC 3.7, hemoglobin 10.7, platelet count 177,000, MCV 85.3.    3/6/17 - Patient has received 16 out of the 23 planned radiation treatments.  Total planned dose is 4140 cGy.    3/6/17 - WBC 5.4, hemoglobin 11.2, platelet count 113,000.  3/6/17 - Patient received cycle 5 of weekly Carboplatin and Taxol.   3/13/17 - Patient received cycle 6 of weekly Carboplatin and Taxol.  WBC 3.9, hemoglobin 11.3, platelet count 93,000.     3/20/17 - WBC 1.4, hemoglobin 10.0, platelet count 118,000, MCV 86.1   3/27/17 - WBC 3.8, hemoglobin 9.5, platelet count 176,000, MCV 87.3.    4/7/17 - Stress test:  No evidence of reversible myocardial ischemia.  Normal left ventricular ejection fraction of 50%.    4/17/17 - Upper GI endoscopy by Dr. 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  The GE junction adenocarcinoma lesion seems to have a very good response to chemotherapy and radiation.  The lumen was open.  5/1/17 - WBC 7.0, hemoglobin 10.2, platelet count 175,000.    5/4/17 - Patient underwent [place] Gurwinder esophagectomy with pyloroplasty, feeding jejunostomy tube, abdominal and mediastinal lymph node dissection.    Surgical Pathology:  Moderately to poorly differentiated adenocarcinoma measuring 1.3 cm at the GE junction.  Resection margins are negative for malignancy.  Tumor margins are negative.  There is effective treatment response.  No evidence of lymphovascular invasion.  Staging is ypT3,pN1.  One out of fourteen lymph nodes involved.   5/22/17 - WBC 6.8, hemoglobin 10.3, platelet count 312,000.    6/19/17 - WBC 5.7, hemoglobin 10.5, platelet count 204,000, MCV 92.1.    7/6/17 - EGD:  Status post balloon dilation of esophageal stricture.  7/12/17 - EGD with balloon dilation of esophageal  stricture.  7/26/17 - EGD and balloon dilation of esophageal stricture.  7/31/17 - PET/CT scan:  There is mild metabolic activity seen at the thoracic esophagus just at and below the surgical margins.  Some mild wall thickening.  This could be from recent surgery.  A residual cancer seems unlikely.  There is a precarinal lymph node with mild metabolic activity with SUV of 3 measuring 1 x 1.3 cm.  Again this appears to be nonspecific in my opinion.    8/7/17 - WBC 5.9, hemoglobin 11.9, platelet count 171,000, MCV 88.7.    10/19/17 - WBC 7.5, hemoglobin 11.8, platelet count 216,000.    1/8/18 - PET scan:  No evidence of residual disease or recurrent disease.  There is an esophageal stent in the mid esophagus with a large debris air level.  No evidence of any metastases in the chest, abdomen or pelvis.  Mild nodule uptake in the vocal cords with fullness in the right vocal cord.  This could be physiologic.    7/9/18 - CT scan of chest with contrast:  Prominent mediastinal lymph nodes appear stable since February.  Changes of gastric pull-through procedure for esophageal cancer again noted.  Tree-in-bud infiltrates in the left lung base, consistent with infection versus inflammation.    1/10/19 - CT chest, abdomen and pelvis with contrast:  No evidence of mets in the chest; however, interval development of metastatic lymphadenopathy in the left side of the retroperitoneum.  For instance, there is a 17 mm rounded lymph node, 10 mm lymph node and also a 14 mm lymph node.  These are all new.  Stable 9 mm hypodense lesion within the right hepatic lobe, which could reflect hemangioma or complex cyst.    1/30/19 - CT-guided core biopsy of retroperitoneal lymph node:  Poorly differentiated adenocarcinoma consistent with metastases from patient’s known esophageal primary.  CK20 positive, CDX2 positive, cytokeratin 7 negative, TTF-1 negative.    2/1/19 - Creatinine 0.9, BUN 18, calcium 9.3; these are normal.    2/28/19 - [facility]  Next Gen sequencing testing on retroperitoneal lymph node specimen:  PD-L1 positive.  CPS = 3.  This implies responsiveness to pembrolizumab.  Mismatch repair status is proficient (no evidence of microsatellite instability).  Tumor mutational burden is low = 6 mutations/Mb.  CDH1 indeterminate.  KRAS mutation positive.  TP53 mutation positive.  CDK6 amplified.  [employer] PD-L1 testing by IHC:  PD-L1 expression 1% positive (CPS =1).  HER2/marvel by IHC is negative.  HER2/marvel by CISH not amplified.  Microsatellite stable tumor.    3/7/19 - WBC 7.3, hemoglobin 12, platelet count 128,000, MCV 92.     3/14/19 - Patient was seen by radiation oncologist, Dr. Chahal.  He has recommended observation versus systemic chemoimmunotherapy as needed.  No plans for radiation therapy.   5/1/19 - CT scan of chest with contrast:  No definitive findings of new metastatic disease in the chest.  Emphysema noted.    5/1/19 - CT abdomen and pelvis:  There is interval progression of metastatic retroperitoneal adenopathy.  Left periaortic adenopathy with lymph node measuring up to 2 cm, previously 1.7 cm.  Another lymph node now measures 2.3, previously 1.4 cm.  New enlarged lymph node on image 147 measures 1.4 cm, previously 0.4 cm.  A 9 mm hypodense right hepatic lobe lesion appears stable.    5/9/19 - Decision made to start patient on immunotherapy Keytruda.    5/9/19 - Vitamin B12 229.  Ferritin 61.  Folate 14.3.  Iron saturation 26%, TIBC 304, serum iron 79.  5/31/19 - Patient received Keytruda 200 mg cycle 1, day 1.    5/31/19 - Free T4 0.99.  Creatinine 0.9, BUN 9.  LFTs normal.  TSH 3.99.  Folate 14.3.  Iron saturation 26%.    6/21/2019 patient received Keytruda cycle 2  7/12/2019: Patient received Keytruda cycle 3  8/2/2019 patient received cycle 4 Keytruda WBC 7.2, hemoglobin 11.8 platelet count 163  8/23/2019 creatinine 0.9, LFTs normal, WBC 6.4, hemoglobin 11.5, platelets 179, TSH 2.1   8/23/2019 patient received cycle 5 of  Keytruda  9/3/2019 CT chest abdomen and pelvis with contrast: . Positive response to therapy in the abdomen since 05/01/2019. Pathologically enlarged retroperitoneal lymph nodes, predominantly in the left periaortic region, have diminished in size.a 1.3 x 2.1 cm left periaortic node (image 60), previously measured 3.5 x 2.3 cm. A 1.6 cm index node near the level of the left renal vein previously measured 2.0 cm No new or progressive adenopathy. 2. Stable findings in the chest. Noncalcified right upper lobe nodules are unchanged. There is no evidence of new or progressive metastatic disease within the chest. 3. 8 mm indeterminate low-density lesion in the right hepatic lobe, is stable. No new liver lesions.  9/6/2019 WBC 5.6, hemoglobin 11.2, platelets of 192, MCV 91.8  9/13/2019 patient received Keytruda cycle 6, albumin 3.2  10/4/2019 patient received cycle 7 of Keytruda, TSH 4.8, free T4 0.86  10/9/2019 WBC 5.8, hemoglobin 11.7, platelets 174  10/25/2019 patient received cycle 8 of Keytruda.  WBC 5.9, hemoglobin 11.5, platelets 152, creatinine 0.75, cortisol 15.2, TSH 6.48 high , free T4 1.13 normal  11/6/2019 WBC 6.6, hemoglobin 11.8, MCV 91.5, platelets 187  11/15/2019 patient received cycle 9 of Keytruda, WBC 6.6, hemoglobin 11.9, platelets 161, cortisol level 8.86, TSH 2.68, free T3 2.8, free T4 1.5  12/4/2019 WBC 6.9, hemoglobin 12.0, platelets 180, MCV 92.9  12/06/2019-Keytruda Cycle 10  12/27/2019- Cycle 11 LFTs normal, WBC 6.7, hemoglobin 11.4, platelets 168, MCV 93, TSH 2.4,  1/28/2020: CT chest abdomen and pelvis with contrast:  Single (aortic lymph node in the abdominal retroperitoneum has increased.  Rest of the retroperitoneal lymph nodes have decreased in size.  Mediastinal adenopathy appears unchanged.  Noncalcified bilateral pulmonary nodules are stable  1/17/2020 patient received Keytruda cycle 12:.  2/3/2020 WBC 7.1, hemoglobin 12.2, platelets 171  02/10/2020 patient received cycle 13 of  Keytruda: WBC 7.3, hemoglobin 12.1, platelets 166, creatinine 0.82  3/6/2020 patient is of Keytruda 200 mg, hemoglobin 10.8  3/27/2020 patient received Keytruda 200 mg, hemoglobin 11.4, TSH 1.95  4/17/2020 patient received Keytruda cycle 16 200 mg, WBC 6.1, hemoglobin 11.6, platelets 150  5/8/2020: Patient received cycle 17 Keytruda, WBC 6.3, hemoglobin 9.6, platelets 137, TSH 2.05, free T4 1.54, creatinine 0.93, LFTs normal,  5/29/2020: Patient received cycle 18 Keytruda, WBC 6.5, hemoglobin 11.7, platelets 129, creatinine 0.83, TSH 1.69  6/26/2020: Patient receiving Keytruda.   7/17/2020: TSH 2.06, WBC 5.7, hemoglobin 11.4, platelets 177, MCV 92.4, Keytruda 200 mg cycle 20  7/27/2020: CT chest abdomen pelvis with contrast: Stable findings in the chest abdomen pelvis since 1/28/2020.  Noncalcified bilateral pulmonary nodules are stable.  Stable retroperitoneal left perinephric adenopathy in the abdomen.  8/7/2020: Patient received cycle 21 of Keytruda  8/28/2020 patient is a cycle 22 of Keytruda.  WBC 6.02, hemoglobin 11.3, platelets 161  9/11/2020 WBC 6.9, hemoglobin 12.4, platelets 192  9/18/2020: Cycle 23 of Keytruda, TSH 1.19  10/9/2020: Cycle 24 of Keytruda, WBC 5.9, hemoglobin 11.9, platelets 178, creatinine 0.84, TSH 2.56, free T3 2.4, CMP normal  10/23/2020: WBC 7.34, hemoglobin 11.6, platelets 175  10/30/2020:.  Keytruda 200 mg  11/20/2020: Keytruda 200 mg  12/11/2020 Keytruda 200 mg.  WBC 5.5, hemoglobin 11.4, platelets 176, creatinine 0.8, LFTs normal, TSH 2.2, cortisol 8.3  12/10/2020: CT chest abdomen and pelvis with contrast: Previously seen left para-aortic lymph nodes within the abdomen are decreased in size.  No pathological lymphadenopathy.  No evidence of residual malignancy..  Small noncalcified pulmonary nodules are stable.  No new nodules.  Stable mediastinal lymph nodes.  3/12/2021: WBC 6.7, hemoglobin 11.3, platelets 17  3/12/2021: CMP normal, TSH 2.9, cortisol 11.95, 1  3/15/2021: CT chest  with contrast: Stable findings in the chest with postsurgical changes of esophageal resection and gastric pull-through.  Stable right lower lobe lung nodule measures 11 mm.  Stable size and appearance of the mediastinal and upper abdominal lymph nodes.  3/23/2021: Doppler of upper extremity: Normal.  3/24/2021: CT abdomen and pelvis with contrast: Postoperative changes.  Left periaortic nodes are stable.  4/12/2021: TSH 4.1, cortisol 12.4  6/22/2021: CMP normal,Cortisol 10.08,, WBC 5.8, hemoglobin 11.2, platelets 109, TSH 2.59  7/12/2021: CT scan of the chest abdomen pelvis with contrast: Dominant 10 x 10 left periaortic lymph node is stable since 3/22/2021.  Dominant lymph nodes in the right lower paratracheal and AP window distribution are stable since 3/15/2021.  No evidence of progressive adenopathy in the chest abdomen or pelvis.    01/11/2022 -PET/CT findings consistent with new cedric metastatic disease in the retroperitoneum.  2 new pathologically enlarged hypermetabolic retroperitoneal lymph nodes.  No convincing evidence of recurrent disease within the chest neck pelvis or skeleton.  1/20/2022 -CT-guided needle biopsy consistent with poorly differentiated carcinoma.  CDX2 positive consistent with metastases from known esophageal primary  2/9/2022 - Pembrolizumab complicated with fatigue, joint pains, nausea, diarrhea. Symptoms decrease in intensity over a week.  3/30/2022 -CT chest and pelvis with stable metastatic lymph nodes in the left periaortic retroperitoneum.  No evidence of disease progression or new lesions.  Stable lymphadenopathy in the distal paratracheal mediastinal area  4/13/2022 - Keytruda 200  5/4/2022 - Keytruda 200  Continued keytruda  7/11/2022 - CT abdomen pelvis with decrease in size of left perioaortic lymph node. No other areas of disease.  Continues to be on Keytruda  10/18/2022 - stable CT imaging  1/20/2023 - CT imaging with stable disease. No significant change.  Patient was  hospitalized with esophagitis, subsequent EGD with improvement, no recurrence of disease.  2/21/2023 - Keytruda  CT CAP with increased retroperitoneal adenopathy.   ECHO with EF 35-40% (concern for immunotherapy related )immunotherapy held with ongoing investigation for cause.  Subsequent improvement in EF to 45%  4/4/2023 - CT abdomen pelvis with interval retroperitoneal lymph node enlargement compatible with metastatic adenopathyl.  5/5/2023 - PET CT with mixed response. No clear evidence of progression  6/2/23 -started pembrolizumab 200 mg IV  Posttreatment had symptoms of chest tightness troponin and proBNP negative, echocardiogram with EF 40%  Subsequent cardiac cath with EF down to 30% elevated proBNP, troponin no significant obstructive disease in the graft vessels  Cardiac MRI unable to obtain given pacemaker noncompliant.  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 Two enlarged mediastinal lymph nodes in the precarinal and AP window distributions are unchanged since 4/4/2023. No new or progressive adenopathy in the chest.Stable 3 mm or less noncalcified right lung nodules since 4/4/2023. Surgical changes of distal esophagectomy with gastric pull-through, without evidence of local disease recurrence  8/29/23 - started FOLFOX had significant side effects  9/20/23 - stopped oxaliplatin and continued 5FU only'  11/29/23 - CT CAP with decrease size of retroperitoneal adenopathy, no other concerning findings.  Continues 5FU leucovorin   2/12/24 - PET CT There is some metabolic activity within a mediastinal lymph node which has been suggested with slightly decreased metabolic activity. Whether this is reactive or pathologic is uncertain. Previously noted abnormal retroperitoneal  lymph node is no longer identified continued treatmen  4/10/24 - iron sat 10, ferritin  14, plan for venofer  Continued treatment signatera negative  5/7/24 - CT chest without contrast. Unchanged mildly enlarged right paratracheal lymph lymph node measuring 1.1 cm in short axis (series 2 image 40) and left paratracheal measuring 1.1 cm in short axis (series 2 image 39). No new or progressive lymphadenopathy.  Continued maintenance 5FU  changed frequency to every 3 weeks.   7/11/2024 CT chest with stable lung nodule, mildly prominent precarinal lymph node    7/25/2024:  Bilateral lung, bronchial wash with smears and cytospin:  Acute inflammation, pulmonary macrophages and bronchial epithelial cells    2.   Lymph node, precarinal station 4, fine-needle aspiration with smears and cell block:  Benign bronchial epithelial cells in a background of blood and lymphocytes consistent with lymph node aspirate  No malignancy identified      8/7/24: Seen today for initial evaluation by me.  He was previously being seen by Dr. Elizondo in our practice.  He has underlying history of metastatic esophageal adenocarcinoma and was on maintenance therapy with 5-FU/leucovorin.  His last treatment was on 7/17/2024.  He has intermittent bronchoscopy planned lymph node biopsy due to suspicion for metastatic disease on recent imaging.  This is reported as above.    Patient reported that he has had persistent fatigue with maintenance chemotherapy, also reported having GI side effects, such as nausea and poor appetite with chemo.  However, the patient denied any weight loss.  No other issues presently.    9/11/2024: CT chest/abdomen/pelvis:  Impression:  1. Postsurgical changes from esophagectomy with gastric pull-through. No findings to suggest progression of disease within the thorax.  2. Stable subcentimeter pulmonary nodules measuring up to 4 mm in the right lower lobe, and mildly prominent precarinal lymph node. Continued attention on  follow-up suggested.  3. No findings to suggest progression of disease or metastatic disease within the abdomen or pelvis.  4. Additional findings as given above..      10/23/24: Patient seen today for follow-up visit with restaging scans.  Has ogoing sinus congestion for several months. Some sore throat.some cough, SOB has improved. Has ongoing fatigue, stable.     1/10/25: CT chest/Abdomen/Pelvis W contrast:  Impression:   1. Stable examination of the chest with postsurgical changes from an esophagectomy and gastric pull-through.   2. However, within the abdomen there is a new enhancing lesion in the posterior right hepatic lobe measuring 0.6 cm and interval enlargement of a para-aortic lymph node concerning for disease progression now measuring 1.9 x 1.1 cm.     1/17/2025: Patient seen today for follow-up visit with restaging scans.  No new complaints reported.  Continues to do well clinically    1/28/2025 PET/CT:  Impression:  1. Isolated hypermetabolic left periaortic/retroperitoneal lymph nodes suspicious for metastatic disease in patient with history of esophageal malignancy. While almost certainly esophageal related, nonemergent testicular ultrasound would be reasonable in   a male with retroperitoneal adenopathy to exclude a solid testicular lesion.  2. Mild lower mediastinal adenopathy stable from 2024. No evidence of solid organ metastases.  3. Esophagectomy with gastric pull-through. Linear probably physiologic uptake in the stomach. Differential would include gastritis.  4. Emphysema. Emphysema on CT is an independent risk factor for lung cancer. Low dose lung cancer screening should be considered if patient qualifies based on smoking history or if not already enrolled in a screening program.      2/11/2025: Patient here today for routine follow-up prior to scheduled treatment.  He reports overall tolerating leucovorin and fluorouracil although he did have mild intermittent diarrhea.  He also reports he  has been drinking less fluids because he feels bloated.  He has no new pain.  He noted nausea following his infusion but this was managed with Zofran.    3/18/25: Patient seen today for follow-up prior to cycle 3 maintenance chemotherapy reported having off-and-on diarrhea and progressive fatigue after starting chemotherapy.  Also has intermittent nausea.  Weight/appetite is stable presently.  He reported using Imodium sparingly for his diarrhea    4/11/25: CT Chest Abd Pelvis W contrast:  1. New 6 mm low-density lesion within the spleen. Metastatic disease cannot be excluded.   2. Stable 2.0 x 1.0 cm left periaortic lymph node in the mid abdomen, consistent with metastatic disease, corresponding to the metabolically active node seen on prior PET/CT imaging. No new or progressive adenopathy.   3. Stable 6 mm enhancing focus within the right hepatic lobe since CT abdomen of 1/10/2025. This demonstrated no abnormal FDG uptake on prior PET/CT.   3. Surgical changes of esophagectomy with gastric pull-through. No evidence of new or progressive malignancy within the chest. Small noncalcified nodules measuring 2 to 3 mm are unchanged.     History of Present Illness  4/15/25: The patient presents for follow up with restaging scans.   Significant gastrointestinal discomfort is reported, attributed to the discontinuation of Protonix by his nephrologist due to potential renal complications. A sensation of food impaction in the esophagus is described. This symptom was less pronounced during a chemotherapy hiatus but has since worsened.     Esophageal dilation has been performed several times in the past without relief. There is reluctance towards the placement of a feeding tube. Currently, Zofran is used for nausea management.    Fatigue and a lack of energy are also reported, believed to have intensified since resuming chemotherapy. Energy levels have been fluctuating.not on oral iron at this time.    He has completed 5 cycles  of maintenance 5FU/LV.      Past Medical History:   Diagnosis Date    B12 deficiency     Blockage of coronary artery of heart     Depression     Disease of thyroid gland     DM (diabetes mellitus)     Dysphagia     Esophageal cancer     GERD (gastroesophageal reflux disease)     HTN (hypertension)     Hyperlipidemia        Past Surgical History:   Procedure Laterality Date    ABDOMINAL WALL ABSCESS INCISION AND DRAINAGE  10/22/2018    WITH DEBRIDEMENT  1.5CM X 1.5 CM X 1.5 CM    DR. PURI    BRONCHOSCOPY N/A 7/25/2024    Procedure: BRONCHOSCOPY WITH ENDOBRONCHIAL ULTRASOUND, LUNG WASHINGS, AND FINE NEEDLE ASPIRATIONS;  Surgeon: Bonnie Smith MD;  Location: [facility] ENDOSCOPY;  Service: Pulmonary;  Laterality: N/A;    CARDIAC CATHETERIZATION Right 7/20/2023    Procedure: Left Heart Cath;  Surgeon: Mazin Simmons MD;  Location: [facility] CATH INVASIVE LOCATION;  Service: Cardiovascular;  Laterality: Right;    CATARACT EXTRACTION  2018    COLONOSCOPY      CORONARY ARTERY BYPASS GRAFT  2015    ENDOSCOPY  12/20/2016    ENDOSCOPY N/A 01/02/2023    Procedure: ESOPHAGOGASTRODUODENOSCOPY;  Surgeon: Esvin Morgan MD;  Location: [facility] ENDOSCOPY;  Service: Gastroenterology;  Laterality: N/A;  post: anastamosis stricture    ENDOSCOPY N/A 02/09/2023    Procedure: ESOPHAGOGASTRODUODENOSCOPY, non-wire guided balloon dilation (12-14mm) of esophageal stricture;  Surgeon: Tino Villafana MD;  Location: [facility] ENDOSCOPY;  Service: Gastroenterology;  Laterality: N/A;  post: esophageal anastamotic stricture, post surgical changes    ESOPHAGECTOMY  05/04/2017    BROOKS PETTY ESOPHAGECTOMY, FEEDING JEJNOSTOMOY, ABDOMINAL AND MEDIASTINAL LYMPH NODE DISECTION, PEDICLED MUSCLE FLAP COVERAGE DR. PURI    ESOPHAGOSCOPY / EGD  07/12/2017    WITH BALLOON DILATION    ESOPHAGOSCOPY / EGD  07/26/2017    WITH BALLOON DILATION    ESOPHAGOSCOPY / EGD  08/11/2017    WITH BALLOON DILATION    ESOPHAGOSCOPY / EGD  08/28/2017    WITH  BALLOON DILATION    ESOPHAGOSCOPY / EGD  09/27/2017    WITH BALLOON DILATION    INSERT / REPLACE / REMOVE PACEMAKER      LYMPH NODE BIOPSY  01/30/2019    PACEMAKER IMPLANTATION  11/21/2016       Current Outpatient Medications:     clonazePAM (KlonoPIN) 0.5 MG tablet, Take 0.5 tablets by mouth 2 (Two) Times a Day As Needed for Anxiety., Disp: , Rfl: 1    cyanocobalamin (VITAMIN B-12) 1000 MCG tablet, Take 1 tablet by mouth Daily. (Patient not taking: Reported on 3/18/2025), Disp: , Rfl:     gabapentin (NEURONTIN) 100 MG capsule, Take 1 capsule by mouth 3 (Three) Times a Day. (Patient not taking: Reported on 3/18/2025), Disp: 90 capsule, Rfl: 1    levothyroxine (SYNTHROID, LEVOTHROID) 50 MCG tablet, Take 1 tablet by mouth once daily, Disp: 30 tablet, Rfl: 0    metFORMIN (GLUCOPHAGE) 1000 MG tablet, Take 0.5 tablets by mouth Daily. (Patient taking differently: Take 0.5 tablets by mouth 2 (Two) Times a Day With Meals.), Disp: 60 tablet, Rfl: 2    nebivolol (BYSTOLIC) 5 MG tablet, Take 1 tablet by mouth Daily., Disp: , Rfl:     nystatin (MYCOSTATIN) 100,000 unit/mL suspension, , Disp: , Rfl:     nystatin (MYCOSTATIN) [id number] UNIT/GM cream, , Disp: , Rfl:     ondansetron (ZOFRAN) 4 MG tablet, TAKE 1 TABLET BY MOUTH EVERY 8 HOURS AS NEEDED FOR NAUSEA AND VOMITING, Disp: 90 tablet, Rfl: 2    pravastatin (PRAVACHOL) 10 MG tablet, Take 0.5 tablets by mouth Every Night., Disp: , Rfl:     tamsulosin (FLOMAX) 0.4 MG capsule 24 hr capsule, Take 1 capsule by mouth 2 (Two) Times a Day., Disp: , Rfl:     Allergies   Allergen Reactions    Ace Inhibitors Unknown (See Comments)     Unknown reaction      Heparin Other (See Comments)     Fever/chills, weakness in legs    Sulfa Antibiotics Other (See Comments)     Mouth sores    Ciprofloxacin GI Intolerance     Pt reports pain to abd    Empagliflozin Unknown - Low Severity     Other Reaction(s): UTI      per patient report       Family History   Problem Relation Age of Onset    Mental  "illness Mother     Heart disease Mother     Hypertension Father     Cancer Father     Heart disease Sister     Heart disease Brother      Cancer-related family history includes Cancer in his father.    Social History     Tobacco Use    Smoking status: Former     Current packs/day: 0.00     Average packs/day: 2.0 packs/day for 50.0 years (100.0 ttl pk-yrs)     Types: Cigarettes     Start date: 1965     Quit date: 2015     Years since quittin.8    Smokeless tobacco: Former     Types: Chew     Quit date: 1989   Vaping Use    Vaping status: Never Used   Substance Use Topics    Alcohol use: No    Drug use: No     ROS:     Objective:    Vitals:    04/15/25 1018   BP: 114/50   Pulse: 71   SpO2: 98%   Weight: 61.5 kg (135 lb 9.6 oz)   Height: 167.6 cm (66\")   PainSc: 0-No pain           (1) Restricted in physically strenuous activity, ambulatory and able to do work of light nature    Physical Exam:     Physical Exam   Constitutional: He is oriented to person, place, and time. He appears well-developed. No distress.   HENT:   Head: Normocephalic and atraumatic.   Nose: Nose normal. Mouth/Throat: Mucous membranes are dry.   Eyes: Pupils are equal, round, and reactive to light. No scleral icterus.   Neck: No thyromegaly present.   Cardiovascular: Normal rate, regular rhythm, normal heart sounds and normal pulses. Exam reveals no gallop and no friction rub.   Pulmonary/Chest: Effort normal and breath sounds normal. No respiratory distress. He has no wheezes.   Abdominal: Soft. Bowel sounds are normal.   No tenderness   Musculoskeletal: Normal range of motion. No swelling, deformity or signs of injury.      Right lower leg: No edema.   Neurological: He is alert and oriented to person, place, and time. He displays no weakness. He exhibits normal muscle tone.   Skin: Skin is warm and dry. No bruising and no rash noted. He is not diaphoretic. No erythema. No jaundice.   Right side chest  port   Psychiatric: His " behavior is normal. Mood, judgment and thought content normal.   Vitals reviewed.      Lab Results - Last 18 Months   Lab Units 04/01/25  1330 03/18/25  1016 03/04/25  1103   WBC 10*3/mm3 3.30* 4.37 3.61   HEMOGLOBIN g/dL 9.4* 10.1* 9.8*   HEMATOCRIT % 28.0* 31.1* 29.4*   PLATELETS 10*3/mm3 205 205 151   MCV fL 97.2* 97.8* 94.5     Lab Results - Last 18 Months   Lab Units 04/01/25  1356 03/18/25  1047 03/04/25  1124 02/18/25  1130 02/11/25  1057 02/11/25  1044 01/28/25  1041 01/17/25  1241   SODIUM mmol/L  --   --   --  142  --  140  --  139   POTASSIUM mmol/L  --   --   --  5.1  --  5.2  --  6.0*   CHLORIDE mmol/L  --   --   --  107  --  107  --  104   CO2 mmol/L  --   --   --  23.2  --  20.5*  --  24.4   BUN mg/dL  --   --   --  16  --  19  --  12   CREATININE mg/dL 1.50* 1.50* 1.80* 1.80*   < > 2.09*   < > 1.65*   CALCIUM mg/dL  --   --   --  9.0  --  8.9  --  9.1   BILIRUBIN mg/dL  --   --   --  0.3  --  0.4  --  0.3   ALK PHOS U/L  --   --   --  32*  --  32*  --  37*   ALT (SGPT) U/L  --   --   --  5  --  6  --  11   AST (SGOT) U/L  --   --   --  13  --  15  --  25   GLUCOSE mg/dL  --   --   --  145*  --  183*  --  109*    < > = values in this interval not displayed.     CASE SUMMARY:  Patient has a history of GE junction poorly differentiated adenocarcinoma for which he had chemoradiation followed by Noel Gurwinder resection:  Pathology showed ypT3,pN1 disease.  Tumor was Stage IIIA, diagnosed in 2016.  He received concurrent chemoradiation with weekly Carboplatin and Taxol and radiation finishing on 3/15/17.  CT scan on 1/10/19 showed new retroperitoneal lymph nodes.  These lymph nodes were biopsied on 1/30/19 and it shows metastatic esophageal cancer.  [employer] Next Gen testing was performed on the sample and it shows KRAS mutation, microsatellite stable tumor.  PD-L1 is positive.  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  HER2/marvel (ERBB2) was not amplified and was negative.  His CT scan on 5/1/19 shows evidence of progression.   Patient has started receiving Keytruda on 5/31/19.  He has received 19 cycles so far.   CT scan on 1/28/2020 shows improvement and continued response.  CT scan chest abdomen pelvis with contrast on 12/10/2020 does not show any evidence of disease progression.  On 12/22/2020 decision made to hold Keytruda per patient's request to get a treatment break.. Repeat CT scan chest 7/12/2021 does not show any evidence of disease progression.  Now CT imaging in January 2021 with a CT showing increased abdominal left para-aortic adenopathy.  Subsequent PET/CT with new cedric metastatic disease in the retroperitoneum of the abdomen.  New pathologically enlarged hypermetabolic retroperitoneal lymph nodes.  This would be concerning for disease recurrence/progression.  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  I discussed with him the treatment options going forward.  He has had a good response with immunotherapy pembrolizumab in the past. This was  restarted. subsequent imaging with treatment response. 1/2023 imaging with good response. Continued treatment  Now with increased adenopathy 2 new lymph nodes. Holding immunotherapy for now.   Consider getting cardiac MR discussed with Dr. Culp. While immunotherapy on hold, increased adenopathy is concerning. Could be pseudoprogression vs true progression. PET CT reviewed could indicate pseudoprogression since he has had a good response to immunotherapy  immunotherapy was restarted and has been on hold with patient's symptomatology cardiac MRI cannot be obtained, no ischemic heart disease leading to cardiomyopathy this most likely is related to Keytruda.  We will stop Keytruda going forward  CT imaging with stable disease, FOLFOX was started which he had significant side effects  With imaging being stable and significant side effects with FOLFOX, we discussed  about options with wait and watch vs 5FU only, patient prefers to be on maintenance.   We started 5FU  only .   Tolerating relatively well, has significant fatigue. CT imaging now with ongoing response. We discussed that he would have been on treatment for 2 years following recurrence with good response. We can continue treatment now and get a PET CT in 2/2024.will repeat prior to follow up. , PET with some residual activity in the mediastinal lymph node.   We continued treatment and now signatera negative. I had a long discussion with the patient. We decided to repeat CT chest now and if no adenopathy, consider holding treatment.   Now CT chest repeated with persistent adenopathy. Discussed biopsy/mediastinoscopy vs continued treatment. Has fairly been tolerating well except fatigue. After discussion we decided to continue chemotherapy.  Now repeat CT imaging with no new concerning findings there is stable nodules which are subcentimeter in the lungs there is mildly prominent precarinal lymph node.  I discussed with the patient about this possibly being inflammatory rather than neoplastic.    -After discussion we decided to proceed with EBUS and possible biopsy of the lymph node if negative for malignancy we can consider stopping maintenance treatment given Signatera is negative and patient has treatment related side effects which are affecting his quality of life  -Bronchoscopy and precarinal lymph node biopsy reported negative as above.  -Repeat Signatera test also reported negative.  -In view of significant treatment-related toxicities and no concerns for disease progression at this time, we will plan for surveillance alone without additional systemic therapy.  -Repeat imaging with an interval enlargement of periaortic lymph node as well as uptrending Signatera CT DNA levels concerning for disease recurrence.  Given low disease burden, patient to resume 5-FU with leucovorin maintenance (1/28/2025).  Consider adding irinotecan if noted disease progression moving forward.      Assessment & Plan        Assessment &  Plan     Assessment:    Metastatic esophageal carcinoma:    -Patient currently on surveillance and is off treatment  -Restaging scans from 9/11/2024 as above, reported PAULIE.  -Repeat CT chest abdomen pelvis reviewed, noted interval enlargement of a   para-aortic lymph node concerning for disease progression now measuring 1.9 x 1.1 cm.   -Also noted to have uptrending Signatera CT DNA levels.  Discussed with patient that these findings are consistent with disease recurrence.  -Given low disease burden at this time, will resume 5-FU/leucovorin maintenance, can consider adding irinotecan if noted disease progression moving forward  -PET/CT completed on 1/28/2025 as above, consistent with disease progression. this was discussed with patient today.  -Continue maintenance 5-FU/leucovorin. Restaging scans reviwed, noted new splenic lesion but otherwise not concerning for progressive disease. Continue current therapy.        -Will check Signetera ctDNA level.    Possible myocarditis  Symptoms started after last immunotherapy  Treatment on hold for now, symptoms have improved, cardiac markers were negative, echocardiogram with EF 40%, cardiac MRI cannot be obtained cardiac cath with EF down to 30% management per cardiology  Now EF up to 40-45% monitor.  Symptomatic improvement now no shortness of breath    Shortness of breath  Improved, has occasional shortness of breath, likely copd  I have recommended using albuterol  Symptoms have improved since stopping immunotherapy. Questionable pneumonitis will monitor closely with immunotherapy  Cardiology has evaluated for myocarditis related to immunotherapy symptomatic improvement now continue to monitor. Symptomatic improvement as above.    Reflux/heartburn/dysphagia:   Symptoms improved.  Also has a history of strictures. Status post EGD and balloon dilation.   Continues to be on pantoprazole symptoms stable.  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  Continues on Synthroid stable now no heart function stable in normal range    Mild diarrhea:  diarrhea seems to be waxing and waning in nature.  Diarrhea related to immunotherapy now improved stable.    Patient reports mild diarrhea following cycle 1 of leucovorin and fluorouracil.  He has not been taking any Imodium.    PD-L1 positive, HER2 negative, p53 and KRAS positive.  Repeat Caris testing with repeat biopsy with no target mutations.    B12 deficiency: Continue B12    Anemia - Hb  was low iron sat low at 10, given venofer 300 x3 monitor for now. Intolerant to oral iron.  Continued low iron saturation will plan on Feraheme for 2 doses. Hemoglobin remains stable.    Fatigue.  Fatigue may be attributed to the ongoing chemotherapy treatment and slightly low hemoglobin levels. The patient is not currently taking any iron supplements due to stomach issues but had an iron infusion in the past. Adequate hydration by drinking plenty of water is advised.    Heartburn:  Dysphagia:  Significant stomach pain and difficulty eating are reported. He is off Protonix due to worsning renal function. Pepcid will be prescribed to manage stomach issues. The patient is advised to schedule an appointment with his gastroenterologist for further evaluation and potential esophageal dilation. If the scope reveals tumor growth causing esophageal narrowing, alternative treatment options, including the possibility of a feeding tube, will be considered.    SORIN on CKD,:  Limited oral intake:  -Counseled patient on importance of oral intake and nutrition while on chemotherapy  -Continue follow-up with nephrology for his underlying CKD      4-week follow-up with treatment, sooner as needed.

## 2025-04-11 DIAGNOSIS — R79.89 ELEVATED TSH: ICD-10-CM

## 2025-04-11 DIAGNOSIS — E03.9 HYPOTHYROIDISM (ACQUIRED): ICD-10-CM

## 2025-04-11 RX ORDER — LEVOTHYROXINE SODIUM 50 UG/1
50 TABLET ORAL DAILY
Qty: 30 TABLET | Refills: 0 | Status: SHIPPED | OUTPATIENT
Start: 2025-04-11

## 2025-04-15 ENCOUNTER — HOSPITAL ENCOUNTER (OUTPATIENT)
Dept: ONCOLOGY | Facility: HOSPITAL | Age: 75
Discharge: HOME | End: 2025-04-15
Payer: MEDICARE

## 2025-04-15 ENCOUNTER — OFFICE VISIT (OUTPATIENT)
Dept: ONCOLOGY | Facility: CLINIC | Age: 75
End: 2025-04-15
Payer: MEDICARE

## 2025-04-15 VITALS
SYSTOLIC BLOOD PRESSURE: 114 MMHG | WEIGHT: 135.6 LBS | HEIGHT: 66 IN | OXYGEN SATURATION: 98 % | HEART RATE: 71 BPM | BODY MASS INDEX: 21.79 KG/M2 | DIASTOLIC BLOOD PRESSURE: 50 MMHG

## 2025-04-15 DIAGNOSIS — C16.0 MALIGNANT NEOPLASM OF CARDIA: Primary | ICD-10-CM

## 2025-04-15 DIAGNOSIS — D63.0 ANEMIA IN NEOPLASTIC DISEASE: Primary | ICD-10-CM

## 2025-04-15 DIAGNOSIS — C16.0 MALIGNANT NEOPLASM OF CARDIA: ICD-10-CM

## 2025-04-15 DIAGNOSIS — C15.5 PRIMARY MALIGNANT NEOPLASM OF LOWER THIRD OF ESOPHAGUS: ICD-10-CM

## 2025-04-15 DIAGNOSIS — D63.0 ANEMIA IN NEOPLASTIC DISEASE: ICD-10-CM

## 2025-04-15 DIAGNOSIS — K90.9 MALABSORPTION OF IRON: ICD-10-CM

## 2025-04-15 LAB
ALP BLD-CCNC: 29 U/L (ref 53–128)
BASOPHILS # BLD AUTO: 0.04 10*3/MM3 (ref 0–0.2)
BASOPHILS NFR BLD AUTO: 0.7 % (ref 0–1.5)
BUN BLDA-MCNC: 13 MG/DL (ref 7–22)
CALCIUM BLD QL: 8.5 MG/DL (ref 8–10.3)
CHLORIDE BLDA-SCNC: 108 MMOL/L (ref 98–108)
CO2 BLDA-SCNC: 23 MMOL/L (ref 18–33)
CREAT BLDA-MCNC: 1.4 MG/DL (ref 0.6–1.2)
DEPRECATED RDW RBC AUTO: 53.7 FL (ref 37–54)
EGFRCR SERPLBLD CKD-EPI 2021: 52.4 ML/MIN/1.73
EOSINOPHIL # BLD AUTO: 0.12 10*3/MM3 (ref 0–0.4)
EOSINOPHIL NFR BLD AUTO: 2.1 % (ref 0.3–6.2)
ERYTHROCYTE [DISTWIDTH] IN BLOOD BY AUTOMATED COUNT: 16.1 % (ref 12.3–15.4)
FERRITIN SERPL-MCNC: 288 NG/ML (ref 30–400)
GLUCOSE BLDC GLUCOMTR-MCNC: 275 MG/DL (ref 73–118)
HCT VFR BLD AUTO: 25.4 % (ref 37.5–51)
HGB BLD-MCNC: 8.5 G/DL (ref 13–17.7)
IRON 24H UR-MRATE: 26 MCG/DL (ref 59–158)
IRON SATN MFR SERPL: 10 % (ref 20–50)
LYMPHOCYTES # BLD AUTO: 1.21 10*3/MM3 (ref 0.7–3.1)
LYMPHOCYTES NFR BLD AUTO: 21 % (ref 19.6–45.3)
MCH RBC QN AUTO: 32.8 PG (ref 26.6–33)
MCHC RBC AUTO-ENTMCNC: 33.5 G/DL (ref 31.5–35.7)
MCV RBC AUTO: 98.1 FL (ref 79–97)
MONOCYTES # BLD AUTO: 0.89 10*3/MM3 (ref 0.1–0.9)
MONOCYTES NFR BLD AUTO: 15.5 % (ref 5–12)
NEUTROPHILS NFR BLD AUTO: 3.5 10*3/MM3 (ref 1.7–7)
NEUTROPHILS NFR BLD AUTO: 60.7 % (ref 42.7–76)
PLATELET # BLD AUTO: 179 10*3/MM3 (ref 140–450)
PMV BLD AUTO: 9.9 FL (ref 6–12)
POC ALBUMIN: 2.9 G/L (ref 3.3–5.5)
POC ALT (SGPT): 10 U/L (ref 10–47)
POC AST (SGOT): 17 U/L (ref 11–38)
POC TOTAL BILIRUBIN: 1 MG/DL (ref 0.2–1.6)
POC TOTAL PROTEIN: 5.2 G/DL (ref 6.4–8.1)
POTASSIUM BLDA-SCNC: 4.4 MMOL/L (ref 3.6–5.1)
RBC # BLD AUTO: 2.59 10*6/MM3 (ref 4.14–5.8)
SODIUM BLD-SCNC: 141 MMOL/L (ref 128–145)
TIBC SERPL-MCNC: 259 MCG/DL (ref 298–536)
TRANSFERRIN SERPL-MCNC: 174 MG/DL (ref 200–360)
WBC NRBC COR # BLD AUTO: 5.76 10*3/MM3 (ref 3.4–10.8)

## 2025-04-15 PROCEDURE — 25810000003 SODIUM CHLORIDE 0.9 % SOLUTION: Performed by: STUDENT IN AN ORGANIZED HEALTH CARE EDUCATION/TRAINING PROGRAM

## 2025-04-15 PROCEDURE — 96409 CHEMO IV PUSH SNGL DRUG: CPT

## 2025-04-15 PROCEDURE — 96367 TX/PROPH/DG ADDL SEQ IV INF: CPT

## 2025-04-15 PROCEDURE — 85025 COMPLETE CBC W/AUTO DIFF WBC: CPT | Performed by: STUDENT IN AN ORGANIZED HEALTH CARE EDUCATION/TRAINING PROGRAM

## 2025-04-15 PROCEDURE — 25010000002 FLUOROURACIL PER 500 MG: Performed by: STUDENT IN AN ORGANIZED HEALTH CARE EDUCATION/TRAINING PROGRAM

## 2025-04-15 PROCEDURE — G0498 CHEMO EXTEND IV INFUS W/PUMP: HCPCS

## 2025-04-15 PROCEDURE — 25010000002 DEXAMETHASONE PER 1 MG: Performed by: STUDENT IN AN ORGANIZED HEALTH CARE EDUCATION/TRAINING PROGRAM

## 2025-04-15 PROCEDURE — 25010000002 LEUCOVORIN CALCIUM PER 50 MG: Performed by: STUDENT IN AN ORGANIZED HEALTH CARE EDUCATION/TRAINING PROGRAM

## 2025-04-15 PROCEDURE — 84466 ASSAY OF TRANSFERRIN: CPT | Performed by: STUDENT IN AN ORGANIZED HEALTH CARE EDUCATION/TRAINING PROGRAM

## 2025-04-15 PROCEDURE — 25810000003 SODIUM CHLORIDE 0.9 % SOLUTION 500 ML FLEX CONT: Performed by: STUDENT IN AN ORGANIZED HEALTH CARE EDUCATION/TRAINING PROGRAM

## 2025-04-15 PROCEDURE — 82728 ASSAY OF FERRITIN: CPT | Performed by: STUDENT IN AN ORGANIZED HEALTH CARE EDUCATION/TRAINING PROGRAM

## 2025-04-15 PROCEDURE — 83540 ASSAY OF IRON: CPT | Performed by: STUDENT IN AN ORGANIZED HEALTH CARE EDUCATION/TRAINING PROGRAM

## 2025-04-15 PROCEDURE — 25810000003 SODIUM CHLORIDE 0.9 % SOLUTION 250 ML FLEX CONT: Performed by: STUDENT IN AN ORGANIZED HEALTH CARE EDUCATION/TRAINING PROGRAM

## 2025-04-15 PROCEDURE — 80053 COMPREHEN METABOLIC PANEL: CPT

## 2025-04-15 PROCEDURE — 96375 TX/PRO/DX INJ NEW DRUG ADDON: CPT

## 2025-04-15 RX ORDER — DEXAMETHASONE SODIUM PHOSPHATE 4 MG/ML
12 INJECTION, SOLUTION INTRA-ARTICULAR; INTRALESIONAL; INTRAMUSCULAR; INTRAVENOUS; SOFT TISSUE ONCE
Status: CANCELLED
Start: 2025-04-15 | End: 2025-04-15

## 2025-04-15 RX ORDER — FLUOROURACIL 50 MG/ML
400 INJECTION, SOLUTION INTRAVENOUS ONCE
Status: COMPLETED | OUTPATIENT
Start: 2025-04-15 | End: 2025-04-15

## 2025-04-15 RX ORDER — SODIUM CHLORIDE 9 MG/ML
20 INJECTION, SOLUTION INTRAVENOUS ONCE
Status: CANCELLED | OUTPATIENT
Start: 2025-04-15

## 2025-04-15 RX ORDER — SODIUM CHLORIDE 9 MG/ML
500 INJECTION, SOLUTION INTRAVENOUS ONCE
Status: CANCELLED
Start: 2025-04-17 | End: 2025-04-17

## 2025-04-15 RX ORDER — SODIUM CHLORIDE 0.9 % (FLUSH) 0.9 %
20 SYRINGE (ML) INJECTION AS NEEDED
Status: DISCONTINUED | OUTPATIENT
Start: 2025-04-15 | End: 2025-04-16 | Stop reason: HOSPADM

## 2025-04-15 RX ORDER — DEXAMETHASONE SODIUM PHOSPHATE 4 MG/ML
12 INJECTION, SOLUTION INTRA-ARTICULAR; INTRALESIONAL; INTRAMUSCULAR; INTRAVENOUS; SOFT TISSUE ONCE
Status: COMPLETED | OUTPATIENT
Start: 2025-04-15 | End: 2025-04-15

## 2025-04-15 RX ORDER — FLUOROURACIL 50 MG/ML
400 INJECTION, SOLUTION INTRAVENOUS ONCE
Status: CANCELLED | OUTPATIENT
Start: 2025-04-15

## 2025-04-15 RX ORDER — PANTOPRAZOLE SODIUM 40 MG/1
1 TABLET, DELAYED RELEASE ORAL EVERY 12 HOURS SCHEDULED
COMMUNITY
Start: 2025-03-25 | End: 2025-04-15

## 2025-04-15 RX ORDER — SODIUM CHLORIDE 0.9 % (FLUSH) 0.9 %
20 SYRINGE (ML) INJECTION AS NEEDED
Status: CANCELLED | OUTPATIENT
Start: 2025-04-15

## 2025-04-15 RX ORDER — FAMOTIDINE 20 MG/1
20 TABLET, FILM COATED ORAL 2 TIMES DAILY
Qty: 60 TABLET | Refills: 0 | Status: SHIPPED | OUTPATIENT
Start: 2025-04-15

## 2025-04-15 RX ORDER — SODIUM CHLORIDE 9 MG/ML
20 INJECTION, SOLUTION INTRAVENOUS ONCE
Status: COMPLETED | OUTPATIENT
Start: 2025-04-15 | End: 2025-04-15

## 2025-04-15 RX ADMIN — DEXAMETHASONE SODIUM PHOSPHATE 12 MG: 4 INJECTION INTRA-ARTICULAR; INTRALESIONAL; INTRAMUSCULAR; INTRAVENOUS; SOFT TISSUE at 12:51

## 2025-04-15 RX ADMIN — FLUOROURACIL 700 MG: 50 INJECTION, SOLUTION INTRAVENOUS at 13:45

## 2025-04-15 RX ADMIN — LEUCOVORIN CALCIUM 700 MG: 350 INJECTION, POWDER, LYOPHILIZED, FOR SOLUTION INTRAMUSCULAR; INTRAVENOUS at 12:56

## 2025-04-15 RX ADMIN — FLUOROURACIL 4220 MG: 50 INJECTION, SOLUTION INTRAVENOUS at 13:50

## 2025-04-15 RX ADMIN — SODIUM CHLORIDE 20 ML/HR: 9 INJECTION, SOLUTION INTRAVENOUS at 12:50

## 2025-04-15 NOTE — PROGRESS NOTES
Port accessed and flushed with good blood return noted. 10cc of blood wasted prior to specimen collection. Blood specimen obtained and sent to lab for processing per protocol.  Port saline locked for infusion. Pt tolerated well. Pt sent to waiting room for MD ramt.

## 2025-04-17 ENCOUNTER — RESULTS FOLLOW-UP (OUTPATIENT)
Dept: ONCOLOGY | Facility: HOSPITAL | Age: 75
End: 2025-04-17
Payer: MEDICARE

## 2025-04-17 ENCOUNTER — HOSPITAL ENCOUNTER (OUTPATIENT)
Dept: ONCOLOGY | Facility: HOSPITAL | Age: 75
Discharge: HOME | End: 2025-04-17
Payer: MEDICARE

## 2025-04-17 ENCOUNTER — HOSPITAL ENCOUNTER (OUTPATIENT)
Dept: CARDIOLOGY | Facility: HOSPITAL | Age: 75
Discharge: HOME | End: 2025-04-17
Admitting: INTERNAL MEDICINE
Payer: MEDICARE

## 2025-04-17 VITALS — HEART RATE: 65 BPM | SYSTOLIC BLOOD PRESSURE: 117 MMHG | DIASTOLIC BLOOD PRESSURE: 66 MMHG

## 2025-04-17 DIAGNOSIS — I42.0 CARDIOMYOPATHY, DILATED: ICD-10-CM

## 2025-04-17 DIAGNOSIS — C16.0 MALIGNANT NEOPLASM OF CARDIA: Primary | ICD-10-CM

## 2025-04-17 DIAGNOSIS — C15.5 PRIMARY MALIGNANT NEOPLASM OF LOWER THIRD OF ESOPHAGUS: ICD-10-CM

## 2025-04-17 DIAGNOSIS — I42.0 DILATED CARDIOMYOPATHY: ICD-10-CM

## 2025-04-17 LAB
AORTIC DIMENSIONLESS INDEX: 0.62 (DI)
AV MEAN PRESS GRAD SYS DOP V1V2: 5 MMHG
AV VMAX SYS DOP: 155 CM/SEC
BH CV ECHO LEFT VENTRICLE GLOBAL LONGITUDINAL STRAIN: -9.1 %
BH CV ECHO MEAS - AI P1/2T: 436.4 MSEC
BH CV ECHO MEAS - AO MAX PG: 9.6 MMHG
BH CV ECHO MEAS - AO V2 VTI: 33.2 CM
BH CV ECHO MEAS - AVA(I,D): 2.37 CM2
BH CV ECHO MEAS - EDV(CUBED): 132.7 ML
BH CV ECHO MEAS - EDV(MOD-SP2): 112 ML
BH CV ECHO MEAS - EDV(MOD-SP4): 112 ML
BH CV ECHO MEAS - EF(MOD-SP2): 45.1 %
BH CV ECHO MEAS - EF(MOD-SP4): 46.5 %
BH CV ECHO MEAS - ESV(CUBED): 59.3 ML
BH CV ECHO MEAS - ESV(MOD-SP2): 61.5 ML
BH CV ECHO MEAS - ESV(MOD-SP4): 59.9 ML
BH CV ECHO MEAS - FS: 23.5 %
BH CV ECHO MEAS - IVS/LVPW: 0.75 CM
BH CV ECHO MEAS - IVSD: 0.9 CM
BH CV ECHO MEAS - LA DIMENSION: 3.3 CM
BH CV ECHO MEAS - LAT PEAK E' VEL: 9.7 CM/SEC
BH CV ECHO MEAS - LV DIASTOLIC VOL/BSA (35-75): 66.2 CM2
BH CV ECHO MEAS - LV MASS(C)D: 200.8 GRAMS
BH CV ECHO MEAS - LV MAX PG: 3.8 MMHG
BH CV ECHO MEAS - LV MEAN PG: 2 MMHG
BH CV ECHO MEAS - LV SYSTOLIC VOL/BSA (12-30): 35.4 CM2
BH CV ECHO MEAS - LV V1 MAX: 97.6 CM/SEC
BH CV ECHO MEAS - LV V1 VTI: 20.7 CM
BH CV ECHO MEAS - LVIDD: 5.1 CM
BH CV ECHO MEAS - LVIDS: 3.9 CM
BH CV ECHO MEAS - LVOT AREA: 3.8 CM2
BH CV ECHO MEAS - LVOT DIAM: 2.2 CM
BH CV ECHO MEAS - LVPWD: 1.2 CM
BH CV ECHO MEAS - MED PEAK E' VEL: 5.4 CM/SEC
BH CV ECHO MEAS - MR MAX PG: 150.8 MMHG
BH CV ECHO MEAS - MR MAX VEL: 614 CM/SEC
BH CV ECHO MEAS - MR MEAN PG: 90 MMHG
BH CV ECHO MEAS - MR MEAN VEL: 440 CM/SEC
BH CV ECHO MEAS - MR VTI: 252 CM
BH CV ECHO MEAS - MV A DUR: 0.15 SEC
BH CV ECHO MEAS - MV A MAX VEL: 96.7 CM/SEC
BH CV ECHO MEAS - MV DEC SLOPE: 418 CM/SEC2
BH CV ECHO MEAS - MV DEC TIME: 0.19 SEC
BH CV ECHO MEAS - MV E MAX VEL: 95.7 CM/SEC
BH CV ECHO MEAS - MV E/A: 0.99
BH CV ECHO MEAS - MV MAX PG: 4.6 MMHG
BH CV ECHO MEAS - MV MEAN PG: 2 MMHG
BH CV ECHO MEAS - MV P1/2T: 77.1 MSEC
BH CV ECHO MEAS - MV V2 VTI: 25 CM
BH CV ECHO MEAS - MVA(P1/2T): 2.9 CM2
BH CV ECHO MEAS - MVA(VTI): 3.1 CM2
BH CV ECHO MEAS - PA ACC TIME: 0.11 SEC
BH CV ECHO MEAS - PA V2 MAX: 80.8 CM/SEC
BH CV ECHO MEAS - PULM A REVS DUR: 0.1 SEC
BH CV ECHO MEAS - PULM A REVS VEL: 25.6 CM/SEC
BH CV ECHO MEAS - PULM DIAS VEL: 46.9 CM/SEC
BH CV ECHO MEAS - PULM S/D: 1.4
BH CV ECHO MEAS - PULM SYS VEL: 65.5 CM/SEC
BH CV ECHO MEAS - RAP SYSTOLE: 3 MMHG
BH CV ECHO MEAS - RV MAX PG: 0.72 MMHG
BH CV ECHO MEAS - RV V1 MAX: 42.5 CM/SEC
BH CV ECHO MEAS - RV V1 VTI: 10.7 CM
BH CV ECHO MEAS - RVSP: 31.3 MMHG
BH CV ECHO MEAS - SV(LVOT): 78.7 ML
BH CV ECHO MEAS - SV(MOD-SP2): 50.5 ML
BH CV ECHO MEAS - SV(MOD-SP4): 52.1 ML
BH CV ECHO MEAS - SVI(LVOT): 46.5 ML/M2
BH CV ECHO MEAS - SVI(MOD-SP2): 29.8 ML/M2
BH CV ECHO MEAS - SVI(MOD-SP4): 30.8 ML/M2
BH CV ECHO MEAS - TAPSE (>1.6): 1.33 CM
BH CV ECHO MEAS - TR MAX PG: 28.3 MMHG
BH CV ECHO MEAS - TR MAX VEL: 266 CM/SEC
BH CV ECHO MEASUREMENTS AVERAGE E/E' RATIO: 12.68
BH CV XLRA - TDI S': 5.5 CM/SEC
LEFT ATRIUM VOLUME INDEX: 41.5 ML/M2
LV EF 3D SEGMENTATION: 48 %
LV EF BIPLANE MOD: 46.7 %
SINUS: 3.7 CM
STJ: 2.7 CM

## 2025-04-17 PROCEDURE — 93356 MYOCRD STRAIN IMG SPCKL TRCK: CPT

## 2025-04-17 PROCEDURE — 93306 TTE W/DOPPLER COMPLETE: CPT

## 2025-04-17 RX ORDER — SODIUM CHLORIDE 0.9 % (FLUSH) 0.9 %
20 SYRINGE (ML) INJECTION AS NEEDED
Status: DISCONTINUED | OUTPATIENT
Start: 2025-04-17 | End: 2025-04-18 | Stop reason: HOSPADM

## 2025-04-17 RX ORDER — SODIUM CHLORIDE 0.9 % (FLUSH) 0.9 %
20 SYRINGE (ML) INJECTION AS NEEDED
OUTPATIENT
Start: 2025-04-17

## 2025-04-17 RX ORDER — SODIUM CHLORIDE 9 MG/ML
500 INJECTION, SOLUTION INTRAVENOUS ONCE
Status: DISCONTINUED | OUTPATIENT
Start: 2025-04-17 | End: 2025-04-18 | Stop reason: HOSPADM

## 2025-04-17 RX ADMIN — Medication 20 ML: at 13:12

## 2025-04-17 NOTE — PROGRESS NOTES
Patient in clinic for 5FU pump disconnect. Pump empty. Port flushed and saline locked prior to needle removal. Patient feels well today. VS WNL. Patient declined IVF bolus today. Patient discharged, declined AVS.

## 2025-04-17 NOTE — TELEPHONE ENCOUNTER
Informed patient of elevated blood sugar on his last labs and if they continue to be high he should follow up with who manages his diabetes. He voiced understanding and that he had been eating a bunch of candy

## 2025-04-22 LAB
NTRA SIGNATERA MTM READOUT: 0.21 MTM/ML
NTRA SIGNATERA TEST RESULT: POSITIVE

## 2025-04-28 DIAGNOSIS — C15.5 PRIMARY MALIGNANT NEOPLASM OF LOWER THIRD OF ESOPHAGUS: ICD-10-CM

## 2025-04-28 DIAGNOSIS — C16.0 MALIGNANT NEOPLASM OF CARDIA: Primary | ICD-10-CM

## 2025-04-28 RX ORDER — FLUOROURACIL 50 MG/ML
400 INJECTION, SOLUTION INTRAVENOUS ONCE
Status: CANCELLED | OUTPATIENT
Start: 2025-04-29

## 2025-04-28 RX ORDER — SODIUM CHLORIDE 9 MG/ML
20 INJECTION, SOLUTION INTRAVENOUS ONCE
Status: CANCELLED | OUTPATIENT
Start: 2025-04-29

## 2025-04-28 RX ORDER — DEXAMETHASONE SODIUM PHOSPHATE 4 MG/ML
12 INJECTION, SOLUTION INTRA-ARTICULAR; INTRALESIONAL; INTRAMUSCULAR; INTRAVENOUS; SOFT TISSUE ONCE
Status: CANCELLED
Start: 2025-04-29 | End: 2025-04-29

## 2025-04-29 ENCOUNTER — HOSPITAL ENCOUNTER (OUTPATIENT)
Dept: ONCOLOGY | Facility: HOSPITAL | Age: 75
Discharge: HOME | End: 2025-04-29
Admitting: NURSE PRACTITIONER
Payer: MEDICARE

## 2025-04-29 VITALS
HEIGHT: 66 IN | RESPIRATION RATE: 14 BRPM | HEART RATE: 74 BPM | BODY MASS INDEX: 21.33 KG/M2 | SYSTOLIC BLOOD PRESSURE: 117 MMHG | WEIGHT: 132.7 LBS | OXYGEN SATURATION: 98 % | TEMPERATURE: 97.8 F | DIASTOLIC BLOOD PRESSURE: 56 MMHG

## 2025-04-29 DIAGNOSIS — C15.5 PRIMARY MALIGNANT NEOPLASM OF LOWER THIRD OF ESOPHAGUS: Primary | ICD-10-CM

## 2025-04-29 DIAGNOSIS — C16.0 MALIGNANT NEOPLASM OF CARDIA: ICD-10-CM

## 2025-04-29 LAB
ALP BLD-CCNC: 44 U/L (ref 53–128)
BASOPHILS # BLD AUTO: 0.04 10*3/MM3 (ref 0–0.2)
BASOPHILS NFR BLD AUTO: 0.7 % (ref 0–1.5)
BUN BLDA-MCNC: 10 MG/DL (ref 7–22)
CALCIUM BLD QL: 8.6 MG/DL (ref 8–10.3)
CHLORIDE BLDA-SCNC: 112 MMOL/L (ref 98–108)
CO2 BLDA-SCNC: 23 MMOL/L (ref 18–33)
CREAT BLDA-MCNC: 1.6 MG/DL (ref 0.6–1.2)
DEPRECATED RDW RBC AUTO: 55.1 FL (ref 37–54)
EGFRCR SERPLBLD CKD-EPI 2021: 44.7 ML/MIN/1.73
EOSINOPHIL # BLD AUTO: 0.08 10*3/MM3 (ref 0–0.4)
EOSINOPHIL NFR BLD AUTO: 1.3 % (ref 0.3–6.2)
ERYTHROCYTE [DISTWIDTH] IN BLOOD BY AUTOMATED COUNT: 16.5 % (ref 12.3–15.4)
GLUCOSE BLDC GLUCOMTR-MCNC: 145 MG/DL (ref 73–118)
HCT VFR BLD AUTO: 24.1 % (ref 37.5–51)
HGB BLD-MCNC: 8.1 G/DL (ref 13–17.7)
LYMPHOCYTES # BLD AUTO: 1.43 10*3/MM3 (ref 0.7–3.1)
LYMPHOCYTES NFR BLD AUTO: 23.9 % (ref 19.6–45.3)
MCH RBC QN AUTO: 33.1 PG (ref 26.6–33)
MCHC RBC AUTO-ENTMCNC: 33.6 G/DL (ref 31.5–35.7)
MCV RBC AUTO: 98.4 FL (ref 79–97)
MONOCYTES # BLD AUTO: 0.71 10*3/MM3 (ref 0.1–0.9)
MONOCYTES NFR BLD AUTO: 11.9 % (ref 5–12)
NEUTROPHILS NFR BLD AUTO: 3.72 10*3/MM3 (ref 1.7–7)
NEUTROPHILS NFR BLD AUTO: 62.2 % (ref 42.7–76)
PLATELET # BLD AUTO: 157 10*3/MM3 (ref 140–450)
PMV BLD AUTO: 9.4 FL (ref 6–12)
POC ALBUMIN: 3.1 G/L (ref 3.3–5.5)
POC ALT (SGPT): 12 U/L (ref 10–47)
POC AST (SGOT): 18 U/L (ref 11–38)
POC TOTAL BILIRUBIN: 0.8 MG/DL (ref 0.2–1.6)
POC TOTAL PROTEIN: 5.4 G/DL (ref 6.4–8.1)
POTASSIUM BLDA-SCNC: 3.5 MMOL/L (ref 3.6–5.1)
RBC # BLD AUTO: 2.45 10*6/MM3 (ref 4.14–5.8)
SODIUM BLD-SCNC: 143 MMOL/L (ref 128–145)
WBC NRBC COR # BLD AUTO: 5.98 10*3/MM3 (ref 3.4–10.8)

## 2025-04-29 PROCEDURE — 25010000002 LEUCOVORIN CALCIUM PER 50 MG: Performed by: NURSE PRACTITIONER

## 2025-04-29 PROCEDURE — 96367 TX/PROPH/DG ADDL SEQ IV INF: CPT

## 2025-04-29 PROCEDURE — 96375 TX/PRO/DX INJ NEW DRUG ADDON: CPT

## 2025-04-29 PROCEDURE — 85025 COMPLETE CBC W/AUTO DIFF WBC: CPT | Performed by: NURSE PRACTITIONER

## 2025-04-29 PROCEDURE — 25810000003 SODIUM CHLORIDE 0.9 % SOLUTION: Performed by: NURSE PRACTITIONER

## 2025-04-29 PROCEDURE — 96409 CHEMO IV PUSH SNGL DRUG: CPT

## 2025-04-29 PROCEDURE — 25010000002 DEXAMETHASONE PER 1 MG: Performed by: NURSE PRACTITIONER

## 2025-04-29 PROCEDURE — 80053 COMPREHEN METABOLIC PANEL: CPT

## 2025-04-29 PROCEDURE — 25810000003 SODIUM CHLORIDE 0.9 % SOLUTION 500 ML FLEX CONT: Performed by: NURSE PRACTITIONER

## 2025-04-29 PROCEDURE — 25810000003 SODIUM CHLORIDE 0.9 % SOLUTION 250 ML FLEX CONT: Performed by: NURSE PRACTITIONER

## 2025-04-29 PROCEDURE — G0498 CHEMO EXTEND IV INFUS W/PUMP: HCPCS

## 2025-04-29 PROCEDURE — 25010000002 FLUOROURACIL PER 500 MG: Performed by: NURSE PRACTITIONER

## 2025-04-29 RX ORDER — ERYTHROMYCIN 5 MG/G
OINTMENT OPHTHALMIC EVERY 6 HOURS
Qty: 3.5 G | Refills: 0 | Status: SHIPPED | OUTPATIENT
Start: 2025-04-29 | End: 2025-05-04

## 2025-04-29 RX ORDER — SODIUM CHLORIDE 9 MG/ML
20 INJECTION, SOLUTION INTRAVENOUS ONCE
Status: COMPLETED | OUTPATIENT
Start: 2025-04-29 | End: 2025-04-29

## 2025-04-29 RX ORDER — FLUOROURACIL 50 MG/ML
400 INJECTION, SOLUTION INTRAVENOUS ONCE
Status: COMPLETED | OUTPATIENT
Start: 2025-04-29 | End: 2025-04-29

## 2025-04-29 RX ORDER — DEXAMETHASONE SODIUM PHOSPHATE 4 MG/ML
12 INJECTION, SOLUTION INTRA-ARTICULAR; INTRALESIONAL; INTRAMUSCULAR; INTRAVENOUS; SOFT TISSUE ONCE
Status: COMPLETED | OUTPATIENT
Start: 2025-04-29 | End: 2025-04-29

## 2025-04-29 RX ADMIN — FLUOROURACIL 700 MG: 50 INJECTION, SOLUTION INTRAVENOUS at 15:16

## 2025-04-29 RX ADMIN — LEUCOVORIN CALCIUM 700 MG: 350 INJECTION, POWDER, LYOPHILIZED, FOR SOLUTION INTRAMUSCULAR; INTRAVENOUS at 14:38

## 2025-04-29 RX ADMIN — DEXAMETHASONE SODIUM PHOSPHATE 12 MG: 4 INJECTION INTRA-ARTICULAR; INTRALESIONAL; INTRAMUSCULAR; INTRAVENOUS; SOFT TISSUE at 14:31

## 2025-04-29 RX ADMIN — FLUOROURACIL 4220 MG: 50 INJECTION, SOLUTION INTRAVENOUS at 15:20

## 2025-04-29 RX ADMIN — SODIUM CHLORIDE 20 ML/HR: 9 INJECTION, SOLUTION INTRAVENOUS at 14:30

## 2025-04-29 NOTE — PROGRESS NOTES
Pt here for LCV/5 FU.  Reviewed assessment/labs with Lynette HUNTER NP and pt to proceed with treatment as planned,  Lynette HUNTER NP sending in eye drops,   reviewed with pt and v/u and agreement.   Treatment given per MAR and pt tolerated well, pt discharged and AVS provided.

## 2025-05-01 ENCOUNTER — HOSPITAL ENCOUNTER (OUTPATIENT)
Dept: ONCOLOGY | Facility: HOSPITAL | Age: 75
Discharge: HOME | End: 2025-05-01
Admitting: STUDENT IN AN ORGANIZED HEALTH CARE EDUCATION/TRAINING PROGRAM
Payer: MEDICARE

## 2025-05-01 VITALS
HEART RATE: 67 BPM | WEIGHT: 135.1 LBS | TEMPERATURE: 98.5 F | RESPIRATION RATE: 14 BRPM | DIASTOLIC BLOOD PRESSURE: 62 MMHG | OXYGEN SATURATION: 97 % | BODY MASS INDEX: 21.71 KG/M2 | HEIGHT: 66 IN | SYSTOLIC BLOOD PRESSURE: 120 MMHG

## 2025-05-01 DIAGNOSIS — C15.5 PRIMARY MALIGNANT NEOPLASM OF LOWER THIRD OF ESOPHAGUS: ICD-10-CM

## 2025-05-01 DIAGNOSIS — C16.0 MALIGNANT NEOPLASM OF CARDIA: Primary | ICD-10-CM

## 2025-05-01 PROCEDURE — 25810000003 SODIUM CHLORIDE 0.9 % SOLUTION: Performed by: STUDENT IN AN ORGANIZED HEALTH CARE EDUCATION/TRAINING PROGRAM

## 2025-05-01 PROCEDURE — 96360 HYDRATION IV INFUSION INIT: CPT

## 2025-05-01 RX ORDER — SODIUM CHLORIDE 0.9 % (FLUSH) 0.9 %
20 SYRINGE (ML) INJECTION AS NEEDED
OUTPATIENT
Start: 2025-05-01

## 2025-05-01 RX ORDER — SODIUM CHLORIDE 9 MG/ML
500 INJECTION, SOLUTION INTRAVENOUS ONCE
Status: COMPLETED | OUTPATIENT
Start: 2025-05-01 | End: 2025-05-01

## 2025-05-01 RX ORDER — SODIUM CHLORIDE 0.9 % (FLUSH) 0.9 %
20 SYRINGE (ML) INJECTION AS NEEDED
Status: DISCONTINUED | OUTPATIENT
Start: 2025-05-01 | End: 2025-05-02 | Stop reason: HOSPADM

## 2025-05-01 RX ADMIN — SODIUM CHLORIDE 500 ML/HR: 900 INJECTION, SOLUTION INTRAVENOUS at 13:53

## 2025-05-01 RX ADMIN — Medication 10 ML: at 14:59

## 2025-05-01 NOTE — PROGRESS NOTES
Patient in clinic for 5FU pump disconnect/IVF. Pump empty. IVF given.Port flushed and saline locked prior to needle removal.Patient discharged, declined AVS.

## 2025-05-06 DIAGNOSIS — E03.9 HYPOTHYROIDISM (ACQUIRED): ICD-10-CM

## 2025-05-06 DIAGNOSIS — R79.89 ELEVATED TSH: ICD-10-CM

## 2025-05-06 RX ORDER — LEVOTHYROXINE SODIUM 50 UG/1
50 TABLET ORAL DAILY
Qty: 30 TABLET | Refills: 0 | Status: SHIPPED | OUTPATIENT
Start: 2025-05-06

## 2025-05-08 RX ORDER — FAMOTIDINE 20 MG/1
20 TABLET, FILM COATED ORAL 2 TIMES DAILY
Qty: 60 TABLET | Refills: 0 | Status: SHIPPED | OUTPATIENT
Start: 2025-05-08

## 2025-05-08 NOTE — PROGRESS NOTES
Hematology-Oncology Follow-up Note       Artie Mc  1950    Primary Care Physician: Chanel Carter, DAT  Referring Physician: No ref. provider found    Reason For Visit:  No chief complaint on file.    Metastatic esophageal cancer  Monitoring for adverse effects related to immunotherapy.  Hypothyroidism      Mr. Mc is a pleasant 74 y.o. gentleman with a history of gastroesophageal reflux disease, diabetes, hypertension, heart block status post pacemaker placement and history of CABG.  He was having symptoms of dysphagia, weight loss since September 2016. He was reporting symptoms of food getting stuck in the lower chest.  He has transitioned himself to a liquid diet.  The patient reports losing about 30 pounds over this duration.  The patient underwent upper GI endoscopy on 12/20/16 with Dr. Esvin Barrera.  Endoscopy showed necrotic, circumferential and moderately obstructive mass at the distal esophagus between 42 cm and 36 cm.      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  There was also evidence of mild subacute inflammation in the duodenum.  Dr. Barrera ordered a CT scan of the chest and abdomen with contrast on 12/20/16.  The scan was done at [facility].  The scan showed a new ill-defined mass in the anterior wall of the distal esophagus at the GE junction measuring 2.3 x 2.2 x 1.6 cm, worrisome for cancer.  There were four subcentimeter liver lesions which had appeared stable in comparison to another CT scan from 2009 and they likely represent small cysts.  There was also mild lymphadenopathy in the gastrohepatic ligament.  There were at least six borderline enlarged lymph nodes measuring up to 1.3 x 1 cm in the region.  The patient is referred to us for further oncological evaluation.    1/5/2017 - The patient presents for initial consultation.  He reports persistent dysphagia and also has  symptoms of regurgitation.  He cannot tolerate solid foods and is dependent on liquid diet such as Ensure.  He also reports fatigue. Symptoms of dysphagia have been present for the past four months.  When he eats any solids, the food gets stuck in the lower chest.    1/5/17 - Vitamin B12 200 (L).  CEA 0.8.  Ferritin 35.  Iron saturation 16% (L), serum iron 56, TIBC 354.  Creatinine 0.9.  LFTs normal.  Folate 10.2.  1/12/17 - PET/CT scan:  Intense abnormal activity corresponding to the region of the GE junction and proximal stomach.  SUV is 11.06.  There is a suggestion of an abnormal mass or abnormal wall thickening in the region measuring 3.9 x 4.8 cm.  No additional abnormalities.  No evidence of metastases or other lymphadenopathy.  Scattered nonspecific subcentimeter lymph nodes involving the mediastinum and within the central upper abdomen.  1/13/17 - Creatinine 0.9.  LFTs normal.    1/17/17 - Patient seen by thoracic surgeon, Dr. Bradley.  PET scan was reviewed.  He has recommended neoadjuvant chemoradiation therapy followed by Noel Gurwinder esophagogastrectomy.  Port-A-Cath is being arranged.    1/20/17 - WBC 6.3, hemoglobin 12.1, platelet count 198,000, MCV 86.4.  1/30/17 - Patient started weekly Carboplatin and Taxol (Carboplatin AUC 2 and Taxol 50 mg/M2).  Patient received Injectafer 750 mg.  WBC 5.8, hemoglobin 12.4, platelet count 244,000.   1/31/17 - Patient seen by Dr. Eduardo Oleary.  The plan was to give 5040 cGy in 28 fractions.   2/6/17 - WBC 4.4, hemoglobin 11.6, platelet count 201,000.  Patient received cycle 2 of weekly Carboplatin and Taxol (Carboplatin AUC 2 and Taxol 50 mg/M2).  2/13/17 - Patient received Carboplatin and Taxol weekly cycle 3.  2/13/17 - Patient started concurrent radiation therapy.  Total planned dose is 4140 cGy.    2/13/17 to 2/15/17 - Patient received 4140 cGy of neoadjuvant radiation.  Radiation was finished on 3/15/17.    2/20/17 - WBC 1.8, hemoglobin 10.6, platelet count  217,000.  Creatinine 0.5. Folate 15 (L).  Ferritin 361.  Haptoglobin 214.  Iron saturation 33%, TIBC 263, serum iron 88.    2/20/17 - Patient received Carboplatin and Taxol weekly cycle 4.   2/27/17 - WBC 3.7, hemoglobin 10.7, platelet count 177,000, MCV 85.3.    3/6/17 - Patient has received 16 out of the 23 planned radiation treatments.  Total planned dose is 4140 cGy.    3/6/17 - WBC 5.4, hemoglobin 11.2, platelet count 113,000.  3/6/17 - Patient received cycle 5 of weekly Carboplatin and Taxol.   3/13/17 - Patient received cycle 6 of weekly Carboplatin and Taxol.  WBC 3.9, hemoglobin 11.3, platelet count 93,000.     3/20/17 - WBC 1.4, hemoglobin 10.0, platelet count 118,000, MCV 86.1   3/27/17 - WBC 3.8, hemoglobin 9.5, platelet count 176,000, MCV 87.3.    4/7/17 - Stress test:  No evidence of reversible myocardial ischemia.  Normal left ventricular ejection fraction of 50%.    4/17/17 - Upper GI endoscopy by Dr. 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  The GE junction adenocarcinoma lesion seems to have a very good response to chemotherapy and radiation.  The lumen was open.  5/1/17 - WBC 7.0, hemoglobin 10.2, platelet count 175,000.    5/4/17 - Patient underwent Laurel Gurwinder esophagectomy with pyloroplasty, feeding jejunostomy tube, abdominal and mediastinal lymph node dissection.    Surgical Pathology:  Moderately to poorly differentiated adenocarcinoma measuring 1.3 cm at the GE junction.  Resection margins are negative for malignancy.  Tumor margins are negative.  There is effective treatment response.  No evidence of lymphovascular invasion.  Staging is ypT3,pN1.  One out of fourteen lymph nodes involved.   5/22/17 - WBC 6.8, hemoglobin 10.3, platelet count 312,000.    6/19/17 - WBC 5.7, hemoglobin 10.5, platelet count 204,000, MCV 92.1.    7/6/17 - EGD:  Status post balloon dilation of esophageal stricture.  7/12/17 - EGD with balloon dilation of esophageal  stricture.  7/26/17 - EGD and balloon dilation of esophageal stricture.  7/31/17 - PET/CT scan:  There is mild metabolic activity seen at the thoracic esophagus just at and below the surgical margins.  Some mild wall thickening.  This could be from recent surgery.  A residual cancer seems unlikely.  There is a precarinal lymph node with mild metabolic activity with SUV of 3 measuring 1 x 1.3 cm.  Again this appears to be nonspecific in my opinion.    8/7/17 - WBC 5.9, hemoglobin 11.9, platelet count 171,000, MCV 88.7.    10/19/17 - WBC 7.5, hemoglobin 11.8, platelet count 216,000.    1/8/18 - PET scan:  No evidence of residual disease or recurrent disease.  There is an esophageal stent in the mid esophagus with a large debris air level.  No evidence of any metastases in the chest, abdomen or pelvis.  Mild nodule uptake in the vocal cords with fullness in the right vocal cord.  This could be physiologic.    7/9/18 - CT scan of chest with contrast:  Prominent mediastinal lymph nodes appear stable since February.  Changes of gastric pull-through procedure for esophageal cancer again noted.  Tree-in-bud infiltrates in the left lung base, consistent with infection versus inflammation.    1/10/19 - CT chest, abdomen and pelvis with contrast:  No evidence of mets in the chest; however, interval development of metastatic lymphadenopathy in the left side of the retroperitoneum.  For instance, there is a 17 mm rounded lymph node, 10 mm lymph node and also a 14 mm lymph node.  These are all new.  Stable 9 mm hypodense lesion within the right hepatic lobe, which could reflect hemangioma or complex cyst.    1/30/19 - CT-guided core biopsy of retroperitoneal lymph node:  Poorly differentiated adenocarcinoma consistent with metastases from patient’s known esophageal primary.  CK20 positive, CDX2 positive, cytokeratin 7 negative, TTF-1 negative.    2/1/19 - Creatinine 0.9, BUN 18, calcium 9.3; these are normal.    2/28/19 - [facility]  Next Gen sequencing testing on retroperitoneal lymph node specimen:  PD-L1 positive.  CPS = 3.  This implies responsiveness to pembrolizumab.  Mismatch repair status is proficient (no evidence of microsatellite instability).  Tumor mutational burden is low = 6 mutations/Mb.  CDH1 indeterminate.  KRAS mutation positive.  TP53 mutation positive.  CDK6 amplified.  [employer] PD-L1 testing by IHC:  PD-L1 expression 1% positive (CPS =1).  HER2/marvel by IHC is negative.  HER2/marvel by CISH not amplified.  Microsatellite stable tumor.    3/7/19 - WBC 7.3, hemoglobin 12, platelet count 128,000, MCV 92.     3/14/19 - Patient was seen by radiation oncologist, Dr. Chahal.  He has recommended observation versus systemic chemoimmunotherapy as needed.  No plans for radiation therapy.   5/1/19 - CT scan of chest with contrast:  No definitive findings of new metastatic disease in the chest.  Emphysema noted.    5/1/19 - CT abdomen and pelvis:  There is interval progression of metastatic retroperitoneal adenopathy.  Left periaortic adenopathy with lymph node measuring up to 2 cm, previously 1.7 cm.  Another lymph node now measures 2.3, previously 1.4 cm.  New enlarged lymph node on image 147 measures 1.4 cm, previously 0.4 cm.  A 9 mm hypodense right hepatic lobe lesion appears stable.    5/9/19 - Decision made to start patient on immunotherapy Keytruda.    5/9/19 - Vitamin B12 229.  Ferritin 61.  Folate 14.3.  Iron saturation 26%, TIBC 304, serum iron 79.  5/31/19 - Patient received Keytruda 200 mg cycle 1, day 1.    5/31/19 - Free T4 0.99.  Creatinine 0.9, BUN 9.  LFTs normal.  TSH 3.99.  Folate 14.3.  Iron saturation 26%.    6/21/2019 patient received Keytruda cycle 2  7/12/2019: Patient received Keytruda cycle 3  8/2/2019 patient received cycle 4 Keytruda WBC 7.2, hemoglobin 11.8 platelet count 163  8/23/2019 creatinine 0.9, LFTs normal, WBC 6.4, hemoglobin 11.5, platelets 179, TSH 2.1   8/23/2019 patient received cycle 5 of  Keytruda  9/3/2019 CT chest abdomen and pelvis with contrast: . Positive response to therapy in the abdomen since 05/01/2019. Pathologically enlarged retroperitoneal lymph nodes, predominantly in the left periaortic region, have diminished in size.a 1.3 x 2.1 cm left periaortic node (image 60), previously measured 3.5 x 2.3 cm. A 1.6 cm index node near the level of the left renal vein previously measured 2.0 cm No new or progressive adenopathy. 2. Stable findings in the chest. Noncalcified right upper lobe nodules are unchanged. There is no evidence of new or progressive metastatic disease within the chest. 3. 8 mm indeterminate low-density lesion in the right hepatic lobe, is stable. No new liver lesions.  9/6/2019 WBC 5.6, hemoglobin 11.2, platelets of 192, MCV 91.8  9/13/2019 patient received Keytruda cycle 6, albumin 3.2  10/4/2019 patient received cycle 7 of Keytruda, TSH 4.8, free T4 0.86  10/9/2019 WBC 5.8, hemoglobin 11.7, platelets 174  10/25/2019 patient received cycle 8 of Keytruda.  WBC 5.9, hemoglobin 11.5, platelets 152, creatinine 0.75, cortisol 15.2, TSH 6.48 high , free T4 1.13 normal  11/6/2019 WBC 6.6, hemoglobin 11.8, MCV 91.5, platelets 187  11/15/2019 patient received cycle 9 of Keytruda, WBC 6.6, hemoglobin 11.9, platelets 161, cortisol level 8.86, TSH 2.68, free T3 2.8, free T4 1.5  12/4/2019 WBC 6.9, hemoglobin 12.0, platelets 180, MCV 92.9  12/06/2019-Keytruda Cycle 10  12/27/2019- Cycle 11 LFTs normal, WBC 6.7, hemoglobin 11.4, platelets 168, MCV 93, TSH 2.4,  1/28/2020: CT chest abdomen and pelvis with contrast:  Single (aortic lymph node in the abdominal retroperitoneum has increased.  Rest of the retroperitoneal lymph nodes have decreased in size.  Mediastinal adenopathy appears unchanged.  Noncalcified bilateral pulmonary nodules are stable  1/17/2020 patient received Keytruda cycle 12:.  2/3/2020 WBC 7.1, hemoglobin 12.2, platelets 171  02/10/2020 patient received cycle 13 of  Keytruda: WBC 7.3, hemoglobin 12.1, platelets 166, creatinine 0.82  3/6/2020 patient is of Keytruda 200 mg, hemoglobin 10.8  3/27/2020 patient received Keytruda 200 mg, hemoglobin 11.4, TSH 1.95  4/17/2020 patient received Keytruda cycle 16 200 mg, WBC 6.1, hemoglobin 11.6, platelets 150  5/8/2020: Patient received cycle 17 Keytruda, WBC 6.3, hemoglobin 9.6, platelets 137, TSH 2.05, free T4 1.54, creatinine 0.93, LFTs normal,  5/29/2020: Patient received cycle 18 Keytruda, WBC 6.5, hemoglobin 11.7, platelets 129, creatinine 0.83, TSH 1.69  6/26/2020: Patient receiving Keytruda.   7/17/2020: TSH 2.06, WBC 5.7, hemoglobin 11.4, platelets 177, MCV 92.4, Keytruda 200 mg cycle 20  7/27/2020: CT chest abdomen pelvis with contrast: Stable findings in the chest abdomen pelvis since 1/28/2020.  Noncalcified bilateral pulmonary nodules are stable.  Stable retroperitoneal left perinephric adenopathy in the abdomen.  8/7/2020: Patient received cycle 21 of Keytruda  8/28/2020 patient is a cycle 22 of Keytruda.  WBC 6.02, hemoglobin 11.3, platelets 161  9/11/2020 WBC 6.9, hemoglobin 12.4, platelets 192  9/18/2020: Cycle 23 of Keytruda, TSH 1.19  10/9/2020: Cycle 24 of Keytruda, WBC 5.9, hemoglobin 11.9, platelets 178, creatinine 0.84, TSH 2.56, free T3 2.4, CMP normal  10/23/2020: WBC 7.34, hemoglobin 11.6, platelets 175  10/30/2020:.  Keytruda 200 mg  11/20/2020: Keytruda 200 mg  12/11/2020 Keytruda 200 mg.  WBC 5.5, hemoglobin 11.4, platelets 176, creatinine 0.8, LFTs normal, TSH 2.2, cortisol 8.3  12/10/2020: CT chest abdomen and pelvis with contrast: Previously seen left para-aortic lymph nodes within the abdomen are decreased in size.  No pathological lymphadenopathy.  No evidence of residual malignancy..  Small noncalcified pulmonary nodules are stable.  No new nodules.  Stable mediastinal lymph nodes.  3/12/2021: WBC 6.7, hemoglobin 11.3, platelets 17  3/12/2021: CMP normal, TSH 2.9, cortisol 11.95, 1  3/15/2021: CT chest  with contrast: Stable findings in the chest with postsurgical changes of esophageal resection and gastric pull-through.  Stable right lower lobe lung nodule measures 11 mm.  Stable size and appearance of the mediastinal and upper abdominal lymph nodes.  3/23/2021: Doppler of upper extremity: Normal.  3/24/2021: CT abdomen and pelvis with contrast: Postoperative changes.  Left periaortic nodes are stable.  4/12/2021: TSH 4.1, cortisol 12.4  6/22/2021: CMP normal,Cortisol 10.08,, WBC 5.8, hemoglobin 11.2, platelets 109, TSH 2.59  7/12/2021: CT scan of the chest abdomen pelvis with contrast: Dominant 10 x 10 left periaortic lymph node is stable since 3/22/2021.  Dominant lymph nodes in the right lower paratracheal and AP window distribution are stable since 3/15/2021.  No evidence of progressive adenopathy in the chest abdomen or pelvis.    01/11/2022 -PET/CT findings consistent with new cedric metastatic disease in the retroperitoneum.  2 new pathologically enlarged hypermetabolic retroperitoneal lymph nodes.  No convincing evidence of recurrent disease within the chest neck pelvis or skeleton.  1/20/2022 -CT-guided needle biopsy consistent with poorly differentiated carcinoma.  CDX2 positive consistent with metastases from known esophageal primary  2/9/2022 - Pembrolizumab complicated with fatigue, joint pains, nausea, diarrhea. Symptoms decrease in intensity over a week.  3/30/2022 -CT chest and pelvis with stable metastatic lymph nodes in the left periaortic retroperitoneum.  No evidence of disease progression or new lesions.  Stable lymphadenopathy in the distal paratracheal mediastinal area  4/13/2022 - Keytruda 200  5/4/2022 - Keytruda 200  Continued keytruda  7/11/2022 - CT abdomen pelvis with decrease in size of left perioaortic lymph node. No other areas of disease.  Continues to be on Keytruda  10/18/2022 - stable CT imaging  1/20/2023 - CT imaging with stable disease. No significant change.  Patient was  hospitalized with esophagitis, subsequent EGD with improvement, no recurrence of disease.  2/21/2023 - Keytruda  CT CAP with increased retroperitoneal adenopathy.   ECHO with EF 35-40% (concern for immunotherapy related )immunotherapy held with ongoing investigation for cause.  Subsequent improvement in EF to 45%  4/4/2023 - CT abdomen pelvis with interval retroperitoneal lymph node enlargement compatible with metastatic adenopathyl.  5/5/2023 - PET CT with mixed response. No clear evidence of progression  6/2/23 -started pembrolizumab 200 mg IV  Posttreatment had symptoms of chest tightness troponin and proBNP negative, echocardiogram with EF 40%  Subsequent cardiac cath with EF down to 30% elevated proBNP, troponin no significant obstructive disease in the graft vessels  Cardiac MRI unable to obtain given pacemaker noncompliant.  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 Two enlarged mediastinal lymph nodes in the precarinal and AP window distributions are unchanged since 4/4/2023. No new or progressive adenopathy in the chest.Stable 3 mm or less noncalcified right lung nodules since 4/4/2023. Surgical changes of distal esophagectomy with gastric pull-through, without evidence of local disease recurrence  8/29/23 - started FOLFOX had significant side effects  9/20/23 - stopped oxaliplatin and continued 5FU only'  11/29/23 - CT CAP with decrease size of retroperitoneal adenopathy, no other concerning findings.  Continues 5FU leucovorin   2/12/24 - PET CT There is some metabolic activity within a mediastinal lymph node which has been suggested with slightly decreased metabolic activity. Whether this is reactive or pathologic is uncertain. Previously noted abnormal retroperitoneal  lymph node is no longer identified continued treatmen  4/10/24 - iron sat 10, ferritin  14, plan for venofer  Continued treatment signatera negative  5/7/24 - CT chest without contrast. Unchanged mildly enlarged right paratracheal lymph lymph node measuring 1.1 cm in short axis (series 2 image 40) and left paratracheal measuring 1.1 cm in short axis (series 2 image 39). No new or progressive lymphadenopathy.  Continued maintenance 5FU  changed frequency to every 3 weeks.   7/11/2024 CT chest with stable lung nodule, mildly prominent precarinal lymph node    7/25/2024:  Bilateral lung, bronchial wash with smears and cytospin:  Acute inflammation, pulmonary macrophages and bronchial epithelial cells    2.   Lymph node, precarinal station 4, fine-needle aspiration with smears and cell block:  Benign bronchial epithelial cells in a background of blood and lymphocytes consistent with lymph node aspirate  No malignancy identified      8/7/24: Seen today for initial evaluation by me.  He was previously being seen by Dr. Elizondo in our practice.  He has underlying history of metastatic esophageal adenocarcinoma and was on maintenance therapy with 5-FU/leucovorin.  His last treatment was on 7/17/2024.  He has intermittent bronchoscopy planned lymph node biopsy due to suspicion for metastatic disease on recent imaging.  This is reported as above.    Patient reported that he has had persistent fatigue with maintenance chemotherapy, also reported having GI side effects, such as nausea and poor appetite with chemo.  However, the patient denied any weight loss.  No other issues presently.    9/11/2024: CT chest/abdomen/pelvis:  Impression:  1. Postsurgical changes from esophagectomy with gastric pull-through. No findings to suggest progression of disease within the thorax.  2. Stable subcentimeter pulmonary nodules measuring up to 4 mm in the right lower lobe, and mildly prominent precarinal lymph node. Continued attention on  follow-up suggested.  3. No findings to suggest progression of disease or metastatic disease within the abdomen or pelvis.  4. Additional findings as given above..      10/23/24: Patient seen today for follow-up visit with restaging scans.  Has ogoing sinus congestion for several months. Some sore throat.some cough, SOB has improved. Has ongoing fatigue, stable.     1/10/25: CT chest/Abdomen/Pelvis W contrast:  Impression:   1. Stable examination of the chest with postsurgical changes from an esophagectomy and gastric pull-through.   2. However, within the abdomen there is a new enhancing lesion in the posterior right hepatic lobe measuring 0.6 cm and interval enlargement of a para-aortic lymph node concerning for disease progression now measuring 1.9 x 1.1 cm.     1/17/2025: Patient seen today for follow-up visit with restaging scans.  No new complaints reported.  Continues to do well clinically    1/28/2025 PET/CT:  Impression:  1. Isolated hypermetabolic left periaortic/retroperitoneal lymph nodes suspicious for metastatic disease in patient with history of esophageal malignancy. While almost certainly esophageal related, nonemergent testicular ultrasound would be reasonable in   a male with retroperitoneal adenopathy to exclude a solid testicular lesion.  2. Mild lower mediastinal adenopathy stable from 2024. No evidence of solid organ metastases.  3. Esophagectomy with gastric pull-through. Linear probably physiologic uptake in the stomach. Differential would include gastritis.  4. Emphysema. Emphysema on CT is an independent risk factor for lung cancer. Low dose lung cancer screening should be considered if patient qualifies based on smoking history or if not already enrolled in a screening program.      2/11/2025: Patient here today for routine follow-up prior to scheduled treatment.  He reports overall tolerating leucovorin and fluorouracil although he did have mild intermittent diarrhea.  He also reports he  has been drinking less fluids because he feels bloated.  He has no new pain.  He noted nausea following his infusion but this was managed with Zofran.    3/18/25: Patient seen today for follow-up prior to cycle 3 maintenance chemotherapy reported having off-and-on diarrhea and progressive fatigue after starting chemotherapy.  Also has intermittent nausea.  Weight/appetite is stable presently.  He reported using Imodium sparingly for his diarrhea    4/11/25: CT Chest Abd Pelvis W contrast:  1. New 6 mm low-density lesion within the spleen. Metastatic disease cannot be excluded.   2. Stable 2.0 x 1.0 cm left periaortic lymph node in the mid abdomen, consistent with metastatic disease, corresponding to the metabolically active node seen on prior PET/CT imaging. No new or progressive adenopathy.   3. Stable 6 mm enhancing focus within the right hepatic lobe since CT abdomen of 1/10/2025. This demonstrated no abnormal FDG uptake on prior PET/CT.   3. Surgical changes of esophagectomy with gastric pull-through. No evidence of new or progressive malignancy within the chest. Small noncalcified nodules measuring 2 to 3 mm are unchanged.     History of Present Illness  4/15/25: The patient presents for follow up with restaging scans.   Significant gastrointestinal discomfort is reported, attributed to the discontinuation of Protonix by his nephrologist due to potential renal complications. A sensation of food impaction in the esophagus is described. This symptom was less pronounced during a chemotherapy hiatus but has since worsened.     Esophageal dilation has been performed several times in the past without relief. There is reluctance towards the placement of a feeding tube. Currently, Zofran is used for nausea management.    Fatigue and a lack of energy are also reported, believed to have intensified since resuming chemotherapy. Energy levels have been fluctuating.not on oral iron at this time.  He has completed 5 cycles  of maintenance 5FU/LV.    SUBJECTIVE:  5/13/25: pt seen for follow up. Noted to have some productive cough. Patient reported having low grade fever, myalgias and generalized weakness over last few days which he attributed to a viral illness. These symptoms have improved over last couple days and he is starting to have more energy at this time, however not back to his normal baseline yet.      Past Medical History:   Diagnosis Date    B12 deficiency     Blockage of coronary artery of heart     Depression     Disease of thyroid gland     DM (diabetes mellitus)     Dysphagia     Esophageal cancer     GERD (gastroesophageal reflux disease)     HTN (hypertension)     Hyperlipidemia        Past Surgical History:   Procedure Laterality Date    ABDOMINAL WALL ABSCESS INCISION AND DRAINAGE  10/22/2018    WITH DEBRIDEMENT  1.5CM X 1.5 CM X 1.5 CM    DR. PURI    BRONCHOSCOPY N/A 7/25/2024    Procedure: BRONCHOSCOPY WITH ENDOBRONCHIAL ULTRASOUND, LUNG WASHINGS, AND FINE NEEDLE ASPIRATIONS;  Surgeon: Bonnie Smith MD;  Location: [facility] ENDOSCOPY;  Service: Pulmonary;  Laterality: N/A;    CARDIAC CATHETERIZATION Right 7/20/2023    Procedure: Left Heart Cath;  Surgeon: Mazin Simmons MD;  Location: [facility] CATH INVASIVE LOCATION;  Service: Cardiovascular;  Laterality: Right;    CATARACT EXTRACTION  2018    COLONOSCOPY      CORONARY ARTERY BYPASS GRAFT  2015    ENDOSCOPY  12/20/2016    ENDOSCOPY N/A 01/02/2023    Procedure: ESOPHAGOGASTRODUODENOSCOPY;  Surgeon: Esvin Morgan MD;  Location: [facility] ENDOSCOPY;  Service: Gastroenterology;  Laterality: N/A;  post: anastamosis stricture    ENDOSCOPY N/A 02/09/2023    Procedure: ESOPHAGOGASTRODUODENOSCOPY, non-wire guided balloon dilation (12-14mm) of esophageal stricture;  Surgeon: Tino Villafana MD;  Location: [facility] ENDOSCOPY;  Service: Gastroenterology;  Laterality: N/A;  post: esophageal anastamotic stricture, post surgical changes    ESOPHAGECTOMY   05/04/2017    BROOKS PETTY ESOPHAGECTOMY, FEEDING JEJNOSTOMOY, ABDOMINAL AND MEDIASTINAL LYMPH NODE DISECTION, PEDICLED MUSCLE FLAP COVERAGE DR. PURI    ESOPHAGOSCOPY / EGD  07/12/2017    WITH BALLOON DILATION    ESOPHAGOSCOPY / EGD  07/26/2017    WITH BALLOON DILATION    ESOPHAGOSCOPY / EGD  08/11/2017    WITH BALLOON DILATION    ESOPHAGOSCOPY / EGD  08/28/2017    WITH BALLOON DILATION    ESOPHAGOSCOPY / EGD  09/27/2017    WITH BALLOON DILATION    INSERT / REPLACE / REMOVE PACEMAKER      LYMPH NODE BIOPSY  01/30/2019    PACEMAKER IMPLANTATION  11/21/2016       Current Outpatient Medications:     clonazePAM (KlonoPIN) 0.5 MG tablet, Take 0.5 tablets by mouth 2 (Two) Times a Day As Needed for Anxiety., Disp: , Rfl: 1    cyanocobalamin (VITAMIN B-12) 1000 MCG tablet, Take 1 tablet by mouth Daily. (Patient not taking: Reported on 4/15/2025), Disp: , Rfl:     famotidine (PEPCID) 20 MG tablet, Take 1 tablet by mouth twice daily, Disp: 60 tablet, Rfl: 0    gabapentin (NEURONTIN) 100 MG capsule, Take 1 capsule by mouth 3 (Three) Times a Day. (Patient not taking: Reported on 2/18/2025), Disp: 90 capsule, Rfl: 1    levothyroxine (SYNTHROID, LEVOTHROID) 50 MCG tablet, Take 1 tablet by mouth once daily, Disp: 30 tablet, Rfl: 0    metFORMIN (GLUCOPHAGE) 1000 MG tablet, Take 0.5 tablets by mouth Daily. (Patient taking differently: Take 0.5 tablets by mouth 2 (Two) Times a Day With Meals.), Disp: 60 tablet, Rfl: 2    nebivolol (BYSTOLIC) 5 MG tablet, Take 1 tablet by mouth Daily., Disp: , Rfl:     ondansetron (ZOFRAN) 4 MG tablet, TAKE 1 TABLET BY MOUTH EVERY 8 HOURS AS NEEDED FOR NAUSEA AND VOMITING, Disp: 90 tablet, Rfl: 2    pravastatin (PRAVACHOL) 10 MG tablet, Take 0.5 tablets by mouth Every Night., Disp: , Rfl:     tamsulosin (FLOMAX) 0.4 MG capsule 24 hr capsule, Take 1 capsule by mouth 2 (Two) Times a Day. (Patient not taking: Reported on 4/15/2025), Disp: , Rfl:     Allergies   Allergen Reactions    Ace Inhibitors  Unknown (See Comments)     Unknown reaction      Heparin Other (See Comments)     Fever/chills, weakness in legs    Sulfa Antibiotics Other (See Comments)     Mouth sores    Ciprofloxacin GI Intolerance     Pt reports pain to abd    Empagliflozin Unknown - Low Severity     Other Reaction(s): UTI      per patient report       Family History   Problem Relation Age of Onset    Mental illness Mother     Heart disease Mother     Hypertension Father     Cancer Father     Heart disease Sister     Heart disease Brother      Cancer-related family history includes Cancer in his father.    Social History     Tobacco Use    Smoking status: Former     Current packs/day: 0.00     Average packs/day: 2.0 packs/day for 50.0 years (100.0 ttl pk-yrs)     Types: Cigarettes     Start date: 1965     Quit date: 2015     Years since quittin.8    Smokeless tobacco: Former     Types: Chew     Quit date: 1989   Vaping Use    Vaping status: Never Used   Substance Use Topics    Alcohol use: No    Drug use: No     ROS:     Objective:    There were no vitals filed for this visit.          (1) Restricted in physically strenuous activity, ambulatory and able to do work of light nature    Physical Exam:     Physical Exam   Constitutional: He is oriented to person, place, and time. He appears well-developed. No distress.   HENT:   Head: Normocephalic and atraumatic.   Nose: Nose normal. Mouth/Throat: Mucous membranes are dry.   Eyes: Pupils are equal, round, and reactive to light. No scleral icterus.   Neck: No thyromegaly present.   Cardiovascular: Normal rate, regular rhythm, normal heart sounds and normal pulses. Exam reveals no gallop and no friction rub.   Pulmonary/Chest: Effort normal and breath sounds normal. No respiratory distress. He has no wheezes.   Abdominal: Soft. Bowel sounds are normal.   No tenderness   Musculoskeletal: Normal range of motion. No swelling, deformity or signs of injury.      Right lower leg: No  edema.   Neurological: He is alert and oriented to person, place, and time. He displays no weakness. He exhibits normal muscle tone.   Skin: Skin is warm and dry. No bruising and no rash noted. He is not diaphoretic. No erythema. No jaundice.   Right side chest  port   Psychiatric: His behavior is normal. Mood, judgment and thought content normal.   Vitals reviewed.      Lab Results - Last 18 Months   Lab Units 04/29/25  1348 04/15/25  1039 04/01/25  1330   WBC 10*3/mm3 5.98 5.76 3.30*   HEMOGLOBIN g/dL 8.1* 8.5* 9.4*   HEMATOCRIT % 24.1* 25.4* 28.0*   PLATELETS 10*3/mm3 157 179 205   MCV fL 98.4* 98.1* 97.2*     Lab Results - Last 18 Months   Lab Units 04/29/25  1400 04/15/25  1044 04/01/25  1356 03/04/25  1124 02/18/25  1130 02/11/25  1057 02/11/25  1044 01/28/25  1041 01/17/25  1241   SODIUM mmol/L  --   --   --   --  142  --  140  --  139   POTASSIUM mmol/L  --   --   --   --  5.1  --  5.2  --  6.0*   CHLORIDE mmol/L  --   --   --   --  107  --  107  --  104   CO2 mmol/L  --   --   --   --  23.2  --  20.5*  --  24.4   BUN mg/dL  --   --   --   --  16  --  19  --  12   CREATININE mg/dL 1.60* 1.40* 1.50*   < > 1.80*   < > 2.09*   < > 1.65*   CALCIUM mg/dL  --   --   --   --  9.0  --  8.9  --  9.1   BILIRUBIN mg/dL  --   --   --   --  0.3  --  0.4  --  0.3   ALK PHOS U/L  --   --   --   --  32*  --  32*  --  37*   ALT (SGPT) U/L  --   --   --   --  5  --  6  --  11   AST (SGOT) U/L  --   --   --   --  13  --  15  --  25   GLUCOSE mg/dL  --   --   --   --  145*  --  183*  --  109*    < > = values in this interval not displayed.     CASE SUMMARY:  Patient has a history of GE junction poorly differentiated adenocarcinoma for which he had chemoradiation followed by Noel Gurwinder resection:  Pathology showed ypT3,pN1 disease.  Tumor was Stage IIIA, diagnosed in 2016.  He received concurrent chemoradiation with weekly Carboplatin and Taxol and radiation finishing on 3/15/17.  CT scan on 1/10/19 showed new retroperitoneal  lymph nodes.  These lymph nodes were biopsied on 1/30/19 and it shows metastatic esophageal cancer.  [facility]is Next Gen testing was performed on the sample and it shows KRAS mutation, microsatellite stable tumor.  PD-L1 is positive.  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  HER2/marvel (ERBB2) was not amplified and was negative.  His CT scan on 5/1/19 shows evidence of progression.  Patient has started receiving Keytruda on 5/31/19.  He has received 19 cycles so far.   CT scan on 1/28/2020 shows improvement and continued response.  CT scan chest abdomen pelvis with contrast on 12/10/2020 does not show any evidence of disease progression.  On 12/22/2020 decision made to hold Keytruda per patient's request to get a treatment break.. Repeat CT scan chest 7/12/2021 does not show any evidence of disease progression.  Now CT imaging in January 2021 with a CT showing increased abdominal left para-aortic adenopathy.  Subsequent PET/CT with new cedric metastatic disease in the retroperitoneum of the abdomen.  New pathologically enlarged hypermetabolic retroperitoneal lymph nodes.  This would be concerning for disease recurrence/progression.  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  I discussed with him the treatment options going forward.  He has had a good response with immunotherapy pembrolizumab in the past. This was  restarted. subsequent imaging with treatment response. 1/2023 imaging with good response. Continued treatment  Now with increased adenopathy 2 new lymph nodes. Holding immunotherapy for now.   Consider getting cardiac MR discussed with Dr. Culp. While immunotherapy on hold, increased adenopathy is concerning. Could be pseudoprogression vs true progression. PET CT reviewed could indicate pseudoprogression since he has had a good response to immunotherapy  immunotherapy was restarted and has been on hold with patient's symptomatology cardiac MRI cannot be obtained, no ischemic heart disease leading to  cardiomyopathy this most likely is related to Keytruda.  We will stop Keytruda going forward  CT imaging with stable disease, FOLFOX was started which he had significant side effects  With imaging being stable and significant side effects with FOLFOX, we discussed  about options with wait and watch vs 5FU only, patient prefers to be on maintenance.   We started 5FU only .   Tolerating relatively well, has significant fatigue. CT imaging now with ongoing response. We discussed that he would have been on treatment for 2 years following recurrence with good response. We can continue treatment now and get a PET CT in 2/2024.will repeat prior to follow up. , PET with some residual activity in the mediastinal lymph node.   We continued treatment and now signatera negative. I had a long discussion with the patient. We decided to repeat CT chest now and if no adenopathy, consider holding treatment.   Now CT chest repeated with persistent adenopathy. Discussed biopsy/mediastinoscopy vs continued treatment. Has fairly been tolerating well except fatigue. After discussion we decided to continue chemotherapy.  Now repeat CT imaging with no new concerning findings there is stable nodules which are subcentimeter in the lungs there is mildly prominent precarinal lymph node.  I discussed with the patient about this possibly being inflammatory rather than neoplastic.    -After discussion we decided to proceed with EBUS and possible biopsy of the lymph node if negative for malignancy we can consider stopping maintenance treatment given Signatera is negative and patient has treatment related side effects which are affecting his quality of life  -Bronchoscopy and precarinal lymph node biopsy reported negative as above.  -Repeat Signatera test also reported negative.  -In view of significant treatment-related toxicities and no concerns for disease progression at this time, we will plan for surveillance alone without additional systemic  therapy.  -Repeat imaging with an interval enlargement of periaortic lymph node as well as uptrending Signatera CT DNA levels concerning for disease recurrence.  Given low disease burden, patient to resume 5-FU with leucovorin maintenance (1/28/2025).  Consider adding irinotecan if noted disease progression moving forward.      Assessment & Plan        Assessment & Plan     Assessment:    Metastatic esophageal carcinoma:    -Patient currently on surveillance and is off treatment  -Restaging scans from 9/11/2024 as above, reported PAULIE.  -Repeat CT chest abdomen pelvis reviewed, noted interval enlargement of a   para-aortic lymph node concerning for disease progression now measuring 1.9 x 1.1 cm.   -Also noted to have uptrending Signatera CT DNA levels.  Discussed with patient that these findings are consistent with disease recurrence.  -Given low disease burden at this time, will resume 5-FU/leucovorin maintenance, can consider adding irinotecan if noted disease progression moving forward  -PET/CT completed on 1/28/2025 as above, consistent with disease progression. this was discussed with patient today.  -Continue maintenance 5-FU/leucovorin. Restaging scans reviwed, noted new splenic lesion but otherwise not concerning for progressive disease. Continue current therapy.        -Signetera ctDNA level has trended down. This was discussed with patient       -Hold treatment for 1 week in view of respiratory symptoms.     Possible myocarditis  Symptoms started after last immunotherapy  Treatment on hold for now, symptoms have improved, cardiac markers were negative, echocardiogram with EF 40%, cardiac MRI cannot be obtained cardiac cath with EF down to 30% management per cardiology  Now EF up to 40-45% monitor.  Symptomatic improvement now no shortness of breath    Shortness of breath  Improved, has occasional shortness of breath, likely copd  I have recommended using albuterol  Symptoms have improved since stopping  immunotherapy. Questionable pneumonitis will monitor closely with immunotherapy  Cardiology has evaluated for myocarditis related to immunotherapy symptomatic improvement now continue to monitor. Symptomatic improvement as above.    Reflux/heartburn/dysphagia:   Symptoms improved.  Also has a history of strictures. Status post EGD and balloon dilation.   Continues to be on pantoprazole symptoms stable.  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  Continues on Synthroid stable now no heart function stable in normal range    Mild diarrhea:  diarrhea seems to be waxing and waning in nature.  Diarrhea related to immunotherapy now improved stable.    Patient reports mild diarrhea following cycle 1 of leucovorin and fluorouracil.  He has not been taking any Imodium.    PD-L1 positive, HER2 negative, p53 and KRAS positive.  Repeat Caris testing with repeat biopsy with no target mutations.    B12 deficiency: Continue B12    Anemia - Hb  was low iron sat low at 10, given venofer 300 x3 monitor for now. Intolerant to oral iron.  Continued low iron saturation will plan on Feraheme for 2 doses. Hemoglobin remains stable.    Fatigue.  Fatigue may be attributed to the ongoing chemotherapy treatment and slightly low hemoglobin levels. The patient is not currently taking any iron supplements due to stomach issues but had an iron infusion in the past. Adequate hydration by drinking plenty of water is advised.    Heartburn:  Dysphagia:  Significant stomach pain and difficulty eating are reported. He is off Protonix due to worsning renal function. Pepcid will be prescribed to manage stomach issues. The patient is advised to schedule an appointment with his gastroenterologist for further evaluation and potential esophageal dilation. If the scope reveals tumor growth causing esophageal narrowing, alternative treatment options, including the possibility of a feeding tube, will be considered.    SORIN on  CKD,:  Limited oral intake:  -Counseled patient on importance of oral intake and nutrition while on chemotherapy  -Continue follow-up with nephrology for his underlying CKD      5-week follow-up with treatment, sooner as needed.  Will order restaging scans on follow up.

## 2025-05-13 ENCOUNTER — HOSPITAL ENCOUNTER (OUTPATIENT)
Dept: ONCOLOGY | Facility: HOSPITAL | Age: 75
Discharge: HOME | End: 2025-05-13
Payer: MEDICARE

## 2025-05-13 ENCOUNTER — TELEPHONE (OUTPATIENT)
Dept: ONCOLOGY | Facility: CLINIC | Age: 75
End: 2025-05-13
Payer: MEDICARE

## 2025-05-13 ENCOUNTER — OFFICE VISIT (OUTPATIENT)
Dept: ONCOLOGY | Facility: CLINIC | Age: 75
End: 2025-05-13
Payer: MEDICARE

## 2025-05-13 VITALS
DIASTOLIC BLOOD PRESSURE: 53 MMHG | BODY MASS INDEX: 20.28 KG/M2 | WEIGHT: 126.2 LBS | OXYGEN SATURATION: 92 % | HEIGHT: 66 IN | HEART RATE: 71 BPM | SYSTOLIC BLOOD PRESSURE: 103 MMHG

## 2025-05-13 DIAGNOSIS — C16.0 MALIGNANT NEOPLASM OF CARDIA: Primary | ICD-10-CM

## 2025-05-13 DIAGNOSIS — C15.5 PRIMARY MALIGNANT NEOPLASM OF LOWER THIRD OF ESOPHAGUS: ICD-10-CM

## 2025-05-13 DIAGNOSIS — E03.9 HYPOTHYROIDISM, UNSPECIFIED TYPE: ICD-10-CM

## 2025-05-13 DIAGNOSIS — D63.0 ANEMIA IN NEOPLASTIC DISEASE: ICD-10-CM

## 2025-05-13 DIAGNOSIS — D50.9 IRON DEFICIENCY ANEMIA, UNSPECIFIED IRON DEFICIENCY ANEMIA TYPE: ICD-10-CM

## 2025-05-13 LAB
ALP BLD-CCNC: 30 U/L (ref 53–128)
BASOPHILS # BLD AUTO: 0.04 10*3/MM3 (ref 0–0.2)
BASOPHILS NFR BLD AUTO: 0.5 % (ref 0–1.5)
BUN BLDA-MCNC: 21 MG/DL (ref 7–22)
CALCIUM BLD QL: 8.2 MG/DL (ref 8–10.3)
CHLORIDE BLDA-SCNC: 102 MMOL/L (ref 98–108)
CO2 BLDA-SCNC: 22 MMOL/L (ref 18–33)
CREAT BLDA-MCNC: 1.5 MG/DL (ref 0.6–1.2)
DEPRECATED RDW RBC AUTO: 52 FL (ref 37–54)
EGFRCR SERPLBLD CKD-EPI 2021: 48 ML/MIN/1.73
EOSINOPHIL # BLD AUTO: 0 10*3/MM3 (ref 0–0.4)
EOSINOPHIL NFR BLD AUTO: 0 % (ref 0.3–6.2)
ERYTHROCYTE [DISTWIDTH] IN BLOOD BY AUTOMATED COUNT: 15.1 % (ref 12.3–15.4)
GLUCOSE BLDC GLUCOMTR-MCNC: 146 MG/DL (ref 73–118)
HCT VFR BLD AUTO: 24.8 % (ref 37.5–51)
HGB BLD-MCNC: 8.1 G/DL (ref 13–17.7)
LYMPHOCYTES # BLD AUTO: 1.07 10*3/MM3 (ref 0.7–3.1)
LYMPHOCYTES NFR BLD AUTO: 14.5 % (ref 19.6–45.3)
MCH RBC QN AUTO: 32.5 PG (ref 26.6–33)
MCHC RBC AUTO-ENTMCNC: 32.7 G/DL (ref 31.5–35.7)
MCV RBC AUTO: 99.6 FL (ref 79–97)
MONOCYTES # BLD AUTO: 0.9 10*3/MM3 (ref 0.1–0.9)
MONOCYTES NFR BLD AUTO: 12.2 % (ref 5–12)
NEUTROPHILS NFR BLD AUTO: 5.35 10*3/MM3 (ref 1.7–7)
NEUTROPHILS NFR BLD AUTO: 72.8 % (ref 42.7–76)
PLATELET # BLD AUTO: 133 10*3/MM3 (ref 140–450)
PMV BLD AUTO: 10.1 FL (ref 6–12)
POC ALBUMIN: 2.9 G/L (ref 3.3–5.5)
POC ALT (SGPT): 13 U/L (ref 10–47)
POC AST (SGOT): 30 U/L (ref 11–38)
POC TOTAL BILIRUBIN: 0.8 MG/DL (ref 0.2–1.6)
POC TOTAL PROTEIN: 5.4 G/DL (ref 6.4–8.1)
POTASSIUM BLDA-SCNC: 3.7 MMOL/L (ref 3.6–5.1)
RBC # BLD AUTO: 2.49 10*6/MM3 (ref 4.14–5.8)
SODIUM BLD-SCNC: 139 MMOL/L (ref 128–145)
WBC NRBC COR # BLD AUTO: 7.36 10*3/MM3 (ref 3.4–10.8)

## 2025-05-13 PROCEDURE — 36591 DRAW BLOOD OFF VENOUS DEVICE: CPT

## 2025-05-13 PROCEDURE — 85025 COMPLETE CBC W/AUTO DIFF WBC: CPT | Performed by: STUDENT IN AN ORGANIZED HEALTH CARE EDUCATION/TRAINING PROGRAM

## 2025-05-13 PROCEDURE — 80053 COMPREHEN METABOLIC PANEL: CPT

## 2025-05-13 NOTE — PROGRESS NOTES
Port accessed and flushed with good blood return noted. 10cc of blood wasted prior to specimen collection. Blood specimen cbc,cmp obtained and sent to lab for processing per protocol.  Port flushed with saline and capped, to see MD next with possible treatment.

## 2025-05-13 NOTE — TELEPHONE ENCOUNTER
Pts treatment was held a week, but patients port was not de-accessed before he left.     He called about the port and was advised to come back to the office to have it deaccessed. Pt went ahead and removed it on his own.

## 2025-05-20 ENCOUNTER — OFFICE VISIT (OUTPATIENT)
Dept: ONCOLOGY | Facility: CLINIC | Age: 75
End: 2025-05-20
Payer: MEDICARE

## 2025-05-20 ENCOUNTER — APPOINTMENT (OUTPATIENT)
Dept: CT IMAGING | Facility: HOSPITAL | Age: 75
DRG: 177 | End: 2025-05-20
Payer: MEDICARE

## 2025-05-20 ENCOUNTER — HOSPITAL ENCOUNTER (INPATIENT)
Facility: HOSPITAL | Age: 75
LOS: 4 days | Discharge: HOME | DRG: 177 | End: 2025-05-24
Attending: EMERGENCY MEDICINE | Admitting: STUDENT IN AN ORGANIZED HEALTH CARE EDUCATION/TRAINING PROGRAM
Payer: MEDICARE

## 2025-05-20 ENCOUNTER — HOSPITAL ENCOUNTER (OUTPATIENT)
Dept: ONCOLOGY | Facility: HOSPITAL | Age: 75
Discharge: HOME | End: 2025-05-20
Admitting: STUDENT IN AN ORGANIZED HEALTH CARE EDUCATION/TRAINING PROGRAM
Payer: MEDICARE

## 2025-05-20 ENCOUNTER — APPOINTMENT (OUTPATIENT)
Dept: GENERAL RADIOLOGY | Facility: HOSPITAL | Age: 75
DRG: 177 | End: 2025-05-20
Payer: MEDICARE

## 2025-05-20 VITALS
RESPIRATION RATE: 14 BRPM | OXYGEN SATURATION: 96 % | WEIGHT: 123 LBS | HEIGHT: 66 IN | HEART RATE: 78 BPM | DIASTOLIC BLOOD PRESSURE: 53 MMHG | BODY MASS INDEX: 19.77 KG/M2 | SYSTOLIC BLOOD PRESSURE: 97 MMHG | TEMPERATURE: 97.4 F

## 2025-05-20 VITALS
TEMPERATURE: 97.4 F | HEIGHT: 66 IN | RESPIRATION RATE: 14 BRPM | HEART RATE: 65 BPM | BODY MASS INDEX: 19.77 KG/M2 | DIASTOLIC BLOOD PRESSURE: 57 MMHG | WEIGHT: 123 LBS | SYSTOLIC BLOOD PRESSURE: 136 MMHG | OXYGEN SATURATION: 96 %

## 2025-05-20 DIAGNOSIS — R53.1 GENERALIZED WEAKNESS: Primary | ICD-10-CM

## 2025-05-20 DIAGNOSIS — C15.5 PRIMARY MALIGNANT NEOPLASM OF LOWER THIRD OF ESOPHAGUS: ICD-10-CM

## 2025-05-20 DIAGNOSIS — I50.9 ACUTE ON CHRONIC CONGESTIVE HEART FAILURE, UNSPECIFIED HEART FAILURE TYPE: ICD-10-CM

## 2025-05-20 DIAGNOSIS — B34.8 PARAINFLUENZA INFECTION: ICD-10-CM

## 2025-05-20 DIAGNOSIS — C16.0 MALIGNANT NEOPLASM OF CARDIA: Primary | ICD-10-CM

## 2025-05-20 DIAGNOSIS — J69.0 ASPIRATION PNEUMONIA, UNSPECIFIED ASPIRATION PNEUMONIA TYPE, UNSPECIFIED LATERALITY, UNSPECIFIED PART OF LUNG: ICD-10-CM

## 2025-05-20 DIAGNOSIS — R06.00 DYSPNEA, UNSPECIFIED TYPE: Primary | ICD-10-CM

## 2025-05-20 DIAGNOSIS — R13.19 ESOPHAGEAL DYSPHAGIA: ICD-10-CM

## 2025-05-20 PROBLEM — J18.9 PNA (PNEUMONIA): Status: ACTIVE | Noted: 2025-05-20

## 2025-05-20 LAB
ALBUMIN SERPL-MCNC: 3.3 G/DL (ref 3.5–5.2)
ALBUMIN/GLOB SERPL: 1.3 G/DL
ALP BLD-CCNC: 39 U/L (ref 53–128)
ALP SERPL-CCNC: <5 U/L (ref 39–117)
ALT SERPL W P-5'-P-CCNC: <5 U/L (ref 1–41)
ANION GAP SERPL CALCULATED.3IONS-SCNC: 11.8 MMOL/L (ref 5–15)
APTT PPP: 31.6 SECONDS (ref 22.7–35.4)
AST SERPL-CCNC: 15 U/L (ref 1–40)
B PARAPERT DNA SPEC QL NAA+PROBE: NOT DETECTED
B PERT DNA SPEC QL NAA+PROBE: NOT DETECTED
BASOPHILS # BLD AUTO: 0.02 10*3/MM3 (ref 0–0.2)
BASOPHILS # BLD AUTO: 0.04 10*3/MM3 (ref 0–0.2)
BASOPHILS NFR BLD AUTO: 0.3 % (ref 0–1.5)
BASOPHILS NFR BLD AUTO: 0.6 % (ref 0–1.5)
BILIRUB SERPL-MCNC: 0.6 MG/DL (ref 0–1.2)
BUN BLDA-MCNC: 12 MG/DL (ref 7–22)
BUN SERPL-MCNC: 10 MG/DL (ref 8–23)
BUN/CREAT SERPL: 7.5 (ref 7–25)
C PNEUM DNA NPH QL NAA+NON-PROBE: NOT DETECTED
CALCIUM BLD QL: 9.2 MG/DL (ref 8–10.3)
CALCIUM SPEC-SCNC: 8.3 MG/DL (ref 8.6–10.5)
CHLORIDE BLDA-SCNC: 105 MMOL/L (ref 98–108)
CHLORIDE SERPL-SCNC: 105 MMOL/L (ref 98–107)
CO2 BLDA-SCNC: 27 MMOL/L (ref 18–33)
CO2 SERPL-SCNC: 23.2 MMOL/L (ref 22–29)
CREAT BLDA-MCNC: 1.4 MG/DL (ref 0.6–1.2)
CREAT SERPL-MCNC: 1.34 MG/DL (ref 0.76–1.27)
D-LACTATE SERPL-SCNC: 2 MMOL/L (ref 0.3–2)
DEPRECATED RDW RBC AUTO: 57.6 FL (ref 37–54)
DEPRECATED RDW RBC AUTO: 61 FL (ref 37–54)
EGFRCR SERPLBLD CKD-EPI 2021: 52.1 ML/MIN/1.73
EGFRCR SERPLBLD CKD-EPI 2021: 54.9 ML/MIN/1.73
EOSINOPHIL # BLD AUTO: 0.02 10*3/MM3 (ref 0–0.4)
EOSINOPHIL # BLD AUTO: 0.03 10*3/MM3 (ref 0–0.4)
EOSINOPHIL NFR BLD AUTO: 0.3 % (ref 0.3–6.2)
EOSINOPHIL NFR BLD AUTO: 0.4 % (ref 0.3–6.2)
ERYTHROCYTE [DISTWIDTH] IN BLOOD BY AUTOMATED COUNT: 16.4 % (ref 12.3–15.4)
ERYTHROCYTE [DISTWIDTH] IN BLOOD BY AUTOMATED COUNT: 16.9 % (ref 12.3–15.4)
FLUAV SUBTYP SPEC NAA+PROBE: NOT DETECTED
FLUBV RNA ISLT QL NAA+PROBE: NOT DETECTED
GEN 5 1HR TROPONIN T REFLEX: 25 NG/L
GLOBULIN UR ELPH-MCNC: 2.5 GM/DL
GLUCOSE BLDC GLUCOMTR-MCNC: 108 MG/DL (ref 70–105)
GLUCOSE BLDC GLUCOMTR-MCNC: 210 MG/DL (ref 73–118)
GLUCOSE SERPL-MCNC: 121 MG/DL (ref 65–99)
HADV DNA SPEC NAA+PROBE: NOT DETECTED
HCOV 229E RNA SPEC QL NAA+PROBE: NOT DETECTED
HCOV HKU1 RNA SPEC QL NAA+PROBE: NOT DETECTED
HCOV NL63 RNA SPEC QL NAA+PROBE: NOT DETECTED
HCOV OC43 RNA SPEC QL NAA+PROBE: NOT DETECTED
HCT VFR BLD AUTO: 26.7 % (ref 37.5–51)
HCT VFR BLD AUTO: 27.2 % (ref 37.5–51)
HGB BLD-MCNC: 8.7 G/DL (ref 13–17.7)
HGB BLD-MCNC: 8.8 G/DL (ref 13–17.7)
HMPV RNA NPH QL NAA+NON-PROBE: NOT DETECTED
HOLD SPECIMEN: NORMAL
HPIV1 RNA ISLT QL NAA+PROBE: NOT DETECTED
HPIV2 RNA SPEC QL NAA+PROBE: NOT DETECTED
HPIV3 RNA NPH QL NAA+PROBE: DETECTED
HPIV4 P GENE NPH QL NAA+PROBE: NOT DETECTED
IMM GRANULOCYTES # BLD AUTO: 0.59 10*3/MM3 (ref 0–0.05)
IMM GRANULOCYTES NFR BLD AUTO: 8.3 % (ref 0–0.5)
INR PPP: 1.35 (ref 0.9–1.1)
L PNEUMO1 AG UR QL IA: NEGATIVE
LYMPHOCYTES # BLD AUTO: 1.12 10*3/MM3 (ref 0.7–3.1)
LYMPHOCYTES # BLD AUTO: 1.21 10*3/MM3 (ref 0.7–3.1)
LYMPHOCYTES NFR BLD AUTO: 15.8 % (ref 19.6–45.3)
LYMPHOCYTES NFR BLD AUTO: 18.1 % (ref 19.6–45.3)
M PNEUMO IGG SER IA-ACNC: NOT DETECTED
MCH RBC QN AUTO: 32.3 PG (ref 26.6–33)
MCH RBC QN AUTO: 32.8 PG (ref 26.6–33)
MCHC RBC AUTO-ENTMCNC: 32 G/DL (ref 31.5–35.7)
MCHC RBC AUTO-ENTMCNC: 33 G/DL (ref 31.5–35.7)
MCV RBC AUTO: 101.1 FL (ref 79–97)
MCV RBC AUTO: 99.6 FL (ref 79–97)
MONOCYTES # BLD AUTO: 0.92 10*3/MM3 (ref 0.1–0.9)
MONOCYTES # BLD AUTO: 1.1 10*3/MM3 (ref 0.1–0.9)
MONOCYTES NFR BLD AUTO: 13 % (ref 5–12)
MONOCYTES NFR BLD AUTO: 16.4 % (ref 5–12)
NEUTROPHILS NFR BLD AUTO: 4.32 10*3/MM3 (ref 1.7–7)
NEUTROPHILS NFR BLD AUTO: 4.41 10*3/MM3 (ref 1.7–7)
NEUTROPHILS NFR BLD AUTO: 62.3 % (ref 42.7–76)
NEUTROPHILS NFR BLD AUTO: 64.5 % (ref 42.7–76)
NRBC BLD AUTO-RTO: 0 /100 WBC (ref 0–0.2)
NT-PROBNP SERPL-MCNC: 6654 PG/ML (ref 0–1800)
PLATELET # BLD AUTO: 268 10*3/MM3 (ref 140–450)
PLATELET # BLD AUTO: 277 10*3/MM3 (ref 140–450)
PMV BLD AUTO: 9.7 FL (ref 6–12)
PMV BLD AUTO: 9.8 FL (ref 6–12)
POC ALBUMIN: 2.9 G/L (ref 3.3–5.5)
POC ALT (SGPT): 12 U/L (ref 10–47)
POC AST (SGOT): 20 U/L (ref 11–38)
POC TOTAL BILIRUBIN: 0.9 MG/DL (ref 0.2–1.6)
POC TOTAL PROTEIN: 6.1 G/DL (ref 6.4–8.1)
POTASSIUM BLDA-SCNC: 3.3 MMOL/L (ref 3.6–5.1)
POTASSIUM SERPL-SCNC: 3.7 MMOL/L (ref 3.5–5.2)
PROT SERPL-MCNC: 5.8 G/DL (ref 6–8.5)
PROTHROMBIN TIME: 16.6 SECONDS (ref 11.7–14.2)
RBC # BLD AUTO: 2.68 10*6/MM3 (ref 4.14–5.8)
RBC # BLD AUTO: 2.69 10*6/MM3 (ref 4.14–5.8)
RHINOVIRUS RNA SPEC NAA+PROBE: NOT DETECTED
RSV RNA NPH QL NAA+NON-PROBE: NOT DETECTED
S PNEUM AG SPEC QL LA: POSITIVE
SARS-COV-2 RNA RESP QL NAA+PROBE: NOT DETECTED
SODIUM BLD-SCNC: 146 MMOL/L (ref 128–145)
SODIUM SERPL-SCNC: 140 MMOL/L (ref 136–145)
TROPONIN T % DELTA: -11
TROPONIN T NUMERIC DELTA: -3 NG/L
TROPONIN T SERPL HS-MCNC: 28 NG/L
WBC NRBC COR # BLD AUTO: 6.7 10*3/MM3 (ref 3.4–10.8)
WBC NRBC COR # BLD AUTO: 7.08 10*3/MM3 (ref 3.4–10.8)

## 2025-05-20 PROCEDURE — 80053 COMPREHEN METABOLIC PANEL: CPT

## 2025-05-20 PROCEDURE — 82948 REAGENT STRIP/BLOOD GLUCOSE: CPT | Performed by: STUDENT IN AN ORGANIZED HEALTH CARE EDUCATION/TRAINING PROGRAM

## 2025-05-20 PROCEDURE — 85610 PROTHROMBIN TIME: CPT | Performed by: EMERGENCY MEDICINE

## 2025-05-20 PROCEDURE — 25810000003 LACTATED RINGERS PER 1000 ML: Performed by: STUDENT IN AN ORGANIZED HEALTH CARE EDUCATION/TRAINING PROGRAM

## 2025-05-20 PROCEDURE — 25010000002 PIPERACILLIN SOD-TAZOBACTAM PER 1 G: Performed by: EMERGENCY MEDICINE

## 2025-05-20 PROCEDURE — 94799 UNLISTED PULMONARY SVC/PX: CPT

## 2025-05-20 PROCEDURE — 87641 MR-STAPH DNA AMP PROBE: CPT | Performed by: STUDENT IN AN ORGANIZED HEALTH CARE EDUCATION/TRAINING PROGRAM

## 2025-05-20 PROCEDURE — 0202U NFCT DS 22 TRGT SARS-COV-2: CPT | Performed by: EMERGENCY MEDICINE

## 2025-05-20 PROCEDURE — 87449 NOS EACH ORGANISM AG IA: CPT | Performed by: STUDENT IN AN ORGANIZED HEALTH CARE EDUCATION/TRAINING PROGRAM

## 2025-05-20 PROCEDURE — 85730 THROMBOPLASTIN TIME PARTIAL: CPT | Performed by: EMERGENCY MEDICINE

## 2025-05-20 PROCEDURE — 85025 COMPLETE CBC W/AUTO DIFF WBC: CPT | Performed by: STUDENT IN AN ORGANIZED HEALTH CARE EDUCATION/TRAINING PROGRAM

## 2025-05-20 PROCEDURE — 25810000003 SODIUM CHLORIDE 0.9 % SOLUTION: Performed by: NURSE PRACTITIONER

## 2025-05-20 PROCEDURE — 36415 COLL VENOUS BLD VENIPUNCTURE: CPT

## 2025-05-20 PROCEDURE — 80053 COMPREHEN METABOLIC PANEL: CPT | Performed by: EMERGENCY MEDICINE

## 2025-05-20 PROCEDURE — 96366 THER/PROPH/DIAG IV INF ADDON: CPT

## 2025-05-20 PROCEDURE — 83880 ASSAY OF NATRIURETIC PEPTIDE: CPT | Performed by: EMERGENCY MEDICINE

## 2025-05-20 PROCEDURE — 96361 HYDRATE IV INFUSION ADD-ON: CPT

## 2025-05-20 PROCEDURE — 25510000001 IOPAMIDOL PER 1 ML: Performed by: EMERGENCY MEDICINE

## 2025-05-20 PROCEDURE — 83605 ASSAY OF LACTIC ACID: CPT

## 2025-05-20 PROCEDURE — 85025 COMPLETE CBC W/AUTO DIFF WBC: CPT | Performed by: EMERGENCY MEDICINE

## 2025-05-20 PROCEDURE — 99285 EMERGENCY DEPT VISIT HI MDM: CPT

## 2025-05-20 PROCEDURE — 25010000002 POTASSIUM CHLORIDE 10 MEQ/100ML SOLUTION: Performed by: NURSE PRACTITIONER

## 2025-05-20 PROCEDURE — 96365 THER/PROPH/DIAG IV INF INIT: CPT

## 2025-05-20 PROCEDURE — 71045 X-RAY EXAM CHEST 1 VIEW: CPT

## 2025-05-20 PROCEDURE — 94640 AIRWAY INHALATION TREATMENT: CPT

## 2025-05-20 PROCEDURE — 84484 ASSAY OF TROPONIN QUANT: CPT | Performed by: EMERGENCY MEDICINE

## 2025-05-20 PROCEDURE — 93005 ELECTROCARDIOGRAM TRACING: CPT

## 2025-05-20 PROCEDURE — 93005 ELECTROCARDIOGRAM TRACING: CPT | Performed by: EMERGENCY MEDICINE

## 2025-05-20 PROCEDURE — 87040 BLOOD CULTURE FOR BACTERIA: CPT | Performed by: EMERGENCY MEDICINE

## 2025-05-20 PROCEDURE — 71275 CT ANGIOGRAPHY CHEST: CPT

## 2025-05-20 RX ORDER — BUDESONIDE 0.5 MG/2ML
0.5 INHALANT ORAL
Status: DISCONTINUED | OUTPATIENT
Start: 2025-05-20 | End: 2025-05-24 | Stop reason: HOSPADM

## 2025-05-20 RX ORDER — SODIUM CHLORIDE 0.9 % (FLUSH) 0.9 %
10 SYRINGE (ML) INJECTION AS NEEDED
Status: DISCONTINUED | OUTPATIENT
Start: 2025-05-20 | End: 2025-05-24 | Stop reason: HOSPADM

## 2025-05-20 RX ORDER — ACETAMINOPHEN 325 MG/1
650 TABLET ORAL EVERY 6 HOURS PRN
Status: DISCONTINUED | OUTPATIENT
Start: 2025-05-20 | End: 2025-05-24 | Stop reason: HOSPADM

## 2025-05-20 RX ORDER — SODIUM CHLORIDE 0.9 % (FLUSH) 0.9 %
20 SYRINGE (ML) INJECTION AS NEEDED
Status: DISCONTINUED | OUTPATIENT
Start: 2025-05-20 | End: 2025-05-21 | Stop reason: HOSPADM

## 2025-05-20 RX ORDER — LEVOTHYROXINE SODIUM 50 UG/1
50 TABLET ORAL
Status: DISCONTINUED | OUTPATIENT
Start: 2025-05-21 | End: 2025-05-24 | Stop reason: HOSPADM

## 2025-05-20 RX ORDER — SODIUM CHLORIDE 0.9 % (FLUSH) 0.9 %
10 SYRINGE (ML) INJECTION EVERY 12 HOURS SCHEDULED
Status: DISCONTINUED | OUTPATIENT
Start: 2025-05-20 | End: 2025-05-24 | Stop reason: HOSPADM

## 2025-05-20 RX ORDER — SODIUM CHLORIDE 0.9 % (FLUSH) 0.9 %
20 SYRINGE (ML) INJECTION AS NEEDED
OUTPATIENT
Start: 2025-05-20

## 2025-05-20 RX ORDER — BISACODYL 10 MG
10 SUPPOSITORY, RECTAL RECTAL DAILY PRN
Status: DISCONTINUED | OUTPATIENT
Start: 2025-05-20 | End: 2025-05-24 | Stop reason: HOSPADM

## 2025-05-20 RX ORDER — IPRATROPIUM BROMIDE AND ALBUTEROL SULFATE 2.5; .5 MG/3ML; MG/3ML
3 SOLUTION RESPIRATORY (INHALATION)
Status: DISCONTINUED | OUTPATIENT
Start: 2025-05-20 | End: 2025-05-24 | Stop reason: HOSPADM

## 2025-05-20 RX ORDER — SODIUM CHLORIDE, SODIUM LACTATE, POTASSIUM CHLORIDE, CALCIUM CHLORIDE 600; 310; 30; 20 MG/100ML; MG/100ML; MG/100ML; MG/100ML
75 INJECTION, SOLUTION INTRAVENOUS CONTINUOUS
Status: DISCONTINUED | OUTPATIENT
Start: 2025-05-20 | End: 2025-05-20

## 2025-05-20 RX ORDER — IPRATROPIUM BROMIDE AND ALBUTEROL SULFATE 2.5; .5 MG/3ML; MG/3ML
3 SOLUTION RESPIRATORY (INHALATION) ONCE
Status: COMPLETED | OUTPATIENT
Start: 2025-05-20 | End: 2025-05-20

## 2025-05-20 RX ORDER — NICOTINE POLACRILEX 4 MG
15 LOZENGE BUCCAL
Status: DISCONTINUED | OUTPATIENT
Start: 2025-05-20 | End: 2025-05-24 | Stop reason: HOSPADM

## 2025-05-20 RX ORDER — SODIUM CHLORIDE, SODIUM LACTATE, POTASSIUM CHLORIDE, CALCIUM CHLORIDE 600; 310; 30; 20 MG/100ML; MG/100ML; MG/100ML; MG/100ML
75 INJECTION, SOLUTION INTRAVENOUS CONTINUOUS
Status: DISCONTINUED | OUTPATIENT
Start: 2025-05-20 | End: 2025-05-21

## 2025-05-20 RX ORDER — BISACODYL 5 MG/1
5 TABLET, DELAYED RELEASE ORAL DAILY PRN
Status: DISCONTINUED | OUTPATIENT
Start: 2025-05-20 | End: 2025-05-24 | Stop reason: HOSPADM

## 2025-05-20 RX ORDER — IOPAMIDOL 755 MG/ML
100 INJECTION, SOLUTION INTRAVASCULAR
Status: COMPLETED | OUTPATIENT
Start: 2025-05-20 | End: 2025-05-20

## 2025-05-20 RX ORDER — INSULIN LISPRO 100 [IU]/ML
2-7 INJECTION, SOLUTION INTRAVENOUS; SUBCUTANEOUS EVERY 6 HOURS SCHEDULED
Status: DISCONTINUED | OUTPATIENT
Start: 2025-05-21 | End: 2025-05-22

## 2025-05-20 RX ORDER — AMOXICILLIN 250 MG
2 CAPSULE ORAL 2 TIMES DAILY PRN
Status: DISCONTINUED | OUTPATIENT
Start: 2025-05-20 | End: 2025-05-24 | Stop reason: HOSPADM

## 2025-05-20 RX ORDER — SODIUM CHLORIDE 9 MG/ML
40 INJECTION, SOLUTION INTRAVENOUS AS NEEDED
Status: DISCONTINUED | OUTPATIENT
Start: 2025-05-20 | End: 2025-05-24 | Stop reason: HOSPADM

## 2025-05-20 RX ORDER — POLYETHYLENE GLYCOL 3350 17 G/17G
17 POWDER, FOR SOLUTION ORAL DAILY PRN
Status: DISCONTINUED | OUTPATIENT
Start: 2025-05-20 | End: 2025-05-24 | Stop reason: HOSPADM

## 2025-05-20 RX ORDER — IBUPROFEN 600 MG/1
1 TABLET ORAL
Status: DISCONTINUED | OUTPATIENT
Start: 2025-05-20 | End: 2025-05-24 | Stop reason: HOSPADM

## 2025-05-20 RX ORDER — POTASSIUM CHLORIDE 7.45 MG/ML
10 INJECTION INTRAVENOUS ONCE
Status: COMPLETED | OUTPATIENT
Start: 2025-05-20 | End: 2025-05-20

## 2025-05-20 RX ORDER — NITROGLYCERIN 0.4 MG/1
0.4 TABLET SUBLINGUAL
Status: DISCONTINUED | OUTPATIENT
Start: 2025-05-20 | End: 2025-05-24 | Stop reason: HOSPADM

## 2025-05-20 RX ORDER — DEXTROSE MONOHYDRATE 25 G/50ML
25 INJECTION, SOLUTION INTRAVENOUS
Status: DISCONTINUED | OUTPATIENT
Start: 2025-05-20 | End: 2025-05-24 | Stop reason: HOSPADM

## 2025-05-20 RX ORDER — DOXYCYCLINE 100 MG/1
100 CAPSULE ORAL 2 TIMES DAILY
COMMUNITY
End: 2025-05-24 | Stop reason: HOSPADM

## 2025-05-20 RX ORDER — CLONAZEPAM 0.5 MG/1
0.25 TABLET ORAL 2 TIMES DAILY PRN
Status: DISCONTINUED | OUTPATIENT
Start: 2025-05-20 | End: 2025-05-24 | Stop reason: HOSPADM

## 2025-05-20 RX ADMIN — PIPERACILLIN AND TAZOBACTAM 3.38 G: 3; .375 INJECTION, POWDER, FOR SOLUTION INTRAVENOUS at 19:58

## 2025-05-20 RX ADMIN — SODIUM CHLORIDE, POTASSIUM CHLORIDE, SODIUM LACTATE AND CALCIUM CHLORIDE 75 ML/HR: 600; 310; 30; 20 INJECTION, SOLUTION INTRAVENOUS at 22:55

## 2025-05-20 RX ADMIN — BUDESONIDE INHALATION 0.5 MG: 0.5 SUSPENSION RESPIRATORY (INHALATION) at 22:38

## 2025-05-20 RX ADMIN — Medication 20 ML: at 15:13

## 2025-05-20 RX ADMIN — Medication 20 ML: at 15:12

## 2025-05-20 RX ADMIN — IPRATROPIUM BROMIDE AND ALBUTEROL SULFATE 3 ML: .5; 3 SOLUTION RESPIRATORY (INHALATION) at 19:59

## 2025-05-20 RX ADMIN — POTASSIUM CHLORIDE 10 MEQ: 7.46 INJECTION, SOLUTION INTRAVENOUS at 13:16

## 2025-05-20 RX ADMIN — SODIUM CHLORIDE 1000 ML: 900 INJECTION, SOLUTION INTRAVENOUS at 13:01

## 2025-05-20 RX ADMIN — IOPAMIDOL 100 ML: 755 INJECTION, SOLUTION INTRAVENOUS at 18:50

## 2025-05-20 RX ADMIN — POTASSIUM CHLORIDE 10 MEQ: 7.46 INJECTION, SOLUTION INTRAVENOUS at 14:12

## 2025-05-20 NOTE — PROGRESS NOTES
Patient in clinic for C8 Leucovorin/5FU.  Port accessed with sterile technique and positive blood return noted.  Blood drawn from port.  10 cc blood wasted prior to specimen collection.  Specimens sent to lab for processing.  Patient not feeling well today; he presents with weakness and required wheelchair to transport back to infusion. BP 97/53. Patient was diagnosed with pneumonia yesterday by PCP and is taking doxycycline 100 mg BID. He has a productive cough with white/yellow expectorant and shortness of breath that is a little worse than usual. Patient states no light-headedness or dizziness but is unsteady on his feet; he fell 3 days ago onto his face because he was not able to catch his fall. He did not go to a doctor; he has abrasion/scab to nose. He has persistent abdominal pain with eating and has lost 3 pounds in the last week. He states he drinks 2-3 bottles of Propel per day.

## 2025-05-20 NOTE — ED PROVIDER NOTES
"Subjective   History of Present Illness  Chief complaint: Patient is 75-year-old who presents with shortness of breath.  There is concern for pneumonia.  He has been short of breath for a few weeks.  He has not been eating well.  He has esophageal cancer.  He states he has been dizzy and off balance.  He was receiving an infusion at the cancer center and they asked him to come here for further evaluation.  He does not note fever today but in the last 3 weeks he has had a fever.  He has been coughing some but not really coughing up any mucus.  He states \"my chest feels tight\".    Context:    Duration:    Timing:    Severity:    Associated Symptoms:        PCP:  LMP:      Review of Systems   Constitutional:  Positive for unexpected weight change.   Respiratory:  Positive for cough, chest tightness and shortness of breath.    Cardiovascular:  Positive for chest pain.   Gastrointestinal: Negative.    Musculoskeletal: Negative.    Neurological:  Positive for dizziness.       Past Medical History:   Diagnosis Date    B12 deficiency     Blockage of coronary artery of heart     Depression     Disease of thyroid gland     DM (diabetes mellitus)     Dysphagia     Esophageal cancer     GERD (gastroesophageal reflux disease)     HTN (hypertension)     Hyperlipidemia        Allergies   Allergen Reactions    Ace Inhibitors Unknown (See Comments)     Unknown reaction      Heparin Other (See Comments)     Fever/chills, weakness in legs    Sulfa Antibiotics Other (See Comments)     Mouth sores    Ciprofloxacin GI Intolerance     Pt reports pain to abd    Empagliflozin Unknown - Low Severity     Other Reaction(s): UTI      per patient report       Past Surgical History:   Procedure Laterality Date    ABDOMINAL WALL ABSCESS INCISION AND DRAINAGE  10/22/2018    WITH DEBRIDEMENT  1.5CM X 1.5 CM X 1.5 CM    DR. PURI    BRONCHOSCOPY N/A 7/25/2024    Procedure: BRONCHOSCOPY WITH ENDOBRONCHIAL ULTRASOUND, LUNG WASHINGS, AND FINE NEEDLE " ASPIRATIONS;  Surgeon: Bonnie Smith MD;  Location: [facility] ENDOSCOPY;  Service: Pulmonary;  Laterality: N/A;    CARDIAC CATHETERIZATION Right 7/20/2023    Procedure: Left Heart Cath;  Surgeon: Mazin Simmons MD;  Location: [facility] CATH INVASIVE LOCATION;  Service: Cardiovascular;  Laterality: Right;    CATARACT EXTRACTION  2018    COLONOSCOPY      CORONARY ARTERY BYPASS GRAFT  2015    ENDOSCOPY  12/20/2016    ENDOSCOPY N/A 01/02/2023    Procedure: ESOPHAGOGASTRODUODENOSCOPY;  Surgeon: Esvin Morgan MD;  Location: [facility] ENDOSCOPY;  Service: Gastroenterology;  Laterality: N/A;  post: anastamosis stricture    ENDOSCOPY N/A 02/09/2023    Procedure: ESOPHAGOGASTRODUODENOSCOPY, non-wire guided balloon dilation (12-14mm) of esophageal stricture;  Surgeon: Tino Villafana MD;  Location: [facility] ENDOSCOPY;  Service: Gastroenterology;  Laterality: N/A;  post: esophageal anastamotic stricture, post surgical changes    ESOPHAGECTOMY  05/04/2017    BROOKS PETTY ESOPHAGECTOMY, FEEDING JEJNOSTOMOY, ABDOMINAL AND MEDIASTINAL LYMPH NODE DISECTION, PEDICLED MUSCLE FLAP COVERAGE DR. PURI    ESOPHAGOSCOPY / EGD  07/12/2017    WITH BALLOON DILATION    ESOPHAGOSCOPY / EGD  07/26/2017    WITH BALLOON DILATION    ESOPHAGOSCOPY / EGD  08/11/2017    WITH BALLOON DILATION    ESOPHAGOSCOPY / EGD  08/28/2017    WITH BALLOON DILATION    ESOPHAGOSCOPY / EGD  09/27/2017    WITH BALLOON DILATION    INSERT / REPLACE / REMOVE PACEMAKER      LYMPH NODE BIOPSY  01/30/2019    PACEMAKER IMPLANTATION  11/21/2016       Family History   Problem Relation Age of Onset    Mental illness Mother     Heart disease Mother     Hypertension Father     Cancer Father     Heart disease Sister     Heart disease Brother        Social History     Socioeconomic History    Marital status:    Tobacco Use    Smoking status: Former     Current packs/day: 0.00     Average packs/day: 2.0 packs/day for 50.0 years (100.0 ttl pk-yrs)     Types:  Cigarettes     Start date: 1965     Quit date: 2015     Years since quittin.9    Smokeless tobacco: Former     Types: Chew     Quit date: 1989   Vaping Use    Vaping status: Never Used   Substance and Sexual Activity    Alcohol use: No    Drug use: No    Sexual activity: Defer           Objective   Physical Exam  Vitals and nursing note reviewed.   Constitutional:       General: He is not in acute distress.     Appearance: He is ill-appearing.   HENT:      Head: Normocephalic and atraumatic.   Cardiovascular:      Rate and Rhythm: Normal rate.   Pulmonary:      Effort: Pulmonary effort is normal.      Breath sounds: Decreased breath sounds present.   Abdominal:      Palpations: Abdomen is soft.      Tenderness: There is no abdominal tenderness.   Musculoskeletal:      Right lower leg: No edema.      Left lower leg: No edema.   Skin:     General: Skin is warm and dry.   Neurological:      General: No focal deficit present.      Mental Status: He is alert and oriented to person, place, and time.         Procedures           ED Course  ED Course as of 05/20/25 2005   Tue May 20, 2025   1934 Awaiting ct report [LH]      ED Course User Index  [LH] Cristino Hernandez,       CT Angiogram Chest Pulmonary Embolism  Result Date: 2025  Impression: 1. No pulmonary emboli are identified. 2. Patchy airspace consolidation in the dependent lower lobes with areas of bronchial wall thickening and endobronchial secretion. Tree-in-bud infiltrate is present. The findings are suspicious for aspiration pneumonia. Electronically Signed: Jero Sims MD  2025 7:34 PM EDT  Workstation ID: [id number]    XR Chest 1 View  Result Date: 2025  Impression: No active disease. Electronically Signed: Jero Sims MD  2025 5:36 PM EDT  Workstation ID: [id number]                                         Results for orders placed or performed during the hospital encounter of 25   ECG 12 Lead Dyspnea     Collection Time: 05/20/25  3:50 PM   Result Value Ref Range    QT Interval 468 ms    QTC Interval 516 ms   Comprehensive Metabolic Panel    Collection Time: 05/20/25  5:04 PM    Specimen: Blood   Result Value Ref Range    Glucose 121 (H) 65 - 99 mg/dL    BUN 10 8 - 23 mg/dL    Creatinine 1.34 (H) 0.76 - 1.27 mg/dL    Sodium 140 136 - 145 mmol/L    Potassium 3.7 3.5 - 5.2 mmol/L    Chloride 105 98 - 107 mmol/L    CO2 23.2 22.0 - 29.0 mmol/L    Calcium 8.3 (L) 8.6 - 10.5 mg/dL    Total Protein 5.8 (L) 6.0 - 8.5 g/dL    Albumin 3.3 (L) 3.5 - 5.2 g/dL    ALT (SGPT) <5 1 - 41 U/L    AST (SGOT) 15 1 - 40 U/L    Alkaline Phosphatase <5 (L) 39 - 117 U/L    Total Bilirubin 0.6 0.0 - 1.2 mg/dL    Globulin 2.5 gm/dL    A/G Ratio 1.3 g/dL    BUN/Creatinine Ratio 7.5 7.0 - 25.0    Anion Gap 11.8 5.0 - 15.0 mmol/L    eGFR 54.9 (L) >60.0 mL/min/1.73   Protime-INR    Collection Time: 05/20/25  5:04 PM    Specimen: Blood   Result Value Ref Range    Protime 16.6 (H) 11.7 - 14.2 Seconds    INR 1.35 (H) 0.90 - 1.10   aPTT    Collection Time: 05/20/25  5:04 PM    Specimen: Blood   Result Value Ref Range    PTT 31.6 22.7 - 35.4 seconds   High Sensitivity Troponin T    Collection Time: 05/20/25  5:04 PM    Specimen: Blood   Result Value Ref Range    HS Troponin T 28 (H) <22 ng/L   BNP    Collection Time: 05/20/25  5:04 PM    Specimen: Blood   Result Value Ref Range    proBNP 6,654.0 (H) 0.0 - 1,800.0 pg/mL   CBC Auto Differential    Collection Time: 05/20/25  5:04 PM    Specimen: Blood   Result Value Ref Range    WBC 7.08 3.40 - 10.80 10*3/mm3    RBC 2.69 (L) 4.14 - 5.80 10*6/mm3    Hemoglobin 8.7 (L) 13.0 - 17.7 g/dL    Hematocrit 27.2 (L) 37.5 - 51.0 %    .1 (H) 79.0 - 97.0 fL    MCH 32.3 26.6 - 33.0 pg    MCHC 32.0 31.5 - 35.7 g/dL    RDW 16.9 (H) 12.3 - 15.4 %    RDW-SD 61.0 (H) 37.0 - 54.0 fl    MPV 9.8 6.0 - 12.0 fL    Platelets 277 140 - 450 10*3/mm3    Neutrophil % 62.3 42.7 - 76.0 %    Lymphocyte % 15.8 (L) 19.6 - 45.3 %     Monocyte % 13.0 (H) 5.0 - 12.0 %    Eosinophil % 0.3 0.3 - 6.2 %    Basophil % 0.3 0.0 - 1.5 %    Immature Grans % 8.3 (H) 0.0 - 0.5 %    Neutrophils, Absolute 4.41 1.70 - 7.00 10*3/mm3    Lymphocytes, Absolute 1.12 0.70 - 3.10 10*3/mm3    Monocytes, Absolute 0.92 (H) 0.10 - 0.90 10*3/mm3    Eosinophils, Absolute 0.02 0.00 - 0.40 10*3/mm3    Basophils, Absolute 0.02 0.00 - 0.20 10*3/mm3    Immature Grans, Absolute 0.59 (H) 0.00 - 0.05 10*3/mm3    nRBC 0.0 0.0 - 0.2 /100 WBC   Gold Top - SST    Collection Time: 05/20/25  5:04 PM   Result Value Ref Range    Extra Tube Hold for add-ons.    Respiratory Panel PCR w/COVID-19(SARS-CoV-2) PAM/JAGJIT/RAMEZ/PAD/COR/RAUDEL In-House, NP Swab in [facility], 2 HR TAT - Swab, Nasopharynx    Collection Time: 05/20/25  5:06 PM    Specimen: Nasopharynx; Swab   Result Value Ref Range    ADENOVIRUS, PCR Not Detected Not Detected    Coronavirus 229E Not Detected Not Detected    Coronavirus HKU1 Not Detected Not Detected    Coronavirus NL63 Not Detected Not Detected    Coronavirus OC43 Not Detected Not Detected    COVID19 Not Detected Not Detected - Ref. Range    Human Metapneumovirus Not Detected Not Detected    Human Rhinovirus/Enterovirus Not Detected Not Detected    Influenza A PCR Not Detected Not Detected    Influenza B PCR Not Detected Not Detected    Parainfluenza Virus 1 Not Detected Not Detected    Parainfluenza Virus 2 Not Detected Not Detected    Parainfluenza Virus 3 Detected (A) Not Detected    Parainfluenza Virus 4 Not Detected Not Detected    RSV, PCR Not Detected Not Detected    Bordetella pertussis pcr Not Detected Not Detected    Bordetella parapertussis PCR Not Detected Not Detected    Chlamydophila pneumoniae PCR Not Detected Not Detected    Mycoplasma pneumo by PCR Not Detected Not Detected   POC Lactate    Collection Time: 05/20/25  5:11 PM    Specimen: Blood   Result Value Ref Range    Lactate 2.0 0.3 - 2.0 mmol/L   High Sensitivity Troponin T 1Hr    Collection Time:  05/20/25  7:29 PM    Specimen: Blood   Result Value Ref Range    HS Troponin T 25 (H) <22 ng/L    Troponin T Numeric Delta -3 ng/L    Troponin T % Delta -11 Abnormal if >/= 20%     *Note: Due to a large number of results and/or encounters for the requested time period, some results have not been displayed. A complete set of results can be found in Results Review.                 Medical Decision Making  Seen evaluated for above problem    Differential diagnosis includes but is not limited to CHF exacerbation, PE, pneumonia, pneumothorax    Patient presented initial EKG interpreted by myself paced rhythm rate of 73.  BNP is elevated.  He does not have a history of prior of CHF.  He does not have edema to his extremities.  Chest x-ray looked clear as well.  However with cancer and the risk for PE he was sent for CT chest.  He does have bibasilar infiltrates appearing like aspiration.  He does have esophageal cancer and does aspirate some.  He also tested positive for parainfluenza virus.  He was placed on Zosyn and will be admitted to the hospitalist.  I spoke with  who agrees to admit the patient    Problems Addressed:  Acute on chronic congestive heart failure, unspecified heart failure type: complicated acute illness or injury  Aspiration pneumonia, unspecified aspiration pneumonia type, unspecified laterality, unspecified part of lung: complicated acute illness or injury  Dyspnea, unspecified type: complicated acute illness or injury  Parainfluenza infection: complicated acute illness or injury    Amount and/or Complexity of Data Reviewed  Labs: ordered. Decision-making details documented in ED Course.     Details: Labs reviewed by myself  Radiology: ordered and independent interpretation performed.     Details: X-ray and CT reviewed by myself as above  ECG/medicine tests: ordered and independent interpretation performed.     Details: EKG interpreted by myself paced rhythm rate 70    Risk  Prescription  drug management.  Decision regarding hospitalization.        Final diagnoses:   None   Dyspnea  Aspiration pneumonia  Parainfluenza virus  CHF    ED Disposition  ED Disposition       None            No follow-up provider specified.       Medication List      No changes were made to your prescriptions during this visit.            Cristino Hernandez DO  05/20/25 2008

## 2025-05-20 NOTE — H&P
[facility] Medicine Services  History & Physical    Patient Name: Artie Mc  : 1950  MRN: [id number]  Primary Care Physician:  Chanel Carter, DAT  Date of admission: 2025  Date and Time of Service: 2025 at 1953    Subjective      Chief Complaint: Shortness of breath    History of Present Illness: Artie Mc is a 75 y.o. male with a CMH of [id number] with LVEF of 45 to 50% per echo in 2025, CAD status post CABG, status post pacemaker, metastatic esophageal carcinoma following oncology outpatient last chemotherapy about 2 weeks ago, hypertension, hyperlipidemia, type 2 diabetes, GERD, who presents from oncology office to [facility] on 2025 with shortness of breath.    Patient is currently awake, alert, oriented ×3, and conversational. He reports worsening shortness of breath over the past few days. He was seen at the oncology center today for follow-up and last received chemotherapy approximately two weeks ago. Due to worsening SOB symptoms, he was referred to the hospital for further evaluation. He reports chills and a nonproductive cough but denies chest pain, fever, dysuria, hemoptysis, or signs of GI bleeding.    In the ED, vital stable, afebrile, EKG showing paced rhythm, proBNP 60 654, creatinine of 1.34, glucose 121, calcium 8.3, albumin 3.3, INR 1.35, WBC 7.08, hemoglobin 8.7, , blood cultures pending, parainfluenza positive, x-ray negative for any acute disease, CTA chest negative for PE, patchy airspace consolidation in the dependent lower lobes with area of bronchial wall thickening and endobronchial secretion.  Tree-in-bud infiltrate is present, suspicious for possible aspiration pneumonia.  Started on Zosyn, admitted to medicine team further inpatient management.    Review of Systems negative unless mentioned above    Personal History     Past Medical History:   Diagnosis Date    B12 deficiency     Blockage of coronary artery of  heart     Depression     Disease of thyroid gland     DM (diabetes mellitus)     Dysphagia     Esophageal cancer     GERD (gastroesophageal reflux disease)     HTN (hypertension)     Hyperlipidemia        Past Surgical History:   Procedure Laterality Date    ABDOMINAL WALL ABSCESS INCISION AND DRAINAGE  10/22/2018    WITH DEBRIDEMENT  1.5CM X 1.5 CM X 1.5 CM    DR. PURI    BRONCHOSCOPY N/A 7/25/2024    Procedure: BRONCHOSCOPY WITH ENDOBRONCHIAL ULTRASOUND, LUNG WASHINGS, AND FINE NEEDLE ASPIRATIONS;  Surgeon: Bonnie Smith MD;  Location: [facility] ENDOSCOPY;  Service: Pulmonary;  Laterality: N/A;    CARDIAC CATHETERIZATION Right 7/20/2023    Procedure: Left Heart Cath;  Surgeon: Mazin Simmons MD;  Location: [facility] CATH INVASIVE LOCATION;  Service: Cardiovascular;  Laterality: Right;    CATARACT EXTRACTION  2018    COLONOSCOPY      CORONARY ARTERY BYPASS GRAFT  2015    ENDOSCOPY  12/20/2016    ENDOSCOPY N/A 01/02/2023    Procedure: ESOPHAGOGASTRODUODENOSCOPY;  Surgeon: Esvin Morgan MD;  Location: [facility] ENDOSCOPY;  Service: Gastroenterology;  Laterality: N/A;  post: anastamosis stricture    ENDOSCOPY N/A 02/09/2023    Procedure: ESOPHAGOGASTRODUODENOSCOPY, non-wire guided balloon dilation (12-14mm) of esophageal stricture;  Surgeon: Tino Villafana MD;  Location: [facility] ENDOSCOPY;  Service: Gastroenterology;  Laterality: N/A;  post: esophageal anastamotic stricture, post surgical changes    ESOPHAGECTOMY  05/04/2017    BROOKS PETTY ESOPHAGECTOMY, FEEDING JEJNOSTOMOY, ABDOMINAL AND MEDIASTINAL LYMPH NODE DISECTION, PEDICLED MUSCLE FLAP COVERAGE DR. PURI    ESOPHAGOSCOPY / EGD  07/12/2017    WITH BALLOON DILATION    ESOPHAGOSCOPY / EGD  07/26/2017    WITH BALLOON DILATION    ESOPHAGOSCOPY / EGD  08/11/2017    WITH BALLOON DILATION    ESOPHAGOSCOPY / EGD  08/28/2017    WITH BALLOON DILATION    ESOPHAGOSCOPY / EGD  09/27/2017    WITH BALLOON DILATION    INSERT / REPLACE / REMOVE PACEMAKER       LYMPH NODE BIOPSY  01/30/2019    PACEMAKER IMPLANTATION  11/21/2016       Family History: family history includes Cancer in his father; Heart disease in his brother, mother, and sister; Hypertension in his father; Mental illness in his mother. Otherwise pertinent FHx was reviewed and not pertinent to current issue.    Social History:  reports that he quit smoking about 9 years ago. His smoking use included cigarettes. He started smoking about 59 years ago. He has a 100 pack-year smoking history. He quit smokeless tobacco use about 35 years ago.  His smokeless tobacco use included chew. He reports that he does not drink alcohol and does not use drugs.    Home Medications:  Prior to Admission Medications       Prescriptions Last Dose Informant Patient Reported? Taking?    clonazePAM (KlonoPIN) 0.5 MG tablet   Yes No    Take 0.5 tablets by mouth 2 (Two) Times a Day As Needed for Anxiety.    cyanocobalamin (VITAMIN B-12) 1000 MCG tablet   Yes No    Take 1 tablet by mouth Daily.    Patient not taking:  Reported on 5/13/2025    doxycycline (VIBRAMYCIN) 100 MG capsule  Self Yes No    Take 1 capsule by mouth 2 (Two) Times a Day.    famotidine (PEPCID) 20 MG tablet   No No    Take 1 tablet by mouth twice daily    levothyroxine (SYNTHROID, LEVOTHROID) 50 MCG tablet   No No    Take 1 tablet by mouth once daily    metFORMIN (GLUCOPHAGE) 1000 MG tablet   No No    Take 0.5 tablets by mouth Daily.    Patient taking differently:  Take 0.5 tablets by mouth 2 (Two) Times a Day With Meals.    nebivolol (BYSTOLIC) 5 MG tablet   Yes No    Take 1 tablet by mouth Daily.    ondansetron (ZOFRAN) 4 MG tablet   No No    TAKE 1 TABLET BY MOUTH EVERY 8 HOURS AS NEEDED FOR NAUSEA AND VOMITING    pravastatin (PRAVACHOL) 10 MG tablet  Self Yes No    Take 0.5 tablets by mouth Every Night.    Patient not taking:  Reported on 5/13/2025    tamsulosin (FLOMAX) 0.4 MG capsule 24 hr capsule  Self Yes No    Take 1 capsule by mouth 2 (Two) Times a Day.     Patient not taking:  Reported on 4/15/2025              Allergies:  Allergies   Allergen Reactions    Ace Inhibitors Unknown (See Comments)     Unknown reaction      Heparin Other (See Comments)     Fever/chills, weakness in legs    Sulfa Antibiotics Other (See Comments)     Mouth sores    Ciprofloxacin GI Intolerance     Pt reports pain to abd    Empagliflozin Unknown - Low Severity     Other Reaction(s): UTI      per patient report       Objective      Vitals:   Temp:  [97.4 °F (36.3 °C)-97.6 °F (36.4 °C)] 97.6 °F (36.4 °C)  Heart Rate:  [65-84] 75  Resp:  [14-16] 16  BP: ()/(53-77) 151/65  Body mass index is 20.33 kg/m².  Physical Exam  General: Awake alert [place] x 3, conversational  HEENT, atraumatic normocephalic  Cardio: Heart rate normal, rhythm regular  Respiratory: Decreased breath sounds at lung bases, not rating well  Abdomen: Soft, nontender, no rebound or guarding  Extremities, trace edema the bilateral extremities  Neuro: Awake alert  conversational, moves all extremities    Diagnostic Data:  Lab Results (last 24 hours)       Procedure Component Value Units Date/Time    High Sensitivity Troponin T 1Hr [[id number]] Collected: 05/20/25 1929    Specimen: Blood Updated: 05/20/25 1930    Respiratory Panel PCR w/COVID-19(SARS-CoV-2) PAM/JAGJIT/RAMEZ/PAD/COR/RAUDEL In-House, NP Swab in UTM/VTM, 2 HR TAT - Swab, Nasopharynx [[id number]]  (Abnormal) Collected: 05/20/25 1706    Specimen: Swab from Nasopharynx Updated: 05/20/25 1806     ADENOVIRUS, PCR Not Detected     Coronavirus 229E Not Detected     Coronavirus HKU1 Not Detected     Coronavirus NL63 Not Detected     Coronavirus OC43 Not Detected     COVID19 Not Detected     Human Metapneumovirus Not Detected     Human Rhinovirus/Enterovirus Not Detected     Influenza A PCR Not Detected     Influenza B PCR Not Detected     Parainfluenza Virus 1 Not Detected     Parainfluenza Virus 2 Not Detected     Parainfluenza Virus 3 Detected     Parainfluenza Virus 4 Not  Detected     RSV, PCR Not Detected     Bordetella pertussis pcr Not Detected     Bordetella parapertussis PCR Not Detected     Chlamydophila pneumoniae PCR Not Detected     Mycoplasma pneumo by PCR Not Detected    Narrative:      In the setting of a positive respiratory panel with a viral infection PLUS a negative procalcitonin without other underlying concern for bacterial infection, consider observing off antibiotics or discontinuation of antibiotics and continue supportive care. If the respiratory panel is positive for atypical bacterial infection (Bordetella pertussis, Chlamydophila pneumoniae, or Mycoplasma pneumoniae), consider antibiotic de-escalation to target atypical bacterial infection.    Comprehensive Metabolic Panel [[id number]]  (Abnormal) Collected: 05/20/25 1704    Specimen: Blood Updated: 05/20/25 1757     Glucose 121 mg/dL      BUN 10 mg/dL      Creatinine 1.34 mg/dL      Sodium 140 mmol/L      Potassium 3.7 mmol/L      Chloride 105 mmol/L      CO2 23.2 mmol/L      Calcium 8.3 mg/dL      Total Protein 5.8 g/dL      Albumin 3.3 g/dL      ALT (SGPT) <5 U/L      AST (SGOT) 15 U/L      Alkaline Phosphatase <5 U/L      Total Bilirubin 0.6 mg/dL      Globulin 2.5 gm/dL      A/G Ratio 1.3 g/dL      BUN/Creatinine Ratio 7.5     Anion Gap 11.8 mmol/L      eGFR 54.9 mL/min/1.73     Narrative:      GFR Categories in Chronic Kidney Disease (CKD)              GFR Category          GFR (mL/min/1.73)    Interpretation  G1                    90 or greater        Normal or high (1)  G2                    60-89                Mild decrease (1)  G3a                   45-59                Mild to moderate decrease  G3b                   30-44                Moderate to severe decrease  G4                    15-29                Severe decrease  G5                    14 or less           Kidney failure    (1)In the absence of evidence of kidney disease, neither GFR category G1 or G2 fulfill the criteria for  CKD.    eGFR calculation 2021 CKD-EPI creatinine equation, which does not include race as a factor    High Sensitivity Troponin T [[id number]]  (Abnormal) Collected: 05/20/25 1704    Specimen: Blood Updated: 05/20/25 1738     HS Troponin T 28 ng/L     Narrative:      High Sensitive Troponin T Reference Range:  <14.0 ng/L- Negative Female for AMI  <22.0 ng/L- Negative Male for AMI  >=14 - Abnormal Female indicating possible myocardial injury.  >=22 - Abnormal Male indicating possible myocardial injury.   Clinicians would have to utilize clinical acumen, EKG, Troponin, and serial changes to determine if it is an Acute Myocardial Infarction or myocardial injury due to an underlying chronic condition.         BNP [[id number]]  (Abnormal) Collected: 05/20/25 1704    Specimen: Blood Updated: 05/20/25 1738     proBNP 6,654.0 pg/mL     Narrative:      This assay is used as an aid in the diagnosis of individuals suspected of having heart failure. It can be used as an aid in the diagnosis of acute decompensated heart failure (ADHF) in patients presenting with signs and symptoms of ADHF to the emergency department (ED). In addition, NT-proBNP of <300 pg/mL indicates ADHF is not likely.    Age Range Result Interpretation  NT-proBNP Concentration (pg/mL:      <50             Positive            >450                   Gray                 300-450                    Negative             <300    50-75           Positive            >900                  Gray                300-900                  Negative            <300      >75             Positive            >1800                  Gray                300-1800                  Negative            <300    Protime-INR [[id number]]  (Abnormal) Collected: 05/20/25 1704    Specimen: Blood Updated: 05/20/25 1725     Protime 16.6 Seconds      INR 1.35    aPTT [[id number]]  (Normal) Collected: 05/20/25 1704    Specimen: Blood Updated: 05/20/25 1725     PTT 31.6 seconds     CBC & Differential  [[id number]]  (Abnormal) Collected: 05/20/25 1704    Specimen: Blood Updated: 05/20/25 1720    Narrative:      The following orders were created for panel order CBC & Differential.  Procedure                               Abnormality         Status                     ---------                               -----------         ------                     CBC Auto Differential[[id number]]        Abnormal            Final result               Scan Slide[[id number]]                                                                    Please view results for these tests on the individual orders.    CBC Auto Differential [[id number]]  (Abnormal) Collected: 05/20/25 1704    Specimen: Blood Updated: 05/20/25 1720     WBC 7.08 10*3/mm3      RBC 2.69 10*6/mm3      Hemoglobin 8.7 g/dL      Hematocrit 27.2 %      .1 fL      MCH 32.3 pg      MCHC 32.0 g/dL      RDW 16.9 %      RDW-SD 61.0 fl      MPV 9.8 fL      Platelets 277 10*3/mm3      Neutrophil % 62.3 %      Lymphocyte % 15.8 %      Monocyte % 13.0 %      Eosinophil % 0.3 %      Basophil % 0.3 %      Immature Grans % 8.3 %      Neutrophils, Absolute 4.41 10*3/mm3      Lymphocytes, Absolute 1.12 10*3/mm3      Monocytes, Absolute 0.92 10*3/mm3      Eosinophils, Absolute 0.02 10*3/mm3      Basophils, Absolute 0.02 10*3/mm3      Immature Grans, Absolute 0.59 10*3/mm3      nRBC 0.0 /100 WBC     Extra Tubes [[id number]] Collected: 05/20/25 1704    Specimen: Blood, Venous Line Updated: 05/20/25 1715    Narrative:      The following orders were created for panel order Extra Tubes.  Procedure                               Abnormality         Status                     ---------                               -----------         ------                     Gold Top - SST[[id number]]                                   Final result                 Please view results for these tests on the individual orders.    Gold Top - SST [[id number]] Collected: 05/20/25 1704    Specimen: Blood  Updated: 05/20/25 1715     Extra Tube Hold for add-ons.     Comment: Auto resulted.       POC Lactate [[id number]]  (Normal) Collected: 05/20/25 1711    Specimen: Blood Updated: 05/20/25 1713     Lactate 2.0 mmol/L      Comment: Serial Number: [id number]:  [id number]       Blood Culture - Blood, Arm, Right [[id number]] Collected: 05/20/25 1706    Specimen: Blood from Arm, Right Updated: 05/20/25 1712    Blood Culture - Blood, Arm, Left [[id number]] Collected: 05/20/25 1704    Specimen: Blood from Arm, Left Updated: 05/20/25 1711             Imaging Results (Last 24 Hours)       Procedure Component Value Units Date/Time    CT Angiogram Chest Pulmonary Embolism [[id number]] Collected: 05/20/25 1926     Updated: 05/20/25 1936    Narrative:      CT ANGIOGRAM CHEST PULMONARY EMBOLISM    Date of Exam: 5/20/2025 6:39 PM EDT    Indication: soa.    Comparison: 4/8/2025    Technique: Axial CT images were obtained of the chest after the uneventful intravenous administration of iodinated contrast utilizing pulmonary embolism protocol.  In addition, a 3-D volume rendered image was created for interpretation.  Sagittal and   coronal reconstructions were performed.  Automated exposure control and iterative reconstruction methods were used.      Findings:  No pulmonary emboli are identified. There are postoperative changes from prior esophagectomy with gastric pull-through. Prior median sternotomy. There is a right-sided Port-A-Cath. There is a left-sided cardiac pacemaker. Emphysema is present in the lung   fields. There is patchy airspace consolidation in the dependent lower lobes with areas of bronchial wall thickening and endobronchial secretion. Tree-in-bud infiltrate is present. The findings are suspicious for aspiration. Mild cardiomegaly is present.   There are a few scattered subcentimeter pulmonary nodules. No new or enlarging pulmonary nodules are identified. The thoracic aorta has a normal caliber. There appear to be  a few stable simple and proteinaceous renal cysts. Stable small cyst in the   right hepatic lobe. Degenerative changes are present in the thoracic spine.      Impression:      Impression:    1. No pulmonary emboli are identified.    2. Patchy airspace consolidation in the dependent lower lobes with areas of bronchial wall thickening and endobronchial secretion. Tree-in-bud infiltrate is present. The findings are suspicious for aspiration pneumonia.        Electronically Signed: Jero Sims MD    5/20/2025 7:34 PM EDT    Workstation ID: [id number]    XR Chest 1 View [[id number]] Collected: 05/20/25 1735     Updated: 05/20/25 1738    Narrative:      XR CHEST 1 VW    Date of Exam: 5/20/2025 5:06 PM EDT    Indication: soa    Comparison: CT scan of the chest 4/8/2025.    Findings:  Stable right-sided Port-A-Cath. Stable left-sided cardiac pacemaker. Prior median sternotomy. The heart and pulmonary vasculature are within normal limits. No evidence of pneumothorax or large pleural effusion. There are no active appearing infiltrates.   There are chronic appearing changes in the lung fields.      Impression:      Impression:  No active disease.      Electronically Signed: Jero Sims MD    5/20/2025 5:36 PM EDT    Workstation ID: [id number]              Assessment & Plan    Artie Mc is a 75 y.o. male with a CMH of [id number] with LVEF of 45 to 50% per echo in April 2025, CAD status post CABG, status post pacemaker, metastatic esophageal carcinoma following oncology outpatient last chemotherapy about 2 weeks ago, hypertension, hyperlipidemia, type 2 diabetes, GERD, who presents from oncology office to [facility] on 5/20/2025 with shortness of breath.    Assessments  Superimposed bacterial bilateral pneumonia, possible concern for aspiration  Strep pneumoantigen positive  Parainfluenza positive  CTA chest negative for PE  Metastatic esophageal carcinoma, currently on chemotherapy  CAD status post  CABG  Status post pacemaker  Hypertension  Hyperlipidemia  GERD  Type diabetes  CKD    Plan  -DuoNebs and Pulmicort. Streptococcus pneumoniae urinary antigen returned positive; Legionella antigen and MRSA nares were negative. Patient received a dose of Zosyn in the ED. Will transition antibiotics to ceftriaxone. Supplemental oxygen as needed.    -Hx of CKD; patient reports poor oral intake. Will initiate gentle IV hydration. May require a dose of diuretics in the morning if blood pressure remains stable. CTA chest reviewed Personally and shows no significant pleural effusion. Monitor creatinine with morning labs.    -Patient to remain NPO except for medications. Will obtain a SLP consult due to concern for aspiration pneumonia in the setting of metastatic esophageal cancer.    -Initiate SSI for type 2 diabetes. Monitor blood glucose and adjust regimen as needed.    -Resume home levothyroxine. Resume other home medications once reconciled by pharmacy and deemed medically appropriate.    -Follow up on morning labs.    VTE Prophylaxis:  No VTE prophylaxis order currently exists.      CODE STATUS:  Full      I discussed the patient's findings and my recommendations with patient.      This document has been electronically signed by Nas Sen MD on May 20, 2025 19:54 EDT   [facility] Hospitalist Team

## 2025-05-20 NOTE — PROGRESS NOTES
Hematology-Oncology Follow-up Note       Artie Mc  1950    Primary Care Physician: Chanel Carter, DAT  Referring Physician: No ref. provider found    Reason For Visit:  No chief complaint on file.    Metastatic esophageal cancer  Monitoring for adverse effects related to immunotherapy.  Hypothyroidism      Mr. Mc is a pleasant 75 y.o. gentleman with a history of gastroesophageal reflux disease, diabetes, hypertension, heart block status post pacemaker placement and history of CABG.  He was having symptoms of dysphagia, weight loss since September 2016. He was reporting symptoms of food getting stuck in the lower chest.  He has transitioned himself to a liquid diet.  The patient reports losing about 30 pounds over this duration.  The patient underwent upper GI endoscopy on 12/20/16 with Dr. Esvin Barrera.  Endoscopy showed necrotic, circumferential and moderately obstructive mass at the distal esophagus between 42 cm and 36 cm.      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  There was also evidence of mild subacute inflammation in the duodenum.  Dr. Barrera ordered a CT scan of the chest and abdomen with contrast on 12/20/16.  The scan was done at [facility].  The scan showed a new ill-defined mass in the anterior wall of the distal esophagus at the GE junction measuring 2.3 x 2.2 x 1.6 cm, worrisome for cancer.  There were four subcentimeter liver lesions which had appeared stable in comparison to another CT scan from 2009 and they likely represent small cysts.  There was also mild lymphadenopathy in the gastrohepatic ligament.  There were at least six borderline enlarged lymph nodes measuring up to 1.3 x 1 cm in the region.  The patient is referred to us for further oncological evaluation.    1/5/2017 - The patient presents for initial consultation.  He reports persistent dysphagia and also has  symptoms of regurgitation.  He cannot tolerate solid foods and is dependent on liquid diet such as Ensure.  He also reports fatigue. Symptoms of dysphagia have been present for the past four months.  When he eats any solids, the food gets stuck in the lower chest.    1/5/17 - Vitamin B12 200 (L).  CEA 0.8.  Ferritin 35.  Iron saturation 16% (L), serum iron 56, TIBC 354.  Creatinine 0.9.  LFTs normal.  Folate 10.2.  1/12/17 - PET/CT scan:  Intense abnormal activity corresponding to the region of the GE junction and proximal stomach.  SUV is 11.06.  There is a suggestion of an abnormal mass or abnormal wall thickening in the region measuring 3.9 x 4.8 cm.  No additional abnormalities.  No evidence of metastases or other lymphadenopathy.  Scattered nonspecific subcentimeter lymph nodes involving the mediastinum and within the central upper abdomen.  1/13/17 - Creatinine 0.9.  LFTs normal.    1/17/17 - Patient seen by thoracic surgeon, Dr. Bradley.  PET scan was reviewed.  He has recommended neoadjuvant chemoradiation therapy followed by [place] Gurwinder esophagogastrectomy.  Port-A-Cath is being arranged.    1/20/17 - WBC 6.3, hemoglobin 12.1, platelet count 198,000, MCV 86.4.  1/30/17 - Patient started weekly Carboplatin and Taxol (Carboplatin AUC 2 and Taxol 50 mg/M2).  Patient received Injectafer 750 mg.  WBC 5.8, hemoglobin 12.4, platelet count 244,000.   1/31/17 - Patient seen by Dr. Eduardo Oleary.  The plan was to give 5040 cGy in 28 fractions.   2/6/17 - WBC 4.4, hemoglobin 11.6, platelet count 201,000.  Patient received cycle 2 of weekly Carboplatin and Taxol (Carboplatin AUC 2 and Taxol 50 mg/M2).  2/13/17 - Patient received Carboplatin and Taxol weekly cycle 3.  2/13/17 - Patient started concurrent radiation therapy.  Total planned dose is 4140 cGy.    2/13/17 to 2/15/17 - Patient received 4140 cGy of neoadjuvant radiation.  Radiation was finished on 3/15/17.    2/20/17 - WBC 1.8, hemoglobin 10.6, platelet count  217,000.  Creatinine 0.5. Folate 15 (L).  Ferritin 361.  Haptoglobin 214.  Iron saturation 33%, TIBC 263, serum iron 88.    2/20/17 - Patient received Carboplatin and Taxol weekly cycle 4.   2/27/17 - WBC 3.7, hemoglobin 10.7, platelet count 177,000, MCV 85.3.    3/6/17 - Patient has received 16 out of the 23 planned radiation treatments.  Total planned dose is 4140 cGy.    3/6/17 - WBC 5.4, hemoglobin 11.2, platelet count 113,000.  3/6/17 - Patient received cycle 5 of weekly Carboplatin and Taxol.   3/13/17 - Patient received cycle 6 of weekly Carboplatin and Taxol.  WBC 3.9, hemoglobin 11.3, platelet count 93,000.     3/20/17 - WBC 1.4, hemoglobin 10.0, platelet count 118,000, MCV 86.1   3/27/17 - WBC 3.8, hemoglobin 9.5, platelet count 176,000, MCV 87.3.    4/7/17 - Stress test:  No evidence of reversible myocardial ischemia.  Normal left ventricular ejection fraction of 50%.    4/17/17 - Upper GI endoscopy by Dr. 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  The GE junction adenocarcinoma lesion seems to have a very good response to chemotherapy and radiation.  The lumen was open.  5/1/17 - WBC 7.0, hemoglobin 10.2, platelet count 175,000.    5/4/17 - Patient underwent [place] Gurwinder esophagectomy with pyloroplasty, feeding jejunostomy tube, abdominal and mediastinal lymph node dissection.    Surgical Pathology:  Moderately to poorly differentiated adenocarcinoma measuring 1.3 cm at the GE junction.  Resection margins are negative for malignancy.  Tumor margins are negative.  There is effective treatment response.  No evidence of lymphovascular invasion.  Staging is ypT3,pN1.  One out of fourteen lymph nodes involved.   5/22/17 - WBC 6.8, hemoglobin 10.3, platelet count 312,000.    6/19/17 - WBC 5.7, hemoglobin 10.5, platelet count 204,000, MCV 92.1.    7/6/17 - EGD:  Status post balloon dilation of esophageal stricture.  7/12/17 - EGD with balloon dilation of esophageal  stricture.  7/26/17 - EGD and balloon dilation of esophageal stricture.  7/31/17 - PET/CT scan:  There is mild metabolic activity seen at the thoracic esophagus just at and below the surgical margins.  Some mild wall thickening.  This could be from recent surgery.  A residual cancer seems unlikely.  There is a precarinal lymph node with mild metabolic activity with SUV of 3 measuring 1 x 1.3 cm.  Again this appears to be nonspecific in my opinion.    8/7/17 - WBC 5.9, hemoglobin 11.9, platelet count 171,000, MCV 88.7.    10/19/17 - WBC 7.5, hemoglobin 11.8, platelet count 216,000.    1/8/18 - PET scan:  No evidence of residual disease or recurrent disease.  There is an esophageal stent in the mid esophagus with a large debris air level.  No evidence of any metastases in the chest, abdomen or pelvis.  Mild nodule uptake in the vocal cords with fullness in the right vocal cord.  This could be physiologic.    7/9/18 - CT scan of chest with contrast:  Prominent mediastinal lymph nodes appear stable since February.  Changes of gastric pull-through procedure for esophageal cancer again noted.  Tree-in-bud infiltrates in the left lung base, consistent with infection versus inflammation.    1/10/19 - CT chest, abdomen and pelvis with contrast:  No evidence of mets in the chest; however, interval development of metastatic lymphadenopathy in the left side of the retroperitoneum.  For instance, there is a 17 mm rounded lymph node, 10 mm lymph node and also a 14 mm lymph node.  These are all new.  Stable 9 mm hypodense lesion within the right hepatic lobe, which could reflect hemangioma or complex cyst.    1/30/19 - CT-guided core biopsy of retroperitoneal lymph node:  Poorly differentiated adenocarcinoma consistent with metastases from patient’s known esophageal primary.  CK20 positive, CDX2 positive, cytokeratin 7 negative, TTF-1 negative.    2/1/19 - Creatinine 0.9, BUN 18, calcium 9.3; these are normal.    2/28/19 - [facility]  Next Gen sequencing testing on retroperitoneal lymph node specimen:  PD-L1 positive.  CPS = 3.  This implies responsiveness to pembrolizumab.  Mismatch repair status is proficient (no evidence of microsatellite instability).  Tumor mutational burden is low = 6 mutations/Mb.  CDH1 indeterminate.  KRAS mutation positive.  TP53 mutation positive.  CDK6 amplified.  [employer] PD-L1 testing by IHC:  PD-L1 expression 1% positive (CPS =1).  HER2/marvel by IHC is negative.  HER2/marvel by CISH not amplified.  Microsatellite stable tumor.    3/7/19 - WBC 7.3, hemoglobin 12, platelet count 128,000, MCV 92.     3/14/19 - Patient was seen by radiation oncologist, Dr. Chahal.  He has recommended observation versus systemic chemoimmunotherapy as needed.  No plans for radiation therapy.   5/1/19 - CT scan of chest with contrast:  No definitive findings of new metastatic disease in the chest.  Emphysema noted.    5/1/19 - CT abdomen and pelvis:  There is interval progression of metastatic retroperitoneal adenopathy.  Left periaortic adenopathy with lymph node measuring up to 2 cm, previously 1.7 cm.  Another lymph node now measures 2.3, previously 1.4 cm.  New enlarged lymph node on image 147 measures 1.4 cm, previously 0.4 cm.  A 9 mm hypodense right hepatic lobe lesion appears stable.    5/9/19 - Decision made to start patient on immunotherapy Keytruda.    5/9/19 - Vitamin B12 229.  Ferritin 61.  Folate 14.3.  Iron saturation 26%, TIBC 304, serum iron 79.  5/31/19 - Patient received Keytruda 200 mg cycle 1, day 1.    5/31/19 - Free T4 0.99.  Creatinine 0.9, BUN 9.  LFTs normal.  TSH 3.99.  Folate 14.3.  Iron saturation 26%.    6/21/2019 patient received Keytruda cycle 2  7/12/2019: Patient received Keytruda cycle 3  8/2/2019 patient received cycle 4 Keytruda WBC 7.2, hemoglobin 11.8 platelet count 163  8/23/2019 creatinine 0.9, LFTs normal, WBC 6.4, hemoglobin 11.5, platelets 179, TSH 2.1   8/23/2019 patient received cycle 5 of  Keytruda  9/3/2019 CT chest abdomen and pelvis with contrast: . Positive response to therapy in the abdomen since 05/01/2019. Pathologically enlarged retroperitoneal lymph nodes, predominantly in the left periaortic region, have diminished in size.a 1.3 x 2.1 cm left periaortic node (image 60), previously measured 3.5 x 2.3 cm. A 1.6 cm index node near the level of the left renal vein previously measured 2.0 cm No new or progressive adenopathy. 2. Stable findings in the chest. Noncalcified right upper lobe nodules are unchanged. There is no evidence of new or progressive metastatic disease within the chest. 3. 8 mm indeterminate low-density lesion in the right hepatic lobe, is stable. No new liver lesions.  9/6/2019 WBC 5.6, hemoglobin 11.2, platelets of 192, MCV 91.8  9/13/2019 patient received Keytruda cycle 6, albumin 3.2  10/4/2019 patient received cycle 7 of Keytruda, TSH 4.8, free T4 0.86  10/9/2019 WBC 5.8, hemoglobin 11.7, platelets 174  10/25/2019 patient received cycle 8 of Keytruda.  WBC 5.9, hemoglobin 11.5, platelets 152, creatinine 0.75, cortisol 15.2, TSH 6.48 high , free T4 1.13 normal  11/6/2019 WBC 6.6, hemoglobin 11.8, MCV 91.5, platelets 187  11/15/2019 patient received cycle 9 of Keytruda, WBC 6.6, hemoglobin 11.9, platelets 161, cortisol level 8.86, TSH 2.68, free T3 2.8, free T4 1.5  12/4/2019 WBC 6.9, hemoglobin 12.0, platelets 180, MCV 92.9  12/06/2019-Keytruda Cycle 10  12/27/2019- Cycle 11 LFTs normal, WBC 6.7, hemoglobin 11.4, platelets 168, MCV 93, TSH 2.4,  1/28/2020: CT chest abdomen and pelvis with contrast:  Single (aortic lymph node in the abdominal retroperitoneum has increased.  Rest of the retroperitoneal lymph nodes have decreased in size.  Mediastinal adenopathy appears unchanged.  Noncalcified bilateral pulmonary nodules are stable  1/17/2020 patient received Keytruda cycle 12:.  2/3/2020 WBC 7.1, hemoglobin 12.2, platelets 171  02/10/2020 patient received cycle 13 of  Keytruda: WBC 7.3, hemoglobin 12.1, platelets 166, creatinine 0.82  3/6/2020 patient is of Keytruda 200 mg, hemoglobin 10.8  3/27/2020 patient received Keytruda 200 mg, hemoglobin 11.4, TSH 1.95  4/17/2020 patient received Keytruda cycle 16 200 mg, WBC 6.1, hemoglobin 11.6, platelets 150  5/8/2020: Patient received cycle 17 Keytruda, WBC 6.3, hemoglobin 9.6, platelets 137, TSH 2.05, free T4 1.54, creatinine 0.93, LFTs normal,  5/29/2020: Patient received cycle 18 Keytruda, WBC 6.5, hemoglobin 11.7, platelets 129, creatinine 0.83, TSH 1.69  6/26/2020: Patient receiving Keytruda.   7/17/2020: TSH 2.06, WBC 5.7, hemoglobin 11.4, platelets 177, MCV 92.4, Keytruda 200 mg cycle 20  7/27/2020: CT chest abdomen pelvis with contrast: Stable findings in the chest abdomen pelvis since 1/28/2020.  Noncalcified bilateral pulmonary nodules are stable.  Stable retroperitoneal left perinephric adenopathy in the abdomen.  8/7/2020: Patient received cycle 21 of Keytruda  8/28/2020 patient is a cycle 22 of Keytruda.  WBC 6.02, hemoglobin 11.3, platelets 161  9/11/2020 WBC 6.9, hemoglobin 12.4, platelets 192  9/18/2020: Cycle 23 of Keytruda, TSH 1.19  10/9/2020: Cycle 24 of Keytruda, WBC 5.9, hemoglobin 11.9, platelets 178, creatinine 0.84, TSH 2.56, free T3 2.4, CMP normal  10/23/2020: WBC 7.34, hemoglobin 11.6, platelets 175  10/30/2020:.  Keytruda 200 mg  11/20/2020: Keytruda 200 mg  12/11/2020 Keytruda 200 mg.  WBC 5.5, hemoglobin 11.4, platelets 176, creatinine 0.8, LFTs normal, TSH 2.2, cortisol 8.3  12/10/2020: CT chest abdomen and pelvis with contrast: Previously seen left para-aortic lymph nodes within the abdomen are decreased in size.  No pathological lymphadenopathy.  No evidence of residual malignancy..  Small noncalcified pulmonary nodules are stable.  No new nodules.  Stable mediastinal lymph nodes.  3/12/2021: WBC 6.7, hemoglobin 11.3, platelets 17  3/12/2021: CMP normal, TSH 2.9, cortisol 11.95, 1  3/15/2021: CT chest  with contrast: Stable findings in the chest with postsurgical changes of esophageal resection and gastric pull-through.  Stable right lower lobe lung nodule measures 11 mm.  Stable size and appearance of the mediastinal and upper abdominal lymph nodes.  3/23/2021: Doppler of upper extremity: Normal.  3/24/2021: CT abdomen and pelvis with contrast: Postoperative changes.  Left periaortic nodes are stable.  4/12/2021: TSH 4.1, cortisol 12.4  6/22/2021: CMP normal,Cortisol 10.08,, WBC 5.8, hemoglobin 11.2, platelets 109, TSH 2.59  7/12/2021: CT scan of the chest abdomen pelvis with contrast: Dominant 10 x 10 left periaortic lymph node is stable since 3/22/2021.  Dominant lymph nodes in the right lower paratracheal and AP window distribution are stable since 3/15/2021.  No evidence of progressive adenopathy in the chest abdomen or pelvis.    01/11/2022 -PET/CT findings consistent with new cedric metastatic disease in the retroperitoneum.  2 new pathologically enlarged hypermetabolic retroperitoneal lymph nodes.  No convincing evidence of recurrent disease within the chest neck pelvis or skeleton.  1/20/2022 -CT-guided needle biopsy consistent with poorly differentiated carcinoma.  CDX2 positive consistent with metastases from known esophageal primary  2/9/2022 - Pembrolizumab complicated with fatigue, joint pains, nausea, diarrhea. Symptoms decrease in intensity over a week.  3/30/2022 -CT chest and pelvis with stable metastatic lymph nodes in the left periaortic retroperitoneum.  No evidence of disease progression or new lesions.  Stable lymphadenopathy in the distal paratracheal mediastinal area  4/13/2022 - Keytruda 200  5/4/2022 - Keytruda 200  Continued keytruda  7/11/2022 - CT abdomen pelvis with decrease in size of left perioaortic lymph node. No other areas of disease.  Continues to be on Keytruda  10/18/2022 - stable CT imaging  1/20/2023 - CT imaging with stable disease. No significant change.  Patient was  hospitalized with esophagitis, subsequent EGD with improvement, no recurrence of disease.  2/21/2023 - Keytruda  CT CAP with increased retroperitoneal adenopathy.   ECHO with EF 35-40% (concern for immunotherapy related )immunotherapy held with ongoing investigation for cause.  Subsequent improvement in EF to 45%  4/4/2023 - CT abdomen pelvis with interval retroperitoneal lymph node enlargement compatible with metastatic adenopathyl.  5/5/2023 - PET CT with mixed response. No clear evidence of progression  6/2/23 -started pembrolizumab 200 mg IV  Posttreatment had symptoms of chest tightness troponin and proBNP negative, echocardiogram with EF 40%  Subsequent cardiac cath with EF down to 30% elevated proBNP, troponin no significant obstructive disease in the graft vessels  Cardiac MRI unable to obtain given pacemaker noncompliant.  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 Two enlarged mediastinal lymph nodes in the precarinal and AP window distributions are unchanged since 4/4/2023. No new or progressive adenopathy in the chest.Stable 3 mm or less noncalcified right lung nodules since 4/4/2023. Surgical changes of distal esophagectomy with gastric pull-through, without evidence of local disease recurrence  8/29/23 - started FOLFOX had significant side effects  9/20/23 - stopped oxaliplatin and continued 5FU only'  11/29/23 - CT CAP with decrease size of retroperitoneal adenopathy, no other concerning findings.  Continues 5FU leucovorin   2/12/24 - PET CT There is some metabolic activity within a mediastinal lymph node which has been suggested with slightly decreased metabolic activity. Whether this is reactive or pathologic is uncertain. Previously noted abnormal retroperitoneal  lymph node is no longer identified continued treatmen  4/10/24 - iron sat 10, ferritin  14, plan for venofer  Continued treatment signatera negative  5/7/24 - CT chest without contrast. Unchanged mildly enlarged right paratracheal lymph lymph node measuring 1.1 cm in short axis (series 2 image 40) and left paratracheal measuring 1.1 cm in short axis (series 2 image 39). No new or progressive lymphadenopathy.  Continued maintenance 5FU  changed frequency to every 3 weeks.   7/11/2024 CT chest with stable lung nodule, mildly prominent precarinal lymph node    7/25/2024:  Bilateral lung, bronchial wash with smears and cytospin:  Acute inflammation, pulmonary macrophages and bronchial epithelial cells    2.   Lymph node, precarinal station 4, fine-needle aspiration with smears and cell block:  Benign bronchial epithelial cells in a background of blood and lymphocytes consistent with lymph node aspirate  No malignancy identified      8/7/24: Seen today for initial evaluation by me.  He was previously being seen by Dr. Elizondo in our practice.  He has underlying history of metastatic esophageal adenocarcinoma and was on maintenance therapy with 5-FU/leucovorin.  His last treatment was on 7/17/2024.  He has intermittent bronchoscopy planned lymph node biopsy due to suspicion for metastatic disease on recent imaging.  This is reported as above.    Patient reported that he has had persistent fatigue with maintenance chemotherapy, also reported having GI side effects, such as nausea and poor appetite with chemo.  However, the patient denied any weight loss.  No other issues presently.    9/11/2024: CT chest/abdomen/pelvis:  Impression:  1. Postsurgical changes from esophagectomy with gastric pull-through. No findings to suggest progression of disease within the thorax.  2. Stable subcentimeter pulmonary nodules measuring up to 4 mm in the right lower lobe, and mildly prominent precarinal lymph node. Continued attention on  follow-up suggested.  3. No findings to suggest progression of disease or metastatic disease within the abdomen or pelvis.  4. Additional findings as given above..      10/23/24: Patient seen today for follow-up visit with restaging scans.  Has ogoing sinus congestion for several months. Some sore throat.some cough, SOB has improved. Has ongoing fatigue, stable.     1/10/25: CT chest/Abdomen/Pelvis W contrast:  Impression:   1. Stable examination of the chest with postsurgical changes from an esophagectomy and gastric pull-through.   2. However, within the abdomen there is a new enhancing lesion in the posterior right hepatic lobe measuring 0.6 cm and interval enlargement of a para-aortic lymph node concerning for disease progression now measuring 1.9 x 1.1 cm.     1/17/2025: Patient seen today for follow-up visit with restaging scans.  No new complaints reported.  Continues to do well clinically    1/28/2025 PET/CT:  Impression:  1. Isolated hypermetabolic left periaortic/retroperitoneal lymph nodes suspicious for metastatic disease in patient with history of esophageal malignancy. While almost certainly esophageal related, nonemergent testicular ultrasound would be reasonable in   a male with retroperitoneal adenopathy to exclude a solid testicular lesion.  2. Mild lower mediastinal adenopathy stable from 2024. No evidence of solid organ metastases.  3. Esophagectomy with gastric pull-through. Linear probably physiologic uptake in the stomach. Differential would include gastritis.  4. Emphysema. Emphysema on CT is an independent risk factor for lung cancer. Low dose lung cancer screening should be considered if patient qualifies based on smoking history or if not already enrolled in a screening program.      2/11/2025: Patient here today for routine follow-up prior to scheduled treatment.  He reports overall tolerating leucovorin and fluorouracil although he did have mild intermittent diarrhea.  He also reports he  has been drinking less fluids because he feels bloated.  He has no new pain.  He noted nausea following his infusion but this was managed with Zofran.    3/18/25: Patient seen today for follow-up prior to cycle 3 maintenance chemotherapy reported having off-and-on diarrhea and progressive fatigue after starting chemotherapy.  Also has intermittent nausea.  Weight/appetite is stable presently.  He reported using Imodium sparingly for his diarrhea    4/11/25: CT Chest Abd Pelvis W contrast:  1. New 6 mm low-density lesion within the spleen. Metastatic disease cannot be excluded.   2. Stable 2.0 x 1.0 cm left periaortic lymph node in the mid abdomen, consistent with metastatic disease, corresponding to the metabolically active node seen on prior PET/CT imaging. No new or progressive adenopathy.   3. Stable 6 mm enhancing focus within the right hepatic lobe since CT abdomen of 1/10/2025. This demonstrated no abnormal FDG uptake on prior PET/CT.   3. Surgical changes of esophagectomy with gastric pull-through. No evidence of new or progressive malignancy within the chest. Small noncalcified nodules measuring 2 to 3 mm are unchanged.     History of Present Illness  4/15/25: The patient presents for follow up with restaging scans.   Significant gastrointestinal discomfort is reported, attributed to the discontinuation of Protonix by his nephrologist due to potential renal complications. A sensation of food impaction in the esophagus is described. This symptom was less pronounced during a chemotherapy hiatus but has since worsened.     Esophageal dilation has been performed several times in the past without relief. There is reluctance towards the placement of a feeding tube. Currently, Zofran is used for nausea management.    Fatigue and a lack of energy are also reported, believed to have intensified since resuming chemotherapy. Energy levels have been fluctuating.not on oral iron at this time.  He has completed 5 cycles  of maintenance 5FU/LV.        SUBJECTIVE:  5/13/25: pt seen for follow up. Noted to have some productive cough. Patient reported having low grade fever, myalgias and generalized weakness over last few days which he attributed to a viral illness. These symptoms have improved over last couple days and he is starting to have more energy at this time, however not back to his normal baseline yet.    5/20/25 Here for chemotherapy infusion, missed last week due to feeling poorly. I was asked to evaluate patient in infusion area. He reports he feels generally weak.  He denies any fevers chills or diaphoresis.  He reports he was diagnosed with pneumonia yesterday and was prescribed doxycycline of which he took only 1 dose.  He reports shortness of air and a cough productive of yellow sputum.  He denies any difficulty swallowing but reports he spits up a lot and finds it difficult to eat and has lost 12 pounds in less than 3 weeks.  His blood pressure was moderately low at 97/53 .  Normal saline fluid bolus was given here and this is improved to 136/57.  He remains afebrile.  Labs today showed a potassium of 3.3 and he was given IV potassium replacement here.  Given concerns for possible sepsis on chemotherapy and failure to thrive he was advised to go to the emergency department at [facility] to be evaluated with blood cultures, CT chest and abdomen and further evaluation.  Patient agreed.      Review of Systems   Constitutional:  Positive for appetite change, fatigue and unexpected weight change.        Weight loss 12 lbs in less than 3 weeks,   HENT: Negative.     Eyes: Negative.    Respiratory:  Positive for cough, shortness of breath and wheezing.         Productive of yellow sputum   Cardiovascular: Negative.    Gastrointestinal:  Positive for nausea.   Endocrine: Negative.    Genitourinary: Negative.    Musculoskeletal: Negative.    Skin: Negative.    Neurological: Negative.    Hematological: Negative.     Psychiatric/Behavioral: Negative.     All other systems reviewed and are negative.     Past Medical History:   Diagnosis Date    B12 deficiency     Blockage of coronary artery of heart     Depression     Disease of thyroid gland     DM (diabetes mellitus)     Dysphagia     Esophageal cancer     GERD (gastroesophageal reflux disease)     HTN (hypertension)     Hyperlipidemia        Past Surgical History:   Procedure Laterality Date    ABDOMINAL WALL ABSCESS INCISION AND DRAINAGE  10/22/2018    WITH DEBRIDEMENT  1.5CM X 1.5 CM X 1.5 CM    DR. PURI    BRONCHOSCOPY N/A 7/25/2024    Procedure: BRONCHOSCOPY WITH ENDOBRONCHIAL ULTRASOUND, LUNG WASHINGS, AND FINE NEEDLE ASPIRATIONS;  Surgeon: Bonnie Smith MD;  Location: [facility] ENDOSCOPY;  Service: Pulmonary;  Laterality: N/A;    CARDIAC CATHETERIZATION Right 7/20/2023    Procedure: Left Heart Cath;  Surgeon: Mazin Simmons MD;  Location: [facility] CATH INVASIVE LOCATION;  Service: Cardiovascular;  Laterality: Right;    CATARACT EXTRACTION  2018    COLONOSCOPY      CORONARY ARTERY BYPASS GRAFT  2015    ENDOSCOPY  12/20/2016    ENDOSCOPY N/A 01/02/2023    Procedure: ESOPHAGOGASTRODUODENOSCOPY;  Surgeon: Esvin Morgan MD;  Location: [facility] ENDOSCOPY;  Service: Gastroenterology;  Laterality: N/A;  post: anastamosis stricture    ENDOSCOPY N/A 02/09/2023    Procedure: ESOPHAGOGASTRODUODENOSCOPY, non-wire guided balloon dilation (12-14mm) of esophageal stricture;  Surgeon: Tino Villafana MD;  Location: [facility] ENDOSCOPY;  Service: Gastroenterology;  Laterality: N/A;  post: esophageal anastamotic stricture, post surgical changes    ESOPHAGECTOMY  05/04/2017    BROOKS PETTY ESOPHAGECTOMY, FEEDING JEJNOSTOMOY, ABDOMINAL AND MEDIASTINAL LYMPH NODE DISECTION, PEDICLED MUSCLE FLAP COVERAGE DR. PURI    ESOPHAGOSCOPY / EGD  07/12/2017    WITH BALLOON DILATION    ESOPHAGOSCOPY / EGD  07/26/2017    WITH BALLOON DILATION    ESOPHAGOSCOPY / EGD  08/11/2017    WITH  BALLOON DILATION    ESOPHAGOSCOPY / EGD  08/28/2017    WITH BALLOON DILATION    ESOPHAGOSCOPY / EGD  09/27/2017    WITH BALLOON DILATION    INSERT / REPLACE / REMOVE PACEMAKER      LYMPH NODE BIOPSY  01/30/2019    PACEMAKER IMPLANTATION  11/21/2016       Current Outpatient Medications:     clonazePAM (KlonoPIN) 0.5 MG tablet, Take 0.5 tablets by mouth 2 (Two) Times a Day As Needed for Anxiety., Disp: , Rfl: 1    cyanocobalamin (VITAMIN B-12) 1000 MCG tablet, Take 1 tablet by mouth Daily. (Patient not taking: Reported on 5/13/2025), Disp: , Rfl:     doxycycline (VIBRAMYCIN) 100 MG capsule, Take 1 capsule by mouth 2 (Two) Times a Day., Disp: , Rfl:     famotidine (PEPCID) 20 MG tablet, Take 1 tablet by mouth twice daily, Disp: 60 tablet, Rfl: 0    levothyroxine (SYNTHROID, LEVOTHROID) 50 MCG tablet, Take 1 tablet by mouth once daily, Disp: 30 tablet, Rfl: 0    metFORMIN (GLUCOPHAGE) 1000 MG tablet, Take 0.5 tablets by mouth Daily. (Patient taking differently: Take 0.5 tablets by mouth 2 (Two) Times a Day With Meals.), Disp: 60 tablet, Rfl: 2    nebivolol (BYSTOLIC) 5 MG tablet, Take 1 tablet by mouth Daily., Disp: , Rfl:     ondansetron (ZOFRAN) 4 MG tablet, TAKE 1 TABLET BY MOUTH EVERY 8 HOURS AS NEEDED FOR NAUSEA AND VOMITING, Disp: 90 tablet, Rfl: 2    pravastatin (PRAVACHOL) 10 MG tablet, Take 0.5 tablets by mouth Every Night. (Patient not taking: Reported on 5/13/2025), Disp: , Rfl:     tamsulosin (FLOMAX) 0.4 MG capsule 24 hr capsule, Take 1 capsule by mouth 2 (Two) Times a Day. (Patient not taking: Reported on 4/15/2025), Disp: , Rfl:   No current facility-administered medications for this visit.    Facility-Administered Medications Ordered in Other Visits:     potassium chloride 10 mEq in 100 mL IVPB, 10 mEq, Intravenous, Once, Ayanna Melendez APRN, Last Rate: 100 mL/hr at 05/20/25 1412, 10 mEq at 05/20/25 1412    Allergies   Allergen Reactions    Ace Inhibitors Unknown (See Comments)     Unknown  "reaction      Heparin Other (See Comments)     Fever/chills, weakness in legs    Sulfa Antibiotics Other (See Comments)     Mouth sores    Ciprofloxacin GI Intolerance     Pt reports pain to abd    Empagliflozin Unknown - Low Severity     Other Reaction(s): UTI      per patient report       Family History   Problem Relation Age of Onset    Mental illness Mother     Heart disease Mother     Hypertension Father     Cancer Father     Heart disease Sister     Heart disease Brother      Cancer-related family history includes Cancer in his father.    Social History     Tobacco Use    Smoking status: Former     Current packs/day: 0.00     Average packs/day: 2.0 packs/day for 50.0 years (100.0 ttl pk-yrs)     Types: Cigarettes     Start date: 1965     Quit date: 2015     Years since quittin.9    Smokeless tobacco: Former     Types: Chew     Quit date: 1989   Vaping Use    Vaping status: Never Used   Substance Use Topics    Alcohol use: No    Drug use: No     ROS:     Objective:    Vitals:    25 1329   BP: 97/53   Pulse: 78   Resp: 14   Temp: 97.4 °F (36.3 °C)   SpO2: 96%   Weight: 55.8 kg (123 lb)   Height: 167.6 cm (65.98\")             (1) Restricted in physically strenuous activity, ambulatory and able to do work of light nature    Physical Exam:     Physical Exam   Constitutional: He is oriented to person, place, and time. He appears well-developed. He appears ill.   HENT:   Head: Normocephalic and atraumatic.   Right Ear: External ear normal.   Left Ear: External ear normal.   Nose: Nose normal. Mouth/Throat: Mucous membranes are dry.   Eyes: Conjunctivae are normal. No scleral icterus.   Neck: No thyromegaly present.   Cardiovascular: Normal rate, regular rhythm, normal heart sounds and normal pulses. Exam reveals no gallop and no friction rub.   Pulmonary/Chest: Effort normal. No respiratory distress. He has wheezes.   Abdominal: Soft. Bowel sounds are normal.   No tenderness   Musculoskeletal: " Normal range of motion. No swelling, deformity or signs of injury.   Neurological: He is alert and oriented to person, place, and time. He displays no weakness. He exhibits normal muscle tone.   Skin: Skin is warm and dry. No bruising and no rash noted. He is not diaphoretic. No erythema. No jaundice.   Right side chest  port   Psychiatric: His behavior is normal. Mood, judgment and thought content normal.   Vitals reviewed.      Lab Results - Last 18 Months   Lab Units 05/20/25  1213 05/13/25  1255 04/29/25  1348   WBC 10*3/mm3 6.70 7.36 5.98   HEMOGLOBIN g/dL 8.8* 8.1* 8.1*   HEMATOCRIT % 26.7* 24.8* 24.1*   PLATELETS 10*3/mm3 268 133* 157   MCV fL 99.6* 99.6* 98.4*     Lab Results - Last 18 Months   Lab Units 05/20/25  1218 05/13/25  1312 04/29/25  1400 03/04/25  1124 02/18/25  1130 02/11/25  1057 02/11/25  1044 01/28/25  1041 01/17/25  1241   SODIUM mmol/L  --   --   --   --  142  --  140  --  139   POTASSIUM mmol/L  --   --   --   --  5.1  --  5.2  --  6.0*   CHLORIDE mmol/L  --   --   --   --  107  --  107  --  104   CO2 mmol/L  --   --   --   --  23.2  --  20.5*  --  24.4   BUN mg/dL  --   --   --   --  16  --  19  --  12   CREATININE mg/dL 1.40* 1.50* 1.60*   < > 1.80*   < > 2.09*   < > 1.65*   CALCIUM mg/dL  --   --   --   --  9.0  --  8.9  --  9.1   BILIRUBIN mg/dL  --   --   --   --  0.3  --  0.4  --  0.3   ALK PHOS U/L  --   --   --   --  32*  --  32*  --  37*   ALT (SGPT) U/L  --   --   --   --  5  --  6  --  11   AST (SGOT) U/L  --   --   --   --  13  --  15  --  25   GLUCOSE mg/dL  --   --   --   --  145*  --  183*  --  109*    < > = values in this interval not displayed.     CASE SUMMARY:  Patient has a history of GE junction poorly differentiated adenocarcinoma for which he had chemoradiation followed by [place] [facility] resection:  Pathology showed ypT3,pN1 disease.  Tumor was Stage IIIA, diagnosed in 2016.  He received concurrent chemoradiation with weekly Carboplatin and Taxol and radiation finishing  on 3/15/17.  CT scan on 1/10/19 showed new retroperitoneal lymph nodes.  These lymph nodes were biopsied on 1/30/19 and it shows metastatic esophageal cancer.  Caris Next Gen testing was performed on the sample and it shows KRAS mutation, microsatellite stable tumor.  PD-L1 is positive.  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  HER2/marvel (ERBB2) was not amplified and was negative.  His CT scan on 5/1/19 shows evidence of progression.  Patient has started receiving Keytruda on 5/31/19.  He has received 19 cycles so far.   CT scan on 1/28/2020 shows improvement and continued response.  CT scan chest abdomen pelvis with contrast on 12/10/2020 does not show any evidence of disease progression.  On 12/22/2020 decision made to hold Keytruda per patient's request to get a treatment break.. Repeat CT scan chest 7/12/2021 does not show any evidence of disease progression.  Now CT imaging in January 2021 with a CT showing increased abdominal left para-aortic adenopathy.  Subsequent PET/CT with new cedric metastatic disease in the retroperitoneum of the abdomen.  New pathologically enlarged hypermetabolic retroperitoneal lymph nodes.  This would be concerning for disease recurrence/progression.  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  I discussed with him the treatment options going forward.  He has had a good response with immunotherapy pembrolizumab in the past. This was  restarted. subsequent imaging with treatment response. 1/2023 imaging with good response. Continued treatment  Now with increased adenopathy 2 new lymph nodes. Holding immunotherapy for now.   Consider getting cardiac MR discussed with Dr. Culp. While immunotherapy on hold, increased adenopathy is concerning. Could be pseudoprogression vs true progression. PET CT reviewed could indicate pseudoprogression since he has had a good response to immunotherapy  immunotherapy was restarted and has been on hold with patient's symptomatology cardiac MRI  cannot be obtained, no ischemic heart disease leading to cardiomyopathy this most likely is related to Keytruda.  We will stop Keytruda going forward  CT imaging with stable disease, FOLFOX was started which he had significant side effects  With imaging being stable and significant side effects with FOLFOX, we discussed  about options with wait and watch vs 5FU only, patient prefers to be on maintenance.   We started 5FU only .   Tolerating relatively well, has significant fatigue. CT imaging now with ongoing response. We discussed that he would have been on treatment for 2 years following recurrence with good response. We can continue treatment now and get a PET CT in 2/2024.will repeat prior to follow up. , PET with some residual activity in the mediastinal lymph node.   We continued treatment and now signatera negative. I had a long discussion with the patient. We decided to repeat CT chest now and if no adenopathy, consider holding treatment.   Now CT chest repeated with persistent adenopathy. Discussed biopsy/mediastinoscopy vs continued treatment. Has fairly been tolerating well except fatigue. After discussion we decided to continue chemotherapy.  Now repeat CT imaging with no new concerning findings there is stable nodules which are subcentimeter in the lungs there is mildly prominent precarinal lymph node.  I discussed with the patient about this possibly being inflammatory rather than neoplastic.    -After discussion we decided to proceed with EBUS and possible biopsy of the lymph node if negative for malignancy we can consider stopping maintenance treatment given Signatera is negative and patient has treatment related side effects which are affecting his quality of life  -Bronchoscopy and precarinal lymph node biopsy reported negative as above.  -Repeat Signatera test also reported negative.  -In view of significant treatment-related toxicities and no concerns for disease progression at this time, we will  plan for surveillance alone without additional systemic therapy.  -Repeat imaging with an interval enlargement of periaortic lymph node as well as uptrending Signatera CT DNA levels concerning for disease recurrence.  Given low disease burden, patient to resume 5-FU with leucovorin maintenance (1/28/2025).  Consider adding irinotecan if noted disease progression moving forward.      Assessment & Plan        Assessment & Plan     Assessment:    Generalized Weakness 5/20/25 acute concern for possible sepsis on chemotherapy and failure to thrive superimposed upon chronic problems described below. Advised to go to Emergency Department at [facility] for further evaluation and possible admission. Spoke with ED provider via telephone and given update. Recent diagnosis pneumonia, productive cough, dyspnea , was on PO Doxycycline took one dose.Recent significant weight loss up to 12 lbs in less than 3 weeks.        Metastatic esophageal carcinoma:    -Patient currently on surveillance and is off treatment  -Restaging scans from 9/11/2024 as above, reported PAULIE.  -Repeat CT chest abdomen pelvis reviewed, noted interval enlargement of a   para-aortic lymph node concerning for disease progression now measuring 1.9 x 1.1 cm.   -Also noted to have uptrending Signatera CT DNA levels.  Discussed with patient that these findings are consistent with disease recurrence.  -Given low disease burden at this time, will resume 5-FU/leucovorin maintenance, can consider adding irinotecan if noted disease progression moving forward  -PET/CT completed on 1/28/2025 as above, consistent with disease progression. this was discussed with patient today.  -Continue maintenance 5-FU/leucovorin. Restaging scans reviwed, noted new splenic lesion but otherwise not concerning for progressive disease. Continue current therapy.        -Signetera ctDNA level has trended down. This was discussed with patient       -Hold treatment for 1 week in view  of respiratory symptoms.     Possible myocarditis  Symptoms started after last immunotherapy  Treatment on hold for now, symptoms have improved, cardiac markers were negative, echocardiogram with EF 40%, cardiac MRI cannot be obtained cardiac cath with EF down to 30% management per cardiology  Now EF up to 40-45% monitor.  Symptomatic improvement now no shortness of breath    Shortness of breath  Improved, has occasional shortness of breath, likely copd  I have recommended using albuterol  Symptoms have improved since stopping immunotherapy. Questionable pneumonitis will monitor closely with immunotherapy  Cardiology has evaluated for myocarditis related to immunotherapy symptomatic improvement now continue to monitor. Symptomatic improvement as above.    Reflux/heartburn/dysphagia:   Symptoms improved.  Also has a history of strictures. Status post EGD and balloon dilation.   Continues to be on pantoprazole symptoms stable.  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  Continues on Synthroid stable now no heart function stable in normal range    Mild diarrhea:  diarrhea seems to be waxing and waning in nature.  Diarrhea related to immunotherapy now improved stable.    Patient reports mild diarrhea following cycle 1 of leucovorin and fluorouracil.  He has not been taking any Imodium.    PD-L1 positive, HER2 negative, p53 and KRAS positive.  Repeat Caris testing with repeat biopsy with no target mutations.    B12 deficiency: Continue B12    Anemia - Hb  was low iron sat low at 10, given venofer 300 x3 monitor for now. Intolerant to oral iron.  Continued low iron saturation will plan on Feraheme for 2 doses. Hemoglobin remains stable.    Fatigue.  Fatigue may be attributed to the ongoing chemotherapy treatment and slightly low hemoglobin levels. The patient is not currently taking any iron supplements due to stomach issues but had an iron infusion in the past. Adequate hydration by  drinking plenty of water is advised.    Heartburn:  Dysphagia:  Significant stomach pain and difficulty eating are reported. He is off Protonix due to worsning renal function. Pepcid will be prescribed to manage stomach issues. The patient is advised to schedule an appointment with his gastroenterologist for further evaluation and potential esophageal dilation. If the scope reveals tumor growth causing esophageal narrowing, alternative treatment options, including the possibility of a feeding tube, will be considered.    SORIN on CKD,:  Limited oral intake:  -Counseled patient on importance of oral intake and nutrition while on chemotherapy  -Continue follow-up with nephrology for his underlying CKD      Follow up post hospitalization   Will order restaging scans on follow up.    Time spent on encounter including record review, history taking, exam, discussion, counseling and documentation at: 30  minutes

## 2025-05-20 NOTE — PROGRESS NOTES
Patient in clinic for C8 Leucovorin/5FU.  Port accessed with sterile technique and positive blood return noted.  Blood drawn from port.  10 cc blood wasted prior to specimen collection.  Specimens sent to lab for processing.  Patient not feeling well today; he presents with weakness and required wheelchair to transport back to infusion. BP 97/53. Patient was diagnosed with pneumonia yesterday by PCP and is taking doxycycline 100 mg BID. He has a productive cough with white/yellow expectorant and shortness of breath that is a little worse than usual. Patient states no light-headedness or dizziness but is unsteady on his feet; he fell 3 days ago onto his face because he was not able to catch his fall. He did not go to a doctor; he has abrasion/scab to nose. He has persistent abdominal pain with eating and has lost 3 pounds in the last week. He states he drinks 2-3 bottles of Propel per day.    Ayanna ordered 1 L NS bolus with 20 meq of KCL (for potassium 3.3) and came to chairside for assessment per Dr. Beth's request. Patient given IVF and KCL as ordered. Dr. Beth wants the patient to go to the [facility] ED for sepsis workup and possible admission. Patient verbalized understanding. Leuco/5FU treatment will be held for 1 week. Patient discharged with port accessed and AVS.

## 2025-05-21 ENCOUNTER — APPOINTMENT (OUTPATIENT)
Dept: GENERAL RADIOLOGY | Facility: HOSPITAL | Age: 75
DRG: 177 | End: 2025-05-21
Payer: MEDICARE

## 2025-05-21 LAB
ALBUMIN SERPL-MCNC: 3 G/DL (ref 3.5–5.2)
ALBUMIN/GLOB SERPL: 1.4 G/DL
ALP SERPL-CCNC: 33 U/L (ref 39–117)
ALT SERPL W P-5'-P-CCNC: 5 U/L (ref 1–41)
ANION GAP SERPL CALCULATED.3IONS-SCNC: 8.7 MMOL/L (ref 5–15)
AST SERPL-CCNC: 19 U/L (ref 1–40)
BACTERIA UR QL AUTO: ABNORMAL /HPF
BASOPHILS # BLD AUTO: 0.02 10*3/MM3 (ref 0–0.2)
BASOPHILS # BLD AUTO: 0.05 10*3/MM3 (ref 0–0.2)
BASOPHILS NFR BLD AUTO: 0.3 % (ref 0–1.5)
BASOPHILS NFR BLD AUTO: 0.5 % (ref 0–1.5)
BILIRUB SERPL-MCNC: 0.6 MG/DL (ref 0–1.2)
BILIRUB UR QL STRIP: NEGATIVE
BUN SERPL-MCNC: 9 MG/DL (ref 8–23)
BUN/CREAT SERPL: 7 (ref 7–25)
CALCIUM SPEC-SCNC: 8.3 MG/DL (ref 8.6–10.5)
CHLORIDE SERPL-SCNC: 107 MMOL/L (ref 98–107)
CLARITY UR: CLEAR
CO2 SERPL-SCNC: 24.3 MMOL/L (ref 22–29)
COLOR UR: YELLOW
CREAT SERPL-MCNC: 1.28 MG/DL (ref 0.76–1.27)
DEPRECATED RDW RBC AUTO: 61.5 FL (ref 37–54)
DEPRECATED RDW RBC AUTO: 63.2 FL (ref 37–54)
EGFRCR SERPLBLD CKD-EPI 2021: 58 ML/MIN/1.73
EOSINOPHIL # BLD AUTO: 0.01 10*3/MM3 (ref 0–0.4)
EOSINOPHIL # BLD AUTO: 0.02 10*3/MM3 (ref 0–0.4)
EOSINOPHIL NFR BLD AUTO: 0.1 % (ref 0.3–6.2)
EOSINOPHIL NFR BLD AUTO: 0.3 % (ref 0.3–6.2)
ERYTHROCYTE [DISTWIDTH] IN BLOOD BY AUTOMATED COUNT: 17.1 % (ref 12.3–15.4)
ERYTHROCYTE [DISTWIDTH] IN BLOOD BY AUTOMATED COUNT: 17.1 % (ref 12.3–15.4)
GLOBULIN UR ELPH-MCNC: 2.1 GM/DL
GLUCOSE BLDC GLUCOMTR-MCNC: 114 MG/DL (ref 70–105)
GLUCOSE BLDC GLUCOMTR-MCNC: 78 MG/DL (ref 70–105)
GLUCOSE BLDC GLUCOMTR-MCNC: 83 MG/DL (ref 70–105)
GLUCOSE BLDC GLUCOMTR-MCNC: 84 MG/DL (ref 70–105)
GLUCOSE SERPL-MCNC: 91 MG/DL (ref 65–99)
GLUCOSE UR STRIP-MCNC: ABNORMAL MG/DL
HCT VFR BLD AUTO: 25 % (ref 37.5–51)
HCT VFR BLD AUTO: 29.1 % (ref 37.5–51)
HGB BLD-MCNC: 7.9 G/DL (ref 13–17.7)
HGB BLD-MCNC: 9.3 G/DL (ref 13–17.7)
HGB UR QL STRIP.AUTO: NEGATIVE
HYALINE CASTS UR QL AUTO: ABNORMAL /LPF
IMM GRANULOCYTES # BLD AUTO: 0.47 10*3/MM3 (ref 0–0.05)
IMM GRANULOCYTES # BLD AUTO: 0.49 10*3/MM3 (ref 0–0.05)
IMM GRANULOCYTES NFR BLD AUTO: 5.3 % (ref 0–0.5)
IMM GRANULOCYTES NFR BLD AUTO: 7.6 % (ref 0–0.5)
KETONES UR QL STRIP: ABNORMAL
LEUKOCYTE ESTERASE UR QL STRIP.AUTO: ABNORMAL
LYMPHOCYTES # BLD AUTO: 1.32 10*3/MM3 (ref 0.7–3.1)
LYMPHOCYTES # BLD AUTO: 1.34 10*3/MM3 (ref 0.7–3.1)
LYMPHOCYTES NFR BLD AUTO: 14.4 % (ref 19.6–45.3)
LYMPHOCYTES NFR BLD AUTO: 21.6 % (ref 19.6–45.3)
MAGNESIUM SERPL-MCNC: 1.8 MG/DL (ref 1.6–2.4)
MCH RBC QN AUTO: 31.6 PG (ref 26.6–33)
MCH RBC QN AUTO: 32.3 PG (ref 26.6–33)
MCHC RBC AUTO-ENTMCNC: 31.6 G/DL (ref 31.5–35.7)
MCHC RBC AUTO-ENTMCNC: 32 G/DL (ref 31.5–35.7)
MCV RBC AUTO: 100 FL (ref 79–97)
MCV RBC AUTO: 101 FL (ref 79–97)
MONOCYTES # BLD AUTO: 0.91 10*3/MM3 (ref 0.1–0.9)
MONOCYTES # BLD AUTO: 1 10*3/MM3 (ref 0.1–0.9)
MONOCYTES NFR BLD AUTO: 10.9 % (ref 5–12)
MONOCYTES NFR BLD AUTO: 14.7 % (ref 5–12)
MRSA DNA SPEC QL NAA+PROBE: NORMAL
NEUTROPHILS NFR BLD AUTO: 3.45 10*3/MM3 (ref 1.7–7)
NEUTROPHILS NFR BLD AUTO: 55.5 % (ref 42.7–76)
NEUTROPHILS NFR BLD AUTO: 6.32 10*3/MM3 (ref 1.7–7)
NEUTROPHILS NFR BLD AUTO: 68.8 % (ref 42.7–76)
NITRITE UR QL STRIP: NEGATIVE
NRBC BLD AUTO-RTO: 0 /100 WBC (ref 0–0.2)
NRBC BLD AUTO-RTO: 0 /100 WBC (ref 0–0.2)
PH UR STRIP.AUTO: <=5 [PH] (ref 5–8)
PHOSPHATE SERPL-MCNC: 3.2 MG/DL (ref 2.5–4.5)
PLATELET # BLD AUTO: 254 10*3/MM3 (ref 140–450)
PLATELET # BLD AUTO: 290 10*3/MM3 (ref 140–450)
PMV BLD AUTO: 10 FL (ref 6–12)
PMV BLD AUTO: 9.9 FL (ref 6–12)
POTASSIUM SERPL-SCNC: 3.7 MMOL/L (ref 3.5–5.2)
PROT SERPL-MCNC: 5.1 G/DL (ref 6–8.5)
PROT UR QL STRIP: ABNORMAL
QT INTERVAL: 468 MS
QTC INTERVAL: 516 MS
RBC # BLD AUTO: 2.5 10*6/MM3 (ref 4.14–5.8)
RBC # BLD AUTO: 2.88 10*6/MM3 (ref 4.14–5.8)
RBC # UR STRIP: ABNORMAL /HPF
REF LAB TEST METHOD: ABNORMAL
SODIUM SERPL-SCNC: 140 MMOL/L (ref 136–145)
SP GR UR STRIP: 1.04 (ref 1–1.03)
SQUAMOUS #/AREA URNS HPF: ABNORMAL /HPF
UROBILINOGEN UR QL STRIP: ABNORMAL
WBC # UR STRIP: ABNORMAL /HPF
WBC NRBC COR # BLD AUTO: 6.21 10*3/MM3 (ref 3.4–10.8)
WBC NRBC COR # BLD AUTO: 9.19 10*3/MM3 (ref 3.4–10.8)

## 2025-05-21 PROCEDURE — 84100 ASSAY OF PHOSPHORUS: CPT | Performed by: STUDENT IN AN ORGANIZED HEALTH CARE EDUCATION/TRAINING PROGRAM

## 2025-05-21 PROCEDURE — 63710000001 INSULIN LISPRO (HUMAN) PER 5 UNITS: Performed by: STUDENT IN AN ORGANIZED HEALTH CARE EDUCATION/TRAINING PROGRAM

## 2025-05-21 PROCEDURE — 82948 REAGENT STRIP/BLOOD GLUCOSE: CPT

## 2025-05-21 PROCEDURE — 74220 X-RAY XM ESOPHAGUS 1CNTRST: CPT

## 2025-05-21 PROCEDURE — 85025 COMPLETE CBC W/AUTO DIFF WBC: CPT | Performed by: STUDENT IN AN ORGANIZED HEALTH CARE EDUCATION/TRAINING PROGRAM

## 2025-05-21 PROCEDURE — 94799 UNLISTED PULMONARY SVC/PX: CPT

## 2025-05-21 PROCEDURE — 84100 ASSAY OF PHOSPHORUS: CPT | Performed by: HOSPITALIST

## 2025-05-21 PROCEDURE — 92610 EVALUATE SWALLOWING FUNCTION: CPT

## 2025-05-21 PROCEDURE — 80053 COMPREHEN METABOLIC PANEL: CPT | Performed by: STUDENT IN AN ORGANIZED HEALTH CARE EDUCATION/TRAINING PROGRAM

## 2025-05-21 PROCEDURE — 25010000002 CEFTRIAXONE PER 250 MG: Performed by: STUDENT IN AN ORGANIZED HEALTH CARE EDUCATION/TRAINING PROGRAM

## 2025-05-21 PROCEDURE — 92611 MOTION FLUOROSCOPY/SWALLOW: CPT

## 2025-05-21 PROCEDURE — 85025 COMPLETE CBC W/AUTO DIFF WBC: CPT | Performed by: HOSPITALIST

## 2025-05-21 PROCEDURE — 83735 ASSAY OF MAGNESIUM: CPT | Performed by: HOSPITALIST

## 2025-05-21 PROCEDURE — 81001 URINALYSIS AUTO W/SCOPE: CPT | Performed by: HOSPITALIST

## 2025-05-21 PROCEDURE — 83735 ASSAY OF MAGNESIUM: CPT | Performed by: STUDENT IN AN ORGANIZED HEALTH CARE EDUCATION/TRAINING PROGRAM

## 2025-05-21 PROCEDURE — 94664 DEMO&/EVAL PT USE INHALER: CPT

## 2025-05-21 PROCEDURE — 74230 X-RAY XM SWLNG FUNCJ C+: CPT

## 2025-05-21 PROCEDURE — 80053 COMPREHEN METABOLIC PANEL: CPT | Performed by: HOSPITALIST

## 2025-05-21 RX ORDER — NEBIVOLOL 5 MG/1
2.5 TABLET ORAL
Status: DISCONTINUED | OUTPATIENT
Start: 2025-05-22 | End: 2025-05-24 | Stop reason: HOSPADM

## 2025-05-21 RX ORDER — SODIUM CHLORIDE, SODIUM LACTATE, POTASSIUM CHLORIDE, CALCIUM CHLORIDE 600; 310; 30; 20 MG/100ML; MG/100ML; MG/100ML; MG/100ML
100 INJECTION, SOLUTION INTRAVENOUS CONTINUOUS
Status: DISPENSED | OUTPATIENT
Start: 2025-05-21 | End: 2025-05-21

## 2025-05-21 RX ORDER — FAMOTIDINE 20 MG/1
20 TABLET, FILM COATED ORAL DAILY
Status: DISCONTINUED | OUTPATIENT
Start: 2025-05-21 | End: 2025-05-22

## 2025-05-21 RX ADMIN — BUDESONIDE INHALATION 0.5 MG: 0.5 SUSPENSION RESPIRATORY (INHALATION) at 19:08

## 2025-05-21 RX ADMIN — BARIUM SULFATE 50 ML: 400 SUSPENSION ORAL at 11:57

## 2025-05-21 RX ADMIN — BUDESONIDE INHALATION 0.5 MG: 0.5 SUSPENSION RESPIRATORY (INHALATION) at 08:06

## 2025-05-21 RX ADMIN — Medication 10 ML: at 08:23

## 2025-05-21 RX ADMIN — IPRATROPIUM BROMIDE AND ALBUTEROL SULFATE 3 ML: .5; 3 SOLUTION RESPIRATORY (INHALATION) at 08:02

## 2025-05-21 RX ADMIN — IPRATROPIUM BROMIDE AND ALBUTEROL SULFATE 3 ML: .5; 3 SOLUTION RESPIRATORY (INHALATION) at 19:03

## 2025-05-21 RX ADMIN — INSULIN LISPRO 2 UNITS: 100 INJECTION, SOLUTION INTRAVENOUS; SUBCUTANEOUS at 18:39

## 2025-05-21 RX ADMIN — BARIUM SULFATE 50 ML: 400 SUSPENSION ORAL at 13:08

## 2025-05-21 RX ADMIN — IPRATROPIUM BROMIDE AND ALBUTEROL SULFATE 3 ML: .5; 3 SOLUTION RESPIRATORY (INHALATION) at 12:06

## 2025-05-21 RX ADMIN — CEFTRIAXONE 2000 MG: 2 INJECTION, POWDER, FOR SOLUTION INTRAMUSCULAR; INTRAVENOUS at 04:28

## 2025-05-21 RX ADMIN — BARIUM SULFATE 1 TEASPOON(S): 0.6 CREAM ORAL at 11:58

## 2025-05-21 RX ADMIN — Medication 10 ML: at 23:14

## 2025-05-21 RX ADMIN — FAMOTIDINE 20 MG: 20 TABLET, FILM COATED ORAL at 23:13

## 2025-05-21 NOTE — PROGRESS NOTES
Artie Mc is a 75 y.o. male admitted with pneumonia.     Recent Labs     05/20/25  1218 05/20/25  1704 05/21/25  0441   CREATININE 1.40* 1.34* 1.28*   BUN  --  10 9   NA  --  140 140   K  --  3.7 3.7   CL  --  105 107   CO2  --  23.2 24.3     Estimated Creatinine Clearance: 39.7 mL/min (A) (by C-G formula based on SCr of 1.28 mg/dL (H)).    Assessment/Plan  Famotidine 20 mg PO BID renally adjusted to 20 mg PO daily per the Adult Renal Dosing of Medication policy for estCrCl 30-59 mL/min    Aaron Hudson, [facility]  5/21/2025

## 2025-05-21 NOTE — PROGRESS NOTES
[facility] MEDICINE SERVICE  DAILY PROGRESS NOTE    NAME: Artie Mc  : 1950  MRN: [id number]      LOS: 1 day     PROVIDER OF SERVICE: Ross Hendrickson MD    Chief Complaint: PNA (pneumonia)    Subjective:     Interval History:  History taken from: patient chart RN    Shortness of breath improved is afebrile awaiting SLP eval        Review of Systems:   Review of Systems  All negative except as above  Objective:     Vital Signs  Temp:  [97.4 °F (36.3 °C)-98.7 °F (37.1 °C)] 98.7 °F (37.1 °C)  Heart Rate:  [60-84] 62  Resp:  [12-21] 16  BP: ()/(53-77) 138/69   Body mass index is 20.33 kg/m².    Physical Exam  Physical Exam  AO x 3 elderly male NAD  RRR S1-S2 audible  Lungs with fair air entry  Abdomen soft nontender       Diagnostic Data    Results from last 7 days   Lab Units 25  0441   WBC 10*3/mm3 6.21   HEMOGLOBIN g/dL 7.9*   HEMATOCRIT % 25.0*   PLATELETS 10*3/mm3 254   GLUCOSE mg/dL 91   CREATININE mg/dL 1.28*   BUN mg/dL 9   SODIUM mmol/L 140   POTASSIUM mmol/L 3.7   AST (SGOT) U/L 19   ALT (SGPT) U/L 5   ALK PHOS U/L 33*   BILIRUBIN mg/dL 0.6   ANION GAP mmol/L 8.7       CT Angiogram Chest Pulmonary Embolism  Result Date: 2025  Impression: 1. No pulmonary emboli are identified. 2. Patchy airspace consolidation in the dependent lower lobes with areas of bronchial wall thickening and endobronchial secretion. Tree-in-bud infiltrate is present. The findings are suspicious for aspiration pneumonia. Electronically Signed: Jero Sims MD  2025 7:34 PM EDT  Workstation ID: [id number]    XR Chest 1 View  Result Date: 2025  Impression: No active disease. Electronically Signed: Jero Sims MD  2025 5:36 PM EDT  Workstation ID: [id number]        I reviewed the patient's new clinical results.  I reviewed the patient's new imaging results and agree with the interpretation.    Assessment/Plan:     Active and Resolved Problems  Active Hospital Problems    Diagnosis  POA     **PNA (pneumonia) [J18.9]  Yes      Resolved Hospital Problems   No resolved problems to display.       75-year-old male with history of HFpEF, CAD status post CABG, status post PPM, metastatic esophageal cancer last chemotherapy 2 weeks ago, hypertension, hyperlipidemia, DM2, GERD admitted to [facility] 5/20 with shortness of breath    #Streptococcus pneumo pneumonia with possible superimposed aspiration pneumonia  #Acute parainfluenza V3 infection  #Dysphagia  #Esophageal cancer on chemotherapy  CTA chest on admission noted no PE.  Patchy airspace consolidation in the dependent lower lobes with areas of wall thickening and endobronchial secretions.  Tree-in-bud infiltrate is present  Immunocompromised host    Continue ceftriaxone  Isolation per protocol  Follow blood cultures.  MRSA swab negative  Seen by SLP underwent VFSS. Concern for incomplete esophageal emptying.  Started on modified diet monitor for tolerance  Consult GI.  Will get esophagram  Aspiration precautions  Follow-up with oncology as outpatient    #CAD status post CABG  #Status post PPM  #Hypertension  Resume Nebivolol as blood pressure permits    #Hypothyroidism  Continue home dose of levothyroxine    #DM2  Hold OHA  ISS lispro  Diabetic diet    #Anxiety  Continue home dose of Klonopin      VTE Prophylaxis:  No VTE prophylaxis order currently exists.             Disposition Planning:     Barriers to Discharge:medical workup   Anticipated Date of Discharge: 2-3 days  Place of Discharge: home      Time: 50 minutes     Code Status and Medical Interventions: CPR (Attempt to Resuscitate); Full Support   Ordered at: 05/20/25 2133     Code Status (Patient has no pulse and is not breathing):    CPR (Attempt to Resuscitate)     Medical Interventions (Patient has pulse or is breathing):    Full Support       Signature: Electronically signed by Ross Hendrickson MD, 05/21/25, 09:33 EDT.  [facility] Hospitalist Team

## 2025-05-21 NOTE — PLAN OF CARE
Goal Outcome Evaluation:  Plan of Care Reviewed With: patient        Progress: no change  Outcome Evaluation: VSS, paced rhythm, admitted from ED from onc office for SOA. admission database completed, care plan initiated.

## 2025-05-21 NOTE — PLAN OF CARE
"Goal Outcome Evaluation:    VFSS completed this date. The patient was seated up at a 90-degree angle and viewed in the lateral projection. Pt was fed all trials by SLP respectively: NT by spoon x2, puree (applesauce) x2, mech soft/mixed consistency (peaches) x2, soft to chew w/ barium pudding x1, thin liquid wash x6, and an esophageal scan (lateral and anterior view) to complete the study.     Complete labial. Premature spillage to the BOT on NT, puree, and soft to chew and to the pyriform sinus on thin and mech soft at the initiation of swallow. Mildly reduced bolus formation. Mildly disorganized and anterior mastication noted on solids, which was more extended on soft to chew compared to mech soft. A-P adequate. Min lingual and vallecular residue noted following majorities of consistencies assessed. Swallow initiation timely. Adequate laryngeal elevation and forward hyoid movement.  Epiglottic inversion and laryngeal vestibular closure adequate on NT, puree, solids, and thin by cup (controlled sips); No penetration or aspiration; Material does not enter airway (a 1 on the Rosenbeck Penetration-Aspiration Scale).  On thin 1/6 trials of thin by cup (large sip), pt demonstrated transient penetration during the swallow, which clears after subsequent swallow; Material enters the airway, remains above the vocal folds, and is ejected from the airway (a 2 on the Rosenbeck Penetration-Aspiration Scale). Esophageal scan in lateral view showed severe esophageal dysmotility w/ little to no esophageal emptying (please see image 1 below) in lower esophagus. Pt reported pain on the right side of chest when swallowing. Pt was then repositioned to anterior view (please see image 2 below). 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     VFSS findings via IVAN images were reviewed and discussed w/ pt. He demonstrated and verbalized " understanding of all discussed and recommendations.     Per objective findings, pt presents w/ mild oral dysphagia, grossly WFL pharyngeal swallow, and severe esophageal dysphagia. While aspiration was not shown w/ assessed consistencies, however, pt is at risk for aspiration d/t severe esophageal impairment observed via esophageal scan at the end of the study.  Pt may benefit from further GI workup. Additionally, would appreciate cardiothoracic surgery's input. Notified MD and nsg of above recs.       SLP Plan & Recommendation:       - Mech soft and thin liquids, no straw    - Strict reflux precautions   - General aspiration precautions    - Meds: whole in thin or as tolerated    - Safe swallow strategies: HOB at a 90 degree angle, slow rate of intake, small bites/sips, alternate liquids and solids, chew thoroughly before swallowing, utilization of thin liquid wash or extra sauces/gravies, avoid eating at reasonable time frame based on sleep schedule    - ST will continue to follow to ensure tolerance and safety of rec'd diet, provide further recs as indicated.       Anterior view of esophageal scan at the end of the study          Lateral view of esophageal scan at the end of the study                           SLP Swallowing Diagnosis:  (mild oral swallow; grossly WFL pharyngeal swallow; esophageal dysphagia) (05/21/25 1200)

## 2025-05-21 NOTE — CASE MANAGEMENT/SOCIAL WORK
Discharge Planning Assessment   Ion     Patient Name: Artie Mc  MRN: [id number]  Today's Date: 5/21/2025    Admit Date: 5/20/2025    Plan: Home, pt plans on driving self home, Monitor SLP recommendations,   Discharge Needs Assessment       Row Name 05/21/25 1053       Living Environment    People in Home alone    Current Living Arrangements home    Potentially Unsafe Housing Conditions none    In the past 12 months has the electric, gas, oil, or water company threatened to shut off services in your home? No    Primary Care Provided by self    Provides Primary Care For no one    Family Caregiver if Needed sibling(s)    Family Caregiver Names Carlyle-brother lives across the road from him.  Pt. states Carlyle is in good health.    Quality of Family Relationships supportive;helpful    Able to Return to Prior Arrangements yes       Resource/Environmental Concerns    Resource/Environmental Concerns none    Transportation Concerns none       Transportation Needs    In the past 12 months, has lack of transportation kept you from medical appointments or from getting medications? no    In the past 12 months, has lack of transportation kept you from meetings, work, or from getting things needed for daily living? No       Food Insecurity    Within the past 12 months, you worried that your food would run out before you got the money to buy more. Never true    Within the past 12 months, the food you bought just didn't last and you didn't have money to get more. Never true       Transition Planning    Patient/Family Anticipates Transition to home    Patient/Family Anticipated Services at Transition other (see comments)  Monitor    Transportation Anticipated car, drives self       Discharge Needs Assessment    Equipment Currently Used at Home grab bar;glucometer    Concerns to be Addressed discharge planning    Anticipated Changes Related to Illness none    Equipment Needed After Discharge none              Discharge Plan        Row Name 05/21/25 1056       Plan    Plan Home, pt plans on driving self home, Monitor SLP recommendations,    Patient/Family in Agreement with Plan yes    Plan Comments CM met with patient at the bedside to review discharge planning. Patient lives in a house alone.  Pt is independent with all IADLs except for housekeeping.  Pt. has a  come monthly.  Pt plans on driving self home at d/c. Confirmed PCP-Chanel Emma, insurance, and pharmacy. Patient accepts [facility]. Patient denies any difficulty affording food, utilities, or medications. Patient is not current with any HHC/PT services. [employer] delivers a boxes of food to him. Pt voiced he also is signed up for [employer]. DC Barriers: IV ATB, Monitor labs, Speech Therapy Consult recommendations, Telemetry.              Demographic Summary       Row Name 05/21/25 1051       General Information    Admission Type inpatient    Arrived From home    Required Notices Provided Important Message from Medicare    Referral Source admission list    Reason for Consult discharge planning    Preferred Language English       Contact Information    Permission Granted to Share Info With               Functional Status       Row Name 05/21/25 1052       Functional Status    Usual Activity Tolerance good    Current Activity Tolerance fair       Functional Status, IADL    Medications independent    Meal Preparation independent    Housekeeping assistive person   comes once a month to clean.    Laundry independent    Shopping independent    If for any reason you need help with day-to-day activities such as bathing, preparing meals, shopping, managing finances, etc., do you get the help you need? I get all the help I need              Patient Forms       Row Name 05/21/25 1050       Patient Forms    Important Message from Medicare (IMM) Delivered  5/20 IMM per registration    Delivered to Patient    Method of delivery In person              Yvette Hill RN    [place] [id number]  Phone: [phone]  Fax: [phone]

## 2025-05-21 NOTE — PLAN OF CARE
Goal Outcome Evaluation:    Clinical swallow evaluation completed. Pt was pleasant, alert, and responsive. Pt allowed bed to elevate upright prior to PO acceptance. On room air. Trials included thin by cup x2, thin by straw x5, and puree x5. Pt reported he has modified diet at home to mostly puree and thin liquids d/t complaints of globus sensation in lower esophagus w/ solids and regurgitation of foods/liquids when he doesn't sit upright during PO consumption. Oral [facility] exam revealed facial symmetry and no abnormality. Natural adequate mandibular dentition and maxillary dentition pt observed w/ edentulous state (does not wear upper partial). Pt self-fed all trials. No difficulty using cup, straw, or spoon. No labial spillage occurred. Bolus formation mildly reduced on puree. Oral transit unremarkable. There was no pocketing or oral holding. Digital palpation suggests timely swallow. Pharyngeal swallow marked by clear vocal quality w/ no cough, throat clear, or any sign of respiratory distress. Per CT Chest findings are suspicious for aspiration pneumonia and h/o esophageal cancer, VFSS is indicated to further objectively assess swallow function and r/o pharyngeal impairment. Educated pt on above recs and rationale, and to obtain baseline of swallow function via VFSS. Pt verbalized understanding of all discussed and agreeable to VFSS today.      SLP Plan & Recommendations:      - Remain NPO w/ [place] Water Protocol until VFSS   - Meds: via alternate route  - Plan for VFSS today w/ recs to follow         Water Protocol  The rationale to recommend water when a PO diet cannot appropriately/functionally sustain nutrition (or if thickened liquids are prescribed due to aspiration of thin liquids) is because water is low risk for aspiration pna when compared to aspiration of food or other liquids.    Benefits of a water protocol include but are not limited to:     Oral gratification  Engagement of oropharyngeal swallow  musculature  Decrease likelihood of dehydration       Guidelines for proper implementation include:  Waiting a minimum of 30 minutes after consuming any other PO  Thorough oral care prior to consuming water  Upright at 90 degree hip flexion  Small sips at slow rate  Monitor for any changes in respiratory status and discontinue if distress noted     The [place] Water Protocol is a research based protocol established in 1984.                         SLP Swallowing Diagnosis: mild, oral dysphagia, R/O pharyngeal dysphagia, suspected esophageal dysphagia (05/21/25 1050)

## 2025-05-21 NOTE — MBS/VFSS/FEES
Acute Care - Speech Language Pathology   Swallow Initial Evaluation  Ion     Patient Name: Artie Mc  : 1950  MRN: [id number]  Today's Date: 2025               Admit Date: 2025    Visit Dx:     ICD-10-CM ICD-9-CM   1. Dyspnea, unspecified type  R06.00 786.09   2. Acute on chronic congestive heart failure, unspecified heart failure type  I50.9 428.0   3. Parainfluenza infection  B34.8 078.89   4. Aspiration pneumonia, unspecified aspiration pneumonia type, unspecified laterality, unspecified part of lung  J69.0 507.0     Patient Active Problem List   Diagnosis    Malignant neoplasm of cardia    Esophageal cancer, stage IV    Chemotherapy adverse reaction    B12 deficiency    Encounter for antineoplastic chemotherapy    Anemia in neoplastic disease    Nausea    Abnormal weight loss    Abscess of chest wall    Bradycardia    Chronic orthostatic hypotension    Chronic coronary artery disease    Dizziness    Dysphagia    Esophageal dysphagia    Esophageal stricture    Gastroesophageal reflux disease without esophagitis    History of open heart surgery    Mixed hyperlipidemia    Primary hypertension    Panic disorder    Presence of cardiac pacemaker    Primary malignant neoplasm of lower third of esophagus    Primary malignant neoplasm of retroperitoneum    Seizure    Syncope and collapse    Type 2 diabetes mellitus    Flu vaccine need    Hypothyroidism (acquired)    Lymphadenopathy    Encounter for therapeutic drug monitoring    Malabsorption of iron    CHRISTIAN (iron deficiency anemia)    Gastrointestinal hemorrhage, unspecified gastrointestinal hemorrhage type    Hematemesis with nausea    Erosive esophagitis    Ischemic cardiomyopathy    Cardiomyopathy, dilated    Dysuria    PNA (pneumonia)     Past Medical History:   Diagnosis Date    B12 deficiency     Blockage of coronary artery of heart     Depression     Disease of thyroid gland     DM (diabetes mellitus)     Dysphagia     Esophageal cancer      GERD (gastroesophageal reflux disease)     HTN (hypertension)     Hyperlipidemia      Past Surgical History:   Procedure Laterality Date    ABDOMINAL WALL ABSCESS INCISION AND DRAINAGE  10/22/2018    WITH DEBRIDEMENT  1.5CM X 1.5 CM X 1.5 CM    DR. PURI    BRONCHOSCOPY N/A 7/25/2024    Procedure: BRONCHOSCOPY WITH ENDOBRONCHIAL ULTRASOUND, LUNG WASHINGS, AND FINE NEEDLE ASPIRATIONS;  Surgeon: Bonnie Smith MD;  Location: [facility] ENDOSCOPY;  Service: Pulmonary;  Laterality: N/A;    CARDIAC CATHETERIZATION Right 7/20/2023    Procedure: Left Heart Cath;  Surgeon: Mazin Simmons MD;  Location: [facility] CATH INVASIVE LOCATION;  Service: Cardiovascular;  Laterality: Right;    CATARACT EXTRACTION  2018    COLONOSCOPY      CORONARY ARTERY BYPASS GRAFT  2015    ENDOSCOPY  12/20/2016    ENDOSCOPY N/A 01/02/2023    Procedure: ESOPHAGOGASTRODUODENOSCOPY;  Surgeon: Esvin Morgan MD;  Location: [facility] ENDOSCOPY;  Service: Gastroenterology;  Laterality: N/A;  post: anastamosis stricture    ENDOSCOPY N/A 02/09/2023    Procedure: ESOPHAGOGASTRODUODENOSCOPY, non-wire guided balloon dilation (12-14mm) of esophageal stricture;  Surgeon: Tino Villafana MD;  Location: [facility] ENDOSCOPY;  Service: Gastroenterology;  Laterality: N/A;  post: esophageal anastamotic stricture, post surgical changes    ESOPHAGECTOMY  05/04/2017    BROOKS PETTY ESOPHAGECTOMY, FEEDING JEJNOSTOMOY, ABDOMINAL AND MEDIASTINAL LYMPH NODE DISECTION, PEDICLED MUSCLE FLAP COVERAGE DR. PURI    ESOPHAGOSCOPY / EGD  07/12/2017    WITH BALLOON DILATION    ESOPHAGOSCOPY / EGD  07/26/2017    WITH BALLOON DILATION    ESOPHAGOSCOPY / EGD  08/11/2017    WITH BALLOON DILATION    ESOPHAGOSCOPY / EGD  08/28/2017    WITH BALLOON DILATION    ESOPHAGOSCOPY / EGD  09/27/2017    WITH BALLOON DILATION    INSERT / REPLACE / REMOVE PACEMAKER      LYMPH NODE BIOPSY  01/30/2019    PACEMAKER IMPLANTATION  11/21/2016       SLP Recommendation and Plan  SLP Swallowing  Diagnosis:  (mild oral swallow; grossly WFL pharyngeal swallow; esophageal dysphagia) (05/21/25 1200)  SLP Diet Recommendation: mechanical ground textures, thin liquids (05/21/25 1200)  Recommended Precautions and Strategies: upright posture during/after eating, small bites of food and sips of liquid, multiple swallows per bite of food, multiple swallows per sip of liquid, alternate between small bites of food and sips of liquid (05/21/25 1200)  SLP Rec. for Method of Medication Administration: meds via alternate route (05/21/25 1200)     Monitor for Signs of Aspiration: yes, notify SLP if any concerns (05/21/25 1200)  Recommended Diagnostics: reassess via clinical swallow evaluation (05/21/25 1200)  Swallow Criteria for Skilled Therapeutic Interventions Met: demonstrates skilled criteria (05/21/25 1200)     Rehab Potential/Prognosis, Swallowing: good, to achieve stated therapy goals (05/21/25 1200)  Therapy Frequency (Swallow): 3 days per week, 4 days per week (05/21/25 1200)  Predicted Duration Therapy Intervention (Days): until discharge (05/21/25 1200)  Oral Care Recommendations: Oral Care BID/PRN, Oral Care before breakfast, after meals and PRN (05/21/25 1200)  Demonstrates Need for Referral to Another Service: gastroenterology (05/21/25 1200)     SWALLOW EVALUATION (Last 72 Hours)       SLP Adult Swallow Evaluation       Row Name 05/21/25 1200       Rehab Evaluation    Document Type evaluation  -PF    Subjective Information no complaints  -PF    Patient Observations alert;cooperative  -PF    Patient Effort excellent  -PF    Symptoms Noted During/After Treatment none  -PF       General Information    Patient Profile Reviewed yes  -PF    Current Method of Nutrition pureed;thin liquids  -PF    Precautions/Limitations, Vision WFL;for purposes of eval  -PF    Precautions/Limitations, Hearing WFL;for purposes of eval  -PF    Prior Level of Function-Communication WFL  -PF    Prior Level of Function-Swallowing soft to  chew;regular textures;thin liquids  -PF    Plans/Goals Discussed with patient  -PF    Barriers to Rehab none identified  -PF       Oral Motor Structure and Function    Dentition Assessment missing teeth  lower - natural dentition; upper - edentulous - does not wear partials  -PF    Mucosal Quality moist, healthy  -PF       General Eating/Swallowing Observations    Respiratory Support Currently in Use room air  -PF    Eating/Swallowing Skills self-fed  -PF    Positioning During Eating upright 90 degree;upright in bed  -PF    Utensils Used straw;spoon;cup  -PF    Consistencies Trialed pureed;thin liquids  -PF       MBS/VFSS    Utensils Used spoon;cup;straw  -PF    Consistencies Trialed mechanical ground textures;mixed consistency;pureed;thin liquids;nectar/syrup-thick liquids  -PF       MBS/VFSS Interpretation    VFSS Summary VFSS completed this date. The patient was seated up at a 90-degree angle and viewed in the lateral projection. Pt was fed all trials by SLP respectively: NT by spoon x2, puree (applesauce) x2, mech soft/mixed consistency (peaches) x2, soft to chew w/ barium pudding x1, thin liquid wash x6, and an esophageal scan (lateral and anterior view) to complete the study.     Complete labial. Premature spillage to the BOT on NT, puree, and soft to chew and to the pyriform sinus on thin and mech soft at the initiation of swallow. Mildly reduced bolus formation. Mildly disorganized and anterior mastication noted on solids, which was more extended on soft to chew compared to mech soft. A-P adequate. Min lingual and vallecular residue noted following majorities of consistencies assessed. Swallow initiation timely. Adequate laryngeal elevation and forward hyoid movement.  Epiglottic inversion and laryngeal vestibular closure adequate on NT, puree, solids, and thin by cup (controlled sips); No penetration or aspiration; Material does not enter airway (a 1 on the Rosenbeck Penetration-Aspiration Scale).  On thin  "1/6 trials of thin by cup (large sip), pt demonstrated transient penetration during the swallow, which clears after subsequent swallow; Material enters the airway, remains above the vocal folds, and is ejected from the airway (a 2 on the Rosenbeck Penetration-Aspiration Scale). Esophageal scan in lateral view showed severe esophageal dysmotility w/ little to no esophageal emptying (please see image 1 below) in lower esophagus. Pt reported pain on the right side of chest when swallowing. Pt was then repositioned to anterior view (please see image 2 below). 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     VFSS findings via IVAN images were reviewed and discussed w/ pt. He demonstrated and verbalized understanding of all discussed and recommendations.     Per objective findings, pt presents w/ mild oral dysphagia, grossly WFL pharyngeal swallow, and severe esophageal dysphagia. While aspiration was not shown w/ assessed consistencies, however, pt is at risk for aspiration d/t severe esophageal impairment observed via esophageal scan at the end of the study.  Pt may benefit from further GI workup. Additionally, would appreciate cardiothoracic surgery's input.  Notified MD and nsg of above recs.       SLP Plan & Recommendation:       - Mech soft and thin liquids, no straw    - Strict reflux precautions   - General aspiration precautions    - Meds: whole in thin or as tolerated    - Safe swallow strategies: HOB at a 90 degree angle, slow rate of intake, small bites/sips, alternate liquids and solids, chew thoroughly before swallowing, utilization of thin liquid wash or extra sauces/gravies, avoid eating at reasonable time frame based on sleep schedule    - ST will continue to follow to ensure tolerance and safety of rec'd diet, provide further recs as indicated.       Anterior view of esophageal scan at the end of the " study          Lateral view of esophageal scan at the end of the study     -PF       SLP Evaluation Clinical Impression    SLP Swallowing Diagnosis --  mild oral swallow; grossly WFL pharyngeal swallow; esophageal dysphagia  -PF    Functional Impact risk of aspiration/pneumonia;risk of malnutrition;risk of dehydration  -PF    Rehab Potential/Prognosis, Swallowing good, to achieve stated therapy goals  -PF    Swallow Criteria for Skilled Therapeutic Interventions Met demonstrates skilled criteria  -PF       Recommendations    Therapy Frequency (Swallow) 3 days per week;4 days per week  -PF    Predicted Duration Therapy Intervention (Days) until discharge  -PF    SLP Diet Recommendation mechanical ground textures;thin liquids  -PF    Recommended Diagnostics reassess via clinical swallow evaluation  -PF    Recommended Precautions and Strategies upright posture during/after eating;small bites of food and sips of liquid;multiple swallows per bite of food;multiple swallows per sip of liquid;alternate between small bites of food and sips of liquid  -PF    Oral Care Recommendations Oral Care BID/PRN;Oral Care before breakfast, after meals and PRN  -PF    SLP Rec. for Method of Medication Administration meds via alternate route  -PF    Monitor for Signs of Aspiration yes;notify SLP if any concerns  -PF    Demonstrates Need for Referral to Another Service gastroenterology  -PF       Swallow Goals (SLP)    Swallow LTGs Swallow Long Term Goal (free text)  -PF    Swallow STGs diet tolerance goal selection (SLP)  -PF    Diet Tolerance Goal Selection (SLP) Swallow Short Term Goal 1;Swallow Short Term Goal 2  -PF       (LTG) Swallow    (LTG) Swallow Pt will maximize swallow function for least restrictive PO diet, exhibiting no complication associated with dysphagia, adequate PO intake, and demonstrating independent use of swallow compensation.  -PF    [place] (Swallow Long Term Goal) independently (over 90% accuracy)  -PF     Time Frame (Swallow Long Term Goal) by discharge  -PF    Progress/Outcomes (Swallow Long Term Goal) new goal  -PF       (STG) Swallow 1    (STG) Swallow 1 Patient will participate in ongoing assessment of swallow, including reevaluation clinically and/or including instrumental assessment of swallow if indicated, to further assess swallow function and return to oral nutrition.  -PF    [place] (Swallow Short Term Goal 1) independently (over 90% accuracy)  -PF    Time Frame (Swallow Short Term Goal 1) 1 week  -PF    Progress/Outcomes (Swallow Short Term Goal 1) new goal  -PF       (STG) Swallow 2    (STG) Swallow 2 The patient will participate in a full meal assessment to determine safety and adequacy of recommended diet, independent use of safe swallow compensations, and additional goals/recommendations to follow.  -PF    [place] (Swallow Short Term Goal 2) independently (over 90% accuracy)  -PF    Time Frame (Swallow Short Term Goal 2) 1 week  -PF    Progress/Outcomes (Swallow Short Term Goal 2) new goal  -PF              User Key  (r) = Recorded By, (t) = Taken By, (c) = Cosigned By      Initials Name Effective Dates    PF Te Goldman, SLP 05/08/23 -                EDUCATION  The patient has been educated in the following areas:   Dysphagia (Swallowing Impairment) Oral Care/Hydration Modified Diet Instruction.      SLP GOALS       Row Name 05/21/25 1200       (LTG) Swallow    (LTG) Swallow Pt will maximize swallow function for least restrictive PO diet, exhibiting no complication associated with dysphagia, adequate PO intake, and demonstrating independent use of swallow compensation.  -PF    [place] (Swallow Long Term Goal) independently (over 90% accuracy)  -PF    Time Frame (Swallow Long Term Goal) by discharge  -PF    Progress/Outcomes (Swallow Long Term Goal) new goal  -PF       (STG) Swallow 1    (STG) Swallow 1 Patient will participate in ongoing assessment of swallow, including  reevaluation clinically and/or including instrumental assessment of swallow if indicated, to further assess swallow function and return to oral nutrition.  -PF    [place] (Swallow Short Term Goal 1) independently (over 90% accuracy)  -PF    Time Frame (Swallow Short Term Goal 1) 1 week  -PF    Progress/Outcomes (Swallow Short Term Goal 1) new goal  -PF       (STG) Swallow 2    (STG) Swallow 2 The patient will participate in a full meal assessment to determine safety and adequacy of recommended diet, independent use of safe swallow compensations, and additional goals/recommendations to follow.  -PF    [place] (Swallow Short Term Goal 2) independently (over 90% accuracy)  -PF    Time Frame (Swallow Short Term Goal 2) 1 week  -PF    Progress/Outcomes (Swallow Short Term Goal 2) new goal  -PF              User Key  (r) = Recorded By, (t) = Taken By, (c) = Cosigned By      Initials Name Provider Type    PF Fakto, [place], SLP Speech and Language Pathologist               ESSENCE Forman  5/21/2025

## 2025-05-21 NOTE — THERAPY EVALUATION
Acute Care - Speech Language Pathology   Swallow Initial Evaluation  Ion     Patient Name: Artie Mc  : 1950  MRN: [id number]  Today's Date: 2025               Admit Date: 2025    Visit Dx:     ICD-10-CM ICD-9-CM   1. Dyspnea, unspecified type  R06.00 786.09   2. Acute on chronic congestive heart failure, unspecified heart failure type  I50.9 428.0   3. Parainfluenza infection  B34.8 078.89   4. Aspiration pneumonia, unspecified aspiration pneumonia type, unspecified laterality, unspecified part of lung  J69.0 507.0     Patient Active Problem List   Diagnosis    Malignant neoplasm of cardia    Esophageal cancer, stage IV    Chemotherapy adverse reaction    B12 deficiency    Encounter for antineoplastic chemotherapy    Anemia in neoplastic disease    Nausea    Abnormal weight loss    Abscess of chest wall    Bradycardia    Chronic orthostatic hypotension    Chronic coronary artery disease    Dizziness    Dysphagia    Esophageal dysphagia    Esophageal stricture    Gastroesophageal reflux disease without esophagitis    History of open heart surgery    Mixed hyperlipidemia    Primary hypertension    Panic disorder    Presence of cardiac pacemaker    Primary malignant neoplasm of lower third of esophagus    Primary malignant neoplasm of retroperitoneum    Seizure    Syncope and collapse    Type 2 diabetes mellitus    Flu vaccine need    Hypothyroidism (acquired)    Lymphadenopathy    Encounter for therapeutic drug monitoring    Malabsorption of iron    CHRISTIAN (iron deficiency anemia)    Gastrointestinal hemorrhage, unspecified gastrointestinal hemorrhage type    Hematemesis with nausea    Erosive esophagitis    Ischemic cardiomyopathy    Cardiomyopathy, dilated    Dysuria    PNA (pneumonia)     Past Medical History:   Diagnosis Date    B12 deficiency     Blockage of coronary artery of heart     Depression     Disease of thyroid gland     DM (diabetes mellitus)     Dysphagia     Esophageal cancer      GERD (gastroesophageal reflux disease)     HTN (hypertension)     Hyperlipidemia      Past Surgical History:   Procedure Laterality Date    ABDOMINAL WALL ABSCESS INCISION AND DRAINAGE  10/22/2018    WITH DEBRIDEMENT  1.5CM X 1.5 CM X 1.5 CM    DR. PURI    BRONCHOSCOPY N/A 7/25/2024    Procedure: BRONCHOSCOPY WITH ENDOBRONCHIAL ULTRASOUND, LUNG WASHINGS, AND FINE NEEDLE ASPIRATIONS;  Surgeon: Bonnie Smith MD;  Location: [facility] ENDOSCOPY;  Service: Pulmonary;  Laterality: N/A;    CARDIAC CATHETERIZATION Right 7/20/2023    Procedure: Left Heart Cath;  Surgeon: Mazin Simmons MD;  Location: [facility] CATH INVASIVE LOCATION;  Service: Cardiovascular;  Laterality: Right;    CATARACT EXTRACTION  2018    COLONOSCOPY      CORONARY ARTERY BYPASS GRAFT  2015    ENDOSCOPY  12/20/2016    ENDOSCOPY N/A 01/02/2023    Procedure: ESOPHAGOGASTRODUODENOSCOPY;  Surgeon: Esvin Morgan MD;  Location: [facility] ENDOSCOPY;  Service: Gastroenterology;  Laterality: N/A;  post: anastamosis stricture    ENDOSCOPY N/A 02/09/2023    Procedure: ESOPHAGOGASTRODUODENOSCOPY, non-wire guided balloon dilation (12-14mm) of esophageal stricture;  Surgeon: Tino Villafana MD;  Location: [facility] ENDOSCOPY;  Service: Gastroenterology;  Laterality: N/A;  post: esophageal anastamotic stricture, post surgical changes    ESOPHAGECTOMY  05/04/2017    BROOKS PETTY ESOPHAGECTOMY, FEEDING JEJNOSTOMOY, ABDOMINAL AND MEDIASTINAL LYMPH NODE DISECTION, PEDICLED MUSCLE FLAP COVERAGE DR. PURI    ESOPHAGOSCOPY / EGD  07/12/2017    WITH BALLOON DILATION    ESOPHAGOSCOPY / EGD  07/26/2017    WITH BALLOON DILATION    ESOPHAGOSCOPY / EGD  08/11/2017    WITH BALLOON DILATION    ESOPHAGOSCOPY / EGD  08/28/2017    WITH BALLOON DILATION    ESOPHAGOSCOPY / EGD  09/27/2017    WITH BALLOON DILATION    INSERT / REPLACE / REMOVE PACEMAKER      LYMPH NODE BIOPSY  01/30/2019    PACEMAKER IMPLANTATION  11/21/2016       SLP Recommendation and Plan  SLP Swallowing  "Diagnosis: mild, oral dysphagia, R/O pharyngeal dysphagia, suspected esophageal dysphagia (05/21/25 1050)  SLP Diet Recommendation: NPO (05/21/25 1050)     SLP Rec. for Method of Medication Administration: meds via alternate route (05/21/25 1050)     Monitor for Signs of Aspiration: yes, notify SLP if any concerns (05/21/25 1050)  Recommended Diagnostics: VFSS (MBS) (05/21/25 1050)  Swallow Criteria for Skilled Therapeutic Interventions Met: demonstrates skilled criteria (05/21/25 1050)     Rehab Potential/Prognosis, Swallowing: good, to achieve stated therapy goals (05/21/25 1050)  Therapy Frequency (Swallow): 3 days per week, 4 days per week (05/21/25 1050)  Predicted Duration Therapy Intervention (Days): until discharge (05/21/25 1050)  Oral Care Recommendations: Oral Care BID/PRN, Before ice/water (05/21/25 1050)        SWALLOW EVALUATION (Last 72 Hours)       SLP Adult Swallow Evaluation       Row Name 05/21/25 1050       Rehab Evaluation    Document Type evaluation  -PF    Subjective Information no complaints  -PF    Patient Observations alert;cooperative  -PF    Patient Effort excellent  -PF    Symptoms Noted During/After Treatment none  -PF       General Information    Patient Profile Reviewed yes  -PF    Pertinent History Of Current Problem Per H&P: \"Artie Mc is a 75 y.o. male with a CMH of [id number]-MB with LVEF of 45 to 50% per echo in April 2025, CAD status post CABG, status post pacemaker, metastatic esophageal carcinoma following oncology outpatient last chemotherapy about 2 weeks ago, hypertension, hyperlipidemia, type 2 diabetes, GERD, who presents from oncology office to [facility] on 5/20/2025 with shortness of breath. Patient is currently awake, alert, oriented ×3, and conversational. He reports worsening shortness of breath over the past few days. He was seen at the oncology center today for follow-up and last received chemotherapy approximately two weeks ago. Due to worsening " "SOB symptoms, he was referred to the hospital for further evaluation. He reports chills and a nonproductive cough but denies chest pain, fever, dysuria, hemoptysis, or signs of GI bleeding.\" CT Chest w/ findings as follows \"Patchy airspace consolidation in the dependent lower lobes with areas of bronchial wall thickening and endobronchial secretion. Tree-in-bud infiltrate is present. The findings are suspicious for aspiration pneumonia.\" Received orders for dysphagia evaluation. Pt is currently NPO at the time of evaluation. -PF    Current Method of Nutrition pureed;thin liquids  -PF    Precautions/Limitations, Vision WFL;for purposes of eval  -PF    Precautions/Limitations, Hearing WFL;for purposes of eval  -PF    Prior Level of Function-Communication WFL  -PF    Prior Level of Function-Swallowing soft to chew;regular textures;thin liquids  -PF    Plans/Goals Discussed with patient  -PF    Barriers to Rehab none identified  -PF       Oral Motor Structure and Function    Dentition Assessment missing teeth  lower - natural dentition; upper - edentulous - does not wear partials  -PF    Mucosal Quality moist, healthy  -PF       General Eating/Swallowing Observations    Respiratory Support Currently in Use room air  -PF    Eating/Swallowing Skills self-fed  -PF    Positioning During Eating upright 90 degree;upright in bed  -PF    Utensils Used straw;spoon;cup  -PF    Consistencies Trialed pureed;thin liquids  -PF       Clinical Swallow Eval    Clinical Swallow Evaluation Summary Clinical swallow evaluation completed. Pt was pleasant, alert, and responsive. Pt allowed bed to elevate upright prior to PO acceptance. On room air. Trials included thin by cup x2, thin by straw x5, and puree x5. Pt reported he has modified diet at home to mostly puree and thin liquids d/t complaints of globus sensation in lower esophagus w/ solids and regurgitation of foods/liquids when he doesn't sit upright during PO consumption. Oral mech " exam revealed facial symmetry and no abnormality. Natural adequate mandibular dentition and maxillary dentition pt observed w/ edentulous state (does not wear upper partial). Pt self-fed all trials. No difficulty using cup, straw, or spoon. No labial spillage occurred. Bolus formation mildly reduced on puree. Oral transit unremarkable. There was no pocketing or oral holding. Digital palpation suggests timely swallow. Pharyngeal swallow marked by clear vocal quality w/ no cough, throat clear, or any sign of respiratory distress. Per CT Chest findings are suspicious for aspiration pneumonia and h/o esophageal cancer, VFSS is indicated to further objectively assess swallow function and r/o pharyngeal impairment. Educated pt on above recs and rationale, and to obtain baseline of swallow function via VFSS. Pt verbalized understanding of all discussed and agreeable to VFSS today.      SLP Plan & Recommendations:     - Remain NPO w/ [place] Water Protocol until VFSS   - Meds: via alternate route  - Plan for VFSS today w/ recs to follow       Water Protocol  The rationale to recommend water when a PO diet cannot appropriately/functionally sustain nutrition (or if thickened liquids are prescribed due to aspiration of thin liquids) is because water is low risk for aspiration pna when compared to aspiration of food or other liquids.    Benefits of a water protocol include but are not limited to:     Oral gratification  Engagement of oropharyngeal swallow musculature  Decrease likelihood of dehydration       Guidelines for proper implementation include:  Waiting a minimum of 30 minutes after consuming any other PO  Thorough oral care prior to consuming water  Upright at 90 degree hip flexion  Small sips at slow rate  Monitor for any changes in respiratory status and discontinue if distress noted     The [place] Free Water Protocol is a research based protocol established in 1984.  -PF       SLP Evaluation Clinical Impression     SLP Swallowing Diagnosis mild;oral dysphagia;R/O pharyngeal dysphagia;suspected esophageal dysphagia  -PF    Functional Impact risk of aspiration/pneumonia  -PF    Rehab Potential/Prognosis, Swallowing good, to achieve stated therapy goals  -PF    Swallow Criteria for Skilled Therapeutic Interventions Met demonstrates skilled criteria  -PF       Recommendations    Therapy Frequency (Swallow) 3 days per week;4 days per week  -PF    Predicted Duration Therapy Intervention (Days) until discharge  -PF    SLP Diet Recommendation NPO  -PF    Recommended Diagnostics VFSS (MBS)  -PF    Oral Care Recommendations Oral Care BID/PRN;Before ice/water  -PF    SLP Rec. for Method of Medication Administration meds via alternate route  -PF    Monitor for Signs of Aspiration yes;notify SLP if any concerns  -PF       Swallow Goals (SLP)    Swallow LTGs Swallow Long Term Goal (free text)  -PF    Swallow STGs diet tolerance goal selection (SLP)  -PF    Diet Tolerance Goal Selection (SLP) Swallow Short Term Goal 1;Swallow Short Term Goal 2  -PF       (LTG) Swallow    (LTG) Swallow Pt will maximize swallow function for least restrictive PO diet, exhibiting no complication associated with dysphagia, adequate PO intake, and demonstrating independent use of swallow compensation.  -PF    [place] (Swallow Long Term Goal) independently (over 90% accuracy)  -PF    Time Frame (Swallow Long Term Goal) by discharge  -PF    Progress/Outcomes (Swallow Long Term Goal) new goal  -PF       (STG) Swallow 1    (STG) Swallow 1 Patient will participate in ongoing assessment of swallow, including reevaluation clinically and/or including instrumental assessment of swallow if indicated, to further assess swallow function and return to oral nutrition.  -PF    [place] (Swallow Short Term Goal 1) independently (over 90% accuracy)  -PF    Time Frame (Swallow Short Term Goal 1) 1 week  -PF    Progress/Outcomes (Swallow Short Term Goal 1) new goal  -PF        (STG) Swallow 2    (STG) Swallow 2 The patient will participate in a full meal assessment to determine safety and adequacy of recommended diet, independent use of safe swallow compensations, and additional goals/recommendations to follow.  -PF    [place] (Swallow Short Term Goal 2) independently (over 90% accuracy)  -PF    Time Frame (Swallow Short Term Goal 2) 1 week  -PF    Progress/Outcomes (Swallow Short Term Goal 2) new goal  -PF              User Key  (r) = Recorded By, (t) = Taken By, (c) = Cosigned By      Initials Name Effective Dates    PF Te Goldman, SLP 05/08/23 -              EDUCATION  The patient has been educated in the following areas:   Dysphagia (Swallowing Impairment) Oral Care/Hydration Modified Diet Instruction.        SLP GOALS       Row Name 05/21/25 1050       (LTG) Swallow    (LTG) Swallow Pt will maximize swallow function for least restrictive PO diet, exhibiting no complication associated with dysphagia, adequate PO intake, and demonstrating independent use of swallow compensation.  -PF    [place] (Swallow Long Term Goal) independently (over 90% accuracy)  -PF    Time Frame (Swallow Long Term Goal) by discharge  -PF    Progress/Outcomes (Swallow Long Term Goal) new goal  -PF       (STG) Swallow 1    (STG) Swallow 1 Patient will participate in ongoing assessment of swallow, including reevaluation clinically and/or including instrumental assessment of swallow if indicated, to further assess swallow function and return to oral nutrition.  -PF    [place] (Swallow Short Term Goal 1) independently (over 90% accuracy)  -PF    Time Frame (Swallow Short Term Goal 1) 1 week  -PF    Progress/Outcomes (Swallow Short Term Goal 1) new goal  -PF       (STG) Swallow 2    (STG) Swallow 2 The patient will participate in a full meal assessment to determine safety and adequacy of recommended diet, independent use of safe swallow compensations, and additional goals/recommendations to  follow.  -PF    [place] (Swallow Short Term Goal 2) independently (over 90% accuracy)  -PF    Time Frame (Swallow Short Term Goal 2) 1 week  -PF    Progress/Outcomes (Swallow Short Term Goal 2) new goal  -PF              User Key  (r) = Recorded By, (t) = Taken By, (c) = Cosigned By      Initials Name Provider Type    PF Fakto, [place], SLP Speech and Language Pathologist             ESSENCE Forman  5/21/2025

## 2025-05-21 NOTE — CONSULTS
GI CONSULT  NOTE:    Referring Provider:  Ross Hendrickson MD    Chief complaint: Dysphagia    Subjective .     History of present illness: Artie Mc is a 75 y.o. male with PMH distal esophageal adenocarcinoma s/p [place]-Gurwinder esophagectomy with Dr. Bradley in 5/2017, dysphagia due to recurrent esophageal anastomotic stricture, CAD, DM, HTN, and CKD.  He was seen at oncology center today and was supposed to receive chemotherapy which he gets every couple of weeks.  He was having worsening of shortness of breath symptoms and was recommended that he bypass chemotherapy and proceed to the ER for further workup.  He was found to have pneumonia, possibly from aspiration.  GI was consulted for dysphagia.    Patient reports that he feels like he is breathing better after getting some breathing treatments.  He does not wear oxygen at home and is not wearing oxygen at time of exam.  Patient does not recall aspirating any food.  He watches what he eats, is sure to cut everything up very well and chews appropriately.  In the past he is not sure that dilation has helped.  At 1 point he had a stent placed in his esophagus which also did not help.  He reports that his swallowing tends to get a little bit worse after chemo which he gets every couple of weeks.  He is not sure which chemotherapy agent he is on.  He reports having fever and chills approximately a week ago.  No abdominal pain.  He typically has formed bowel movements, rarely has diarrhea.  No melena or BRBPR.  Patient was previously on a PPI but was discontinued by a provider in regard to his kidneys and is now on Pepcid 40 mg twice per day, heartburn seems to be under good control with his regimen.    Endo History:  2/9/2023 EGD by Dr. Painting showed evidence of [place] Gurwinder esophagectomy with anastomotic stricture measuring 10 mm in diameter and 5 mm in length, no evidence of tumor recurrence and esophagitis is resolved.  Dilated to 14 mm.  Normal mucosa  of stomach and duodenum.    1/2/2023 EGD by Dr. Morgan showed esophagogastro anastomosis stricture, difficult to traverse with scope but could be done with some gentle pressure.  Normal gastric mucosa entire stomach.  [place] Gurwinder esophagectomy anatomy.  Normal small bowel.  8/23/2018 EGD (Dr. Villafana) - previous esophageal surgery status post dilation at the anastomosis to 17 mm  7/2018 EGD (Dr. Villafana) - previous esophageal surgery status post dilation to 15 mm  5/2018 EGD/colonoscopy (Dr. Villafana) - esophagogastric anastomotic stricture status post dilation to 16.5 mm, status post [place]-Gurwinder esophagectomy, colon polyps (TA and HP), cecal AVM status post APC      Past Medical History:  Past Medical History:   Diagnosis Date    B12 deficiency     Blockage of coronary artery of heart     Depression     Disease of thyroid gland     DM (diabetes mellitus)     Dysphagia     Esophageal cancer     GERD (gastroesophageal reflux disease)     HTN (hypertension)     Hyperlipidemia        Past Surgical History:  Past Surgical History:   Procedure Laterality Date    ABDOMINAL WALL ABSCESS INCISION AND DRAINAGE  10/22/2018    WITH DEBRIDEMENT  1.5CM X 1.5 CM X 1.5 CM    DR. PURI    BRONCHOSCOPY N/A 7/25/2024    Procedure: BRONCHOSCOPY WITH ENDOBRONCHIAL ULTRASOUND, LUNG WASHINGS, AND FINE NEEDLE ASPIRATIONS;  Surgeon: Bonnie Smith MD;  Location: [facility] ENDOSCOPY;  Service: Pulmonary;  Laterality: N/A;    CARDIAC CATHETERIZATION Right 7/20/2023    Procedure: Left Heart Cath;  Surgeon: Mazin Simmons MD;  Location: [facility] CATH INVASIVE LOCATION;  Service: Cardiovascular;  Laterality: Right;    CATARACT EXTRACTION  2018    COLONOSCOPY      CORONARY ARTERY BYPASS GRAFT  2015    ENDOSCOPY  12/20/2016    ENDOSCOPY N/A 01/02/2023    Procedure: ESOPHAGOGASTRODUODENOSCOPY;  Surgeon: Esvin Morgan MD;  Location: [facility] ENDOSCOPY;  Service: Gastroenterology;  Laterality: N/A;  post: anastamosis stricture    ENDOSCOPY  N/A 2023    Procedure: ESOPHAGOGASTRODUODENOSCOPY, non-wire guided balloon dilation (12-14mm) of esophageal stricture;  Surgeon: Tino Villafana MD;  Location: [facility] ENDOSCOPY;  Service: Gastroenterology;  Laterality: N/A;  post: esophageal anastamotic stricture, post surgical changes    ESOPHAGECTOMY  2017    BROOKS PETTY ESOPHAGECTOMY, FEEDING JEJNOSTOMOY, ABDOMINAL AND MEDIASTINAL LYMPH NODE DISECTION, PEDICLED MUSCLE FLAP COVERAGE DR. PURI    ESOPHAGOSCOPY / EGD  2017    WITH BALLOON DILATION    ESOPHAGOSCOPY / EGD  2017    WITH BALLOON DILATION    ESOPHAGOSCOPY / EGD  2017    WITH BALLOON DILATION    ESOPHAGOSCOPY / EGD  2017    WITH BALLOON DILATION    ESOPHAGOSCOPY / EGD  2017    WITH BALLOON DILATION    INSERT / REPLACE / REMOVE PACEMAKER      LYMPH NODE BIOPSY  2019    PACEMAKER IMPLANTATION  2016       Social History:  Social History     Tobacco Use    Smoking status: Former     Current packs/day: 0.00     Average packs/day: 2.0 packs/day for 50.0 years (100.0 ttl pk-yrs)     Types: Cigarettes     Start date: 1965     Quit date: 2015     Years since quittin.9    Smokeless tobacco: Former     Types: Chew     Quit date: 1989   Vaping Use    Vaping status: Never Used   Substance Use Topics    Alcohol use: No    Drug use: No       Family History:  Family History   Problem Relation Age of Onset    Mental illness Mother     Heart disease Mother     Hypertension Father     Cancer Father     Heart disease Sister     Heart disease Brother        Medications:  (Not in a hospital admission)      Scheduled Meds:budesonide, 0.5 mg, Nebulization, BID - RT  cefTRIAXone, 2,000 mg, Intravenous, Q24H  insulin lispro, 2-7 Units, Subcutaneous, Q6H  ipratropium-albuterol, 3 mL, Nebulization, 4x Daily - RT  levothyroxine, 50 mcg, Oral, Q AM  sodium chloride, 10 mL, Intravenous, Q12H      Continuous Infusions:   PRN Meds:.  acetaminophen     senna-docusate sodium **AND** polyethylene glycol **AND** bisacodyl **AND** bisacodyl    Calcium Replacement - Follow Nurse / BPA Driven Protocol    clonazePAM    dextrose    dextrose    glucagon (human recombinant)    Magnesium Standard Dose Replacement - Follow Nurse / BPA Driven Protocol    nitroglycerin    Phosphorus Replacement - Follow Nurse / BPA Driven Protocol    Potassium Replacement - Follow Nurse / BPA Driven Protocol    [COMPLETED] Insert Peripheral IV **AND** sodium chloride    sodium chloride    sodium chloride    ALLERGIES:  Ace inhibitors, Heparin, Sulfa antibiotics, Ciprofloxacin, and Empagliflozin    ROS:  Review of Systems    The following systems were reviewed and negative;   Constitution:  No fevers, chills, no unintentional weight loss  Skin: no rash, no jaundice  Eyes:  No blurry vision, no eye pain  HENT:  No change in hearing or smell  Resp:  No dyspnea or cough  CV:  No chest pain or palpitations  :  No dysuria, hematuria  Musculoskeletal:  No leg cramps or arthralgias  Neuro:  No tremor, no numbness  Psych:  No depression or confusion    Objective     Vital Signs:   Vitals:    05/21/25 1206 05/21/25 1209 05/21/25 1300 05/21/25 1500   BP:    133/66   BP Location:    Left arm   Patient Position:    Lying   Pulse:  65 92 89   Resp: 16 18  20   Temp:    97.4 °F (36.3 °C)   TempSrc:    Oral   SpO2:  98%  99%   Weight:       Height:           Physical Exam:     General Appearance:    Awake and alert, in no acute distress   Head:    Normocephalic, without obvious abnormality, atraumatic   Eyes:            Conjunctivae normal, anicteric sclerae, pupils equal   Ears:    Ears appear intact with no abnormalities noted   Throat:   No oral lesions, no thrush, oral mucosa moist   Neck:   Supple, no JVD   Lungs:     Clear to auscultation bilaterally, respirations regular, even and unlabored       Chest Wall:    No abnormalities observed   Abdomen:      soft, nontender, no rebound or guarding,  nondistended   Rectal:     Deferred   Extremities:   Moves all extremities, no edema, no cyanosis   Pulses:   Pulses palpable and equal bilaterally   Skin:   No rash, no jaundice, normal palpation             Results Review:   I reviewed the patient's labs and imaging.  CBC    Results from last 7 days   Lab Units 05/21/25  0441 05/20/25  1704 05/20/25  1213   WBC 10*3/mm3 6.21 7.08 6.70   HEMOGLOBIN g/dL 7.9* 8.7* 8.8*   PLATELETS 10*3/mm3 254 277 268     CMP   Results from last 7 days   Lab Units 05/21/25  0441 05/20/25  1704 05/20/25  1218   SODIUM mmol/L 140 140  --    POTASSIUM mmol/L 3.7 3.7  --    CHLORIDE mmol/L 107 105  --    CO2 mmol/L 24.3 23.2  --    BUN mg/dL 9 10  --    CREATININE mg/dL 1.28* 1.34* 1.40*   GLUCOSE mg/dL 91 121*  --    ALBUMIN g/dL 3.0* 3.3*  --    BILIRUBIN mg/dL 0.6 0.6  --    ALK PHOS U/L 33* <5*  --    AST (SGOT) U/L 19 15  --    ALT (SGPT) U/L 5 <5  --    MAGNESIUM mg/dL 1.8  --   --    PHOSPHORUS mg/dL 3.2  --   --      Cr Clearance Estimated Creatinine Clearance: 39.7 mL/min (A) (by C-G formula based on SCr of 1.28 mg/dL (H)).  Coag   Results from last 7 days   Lab Units 05/20/25  1704   INR  1.35*   APTT seconds 31.6     HbA1C   Lab Results   Component Value Date    HGBA1C 7.7 (H) 01/02/2023     Blood Glucose   Glucose   Date/Time Value Ref Range Status   05/21/2025 0736 83 70 - 105 mg/dL Final     Comment:     Serial Number: [id number]:  [id number]   05/21/2025 0539 78 70 - 105 mg/dL Final     Comment:     Serial Number: [id number]:  [id number]   05/21/2025 0321 84 70 - 105 mg/dL Final     Comment:     Serial Number: [id number]:  [id number]   05/20/2025 2252 108 (H) 70 - 105 mg/dL Final     Comment:     Serial Number: [id number]:  [id number]   05/20/2025 1218 210 (H) 73 - 118 mg/dL Final     Infection     UA    Results from last 7 days   Lab Units 05/21/25  1432   NITRITE UA  Negative   WBC UA /HPF 3-5*   BACTERIA UA /HPF None Seen   SQUAM EPITHEL UA /HPF  0-2     Radiology(recent) CT Angiogram Chest Pulmonary Embolism  Result Date: 5/20/2025  Impression: 1. No pulmonary emboli are identified. 2. Patchy airspace consolidation in the dependent lower lobes with areas of bronchial wall thickening and endobronchial secretion. Tree-in-bud infiltrate is present. The findings are suspicious for aspiration pneumonia. Electronically Signed: Jero Sims MD  5/20/2025 7:34 PM EDT  Workstation ID: [id number]    XR Chest 1 View  Result Date: 5/20/2025  Impression: No active disease. Electronically Signed: Jero Sims MD  5/20/2025 5:36 PM EDT  Workstation ID: [id number]        ASSESSMENT AND PLAN:  75 y.o. male with PMH distal esophageal adenocarcinoma s/p [place]-Gurwinder esophagectomy with Dr. Bradley in 5/2017, dysphagia due to recurrent esophageal anastomotic stricture, CAD, DM, HTN, and CKD.  He was seen at oncology center today and was supposed to receive chemotherapy which he gets every couple of weeks.  He was having worsening of shortness of breath symptoms and was recommended that he bypass chemotherapy and proceed to the ER for further workup.  He was found to have pneumonia, possibly from aspiration.  GI was consulted for dysphagia.    Problem List:  Dysphagia  Pneumonia -bilateral bacterial versus aspiration  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 Bradley 5/2017; currently on chemotherapy  S/p pacemaker  HTN  HLD  GERD  DM2  CKD  Parainfluenza positive, strep pneumo antigen positive    Plan:  - Patient was assessed by SLP and underwent video swallow showing mild oral dysphagia, grossly WFL pharyngeal swallow and severe esophageal dysphagia.  Aspiration not shown with assessed consistencies, but reports the patient is at risk for aspiration due to severe esophageal impairment observed via esophageal scan at end of study.  - Will plan EGD tomorrow, NPO after midnight.   - SLP recommended further workup by GI and cardiothoracic surgery.  -  Previously was on PPI but was discontinued due to CKD.  Now well-controlled on Pepcid 40 mg twice per day.    I discussed the patients findings and my recommendations with the patient. Patient was seen with GI attending, addendum to follow. We appreciate the referral.    Electronically signed by Clovis Kelley PA-C, 05/21/25, 4:15 PM EDT.

## 2025-05-22 ENCOUNTER — INPATIENT HOSPITAL (AMBULATORY)
Dept: URBAN - METROPOLITAN AREA HOSPITAL 84 | Facility: HOSPITAL | Age: 75
End: 2025-05-22
Payer: MEDICARE

## 2025-05-22 ENCOUNTER — ANESTHESIA EVENT (OUTPATIENT)
Dept: GASTROENTEROLOGY | Facility: HOSPITAL | Age: 75
End: 2025-05-22
Payer: MEDICARE

## 2025-05-22 ENCOUNTER — ANESTHESIA (OUTPATIENT)
Dept: GASTROENTEROLOGY | Facility: HOSPITAL | Age: 75
End: 2025-05-22
Payer: MEDICARE

## 2025-05-22 DIAGNOSIS — K22.2 ESOPHAGEAL OBSTRUCTION: ICD-10-CM

## 2025-05-22 DIAGNOSIS — K91.89 OTHER POSTPROCEDURAL COMPLICATIONS AND DISORDERS OF DIGESTIV: ICD-10-CM

## 2025-05-22 DIAGNOSIS — Z85.01 PERSONAL HISTORY OF MALIGNANT NEOPLASM OF ESOPHAGUS: ICD-10-CM

## 2025-05-22 DIAGNOSIS — Z98.890 OTHER SPECIFIED POSTPROCEDURAL STATES: ICD-10-CM

## 2025-05-22 DIAGNOSIS — R13.10 DYSPHAGIA, UNSPECIFIED: ICD-10-CM

## 2025-05-22 PROBLEM — E43 SEVERE PROTEIN-CALORIE MALNUTRITION: Status: ACTIVE | Noted: 2025-05-22

## 2025-05-22 LAB
ALBUMIN SERPL-MCNC: 3.2 G/DL (ref 3.5–5.2)
ALBUMIN SERPL-MCNC: 3.3 G/DL (ref 3.5–5.2)
ALBUMIN/GLOB SERPL: 1.3 G/DL
ALBUMIN/GLOB SERPL: 1.3 G/DL
ALP SERPL-CCNC: 35 U/L (ref 39–117)
ALP SERPL-CCNC: 37 U/L (ref 39–117)
ALT SERPL W P-5'-P-CCNC: 5 U/L (ref 1–41)
ALT SERPL W P-5'-P-CCNC: <5 U/L (ref 1–41)
ANION GAP SERPL CALCULATED.3IONS-SCNC: 12.4 MMOL/L (ref 5–15)
ANION GAP SERPL CALCULATED.3IONS-SCNC: 9.2 MMOL/L (ref 5–15)
AST SERPL-CCNC: 14 U/L (ref 1–40)
AST SERPL-CCNC: 15 U/L (ref 1–40)
BASOPHILS # BLD AUTO: 0.03 10*3/MM3 (ref 0–0.2)
BASOPHILS NFR BLD AUTO: 0.4 % (ref 0–1.5)
BILIRUB SERPL-MCNC: 0.3 MG/DL (ref 0–1.2)
BILIRUB SERPL-MCNC: 0.3 MG/DL (ref 0–1.2)
BUN SERPL-MCNC: 6 MG/DL (ref 8–23)
BUN SERPL-MCNC: 7 MG/DL (ref 8–23)
BUN/CREAT SERPL: 5.2 (ref 7–25)
BUN/CREAT SERPL: 5.6 (ref 7–25)
CALCIUM SPEC-SCNC: 8.5 MG/DL (ref 8.6–10.5)
CALCIUM SPEC-SCNC: 8.7 MG/DL (ref 8.6–10.5)
CHLORIDE SERPL-SCNC: 105 MMOL/L (ref 98–107)
CHLORIDE SERPL-SCNC: 107 MMOL/L (ref 98–107)
CO2 SERPL-SCNC: 23.6 MMOL/L (ref 22–29)
CO2 SERPL-SCNC: 24.8 MMOL/L (ref 22–29)
CREAT SERPL-MCNC: 1.15 MG/DL (ref 0.76–1.27)
CREAT SERPL-MCNC: 1.26 MG/DL (ref 0.76–1.27)
DEPRECATED RDW RBC AUTO: 63.4 FL (ref 37–54)
EGFRCR SERPLBLD CKD-EPI 2021: 59.1 ML/MIN/1.73
EGFRCR SERPLBLD CKD-EPI 2021: 66 ML/MIN/1.73
EOSINOPHIL # BLD AUTO: 0.05 10*3/MM3 (ref 0–0.4)
EOSINOPHIL NFR BLD AUTO: 0.6 % (ref 0.3–6.2)
ERYTHROCYTE [DISTWIDTH] IN BLOOD BY AUTOMATED COUNT: 17.3 % (ref 12.3–15.4)
GLOBULIN UR ELPH-MCNC: 2.4 GM/DL
GLOBULIN UR ELPH-MCNC: 2.5 GM/DL
GLUCOSE BLDC GLUCOMTR-MCNC: 109 MG/DL (ref 70–105)
GLUCOSE BLDC GLUCOMTR-MCNC: 115 MG/DL (ref 70–105)
GLUCOSE BLDC GLUCOMTR-MCNC: 117 MG/DL (ref 70–105)
GLUCOSE BLDC GLUCOMTR-MCNC: 117 MG/DL (ref 70–105)
GLUCOSE BLDC GLUCOMTR-MCNC: 133 MG/DL (ref 70–105)
GLUCOSE BLDC GLUCOMTR-MCNC: 163 MG/DL (ref 70–105)
GLUCOSE SERPL-MCNC: 112 MG/DL (ref 65–99)
GLUCOSE SERPL-MCNC: 161 MG/DL (ref 65–99)
HCT VFR BLD AUTO: 29.9 % (ref 37.5–51)
HGB BLD-MCNC: 9.5 G/DL (ref 13–17.7)
IMM GRANULOCYTES # BLD AUTO: 0.23 10*3/MM3 (ref 0–0.05)
IMM GRANULOCYTES NFR BLD AUTO: 2.7 % (ref 0–0.5)
LYMPHOCYTES # BLD AUTO: 1.36 10*3/MM3 (ref 0.7–3.1)
LYMPHOCYTES NFR BLD AUTO: 15.9 % (ref 19.6–45.3)
MAGNESIUM SERPL-MCNC: 1.8 MG/DL (ref 1.6–2.4)
MAGNESIUM SERPL-MCNC: 1.9 MG/DL (ref 1.6–2.4)
MCH RBC QN AUTO: 32.2 PG (ref 26.6–33)
MCHC RBC AUTO-ENTMCNC: 31.8 G/DL (ref 31.5–35.7)
MCV RBC AUTO: 101.4 FL (ref 79–97)
MONOCYTES # BLD AUTO: 0.92 10*3/MM3 (ref 0.1–0.9)
MONOCYTES NFR BLD AUTO: 10.8 % (ref 5–12)
NEUTROPHILS NFR BLD AUTO: 5.96 10*3/MM3 (ref 1.7–7)
NEUTROPHILS NFR BLD AUTO: 69.6 % (ref 42.7–76)
NRBC BLD AUTO-RTO: 0 /100 WBC (ref 0–0.2)
PHOSPHATE SERPL-MCNC: 2.4 MG/DL (ref 2.5–4.5)
PHOSPHATE SERPL-MCNC: 3.3 MG/DL (ref 2.5–4.5)
PLATELET # BLD AUTO: 301 10*3/MM3 (ref 140–450)
PMV BLD AUTO: 10.2 FL (ref 6–12)
POTASSIUM SERPL-SCNC: 3.5 MMOL/L (ref 3.5–5.2)
POTASSIUM SERPL-SCNC: 4.4 MMOL/L (ref 3.5–5.2)
PROT SERPL-MCNC: 5.6 G/DL (ref 6–8.5)
PROT SERPL-MCNC: 5.8 G/DL (ref 6–8.5)
RBC # BLD AUTO: 2.95 10*6/MM3 (ref 4.14–5.8)
SODIUM SERPL-SCNC: 139 MMOL/L (ref 136–145)
SODIUM SERPL-SCNC: 143 MMOL/L (ref 136–145)
WBC NRBC COR # BLD AUTO: 8.55 10*3/MM3 (ref 3.4–10.8)

## 2025-05-22 PROCEDURE — 88312 SPECIAL STAINS GROUP 1: CPT | Performed by: INTERNAL MEDICINE

## 2025-05-22 PROCEDURE — 0D768DZ DILATION OF STOMACH WITH INTRALUMINAL DEVICE, VIA NATURAL OR ARTIFICIAL OPENING ENDOSCOPIC: ICD-10-PCS | Performed by: INTERNAL MEDICINE

## 2025-05-22 PROCEDURE — 88305 TISSUE EXAM BY PATHOLOGIST: CPT | Performed by: INTERNAL MEDICINE

## 2025-05-22 PROCEDURE — 82948 REAGENT STRIP/BLOOD GLUCOSE: CPT

## 2025-05-22 PROCEDURE — 25010000002 METOCLOPRAMIDE PER 10 MG: Performed by: INTERNAL MEDICINE

## 2025-05-22 PROCEDURE — 85025 COMPLETE CBC W/AUTO DIFF WBC: CPT | Performed by: INTERNAL MEDICINE

## 2025-05-22 PROCEDURE — C1874 STENT, COATED/COV W/DEL SYS: HCPCS | Performed by: INTERNAL MEDICINE

## 2025-05-22 PROCEDURE — 94664 DEMO&/EVAL PT USE INHALER: CPT

## 2025-05-22 PROCEDURE — 25810000003 SODIUM CHLORIDE 0.9 % SOLUTION: Performed by: NURSE ANESTHETIST, CERTIFIED REGISTERED

## 2025-05-22 PROCEDURE — 0DB38ZX EXCISION OF LOWER ESOPHAGUS, VIA NATURAL OR ARTIFICIAL OPENING ENDOSCOPIC, DIAGNOSTIC: ICD-10-PCS | Performed by: INTERNAL MEDICINE

## 2025-05-22 PROCEDURE — 83735 ASSAY OF MAGNESIUM: CPT | Performed by: INTERNAL MEDICINE

## 2025-05-22 PROCEDURE — 84100 ASSAY OF PHOSPHORUS: CPT | Performed by: INTERNAL MEDICINE

## 2025-05-22 PROCEDURE — C1769 GUIDE WIRE: HCPCS | Performed by: INTERNAL MEDICINE

## 2025-05-22 PROCEDURE — 25010000002 PROPOFOL 10 MG/ML EMULSION: Performed by: NURSE ANESTHETIST, CERTIFIED REGISTERED

## 2025-05-22 PROCEDURE — 82948 REAGENT STRIP/BLOOD GLUCOSE: CPT | Performed by: INTERNAL MEDICINE

## 2025-05-22 PROCEDURE — 82948 REAGENT STRIP/BLOOD GLUCOSE: CPT | Performed by: STUDENT IN AN ORGANIZED HEALTH CARE EDUCATION/TRAINING PROGRAM

## 2025-05-22 PROCEDURE — 94761 N-INVAS EAR/PLS OXIMETRY MLT: CPT

## 2025-05-22 PROCEDURE — 43266 EGD ENDOSCOPIC STENT PLACE: CPT | Performed by: INTERNAL MEDICINE

## 2025-05-22 PROCEDURE — 25010000002 LIDOCAINE 2% SOLUTION: Performed by: NURSE ANESTHETIST, CERTIFIED REGISTERED

## 2025-05-22 PROCEDURE — 25010000002 SUCCINYLCHOLINE PER 20 MG: Performed by: NURSE ANESTHETIST, CERTIFIED REGISTERED

## 2025-05-22 PROCEDURE — 43239 EGD BIOPSY SINGLE/MULTIPLE: CPT | Mod: 59 | Performed by: INTERNAL MEDICINE

## 2025-05-22 PROCEDURE — 94799 UNLISTED PULMONARY SVC/PX: CPT

## 2025-05-22 PROCEDURE — 25010000002 CEFTRIAXONE PER 250 MG: Performed by: STUDENT IN AN ORGANIZED HEALTH CARE EDUCATION/TRAINING PROGRAM

## 2025-05-22 PROCEDURE — 80053 COMPREHEN METABOLIC PANEL: CPT | Performed by: INTERNAL MEDICINE

## 2025-05-22 DEVICE — STENT AND ELECTROCAUTERY - ENHANCED DELIVERY SYSTEM
Type: IMPLANTABLE DEVICE | Site: ESOPHAGUS | Status: FUNCTIONAL
Brand: AXIOS™

## 2025-05-22 RX ORDER — SUCCINYLCHOLINE CHLORIDE 20 MG/ML
INJECTION INTRAMUSCULAR; INTRAVENOUS AS NEEDED
Status: DISCONTINUED | OUTPATIENT
Start: 2025-05-22 | End: 2025-05-22 | Stop reason: SURG

## 2025-05-22 RX ORDER — TAMSULOSIN HYDROCHLORIDE 0.4 MG/1
0.8 CAPSULE ORAL DAILY
Status: DISCONTINUED | OUTPATIENT
Start: 2025-05-22 | End: 2025-05-24 | Stop reason: HOSPADM

## 2025-05-22 RX ORDER — ONDANSETRON 2 MG/ML
4 INJECTION INTRAMUSCULAR; INTRAVENOUS EVERY 6 HOURS PRN
Status: DISCONTINUED | OUTPATIENT
Start: 2025-05-22 | End: 2025-05-24 | Stop reason: HOSPADM

## 2025-05-22 RX ORDER — INSULIN LISPRO 100 [IU]/ML
2-7 INJECTION, SOLUTION INTRAVENOUS; SUBCUTANEOUS
Status: DISCONTINUED | OUTPATIENT
Start: 2025-05-22 | End: 2025-05-24 | Stop reason: HOSPADM

## 2025-05-22 RX ORDER — METOCLOPRAMIDE HYDROCHLORIDE 5 MG/ML
10 INJECTION INTRAMUSCULAR; INTRAVENOUS EVERY 6 HOURS
Status: DISCONTINUED | OUTPATIENT
Start: 2025-05-22 | End: 2025-05-24 | Stop reason: HOSPADM

## 2025-05-22 RX ORDER — LIDOCAINE HYDROCHLORIDE 20 MG/ML
INJECTION, SOLUTION INFILTRATION; PERINEURAL AS NEEDED
Status: DISCONTINUED | OUTPATIENT
Start: 2025-05-22 | End: 2025-05-22 | Stop reason: SURG

## 2025-05-22 RX ORDER — SODIUM CHLORIDE 9 MG/ML
INJECTION, SOLUTION INTRAVENOUS CONTINUOUS PRN
Status: DISCONTINUED | OUTPATIENT
Start: 2025-05-22 | End: 2025-05-22 | Stop reason: SURG

## 2025-05-22 RX ORDER — PROPOFOL 10 MG/ML
VIAL (ML) INTRAVENOUS AS NEEDED
Status: DISCONTINUED | OUTPATIENT
Start: 2025-05-22 | End: 2025-05-22 | Stop reason: SURG

## 2025-05-22 RX ORDER — PANTOPRAZOLE SODIUM 40 MG/1
40 TABLET, DELAYED RELEASE ORAL
Status: DISCONTINUED | OUTPATIENT
Start: 2025-05-22 | End: 2025-05-24 | Stop reason: HOSPADM

## 2025-05-22 RX ORDER — ONDANSETRON 4 MG/1
4 TABLET, ORALLY DISINTEGRATING ORAL EVERY 6 HOURS PRN
Status: DISCONTINUED | OUTPATIENT
Start: 2025-05-22 | End: 2025-05-24 | Stop reason: HOSPADM

## 2025-05-22 RX ADMIN — SUCCINYLCHOLINE CHLORIDE 100 MG: 20 INJECTION, SOLUTION INTRAMUSCULAR; INTRAVENOUS at 15:41

## 2025-05-22 RX ADMIN — BUDESONIDE INHALATION 0.5 MG: 0.5 SUSPENSION RESPIRATORY (INHALATION) at 07:25

## 2025-05-22 RX ADMIN — METOCLOPRAMIDE HYDROCHLORIDE 10 MG: 5 INJECTION INTRAMUSCULAR; INTRAVENOUS at 18:15

## 2025-05-22 RX ADMIN — Medication 10 ML: at 11:50

## 2025-05-22 RX ADMIN — IPRATROPIUM BROMIDE AND ALBUTEROL SULFATE 3 ML: .5; 3 SOLUTION RESPIRATORY (INHALATION) at 07:20

## 2025-05-22 RX ADMIN — LIDOCAINE HYDROCHLORIDE 60 MG: 20 INJECTION, SOLUTION INFILTRATION; PERINEURAL at 15:41

## 2025-05-22 RX ADMIN — PANTOPRAZOLE SODIUM 40 MG: 40 TABLET, DELAYED RELEASE ORAL at 18:15

## 2025-05-22 RX ADMIN — SODIUM CHLORIDE: 9 INJECTION, SOLUTION INTRAVENOUS at 15:41

## 2025-05-22 RX ADMIN — IPRATROPIUM BROMIDE AND ALBUTEROL SULFATE 3 ML: .5; 3 SOLUTION RESPIRATORY (INHALATION) at 19:54

## 2025-05-22 RX ADMIN — LEVOTHYROXINE SODIUM 50 MCG: 50 TABLET ORAL at 05:16

## 2025-05-22 RX ADMIN — CEFTRIAXONE 2000 MG: 2 INJECTION, POWDER, FOR SOLUTION INTRAMUSCULAR; INTRAVENOUS at 01:15

## 2025-05-22 RX ADMIN — Medication 10 ML: at 21:09

## 2025-05-22 RX ADMIN — BUDESONIDE INHALATION 0.5 MG: 0.5 SUSPENSION RESPIRATORY (INHALATION) at 19:58

## 2025-05-22 RX ADMIN — PROPOFOL 100 MG: 10 INJECTION, EMULSION INTRAVENOUS at 15:41

## 2025-05-22 NOTE — ANESTHESIA PREPROCEDURE EVALUATION
Anesthesia Evaluation     Patient summary reviewed and Nursing notes reviewed   no history of anesthetic complications:   NPO Solid Status: > 8 hours  NPO Liquid Status: > 2 hours           Airway   Dental      Pulmonary    (+) a smoker Former,  Cardiovascular     ECG reviewed  Patient on routine beta blocker    (+) pacemaker pacemaker, hypertension, valvular problems/murmurs MR, CAD, CABG, CHF Systolic <55%, hyperlipidemia      Neuro/Psych  (+) seizures, dizziness/light headedness, syncope, psychiatric history Depression  GI/Hepatic/Renal/Endo    (+) GERD, PUD, GI bleeding , renal disease- CRI, diabetes mellitus, thyroid problem hypothyroidism    Musculoskeletal     Abdominal    Substance History      OB/GYN          Other   blood dyscrasia anemia thrombocytopenia,   history of cancer active    ROS/Med Hx Other: Additional History:  Esophageal cancer, retroperitoneal neoplasm, lymphadenopathy, ischemic dilated cardiomyopathy, orthostatic hypotension, nausea, hematemesis, erosive esophagitis, dysphagia, panic d/o, chest wall abscess, iron malabsorption    Echo:  Echocardiogram Findings    Left Ventricle Left ventricular systolic function is moderately decreased. Left ventricular ejection fraction appears to be 41 - 45%.     The left ventricular cavity is moderately dilated. The following left ventricular wall segments are akinetic: apical anterior, apical lateral, apical inferior, apical septal and apex.  Right Ventricle Normal right ventricular cavity size and systolic function noted.  Left Atrium Normal left atrial size and volume noted.  Right Atrium Right atrium not well visualized.  Aortic Valve The aortic valve is not well visualized. Trace aortic valve regurgitation is present. No aortic valve stenosis is present.  Mitral Valve The mitral valve is grossly normal in structure. Mild mitral valve regurgitation is present.  Tricuspid Valve The tricuspid valve is grossly normal in structure. Trace tricuspid valve  regurgitation is present.  Pulmonic Valve The pulmonic valve is not well visualized. There is trace pulmonic valve regurgitation present.  Greater Vessels No dilation of the aortic root is present.  Pericardium There is no evidence of pericardial effusion. .        Stress Test: (2017)  No evidence for reversible myocardial ischemia noted.  Reverse redistribution noted in the inferior and apical wall without  any ischemia  Normal left ventricular ejection fraction of  50 %.      Cath:  Findings:     Hemodynamics Central aortic pressure systolic 132 diastolic 62 with a mean pressure of 88 mmHg  LV end-diastolic pressure of 8 mmHg  There was no gradient across the aortic valve on the pullback of the pigtail catheter  Left Main Left main is a large-caliber vessel heavily calcified distal left main has angiographic 90% stenosis trifurcates into LAD small caliber ramus intermediate branch and a moderate caliber left circumflex artery  RCA Large-caliber dominant vessel  Right kidney arteries 100% occluded in the proximal portion with right to right collaterals filling the distal right kidney  LAD Ostial 100% occlusion  Circ Moderate caliber vessel mid 100% occlusion  Small caliber ramus intermediate branch angiographically normal  SVG(s) Vein graft to the distal right coronary artery is patent without any significant stenosis involving the ostium/body at the anastomotic site  No significant obstructive disease involving the PDA and PLV branches of the right coronary artery after the vein graft touchdown     Vein graft to the marginal which is a jump graft that supplies the marginal branch of circumflex artery and also diagonal branch of the LAD is angiographically normal without any significant stenosis involving the ostium/body at the anastomotic site.  No significant obstructive disease involving the marginal branch of the left circumflex artery after the vein graft touchdown this graft retrogradely fills the second  marginal branch of the left circumflex artery  Jump graft portion of this vein graft supplies a diagonal branch of the LAD and there is no significant disease involving the diagonal branch after the vein graft touchdown  ERIK LIMA to LAD is patent without any significant stenosis involving the ostium/body at the anastomotic site  No significant disease involving the LAD after the LIMA touchdown   LV Moderate to severe hypokinesis involving the basal mid and distal inferior wall severe hypokinesis involving the distal inferior wall to LV apex and also anteroapical segment with mild to moderate hypokinesis involving the basal segments of the anterior and anterolateral wall with estimated LV ejection fraction of 30%  Coronary Dominance Right coronary artery     Estimated Blood Loss:  Minimal     Specimens: None         Complications:  None; patient tolerated the procedure well.           Disposition: PACU - hemodynamically stable.           Condition: stable     Impressions:  Severe LV dysfunction with estimated LV ejection fraction of 30%  Normal left-sided filling pressures  Severe native three-vessel coronary artery disease with 100% occlusion of the ostial % occlusion of the mid circumflex artery and 100% occlusion of the proximal right coronary artery  Patent LIMA to the LAD  Patient vein graft to the diagonal and marginal branch   Patent vein graft to the PDA     Recommendations:  Medical management for cardiomyopathy  Discontinue spironolactone secondary to hyperkalemia at least for now  Decrease the dose of metformin and add SGLT2 inhibitor  Patient is not on ACE inhibitors and angiotensin receptor blocker secondary to allergic reaction  Patient is currently on beta-blockers which will be continued  Low potassium diet  Recheck BMP in 1 week  EP evaluation for consideration for BiV upgrade to help with LV function and patient symptoms  We will discuss with oncology about cardiac findings and further  treatment options      PSH:  PACEMAKER IMPLANTATION CORONARY ARTERY BYPASS GRAFT  ENDOSCOPY COLONOSCOPY  ESOPHAGECTOMY ESOPHAGOSCOPY / EGD  ESOPHAGOSCOPY / EGD ESOPHAGOSCOPY / EGD  ESOPHAGOSCOPY / EGD ESOPHAGOSCOPY / EGD  ABDOMINAL WALL ABSCESS INCISION AND DRAINAGE CATARACT EXTRACTION  LYMPH NODE BIOPSY ENDOSCOPY  ENDOSCOPY INSERT / REPLACE / REMOVE PACEMAKER  CARDIAC CATHETERIZATION                 Anesthesia Plan    ASA 4     general   Rapid sequence  (Patient identified; pre-operative vital signs, all relevant labs/studies, complete medical/surgical/anesthetic history, full medication list, full allergy list, and NPO status obtained/reviewed; physical assessment performed; anesthetic options, side effects, potential complications, risks, and benefits discussed; questions answered; written anesthesia consent obtained; patient cleared for procedure; anesthesia machine and equipment checked and functioning)  intravenous induction     Anesthetic plan, risks, benefits, and alternatives have been provided, discussed and informed consent has been obtained with: patient.    Plan discussed with CRNA and CAA.    CODE STATUS:    Code Status (Patient has no pulse and is not breathing): CPR (Attempt to Resuscitate)  Medical Interventions (Patient has pulse or is breathing): Full Support

## 2025-05-22 NOTE — PROGRESS NOTES
[facility] MEDICINE SERVICE  DAILY PROGRESS NOTE    NAME: Artie Mc  : 1950  MRN: [id number]      LOS: 2 days     PROVIDER OF SERVICE: Jc Mehta PA-C    Chief Complaint: PNA (pneumonia)    Subjective:     Interval History:  History taken from: patient    Patient resting in bed on exam this morning.  Patient reports suprapubic tenderness with difficulty urinating and reports only urinating small amounts at a time with sensation of bladder fullness.  Patient is currently n.p.o. and states he is awaiting procedure with GI.  Patient states oncologist is Dr. Beth and that radiation is currently being held given recent pneumonia states that he is on chemotherapy.  Patient reports to living alone and independently.  He does state that brother lives across the street but is unreliable and offers little help if necessary.      Review of Systems:   Review of Systems  12-point review of systems was reviewed and is negative except as listed above      Objective:     Vital Signs  Temp:  [97.4 °F (36.3 °C)-98 °F (36.7 °C)] 97.5 °F (36.4 °C)  Heart Rate:  [62-89] 62  Resp:  [16-23] 16  BP: (133-161)/(66-79) 150/71   Body mass index is 20.33 kg/m².    Physical Exam  Physical Exam  General: awake, alert, in NAD, cachectic   Neck: Supple  Chest: CTA bilat.  CV: S1S2 nl. RRR  Abd: Moderate suprapubic TTP. Soft, NTND. +BS  Ext: No c/c/e.      Diagnostic Data    Results from last 7 days   Lab Units 25  2341   WBC 10*3/mm3 9.19   HEMOGLOBIN g/dL 9.3*   HEMATOCRIT % 29.1*   PLATELETS 10*3/mm3 290   GLUCOSE mg/dL 112*   CREATININE mg/dL 1.26   BUN mg/dL 7*   SODIUM mmol/L 143   POTASSIUM mmol/L 3.5   AST (SGOT) U/L 15   ALT (SGPT) U/L 5   ALK PHOS U/L 35*   BILIRUBIN mg/dL 0.3   ANION GAP mmol/L 12.4       CT Angiogram Chest Pulmonary Embolism  Result Date: 2025  Impression: 1. No pulmonary emboli are identified. 2. Patchy airspace consolidation in the dependent lower lobes with areas of bronchial  wall thickening and endobronchial secretion. Tree-in-bud infiltrate is present. The findings are suspicious for aspiration pneumonia. Electronically Signed: Jero Sims MD  5/20/2025 7:34 PM EDT  Workstation ID: [id number]    XR Chest 1 View  Result Date: 5/20/2025  Impression: No active disease. Electronically Signed: Jero Sims MD  5/20/2025 5:36 PM EDT  Workstation ID: [id number]        I reviewed the patient's new clinical results.    Assessment/Plan:     Active and Resolved Problems  Active Hospital Problems    Diagnosis  POA    **PNA (pneumonia) [J18.9]  Yes    Esophageal dysphagia [R13.19]  Unknown      Resolved Hospital Problems   No resolved problems to display.       Multifocal pneumonia, likely secondary to aspiration  Parainfluenza infection  Dysphagia  Metastatic esophageal cancer on XRT and chemotherapy  History of esophageal strictures requiring dilatations  Urinary retention  Severe protein calorie malnutrition  HFpEF  CAD s/p CABG  Status post pacemaker  CKD  Type 2 diabetes mellitus  Hypothyroidism  GERD  Anxiety      Patient not requiring supplemental oxygen at this time.  Patient continues to be on Rocephin, previously on Zosyn. Noted S pneumo and parainfluenza positive.  Legionella and MRSA swabs  negative.  Continue DuoNebs.  Noted speech evaluation with mild oral and severe esophageal dysphagia.  EGD and dilatation planned for today with GI.  N.p.o. for now and resume mechanical soft diet with thin liquids.  Ensure added to optimize nutrition.    Patient also noted to be retaining urine on exam this morning.  Bladder scan revealed PVR of 920 mL.  Patient was straight cath successfully with coudé cath.  Flomax will be resumed once patient is no longer NPO.  Continue to check PVRs.  May require indwelling Navarro if continues to retain urine.    A.m. labs reviewed, no new changes.  Continue SSI and home medications.  PT/OT eval pending given little support at home.      VTE  Prophylaxis:  Mechanical VTE prophylaxis orders are present.             Disposition Planning:     Barriers to Discharge: EGD and dilatation, PT eval  Anticipated Date of Discharge: 5/25/2025  Place of Discharge: Home versus SNF      Time: 35 minutes     Code Status and Medical Interventions: CPR (Attempt to Resuscitate); Full Support   Ordered at: 05/20/25 2133     Code Status (Patient has no pulse and is not breathing):    CPR (Attempt to Resuscitate)     Medical Interventions (Patient has pulse or is breathing):    Full Support       Signature: Electronically signed by Jc Mehta PA-C, 05/22/25, 14:33 EDT.  [facility] Hospitalist Team

## 2025-05-22 NOTE — ANESTHESIA POSTPROCEDURE EVALUATION
Patient: Artie Mc    Procedure Summary       Date: 05/22/25 Room / Location: [facility] ENDOSCOPY 2 / [facility] ENDOSCOPY    Anesthesia Start: 1541 Anesthesia Stop: 1618    Procedure: ESOPHAGOGASTRODUODENOSCOPY with biopsy x 1 area and placement of esophagogastric stent Diagnosis:       Esophageal dysphagia      (Esophageal dysphagia [R13.19])    Surgeons: Tino Villafana MD Provider: Lynette Recinos MD    Anesthesia Type: general ASA Status: 4            Anesthesia Type: general    Vitals  Vitals Value Taken Time   /68 05/22/25 17:03   Temp 97.7 °F (36.5 °C) 05/22/25 16:17   Pulse 69 05/22/25 17:04   Resp 19 05/22/25 17:00   SpO2 98 % 05/22/25 17:04   Vitals shown include unfiled device data.        Post Anesthesia Care and Evaluation    Patient location during evaluation: PACU  Patient participation: complete - patient participated  Level of consciousness: awake  Pain scale: See nurse's notes for pain score.  Pain management: adequate    Airway patency: patent  Anesthetic complications: No anesthetic complications  PONV Status: none  Cardiovascular status: acceptable  Respiratory status: acceptable and spontaneous ventilation  Hydration status: acceptable    Comments: Patient seen and examined postoperatively; vital signs stable; SpO2 greater than or equal to 90%; cardiopulmonary status stable; nausea/vomiting adequately controlled; pain adequately controlled; no apparent anesthesia complications; patient discharged from anesthesia care when discharge criteria were met

## 2025-05-22 NOTE — CONSULTS
Nutrition Services    Patient Name: Artie Mc  YOB: 1950  MRN: [id number]  Admission date: 5/20/2025    Comment:  -- Diet per SLP as GI allows     -- RD to monitor for ability to add ONS    -- Severe chronic disease related malnutrition related to hypermetabolic state and swallowing hindrance of esophageal cancer as evidenced by PO intakes less than 75% for greater than 1 month, weight loss greater that 7.5% for greater than 1 month and severe fat/muscle loss per physical exam.  See MSA below.        CLINICAL NUTRITION ASSESSMENT      Reason for Assessment 5/22: MST of 2     H&P      Past Medical History:   Diagnosis Date    B12 deficiency     Blockage of coronary artery of heart     Depression     Disease of thyroid gland     DM (diabetes mellitus)     Dysphagia     Esophageal cancer     GERD (gastroesophageal reflux disease)     HTN (hypertension)     Hyperlipidemia        Past Surgical History:   Procedure Laterality Date    ABDOMINAL WALL ABSCESS INCISION AND DRAINAGE  10/22/2018    WITH DEBRIDEMENT  1.5CM X 1.5 CM X 1.5 CM    DR. PURI    BRONCHOSCOPY N/A 7/25/2024    Procedure: BRONCHOSCOPY WITH ENDOBRONCHIAL ULTRASOUND, LUNG WASHINGS, AND FINE NEEDLE ASPIRATIONS;  Surgeon: Bonnie Smith MD;  Location: [facility] ENDOSCOPY;  Service: Pulmonary;  Laterality: N/A;    CARDIAC CATHETERIZATION Right 7/20/2023    Procedure: Left Heart Cath;  Surgeon: Mazin Simmons MD;  Location: [facility] CATH INVASIVE LOCATION;  Service: Cardiovascular;  Laterality: Right;    CATARACT EXTRACTION  2018    COLONOSCOPY      CORONARY ARTERY BYPASS GRAFT  2015    ENDOSCOPY  12/20/2016    ENDOSCOPY N/A 01/02/2023    Procedure: ESOPHAGOGASTRODUODENOSCOPY;  Surgeon: Esvin Morgan MD;  Location: [facility] ENDOSCOPY;  Service: Gastroenterology;  Laterality: N/A;  post: anastamosis stricture    ENDOSCOPY N/A 02/09/2023    Procedure: ESOPHAGOGASTRODUODENOSCOPY, non-wire guided balloon dilation (12-14mm) of esophageal  "stricture;  Surgeon: Tino Villafana MD;  Location: [facility] ENDOSCOPY;  Service: Gastroenterology;  Laterality: N/A;  post: esophageal anastamotic stricture, post surgical changes    ESOPHAGECTOMY  05/04/2017    BROOKS PETTY ESOPHAGECTOMY, FEEDING JEJNOSTOMOY, ABDOMINAL AND MEDIASTINAL LYMPH NODE DISECTION, PEDICLED MUSCLE FLAP COVERAGE DR. PURI    ESOPHAGOSCOPY / EGD  07/12/2017    WITH BALLOON DILATION    ESOPHAGOSCOPY / EGD  07/26/2017    WITH BALLOON DILATION    ESOPHAGOSCOPY / EGD  08/11/2017    WITH BALLOON DILATION    ESOPHAGOSCOPY / EGD  08/28/2017    WITH BALLOON DILATION    ESOPHAGOSCOPY / EGD  09/27/2017    WITH BALLOON DILATION    INSERT / REPLACE / REMOVE PACEMAKER      LYMPH NODE BIOPSY  01/30/2019    PACEMAKER IMPLANTATION  11/21/2016        Current Problems   Superimposed bacterial bilateral pneumonia    Strep pneumoantigen positive    Parainfluenza positive    CTA chest negative for PE    Metastatic esophageal carcinoma  - currently on chemotherapy  - SLP following, S/P VFSS, currently on mechanical ground   - GI following   - plans for EGD with dilation 5/22    CAD status post CABG    Status post pacemaker    Hypertension    Hyperlipidemia    GERD    Diabetes    CKD       Encounter Information        Trending Narrative     5/22: Admitted for SOB.  RD visited patient at bedside.  Patient NPO for EGD with dilation per GI today (5/22).  Patient reports # and reports weight loss since cancer dx.  Patient reports getting his throat stretched 15 times in the past, the last time begin a few years ago, but that is does not help much.  SLP to see when able.  NFPE completed, consistent with nutrition diagnosis of malnutrition using AND/ASPEN criteria. See MSA below.       Anthropometrics        Current Height, Weight Height: 167.6 cm (65.98\")  Weight: 57.1 kg (125 lb 14.1 oz) (05/20/25 1540)       Usual Body Weight (UBW) 185# per patient        Trending Weight Hx     This admission: 5/22: " 125#             PTA: 13% weight loss x 3 months - significant   17.7% weight loss x 1 year     Wt Readings from Last 30 Encounters:   05/20/25 1540 57.1 kg (125 lb 14.1 oz)   05/20/25 1200 55.8 kg (123 lb)   05/20/25 1329 55.8 kg (123 lb)   05/13/25 1241 57.2 kg (126 lb 3.2 oz)   05/01/25 1351 61.3 kg (135 lb 1.6 oz)   04/29/25 1326 60.2 kg (132 lb 11.2 oz)   04/15/25 1018 61.5 kg (135 lb 9.6 oz)   04/03/25 1324 60.6 kg (133 lb 9.6 oz)   04/01/25 1315 59.9 kg (132 lb)   03/20/25 1350 63.6 kg (140 lb 3.2 oz)   03/18/25 1025 63.6 kg (140 lb 3.2 oz)   03/04/25 1053 66.2 kg (145 lb 14.4 oz)   02/18/25 1116 65.5 kg (144 lb 4.8 oz)   02/11/25 1052 65.3 kg (144 lb)   01/28/25 0946 65.5 kg (144 lb 4.8 oz)   01/17/25 1228 67.1 kg (148 lb)   10/23/24 1049 67.9 kg (149 lb 12.8 oz)   08/07/24 1010 67.2 kg (148 lb 3.2 oz)   07/25/24 0635 67.6 kg (149 lb 0.5 oz)   07/22/24 1630 66.7 kg (147 lb)   07/17/24 0929 66.8 kg (147 lb 3.2 oz)   07/17/24 0931 66.7 kg (147 lb)   06/26/24 1142 68.4 kg (150 lb 12.8 oz)   06/07/24 1257 68.1 kg (150 lb 3.2 oz)   06/05/24 0952 68 kg (149 lb 14.4 oz)   06/05/24 1034 67.6 kg (149 lb)   05/15/24 0949 69.3 kg (152 lb 12.8 oz)   05/01/24 0826 67.1 kg (148 lb)   04/23/24 1010 68 kg (150 lb)   04/17/24 0900 68.9 kg (152 lb)   04/03/24 1257 70.9 kg (156 lb 6.4 oz)      BMI kg/m2 Body mass index is 20.33 kg/m².       Labs        Pertinent Labs    Results from last 7 days   Lab Units 05/21/25  2341 05/21/25  0441 05/20/25  1704   SODIUM mmol/L 143 140 140   POTASSIUM mmol/L 3.5 3.7 3.7   CHLORIDE mmol/L 107 107 105   CO2 mmol/L 23.6 24.3 23.2   BUN mg/dL 7* 9 10   CREATININE mg/dL 1.26 1.28* 1.34*   CALCIUM mg/dL 8.5* 8.3* 8.3*   BILIRUBIN mg/dL 0.3 0.6 0.6   ALK PHOS U/L 35* 33* <5*   ALT (SGPT) U/L 5 5 <5   AST (SGOT) U/L 15 19 15   GLUCOSE mg/dL 112* 91 121*     Results from last 7 days   Lab Units 05/21/25  2341 05/21/25  0441   MAGNESIUM mg/dL 1.8 1.8   PHOSPHORUS mg/dL 2.4* 3.2   HEMOGLOBIN g/dL  9.3* 7.9*   HEMATOCRIT % 29.1* 25.0*     Lab Results   Component Value Date    HGBA1C 7.7 (H) 01/02/2023        Medications    Scheduled Medications budesonide, 0.5 mg, Nebulization, BID - RT  cefTRIAXone, 2,000 mg, Intravenous, Q24H  famotidine, 20 mg, Oral, Daily  insulin lispro, 2-7 Units, Subcutaneous, Q6H  ipratropium-albuterol, 3 mL, Nebulization, 4x Daily - RT  levothyroxine, 50 mcg, Oral, Q AM  nebivolol, 2.5 mg, Oral, Q24H  sodium chloride, 10 mL, Intravenous, Q12H        Infusions      PRN Medications   acetaminophen    senna-docusate sodium **AND** polyethylene glycol **AND** bisacodyl **AND** bisacodyl    Calcium Replacement - Follow Nurse / BPA Driven Protocol    clonazePAM    dextrose    dextrose    glucagon (human recombinant)    Magnesium Standard Dose Replacement - Follow Nurse / BPA Driven Protocol    nitroglycerin    Phosphorus Replacement - Follow Nurse / BPA Driven Protocol    Potassium Replacement - Follow Nurse / BPA Driven Protocol    [COMPLETED] Insert Peripheral IV **AND** sodium chloride    sodium chloride    sodium chloride     Physical Findings        Trending Physical   Appearance, NFPE 5/22: NFPE completed, consistent with nutrition diagnosis of malnutrition using AND/ASPEN criteria. See MSA below.      --  Edema  No edema documented      Bowel Function Last documented BM 5/22 (today)     Tubes No feeding tube      Chewing/Swallowing SLP following, S/P VFSS, currently on mechanical ground      Skin Intact      --  Current Nutrition Orders & Evaluation of Intake       Oral Nutrition     Food Allergies NKFA   Current PO Diet Diet: Cardiac; Healthy Heart (2-3 Na+); Texture: Mechanical Ground (NDD 2); Fluid Consistency: Thin (IDDSI 0)   Supplement None ordered    PO Evaluation     Trending % PO Intake 5/22: None documented    --  Nutritional Risk Screening        NRS-2002 Score          Nutrition Diagnosis         Nutrition Dx Problem 1 Severe chronic disease related malnutrition related  to hypermetabolic state and swallowing hindrance of esophageal cancer as evidenced by PO intakes less than 75% for greater than 1 month, weight loss greater that 7.5% for greater than 1 month and severe fat/muscle loss per physical exam.        Nutrition Dx Problem 2 Inadequate energy intake related to clinical course as evidenced by NPO.         Intervention Goal         Intervention Goal(s) Begin nutrition as able  Stable weight      Nutrition Intervention        RD Action Monitor for ability to begin nutrition   Completed NFPE     Nutrition Prescription          Diet Prescription NPO   Supplement Prescription None    --  Monitor/Evaluation        Monitor Per protocol, I&O, PO intake, Supplement intake, Pertinent labs, Weight, GI status, Swallow function     Malnutrition Severity Assessment      Patient meets criteria for : Severe Malnutrition  Malnutrition Type (Last 8 Hours)       Malnutrition Severity Assessment       Row Name 05/22/25 1438       Malnutrition Severity Assessment    Malnutrition Type Chronic Disease - Related Malnutrition      Row Name 05/22/25 1438       Insufficient Energy Intake     Insufficient Energy Intake Findings Severe    Insufficient Energy Intake  <75% of est. energy requirement for > or equal to 1 month      Row Name 05/22/25 1438       Unintentional Weight Loss     Unintentional Weight Loss Findings Severe    Unintentional Weight Loss  Weight loss greater than 7.5% in three months      Row Name 05/22/25 1438       Muscle Loss    Loss of Muscle Mass Findings Severe    [place] Region Severe - deep hollowing/scooping, lack of muscle to touch, facial bones well defined    Clavicle Bone Region Severe - protruding prominent bone    Acromion Bone Region Severe - squared shoulders, bones, and acromion process protrusion prominent    Scapular Bone Region Severe - prominent bones, depressions easily visible between ribs, scapula, spine, shoulders    Dorsal Hand Region Severe - prominent  depression    Patellar Region Severe - prominent bone, square looking, very little muscle definition    Anterior Thigh Region Severe - line/depression along thigh, obviously thin    Posterior Calf Region Severe - thin with very little definition/firmness      Row Name 05/22/25 1438       Fat Loss    Subcutaneous Fat Loss Findings Severe    Orbital Region  Severe - pronounced hollowness/depression, dark circles, loose saggy skin    Upper Arm Region Severe - mostly skin, very little space between folds, fingers touch    Thoracic & Lumbar Region Severe - ribs visible with prominent depressions, iliac crest very prominent      Row Name 05/22/25 1432       Criteria Met (Must meet criteria for severity in at least 2 of these categories: M Wasting, Fat Loss, Fluid, Secondary Signs, Wt. Status, Intake)    Patient meets criteria for  Severe Malnutrition                     Electronically signed by:  Tereza Damon RD  05/22/25 10:13 EDT

## 2025-05-22 NOTE — OP NOTE
ESOPHAGOGASTRODUODENOSCOPY Procedure Report    Patient Name:  Artie Mc  YOB: 1950    Date of Surgery:  5/22/2025     Preop diagnosis:  Dysphagia  History of esophageal cancer    Postoperative diagnosis:  Anastomotic stricture with mucosal irregularity  Status post [place] Gurwinder esophagectomy    GA ESOPHAGOGASTRODUODENOSCOPY TRANSORAL DIAGNOSTIC [[id number]]    Procedure(s):  ESOPHAGOGASTRODUODENOSCOPY with biopsy x 1 area and placement of esophagogastric stent    Staff:  Surgeon(s):  Tino Villafana MD      Anesthesia: General    DNR status: Patient wishes full code during the procedure    Implants:    Implant Name Type Inv. Item Serial No.  Lot No. LRB No. Used Action   STNT PANCR H/AXIOS W/DS ELECTROCAUTERY 10.8F 29MM 20MM - [id number] Stent STNT PANCR H/AXIOS W/DS ELECTROCAUTERY 10.8F 29MM 20MM  [employer] SHILO [id number] N/A 1 Implanted       Specimen:        See Below    Estimated blood loss:   None    Complications:  None    Description of Procedure:  Informed consent was obtained for the procedure, including sedation.  Risks of perforation, hemorrhage, adverse drug reaction and aspiration were discussed.  The patient was brought into the endoscopy suite. Continuous cardiopulmonary monitoring was performed. The patient was placed in the left lateral decubitus position.  The bite block was inserted into the patient's mouth. After adequate sedation was attained, the Olympus gastroscope was inserted into the patient's mouth and advanced to the second portion of the duodenum without difficulty.  Circumferential examination was performed. A retroflex exam was performed in the patient's stomach.  Narrowband imaging was used to examine the distal esophagus and GE junction.  On completion of the exam, the bowel was decompressed, the scope was removed from the patient, the patient tolerated the procedure well, there were no immediate post-operative complications.      Examination of the esophagus: Evidence of [place] Gurwinder esophagectomy with high-grade stricture at the esophagogastric anastomosis with mucosal irregularity suspicious for dysplastic tissue.  Cold forceps biopsies obtained for histology.  I was able to navigate both the regular gastroscope and therapeutic gastroscope across the stricture with moderate resistance which was approximately 10 mm in diameter and 10 mm in length.  A wire was placed into the stomach under direct endoscopic visualization and then a lumen opposing 20 mm x 10 mm Axios stent was placed across the stricture successfully  Examination of the stomach: Examination of the duodenum: Normal    Impression:  EGD shows [place] Gurwinder esophagectomy with high-grade anastomotic stricture and mucosal irregularity concerning for dysplastic tissue.  Lumen opposing stent placed successfully    Recommendations:  Monitor for postoperative complications  Follow-up with pathology  Full liquid diet  Pantoprazole 40 mg p.o. twice daily  Metoclopramide 10 mg IV every 6 hours  Keep head of bed elevated at all times  Repeat EGD in 3 months for stent removal and reevaluation    We appreciate the referral    Electronically signed by Tino Villafana MD, 05/22/25, 4:17 PM EDT.

## 2025-05-22 NOTE — PLAN OF CARE
Goal Outcome Evaluation:   Pt alert and orient x 4. Pt returned to unit at 1630. Nurse locked bed at 35 degrees per orders. Pt educated on bed and diet. Pt compliant at this time.

## 2025-05-22 NOTE — SIGNIFICANT NOTE
05/22/25 1538   Rehab Time/Intention   Session Not Performed patient unavailable for evaluation  (EGD)   Recommendation   PT - Next Appointment 05/23/25

## 2025-05-22 NOTE — PLAN OF CARE
Problem: Adult Inpatient Plan of Care  Goal: Plan of Care Review  Outcome: Progressing  Goal: Patient-Specific Goal (Individualized)  Outcome: Progressing  Goal: Absence of Hospital-Acquired Illness or Injury  Outcome: Progressing  Intervention: Identify and Manage Fall Risk  Recent Flowsheet Documentation  Taken 5/22/2025 0200 by Que Patino RN  Safety Promotion/Fall Prevention: safety round/check completed  Taken 5/22/2025 0000 by Que Patino RN  Safety Promotion/Fall Prevention: safety round/check completed  Intervention: Prevent Skin Injury  Recent Flowsheet Documentation  Taken 5/22/2025 0000 by Que Patino RN  Body Position: position changed independently  Skin Protection: incontinence pads utilized  Intervention: Prevent and Manage VTE (Venous Thromboembolism) Risk  Recent Flowsheet Documentation  Taken 5/22/2025 0000 by Que Patino RN  VTE Prevention/Management:   SCDs (sequential compression devices) off   patient refused intervention  Intervention: Prevent Infection  Recent Flowsheet Documentation  Taken 5/22/2025 0200 by Que Patino RN  Infection Prevention: visitors restricted/screened  Taken 5/22/2025 0000 by Que Patino RN  Infection Prevention: visitors restricted/screened  Goal: Optimal Comfort and Wellbeing  Outcome: Progressing  Intervention: Provide Person-Centered Care  Recent Flowsheet Documentation  Taken 5/22/2025 0000 by Que Patino RN  Trust Relationship/Rapport:   care explained   choices provided  Goal: Readiness for Transition of Care  Outcome: Progressing     Problem: Breathing Pattern Ineffective  Goal: Effective Breathing Pattern  Outcome: Progressing  Intervention: Promote Improved Breathing Pattern  Recent Flowsheet Documentation  Taken 5/22/2025 0000 by Que Patino RN  Supportive Measures: active listening utilized  Head of Bed (HOB) Positioning: HOB elevated     Problem: Fall Injury Risk  Goal: Absence of Fall and Fall-Related Injury  Outcome:  Progressing  Intervention: Identify and Manage Contributors  Recent Flowsheet Documentation  Taken 5/22/2025 0200 by Que Patino RN  Medication Review/Management: medications reviewed  Taken 5/22/2025 0000 by Que Patino RN  Medication Review/Management: medications reviewed  Intervention: Promote Injury-Free Environment  Recent Flowsheet Documentation  Taken 5/22/2025 0200 by Que Patino, WILBERT  Safety Promotion/Fall Prevention: safety round/check completed  Taken 5/22/2025 0000 by Que Patino RN  Safety Promotion/Fall Prevention: safety round/check completed   Goal Outcome Evaluation:

## 2025-05-22 NOTE — ANESTHESIA PROCEDURE NOTES
Airway  Reason: elective    Date/Time: 5/22/2025 3:44 PM  Airway not difficult    General Information and Staff    Anesthesiologist: Lynette Recinos MD  CRNA/CAA: Shonna Cochran CRNA    Indications and Patient Condition  Indications for airway management: airway protection    Preoxygenated: yes  MILS maintained throughout    Mask difficulty assessment: 0 - not attempted    Final Airway Details    Final airway type: endotracheal airway      Successful airway: ETT  Cuffed: yes   Successful intubation technique: video laryngoscopy and RSI  Adjuncts used in placement: intubating stylet  Endotracheal tube insertion site: oral  Blade: Pringle  Blade size: 3  ETT size (mm): 7.0  Cormack-Lehane Classification: grade I - full view of glottis  Placement verified by: chest auscultation and capnometry   Cuff volume (mL): 6  Measured from: gums  ETT/EBT to gums (cm): 21  Number of attempts at approach: 1  Assessment: lips, teeth, and gum same as pre-op and atraumatic intubation

## 2025-05-22 NOTE — PLAN OF CARE
Goal Outcome Evaluation:      5/22/2025   OTHER   Discipline Speech-Language Pathologist   Rehab Time/Intention   Session Not Performed Unable to treat ; Pt NPO per GI for procedure. ST will re-attempt on next date of service.

## 2025-05-23 ENCOUNTER — INPATIENT HOSPITAL (AMBULATORY)
Dept: URBAN - METROPOLITAN AREA HOSPITAL 84 | Facility: HOSPITAL | Age: 75
End: 2025-05-23
Payer: MEDICARE

## 2025-05-23 DIAGNOSIS — C15.9 MALIGNANT NEOPLASM OF ESOPHAGUS, UNSPECIFIED: ICD-10-CM

## 2025-05-23 DIAGNOSIS — R13.10 DYSPHAGIA, UNSPECIFIED: ICD-10-CM

## 2025-05-23 DIAGNOSIS — K22.2 ESOPHAGEAL OBSTRUCTION: ICD-10-CM

## 2025-05-23 DIAGNOSIS — K91.89 OTHER POSTPROCEDURAL COMPLICATIONS AND DISORDERS OF DIGESTIV: ICD-10-CM

## 2025-05-23 LAB
GLUCOSE BLDC GLUCOMTR-MCNC: 116 MG/DL (ref 70–105)
GLUCOSE BLDC GLUCOMTR-MCNC: 126 MG/DL (ref 70–105)
GLUCOSE BLDC GLUCOMTR-MCNC: 261 MG/DL (ref 70–105)
GLUCOSE BLDC GLUCOMTR-MCNC: 81 MG/DL (ref 70–105)

## 2025-05-23 PROCEDURE — 25010000002 CEFTRIAXONE PER 250 MG: Performed by: INTERNAL MEDICINE

## 2025-05-23 PROCEDURE — 94799 UNLISTED PULMONARY SVC/PX: CPT

## 2025-05-23 PROCEDURE — 97161 PT EVAL LOW COMPLEX 20 MIN: CPT

## 2025-05-23 PROCEDURE — 99232 SBSQ HOSP IP/OBS MODERATE 35: CPT | Mod: FS | Performed by: NURSE PRACTITIONER

## 2025-05-23 PROCEDURE — 94664 DEMO&/EVAL PT USE INHALER: CPT

## 2025-05-23 PROCEDURE — 25010000002 METOCLOPRAMIDE PER 10 MG: Performed by: INTERNAL MEDICINE

## 2025-05-23 PROCEDURE — 92526 ORAL FUNCTION THERAPY: CPT

## 2025-05-23 PROCEDURE — 63710000001 INSULIN LISPRO (HUMAN) PER 5 UNITS: Performed by: FAMILY MEDICINE

## 2025-05-23 PROCEDURE — 82948 REAGENT STRIP/BLOOD GLUCOSE: CPT | Performed by: INTERNAL MEDICINE

## 2025-05-23 PROCEDURE — 94761 N-INVAS EAR/PLS OXIMETRY MLT: CPT

## 2025-05-23 PROCEDURE — 82948 REAGENT STRIP/BLOOD GLUCOSE: CPT

## 2025-05-23 PROCEDURE — 94760 N-INVAS EAR/PLS OXIMETRY 1: CPT

## 2025-05-23 RX ORDER — SACCHAROMYCES BOULARDII 250 MG
500 CAPSULE ORAL 2 TIMES DAILY
Status: DISCONTINUED | OUTPATIENT
Start: 2025-05-23 | End: 2025-05-24 | Stop reason: HOSPADM

## 2025-05-23 RX ADMIN — NEBIVOLOL 2.5 MG: 5 TABLET ORAL at 09:12

## 2025-05-23 RX ADMIN — IPRATROPIUM BROMIDE AND ALBUTEROL SULFATE 3 ML: .5; 3 SOLUTION RESPIRATORY (INHALATION) at 14:57

## 2025-05-23 RX ADMIN — Medication 500 MG: at 20:37

## 2025-05-23 RX ADMIN — Medication 10 ML: at 20:37

## 2025-05-23 RX ADMIN — CEFTRIAXONE 2000 MG: 2 INJECTION, POWDER, FOR SOLUTION INTRAMUSCULAR; INTRAVENOUS at 02:36

## 2025-05-23 RX ADMIN — Medication 10 ML: at 09:17

## 2025-05-23 RX ADMIN — METOCLOPRAMIDE HYDROCHLORIDE 10 MG: 5 INJECTION INTRAMUSCULAR; INTRAVENOUS at 00:39

## 2025-05-23 RX ADMIN — BUDESONIDE INHALATION 0.5 MG: 0.5 SUSPENSION RESPIRATORY (INHALATION) at 19:35

## 2025-05-23 RX ADMIN — METOCLOPRAMIDE HYDROCHLORIDE 10 MG: 5 INJECTION INTRAMUSCULAR; INTRAVENOUS at 12:49

## 2025-05-23 RX ADMIN — CLONAZEPAM 0.25 MG: 0.5 TABLET ORAL at 09:25

## 2025-05-23 RX ADMIN — TAMSULOSIN HYDROCHLORIDE 0.8 MG: 0.4 CAPSULE ORAL at 09:12

## 2025-05-23 RX ADMIN — METOCLOPRAMIDE HYDROCHLORIDE 10 MG: 5 INJECTION INTRAMUSCULAR; INTRAVENOUS at 17:51

## 2025-05-23 RX ADMIN — INSULIN LISPRO 4 UNITS: 100 INJECTION, SOLUTION INTRAVENOUS; SUBCUTANEOUS at 12:49

## 2025-05-23 RX ADMIN — IPRATROPIUM BROMIDE AND ALBUTEROL SULFATE 3 ML: .5; 3 SOLUTION RESPIRATORY (INHALATION) at 08:01

## 2025-05-23 RX ADMIN — BUDESONIDE INHALATION 0.5 MG: 0.5 SUSPENSION RESPIRATORY (INHALATION) at 08:01

## 2025-05-23 RX ADMIN — LEVOTHYROXINE SODIUM 50 MCG: 50 TABLET ORAL at 05:12

## 2025-05-23 RX ADMIN — Medication 500 MG: at 12:49

## 2025-05-23 RX ADMIN — IPRATROPIUM BROMIDE AND ALBUTEROL SULFATE 3 ML: .5; 3 SOLUTION RESPIRATORY (INHALATION) at 19:30

## 2025-05-23 RX ADMIN — METOCLOPRAMIDE HYDROCHLORIDE 10 MG: 5 INJECTION INTRAMUSCULAR; INTRAVENOUS at 05:13

## 2025-05-23 RX ADMIN — IPRATROPIUM BROMIDE AND ALBUTEROL SULFATE 3 ML: .5; 3 SOLUTION RESPIRATORY (INHALATION) at 10:39

## 2025-05-23 NOTE — PLAN OF CARE
Goal Outcome Evaluation:  Plan of Care Reviewed With: patient           Outcome Evaluation: 76 y/o male admitted on 5/20 due to SOA and found to have parainfluenza and strep pneumonia. PMH includes esophageal Ca on chemo, htn, pacemaker, CABG. Patient lives alone and normally independent, still driving and manages a vegetable garden. Patient had EGD with stent placement on 5/22. Patient remains independent with all aspects of mobility without loss of balance nor gait deficits noted. Tolerated 100 ft of gait , doing 3 laps in room without suplemental O2 and spO2 >90% entire session. Patient is safe to return home , no further skilled rehab needs at discharge. Safe to be up with family, nursing.    Anticipated Discharge Disposition (PT): home

## 2025-05-23 NOTE — THERAPY TREATMENT NOTE
Acute Care - Speech Language Pathology Treatment Note     Ion     Patient Name: Artie Mc  : 1950  MRN: [id number]    Today's Date: 2025                   Admit Date: 2025       Visit Dx:      ICD-10-CM ICD-9-CM   1. Dyspnea, unspecified type  R06.00 786.09   2. Acute on chronic congestive heart failure, unspecified heart failure type  I50.9 428.0   3. Parainfluenza infection  B34.8 078.89   4. Aspiration pneumonia, unspecified aspiration pneumonia type, unspecified laterality, unspecified part of lung  J69.0 507.0   5. Esophageal dysphagia  R13.19 787.29       Patient Active Problem List   Diagnosis    Malignant neoplasm of cardia    Esophageal cancer, stage IV    Chemotherapy adverse reaction    B12 deficiency    Encounter for antineoplastic chemotherapy    Anemia in neoplastic disease    Nausea    Abnormal weight loss    Abscess of chest wall    Bradycardia    Chronic orthostatic hypotension    Chronic coronary artery disease    Dizziness    Dysphagia    Esophageal dysphagia    Esophageal stricture    Gastroesophageal reflux disease without esophagitis    History of open heart surgery    Mixed hyperlipidemia    Primary hypertension    Panic disorder    Presence of cardiac pacemaker    Primary malignant neoplasm of lower third of esophagus    Primary malignant neoplasm of retroperitoneum    Seizure    Syncope and collapse    Type 2 diabetes mellitus    Flu vaccine need    Hypothyroidism (acquired)    Lymphadenopathy    Encounter for therapeutic drug monitoring    Malabsorption of iron    CHRISTIAN (iron deficiency anemia)    Gastrointestinal hemorrhage, unspecified gastrointestinal hemorrhage type    Hematemesis with nausea    Erosive esophagitis    Ischemic cardiomyopathy    Cardiomyopathy, dilated    Dysuria    PNA (pneumonia)    Severe protein-calorie malnutrition       Past Medical History:   Diagnosis Date    B12 deficiency     Blockage of coronary artery of heart     Depression      Disease of thyroid gland     DM (diabetes mellitus)     Dysphagia     Esophageal cancer     GERD (gastroesophageal reflux disease)     HTN (hypertension)     Hyperlipidemia        Past Surgical History:   Procedure Laterality Date    ABDOMINAL WALL ABSCESS INCISION AND DRAINAGE  10/22/2018    WITH DEBRIDEMENT  1.5CM X 1.5 CM X 1.5 CM    DR. PURI    BRONCHOSCOPY N/A 7/25/2024    Procedure: BRONCHOSCOPY WITH ENDOBRONCHIAL ULTRASOUND, LUNG WASHINGS, AND FINE NEEDLE ASPIRATIONS;  Surgeon: Bonnie Smith MD;  Location: [facility] ENDOSCOPY;  Service: Pulmonary;  Laterality: N/A;    CARDIAC CATHETERIZATION Right 7/20/2023    Procedure: Left Heart Cath;  Surgeon: Mazin Simmons MD;  Location: [facility] CATH INVASIVE LOCATION;  Service: Cardiovascular;  Laterality: Right;    CATARACT EXTRACTION  2018    COLONOSCOPY      CORONARY ARTERY BYPASS GRAFT  2015    ENDOSCOPY  12/20/2016    ENDOSCOPY N/A 01/02/2023    Procedure: ESOPHAGOGASTRODUODENOSCOPY;  Surgeon: Esvin Morgan MD;  Location: [facility] ENDOSCOPY;  Service: Gastroenterology;  Laterality: N/A;  post: anastamosis stricture    ENDOSCOPY N/A 02/09/2023    Procedure: ESOPHAGOGASTRODUODENOSCOPY, non-wire guided balloon dilation (12-14mm) of esophageal stricture;  Surgeon: Tino Villafana MD;  Location: [facility] ENDOSCOPY;  Service: Gastroenterology;  Laterality: N/A;  post: esophageal anastamotic stricture, post surgical changes    ESOPHAGECTOMY  05/04/2017    BROOKS PETTY ESOPHAGECTOMY, FEEDING JEJNOSTOMOY, ABDOMINAL AND MEDIASTINAL LYMPH NODE DISECTION, PEDICLED MUSCLE FLAP COVERAGE DR. PURI    ESOPHAGOSCOPY / EGD  07/12/2017    WITH BALLOON DILATION    ESOPHAGOSCOPY / EGD  07/26/2017    WITH BALLOON DILATION    ESOPHAGOSCOPY / EGD  08/11/2017    WITH BALLOON DILATION    ESOPHAGOSCOPY / EGD  08/28/2017    WITH BALLOON DILATION    ESOPHAGOSCOPY / EGD  09/27/2017    WITH BALLOON DILATION    INSERT / REPLACE / REMOVE PACEMAKER      LYMPH NODE BIOPSY   01/30/2019    PACEMAKER IMPLANTATION  11/21/2016     Skilled ST intervention conducted this date targeting dysphagia in the setting of metastatic esophageal carcinoma.  Pt alert, pleasant, and amenable to treatment.   Pt on room air during session. Pt currently prescribed a Thin liquids and Other/Full liquid diet.      Treatment narrative/results:Chart reviewed and events noted. The patient underwent an  initial clinical swallow evaluation and VFSS on 5/21/2025. This revealed possible severe esophageal dysphagia. GI was consulted and he underwent EGD with Stent placement yesterday. GI has placed the patient on a full liquid diet and he was seen today at bedside during an established morning meal. Upon entrance to the patient's room he was seated up on the side of the bed feeding himself. He was assessed consuming thin liquids by straw and full liquids including cream of wheat and pudding. Labial seal on spoon and straw are adequate. No anterior labial spillage observed. No overt pharyngeal s/s of difficulty noted. He presents with no coughing, choking or throat clearing. Vocal quality is clear across all trials. He reports swallowing is better since his procedure yesterday.     SLP Recommendation and Plan:  It is recommended that the patient continue his current diet of full liquids as per GI order  Continue to be up at a full 90 degree angle for all meals/po  Continue to utilize swallow compensations of small bites/sips, and slow rate of intake  ST will continue to follow as needed while in-house.           EDUCATION    The patient has been educated in the following areas:     Discussed recent VFSS, results and recommendations from this and safe swallow compensations. Patient verbalized understanding and in agreement      Dysphagia (Swallowing Impairment) Modified Diet Instruction.             SLP GOALS       Row Name 05/23/25 1100       (LTG) Swallow    (LTG) Swallow Pt will maximize swallow function for least  restrictive PO diet, exhibiting no complication associated with dysphagia, adequate PO intake, and demonstrating independent use of swallow compensation.  -SM    [place] (Swallow Long Term Goal) independently (over 90% accuracy)  -SM    Time Frame (Swallow Long Term Goal) by discharge  -SM    Progress/Outcomes (Swallow Long Term Goal) goal ongoing  -SM    Comment (Swallow Long Term Goal) See above  -SM       (STG) Swallow 1    (STG) Swallow 1 Patient will participate in ongoing assessment of swallow, including reevaluation clinically and/or including instrumental assessment of swallow if indicated, to further assess swallow function and return to oral nutrition.  -SM    [place] (Swallow Short Term Goal 1) independently (over 90% accuracy)  -SM    Time Frame (Swallow Short Term Goal 1) 1 week  -SM    Progress/Outcomes (Swallow Short Term Goal 1) goal ongoing  -SM    Comment (Swallow Short Term Goal 1) See above  -SM       (STG) Swallow 2    (STG) Swallow 2 The patient will participate in a full meal assessment to determine safety and adequacy of recommended diet, independent use of safe swallow compensations, and additional goals/recommendations to follow.  -SM    [place] (Swallow Short Term Goal 2) independently (over 90% accuracy)  -SM    Time Frame (Swallow Short Term Goal 2) 1 week  -SM    Progress/Outcomes (Swallow Short Term Goal 2) goal ongoing;good progress toward goal  -SM    Comment (Swallow Short Term Goal 2) See above  -SM              User Key  (r) = Recorded By, (t) = Taken By, (c) = Cosigned By      Initials Name Provider Type    Ayanna Aguirre, SLP Speech and Language Pathologist    Te Shanks SLP Speech and Language Pathologist                                Time Calculation:                                  ESSENCE Sin  5/23/2025

## 2025-05-23 NOTE — PROGRESS NOTES
Nutrition Services  Patient Name: Artie Mc  YOB: 1950  MRN: [id number]  Admission date: 5/20/2025    PROGRESS NOTE      Nutrition Intervention Updates: RD to add Boost Glucose Control TID (Provides 570 kcals, 48 g protein if consumed)     Diet per GI       Encounter Information: Checking in to monitor po diet tolerance and advancement. Pt now receiving FLD Consistent CHO diet. SLP evaluated pt today and recommends that pt continue this diet as ordered per GI.  RD to add ONS due to FLD inadequate to meet est needs without supplementation.        PO Diet: Diet: Liquid, Diabetic; Full Liquid; Consistent Carbohydrate; Fluid Consistency: Thin (IDDSI 0)   PO Supplements: None    PO Intake:  Minimal due to nature of FLD       Current nutrition support: -   Nutrition support review: -       Labs (reviewed below): Reviewed. Management per attending.        GI Function:  Last documented BM 5/23       Brief weight review   Wt Readings from Last 10 Encounters:   05/20/25 1540 57.1 kg (125 lb 14.1 oz)   05/20/25 1200 55.8 kg (123 lb)   05/20/25 1329 55.8 kg (123 lb)   05/13/25 1241 57.2 kg (126 lb 3.2 oz)   05/01/25 1351 61.3 kg (135 lb 1.6 oz)   04/29/25 1326 60.2 kg (132 lb 11.2 oz)   04/15/25 1018 61.5 kg (135 lb 9.6 oz)   04/03/25 1324 60.6 kg (133 lb 9.6 oz)   04/01/25 1315 59.9 kg (132 lb)   03/20/25 1350 63.6 kg (140 lb 3.2 oz)        Results from last 7 days   Lab Units 05/22/25  2239 05/21/25  2341 05/21/25  0441   SODIUM mmol/L 139 143 140   POTASSIUM mmol/L 4.4 3.5 3.7   CHLORIDE mmol/L 105 107 107   CO2 mmol/L 24.8 23.6 24.3   BUN mg/dL 6* 7* 9   CREATININE mg/dL 1.15 1.26 1.28*   CALCIUM mg/dL 8.7 8.5* 8.3*   BILIRUBIN mg/dL 0.3 0.3 0.6   ALK PHOS U/L 37* 35* 33*   ALT (SGPT) U/L <5 5 5   AST (SGOT) U/L 14 15 19   GLUCOSE mg/dL 161* 112* 91     Results from last 7 days   Lab Units 05/22/25  2239 05/21/25  2341 05/21/25  0441   MAGNESIUM mg/dL 1.9 1.8 1.8   PHOSPHORUS mg/dL 3.3 2.4* 3.2    HEMOGLOBIN g/dL 9.5* 9.3* 7.9*   HEMATOCRIT % 29.9* 29.1* 25.0*       RD to follow up per protocol.    Electronically signed by:  Jennifer Hernandez RD  05/23/25 14:13 EDT

## 2025-05-23 NOTE — PLAN OF CARE
Problem: Adult Inpatient Plan of Care  Goal: Plan of Care Review  Outcome: Progressing  Goal: Patient-Specific Goal (Individualized)  Outcome: Progressing  Goal: Absence of Hospital-Acquired Illness or Injury  Outcome: Progressing  Goal: Optimal Comfort and Wellbeing  Outcome: Progressing  Goal: Readiness for Transition of Care  Outcome: Progressing     Problem: Breathing Pattern Ineffective  Goal: Effective Breathing Pattern  Outcome: Progressing     Problem: Fall Injury Risk  Goal: Absence of Fall and Fall-Related Injury  Outcome: Progressing     Problem: Malnutrition  Goal: Improved Nutritional Intake  Outcome: Progressing     Problem: Skin Injury Risk Increased  Goal: Skin Health and Integrity  Outcome: Progressing   Goal Outcome Evaluation:

## 2025-05-23 NOTE — THERAPY EVALUATION
Patient Name: Artie Mc  : 1950    MRN: [id number]                              Today's Date: 2025       Admit Date: 2025    Visit Dx:     ICD-10-CM ICD-9-CM   1. Dyspnea, unspecified type  R06.00 786.09   2. Acute on chronic congestive heart failure, unspecified heart failure type  I50.9 428.0   3. Parainfluenza infection  B34.8 078.89   4. Aspiration pneumonia, unspecified aspiration pneumonia type, unspecified laterality, unspecified part of lung  J69.0 507.0   5. Esophageal dysphagia  R13.19 787.29     Patient Active Problem List   Diagnosis    Malignant neoplasm of cardia    Esophageal cancer, stage IV    Chemotherapy adverse reaction    B12 deficiency    Encounter for antineoplastic chemotherapy    Anemia in neoplastic disease    Nausea    Abnormal weight loss    Abscess of chest wall    Bradycardia    Chronic orthostatic hypotension    Chronic coronary artery disease    Dizziness    Dysphagia    Esophageal dysphagia    Esophageal stricture    Gastroesophageal reflux disease without esophagitis    History of open heart surgery    Mixed hyperlipidemia    Primary hypertension    Panic disorder    Presence of cardiac pacemaker    Primary malignant neoplasm of lower third of esophagus    Primary malignant neoplasm of retroperitoneum    Seizure    Syncope and collapse    Type 2 diabetes mellitus    Flu vaccine need    Hypothyroidism (acquired)    Lymphadenopathy    Encounter for therapeutic drug monitoring    Malabsorption of iron    CHRISTIAN (iron deficiency anemia)    Gastrointestinal hemorrhage, unspecified gastrointestinal hemorrhage type    Hematemesis with nausea    Erosive esophagitis    Ischemic cardiomyopathy    Cardiomyopathy, dilated    Dysuria    PNA (pneumonia)    Severe protein-calorie malnutrition     Past Medical History:   Diagnosis Date    B12 deficiency     Blockage of coronary artery of heart     Depression     Disease of thyroid gland     DM (diabetes mellitus)     Dysphagia      Esophageal cancer     GERD (gastroesophageal reflux disease)     HTN (hypertension)     Hyperlipidemia      Past Surgical History:   Procedure Laterality Date    ABDOMINAL WALL ABSCESS INCISION AND DRAINAGE  10/22/2018    WITH DEBRIDEMENT  1.5CM X 1.5 CM X 1.5 CM    DR. PURI    BRONCHOSCOPY N/A 7/25/2024    Procedure: BRONCHOSCOPY WITH ENDOBRONCHIAL ULTRASOUND, LUNG WASHINGS, AND FINE NEEDLE ASPIRATIONS;  Surgeon: Bonnie Smith MD;  Location: [facility] ENDOSCOPY;  Service: Pulmonary;  Laterality: N/A;    CARDIAC CATHETERIZATION Right 7/20/2023    Procedure: Left Heart Cath;  Surgeon: Mazin Simmons MD;  Location: [facility] CATH INVASIVE LOCATION;  Service: Cardiovascular;  Laterality: Right;    CATARACT EXTRACTION  2018    COLONOSCOPY      CORONARY ARTERY BYPASS GRAFT  2015    ENDOSCOPY  12/20/2016    ENDOSCOPY N/A 01/02/2023    Procedure: ESOPHAGOGASTRODUODENOSCOPY;  Surgeon: Esvin Morgan MD;  Location: [facility] ENDOSCOPY;  Service: Gastroenterology;  Laterality: N/A;  post: anastamosis stricture    ENDOSCOPY N/A 02/09/2023    Procedure: ESOPHAGOGASTRODUODENOSCOPY, non-wire guided balloon dilation (12-14mm) of esophageal stricture;  Surgeon: Tino Villafana MD;  Location: [facility] ENDOSCOPY;  Service: Gastroenterology;  Laterality: N/A;  post: esophageal anastamotic stricture, post surgical changes    ESOPHAGECTOMY  05/04/2017    BROOKS PETTY ESOPHAGECTOMY, FEEDING JEJNOSTOMOY, ABDOMINAL AND MEDIASTINAL LYMPH NODE DISECTION, PEDICLED MUSCLE FLAP COVERAGE DR. PURI    ESOPHAGOSCOPY / EGD  07/12/2017    WITH BALLOON DILATION    ESOPHAGOSCOPY / EGD  07/26/2017    WITH BALLOON DILATION    ESOPHAGOSCOPY / EGD  08/11/2017    WITH BALLOON DILATION    ESOPHAGOSCOPY / EGD  08/28/2017    WITH BALLOON DILATION    ESOPHAGOSCOPY / EGD  09/27/2017    WITH BALLOON DILATION    INSERT / REPLACE / REMOVE PACEMAKER      LYMPH NODE BIOPSY  01/30/2019    PACEMAKER IMPLANTATION  11/21/2016      General Information        Row Name 05/23/25 1336          Physical Therapy Time and Intention    Document Type evaluation  -CR     Mode of Treatment physical therapy  -CR       Row Name 05/23/25 1336          General Information    Patient Profile Reviewed yes  -CR     Prior Level of Function independent:;all household mobility;community mobility;ADL's;home management;yard work;driving  -CR       Row Name 05/23/25 1336          Living Environment    Current Living Arrangements home  -CR     People in Home alone  -CR       Row Name 05/23/25 1336          Home Main Entrance    Number of Stairs, Main Entrance one  -CR       Row Name 05/23/25 1336          Stairs Within Home, Primary    Stairs, Within Home, Primary has upstairs but does not access  -CR       Row Name 05/23/25 1336          Cognition    Orientation Status (Cognition) oriented x 4  -CR               User Key  (r) = Recorded By, (t) = Taken By, (c) = Cosigned By      Initials Name Provider Type    CR Reyes, Carmela, PT Physical Therapist                   Mobility       Row Name 05/23/25 1337          Bed Mobility    Bed Mobility bed mobility (all) activities  -CR     All Activities, [place] (Bed Mobility) standby assist  -CR       Row Name 05/23/25 1337          Bed-Chair Transfer    Bed-Chair [place] (Transfers) standby assist  -CR       Row Name 05/23/25 1337          Sit-Stand Transfer    Sit-Stand [place] (Transfers) standby assist  -CR       Row Name 05/23/25 1337          Gait/Stairs (Locomotion)    [place] Level (Gait) supervision  -CR     Distance in Feet (Gait) 100  3 laps in room  -CR     Deviations/Abnormal Patterns (Gait) gait speed decreased  -CR               User Key  (r) = Recorded By, (t) = Taken By, (c) = Cosigned By      Initials Name Provider Type    CR Reyes, Carmela, PT Physical Therapist                   Obj/Interventions       Row Name 05/23/25 1337          Range of Motion Comprehensive    General Range of Motion no range of  motion deficits identified  -CR       Row Name 05/23/25 1337          Strength Comprehensive (MMT)    General Manual Muscle Testing (MMT) Assessment no strength deficits identified  -CR       Row Name 05/23/25 1337          Balance    Balance Assessment sitting static balance;standing static balance;standing dynamic balance;sitting dynamic balance  -CR     Static Sitting Balance independent  -CR     Dynamic Sitting Balance independent  -CR     Position, Sitting Balance sitting edge of bed  -CR     Static Standing Balance independent  -CR     Dynamic Standing Balance independent  -CR               User Key  (r) = Recorded By, (t) = Taken By, (c) = Cosigned By      Initials Name Provider Type    CR Reyes, Carmela, PT Physical Therapist                   Goals/Plan       Row Name 05/23/25 1342          Gait Training Goal 1 (PT)    Activity/Assistive Device (Gait Training Goal 1, PT) gait (walking locomotion);increase endurance/gait distance  -CR     [place] Level (Gait Training Goal 1, PT) standby assist  -CR     Distance (Gait Training Goal 1, PT) 300 ft, no desat  -CR     Time Frame (Gait Training Goal 1, PT) long term goal (LTG);2 weeks  -CR       Row Name 05/23/25 1342          Patient Education Goal (PT)    Activity (Patient Education Goal, PT) HEP  -CR     [place]/Cues/Accuracy (Memory Goal 2, PT) demonstrates adequately  -CR     Time Frame (Patient Education Goal, PT) 2 weeks  -CR       Row Name 05/23/25 1342          Therapy Assessment/Plan (PT)    Planned Therapy Interventions (PT) gait training;home exercise program;patient/family education  -CR               User Key  (r) = Recorded By, (t) = Taken By, (c) = Cosigned By      Initials Name Provider Type    CR Reyes, Carmela, PT Physical Therapist                   Clinical Impression       Row Name 05/23/25 1338          Pain    Pretreatment Pain Rating 0/10 - no pain  -CR     Posttreatment Pain Rating 0/10 - no pain  -CR       Row Name 05/23/25  1338          Plan of Care Review    Plan of Care Reviewed With patient  -CR     Outcome Evaluation 76 y/o male admitted on 5/20 due to SOA and found to have parainfluenza and strep pneumonia. PMH includes esophageal Ca on chemo, htn, pacemaker, CABG. Patient lives alone and normally independent, still driving and manages a vegetable garden. Patient had EGD with stent placement on 5/22. Patient remains independent with all aspects of mobility without loss of balance nor gait deficits noted. Tolerated 100 ft of gait , doing 3 laps in room without suplemental O2 and spO2 >90% entire session. Patient is safe to return home , no further skilled rehab needs at discharge. Safe to be up with family, nursing.  -CR       Row Name 05/23/25 1338          Therapy Assessment/Plan (PT)    Patient/Family Therapy Goals Statement (PT) home  -CR     Rehab Potential (PT) good  -CR     Criteria for Skilled Interventions Met (PT) yes;skilled treatment is necessary  -CR     Therapy Frequency (PT) 3 times/wk  -CR     Predicted Duration of Therapy Intervention (PT) dc  -CR       Row Name 05/23/25 1338          Positioning and Restraints    Post Treatment Position chair  -CR     In Chair notified nsg;sitting;call light within reach;exit alarm on  -CR               User Key  (r) = Recorded By, (t) = Taken By, (c) = Cosigned By      Initials Name Provider Type    CR Reyes, Carmela, PT Physical Therapist                   Outcome Measures       Row Name 05/23/25 1343 05/23/25 0805       How much help from another person do you currently need...    Turning from your back to your side while in flat bed without using bedrails? 4  -CR 4  -MN    Moving from lying on back to sitting on the side of a flat bed without bedrails? 4  -CR 4  -MN    Moving to and from a bed to a chair (including a wheelchair)? 4  -CR 4  -MN    Standing up from a chair using your arms (e.g., wheelchair, bedside chair)? 4  -CR 4  -MN    Climbing 3-5 steps with a railing? 4   -CR 3  -MN    To walk in hospital room? 4  -CR 3  -MN    AM-PAC 6 Clicks Score (PT) 24  -CR 22  -MN              User Key  (r) = Recorded By, (t) = Taken By, (c) = Cosigned By      Initials Name Provider Type    CR Reyes, Carmela, PT Physical Therapist    Vamsi Mo LPN Licensed Nurse                                 Physical Therapy Education       Title: PT OT SLP Therapies (In Progress)       Topic: Physical Therapy (In Progress)       Point: Mobility training (Done)       Learning Progress Summary            Patient Acceptance, E, VU by CR at 5/23/2025 1343                      Point: Home exercise program (Not Started)       Learner Progress:  Not documented in this visit.                              User Key       Initials Effective Dates Name Provider Type Discipline    CR 06/16/21 -  Reyes, Carmela, PT Physical Therapist PT                  PT Recommendation and Plan  Planned Therapy Interventions (PT): gait training, home exercise program, patient/family education  Outcome Evaluation: 74 y/o male admitted on 5/20 due to SOA and found to have parainfluenza and strep pneumonia. PMH includes esophageal Ca on chemo, htn, pacemaker, CABG. Patient lives alone and normally independent, still driving and manages a vegetable garden. Patient had EGD with stent placement on 5/22. Patient remains independent with all aspects of mobility without loss of balance nor gait deficits noted. Tolerated 100 ft of gait , doing 3 laps in room without suplemental O2 and spO2 >90% entire session. Patient is safe to return home , no further skilled rehab needs at discharge. Safe to be up with family, nursing.     Time Calculation:         PT Charges       Row Name 05/23/25 1345 05/23/25 1344          Time Calculation    Start Time -- 1005  -CR     Stop Time -- 1030  -CR     Time Calculation (min) -- 25 min  -CR     PT Received On -- 05/23/25  -CR     PT Goal Re-Cert Due Date 06/06/25  -CR --        Time Calculation- PT     Total Timed Code Minutes- PT -- 0 minute(s)  -AYAD               User Key  (r) = Recorded By, (t) = Taken By, (c) = Cosigned By      Initials Name Provider Type    CR Reyes, Carmela, DOMENICA Physical Therapist                  Therapy Charges for Today       Code Description Service Date Service Provider Modifiers Qty    99441007355 HC PT EVAL LOW COMPLEXITY 4 5/23/2025 Reyes, Carmela, PT GP 1            PT G-Codes  AM-PAC 6 Clicks Score (PT): 24  PT Discharge Summary  Anticipated Discharge Disposition (PT): home    Carmela Reyes, PT  5/23/2025

## 2025-05-23 NOTE — PROGRESS NOTES
[facility] MEDICINE SERVICE  DAILY PROGRESS NOTE    NAME: Artie Mc  : 1950  MRN: [id number]      LOS: 3 days     PROVIDER OF SERVICE: Jc Mehta PA-C    Chief Complaint: PNA (pneumonia)    Subjective:     Interval History:  History taken from: patient    Patient resting in bed on exam this morning. Patient endorses slight mid-sternal chest pain related to procedure. He denies shortness of breath. He also still reports urinary retention despite Flomax.       Review of Systems:   Review of Systems  12-point review of systems was reviewed and is negative except as listed above      Objective:     Vital Signs  Temp:  [97.4 °F (36.3 °C)-98.1 °F (36.7 °C)] 97.4 °F (36.3 °C)  Heart Rate:  [68-94] 74  Resp:  [12-19] 14  BP: (105-147)/(53-75) 128/56   Body mass index is 20.33 kg/m².    Physical Exam  Physical Exam  General: awake, alert, in NAD, cachectic   Neck: Supple  Chest: CTA bilat.  CV: S1S2 nl. RRR  Abd: Moderate suprapubic TTP. Soft, NTND. +BS  Ext: No c/c/e.      Diagnostic Data    Results from last 7 days   Lab Units 25  2239   WBC 10*3/mm3 8.55   HEMOGLOBIN g/dL 9.5*   HEMATOCRIT % 29.9*   PLATELETS 10*3/mm3 301   GLUCOSE mg/dL 161*   CREATININE mg/dL 1.15   BUN mg/dL 6*   SODIUM mmol/L 139   POTASSIUM mmol/L 4.4   AST (SGOT) U/L 14   ALT (SGPT) U/L <5   ALK PHOS U/L 37*   BILIRUBIN mg/dL 0.3   ANION GAP mmol/L 9.2       No radiology results for the last day        I reviewed the patient's new clinical results.    Assessment/Plan:     Active and Resolved Problems  Active Hospital Problems    Diagnosis  POA    **PNA (pneumonia) [J18.9]  Yes    Severe protein-calorie malnutrition [E43]  Yes    Esophageal dysphagia [R13.19]  Unknown      Resolved Hospital Problems   No resolved problems to display.       Multifocal pneumonia, likely secondary to aspiration  Parainfluenza infection  Dysphagia  Metastatic esophageal cancer on XRT and chemotherapy  History of esophageal strictures  requiring dilatations  Urinary retention  Severe protein calorie malnutrition  HFpEF  CAD s/p CABG  Status post pacemaker  CKD  Type 2 diabetes mellitus  Hypothyroidism  GERD  Anxiety      Patient not requiring supplemental oxygen.  Patient continues to be on Rocephin, previously on Zosyn, for presumed aspiration PNA. Noted S pneumo and parainfluenza positive.  Legionella and MRSA swabs  negative.  Continue DuoNebs.  Noted speech evaluation with mild oral and severe esophageal dysphagia.     Patient is s/p EGD and dilatation 5/22/25 with GI. On mechanical soft diet with thin liquids, tolerating well., advance to low residue diet with ground meat per GI  Ensure added to optimize nutrition. Continue PPI twice daily. Will need repeat EGD in 3 months for stent removal.     Patient continues to retain urine. Navarro will be placed. Previous difficulty with straight cath requiring Coude. Urology consulted.    PT/OT evaluation noted with safe to d/c home when medically ready.       VTE Prophylaxis:  Mechanical VTE prophylaxis orders are present.             Disposition Planning:     Barriers to Discharge: urinary retention   Anticipated Date of Discharge: 5/24/2025  Place of Discharge: Home      Time: 35 minutes     Code Status and Medical Interventions: CPR (Attempt to Resuscitate); Full Support   Ordered at: 05/20/25 2133     Code Status (Patient has no pulse and is not breathing):    CPR (Attempt to Resuscitate)     Medical Interventions (Patient has pulse or is breathing):    Full Support       Signature: Electronically signed by Jc Mehta PA-C, 05/23/25, 19:18 EDT.  [facility] Hospitalist Team

## 2025-05-23 NOTE — PROGRESS NOTES
" LOS: 3 days   Patient Care Team:  Chanel Carter APRN as PCP - General (Nurse Practitioner)  Chanel Carter APRN as PCP - Family Medicine  Vishnu Galvan MD as Consulting Physician (Cardiology)  Dorian Beth MD as Consulting Physician (Hematology and Oncology)      Subjective   \" Little bit of pain in my chest\"    Interval History:   5/22/25 EGD (Dr Villafana) anastomotic stricture with mucosal irregularity status post biopsy.  Status post [place] Gurwinder esophagectomy.  20 mm x 10 mm Axios stent placed.  EGD in 3 months for removal and evaluation.  Has been able to tolerate full liquid diet without any nausea or vomiting.  Is complaining of some diarrhea with 2 bowel movements since midnight.  No blood or black in stool.  Mild reflux.      ROS:   No chest pain, shortness of breath, or cough.         Medication Review:     Current Facility-Administered Medications:     acetaminophen (TYLENOL) tablet 650 mg, 650 mg, Oral, Q6H PRN, Tino Villafana MD    sennosides-docusate (PERICOLACE) 8.6-50 MG per tablet 2 tablet, 2 tablet, Oral, BID PRN **AND** polyethylene glycol (MIRALAX) packet 17 g, 17 g, Oral, Daily PRN **AND** bisacodyl (DULCOLAX) EC tablet 5 mg, 5 mg, Oral, Daily PRN **AND** bisacodyl (DULCOLAX) suppository 10 mg, 10 mg, Rectal, Daily PRN, Tino Villafana MD    budesonide (PULMICORT) nebulizer solution 0.5 mg, 0.5 mg, Nebulization, BID - RT, Tino Villafana MD, 0.5 mg at 05/23/25 0801    Calcium Replacement - Follow Nurse / BPA Driven Protocol, , Not Applicable, PRN, Tino Villafana MD    cefTRIAXone (ROCEPHIN) 2,000 mg in sodium chloride 0.9 % 100 mL MBP, 2,000 mg, Intravenous, Q24H, Tino Villafana MD, Last Rate: 200 mL/hr at 05/23/25 0236, 2,000 mg at 05/23/25 0236    clonazePAM (KlonoPIN) tablet 0.25 mg, 0.25 mg, Oral, BID PRN, Tino Villafana MD    dextrose (D50W) (25 g/50 mL) IV injection 25 g, 25 g, Intravenous, Q15 Min " PRN, Tino Villafana MD    dextrose (GLUTOSE) oral gel 15 g, 15 g, Oral, Q15 Min PRN, Tino Villafana MD    glucagon (GLUCAGEN) injection 1 mg, 1 mg, Intramuscular, Q15 Min PRN, Tino Villafana MD    insulin lispro (HUMALOG/ADMELOG) injection 2-7 Units, 2-7 Units, Subcutaneous, 4x Daily With Meals & Nightly, Sayra Angulo MD    ipratropium-albuterol (DUO-NEB) nebulizer solution 3 mL, 3 mL, Nebulization, 4x Daily - RT, Tino Villafana MD, 3 mL at 05/23/25 0801    levothyroxine (SYNTHROID, LEVOTHROID) tablet 50 mcg, 50 mcg, Oral, Q AM, Tino Villafana MD, 50 mcg at 05/23/25 0512    Magnesium Standard Dose Replacement - Follow Nurse / BPA Driven Protocol, , Not Applicable, PRN, Tino Villafana MD    metoclopramide (REGLAN) injection 10 mg, 10 mg, Intravenous, Q6H, Tino Villafana MD, 10 mg at 05/23/25 0513    nebivolol (BYSTOLIC) tablet 2.5 mg, 2.5 mg, Oral, Q24H, Tino Villafana MD    nitroglycerin (NITROSTAT) SL tablet 0.4 mg, 0.4 mg, Sublingual, Q5 Min PRN, Tino Villafana MD    ondansetron ODT (ZOFRAN-ODT) disintegrating tablet 4 mg, 4 mg, Oral, Q6H PRN **OR** ondansetron (ZOFRAN) injection 4 mg, 4 mg, Intravenous, Q6H PRN, Tino Villafana MD    pantoprazole (PROTONIX) EC tablet 40 mg, 40 mg, Oral, BID AC, Tino Villafana MD, 40 mg at 05/22/25 1815    Phosphorus Replacement - Follow Nurse / BPA Driven Protocol, , Not Applicable, PRN, Tino Villafana MD    Potassium Replacement - Follow Nurse / BPA Driven Protocol, , Not Applicable, PRN, Tino Villafana MD    [COMPLETED] Insert Peripheral IV, , , Once **AND** sodium chloride 0.9 % flush 10 mL, 10 mL, Intravenous, PRN, Tino Villafana MD    sodium chloride 0.9 % flush 10 mL, 10 mL, Intravenous, Q12H, Tino Villafana MD, 10 mL at 05/22/25 2109    sodium chloride 0.9 % flush 10 mL, 10 mL, Intravenous,  JAYCEE, Tino Villafana MD    sodium chloride 0.9 % infusion 40 mL, 40 mL, Intravenous, PRN, Tino Villafana MD    tamsulosin (FLOMAX) 24 hr capsule 0.8 mg, 0.8 mg, Oral, Daily, Tino Villafana MD      Objective resting in bed.  NAD.  No family present.  Room 358.    Vital Signs  Temp:  [97.5 °F (36.4 °C)-98.1 °F (36.7 °C)] 97.9 °F (36.6 °C)  Heart Rate:  [62-91] 76  Resp:  [14-20] 19  BP: (123-153)/(63-82) 133/67  Physical Exam:  General Appearance:    Awake and alert, in no acute distress   Head:    Normocephalic, without obvious abnormality   Eyes:          Conjunctivae normal, anicteric sclerae   Ears:    Hearing intact   Throat:   No oral lesions, no thrush, oral mucosa moist   Neck:   No adenopathy, supple, no JVD   Lungs:       respirations regular, even and unlabored        Abdomen:      soft, non-tender, no rebound or guarding, non-distended, no hepatosplenomegaly   Rectal:     Deferred   Extremities:   No edema, no cyanosis, no redness   Skin:   No bleeding, bruising or rash, no jaundice   Neurologic:   Cranial nerves 2 - 12 grossly intact, no asterixis, sensation   intact        Results Review:    CBC    Results from last 7 days   Lab Units 05/22/25 2239 05/21/25 2341 05/21/25 0441 05/20/25 1704 05/20/25  1213   WBC 10*3/mm3 8.55 9.19 6.21 7.08 6.70   HEMOGLOBIN g/dL 9.5* 9.3* 7.9* 8.7* 8.8*   PLATELETS 10*3/mm3 301 290 254 277 268     CMP   Results from last 7 days   Lab Units 05/22/25 2239 05/21/25 2341 05/21/25 0441 05/20/25 1704 05/20/25  1218   SODIUM mmol/L 139 143 140 140  --    POTASSIUM mmol/L 4.4 3.5 3.7 3.7  --    CHLORIDE mmol/L 105 107 107 105  --    CO2 mmol/L 24.8 23.6 24.3 23.2  --    BUN mg/dL 6* 7* 9 10  --    CREATININE mg/dL 1.15 1.26 1.28* 1.34* 1.40*   GLUCOSE mg/dL 161* 112* 91 121*  --    ALBUMIN g/dL 3.2* 3.3* 3.0* 3.3*  --    BILIRUBIN mg/dL 0.3 0.3 0.6 0.6  --    ALK PHOS U/L 37* 35* 33* <5*  --    AST (SGOT) U/L 14 15 19 15  --    ALT  (SGPT) U/L <5 5 5 <5  --    MAGNESIUM mg/dL 1.9 1.8 1.8  --   --    PHOSPHORUS mg/dL 3.3 2.4* 3.2  --   --      Cr Clearance Estimated Creatinine Clearance: 44.1 mL/min (by C-G formula based on SCr of 1.15 mg/dL).  Coag   Results from last 7 days   Lab Units 05/20/25  1704   INR  1.35*   APTT seconds 31.6     HbA1C   Lab Results   Component Value Date    HGBA1C 7.7 (H) 01/02/2023     Blood Glucose   Glucose   Date/Time Value Ref Range Status   05/22/2025 1720 109 (H) 70 - 105 mg/dL Final     Comment:     Serial Number: [id number]:  [id number]   05/22/2025 1428 117 (H) 70 - 105 mg/dL Final     Comment:     Serial Number: [id number]:  [id number]   05/22/2025 1125 115 (H) 70 - 105 mg/dL Final     Comment:     Serial Number: [id number]:  [id number]   05/22/2025 0825 133 (H) 70 - 105 mg/dL Final     Comment:     Serial Number: [id number]:  [id number]   05/21/2025 2042 114 (H) 70 - 105 mg/dL Final     Comment:     Serial Number: [id number]:  [id number]   05/21/2025 1533 163 (H) 70 - 105 mg/dL Final     Comment:     Serial Number: [id number]:  [id number]   05/21/2025 1147 117 (H) 70 - 105 mg/dL Final     Comment:     Serial Number: [id number]:  [id number]   05/21/2025 0736 83 70 - 105 mg/dL Final     Comment:     Serial Number: [id number]:  [id number]     Infection   Results from last 7 days   Lab Units 05/20/25  1706 05/20/25  1704   BLOODCX  No growth at 2 days No growth at 2 days     UA    Results from last 7 days   Lab Units 05/21/25  1432   NITRITE UA  Negative   WBC UA /HPF 3-5*   BACTERIA UA /HPF None Seen   SQUAM EPITHEL UA /HPF 0-2     Radiology(recent) No radiology results for the last day          Assessment & Plan   Dysphagia  Pneumonia -bilateral bacterial versus aspiration  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 Bradley 5/2017; currently on chemotherapy  S/p pacemaker  HTN  HLD  GERD  DM2  CKD  Parainfluenza positive, strep pneumo  antigen positive    5/22/25 EGD (Dr Villafana) anastomotic stricture with mucosal irregularity status post biopsy.  Status post Noel Gurwinder esophagectomy.  20 mm x 10 mm Axios stent placed.  EGD in 3 months for removal and evaluation.    Plan:   75 y.o. male with PMH distal esophageal adenocarcinoma s/p [place]-Gurwinder esophagectomy with Dr. Bradley in 5/2017, dysphagia due to recurrent esophageal anastomotic stricture. He was seen at oncology center 5/20/25 and was supposed to receive chemotherapy. He was having worsening of shortness of breath so he was sent to ER. He was found to have pneumonia, possibly from aspiration.  GI was consulted for dysphagia.     Patient underwent EGD with stent placement 5/22/2025.  Is having mild chest pain due to stent expansion.  Discussed that this will resolve after stent is fully expanded.  Continue pantoprazole 40 mg twice a day.  Continue full liquid diet.  Continue Reglan.  Head of the bed elevated at all times prevent aspiration.  Repeat EGD in 3 months for stent removal and reevaluation.  I will send a task to the office to make sure he gets scheduled.  Patient is on Rocephin for pneumonia.       Fabiana Stevens, APRN  05/23/25  08:10 EDT

## 2025-05-23 NOTE — CASE MANAGEMENT/SOCIAL WORK
Continued Stay Note  WILFREDO Lemon     Patient Name: Artie Mc  MRN: [id number]  Today's Date: 5/23/2025    Admit Date: 5/20/2025    Plan: Routine home.   Discharge Plan       Row Name 05/23/25 1451       Plan    Plan Routine home.    Patient/Family in Agreement with Plan yes    Plan Comments CM met with patient at bedside with signed IMM letter. Pt worked with physical therapy today and does not have any skilled needs. Monitoring his urination and need for straight cathing. Pt reports he drove himself here and plans to drive home at [place].             Megan Naegele, RN     Office Phone: [phone]  Office Cell: [phone]

## 2025-05-24 ENCOUNTER — READMISSION MANAGEMENT (OUTPATIENT)
Dept: CALL CENTER | Facility: HOSPITAL | Age: 75
End: 2025-05-24
Payer: MEDICARE

## 2025-05-24 VITALS
WEIGHT: 125.88 LBS | RESPIRATION RATE: 16 BRPM | TEMPERATURE: 97.8 F | BODY MASS INDEX: 20.23 KG/M2 | DIASTOLIC BLOOD PRESSURE: 69 MMHG | SYSTOLIC BLOOD PRESSURE: 154 MMHG | HEIGHT: 66 IN | HEART RATE: 70 BPM | OXYGEN SATURATION: 100 %

## 2025-05-24 PROBLEM — J15.4 PNEUMONIA DUE TO STREPTOCOCCUS: Status: ACTIVE | Noted: 2025-05-24

## 2025-05-24 LAB
ALBUMIN SERPL-MCNC: 3.3 G/DL (ref 3.5–5.2)
ALBUMIN/GLOB SERPL: 1.5 G/DL
ALP SERPL-CCNC: 37 U/L (ref 39–117)
ALT SERPL W P-5'-P-CCNC: 5 U/L (ref 1–41)
ANION GAP SERPL CALCULATED.3IONS-SCNC: 8.7 MMOL/L (ref 5–15)
AST SERPL-CCNC: 13 U/L (ref 1–40)
BASOPHILS # BLD AUTO: 0.02 10*3/MM3 (ref 0–0.2)
BASOPHILS NFR BLD AUTO: 0.2 % (ref 0–1.5)
BILIRUB SERPL-MCNC: 0.3 MG/DL (ref 0–1.2)
BUN SERPL-MCNC: 6 MG/DL (ref 8–23)
BUN/CREAT SERPL: 5.1 (ref 7–25)
CALCIUM SPEC-SCNC: 8.6 MG/DL (ref 8.6–10.5)
CHLORIDE SERPL-SCNC: 105 MMOL/L (ref 98–107)
CO2 SERPL-SCNC: 25.3 MMOL/L (ref 22–29)
CREAT SERPL-MCNC: 1.18 MG/DL (ref 0.76–1.27)
DEPRECATED RDW RBC AUTO: 64.3 FL (ref 37–54)
EGFRCR SERPLBLD CKD-EPI 2021: 64 ML/MIN/1.73
EOSINOPHIL # BLD AUTO: 0.08 10*3/MM3 (ref 0–0.4)
EOSINOPHIL NFR BLD AUTO: 1 % (ref 0.3–6.2)
ERYTHROCYTE [DISTWIDTH] IN BLOOD BY AUTOMATED COUNT: 17.3 % (ref 12.3–15.4)
GLOBULIN UR ELPH-MCNC: 2.2 GM/DL
GLUCOSE BLDC GLUCOMTR-MCNC: 167 MG/DL (ref 70–105)
GLUCOSE BLDC GLUCOMTR-MCNC: 87 MG/DL (ref 70–105)
GLUCOSE SERPL-MCNC: 141 MG/DL (ref 65–99)
HCT VFR BLD AUTO: 26.8 % (ref 37.5–51)
HGB BLD-MCNC: 8.5 G/DL (ref 13–17.7)
IMM GRANULOCYTES # BLD AUTO: 0.16 10*3/MM3 (ref 0–0.05)
IMM GRANULOCYTES NFR BLD AUTO: 1.9 % (ref 0–0.5)
LYMPHOCYTES # BLD AUTO: 1.24 10*3/MM3 (ref 0.7–3.1)
LYMPHOCYTES NFR BLD AUTO: 14.8 % (ref 19.6–45.3)
MAGNESIUM SERPL-MCNC: 1.7 MG/DL (ref 1.6–2.4)
MCH RBC QN AUTO: 32.1 PG (ref 26.6–33)
MCHC RBC AUTO-ENTMCNC: 31.7 G/DL (ref 31.5–35.7)
MCV RBC AUTO: 101.1 FL (ref 79–97)
MONOCYTES # BLD AUTO: 0.75 10*3/MM3 (ref 0.1–0.9)
MONOCYTES NFR BLD AUTO: 9 % (ref 5–12)
NEUTROPHILS NFR BLD AUTO: 6.11 10*3/MM3 (ref 1.7–7)
NEUTROPHILS NFR BLD AUTO: 73.1 % (ref 42.7–76)
NRBC BLD AUTO-RTO: 0 /100 WBC (ref 0–0.2)
PHOSPHATE SERPL-MCNC: 2.9 MG/DL (ref 2.5–4.5)
PLATELET # BLD AUTO: 248 10*3/MM3 (ref 140–450)
PMV BLD AUTO: 9.4 FL (ref 6–12)
POTASSIUM SERPL-SCNC: 3.8 MMOL/L (ref 3.5–5.2)
PROT SERPL-MCNC: 5.5 G/DL (ref 6–8.5)
RBC # BLD AUTO: 2.65 10*6/MM3 (ref 4.14–5.8)
SODIUM SERPL-SCNC: 139 MMOL/L (ref 136–145)
WBC NRBC COR # BLD AUTO: 8.36 10*3/MM3 (ref 3.4–10.8)

## 2025-05-24 PROCEDURE — 25010000002 METOCLOPRAMIDE PER 10 MG: Performed by: INTERNAL MEDICINE

## 2025-05-24 PROCEDURE — 63710000001 INSULIN LISPRO (HUMAN) PER 5 UNITS: Performed by: FAMILY MEDICINE

## 2025-05-24 PROCEDURE — 25010000002 CEFTRIAXONE PER 250 MG: Performed by: INTERNAL MEDICINE

## 2025-05-24 PROCEDURE — 94761 N-INVAS EAR/PLS OXIMETRY MLT: CPT

## 2025-05-24 PROCEDURE — 83735 ASSAY OF MAGNESIUM: CPT | Performed by: INTERNAL MEDICINE

## 2025-05-24 PROCEDURE — 85025 COMPLETE CBC W/AUTO DIFF WBC: CPT | Performed by: INTERNAL MEDICINE

## 2025-05-24 PROCEDURE — 94664 DEMO&/EVAL PT USE INHALER: CPT

## 2025-05-24 PROCEDURE — 94799 UNLISTED PULMONARY SVC/PX: CPT

## 2025-05-24 PROCEDURE — 80053 COMPREHEN METABOLIC PANEL: CPT | Performed by: INTERNAL MEDICINE

## 2025-05-24 PROCEDURE — 84100 ASSAY OF PHOSPHORUS: CPT | Performed by: INTERNAL MEDICINE

## 2025-05-24 PROCEDURE — 82948 REAGENT STRIP/BLOOD GLUCOSE: CPT | Performed by: INTERNAL MEDICINE

## 2025-05-24 RX ORDER — TAMSULOSIN HYDROCHLORIDE 0.4 MG/1
0.8 CAPSULE ORAL DAILY
Qty: 30 CAPSULE | Refills: 0 | Status: SHIPPED | OUTPATIENT
Start: 2025-05-25

## 2025-05-24 RX ORDER — PANTOPRAZOLE SODIUM 40 MG/1
40 TABLET, DELAYED RELEASE ORAL
Qty: 60 TABLET | Refills: 0 | Status: SHIPPED | OUTPATIENT
Start: 2025-05-24

## 2025-05-24 RX ORDER — SACCHAROMYCES BOULARDII 250 MG
500 CAPSULE ORAL 2 TIMES DAILY
Qty: 30 CAPSULE | Refills: 0 | Status: SHIPPED | OUTPATIENT
Start: 2025-05-24 | End: 2025-05-24

## 2025-05-24 RX ORDER — SACCHAROMYCES BOULARDII 250 MG
500 CAPSULE ORAL 2 TIMES DAILY
Qty: 30 CAPSULE | Refills: 0 | Status: SHIPPED | OUTPATIENT
Start: 2025-05-24

## 2025-05-24 RX ORDER — PANTOPRAZOLE SODIUM 40 MG/1
40 TABLET, DELAYED RELEASE ORAL
Qty: 60 TABLET | Refills: 0 | Status: SHIPPED | OUTPATIENT
Start: 2025-05-24 | End: 2025-05-24

## 2025-05-24 RX ORDER — TAMSULOSIN HYDROCHLORIDE 0.4 MG/1
0.8 CAPSULE ORAL DAILY
Qty: 30 CAPSULE | Refills: 0 | Status: SHIPPED | OUTPATIENT
Start: 2025-05-25 | End: 2025-05-24

## 2025-05-24 RX ADMIN — ACETAMINOPHEN 650 MG: 325 TABLET, FILM COATED ORAL at 03:17

## 2025-05-24 RX ADMIN — Medication 10 ML: at 09:57

## 2025-05-24 RX ADMIN — INSULIN LISPRO 2 UNITS: 100 INJECTION, SOLUTION INTRAVENOUS; SUBCUTANEOUS at 17:43

## 2025-05-24 RX ADMIN — BUDESONIDE INHALATION 0.5 MG: 0.5 SUSPENSION RESPIRATORY (INHALATION) at 07:18

## 2025-05-24 RX ADMIN — METOCLOPRAMIDE HYDROCHLORIDE 10 MG: 5 INJECTION INTRAMUSCULAR; INTRAVENOUS at 01:04

## 2025-05-24 RX ADMIN — NEBIVOLOL 2.5 MG: 5 TABLET ORAL at 09:56

## 2025-05-24 RX ADMIN — TAMSULOSIN HYDROCHLORIDE 0.8 MG: 0.4 CAPSULE ORAL at 09:56

## 2025-05-24 RX ADMIN — INSULIN LISPRO 2 UNITS: 100 INJECTION, SOLUTION INTRAVENOUS; SUBCUTANEOUS at 12:02

## 2025-05-24 RX ADMIN — METOCLOPRAMIDE HYDROCHLORIDE 10 MG: 5 INJECTION INTRAMUSCULAR; INTRAVENOUS at 05:28

## 2025-05-24 RX ADMIN — IPRATROPIUM BROMIDE AND ALBUTEROL SULFATE 3 ML: .5; 3 SOLUTION RESPIRATORY (INHALATION) at 07:18

## 2025-05-24 RX ADMIN — IPRATROPIUM BROMIDE AND ALBUTEROL SULFATE 3 ML: .5; 3 SOLUTION RESPIRATORY (INHALATION) at 11:36

## 2025-05-24 RX ADMIN — METOCLOPRAMIDE HYDROCHLORIDE 10 MG: 5 INJECTION INTRAMUSCULAR; INTRAVENOUS at 12:02

## 2025-05-24 RX ADMIN — LEVOTHYROXINE SODIUM 50 MCG: 50 TABLET ORAL at 05:28

## 2025-05-24 RX ADMIN — CEFTRIAXONE 2000 MG: 2 INJECTION, POWDER, FOR SOLUTION INTRAMUSCULAR; INTRAVENOUS at 03:13

## 2025-05-24 RX ADMIN — Medication 500 MG: at 09:56

## 2025-05-24 NOTE — DISCHARGE SUMMARY
[facility] Medicine Services  Discharge Summary    Date of Service: 2025  Patient Name: Artie Mc  : 1950  MRN: [id number]    Date of Admission: 2025  Discharge Diagnosis: PNA (pneumonia)  Date of Discharge: 2025  Primary Care Physician: Chanel Carter, DAT      Presenting Problem:   Parainfluenza infection [B34.8]  PNA (pneumonia) [J18.9]  Aspiration pneumonia, unspecified aspiration pneumonia type, unspecified laterality, unspecified part of lung [J69.0]  Dyspnea, unspecified type [R06.00]  Acute on chronic congestive heart failure, unspecified heart failure type [I50.9]    Active and Resolved Hospital Problems:  Active Hospital Problems    Diagnosis POA    **PNA (pneumonia) [J18.9] Yes    Severe protein-calorie malnutrition [E43] Yes    Esophageal dysphagia [R13.19] Unknown      Resolved Hospital Problems   No resolved problems to display.         Hospital Course     HPI:  Artie Mc is a 75 y.o. male with a CMH of [id number] with LVEF of 45 to 50% per echo in 2025, CAD status post CABG, status post pacemaker, metastatic esophageal carcinoma following oncology outpatient last chemotherapy about 2 weeks ago, hypertension, hyperlipidemia, type 2 diabetes, GERD, who presents from oncology office to [facility] on 2025 with shortness of breath.     Patient is currently awake, alert, oriented ×3, and conversational. He reports worsening shortness of breath over the past few days. He was seen at the oncology center today for follow-up and last received chemotherapy approximately two weeks ago. Due to worsening SOB symptoms, he was referred to the hospital for further evaluation. He reports chills and a nonproductive cough but denies chest pain, fever, dysuria, hemoptysis, or signs of GI bleeding.     In the ED, vital stable, afebrile, EKG showing paced rhythm, proBNP 60 654, creatinine of 1.34, glucose 121, calcium 8.3, albumin 3.3, INR  1.35, WBC 7.08, hemoglobin 8.7, , blood cultures pending, parainfluenza positive, x-ray negative for any acute disease, CTA chest negative for PE, patchy airspace consolidation in the dependent lower lobes with area of bronchial wall thickening and endobronchial secretion.  Tree-in-bud infiltrate is present, suspicious for possible aspiration pneumonia.  Started on Zosyn, admitted to medicine team further inpatient management.    Hospital Course:  Multifocal pneumonia, likely secondary to aspiration  Parainfluenza infection  Dysphagia  Metastatic esophageal cancer on XRT and chemotherapy  History of esophageal strictures requiring dilatations  Urinary retention  Severe protein calorie malnutrition  HFpEF  CAD s/p CABG  Status post pacemaker  CKD  Type 2 diabetes mellitus  Hypothyroidism  GERD  Anxiety        Patient not requiring supplemental oxygen. S pneumo and parainfluenza positive.  Treated with IV abx, d/c on augmentin for 2 more days. Legionella and MRSA swabs  negative.  Noted speech evaluation with mild oral and severe esophageal dysphagia.      Patient is s/p EGD and dilatation 5/22/25 with GI, Esophagogastric anastomotic stricture status post stent placement .  Continue PPI twice daily. Will need repeat EGD in 3 months for stent removal.      Patient continues to retain urine. Navarro placed. Seen by urology,  Plan outpatient follow up with Dr. DIANA Kimball, [facility] in 2 weeks, [phone]     PT/OT evaluation noted with safe to d/c home                Day of Discharge     Vital Signs:  Temp:  [97.4 °F (36.3 °C)-98.1 °F (36.7 °C)] 97.8 °F (36.6 °C)  Heart Rate:  [68-95] 70  Resp:  [12-20] 16  BP: (105-156)/(53-73) 154/69    Physical Exam:  Physical Exam   AOx3 NAD  RRR S1-S2 audible  Lung CTA  Abdomen soft nontender       Pertinent  and/or Most Recent Results     LAB RESULTS:      Lab 05/24/25  0050 05/22/25  2239 05/21/25  2341 05/21/25  0441 05/20/25  1711 05/20/25  1704   WBC 8.36 8.55 9.19 6.21   --  7.08   HEMOGLOBIN 8.5* 9.5* 9.3* 7.9*  --  8.7*   HEMATOCRIT 26.8* 29.9* 29.1* 25.0*  --  27.2*   PLATELETS 248 301 290 254  --  277   NEUTROS ABS 6.11 5.96 6.32 3.45  --  4.41   IMMATURE GRANS (ABS) 0.16* 0.23* 0.49* 0.47*  --  0.59*   LYMPHS ABS 1.24 1.36 1.32 1.34  --  1.12   MONOS ABS 0.75 0.92* 1.00* 0.91*  --  0.92*   EOS ABS 0.08 0.05 0.01 0.02  --  0.02   .1* 101.4* 101.0* 100.0*  --  101.1*   LACTATE  --   --   --   --  2.0  --    PROTIME  --   --   --   --   --  16.6*   APTT  --   --   --   --   --  31.6         Lab 05/24/25 0050 05/22/25 2239 05/21/25 2341 05/21/25 0441 05/20/25  1704   SODIUM 139 139 143 140 140   POTASSIUM 3.8 4.4 3.5 3.7 3.7   CHLORIDE 105 105 107 107 105   CO2 25.3 24.8 23.6 24.3 23.2   ANION GAP 8.7 9.2 12.4 8.7 11.8   BUN 6* 6* 7* 9 10   CREATININE 1.18 1.15 1.26 1.28* 1.34*   EGFR 64.0 66.0 59.1* 58.0* 54.9*   GLUCOSE 141* 161* 112* 91 121*   CALCIUM 8.6 8.7 8.5* 8.3* 8.3*   MAGNESIUM 1.7 1.9 1.8 1.8  --    PHOSPHORUS 2.9 3.3 2.4* 3.2  --          Lab 05/24/25 0050 05/22/25 2239 05/21/25 2341 05/21/25 0441 05/20/25  1704   TOTAL PROTEIN 5.5* 5.6* 5.8* 5.1* 5.8*   ALBUMIN 3.3* 3.2* 3.3* 3.0* 3.3*   GLOBULIN 2.2 2.4 2.5 2.1 2.5   ALT (SGPT) 5 <5 5 5 <5   AST (SGOT) 13 14 15 19 15   BILIRUBIN 0.3 0.3 0.3 0.6 0.6   ALK PHOS 37* 37* 35* 33* <5*         Lab 05/20/25  1929 05/20/25  1704   PROBNP  --  6,654.0*   HSTROP T 25* 28*   PROTIME  --  16.6*   INR  --  1.35*                 Brief Urine Lab Results  (Last result in the past 365 days)        Color   Clarity   Blood   Leuk Est   Nitrite   Protein   CREAT   Urine HCG        05/21/25 1432 Yellow   Clear   Negative   Trace   Negative   Trace                 Microbiology Results (last 10 days)       Procedure Component Value - Date/Time    S. Pneumo Ag Urine or CSF - Urine, Urine, Clean Catch [[id number]]  (Abnormal) Collected: 05/20/25 0329    Lab Status: Final result Specimen: Urine, Clean Catch Updated: 05/20/25  2334     Strep Pneumo Ag Positive    Legionella Antigen, Urine - Urine, Urine, Clean Catch [[id number]]  (Normal) Collected: 05/20/25 2257    Lab Status: Final result Specimen: Urine, Clean Catch Updated: 05/20/25 2334     LEGIONELLA ANTIGEN, URINE Negative    MRSA Screen, PCR (Inpatient) - Swab, Nares [[id number]]  (Normal) Collected: 05/20/25 2255    Lab Status: Final result Specimen: Swab from Nares Updated: 05/21/25 0024     MRSA PCR No MRSA Detected    Narrative:      The negative predictive value of this diagnostic test is high and should only be used to consider de-escalating anti-MRSA therapy. A positive result may indicate colonization with MRSA and must be correlated clinically.    Blood Culture - Blood, Arm, Right [[id number]]  (Normal) Collected: 05/20/25 1706    Lab Status: Preliminary result Specimen: Blood from Arm, Right Updated: 05/23/25 1715     Blood Culture No growth at 3 days    Respiratory Panel PCR w/COVID-19(SARS-CoV-2) PAM/JAGJIT/[facility]/PAD/COR/RAUDEL In-House, NP Swab in UTM/VTM, 2 HR TAT - Swab, Nasopharynx [[id number]]  (Abnormal) Collected: 05/20/25 1706    Lab Status: Final result Specimen: Swab from Nasopharynx Updated: 05/20/25 1806     ADENOVIRUS, PCR Not Detected     Coronavirus 229E Not Detected     Coronavirus HKU1 Not Detected     Coronavirus NL63 Not Detected     Coronavirus OC43 Not Detected     COVID19 Not Detected     Human Metapneumovirus Not Detected     Human Rhinovirus/Enterovirus Not Detected     Influenza A PCR Not Detected     Influenza B PCR Not Detected     Parainfluenza Virus 1 Not Detected     Parainfluenza Virus 2 Not Detected     Parainfluenza Virus 3 Detected     Parainfluenza Virus 4 Not Detected     RSV, PCR Not Detected     Bordetella pertussis pcr Not Detected     Bordetella parapertussis PCR Not Detected     Chlamydophila pneumoniae PCR Not Detected     Mycoplasma pneumo by PCR Not Detected    Narrative:      In the setting of a positive respiratory panel with a viral  infection PLUS a negative procalcitonin without other underlying concern for bacterial infection, consider observing off antibiotics or discontinuation of antibiotics and continue supportive care. If the respiratory panel is positive for atypical bacterial infection (Bordetella pertussis, Chlamydophila pneumoniae, or Mycoplasma pneumoniae), consider antibiotic de-escalation to target atypical bacterial infection.    Blood Culture - Blood, Arm, Left [[id number]]  (Normal) Collected: 05/20/25 1704    Lab Status: Preliminary result Specimen: Blood from Arm, Left Updated: 05/23/25 1715     Blood Culture No growth at 3 days            FL ESOPHAGRAM SINGLE CONTRAST  Result Date: 5/23/2025  Impression: Impression: 1. Moderate stenosis near the proximal anastomosis of the gastric pull-through. Electronically Signed: Damon Holman MD  5/23/2025 3:34 PM EDT  Workstation ID: [id number]    CT Angiogram Chest Pulmonary Embolism  Result Date: 5/20/2025  Impression: Impression: 1. No pulmonary emboli are identified. 2. Patchy airspace consolidation in the dependent lower lobes with areas of bronchial wall thickening and endobronchial secretion. Tree-in-bud infiltrate is present. The findings are suspicious for aspiration pneumonia. Electronically Signed: Jero Sims MD  5/20/2025 7:34 PM EDT  Workstation ID: [id number]    XR Chest 1 View  Result Date: 5/20/2025  Impression: Impression: No active disease. Electronically Signed: Jero Sims MD  5/20/2025 5:36 PM EDT  Workstation ID: [id number]      Results for orders placed during the hospital encounter of 03/23/21    Duplex Venous Lower Extremity - Bilateral CAR    Interpretation Summary  · Normal bilateral lower extremity venous duplex scan.      Results for orders placed during the hospital encounter of 03/23/21    Duplex Venous Lower Extremity - Bilateral CAR    Interpretation Summary  · Normal bilateral lower extremity venous duplex scan.      Results for orders placed during the  hospital encounter of 04/17/25    Adult Transthoracic Echo Complete W/ Cont if Necessary Per Protocol    Interpretation Summary    Left ventricular systolic function is low normal. Calculated left ventricular EF = 46.7%    The left ventricular cavity is mild to moderately dilated.    Left ventricular wall thickness is consistent with mild concentric hypertrophy.    Left ventricular diastolic function is consistent with (grade I) impaired relaxation.    The left atrial cavity is mild to moderately dilated.    There is mild calcification of the aortic valve.    Moderate mitral valve regurgitation is present with an eccentric jet noted.    Estimated right ventricular systolic pressure from tricuspid regurgitation is normal (<35 mmHg).    Transthoracic echocardiography reveals mild to moderately dilated LV with mild LVH with paradoxical septal motion and apical motion from RV pacing with overall EF between 40 to 45%  Moderate posteriorly directed eccentric MR noted  Mild TR without pulm hypertension no effusion    Electronically signed by Sumit Crowell MD, 04/17/25, 1:28 PM EDT.      Labs Pending at Discharge:  Pending Results       None            Procedures Performed  Procedure(s):  ESOPHAGOGASTRODUODENOSCOPY with biopsy x 1 area and placement of esophagogastric stent  05/22 8319 Upper GI Endoscopy      Consults:   Consults       Date and Time Order Name Status Description    5/23/2025  7:12 PM Inpatient Urology Consult Completed     5/21/2025 12:27 PM Inpatient Gastroenterology Consult Completed     5/20/2025  7:37 PM Hospitalist (on-call MD unless specified)                Discharge Details        Discharge Medications        New Medications        Instructions Start Date   amoxicillin-clavulanate 875-125 MG per tablet  Commonly known as: AUGMENTIN   1 tablet, Oral, 2 Times Daily      pantoprazole 40 MG EC tablet  Commonly known as: PROTONIX   40 mg, Oral, 2 Times Daily Before Meals      saccharomyces  boulardii 250 MG capsule  Commonly known as: FLORASTOR   500 mg, Oral, 2 Times Daily      tamsulosin 0.4 MG capsule 24 hr capsule  Commonly known as: FLOMAX   0.8 mg, Oral, Daily   Start Date: May 25, 2025            Continue These Medications        Instructions Start Date   clonazePAM 0.5 MG tablet  Commonly known as: KlonoPIN   0.25 mg, 2 Times Daily PRN      levothyroxine 50 MCG tablet  Commonly known as: SYNTHROID, LEVOTHROID   50 mcg, Oral, Daily      metFORMIN 1000 MG tablet  Commonly known as: GLUCOPHAGE   500 mg, 2 Times Daily      nebivolol 5 MG tablet  Commonly known as: BYSTOLIC   2.5 mg, Daily      ondansetron 4 MG tablet  Commonly known as: ZOFRAN   4 mg, Oral, Every 8 Hours PRN             Stop These Medications      doxycycline 100 MG capsule  Commonly known as: VIBRAMYCIN     famotidine 20 MG tablet  Commonly known as: PEPCID              Allergies   Allergen Reactions    Ace Inhibitors Unknown (See Comments)     Unknown reaction      Heparin Other (See Comments)     Fever/chills, weakness in legs    Sulfa Antibiotics Other (See Comments)     Mouth sores    Ciprofloxacin GI Intolerance     Pt reports pain to abd    Empagliflozin Unknown - Low Severity     Other Reaction(s): UTI      per patient report         Discharge Disposition:   Home or Self Care    Diet:  Hospital:  Diet Order   Procedures    Diet: Cardiac, Gastrointestinal; Healthy Heart (2-3 Na+); Fiber-Restricted; Texture: Mechanical Ground (NDD 2); Fluid Consistency: Thin (IDDSI 0)         Discharge Activity:         CODE STATUS:  Code Status and Medical Interventions: CPR (Attempt to Resuscitate); Full Support   Ordered at: 05/20/25 2133     Code Status (Patient has no pulse and is not breathing):    CPR (Attempt to Resuscitate)     Medical Interventions (Patient has pulse or is breathing):    Full Support         Future Appointments   Date Time Provider Department Center   5/27/2025  1:00 PM INF ROOM 21 - CHAIR [facility]    5/29/2025  2:30 PM INF ROOM 30 - CHAIR [facility]   6/17/2025  9:00 AM [facility] PORT CHAIR [facility]   6/17/2025  9:15 AM Dorian Beth MD [facility] ON[facility] NA [facility]   9/9/2025 10:20 AM [facility], [facility] ELLY MCCLAIN [facility]           Time spent on Discharge including face to face service:  40 minutes    Signature: Electronically signed by Ciro Mendoza MD, 05/24/25, 12:06 EDT.  [facility] Hospitalist Team

## 2025-05-24 NOTE — CONSULTS
[facility]      Patient Identification:  NAME:  Artie Mc  Age:  75 y.o.   Sex:  male   :  1950   MRN:  [id number]     Chief complaint: Urinary Retention     History of present illness:      Pt is a 75 y.o. male with a history of BPH w LUTS, known to our practice and followed by Dr. DIANA Kimball.  He is treated for BPH and is on Flomax 0.8mg qd. Urology was consulted for evaluation and treatment of urinary retention.  He has been unable to empty his bladder and required straight catheterization. I discussed anchoring a saldana catheter and he was agreeable.     In hospital:  -AVSS  -WBC - 8.36  -Creat - 1.18  -UCx - strep Pneumonia       Past medical history:  Past Medical History:   Diagnosis Date    B12 deficiency     Blockage of coronary artery of heart     Depression     Disease of thyroid gland     DM (diabetes mellitus)     Dysphagia     Esophageal cancer     GERD (gastroesophageal reflux disease)     HTN (hypertension)     Hyperlipidemia        Past surgical history:  Past Surgical History:   Procedure Laterality Date    ABDOMINAL WALL ABSCESS INCISION AND DRAINAGE  10/22/2018    WITH DEBRIDEMENT  1.5CM X 1.5 CM X 1.5 CM    DR. PURI    BRONCHOSCOPY N/A 2024    Procedure: BRONCHOSCOPY WITH ENDOBRONCHIAL ULTRASOUND, LUNG WASHINGS, AND FINE NEEDLE ASPIRATIONS;  Surgeon: Bonnie Smith MD;  Location: [facility] ENDOSCOPY;  Service: Pulmonary;  Laterality: N/A;    CARDIAC CATHETERIZATION Right 2023    Procedure: Left Heart Cath;  Surgeon: Mazin Simmons MD;  Location: [facility] CATH INVASIVE LOCATION;  Service: Cardiovascular;  Laterality: Right;    CATARACT EXTRACTION      COLONOSCOPY      CORONARY ARTERY BYPASS GRAFT      ENDOSCOPY  2016    ENDOSCOPY N/A 2023    Procedure: ESOPHAGOGASTRODUODENOSCOPY;  Surgeon: Esvin Morgan MD;  Location: [facility] ENDOSCOPY;  Service: Gastroenterology;  Laterality: N/A;  post: anastamosis stricture    ENDOSCOPY N/A  02/09/2023    Procedure: ESOPHAGOGASTRODUODENOSCOPY, non-wire guided balloon dilation (12-14mm) of esophageal stricture;  Surgeon: Tino Villafana MD;  Location: [facility] ENDOSCOPY;  Service: Gastroenterology;  Laterality: N/A;  post: esophageal anastamotic stricture, post surgical changes    ENDOSCOPY N/A 5/22/2025    Procedure: ESOPHAGOGASTRODUODENOSCOPY with biopsy x 1 area and placement of esophagogastric stent;  Surgeon: Tino Villafana MD;  Location: [facility] ENDOSCOPY;  Service: Gastroenterology;  Laterality: N/A;  post op: esophagogastro anastamosis    ESOPHAGECTOMY  05/04/2017    BROOKS PETTY ESOPHAGECTOMY, FEEDING JEJNOSTOMOY, ABDOMINAL AND MEDIASTINAL LYMPH NODE DISECTION, PEDICLED MUSCLE FLAP COVERAGE DR. PURI    ESOPHAGOSCOPY / EGD  07/12/2017    WITH BALLOON DILATION    ESOPHAGOSCOPY / EGD  07/26/2017    WITH BALLOON DILATION    ESOPHAGOSCOPY / EGD  08/11/2017    WITH BALLOON DILATION    ESOPHAGOSCOPY / EGD  08/28/2017    WITH BALLOON DILATION    ESOPHAGOSCOPY / EGD  09/27/2017    WITH BALLOON DILATION    INSERT / REPLACE / REMOVE PACEMAKER      LYMPH NODE BIOPSY  01/30/2019    PACEMAKER IMPLANTATION  11/21/2016       Allergies:  Ace inhibitors, Heparin, Sulfa antibiotics, Ciprofloxacin, and Empagliflozin    Home medications:  Medications Prior to Admission   Medication Sig Dispense Refill Last Dose/Taking    clonazePAM (KlonoPIN) 0.5 MG tablet Take 0.5 tablets by mouth 2 (Two) Times a Day As Needed for Anxiety.  1 5/20/2025 Morning    doxycycline (VIBRAMYCIN) 100 MG capsule Take 1 capsule by mouth 2 (Two) Times a Day.   5/20/2025 Morning    famotidine (PEPCID) 20 MG tablet Take 1 tablet by mouth twice daily 60 tablet 0 5/20/2025 Morning    levothyroxine (SYNTHROID, LEVOTHROID) 50 MCG tablet Take 1 tablet by mouth once daily 30 tablet 0 5/20/2025 Morning    metFORMIN (GLUCOPHAGE) 1000 MG tablet Take 0.5 tablets by mouth 2 (Two) Times a Day.   5/20/2025 Morning    nebivolol  (BYSTOLIC) 5 MG tablet Take 0.5 tablets by mouth Daily.   2025 Morning    ondansetron (ZOFRAN) 4 MG tablet TAKE 1 TABLET BY MOUTH EVERY 8 HOURS AS NEEDED FOR NAUSEA AND VOMITING 90 tablet 2 2025 Morning        Hospital medications:  budesonide, 0.5 mg, Nebulization, BID - RT  cefTRIAXone, 2,000 mg, Intravenous, Q24H  insulin lispro, 2-7 Units, Subcutaneous, 4x Daily With Meals & Nightly  ipratropium-albuterol, 3 mL, Nebulization, 4x Daily - RT  levothyroxine, 50 mcg, Oral, Q AM  metoclopramide, 10 mg, Intravenous, Q6H  nebivolol, 2.5 mg, Oral, Q24H  pantoprazole, 40 mg, Oral, BID AC  saccharomyces boulardii, 500 mg, Oral, BID  sodium chloride, 10 mL, Intravenous, Q12H  tamsulosin, 0.8 mg, Oral, Daily           acetaminophen    senna-docusate sodium **AND** polyethylene glycol **AND** bisacodyl **AND** bisacodyl    Calcium Replacement - Follow Nurse / BPA Driven Protocol    clonazePAM    dextrose    dextrose    glucagon (human recombinant)    Magnesium Standard Dose Replacement - Follow Nurse / BPA Driven Protocol    nitroglycerin    ondansetron ODT **OR** ondansetron    Phosphorus Replacement - Follow Nurse / BPA Driven Protocol    Potassium Replacement - Follow Nurse / BPA Driven Protocol    [COMPLETED] Insert Peripheral IV **AND** sodium chloride    sodium chloride    sodium chloride    Family history:  Family History   Problem Relation Age of Onset    Mental illness Mother     Heart disease Mother     Hypertension Father     Cancer Father     Heart disease Sister     Heart disease Brother        Social history:  Social History     Tobacco Use    Smoking status: Former     Current packs/day: 0.00     Average packs/day: 2.0 packs/day for 50.0 years (100.0 ttl pk-yrs)     Types: Cigarettes     Start date: 1965     Quit date: 2015     Years since quittin.9    Smokeless tobacco: Former     Types: Chew     Quit date: 1989   Vaping Use    Vaping status: Never Used   Substance Use Topics     Alcohol use: No    Drug use: No       Review of systems:      12 point negative except as in HPI    Objective:  TMax 24 hours:   Temp (24hrs), Av.8 °F (36.6 °C), Min:97.4 °F (36.3 °C), Max:98.1 °F (36.7 °C)      Vitals Ranges:   Temp:  [97.4 °F (36.3 °C)-98.1 °F (36.7 °C)] 97.8 °F (36.6 °C)  Heart Rate:  [68-95] 95  Resp:  [12-20] 15  BP: (105-156)/(53-73) 154/69    Intake/Output Last 3 shifts:  I/O last 3 completed shifts:  In: 1575 [P.O.:1475; IV Piggyback:100]  Out: 700 [Urine:700]     Physical Exam:    General Appearance:    Alert, cooperative, NAD   Back:     No CVA tenderness   Lungs:     Respirations unlabored   Abdomen:  :      Soft,  nontender    Navarro catheter anchored and drained clear yellow urine, scrotum wnl   Neuro/Psych:   Orientation intact, mood/affect pleasant       Results review:   I reviewed the patient's new clinical results.    Data review:  Lab Results (last 24 hours)       Procedure Component Value Units Date/Time    POC Glucose 4x Daily Before Meals & at Bedtime [[id number]]  (Normal) Collected: 25    Specimen: Blood Updated: 25     Glucose 87 mg/dL      Comment: Serial Number: [id number]:  [id number]       Phosphorus [[id number]]  (Normal) Collected: 25    Specimen: Blood Updated: 25     Phosphorus 2.9 mg/dL     Magnesium [[id number]]  (Normal) Collected: 25    Specimen: Blood Updated: 25     Magnesium 1.7 mg/dL     Comprehensive Metabolic Panel [[id number]]  (Abnormal) Collected: 25    Specimen: Blood Updated: 25     Glucose 141 mg/dL      BUN 6 mg/dL      Creatinine 1.18 mg/dL      Sodium 139 mmol/L      Potassium 3.8 mmol/L      Chloride 105 mmol/L      CO2 25.3 mmol/L      Calcium 8.6 mg/dL      Total Protein 5.5 g/dL      Albumin 3.3 g/dL      ALT (SGPT) 5 U/L      AST (SGOT) 13 U/L      Alkaline Phosphatase 37 U/L      Total Bilirubin 0.3 mg/dL      Globulin 2.2 gm/dL      A/G Ratio 1.5  g/dL      BUN/Creatinine Ratio 5.1     Anion Gap 8.7 mmol/L      eGFR 64.0 mL/min/1.73     Narrative:      GFR Categories in Chronic Kidney Disease (CKD)              GFR Category          GFR (mL/min/1.73)    Interpretation  G1                    90 or greater        Normal or high (1)  G2                    60-89                Mild decrease (1)  G3a                   45-59                Mild to moderate decrease  G3b                   30-44                Moderate to severe decrease  G4                    15-29                Severe decrease  G5                    14 or less           Kidney failure    (1)In the absence of evidence of kidney disease, neither GFR category G1 or G2 fulfill the criteria for CKD.    eGFR calculation 2021 CKD-EPI creatinine equation, which does not include race as a factor    CBC & Differential [[id number]]  (Abnormal) Collected: 05/24/25 0050    Specimen: Blood Updated: 05/24/25 0103    Narrative:      The following orders were created for panel order CBC & Differential.  Procedure                               Abnormality         Status                     ---------                               -----------         ------                     CBC Auto Differential[[id number]]        Abnormal            Final result                 Please view results for these tests on the individual orders.    CBC Auto Differential [[id number]]  (Abnormal) Collected: 05/24/25 0050    Specimen: Blood Updated: 05/24/25 0103     WBC 8.36 10*3/mm3      RBC 2.65 10*6/mm3      Hemoglobin 8.5 g/dL      Hematocrit 26.8 %      .1 fL      MCH 32.1 pg      MCHC 31.7 g/dL      RDW 17.3 %      RDW-SD 64.3 fl      MPV 9.4 fL      Platelets 248 10*3/mm3      Neutrophil % 73.1 %      Lymphocyte % 14.8 %      Monocyte % 9.0 %      Eosinophil % 1.0 %      Basophil % 0.2 %      Immature Grans % 1.9 %      Neutrophils, Absolute 6.11 10*3/mm3      Lymphocytes, Absolute 1.24 10*3/mm3      Monocytes, Absolute 0.75  10*3/mm3      Eosinophils, Absolute 0.08 10*3/mm3      Basophils, Absolute 0.02 10*3/mm3      Immature Grans, Absolute 0.16 10*3/mm3      nRBC 0.0 /100 WBC     POC Glucose Once [[id number]]  (Abnormal) Collected: 05/23/25 2128    Specimen: Blood Updated: 05/23/25 2129     Glucose 116 mg/dL      Comment: Serial Number: [id number]:  [id number]       POC Glucose 4x Daily Before Meals & at Bedtime [[id number]]  (Normal) Collected: 05/23/25 1715    Specimen: Blood Updated: 05/23/25 1717     Glucose 81 mg/dL      Comment: Serial Number: [id number]:  [id number]       Blood Culture - Blood, Arm, Right [[id number]]  (Normal) Collected: 05/20/25 1706    Specimen: Blood from Arm, Right Updated: 05/23/25 1715     Blood Culture No growth at 3 days    Blood Culture - Blood, Arm, Left [[id number]]  (Normal) Collected: 05/20/25 1704    Specimen: Blood from Arm, Left Updated: 05/23/25 1715     Blood Culture No growth at 3 days    POC Glucose 4x Daily Before Meals & at Bedtime [[id number]]  (Abnormal) Collected: 05/23/25 1214    Specimen: Blood Updated: 05/23/25 1218     Glucose 261 mg/dL      Comment: Serial Number: [id number]:  [id number]       POC Glucose Once [[id number]]  (Abnormal) Collected: 05/23/25 0830    Specimen: Blood Updated: 05/23/25 0832     Glucose 126 mg/dL      Comment: Serial Number: [id number]:  [id number]                Imaging:  Imaging Results (Last 24 Hours)       Procedure Component Value Units Date/Time    FL ESOPHAGRAM SINGLE CONTRAST [[id number]] Collected: 05/23/25 1532     Updated: 05/23/25 1537    Narrative:      FL ESOPHAGRAM SINGLE CONTRAST    Date of Exam: 5/21/2025 1:07 PM EDT    Indication: dysphagia.    Comparison: None available.    Technique:  Patient ingested effervescent crystals followed by high density barium for double contrast imaging of the esophagus. Patient subsequently ingested medium density barium for single-contrast evaluation.    Fluoroscopic Time: 1.6  minutes    Number of Images: 5    Findings:  The patient is status post partial esophagectomy and gastric pull-through. There is moderate narrowing near the proximal anastomosis in the mid chest though contrast does pass into the stomach, though this does appear to be somewhat delayed. There is   normal emptying of the stomach.      Impression:      Impression:    1. Moderate stenosis near the proximal anastomosis of the gastric pull-through.      Electronically Signed: Damon Holman MD    5/23/2025 3:34 PM EDT    Workstation ID: [id number]               Assessment:     Urinary retention  Pneumonia  3.   Hx of Esophageal Cancer   4.   BPH w/ LUTS   5.   CAD     Plan:     - 16 fr coude saldana anchored  - Recommend continuing Flomax 0.8mg qd - continue upon discharge  - VT prior to discharge, if PVR >250 then re anchor saldana catheter, 16 fr coude  - Plan outpatient follow up with Dr. DIANA Kimball, [facility] in 2 weeks, [phone]  - Urology will sign off; please call with any questions/concerns or clinical change     Lynette Rodgers, DAT  05/24/25  08:24 EDT    Plan discussion: The pertinent medical findings were discussed in detail with , who actively participated in the review of available data, the direction of additional testing, and formulation of the care plan. This was discussed with the patient in detail.  The patient's questions were addressed, and they expressed understanding of the proposed management.

## 2025-05-24 NOTE — PLAN OF CARE
Goal Outcome Evaluation:  Pt resting in bed. Pt has no pain or nausea. Alert and oriented x4. Takes meds whole. Pt has saldana cath in place. Will continue to monitor.

## 2025-05-24 NOTE — NURSING NOTE
Discharge instructions gone over with pt and son.  Both able to verbalize an understanding.  Copy of d/c instructions given to pt.  Pt transported per wheelchair to main lobby and assisted into car.

## 2025-05-25 LAB
BACTERIA SPEC AEROBE CULT: NORMAL
BACTERIA SPEC AEROBE CULT: NORMAL

## 2025-05-25 NOTE — OUTREACH NOTE
Prep Survey      Flowsheet Row Responses   [facility]? [facility]   Is LACE score < 7 ? No   Eligibility Readm Mgmt   Discharge diagnosis PNA (pneumonia)   Does the patient have one of the following disease processes/diagnoses(primary or secondary)? Pneumonia   Does the patient have Home health ordered? No   Is there a DME ordered? No   Prep survey completed? Yes            Mitzi HUNTER - Registered Nurse

## 2025-05-28 LAB
LAB AP CASE REPORT: NORMAL
PATH REPORT.FINAL DX SPEC: NORMAL
PATH REPORT.GROSS SPEC: NORMAL

## 2025-05-30 ENCOUNTER — READMISSION MANAGEMENT (OUTPATIENT)
Dept: CALL CENTER | Facility: HOSPITAL | Age: 75
End: 2025-05-30
Payer: MEDICARE

## 2025-05-30 NOTE — OUTREACH NOTE
COPD/PN Week 1 Survey      Flowsheet Row Responses   [facility] patient discharged from? [facility]   Does the patient have one of the following disease processes/diagnoses(primary or secondary)? Pneumonia   Week 1 attempt successful? Yes   Call start time 1048   Call end time 1056   Discharge diagnosis PNA (pneumonia)   Meds reviewed with patient/caregiver? Yes   Is the patient having any side effects they believe may be caused by any medication additions or changes? No   Does the patient have all medications ordered at discharge? Yes   Is the patient taking all medications as directed (includes completed medication regime)? Yes   Does the patient have a primary care provider?  Yes   Does the patient have an appointment with their PCP or specialist within 7 days of discharge? Yes   Has the patient kept scheduled appointments due by today? N/A   Has home health visited the patient within 72 hours of discharge? N/A   Pulse Ox monitoring None   Psychosocial issues? No   Did the patient receive a copy of their discharge instructions? Yes   Nursing interventions Reviewed instructions with patient   What is the patient's perception of their health status since discharge? Improving   Nursing Interventions Nurse provided patient education   Are the patient's immunizations up to date?  No   Nursing interventions Educated on importance of maintaining up to date immunizations as advised by provider, Advised patient to discuss with provider at next visit   Is the patient/caregiver able to teach back the hierarchy of who to call/visit for symptoms/problems? PCP, Specialist, Home health nurse, Urgent Care, ED, 911 Yes   Is the patient/caregiver able to teach back signs and symptoms of worsening condition: Shortness of breath, Chest pain, Fever/chills   Is the patient/caregiver able to teach back importance of completing antibiotic course of treatment? Yes   Week 1 call completed? Yes   Is the patient interested in additional  calls from an ambulatory ? No   Would this patient benefit from a Referral to [facility] Social Work? No   Call end time [id number]            Elaina SIDDIQUI - Registered Nurse

## 2025-06-03 ENCOUNTER — RESULTS FOLLOW-UP (OUTPATIENT)
Dept: ONCOLOGY | Facility: HOSPITAL | Age: 75
End: 2025-06-03
Payer: MEDICARE

## 2025-06-03 ENCOUNTER — HOSPITAL ENCOUNTER (OUTPATIENT)
Dept: ONCOLOGY | Facility: HOSPITAL | Age: 75
Discharge: HOME | End: 2025-06-03
Admitting: STUDENT IN AN ORGANIZED HEALTH CARE EDUCATION/TRAINING PROGRAM
Payer: MEDICARE

## 2025-06-03 VITALS
RESPIRATION RATE: 16 BRPM | DIASTOLIC BLOOD PRESSURE: 55 MMHG | BODY MASS INDEX: 20.49 KG/M2 | SYSTOLIC BLOOD PRESSURE: 92 MMHG | WEIGHT: 127.5 LBS | OXYGEN SATURATION: 98 % | HEIGHT: 66 IN | TEMPERATURE: 97.6 F | HEART RATE: 66 BPM

## 2025-06-03 DIAGNOSIS — C16.0 MALIGNANT NEOPLASM OF CARDIA: Primary | ICD-10-CM

## 2025-06-03 DIAGNOSIS — C15.5 PRIMARY MALIGNANT NEOPLASM OF LOWER THIRD OF ESOPHAGUS: ICD-10-CM

## 2025-06-03 LAB
ALP BLD-CCNC: 33 U/L (ref 53–128)
BASOPHILS # BLD AUTO: 0.04 10*3/MM3 (ref 0–0.2)
BASOPHILS NFR BLD AUTO: 0.9 % (ref 0–1.5)
BUN BLDA-MCNC: 11 MG/DL (ref 7–22)
CALCIUM BLD QL: 8.3 MG/DL (ref 8–10.3)
CHLORIDE BLDA-SCNC: 106 MMOL/L (ref 98–108)
CO2 BLDA-SCNC: 24 MMOL/L (ref 18–33)
CREAT BLDA-MCNC: 1.4 MG/DL (ref 0.6–1.2)
DEPRECATED RDW RBC AUTO: 56.9 FL (ref 37–54)
EGFRCR SERPLBLD CKD-EPI 2021: 52.1 ML/MIN/1.73
EOSINOPHIL # BLD AUTO: 0.02 10*3/MM3 (ref 0–0.4)
EOSINOPHIL NFR BLD AUTO: 0.4 % (ref 0.3–6.2)
ERYTHROCYTE [DISTWIDTH] IN BLOOD BY AUTOMATED COUNT: 15.4 % (ref 12.3–15.4)
GLUCOSE BLDC GLUCOMTR-MCNC: 145 MG/DL (ref 73–118)
HCT VFR BLD AUTO: 25.7 % (ref 37.5–51)
HGB BLD-MCNC: 8.3 G/DL (ref 13–17.7)
LYMPHOCYTES # BLD AUTO: 1.18 10*3/MM3 (ref 0.7–3.1)
LYMPHOCYTES NFR BLD AUTO: 26 % (ref 19.6–45.3)
MCH RBC QN AUTO: 33.2 PG (ref 26.6–33)
MCHC RBC AUTO-ENTMCNC: 32.3 G/DL (ref 31.5–35.7)
MCV RBC AUTO: 102.8 FL (ref 79–97)
MONOCYTES # BLD AUTO: 0.39 10*3/MM3 (ref 0.1–0.9)
MONOCYTES NFR BLD AUTO: 8.6 % (ref 5–12)
NEUTROPHILS NFR BLD AUTO: 2.9 10*3/MM3 (ref 1.7–7)
NEUTROPHILS NFR BLD AUTO: 64.1 % (ref 42.7–76)
PLATELET # BLD AUTO: 176 10*3/MM3 (ref 140–450)
PMV BLD AUTO: 10.4 FL (ref 6–12)
POC ALBUMIN: 2.8 G/L (ref 3.3–5.5)
POC ALT (SGPT): 14 U/L (ref 10–47)
POC AST (SGOT): 32 U/L (ref 11–38)
POC TOTAL BILIRUBIN: 0.7 MG/DL (ref 0.2–1.6)
POC TOTAL PROTEIN: 5.4 G/DL (ref 6.4–8.1)
POTASSIUM BLDA-SCNC: 4.3 MMOL/L (ref 3.6–5.1)
RBC # BLD AUTO: 2.5 10*6/MM3 (ref 4.14–5.8)
SODIUM BLD-SCNC: 140 MMOL/L (ref 128–145)
WBC NRBC COR # BLD AUTO: 4.53 10*3/MM3 (ref 3.4–10.8)

## 2025-06-03 PROCEDURE — 36591 DRAW BLOOD OFF VENOUS DEVICE: CPT

## 2025-06-03 PROCEDURE — 85025 COMPLETE CBC W/AUTO DIFF WBC: CPT | Performed by: STUDENT IN AN ORGANIZED HEALTH CARE EDUCATION/TRAINING PROGRAM

## 2025-06-03 PROCEDURE — 80053 COMPREHEN METABOLIC PANEL: CPT

## 2025-06-03 RX ORDER — SODIUM CHLORIDE 0.9 % (FLUSH) 0.9 %
20 SYRINGE (ML) INJECTION AS NEEDED
Status: DISCONTINUED | OUTPATIENT
Start: 2025-06-03 | End: 2025-06-04 | Stop reason: HOSPADM

## 2025-06-03 RX ORDER — SODIUM CHLORIDE 0.9 % (FLUSH) 0.9 %
20 SYRINGE (ML) INJECTION AS NEEDED
OUTPATIENT
Start: 2025-06-03

## 2025-06-03 NOTE — PROGRESS NOTES
Pt here for Lcv/5fu C8D1. [facility] infusaport accessed. 10cc wasted. CBC/CMP drawn and resulted. Pt discharged from Hospital last Saturday. Pt states he is still very weak. Lynette Ordonez NP notified orders to hold treatment until after seen by NP/MD. Pt notified. Message sent to scheduling to schedule for md/np next week with poss chemo after. Instructions to call pt with information regarding appts. Pt v/u.

## 2025-06-04 DIAGNOSIS — R79.89 ELEVATED TSH: ICD-10-CM

## 2025-06-04 DIAGNOSIS — E03.9 HYPOTHYROIDISM (ACQUIRED): ICD-10-CM

## 2025-06-04 RX ORDER — LEVOTHYROXINE SODIUM 50 UG/1
50 TABLET ORAL DAILY
Qty: 30 TABLET | Refills: 0 | Status: SHIPPED | OUTPATIENT
Start: 2025-06-04

## 2025-06-04 RX ORDER — FAMOTIDINE 20 MG/1
20 TABLET, FILM COATED ORAL 2 TIMES DAILY
Qty: 60 TABLET | Refills: 0 | Status: SHIPPED | OUTPATIENT
Start: 2025-06-04

## 2025-06-05 NOTE — TELEPHONE ENCOUNTER
Spoke with patient advised him that his kidney function was a bit decreased from his baseline and that he should increase his fluid intake to around 64 ounces daily unless otherwise told due to his CHF. Patient stated he was at his kidney doctor a month or so ago and they discontinued his protonix as they thought it could be contributing to this but the hospital put him back on it. Advised patient we would send the lab results to his kidney doctor for them to review.

## 2025-06-09 ENCOUNTER — READMISSION MANAGEMENT (OUTPATIENT)
Dept: CALL CENTER | Facility: HOSPITAL | Age: 75
End: 2025-06-09
Payer: MEDICARE

## 2025-06-09 NOTE — OUTREACH NOTE
COPD/PN Week 2 Survey      Flowsheet Row Responses   [facility] patient discharged from? [facility]   Does the patient have one of the following disease processes/diagnoses(primary or secondary)? Pneumonia   Week 2 attempt successful? Yes   Call start time 1313   Call end time 1314   Discharge diagnosis PNA (pneumonia)   Person spoke with today (if not patient) and relationship patient   Meds reviewed with patient/caregiver? Yes   Does the patient have all medications ordered at discharge? Yes   Is the patient taking all medications as directed (includes completed medication regime)? Yes   Does the patient have a primary care provider?  Yes   Comments regarding PCP Sees pcp today @ 2p   Has home health visited the patient within 72 hours of discharge? N/A   Psychosocial issues? No   Did the patient receive a copy of their discharge instructions? Yes   Nursing interventions Reviewed instructions with patient   What is the patient's perception of their health status since discharge? Improving   Nursing Interventions Nurse provided patient education   Is the patient/caregiver able to teach back the hierarchy of who to call/visit for symptoms/problems? PCP, Specialist, Home health nurse, Urgent Care, ED, 911 Yes   Is the patient able to teach back COPD zones? Yes   Patient reports what zone on this call? Green Zone   Green Zone Reports doing well, Breathing without shortness of breath   Green Zone interventions: Take daily medications   Week 2 call completed? Yes   Graduated Yes   Is the patient interested in additional calls from an ambulatory ? No   Would this patient benefit from a Referral to Amb Social Work? No   Wrap up additional comments patient reports doing well. No concerns or questions noted.   Call end time 1314            Kristal ORTIZ - Registered Nurse

## 2025-06-09 NOTE — PROGRESS NOTES
Hematology-Oncology Follow-up Note       Artie Mc  1950    Primary Care Physician: Chanel Carter APRN  Referring Physician: Chanel Carter APRN    Reason For Visit:  Chief Complaint   Patient presents with    Follow-up     Malignant neoplasm of cardia     Metastatic esophageal cancer  Monitoring for adverse effects related to immunotherapy.  Hypothyroidism      Mr. Mc is a pleasant 75 y.o. gentleman with a history of gastroesophageal reflux disease, diabetes, hypertension, heart block status post pacemaker placement and history of CABG.  He was having symptoms of dysphagia, weight loss since September 2016. He was reporting symptoms of food getting stuck in the lower chest.  He has transitioned himself to a liquid diet.  The patient reports losing about 30 pounds over this duration.  The patient underwent upper GI endoscopy on 12/20/16 with Dr. Esvin Barrera.  Endoscopy showed necrotic, circumferential and moderately obstructive mass at the distal esophagus between 42 cm and 36 cm.      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  There was also evidence of mild subacute inflammation in the duodenum.  Dr. Barrera ordered a CT scan of the chest and abdomen with contrast on 12/20/16.  The scan was done at [facility].  The scan showed a new ill-defined mass in the anterior wall of the distal esophagus at the GE junction measuring 2.3 x 2.2 x 1.6 cm, worrisome for cancer.  There were four subcentimeter liver lesions which had appeared stable in comparison to another CT scan from 2009 and they likely represent small cysts.  There was also mild lymphadenopathy in the gastrohepatic ligament.  There were at least six borderline enlarged lymph nodes measuring up to 1.3 x 1 cm in the region.  The patient is referred to us for further oncological evaluation.    1/5/2017 - The patient presents for initial  consultation.  He reports persistent dysphagia and also has symptoms of regurgitation.  He cannot tolerate solid foods and is dependent on liquid diet such as Ensure.  He also reports fatigue. Symptoms of dysphagia have been present for the past four months.  When he eats any solids, the food gets stuck in the lower chest.    1/5/17 - Vitamin B12 200 (L).  CEA 0.8.  Ferritin 35.  Iron saturation 16% (L), serum iron 56, TIBC 354.  Creatinine 0.9.  LFTs normal.  Folate 10.2.  1/12/17 - PET/CT scan:  Intense abnormal activity corresponding to the region of the GE junction and proximal stomach.  SUV is 11.06.  There is a suggestion of an abnormal mass or abnormal wall thickening in the region measuring 3.9 x 4.8 cm.  No additional abnormalities.  No evidence of metastases or other lymphadenopathy.  Scattered nonspecific subcentimeter lymph nodes involving the mediastinum and within the central upper abdomen.  1/13/17 - Creatinine 0.9.  LFTs normal.    1/17/17 - Patient seen by thoracic surgeon, Dr. Bradley.  PET scan was reviewed.  He has recommended neoadjuvant chemoradiation therapy followed by [place] Gurwinder esophagogastrectomy.  Port-A-Cath is being arranged.    1/20/17 - WBC 6.3, hemoglobin 12.1, platelet count 198,000, MCV 86.4.  1/30/17 - Patient started weekly Carboplatin and Taxol (Carboplatin AUC 2 and Taxol 50 mg/M2).  Patient received Injectafer 750 mg.  WBC 5.8, hemoglobin 12.4, platelet count 244,000.   1/31/17 - Patient seen by Dr. Eduardo Oleary.  The plan was to give 5040 cGy in 28 fractions.   2/6/17 - WBC 4.4, hemoglobin 11.6, platelet count 201,000.  Patient received cycle 2 of weekly Carboplatin and Taxol (Carboplatin AUC 2 and Taxol 50 mg/M2).  2/13/17 - Patient received Carboplatin and Taxol weekly cycle 3.  2/13/17 - Patient started concurrent radiation therapy.  Total planned dose is 4140 cGy.    2/13/17 to 2/15/17 - Patient received 4140 cGy of neoadjuvant radiation.  Radiation was finished on  3/15/17.    2/20/17 - WBC 1.8, hemoglobin 10.6, platelet count 217,000.  Creatinine 0.5. Folate 15 (L).  Ferritin 361.  Haptoglobin 214.  Iron saturation 33%, TIBC 263, serum iron 88.    2/20/17 - Patient received Carboplatin and Taxol weekly cycle 4.   2/27/17 - WBC 3.7, hemoglobin 10.7, platelet count 177,000, MCV 85.3.    3/6/17 - Patient has received 16 out of the 23 planned radiation treatments.  Total planned dose is 4140 cGy.    3/6/17 - WBC 5.4, hemoglobin 11.2, platelet count 113,000.  3/6/17 - Patient received cycle 5 of weekly Carboplatin and Taxol.   3/13/17 - Patient received cycle 6 of weekly Carboplatin and Taxol.  WBC 3.9, hemoglobin 11.3, platelet count 93,000.     3/20/17 - WBC 1.4, hemoglobin 10.0, platelet count 118,000, MCV 86.1   3/27/17 - WBC 3.8, hemoglobin 9.5, platelet count 176,000, MCV 87.3.    4/7/17 - Stress test:  No evidence of reversible myocardial ischemia.  Normal left ventricular ejection fraction of 50%.    4/17/17 - Upper GI endoscopy by Dr. 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  The GE junction adenocarcinoma lesion seems to have a very good response to chemotherapy and radiation.  The lumen was open.  5/1/17 - WBC 7.0, hemoglobin 10.2, platelet count 175,000.    5/4/17 - Patient underwent [place] Gurwinder esophagectomy with pyloroplasty, feeding jejunostomy tube, abdominal and mediastinal lymph node dissection.    Surgical Pathology:  Moderately to poorly differentiated adenocarcinoma measuring 1.3 cm at the GE junction.  Resection margins are negative for malignancy.  Tumor margins are negative.  There is effective treatment response.  No evidence of lymphovascular invasion.  Staging is ypT3,pN1.  One out of fourteen lymph nodes involved.   5/22/17 - WBC 6.8, hemoglobin 10.3, platelet count 312,000.    6/19/17 - WBC 5.7, hemoglobin 10.5, platelet count 204,000, MCV 92.1.    7/6/17 - EGD:  Status post balloon dilation of esophageal  stricture.  7/12/17 - EGD with balloon dilation of esophageal stricture.  7/26/17 - EGD and balloon dilation of esophageal stricture.  7/31/17 - PET/CT scan:  There is mild metabolic activity seen at the thoracic esophagus just at and below the surgical margins.  Some mild wall thickening.  This could be from recent surgery.  A residual cancer seems unlikely.  There is a precarinal lymph node with mild metabolic activity with SUV of 3 measuring 1 x 1.3 cm.  Again this appears to be nonspecific in my opinion.    8/7/17 - WBC 5.9, hemoglobin 11.9, platelet count 171,000, MCV 88.7.    10/19/17 - WBC 7.5, hemoglobin 11.8, platelet count 216,000.    1/8/18 - PET scan:  No evidence of residual disease or recurrent disease.  There is an esophageal stent in the mid esophagus with a large debris air level.  No evidence of any metastases in the chest, abdomen or pelvis.  Mild nodule uptake in the vocal cords with fullness in the right vocal cord.  This could be physiologic.    7/9/18 - CT scan of chest with contrast:  Prominent mediastinal lymph nodes appear stable since February.  Changes of gastric pull-through procedure for esophageal cancer again noted.  Tree-in-bud infiltrates in the left lung base, consistent with infection versus inflammation.    1/10/19 - CT chest, abdomen and pelvis with contrast:  No evidence of mets in the chest; however, interval development of metastatic lymphadenopathy in the left side of the retroperitoneum.  For instance, there is a 17 mm rounded lymph node, 10 mm lymph node and also a 14 mm lymph node.  These are all new.  Stable 9 mm hypodense lesion within the right hepatic lobe, which could reflect hemangioma or complex cyst.    1/30/19 - CT-guided core biopsy of retroperitoneal lymph node:  Poorly differentiated adenocarcinoma consistent with metastases from patient’s known esophageal primary.  CK20 positive, CDX2 positive, cytokeratin 7 negative, TTF-1 negative.    2/1/19 - Creatinine  0.9, BUN 18, calcium 9.3; these are normal.    2/28/19 - [facility] Next Gen sequencing testing on retroperitoneal lymph node specimen:  PD-L1 positive.  CPS = 3.  This implies responsiveness to pembrolizumab.  Mismatch repair status is proficient (no evidence of microsatellite instability).  Tumor mutational burden is low = 6 mutations/Mb.  CDH1 indeterminate.  KRAS mutation positive.  TP53 mutation positive.  CDK6 amplified.  [employer] PD-L1 testing by IHC:  PD-L1 expression 1% positive (CPS =1).  HER2/marvel by IHC is negative.  HER2/marvel by CISH not amplified.  Microsatellite stable tumor.    3/7/19 - WBC 7.3, hemoglobin 12, platelet count 128,000, MCV 92.     3/14/19 - Patient was seen by radiation oncologist, Dr. Chahal.  He has recommended observation versus systemic chemoimmunotherapy as needed.  No plans for radiation therapy.   5/1/19 - CT scan of chest with contrast:  No definitive findings of new metastatic disease in the chest.  Emphysema noted.    5/1/19 - CT abdomen and pelvis:  There is interval progression of metastatic retroperitoneal adenopathy.  Left periaortic adenopathy with lymph node measuring up to 2 cm, previously 1.7 cm.  Another lymph node now measures 2.3, previously 1.4 cm.  New enlarged lymph node on image 147 measures 1.4 cm, previously 0.4 cm.  A 9 mm hypodense right hepatic lobe lesion appears stable.    5/9/19 - Decision made to start patient on immunotherapy Keytruda.    5/9/19 - Vitamin B12 229.  Ferritin 61.  Folate 14.3.  Iron saturation 26%, TIBC 304, serum iron 79.  5/31/19 - Patient received Keytruda 200 mg cycle 1, day 1.    5/31/19 - Free T4 0.99.  Creatinine 0.9, BUN 9.  LFTs normal.  TSH 3.99.  Folate 14.3.  Iron saturation 26%.    6/21/2019 patient received Keytruda cycle 2  7/12/2019: Patient received Keytruda cycle 3  8/2/2019 patient received cycle 4 Keytruda WBC 7.2, hemoglobin 11.8 platelet count 163  8/23/2019 creatinine 0.9, LFTs normal, WBC 6.4, hemoglobin 11.5, platelets  179, TSH 2.1   8/23/2019 patient received cycle 5 of Keytruda  9/3/2019 CT chest abdomen and pelvis with contrast: . Positive response to therapy in the abdomen since 05/01/2019. Pathologically enlarged retroperitoneal lymph nodes, predominantly in the left periaortic region, have diminished in size.a 1.3 x 2.1 cm left periaortic node (image 60), previously measured 3.5 x 2.3 cm. A 1.6 cm index node near the level of the left renal vein previously measured 2.0 cm No new or progressive adenopathy. 2. Stable findings in the chest. Noncalcified right upper lobe nodules are unchanged. There is no evidence of new or progressive metastatic disease within the chest. 3. 8 mm indeterminate low-density lesion in the right hepatic lobe, is stable. No new liver lesions.  9/6/2019 WBC 5.6, hemoglobin 11.2, platelets of 192, MCV 91.8  9/13/2019 patient received Keytruda cycle 6, albumin 3.2  10/4/2019 patient received cycle 7 of Keytruda, TSH 4.8, free T4 0.86  10/9/2019 WBC 5.8, hemoglobin 11.7, platelets 174  10/25/2019 patient received cycle 8 of Keytruda.  WBC 5.9, hemoglobin 11.5, platelets 152, creatinine 0.75, cortisol 15.2, TSH 6.48 high , free T4 1.13 normal  11/6/2019 WBC 6.6, hemoglobin 11.8, MCV 91.5, platelets 187  11/15/2019 patient received cycle 9 of Keytruda, WBC 6.6, hemoglobin 11.9, platelets 161, cortisol level 8.86, TSH 2.68, free T3 2.8, free T4 1.5  12/4/2019 WBC 6.9, hemoglobin 12.0, platelets 180, MCV 92.9  12/06/2019-Keytruda Cycle 10  12/27/2019- Cycle 11 LFTs normal, WBC 6.7, hemoglobin 11.4, platelets 168, MCV 93, TSH 2.4,  1/28/2020: CT chest abdomen and pelvis with contrast:  Single (aortic lymph node in the abdominal retroperitoneum has increased.  Rest of the retroperitoneal lymph nodes have decreased in size.  Mediastinal adenopathy appears unchanged.  Noncalcified bilateral pulmonary nodules are stable  1/17/2020 patient received Keytruda cycle 12:.  2/3/2020 WBC 7.1, hemoglobin 12.2, platelets  171  02/10/2020 patient received cycle 13 of Keytruda: WBC 7.3, hemoglobin 12.1, platelets 166, creatinine 0.82  3/6/2020 patient is of Keytruda 200 mg, hemoglobin 10.8  3/27/2020 patient received Keytruda 200 mg, hemoglobin 11.4, TSH 1.95  4/17/2020 patient received Keytruda cycle 16 200 mg, WBC 6.1, hemoglobin 11.6, platelets 150  5/8/2020: Patient received cycle 17 Keytruda, WBC 6.3, hemoglobin 9.6, platelets 137, TSH 2.05, free T4 1.54, creatinine 0.93, LFTs normal,  5/29/2020: Patient received cycle 18 Keytruda, WBC 6.5, hemoglobin 11.7, platelets 129, creatinine 0.83, TSH 1.69  6/26/2020: Patient receiving Keytruda.   7/17/2020: TSH 2.06, WBC 5.7, hemoglobin 11.4, platelets 177, MCV 92.4, Keytruda 200 mg cycle 20  7/27/2020: CT chest abdomen pelvis with contrast: Stable findings in the chest abdomen pelvis since 1/28/2020.  Noncalcified bilateral pulmonary nodules are stable.  Stable retroperitoneal left perinephric adenopathy in the abdomen.  8/7/2020: Patient received cycle 21 of Keytruda  8/28/2020 patient is a cycle 22 of Keytruda.  WBC 6.02, hemoglobin 11.3, platelets 161  9/11/2020 WBC 6.9, hemoglobin 12.4, platelets 192  9/18/2020: Cycle 23 of Keytruda, TSH 1.19  10/9/2020: Cycle 24 of Keytruda, WBC 5.9, hemoglobin 11.9, platelets 178, creatinine 0.84, TSH 2.56, free T3 2.4, CMP normal  10/23/2020: WBC 7.34, hemoglobin 11.6, platelets 175  10/30/2020:.  Keytruda 200 mg  11/20/2020: Keytruda 200 mg  12/11/2020 Keytruda 200 mg.  WBC 5.5, hemoglobin 11.4, platelets 176, creatinine 0.8, LFTs normal, TSH 2.2, cortisol 8.3  12/10/2020: CT chest abdomen and pelvis with contrast: Previously seen left para-aortic lymph nodes within the abdomen are decreased in size.  No pathological lymphadenopathy.  No evidence of residual malignancy..  Small noncalcified pulmonary nodules are stable.  No new nodules.  Stable mediastinal lymph nodes.  3/12/2021: WBC 6.7, hemoglobin 11.3, platelets 17  3/12/2021: CMP normal, TSH  2.9, cortisol 11.95, 1  3/15/2021: CT chest with contrast: Stable findings in the chest with postsurgical changes of esophageal resection and gastric pull-through.  Stable right lower lobe lung nodule measures 11 mm.  Stable size and appearance of the mediastinal and upper abdominal lymph nodes.  3/23/2021: Doppler of upper extremity: Normal.  3/24/2021: CT abdomen and pelvis with contrast: Postoperative changes.  Left periaortic nodes are stable.  4/12/2021: TSH 4.1, cortisol 12.4  6/22/2021: CMP normal,Cortisol 10.08,, WBC 5.8, hemoglobin 11.2, platelets 109, TSH 2.59  7/12/2021: CT scan of the chest abdomen pelvis with contrast: Dominant 10 x 10 left periaortic lymph node is stable since 3/22/2021.  Dominant lymph nodes in the right lower paratracheal and AP window distribution are stable since 3/15/2021.  No evidence of progressive adenopathy in the chest abdomen or pelvis.    01/11/2022 -PET/CT findings consistent with new cedric metastatic disease in the retroperitoneum.  2 new pathologically enlarged hypermetabolic retroperitoneal lymph nodes.  No convincing evidence of recurrent disease within the chest neck pelvis or skeleton.  1/20/2022 -CT-guided needle biopsy consistent with poorly differentiated carcinoma.  CDX2 positive consistent with metastases from known esophageal primary  2/9/2022 - Pembrolizumab complicated with fatigue, joint pains, nausea, diarrhea. Symptoms decrease in intensity over a week.  3/30/2022 -CT chest and pelvis with stable metastatic lymph nodes in the left periaortic retroperitoneum.  No evidence of disease progression or new lesions.  Stable lymphadenopathy in the distal paratracheal mediastinal area  4/13/2022 - Keytruda 200  5/4/2022 - Keytruda 200  Continued keytruda  7/11/2022 - CT abdomen pelvis with decrease in size of left perioaortic lymph node. No other areas of disease.  Continues to be on Keytruda  10/18/2022 - stable CT imaging  1/20/2023 - CT imaging with stable  disease. No significant change.  Patient was hospitalized with esophagitis, subsequent EGD with improvement, no recurrence of disease.  2/21/2023 - Keytruda  CT CAP with increased retroperitoneal adenopathy.   ECHO with EF 35-40% (concern for immunotherapy related )immunotherapy held with ongoing investigation for cause.  Subsequent improvement in EF to 45%  4/4/2023 - CT abdomen pelvis with interval retroperitoneal lymph node enlargement compatible with metastatic adenopathyl.  5/5/2023 - PET CT with mixed response. No clear evidence of progression  6/2/23 -started pembrolizumab 200 mg IV  Posttreatment had symptoms of chest tightness troponin and proBNP negative, echocardiogram with EF 40%  Subsequent cardiac cath with EF down to 30% elevated proBNP, troponin no significant obstructive disease in the graft vessels  Cardiac MRI unable to obtain given pacemaker noncompliant.  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 Two enlarged mediastinal lymph nodes in the precarinal and AP window distributions are unchanged since 4/4/2023. No new or progressive adenopathy in the chest.Stable 3 mm or less noncalcified right lung nodules since 4/4/2023. Surgical changes of distal esophagectomy with gastric pull-through, without evidence of local disease recurrence  8/29/23 - started FOLFOX had significant side effects  9/20/23 - stopped oxaliplatin and continued 5FU only'  11/29/23 - CT CAP with decrease size of retroperitoneal adenopathy, no other concerning findings.  Continues 5FU leucovorin   2/12/24 - PET CT There is some metabolic activity within a mediastinal lymph node which has been suggested with slightly decreased metabolic activity. Whether this is reactive or pathologic is  uncertain. Previously noted abnormal retroperitoneal lymph node is no longer identified continued treatmen  4/10/24 - iron sat 10, ferritin  14, plan for venofer  Continued treatment signatera negative  5/7/24 - CT chest without contrast. Unchanged mildly enlarged right paratracheal lymph lymph node measuring 1.1 cm in short axis (series 2 image 40) and left paratracheal measuring 1.1 cm in short axis (series 2 image 39). No new or progressive lymphadenopathy.  Continued maintenance 5FU  changed frequency to every 3 weeks.   7/11/2024 CT chest with stable lung nodule, mildly prominent precarinal lymph node    7/25/2024:  Bilateral lung, bronchial wash with smears and cytospin:  Acute inflammation, pulmonary macrophages and bronchial epithelial cells    2.   Lymph node, precarinal station 4, fine-needle aspiration with smears and cell block:  Benign bronchial epithelial cells in a background of blood and lymphocytes consistent with lymph node aspirate  No malignancy identified      8/7/24: Seen today for initial evaluation by me.  He was previously being seen by Dr. Elizondo in our practice.  He has underlying history of metastatic esophageal adenocarcinoma and was on maintenance therapy with 5-FU/leucovorin.  His last treatment was on 7/17/2024.  He has intermittent bronchoscopy planned lymph node biopsy due to suspicion for metastatic disease on recent imaging.  This is reported as above.    Patient reported that he has had persistent fatigue with maintenance chemotherapy, also reported having GI side effects, such as nausea and poor appetite with chemo.  However, the patient denied any weight loss.  No other issues presently.    9/11/2024: CT chest/abdomen/pelvis:  Impression:  1. Postsurgical changes from esophagectomy with gastric pull-through. No findings to suggest progression of disease within the thorax.  2. Stable subcentimeter pulmonary nodules measuring up to 4 mm in the right lower lobe, and mildly prominent  precarinal lymph node. Continued attention on follow-up suggested.  3. No findings to suggest progression of disease or metastatic disease within the abdomen or pelvis.  4. Additional findings as given above..      10/23/24: Patient seen today for follow-up visit with restaging scans.  Has ogoing sinus congestion for several months. Some sore throat.some cough, SOB has improved. Has ongoing fatigue, stable.     1/10/25: CT chest/Abdomen/Pelvis W contrast:  Impression:   1. Stable examination of the chest with postsurgical changes from an esophagectomy and gastric pull-through.   2. However, within the abdomen there is a new enhancing lesion in the posterior right hepatic lobe measuring 0.6 cm and interval enlargement of a para-aortic lymph node concerning for disease progression now measuring 1.9 x 1.1 cm.     1/17/2025: Patient seen today for follow-up visit with restaging scans.  No new complaints reported.  Continues to do well clinically    1/28/2025 PET/CT:  Impression:  1. Isolated hypermetabolic left periaortic/retroperitoneal lymph nodes suspicious for metastatic disease in patient with history of esophageal malignancy. While almost certainly esophageal related, nonemergent testicular ultrasound would be reasonable in   a male with retroperitoneal adenopathy to exclude a solid testicular lesion.  2. Mild lower mediastinal adenopathy stable from 2024. No evidence of solid organ metastases.  3. Esophagectomy with gastric pull-through. Linear probably physiologic uptake in the stomach. Differential would include gastritis.  4. Emphysema. Emphysema on CT is an independent risk factor for lung cancer. Low dose lung cancer screening should be considered if patient qualifies based on smoking history or if not already enrolled in a screening program.      2/11/2025: Patient here today for routine follow-up prior to scheduled treatment.  He reports overall tolerating leucovorin and fluorouracil although he did have  mild intermittent diarrhea.  He also reports he has been drinking less fluids because he feels bloated.  He has no new pain.  He noted nausea following his infusion but this was managed with Zofran.    3/18/25: Patient seen today for follow-up prior to cycle 3 maintenance chemotherapy reported having off-and-on diarrhea and progressive fatigue after starting chemotherapy.  Also has intermittent nausea.  Weight/appetite is stable presently.  He reported using Imodium sparingly for his diarrhea    4/11/25: CT Chest Abd Pelvis W contrast:  1. New 6 mm low-density lesion within the spleen. Metastatic disease cannot be excluded.   2. Stable 2.0 x 1.0 cm left periaortic lymph node in the mid abdomen, consistent with metastatic disease, corresponding to the metabolically active node seen on prior PET/CT imaging. No new or progressive adenopathy.   3. Stable 6 mm enhancing focus within the right hepatic lobe since CT abdomen of 1/10/2025. This demonstrated no abnormal FDG uptake on prior PET/CT.   3. Surgical changes of esophagectomy with gastric pull-through. No evidence of new or progressive malignancy within the chest. Small noncalcified nodules measuring 2 to 3 mm are unchanged.     History of Present Illness  4/15/25: The patient presents for follow up with restaging scans.   Significant gastrointestinal discomfort is reported, attributed to the discontinuation of Protonix by his nephrologist due to potential renal complications. A sensation of food impaction in the esophagus is described. This symptom was less pronounced during a chemotherapy hiatus but has since worsened.     Esophageal dilation has been performed several times in the past without relief. There is reluctance towards the placement of a feeding tube. Currently, Zofran is used for nausea management.    Fatigue and a lack of energy are also reported, believed to have intensified since resuming chemotherapy. Energy levels have been fluctuating.not on oral  iron at this time.  He has completed 5 cycles of maintenance 5FU/LV.    5/13/25: pt seen for follow up. Noted to have some productive cough. Patient reported having low grade fever, myalgias and generalized weakness over last few days which he attributed to a viral illness. These symptoms have improved over last couple days and he is starting to have more energy at this time, however not back to his normal baseline yet.    5/20/25 Here for chemotherapy infusion, missed last week due to feeling poorly. I was asked to evaluate patient in infusion area. He reports he feels generally weak.  He denies any fevers chills or diaphoresis.  He reports he was diagnosed with pneumonia yesterday and was prescribed doxycycline of which he took only 1 dose.  He reports shortness of air and a cough productive of yellow sputum.  He denies any difficulty swallowing but reports he spits up a lot and finds it difficult to eat and has lost 12 pounds in less than 3 weeks.  His blood pressure was moderately low at 97/53 .  Normal saline fluid bolus was given here and this is improved to 136/57.  He remains afebrile.  Labs today showed a potassium of 3.3 and he was given IV potassium replacement here.  Given concerns for possible sepsis on chemotherapy and failure to thrive he was advised to go to the emergency department at [facility] to be evaluated with blood cultures, CT chest and abdomen and further evaluation.  Patient agreed.    5/20/2025-5/24/2025: Patient was hospitalized at [facility] for multifocal pneumonia, likely secondary to aspiration.  Parainfluenza infection.  Underwent EGD with dilation on 5/22/2025 with esophogastric of anastomotic stricture noted status post stent placement.  Will need a repeat EGD in 3 months for stent removal.  Also seen by urology for urinary retention that necessitated Navarro catheter placement.  Was to be reassessed in their office 2 weeks postdischarge.  For his pneumonia he was treated with IV  antibiotics followed by oral treatment with Augmentin to complete course.      SUBJECTIVE:  6/10/2025: Artie is here today for his hospital follow-up to assess readiness to resume chemotherapy.  Reviewed hospitalization as above.  He reports that he has a follow-up with gastroenterology scheduled for September 2025.  He has had marked improvement in his food and fluid intake after having his stent placed.  He reports that he already saw urology and has had his Navarro catheter removed this appointment happen today.  He has overall had improvement although he still does not feel 100% he does feel like he is ready for treatment.      Review of Systems   Constitutional:  Positive for fatigue.   HENT: Negative.     Eyes: Negative.    Respiratory: Negative.     Cardiovascular:  Positive for leg swelling (Chronic and unchanged).   Gastrointestinal: Negative.    Endocrine: Negative.    Genitourinary: Negative.    Musculoskeletal: Negative.    Skin: Negative.    Allergic/Immunologic: Negative.    Neurological: Negative.    Hematological: Negative.    Psychiatric/Behavioral: Negative.        Past Medical History:   Diagnosis Date    B12 deficiency     Blockage of coronary artery of heart     Depression     Disease of thyroid gland     DM (diabetes mellitus)     Dysphagia     Esophageal cancer     GERD (gastroesophageal reflux disease)     HTN (hypertension)     Hyperlipidemia        Past Surgical History:   Procedure Laterality Date    ABDOMINAL WALL ABSCESS INCISION AND DRAINAGE  10/22/2018    WITH DEBRIDEMENT  1.5CM X 1.5 CM X 1.5 CM    DR. PURI    BRONCHOSCOPY N/A 7/25/2024    Procedure: BRONCHOSCOPY WITH ENDOBRONCHIAL ULTRASOUND, LUNG WASHINGS, AND FINE NEEDLE ASPIRATIONS;  Surgeon: Bonnie Smith MD;  Location: [facility] ENDOSCOPY;  Service: Pulmonary;  Laterality: N/A;    CARDIAC CATHETERIZATION Right 7/20/2023    Procedure: Left Heart Cath;  Surgeon: Mazin Simmons MD;  Location: [facility] CATH INVASIVE LOCATION;   Service: Cardiovascular;  Laterality: Right;    CATARACT EXTRACTION  2018    COLONOSCOPY      CORONARY ARTERY BYPASS GRAFT  2015    ENDOSCOPY  12/20/2016    ENDOSCOPY N/A 01/02/2023    Procedure: ESOPHAGOGASTRODUODENOSCOPY;  Surgeon: Esvin Morgan MD;  Location: [facility] ENDOSCOPY;  Service: Gastroenterology;  Laterality: N/A;  post: anastamosis stricture    ENDOSCOPY N/A 02/09/2023    Procedure: ESOPHAGOGASTRODUODENOSCOPY, non-wire guided balloon dilation (12-14mm) of esophageal stricture;  Surgeon: Tino Villafana MD;  Location: [facility] ENDOSCOPY;  Service: Gastroenterology;  Laterality: N/A;  post: esophageal anastamotic stricture, post surgical changes    ENDOSCOPY N/A 5/22/2025    Procedure: ESOPHAGOGASTRODUODENOSCOPY with biopsy x 1 area and placement of esophagogastric stent;  Surgeon: Tino Villafana MD;  Location: [facility] ENDOSCOPY;  Service: Gastroenterology;  Laterality: N/A;  post op: esophagogastro anastamosis    ESOPHAGECTOMY  05/04/2017    BROOKS PETTY ESOPHAGECTOMY, FEEDING JEJNOSTOMOY, ABDOMINAL AND MEDIASTINAL LYMPH NODE DISECTION, PEDICLED MUSCLE FLAP COVERAGE DR. PURI    ESOPHAGOSCOPY / EGD  07/12/2017    WITH BALLOON DILATION    ESOPHAGOSCOPY / EGD  07/26/2017    WITH BALLOON DILATION    ESOPHAGOSCOPY / EGD  08/11/2017    WITH BALLOON DILATION    ESOPHAGOSCOPY / EGD  08/28/2017    WITH BALLOON DILATION    ESOPHAGOSCOPY / EGD  09/27/2017    WITH BALLOON DILATION    INSERT / REPLACE / REMOVE PACEMAKER      LYMPH NODE BIOPSY  01/30/2019    PACEMAKER IMPLANTATION  11/21/2016       Current Outpatient Medications:     clonazePAM (KlonoPIN) 0.5 MG tablet, Take 0.5 tablets by mouth 2 (Two) Times a Day As Needed for Anxiety., Disp: , Rfl: 1    famotidine (PEPCID) 20 MG tablet, Take 1 tablet by mouth 2 (Two) Times a Day., Disp: 60 tablet, Rfl: 0    levothyroxine (SYNTHROID, LEVOTHROID) 50 MCG tablet, Take 1 tablet by mouth Daily., Disp: 30 tablet, Rfl: 0    metFORMIN (GLUCOPHAGE)  1000 MG tablet, Take 0.5 tablets by mouth 2 (Two) Times a Day., Disp: , Rfl:     nebivolol (BYSTOLIC) 5 MG tablet, Take 0.5 tablets by mouth Daily., Disp: , Rfl:     ondansetron (ZOFRAN) 4 MG tablet, TAKE 1 TABLET BY MOUTH EVERY 8 HOURS AS NEEDED FOR NAUSEA AND VOMITING, Disp: 90 tablet, Rfl: 2    saccharomyces boulardii (FLORASTOR) 250 MG capsule, Take 2 capsules by mouth 2 (Two) Times a Day., Disp: 30 capsule, Rfl: 0    tamsulosin (FLOMAX) 0.4 MG capsule 24 hr capsule, Take 2 capsules by mouth Daily., Disp: 30 capsule, Rfl: 0    pantoprazole (PROTONIX) 40 MG EC tablet, Take 1 tablet by mouth 2 (Two) Times a Day Before Meals. (Patient not taking: Reported on 6/10/2025), Disp: 60 tablet, Rfl: 0  No current facility-administered medications for this visit.    Facility-Administered Medications Ordered in Other Visits:     dexAMETHasone (DECADRON) injection 12 mg, 12 mg, Intravenous, Once, Dorian Beth MD    fluorouracil (ADRUCIL) 4,220 mg in sodium chloride 0.9 % 94 mL chemo infusion - FOR HOME USE, 2,400 mg/m2 (Treatment Plan Recorded), Intravenous, Once, Lynette Ordonez APRN    fluorouracil (ADRUCIL) chemo injection 700 mg, 400 mg/m2 (Treatment Plan Recorded), Intravenous, Once, Lynette Ordonez APRN    leucovorin 700 mg in sodium chloride 0.9 % 285 mL IVPB, 400 mg/m2 (Treatment Plan Recorded), Intravenous, Once, Lynette Ordonez APRN    Allergies   Allergen Reactions    Ace Inhibitors Unknown (See Comments)     Unknown reaction      Heparin Other (See Comments)     Fever/chills, weakness in legs    Sulfa Antibiotics Other (See Comments)     Mouth sores    Ciprofloxacin GI Intolerance     Pt reports pain to abd    Empagliflozin Unknown - Low Severity     Other Reaction(s): UTI      per patient report       Family History   Problem Relation Age of Onset    Mental illness Mother     Heart disease Mother     Hypertension Father     Cancer Father     Heart disease Sister     Heart disease  "Brother      Cancer-related family history includes Cancer in his father.    Social History     Tobacco Use    Smoking status: Former     Current packs/day: 0.00     Average packs/day: 2.0 packs/day for 50.0 years (100.0 ttl pk-yrs)     Types: Cigarettes     Start date: 1965     Quit date: 2015     Years since quittin.9    Smokeless tobacco: Former     Types: Chew     Quit date: 1989   Vaping Use    Vaping status: Never Used   Substance Use Topics    Alcohol use: No    Drug use: No       Objective:    Vitals:    06/10/25 1059   BP: 118/68   Pulse: 75   Temp: 98.3 °F (36.8 °C)   SpO2: 100%   Weight: 60.8 kg (134 lb)   Height: 167.6 cm (65.98\")   PainSc: 7    PainLoc: Back       (1) Restricted in physically strenuous activity, ambulatory and able to do work of light nature    Physical Exam:     Physical Exam   Constitutional:  Non-toxic appearance. He appears ill (Chronically). No distress.   HENT:   Head: Normocephalic and atraumatic.   Eyes: No scleral icterus.   Cardiovascular: Normal rate.   Pulmonary/Chest: Effort normal. No respiratory distress.   Abdominal: Soft. He exhibits no distension.   Musculoskeletal: Normal range of motion.      Right lower leg: Edema present.      Left lower leg: Edema present.   Neurological: He is alert.   Skin: Skin is warm. No bruising and no rash noted. No erythema. No jaundice or pallor.   Psychiatric: His behavior is normal. Mood normal.   Vitals reviewed.      Lab Results - Last 18 Months   Lab Units 06/10/25  1046 25  1140 25  0050   WBC 10*3/mm3 5.12 4.53 8.36   HEMOGLOBIN g/dL 8.3* 8.3* 8.5*   HEMATOCRIT % 26.1* 25.7* 26.8*   PLATELETS 10*3/mm3 166 176 248   MCV fL 102.8* 102.8* 101.1*     Lab Results - Last 18 Months   Lab Units 06/10/25  1102 25  1148 25  0050 25  2239 25  2341   SODIUM mmol/L  --   --  139 139 143   POTASSIUM mmol/L  --   --  3.8 4.4 3.5   CHLORIDE mmol/L  --   --  105 105 107   CO2 mmol/L  --   --  " 25.3 24.8 23.6   BUN mg/dL  --   --  6* 6* 7*   CREATININE mg/dL 1.30* 1.40* 1.18 1.15 1.26   CALCIUM mg/dL  --   --  8.6 8.7 8.5*   BILIRUBIN mg/dL  --   --  0.3 0.3 0.3   ALK PHOS U/L  --   --  37* 37* 35*   ALT (SGPT) U/L  --   --  5 <5 5   AST (SGOT) U/L  --   --  13 14 15   GLUCOSE mg/dL  --   --  141* 161* 112*     CASE SUMMARY:  Patient has a history of GE junction poorly differentiated adenocarcinoma for which he had chemoradiation followed by Noel Gurwinder resection:  Pathology showed ypT3,pN1 disease.  Tumor was Stage IIIA, diagnosed in 2016.  He received concurrent chemoradiation with weekly Carboplatin and Taxol and radiation finishing on 3/15/17.  CT scan on 1/10/19 showed new retroperitoneal lymph nodes.  These lymph nodes were biopsied on 1/30/19 and it shows metastatic esophageal cancer.  [employer] Next Gen testing was performed on the sample and it shows KRAS mutation, microsatellite stable tumor.  PD-L1 is positive.  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  HER2/marvel (ERBB2) was not amplified and was negative.  His CT scan on 5/1/19 shows evidence of progression.  Patient has started receiving Keytruda on 5/31/19.  He has received 19 cycles so far.   CT scan on 1/28/2020 shows improvement and continued response.  CT scan chest abdomen pelvis with contrast on 12/10/2020 does not show any evidence of disease progression.  On 12/22/2020 decision made to hold Keytruda per patient's request to get a treatment break.. Repeat CT scan chest 7/12/2021 does not show any evidence of disease progression.  Now CT imaging in January 2021 with a CT showing increased abdominal left para-aortic adenopathy.  Subsequent PET/CT with new cedric metastatic disease in the retroperitoneum of the abdomen.  New pathologically enlarged hypermetabolic retroperitoneal lymph nodes.  This would be concerning for disease recurrence/progression.  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  I discussed with him the treatment options  going forward.  He has had a good response with immunotherapy pembrolizumab in the past. This was  restarted. subsequent imaging with treatment response. 1/2023 imaging with good response. Continued treatment  Now with increased adenopathy 2 new lymph nodes. Holding immunotherapy for now.   Consider getting cardiac MR discussed with Dr. Culp. While immunotherapy on hold, increased adenopathy is concerning. Could be pseudoprogression vs true progression. PET CT reviewed could indicate pseudoprogression since he has had a good response to immunotherapy  immunotherapy was restarted and has been on hold with patient's symptomatology cardiac MRI cannot be obtained, no ischemic heart disease leading to cardiomyopathy this most likely is related to Keytruda.  We will stop Keytruda going forward  CT imaging with stable disease, FOLFOX was started which he had significant side effects  With imaging being stable and significant side effects with FOLFOX, we discussed  about options with wait and watch vs 5FU only, patient prefers to be on maintenance.   We started 5FU only .   Tolerating relatively well, has significant fatigue. CT imaging now with ongoing response. We discussed that he would have been on treatment for 2 years following recurrence with good response. We can continue treatment now and get a PET CT in 2/2024.will repeat prior to follow up. , PET with some residual activity in the mediastinal lymph node.   We continued treatment and now signatera negative. I had a long discussion with the patient. We decided to repeat CT chest now and if no adenopathy, consider holding treatment.   Now CT chest repeated with persistent adenopathy. Discussed biopsy/mediastinoscopy vs continued treatment. Has fairly been tolerating well except fatigue. After discussion we decided to continue chemotherapy.  Now repeat CT imaging with no new concerning findings there is stable nodules which are subcentimeter in the lungs there is  mildly prominent precarinal lymph node.  I discussed with the patient about this possibly being inflammatory rather than neoplastic.    -After discussion we decided to proceed with EBUS and possible biopsy of the lymph node if negative for malignancy we can consider stopping maintenance treatment given Signatera is negative and patient has treatment related side effects which are affecting his quality of life  -Bronchoscopy and precarinal lymph node biopsy reported negative as above.  -Repeat Signatera test also reported negative.  -In view of significant treatment-related toxicities and no concerns for disease progression at this time, we will plan for surveillance alone without additional systemic therapy.  -Repeat imaging with an interval enlargement of periaortic lymph node as well as uptrending Signatera CT DNA levels concerning for disease recurrence.  Given low disease burden, patient to resume 5-FU with leucovorin maintenance (1/28/2025).  Consider adding irinotecan if noted disease progression moving forward.      Assessment & Plan        Assessment & Plan     Assessment:  Metastatic esophageal carcinoma:    -Patient currently on surveillance and is off treatment  -Restaging scans from 9/11/2024 as above, reported PAULIE.  -Repeat CT chest abdomen pelvis reviewed, noted interval enlargement of a   para-aortic lymph node concerning for disease progression now measuring 1.9 x 1.1 cm.   -Also noted to have uptrending Signatera CT DNA levels.  Discussed with patient that these findings are consistent with disease recurrence.  -Given low disease burden at this time, will resume 5-FU/leucovorin maintenance, can consider adding irinotecan if noted disease progression moving forward  -PET/CT completed on 1/28/2025 as above, consistent with disease progression. this was discussed with patient today.  -Continue maintenance 5-FU/leucovorin. Restaging scans reviwed, noted new splenic lesion but otherwise not concerning  for progressive disease. Continue current therapy.        -Signetera ctDNA level has trended down. This was discussed with patient       - Proceed with leucovorin 5-FU today  - Restaging CT chest abdomen and pelvis with contrast ordered for July 2025    Possible myocarditis  Symptoms started after last immunotherapy  Treatment on hold for now, symptoms have improved, cardiac markers were negative, echocardiogram with EF 40%, cardiac MRI cannot be obtained cardiac cath with EF down to 30% management per cardiology  Now EF up to 40-45% monitor.  Symptomatic improvement now no shortness of breath    Shortness of breath  Improved, has occasional shortness of breath, likely copd  I have recommended using albuterol  Symptoms have improved since stopping immunotherapy. Questionable pneumonitis will monitor closely with immunotherapy  Cardiology has evaluated for myocarditis related to immunotherapy symptomatic improvement now continue to monitor. Symptomatic improvement as above.    Reflux/heartburn/dysphagia:   Symptoms improved.  Also has a history of strictures. Status post EGD and balloon dilation.   Continues to be on pantoprazole symptoms stable.  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  Continues on Synthroid stable now no heart function stable in normal range    Mild diarrhea:  diarrhea seems to be waxing and waning in nature.  Diarrhea related to immunotherapy now improved stable.    Patient reports mild diarrhea following cycle 1 of leucovorin and fluorouracil.  He has not been taking any Imodium.    PD-L1 positive, HER2 negative, p53 and KRAS positive.  Repeat [employer] testing with repeat biopsy with no target mutations.    B12 deficiency: Continue B12    Anemia - Hb  was low iron sat low at 10, given venofer 300 x3 monitor for now. Intolerant to oral iron.  Continued low iron saturation will plan  on Feraheme for 2 doses. Hemoglobin remains stable.    Fatigue.  Fatigue may be attributed to the ongoing chemotherapy treatment and slightly low hemoglobin levels. The patient is not currently taking any iron supplements due to stomach issues but had an iron infusion in the past. Adequate hydration by drinking plenty of water is advised.    Heartburn:  Dysphagia:  Significant stomach pain and difficulty eating are reported. He is off Protonix due to worsning renal function. Pepcid will be prescribed to manage stomach issues. The patient is advised to schedule an appointment with his gastroenterologist for further evaluation and potential esophageal dilation. If the scope reveals tumor growth causing esophageal narrowing, alternative treatment options, including the possibility of a feeding tube, will be considered.    SORIN on CKD,:  Limited oral intake:  -Counseled patient on importance of oral intake and nutrition while on chemotherapy  -Continue follow-up with nephrology for his underlying CKD    Follow-up with Dr. Beth in 4 weeks as previously scheduled or sooner if needed

## 2025-06-10 ENCOUNTER — OFFICE VISIT (OUTPATIENT)
Dept: ONCOLOGY | Facility: CLINIC | Age: 75
End: 2025-06-10
Payer: MEDICARE

## 2025-06-10 ENCOUNTER — HOSPITAL ENCOUNTER (OUTPATIENT)
Dept: ONCOLOGY | Facility: HOSPITAL | Age: 75
Discharge: HOME | End: 2025-06-10
Payer: MEDICARE

## 2025-06-10 VITALS
HEART RATE: 75 BPM | DIASTOLIC BLOOD PRESSURE: 68 MMHG | HEIGHT: 66 IN | SYSTOLIC BLOOD PRESSURE: 118 MMHG | TEMPERATURE: 98.3 F | WEIGHT: 134 LBS | OXYGEN SATURATION: 100 % | BODY MASS INDEX: 21.53 KG/M2

## 2025-06-10 DIAGNOSIS — C15.5 PRIMARY MALIGNANT NEOPLASM OF LOWER THIRD OF ESOPHAGUS: ICD-10-CM

## 2025-06-10 DIAGNOSIS — C16.0 MALIGNANT NEOPLASM OF CARDIA: Primary | ICD-10-CM

## 2025-06-10 LAB
ALP BLD-CCNC: 40 U/L (ref 53–128)
BASOPHILS # BLD AUTO: 0.04 10*3/MM3 (ref 0–0.2)
BASOPHILS NFR BLD AUTO: 0.8 % (ref 0–1.5)
BUN BLDA-MCNC: 15 MG/DL (ref 7–22)
CALCIUM BLD QL: 8.5 MG/DL (ref 8–10.3)
CHLORIDE BLDA-SCNC: 111 MMOL/L (ref 98–108)
CO2 BLDA-SCNC: 25 MMOL/L (ref 18–33)
CREAT BLDA-MCNC: 1.3 MG/DL (ref 0.6–1.2)
DEPRECATED RDW RBC AUTO: 54.6 FL (ref 37–54)
EGFRCR SERPLBLD CKD-EPI 2021: 56.9 ML/MIN/1.73
EOSINOPHIL # BLD AUTO: 0.13 10*3/MM3 (ref 0–0.4)
EOSINOPHIL NFR BLD AUTO: 2.5 % (ref 0.3–6.2)
ERYTHROCYTE [DISTWIDTH] IN BLOOD BY AUTOMATED COUNT: 15.1 % (ref 12.3–15.4)
GLUCOSE BLDC GLUCOMTR-MCNC: 151 MG/DL (ref 73–118)
HCT VFR BLD AUTO: 26.1 % (ref 37.5–51)
HGB BLD-MCNC: 8.3 G/DL (ref 13–17.7)
LYMPHOCYTES # BLD AUTO: 1.23 10*3/MM3 (ref 0.7–3.1)
LYMPHOCYTES NFR BLD AUTO: 24 % (ref 19.6–45.3)
MCH RBC QN AUTO: 32.7 PG (ref 26.6–33)
MCHC RBC AUTO-ENTMCNC: 31.8 G/DL (ref 31.5–35.7)
MCV RBC AUTO: 102.8 FL (ref 79–97)
MONOCYTES # BLD AUTO: 0.55 10*3/MM3 (ref 0.1–0.9)
MONOCYTES NFR BLD AUTO: 10.7 % (ref 5–12)
NEUTROPHILS NFR BLD AUTO: 3.17 10*3/MM3 (ref 1.7–7)
NEUTROPHILS NFR BLD AUTO: 62 % (ref 42.7–76)
PLATELET # BLD AUTO: 166 10*3/MM3 (ref 140–450)
PMV BLD AUTO: 10.3 FL (ref 6–12)
POC ALBUMIN: 2.7 G/L (ref 3.3–5.5)
POC ALT (SGPT): 13 U/L (ref 10–47)
POC AST (SGOT): 22 U/L (ref 11–38)
POC TOTAL BILIRUBIN: 0.6 MG/DL (ref 0.2–1.6)
POC TOTAL PROTEIN: 5.2 G/DL (ref 6.4–8.1)
POTASSIUM BLDA-SCNC: 4.4 MMOL/L (ref 3.6–5.1)
RBC # BLD AUTO: 2.54 10*6/MM3 (ref 4.14–5.8)
SODIUM BLD-SCNC: 139 MMOL/L (ref 128–145)
WBC NRBC COR # BLD AUTO: 5.12 10*3/MM3 (ref 3.4–10.8)

## 2025-06-10 PROCEDURE — 85025 COMPLETE CBC W/AUTO DIFF WBC: CPT | Performed by: STUDENT IN AN ORGANIZED HEALTH CARE EDUCATION/TRAINING PROGRAM

## 2025-06-10 PROCEDURE — 80053 COMPREHEN METABOLIC PANEL: CPT

## 2025-06-10 PROCEDURE — 25810000003 SODIUM CHLORIDE 0.9 % SOLUTION: Performed by: STUDENT IN AN ORGANIZED HEALTH CARE EDUCATION/TRAINING PROGRAM

## 2025-06-10 PROCEDURE — 25010000002 LEUCOVORIN CALCIUM PER 50 MG: Performed by: NURSE PRACTITIONER

## 2025-06-10 PROCEDURE — 25010000002 FLUOROURACIL PER 500 MG: Performed by: NURSE PRACTITIONER

## 2025-06-10 PROCEDURE — 25010000002 DEXAMETHASONE PER 1 MG: Performed by: STUDENT IN AN ORGANIZED HEALTH CARE EDUCATION/TRAINING PROGRAM

## 2025-06-10 PROCEDURE — 96375 TX/PRO/DX INJ NEW DRUG ADDON: CPT

## 2025-06-10 PROCEDURE — 96367 TX/PROPH/DG ADDL SEQ IV INF: CPT

## 2025-06-10 PROCEDURE — 25810000003 SODIUM CHLORIDE 0.9 % SOLUTION 250 ML FLEX CONT: Performed by: NURSE PRACTITIONER

## 2025-06-10 PROCEDURE — 25810000003 SODIUM CHLORIDE 0.9 % SOLUTION 500 ML FLEX CONT: Performed by: NURSE PRACTITIONER

## 2025-06-10 PROCEDURE — G0498 CHEMO EXTEND IV INFUS W/PUMP: HCPCS

## 2025-06-10 PROCEDURE — 36591 DRAW BLOOD OFF VENOUS DEVICE: CPT

## 2025-06-10 PROCEDURE — 96409 CHEMO IV PUSH SNGL DRUG: CPT

## 2025-06-10 RX ORDER — FLUOROURACIL 50 MG/ML
400 INJECTION, SOLUTION INTRAVENOUS ONCE
Status: CANCELLED | OUTPATIENT
Start: 2025-06-10

## 2025-06-10 RX ORDER — FLUOROURACIL 50 MG/ML
400 INJECTION, SOLUTION INTRAVENOUS ONCE
Status: COMPLETED | OUTPATIENT
Start: 2025-06-10 | End: 2025-06-10

## 2025-06-10 RX ORDER — SODIUM CHLORIDE 9 MG/ML
20 INJECTION, SOLUTION INTRAVENOUS ONCE
Status: COMPLETED | OUTPATIENT
Start: 2025-06-10 | End: 2025-06-10

## 2025-06-10 RX ORDER — DEXAMETHASONE SODIUM PHOSPHATE 4 MG/ML
12 INJECTION, SOLUTION INTRA-ARTICULAR; INTRALESIONAL; INTRAMUSCULAR; INTRAVENOUS; SOFT TISSUE ONCE
Status: COMPLETED | OUTPATIENT
Start: 2025-06-10 | End: 2025-06-10

## 2025-06-10 RX ADMIN — SODIUM CHLORIDE 20 ML/HR: 9 INJECTION, SOLUTION INTRAVENOUS at 11:55

## 2025-06-10 RX ADMIN — LEUCOVORIN CALCIUM 700 MG: 350 INJECTION, POWDER, LYOPHILIZED, FOR SOLUTION INTRAMUSCULAR; INTRAVENOUS at 12:00

## 2025-06-10 RX ADMIN — FLUOROURACIL 700 MG: 50 INJECTION, SOLUTION INTRAVENOUS at 12:37

## 2025-06-10 RX ADMIN — DEXAMETHASONE SODIUM PHOSPHATE 12 MG: 4 INJECTION, SOLUTION INTRAMUSCULAR; INTRAVENOUS at 11:58

## 2025-06-10 RX ADMIN — FLUOROURACIL 4220 MG: 50 INJECTION, SOLUTION INTRAVENOUS at 12:42

## 2025-06-10 NOTE — PROGRESS NOTES
Pt here for treatment following scheduled visit with Lynette HUNTER NP.  Infusaport previously accessed with blood return noted.   Treatment given per MAR and AVS provided and pt discharged.

## 2025-06-10 NOTE — PROGRESS NOTES
Port accessed and flushed with good blood return noted. 10cc of blood wasted prior to specimen collection. Blood specimen obtained and sent to lab for processing per protocol.  Port flushed with saline and capped.Pt back to waiting room for MD ramt

## 2025-06-12 ENCOUNTER — HOSPITAL ENCOUNTER (OUTPATIENT)
Dept: ONCOLOGY | Facility: HOSPITAL | Age: 75
Discharge: HOME | End: 2025-06-12
Admitting: STUDENT IN AN ORGANIZED HEALTH CARE EDUCATION/TRAINING PROGRAM
Payer: MEDICARE

## 2025-06-12 VITALS
RESPIRATION RATE: 14 BRPM | DIASTOLIC BLOOD PRESSURE: 54 MMHG | BODY MASS INDEX: 21.63 KG/M2 | OXYGEN SATURATION: 96 % | HEART RATE: 88 BPM | SYSTOLIC BLOOD PRESSURE: 105 MMHG | HEIGHT: 66 IN

## 2025-06-12 DIAGNOSIS — C16.0 MALIGNANT NEOPLASM OF CARDIA: Primary | ICD-10-CM

## 2025-06-12 DIAGNOSIS — C15.5 PRIMARY MALIGNANT NEOPLASM OF LOWER THIRD OF ESOPHAGUS: ICD-10-CM

## 2025-06-12 PROCEDURE — 25810000003 SODIUM CHLORIDE 0.9 % SOLUTION: Performed by: STUDENT IN AN ORGANIZED HEALTH CARE EDUCATION/TRAINING PROGRAM

## 2025-06-12 PROCEDURE — 96360 HYDRATION IV INFUSION INIT: CPT

## 2025-06-12 RX ORDER — SODIUM CHLORIDE 0.9 % (FLUSH) 0.9 %
20 SYRINGE (ML) INJECTION AS NEEDED
Status: DISCONTINUED | OUTPATIENT
Start: 2025-06-12 | End: 2025-06-13 | Stop reason: HOSPADM

## 2025-06-12 RX ORDER — SODIUM CHLORIDE 0.9 % (FLUSH) 0.9 %
20 SYRINGE (ML) INJECTION AS NEEDED
OUTPATIENT
Start: 2025-06-12

## 2025-06-12 RX ORDER — SODIUM CHLORIDE 9 MG/ML
500 INJECTION, SOLUTION INTRAVENOUS ONCE
Status: COMPLETED | OUTPATIENT
Start: 2025-06-12 | End: 2025-06-12

## 2025-06-12 RX ADMIN — SODIUM CHLORIDE 500 ML/HR: 0.9 INJECTION, SOLUTION INTRAVENOUS at 13:48

## 2025-06-12 RX ADMIN — Medication 20 ML: at 14:55

## 2025-06-24 ENCOUNTER — HOSPITAL ENCOUNTER (OUTPATIENT)
Dept: ONCOLOGY | Facility: HOSPITAL | Age: 75
Discharge: HOME | End: 2025-06-24
Admitting: PHYSICIAN ASSISTANT
Payer: MEDICARE

## 2025-06-24 VITALS
DIASTOLIC BLOOD PRESSURE: 61 MMHG | BODY MASS INDEX: 22.18 KG/M2 | TEMPERATURE: 96.8 F | HEART RATE: 81 BPM | SYSTOLIC BLOOD PRESSURE: 131 MMHG | RESPIRATION RATE: 16 BRPM | OXYGEN SATURATION: 100 % | WEIGHT: 137.4 LBS

## 2025-06-24 DIAGNOSIS — C15.5 PRIMARY MALIGNANT NEOPLASM OF LOWER THIRD OF ESOPHAGUS: Primary | ICD-10-CM

## 2025-06-24 DIAGNOSIS — C15.5 PRIMARY MALIGNANT NEOPLASM OF LOWER THIRD OF ESOPHAGUS: ICD-10-CM

## 2025-06-24 DIAGNOSIS — C16.0 MALIGNANT NEOPLASM OF CARDIA: ICD-10-CM

## 2025-06-24 DIAGNOSIS — C16.0 MALIGNANT NEOPLASM OF CARDIA: Primary | ICD-10-CM

## 2025-06-24 LAB
ALP BLD-CCNC: 41 U/L (ref 53–128)
BASOPHILS # BLD AUTO: 0.03 10*3/MM3 (ref 0–0.2)
BASOPHILS NFR BLD AUTO: 0.7 % (ref 0–1.5)
BUN BLDA-MCNC: 12 MG/DL (ref 7–22)
CALCIUM BLD QL: 8.1 MG/DL (ref 8–10.3)
CHLORIDE BLDA-SCNC: 110 MMOL/L (ref 98–108)
CO2 BLDA-SCNC: 23 MMOL/L (ref 18–33)
CREAT BLDA-MCNC: 1.2 MG/DL (ref 0.6–1.2)
DEPRECATED RDW RBC AUTO: 50.4 FL (ref 37–54)
EGFRCR SERPLBLD CKD-EPI 2021: 62.7 ML/MIN/1.73
EOSINOPHIL # BLD AUTO: 0.1 10*3/MM3 (ref 0–0.4)
EOSINOPHIL NFR BLD AUTO: 2.3 % (ref 0.3–6.2)
ERYTHROCYTE [DISTWIDTH] IN BLOOD BY AUTOMATED COUNT: 14.6 % (ref 12.3–15.4)
GLUCOSE BLDC GLUCOMTR-MCNC: 162 MG/DL (ref 73–118)
HCT VFR BLD AUTO: 24.1 % (ref 37.5–51)
HGB BLD-MCNC: 7.7 G/DL (ref 13–17.7)
LYMPHOCYTES # BLD AUTO: 0.71 10*3/MM3 (ref 0.7–3.1)
LYMPHOCYTES NFR BLD AUTO: 16.1 % (ref 19.6–45.3)
MCH RBC QN AUTO: 32.2 PG (ref 26.6–33)
MCHC RBC AUTO-ENTMCNC: 32 G/DL (ref 31.5–35.7)
MCV RBC AUTO: 100.8 FL (ref 79–97)
MONOCYTES # BLD AUTO: 0.55 10*3/MM3 (ref 0.1–0.9)
MONOCYTES NFR BLD AUTO: 12.5 % (ref 5–12)
NEUTROPHILS NFR BLD AUTO: 3.01 10*3/MM3 (ref 1.7–7)
NEUTROPHILS NFR BLD AUTO: 68.4 % (ref 42.7–76)
PLATELET # BLD AUTO: 225 10*3/MM3 (ref 140–450)
PMV BLD AUTO: 9.6 FL (ref 6–12)
POC ALBUMIN: 2.9 G/L (ref 3.3–5.5)
POC ALT (SGPT): 11 U/L (ref 10–47)
POC AST (SGOT): 15 U/L (ref 11–38)
POC TOTAL BILIRUBIN: 0.6 MG/DL (ref 0.2–1.6)
POC TOTAL PROTEIN: 5.4 G/DL (ref 6.4–8.1)
POTASSIUM BLDA-SCNC: 4.5 MMOL/L (ref 3.6–5.1)
RBC # BLD AUTO: 2.39 10*6/MM3 (ref 4.14–5.8)
SODIUM BLD-SCNC: 142 MMOL/L (ref 128–145)
WBC NRBC COR # BLD AUTO: 4.4 10*3/MM3 (ref 3.4–10.8)

## 2025-06-24 PROCEDURE — 25810000003 SODIUM CHLORIDE 0.9 % SOLUTION 250 ML FLEX CONT: Performed by: PHYSICIAN ASSISTANT

## 2025-06-24 PROCEDURE — 25810000003 SODIUM CHLORIDE 0.9 % SOLUTION 500 ML FLEX CONT: Performed by: PHYSICIAN ASSISTANT

## 2025-06-24 PROCEDURE — 25010000002 FLUOROURACIL PER 500 MG: Performed by: PHYSICIAN ASSISTANT

## 2025-06-24 PROCEDURE — 25010000002 LEUCOVORIN CALCIUM PER 50 MG: Performed by: PHYSICIAN ASSISTANT

## 2025-06-24 PROCEDURE — 96409 CHEMO IV PUSH SNGL DRUG: CPT

## 2025-06-24 PROCEDURE — 96367 TX/PROPH/DG ADDL SEQ IV INF: CPT

## 2025-06-24 PROCEDURE — 96375 TX/PRO/DX INJ NEW DRUG ADDON: CPT

## 2025-06-24 PROCEDURE — 85025 COMPLETE CBC W/AUTO DIFF WBC: CPT | Performed by: PHYSICIAN ASSISTANT

## 2025-06-24 PROCEDURE — 80053 COMPREHEN METABOLIC PANEL: CPT

## 2025-06-24 PROCEDURE — 25010000002 DEXAMETHASONE PER 1 MG: Performed by: PHYSICIAN ASSISTANT

## 2025-06-24 PROCEDURE — G0498 CHEMO EXTEND IV INFUS W/PUMP: HCPCS

## 2025-06-24 RX ORDER — DEXAMETHASONE SODIUM PHOSPHATE 4 MG/ML
12 INJECTION, SOLUTION INTRA-ARTICULAR; INTRALESIONAL; INTRAMUSCULAR; INTRAVENOUS; SOFT TISSUE ONCE
Status: CANCELLED
Start: 2025-06-24 | End: 2025-06-24

## 2025-06-24 RX ORDER — FLUOROURACIL 50 MG/ML
400 INJECTION, SOLUTION INTRAVENOUS ONCE
Status: COMPLETED | OUTPATIENT
Start: 2025-06-24 | End: 2025-06-24

## 2025-06-24 RX ORDER — SODIUM CHLORIDE 9 MG/ML
20 INJECTION, SOLUTION INTRAVENOUS ONCE
Status: DISCONTINUED | OUTPATIENT
Start: 2025-06-24 | End: 2025-06-25 | Stop reason: HOSPADM

## 2025-06-24 RX ORDER — SODIUM CHLORIDE 9 MG/ML
20 INJECTION, SOLUTION INTRAVENOUS ONCE
Status: CANCELLED | OUTPATIENT
Start: 2025-06-24

## 2025-06-24 RX ORDER — FLUOROURACIL 50 MG/ML
400 INJECTION, SOLUTION INTRAVENOUS ONCE
Status: CANCELLED | OUTPATIENT
Start: 2025-06-24

## 2025-06-24 RX ORDER — DEXAMETHASONE SODIUM PHOSPHATE 4 MG/ML
12 INJECTION, SOLUTION INTRA-ARTICULAR; INTRALESIONAL; INTRAMUSCULAR; INTRAVENOUS; SOFT TISSUE ONCE
Status: COMPLETED | OUTPATIENT
Start: 2025-06-24 | End: 2025-06-24

## 2025-06-24 RX ORDER — SODIUM CHLORIDE 9 MG/ML
500 INJECTION, SOLUTION INTRAVENOUS ONCE
Status: CANCELLED
Start: 2025-06-26 | End: 2025-06-26

## 2025-06-24 RX ADMIN — FLUOROURACIL 4080 MG: 50 INJECTION, SOLUTION INTRAVENOUS at 13:19

## 2025-06-24 RX ADMIN — FLUOROURACIL 700 MG: 50 INJECTION, SOLUTION INTRAVENOUS at 13:15

## 2025-06-24 RX ADMIN — LEUCOVORIN CALCIUM 700 MG: 350 INJECTION, POWDER, LYOPHILIZED, FOR SUSPENSION INTRAMUSCULAR; INTRAVENOUS at 12:15

## 2025-06-24 RX ADMIN — DEXAMETHASONE SODIUM PHOSPHATE 12 MG: 4 INJECTION, SOLUTION INTRAMUSCULAR; INTRAVENOUS at 12:11

## 2025-06-24 NOTE — PROGRESS NOTES
Patient to infusion for C9 leucovorin/5FU. VSS, lab parameters for treatment. Hgb 7.7 today, no reports of bleeding or not symptomatic. He reports feeling better and stronger since being discharged from hospital. Lab parameters met for treatment.     He will return 6/26/25 at 2pm for 5FU pump disconnect with IVF. Printed AVS and discharged home.

## 2025-06-25 NOTE — ADDENDUM NOTE
Encounter addended by: Cara Garcia RN on: 6/25/2025 1:13 PM   Actions taken: Clinical Note Signed, Order list changed

## 2025-06-25 NOTE — PROGRESS NOTES
Per Dr. Beth, recheck CBC on pump dc day. If Hgb better than 7.7, he can go home. If has dropped let Dr. Beth know.

## 2025-06-26 ENCOUNTER — HOSPITAL ENCOUNTER (OUTPATIENT)
Dept: ONCOLOGY | Facility: HOSPITAL | Age: 75
Discharge: HOME | End: 2025-06-26
Admitting: STUDENT IN AN ORGANIZED HEALTH CARE EDUCATION/TRAINING PROGRAM
Payer: MEDICARE

## 2025-06-26 VITALS
SYSTOLIC BLOOD PRESSURE: 142 MMHG | DIASTOLIC BLOOD PRESSURE: 62 MMHG | WEIGHT: 139.4 LBS | OXYGEN SATURATION: 98 % | HEART RATE: 89 BPM | TEMPERATURE: 97.6 F | BODY MASS INDEX: 22.4 KG/M2 | HEIGHT: 66 IN | RESPIRATION RATE: 18 BRPM

## 2025-06-26 DIAGNOSIS — C15.5 PRIMARY MALIGNANT NEOPLASM OF LOWER THIRD OF ESOPHAGUS: ICD-10-CM

## 2025-06-26 DIAGNOSIS — C16.0 MALIGNANT NEOPLASM OF CARDIA: Primary | ICD-10-CM

## 2025-06-26 LAB
BASOPHILS # BLD AUTO: 0.04 10*3/MM3 (ref 0–0.2)
BASOPHILS NFR BLD AUTO: 1.2 % (ref 0–1.5)
DEPRECATED RDW RBC AUTO: 50 FL (ref 37–54)
EOSINOPHIL # BLD AUTO: 0.06 10*3/MM3 (ref 0–0.4)
EOSINOPHIL NFR BLD AUTO: 1.9 % (ref 0.3–6.2)
ERYTHROCYTE [DISTWIDTH] IN BLOOD BY AUTOMATED COUNT: 14.6 % (ref 12.3–15.4)
HCT VFR BLD AUTO: 22.6 % (ref 37.5–51)
HGB BLD-MCNC: 7.4 G/DL (ref 13–17.7)
LYMPHOCYTES # BLD AUTO: 0.91 10*3/MM3 (ref 0.7–3.1)
LYMPHOCYTES NFR BLD AUTO: 28.3 % (ref 19.6–45.3)
MCH RBC QN AUTO: 33 PG (ref 26.6–33)
MCHC RBC AUTO-ENTMCNC: 32.7 G/DL (ref 31.5–35.7)
MCV RBC AUTO: 100.9 FL (ref 79–97)
MONOCYTES # BLD AUTO: 0.22 10*3/MM3 (ref 0.1–0.9)
MONOCYTES NFR BLD AUTO: 6.8 % (ref 5–12)
NEUTROPHILS NFR BLD AUTO: 1.99 10*3/MM3 (ref 1.7–7)
NEUTROPHILS NFR BLD AUTO: 61.8 % (ref 42.7–76)
PLATELET # BLD AUTO: 245 10*3/MM3 (ref 140–450)
PMV BLD AUTO: 9.1 FL (ref 6–12)
RBC # BLD AUTO: 2.24 10*6/MM3 (ref 4.14–5.8)
WBC NRBC COR # BLD AUTO: 3.22 10*3/MM3 (ref 3.4–10.8)

## 2025-06-26 PROCEDURE — 25810000003 SODIUM CHLORIDE 0.9 % SOLUTION: Performed by: PHYSICIAN ASSISTANT

## 2025-06-26 PROCEDURE — 85025 COMPLETE CBC W/AUTO DIFF WBC: CPT | Performed by: STUDENT IN AN ORGANIZED HEALTH CARE EDUCATION/TRAINING PROGRAM

## 2025-06-26 PROCEDURE — 96360 HYDRATION IV INFUSION INIT: CPT

## 2025-06-26 RX ORDER — SODIUM CHLORIDE 0.9 % (FLUSH) 0.9 %
20 SYRINGE (ML) INJECTION AS NEEDED
OUTPATIENT
Start: 2025-06-26

## 2025-06-26 RX ORDER — SODIUM CHLORIDE 0.9 % (FLUSH) 0.9 %
20 SYRINGE (ML) INJECTION AS NEEDED
Status: DISCONTINUED | OUTPATIENT
Start: 2025-06-26 | End: 2025-06-28 | Stop reason: HOSPADM

## 2025-06-26 RX ORDER — SODIUM CHLORIDE 9 MG/ML
500 INJECTION, SOLUTION INTRAVENOUS ONCE
Status: COMPLETED | OUTPATIENT
Start: 2025-06-26 | End: 2025-06-26

## 2025-06-26 RX ADMIN — Medication 20 ML: at 14:42

## 2025-06-26 RX ADMIN — SODIUM CHLORIDE 500 ML/HR: 9 INJECTION, SOLUTION INTRAVENOUS at 13:34

## 2025-06-26 NOTE — PROGRESS NOTES
Pt here for CIV DC, fluids, and CBC recheck. 5FU pump empty and blood return noted. 10 cc of blood wasted prior to collecting blood specimen. Blood specimen collected and sent to lab per protocol. Pt's Hgb today is 7.4. Pt denies any new symptoms and states he feels like he normally does. Dr. Beth notified and wants another CBC recheck in 1 week to see if pt needs a blood transfusion. Pt notified and is aware of this new appointment. IVF given per MAR. Port flushed with NS before needle removed. Pt given AVS and d/c by self.

## 2025-06-27 DIAGNOSIS — C15.5 PRIMARY MALIGNANT NEOPLASM OF LOWER THIRD OF ESOPHAGUS: ICD-10-CM

## 2025-06-27 DIAGNOSIS — D63.0 ANEMIA IN NEOPLASTIC DISEASE: Primary | ICD-10-CM

## 2025-06-30 RX ORDER — FAMOTIDINE 20 MG/1
20 TABLET, FILM COATED ORAL 2 TIMES DAILY
Qty: 60 TABLET | Refills: 0 | Status: SHIPPED | OUTPATIENT
Start: 2025-06-30

## 2025-07-01 ENCOUNTER — TELEPHONE (OUTPATIENT)
Dept: CARDIOLOGY | Facility: CLINIC | Age: 75
End: 2025-07-01
Payer: MEDICARE

## 2025-07-01 ENCOUNTER — HOSPITAL ENCOUNTER (OUTPATIENT)
Dept: ONCOLOGY | Facility: HOSPITAL | Age: 75
Discharge: HOME | End: 2025-07-01
Admitting: STUDENT IN AN ORGANIZED HEALTH CARE EDUCATION/TRAINING PROGRAM
Payer: MEDICARE

## 2025-07-01 DIAGNOSIS — C16.0 MALIGNANT NEOPLASM OF CARDIA: Primary | ICD-10-CM

## 2025-07-01 DIAGNOSIS — C15.5 PRIMARY MALIGNANT NEOPLASM OF LOWER THIRD OF ESOPHAGUS: ICD-10-CM

## 2025-07-01 DIAGNOSIS — E03.9 HYPOTHYROIDISM (ACQUIRED): ICD-10-CM

## 2025-07-01 DIAGNOSIS — R79.89 ELEVATED TSH: ICD-10-CM

## 2025-07-01 DIAGNOSIS — D63.0 ANEMIA IN NEOPLASTIC DISEASE: ICD-10-CM

## 2025-07-01 LAB
BASOPHILS # BLD AUTO: 0.02 10*3/MM3 (ref 0–0.2)
BASOPHILS NFR BLD AUTO: 0.7 % (ref 0–1.5)
DEPRECATED RDW RBC AUTO: 49.6 FL (ref 37–54)
EOSINOPHIL # BLD AUTO: 0.07 10*3/MM3 (ref 0–0.4)
EOSINOPHIL NFR BLD AUTO: 2.5 % (ref 0.3–6.2)
ERYTHROCYTE [DISTWIDTH] IN BLOOD BY AUTOMATED COUNT: 14.3 % (ref 12.3–15.4)
HCT VFR BLD AUTO: 24.7 % (ref 37.5–51)
HGB BLD-MCNC: 7.9 G/DL (ref 13–17.7)
LYMPHOCYTES # BLD AUTO: 0.99 10*3/MM3 (ref 0.7–3.1)
LYMPHOCYTES NFR BLD AUTO: 35.1 % (ref 19.6–45.3)
MCH RBC QN AUTO: 31.9 PG (ref 26.6–33)
MCHC RBC AUTO-ENTMCNC: 32 G/DL (ref 31.5–35.7)
MCV RBC AUTO: 99.6 FL (ref 79–97)
MDC_IDC_MSMT_BATTERY_REMAINING_LONGEVITY: 25 MO
MDC_IDC_MSMT_BATTERY_REMAINING_PERCENTAGE: 21 %
MDC_IDC_MSMT_BATTERY_RRT_TRIGGER: 2.6
MDC_IDC_MSMT_BATTERY_STATUS: NORMAL
MDC_IDC_MSMT_BATTERY_VOLTAGE: 2.9
MDC_IDC_MSMT_LEADCHNL_RA_DTM: NORMAL
MDC_IDC_MSMT_LEADCHNL_RA_IMPEDANCE_VALUE: 330
MDC_IDC_MSMT_LEADCHNL_RA_PACING_THRESHOLD_AMPLITUDE: 0.75
MDC_IDC_MSMT_LEADCHNL_RA_PACING_THRESHOLD_POLARITY: NORMAL
MDC_IDC_MSMT_LEADCHNL_RA_PACING_THRESHOLD_PULSEWIDTH: 0.4
MDC_IDC_MSMT_LEADCHNL_RA_SENSING_INTR_AMPL: 3.6
MDC_IDC_MSMT_LEADCHNL_RV_DTM: NORMAL
MDC_IDC_MSMT_LEADCHNL_RV_IMPEDANCE_VALUE: 450
MDC_IDC_MSMT_LEADCHNL_RV_PACING_THRESHOLD_AMPLITUDE: 0.62
MDC_IDC_MSMT_LEADCHNL_RV_PACING_THRESHOLD_POLARITY: NORMAL
MDC_IDC_MSMT_LEADCHNL_RV_PACING_THRESHOLD_PULSEWIDTH: 0.4
MDC_IDC_MSMT_LEADCHNL_RV_SENSING_INTR_AMPL: 7.4
MDC_IDC_PG_IMPLANT_DTM: NORMAL
MDC_IDC_PG_MFG: NORMAL
MDC_IDC_PG_MODEL: NORMAL
MDC_IDC_PG_SERIAL: NORMAL
MDC_IDC_PG_TYPE: NORMAL
MDC_IDC_SESS_DTM: NORMAL
MDC_IDC_SESS_TYPE: NORMAL
MDC_IDC_SET_BRADY_AT_MODE_SWITCH_RATE: 180
MDC_IDC_SET_BRADY_LOWRATE: 60
MDC_IDC_SET_BRADY_MAX_SENSOR_RATE: 120
MDC_IDC_SET_BRADY_MAX_TRACKING_RATE: 120
MDC_IDC_SET_BRADY_MODE: NORMAL
MDC_IDC_SET_BRADY_PAV_DELAY: 250
MDC_IDC_SET_BRADY_SAV_DELAY: 225
MDC_IDC_SET_LEADCHNL_RA_PACING_AMPLITUDE: 1.75
MDC_IDC_SET_LEADCHNL_RA_PACING_POLARITY: NORMAL
MDC_IDC_SET_LEADCHNL_RA_PACING_PULSEWIDTH: 0.4
MDC_IDC_SET_LEADCHNL_RA_SENSING_POLARITY: NORMAL
MDC_IDC_SET_LEADCHNL_RA_SENSING_SENSITIVITY: 0.3
MDC_IDC_SET_LEADCHNL_RV_PACING_AMPLITUDE: 0.88
MDC_IDC_SET_LEADCHNL_RV_PACING_POLARITY: NORMAL
MDC_IDC_SET_LEADCHNL_RV_PACING_PULSEWIDTH: 0.4
MDC_IDC_SET_LEADCHNL_RV_SENSING_POLARITY: NORMAL
MDC_IDC_SET_LEADCHNL_RV_SENSING_SENSITIVITY: 0.5
MDC_IDC_STAT_AT_BURDEN_PERCENT: 0
MDC_IDC_STAT_BRADY_RA_PERCENT_PACED: 11
MDC_IDC_STAT_BRADY_RV_PERCENT_PACED: 99
MONOCYTES # BLD AUTO: 0.31 10*3/MM3 (ref 0.1–0.9)
MONOCYTES NFR BLD AUTO: 11 % (ref 5–12)
NEUTROPHILS NFR BLD AUTO: 1.43 10*3/MM3 (ref 1.7–7)
NEUTROPHILS NFR BLD AUTO: 50.7 % (ref 42.7–76)
PLATELET # BLD AUTO: 239 10*3/MM3 (ref 140–450)
PMV BLD AUTO: 9.8 FL (ref 6–12)
RBC # BLD AUTO: 2.48 10*6/MM3 (ref 4.14–5.8)
WBC NRBC COR # BLD AUTO: 2.82 10*3/MM3 (ref 3.4–10.8)

## 2025-07-01 PROCEDURE — 85025 COMPLETE CBC W/AUTO DIFF WBC: CPT | Performed by: STUDENT IN AN ORGANIZED HEALTH CARE EDUCATION/TRAINING PROGRAM

## 2025-07-01 PROCEDURE — 36591 DRAW BLOOD OFF VENOUS DEVICE: CPT

## 2025-07-01 RX ORDER — SODIUM CHLORIDE 0.9 % (FLUSH) 0.9 %
20 SYRINGE (ML) INJECTION AS NEEDED
Status: DISCONTINUED | OUTPATIENT
Start: 2025-07-01 | End: 2025-07-02 | Stop reason: HOSPADM

## 2025-07-01 RX ORDER — LEVOTHYROXINE SODIUM 50 UG/1
50 TABLET ORAL DAILY
Qty: 30 TABLET | Refills: 0 | Status: SHIPPED | OUTPATIENT
Start: 2025-07-01

## 2025-07-01 RX ORDER — SODIUM CHLORIDE 0.9 % (FLUSH) 0.9 %
20 SYRINGE (ML) INJECTION AS NEEDED
OUTPATIENT
Start: 2025-07-01

## 2025-07-01 RX ADMIN — Medication 20 ML: at 11:43

## 2025-07-01 NOTE — PROGRESS NOTES
Pt to port chair for PF and cbc recheck. Port accessed using sterile technique and flushed with good blood return noted. 10cc of blood wasted prior to specimen collection. Blood specimen obtained and sent to lab for processing per protocol.  Port saline locked and capped and pt to wait for CBC results.

## 2025-07-01 NOTE — ADDENDUM NOTE
Encounter addended by: Aaron Campbell RN on: 7/1/2025 11:51 AM   Actions taken: Clinical Note Signed

## 2025-07-01 NOTE — PROGRESS NOTES
Hgb 7.9 today - higher than hgb on 6/26. Pt states he feels his normal weakness. Pt states he does have bilateral lower extremity swelling but saw his nephrologist today and they assessed and started him on a new medication. Pt denies any new symptoms. Advised pt to call office for any SOB, worsening fatigue/weakness or to go to hospital. Pt v/u. Port flushed with good blood return noted. Port flushed with saline prior to needle removal.  Pt returns to office for provider and infusion on 7/8 and pt aware of this. Pt discharged.

## 2025-07-01 NOTE — TELEPHONE ENCOUNTER
The [facility] received a fax that requires your attention. The document has been indexed to the patient’s chart for your review.      Reason for sending: EXTERNAL MEDICAL RECORD NOTIFICATION     Documents Description: RECORDS FOR URGENT REVIEW, LISTED BELOW     Name of Sender: [facility]     Date Indexed:   LAB 6.30.25  PN 6.30.25

## 2025-07-02 NOTE — TELEPHONE ENCOUNTER
I spoke with pt per Dr Simmons's request and pt stated his kidney doctor started him on a water pill and he will call if there is not improvement in the next few days.

## 2025-07-07 NOTE — PROGRESS NOTES
Hematology-Oncology Follow-up Note       Artie Mc  1950    Primary Care Physician: Chanel Carter APRN  Referring Physician: Chanel Carter APRN    Reason For Visit:  Chief Complaint   Patient presents with    Follow-up     Malignant neoplasm of cardia     Metastatic esophageal cancer  Monitoring for adverse effects related to immunotherapy.  Hypothyroidism      Mr. Mc is a pleasant 75 y.o. gentleman with a history of gastroesophageal reflux disease, diabetes, hypertension, heart block status post pacemaker placement and history of CABG.  He was having symptoms of dysphagia, weight loss since September 2016. He was reporting symptoms of food getting stuck in the lower chest.  He has transitioned himself to a liquid diet.  The patient reports losing about 30 pounds over this duration.  The patient underwent upper GI endoscopy on 12/20/16 with Dr. Esvin Barrera.  Endoscopy showed necrotic, circumferential and moderately obstructive mass at the distal esophagus between 42 cm and 36 cm.      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  There was also evidence of mild subacute inflammation in the duodenum.  Dr. Barrera ordered a CT scan of the chest and abdomen with contrast on 12/20/16.  The scan was done at [facility].  The scan showed a new ill-defined mass in the anterior wall of the distal esophagus at the GE junction measuring 2.3 x 2.2 x 1.6 cm, worrisome for cancer.  There were four subcentimeter liver lesions which had appeared stable in comparison to another CT scan from 2009 and they likely represent small cysts.  There was also mild lymphadenopathy in the gastrohepatic ligament.  There were at least six borderline enlarged lymph nodes measuring up to 1.3 x 1 cm in the region.  The patient is referred to us for further oncological evaluation.    1/5/2017 - The patient presents for initial  consultation.  He reports persistent dysphagia and also has symptoms of regurgitation.  He cannot tolerate solid foods and is dependent on liquid diet such as Ensure.  He also reports fatigue. Symptoms of dysphagia have been present for the past four months.  When he eats any solids, the food gets stuck in the lower chest.    1/5/17 - Vitamin B12 200 (L).  CEA 0.8.  Ferritin 35.  Iron saturation 16% (L), serum iron 56, TIBC 354.  Creatinine 0.9.  LFTs normal.  Folate 10.2.  1/12/17 - PET/CT scan:  Intense abnormal activity corresponding to the region of the GE junction and proximal stomach.  SUV is 11.06.  There is a suggestion of an abnormal mass or abnormal wall thickening in the region measuring 3.9 x 4.8 cm.  No additional abnormalities.  No evidence of metastases or other lymphadenopathy.  Scattered nonspecific subcentimeter lymph nodes involving the mediastinum and within the central upper abdomen.  1/13/17 - Creatinine 0.9.  LFTs normal.    1/17/17 - Patient seen by thoracic surgeon, Dr. Bradley.  PET scan was reviewed.  He has recommended neoadjuvant chemoradiation therapy followed by [place] [facility] esophagogastrectomy.  Port-A-Cath is being arranged.    1/20/17 - WBC 6.3, hemoglobin 12.1, platelet count 198,000, MCV 86.4.  1/30/17 - Patient started weekly Carboplatin and Taxol (Carboplatin AUC 2 and Taxol 50 mg/M2).  Patient received Injectafer 750 mg.  WBC 5.8, hemoglobin 12.4, platelet count 244,000.   1/31/17 - Patient seen by Dr. Eduardo Oleary.  The plan was to give 5040 cGy in 28 fractions.   2/6/17 - WBC 4.4, hemoglobin 11.6, platelet count 201,000.  Patient received cycle 2 of weekly Carboplatin and Taxol (Carboplatin AUC 2 and Taxol 50 mg/M2).  2/13/17 - Patient received Carboplatin and Taxol weekly cycle 3.  2/13/17 - Patient started concurrent radiation therapy.  Total planned dose is 4140 cGy.    2/13/17 to 2/15/17 - Patient received 4140 cGy of neoadjuvant radiation.  Radiation was finished on  3/15/17.    2/20/17 - WBC 1.8, hemoglobin 10.6, platelet count 217,000.  Creatinine 0.5. Folate 15 (L).  Ferritin 361.  Haptoglobin 214.  Iron saturation 33%, TIBC 263, serum iron 88.    2/20/17 - Patient received Carboplatin and Taxol weekly cycle 4.   2/27/17 - WBC 3.7, hemoglobin 10.7, platelet count 177,000, MCV 85.3.    3/6/17 - Patient has received 16 out of the 23 planned radiation treatments.  Total planned dose is 4140 cGy.    3/6/17 - WBC 5.4, hemoglobin 11.2, platelet count 113,000.  3/6/17 - Patient received cycle 5 of weekly Carboplatin and Taxol.   3/13/17 - Patient received cycle 6 of weekly Carboplatin and Taxol.  WBC 3.9, hemoglobin 11.3, platelet count 93,000.     3/20/17 - WBC 1.4, hemoglobin 10.0, platelet count 118,000, MCV 86.1   3/27/17 - WBC 3.8, hemoglobin 9.5, platelet count 176,000, MCV 87.3.    4/7/17 - Stress test:  No evidence of reversible myocardial ischemia.  Normal left ventricular ejection fraction of 50%.    4/17/17 - Upper GI endoscopy by Dr. 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  The GE junction adenocarcinoma lesion seems to have a very good response to chemotherapy and radiation.  The lumen was open.  5/1/17 - WBC 7.0, hemoglobin 10.2, platelet count 175,000.    5/4/17 - Patient underwent [place] [facility] esophagectomy with pyloroplasty, feeding jejunostomy tube, abdominal and mediastinal lymph node dissection.    Surgical Pathology:  Moderately to poorly differentiated adenocarcinoma measuring 1.3 cm at the GE junction.  Resection margins are negative for malignancy.  Tumor margins are negative.  There is effective treatment response.  No evidence of lymphovascular invasion.  Staging is ypT3,pN1.  One out of fourteen lymph nodes involved.   5/22/17 - WBC 6.8, hemoglobin 10.3, platelet count 312,000.    6/19/17 - WBC 5.7, hemoglobin 10.5, platelet count 204,000, MCV 92.1.    7/6/17 - EGD:  Status post balloon dilation of esophageal  stricture.  7/12/17 - EGD with balloon dilation of esophageal stricture.  7/26/17 - EGD and balloon dilation of esophageal stricture.  7/31/17 - PET/CT scan:  There is mild metabolic activity seen at the thoracic esophagus just at and below the surgical margins.  Some mild wall thickening.  This could be from recent surgery.  A residual cancer seems unlikely.  There is a precarinal lymph node with mild metabolic activity with SUV of 3 measuring 1 x 1.3 cm.  Again this appears to be nonspecific in my opinion.    8/7/17 - WBC 5.9, hemoglobin 11.9, platelet count 171,000, MCV 88.7.    10/19/17 - WBC 7.5, hemoglobin 11.8, platelet count 216,000.    1/8/18 - PET scan:  No evidence of residual disease or recurrent disease.  There is an esophageal stent in the mid esophagus with a large debris air level.  No evidence of any metastases in the chest, abdomen or pelvis.  Mild nodule uptake in the vocal cords with fullness in the right vocal cord.  This could be physiologic.    7/9/18 - CT scan of chest with contrast:  Prominent mediastinal lymph nodes appear stable since February.  Changes of gastric pull-through procedure for esophageal cancer again noted.  Tree-in-bud infiltrates in the left lung base, consistent with infection versus inflammation.    1/10/19 - CT chest, abdomen and pelvis with contrast:  No evidence of mets in the chest; however, interval development of metastatic lymphadenopathy in the left side of the retroperitoneum.  For instance, there is a 17 mm rounded lymph node, 10 mm lymph node and also a 14 mm lymph node.  These are all new.  Stable 9 mm hypodense lesion within the right hepatic lobe, which could reflect hemangioma or complex cyst.    1/30/19 - CT-guided core biopsy of retroperitoneal lymph node:  Poorly differentiated adenocarcinoma consistent with metastases from patient’s known esophageal primary.  CK20 positive, CDX2 positive, cytokeratin 7 negative, TTF-1 negative.    2/1/19 - Creatinine  0.9, BUN 18, calcium 9.3; these are normal.    2/28/19 - [facility] Next Gen sequencing testing on retroperitoneal lymph node specimen:  PD-L1 positive.  CPS = 3.  This implies responsiveness to pembrolizumab.  Mismatch repair status is proficient (no evidence of microsatellite instability).  Tumor mutational burden is low = 6 mutations/Mb.  CDH1 indeterminate.  KRAS mutation positive.  TP53 mutation positive.  CDK6 amplified.  [employer] PD-L1 testing by IHC:  PD-L1 expression 1% positive (CPS =1).  HER2/marvel by IHC is negative.  HER2/marvel by CISH not amplified.  Microsatellite stable tumor.    3/7/19 - WBC 7.3, hemoglobin 12, platelet count 128,000, MCV 92.     3/14/19 - Patient was seen by radiation oncologist, Dr. Chahal.  He has recommended observation versus systemic chemoimmunotherapy as needed.  No plans for radiation therapy.   5/1/19 - CT scan of chest with contrast:  No definitive findings of new metastatic disease in the chest.  Emphysema noted.    5/1/19 - CT abdomen and pelvis:  There is interval progression of metastatic retroperitoneal adenopathy.  Left periaortic adenopathy with lymph node measuring up to 2 cm, previously 1.7 cm.  Another lymph node now measures 2.3, previously 1.4 cm.  New enlarged lymph node on image 147 measures 1.4 cm, previously 0.4 cm.  A 9 mm hypodense right hepatic lobe lesion appears stable.    5/9/19 - Decision made to start patient on immunotherapy Keytruda.    5/9/19 - Vitamin B12 229.  Ferritin 61.  Folate 14.3.  Iron saturation 26%, TIBC 304, serum iron 79.  5/31/19 - Patient received Keytruda 200 mg cycle 1, day 1.    5/31/19 - Free T4 0.99.  Creatinine 0.9, BUN 9.  LFTs normal.  TSH 3.99.  Folate 14.3.  Iron saturation 26%.    6/21/2019 patient received Keytruda cycle 2  7/12/2019: Patient received Keytruda cycle 3  8/2/2019 patient received cycle 4 Keytruda WBC 7.2, hemoglobin 11.8 platelet count 163  8/23/2019 creatinine 0.9, LFTs normal, WBC 6.4, hemoglobin 11.5, platelets  179, TSH 2.1   8/23/2019 patient received cycle 5 of Keytruda  9/3/2019 CT chest abdomen and pelvis with contrast: . Positive response to therapy in the abdomen since 05/01/2019. Pathologically enlarged retroperitoneal lymph nodes, predominantly in the left periaortic region, have diminished in size.a 1.3 x 2.1 cm left periaortic node (image 60), previously measured 3.5 x 2.3 cm. A 1.6 cm index node near the level of the left renal vein previously measured 2.0 cm No new or progressive adenopathy. 2. Stable findings in the chest. Noncalcified right upper lobe nodules are unchanged. There is no evidence of new or progressive metastatic disease within the chest. 3. 8 mm indeterminate low-density lesion in the right hepatic lobe, is stable. No new liver lesions.  9/6/2019 WBC 5.6, hemoglobin 11.2, platelets of 192, MCV 91.8  9/13/2019 patient received Keytruda cycle 6, albumin 3.2  10/4/2019 patient received cycle 7 of Keytruda, TSH 4.8, free T4 0.86  10/9/2019 WBC 5.8, hemoglobin 11.7, platelets 174  10/25/2019 patient received cycle 8 of Keytruda.  WBC 5.9, hemoglobin 11.5, platelets 152, creatinine 0.75, cortisol 15.2, TSH 6.48 high , free T4 1.13 normal  11/6/2019 WBC 6.6, hemoglobin 11.8, MCV 91.5, platelets 187  11/15/2019 patient received cycle 9 of Keytruda, WBC 6.6, hemoglobin 11.9, platelets 161, cortisol level 8.86, TSH 2.68, free T3 2.8, free T4 1.5  12/4/2019 WBC 6.9, hemoglobin 12.0, platelets 180, MCV 92.9  12/06/2019-Keytruda Cycle 10  12/27/2019- Cycle 11 LFTs normal, WBC 6.7, hemoglobin 11.4, platelets 168, MCV 93, TSH 2.4,  1/28/2020: CT chest abdomen and pelvis with contrast:  Single (aortic lymph node in the abdominal retroperitoneum has increased.  Rest of the retroperitoneal lymph nodes have decreased in size.  Mediastinal adenopathy appears unchanged.  Noncalcified bilateral pulmonary nodules are stable  1/17/2020 patient received Keytruda cycle 12:.  2/3/2020 WBC 7.1, hemoglobin 12.2, platelets  171  02/10/2020 patient received cycle 13 of Keytruda: WBC 7.3, hemoglobin 12.1, platelets 166, creatinine 0.82  3/6/2020 patient is of Keytruda 200 mg, hemoglobin 10.8  3/27/2020 patient received Keytruda 200 mg, hemoglobin 11.4, TSH 1.95  4/17/2020 patient received Keytruda cycle 16 200 mg, WBC 6.1, hemoglobin 11.6, platelets 150  5/8/2020: Patient received cycle 17 Keytruda, WBC 6.3, hemoglobin 9.6, platelets 137, TSH 2.05, free T4 1.54, creatinine 0.93, LFTs normal,  5/29/2020: Patient received cycle 18 Keytruda, WBC 6.5, hemoglobin 11.7, platelets 129, creatinine 0.83, TSH 1.69  6/26/2020: Patient receiving Keytruda.   7/17/2020: TSH 2.06, WBC 5.7, hemoglobin 11.4, platelets 177, MCV 92.4, Keytruda 200 mg cycle 20  7/27/2020: CT chest abdomen pelvis with contrast: Stable findings in the chest abdomen pelvis since 1/28/2020.  Noncalcified bilateral pulmonary nodules are stable.  Stable retroperitoneal left perinephric adenopathy in the abdomen.  8/7/2020: Patient received cycle 21 of Keytruda  8/28/2020 patient is a cycle 22 of Keytruda.  WBC 6.02, hemoglobin 11.3, platelets 161  9/11/2020 WBC 6.9, hemoglobin 12.4, platelets 192  9/18/2020: Cycle 23 of Keytruda, TSH 1.19  10/9/2020: Cycle 24 of Keytruda, WBC 5.9, hemoglobin 11.9, platelets 178, creatinine 0.84, TSH 2.56, free T3 2.4, CMP normal  10/23/2020: WBC 7.34, hemoglobin 11.6, platelets 175  10/30/2020:.  Keytruda 200 mg  11/20/2020: Keytruda 200 mg  12/11/2020 Keytruda 200 mg.  WBC 5.5, hemoglobin 11.4, platelets 176, creatinine 0.8, LFTs normal, TSH 2.2, cortisol 8.3  12/10/2020: CT chest abdomen and pelvis with contrast: Previously seen left para-aortic lymph nodes within the abdomen are decreased in size.  No pathological lymphadenopathy.  No evidence of residual malignancy..  Small noncalcified pulmonary nodules are stable.  No new nodules.  Stable mediastinal lymph nodes.  3/12/2021: WBC 6.7, hemoglobin 11.3, platelets 17  3/12/2021: CMP normal, TSH  2.9, cortisol 11.95, 1  3/15/2021: CT chest with contrast: Stable findings in the chest with postsurgical changes of esophageal resection and gastric pull-through.  Stable right lower lobe lung nodule measures 11 mm.  Stable size and appearance of the mediastinal and upper abdominal lymph nodes.  3/23/2021: Doppler of upper extremity: Normal.  3/24/2021: CT abdomen and pelvis with contrast: Postoperative changes.  Left periaortic nodes are stable.  4/12/2021: TSH 4.1, cortisol 12.4  6/22/2021: CMP normal,Cortisol 10.08,, WBC 5.8, hemoglobin 11.2, platelets 109, TSH 2.59  7/12/2021: CT scan of the chest abdomen pelvis with contrast: Dominant 10 x 10 left periaortic lymph node is stable since 3/22/2021.  Dominant lymph nodes in the right lower paratracheal and AP window distribution are stable since 3/15/2021.  No evidence of progressive adenopathy in the chest abdomen or pelvis.    01/11/2022 -PET/CT findings consistent with new cedric metastatic disease in the retroperitoneum.  2 new pathologically enlarged hypermetabolic retroperitoneal lymph nodes.  No convincing evidence of recurrent disease within the chest neck pelvis or skeleton.  1/20/2022 -CT-guided needle biopsy consistent with poorly differentiated carcinoma.  CDX2 positive consistent with metastases from known esophageal primary  2/9/2022 - Pembrolizumab complicated with fatigue, joint pains, nausea, diarrhea. Symptoms decrease in intensity over a week.  3/30/2022 -CT chest and pelvis with stable metastatic lymph nodes in the left periaortic retroperitoneum.  No evidence of disease progression or new lesions.  Stable lymphadenopathy in the distal paratracheal mediastinal area  4/13/2022 - Keytruda 200  5/4/2022 - Keytruda 200  Continued keytruda  7/11/2022 - CT abdomen pelvis with decrease in size of left perioaortic lymph node. No other areas of disease.  Continues to be on Keytruda  10/18/2022 - stable CT imaging  1/20/2023 - CT imaging with stable  disease. No significant change.  Patient was hospitalized with esophagitis, subsequent EGD with improvement, no recurrence of disease.  2/21/2023 - Keytruda  CT CAP with increased retroperitoneal adenopathy.   ECHO with EF 35-40% (concern for immunotherapy related )immunotherapy held with ongoing investigation for cause.  Subsequent improvement in EF to 45%  4/4/2023 - CT abdomen pelvis with interval retroperitoneal lymph node enlargement compatible with metastatic adenopathyl.  5/5/2023 - PET CT with mixed response. No clear evidence of progression  6/2/23 -started pembrolizumab 200 mg IV  Posttreatment had symptoms of chest tightness troponin and proBNP negative, echocardiogram with EF 40%  Subsequent cardiac cath with EF down to 30% elevated proBNP, troponin no significant obstructive disease in the graft vessels  Cardiac MRI unable to obtain given pacemaker noncompliant.  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 Two enlarged mediastinal lymph nodes in the precarinal and AP window distributions are unchanged since 4/4/2023. No new or progressive adenopathy in the chest.Stable 3 mm or less noncalcified right lung nodules since 4/4/2023. Surgical changes of distal esophagectomy with gastric pull-through, without evidence of local disease recurrence  8/29/23 - started FOLFOX had significant side effects  9/20/23 - stopped oxaliplatin and continued 5FU only'  11/29/23 - CT CAP with decrease size of retroperitoneal adenopathy, no other concerning findings.  Continues 5FU leucovorin   2/12/24 - PET CT There is some metabolic activity within a mediastinal lymph node which has been suggested with slightly decreased metabolic activity. Whether this is reactive or pathologic is  uncertain. Previously noted abnormal retroperitoneal lymph node is no longer identified continued treatmen  4/10/24 - iron sat 10, ferritin  14, plan for venofer  Continued treatment signatera negative  5/7/24 - CT chest without contrast. Unchanged mildly enlarged right paratracheal lymph lymph node measuring 1.1 cm in short axis (series 2 image 40) and left paratracheal measuring 1.1 cm in short axis (series 2 image 39). No new or progressive lymphadenopathy.  Continued maintenance 5FU  changed frequency to every 3 weeks.   7/11/2024 CT chest with stable lung nodule, mildly prominent precarinal lymph node    7/25/2024:  Bilateral lung, bronchial wash with smears and cytospin:  Acute inflammation, pulmonary macrophages and bronchial epithelial cells    2.   Lymph node, precarinal station 4, fine-needle aspiration with smears and cell block:  Benign bronchial epithelial cells in a background of blood and lymphocytes consistent with lymph node aspirate  No malignancy identified      8/7/24: Seen today for initial evaluation by me.  He was previously being seen by Dr. Elizondo in our practice.  He has underlying history of metastatic esophageal adenocarcinoma and was on maintenance therapy with 5-FU/leucovorin.  His last treatment was on 7/17/2024.  He has intermittent bronchoscopy planned lymph node biopsy due to suspicion for metastatic disease on recent imaging.  This is reported as above.    Patient reported that he has had persistent fatigue with maintenance chemotherapy, also reported having GI side effects, such as nausea and poor appetite with chemo.  However, the patient denied any weight loss.  No other issues presently.    9/11/2024: CT chest/abdomen/pelvis:  Impression:  1. Postsurgical changes from esophagectomy with gastric pull-through. No findings to suggest progression of disease within the thorax.  2. Stable subcentimeter pulmonary nodules measuring up to 4 mm in the right lower lobe, and mildly prominent  precarinal lymph node. Continued attention on follow-up suggested.  3. No findings to suggest progression of disease or metastatic disease within the abdomen or pelvis.  4. Additional findings as given above..      10/23/24: Patient seen today for follow-up visit with restaging scans.  Has ogoing sinus congestion for several months. Some sore throat.some cough, SOB has improved. Has ongoing fatigue, stable.     1/10/25: CT chest/Abdomen/Pelvis W contrast:  Impression:   1. Stable examination of the chest with postsurgical changes from an esophagectomy and gastric pull-through.   2. However, within the abdomen there is a new enhancing lesion in the posterior right hepatic lobe measuring 0.6 cm and interval enlargement of a para-aortic lymph node concerning for disease progression now measuring 1.9 x 1.1 cm.     1/17/2025: Patient seen today for follow-up visit with restaging scans.  No new complaints reported.  Continues to do well clinically    1/28/2025 PET/CT:  Impression:  1. Isolated hypermetabolic left periaortic/retroperitoneal lymph nodes suspicious for metastatic disease in patient with history of esophageal malignancy. While almost certainly esophageal related, nonemergent testicular ultrasound would be reasonable in   a male with retroperitoneal adenopathy to exclude a solid testicular lesion.  2. Mild lower mediastinal adenopathy stable from 2024. No evidence of solid organ metastases.  3. Esophagectomy with gastric pull-through. Linear probably physiologic uptake in the stomach. Differential would include gastritis.  4. Emphysema. Emphysema on CT is an independent risk factor for lung cancer. Low dose lung cancer screening should be considered if patient qualifies based on smoking history or if not already enrolled in a screening program.      2/11/2025: Patient here today for routine follow-up prior to scheduled treatment.  He reports overall tolerating leucovorin and fluorouracil although he did have  mild intermittent diarrhea.  He also reports he has been drinking less fluids because he feels bloated.  He has no new pain.  He noted nausea following his infusion but this was managed with Zofran.    3/18/25: Patient seen today for follow-up prior to cycle 3 maintenance chemotherapy reported having off-and-on diarrhea and progressive fatigue after starting chemotherapy.  Also has intermittent nausea.  Weight/appetite is stable presently.  He reported using Imodium sparingly for his diarrhea    4/11/25: CT Chest Abd Pelvis W contrast:  1. New 6 mm low-density lesion within the spleen. Metastatic disease cannot be excluded.   2. Stable 2.0 x 1.0 cm left periaortic lymph node in the mid abdomen, consistent with metastatic disease, corresponding to the metabolically active node seen on prior PET/CT imaging. No new or progressive adenopathy.   3. Stable 6 mm enhancing focus within the right hepatic lobe since CT abdomen of 1/10/2025. This demonstrated no abnormal FDG uptake on prior PET/CT.   3. Surgical changes of esophagectomy with gastric pull-through. No evidence of new or progressive malignancy within the chest. Small noncalcified nodules measuring 2 to 3 mm are unchanged.      HPI above reviewed  7/8/25   History of Present Illness  4/15/25: The patient presents for follow up with restaging scans.   Significant gastrointestinal discomfort is reported, attributed to the discontinuation of Protonix by his nephrologist due to potential renal complications. A sensation of food impaction in the esophagus is described. This symptom was less pronounced during a chemotherapy hiatus but has since worsened.     Esophageal dilation has been performed several times in the past without relief. There is reluctance towards the placement of a feeding tube. Currently, Zofran is used for nausea management.    Fatigue and a lack of energy are also reported, believed to have intensified since resuming chemotherapy. Energy levels  have been fluctuating.not on oral iron at this time.  He has completed 5 cycles of maintenance 5FU/LV.    5/13/25: pt seen for follow up. Noted to have some productive cough. Patient reported having low grade fever, myalgias and generalized weakness over last few days which he attributed to a viral illness. These symptoms have improved over last couple days and he is starting to have more energy at this time, however not back to his normal baseline yet.    5/20/25 Here for chemotherapy infusion, missed last week due to feeling poorly. I was asked to evaluate patient in infusion area. He reports he feels generally weak.  He denies any fevers chills or diaphoresis.  He reports he was diagnosed with pneumonia yesterday and was prescribed doxycycline of which he took only 1 dose.  He reports shortness of air and a cough productive of yellow sputum.  He denies any difficulty swallowing but reports he spits up a lot and finds it difficult to eat and has lost 12 pounds in less than 3 weeks.  His blood pressure was moderately low at 97/53 .  Normal saline fluid bolus was given here and this is improved to 136/57.  He remains afebrile.  Labs today showed a potassium of 3.3 and he was given IV potassium replacement here.  Given concerns for possible sepsis on chemotherapy and failure to thrive he was advised to go to the emergency department at [facility] to be evaluated with blood cultures, CT chest and abdomen and further evaluation.  Patient agreed.    5/20/2025-5/24/2025: Patient was hospitalized at [facility] for multifocal pneumonia, likely secondary to aspiration.  Parainfluenza infection.  Underwent EGD with dilation on 5/22/2025 with esophogastric of anastomotic stricture noted status post stent placement.  Will need a repeat EGD in 3 months for stent removal.  Also seen by urology for urinary retention that necessitated Navarro catheter placement.  Was to be reassessed in their office 2 weeks postdischarge.  For  his pneumonia he was treated with IV antibiotics followed by oral treatment with Augmentin to complete course.      SUBJECTIVE:  6/10/2025: Artie is here today for his hospital follow-up to assess readiness to resume chemotherapy.  Reviewed hospitalization as above.  He reports that he has a follow-up with gastroenterology scheduled for September 2025.  He has had marked improvement in his food and fluid intake after having his stent placed.  He reports that he already saw urology and has had his Navarro catheter removed this appointment happen today.  He has overall had improvement although he still does not feel 100% he does feel like he is ready for treatment.    7/8/25 Artie is here for follow up. He denies any new complaints or medical issues. He reports he is trying to eat more. He reports he has tried Ensure but it gives him diarrhea. He still reports some fatigue but unchanged and he feels he is ready for treatment today.   Review of Systems   Constitutional:  Positive for fatigue.   HENT: Negative.     Eyes: Negative.    Respiratory: Negative.     Cardiovascular:  Negative for leg swelling (Chronic and unchanged).   Gastrointestinal: Negative.    Endocrine: Negative.    Genitourinary: Negative.    Musculoskeletal: Negative.    Skin: Negative.    Allergic/Immunologic: Negative.    Neurological: Negative.    Hematological: Negative.    Psychiatric/Behavioral: Negative.        Past Medical History:   Diagnosis Date    B12 deficiency     Blockage of coronary artery of heart     Depression     Disease of thyroid gland     DM (diabetes mellitus)     Dysphagia     Esophageal cancer     GERD (gastroesophageal reflux disease)     HTN (hypertension)     Hyperlipidemia        Past Surgical History:   Procedure Laterality Date    ABDOMINAL WALL ABSCESS INCISION AND DRAINAGE  10/22/2018    WITH DEBRIDEMENT  1.5CM X 1.5 CM X 1.5 CM    DR. PURI    BRONCHOSCOPY N/A 7/25/2024    Procedure: BRONCHOSCOPY WITH ENDOBRONCHIAL  ULTRASOUND, LUNG WASHINGS, AND FINE NEEDLE ASPIRATIONS;  Surgeon: Bonnie Smith MD;  Location: [facility] ENDOSCOPY;  Service: Pulmonary;  Laterality: N/A;    CARDIAC CATHETERIZATION Right 7/20/2023    Procedure: Left Heart Cath;  Surgeon: Mazin Simmons MD;  Location: [facility] CATH INVASIVE LOCATION;  Service: Cardiovascular;  Laterality: Right;    CATARACT EXTRACTION  2018    COLONOSCOPY      CORONARY ARTERY BYPASS GRAFT  2015    ENDOSCOPY  12/20/2016    ENDOSCOPY N/A 01/02/2023    Procedure: ESOPHAGOGASTRODUODENOSCOPY;  Surgeon: Esvin Morgan MD;  Location: [facility] ENDOSCOPY;  Service: Gastroenterology;  Laterality: N/A;  post: anastamosis stricture    ENDOSCOPY N/A 02/09/2023    Procedure: ESOPHAGOGASTRODUODENOSCOPY, non-wire guided balloon dilation (12-14mm) of esophageal stricture;  Surgeon: Tino Villafana MD;  Location: [facility] ENDOSCOPY;  Service: Gastroenterology;  Laterality: N/A;  post: esophageal anastamotic stricture, post surgical changes    ENDOSCOPY N/A 5/22/2025    Procedure: ESOPHAGOGASTRODUODENOSCOPY with biopsy x 1 area and placement of esophagogastric stent;  Surgeon: Tino Villafana MD;  Location: [facility] ENDOSCOPY;  Service: Gastroenterology;  Laterality: N/A;  post op: esophagogastro anastamosis    ESOPHAGECTOMY  05/04/2017    BROOKS PETTY ESOPHAGECTOMY, FEEDING JEJNOSTOMOY, ABDOMINAL AND MEDIASTINAL LYMPH NODE DISECTION, PEDICLED MUSCLE FLAP COVERAGE DR. PURI    ESOPHAGOSCOPY / EGD  07/12/2017    WITH BALLOON DILATION    ESOPHAGOSCOPY / EGD  07/26/2017    WITH BALLOON DILATION    ESOPHAGOSCOPY / EGD  08/11/2017    WITH BALLOON DILATION    ESOPHAGOSCOPY / EGD  08/28/2017    WITH BALLOON DILATION    ESOPHAGOSCOPY / EGD  09/27/2017    WITH BALLOON DILATION    INSERT / REPLACE / REMOVE PACEMAKER      LYMPH NODE BIOPSY  01/30/2019    PACEMAKER IMPLANTATION  11/21/2016       Current Outpatient Medications:     clonazePAM (KlonoPIN) 0.5 MG tablet, Take 0.5 tablets by mouth  2 (Two) Times a Day As Needed for Anxiety., Disp: , Rfl: 1    famotidine (PEPCID) 20 MG tablet, Take 1 tablet by mouth twice daily, Disp: 60 tablet, Rfl: 0    levothyroxine (SYNTHROID, LEVOTHROID) 50 MCG tablet, Take 1 tablet by mouth Daily., Disp: 30 tablet, Rfl: 0    metFORMIN (GLUCOPHAGE) 1000 MG tablet, Take 0.5 tablets by mouth 2 (Two) Times a Day., Disp: , Rfl:     ondansetron (ZOFRAN) 4 MG tablet, TAKE 1 TABLET BY MOUTH EVERY 8 HOURS AS NEEDED FOR NAUSEA AND VOMITING, Disp: 90 tablet, Rfl: 2    pantoprazole (PROTONIX) 40 MG EC tablet, Take 1 tablet by mouth 2 (Two) Times a Day Before Meals., Disp: 60 tablet, Rfl: 0    pravastatin (PRAVACHOL) 20 MG tablet, , Disp: , Rfl:     torsemide (DEMADEX) 20 MG tablet, Take  by mouth Daily., Disp: , Rfl:     nebivolol (BYSTOLIC) 5 MG tablet, Take 0.5 tablets by mouth Daily. (Patient not taking: Reported on 7/8/2025), Disp: , Rfl:     saccharomyces boulardii (FLORASTOR) 250 MG capsule, Take 2 capsules by mouth 2 (Two) Times a Day. (Patient not taking: Reported on 7/8/2025), Disp: 30 capsule, Rfl: 0    tamsulosin (FLOMAX) 0.4 MG capsule 24 hr capsule, Take 2 capsules by mouth Daily. (Patient not taking: Reported on 7/8/2025), Disp: 30 capsule, Rfl: 0  No current facility-administered medications for this visit.    Facility-Administered Medications Ordered in Other Visits:     dexAMETHasone (DECADRON) injection 12 mg, 12 mg, Intravenous, Once, Piotr-Ayanna Gao APRABDI    fluorouracil (ADRUCIL) 4,220 mg in sodium chloride 0.9 % 334.4 mL chemo infusion - FOR HOME USE, 2,400 mg/m2 (Treatment Plan Recorded), Intravenous, Once, Essing-Ayanna Gao, APRN    fluorouracil (ADRUCIL) chemo injection 700 mg, 400 mg/m2 (Treatment Plan Recorded), Intravenous, Once, Piotr-Ayanna Gao, APRN    leucovorin 700 mg in sodium chloride 0.9 % 320 mL IVPB, 400 mg/m2 (Treatment Plan Recorded), Intravenous, Once, Ayanna Melendez APRN    sodium chloride 0.9 % flush 20 mL, 20 mL,  "Intravenous, PRN, Beth, Dorian Quiñonez MD, 20 mL at 07/08/25 1007    sodium chloride 0.9 % infusion, 20 mL/hr, Intravenous, Once, Piotr-Ayanna Gao APRN    Allergies   Allergen Reactions    Ace Inhibitors Unknown (See Comments)     Unknown reaction      Heparin Other (See Comments)     Fever/chills, weakness in legs    Sulfa Antibiotics Other (See Comments)     Mouth sores    Ciprofloxacin GI Intolerance     Pt reports pain to abd    Empagliflozin Unknown - Low Severity     Other Reaction(s): UTI      per patient report       Family History   Problem Relation Age of Onset    Mental illness Mother     Heart disease Mother     Hypertension Father     Cancer Father     Heart disease Sister     Heart disease Brother      Cancer-related family history includes Cancer in his father.    Social History     Tobacco Use    Smoking status: Former     Current packs/day: 0.00     Average packs/day: 2.0 packs/day for 50.0 years (100.0 ttl pk-yrs)     Types: Cigarettes     Start date: 6/21/1965     Quit date: 6/21/2015     Years since quitting: 10.0    Smokeless tobacco: Former     Types: Chew     Quit date: 9/6/1989   Vaping Use    Vaping status: Never Used   Substance Use Topics    Alcohol use: No    Drug use: No       Objective:    Vitals:    07/08/25 1013   BP: 131/70   Pulse: 81   Temp: 98.1 °F (36.7 °C)   SpO2: 100%   Weight: 60.3 kg (133 lb)   Height: 167.6 cm (65.98\")   PainSc: 0-No pain         (1) Restricted in physically strenuous activity, ambulatory and able to do work of light nature    Physical Exam:     Physical Exam   Constitutional: He is oriented to person, place, and time.  Non-toxic appearance. He does not appear ill (Chronically). No distress.   HENT:   Head: Normocephalic and atraumatic.   Right Ear: External ear normal.   Left Ear: External ear normal.   Nose: Nose normal. Mouth/Throat: Mucous membranes are moist.   Eyes: Conjunctivae are normal. Right eye exhibits no discharge. Left eye exhibits no " discharge. No scleral icterus.   Cardiovascular: Normal rate, regular rhythm and normal heart sounds.   Pulmonary/Chest: Effort normal and breath sounds normal. No respiratory distress.   Abdominal: Soft. He exhibits no distension.   Musculoskeletal: Normal range of motion.      Right lower leg: No edema.      Left lower leg: No edema.   Neurological: He is alert and oriented to person, place, and time.   Skin: Skin is warm. No bruising and no rash noted. No erythema. No jaundice or pallor.   Psychiatric: His behavior is normal. Mood, judgment and thought content normal.   Vitals (frail appearing) reviewed.      Lab Results - Last 18 Months   Lab Units 07/08/25  1004 07/01/25  1116 06/26/25  1341   WBC 10*3/mm3 3.83 2.82* 3.22*   HEMOGLOBIN g/dL 8.1* 7.9* 7.4*   HEMATOCRIT % 25.2* 24.7* 22.6*   PLATELETS 10*3/mm3 139* 239 245   MCV fL 99.6* 99.6* 100.9*     Lab Results - Last 18 Months   Lab Units 07/08/25  1040 06/24/25  1129 06/10/25  1102 06/03/25  1148 05/24/25  0050 05/22/25  2239 05/21/25  2341   SODIUM mmol/L  --   --   --   --  139 139 143   POTASSIUM mmol/L  --   --   --   --  3.8 4.4 3.5   CHLORIDE mmol/L  --   --   --   --  105 105 107   CO2 mmol/L  --   --   --   --  25.3 24.8 23.6   BUN mg/dL  --   --   --   --  6* 6* 7*   CREATININE mg/dL 1.20 1.20 1.30*   < > 1.18 1.15 1.26   CALCIUM mg/dL  --   --   --   --  8.6 8.7 8.5*   BILIRUBIN mg/dL  --   --   --   --  0.3 0.3 0.3   ALK PHOS U/L  --   --   --   --  37* 37* 35*   ALT (SGPT) U/L  --   --   --   --  5 <5 5   AST (SGOT) U/L  --   --   --   --  13 14 15   GLUCOSE mg/dL  --   --   --   --  141* 161* 112*    < > = values in this interval not displayed.     CASE SUMMARY:  Patient has a history of GE junction poorly differentiated adenocarcinoma for which he had chemoradiation followed by [place] resection:  Pathology showed ypT3,pN1 disease.  Tumor was Stage IIIA, diagnosed in 2016.  He received concurrent chemoradiation with weekly Carboplatin and  Taxol and radiation finishing on 3/15/17.  CT scan on 1/10/19 showed new retroperitoneal lymph nodes.  These lymph nodes were biopsied on 1/30/19 and it shows metastatic esophageal cancer.  Caris Next Gen testing was performed on the sample and it shows KRAS mutation, microsatellite stable tumor.  PD-L1 is positive.  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  HER2/marvel (ERBB2) was not amplified and was negative.  His CT scan on 5/1/19 shows evidence of progression.  Patient has started receiving Keytruda on 5/31/19.  He has received 19 cycles so far.   CT scan on 1/28/2020 shows improvement and continued response.  CT scan chest abdomen pelvis with contrast on 12/10/2020 does not show any evidence of disease progression.  On 12/22/2020 decision made to hold Keytruda per patient's request to get a treatment break.. Repeat CT scan chest 7/12/2021 does not show any evidence of disease progression.  Now CT imaging in January 2021 with a CT showing increased abdominal left para-aortic adenopathy.  Subsequent PET/CT with new cedric metastatic disease in the retroperitoneum of the abdomen.  New pathologically enlarged hypermetabolic retroperitoneal lymph nodes.  This would be concerning for disease recurrence/progression.  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  I discussed with him the treatment options going forward.  He has had a good response with immunotherapy pembrolizumab in the past. This was  restarted. subsequent imaging with treatment response. 1/2023 imaging with good response. Continued treatment  Now with increased adenopathy 2 new lymph nodes. Holding immunotherapy for now.   Consider getting cardiac MR discussed with Dr. Culp. While immunotherapy on hold, increased adenopathy is concerning. Could be pseudoprogression vs true progression. PET CT reviewed could indicate pseudoprogression since he has had a good response to immunotherapy  immunotherapy was restarted and has been on hold with patient's  symptomatology cardiac MRI cannot be obtained, no ischemic heart disease leading to cardiomyopathy this most likely is related to Keytruda.  We will stop Keytruda going forward  CT imaging with stable disease, FOLFOX was started which he had significant side effects  With imaging being stable and significant side effects with FOLFOX, we discussed  about options with wait and watch vs 5FU only, patient prefers to be on maintenance.   We started 5FU only .   Tolerating relatively well, has significant fatigue. CT imaging now with ongoing response. We discussed that he would have been on treatment for 2 years following recurrence with good response. We can continue treatment now and get a PET CT in 2/2024.will repeat prior to follow up. , PET with some residual activity in the mediastinal lymph node.   We continued treatment and now signatera negative. I had a long discussion with the patient. We decided to repeat CT chest now and if no adenopathy, consider holding treatment.   Now CT chest repeated with persistent adenopathy. Discussed biopsy/mediastinoscopy vs continued treatment. Has fairly been tolerating well except fatigue. After discussion we decided to continue chemotherapy.  Now repeat CT imaging with no new concerning findings there is stable nodules which are subcentimeter in the lungs there is mildly prominent precarinal lymph node.  I discussed with the patient about this possibly being inflammatory rather than neoplastic.    -After discussion we decided to proceed with EBUS and possible biopsy of the lymph node if negative for malignancy we can consider stopping maintenance treatment given Signatera is negative and patient has treatment related side effects which are affecting his quality of life  -Bronchoscopy and precarinal lymph node biopsy reported negative as above.  -Repeat Signatera test also reported negative.  -In view of significant treatment-related toxicities and no concerns for disease  progression at this time, we will plan for surveillance alone without additional systemic therapy.  -Repeat imaging with an interval enlargement of periaortic lymph node as well as uptrending Signatera CT DNA levels concerning for disease recurrence.  Given low disease burden, patient to resume 5-FU with leucovorin maintenance (1/28/2025).  Consider adding irinotecan if noted disease progression moving forward.      Assessment & Plan        Assessment & Plan     Assessment:  Metastatic esophageal carcinoma:    -Patient currently on surveillance and is off treatment  -Restaging scans from 9/11/2024 as above, reported PAULIE.  -Repeat CT chest abdomen pelvis reviewed, noted interval enlargement of a   para-aortic lymph node concerning for disease progression now measuring 1.9 x 1.1 cm.   -Also noted to have uptrending Signatera CT DNA levels.  Discussed with patient that these findings are consistent with disease recurrence.  -Given low disease burden at this time, will resume 5-FU/leucovorin maintenance, can consider adding irinotecan if noted disease progression moving forward  -PET/CT completed on 1/28/2025 as above, consistent with disease progression. this was discussed with patient today.  -Continue maintenance 5-FU/leucovorin. Restaging scans reviwed, noted new splenic lesion but otherwise not concerning for progressive disease. Continue current therapy.        -Signetera ctDNA level has trended down. This was discussed with patient       - Proceed with leucovorin 5-FU today  - Restaging CT chest abdomen and pelvis with contrast ordered for July 2025 scheduled for 7/10/25     Possible myocarditis  Symptoms started after last immunotherapy  Treatment on hold for now, symptoms have improved, cardiac markers were negative, echocardiogram with EF 40%, cardiac MRI cannot be obtained cardiac cath with EF down to 30% management per cardiology  Now EF up to 40-45% monitor.  Symptomatic improvement now no shortness of  breath    Shortness of breath  Improved, has occasional shortness of breath, likely copd  I have recommended using albuterol  Symptoms have improved since stopping immunotherapy. Questionable pneumonitis will monitor closely with immunotherapy  Cardiology has evaluated for myocarditis related to immunotherapy symptomatic improvement now continue to monitor. Symptomatic improvement as above.    Reflux/heartburn/dysphagia:   Symptoms improved.  Also has a history of strictures. Status post EGD and balloon dilation.   Continues to be on pantoprazole symptoms stable.  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  Continues on Synthroid stable now no heart function stable in normal range    Mild diarrhea:  diarrhea seems to be waxing and waning in nature.  Diarrhea related to immunotherapy now improved stable.    Patient reports mild diarrhea following cycle 1 of leucovorin and fluorouracil.  He has not been taking any Imodium.    PD-L1 positive, HER2 negative, p53 and KRAS positive.  Repeat Caris testing with repeat biopsy with no target mutations.    B12 deficiency: Continue B12    Anemia - Hb  was low iron sat low at 10, given venofer 300 x3 monitor for now. Intolerant to oral iron.  Continued low iron saturation will plan on Feraheme for 2 doses. Hemoglobin remains stable.7/8/25 Hgb today 8.1, platelets 139,000, will recheck iron profile and ferritin for next visit     Fatigue.  Fatigue may be attributed to the ongoing chemotherapy treatment and slightly low hemoglobin levels. The patient is not currently taking any iron supplements due to stomach issues but had an iron infusion in the past. Adequate hydration by drinking plenty of water is advised.    Heartburn:  Dysphagia:  Significant stomach pain and difficulty eating are reported. He is off Protonix due to worsning renal function. Pepcid will be  prescribed to manage stomach issues. The patient is advised to schedule an appointment with his gastroenterologist for further evaluation and potential esophageal dilation. If the scope reveals tumor growth causing esophageal narrowing, alternative treatment options, including the possibility of a feeding tube, will be considered.7/8/25 reports able to eat since stent placed.     SORIN on CKD,:  Limited oral intake:  -Counseled patient on importance of oral intake and nutrition while on chemotherapy  -Continue follow-up with nephrology for his underlying CKD  7/8/25 CMP pending     Follow-up with Dr. Beth in 4 weeks as previously scheduled or sooner if needed        Time spent on encounter including record review, history taking, exam, discussion, counseling and documentation at: 30  minutes

## 2025-07-08 ENCOUNTER — OFFICE VISIT (OUTPATIENT)
Dept: ONCOLOGY | Facility: CLINIC | Age: 75
End: 2025-07-08
Payer: MEDICARE

## 2025-07-08 ENCOUNTER — HOSPITAL ENCOUNTER (OUTPATIENT)
Dept: ONCOLOGY | Facility: HOSPITAL | Age: 75
Discharge: HOME | End: 2025-07-08
Payer: MEDICARE

## 2025-07-08 VITALS
DIASTOLIC BLOOD PRESSURE: 70 MMHG | TEMPERATURE: 98.1 F | WEIGHT: 133 LBS | OXYGEN SATURATION: 100 % | HEIGHT: 66 IN | BODY MASS INDEX: 21.38 KG/M2 | HEART RATE: 81 BPM | SYSTOLIC BLOOD PRESSURE: 131 MMHG

## 2025-07-08 VITALS — BODY MASS INDEX: 21.32 KG/M2 | WEIGHT: 132 LBS

## 2025-07-08 DIAGNOSIS — C16.0 MALIGNANT NEOPLASM OF CARDIA: Primary | ICD-10-CM

## 2025-07-08 DIAGNOSIS — C15.5 PRIMARY MALIGNANT NEOPLASM OF LOWER THIRD OF ESOPHAGUS: ICD-10-CM

## 2025-07-08 DIAGNOSIS — D50.9 IRON DEFICIENCY ANEMIA, UNSPECIFIED IRON DEFICIENCY ANEMIA TYPE: ICD-10-CM

## 2025-07-08 LAB
ALP BLD-CCNC: 42 U/L (ref 53–128)
BASOPHILS # BLD AUTO: 0.03 10*3/MM3 (ref 0–0.2)
BASOPHILS NFR BLD AUTO: 0.8 % (ref 0–1.5)
BUN BLDA-MCNC: 15 MG/DL (ref 7–22)
CALCIUM BLD QL: 8.4 MG/DL (ref 8–10.3)
CHLORIDE BLDA-SCNC: 109 MMOL/L (ref 98–108)
CO2 BLDA-SCNC: 24 MMOL/L (ref 18–33)
CREAT BLDA-MCNC: 1.2 MG/DL (ref 0.6–1.2)
DEPRECATED RDW RBC AUTO: 51.8 FL (ref 37–54)
EGFRCR SERPLBLD CKD-EPI 2021: 62.7 ML/MIN/1.73
EOSINOPHIL # BLD AUTO: 0.1 10*3/MM3 (ref 0–0.4)
EOSINOPHIL NFR BLD AUTO: 2.6 % (ref 0.3–6.2)
ERYTHROCYTE [DISTWIDTH] IN BLOOD BY AUTOMATED COUNT: 15.2 % (ref 12.3–15.4)
GLUCOSE BLDC GLUCOMTR-MCNC: 163 MG/DL (ref 73–118)
HCT VFR BLD AUTO: 25.2 % (ref 37.5–51)
HGB BLD-MCNC: 8.1 G/DL (ref 13–17.7)
LYMPHOCYTES # BLD AUTO: 0.88 10*3/MM3 (ref 0.7–3.1)
LYMPHOCYTES NFR BLD AUTO: 23 % (ref 19.6–45.3)
MCH RBC QN AUTO: 32 PG (ref 26.6–33)
MCHC RBC AUTO-ENTMCNC: 32.1 G/DL (ref 31.5–35.7)
MCV RBC AUTO: 99.6 FL (ref 79–97)
MONOCYTES # BLD AUTO: 1.02 10*3/MM3 (ref 0.1–0.9)
MONOCYTES NFR BLD AUTO: 26.6 % (ref 5–12)
NEUTROPHILS NFR BLD AUTO: 1.8 10*3/MM3 (ref 1.7–7)
NEUTROPHILS NFR BLD AUTO: 47 % (ref 42.7–76)
PLATELET # BLD AUTO: 139 10*3/MM3 (ref 140–450)
PMV BLD AUTO: 9.7 FL (ref 6–12)
POC ALBUMIN: 2.7 G/L (ref 3.3–5.5)
POC ALT (SGPT): 9 U/L (ref 10–47)
POC AST (SGOT): 17 U/L (ref 11–38)
POC TOTAL BILIRUBIN: 0.5 MG/DL (ref 0.2–1.6)
POC TOTAL PROTEIN: 5.7 G/DL (ref 6.4–8.1)
POTASSIUM BLDA-SCNC: 4.3 MMOL/L (ref 3.6–5.1)
RBC # BLD AUTO: 2.53 10*6/MM3 (ref 4.14–5.8)
SODIUM BLD-SCNC: 137 MMOL/L (ref 128–145)
WBC NRBC COR # BLD AUTO: 3.83 10*3/MM3 (ref 3.4–10.8)

## 2025-07-08 PROCEDURE — 25010000002 FLUOROURACIL PER 500 MG: Performed by: NURSE PRACTITIONER

## 2025-07-08 PROCEDURE — 85025 COMPLETE CBC W/AUTO DIFF WBC: CPT | Performed by: STUDENT IN AN ORGANIZED HEALTH CARE EDUCATION/TRAINING PROGRAM

## 2025-07-08 PROCEDURE — 80053 COMPREHEN METABOLIC PANEL: CPT

## 2025-07-08 PROCEDURE — 25010000002 DEXAMETHASONE PER 1 MG: Performed by: NURSE PRACTITIONER

## 2025-07-08 PROCEDURE — 96375 TX/PRO/DX INJ NEW DRUG ADDON: CPT

## 2025-07-08 PROCEDURE — 25810000003 SODIUM CHLORIDE 0.9 % SOLUTION 250 ML FLEX CONT: Performed by: NURSE PRACTITIONER

## 2025-07-08 PROCEDURE — 96409 CHEMO IV PUSH SNGL DRUG: CPT

## 2025-07-08 PROCEDURE — G0498 CHEMO EXTEND IV INFUS W/PUMP: HCPCS

## 2025-07-08 PROCEDURE — 25810000003 SODIUM CHLORIDE 0.9 % SOLUTION: Performed by: NURSE PRACTITIONER

## 2025-07-08 PROCEDURE — 25810000003 SODIUM CHLORIDE 0.9 % SOLUTION 500 ML FLEX CONT: Performed by: NURSE PRACTITIONER

## 2025-07-08 PROCEDURE — 25010000002 LEUCOVORIN CALCIUM PER 50 MG: Performed by: NURSE PRACTITIONER

## 2025-07-08 PROCEDURE — 96367 TX/PROPH/DG ADDL SEQ IV INF: CPT

## 2025-07-08 RX ORDER — DEXAMETHASONE SODIUM PHOSPHATE 4 MG/ML
12 INJECTION, SOLUTION INTRA-ARTICULAR; INTRALESIONAL; INTRAMUSCULAR; INTRAVENOUS; SOFT TISSUE ONCE
Status: COMPLETED | OUTPATIENT
Start: 2025-07-08 | End: 2025-07-08

## 2025-07-08 RX ORDER — PRAVASTATIN SODIUM 20 MG
TABLET ORAL
COMMUNITY
Start: 2025-07-02

## 2025-07-08 RX ORDER — SODIUM CHLORIDE 0.9 % (FLUSH) 0.9 %
20 SYRINGE (ML) INJECTION AS NEEDED
Status: CANCELLED | OUTPATIENT
Start: 2025-07-08

## 2025-07-08 RX ORDER — TORSEMIDE 20 MG/1
TABLET ORAL DAILY
COMMUNITY
Start: 2025-07-01

## 2025-07-08 RX ORDER — FLUOROURACIL 50 MG/ML
400 INJECTION, SOLUTION INTRAVENOUS ONCE
Status: CANCELLED | OUTPATIENT
Start: 2025-07-08

## 2025-07-08 RX ORDER — SODIUM CHLORIDE 0.9 % (FLUSH) 0.9 %
20 SYRINGE (ML) INJECTION AS NEEDED
Status: DISCONTINUED | OUTPATIENT
Start: 2025-07-08 | End: 2025-07-09 | Stop reason: HOSPADM

## 2025-07-08 RX ORDER — FLUOROURACIL 50 MG/ML
400 INJECTION, SOLUTION INTRAVENOUS ONCE
Status: COMPLETED | OUTPATIENT
Start: 2025-07-08 | End: 2025-07-08

## 2025-07-08 RX ORDER — SODIUM CHLORIDE 9 MG/ML
20 INJECTION, SOLUTION INTRAVENOUS ONCE
Status: CANCELLED | OUTPATIENT
Start: 2025-07-08

## 2025-07-08 RX ORDER — SODIUM CHLORIDE 9 MG/ML
500 INJECTION, SOLUTION INTRAVENOUS ONCE
Status: CANCELLED
Start: 2025-07-10 | End: 2025-07-10

## 2025-07-08 RX ORDER — SODIUM CHLORIDE 9 MG/ML
20 INJECTION, SOLUTION INTRAVENOUS ONCE
Status: COMPLETED | OUTPATIENT
Start: 2025-07-08 | End: 2025-07-08

## 2025-07-08 RX ORDER — DEXAMETHASONE SODIUM PHOSPHATE 4 MG/ML
12 INJECTION, SOLUTION INTRA-ARTICULAR; INTRALESIONAL; INTRAMUSCULAR; INTRAVENOUS; SOFT TISSUE ONCE
Status: CANCELLED
Start: 2025-07-08 | End: 2025-07-08

## 2025-07-08 RX ADMIN — SODIUM CHLORIDE 20 ML/HR: 9 INJECTION, SOLUTION INTRAVENOUS at 11:09

## 2025-07-08 RX ADMIN — DEXAMETHASONE SODIUM PHOSPHATE 12 MG: 4 INJECTION, SOLUTION INTRAMUSCULAR; INTRAVENOUS at 11:09

## 2025-07-08 RX ADMIN — FLUOROURACIL 670 MG: 50 INJECTION, SOLUTION INTRAVENOUS at 12:10

## 2025-07-08 RX ADMIN — LEUCOVORIN CALCIUM 670 MG: 350 INJECTION, POWDER, LYOPHILIZED, FOR SUSPENSION INTRAMUSCULAR; INTRAVENOUS at 11:36

## 2025-07-08 RX ADMIN — FLUOROURACIL 4000 MG: 50 INJECTION, SOLUTION INTRAVENOUS at 12:14

## 2025-07-08 RX ADMIN — Medication 20 ML: at 10:07

## 2025-07-08 NOTE — PROGRESS NOTES
Port accessed and flushed with good blood return noted. 10cc of blood wasted prior to specimen collection. Blood specimen obtained and sent to lab for processing per protocol.  Port flushed with saline and dressing applied. Pt sent to waiting room for NP visit.

## 2025-07-10 ENCOUNTER — HOSPITAL ENCOUNTER (OUTPATIENT)
Dept: ONCOLOGY | Facility: HOSPITAL | Age: 75
Discharge: HOME | End: 2025-07-10
Admitting: STUDENT IN AN ORGANIZED HEALTH CARE EDUCATION/TRAINING PROGRAM
Payer: MEDICARE

## 2025-07-10 ENCOUNTER — HOSPITAL ENCOUNTER (OUTPATIENT)
Dept: PET IMAGING | Facility: HOSPITAL | Age: 75
Discharge: HOME | End: 2025-07-10
Admitting: NURSE PRACTITIONER
Payer: MEDICARE

## 2025-07-10 VITALS
WEIGHT: 133.9 LBS | OXYGEN SATURATION: 100 % | TEMPERATURE: 97.5 F | HEIGHT: 66 IN | HEART RATE: 77 BPM | SYSTOLIC BLOOD PRESSURE: 141 MMHG | DIASTOLIC BLOOD PRESSURE: 68 MMHG | BODY MASS INDEX: 21.52 KG/M2

## 2025-07-10 DIAGNOSIS — C16.0 MALIGNANT NEOPLASM OF CARDIA: Primary | ICD-10-CM

## 2025-07-10 DIAGNOSIS — C16.0 MALIGNANT NEOPLASM OF CARDIA: ICD-10-CM

## 2025-07-10 DIAGNOSIS — C15.5 PRIMARY MALIGNANT NEOPLASM OF LOWER THIRD OF ESOPHAGUS: ICD-10-CM

## 2025-07-10 PROCEDURE — 71250 CT THORAX DX C-: CPT

## 2025-07-10 PROCEDURE — 96360 HYDRATION IV INFUSION INIT: CPT

## 2025-07-10 PROCEDURE — 74176 CT ABD & PELVIS W/O CONTRAST: CPT

## 2025-07-10 PROCEDURE — 25810000003 SODIUM CHLORIDE 0.9 % SOLUTION: Performed by: NURSE PRACTITIONER

## 2025-07-10 RX ORDER — SODIUM CHLORIDE 9 MG/ML
500 INJECTION, SOLUTION INTRAVENOUS ONCE
Status: COMPLETED | OUTPATIENT
Start: 2025-07-10 | End: 2025-07-10

## 2025-07-10 RX ORDER — SODIUM CHLORIDE 0.9 % (FLUSH) 0.9 %
20 SYRINGE (ML) INJECTION AS NEEDED
Status: DISCONTINUED | OUTPATIENT
Start: 2025-07-10 | End: 2025-07-11 | Stop reason: HOSPADM

## 2025-07-10 RX ORDER — SODIUM CHLORIDE 0.9 % (FLUSH) 0.9 %
20 SYRINGE (ML) INJECTION AS NEEDED
OUTPATIENT
Start: 2025-07-10

## 2025-07-10 RX ADMIN — SODIUM CHLORIDE 500 ML/HR: 9 INJECTION, SOLUTION INTRAVENOUS at 13:35

## 2025-07-10 RX ADMIN — Medication 20 ML: at 14:40

## 2025-07-10 NOTE — PROGRESS NOTES
Patient to infusion for 5FU pump disconnect. Pump empty, positive blood return, flushed with saline. He had more diarrhea yesterday with dry heaves, none today. He does not like taking imodium but encourged to use if having more diarrhea. He does take zofran prn.  Scheduled IVF given and tolerated. Declined  AVS and discharged home.

## 2025-07-17 NOTE — PROGRESS NOTES
Hematology-Oncology Follow-up Note       Artie Mc  1950    Primary Care Physician: Chanel Carter APRN  Referring Physician: Chanel Carter APRN    Reason For Visit:  No chief complaint on file.    Metastatic esophageal cancer  Monitoring for adverse effects related to immunotherapy.  Hypothyroidism      Mr. Mc is a pleasant 75 y.o. gentleman with a history of gastroesophageal reflux disease, diabetes, hypertension, heart block status post pacemaker placement and history of CABG.  He was having symptoms of dysphagia, weight loss since September 2016. He was reporting symptoms of food getting stuck in the lower chest.  He has transitioned himself to a liquid diet.  The patient reports losing about 30 pounds over this duration.  The patient underwent upper GI endoscopy on 12/20/16 with Dr. sEvin Barrera.  Endoscopy showed necrotic, circumferential and moderately obstructive mass at the distal esophagus between 42 cm and 36 cm.      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  There was also evidence of mild subacute inflammation in the duodenum.  Dr. Barrera ordered a CT scan of the chest and abdomen with contrast on 12/20/16.  The scan was done at [facility].  The scan showed a new ill-defined mass in the anterior wall of the distal esophagus at the GE junction measuring 2.3 x 2.2 x 1.6 cm, worrisome for cancer.  There were four subcentimeter liver lesions which had appeared stable in comparison to another CT scan from 2009 and they likely represent small cysts.  There was also mild lymphadenopathy in the gastrohepatic ligament.  There were at least six borderline enlarged lymph nodes measuring up to 1.3 x 1 cm in the region.  The patient is referred to us for further oncological evaluation.    1/5/2017 - The patient presents for initial consultation.  He reports persistent dysphagia and also has  symptoms of regurgitation.  He cannot tolerate solid foods and is dependent on liquid diet such as Ensure.  He also reports fatigue. Symptoms of dysphagia have been present for the past four months.  When he eats any solids, the food gets stuck in the lower chest.    1/5/17 - Vitamin B12 200 (L).  CEA 0.8.  Ferritin 35.  Iron saturation 16% (L), serum iron 56, TIBC 354.  Creatinine 0.9.  LFTs normal.  Folate 10.2.  1/12/17 - PET/CT scan:  Intense abnormal activity corresponding to the region of the GE junction and proximal stomach.  SUV is 11.06.  There is a suggestion of an abnormal mass or abnormal wall thickening in the region measuring 3.9 x 4.8 cm.  No additional abnormalities.  No evidence of metastases or other lymphadenopathy.  Scattered nonspecific subcentimeter lymph nodes involving the mediastinum and within the central upper abdomen.  1/13/17 - Creatinine 0.9.  LFTs normal.    1/17/17 - Patient seen by thoracic surgeon, Dr. Bradley.  PET scan was reviewed.  He has recommended neoadjuvant chemoradiation therapy followed by Noel Gurwinder esophagogastrectomy.  Port-A-Cath is being arranged.    1/20/17 - WBC 6.3, hemoglobin 12.1, platelet count 198,000, MCV 86.4.  1/30/17 - Patient started weekly Carboplatin and Taxol (Carboplatin AUC 2 and Taxol 50 mg/M2).  Patient received Injectafer 750 mg.  WBC 5.8, hemoglobin 12.4, platelet count 244,000.   1/31/17 - Patient seen by Dr. Eduardo Oleary.  The plan was to give 5040 cGy in 28 fractions.   2/6/17 - WBC 4.4, hemoglobin 11.6, platelet count 201,000.  Patient received cycle 2 of weekly Carboplatin and Taxol (Carboplatin AUC 2 and Taxol 50 mg/M2).  2/13/17 - Patient received Carboplatin and Taxol weekly cycle 3.  2/13/17 - Patient started concurrent radiation therapy.  Total planned dose is 4140 cGy.    2/13/17 to 2/15/17 - Patient received 4140 cGy of neoadjuvant radiation.  Radiation was finished on 3/15/17.    2/20/17 - WBC 1.8, hemoglobin 10.6, platelet count  217,000.  Creatinine 0.5. Folate 15 (L).  Ferritin 361.  Haptoglobin 214.  Iron saturation 33%, TIBC 263, serum iron 88.    2/20/17 - Patient received Carboplatin and Taxol weekly cycle 4.   2/27/17 - WBC 3.7, hemoglobin 10.7, platelet count 177,000, MCV 85.3.    3/6/17 - Patient has received 16 out of the 23 planned radiation treatments.  Total planned dose is 4140 cGy.    3/6/17 - WBC 5.4, hemoglobin 11.2, platelet count 113,000.  3/6/17 - Patient received cycle 5 of weekly Carboplatin and Taxol.   3/13/17 - Patient received cycle 6 of weekly Carboplatin and Taxol.  WBC 3.9, hemoglobin 11.3, platelet count 93,000.     3/20/17 - WBC 1.4, hemoglobin 10.0, platelet count 118,000, MCV 86.1   3/27/17 - WBC 3.8, hemoglobin 9.5, platelet count 176,000, MCV 87.3.    4/7/17 - Stress test:  No evidence of reversible myocardial ischemia.  Normal left ventricular ejection fraction of 50%.    4/17/17 - Upper GI endoscopy by Dr. 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  The GE junction adenocarcinoma lesion seems to have a very good response to chemotherapy and radiation.  The lumen was open.  5/1/17 - WBC 7.0, hemoglobin 10.2, platelet count 175,000.    5/4/17 - Patient underwent [place] Gurwinder esophagectomy with pyloroplasty, feeding jejunostomy tube, abdominal and mediastinal lymph node dissection.    Surgical Pathology:  Moderately to poorly differentiated adenocarcinoma measuring 1.3 cm at the GE junction.  Resection margins are negative for malignancy.  Tumor margins are negative.  There is effective treatment response.  No evidence of lymphovascular invasion.  Staging is ypT3,pN1.  One out of fourteen lymph nodes involved.   5/22/17 - WBC 6.8, hemoglobin 10.3, platelet count 312,000.    6/19/17 - WBC 5.7, hemoglobin 10.5, platelet count 204,000, MCV 92.1.    7/6/17 - EGD:  Status post balloon dilation of esophageal stricture.  7/12/17 - EGD with balloon dilation of esophageal  stricture.  7/26/17 - EGD and balloon dilation of esophageal stricture.  7/31/17 - PET/CT scan:  There is mild metabolic activity seen at the thoracic esophagus just at and below the surgical margins.  Some mild wall thickening.  This could be from recent surgery.  A residual cancer seems unlikely.  There is a precarinal lymph node with mild metabolic activity with SUV of 3 measuring 1 x 1.3 cm.  Again this appears to be nonspecific in my opinion.    8/7/17 - WBC 5.9, hemoglobin 11.9, platelet count 171,000, MCV 88.7.    10/19/17 - WBC 7.5, hemoglobin 11.8, platelet count 216,000.    1/8/18 - PET scan:  No evidence of residual disease or recurrent disease.  There is an esophageal stent in the mid esophagus with a large debris air level.  No evidence of any metastases in the chest, abdomen or pelvis.  Mild nodule uptake in the vocal cords with fullness in the right vocal cord.  This could be physiologic.    7/9/18 - CT scan of chest with contrast:  Prominent mediastinal lymph nodes appear stable since February.  Changes of gastric pull-through procedure for esophageal cancer again noted.  Tree-in-bud infiltrates in the left lung base, consistent with infection versus inflammation.    1/10/19 - CT chest, abdomen and pelvis with contrast:  No evidence of mets in the chest; however, interval development of metastatic lymphadenopathy in the left side of the retroperitoneum.  For instance, there is a 17 mm rounded lymph node, 10 mm lymph node and also a 14 mm lymph node.  These are all new.  Stable 9 mm hypodense lesion within the right hepatic lobe, which could reflect hemangioma or complex cyst.    1/30/19 - CT-guided core biopsy of retroperitoneal lymph node:  Poorly differentiated adenocarcinoma consistent with metastases from patient’s known esophageal primary.  CK20 positive, CDX2 positive, cytokeratin 7 negative, TTF-1 negative.    2/1/19 - Creatinine 0.9, BUN 18, calcium 9.3; these are normal.    2/28/19 - [facility]  Next Gen sequencing testing on retroperitoneal lymph node specimen:  PD-L1 positive.  CPS = 3.  This implies responsiveness to pembrolizumab.  Mismatch repair status is proficient (no evidence of microsatellite instability).  Tumor mutational burden is low = 6 mutations/Mb.  CDH1 indeterminate.  KRAS mutation positive.  TP53 mutation positive.  CDK6 amplified.  [employer] PD-L1 testing by IHC:  PD-L1 expression 1% positive (CPS =1).  HER2/marvel by IHC is negative.  HER2/marvel by CISH not amplified.  Microsatellite stable tumor.    3/7/19 - WBC 7.3, hemoglobin 12, platelet count 128,000, MCV 92.     3/14/19 - Patient was seen by radiation oncologist, Dr. Chahal.  He has recommended observation versus systemic chemoimmunotherapy as needed.  No plans for radiation therapy.   5/1/19 - CT scan of chest with contrast:  No definitive findings of new metastatic disease in the chest.  Emphysema noted.    5/1/19 - CT abdomen and pelvis:  There is interval progression of metastatic retroperitoneal adenopathy.  Left periaortic adenopathy with lymph node measuring up to 2 cm, previously 1.7 cm.  Another lymph node now measures 2.3, previously 1.4 cm.  New enlarged lymph node on image 147 measures 1.4 cm, previously 0.4 cm.  A 9 mm hypodense right hepatic lobe lesion appears stable.    5/9/19 - Decision made to start patient on immunotherapy Keytruda.    5/9/19 - Vitamin B12 229.  Ferritin 61.  Folate 14.3.  Iron saturation 26%, TIBC 304, serum iron 79.  5/31/19 - Patient received Keytruda 200 mg cycle 1, day 1.    5/31/19 - Free T4 0.99.  Creatinine 0.9, BUN 9.  LFTs normal.  TSH 3.99.  Folate 14.3.  Iron saturation 26%.    6/21/2019 patient received Keytruda cycle 2  7/12/2019: Patient received Keytruda cycle 3  8/2/2019 patient received cycle 4 Keytruda WBC 7.2, hemoglobin 11.8 platelet count 163  8/23/2019 creatinine 0.9, LFTs normal, WBC 6.4, hemoglobin 11.5, platelets 179, TSH 2.1   8/23/2019 patient received cycle 5 of  Keytruda  9/3/2019 CT chest abdomen and pelvis with contrast: . Positive response to therapy in the abdomen since 05/01/2019. Pathologically enlarged retroperitoneal lymph nodes, predominantly in the left periaortic region, have diminished in size.a 1.3 x 2.1 cm left periaortic node (image 60), previously measured 3.5 x 2.3 cm. A 1.6 cm index node near the level of the left renal vein previously measured 2.0 cm No new or progressive adenopathy. 2. Stable findings in the chest. Noncalcified right upper lobe nodules are unchanged. There is no evidence of new or progressive metastatic disease within the chest. 3. 8 mm indeterminate low-density lesion in the right hepatic lobe, is stable. No new liver lesions.  9/6/2019 WBC 5.6, hemoglobin 11.2, platelets of 192, MCV 91.8  9/13/2019 patient received Keytruda cycle 6, albumin 3.2  10/4/2019 patient received cycle 7 of Keytruda, TSH 4.8, free T4 0.86  10/9/2019 WBC 5.8, hemoglobin 11.7, platelets 174  10/25/2019 patient received cycle 8 of Keytruda.  WBC 5.9, hemoglobin 11.5, platelets 152, creatinine 0.75, cortisol 15.2, TSH 6.48 high , free T4 1.13 normal  11/6/2019 WBC 6.6, hemoglobin 11.8, MCV 91.5, platelets 187  11/15/2019 patient received cycle 9 of Keytruda, WBC 6.6, hemoglobin 11.9, platelets 161, cortisol level 8.86, TSH 2.68, free T3 2.8, free T4 1.5  12/4/2019 WBC 6.9, hemoglobin 12.0, platelets 180, MCV 92.9  12/06/2019-Keytruda Cycle 10  12/27/2019- Cycle 11 LFTs normal, WBC 6.7, hemoglobin 11.4, platelets 168, MCV 93, TSH 2.4,  1/28/2020: CT chest abdomen and pelvis with contrast:  Single (aortic lymph node in the abdominal retroperitoneum has increased.  Rest of the retroperitoneal lymph nodes have decreased in size.  Mediastinal adenopathy appears unchanged.  Noncalcified bilateral pulmonary nodules are stable  1/17/2020 patient received Keytruda cycle 12:.  2/3/2020 WBC 7.1, hemoglobin 12.2, platelets 171  02/10/2020 patient received cycle 13 of  Keytruda: WBC 7.3, hemoglobin 12.1, platelets 166, creatinine 0.82  3/6/2020 patient is of Keytruda 200 mg, hemoglobin 10.8  3/27/2020 patient received Keytruda 200 mg, hemoglobin 11.4, TSH 1.95  4/17/2020 patient received Keytruda cycle 16 200 mg, WBC 6.1, hemoglobin 11.6, platelets 150  5/8/2020: Patient received cycle 17 Keytruda, WBC 6.3, hemoglobin 9.6, platelets 137, TSH 2.05, free T4 1.54, creatinine 0.93, LFTs normal,  5/29/2020: Patient received cycle 18 Keytruda, WBC 6.5, hemoglobin 11.7, platelets 129, creatinine 0.83, TSH 1.69  6/26/2020: Patient receiving Keytruda.   7/17/2020: TSH 2.06, WBC 5.7, hemoglobin 11.4, platelets 177, MCV 92.4, Keytruda 200 mg cycle 20  7/27/2020: CT chest abdomen pelvis with contrast: Stable findings in the chest abdomen pelvis since 1/28/2020.  Noncalcified bilateral pulmonary nodules are stable.  Stable retroperitoneal left perinephric adenopathy in the abdomen.  8/7/2020: Patient received cycle 21 of Keytruda  8/28/2020 patient is a cycle 22 of Keytruda.  WBC 6.02, hemoglobin 11.3, platelets 161  9/11/2020 WBC 6.9, hemoglobin 12.4, platelets 192  9/18/2020: Cycle 23 of Keytruda, TSH 1.19  10/9/2020: Cycle 24 of Keytruda, WBC 5.9, hemoglobin 11.9, platelets 178, creatinine 0.84, TSH 2.56, free T3 2.4, CMP normal  10/23/2020: WBC 7.34, hemoglobin 11.6, platelets 175  10/30/2020:.  Keytruda 200 mg  11/20/2020: Keytruda 200 mg  12/11/2020 Keytruda 200 mg.  WBC 5.5, hemoglobin 11.4, platelets 176, creatinine 0.8, LFTs normal, TSH 2.2, cortisol 8.3  12/10/2020: CT chest abdomen and pelvis with contrast: Previously seen left para-aortic lymph nodes within the abdomen are decreased in size.  No pathological lymphadenopathy.  No evidence of residual malignancy..  Small noncalcified pulmonary nodules are stable.  No new nodules.  Stable mediastinal lymph nodes.  3/12/2021: WBC 6.7, hemoglobin 11.3, platelets 17  3/12/2021: CMP normal, TSH 2.9, cortisol 11.95, 1  3/15/2021: CT chest  with contrast: Stable findings in the chest with postsurgical changes of esophageal resection and gastric pull-through.  Stable right lower lobe lung nodule measures 11 mm.  Stable size and appearance of the mediastinal and upper abdominal lymph nodes.  3/23/2021: Doppler of upper extremity: Normal.  3/24/2021: CT abdomen and pelvis with contrast: Postoperative changes.  Left periaortic nodes are stable.  4/12/2021: TSH 4.1, cortisol 12.4  6/22/2021: CMP normal,Cortisol 10.08,, WBC 5.8, hemoglobin 11.2, platelets 109, TSH 2.59  7/12/2021: CT scan of the chest abdomen pelvis with contrast: Dominant 10 x 10 left periaortic lymph node is stable since 3/22/2021.  Dominant lymph nodes in the right lower paratracheal and AP window distribution are stable since 3/15/2021.  No evidence of progressive adenopathy in the chest abdomen or pelvis.    01/11/2022 -PET/CT findings consistent with new cedric metastatic disease in the retroperitoneum.  2 new pathologically enlarged hypermetabolic retroperitoneal lymph nodes.  No convincing evidence of recurrent disease within the chest neck pelvis or skeleton.  1/20/2022 -CT-guided needle biopsy consistent with poorly differentiated carcinoma.  CDX2 positive consistent with metastases from known esophageal primary  2/9/2022 - Pembrolizumab complicated with fatigue, joint pains, nausea, diarrhea. Symptoms decrease in intensity over a week.  3/30/2022 -CT chest and pelvis with stable metastatic lymph nodes in the left periaortic retroperitoneum.  No evidence of disease progression or new lesions.  Stable lymphadenopathy in the distal paratracheal mediastinal area  4/13/2022 - Keytruda 200  5/4/2022 - Keytruda 200  Continued keytruda  7/11/2022 - CT abdomen pelvis with decrease in size of left perioaortic lymph node. No other areas of disease.  Continues to be on Keytruda  10/18/2022 - stable CT imaging  1/20/2023 - CT imaging with stable disease. No significant change.  Patient was  hospitalized with esophagitis, subsequent EGD with improvement, no recurrence of disease.  2/21/2023 - Keytruda  CT CAP with increased retroperitoneal adenopathy.   ECHO with EF 35-40% (concern for immunotherapy related )immunotherapy held with ongoing investigation for cause.  Subsequent improvement in EF to 45%  4/4/2023 - CT abdomen pelvis with interval retroperitoneal lymph node enlargement compatible with metastatic adenopathyl.  5/5/2023 - PET CT with mixed response. No clear evidence of progression  6/2/23 -started pembrolizumab 200 mg IV  Posttreatment had symptoms of chest tightness troponin and proBNP negative, echocardiogram with EF 40%  Subsequent cardiac cath with EF down to 30% elevated proBNP, troponin no significant obstructive disease in the graft vessels  Cardiac MRI unable to obtain given pacemaker noncompliant.  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 Two enlarged mediastinal lymph nodes in the precarinal and AP window distributions are unchanged since 4/4/2023. No new or progressive adenopathy in the chest.Stable 3 mm or less noncalcified right lung nodules since 4/4/2023. Surgical changes of distal esophagectomy with gastric pull-through, without evidence of local disease recurrence  8/29/23 - started FOLFOX had significant side effects  9/20/23 - stopped oxaliplatin and continued 5FU only'  11/29/23 - CT CAP with decrease size of retroperitoneal adenopathy, no other concerning findings.  Continues 5FU leucovorin   2/12/24 - PET CT There is some metabolic activity within a mediastinal lymph node which has been suggested with slightly decreased metabolic activity. Whether this is reactive or pathologic is uncertain. Previously noted abnormal retroperitoneal  lymph node is no longer identified continued treatmen  4/10/24 - iron sat 10, ferritin  14, plan for venofer  Continued treatment signatera negative  5/7/24 - CT chest without contrast. Unchanged mildly enlarged right paratracheal lymph lymph node measuring 1.1 cm in short axis (series 2 image 40) and left paratracheal measuring 1.1 cm in short axis (series 2 image 39). No new or progressive lymphadenopathy.  Continued maintenance 5FU  changed frequency to every 3 weeks.   7/11/2024 CT chest with stable lung nodule, mildly prominent precarinal lymph node    7/25/2024:  Bilateral lung, bronchial wash with smears and cytospin:  Acute inflammation, pulmonary macrophages and bronchial epithelial cells    2.   Lymph node, precarinal station 4, fine-needle aspiration with smears and cell block:  Benign bronchial epithelial cells in a background of blood and lymphocytes consistent with lymph node aspirate  No malignancy identified      8/7/24: Seen today for initial evaluation by me.  He was previously being seen by Dr. Elizondo in our practice.  He has underlying history of metastatic esophageal adenocarcinoma and was on maintenance therapy with 5-FU/leucovorin.  His last treatment was on 7/17/2024.  He has intermittent bronchoscopy planned lymph node biopsy due to suspicion for metastatic disease on recent imaging.  This is reported as above.    Patient reported that he has had persistent fatigue with maintenance chemotherapy, also reported having GI side effects, such as nausea and poor appetite with chemo.  However, the patient denied any weight loss.  No other issues presently.    9/11/2024: CT chest/abdomen/pelvis:  Impression:  1. Postsurgical changes from esophagectomy with gastric pull-through. No findings to suggest progression of disease within the thorax.  2. Stable subcentimeter pulmonary nodules measuring up to 4 mm in the right lower lobe, and mildly prominent precarinal lymph node. Continued attention on  follow-up suggested.  3. No findings to suggest progression of disease or metastatic disease within the abdomen or pelvis.  4. Additional findings as given above..      10/23/24: Patient seen today for follow-up visit with restaging scans.  Has ogoing sinus congestion for several months. Some sore throat.some cough, SOB has improved. Has ongoing fatigue, stable.     1/10/25: CT chest/Abdomen/Pelvis W contrast:  Impression:   1. Stable examination of the chest with postsurgical changes from an esophagectomy and gastric pull-through.   2. However, within the abdomen there is a new enhancing lesion in the posterior right hepatic lobe measuring 0.6 cm and interval enlargement of a para-aortic lymph node concerning for disease progression now measuring 1.9 x 1.1 cm.     1/17/2025: Patient seen today for follow-up visit with restaging scans.  No new complaints reported.  Continues to do well clinically    1/28/2025 PET/CT:  Impression:  1. Isolated hypermetabolic left periaortic/retroperitoneal lymph nodes suspicious for metastatic disease in patient with history of esophageal malignancy. While almost certainly esophageal related, nonemergent testicular ultrasound would be reasonable in   a male with retroperitoneal adenopathy to exclude a solid testicular lesion.  2. Mild lower mediastinal adenopathy stable from 2024. No evidence of solid organ metastases.  3. Esophagectomy with gastric pull-through. Linear probably physiologic uptake in the stomach. Differential would include gastritis.  4. Emphysema. Emphysema on CT is an independent risk factor for lung cancer. Low dose lung cancer screening should be considered if patient qualifies based on smoking history or if not already enrolled in a screening program.      2/11/2025: Patient here today for routine follow-up prior to scheduled treatment.  He reports overall tolerating leucovorin and fluorouracil although he did have mild intermittent diarrhea.  He also reports he  has been drinking less fluids because he feels bloated.  He has no new pain.  He noted nausea following his infusion but this was managed with Zofran.    3/18/25: Patient seen today for follow-up prior to cycle 3 maintenance chemotherapy reported having off-and-on diarrhea and progressive fatigue after starting chemotherapy.  Also has intermittent nausea.  Weight/appetite is stable presently.  He reported using Imodium sparingly for his diarrhea    4/11/25: CT Chest Abd Pelvis W contrast:  1. New 6 mm low-density lesion within the spleen. Metastatic disease cannot be excluded.   2. Stable 2.0 x 1.0 cm left periaortic lymph node in the mid abdomen, consistent with metastatic disease, corresponding to the metabolically active node seen on prior PET/CT imaging. No new or progressive adenopathy.   3. Stable 6 mm enhancing focus within the right hepatic lobe since CT abdomen of 1/10/2025. This demonstrated no abnormal FDG uptake on prior PET/CT.   3. Surgical changes of esophagectomy with gastric pull-through. No evidence of new or progressive malignancy within the chest. Small noncalcified nodules measuring 2 to 3 mm are unchanged.      HPI above reviewed  7/8/25   History of Present Illness  4/15/25: The patient presents for follow up with restaging scans.   Significant gastrointestinal discomfort is reported, attributed to the discontinuation of Protonix by his nephrologist due to potential renal complications. A sensation of food impaction in the esophagus is described. This symptom was less pronounced during a chemotherapy hiatus but has since worsened. Esophageal dilation has been performed several times in the past without relief. There is reluctance towards the placement of a feeding tube. Currently, Zofran is used for nausea management. Fatigue and a lack of energy are also reported, believed to have intensified since resuming chemotherapy. Energy levels have been fluctuating.not on oral iron at this time.  He  has completed 5 cycles of maintenance 5FU/LV.    5/13/25: pt seen for follow up. Noted to have some productive cough. Patient reported having low grade fever, myalgias and generalized weakness over last few days which he attributed to a viral illness. These symptoms have improved over last couple days and he is starting to have more energy at this time, however not back to his normal baseline yet.    5/20/25 Here for chemotherapy infusion, missed last week due to feeling poorly. I was asked to evaluate patient in infusion area. He reports he feels generally weak.  He denies any fevers chills or diaphoresis.  He reports he was diagnosed with pneumonia yesterday and was prescribed doxycycline of which he took only 1 dose.  He reports shortness of air and a cough productive of yellow sputum.  He denies any difficulty swallowing but reports he spits up a lot and finds it difficult to eat and has lost 12 pounds in less than 3 weeks.  His blood pressure was moderately low at 97/53 .  Normal saline fluid bolus was given here and this is improved to 136/57.  He remains afebrile.  Labs today showed a potassium of 3.3 and he was given IV potassium replacement here.  Given concerns for possible sepsis on chemotherapy and failure to thrive he was advised to go to the emergency department at [facility] to be evaluated with blood cultures, CT chest and abdomen and further evaluation.  Patient agreed.    5/20/2025-5/24/2025: Patient was hospitalized at [facility] for multifocal pneumonia, likely secondary to aspiration.  Parainfluenza infection.  Underwent EGD with dilation on 5/22/2025 with esophogastric of anastomotic stricture noted status post stent placement.  Will need a repeat EGD in 3 months for stent removal.  Also seen by urology for urinary retention that necessitated Navarro catheter placement.  Was to be reassessed in their office 2 weeks postdischarge.  For his pneumonia he was treated with IV antibiotics followed  by oral treatment with Augmentin to complete course.    5/22/25: Esophagus, distal, biopsies:    Reactive squamoglandular mucosa with foveolar hyperplasia    Detached fragments of ulcer    PAS stain negative for fungal organisms    Negative for intestinal metaplasia, dysplasia and malignancy    6/10/2025: Artie is here today for his hospital follow-up to assess readiness to resume chemotherapy.  Reviewed hospitalization as above.  He reports that he has a follow-up with gastroenterology scheduled for September 2025.  He has had marked improvement in his food and fluid intake after having his stent placed.  He reports that he already saw urology and has had his Navarro catheter removed this appointment happen today.  He has overall had improvement although he still does not feel 100% he does feel like he is ready for treatment.    7/8/25 Artie is here for follow up. He denies any new complaints or medical issues. He reports he is trying to eat more. He reports he has tried Ensure but it gives him diarrhea. He still reports some fatigue but unchanged and he feels he is ready for treatment today.     7/10/25: CT chest/Abdomen/Pelvis W contrast:  Impression:   1. Interval increase in size of mediastinal lymphadenopathy for which disease progression cannot be excluded.   2. Esophagectomy with gastric pull-through with placement of a stent.   3. Left perinephric stranding which may be related to recent interval passage of calculi with multiple calculi layering at the base of the urinary bladder.   4. Mild enlargement of a lymph node along the right external iliac chain. This may be reactive but recommend attention to on follow-up imaging.     SUBJECTIVE:  7/22/25: has intermittent diarrhea(once/week) which improves with imodium, but he is not taking imodium consistently. Also has bilateral Leg swelling Right>>Left for last 3 weeks.was recently started on Aldectone but has not seen significant improvement. Feels dizziness  off and on.       Review of Systems   Constitutional:  Positive for fatigue.   HENT: Negative.     Eyes: Negative.    Respiratory: Negative.     Cardiovascular:  Negative for leg swelling (Chronic and unchanged).   Gastrointestinal: Negative.    Endocrine: Negative.    Genitourinary: Negative.    Musculoskeletal: Negative.    Skin: Negative.    Allergic/Immunologic: Negative.    Neurological: Negative.    Hematological: Negative.    Psychiatric/Behavioral: Negative.        Past Medical History:   Diagnosis Date    B12 deficiency     Blockage of coronary artery of heart     Depression     Disease of thyroid gland     DM (diabetes mellitus)     Dysphagia     Esophageal cancer     GERD (gastroesophageal reflux disease)     HTN (hypertension)     Hyperlipidemia        Past Surgical History:   Procedure Laterality Date    ABDOMINAL WALL ABSCESS INCISION AND DRAINAGE  10/22/2018    WITH DEBRIDEMENT  1.5CM X 1.5 CM X 1.5 CM    DR. PURI    BRONCHOSCOPY N/A 7/25/2024    Procedure: BRONCHOSCOPY WITH ENDOBRONCHIAL ULTRASOUND, LUNG WASHINGS, AND FINE NEEDLE ASPIRATIONS;  Surgeon: Bonnie Smith MD;  Location: [facility] ENDOSCOPY;  Service: Pulmonary;  Laterality: N/A;    CARDIAC CATHETERIZATION Right 7/20/2023    Procedure: Left Heart Cath;  Surgeon: Mazin Simmons MD;  Location: [facility] CATH INVASIVE LOCATION;  Service: Cardiovascular;  Laterality: Right;    CATARACT EXTRACTION  2018    COLONOSCOPY      CORONARY ARTERY BYPASS GRAFT  2015    ENDOSCOPY  12/20/2016    ENDOSCOPY N/A 01/02/2023    Procedure: ESOPHAGOGASTRODUODENOSCOPY;  Surgeon: Esvin Morgan MD;  Location: [facility] ENDOSCOPY;  Service: Gastroenterology;  Laterality: N/A;  post: anastamosis stricture    ENDOSCOPY N/A 02/09/2023    Procedure: ESOPHAGOGASTRODUODENOSCOPY, non-wire guided balloon dilation (12-14mm) of esophageal stricture;  Surgeon: Tino Villafana MD;  Location: [facility] ENDOSCOPY;  Service: Gastroenterology;  Laterality: N/A;  post:  esophageal anastamotic stricture, post surgical changes    ENDOSCOPY N/A 5/22/2025    Procedure: ESOPHAGOGASTRODUODENOSCOPY with biopsy x 1 area and placement of esophagogastric stent;  Surgeon: Tino Villafana MD;  Location: [facility] ENDOSCOPY;  Service: Gastroenterology;  Laterality: N/A;  post op: esophagogastro anastamosis    ESOPHAGECTOMY  05/04/2017    BROOKS PETTY ESOPHAGECTOMY, FEEDING JEJNOSTOMOY, ABDOMINAL AND MEDIASTINAL LYMPH NODE DISECTION, PEDICLED MUSCLE FLAP COVERAGE DR. UPRI    ESOPHAGOSCOPY / EGD  07/12/2017    WITH BALLOON DILATION    ESOPHAGOSCOPY / EGD  07/26/2017    WITH BALLOON DILATION    ESOPHAGOSCOPY / EGD  08/11/2017    WITH BALLOON DILATION    ESOPHAGOSCOPY / EGD  08/28/2017    WITH BALLOON DILATION    ESOPHAGOSCOPY / EGD  09/27/2017    WITH BALLOON DILATION    INSERT / REPLACE / REMOVE PACEMAKER      LYMPH NODE BIOPSY  01/30/2019    PACEMAKER IMPLANTATION  11/21/2016       Current Outpatient Medications:     clonazePAM (KlonoPIN) 0.5 MG tablet, Take 0.5 tablets by mouth 2 (Two) Times a Day As Needed for Anxiety., Disp: , Rfl: 1    famotidine (PEPCID) 20 MG tablet, Take 1 tablet by mouth twice daily, Disp: 60 tablet, Rfl: 0    levothyroxine (SYNTHROID, LEVOTHROID) 50 MCG tablet, Take 1 tablet by mouth Daily., Disp: 30 tablet, Rfl: 0    metFORMIN (GLUCOPHAGE) 1000 MG tablet, Take 0.5 tablets by mouth 2 (Two) Times a Day., Disp: , Rfl:     nebivolol (BYSTOLIC) 5 MG tablet, Take 0.5 tablets by mouth Daily. (Patient not taking: Reported on 7/8/2025), Disp: , Rfl:     ondansetron (ZOFRAN) 4 MG tablet, TAKE 1 TABLET BY MOUTH EVERY 8 HOURS AS NEEDED FOR NAUSEA AND VOMITING, Disp: 90 tablet, Rfl: 2    pantoprazole (PROTONIX) 40 MG EC tablet, Take 1 tablet by mouth 2 (Two) Times a Day Before Meals., Disp: 60 tablet, Rfl: 0    pravastatin (PRAVACHOL) 20 MG tablet, , Disp: , Rfl:     saccharomyces boulardii (FLORASTOR) 250 MG capsule, Take 2 capsules by mouth 2 (Two) Times a Day. (Patient  "not taking: Reported on 7/8/2025), Disp: 30 capsule, Rfl: 0    tamsulosin (FLOMAX) 0.4 MG capsule 24 hr capsule, Take 2 capsules by mouth Daily. (Patient not taking: Reported on 7/8/2025), Disp: 30 capsule, Rfl: 0    torsemide (DEMADEX) 20 MG tablet, Take  by mouth Daily., Disp: , Rfl:     Allergies   Allergen Reactions    Ace Inhibitors Unknown (See Comments)     Unknown reaction      Heparin Other (See Comments)     Fever/chills, weakness in legs    Sulfa Antibiotics Other (See Comments)     Mouth sores    Ciprofloxacin GI Intolerance     Pt reports pain to abd    Empagliflozin Unknown - Low Severity     Other Reaction(s): UTI      per patient report       Family History   Problem Relation Age of Onset    Mental illness Mother     Heart disease Mother     Hypertension Father     Cancer Father     Heart disease Sister     Heart disease Brother      Cancer-related family history includes Cancer in his father.    Social History     Tobacco Use    Smoking status: Former     Current packs/day: 0.00     Average packs/day: 2.0 packs/day for 50.0 years (100.0 ttl pk-yrs)     Types: Cigarettes     Start date: 6/21/1965     Quit date: 6/21/2015     Years since quitting: 10.0    Smokeless tobacco: Former     Types: Chew     Quit date: 9/6/1989   Vaping Use    Vaping status: Never Used   Substance Use Topics    Alcohol use: No    Drug use: No       Objective:    Vitals:    07/22/25 0920   BP: 142/74   Pulse: 81   SpO2: 100%   Weight: 60.3 kg (133 lb)   Height: 167.6 cm (65.98\")   PainSc: 0-No pain           (1) Restricted in physically strenuous activity, ambulatory and able to do work of light nature    Physical Exam:     Physical Exam   Constitutional: He is oriented to person, place, and time.  Non-toxic appearance. He does not appear ill (Chronically). No distress.   HENT:   Head: Normocephalic and atraumatic.   Right Ear: External ear normal.   Left Ear: External ear normal.   Nose: Nose normal. Mouth/Throat: Mucous " membranes are moist.   Eyes: Conjunctivae are normal. Right eye exhibits no discharge. Left eye exhibits no discharge. No scleral icterus.   Cardiovascular: Normal rate, regular rhythm and normal heart sounds.   Pulmonary/Chest: Effort normal and breath sounds normal. No respiratory distress.   Abdominal: Soft. He exhibits no distension.   Musculoskeletal: Normal range of motion.      Right lower leg: No edema.      Left lower leg: No edema.   Neurological: He is alert and oriented to person, place, and time.   Skin: Skin is warm. No bruising and no rash noted. No erythema. No jaundice or pallor.   Psychiatric: His behavior is normal. Mood, judgment and thought content normal.   Vitals (frail appearing) reviewed.      Lab Results - Last 18 Months   Lab Units 07/22/25  1007 07/08/25  1004 07/01/25  1116   WBC 10*3/mm3 4.22 3.83 2.82*   HEMOGLOBIN g/dL 8.1* 8.1* 7.9*   HEMATOCRIT % 24.8* 25.2* 24.7*   PLATELETS 10*3/mm3 123* 139* 239   MCV fL 94.7 99.6* 99.6*     Lab Results - Last 18 Months   Lab Units 07/22/25  1033 07/08/25  1040 06/24/25  1129 06/03/25  1148 05/24/25  0050 05/22/25  2239 05/21/25  2341   SODIUM mmol/L  --   --   --   --  139 139 143   POTASSIUM mmol/L  --   --   --   --  3.8 4.4 3.5   CHLORIDE mmol/L  --   --   --   --  105 105 107   CO2 mmol/L  --   --   --   --  25.3 24.8 23.6   BUN mg/dL  --   --   --   --  6* 6* 7*   CREATININE mg/dL 1.40* 1.20 1.20   < > 1.18 1.15 1.26   CALCIUM mg/dL  --   --   --   --  8.6 8.7 8.5*   BILIRUBIN mg/dL  --   --   --   --  0.3 0.3 0.3   ALK PHOS U/L  --   --   --   --  37* 37* 35*   ALT (SGPT) U/L  --   --   --   --  5 <5 5   AST (SGOT) U/L  --   --   --   --  13 14 15   GLUCOSE mg/dL  --   --   --   --  141* 161* 112*    < > = values in this interval not displayed.     CASE SUMMARY:  Patient has a history of GE junction poorly differentiated adenocarcinoma for which he had chemoradiation followed by Noel Gurwinder resection:  Pathology showed ypT3,pN1 disease.   Tumor was Stage IIIA, diagnosed in 2016.  He received concurrent chemoradiation with weekly Carboplatin and Taxol and radiation finishing on 3/15/17.  CT scan on 1/10/19 showed new retroperitoneal lymph nodes.  These lymph nodes were biopsied on 1/30/19 and it shows metastatic esophageal cancer.  Caris Next Gen testing was performed on the sample and it shows KRAS mutation, microsatellite stable tumor.  PD-L1 is positive.  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  HER2/marvel (ERBB2) was not amplified and was negative.  His CT scan on 5/1/19 shows evidence of progression.  Patient has started receiving Keytruda on 5/31/19.  He has received 19 cycles so far.   CT scan on 1/28/2020 shows improvement and continued response.  CT scan chest abdomen pelvis with contrast on 12/10/2020 does not show any evidence of disease progression.  On 12/22/2020 decision made to hold Keytruda per patient's request to get a treatment break.. Repeat CT scan chest 7/12/2021 does not show any evidence of disease progression.  Now CT imaging in January 2021 with a CT showing increased abdominal left para-aortic adenopathy.  Subsequent PET/CT with new cedric metastatic disease in the retroperitoneum of the abdomen.  New pathologically enlarged hypermetabolic retroperitoneal lymph nodes.  This would be concerning for disease recurrence/progression.  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  I discussed with him the treatment options going forward.  He has had a good response with immunotherapy pembrolizumab in the past. This was  restarted. subsequent imaging with treatment response. 1/2023 imaging with good response. Continued treatment  Now with increased adenopathy 2 new lymph nodes. Holding immunotherapy for now.   Consider getting cardiac MR discussed with Dr. Culp. While immunotherapy on hold, increased adenopathy is concerning. Could be pseudoprogression vs true progression. PET CT reviewed could indicate pseudoprogression since  he has had a good response to immunotherapy  immunotherapy was restarted and has been on hold with patient's symptomatology cardiac MRI cannot be obtained, no ischemic heart disease leading to cardiomyopathy this most likely is related to Keytruda.  We will stop Keytruda going forward  CT imaging with stable disease, FOLFOX was started which he had significant side effects  With imaging being stable and significant side effects with FOLFOX, we discussed  about options with wait and watch vs 5FU only, patient prefers to be on maintenance.   We started 5FU only .   Tolerating relatively well, has significant fatigue. CT imaging now with ongoing response. We discussed that he would have been on treatment for 2 years following recurrence with good response. We can continue treatment now and get a PET CT in 2/2024.will repeat prior to follow up. , PET with some residual activity in the mediastinal lymph node.   We continued treatment and now signatera negative. I had a long discussion with the patient. We decided to repeat CT chest now and if no adenopathy, consider holding treatment.   Now CT chest repeated with persistent adenopathy. Discussed biopsy/mediastinoscopy vs continued treatment. Has fairly been tolerating well except fatigue. After discussion we decided to continue chemotherapy.  Now repeat CT imaging with no new concerning findings there is stable nodules which are subcentimeter in the lungs there is mildly prominent precarinal lymph node.  I discussed with the patient about this possibly being inflammatory rather than neoplastic.    -After discussion we decided to proceed with EBUS and possible biopsy of the lymph node if negative for malignancy we can consider stopping maintenance treatment given Signatera is negative and patient has treatment related side effects which are affecting his quality of life  -Bronchoscopy and precarinal lymph node biopsy reported negative as above.  -Repeat Signatera test  also reported negative.  -In view of significant treatment-related toxicities and no concerns for disease progression at this time, we will plan for surveillance alone without additional systemic therapy.  -Repeat imaging with an interval enlargement of periaortic lymph node as well as uptrending Signatera CT DNA levels concerning for disease recurrence.  Given low disease burden, patient to resume 5-FU with leucovorin maintenance (1/28/2025).  Consider adding irinotecan if noted disease progression moving forward.      Assessment & Plan        Assessment & Plan     Assessment:  Metastatic esophageal carcinoma:    -Patient currently on surveillance and is off treatment  -Restaging scans from 9/11/2024 as above, reported PAULIE.  -Repeat CT chest abdomen pelvis reviewed, noted interval enlargement of a   para-aortic lymph node concerning for disease progression now measuring 1.9 x 1.1 cm.   -Also noted to have uptrending Signatera CT DNA levels.  Discussed with patient that these findings are consistent with disease recurrence.  -Given low disease burden at this time, will resume 5-FU/leucovorin maintenance, can consider adding irinotecan if noted disease progression moving forward  -PET/CT completed on 1/28/2025 as above, consistent with disease progression. this was discussed with patient today.  -Continue maintenance 5-FU/leucovorin. Restaging scans reviwed, noted new splenic lesion but otherwise not concerning for progressive disease. Continue current therapy.        -Signetera ctDNA level has trended down. This was discussed with patient  - 7/22/2025: Restaging scans reviewed from 7/10/2025 as above, noted progressive mediastinal adenopathy, concerning for disease progression, there is no worsening of periaortic adenopathy which was noted previously,  however given noncontrast scans, imaging resolution is limited for comprehensive assessment, although the overall picture is concerning for progression.  This was  discussed with patient in detail.  - Will recheck Signatera CT DNA level, if noted to have uptrending levels, will consider treatment escalation to FOLFIRI.  This was discussed with patient    Possible myocarditis  Symptoms started after last immunotherapy  Treatment on hold for now, symptoms have improved, cardiac markers were negative, echocardiogram with EF 40%, cardiac MRI cannot be obtained cardiac cath with EF down to 30% management per cardiology  Now EF up to 40-45% monitor.  Symptomatic improvement now no shortness of breath    Bilateral leg swelling:  - Likely secondary to volume overload due to inadequate diuresis  - However given significantly worse right leg swelling, will send patient for bilateral leg ultrasound today to rule out DVT.    Shortness of breath  Improved, has occasional shortness of breath, likely copd/volume overload  I have recommended using albuterol  Symptoms have improved since stopping immunotherapy. Questionable pneumonitis will monitor closely with immunotherapy  Cardiology has evaluated for myocarditis related to immunotherapy symptomatic improvement now continue to monitor.   - Patient is on diuretics and denied any significant shortness of breath at present    Reflux/heartburn/dysphagia:   Symptoms improved.  Also has a history of strictures. Status post EGD and balloon dilation.   Continues to be on pantoprazole symptoms stable.  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  Continues on Synthroid stable now no heart function stable in normal range    Mild diarrhea:  diarrhea seems to be waxing and waning in nature.  Diarrhea related to immunotherapy now improved stable.    Patient reports mild diarrhea off and on,  He has not been taking any Imodium.  Encouraged to take Imodium more frequently    PD-L1 positive, HER2 negative, p53 and KRAS positive.  Repeat Caris  testing with repeat biopsy with no targetable mutations.    B12 deficiency: Continue B12    Anemia - Hb  was low iron sat low at 10, given venofer 300 x3 monitor for now. Intolerant to oral iron.  Continued low iron saturation will plan on Feraheme for 2 doses. Hemoglobin remains stable.7/8/25 Hgb today 8.1, platelets 139,000,   - Iron panel and ferritin on 7/22/2025 are not consistent with iron deficiency.    Fatigue.  Fatigue may be attributed to the ongoing chemotherapy treatment and slightly low hemoglobin levels. The patient is not currently taking any iron supplements due to stomach issues but had an iron infusion in the past. Adequate hydration by drinking plenty of water is advised.    SORIN on CKD,:  Limited oral intake:  -Counseled patient on importance of oral intake and nutrition while on chemotherapy  -Continue follow-up with nephrology for his underlying CKD  -noted some worsening of renal function today, likely secondary to Diuretics.      2 week follow up with Treatment, sooner as needed.

## 2025-07-22 ENCOUNTER — HOSPITAL ENCOUNTER (OUTPATIENT)
Dept: CARDIOLOGY | Facility: HOSPITAL | Age: 75
Discharge: HOME | End: 2025-07-22
Admitting: STUDENT IN AN ORGANIZED HEALTH CARE EDUCATION/TRAINING PROGRAM
Payer: MEDICARE

## 2025-07-22 ENCOUNTER — OFFICE VISIT (OUTPATIENT)
Dept: ONCOLOGY | Facility: CLINIC | Age: 75
End: 2025-07-22
Payer: MEDICARE

## 2025-07-22 ENCOUNTER — HOSPITAL ENCOUNTER (OUTPATIENT)
Dept: ONCOLOGY | Facility: HOSPITAL | Age: 75
Discharge: HOME | End: 2025-07-22
Payer: MEDICARE

## 2025-07-22 ENCOUNTER — RESULTS FOLLOW-UP (OUTPATIENT)
Dept: ONCOLOGY | Facility: CLINIC | Age: 75
End: 2025-07-22
Payer: MEDICARE

## 2025-07-22 VITALS
SYSTOLIC BLOOD PRESSURE: 142 MMHG | HEIGHT: 66 IN | WEIGHT: 133 LBS | OXYGEN SATURATION: 100 % | DIASTOLIC BLOOD PRESSURE: 74 MMHG | BODY MASS INDEX: 21.38 KG/M2 | HEART RATE: 81 BPM

## 2025-07-22 DIAGNOSIS — C16.0 MALIGNANT NEOPLASM OF CARDIA: Primary | ICD-10-CM

## 2025-07-22 DIAGNOSIS — R60.0 BILATERAL EDEMA OF LOWER EXTREMITY: ICD-10-CM

## 2025-07-22 DIAGNOSIS — D50.9 IRON DEFICIENCY ANEMIA, UNSPECIFIED IRON DEFICIENCY ANEMIA TYPE: ICD-10-CM

## 2025-07-22 DIAGNOSIS — I83.893 VARICOSE VEINS OF LEG WITH SWELLING, BILATERAL: ICD-10-CM

## 2025-07-22 DIAGNOSIS — C15.5 PRIMARY MALIGNANT NEOPLASM OF LOWER THIRD OF ESOPHAGUS: ICD-10-CM

## 2025-07-22 DIAGNOSIS — E03.9 HYPOTHYROIDISM, UNSPECIFIED TYPE: ICD-10-CM

## 2025-07-22 DIAGNOSIS — R53.1 GENERALIZED WEAKNESS: ICD-10-CM

## 2025-07-22 DIAGNOSIS — D63.0 ANEMIA IN NEOPLASTIC DISEASE: ICD-10-CM

## 2025-07-22 LAB
ALP BLD-CCNC: 35 U/L (ref 53–128)
BASOPHILS # BLD AUTO: 0.03 10*3/MM3 (ref 0–0.2)
BASOPHILS NFR BLD AUTO: 0.7 % (ref 0–1.5)
BH CV LOWER VASCULAR LEFT COMMON FEMORAL AUGMENT: NORMAL
BH CV LOWER VASCULAR LEFT COMMON FEMORAL COMPETENT: NORMAL
BH CV LOWER VASCULAR LEFT COMMON FEMORAL COMPRESS: NORMAL
BH CV LOWER VASCULAR LEFT COMMON FEMORAL PHASIC: NORMAL
BH CV LOWER VASCULAR LEFT COMMON FEMORAL SPONT: NORMAL
BH CV LOWER VASCULAR LEFT DISTAL FEMORAL COMPRESS: NORMAL
BH CV LOWER VASCULAR LEFT GASTRONEMIUS COMPRESS: NORMAL
BH CV LOWER VASCULAR LEFT LESSER SAPH COMPRESS: NORMAL
BH CV LOWER VASCULAR LEFT MID FEMORAL AUGMENT: NORMAL
BH CV LOWER VASCULAR LEFT MID FEMORAL COMPETENT: NORMAL
BH CV LOWER VASCULAR LEFT MID FEMORAL COMPRESS: NORMAL
BH CV LOWER VASCULAR LEFT MID FEMORAL PHASIC: NORMAL
BH CV LOWER VASCULAR LEFT MID FEMORAL SPONT: NORMAL
BH CV LOWER VASCULAR LEFT PERONEAL COMPRESS: NORMAL
BH CV LOWER VASCULAR LEFT POPLITEAL AUGMENT: NORMAL
BH CV LOWER VASCULAR LEFT POPLITEAL COMPETENT: NORMAL
BH CV LOWER VASCULAR LEFT POPLITEAL COMPRESS: NORMAL
BH CV LOWER VASCULAR LEFT POPLITEAL PHASIC: NORMAL
BH CV LOWER VASCULAR LEFT POPLITEAL SPONT: NORMAL
BH CV LOWER VASCULAR LEFT POSTERIOR TIBIAL COMPRESS: NORMAL
BH CV LOWER VASCULAR LEFT PROXIMAL FEMORAL COMPRESS: NORMAL
BH CV LOWER VASCULAR LEFT SAPHENOFEMORAL JUNCTION COMPRESS: NORMAL
BH CV LOWER VASCULAR RIGHT COMMON FEMORAL AUGMENT: NORMAL
BH CV LOWER VASCULAR RIGHT COMMON FEMORAL COMPETENT: NORMAL
BH CV LOWER VASCULAR RIGHT COMMON FEMORAL COMPRESS: NORMAL
BH CV LOWER VASCULAR RIGHT COMMON FEMORAL PHASIC: NORMAL
BH CV LOWER VASCULAR RIGHT COMMON FEMORAL SPONT: NORMAL
BH CV LOWER VASCULAR RIGHT DISTAL FEMORAL COMPRESS: NORMAL
BH CV LOWER VASCULAR RIGHT GASTRONEMIUS COMPRESS: NORMAL
BH CV LOWER VASCULAR RIGHT GREATER SAPH AK COMPRESS: NORMAL
BH CV LOWER VASCULAR RIGHT GREATER SAPH BK COMPRESS: NORMAL
BH CV LOWER VASCULAR RIGHT MID FEMORAL AUGMENT: NORMAL
BH CV LOWER VASCULAR RIGHT MID FEMORAL COMPETENT: NORMAL
BH CV LOWER VASCULAR RIGHT MID FEMORAL COMPRESS: NORMAL
BH CV LOWER VASCULAR RIGHT MID FEMORAL PHASIC: NORMAL
BH CV LOWER VASCULAR RIGHT MID FEMORAL SPONT: NORMAL
BH CV LOWER VASCULAR RIGHT PERONEAL COMPRESS: NORMAL
BH CV LOWER VASCULAR RIGHT POPLITEAL AUGMENT: NORMAL
BH CV LOWER VASCULAR RIGHT POPLITEAL COMPETENT: NORMAL
BH CV LOWER VASCULAR RIGHT POPLITEAL COMPRESS: NORMAL
BH CV LOWER VASCULAR RIGHT POPLITEAL PHASIC: NORMAL
BH CV LOWER VASCULAR RIGHT POPLITEAL SPONT: NORMAL
BH CV LOWER VASCULAR RIGHT POSTERIOR TIBIAL COMPRESS: NORMAL
BH CV LOWER VASCULAR RIGHT PROXIMAL FEMORAL COMPRESS: NORMAL
BH CV LOWER VASCULAR RIGHT SAPHENOFEMORAL JUNCTION COMPRESS: NORMAL
BH CV VAS PRELIMINARY FINDINGS SCRIPTING: 1
BUN BLDA-MCNC: 17 MG/DL (ref 7–22)
CALCIUM BLD QL: 8.1 MG/DL (ref 8–10.3)
CHLORIDE BLDA-SCNC: 108 MMOL/L (ref 98–108)
CO2 BLDA-SCNC: 23 MMOL/L (ref 18–33)
CREAT BLDA-MCNC: 1.4 MG/DL (ref 0.6–1.2)
DEPRECATED RDW RBC AUTO: 54.8 FL (ref 37–54)
EGFRCR SERPLBLD CKD-EPI 2021: 52.1 ML/MIN/1.73
EOSINOPHIL # BLD AUTO: 0.09 10*3/MM3 (ref 0–0.4)
EOSINOPHIL NFR BLD AUTO: 2.1 % (ref 0.3–6.2)
ERYTHROCYTE [DISTWIDTH] IN BLOOD BY AUTOMATED COUNT: 15.8 % (ref 12.3–15.4)
FERRITIN SERPL-MCNC: 285 NG/ML (ref 30–400)
GLUCOSE BLDC GLUCOMTR-MCNC: 111 MG/DL (ref 73–118)
HCT VFR BLD AUTO: 24.8 % (ref 37.5–51)
HGB BLD-MCNC: 8.1 G/DL (ref 13–17.7)
IMM GRANULOCYTES # BLD AUTO: 0 10*3/MM3 (ref 0–0.05)
IMM GRANULOCYTES NFR BLD AUTO: 0 % (ref 0–0.5)
IRON 24H UR-MRATE: 130 MCG/DL (ref 59–158)
IRON SATN MFR SERPL: 73 % (ref 20–50)
LYMPHOCYTES # BLD AUTO: 1.42 10*3/MM3 (ref 0.7–3.1)
LYMPHOCYTES NFR BLD AUTO: 33.6 % (ref 19.6–45.3)
MCH RBC QN AUTO: 30.9 PG (ref 26.6–33)
MCHC RBC AUTO-ENTMCNC: 32.7 G/DL (ref 31.5–35.7)
MCV RBC AUTO: 94.7 FL (ref 79–97)
MONOCYTES # BLD AUTO: 0.4 10*3/MM3 (ref 0.1–0.9)
MONOCYTES NFR BLD AUTO: 9.5 % (ref 5–12)
NEUTROPHILS NFR BLD AUTO: 2.28 10*3/MM3 (ref 1.7–7)
NEUTROPHILS NFR BLD AUTO: 54.1 % (ref 42.7–76)
PLATELET # BLD AUTO: 123 10*3/MM3 (ref 140–450)
PMV BLD AUTO: 10.5 FL (ref 6–12)
POC ALBUMIN: 2.7 G/L (ref 3.3–5.5)
POC ALT (SGPT): 11 U/L (ref 10–47)
POC AST (SGOT): 17 U/L (ref 11–38)
POC TOTAL BILIRUBIN: 0.8 MG/DL (ref 0.2–1.6)
POC TOTAL PROTEIN: 6.5 G/DL (ref 6.4–8.1)
POTASSIUM BLDA-SCNC: 3.4 MMOL/L (ref 3.6–5.1)
RBC # BLD AUTO: 2.62 10*6/MM3 (ref 4.14–5.8)
SODIUM BLD-SCNC: 132 MMOL/L (ref 128–145)
TIBC SERPL-MCNC: 179 MCG/DL (ref 298–536)
TRANSFERRIN SERPL-MCNC: 120 MG/DL (ref 200–360)
WBC NRBC COR # BLD AUTO: 4.22 10*3/MM3 (ref 3.4–10.8)

## 2025-07-22 PROCEDURE — 25010000002 DEXAMETHASONE PER 1 MG: Performed by: STUDENT IN AN ORGANIZED HEALTH CARE EDUCATION/TRAINING PROGRAM

## 2025-07-22 PROCEDURE — 84466 ASSAY OF TRANSFERRIN: CPT | Performed by: NURSE PRACTITIONER

## 2025-07-22 PROCEDURE — 93970 EXTREMITY STUDY: CPT

## 2025-07-22 PROCEDURE — 83540 ASSAY OF IRON: CPT | Performed by: NURSE PRACTITIONER

## 2025-07-22 PROCEDURE — 99214 OFFICE O/P EST MOD 30 MIN: CPT | Performed by: STUDENT IN AN ORGANIZED HEALTH CARE EDUCATION/TRAINING PROGRAM

## 2025-07-22 PROCEDURE — 96367 TX/PROPH/DG ADDL SEQ IV INF: CPT

## 2025-07-22 PROCEDURE — 3078F DIAST BP <80 MM HG: CPT | Performed by: STUDENT IN AN ORGANIZED HEALTH CARE EDUCATION/TRAINING PROGRAM

## 2025-07-22 PROCEDURE — G0498 CHEMO EXTEND IV INFUS W/PUMP: HCPCS

## 2025-07-22 PROCEDURE — 25010000002 LEUCOVORIN CALCIUM PER 50 MG: Performed by: STUDENT IN AN ORGANIZED HEALTH CARE EDUCATION/TRAINING PROGRAM

## 2025-07-22 PROCEDURE — 82728 ASSAY OF FERRITIN: CPT | Performed by: NURSE PRACTITIONER

## 2025-07-22 PROCEDURE — 25810000003 SODIUM CHLORIDE 0.9 % SOLUTION 500 ML FLEX CONT: Performed by: STUDENT IN AN ORGANIZED HEALTH CARE EDUCATION/TRAINING PROGRAM

## 2025-07-22 PROCEDURE — 3077F SYST BP >= 140 MM HG: CPT | Performed by: STUDENT IN AN ORGANIZED HEALTH CARE EDUCATION/TRAINING PROGRAM

## 2025-07-22 PROCEDURE — 96375 TX/PRO/DX INJ NEW DRUG ADDON: CPT

## 2025-07-22 PROCEDURE — 80053 COMPREHEN METABOLIC PANEL: CPT

## 2025-07-22 PROCEDURE — 25810000003 SODIUM CHLORIDE 0.9 % SOLUTION 250 ML FLEX CONT: Performed by: STUDENT IN AN ORGANIZED HEALTH CARE EDUCATION/TRAINING PROGRAM

## 2025-07-22 PROCEDURE — 1126F AMNT PAIN NOTED NONE PRSNT: CPT | Performed by: STUDENT IN AN ORGANIZED HEALTH CARE EDUCATION/TRAINING PROGRAM

## 2025-07-22 PROCEDURE — 25010000002 FLUOROURACIL PER 500 MG: Performed by: STUDENT IN AN ORGANIZED HEALTH CARE EDUCATION/TRAINING PROGRAM

## 2025-07-22 PROCEDURE — 85025 COMPLETE CBC W/AUTO DIFF WBC: CPT | Performed by: STUDENT IN AN ORGANIZED HEALTH CARE EDUCATION/TRAINING PROGRAM

## 2025-07-22 PROCEDURE — 96409 CHEMO IV PUSH SNGL DRUG: CPT

## 2025-07-22 RX ORDER — SODIUM CHLORIDE 9 MG/ML
20 INJECTION, SOLUTION INTRAVENOUS ONCE
Status: CANCELLED | OUTPATIENT
Start: 2025-07-22

## 2025-07-22 RX ORDER — FLUOROURACIL 50 MG/ML
400 INJECTION, SOLUTION INTRAVENOUS ONCE
Status: COMPLETED | OUTPATIENT
Start: 2025-07-22 | End: 2025-07-22

## 2025-07-22 RX ORDER — DEXAMETHASONE SODIUM PHOSPHATE 4 MG/ML
12 INJECTION, SOLUTION INTRA-ARTICULAR; INTRALESIONAL; INTRAMUSCULAR; INTRAVENOUS; SOFT TISSUE ONCE
Status: CANCELLED
Start: 2025-07-22 | End: 2025-07-22

## 2025-07-22 RX ORDER — DEXAMETHASONE SODIUM PHOSPHATE 4 MG/ML
12 INJECTION, SOLUTION INTRA-ARTICULAR; INTRALESIONAL; INTRAMUSCULAR; INTRAVENOUS; SOFT TISSUE ONCE
Status: COMPLETED | OUTPATIENT
Start: 2025-07-22 | End: 2025-07-22

## 2025-07-22 RX ORDER — FLUOROURACIL 50 MG/ML
400 INJECTION, SOLUTION INTRAVENOUS ONCE
Status: CANCELLED | OUTPATIENT
Start: 2025-07-22

## 2025-07-22 RX ORDER — SODIUM CHLORIDE 9 MG/ML
500 INJECTION, SOLUTION INTRAVENOUS ONCE
Status: CANCELLED
Start: 2025-07-24 | End: 2025-07-24

## 2025-07-22 RX ADMIN — LEUCOVORIN CALCIUM 670 MG: 350 INJECTION, POWDER, LYOPHILIZED, FOR SUSPENSION INTRAMUSCULAR; INTRAVENOUS at 11:31

## 2025-07-22 RX ADMIN — FLUOROURACIL 670 MG: 50 INJECTION, SOLUTION INTRAVENOUS at 12:04

## 2025-07-22 RX ADMIN — DEXAMETHASONE SODIUM PHOSPHATE 12 MG: 4 INJECTION INTRA-ARTICULAR; INTRALESIONAL; INTRAMUSCULAR; INTRAVENOUS; SOFT TISSUE at 11:24

## 2025-07-22 RX ADMIN — FLUOROURACIL 4000 MG: 50 INJECTION, SOLUTION INTRAVENOUS at 12:09

## 2025-07-22 NOTE — ADDENDUM NOTE
Encounter addended by: Ana Paula Anderson RN on: 7/22/2025 11:34 AM   Actions taken: MAR administration accepted

## 2025-07-22 NOTE — PROGRESS NOTES
Patient to infusion room for C11D1  leucovorin/5FU push and pump after visit with Dr Beth. Dr Beth ordered Arnold which was drawn and dropped off in lab. Clinic set up BLE U/S for today after his infusion.  Lab parameters met for treatment.  Tolerated infusion well. He will return 7/24/25 at 1315 for 5FU pump disconnect. Printed AVS and discharged off unit.

## 2025-07-23 RX ORDER — SPIRONOLACTONE 25 MG/1
25 TABLET ORAL DAILY
COMMUNITY

## 2025-07-24 ENCOUNTER — HOSPITAL ENCOUNTER (OUTPATIENT)
Dept: ONCOLOGY | Facility: HOSPITAL | Age: 75
Discharge: HOME | End: 2025-07-24
Payer: MEDICARE

## 2025-07-24 VITALS
RESPIRATION RATE: 18 BRPM | BODY MASS INDEX: 22.14 KG/M2 | OXYGEN SATURATION: 99 % | HEIGHT: 66 IN | DIASTOLIC BLOOD PRESSURE: 75 MMHG | WEIGHT: 137.8 LBS | HEART RATE: 91 BPM | TEMPERATURE: 97.3 F | SYSTOLIC BLOOD PRESSURE: 158 MMHG

## 2025-07-24 DIAGNOSIS — C15.5 PRIMARY MALIGNANT NEOPLASM OF LOWER THIRD OF ESOPHAGUS: ICD-10-CM

## 2025-07-24 DIAGNOSIS — C16.0 MALIGNANT NEOPLASM OF CARDIA: Primary | ICD-10-CM

## 2025-07-24 RX ORDER — SODIUM CHLORIDE 0.9 % (FLUSH) 0.9 %
20 SYRINGE (ML) INJECTION AS NEEDED
Status: DISCONTINUED | OUTPATIENT
Start: 2025-07-24 | End: 2025-07-25 | Stop reason: HOSPADM

## 2025-07-24 RX ORDER — SODIUM CHLORIDE 0.9 % (FLUSH) 0.9 %
20 SYRINGE (ML) INJECTION AS NEEDED
OUTPATIENT
Start: 2025-07-24

## 2025-07-24 RX ORDER — SODIUM CHLORIDE 9 MG/ML
500 INJECTION, SOLUTION INTRAVENOUS ONCE
Status: DISCONTINUED | OUTPATIENT
Start: 2025-07-24 | End: 2025-07-25 | Stop reason: HOSPADM

## 2025-07-24 RX ADMIN — Medication 20 ML: at 13:29

## 2025-07-24 NOTE — PROGRESS NOTES
Spoke to ASHLEY Ojeda. Ok to hold IVF's with CIV DC today due to BLE edema and pt tolerating PO fluids at this time.

## 2025-07-25 RX ORDER — FAMOTIDINE 20 MG/1
20 TABLET, FILM COATED ORAL 2 TIMES DAILY
Qty: 60 TABLET | Refills: 0 | Status: SHIPPED | OUTPATIENT
Start: 2025-07-25

## 2025-07-28 DIAGNOSIS — E03.9 HYPOTHYROIDISM (ACQUIRED): ICD-10-CM

## 2025-07-28 DIAGNOSIS — R79.89 ELEVATED TSH: ICD-10-CM

## 2025-07-28 RX ORDER — LEVOTHYROXINE SODIUM 50 UG/1
50 TABLET ORAL DAILY
Qty: 30 TABLET | Refills: 0 | Status: SHIPPED | OUTPATIENT
Start: 2025-07-28

## 2025-07-30 LAB
NTRA SIGNATERA MTM READOUT: 0.06 MTM/ML
NTRA SIGNATERA TEST RESULT: POSITIVE

## 2025-08-05 ENCOUNTER — APPOINTMENT (OUTPATIENT)
Dept: ONCOLOGY | Facility: HOSPITAL | Age: 75
End: 2025-08-05
Payer: MEDICARE

## 2025-08-05 ENCOUNTER — OFFICE VISIT (OUTPATIENT)
Dept: ONCOLOGY | Facility: CLINIC | Age: 75
End: 2025-08-05
Payer: MEDICARE

## 2025-08-05 ENCOUNTER — HOSPITAL ENCOUNTER (OUTPATIENT)
Dept: ONCOLOGY | Facility: HOSPITAL | Age: 75
Discharge: HOME | End: 2025-08-05
Payer: MEDICARE

## 2025-08-05 VITALS
HEIGHT: 66 IN | HEART RATE: 78 BPM | DIASTOLIC BLOOD PRESSURE: 79 MMHG | BODY MASS INDEX: 21.83 KG/M2 | OXYGEN SATURATION: 100 % | SYSTOLIC BLOOD PRESSURE: 151 MMHG | WEIGHT: 135.8 LBS

## 2025-08-05 DIAGNOSIS — R60.0 BILATERAL EDEMA OF LOWER EXTREMITY: ICD-10-CM

## 2025-08-05 DIAGNOSIS — C16.0 MALIGNANT NEOPLASM OF CARDIA: Primary | ICD-10-CM

## 2025-08-05 DIAGNOSIS — C16.0 MALIGNANT NEOPLASM OF CARDIA: ICD-10-CM

## 2025-08-05 DIAGNOSIS — C15.5 PRIMARY MALIGNANT NEOPLASM OF LOWER THIRD OF ESOPHAGUS: Primary | ICD-10-CM

## 2025-08-05 DIAGNOSIS — C15.5 PRIMARY MALIGNANT NEOPLASM OF LOWER THIRD OF ESOPHAGUS: ICD-10-CM

## 2025-08-05 DIAGNOSIS — R53.1 GENERALIZED WEAKNESS: ICD-10-CM

## 2025-08-05 DIAGNOSIS — D64.9 ANEMIA, UNSPECIFIED TYPE: ICD-10-CM

## 2025-08-05 DIAGNOSIS — E03.9 HYPOTHYROIDISM (ACQUIRED): ICD-10-CM

## 2025-08-05 LAB
ALP BLD-CCNC: 53 U/L (ref 53–128)
BASOPHILS # BLD AUTO: 0.03 10*3/MM3 (ref 0–0.2)
BASOPHILS NFR BLD AUTO: 0.6 % (ref 0–1.5)
BUN BLDA-MCNC: 19 MG/DL (ref 7–22)
CALCIUM BLD QL: 8.8 MG/DL (ref 8–10.3)
CHLORIDE BLDA-SCNC: 105 MMOL/L (ref 98–108)
CO2 BLDA-SCNC: 25 MMOL/L (ref 18–33)
CREAT BLDA-MCNC: 1.6 MG/DL (ref 0.6–1.2)
DEPRECATED RDW RBC AUTO: 60.8 FL (ref 37–54)
EGFRCR SERPLBLD CKD-EPI 2021: 44.4 ML/MIN/1.73
EOSINOPHIL # BLD AUTO: 0.04 10*3/MM3 (ref 0–0.4)
EOSINOPHIL NFR BLD AUTO: 0.7 % (ref 0.3–6.2)
ERYTHROCYTE [DISTWIDTH] IN BLOOD BY AUTOMATED COUNT: 17.8 % (ref 12.3–15.4)
GLUCOSE BLDC GLUCOMTR-MCNC: 103 MG/DL (ref 73–118)
HCT VFR BLD AUTO: 24 % (ref 37.5–51)
HGB BLD-MCNC: 7.7 G/DL (ref 13–17.7)
IMM GRANULOCYTES # BLD AUTO: 0.02 10*3/MM3 (ref 0–0.05)
IMM GRANULOCYTES NFR BLD AUTO: 0.4 % (ref 0–0.5)
LYMPHOCYTES # BLD AUTO: 1.86 10*3/MM3 (ref 0.7–3.1)
LYMPHOCYTES NFR BLD AUTO: 34.8 % (ref 19.6–45.3)
MCH RBC QN AUTO: 30.8 PG (ref 26.6–33)
MCHC RBC AUTO-ENTMCNC: 32.1 G/DL (ref 31.5–35.7)
MCV RBC AUTO: 96 FL (ref 79–97)
MONOCYTES # BLD AUTO: 0.64 10*3/MM3 (ref 0.1–0.9)
MONOCYTES NFR BLD AUTO: 12 % (ref 5–12)
NEUTROPHILS NFR BLD AUTO: 2.75 10*3/MM3 (ref 1.7–7)
NEUTROPHILS NFR BLD AUTO: 51.5 % (ref 42.7–76)
PLATELET # BLD AUTO: 156 10*3/MM3 (ref 140–450)
PMV BLD AUTO: 9.8 FL (ref 6–12)
POC ALBUMIN: 2.5 G/L (ref 3.3–5.5)
POC ALT (SGPT): 7 U/L (ref 10–47)
POC AST (SGOT): 20 U/L (ref 11–38)
POC TOTAL BILIRUBIN: 0.6 MG/DL (ref 0.2–1.6)
POC TOTAL PROTEIN: 6.8 G/DL (ref 6.4–8.1)
POTASSIUM BLDA-SCNC: 4.8 MMOL/L (ref 3.6–5.1)
RBC # BLD AUTO: 2.5 10*6/MM3 (ref 4.14–5.8)
SODIUM BLD-SCNC: 134 MMOL/L (ref 128–145)
WBC NRBC COR # BLD AUTO: 5.34 10*3/MM3 (ref 3.4–10.8)

## 2025-08-05 PROCEDURE — G0498 CHEMO EXTEND IV INFUS W/PUMP: HCPCS

## 2025-08-05 PROCEDURE — 99214 OFFICE O/P EST MOD 30 MIN: CPT | Performed by: STUDENT IN AN ORGANIZED HEALTH CARE EDUCATION/TRAINING PROGRAM

## 2025-08-05 PROCEDURE — 1126F AMNT PAIN NOTED NONE PRSNT: CPT | Performed by: STUDENT IN AN ORGANIZED HEALTH CARE EDUCATION/TRAINING PROGRAM

## 2025-08-05 PROCEDURE — 25810000003 SODIUM CHLORIDE 0.9 % SOLUTION 500 ML FLEX CONT: Performed by: STUDENT IN AN ORGANIZED HEALTH CARE EDUCATION/TRAINING PROGRAM

## 2025-08-05 PROCEDURE — 80053 COMPREHEN METABOLIC PANEL: CPT

## 2025-08-05 PROCEDURE — 85025 COMPLETE CBC W/AUTO DIFF WBC: CPT | Performed by: STUDENT IN AN ORGANIZED HEALTH CARE EDUCATION/TRAINING PROGRAM

## 2025-08-05 PROCEDURE — 25010000002 LEUCOVORIN CALCIUM PER 50 MG: Performed by: STUDENT IN AN ORGANIZED HEALTH CARE EDUCATION/TRAINING PROGRAM

## 2025-08-05 PROCEDURE — 25010000002 DEXAMETHASONE PER 1 MG: Performed by: STUDENT IN AN ORGANIZED HEALTH CARE EDUCATION/TRAINING PROGRAM

## 2025-08-05 PROCEDURE — 25810000003 SODIUM CHLORIDE 0.9 % SOLUTION: Performed by: STUDENT IN AN ORGANIZED HEALTH CARE EDUCATION/TRAINING PROGRAM

## 2025-08-05 PROCEDURE — 96367 TX/PROPH/DG ADDL SEQ IV INF: CPT

## 2025-08-05 PROCEDURE — 3078F DIAST BP <80 MM HG: CPT | Performed by: STUDENT IN AN ORGANIZED HEALTH CARE EDUCATION/TRAINING PROGRAM

## 2025-08-05 PROCEDURE — 25810000003 SODIUM CHLORIDE 0.9 % SOLUTION 250 ML FLEX CONT: Performed by: STUDENT IN AN ORGANIZED HEALTH CARE EDUCATION/TRAINING PROGRAM

## 2025-08-05 PROCEDURE — 36591 DRAW BLOOD OFF VENOUS DEVICE: CPT

## 2025-08-05 PROCEDURE — 3077F SYST BP >= 140 MM HG: CPT | Performed by: STUDENT IN AN ORGANIZED HEALTH CARE EDUCATION/TRAINING PROGRAM

## 2025-08-05 PROCEDURE — 25010000002 FLUOROURACIL PER 500 MG: Performed by: STUDENT IN AN ORGANIZED HEALTH CARE EDUCATION/TRAINING PROGRAM

## 2025-08-05 PROCEDURE — 96375 TX/PRO/DX INJ NEW DRUG ADDON: CPT

## 2025-08-05 PROCEDURE — 96409 CHEMO IV PUSH SNGL DRUG: CPT

## 2025-08-05 RX ORDER — FLUOROURACIL 50 MG/ML
400 INJECTION, SOLUTION INTRAVENOUS ONCE
Status: CANCELLED | OUTPATIENT
Start: 2025-08-05

## 2025-08-05 RX ORDER — SODIUM CHLORIDE 0.9 % (FLUSH) 0.9 %
20 SYRINGE (ML) INJECTION AS NEEDED
Status: CANCELLED | OUTPATIENT
Start: 2025-08-05

## 2025-08-05 RX ORDER — SODIUM CHLORIDE 0.9 % (FLUSH) 0.9 %
20 SYRINGE (ML) INJECTION AS NEEDED
Status: DISCONTINUED | OUTPATIENT
Start: 2025-08-05 | End: 2025-08-06 | Stop reason: HOSPADM

## 2025-08-05 RX ORDER — SODIUM CHLORIDE 9 MG/ML
500 INJECTION, SOLUTION INTRAVENOUS ONCE
Status: CANCELLED
Start: 2025-08-07 | End: 2025-08-07

## 2025-08-05 RX ORDER — FLUOROURACIL 50 MG/ML
400 INJECTION, SOLUTION INTRAVENOUS ONCE
Status: COMPLETED | OUTPATIENT
Start: 2025-08-05 | End: 2025-08-05

## 2025-08-05 RX ORDER — SODIUM CHLORIDE 9 MG/ML
20 INJECTION, SOLUTION INTRAVENOUS ONCE
Status: COMPLETED | OUTPATIENT
Start: 2025-08-05 | End: 2025-08-05

## 2025-08-05 RX ORDER — DEXAMETHASONE SODIUM PHOSPHATE 4 MG/ML
12 INJECTION, SOLUTION INTRA-ARTICULAR; INTRALESIONAL; INTRAMUSCULAR; INTRAVENOUS; SOFT TISSUE ONCE
Status: COMPLETED | OUTPATIENT
Start: 2025-08-05 | End: 2025-08-05

## 2025-08-05 RX ADMIN — DEXAMETHASONE SODIUM PHOSPHATE 12 MG: 4 INJECTION, SOLUTION INTRAMUSCULAR; INTRAVENOUS at 13:29

## 2025-08-05 RX ADMIN — SODIUM CHLORIDE 20 ML/HR: 9 INJECTION, SOLUTION INTRAVENOUS at 13:28

## 2025-08-05 RX ADMIN — LEUCOVORIN CALCIUM 670 MG: 350 INJECTION, POWDER, LYOPHILIZED, FOR SUSPENSION INTRAMUSCULAR; INTRAVENOUS at 13:45

## 2025-08-05 RX ADMIN — FLUOROURACIL 670 MG: 50 INJECTION, SOLUTION INTRAVENOUS at 14:18

## 2025-08-05 RX ADMIN — SODIUM CHLORIDE 4030 MG: 9 INJECTION, SOLUTION INTRAVENOUS at 14:22

## 2025-08-07 ENCOUNTER — RESULTS FOLLOW-UP (OUTPATIENT)
Dept: ONCOLOGY | Facility: HOSPITAL | Age: 75
End: 2025-08-07
Payer: MEDICARE

## 2025-08-07 ENCOUNTER — HOSPITAL ENCOUNTER (OUTPATIENT)
Dept: ONCOLOGY | Facility: HOSPITAL | Age: 75
Discharge: HOME | End: 2025-08-07
Payer: MEDICARE

## 2025-08-07 VITALS
SYSTOLIC BLOOD PRESSURE: 145 MMHG | WEIGHT: 136.8 LBS | TEMPERATURE: 97.1 F | HEART RATE: 85 BPM | OXYGEN SATURATION: 99 % | DIASTOLIC BLOOD PRESSURE: 65 MMHG | HEIGHT: 66 IN | BODY MASS INDEX: 21.98 KG/M2 | RESPIRATION RATE: 18 BRPM

## 2025-08-07 DIAGNOSIS — C15.5 PRIMARY MALIGNANT NEOPLASM OF LOWER THIRD OF ESOPHAGUS: ICD-10-CM

## 2025-08-07 DIAGNOSIS — C16.0 MALIGNANT NEOPLASM OF CARDIA: Primary | ICD-10-CM

## 2025-08-07 LAB
ALBUMIN SERPL-MCNC: 3.1 G/DL (ref 3.5–5.2)
ALBUMIN/GLOB SERPL: 0.8 G/DL
ALP SERPL-CCNC: 50 U/L (ref 39–117)
ALT SERPL W P-5'-P-CCNC: 11 U/L (ref 1–41)
ANION GAP SERPL CALCULATED.3IONS-SCNC: 13.5 MMOL/L (ref 5–15)
AST SERPL-CCNC: 24 U/L (ref 1–40)
BILIRUB SERPL-MCNC: 0.3 MG/DL (ref 0–1.2)
BUN SERPL-MCNC: 23.7 MG/DL (ref 8–23)
BUN/CREAT SERPL: 13.7 (ref 7–25)
CALCIUM SPEC-SCNC: 8.5 MG/DL (ref 8.6–10.5)
CHLORIDE SERPL-SCNC: 100 MMOL/L (ref 98–107)
CO2 SERPL-SCNC: 23.5 MMOL/L (ref 22–29)
CREAT SERPL-MCNC: 1.73 MG/DL (ref 0.76–1.27)
EGFRCR SERPLBLD CKD-EPI 2021: 40.4 ML/MIN/1.73
GLOBULIN UR ELPH-MCNC: 4 GM/DL
GLUCOSE SERPL-MCNC: 183 MG/DL (ref 65–99)
POTASSIUM SERPL-SCNC: 4.8 MMOL/L (ref 3.5–5.2)
PROT SERPL-MCNC: 7.1 G/DL (ref 6–8.5)
SODIUM SERPL-SCNC: 137 MMOL/L (ref 136–145)

## 2025-08-07 PROCEDURE — 80053 COMPREHEN METABOLIC PANEL: CPT | Performed by: NURSE PRACTITIONER

## 2025-08-07 RX ORDER — SODIUM CHLORIDE 9 MG/ML
500 INJECTION, SOLUTION INTRAVENOUS ONCE
Status: DISCONTINUED | OUTPATIENT
Start: 2025-08-07 | End: 2025-08-08 | Stop reason: HOSPADM

## 2025-08-07 RX ORDER — SODIUM CHLORIDE 0.9 % (FLUSH) 0.9 %
20 SYRINGE (ML) INJECTION AS NEEDED
OUTPATIENT
Start: 2025-08-07

## 2025-08-07 RX ORDER — SODIUM CHLORIDE 0.9 % (FLUSH) 0.9 %
20 SYRINGE (ML) INJECTION AS NEEDED
Status: DISCONTINUED | OUTPATIENT
Start: 2025-08-07 | End: 2025-08-08 | Stop reason: HOSPADM

## 2025-08-07 RX ADMIN — Medication 20 ML: at 12:49

## 2025-08-08 DIAGNOSIS — R74.8 ELEVATED CREATINE KINASE: Primary | ICD-10-CM

## 2025-08-12 ENCOUNTER — HOSPITAL ENCOUNTER (OUTPATIENT)
Dept: ONCOLOGY | Facility: HOSPITAL | Age: 75
Discharge: HOME | End: 2025-08-12
Admitting: STUDENT IN AN ORGANIZED HEALTH CARE EDUCATION/TRAINING PROGRAM
Payer: MEDICARE

## 2025-08-12 DIAGNOSIS — C15.5 PRIMARY MALIGNANT NEOPLASM OF LOWER THIRD OF ESOPHAGUS: ICD-10-CM

## 2025-08-12 DIAGNOSIS — D64.9 ANEMIA, UNSPECIFIED TYPE: ICD-10-CM

## 2025-08-12 DIAGNOSIS — C16.0 MALIGNANT NEOPLASM OF CARDIA: Primary | ICD-10-CM

## 2025-08-12 LAB
ALBUMIN SERPL-MCNC: 3.3 G/DL (ref 3.5–5.2)
ALBUMIN/GLOB SERPL: 0.8 G/DL
ALP SERPL-CCNC: 46 U/L (ref 39–117)
ALT SERPL W P-5'-P-CCNC: 7 U/L (ref 1–41)
ANION GAP SERPL CALCULATED.3IONS-SCNC: 10.5 MMOL/L (ref 5–15)
AST SERPL-CCNC: 19 U/L (ref 1–40)
BASOPHILS # BLD AUTO: 0.01 10*3/MM3 (ref 0–0.2)
BASOPHILS NFR BLD AUTO: 0.3 % (ref 0–1.5)
BILIRUB SERPL-MCNC: 0.4 MG/DL (ref 0–1.2)
BUN SERPL-MCNC: 13.2 MG/DL (ref 8–23)
BUN/CREAT SERPL: 8.9 (ref 7–25)
CALCIUM SPEC-SCNC: 8.7 MG/DL (ref 8.6–10.5)
CHLORIDE SERPL-SCNC: 101 MMOL/L (ref 98–107)
CO2 SERPL-SCNC: 23.5 MMOL/L (ref 22–29)
CREAT SERPL-MCNC: 1.49 MG/DL (ref 0.76–1.27)
DEPRECATED RDW RBC AUTO: 58.4 FL (ref 37–54)
EGFRCR SERPLBLD CKD-EPI 2021: 48.3 ML/MIN/1.73
EOSINOPHIL # BLD AUTO: 0.04 10*3/MM3 (ref 0–0.4)
EOSINOPHIL NFR BLD AUTO: 1.3 % (ref 0.3–6.2)
ERYTHROCYTE [DISTWIDTH] IN BLOOD BY AUTOMATED COUNT: 16.9 % (ref 12.3–15.4)
GLOBULIN UR ELPH-MCNC: 4 GM/DL
GLUCOSE SERPL-MCNC: 105 MG/DL (ref 65–99)
HCT VFR BLD AUTO: 27.1 % (ref 37.5–51)
HGB BLD-MCNC: 8.8 G/DL (ref 13–17.7)
IMM GRANULOCYTES # BLD AUTO: 0.01 10*3/MM3 (ref 0–0.05)
IMM GRANULOCYTES NFR BLD AUTO: 0.3 % (ref 0–0.5)
LYMPHOCYTES # BLD AUTO: 1.02 10*3/MM3 (ref 0.7–3.1)
LYMPHOCYTES NFR BLD AUTO: 33.1 % (ref 19.6–45.3)
MCH RBC QN AUTO: 31 PG (ref 26.6–33)
MCHC RBC AUTO-ENTMCNC: 32.5 G/DL (ref 31.5–35.7)
MCV RBC AUTO: 95.4 FL (ref 79–97)
MONOCYTES # BLD AUTO: 0.36 10*3/MM3 (ref 0.1–0.9)
MONOCYTES NFR BLD AUTO: 11.7 % (ref 5–12)
NEUTROPHILS NFR BLD AUTO: 1.64 10*3/MM3 (ref 1.7–7)
NEUTROPHILS NFR BLD AUTO: 53.3 % (ref 42.7–76)
PLATELET # BLD AUTO: 163 10*3/MM3 (ref 140–450)
PMV BLD AUTO: 9.6 FL (ref 6–12)
POTASSIUM SERPL-SCNC: 4.9 MMOL/L (ref 3.5–5.2)
PROT SERPL-MCNC: 7.3 G/DL (ref 6–8.5)
RBC # BLD AUTO: 2.84 10*6/MM3 (ref 4.14–5.8)
SODIUM SERPL-SCNC: 135 MMOL/L (ref 136–145)
WBC NRBC COR # BLD AUTO: 3.08 10*3/MM3 (ref 3.4–10.8)

## 2025-08-12 PROCEDURE — 85025 COMPLETE CBC W/AUTO DIFF WBC: CPT | Performed by: STUDENT IN AN ORGANIZED HEALTH CARE EDUCATION/TRAINING PROGRAM

## 2025-08-12 PROCEDURE — 36415 COLL VENOUS BLD VENIPUNCTURE: CPT

## 2025-08-12 PROCEDURE — 80053 COMPREHEN METABOLIC PANEL: CPT | Performed by: STUDENT IN AN ORGANIZED HEALTH CARE EDUCATION/TRAINING PROGRAM

## 2025-08-12 RX ORDER — SODIUM CHLORIDE 0.9 % (FLUSH) 0.9 %
20 SYRINGE (ML) INJECTION AS NEEDED
Status: DISCONTINUED | OUTPATIENT
Start: 2025-08-12 | End: 2025-08-13 | Stop reason: HOSPADM

## 2025-08-12 RX ORDER — SODIUM CHLORIDE 0.9 % (FLUSH) 0.9 %
20 SYRINGE (ML) INJECTION AS NEEDED
OUTPATIENT
Start: 2025-08-12

## 2025-08-19 ENCOUNTER — OFFICE VISIT (OUTPATIENT)
Dept: ONCOLOGY | Facility: CLINIC | Age: 75
End: 2025-08-19
Payer: MEDICARE

## 2025-08-19 ENCOUNTER — HOSPITAL ENCOUNTER (OUTPATIENT)
Dept: ONCOLOGY | Facility: HOSPITAL | Age: 75
Discharge: HOME | End: 2025-08-19
Admitting: STUDENT IN AN ORGANIZED HEALTH CARE EDUCATION/TRAINING PROGRAM
Payer: MEDICARE

## 2025-08-19 VITALS
HEIGHT: 66 IN | TEMPERATURE: 96.4 F | RESPIRATION RATE: 18 BRPM | BODY MASS INDEX: 21.4 KG/M2 | DIASTOLIC BLOOD PRESSURE: 64 MMHG | HEART RATE: 66 BPM | OXYGEN SATURATION: 100 % | SYSTOLIC BLOOD PRESSURE: 108 MMHG | WEIGHT: 133.16 LBS

## 2025-08-19 VITALS
WEIGHT: 133.2 LBS | RESPIRATION RATE: 18 BRPM | BODY MASS INDEX: 21.41 KG/M2 | OXYGEN SATURATION: 100 % | TEMPERATURE: 96.4 F | HEIGHT: 66 IN | SYSTOLIC BLOOD PRESSURE: 108 MMHG | DIASTOLIC BLOOD PRESSURE: 64 MMHG | HEART RATE: 66 BPM

## 2025-08-19 DIAGNOSIS — R53.1 GENERALIZED WEAKNESS: ICD-10-CM

## 2025-08-19 DIAGNOSIS — E03.9 HYPOTHYROIDISM, UNSPECIFIED TYPE: ICD-10-CM

## 2025-08-19 DIAGNOSIS — D63.0 ANEMIA IN NEOPLASTIC DISEASE: ICD-10-CM

## 2025-08-19 DIAGNOSIS — C15.5 PRIMARY MALIGNANT NEOPLASM OF LOWER THIRD OF ESOPHAGUS: ICD-10-CM

## 2025-08-19 DIAGNOSIS — R79.89 ELEVATED SERUM CREATININE: ICD-10-CM

## 2025-08-19 DIAGNOSIS — C16.0 MALIGNANT NEOPLASM OF CARDIA: Primary | ICD-10-CM

## 2025-08-19 DIAGNOSIS — R39.9 UTI SYMPTOMS: ICD-10-CM

## 2025-08-19 DIAGNOSIS — C15.9 ESOPHAGEAL CANCER, STAGE IV: Primary | ICD-10-CM

## 2025-08-19 LAB
ALP BLD-CCNC: 45 U/L (ref 53–128)
BACTERIA UR QL AUTO: ABNORMAL /HPF
BASOPHILS # BLD AUTO: 0.04 10*3/MM3 (ref 0–0.2)
BASOPHILS NFR BLD AUTO: 0.5 % (ref 0–1.5)
BILIRUB UR QL STRIP: NEGATIVE
BUN BLDA-MCNC: 24 MG/DL (ref 7–22)
CALCIUM BLD QL: 8 MG/DL (ref 8–10.3)
CHLORIDE BLDA-SCNC: 107 MMOL/L (ref 98–108)
CLARITY UR: CLEAR
CO2 BLDA-SCNC: 22 MMOL/L (ref 18–33)
COLOR UR: YELLOW
CREAT BLDA-MCNC: 1.8 MG/DL (ref 0.6–1.2)
DEPRECATED RDW RBC AUTO: 62.4 FL (ref 37–54)
EGFRCR SERPLBLD CKD-EPI 2021: 38.5 ML/MIN/1.73
EOSINOPHIL # BLD AUTO: 0.11 10*3/MM3 (ref 0–0.4)
EOSINOPHIL NFR BLD AUTO: 1.5 % (ref 0.3–6.2)
ERYTHROCYTE [DISTWIDTH] IN BLOOD BY AUTOMATED COUNT: 18.2 % (ref 12.3–15.4)
GLUCOSE BLDC GLUCOMTR-MCNC: 104 MG/DL (ref 73–118)
GLUCOSE UR STRIP-MCNC: NEGATIVE MG/DL
HCT VFR BLD AUTO: 24.8 % (ref 37.5–51)
HGB BLD-MCNC: 8.1 G/DL (ref 13–17.7)
HGB UR QL STRIP.AUTO: NEGATIVE
HYALINE CASTS UR QL AUTO: ABNORMAL /LPF
IMM GRANULOCYTES # BLD AUTO: 0.02 10*3/MM3 (ref 0–0.05)
IMM GRANULOCYTES NFR BLD AUTO: 0.3 % (ref 0–0.5)
KETONES UR QL STRIP: NEGATIVE
LEUKOCYTE ESTERASE UR QL STRIP.AUTO: ABNORMAL
LYMPHOCYTES # BLD AUTO: 2.02 10*3/MM3 (ref 0.7–3.1)
LYMPHOCYTES NFR BLD AUTO: 27.1 % (ref 19.6–45.3)
MAGNESIUM SERPL-MCNC: 1.5 MG/DL (ref 1.6–2.4)
MCH RBC QN AUTO: 31.3 PG (ref 26.6–33)
MCHC RBC AUTO-ENTMCNC: 32.7 G/DL (ref 31.5–35.7)
MCV RBC AUTO: 95.8 FL (ref 79–97)
MONOCYTES # BLD AUTO: 0.84 10*3/MM3 (ref 0.1–0.9)
MONOCYTES NFR BLD AUTO: 11.3 % (ref 5–12)
NEUTROPHILS NFR BLD AUTO: 4.43 10*3/MM3 (ref 1.7–7)
NEUTROPHILS NFR BLD AUTO: 59.3 % (ref 42.7–76)
NITRITE UR QL STRIP: NEGATIVE
PH UR STRIP.AUTO: 5.5 [PH] (ref 5–8)
PHOSPHATE SERPL-MCNC: 3.5 MG/DL (ref 2.5–4.5)
PLATELET # BLD AUTO: 143 10*3/MM3 (ref 140–450)
PMV BLD AUTO: 9.7 FL (ref 6–12)
POC ALBUMIN: 2.4 G/L (ref 3.3–5.5)
POC ALT (SGPT): 13 U/L (ref 10–47)
POC AST (SGOT): 18 U/L (ref 11–38)
POC TOTAL BILIRUBIN: 0.8 MG/DL (ref 0.2–1.6)
POC TOTAL PROTEIN: 6.5 G/DL (ref 6.4–8.1)
POTASSIUM BLDA-SCNC: 4.3 MMOL/L (ref 3.6–5.1)
PROT UR QL STRIP: ABNORMAL
RBC # BLD AUTO: 2.59 10*6/MM3 (ref 4.14–5.8)
RBC # UR STRIP: ABNORMAL /HPF
REF LAB TEST METHOD: ABNORMAL
SODIUM BLD-SCNC: 133 MMOL/L (ref 128–145)
SP GR UR STRIP: 1.02 (ref 1–1.03)
SQUAMOUS #/AREA URNS HPF: ABNORMAL /HPF
UROBILINOGEN UR QL STRIP: ABNORMAL
WBC # UR STRIP: ABNORMAL /HPF
WBC NRBC COR # BLD AUTO: 7.46 10*3/MM3 (ref 3.4–10.8)
YEAST URNS QL MICRO: ABNORMAL /HPF

## 2025-08-19 PROCEDURE — 96360 HYDRATION IV INFUSION INIT: CPT

## 2025-08-19 PROCEDURE — 25810000003 SODIUM CHLORIDE 0.9 % SOLUTION: Performed by: PHYSICIAN ASSISTANT

## 2025-08-19 PROCEDURE — 80053 COMPREHEN METABOLIC PANEL: CPT

## 2025-08-19 PROCEDURE — 87086 URINE CULTURE/COLONY COUNT: CPT | Performed by: PHYSICIAN ASSISTANT

## 2025-08-19 PROCEDURE — 85025 COMPLETE CBC W/AUTO DIFF WBC: CPT | Performed by: STUDENT IN AN ORGANIZED HEALTH CARE EDUCATION/TRAINING PROGRAM

## 2025-08-19 PROCEDURE — 83735 ASSAY OF MAGNESIUM: CPT | Performed by: PHYSICIAN ASSISTANT

## 2025-08-19 PROCEDURE — 84100 ASSAY OF PHOSPHORUS: CPT | Performed by: PHYSICIAN ASSISTANT

## 2025-08-19 PROCEDURE — 81001 URINALYSIS AUTO W/SCOPE: CPT | Performed by: PHYSICIAN ASSISTANT

## 2025-08-19 PROCEDURE — 96361 HYDRATE IV INFUSION ADD-ON: CPT

## 2025-08-19 RX ORDER — SODIUM CHLORIDE 0.9 % (FLUSH) 0.9 %
20 SYRINGE (ML) INJECTION AS NEEDED
OUTPATIENT
Start: 2025-08-19

## 2025-08-19 RX ORDER — SODIUM CHLORIDE 0.9 % (FLUSH) 0.9 %
20 SYRINGE (ML) INJECTION AS NEEDED
Status: DISCONTINUED | OUTPATIENT
Start: 2025-08-19 | End: 2025-08-20 | Stop reason: HOSPADM

## 2025-08-19 RX ADMIN — Medication 20 ML: at 13:54

## 2025-08-19 RX ADMIN — SODIUM CHLORIDE 1000 ML: 9 INJECTION, SOLUTION INTRAVENOUS at 12:09

## 2025-08-20 LAB — BACTERIA SPEC AEROBE CULT: ABNORMAL

## 2025-08-24 ENCOUNTER — APPOINTMENT (OUTPATIENT)
Dept: OTHER | Facility: HOSPITAL | Age: 75
End: 2025-08-24
Payer: MEDICARE

## 2025-08-24 ENCOUNTER — APPOINTMENT (OUTPATIENT)
Dept: GENERAL RADIOLOGY | Facility: HOSPITAL | Age: 75
End: 2025-08-24
Payer: MEDICARE

## 2025-08-24 ENCOUNTER — HOSPITAL ENCOUNTER (INPATIENT)
Facility: HOSPITAL | Age: 75
LOS: 5 days | Discharge: HOME | End: 2025-08-29
Attending: HOSPITALIST | Admitting: HOSPITALIST
Payer: MEDICARE

## 2025-08-24 DIAGNOSIS — J15.4 PNEUMONIA DUE TO STREPTOCOCCUS: Primary | ICD-10-CM

## 2025-08-24 DIAGNOSIS — E03.9 HYPOTHYROIDISM (ACQUIRED): ICD-10-CM

## 2025-08-24 DIAGNOSIS — R79.89 ELEVATED TSH: ICD-10-CM

## 2025-08-24 PROBLEM — A41.9 SEPTIC SHOCK: Status: ACTIVE | Noted: 2025-08-24

## 2025-08-24 PROBLEM — R65.21 SEPTIC SHOCK: Status: ACTIVE | Noted: 2025-08-24

## 2025-08-24 PROBLEM — E87.20 LACTIC ACIDOSIS: Status: ACTIVE | Noted: 2025-08-24

## 2025-08-24 LAB
ALBUMIN SERPL-MCNC: 2.1 G/DL (ref 3.5–5.2)
ALBUMIN/GLOB SERPL: 0.5 G/DL
ALP SERPL-CCNC: 51 U/L (ref 39–117)
ALT SERPL W P-5'-P-CCNC: 8 U/L (ref 1–41)
ANION GAP SERPL CALCULATED.3IONS-SCNC: 14.9 MMOL/L (ref 5–15)
ANISOCYTOSIS BLD QL: ABNORMAL
ARTERIAL PATENCY WRIST A: ABNORMAL
AST SERPL-CCNC: 29 U/L (ref 1–40)
ATMOSPHERIC PRESS: ABNORMAL MM[HG]
BASE EXCESS BLDA CALC-SCNC: -8.7 MMOL/L (ref 0–3)
BDY SITE: ABNORMAL
BILIRUB SERPL-MCNC: 1.1 MG/DL (ref 0–1.2)
BUN SERPL-MCNC: 46 MG/DL (ref 8–23)
BUN/CREAT SERPL: 18.2 (ref 7–25)
CALCIUM SPEC-SCNC: 7.4 MG/DL (ref 8.6–10.5)
CHLORIDE SERPL-SCNC: 102 MMOL/L (ref 98–107)
CK SERPL-CCNC: 13 U/L (ref 20–200)
CO2 BLDA-SCNC: 15.3 MMOL/L (ref 22–29)
CO2 SERPL-SCNC: 14.1 MMOL/L (ref 22–29)
CREAT SERPL-MCNC: 2.53 MG/DL (ref 0.76–1.27)
D-LACTATE SERPL-SCNC: 2.7 MMOL/L (ref 0.5–2)
DEPRECATED RDW RBC AUTO: 58.2 FL (ref 37–54)
EGFRCR SERPLBLD CKD-EPI 2021: 25.6 ML/MIN/1.73
EOSINOPHIL # BLD MANUAL: 0.16 10*3/MM3 (ref 0–0.4)
EOSINOPHIL NFR BLD MANUAL: 1 % (ref 0.3–6.2)
ERYTHROCYTE [DISTWIDTH] IN BLOOD BY AUTOMATED COUNT: 17.9 % (ref 12.3–15.4)
GLOBULIN UR ELPH-MCNC: 4.4 GM/DL
GLUCOSE SERPL-MCNC: 78 MG/DL (ref 65–99)
HCO3 BLDA-SCNC: 14.6 MMOL/L (ref 21–28)
HCT VFR BLD AUTO: 26 % (ref 37.5–51)
HEMODILUTION: NO
HGB BLD-MCNC: 8.5 G/DL (ref 13–17.7)
INHALED O2 CONCENTRATION: 21 %
INR PPP: 3.97 (ref 0.9–1.1)
LYMPHOCYTES # BLD MANUAL: 6.09 10*3/MM3 (ref 0.7–3.1)
LYMPHOCYTES NFR BLD MANUAL: 2 % (ref 5–12)
MAGNESIUM SERPL-MCNC: 1.7 MG/DL (ref 1.6–2.4)
MCH RBC QN AUTO: 29.9 PG (ref 26.6–33)
MCHC RBC AUTO-ENTMCNC: 32.7 G/DL (ref 31.5–35.7)
MCV RBC AUTO: 91.5 FL (ref 79–97)
MODALITY: ABNORMAL
MONOCYTES # BLD: 0.31 10*3/MM3 (ref 0.1–0.9)
NEUTROPHILS # BLD AUTO: 8.9 10*3/MM3 (ref 1.7–7)
NEUTROPHILS NFR BLD MANUAL: 53 % (ref 42.7–76)
NEUTS BAND NFR BLD MANUAL: 4 % (ref 0–5)
PATHOLOGY REVIEW: YES
PCO2 BLDA: 22.7 MM HG (ref 35–48)
PH BLDA: 7.42 PH UNITS (ref 7.35–7.45)
PLASMA CELL PREC NFR BLD MANUAL: 1 % (ref 0–0)
PLATELET # BLD AUTO: 86 10*3/MM3 (ref 140–450)
PMV BLD AUTO: 11.8 FL (ref 6–12)
PO2 BLD: 446 MM[HG] (ref 0–500)
PO2 BLDA: 93.6 MM HG (ref 83–108)
POIKILOCYTOSIS BLD QL SMEAR: ABNORMAL
POTASSIUM SERPL-SCNC: 4.5 MMOL/L (ref 3.5–5.2)
PROCALCITONIN SERPL-MCNC: 1.69 NG/ML (ref 0–0.25)
PROT SERPL-MCNC: 6.5 G/DL (ref 6–8.5)
PROTHROMBIN TIME: 39.5 SECONDS (ref 11.7–14.2)
RBC # BLD AUTO: 2.84 10*6/MM3 (ref 4.14–5.8)
SAO2 % BLDCOA: 97.6 % (ref 94–98)
SCAN SLIDE: NORMAL
SMALL PLATELETS BLD QL SMEAR: ABNORMAL
SMUDGE CELLS BLD QL SMEAR: ABNORMAL
SODIUM SERPL-SCNC: 131 MMOL/L (ref 136–145)
T4 FREE SERPL-MCNC: 0.72 NG/DL (ref 0.92–1.68)
TROPONIN T SERPL HS-MCNC: 71 NG/L
TSH SERPL DL<=0.05 MIU/L-ACNC: 4.32 UIU/ML (ref 0.27–4.2)
VARIANT LYMPHS NFR BLD MANUAL: 35 % (ref 19.6–45.3)
VARIANT LYMPHS NFR BLD MANUAL: 4 % (ref 0–5)
WBC NRBC COR # BLD AUTO: 15.62 10*3/MM3 (ref 3.4–10.8)

## 2025-08-24 PROCEDURE — 36600 WITHDRAWAL OF ARTERIAL BLOOD: CPT

## 2025-08-24 PROCEDURE — 84443 ASSAY THYROID STIM HORMONE: CPT

## 2025-08-24 PROCEDURE — 82550 ASSAY OF CK (CPK): CPT

## 2025-08-24 PROCEDURE — 71045 X-RAY EXAM CHEST 1 VIEW: CPT

## 2025-08-24 PROCEDURE — 83735 ASSAY OF MAGNESIUM: CPT

## 2025-08-24 PROCEDURE — 84484 ASSAY OF TROPONIN QUANT: CPT

## 2025-08-24 PROCEDURE — 83605 ASSAY OF LACTIC ACID: CPT

## 2025-08-24 PROCEDURE — 87040 BLOOD CULTURE FOR BACTERIA: CPT

## 2025-08-24 PROCEDURE — 84300 ASSAY OF URINE SODIUM: CPT | Performed by: INTERNAL MEDICINE

## 2025-08-24 PROCEDURE — 80053 COMPREHEN METABOLIC PANEL: CPT

## 2025-08-24 PROCEDURE — 85025 COMPLETE CBC W/AUTO DIFF WBC: CPT

## 2025-08-24 PROCEDURE — 81001 URINALYSIS AUTO W/SCOPE: CPT

## 2025-08-24 PROCEDURE — 87641 MR-STAPH DNA AMP PROBE: CPT | Performed by: NURSE PRACTITIONER

## 2025-08-24 PROCEDURE — 84145 PROCALCITONIN (PCT): CPT

## 2025-08-24 PROCEDURE — 85610 PROTHROMBIN TIME: CPT

## 2025-08-24 PROCEDURE — 82803 BLOOD GASES ANY COMBINATION: CPT

## 2025-08-24 PROCEDURE — 25010000002 ALBUMIN HUMAN 25% PER 50 ML: Performed by: NURSE PRACTITIONER

## 2025-08-24 PROCEDURE — 93005 ELECTROCARDIOGRAM TRACING: CPT | Performed by: NURSE PRACTITIONER

## 2025-08-24 PROCEDURE — 87086 URINE CULTURE/COLONY COUNT: CPT | Performed by: FAMILY MEDICINE

## 2025-08-24 PROCEDURE — P9047 ALBUMIN (HUMAN), 25%, 50ML: HCPCS | Performed by: NURSE PRACTITIONER

## 2025-08-24 PROCEDURE — 25010000002 PIPERACILLIN SOD-TAZOBACTAM PER 1 G: Performed by: NURSE PRACTITIONER

## 2025-08-24 PROCEDURE — 84439 ASSAY OF FREE THYROXINE: CPT

## 2025-08-24 RX ORDER — DEXTROSE MONOHYDRATE 25 G/50ML
25 INJECTION, SOLUTION INTRAVENOUS
Status: DISCONTINUED | OUTPATIENT
Start: 2025-08-24 | End: 2025-08-29 | Stop reason: HOSPADM

## 2025-08-24 RX ORDER — IBUPROFEN 600 MG/1
1 TABLET ORAL
Status: DISCONTINUED | OUTPATIENT
Start: 2025-08-24 | End: 2025-08-29 | Stop reason: HOSPADM

## 2025-08-24 RX ORDER — BISACODYL 5 MG/1
5 TABLET, DELAYED RELEASE ORAL DAILY PRN
Status: DISCONTINUED | OUTPATIENT
Start: 2025-08-24 | End: 2025-08-29 | Stop reason: HOSPADM

## 2025-08-24 RX ORDER — BISACODYL 10 MG
10 SUPPOSITORY, RECTAL RECTAL DAILY PRN
Status: DISCONTINUED | OUTPATIENT
Start: 2025-08-24 | End: 2025-08-29 | Stop reason: HOSPADM

## 2025-08-24 RX ORDER — ACETAMINOPHEN 325 MG/1
650 TABLET ORAL EVERY 4 HOURS PRN
Status: DISCONTINUED | OUTPATIENT
Start: 2025-08-24 | End: 2025-08-29 | Stop reason: HOSPADM

## 2025-08-24 RX ORDER — POLYETHYLENE GLYCOL 3350 17 G/17G
17 POWDER, FOR SOLUTION ORAL DAILY PRN
Status: DISCONTINUED | OUTPATIENT
Start: 2025-08-24 | End: 2025-08-29 | Stop reason: HOSPADM

## 2025-08-24 RX ORDER — ALBUMIN (HUMAN) 12.5 G/50ML
25 SOLUTION INTRAVENOUS ONCE
Status: COMPLETED | OUTPATIENT
Start: 2025-08-24 | End: 2025-08-24

## 2025-08-24 RX ORDER — ACETAMINOPHEN 160 MG/5ML
650 SOLUTION ORAL EVERY 4 HOURS PRN
Status: DISCONTINUED | OUTPATIENT
Start: 2025-08-24 | End: 2025-08-29 | Stop reason: HOSPADM

## 2025-08-24 RX ORDER — ONDANSETRON 2 MG/ML
4 INJECTION INTRAMUSCULAR; INTRAVENOUS EVERY 6 HOURS PRN
Status: DISCONTINUED | OUTPATIENT
Start: 2025-08-24 | End: 2025-08-29 | Stop reason: HOSPADM

## 2025-08-24 RX ORDER — SODIUM CHLORIDE 9 MG/ML
40 INJECTION, SOLUTION INTRAVENOUS AS NEEDED
Status: DISCONTINUED | OUTPATIENT
Start: 2025-08-24 | End: 2025-08-29 | Stop reason: HOSPADM

## 2025-08-24 RX ORDER — SODIUM CHLORIDE 0.9 % (FLUSH) 0.9 %
10 SYRINGE (ML) INJECTION AS NEEDED
Status: DISCONTINUED | OUTPATIENT
Start: 2025-08-24 | End: 2025-08-29 | Stop reason: HOSPADM

## 2025-08-24 RX ORDER — ACETAMINOPHEN 650 MG/1
650 SUPPOSITORY RECTAL EVERY 4 HOURS PRN
Status: DISCONTINUED | OUTPATIENT
Start: 2025-08-24 | End: 2025-08-29 | Stop reason: HOSPADM

## 2025-08-24 RX ORDER — NITROGLYCERIN 0.4 MG/1
0.4 TABLET SUBLINGUAL
Status: DISCONTINUED | OUTPATIENT
Start: 2025-08-24 | End: 2025-08-29 | Stop reason: HOSPADM

## 2025-08-24 RX ORDER — SODIUM CHLORIDE 0.9 % (FLUSH) 0.9 %
10 SYRINGE (ML) INJECTION EVERY 12 HOURS SCHEDULED
Status: DISCONTINUED | OUTPATIENT
Start: 2025-08-24 | End: 2025-08-29 | Stop reason: HOSPADM

## 2025-08-24 RX ORDER — ONDANSETRON 4 MG/1
4 TABLET, ORALLY DISINTEGRATING ORAL EVERY 6 HOURS PRN
Status: DISCONTINUED | OUTPATIENT
Start: 2025-08-24 | End: 2025-08-29 | Stop reason: HOSPADM

## 2025-08-24 RX ORDER — NICOTINE POLACRILEX 4 MG
15 LOZENGE BUCCAL
Status: DISCONTINUED | OUTPATIENT
Start: 2025-08-24 | End: 2025-08-29 | Stop reason: HOSPADM

## 2025-08-24 RX ORDER — AMOXICILLIN 250 MG
2 CAPSULE ORAL 2 TIMES DAILY PRN
Status: DISCONTINUED | OUTPATIENT
Start: 2025-08-24 | End: 2025-08-29 | Stop reason: HOSPADM

## 2025-08-24 RX ADMIN — Medication 10 ML: at 23:26

## 2025-08-24 RX ADMIN — ALBUMIN (HUMAN) 25 G: 0.25 INJECTION, SOLUTION INTRAVENOUS at 23:25

## 2025-08-24 RX ADMIN — PIPERACILLIN AND TAZOBACTAM 3.38 G: 3; .375 INJECTION, POWDER, FOR SOLUTION INTRAVENOUS at 23:20

## 2025-08-25 ENCOUNTER — APPOINTMENT (OUTPATIENT)
Dept: CT IMAGING | Facility: HOSPITAL | Age: 75
End: 2025-08-25
Payer: MEDICARE

## 2025-08-25 LAB
ABO GROUP BLD: NORMAL
ALBUMIN SERPL-MCNC: 2.2 G/DL (ref 3.5–5.2)
ALBUMIN SERPL-MCNC: 2.3 G/DL (ref 3.5–5.2)
ALBUMIN/GLOB SERPL: 0.6 G/DL
ALBUMIN/GLOB SERPL: 0.6 G/DL
ALP SERPL-CCNC: 49 U/L (ref 39–117)
ALP SERPL-CCNC: 51 U/L (ref 39–117)
ALT SERPL W P-5'-P-CCNC: 11 U/L (ref 1–41)
ALT SERPL W P-5'-P-CCNC: 9 U/L (ref 1–41)
ANION GAP SERPL CALCULATED.3IONS-SCNC: 12.8 MMOL/L (ref 5–15)
ANION GAP SERPL CALCULATED.3IONS-SCNC: 15 MMOL/L (ref 5–15)
ANISOCYTOSIS BLD QL: ABNORMAL
ANISOCYTOSIS BLD QL: ABNORMAL
APTT PPP: 109.1 SECONDS (ref 22.7–35.4)
AST SERPL-CCNC: 31 U/L (ref 1–40)
AST SERPL-CCNC: 32 U/L (ref 1–40)
ATMOSPHERIC PRESS: ABNORMAL MM[HG]
BACTERIA UR QL AUTO: ABNORMAL /HPF
BASE EXCESS BLDV CALC-SCNC: -6.8 MMOL/L (ref -2–2)
BASOPHILS # BLD MANUAL: 0.14 10*3/MM3 (ref 0–0.2)
BASOPHILS NFR BLD MANUAL: 1 % (ref 0–1.5)
BDY SITE: ABNORMAL
BILIRUB SERPL-MCNC: 1.4 MG/DL (ref 0–1.2)
BILIRUB SERPL-MCNC: 1.6 MG/DL (ref 0–1.2)
BILIRUB UR QL STRIP: NEGATIVE
BLD GP AB SCN SERPL QL: NEGATIVE
BUN SERPL-MCNC: 44.6 MG/DL (ref 8–23)
BUN SERPL-MCNC: 47.4 MG/DL (ref 8–23)
BUN/CREAT SERPL: 17.1 (ref 7–25)
BUN/CREAT SERPL: 17.7 (ref 7–25)
CA-I SERPL ISE-MCNC: 1.07 MMOL/L (ref 1.15–1.3)
CALCIUM SPEC-SCNC: 7.4 MG/DL (ref 8.6–10.5)
CALCIUM SPEC-SCNC: 7.6 MG/DL (ref 8.6–10.5)
CHLORIDE SERPL-SCNC: 103 MMOL/L (ref 98–107)
CHLORIDE SERPL-SCNC: 104 MMOL/L (ref 98–107)
CLARITY UR: ABNORMAL
CO2 BLDA-SCNC: 18.3 MMOL/L (ref 22–29)
CO2 SERPL-SCNC: 15 MMOL/L (ref 22–29)
CO2 SERPL-SCNC: 19.2 MMOL/L (ref 22–29)
COLOR UR: YELLOW
CREAT SERPL-MCNC: 2.61 MG/DL (ref 0.76–1.27)
CREAT SERPL-MCNC: 2.68 MG/DL (ref 0.76–1.27)
D-LACTATE SERPL-SCNC: 2.2 MMOL/L (ref 0.5–2)
D-LACTATE SERPL-SCNC: 2.3 MMOL/L (ref 0.5–2)
D-LACTATE SERPL-SCNC: 3.1 MMOL/L (ref 0.5–2)
DEPRECATED RDW RBC AUTO: 56.8 FL (ref 37–54)
DEPRECATED RDW RBC AUTO: 58 FL (ref 37–54)
EGFRCR SERPLBLD CKD-EPI 2021: 23.9 ML/MIN/1.73
EGFRCR SERPLBLD CKD-EPI 2021: 24.7 ML/MIN/1.73
EOSINOPHIL # BLD MANUAL: 0.14 10*3/MM3 (ref 0–0.4)
EOSINOPHIL NFR BLD MANUAL: 1 % (ref 0.3–6.2)
ERYTHROCYTE [DISTWIDTH] IN BLOOD BY AUTOMATED COUNT: 17.7 % (ref 12.3–15.4)
ERYTHROCYTE [DISTWIDTH] IN BLOOD BY AUTOMATED COUNT: 17.9 % (ref 12.3–15.4)
FIBRINOGEN PPP-MCNC: <60 MG/DL (ref 219–464)
GEN 5 1HR TROPONIN T REFLEX: 74 NG/L
GLOBULIN UR ELPH-MCNC: 3.8 GM/DL
GLOBULIN UR ELPH-MCNC: 3.8 GM/DL
GLUCOSE BLDC GLUCOMTR-MCNC: 105 MG/DL (ref 70–105)
GLUCOSE BLDC GLUCOMTR-MCNC: 115 MG/DL (ref 70–105)
GLUCOSE BLDC GLUCOMTR-MCNC: 122 MG/DL (ref 70–105)
GLUCOSE BLDC GLUCOMTR-MCNC: 127 MG/DL (ref 70–105)
GLUCOSE BLDC GLUCOMTR-MCNC: 35 MG/DL (ref 70–105)
GLUCOSE BLDC GLUCOMTR-MCNC: 52 MG/DL (ref 70–105)
GLUCOSE SERPL-MCNC: 159 MG/DL (ref 65–99)
GLUCOSE SERPL-MCNC: 63 MG/DL (ref 65–99)
GLUCOSE UR STRIP-MCNC: NEGATIVE MG/DL
HCO3 BLDV-SCNC: 17.4 MMOL/L (ref 22–26)
HCT VFR BLD AUTO: 21.5 % (ref 37.5–51)
HCT VFR BLD AUTO: 23.7 % (ref 37.5–51)
HGB BLD-MCNC: 7 G/DL (ref 13–17.7)
HGB BLD-MCNC: 7.9 G/DL (ref 13–17.7)
HGB UR QL STRIP.AUTO: ABNORMAL
HYALINE CASTS UR QL AUTO: ABNORMAL /LPF
INHALED O2 CONCENTRATION: 21 %
INR PPP: 4.93 (ref 0.9–1.1)
KETONES UR QL STRIP: ABNORMAL
LAB AP CASE REPORT: NORMAL
LDH SERPL-CCNC: 395 U/L (ref 135–225)
LEUKOCYTE ESTERASE UR QL STRIP.AUTO: ABNORMAL
LYMPHOCYTES # BLD MANUAL: 0.74 10*3/MM3 (ref 0.7–3.1)
LYMPHOCYTES # BLD MANUAL: 3.37 10*3/MM3 (ref 0.7–3.1)
LYMPHOCYTES NFR BLD MANUAL: 1 % (ref 5–12)
LYMPHOCYTES NFR BLD MANUAL: 7.1 % (ref 5–12)
Lab: ABNORMAL
Lab: NORMAL
MCH RBC QN AUTO: 29.8 PG (ref 26.6–33)
MCH RBC QN AUTO: 30 PG (ref 26.6–33)
MCHC RBC AUTO-ENTMCNC: 32.6 G/DL (ref 31.5–35.7)
MCHC RBC AUTO-ENTMCNC: 33.3 G/DL (ref 31.5–35.7)
MCV RBC AUTO: 89.4 FL (ref 79–97)
MCV RBC AUTO: 92.3 FL (ref 79–97)
METAMYELOCYTES NFR BLD MANUAL: 1 % (ref 0–0)
MODALITY: ABNORMAL
MONOCYTES # BLD: 0.15 10*3/MM3 (ref 0.1–0.9)
MONOCYTES # BLD: 0.99 10*3/MM3 (ref 0.1–0.9)
MRSA DNA SPEC QL NAA+PROBE: NORMAL
NEUTROPHILS # BLD AUTO: 13.81 10*3/MM3 (ref 1.7–7)
NEUTROPHILS # BLD AUTO: 9.29 10*3/MM3 (ref 1.7–7)
NEUTROPHILS NFR BLD MANUAL: 58 % (ref 42.7–76)
NEUTROPHILS NFR BLD MANUAL: 62.6 % (ref 42.7–76)
NEUTS BAND NFR BLD MANUAL: 35 % (ref 0–5)
NEUTS BAND NFR BLD MANUAL: 4 % (ref 0–5)
NEUTS VAC BLD QL SMEAR: ABNORMAL
NITRITE UR QL STRIP: NEGATIVE
PATH REPORT.FINAL DX SPEC: NORMAL
PCO2 BLDV: 28.9 MM HG (ref 42–51)
PH BLDV: 7.39 PH UNITS (ref 7.32–7.43)
PH UR STRIP.AUTO: <=5 [PH] (ref 5–8)
PLAT MORPH BLD: NORMAL
PLATELET # BLD AUTO: 62 10*3/MM3 (ref 140–450)
PLATELET # BLD AUTO: 69 10*3/MM3 (ref 140–450)
PMV BLD AUTO: 10.8 FL (ref 6–12)
PMV BLD AUTO: 10.9 FL (ref 6–12)
PO2 BLDV: 40.8 MM HG (ref 40–42)
POTASSIUM SERPL-SCNC: 3.5 MMOL/L (ref 3.5–5.2)
POTASSIUM SERPL-SCNC: 4.4 MMOL/L (ref 3.5–5.2)
PROT SERPL-MCNC: 6 G/DL (ref 6–8.5)
PROT SERPL-MCNC: 6.1 G/DL (ref 6–8.5)
PROT UR QL STRIP: ABNORMAL
PROTHROMBIN TIME: 46.9 SECONDS (ref 11.7–14.2)
RBC # BLD AUTO: 2.33 10*6/MM3 (ref 4.14–5.8)
RBC # BLD AUTO: 2.65 10*6/MM3 (ref 4.14–5.8)
RBC # UR STRIP: ABNORMAL /HPF
REF LAB TEST METHOD: ABNORMAL
RH BLD: NEGATIVE
SAO2 % BLDCOV: 76.5 % (ref 45–75)
SCAN SLIDE: NORMAL
SCAN SLIDE: NORMAL
SMALL PLATELETS BLD QL SMEAR: ABNORMAL
SMUDGE CELLS BLD QL SMEAR: ABNORMAL
SODIUM SERPL-SCNC: 134 MMOL/L (ref 136–145)
SODIUM SERPL-SCNC: 135 MMOL/L (ref 136–145)
SODIUM UR-SCNC: 29 MMOL/L
SP GR UR STRIP: 1.02 (ref 1–1.03)
SQUAMOUS #/AREA URNS HPF: ABNORMAL /HPF
T&S EXPIRATION DATE: NORMAL
TROPONIN T % DELTA: 4
TROPONIN T NUMERIC DELTA: 3 NG/L
TROPONIN T SERPL HS-MCNC: 77 NG/L
UROBILINOGEN UR QL STRIP: ABNORMAL
VARIANT LYMPHS NFR BLD MANUAL: 1 % (ref 0–5)
VARIANT LYMPHS NFR BLD MANUAL: 23.2 % (ref 19.6–45.3)
VARIANT LYMPHS NFR BLD MANUAL: 5 % (ref 19.6–45.3)
WBC # UR STRIP: ABNORMAL /HPF
WBC MORPH BLD: NORMAL
WBC NRBC COR # BLD AUTO: 13.93 10*3/MM3 (ref 3.4–10.8)
WBC NRBC COR # BLD AUTO: 14.85 10*3/MM3 (ref 3.4–10.8)
YEAST URNS QL MICRO: ABNORMAL /HPF

## 2025-08-25 PROCEDURE — 82330 ASSAY OF CALCIUM: CPT | Performed by: NURSE PRACTITIONER

## 2025-08-25 PROCEDURE — 85730 THROMBOPLASTIN TIME PARTIAL: CPT | Performed by: STUDENT IN AN ORGANIZED HEALTH CARE EDUCATION/TRAINING PROGRAM

## 2025-08-25 PROCEDURE — 97162 PT EVAL MOD COMPLEX 30 MIN: CPT

## 2025-08-25 PROCEDURE — 85025 COMPLETE CBC W/AUTO DIFF WBC: CPT | Performed by: NURSE PRACTITIONER

## 2025-08-25 PROCEDURE — 36430 TRANSFUSION BLD/BLD COMPNT: CPT

## 2025-08-25 PROCEDURE — 25010000002 MICAFUNGIN SODIUM 100 MG RECONSTITUTED SOLUTION 1 EACH VIAL: Performed by: NURSE PRACTITIONER

## 2025-08-25 PROCEDURE — 83615 LACTATE (LD) (LDH) ENZYME: CPT | Performed by: STUDENT IN AN ORGANIZED HEALTH CARE EDUCATION/TRAINING PROGRAM

## 2025-08-25 PROCEDURE — 85610 PROTHROMBIN TIME: CPT | Performed by: STUDENT IN AN ORGANIZED HEALTH CARE EDUCATION/TRAINING PROGRAM

## 2025-08-25 PROCEDURE — 82948 REAGENT STRIP/BLOOD GLUCOSE: CPT

## 2025-08-25 PROCEDURE — 85007 BL SMEAR W/DIFF WBC COUNT: CPT | Performed by: NURSE PRACTITIONER

## 2025-08-25 PROCEDURE — P9016 RBC LEUKOCYTES REDUCED: HCPCS

## 2025-08-25 PROCEDURE — 25810000003 SODIUM CHLORIDE 0.9 % SOLUTION

## 2025-08-25 PROCEDURE — 25010000002 PIPERACILLIN SOD-TAZOBACTAM PER 1 G: Performed by: NURSE PRACTITIONER

## 2025-08-25 PROCEDURE — 80053 COMPREHEN METABOLIC PANEL: CPT | Performed by: NURSE PRACTITIONER

## 2025-08-25 PROCEDURE — 74176 CT ABD & PELVIS W/O CONTRAST: CPT

## 2025-08-25 PROCEDURE — 86900 BLOOD TYPING SEROLOGIC ABO: CPT | Performed by: FAMILY MEDICINE

## 2025-08-25 PROCEDURE — 86923 COMPATIBILITY TEST ELECTRIC: CPT

## 2025-08-25 PROCEDURE — 63710000001 ONDANSETRON ODT 4 MG TABLET DISPERSIBLE

## 2025-08-25 PROCEDURE — 83010 ASSAY OF HAPTOGLOBIN QUANT: CPT | Performed by: STUDENT IN AN ORGANIZED HEALTH CARE EDUCATION/TRAINING PROGRAM

## 2025-08-25 PROCEDURE — 86850 RBC ANTIBODY SCREEN: CPT | Performed by: FAMILY MEDICINE

## 2025-08-25 PROCEDURE — 86901 BLOOD TYPING SEROLOGIC RH(D): CPT | Performed by: FAMILY MEDICINE

## 2025-08-25 PROCEDURE — 84484 ASSAY OF TROPONIN QUANT: CPT | Performed by: NURSE PRACTITIONER

## 2025-08-25 PROCEDURE — 82803 BLOOD GASES ANY COMBINATION: CPT

## 2025-08-25 PROCEDURE — 25010000003 DEXTROSE 5 % SOLUTION 1,000 ML FLEX CONT: Performed by: INTERNAL MEDICINE

## 2025-08-25 PROCEDURE — 85384 FIBRINOGEN ACTIVITY: CPT | Performed by: STUDENT IN AN ORGANIZED HEALTH CARE EDUCATION/TRAINING PROGRAM

## 2025-08-25 PROCEDURE — 85362 FIBRIN DEGRADATION PRODUCTS: CPT | Performed by: STUDENT IN AN ORGANIZED HEALTH CARE EDUCATION/TRAINING PROGRAM

## 2025-08-25 PROCEDURE — 86900 BLOOD TYPING SEROLOGIC ABO: CPT

## 2025-08-25 PROCEDURE — 83605 ASSAY OF LACTIC ACID: CPT

## 2025-08-25 PROCEDURE — 82948 REAGENT STRIP/BLOOD GLUCOSE: CPT | Performed by: NURSE PRACTITIONER

## 2025-08-25 RX ORDER — PANTOPRAZOLE SODIUM 40 MG/1
40 TABLET, DELAYED RELEASE ORAL
Status: DISCONTINUED | OUTPATIENT
Start: 2025-08-25 | End: 2025-08-29 | Stop reason: HOSPADM

## 2025-08-25 RX ORDER — LEVOTHYROXINE SODIUM 50 UG/1
50 TABLET ORAL DAILY
Status: DISCONTINUED | OUTPATIENT
Start: 2025-08-25 | End: 2025-08-29 | Stop reason: HOSPADM

## 2025-08-25 RX ORDER — FAMOTIDINE 20 MG/1
20 TABLET, FILM COATED ORAL 2 TIMES DAILY
Qty: 60 TABLET | Refills: 0 | Status: SHIPPED | OUTPATIENT
Start: 2025-08-25

## 2025-08-25 RX ORDER — LEVOTHYROXINE SODIUM 50 UG/1
50 TABLET ORAL DAILY
Qty: 30 TABLET | Refills: 0 | Status: SHIPPED | OUTPATIENT
Start: 2025-08-25

## 2025-08-25 RX ORDER — POTASSIUM CHLORIDE 1.5 G/1.58G
20 POWDER, FOR SOLUTION ORAL ONCE
Status: COMPLETED | OUTPATIENT
Start: 2025-08-25 | End: 2025-08-25

## 2025-08-25 RX ADMIN — PIPERACILLIN AND TAZOBACTAM 3.38 G: 3; .375 INJECTION, POWDER, FOR SOLUTION INTRAVENOUS at 23:42

## 2025-08-25 RX ADMIN — MICAFUNGIN 100 MG: 20 INJECTION, POWDER, LYOPHILIZED, FOR SOLUTION INTRAVENOUS at 17:29

## 2025-08-25 RX ADMIN — POTASSIUM CHLORIDE 20 MEQ: 1.5 POWDER, FOR SOLUTION ORAL at 20:56

## 2025-08-25 RX ADMIN — SODIUM BICARBONATE 150 MEQ: 84 INJECTION, SOLUTION INTRAVENOUS at 09:23

## 2025-08-25 RX ADMIN — ACETAMINOPHEN 650 MG: 325 TABLET, FILM COATED ORAL at 21:08

## 2025-08-25 RX ADMIN — ACETAMINOPHEN 650 MG: 325 TABLET, FILM COATED ORAL at 18:05

## 2025-08-25 RX ADMIN — LEVOTHYROXINE SODIUM 50 MCG: 50 TABLET ORAL at 13:59

## 2025-08-25 RX ADMIN — PANTOPRAZOLE SODIUM 40 MG: 40 TABLET, DELAYED RELEASE ORAL at 18:05

## 2025-08-25 RX ADMIN — DEXTROSE MONOHYDRATE 25 G: 25 INJECTION, SOLUTION INTRAVENOUS at 07:08

## 2025-08-25 RX ADMIN — PIPERACILLIN AND TAZOBACTAM 3.38 G: 3; .375 INJECTION, POWDER, FOR SOLUTION INTRAVENOUS at 09:23

## 2025-08-25 RX ADMIN — Medication 10 ML: at 09:23

## 2025-08-25 RX ADMIN — SODIUM CHLORIDE 1000 ML: 9 INJECTION, SOLUTION INTRAVENOUS at 11:09

## 2025-08-25 RX ADMIN — PIPERACILLIN AND TAZOBACTAM 3.38 G: 3; .375 INJECTION, POWDER, FOR SOLUTION INTRAVENOUS at 14:01

## 2025-08-25 RX ADMIN — ONDANSETRON 4 MG: 4 TABLET, ORALLY DISINTEGRATING ORAL at 21:06

## 2025-08-25 RX ADMIN — Medication 10 ML: at 21:09

## 2025-08-26 ENCOUNTER — APPOINTMENT (OUTPATIENT)
Dept: CT IMAGING | Facility: HOSPITAL | Age: 75
End: 2025-08-26
Payer: MEDICARE

## 2025-08-26 LAB
ALBUMIN SERPL-MCNC: 1.9 G/DL (ref 3.5–5.2)
ALBUMIN/GLOB SERPL: 0.5 G/DL
ALP SERPL-CCNC: 53 U/L (ref 39–117)
ALT SERPL W P-5'-P-CCNC: 13 U/L (ref 1–41)
ANION GAP SERPL CALCULATED.3IONS-SCNC: 13 MMOL/L (ref 5–15)
ANISOCYTOSIS BLD QL: ABNORMAL
APTT PPP: >200 SECONDS (ref 22.7–35.4)
AST SERPL-CCNC: 46 U/L (ref 1–40)
BACTERIA SPEC AEROBE CULT: ABNORMAL
BH BB BLOOD EXPIRATION DATE: NORMAL
BH BB BLOOD TYPE BARCODE: 9500
BH BB DISPENSE STATUS: NORMAL
BH BB PRODUCT CODE: NORMAL
BH BB UNIT NUMBER: NORMAL
BILIRUB SERPL-MCNC: 2.1 MG/DL (ref 0–1.2)
BUN SERPL-MCNC: 45.8 MG/DL (ref 8–23)
BUN/CREAT SERPL: 17.2 (ref 7–25)
BURR CELLS BLD QL SMEAR: ABNORMAL
CALCIUM SPEC-SCNC: 7.4 MG/DL (ref 8.6–10.5)
CHLORIDE SERPL-SCNC: 104 MMOL/L (ref 98–107)
CO2 SERPL-SCNC: 20 MMOL/L (ref 22–29)
CREAT SERPL-MCNC: 2.66 MG/DL (ref 0.76–1.27)
CROSSMATCH INTERPRETATION: NORMAL
D-LACTATE SERPL-SCNC: 3.6 MMOL/L (ref 0.5–2)
D-LACTATE SERPL-SCNC: 4.3 MMOL/L (ref 0.5–2)
D-LACTATE SERPL-SCNC: 4.4 MMOL/L (ref 0.5–2)
D-LACTATE SERPL-SCNC: 4.5 MMOL/L (ref 0.5–2)
DACRYOCYTES BLD QL SMEAR: ABNORMAL
DEPRECATED RDW RBC AUTO: 53.1 FL (ref 37–54)
EGFRCR SERPLBLD CKD-EPI 2021: 24.1 ML/MIN/1.73
ERYTHROCYTE [DISTWIDTH] IN BLOOD BY AUTOMATED COUNT: 17.4 % (ref 12.3–15.4)
FIBRINOGEN PPP-MCNC: <60 MG/DL (ref 219–464)
GLOBULIN UR ELPH-MCNC: 3.9 GM/DL
GLUCOSE BLDC GLUCOMTR-MCNC: 113 MG/DL (ref 70–105)
GLUCOSE BLDC GLUCOMTR-MCNC: 122 MG/DL (ref 70–105)
GLUCOSE BLDC GLUCOMTR-MCNC: 142 MG/DL (ref 70–105)
GLUCOSE BLDC GLUCOMTR-MCNC: 83 MG/DL (ref 70–105)
GLUCOSE SERPL-MCNC: 173 MG/DL (ref 65–99)
HAPTOGLOB SERPL-MCNC: 45 MG/DL (ref 30–200)
HCT VFR BLD AUTO: 27 % (ref 37.5–51)
HGB BLD-MCNC: 9 G/DL (ref 13–17.7)
INR PPP: 5.4 (ref 0.9–1.1)
INR PPP: 5.51 (ref 0.9–1.1)
LARGE PLATELETS: ABNORMAL
LYMPHOCYTES # BLD MANUAL: 1.83 10*3/MM3 (ref 0.7–3.1)
LYMPHOCYTES NFR BLD MANUAL: 1 % (ref 5–12)
MAGNESIUM SERPL-MCNC: 1.7 MG/DL (ref 1.6–2.4)
MCH RBC QN AUTO: 29.2 PG (ref 26.6–33)
MCHC RBC AUTO-ENTMCNC: 33.3 G/DL (ref 31.5–35.7)
MCV RBC AUTO: 87.7 FL (ref 79–97)
METAMYELOCYTES NFR BLD MANUAL: 1 % (ref 0–0)
MICROCYTES BLD QL: ABNORMAL
MONOCYTES # BLD: 0.14 10*3/MM3 (ref 0.1–0.9)
NEUTROPHILS # BLD AUTO: 11.94 10*3/MM3 (ref 1.7–7)
NEUTROPHILS NFR BLD MANUAL: 41 % (ref 42.7–76)
NEUTS BAND NFR BLD MANUAL: 44 % (ref 0–5)
PHOSPHATE SERPL-MCNC: 4.1 MG/DL (ref 2.5–4.5)
PLATELET # BLD AUTO: 53 10*3/MM3 (ref 140–450)
PMV BLD AUTO: 11.1 FL (ref 6–12)
POIKILOCYTOSIS BLD QL SMEAR: ABNORMAL
POTASSIUM SERPL-SCNC: 3.8 MMOL/L (ref 3.5–5.2)
PROT SERPL-MCNC: 5.8 G/DL (ref 6–8.5)
PROTHROMBIN TIME: 50.5 SECONDS (ref 11.7–14.2)
PROTHROMBIN TIME: 51.3 SECONDS (ref 11.7–14.2)
QT INTERVAL: 437 MS
QTC INTERVAL: 537 MS
RBC # BLD AUTO: 3.08 10*6/MM3 (ref 4.14–5.8)
SCAN SLIDE: NORMAL
SMALL PLATELETS BLD QL SMEAR: ABNORMAL
SMUDGE CELLS BLD QL SMEAR: ABNORMAL
SODIUM SERPL-SCNC: 137 MMOL/L (ref 136–145)
SODIUM UR-SCNC: 26 MMOL/L
UNIT  ABO: NORMAL
UNIT  RH: NORMAL
VARIANT LYMPHS NFR BLD MANUAL: 4 % (ref 0–5)
VARIANT LYMPHS NFR BLD MANUAL: 9 % (ref 19.6–45.3)
WBC NRBC COR # BLD AUTO: 14.05 10*3/MM3 (ref 3.4–10.8)

## 2025-08-26 PROCEDURE — 83605 ASSAY OF LACTIC ACID: CPT | Performed by: INTERNAL MEDICINE

## 2025-08-26 PROCEDURE — 25010000002 PIPERACILLIN SOD-TAZOBACTAM PER 1 G: Performed by: NURSE PRACTITIONER

## 2025-08-26 PROCEDURE — 85610 PROTHROMBIN TIME: CPT | Performed by: STUDENT IN AN ORGANIZED HEALTH CARE EDUCATION/TRAINING PROGRAM

## 2025-08-26 PROCEDURE — 85384 FIBRINOGEN ACTIVITY: CPT | Performed by: STUDENT IN AN ORGANIZED HEALTH CARE EDUCATION/TRAINING PROGRAM

## 2025-08-26 PROCEDURE — 25010000002 CEFTRIAXONE PER 250 MG: Performed by: NURSE PRACTITIONER

## 2025-08-26 PROCEDURE — 85730 THROMBOPLASTIN TIME PARTIAL: CPT | Performed by: STUDENT IN AN ORGANIZED HEALTH CARE EDUCATION/TRAINING PROGRAM

## 2025-08-26 PROCEDURE — 83735 ASSAY OF MAGNESIUM: CPT

## 2025-08-26 PROCEDURE — 25010000002 MICAFUNGIN SODIUM 100 MG RECONSTITUTED SOLUTION 1 EACH VIAL: Performed by: NURSE PRACTITIONER

## 2025-08-26 PROCEDURE — 80053 COMPREHEN METABOLIC PANEL: CPT | Performed by: NURSE PRACTITIONER

## 2025-08-26 PROCEDURE — 85025 COMPLETE CBC W/AUTO DIFF WBC: CPT | Performed by: NURSE PRACTITIONER

## 2025-08-26 PROCEDURE — 84100 ASSAY OF PHOSPHORUS: CPT

## 2025-08-26 PROCEDURE — 85007 BL SMEAR W/DIFF WBC COUNT: CPT | Performed by: NURSE PRACTITIONER

## 2025-08-26 PROCEDURE — 25010000002: Performed by: FAMILY MEDICINE

## 2025-08-26 PROCEDURE — 82948 REAGENT STRIP/BLOOD GLUCOSE: CPT

## 2025-08-26 PROCEDURE — 85610 PROTHROMBIN TIME: CPT | Performed by: NURSE PRACTITIONER

## 2025-08-26 PROCEDURE — 71250 CT THORAX DX C-: CPT

## 2025-08-26 PROCEDURE — 25810000003 SODIUM CHLORIDE 0.9 % SOLUTION: Performed by: INTERNAL MEDICINE

## 2025-08-26 RX ORDER — PHYTONADIONE 2 MG/ML
10 INJECTION, EMULSION INTRAMUSCULAR; INTRAVENOUS; SUBCUTANEOUS ONCE
Status: COMPLETED | OUTPATIENT
Start: 2025-08-26 | End: 2025-08-26

## 2025-08-26 RX ORDER — POLYETHYLENE GLYCOL 3350 17 G/17G
17 POWDER, FOR SOLUTION ORAL DAILY
Status: DISCONTINUED | OUTPATIENT
Start: 2025-08-26 | End: 2025-08-27

## 2025-08-26 RX ORDER — SODIUM CHLORIDE 9 MG/ML
75 INJECTION, SOLUTION INTRAVENOUS CONTINUOUS
Status: DISCONTINUED | OUTPATIENT
Start: 2025-08-26 | End: 2025-08-27

## 2025-08-26 RX ADMIN — Medication 10 ML: at 08:53

## 2025-08-26 RX ADMIN — PANTOPRAZOLE SODIUM 40 MG: 40 TABLET, DELAYED RELEASE ORAL at 17:01

## 2025-08-26 RX ADMIN — PIPERACILLIN AND TAZOBACTAM 3.38 G: 3; .375 INJECTION, POWDER, FOR SOLUTION INTRAVENOUS at 08:53

## 2025-08-26 RX ADMIN — SODIUM CHLORIDE 75 ML/HR: 900 INJECTION, SOLUTION INTRAVENOUS at 08:52

## 2025-08-26 RX ADMIN — Medication 10 ML: at 22:03

## 2025-08-26 RX ADMIN — MICAFUNGIN 100 MG: 20 INJECTION, POWDER, LYOPHILIZED, FOR SOLUTION INTRAVENOUS at 16:03

## 2025-08-26 RX ADMIN — PANTOPRAZOLE SODIUM 40 MG: 40 TABLET, DELAYED RELEASE ORAL at 08:52

## 2025-08-26 RX ADMIN — POLYETHYLENE GLYCOL 3350 17 G: 17 POWDER, FOR SOLUTION ORAL at 16:03

## 2025-08-26 RX ADMIN — LEVOTHYROXINE SODIUM 50 MCG: 50 TABLET ORAL at 08:52

## 2025-08-26 RX ADMIN — SODIUM CHLORIDE 75 ML/HR: 900 INJECTION, SOLUTION INTRAVENOUS at 22:25

## 2025-08-26 RX ADMIN — PHYTONADIONE 10 MG: 10 INJECTION, EMULSION INTRAMUSCULAR; INTRAVENOUS; SUBCUTANEOUS at 10:29

## 2025-08-26 RX ADMIN — SODIUM CHLORIDE 2000 MG: 900 INJECTION INTRAVENOUS at 16:03

## 2025-08-27 LAB
ALBUMIN SERPL-MCNC: 1.9 G/DL (ref 3.5–5.2)
ALBUMIN/GLOB SERPL: 0.5 G/DL
ALP SERPL-CCNC: 46 U/L (ref 39–117)
ALT SERPL W P-5'-P-CCNC: 17 U/L (ref 1–41)
ANION GAP SERPL CALCULATED.3IONS-SCNC: 11.2 MMOL/L (ref 5–15)
ANISOCYTOSIS BLD QL: ABNORMAL
AST SERPL-CCNC: 39 U/L (ref 1–40)
BILIRUB SERPL-MCNC: 1.7 MG/DL (ref 0–1.2)
BUN SERPL-MCNC: 42.7 MG/DL (ref 8–23)
BUN/CREAT SERPL: 16 (ref 7–25)
CALCIUM SPEC-SCNC: 7.2 MG/DL (ref 8.6–10.5)
CHLORIDE SERPL-SCNC: 104 MMOL/L (ref 98–107)
CO2 SERPL-SCNC: 19.8 MMOL/L (ref 22–29)
CREAT SERPL-MCNC: 2.67 MG/DL (ref 0.76–1.27)
DACRYOCYTES BLD QL SMEAR: ABNORMAL
DEPRECATED RDW RBC AUTO: 54.6 FL (ref 37–54)
EGFRCR SERPLBLD CKD-EPI 2021: 24 ML/MIN/1.73
ERYTHROCYTE [DISTWIDTH] IN BLOOD BY AUTOMATED COUNT: 17.7 % (ref 12.3–15.4)
FIBRINOGEN PPP-MCNC: 116 MG/DL (ref 219–464)
FIBRINOGEN PPP-MCNC: <60 MG/DL (ref 219–464)
FSP PPP-MCNC: 5 UG/ML
GLOBULIN UR ELPH-MCNC: 3.9 GM/DL
GLUCOSE BLDC GLUCOMTR-MCNC: 101 MG/DL (ref 70–105)
GLUCOSE BLDC GLUCOMTR-MCNC: 80 MG/DL (ref 70–105)
GLUCOSE BLDC GLUCOMTR-MCNC: 82 MG/DL (ref 70–105)
GLUCOSE BLDC GLUCOMTR-MCNC: 86 MG/DL (ref 70–105)
GLUCOSE SERPL-MCNC: 109 MG/DL (ref 65–99)
HCT VFR BLD AUTO: 28.8 % (ref 37.5–51)
HGB BLD-MCNC: 9.8 G/DL (ref 13–17.7)
INR PPP: 1.73 (ref 0.9–1.1)
INR PPP: 5.81 (ref 0.9–1.1)
LYMPHOCYTES # BLD MANUAL: 5.25 10*3/MM3 (ref 0.7–3.1)
LYMPHOCYTES NFR BLD MANUAL: 5 % (ref 5–12)
MAGNESIUM SERPL-MCNC: 1.8 MG/DL (ref 1.6–2.4)
MCH RBC QN AUTO: 29.8 PG (ref 26.6–33)
MCHC RBC AUTO-ENTMCNC: 34 G/DL (ref 31.5–35.7)
MCV RBC AUTO: 87.5 FL (ref 79–97)
METAMYELOCYTES NFR BLD MANUAL: 1 % (ref 0–0)
MONOCYTES # BLD: 0.73 10*3/MM3 (ref 0.1–0.9)
NEUTROPHILS # BLD AUTO: 8.31 10*3/MM3 (ref 1.7–7)
NEUTROPHILS NFR BLD MANUAL: 42 % (ref 42.7–76)
NEUTS BAND NFR BLD MANUAL: 15 % (ref 0–5)
PHOSPHATE SERPL-MCNC: 4.6 MG/DL (ref 2.5–4.5)
PLASMA CELL PREC NFR BLD MANUAL: 1 % (ref 0–0)
PLAT MORPH BLD: NORMAL
PLATELET # BLD AUTO: 42 10*3/MM3 (ref 140–450)
PMV BLD AUTO: ABNORMAL FL
POIKILOCYTOSIS BLD QL SMEAR: ABNORMAL
POTASSIUM SERPL-SCNC: 3.9 MMOL/L (ref 3.5–5.2)
PROT SERPL-MCNC: 5.8 G/DL (ref 6–8.5)
PROTHROMBIN TIME: 20.3 SECONDS (ref 11.7–14.2)
PROTHROMBIN TIME: 53.6 SECONDS (ref 11.7–14.2)
RBC # BLD AUTO: 3.29 10*6/MM3 (ref 4.14–5.8)
SCAN SLIDE: NORMAL
SODIUM SERPL-SCNC: 135 MMOL/L (ref 136–145)
VARIANT LYMPHS NFR BLD MANUAL: 30 % (ref 19.6–45.3)
VARIANT LYMPHS NFR BLD MANUAL: 6 % (ref 0–5)
WBC NRBC COR # BLD AUTO: 14.58 10*3/MM3 (ref 3.4–10.8)

## 2025-08-27 PROCEDURE — 85007 BL SMEAR W/DIFF WBC COUNT: CPT | Performed by: NURSE PRACTITIONER

## 2025-08-27 PROCEDURE — 25010000002: Performed by: STUDENT IN AN ORGANIZED HEALTH CARE EDUCATION/TRAINING PROGRAM

## 2025-08-27 PROCEDURE — 82948 REAGENT STRIP/BLOOD GLUCOSE: CPT

## 2025-08-27 PROCEDURE — 80053 COMPREHEN METABOLIC PANEL: CPT | Performed by: NURSE PRACTITIONER

## 2025-08-27 PROCEDURE — 84100 ASSAY OF PHOSPHORUS: CPT

## 2025-08-27 PROCEDURE — 85384 FIBRINOGEN ACTIVITY: CPT | Performed by: STUDENT IN AN ORGANIZED HEALTH CARE EDUCATION/TRAINING PROGRAM

## 2025-08-27 PROCEDURE — 85610 PROTHROMBIN TIME: CPT | Performed by: STUDENT IN AN ORGANIZED HEALTH CARE EDUCATION/TRAINING PROGRAM

## 2025-08-27 PROCEDURE — 25010000002 MICAFUNGIN SODIUM 100 MG RECONSTITUTED SOLUTION 1 EACH VIAL: Performed by: NURSE PRACTITIONER

## 2025-08-27 PROCEDURE — 25010000002 CEFTRIAXONE PER 250 MG: Performed by: NURSE PRACTITIONER

## 2025-08-27 PROCEDURE — 85025 COMPLETE CBC W/AUTO DIFF WBC: CPT | Performed by: NURSE PRACTITIONER

## 2025-08-27 PROCEDURE — 36430 TRANSFUSION BLD/BLD COMPNT: CPT

## 2025-08-27 PROCEDURE — 86927 PLASMA FRESH FROZEN: CPT

## 2025-08-27 PROCEDURE — 82595 ASSAY OF CRYOGLOBULIN: CPT | Performed by: STUDENT IN AN ORGANIZED HEALTH CARE EDUCATION/TRAINING PROGRAM

## 2025-08-27 PROCEDURE — 83735 ASSAY OF MAGNESIUM: CPT

## 2025-08-27 PROCEDURE — 82948 REAGENT STRIP/BLOOD GLUCOSE: CPT | Performed by: NURSE PRACTITIONER

## 2025-08-27 PROCEDURE — 85610 PROTHROMBIN TIME: CPT | Performed by: NURSE PRACTITIONER

## 2025-08-27 PROCEDURE — P9012 CRYOPRECIPITATE EACH UNIT: HCPCS

## 2025-08-27 RX ORDER — PHYTONADIONE 2 MG/ML
10 INJECTION, EMULSION INTRAMUSCULAR; INTRAVENOUS; SUBCUTANEOUS ONCE
Status: DISCONTINUED | OUTPATIENT
Start: 2025-08-27 | End: 2025-08-27

## 2025-08-27 RX ADMIN — Medication 10 ML: at 21:39

## 2025-08-27 RX ADMIN — PANTOPRAZOLE SODIUM 40 MG: 40 TABLET, DELAYED RELEASE ORAL at 16:45

## 2025-08-27 RX ADMIN — POLYETHYLENE GLYCOL 3350 17 G: 17 POWDER, FOR SOLUTION ORAL at 08:13

## 2025-08-27 RX ADMIN — PHYTONADIONE 10 MG: 10 INJECTION, EMULSION INTRAMUSCULAR; INTRAVENOUS; SUBCUTANEOUS at 10:15

## 2025-08-27 RX ADMIN — SODIUM CHLORIDE 2000 MG: 900 INJECTION INTRAVENOUS at 15:45

## 2025-08-27 RX ADMIN — MICAFUNGIN 100 MG: 20 INJECTION, POWDER, LYOPHILIZED, FOR SOLUTION INTRAVENOUS at 14:47

## 2025-08-27 RX ADMIN — AVOBENZONE, HOMOSALATE, OCTISALATE, OCTOCRYLENE, AND OXYBENZONE 1 PACKET: 29.4; 147; 49; 25.4; 58.8 LOTION TOPICAL at 16:45

## 2025-08-27 RX ADMIN — PANTOPRAZOLE SODIUM 40 MG: 40 TABLET, DELAYED RELEASE ORAL at 08:13

## 2025-08-27 RX ADMIN — LEVOTHYROXINE SODIUM 50 MCG: 50 TABLET ORAL at 08:13

## 2025-08-27 RX ADMIN — Medication 10 ML: at 08:13

## 2025-08-28 LAB
ALBUMIN SERPL-MCNC: 1.9 G/DL (ref 3.5–5.2)
ALBUMIN/GLOB SERPL: 0.5 G/DL
ALP SERPL-CCNC: 53 U/L (ref 39–117)
ALT SERPL W P-5'-P-CCNC: 17 U/L (ref 1–41)
ANION GAP SERPL CALCULATED.3IONS-SCNC: 13.5 MMOL/L (ref 5–15)
AST SERPL-CCNC: 34 U/L (ref 1–40)
BACTERIA UR QL AUTO: ABNORMAL /HPF
BH BB BLOOD EXPIRATION DATE: NORMAL
BH BB BLOOD EXPIRATION DATE: NORMAL
BH BB BLOOD TYPE BARCODE: 5100
BH BB BLOOD TYPE BARCODE: 5100
BH BB DISPENSE STATUS: NORMAL
BH BB DISPENSE STATUS: NORMAL
BH BB PRODUCT CODE: NORMAL
BH BB PRODUCT CODE: NORMAL
BH BB UNIT NUMBER: NORMAL
BH BB UNIT NUMBER: NORMAL
BILIRUB SERPL-MCNC: 1.6 MG/DL (ref 0–1.2)
BILIRUB UR QL STRIP: NEGATIVE
BUN SERPL-MCNC: 46.9 MG/DL (ref 8–23)
BUN/CREAT SERPL: 14.8 (ref 7–25)
CALCIUM SPEC-SCNC: 7.6 MG/DL (ref 8.6–10.5)
CHLORIDE SERPL-SCNC: 105 MMOL/L (ref 98–107)
CLARITY UR: ABNORMAL
CO2 SERPL-SCNC: 18.5 MMOL/L (ref 22–29)
COLOR UR: ABNORMAL
CREAT SERPL-MCNC: 3.17 MG/DL (ref 0.76–1.27)
CREAT UR-MCNC: 93 MG/DL
DACRYOCYTES BLD QL SMEAR: ABNORMAL
DEPRECATED RDW RBC AUTO: 55.5 FL (ref 37–54)
EGFRCR SERPLBLD CKD-EPI 2021: 19.5 ML/MIN/1.73
ERYTHROCYTE [DISTWIDTH] IN BLOOD BY AUTOMATED COUNT: 18 % (ref 12.3–15.4)
FIBRINOGEN PPP-MCNC: 97 MG/DL (ref 219–464)
GLOBULIN UR ELPH-MCNC: 4 GM/DL
GLUCOSE BLDC GLUCOMTR-MCNC: 101 MG/DL (ref 70–105)
GLUCOSE BLDC GLUCOMTR-MCNC: 119 MG/DL (ref 70–105)
GLUCOSE BLDC GLUCOMTR-MCNC: 128 MG/DL (ref 70–105)
GLUCOSE BLDC GLUCOMTR-MCNC: 63 MG/DL (ref 70–105)
GLUCOSE SERPL-MCNC: 146 MG/DL (ref 65–99)
GLUCOSE UR STRIP-MCNC: NEGATIVE MG/DL
HCT VFR BLD AUTO: 27.7 % (ref 37.5–51)
HGB BLD-MCNC: 9.1 G/DL (ref 13–17.7)
HGB UR QL STRIP.AUTO: ABNORMAL
HYALINE CASTS UR QL AUTO: ABNORMAL /LPF
INR PPP: 1.95 (ref 0.9–1.1)
KETONES UR QL STRIP: ABNORMAL
LARGE PLATELETS: ABNORMAL
LEUKOCYTE ESTERASE UR QL STRIP.AUTO: ABNORMAL
LYMPHOCYTES # BLD MANUAL: 2.75 10*3/MM3 (ref 0.7–3.1)
LYMPHOCYTES NFR BLD MANUAL: 1 % (ref 5–12)
Lab: ABNORMAL
MAGNESIUM SERPL-MCNC: 1.8 MG/DL (ref 1.6–2.4)
MCH RBC QN AUTO: 29.1 PG (ref 26.6–33)
MCHC RBC AUTO-ENTMCNC: 32.9 G/DL (ref 31.5–35.7)
MCV RBC AUTO: 88.5 FL (ref 79–97)
METAMYELOCYTES NFR BLD MANUAL: 4 % (ref 0–0)
MONOCYTES # BLD: 0.16 10*3/MM3 (ref 0.1–0.9)
NEUTROPHILS # BLD AUTO: 12.62 10*3/MM3 (ref 1.7–7)
NEUTROPHILS NFR BLD MANUAL: 49 % (ref 42.7–76)
NEUTS BAND NFR BLD MANUAL: 29 % (ref 0–5)
NITRITE UR QL STRIP: NEGATIVE
PH UR STRIP.AUTO: <=5 [PH] (ref 5–8)
PHOSPHATE SERPL-MCNC: 5.4 MG/DL (ref 2.5–4.5)
PLATELET # BLD AUTO: 41 10*3/MM3 (ref 140–450)
PMV BLD AUTO: ABNORMAL FL
POIKILOCYTOSIS BLD QL SMEAR: ABNORMAL
POLYCHROMASIA BLD QL SMEAR: ABNORMAL
POTASSIUM SERPL-SCNC: 3.8 MMOL/L (ref 3.5–5.2)
PROT ?TM UR-MCNC: 186.9 MG/DL
PROT SERPL-MCNC: 5.9 G/DL (ref 6–8.5)
PROT UR QL STRIP: ABNORMAL
PROT/CREAT UR: 2009.7 MG/G CREA (ref 0–200)
PROTHROMBIN TIME: 22.4 SECONDS (ref 11.7–14.2)
RBC # BLD AUTO: 3.13 10*6/MM3 (ref 4.14–5.8)
RBC # UR STRIP: ABNORMAL /HPF
REF LAB TEST METHOD: ABNORMAL
SCAN SLIDE: NORMAL
SMALL PLATELETS BLD QL SMEAR: ABNORMAL
SODIUM SERPL-SCNC: 137 MMOL/L (ref 136–145)
SP GR UR STRIP: 1.02 (ref 1–1.03)
SPERM URNS QL MICRO: ABNORMAL /HPF
SQUAMOUS #/AREA URNS HPF: ABNORMAL /HPF
UNIT  ABO: NORMAL
UNIT  ABO: NORMAL
UNIT  RH: NORMAL
UNIT  RH: NORMAL
UROBILINOGEN UR QL STRIP: ABNORMAL
VARIANT LYMPHS NFR BLD MANUAL: 1 % (ref 0–5)
VARIANT LYMPHS NFR BLD MANUAL: 16 % (ref 19.6–45.3)
WBC # UR STRIP: ABNORMAL /HPF
WBC MORPH BLD: NORMAL
WBC NRBC COR # BLD AUTO: 16.18 10*3/MM3 (ref 3.4–10.8)

## 2025-08-28 PROCEDURE — 85007 BL SMEAR W/DIFF WBC COUNT: CPT | Performed by: NURSE PRACTITIONER

## 2025-08-28 PROCEDURE — 25010000002 CEFTRIAXONE PER 250 MG: Performed by: NURSE PRACTITIONER

## 2025-08-28 PROCEDURE — 85610 PROTHROMBIN TIME: CPT | Performed by: NURSE PRACTITIONER

## 2025-08-28 PROCEDURE — 97116 GAIT TRAINING THERAPY: CPT

## 2025-08-28 PROCEDURE — 82570 ASSAY OF URINE CREATININE: CPT | Performed by: INTERNAL MEDICINE

## 2025-08-28 PROCEDURE — 84156 ASSAY OF PROTEIN URINE: CPT | Performed by: INTERNAL MEDICINE

## 2025-08-28 PROCEDURE — 25010000002: Performed by: FAMILY MEDICINE

## 2025-08-28 PROCEDURE — 82948 REAGENT STRIP/BLOOD GLUCOSE: CPT | Performed by: NURSE PRACTITIONER

## 2025-08-28 PROCEDURE — 85025 COMPLETE CBC W/AUTO DIFF WBC: CPT | Performed by: NURSE PRACTITIONER

## 2025-08-28 PROCEDURE — 81001 URINALYSIS AUTO W/SCOPE: CPT | Performed by: INTERNAL MEDICINE

## 2025-08-28 PROCEDURE — 25010000002 MICAFUNGIN SODIUM 100 MG RECONSTITUTED SOLUTION 1 EACH VIAL: Performed by: NURSE PRACTITIONER

## 2025-08-28 PROCEDURE — 85384 FIBRINOGEN ACTIVITY: CPT | Performed by: STUDENT IN AN ORGANIZED HEALTH CARE EDUCATION/TRAINING PROGRAM

## 2025-08-28 PROCEDURE — 97110 THERAPEUTIC EXERCISES: CPT

## 2025-08-28 PROCEDURE — 80053 COMPREHEN METABOLIC PANEL: CPT | Performed by: NURSE PRACTITIONER

## 2025-08-28 PROCEDURE — 84100 ASSAY OF PHOSPHORUS: CPT

## 2025-08-28 PROCEDURE — 83735 ASSAY OF MAGNESIUM: CPT

## 2025-08-28 PROCEDURE — 97530 THERAPEUTIC ACTIVITIES: CPT

## 2025-08-28 PROCEDURE — 82948 REAGENT STRIP/BLOOD GLUCOSE: CPT

## 2025-08-28 RX ORDER — TORSEMIDE 10 MG/1
20 TABLET ORAL DAILY
Status: DISCONTINUED | OUTPATIENT
Start: 2025-08-28 | End: 2025-08-29 | Stop reason: HOSPADM

## 2025-08-28 RX ORDER — TORSEMIDE 20 MG/1
20 TABLET ORAL DAILY
Qty: 30 TABLET | Refills: 0 | Status: SHIPPED | OUTPATIENT
Start: 2025-08-29 | End: 2025-09-28

## 2025-08-28 RX ORDER — PANTOPRAZOLE SODIUM 40 MG/1
40 TABLET, DELAYED RELEASE ORAL
Qty: 60 TABLET | Refills: 0 | Status: SHIPPED | OUTPATIENT
Start: 2025-08-28 | End: 2025-09-27

## 2025-08-28 RX ORDER — CLONAZEPAM 0.5 MG/1
0.25 TABLET ORAL 2 TIMES DAILY PRN
Status: DISCONTINUED | OUTPATIENT
Start: 2025-08-28 | End: 2025-08-29 | Stop reason: HOSPADM

## 2025-08-28 RX ADMIN — LEVOTHYROXINE SODIUM 50 MCG: 50 TABLET ORAL at 10:18

## 2025-08-28 RX ADMIN — ACETAMINOPHEN 650 MG: 325 TABLET, FILM COATED ORAL at 10:24

## 2025-08-28 RX ADMIN — ACETAMINOPHEN 650 MG: 325 TABLET, FILM COATED ORAL at 20:57

## 2025-08-28 RX ADMIN — PANTOPRAZOLE SODIUM 40 MG: 40 TABLET, DELAYED RELEASE ORAL at 10:18

## 2025-08-28 RX ADMIN — AVOBENZONE, HOMOSALATE, OCTISALATE, OCTOCRYLENE, AND OXYBENZONE 1 PACKET: 29.4; 147; 49; 25.4; 58.8 LOTION TOPICAL at 10:18

## 2025-08-28 RX ADMIN — Medication 10 ML: at 10:18

## 2025-08-28 RX ADMIN — SODIUM CHLORIDE 2000 MG: 900 INJECTION INTRAVENOUS at 17:01

## 2025-08-28 RX ADMIN — Medication 5 MG: at 20:57

## 2025-08-28 RX ADMIN — TORSEMIDE 20 MG: 10 TABLET ORAL at 11:28

## 2025-08-28 RX ADMIN — Medication 10 ML: at 20:58

## 2025-08-28 RX ADMIN — PHYTONADIONE 10 MG: 10 INJECTION, EMULSION INTRAMUSCULAR; INTRAVENOUS; SUBCUTANEOUS at 11:29

## 2025-08-28 RX ADMIN — PANTOPRAZOLE SODIUM 40 MG: 40 TABLET, DELAYED RELEASE ORAL at 18:03

## 2025-08-28 RX ADMIN — CLONAZEPAM 0.25 MG: 0.5 TABLET ORAL at 20:57

## 2025-08-28 RX ADMIN — CLONAZEPAM 0.25 MG: 0.5 TABLET ORAL at 04:06

## 2025-08-28 RX ADMIN — MICAFUNGIN 100 MG: 20 INJECTION, POWDER, LYOPHILIZED, FOR SOLUTION INTRAVENOUS at 15:27

## 2025-08-29 VITALS
DIASTOLIC BLOOD PRESSURE: 57 MMHG | TEMPERATURE: 97.4 F | OXYGEN SATURATION: 100 % | RESPIRATION RATE: 16 BRPM | HEART RATE: 83 BPM | BODY MASS INDEX: 21.4 KG/M2 | SYSTOLIC BLOOD PRESSURE: 99 MMHG | HEIGHT: 66 IN | WEIGHT: 133.16 LBS

## 2025-08-29 LAB
ALBUMIN SERPL-MCNC: 1.9 G/DL (ref 3.5–5.2)
ALBUMIN/GLOB SERPL: 0.5 G/DL
ALP SERPL-CCNC: 70 U/L (ref 39–117)
ALT SERPL W P-5'-P-CCNC: 19 U/L (ref 1–41)
ANION GAP SERPL CALCULATED.3IONS-SCNC: 14.7 MMOL/L (ref 5–15)
AST SERPL-CCNC: 36 U/L (ref 1–40)
BACTERIA SPEC AEROBE CULT: NORMAL
BACTERIA SPEC AEROBE CULT: NORMAL
BILIRUB SERPL-MCNC: 1.5 MG/DL (ref 0–1.2)
BUN SERPL-MCNC: 51 MG/DL (ref 8–23)
BUN/CREAT SERPL: 12.9 (ref 7–25)
CALCIUM SPEC-SCNC: 7.7 MG/DL (ref 8.6–10.5)
CHLORIDE SERPL-SCNC: 102 MMOL/L (ref 98–107)
CO2 SERPL-SCNC: 17.3 MMOL/L (ref 22–29)
CREAT SERPL-MCNC: 3.96 MG/DL (ref 0.76–1.27)
DEPRECATED RDW RBC AUTO: 55.9 FL (ref 37–54)
EGFRCR SERPLBLD CKD-EPI 2021: 15 ML/MIN/1.73
ERYTHROCYTE [DISTWIDTH] IN BLOOD BY AUTOMATED COUNT: 18 % (ref 12.3–15.4)
GLOBULIN UR ELPH-MCNC: 4 GM/DL
GLUCOSE BLDC GLUCOMTR-MCNC: 102 MG/DL (ref 70–105)
GLUCOSE BLDC GLUCOMTR-MCNC: 79 MG/DL (ref 70–105)
GLUCOSE SERPL-MCNC: 136 MG/DL (ref 65–99)
HCT VFR BLD AUTO: 27.5 % (ref 37.5–51)
HGB BLD-MCNC: 9.1 G/DL (ref 13–17.7)
HYPOCHROMIA BLD QL: ABNORMAL
INR PPP: 2.09 (ref 0.9–1.1)
LYMPHOCYTES # BLD MANUAL: 4.93 10*3/MM3 (ref 0.7–3.1)
LYMPHOCYTES NFR BLD MANUAL: 1 % (ref 5–12)
MAGNESIUM SERPL-MCNC: 1.7 MG/DL (ref 1.6–2.4)
MCH RBC QN AUTO: 29.5 PG (ref 26.6–33)
MCHC RBC AUTO-ENTMCNC: 33.1 G/DL (ref 31.5–35.7)
MCV RBC AUTO: 89.3 FL (ref 79–97)
MONOCYTES # BLD: 0.18 10*3/MM3 (ref 0.1–0.9)
NEUTROPHILS # BLD AUTO: 12.5 10*3/MM3 (ref 1.7–7)
NEUTROPHILS NFR BLD MANUAL: 56 % (ref 42.7–76)
NEUTS BAND NFR BLD MANUAL: 15 % (ref 0–5)
NRBC SPEC MANUAL: 1 /100 WBC (ref 0–0.2)
PHOSPHATE SERPL-MCNC: 5.1 MG/DL (ref 2.5–4.5)
PLATELET # BLD AUTO: 35 10*3/MM3 (ref 140–450)
PMV BLD AUTO: 11 FL (ref 6–12)
POIKILOCYTOSIS BLD QL SMEAR: ABNORMAL
POTASSIUM SERPL-SCNC: 3.7 MMOL/L (ref 3.5–5.2)
PROT SERPL-MCNC: 5.9 G/DL (ref 6–8.5)
PROTHROMBIN TIME: 23.6 SECONDS (ref 11.7–14.2)
RBC # BLD AUTO: 3.08 10*6/MM3 (ref 4.14–5.8)
SCAN SLIDE: NORMAL
SMALL PLATELETS BLD QL SMEAR: ABNORMAL
SMUDGE CELLS BLD QL SMEAR: ABNORMAL
SODIUM SERPL-SCNC: 134 MMOL/L (ref 136–145)
STOMATOCYTES BLD QL SMEAR: ABNORMAL
VARIANT LYMPHS NFR BLD MANUAL: 25 % (ref 19.6–45.3)
VARIANT LYMPHS NFR BLD MANUAL: 3 % (ref 0–5)
WBC NRBC COR # BLD AUTO: 17.6 10*3/MM3 (ref 3.4–10.8)

## 2025-08-29 PROCEDURE — 82948 REAGENT STRIP/BLOOD GLUCOSE: CPT

## 2025-08-29 PROCEDURE — 85025 COMPLETE CBC W/AUTO DIFF WBC: CPT | Performed by: NURSE PRACTITIONER

## 2025-08-29 PROCEDURE — 85610 PROTHROMBIN TIME: CPT | Performed by: NURSE PRACTITIONER

## 2025-08-29 PROCEDURE — 85007 BL SMEAR W/DIFF WBC COUNT: CPT | Performed by: NURSE PRACTITIONER

## 2025-08-29 PROCEDURE — 84100 ASSAY OF PHOSPHORUS: CPT

## 2025-08-29 PROCEDURE — 80053 COMPREHEN METABOLIC PANEL: CPT | Performed by: NURSE PRACTITIONER

## 2025-08-29 PROCEDURE — 63710000001 ONDANSETRON ODT 4 MG TABLET DISPERSIBLE

## 2025-08-29 PROCEDURE — 82948 REAGENT STRIP/BLOOD GLUCOSE: CPT | Performed by: NURSE PRACTITIONER

## 2025-08-29 PROCEDURE — 83735 ASSAY OF MAGNESIUM: CPT

## 2025-08-29 RX ORDER — OXYCODONE HYDROCHLORIDE 5 MG/1
5 TABLET ORAL EVERY 4 HOURS PRN
Qty: 18 TABLET | Refills: 0 | Status: SHIPPED | OUTPATIENT
Start: 2025-08-29 | End: 2025-09-01

## 2025-08-29 RX ADMIN — LEVOTHYROXINE SODIUM 50 MCG: 50 TABLET ORAL at 09:09

## 2025-08-29 RX ADMIN — ACETAMINOPHEN 650 MG: 325 TABLET, FILM COATED ORAL at 09:11

## 2025-08-29 RX ADMIN — ONDANSETRON 4 MG: 4 TABLET, ORALLY DISINTEGRATING ORAL at 14:47

## 2025-08-29 RX ADMIN — TORSEMIDE 20 MG: 10 TABLET ORAL at 09:09

## 2025-08-29 RX ADMIN — Medication 10 ML: at 09:09

## 2025-08-29 RX ADMIN — AVOBENZONE, HOMOSALATE, OCTISALATE, OCTOCRYLENE, AND OXYBENZONE 1 PACKET: 29.4; 147; 49; 25.4; 58.8 LOTION TOPICAL at 09:09

## (undated) DEVICE — CATH DIAG IMPULSE IMT 6F 100CM

## (undated) DEVICE — PK ENDO GI 50

## (undated) DEVICE — BAPTIST FLOYD BRONCHOSCOPY: Brand: MEDLINE INDUSTRIES, INC.

## (undated) DEVICE — PRESSURE TUBING: Brand: TRUWAVE

## (undated) DEVICE — PINNACLE INTRODUCER SHEATH: Brand: PINNACLE

## (undated) DEVICE — ELECTRD DEFIB M/FUNC PROPADZ RADIOL 2PK

## (undated) DEVICE — PRESSURE MONITORING ACCESSORY: Brand: TRUWAVE

## (undated) DEVICE — ANGIO-SEAL VIP VASCULAR CLOSURE DEVICE: Brand: ANGIO-SEAL

## (undated) DEVICE — ST ACC MICROPUNCTURE STFF/CANN PLAT/TP 4F 21G 40CM

## (undated) DEVICE — ST NDL BRONCH ASP VIZISHOT 2 FLX 22G

## (undated) DEVICE — Device: Brand: BALLOON

## (undated) DEVICE — BITEBLOCK ENDO W/STRAP 60F A/ LF DISP

## (undated) DEVICE — CATH DIAG IMPULSE FL4 6F 100CM

## (undated) DEVICE — CATH DIAG IMPULSE PIG .056 6F 110CM

## (undated) DEVICE — PK TRY HEART CATH 50

## (undated) DEVICE — SINGLE-USE BIOPSY FORCEPS: Brand: RADIAL JAW 4

## (undated) DEVICE — HIGH PERFORMANCE GUIDEWIRE: Brand: DREAMWIRE

## (undated) DEVICE — DEV INFL CRE STERIFLATE 60CC DISP

## (undated) DEVICE — ESOPHAGEAL BALLOON DILATATION CATHETER: Brand: CRE FIXED WIRE

## (undated) DEVICE — GW PTFE EMERALD HEPCOAT FC J TIP STD .035 3MM 150CM

## (undated) DEVICE — CATH DIAG IMPULSE AR2 6F 100CM

## (undated) DEVICE — CATH DIAG IMPULSE FR4 6F 100CM